# Patient Record
Sex: MALE | Race: WHITE | NOT HISPANIC OR LATINO | Employment: OTHER | URBAN - METROPOLITAN AREA
[De-identification: names, ages, dates, MRNs, and addresses within clinical notes are randomized per-mention and may not be internally consistent; named-entity substitution may affect disease eponyms.]

---

## 2018-06-20 ENCOUNTER — APPOINTMENT (EMERGENCY)
Dept: RADIOLOGY | Facility: HOSPITAL | Age: 63
End: 2018-06-20
Payer: COMMERCIAL

## 2018-06-20 ENCOUNTER — HOSPITAL ENCOUNTER (EMERGENCY)
Facility: HOSPITAL | Age: 63
Discharge: HOME | End: 2018-06-20
Attending: EMERGENCY MEDICINE | Admitting: EMERGENCY MEDICINE
Payer: COMMERCIAL

## 2018-06-20 VITALS
TEMPERATURE: 98.9 F | WEIGHT: 210 LBS | DIASTOLIC BLOOD PRESSURE: 68 MMHG | BODY MASS INDEX: 27.71 KG/M2 | RESPIRATION RATE: 18 BRPM | OXYGEN SATURATION: 97 % | SYSTOLIC BLOOD PRESSURE: 110 MMHG | HEART RATE: 82 BPM

## 2018-06-20 DIAGNOSIS — F10.929 ACUTE ALCOHOL INTOXICATION (HCC): Primary | ICD-10-CM

## 2018-06-20 DIAGNOSIS — S51.019A SKIN TEAR OF ELBOW WITHOUT COMPLICATION: ICD-10-CM

## 2018-06-20 DIAGNOSIS — S09.90XA INJURY OF HEAD, INITIAL ENCOUNTER: ICD-10-CM

## 2018-06-20 LAB
ALBUMIN SERPL BCP-MCNC: 3.4 G/DL (ref 3.5–5)
ALP SERPL-CCNC: 100 U/L (ref 46–116)
ALT SERPL W P-5'-P-CCNC: 28 U/L (ref 12–78)
ANION GAP SERPL CALCULATED.3IONS-SCNC: 11 MMOL/L (ref 4–13)
APAP SERPL-MCNC: <2 UG/ML (ref 10–30)
APTT PPP: 28 SECONDS (ref 24–36)
AST SERPL W P-5'-P-CCNC: 15 U/L (ref 5–45)
BASOPHILS # BLD AUTO: 0.09 THOUSANDS/ΜL (ref 0–0.1)
BASOPHILS NFR BLD AUTO: 1 % (ref 0–1)
BILIRUB SERPL-MCNC: 0.34 MG/DL (ref 0.2–1)
BILIRUB UR QL STRIP: NEGATIVE
BUN SERPL-MCNC: 6 MG/DL (ref 5–25)
CALCIUM SERPL-MCNC: 8.3 MG/DL (ref 8.3–10.1)
CHLORIDE SERPL-SCNC: 97 MMOL/L (ref 100–108)
CLARITY UR: CLEAR
CO2 SERPL-SCNC: 23 MMOL/L (ref 21–32)
COLOR UR: YELLOW
COLOR, POC: NORMAL
CREAT SERPL-MCNC: 0.52 MG/DL (ref 0.6–1.3)
EOSINOPHIL # BLD AUTO: 0.38 THOUSAND/ΜL (ref 0–0.61)
EOSINOPHIL NFR BLD AUTO: 5 % (ref 0–6)
ERYTHROCYTE [DISTWIDTH] IN BLOOD BY AUTOMATED COUNT: 13.3 % (ref 11.6–15.1)
ETHANOL SERPL-MCNC: 233 MG/DL (ref 0–3)
GFR SERPL CREATININE-BSD FRML MDRD: 113 ML/MIN/1.73SQ M
GLUCOSE SERPL-MCNC: 209 MG/DL (ref 65–140)
GLUCOSE UR STRIP-MCNC: NEGATIVE MG/DL
HCT VFR BLD AUTO: 38.3 % (ref 36.5–49.3)
HGB BLD-MCNC: 12.9 G/DL (ref 12–17)
HGB UR QL STRIP.AUTO: NEGATIVE
IMM GRANULOCYTES # BLD AUTO: 0.05 THOUSAND/UL (ref 0–0.2)
IMM GRANULOCYTES NFR BLD AUTO: 1 % (ref 0–2)
INR PPP: 0.96 (ref 0.86–1.17)
KETONES UR STRIP-MCNC: NEGATIVE MG/DL
LEUKOCYTE ESTERASE UR QL STRIP: NEGATIVE
LYMPHOCYTES # BLD AUTO: 3.15 THOUSANDS/ΜL (ref 0.6–4.47)
LYMPHOCYTES NFR BLD AUTO: 43 % (ref 14–44)
MCH RBC QN AUTO: 29.1 PG (ref 26.8–34.3)
MCHC RBC AUTO-ENTMCNC: 33.7 G/DL (ref 31.4–37.4)
MCV RBC AUTO: 86 FL (ref 82–98)
MONOCYTES # BLD AUTO: 0.64 THOUSAND/ΜL (ref 0.17–1.22)
MONOCYTES NFR BLD AUTO: 9 % (ref 4–12)
NEUTROPHILS # BLD AUTO: 2.99 THOUSANDS/ΜL (ref 1.85–7.62)
NEUTS SEG NFR BLD AUTO: 41 % (ref 43–75)
NITRITE UR QL STRIP: NEGATIVE
NRBC BLD AUTO-RTO: 0 /100 WBCS
PH UR STRIP.AUTO: 6 [PH] (ref 4.5–8)
PLATELET # BLD AUTO: 420 THOUSANDS/UL (ref 149–390)
PMV BLD AUTO: 9.8 FL (ref 8.9–12.7)
POTASSIUM SERPL-SCNC: 4 MMOL/L (ref 3.5–5.3)
PROT SERPL-MCNC: 6.6 G/DL (ref 6.4–8.2)
PROT UR STRIP-MCNC: NEGATIVE MG/DL
PROTHROMBIN TIME: 12.9 SECONDS (ref 11.8–14.2)
RBC # BLD AUTO: 4.44 MILLION/UL (ref 3.88–5.62)
SALICYLATES SERPL-MCNC: <3 MG/DL (ref 3–20)
SODIUM SERPL-SCNC: 131 MMOL/L (ref 136–145)
SP GR UR STRIP.AUTO: 1.01 (ref 1–1.03)
UROBILINOGEN UR QL STRIP.AUTO: 0.2 E.U./DL
WBC # BLD AUTO: 7.3 THOUSAND/UL (ref 4.31–10.16)

## 2018-06-20 PROCEDURE — 81003 URINALYSIS AUTO W/O SCOPE: CPT

## 2018-06-20 PROCEDURE — 96360 HYDRATION IV INFUSION INIT: CPT

## 2018-06-20 PROCEDURE — 85730 THROMBOPLASTIN TIME PARTIAL: CPT | Performed by: EMERGENCY MEDICINE

## 2018-06-20 PROCEDURE — 80320 DRUG SCREEN QUANTALCOHOLS: CPT | Performed by: EMERGENCY MEDICINE

## 2018-06-20 PROCEDURE — 85610 PROTHROMBIN TIME: CPT | Performed by: EMERGENCY MEDICINE

## 2018-06-20 PROCEDURE — 80053 COMPREHEN METABOLIC PANEL: CPT | Performed by: EMERGENCY MEDICINE

## 2018-06-20 PROCEDURE — 90471 IMMUNIZATION ADMIN: CPT

## 2018-06-20 PROCEDURE — 96361 HYDRATE IV INFUSION ADD-ON: CPT

## 2018-06-20 PROCEDURE — 70450 CT HEAD/BRAIN W/O DYE: CPT

## 2018-06-20 PROCEDURE — 99284 EMERGENCY DEPT VISIT MOD MDM: CPT

## 2018-06-20 PROCEDURE — 73080 X-RAY EXAM OF ELBOW: CPT

## 2018-06-20 PROCEDURE — 70480 CT ORBIT/EAR/FOSSA W/O DYE: CPT

## 2018-06-20 PROCEDURE — 80329 ANALGESICS NON-OPIOID 1 OR 2: CPT | Performed by: EMERGENCY MEDICINE

## 2018-06-20 PROCEDURE — 90715 TDAP VACCINE 7 YRS/> IM: CPT | Performed by: EMERGENCY MEDICINE

## 2018-06-20 PROCEDURE — 36415 COLL VENOUS BLD VENIPUNCTURE: CPT | Performed by: EMERGENCY MEDICINE

## 2018-06-20 PROCEDURE — 72125 CT NECK SPINE W/O DYE: CPT

## 2018-06-20 PROCEDURE — 85025 COMPLETE CBC W/AUTO DIFF WBC: CPT | Performed by: EMERGENCY MEDICINE

## 2018-06-20 RX ORDER — GLIMEPIRIDE 1 MG/1
1 TABLET ORAL DAILY
COMMUNITY
End: 2020-02-19

## 2018-06-20 RX ORDER — ASPIRIN 81 MG/1
TABLET, CHEWABLE ORAL
COMMUNITY
End: 2020-02-19

## 2018-06-20 RX ORDER — QUETIAPINE FUMARATE 25 MG
1 TABLET ORAL
COMMUNITY

## 2018-06-20 RX ORDER — LISINOPRIL 40 MG/1
40 TABLET ORAL DAILY
COMMUNITY

## 2018-06-20 RX ORDER — LORAZEPAM 1 MG/1
1 TABLET ORAL 2 TIMES DAILY
COMMUNITY
End: 2020-02-19

## 2018-06-20 RX ORDER — LORAZEPAM 0.5 MG/1
TABLET ORAL EVERY 6 HOURS PRN
COMMUNITY

## 2018-06-20 RX ORDER — CITALOPRAM 20 MG/1
10 TABLET ORAL DAILY
COMMUNITY

## 2018-06-20 RX ORDER — CLOPIDOGREL BISULFATE 75 MG/1
TABLET ORAL
COMMUNITY

## 2018-06-20 RX ORDER — IPRATROPIUM BROMIDE AND ALBUTEROL SULFATE 2.5; .5 MG/3ML; MG/3ML
SOLUTION RESPIRATORY (INHALATION)
COMMUNITY
End: 2020-02-21 | Stop reason: HOSPADM

## 2018-06-20 RX ORDER — FLUTICASONE PROPIONATE 50 MCG
1 SPRAY, SUSPENSION (ML) NASAL 2 TIMES DAILY
COMMUNITY
Start: 2013-10-04

## 2018-06-20 RX ORDER — PANTOPRAZOLE SODIUM 40 MG/1
1 TABLET, DELAYED RELEASE ORAL 2 TIMES DAILY
COMMUNITY

## 2018-06-20 RX ORDER — ALBUTEROL SULFATE 90 UG/1
2 AEROSOL, METERED RESPIRATORY (INHALATION) 4 TIMES DAILY PRN
Status: ON HOLD | COMMUNITY
End: 2020-02-21 | Stop reason: SDUPTHER

## 2018-06-20 RX ORDER — METOPROLOL SUCCINATE 100 MG/1
100 TABLET, EXTENDED RELEASE ORAL DAILY
COMMUNITY

## 2018-06-20 RX ORDER — OXYCODONE HYDROCHLORIDE 5 MG/1
1 CAPSULE ORAL EVERY 4 HOURS PRN
COMMUNITY

## 2018-06-20 RX ADMIN — SODIUM CHLORIDE 1000 ML: 0.9 INJECTION, SOLUTION INTRAVENOUS at 13:29

## 2018-06-20 RX ADMIN — TETANUS TOXOID, REDUCED DIPHTHERIA TOXOID AND ACELLULAR PERTUSSIS VACCINE, ADSORBED 0.5 ML: 5; 2.5; 8; 8; 2.5 SUSPENSION INTRAMUSCULAR at 13:59

## 2018-06-20 NOTE — DISCHARGE INSTRUCTIONS
Alcohol Intoxication   WHAT YOU NEED TO KNOW:   Alcohol intoxication is a harmful physical condition caused when you drink more alcohol than your body can handle  It is also called ethanol poisoning, or being drunk  DISCHARGE INSTRUCTIONS:   Medicine: You may be given medicine to manage the signs and symptoms of alcohol intoxication  Take your medicine as directed  Contact your healthcare provider if you think your medicine is not helping or if you have side effects  Tell him if you are allergic to any medicine  Keep a list of the medicines, vitamins, and herbs you take  Include the amounts, and when and why you take them  Bring the list or the pill bottles to follow-up visits  Keep the list with you in case of emergency  Follow up with your healthcare provider as directed:  Write down your questions so you remember to ask them during your visits  Limit or avoid alcohol:  Men should not have more than 2 drinks per day  Women should not have more than 1 drink per day  A drink is 12 ounces of beer, 5 ounces of wine, or 1½ ounces of liquor  Do not drive or operate machines when you drink alcohol:  Make sure you always have someone to drive you when you drink alcohol  For more information:   · Alcoholics Anonymous  Web Address: http://www [employer]/  Contact your healthcare provider if:   · You need help to stop drinking alcohol  · You have trouble with work or school because you drink too much alcohol  · You have physical or verbal fights because of alcohol  · You have questions or concerns about your condition or care  Return to the emergency department if:   · You have sudden trouble breathing or chest pain  · You have a seizure  · You feel sad enough to harm yourself or others  · You have hallucinations (you see or hear things that are not real)  · You cannot stop vomiting  · You were in an accident because of alcohol    © 2017 [employer] Information is for End User's use only and may not be sold, redistributed or otherwise used for commercial purposes  All illustrations and images included in CareNotes® are the copyrighted property of [employer]  or [employer]  The above information is an  only  It is not intended as medical advice for individual conditions or treatments  Talk to your doctor, nurse or pharmacist before following any medical regimen to see if it is safe and effective for you

## 2018-06-20 NOTE — ED PROVIDER NOTES
History  Chief Complaint   Patient presents with    Fall     pt ambulating as per EMS pt stumbeling and fell into garbage can sustaining skin tear to left forearm area, pt with 3week old bruisng and hematomas on forehead and face, no LOC pt reports drinking arrpox 4 beers today pt is on plavix     Patient is a 69-year-old male presenting to the ER today status post fall  Patient was drinking at a local bar and states that he had 4 drinks  States he drank 4 beers specifically  States he left the bar and fell forward striking his head  This was witnessed  No loss of consciousness was noted  Patient was able to get himself up but bystanders called EMS and patient was brought to the ER for further evaluation treatment  Patient takes aspirin and Plavix daily  Fall   Associated symptoms: headaches and neck pain    Associated symptoms: no abdominal pain, no nausea and no vomiting        Prior to Admission Medications   Prescriptions Last Dose Informant Patient Reported? Taking?    B Complex Vitamins (VITAMIN B COMPLEX 100 IJ)   Yes Yes   Sig: Take 1 tablet by mouth daily   LORazepam (ATIVAN) 0 5 mg tablet   Yes Yes   Sig: Take by mouth   LORazepam (ATIVAN) 1 mg tablet   Yes Yes   Sig: Take 1 tablet by mouth 2 (two) times a day   Niacin-Simvastatin ER (SIMCOR) 500-40 MG TB24   Yes Yes   Sig: Take 1 tablet by mouth daily   QUEtiapine (SEROQUEL) 25 mg tablet   Yes Yes   Sig: Take 1 tablet by mouth   albuterol (PROVENTIL HFA,VENTOLIN HFA) 90 mcg/act inhaler   Yes Yes   Sig: Inhale 2 puffs 4 (four) times a day as needed   aspirin 81 mg chewable tablet   Yes Yes   Sig: Chew   betamethasone valerate (VALISONE) 0 1 % cream   Yes Yes   Sig: Apply topically 2 (two) times a day   citalopram (CELEXA) 20 mg tablet   Yes Yes   Sig: Take by mouth   clopidogrel (PLAVIX) 75 mg tablet   Yes Yes   Sig: Take by mouth   fluticasone (FLONASE) 50 mcg/act nasal spray   Yes Yes   Si spray into each nostril 2 (two) times a day glimepiride (AMARYL) 1 mg tablet   Yes Yes   Sig: Take 1 tablet by mouth daily   ipratropium-albuterol (COMBIVENT)  mcg/act inhaler   Yes Yes   Sig: Inhale   ipratropium-albuterol (DUONEB) 0 5-2 5 mg/3 mL nebulizer solution   Yes Yes   Sig: Inhale   lisinopril (ZESTRIL) 40 mg tablet   Yes Yes   Sig: Take by mouth   metFORMIN (GLUCOPHAGE) 1000 MG tablet   Yes Yes   Sig: Take 1 tablet by mouth every 12 (twelve) hours   metoprolol succinate (TOPROL XL) 100 mg 24 hr tablet   Yes Yes   Sig: Take by mouth   oxyCODONE (OXY-IR) 5 MG capsule   Yes Yes   Sig: Take 1 capsule by mouth every 4 (four) hours as needed   pantoprazole (PROTONIX) 40 mg tablet   Yes Yes   Sig: Take 1 tablet by mouth Twice daily   sitaGLIPtin (JANUVIA) 50 mg tablet   Yes Yes   Sig: Take by mouth      Facility-Administered Medications: None       Past Medical History:   Diagnosis Date    Cardiac arrest ([facility] )     Diabetes mellitus ([facility] )        History reviewed  No pertinent surgical history  History reviewed  No pertinent family history  I have reviewed and agree with the history as documented  Social History   Substance Use Topics    Smoking status: Unknown If Ever Smoked    Smokeless tobacco: Not on file    Alcohol use 18 0 oz/week     30 Cans of beer per week        Review of Systems   Constitutional: Negative for activity change, appetite change, chills, fatigue and fever  HENT: Negative  Eyes: Negative  Respiratory: Negative  Cardiovascular: Negative  Gastrointestinal: Negative for abdominal distention, abdominal pain, blood in stool, constipation, diarrhea, nausea and vomiting  Endocrine: Negative  Genitourinary: Negative for decreased urine volume, difficulty urinating, dysuria, enuresis, flank pain, frequency, hematuria, penile swelling, scrotal swelling, testicular pain and urgency  Musculoskeletal: Positive for arthralgias and neck pain  Skin: Positive for wound  Allergic/Immunologic: Negative  Neurological: Positive for headaches  Hematological: Negative  Psychiatric/Behavioral: Negative  All other systems reviewed and are negative  Physical Exam  ED Triage Vitals   Temperature Pulse Respirations Blood Pressure SpO2   06/20/18 1311 06/20/18 1311 06/20/18 1311 06/20/18 1311 06/20/18 1311   98 9 °F (37 2 °C) 86 18 118/70 98 %      Temp Source Heart Rate Source Patient Position - Orthostatic VS BP Location FiO2 (%)   06/20/18 1311 06/20/18 1400 06/20/18 1400 06/20/18 1400 --   Tympanic Monitor Lying Right arm       Pain Score       06/20/18 1311       4           Orthostatic Vital Signs  Vitals:    06/20/18 1311 06/20/18 1400 06/20/18 1547 06/20/18 1600   BP: 118/70 116/67 92/55 110/68   Pulse: 86 79 68 82   Patient Position - Orthostatic VS:  Lying         Physical Exam   Constitutional: He is oriented to person, place, and time  He appears well-developed and well-nourished  HENT:   Head:       Right Ear: External ear normal    Left Ear: External ear normal    Nose: Nose normal    Mouth/Throat: Oropharynx is clear and moist    Eyes: Conjunctivae and EOM are normal  Pupils are equal, round, and reactive to light  Neck: Normal range of motion  Neck supple  No JVD present  No tracheal deviation present  No thyromegaly present  Cardiovascular: Normal rate, regular rhythm, normal heart sounds and intact distal pulses  Exam reveals no gallop and no friction rub  No murmur heard  Pulmonary/Chest: Effort normal and breath sounds normal  No stridor  No respiratory distress  He has no wheezes  He has no rales  He exhibits no tenderness  Abdominal: Soft  Bowel sounds are normal  He exhibits no distension and no mass  There is no tenderness  There is no rebound and no guarding  No hernia  Musculoskeletal: Normal range of motion  He exhibits no edema, tenderness or deformity  Lymphadenopathy:     He has no cervical adenopathy     Neurological: He is alert and oriented to person, place, and time  He has normal reflexes  He displays normal reflexes  No cranial nerve deficit  He exhibits normal muscle tone  Coordination normal    Skin: Skin is warm  No rash noted  No erythema  No pallor  Psychiatric: He has a normal mood and affect  His behavior is normal  Judgment and thought content normal    Nursing note and vitals reviewed  ED Medications  Medications   sodium chloride 0 9 % bolus 1,000 mL (0 mL Intravenous Stopped 6/20/18 1556)   tetanus-diphtheria-acellular pertussis (BOOSTRIX) IM injection 0 5 mL (0 5 mL Intramuscular Given 6/20/18 1359)       Diagnostic Studies  Results Reviewed     Procedure Component Value Units Date/Time    Comprehensive metabolic panel [[id number]]  (Abnormal) Collected:  06/20/18 1329    Lab Status:  Final result Specimen:  Blood from Hand, Right Updated:  06/20/18 1414     Sodium 131 (L) mmol/L      Potassium 4 0 mmol/L      Chloride 97 (L) mmol/L      CO2 23 mmol/L      Anion Gap 11 mmol/L      BUN 6 mg/dL      Creatinine 0 52 (L) mg/dL      Glucose 209 (H) mg/dL      Calcium 8 3 mg/dL      AST 15 U/L      ALT 28 U/L      Alkaline Phosphatase 100 U/L      Total Protein 6 6 g/dL      Albumin 3 4 (L) g/dL      Total Bilirubin 0 34 mg/dL      eGFR 113 ml/min/1 73sq m     Narrative:         National Kidney Disease Education Program recommendations are as follows:  GFR calculation is accurate only with a steady state creatinine  Chronic Kidney disease less than 60 ml/min/1 73 sq  meters  Kidney failure less than 15 ml/min/1 73 sq  meters      Salicylate level [[id number]]  (Abnormal) Collected:  06/20/18 1329    Lab Status:  Final result Specimen:  Blood from Hand, Right Updated:  46/77/86 1178     Salicylate Lvl <3 (L) mg/dL     Acetaminophen level [[id number]]  (Abnormal) Collected:  06/20/18 1329    Lab Status:  Final result Specimen:  Blood from Hand, Right Updated:  06/20/18 1414     Acetaminophen Level <2 (L) ug/mL     Ethanol [[id number]]  (Abnormal) Collected:  06/20/18 1329    Lab Status:  Final result Specimen:  Blood from Hand, Right Updated:  06/20/18 1412     Ethanol Lvl 233 (H) mg/dL     Protime-INR [[id number]]  (Normal) Collected:  06/20/18 1329    Lab Status:  Final result Specimen:  Blood from Hand, Right Updated:  06/20/18 1357     Protime 12 9 seconds      INR 0 96    APTT [[id number]]  (Normal) Collected:  06/20/18 1329    Lab Status:  Final result Specimen:  Blood from Hand, Right Updated:  06/20/18 1357     PTT 28 seconds     CBC and differential [[id number]]  (Abnormal) Collected:  06/20/18 1329    Lab Status:  Final result Specimen:  Blood from Hand, Right Updated:  06/20/18 1345     WBC 7 30 Thousand/uL      RBC 4 44 Million/uL      Hemoglobin 12 9 g/dL      Hematocrit 38 3 %      MCV 86 fL      MCH 29 1 pg      MCHC 33 7 g/dL      RDW 13 3 %      MPV 9 8 fL      Platelets 298 (H) Thousands/uL      nRBC 0 /100 WBCs      Neutrophils Relative 41 (L) %      Immat GRANS % 1 %      Lymphocytes Relative 43 %      Monocytes Relative 9 %      Eosinophils Relative 5 %      Basophils Relative 1 %      Neutrophils Absolute 2 99 Thousands/µL      Immature Grans Absolute 0 05 Thousand/uL      Lymphocytes Absolute 3 15 Thousands/µL      Monocytes Absolute 0 64 Thousand/µL      Eosinophils Absolute 0 38 Thousand/µL      Basophils Absolute 0 09 Thousands/µL     POCT urinalysis dipstick [[id number]]  (Normal) Resulted:  06/20/18 1340    Lab Status:  Final result Specimen:  Urine Updated:  06/20/18 1340     Color, UA see results    ED Urine Macroscopic [[id number]] Collected:  06/20/18 1346    Lab Status:  Final result Specimen:  Urine Updated:  06/20/18 1339     Color, UA Yellow     Clarity, UA Clear     pH, UA 6 0     Leukocytes, UA Negative     Nitrite, UA Negative     Protein, UA Negative mg/dl      Glucose, UA Negative mg/dl      Ketones, UA Negative mg/dl      Urobilinogen, UA 0 2 E U /dl      Bilirubin, UA Negative     Blood, UA Negative     Specific Gravity, UA 1 010    Narrative: CLINITEK RESULT                 CT orbits/temporal bones/skull base wo contrast   Final Result by Fifi Blanchard DO (06/20 1526)   No orbital fracture is seen  Soft tissue hematoma right forehead  Workstation performed: [id number]         CT head without contrast   Final Result by Fifi Blanchard DO (06/20 1520)   No intracranial hemorrhage or territorial infarct  Workstation performed: [id number]         CT cervical spine without contrast   Final Result by Fifi Blanchard DO (06/20 1518)   Postop changes and degenerative changes  Mild progression of degenerative disc disease at C6-C7 since prior exam                    Workstation performed: [id number]         XR elbow 3+ views LEFT   Final Result by Rio Samson MD (06/20 1511)      No acute osseous abnormality              Workstation performed: [id number]               Procedures  Procedures      Phone Consults  ED Phone Contact    ED Course  ED Course as of Jun 21 2054 Wed Jun 20, 2018   1549 MEDICAL ALCOHOL: (!) 233                               MDM  Number of Diagnoses or Management Options  Acute alcohol intoxication [facility]): new and requires workup  Injury of head, initial encounter: new and requires workup  Skin tear of elbow without complication: new and requires workup     Amount and/or Complexity of Data Reviewed  Clinical lab tests: ordered and reviewed  Tests in the radiology section of CPT®: ordered and reviewed  Tests in the medicine section of CPT®: reviewed and ordered  Decide to obtain previous medical records or to obtain history from someone other than the patient: yes  Independent visualization of images, tracings, or specimens: yes    Patient Progress  Patient progress: stable    CritCare Time    Disposition  Final diagnoses:   Acute alcohol intoxication ([facility] )   Skin tear of elbow without complication   Injury of head, initial encounter     Time reflects when diagnosis was documented in both MDM as applicable and the Disposition within this note     Time User Action Codes Description Comment    6/20/2018  4:20 PM Heidy Rough Add [F10 929] Acute alcohol intoxication ([address] )     6/20/2018  4:20 PM Heidy Rough Add [U91 064A] Skin tear of elbow without complication     0/13/4083  4:20 PM Heidy Rough Add [S09 90XA] Injury of head, initial encounter       ED Disposition     ED Disposition Condition Comment    Discharge  Yessica Sabillon discharge to home/self care  Condition at discharge: Good        Follow-up Information     Follow up With Specialties Details Why Contact Info    Master Crews MD Internal Medicine Schedule an appointment as soon as possible for a visit  [address] [place] [address]  [phone]            Discharge Medication List as of 6/20/2018  4:21 PM      CONTINUE these medications which have NOT CHANGED    Details   albuterol (PROVENTIL HFA,VENTOLIN HFA) 90 mcg/act inhaler Inhale 2 puffs 4 (four) times a day as needed, Historical Med      aspirin 81 mg chewable tablet Chew, Historical Med      B Complex Vitamins (VITAMIN B COMPLEX 100 IJ) Take 1 tablet by mouth daily, Historical Med      betamethasone valerate (VALISONE) 0 1 % cream Apply topically 2 (two) times a day, Starting Fri 10/4/2013, Historical Med      citalopram (CELEXA) 20 mg tablet Take by mouth, Historical Med      clopidogrel (PLAVIX) 75 mg tablet Take by mouth, Historical Med      fluticasone (FLONASE) 50 mcg/act nasal spray 1 spray into each nostril 2 (two) times a day, Starting Fri 10/4/2013, Historical Med      glimepiride (AMARYL) 1 mg tablet Take 1 tablet by mouth daily, Historical Med      ipratropium-albuterol (COMBIVENT)  mcg/act inhaler Inhale, Historical Med      ipratropium-albuterol (DUONEB) 0 5-2 5 mg/3 mL nebulizer solution Inhale, Historical Med      lisinopril (ZESTRIL) 40 mg tablet Take by mouth, Historical Med      !!  LORazepam (ATIVAN) 0 5 mg tablet Take by mouth, Historical Med !! LORazepam (ATIVAN) 1 mg tablet Take 1 tablet by mouth 2 (two) times a day, Historical Med      metFORMIN (GLUCOPHAGE) 1000 MG tablet Take 1 tablet by mouth every 12 (twelve) hours, Historical Med      metoprolol succinate (TOPROL XL) 100 mg 24 hr tablet Take by mouth, Historical Med      Niacin-Simvastatin ER (SIMCOR) 500-40 MG TB24 Take 1 tablet by mouth daily, Historical Med      oxyCODONE (OXY-IR) 5 MG capsule Take 1 capsule by mouth every 4 (four) hours as needed, Historical Med      pantoprazole (PROTONIX) 40 mg tablet Take 1 tablet by mouth Twice daily, Historical Med      QUEtiapine (SEROQUEL) 25 mg tablet Take 1 tablet by mouth, Historical Med      sitaGLIPtin (JANUVIA) 50 mg tablet Take by mouth, Historical Med       !! - Potential duplicate medications found  Please discuss with provider  No discharge procedures on file  ED Provider  Attending physically available and evaluated Yisselavery Hernandesgenaro MCCOY managed the patient along with the ED Attending      Electronically Signed by         Lorenza Meigs, DO  06/21/18 2054

## 2018-06-24 NOTE — ED ATTENDING ATTESTATION
Samy Healy MD, saw and evaluated the patient  I have discussed the patient with the resident/non-physician practitioner and agree with the resident's/non-physician practitioner's findings, Plan of Care, and MDM as documented in the resident's/non-physician practitioner's note, except where noted  All available labs and Radiology studies were reviewed  At this point I agree with the current assessment done in the Emergency Department  I have conducted an independent evaluation of this patient a history and physical is as follows:  Patient was drinking at the bar and had a fall  Patient also has noted ecchymosis to its eyes bilaterally and face bilaterally periorbital early patient states that he fell a couple weeks ago but was not seen or evaluated at that time  Patient is intoxicated at this time and has no complaints of other than a headache  PE alert there is ecchymosis and swelling noted to the anterior forehead or specially over the right    There is bilateral periorbital ecchymosis noted TMs are clear neck is nontender and in collar heart regular lungs clear abdomen soft nondistended neurologically the patient is intact MDM:  Will do CT of head and neck check alcohol level and allowed to sober    Critical Care Time  CritCare Time    Procedures

## 2018-09-09 ENCOUNTER — APPOINTMENT (EMERGENCY)
Dept: RADIOLOGY | Facility: HOSPITAL | Age: 63
DRG: 471 | End: 2018-09-09
Payer: COMMERCIAL

## 2018-09-09 ENCOUNTER — APPOINTMENT (INPATIENT)
Dept: RADIOLOGY | Facility: HOSPITAL | Age: 63
DRG: 471 | End: 2018-09-09
Payer: COMMERCIAL

## 2018-09-09 ENCOUNTER — HOSPITAL ENCOUNTER (INPATIENT)
Facility: HOSPITAL | Age: 63
LOS: 5 days | DRG: 471 | End: 2018-09-14
Attending: SURGERY | Admitting: EMERGENCY MEDICINE
Payer: COMMERCIAL

## 2018-09-09 DIAGNOSIS — S12.9XXA: ICD-10-CM

## 2018-09-09 DIAGNOSIS — S12.290A: ICD-10-CM

## 2018-09-09 DIAGNOSIS — S12.110A DENS FRACTURE (HCC): Primary | ICD-10-CM

## 2018-09-09 DIAGNOSIS — Z98.1 S/P CERVICAL SPINAL FUSION: ICD-10-CM

## 2018-09-09 DIAGNOSIS — L89.159 DECUBITUS ULCER OF SACRAL REGION, UNSPECIFIED ULCER STAGE: ICD-10-CM

## 2018-09-09 DIAGNOSIS — S12.390A OTHER CLOSED DISPLACED FRACTURE OF FOURTH CERVICAL VERTEBRA, INITIAL ENCOUNTER (HCC): ICD-10-CM

## 2018-09-09 DIAGNOSIS — S12.300D CLOSED DISPLACED FRACTURE OF FOURTH CERVICAL VERTEBRA WITH ROUTINE HEALING, UNSPECIFIED FRACTURE MORPHOLOGY, SUBSEQUENT ENCOUNTER: ICD-10-CM

## 2018-09-09 DIAGNOSIS — S12.200D CLOSED DISPLACED FRACTURE OF THIRD CERVICAL VERTEBRA WITH ROUTINE HEALING, UNSPECIFIED FRACTURE MORPHOLOGY, SUBSEQUENT ENCOUNTER: ICD-10-CM

## 2018-09-09 DIAGNOSIS — S12.100D CLOSED ODONTOID FRACTURE WITH ROUTINE HEALING: ICD-10-CM

## 2018-09-09 PROBLEM — S12.200A CLOSED DISPLACED FRACTURE OF THIRD CERVICAL VERTEBRA (HCC): Status: ACTIVE | Noted: 2018-09-09

## 2018-09-09 PROBLEM — S12.300A CLOSED DISPLACED FRACTURE OF FOURTH CERVICAL VERTEBRA (HCC): Status: ACTIVE | Noted: 2018-09-09

## 2018-09-09 PROBLEM — I25.10 CAD (CORONARY ARTERY DISEASE): Status: ACTIVE | Noted: 2018-09-09

## 2018-09-09 PROBLEM — F10.10 ALCOHOL ABUSE: Status: ACTIVE | Noted: 2018-09-09

## 2018-09-09 LAB
ANION GAP SERPL CALCULATED.3IONS-SCNC: 13 MMOL/L (ref 4–13)
ANISOCYTOSIS BLD QL SMEAR: PRESENT
BASE EXCESS BLDA CALC-SCNC: 1 MMOL/L (ref -2–3)
BASOPHILS # BLD MANUAL: 0 THOUSAND/UL (ref 0–0.1)
BASOPHILS NFR MAR MANUAL: 0 % (ref 0–1)
BUN SERPL-MCNC: 6 MG/DL (ref 5–25)
CA-I BLD-SCNC: 1.09 MMOL/L (ref 1.12–1.32)
CALCIUM SERPL-MCNC: 8.2 MG/DL (ref 8.3–10.1)
CHLORIDE SERPL-SCNC: 98 MMOL/L (ref 100–108)
CO2 SERPL-SCNC: 23 MMOL/L (ref 21–32)
CREAT SERPL-MCNC: 0.72 MG/DL (ref 0.6–1.3)
EOSINOPHIL # BLD MANUAL: 0.56 THOUSAND/UL (ref 0–0.4)
EOSINOPHIL NFR BLD MANUAL: 6 % (ref 0–6)
ERYTHROCYTE [DISTWIDTH] IN BLOOD BY AUTOMATED COUNT: 17.4 % (ref 11.6–15.1)
GFR SERPL CREATININE-BSD FRML MDRD: 99 ML/MIN/1.73SQ M
GIANT PLATELETS BLD QL SMEAR: PRESENT
GLUCOSE SERPL-MCNC: 173 MG/DL (ref 65–140)
GLUCOSE SERPL-MCNC: 184 MG/DL (ref 65–140)
GLUCOSE SERPL-MCNC: 185 MG/DL (ref 65–140)
GLUCOSE SERPL-MCNC: 221 MG/DL (ref 65–140)
GLUCOSE SERPL-MCNC: 284 MG/DL (ref 65–140)
HCO3 BLDA-SCNC: 25.4 MMOL/L (ref 24–30)
HCT VFR BLD AUTO: 42.1 % (ref 36.5–49.3)
HCT VFR BLD CALC: 42 % (ref 36.5–49.3)
HGB BLD-MCNC: 14.2 G/DL (ref 12–17)
HGB BLDA-MCNC: 14.3 G/DL (ref 12–17)
INR PPP: 0.89 (ref 0.86–1.17)
LYMPHOCYTES # BLD AUTO: 3.65 THOUSAND/UL (ref 0.6–4.47)
LYMPHOCYTES # BLD AUTO: 39 % (ref 14–44)
MCH RBC QN AUTO: 27 PG (ref 26.8–34.3)
MCHC RBC AUTO-ENTMCNC: 33.7 G/DL (ref 31.4–37.4)
MCV RBC AUTO: 80 FL (ref 82–98)
MONOCYTES # BLD AUTO: 0.56 THOUSAND/UL (ref 0–1.22)
MONOCYTES NFR BLD: 6 % (ref 4–12)
NEUTROPHILS # BLD MANUAL: 4.12 THOUSAND/UL (ref 1.85–7.62)
NEUTS BAND NFR BLD MANUAL: 3 % (ref 0–8)
NEUTS SEG NFR BLD AUTO: 41 % (ref 43–75)
NRBC BLD AUTO-RTO: 0 /100 WBCS
PA ADP BLD-ACNC: 213 PRU (ref 194–418)
PA ADP BLD-ACNC: 649 ARU
PCO2 BLD: 27 MMOL/L (ref 21–32)
PCO2 BLD: 37.2 MM HG (ref 42–50)
PH BLD: 7.44 [PH] (ref 7.3–7.4)
PLATELET # BLD AUTO: 368 THOUSANDS/UL (ref 149–390)
PLATELET BLD QL SMEAR: ADEQUATE
PMV BLD AUTO: 9.4 FL (ref 8.9–12.7)
PO2 BLD: 72 MM HG (ref 35–45)
POIKILOCYTOSIS BLD QL SMEAR: PRESENT
POLYCHROMASIA BLD QL SMEAR: PRESENT
POTASSIUM BLD-SCNC: 4.5 MMOL/L (ref 3.5–5.3)
POTASSIUM SERPL-SCNC: 4.1 MMOL/L (ref 3.5–5.3)
PROTHROMBIN TIME: 12.1 SECONDS (ref 11.8–14.2)
RBC # BLD AUTO: 5.26 MILLION/UL (ref 3.88–5.62)
RBC MORPH BLD: PRESENT
SAO2 % BLD FROM PO2: 95 % (ref 95–98)
SODIUM BLD-SCNC: 134 MMOL/L (ref 136–145)
SODIUM SERPL-SCNC: 134 MMOL/L (ref 136–145)
SPECIMEN SOURCE: ABNORMAL
TARGETS BLD QL SMEAR: PRESENT
VARIANT LYMPHS # BLD AUTO: 5 %
WBC # BLD AUTO: 9.37 THOUSAND/UL (ref 4.31–10.16)

## 2018-09-09 PROCEDURE — 85007 BL SMEAR W/DIFF WBC COUNT: CPT | Performed by: SURGERY

## 2018-09-09 PROCEDURE — 85014 HEMATOCRIT: CPT

## 2018-09-09 PROCEDURE — 36415 COLL VENOUS BLD VENIPUNCTURE: CPT | Performed by: SURGERY

## 2018-09-09 PROCEDURE — 94760 N-INVAS EAR/PLS OXIMETRY 1: CPT

## 2018-09-09 PROCEDURE — 82948 REAGENT STRIP/BLOOD GLUCOSE: CPT

## 2018-09-09 PROCEDURE — 99223 1ST HOSP IP/OBS HIGH 75: CPT | Performed by: SURGERY

## 2018-09-09 PROCEDURE — 70498 CT ANGIOGRAPHY NECK: CPT

## 2018-09-09 PROCEDURE — 84132 ASSAY OF SERUM POTASSIUM: CPT

## 2018-09-09 PROCEDURE — C9113 INJ PANTOPRAZOLE SODIUM, VIA: HCPCS | Performed by: STUDENT IN AN ORGANIZED HEALTH CARE EDUCATION/TRAINING PROGRAM

## 2018-09-09 PROCEDURE — 70450 CT HEAD/BRAIN W/O DYE: CPT

## 2018-09-09 PROCEDURE — 82330 ASSAY OF CALCIUM: CPT

## 2018-09-09 PROCEDURE — 82803 BLOOD GASES ANY COMBINATION: CPT

## 2018-09-09 PROCEDURE — 82947 ASSAY GLUCOSE BLOOD QUANT: CPT

## 2018-09-09 PROCEDURE — 72141 MRI NECK SPINE W/O DYE: CPT

## 2018-09-09 PROCEDURE — 74177 CT ABD & PELVIS W/CONTRAST: CPT

## 2018-09-09 PROCEDURE — 99285 EMERGENCY DEPT VISIT HI MDM: CPT

## 2018-09-09 PROCEDURE — 92610 EVALUATE SWALLOWING FUNCTION: CPT

## 2018-09-09 PROCEDURE — 85610 PROTHROMBIN TIME: CPT | Performed by: SURGERY

## 2018-09-09 PROCEDURE — 94660 CPAP INITIATION&MGMT: CPT

## 2018-09-09 PROCEDURE — 85576 BLOOD PLATELET AGGREGATION: CPT | Performed by: NEUROLOGICAL SURGERY

## 2018-09-09 PROCEDURE — 94640 AIRWAY INHALATION TREATMENT: CPT

## 2018-09-09 PROCEDURE — 71260 CT THORAX DX C+: CPT

## 2018-09-09 PROCEDURE — 80048 BASIC METABOLIC PNL TOTAL CA: CPT | Performed by: SURGERY

## 2018-09-09 PROCEDURE — 99254 IP/OBS CNSLTJ NEW/EST MOD 60: CPT | Performed by: NEUROLOGICAL SURGERY

## 2018-09-09 PROCEDURE — 85027 COMPLETE CBC AUTOMATED: CPT | Performed by: SURGERY

## 2018-09-09 PROCEDURE — 84295 ASSAY OF SERUM SODIUM: CPT

## 2018-09-09 PROCEDURE — 72125 CT NECK SPINE W/O DYE: CPT

## 2018-09-09 RX ORDER — IPRATROPIUM BROMIDE AND ALBUTEROL SULFATE 2.5; .5 MG/3ML; MG/3ML
3 SOLUTION RESPIRATORY (INHALATION)
Status: DISCONTINUED | OUTPATIENT
Start: 2018-09-09 | End: 2018-09-09

## 2018-09-09 RX ORDER — LORAZEPAM 2 MG/ML
1 INJECTION INTRAMUSCULAR ONCE
Status: COMPLETED | OUTPATIENT
Start: 2018-09-09 | End: 2018-09-09

## 2018-09-09 RX ORDER — DIAZEPAM 5 MG/ML
5 INJECTION, SOLUTION INTRAMUSCULAR; INTRAVENOUS EVERY 6 HOURS PRN
Status: DISCONTINUED | OUTPATIENT
Start: 2018-09-09 | End: 2018-09-10

## 2018-09-09 RX ORDER — FENTANYL CITRATE 50 UG/ML
50 INJECTION, SOLUTION INTRAMUSCULAR; INTRAVENOUS ONCE
Status: COMPLETED | OUTPATIENT
Start: 2018-09-09 | End: 2018-09-09

## 2018-09-09 RX ORDER — OXYCODONE HYDROCHLORIDE 5 MG/1
5 TABLET ORAL EVERY 4 HOURS PRN
Status: DISCONTINUED | OUTPATIENT
Start: 2018-09-09 | End: 2018-09-09

## 2018-09-09 RX ORDER — METOPROLOL TARTRATE 50 MG/1
50 TABLET, FILM COATED ORAL EVERY 12 HOURS SCHEDULED
Status: DISCONTINUED | OUTPATIENT
Start: 2018-09-09 | End: 2018-09-14 | Stop reason: HOSPADM

## 2018-09-09 RX ORDER — OXYCODONE HYDROCHLORIDE 5 MG/1
10 TABLET ORAL EVERY 4 HOURS PRN
Status: DISCONTINUED | OUTPATIENT
Start: 2018-09-09 | End: 2018-09-14 | Stop reason: HOSPADM

## 2018-09-09 RX ORDER — ALBUTEROL SULFATE 90 UG/1
2 AEROSOL, METERED RESPIRATORY (INHALATION) EVERY 4 HOURS PRN
Status: DISCONTINUED | OUTPATIENT
Start: 2018-09-09 | End: 2018-09-14 | Stop reason: HOSPADM

## 2018-09-09 RX ORDER — LEVALBUTEROL 1.25 MG/.5ML
1.25 SOLUTION, CONCENTRATE RESPIRATORY (INHALATION)
Status: DISCONTINUED | OUTPATIENT
Start: 2018-09-09 | End: 2018-09-14 | Stop reason: HOSPADM

## 2018-09-09 RX ORDER — FENTANYL CITRATE 50 UG/ML
INJECTION, SOLUTION INTRAMUSCULAR; INTRAVENOUS
Status: COMPLETED
Start: 2018-09-09 | End: 2018-09-09

## 2018-09-09 RX ORDER — ACETAMINOPHEN 325 MG/1
975 TABLET ORAL EVERY 8 HOURS SCHEDULED
Status: DISCONTINUED | OUTPATIENT
Start: 2018-09-09 | End: 2018-09-14 | Stop reason: HOSPADM

## 2018-09-09 RX ORDER — HYDROMORPHONE HCL 110MG/55ML
0.5 PATIENT CONTROLLED ANALGESIA SYRINGE INTRAVENOUS EVERY 4 HOURS PRN
Status: DISCONTINUED | OUTPATIENT
Start: 2018-09-09 | End: 2018-09-09 | Stop reason: CLARIF

## 2018-09-09 RX ORDER — SODIUM CHLORIDE 9 MG/ML
125 INJECTION, SOLUTION INTRAVENOUS CONTINUOUS
Status: DISCONTINUED | OUTPATIENT
Start: 2018-09-09 | End: 2018-09-11

## 2018-09-09 RX ORDER — OXYCODONE HYDROCHLORIDE 5 MG/1
2.5 TABLET ORAL EVERY 4 HOURS PRN
Status: DISCONTINUED | OUTPATIENT
Start: 2018-09-09 | End: 2018-09-09

## 2018-09-09 RX ORDER — OXAZEPAM 10 MG
10 CAPSULE ORAL EVERY 6 HOURS SCHEDULED
Status: DISCONTINUED | OUTPATIENT
Start: 2018-09-09 | End: 2018-09-10

## 2018-09-09 RX ORDER — ACETAMINOPHEN 325 MG/1
650 TABLET ORAL EVERY 6 HOURS PRN
Status: DISCONTINUED | OUTPATIENT
Start: 2018-09-09 | End: 2018-09-09

## 2018-09-09 RX ORDER — LIDOCAINE 50 MG/G
1 PATCH TOPICAL DAILY
Status: DISCONTINUED | OUTPATIENT
Start: 2018-09-09 | End: 2018-09-14 | Stop reason: HOSPADM

## 2018-09-09 RX ORDER — OXYCODONE HYDROCHLORIDE 10 MG/1
10 TABLET ORAL EVERY 4 HOURS PRN
Status: DISCONTINUED | OUTPATIENT
Start: 2018-09-09 | End: 2018-09-09

## 2018-09-09 RX ORDER — PANTOPRAZOLE SODIUM 40 MG/1
40 INJECTION, POWDER, FOR SOLUTION INTRAVENOUS
Status: DISCONTINUED | OUTPATIENT
Start: 2018-09-09 | End: 2018-09-10

## 2018-09-09 RX ORDER — OXYCODONE HYDROCHLORIDE 5 MG/1
5 TABLET ORAL EVERY 4 HOURS PRN
Status: DISCONTINUED | OUTPATIENT
Start: 2018-09-09 | End: 2018-09-14 | Stop reason: HOSPADM

## 2018-09-09 RX ADMIN — PANTOPRAZOLE SODIUM 40 MG: 40 INJECTION, POWDER, FOR SOLUTION INTRAVENOUS at 12:32

## 2018-09-09 RX ADMIN — LORAZEPAM 1 MG: 2 INJECTION INTRAMUSCULAR; INTRAVENOUS at 11:00

## 2018-09-09 RX ADMIN — LIDOCAINE 1 PATCH: 50 PATCH TOPICAL at 08:45

## 2018-09-09 RX ADMIN — HYDROMORPHONE HYDROCHLORIDE 0.5 MG: 1 INJECTION, SOLUTION INTRAMUSCULAR; INTRAVENOUS; SUBCUTANEOUS at 10:40

## 2018-09-09 RX ADMIN — Medication 5 MG: at 09:44

## 2018-09-09 RX ADMIN — THIAMINE HYDROCHLORIDE 100 MG: 100 INJECTION, SOLUTION INTRAMUSCULAR; INTRAVENOUS at 08:46

## 2018-09-09 RX ADMIN — OXYCODONE HYDROCHLORIDE 5 MG: 5 TABLET ORAL at 04:08

## 2018-09-09 RX ADMIN — OXYCODONE HYDROCHLORIDE 10 MG: 10 TABLET ORAL at 15:15

## 2018-09-09 RX ADMIN — METOPROLOL TARTRATE 50 MG: 50 TABLET ORAL at 14:25

## 2018-09-09 RX ADMIN — IPRATROPIUM BROMIDE 0.5 MG: 0.5 SOLUTION RESPIRATORY (INHALATION) at 19:12

## 2018-09-09 RX ADMIN — LEVALBUTEROL HYDROCHLORIDE 1.25 MG: 1.25 SOLUTION, CONCENTRATE RESPIRATORY (INHALATION) at 08:31

## 2018-09-09 RX ADMIN — ACETAMINOPHEN 975 MG: 325 TABLET ORAL at 14:12

## 2018-09-09 RX ADMIN — METOPROLOL TARTRATE 50 MG: 50 TABLET ORAL at 21:07

## 2018-09-09 RX ADMIN — FOLIC ACID 1 MG: 5 INJECTION, SOLUTION INTRAMUSCULAR; INTRAVENOUS; SUBCUTANEOUS at 12:23

## 2018-09-09 RX ADMIN — IPRATROPIUM BROMIDE 0.5 MG: 0.5 SOLUTION RESPIRATORY (INHALATION) at 08:32

## 2018-09-09 RX ADMIN — SODIUM CHLORIDE 125 ML/HR: 0.9 INJECTION, SOLUTION INTRAVENOUS at 17:27

## 2018-09-09 RX ADMIN — HYDROMORPHONE HYDROCHLORIDE 0.5 MG: 2 INJECTION INTRAMUSCULAR; INTRAVENOUS; SUBCUTANEOUS at 06:31

## 2018-09-09 RX ADMIN — LEVALBUTEROL HYDROCHLORIDE 1.25 MG: 1.25 SOLUTION, CONCENTRATE RESPIRATORY (INHALATION) at 13:33

## 2018-09-09 RX ADMIN — FENTANYL CITRATE 100 MCG: 50 INJECTION INTRAMUSCULAR; INTRAVENOUS at 08:16

## 2018-09-09 RX ADMIN — OXAZEPAM 10 MG: 10 CAPSULE, GELATIN COATED ORAL at 14:12

## 2018-09-09 RX ADMIN — IOHEXOL 100 ML: 350 INJECTION, SOLUTION INTRAVENOUS at 02:42

## 2018-09-09 RX ADMIN — OXYCODONE HYDROCHLORIDE 10 MG: 10 TABLET ORAL at 21:08

## 2018-09-09 RX ADMIN — SODIUM CHLORIDE 125 ML/HR: 0.9 INJECTION, SOLUTION INTRAVENOUS at 06:18

## 2018-09-09 RX ADMIN — IPRATROPIUM BROMIDE 0.5 MG: 0.5 SOLUTION RESPIRATORY (INHALATION) at 13:33

## 2018-09-09 RX ADMIN — FENTANYL CITRATE 100 MCG: 50 INJECTION, SOLUTION INTRAMUSCULAR; INTRAVENOUS at 08:16

## 2018-09-09 RX ADMIN — INSULIN LISPRO 2 UNITS: 100 INJECTION, SOLUTION INTRAVENOUS; SUBCUTANEOUS at 13:01

## 2018-09-09 RX ADMIN — OXYCODONE HYDROCHLORIDE 5 MG: 5 TABLET ORAL at 12:28

## 2018-09-09 RX ADMIN — OXAZEPAM 10 MG: 10 CAPSULE, GELATIN COATED ORAL at 19:05

## 2018-09-09 RX ADMIN — HYDROMORPHONE HYDROCHLORIDE 0.5 MG: 1 INJECTION, SOLUTION INTRAMUSCULAR; INTRAVENOUS; SUBCUTANEOUS at 03:04

## 2018-09-09 RX ADMIN — ACETAMINOPHEN 975 MG: 325 TABLET ORAL at 21:32

## 2018-09-09 RX ADMIN — LEVALBUTEROL HYDROCHLORIDE 1.25 MG: 1.25 SOLUTION, CONCENTRATE RESPIRATORY (INHALATION) at 19:12

## 2018-09-09 NOTE — RESPIRATORY THERAPY NOTE
RT Protocol Note  Germaine Lizarraga 58 y o  male MRN: [id number]  Unit/Bed#: ICU 09 Encounter: [id number]    Assessment    Active Problems:    * No active hospital problems  *      Home Pulmonary Medications:  TID Duoneb        Past Medical History:   Diagnosis Date    [facility]chronic obstructive [facility] disease) [facility]     CVA [facility]cerebral vascular accident) ([facility] )     Diabetes mellitus ([facility] )     Heart attack ([facility] )     Hypertension      Social History     Social History    Marital status:      Spouse name: N/A    Number of children: N/A    Years of education: N/A     Social History Main Topics    Smoking status: Former Smoker    Smokeless tobacco: Never Used    Alcohol use Yes    Drug use: No    Sexual activity: Not Asked     Other Topics Concern    None     Social History Narrative    None       Subjective         Objective    Physical Exam:   Assessment Type: (P) Assess only  General Appearance: (P) Awake, Alert  Respiratory Pattern: (P) Normal  Chest Assessment: (P) Chest expansion asymmetrical  Bilateral Breath Sounds: (P) Clear, Diminished  O2 Device: (P) NC    Vitals:  Blood pressure 149/83, pulse 90, temperature 98 3 °F (36 8 °C), temperature source Oral, resp  rate 18, height 6' 1" (1 854 m), weight 84 8 kg (186 lb 15 2 oz), SpO2 97 %  Imaging and other studies: I have personally reviewed pertinent reports  O2 Device: (P) NC     Plan    Respiratory Plan: (P) Home Bronchodilator Patient pathway        Resp Comments: (P) Pt ordered on txs so resp protocol was done, pt was a Trauma lvl B for a fall down some stairs  Pt has hx of COPD and takes TID txs at home JULIO Martin  Pt has clear bs at this time and is sating 99% on 3L NC  Will make TID txs Xopenex/Atrovent while he is here per protocol

## 2018-09-09 NOTE — SPEECH THERAPY NOTE
Speech Language/Pathology  Speech/Language Pathology  Assessment    Patient Name: Naomie Koyanagi  BCSJO'U Date: 9/9/2018     Problem List  Patient Active Problem List   Diagnosis    Closed odontoid fracture with routine healing    [facility]    Closed displaced fracture of fourth cervical vertebra ([facility]    Alcohol abuse    Decubitus ulcer of sacral region    CAD (coronary artery disease)     Past Medical History  Past Medical History:   Diagnosis Date    [facility]chronic obstructive [facility] disease) ([facility]     [facility]     Diabetes mellitus ([facility] )     Heart attack ([facility] )     Hypertension      Past Surgical History  History reviewed  No pertinent surgical history  Attestation signed by Cecilia Murillo MD at 9/9/2018 9:05 AM   I have personally seen the patient  I discussed the case with the team, including the practitioner who completed the surgical progress note and agree with the history, exam, assessment, and plan as outlined below except where otherwise noted  I personally reviewed appropriate imaging studies in PACS, laboratory data, medications, and vital signs and have noted significant findings      58year old male s/p fall down 15 stairs brought in as level B alert  Exam:  GCS 14, motor and sensory grossly intact, no deformities, no other injuries noted  Imaging: CT neck with multiple cervical spine fractures C2-5 above previous hardware  CTA neck with no traumatic vascular injury seen  Plan: admit to ICU with multiple fractures, follow neuro status, neurosurgery evaluation, hard cervical collar , spine precautions     S/p Fall down ~15 steps  - F/u CT chest, abdomen, and pelvis  History of Present Illness         HPI:  Naomie Koyanagi is a 58 y o  male who presents following a fall at home down ~15 steps  The patient is heavily intoxicated  There was no LOC  He is on plavix for cardiac stents   He is not complaining of any pain in trauma bay  His Cspine was non-tender to palpation  CT Cspine done showing multiple acute cervical fractures       Assessment:n (per neuro sx consult)  C2, C3, C4, C5 vertebral body fractures with likely anterior ligament rupture and disc rupture  He appears neuro intact except the LUE triceps appeared slightly weak or maybe pain limited weakness  MRI  IMPRESSION:     Exaggerated cervical lordosis  Fractures of the dens, C3, C4 and C5 vertebral bodies demonstrate similar alignment compared to recent CT scan  The dens is slightly displaced posteriorly in relation to the body of C2, approximately 4 mm      Extensive prevertebral edema extending from the level of the clivus inferiorly to the level of C5  There is edema within the interspinous ligaments and paraspinal musculature posteriorly  There is disruption of the anterior longitudinal ligament  However, the posterior longitudinal ligament and ligamentum flavum appear intact      Severe canal stenosis at the C3-4, C4-5 levels  Moderate canal stenosis at C5-6 and C6-7  This is related to annular bulging, endplate and posterior element hypertrophic change  Multilevel foraminal narrowing, severe at C6-7 and moderate at the C3-4,   C4-5 and C5-6 levels      Evaluation of the cord is somewhat limited due to patient motion and the severity of the canal stenosis  However, there does not appear to be cord edema to suggest acute cord injury  Summary:  Pt presents w/ "full" speech, suspect due to pharyngeal edema  Tolerated puree and honey thick by tsp given at a slow controlled rate w/ time allowed for secondary or even a third swallow  + cough w/ larger sips nectar, jello  Mild wet vocal quality w/ ice chips  He was unable to orally transfer a VERY small pill whole       Recommendations:  Diet: puree  Liquid: honey thick by tsp  Meds:crush  Supervision:full  Positioning:Upright (in aspen collar at this time)  Strategies: Pt to take PO/Meds only when fully alert and upright  Oral care  Aspiration precautions  Reflux precautions  ? Steroids/other  for edema  Monitor for s/s increased edema    Therapy Prognosis:favorable  Prognosis considerations: age, cog status  Frequency: 2-5x/week    Patient's goal: "i'd love a steak"    Consider consult w/:  Nutrition    Reason for consult:  R/o aspiration  Determine safest and least restrictive diet  Failed nursing dysphagia assessment,  when done the second time  Passed initially  Coughed w/ thin  Current diet:  npo  Premorbid diet[de-identified]  regular  Previous VBS:  none  O2 requirement:  nc  Voice/Speech:  "full" from swelling  Social:  Lives w/ daughter and grandchildren  Follows commands:  yes                       Cognitive Status:  Alert, appropriate, but when cued for small sips he stated he generally doesn't listen when people tell him what to do Mayte Gonzalez  Oral mech exam:  Full dentition  Full symmetry    Items administered:  Puree,  honey thick liquid, nectar thick liquid, ice chips, jello  Liquids were taken by straw/tsp  Oral stage: WNL w/ limited trials  Lip closure: wnl  Mastication:wnl w/ jello  Bolus formation:appeared adequate  Bolus control: wfl/mildly reduced  Transfer: wfl  Oral residue: none observed  Pocketing:none    Pharyngeal stage: Mod  Swallow promptness: mild delay  Laryngeal rise: fair/reduced (c collar in place)  Wet voice: nectar and jello, mild w/ ice chips  Throat clear: nectar and jello  Cough: nectar and jello  Secondary swallows: all trials, occasionally 3  Audible swallows: min, intermittent    Esophageal stage:  No s/s reported    Aspiration precautions posted    Results d/w:  Pt, nursing    Goal(s):  Pt will tolerate least restrictive diet w/out s/s aspiration or oral/pharyngeal difficulties  Further testing if indicated

## 2018-09-09 NOTE — H&P
H&P Exam - Trauma   Suma Player 58 y o  male MRN: [id number]  Unit/Bed#: TR 02 Encounter: [id number]    Assessment/Plan   Trauma Alert: Level B  Model of Arrival: Ambulance  Trauma Team: Attending Dr Bryan Zhang, Residents Dr Zeb Quiñonez and Fellow Dr Benito Cutler  Consultants: None    Trauma Active Problem/Plan  Cervical fractures  - Continue Cervical collar  - Neurosurgery consult  - F/u CTA  - ICU care    S/p Fall down ~15 steps  - F/u CT chest, abdomen, and pelvis    History of Present Illness   HPI:  Suma Alegria is a 58 y o  male who presents following a fall at home down ~15 steps  The patient is heavily intoxicated  There was no LOC  He is on plavix for cardiac stents  He is not complaining of any pain in trauma bay  His Cspine was non-tender to palpation  CT Cspine done showing multiple acute cervical fractures  Mechanism:Fall    Review of Systems   Constitutional: Negative for chills and fever  Respiratory: Negative for chest tightness and shortness of breath  Cardiovascular: Negative for chest pain and palpitations  Gastrointestinal: Negative for abdominal pain and nausea  Genitourinary: Negative for flank pain  Skin: Negative for rash and wound  Neurological: Negative for weakness and light-headedness  All other systems reviewed and are negative  Historical Information     Past Medical History:   Diagnosis Date    [facility]     [facility] )     Hypertension      History reviewed  No pertinent surgical history  Social History   History   Alcohol Use    Yes     History   Drug Use No     History   Smoking Status    Former Smoker   Smokeless Tobacco    Never Used       There is no immunization history on file for this patient  Last Tetanus: Today  Family History: Non-contributory    Meds/Allergies   all current active meds have been reviewed, current meds:   No current facility-administered medications for this encounter       and PTA meds: None       Allergies   Allergen Reactions    Augmentin [Amoxicillin-Pot Clavulanate]          PHYSICAL EXAM    Objective   Vitals:   First set: Temperature: 97 7 °F (36 5 °C) (09/09/18 0214)  Pulse: (!) 111 (09/09/18 0214)  Respirations: 22 (09/09/18 0214)  Blood Pressure: (!) 183/108 (09/09/18 0214)    Primary Survey:   (A) Airway: Intact  Cervical collar in place  (B) Breathing: B/l breath sounds, on 3L  (C) Circulation: Pulses:   normal  (D) Disabliity:  GCS Total:  14  (E) Expose:  Completed    Secondary Survey: (Click on Physical Exam tab above)  Physical Exam   Constitutional: He is oriented to person, place, and time  He appears well-developed and well-nourished  No distress  HENT:   Head: Normocephalic and atraumatic  Eyes: EOM are normal  Pupils are equal, round, and reactive to light  Cardiovascular: Normal rate and regular rhythm  Pulmonary/Chest: Effort normal and breath sounds normal  No respiratory distress  Abdominal: Soft  He exhibits no distension  There is no tenderness  There is no rebound and no guarding  Musculoskeletal: He exhibits no edema or deformity  Neurological: He is alert and oriented to person, place, and time  Skin: Skin is warm  No rash noted  He is not diaphoretic  No erythema  No pallor  Psychiatric: He has a normal mood and affect  His behavior is normal  Judgment and thought content normal        Invasive Devices     Peripheral Intravenous Line            Peripheral IV 09/09/18 Left Antecubital less than 1 day                Lab Results:   Results: I have personally reviewed pertinent reports   , BMP/CMP:   Lab Results   Component Value Date    GLUCOSE 185 (H) 09/09/2018    and CBC:   Lab Results   Component Value Date    HGB 14 3 09/09/2018    HCT 42 09/09/2018     Imaging/EKG Studies:  Ct Head Without Contrast    Result Date: 9/9/2018  Impression: No acute intracranial abnormality  Mild chronic small vessel ischemic changes and volume loss   Workstation performed: [id number]     Ct Spine Cervical Without Contrast    Result Date: 9/9/2018  Impression: Acute fractures of C2, C3, C4 and C5 as described above  Acute nondisplaced left occipital condyle fracture  An MRI of the cervical spine could be performed for further evaluation  Moderate prevertebral soft tissue swelling, likely posttraumatic in nature  I personally discussed this study with Debbie Cano on 9/9/2018 at 2:50 AM   Workstation performed: [id number]     Ct Chest Abdomen Pelvis W Contrast    Result Date: 9/9/2018  Impression: No evidence of solid organ injury  No acute intra-abdominal abnormality  No free air or free fluid  No pneumothorax  Multiple old appearing bilateral rib fractures  17 mm peripherally calcified right renal cyst   A nonemergent renal ultrasound is recommended for further characterization   Workstation performed: [id number]       Code Status: No Order  Advance Directive and Living Will:      Power of :    POLST:

## 2018-09-09 NOTE — ED PROVIDER NOTES
Emergency Department Airway Evaluation and Management Form    History  Obtained from: EMS and patient  Review of patient's allergies indicates no known allergies  Chief Complaint:  Trauma Alert    HPI: Pt is a 79 y o  male presents s/p fall with CHT; fell down 15 steps  I have reviewed and agree with the history as documented  ROS is unobtainable secondary to critical status of the patient as a result of the trauma  Physical Exam    There were no vitals filed for this visit  Supplemental Oxygen: applied via NC    GCS: 15  Airway: patent  Breathing and Pulmonary exam: bilateral BS  Cardiac and Circulation: rrr  Neurologic exam: moving all extremities  C spine and Neck exam: In collar      Monitor:  SR      ED Medications    No current facility-administered medications for this encounter  No current outpatient prescriptions on file  Medical Decision Makin  CHT in inebriated patient      Portions of the record may have been created with voice recognition software  Occasional wrong word or "sound a like" substitutions may have occurred due to the inherent limitations of voice recognition software           Scott Herron MD  [id number]

## 2018-09-09 NOTE — PROGRESS NOTES
Progress Note - Critical Care   Jona Rabago 58 y o  male MRN: [id number]  Unit/Bed#: ICU 09 Encounter: [id number]    Assessment: 58year old male presenting following a fall down stairs, acute fractures of C2, C3, C4, and C5 visualized on CT    Plan:          Neuro:   -Multiple unstable C-spine fractures  -Continue C-spine immobilization  -Serial neurological exams  -Neurosurgery consulted  -MRI as per neurosurgery recs  -Thiamine 100 mg administered for AMS, occulomotor palsy in setting of alcohol abuse, potential for Wernicke's encephalopathy   -MRI as per                  CV:   -CAD, stent 10                  Lung:   -Diffuse wheezing, likely at baseline vs COPD exacerbation but will give nebulizer treatment                 GI:   -No abnormalities  -NPO                 FEN:   -Maintenance fluids                 :   -No abnormalities                 ID:   -No abnormalities                 Heme:   -Mechanical DVT ppx                 Endo:   -Diabetes-glucose monitoring, will initiate insulin algorithm as necessary                            Msk/Skin:   -Unstable C-spine fracture-plan as above                 Disposition: ICU    Chief Complaint: Multiple unstable c-spine fractures following fall down stairs    HPI/24hr events: Patient is a 58year old male presenting following a fall down a flight of stairs  Patient states that he had had about 6 beers when he tripped and fell down 6 stairs  He denies LOC  Following fall patient had moderate midline pain in his C-spine, transported by EMS to [facility], found on CT spine to have type II dens fracture with odontoid process process 4 mm posteriorly displaced, moderately displaced teardrop fracture at anterior inferior aspect of C3 vertebral body, mildly displaced acute fracture at the anterior inferior aspect of the C4 vertebral body as well as a fracture of the left facet and lamina of C4 and an acute nondisplaced fracture of the left occipital condyle   Patient immobilized in C-spine collar, admitted to critical care unit  Physical Exam:     Neuro: GCS-14, [id number], patient AAOx2, abnormal EOM, full strength and sensation bilaterally in upper and lower extremities    Cards: RRR, no M/R/G    Pulm: No respiratory distress, diffuse wheezes    Abd: Soft, non-tender, non-distended    Musc/Sk: Diffuse abrasions bilaterally on upper extremities    Vitals:    18 0340 18 0345 18 0400 18 0408   BP: 158/94 158/94 149/83    BP Location:  Left arm     Pulse: 92 90 92 90   Resp:    Temp: 98 3 °F (36 8 °C) 98 3 °F (36 8 °C)     TempSrc: Oral Oral     SpO2: 96% 97% 96% 97%   Weight:  84 8 kg (186 lb 15 2 oz)     Height:  6' 1" (1 854 m)               Temperature:   Temp (24hrs), Av 1 °F (36 7 °C), Min:97 7 °F (36 5 °C), Max:98 3 °F (36 8 °C)    Current: Temperature: 98 3 °F (36 8 °C)    Weights:   IBW: 79 9 kg    Body mass index is 24 67 kg/m²  Weight (last 2 days)     Date/Time   Weight    18 0345  84 8 (186 95)    18 0219  81 6 (180)              Non-Invasive/Invasive Ventilation Settings:  Respiratory    Lab Data (Last 4 hours)    None         O2/Vent Data (Last 4 hours)    None              SpO2: SpO2: 97 %    Intake and Outputs:  I/O     None        Nutrition:        Diet Orders            Start     Ordered    18 0236  Diet NPO; Sips with meds  Diet effective now     Question Answer Comment   Diet Type NPO    NPO Except: Sips with meds    RD to adjust diet per protocol?  Yes        18 0237            Labs:     Results from last 7 days  Lab Units 18  0243 18  0224   WBC Thousand/uL 9 37  --    HEMOGLOBIN g/dL 14 2  --    I STAT HEMOGLOBIN g/dl  --  14 3   HEMATOCRIT % 42 1 42   PLATELETS Thousands/uL 368  --    MONO PCT MAN % 6  --       Results from last 7 days  Lab Units 18  0243 09/09/18  0224   SODIUM mmol/L 134*  --    POTASSIUM mmol/L 4 1  --    CHLORIDE mmol/L 98*  --    CO2 mmol/L 23  --    BUN mg/dL 6  --    CREATININE mg/dL 0 72  --    CALCIUM mg/dL 8 2*  --    GLUCOSE, ISTAT mg/dl  --  185*                Results from last 7 days  Lab Units 09/09/18  0243   INR  0 89         No results found for: TROPONINI    Imaging:  I have personally reviewed pertinent reports  CT spine cervical without contrast-ALIGNMENT:  Postoperative changes of anterior cervical fusion are present at C5 and C6  No subluxation    VERTEBRAL BODIES:  There is a minimally displaced type II dens fracture  The odontoid process is approximately 4 mm posteriorly displaced  There is a moderately displaced teardrop type fracture at the anterior inferior aspect of the C3 vertebral body  A mildly displaced acute fracture at the anterior inferior aspect of the C4 vertebral body is also present  There are acute spinous process fractures at C4 and C5  There is an acute nondisplaced oblique C5 vertebral body fracture  There is an acute nondisplaced fracture involving the left facet and lamina of C4  There is an acute nondisplaced fracture of the left occipital condyle  CTA neck w wo contrast: No evidence of vertebral artery injury  No arterial dissection  No hemodynamically significant stenosis within either common or internal carotid artery  Less than 50% stenosis by NASCET criteria  Dominant right vertebral artery with a small calibered left vertebral artery  Acute fractures of C2, C3, C4 and C5 as described above  Micro:  No results found for: Ericka Chirinos SPUTUMCULTUR    Allergies:    Allergies   Allergen Reactions    Amoxicillin     Augmentin [Amoxicillin-Pot Clavulanate]        Medications:   Scheduled Meds:  Current Facility-Administered Medications:  acetaminophen 650 mg Oral Q6H PRN Alber Love MD   HYDROmorphone 0 5 mg Intravenous Q4H PRN Alber Love MD   oxyCODONE 10 mg Oral Q4H PRN Alber Love MD   oxyCODONE 5 mg Oral Q4H PRN Alber Love MD   thiamine 100 mg Intravenous Daily Faustine Muta Michael Jimenes MD     Continuous Infusions:   PRN Meds:    acetaminophen 650 mg Q6H PRN   HYDROmorphone 0 5 mg Q4H PRN   oxyCODONE 10 mg Q4H PRN   oxyCODONE 5 mg Q4H PRN       VTE Pharmacologic Prophylaxis: held for potential of neurosurgical intervention  VTE Mechanical Prophylaxis: sequential compression device    Invasive lines and devices: Invasive Devices     Peripheral Intravenous Line            Peripheral IV 09/09/18 Left Antecubital less than 1 day                        Code Status: Level 1 - Full Code     Portions of the record may have been created with voice recognition software  Occasional wrong word or "sound a like" substitutions may have occurred due to the inherent limitations of voice recognition software  Read the chart carefully and recognize, using context, where substitutions have occurred       Amairani Dior MD

## 2018-09-09 NOTE — PLAN OF CARE
CARDIOVASCULAR - ADULT     Maintains optimal cardiac output and hemodynamic stability Not Progressing     Absence of cardiac dysrhythmias or at baseline rhythm Not Progressing        DISCHARGE PLANNING     Discharge to home or other facility with appropriate resources Not Progressing        GASTROINTESTINAL - ADULT     Minimal or absence of nausea and/or vomiting Not Progressing     Maintains or returns to baseline bowel function Not Progressing     Maintains adequate nutritional intake Not Progressing     Establish and maintain optimal ostomy function Not Progressing        GENITOURINARY - ADULT     Maintains or returns to baseline urinary function Not Progressing     Absence of urinary retention Not Progressing     Urinary catheter remains patent Not Progressing        HEMATOLOGIC - ADULT     Maintains hematologic stability Not Progressing        INFECTION - ADULT     Absence or prevention of progression during hospitalization Not Progressing     Absence of fever/infection during neutropenic period Not Progressing        Knowledge Deficit     Patient/family/caregiver demonstrates understanding of disease process, treatment plan, medications, and discharge instructions Not Progressing        METABOLIC, FLUID AND ELECTROLYTES - ADULT     Electrolytes maintained within normal limits Not Progressing     Fluid balance maintained Not Progressing     Glucose maintained within target range Not Progressing        MUSCULOSKELETAL - ADULT     Maintain or return mobility to safest level of function Not Progressing     Maintain proper alignment of affected body part Not Progressing        NEUROSENSORY - ADULT     Achieves stable or improved neurological status Not Progressing     Absence of seizures Not Progressing     Remains free of injury related to seizures activity Not Progressing     Achieves maximal functionality and self care Not Progressing        Nutrition/Hydration-ADULT     Nutrient/Hydration intake appropriate for improving, restoring or maintaining nutritional needs Not Progressing        PAIN - ADULT     Verbalizes/displays adequate comfort level or baseline comfort level Not Progressing        Potential for Falls     Patient will remain free of falls Not Progressing        Prexisting or High Potential for Compromised Skin Integrity     Skin integrity is maintained or improved Not Progressing        RESPIRATORY - ADULT     Achieves optimal ventilation and oxygenation Not Progressing        SAFETY ADULT     Maintain or return to baseline ADL function Not Progressing     Maintain or return mobility status to optimal level Not Progressing        SKIN/TISSUE INTEGRITY - ADULT     Skin integrity remains intact Not Progressing     Incision(s), wounds(s) or drain site(s) healing without S/S of infection Not Progressing     Oral mucous membranes remain intact Not Progressing

## 2018-09-09 NOTE — LETTER
[address]  [address] [place] [address]  Dept: [phone]    September 12, 2018     Patient: Cally Marquez   YOB: 1955   Date of Visit: 9/9/2018       To Whom it May Concern:    Kathy Gonzáles is under my professional care  He is currently in the hospital and alive under my care  If you have any questions or concerns, please don't hesitate to call           Sincerely,          Rebecca Prado MD

## 2018-09-09 NOTE — CONSULTS
Consultation - Neurosurgery   Misty Mathur 58 y o  male MRN: [id number]  Unit/Bed#: ICU 09 Encounter: [id number]      Assessment/Plan     Assessment:  C2, C3, C4, C5 vertebral body fractures with likely anterior ligament rupture and disc rupture  He appears neuro intact except the LUE triceps appeared slightly weak or maybe pain limited weakness  Plan:  MRI cervical trauma protocol  Maintain collar and spine precautions for the cervical spine  Please hold any antiplatelet agents at this time    History of Present Illness     HPI: Misty Mtahur is a 58y o  year old male who presents after a fall +EtOH  He fell down 15 steps at home  Reportedly no LOC CT head and CT c/a/p showed no injuries there  CT cervical showed multilevel cervical vertebral body fractures  Reportedly not showing signs of pain on intake but now reports severe neck pain  He reports no weakness, sensory disturbance  He takes plavix for heart stents placed many years ago  Consults    Review of Systems   Constitutional: Negative  HENT: Negative  Eyes: Negative  Cardiovascular: Negative  Gastrointestinal: Negative  Musculoskeletal: Positive for neck pain and neck stiffness  Negative for back pain and gait problem  Neurological: Negative  Historical Information   Past Medical History:   Diagnosis Date    [facility]     [facility]cere[facility]     Diabetes mellitus [facility]     [facility]     Hypertension      History reviewed  No pertinent surgical history  History   Alcohol Use    Yes     History   Drug Use No     History   Smoking Status    Former Smoker   Smokeless Tobacco    Never Used     History reviewed  No pertinent family history      Meds/Allergies   all current active meds have been reviewed  Allergies   Allergen Reactions    Amoxicillin     Augmentin [Amoxicillin-Pot Clavulanate]        Objective     Intake/Output Summary (Last 24 hours) at 09/09/18 0836  Last data filed at 09/09/18 0618   Gross per 24 hour   Intake                0 ml   Output              150 ml   Net             -150 ml       Physical Exam   Constitutional: He is oriented to person, place, and time  He appears well-developed  HENT:   Head: Normocephalic  Eyes: Conjunctivae and EOM are normal  Pupils are equal, round, and reactive to light  Cardiovascular: Normal rate  Pulmonary/Chest: Effort normal  No stridor  Abdominal: Soft  Neurological: He is alert and oriented to person, place, and time  No cranial nerve deficit or sensory deficit  Coordination normal      LUE: delts 5/5, bi 5/5, tri 4/5, 5/5 , 5/5 IO  RUE: delts 5/5, bi 5/5, tri 4/5, 5/5 , 5/5 IO    Full strength BLE   Skin: Skin is warm and dry  Neurologic Exam     Mental Status   Oriented to person, place, and time  Cranial Nerves     CN III, IV, VI   Pupils are equal, round, and reactive to light  Extraocular motions are normal        Vitals:Blood pressure (!) 175/112, pulse (!) 110, temperature 98 3 °F (36 8 °C), temperature source Oral, resp  rate (!) 26, height 6' 1" (1 854 m), weight 84 8 kg (186 lb 15 2 oz), SpO2 98 %  ,Body mass index is 24 67 kg/m²  Lab Results: I have personally reviewed pertinent results  Imaging Studies: I have personally reviewed pertinent reports  EKG, Pathology, and Other Studies: I have personally reviewed pertinent reports        VTE Prophylaxis: Sequential compression device Wade Rhea)     Code Status: Level 1 - Full Code  Advance Directive and Living Will:      Power of :    POLST:

## 2018-09-09 NOTE — TRAUMA DOCUMENTATION
Report verbally endorsed to [facility] by this nurse mistakenly documented under another RN

## 2018-09-10 ENCOUNTER — APPOINTMENT (INPATIENT)
Dept: RADIOLOGY | Facility: HOSPITAL | Age: 63
DRG: 471 | End: 2018-09-10
Payer: COMMERCIAL

## 2018-09-10 ENCOUNTER — ANESTHESIA (INPATIENT)
Dept: PERIOP | Facility: HOSPITAL | Age: 63
DRG: 471 | End: 2018-09-10
Payer: COMMERCIAL

## 2018-09-10 ENCOUNTER — ANESTHESIA EVENT (INPATIENT)
Dept: PERIOP | Facility: HOSPITAL | Age: 63
DRG: 471 | End: 2018-09-10
Payer: COMMERCIAL

## 2018-09-10 LAB
ABO GROUP BLD: NORMAL
ANION GAP SERPL CALCULATED.3IONS-SCNC: 8 MMOL/L (ref 4–13)
ANION GAP SERPL CALCULATED.3IONS-SCNC: 9 MMOL/L (ref 4–13)
ARTERIAL PATENCY WRIST A: YES
BASE EXCESS BLDA CALC-SCNC: -1 MMOL/L (ref -2–3)
BASE EXCESS BLDA CALC-SCNC: -1.7 MMOL/L
BASE EXCESS BLDA CALC-SCNC: 0 MMOL/L (ref -2–3)
BASOPHILS # BLD AUTO: 0 THOUSANDS/ΜL (ref 0–0.1)
BASOPHILS # BLD AUTO: 0.04 THOUSANDS/ΜL (ref 0–0.1)
BASOPHILS NFR BLD AUTO: 0 % (ref 0–1)
BASOPHILS NFR BLD AUTO: 0 % (ref 0–1)
BLD GP AB SCN SERPL QL: NEGATIVE
BODY TEMPERATURE: 97 DEGREES FEHRENHEIT
BUN SERPL-MCNC: 6 MG/DL (ref 5–25)
BUN SERPL-MCNC: 8 MG/DL (ref 5–25)
CA-I BLD-SCNC: 1.08 MMOL/L (ref 1.12–1.32)
CA-I BLD-SCNC: 1.13 MMOL/L (ref 1.12–1.32)
CA-I BLD-SCNC: 1.13 MMOL/L (ref 1.12–1.32)
CALCIUM SERPL-MCNC: 7.8 MG/DL (ref 8.3–10.1)
CALCIUM SERPL-MCNC: 8.2 MG/DL (ref 8.3–10.1)
CHLORIDE SERPL-SCNC: 103 MMOL/L (ref 100–108)
CHLORIDE SERPL-SCNC: 109 MMOL/L (ref 100–108)
CO2 SERPL-SCNC: 23 MMOL/L (ref 21–32)
CO2 SERPL-SCNC: 26 MMOL/L (ref 21–32)
CREAT SERPL-MCNC: 0.38 MG/DL (ref 0.6–1.3)
CREAT SERPL-MCNC: 0.45 MG/DL (ref 0.6–1.3)
EOSINOPHIL # BLD AUTO: 0.01 THOUSAND/ΜL (ref 0–0.61)
EOSINOPHIL # BLD AUTO: 0.1 THOUSAND/ΜL (ref 0–0.61)
EOSINOPHIL NFR BLD AUTO: 0 % (ref 0–6)
EOSINOPHIL NFR BLD AUTO: 1 % (ref 0–6)
ERYTHROCYTE [DISTWIDTH] IN BLOOD BY AUTOMATED COUNT: 16.8 % (ref 11.6–15.1)
ERYTHROCYTE [DISTWIDTH] IN BLOOD BY AUTOMATED COUNT: 17 % (ref 11.6–15.1)
GFR SERPL CREATININE-BSD FRML MDRD: 120 ML/MIN/1.73SQ M
GFR SERPL CREATININE-BSD FRML MDRD: 128 ML/MIN/1.73SQ M
GLUCOSE SERPL-MCNC: 150 MG/DL (ref 65–140)
GLUCOSE SERPL-MCNC: 191 MG/DL (ref 65–140)
GLUCOSE SERPL-MCNC: 201 MG/DL (ref 65–140)
GLUCOSE SERPL-MCNC: 217 MG/DL (ref 65–140)
GLUCOSE SERPL-MCNC: 220 MG/DL (ref 65–140)
HCO3 BLDA-SCNC: 23.6 MMOL/L (ref 22–28)
HCO3 BLDA-SCNC: 24.9 MMOL/L (ref 22–28)
HCO3 BLDA-SCNC: 25.7 MMOL/L (ref 22–28)
HCT VFR BLD AUTO: 32.1 % (ref 36.5–49.3)
HCT VFR BLD AUTO: 38.1 % (ref 36.5–49.3)
HCT VFR BLD CALC: 31 % (ref 36.5–49.3)
HCT VFR BLD CALC: 31 % (ref 36.5–49.3)
HGB BLD-MCNC: 10.3 G/DL (ref 12–17)
HGB BLD-MCNC: 12.3 G/DL (ref 12–17)
HGB BLDA-MCNC: 10.5 G/DL (ref 12–17)
HGB BLDA-MCNC: 10.5 G/DL (ref 12–17)
HOROWITZ INDEX BLDA+IHG-RTO: 50 MM[HG]
IMM GRANULOCYTES # BLD AUTO: 0.03 THOUSAND/UL (ref 0–0.2)
IMM GRANULOCYTES # BLD AUTO: 0.08 THOUSAND/UL (ref 0–0.2)
IMM GRANULOCYTES NFR BLD AUTO: 1 % (ref 0–2)
IMM GRANULOCYTES NFR BLD AUTO: 1 % (ref 0–2)
LACTATE SERPL-SCNC: 0.7 MMOL/L (ref 0.5–2)
LYMPHOCYTES # BLD AUTO: 0.25 THOUSANDS/ΜL (ref 0.6–4.47)
LYMPHOCYTES # BLD AUTO: 0.8 THOUSANDS/ΜL (ref 0.6–4.47)
LYMPHOCYTES NFR BLD AUTO: 5 % (ref 14–44)
LYMPHOCYTES NFR BLD AUTO: 7 % (ref 14–44)
MAGNESIUM SERPL-MCNC: 2.2 MG/DL (ref 1.6–2.6)
MCH RBC QN AUTO: 27.2 PG (ref 26.8–34.3)
MCH RBC QN AUTO: 27.2 PG (ref 26.8–34.3)
MCHC RBC AUTO-ENTMCNC: 32.1 G/DL (ref 31.4–37.4)
MCHC RBC AUTO-ENTMCNC: 32.3 G/DL (ref 31.4–37.4)
MCV RBC AUTO: 84 FL (ref 82–98)
MCV RBC AUTO: 85 FL (ref 82–98)
MONOCYTES # BLD AUTO: 0.09 THOUSAND/ΜL (ref 0.17–1.22)
MONOCYTES # BLD AUTO: 0.95 THOUSAND/ΜL (ref 0.17–1.22)
MONOCYTES NFR BLD AUTO: 2 % (ref 4–12)
MONOCYTES NFR BLD AUTO: 8 % (ref 4–12)
NEUTROPHILS # BLD AUTO: 5.15 THOUSANDS/ΜL (ref 1.85–7.62)
NEUTROPHILS # BLD AUTO: 9.44 THOUSANDS/ΜL (ref 1.85–7.62)
NEUTS SEG NFR BLD AUTO: 83 % (ref 43–75)
NEUTS SEG NFR BLD AUTO: 92 % (ref 43–75)
NRBC BLD AUTO-RTO: 0 /100 WBCS
NRBC BLD AUTO-RTO: 0 /100 WBCS
O2 CT BLDA-SCNC: 15.2 ML/DL (ref 16–23)
OXYHGB MFR BLDA: 96.3 % (ref 94–97)
PCO2 BLD: 26 MMOL/L (ref 21–32)
PCO2 BLD: 27 MMOL/L (ref 21–32)
PCO2 BLD: 43.6 MM HG (ref 36–44)
PCO2 BLD: 47.3 MM HG (ref 36–44)
PCO2 BLDA: 42.6 MM HG (ref 36–44)
PEEP RESPIRATORY: 5 CM[H2O]
PH BLD: 7.33 [PH] (ref 7.35–7.45)
PH BLD: 7.38 [PH] (ref 7.35–7.45)
PH BLDA: 7.36 [PH] (ref 7.35–7.45)
PHOSPHATE SERPL-MCNC: 2.5 MG/DL (ref 2.3–4.1)
PLATELET # BLD AUTO: 173 THOUSANDS/UL (ref 149–390)
PLATELET # BLD AUTO: 256 THOUSANDS/UL (ref 149–390)
PMV BLD AUTO: 10.4 FL (ref 8.9–12.7)
PMV BLD AUTO: 9.9 FL (ref 8.9–12.7)
PO2 BLD: 127 MM HG (ref 75–129)
PO2 BLD: 273 MM HG (ref 75–129)
PO2 BLDA: 96.1 MM HG (ref 75–129)
POTASSIUM BLD-SCNC: 3.8 MMOL/L (ref 3.5–5.3)
POTASSIUM BLD-SCNC: 4 MMOL/L (ref 3.5–5.3)
POTASSIUM SERPL-SCNC: 3.4 MMOL/L (ref 3.5–5.3)
POTASSIUM SERPL-SCNC: 4 MMOL/L (ref 3.5–5.3)
RBC # BLD AUTO: 3.78 MILLION/UL (ref 3.88–5.62)
RBC # BLD AUTO: 4.53 MILLION/UL (ref 3.88–5.62)
RH BLD: POSITIVE
SAO2 % BLD FROM PO2: 100 % (ref 95–98)
SAO2 % BLD FROM PO2: 99 % (ref 95–98)
SODIUM BLD-SCNC: 139 MMOL/L (ref 136–145)
SODIUM BLD-SCNC: 139 MMOL/L (ref 136–145)
SODIUM SERPL-SCNC: 138 MMOL/L (ref 136–145)
SODIUM SERPL-SCNC: 140 MMOL/L (ref 136–145)
SPECIMEN EXPIRATION DATE: NORMAL
SPECIMEN SOURCE: ABNORMAL
VENT AC: 17
VENT- AC: AC
VT SETTING VENT: 450 ML
WBC # BLD AUTO: 11.41 THOUSAND/UL (ref 4.31–10.16)
WBC # BLD AUTO: 5.53 THOUSAND/UL (ref 4.31–10.16)

## 2018-09-10 PROCEDURE — 84132 ASSAY OF SERUM POTASSIUM: CPT

## 2018-09-10 PROCEDURE — 86900 BLOOD TYPING SEROLOGIC ABO: CPT | Performed by: SURGERY

## 2018-09-10 PROCEDURE — 94640 AIRWAY INHALATION TREATMENT: CPT

## 2018-09-10 PROCEDURE — 22843 INSERT SPINE FIXATION DEVICE: CPT | Performed by: NEUROLOGICAL SURGERY

## 2018-09-10 PROCEDURE — 0RG4071 FUSION OF CERVICOTHORACIC VERTEBRAL JOINT WITH AUTOLOGOUS TISSUE SUBSTITUTE, POSTERIOR APPROACH, POSTERIOR COLUMN, OPEN APPROACH: ICD-10-PCS | Performed by: NEUROLOGICAL SURGERY

## 2018-09-10 PROCEDURE — C1713 ANCHOR/SCREW BN/BN,TIS/BN: HCPCS | Performed by: NEUROLOGICAL SURGERY

## 2018-09-10 PROCEDURE — 95940 IONM IN OPERATNG ROOM 15 MIN: CPT | Performed by: NEUROLOGICAL SURGERY

## 2018-09-10 PROCEDURE — 22614 ARTHRD PST TQ 1NTRSPC EA ADD: CPT | Performed by: NEUROLOGICAL SURGERY

## 2018-09-10 PROCEDURE — 84295 ASSAY OF SERUM SODIUM: CPT

## 2018-09-10 PROCEDURE — 82948 REAGENT STRIP/BLOOD GLUCOSE: CPT

## 2018-09-10 PROCEDURE — 0RG2071 FUSION OF 2 OR MORE CERVICAL VERTEBRAL JOINTS WITH AUTOLOGOUS TISSUE SUBSTITUTE, POSTERIOR APPROACH, POSTERIOR COLUMN, OPEN APPROACH: ICD-10-PCS | Performed by: NEUROLOGICAL SURGERY

## 2018-09-10 PROCEDURE — 82947 ASSAY GLUCOSE BLOOD QUANT: CPT

## 2018-09-10 PROCEDURE — 85025 COMPLETE CBC W/AUTO DIFF WBC: CPT | Performed by: EMERGENCY MEDICINE

## 2018-09-10 PROCEDURE — 80048 BASIC METABOLIC PNL TOTAL CA: CPT | Performed by: NEUROLOGICAL SURGERY

## 2018-09-10 PROCEDURE — 5A1935Z RESPIRATORY VENTILATION, LESS THAN 24 CONSECUTIVE HOURS: ICD-10-PCS | Performed by: ANESTHESIOLOGY

## 2018-09-10 PROCEDURE — 83735 ASSAY OF MAGNESIUM: CPT | Performed by: EMERGENCY MEDICINE

## 2018-09-10 PROCEDURE — 22600 ARTHRD PST TQ 1NTRSPC CRV: CPT | Performed by: NEUROLOGICAL SURGERY

## 2018-09-10 PROCEDURE — 71045 X-RAY EXAM CHEST 1 VIEW: CPT

## 2018-09-10 PROCEDURE — 80048 BASIC METABOLIC PNL TOTAL CA: CPT | Performed by: EMERGENCY MEDICINE

## 2018-09-10 PROCEDURE — 82330 ASSAY OF CALCIUM: CPT | Performed by: EMERGENCY MEDICINE

## 2018-09-10 PROCEDURE — 85014 HEMATOCRIT: CPT

## 2018-09-10 PROCEDURE — 94760 N-INVAS EAR/PLS OXIMETRY 1: CPT

## 2018-09-10 PROCEDURE — 99233 SBSQ HOSP IP/OBS HIGH 50: CPT | Performed by: SURGERY

## 2018-09-10 PROCEDURE — 82330 ASSAY OF CALCIUM: CPT

## 2018-09-10 PROCEDURE — 83605 ASSAY OF LACTIC ACID: CPT | Performed by: EMERGENCY MEDICINE

## 2018-09-10 PROCEDURE — 20936 SP BONE AGRFT LOCAL ADD-ON: CPT | Performed by: NEUROLOGICAL SURGERY

## 2018-09-10 PROCEDURE — 80048 BASIC METABOLIC PNL TOTAL CA: CPT | Performed by: NURSE PRACTITIONER

## 2018-09-10 PROCEDURE — 86920 COMPATIBILITY TEST SPIN: CPT

## 2018-09-10 PROCEDURE — 72040 X-RAY EXAM NECK SPINE 2-3 VW: CPT

## 2018-09-10 PROCEDURE — 82803 BLOOD GASES ANY COMBINATION: CPT

## 2018-09-10 PROCEDURE — 22595 ARTHRD PST TQ ATLAS-AXIS: CPT | Performed by: NEUROLOGICAL SURGERY

## 2018-09-10 PROCEDURE — 84100 ASSAY OF PHOSPHORUS: CPT | Performed by: EMERGENCY MEDICINE

## 2018-09-10 PROCEDURE — C9113 INJ PANTOPRAZOLE SODIUM, VIA: HCPCS | Performed by: STUDENT IN AN ORGANIZED HEALTH CARE EDUCATION/TRAINING PROGRAM

## 2018-09-10 PROCEDURE — 94002 VENT MGMT INPAT INIT DAY: CPT

## 2018-09-10 PROCEDURE — 86901 BLOOD TYPING SEROLOGIC RH(D): CPT | Performed by: SURGERY

## 2018-09-10 PROCEDURE — 63015 REMOVE SPINE LAMINA >2 CRVCL: CPT | Performed by: NEUROLOGICAL SURGERY

## 2018-09-10 PROCEDURE — 86850 RBC ANTIBODY SCREEN: CPT | Performed by: SURGERY

## 2018-09-10 PROCEDURE — 85025 COMPLETE CBC W/AUTO DIFF WBC: CPT | Performed by: NURSE PRACTITIONER

## 2018-09-10 PROCEDURE — 82805 BLOOD GASES W/O2 SATURATION: CPT | Performed by: EMERGENCY MEDICINE

## 2018-09-10 DEVICE — ROD 3605240 STANDARD 3.5MM X 240MM
Type: IMPLANTABLE DEVICE | Site: POSTERIOR CERVICAL | Status: FUNCTIONAL
Brand: INFINITY™ OCCIPITOCERVICAL UPPER THORACIC SYSTEM

## 2018-09-10 DEVICE — DBM 006005 PROGENIX PLUS 5CC
Type: IMPLANTABLE DEVICE | Site: POSTERIOR CERVICAL | Status: FUNCTIONAL
Brand: PROGENIX® PUTTY AND PROGENIX® PLUS

## 2018-09-10 RX ORDER — ONDANSETRON 2 MG/ML
4 INJECTION INTRAMUSCULAR; INTRAVENOUS EVERY 6 HOURS PRN
Status: DISCONTINUED | OUTPATIENT
Start: 2018-09-10 | End: 2018-09-14 | Stop reason: HOSPADM

## 2018-09-10 RX ORDER — PROPOFOL 10 MG/ML
INJECTION, EMULSION INTRAVENOUS
Status: COMPLETED
Start: 2018-09-10 | End: 2018-09-10

## 2018-09-10 RX ORDER — PROPOFOL 10 MG/ML
5-50 INJECTION, EMULSION INTRAVENOUS
Status: DISCONTINUED | OUTPATIENT
Start: 2018-09-10 | End: 2018-09-11

## 2018-09-10 RX ORDER — DIAZEPAM 5 MG/1
5 TABLET ORAL EVERY 6 HOURS
Status: DISCONTINUED | OUTPATIENT
Start: 2018-09-10 | End: 2018-09-12

## 2018-09-10 RX ORDER — LIDOCAINE HYDROCHLORIDE 10 MG/ML
INJECTION, SOLUTION INFILTRATION; PERINEURAL AS NEEDED
Status: DISCONTINUED | OUTPATIENT
Start: 2018-09-10 | End: 2018-09-10 | Stop reason: HOSPADM

## 2018-09-10 RX ORDER — MAGNESIUM HYDROXIDE 1200 MG/15ML
LIQUID ORAL AS NEEDED
Status: DISCONTINUED | OUTPATIENT
Start: 2018-09-10 | End: 2018-09-10 | Stop reason: HOSPADM

## 2018-09-10 RX ORDER — SUCCINYLCHOLINE CHLORIDE 20 MG/ML
INJECTION INTRAMUSCULAR; INTRAVENOUS AS NEEDED
Status: DISCONTINUED | OUTPATIENT
Start: 2018-09-10 | End: 2018-09-10 | Stop reason: SURG

## 2018-09-10 RX ORDER — CHLORHEXIDINE GLUCONATE 0.12 MG/ML
15 RINSE ORAL EVERY 12 HOURS SCHEDULED
Status: DISCONTINUED | OUTPATIENT
Start: 2018-09-10 | End: 2018-09-11

## 2018-09-10 RX ORDER — FENTANYL CITRATE 50 UG/ML
INJECTION, SOLUTION INTRAMUSCULAR; INTRAVENOUS AS NEEDED
Status: DISCONTINUED | OUTPATIENT
Start: 2018-09-10 | End: 2018-09-10 | Stop reason: SURG

## 2018-09-10 RX ORDER — EPHEDRINE SULFATE 50 MG/ML
INJECTION, SOLUTION INTRAVENOUS AS NEEDED
Status: DISCONTINUED | OUTPATIENT
Start: 2018-09-10 | End: 2018-09-10 | Stop reason: SURG

## 2018-09-10 RX ORDER — PROPOFOL 10 MG/ML
INJECTION, EMULSION INTRAVENOUS AS NEEDED
Status: DISCONTINUED | OUTPATIENT
Start: 2018-09-10 | End: 2018-09-10 | Stop reason: SURG

## 2018-09-10 RX ORDER — PROPOFOL 10 MG/ML
INJECTION, EMULSION INTRAVENOUS CONTINUOUS PRN
Status: DISCONTINUED | OUTPATIENT
Start: 2018-09-10 | End: 2018-09-10 | Stop reason: SURG

## 2018-09-10 RX ORDER — ALBUMIN, HUMAN INJ 5% 5 %
SOLUTION INTRAVENOUS CONTINUOUS PRN
Status: DISCONTINUED | OUTPATIENT
Start: 2018-09-10 | End: 2018-09-10 | Stop reason: SURG

## 2018-09-10 RX ORDER — SODIUM CHLORIDE, SODIUM LACTATE, POTASSIUM CHLORIDE, CALCIUM CHLORIDE 600; 310; 30; 20 MG/100ML; MG/100ML; MG/100ML; MG/100ML
INJECTION, SOLUTION INTRAVENOUS CONTINUOUS PRN
Status: DISCONTINUED | OUTPATIENT
Start: 2018-09-10 | End: 2018-09-10 | Stop reason: SURG

## 2018-09-10 RX ORDER — VANCOMYCIN HYDROCHLORIDE 1 G/200ML
1000 INJECTION, SOLUTION INTRAVENOUS EVERY 12 HOURS
Status: COMPLETED | OUTPATIENT
Start: 2018-09-11 | End: 2018-09-11

## 2018-09-10 RX ORDER — LIDOCAINE HYDROCHLORIDE 10 MG/ML
INJECTION, SOLUTION INFILTRATION; PERINEURAL AS NEEDED
Status: DISCONTINUED | OUTPATIENT
Start: 2018-09-10 | End: 2018-09-10 | Stop reason: SURG

## 2018-09-10 RX ORDER — THIAMINE MONONITRATE (VIT B1) 100 MG
100 TABLET ORAL DAILY
Status: DISCONTINUED | OUTPATIENT
Start: 2018-09-11 | End: 2018-09-14 | Stop reason: HOSPADM

## 2018-09-10 RX ORDER — BISACODYL 10 MG
10 SUPPOSITORY, RECTAL RECTAL DAILY PRN
Status: DISCONTINUED | OUTPATIENT
Start: 2018-09-10 | End: 2018-09-14 | Stop reason: HOSPADM

## 2018-09-10 RX ORDER — FAMOTIDINE 40 MG/5ML
20 POWDER, FOR SUSPENSION ORAL 2 TIMES DAILY
Status: DISCONTINUED | OUTPATIENT
Start: 2018-09-10 | End: 2018-09-10

## 2018-09-10 RX ORDER — VANCOMYCIN HYDROCHLORIDE 1 G/200ML
1000 INJECTION, SOLUTION INTRAVENOUS ONCE
Status: COMPLETED | OUTPATIENT
Start: 2018-09-10 | End: 2018-09-10

## 2018-09-10 RX ORDER — DOCUSATE SODIUM 100 MG/1
100 CAPSULE, LIQUID FILLED ORAL 2 TIMES DAILY
Status: DISCONTINUED | OUTPATIENT
Start: 2018-09-10 | End: 2018-09-11

## 2018-09-10 RX ORDER — FOLIC ACID 1 MG/1
1 TABLET ORAL DAILY
Status: DISCONTINUED | OUTPATIENT
Start: 2018-09-10 | End: 2018-09-10

## 2018-09-10 RX ORDER — MAGNESIUM SULFATE HEPTAHYDRATE 40 MG/ML
2 INJECTION, SOLUTION INTRAVENOUS ONCE
Status: COMPLETED | OUTPATIENT
Start: 2018-09-10 | End: 2018-09-10

## 2018-09-10 RX ORDER — LIDOCAINE HYDROCHLORIDE AND EPINEPHRINE 10; 10 MG/ML; UG/ML
INJECTION, SOLUTION INFILTRATION; PERINEURAL AS NEEDED
Status: DISCONTINUED | OUTPATIENT
Start: 2018-09-10 | End: 2018-09-10 | Stop reason: HOSPADM

## 2018-09-10 RX ORDER — FAMOTIDINE 20 MG/1
20 TABLET, FILM COATED ORAL 2 TIMES DAILY
Status: DISCONTINUED | OUTPATIENT
Start: 2018-09-10 | End: 2018-09-11

## 2018-09-10 RX ORDER — HEPARIN SODIUM 5000 [USP'U]/ML
5000 INJECTION, SOLUTION INTRAVENOUS; SUBCUTANEOUS EVERY 8 HOURS SCHEDULED
Status: DISCONTINUED | OUTPATIENT
Start: 2018-09-11 | End: 2018-09-11

## 2018-09-10 RX ORDER — SODIUM CHLORIDE 9 MG/ML
INJECTION, SOLUTION INTRAVENOUS CONTINUOUS PRN
Status: DISCONTINUED | OUTPATIENT
Start: 2018-09-10 | End: 2018-09-10 | Stop reason: SURG

## 2018-09-10 RX ADMIN — VANCOMYCIN HYDROCHLORIDE 1000 MG: 1 INJECTION, SOLUTION INTRAVENOUS at 12:03

## 2018-09-10 RX ADMIN — PROPOFOL 130 MCG/KG/MIN: 10 INJECTION, EMULSION INTRAVENOUS at 12:23

## 2018-09-10 RX ADMIN — SODIUM CHLORIDE: 0.9 INJECTION, SOLUTION INTRAVENOUS at 15:19

## 2018-09-10 RX ADMIN — DIAZEPAM 5 MG: 5 TABLET ORAL at 08:12

## 2018-09-10 RX ADMIN — HYDROMORPHONE HYDROCHLORIDE 0.4 MG: 1 INJECTION, SOLUTION INTRAMUSCULAR; INTRAVENOUS; SUBCUTANEOUS at 17:19

## 2018-09-10 RX ADMIN — OXAZEPAM 10 MG: 10 CAPSULE, GELATIN COATED ORAL at 00:43

## 2018-09-10 RX ADMIN — CHLORHEXIDINE GLUCONATE 15 ML: 1.2 RINSE ORAL at 10:38

## 2018-09-10 RX ADMIN — EPHEDRINE SULFATE 10 MG: 50 INJECTION, SOLUTION INTRAMUSCULAR; INTRAVENOUS; SUBCUTANEOUS at 12:49

## 2018-09-10 RX ADMIN — IPRATROPIUM BROMIDE 0.5 MG: 0.5 SOLUTION RESPIRATORY (INHALATION) at 19:43

## 2018-09-10 RX ADMIN — LIDOCAINE 1 PATCH: 50 PATCH TOPICAL at 08:18

## 2018-09-10 RX ADMIN — ALBUMIN (HUMAN): 12.5 SOLUTION INTRAVENOUS at 12:27

## 2018-09-10 RX ADMIN — Medication 0.15 MCG/KG/MIN: at 12:25

## 2018-09-10 RX ADMIN — SODIUM CHLORIDE 125 ML/HR: 0.9 INJECTION, SOLUTION INTRAVENOUS at 10:07

## 2018-09-10 RX ADMIN — IPRATROPIUM BROMIDE 0.5 MG: 0.5 SOLUTION RESPIRATORY (INHALATION) at 07:23

## 2018-09-10 RX ADMIN — HYDROMORPHONE HYDROCHLORIDE 0.4 MG: 1 INJECTION, SOLUTION INTRAMUSCULAR; INTRAVENOUS; SUBCUTANEOUS at 17:02

## 2018-09-10 RX ADMIN — POTASSIUM CHLORIDE 20 MEQ: 149 INJECTION, SOLUTION, CONCENTRATE INTRAVENOUS at 10:01

## 2018-09-10 RX ADMIN — ALBUMIN (HUMAN): 12.5 SOLUTION INTRAVENOUS at 13:44

## 2018-09-10 RX ADMIN — HYDROMORPHONE HYDROCHLORIDE 0.5 MG: 1 INJECTION, SOLUTION INTRAMUSCULAR; INTRAVENOUS; SUBCUTANEOUS at 10:04

## 2018-09-10 RX ADMIN — LIDOCAINE HYDROCHLORIDE 50 MG: 10 INJECTION, SOLUTION INFILTRATION; PERINEURAL at 12:18

## 2018-09-10 RX ADMIN — SODIUM CHLORIDE, SODIUM LACTATE, POTASSIUM CHLORIDE, AND CALCIUM CHLORIDE: .6; .31; .03; .02 INJECTION, SOLUTION INTRAVENOUS at 14:55

## 2018-09-10 RX ADMIN — OXAZEPAM 10 MG: 10 CAPSULE, GELATIN COATED ORAL at 06:00

## 2018-09-10 RX ADMIN — INSULIN LISPRO 1 UNITS: 100 INJECTION, SOLUTION INTRAVENOUS; SUBCUTANEOUS at 23:45

## 2018-09-10 RX ADMIN — INSULIN LISPRO 2 UNITS: 100 INJECTION, SOLUTION INTRAVENOUS; SUBCUTANEOUS at 19:00

## 2018-09-10 RX ADMIN — SODIUM CHLORIDE: 0.9 INJECTION, SOLUTION INTRAVENOUS at 12:09

## 2018-09-10 RX ADMIN — SODIUM CHLORIDE, SODIUM LACTATE, POTASSIUM CHLORIDE, AND CALCIUM CHLORIDE: .6; .31; .03; .02 INJECTION, SOLUTION INTRAVENOUS at 12:09

## 2018-09-10 RX ADMIN — FAMOTIDINE 20 MG: 20 TABLET ORAL at 18:36

## 2018-09-10 RX ADMIN — MAGNESIUM SULFATE HEPTAHYDRATE 2 G: 40 INJECTION, SOLUTION INTRAVENOUS at 08:11

## 2018-09-10 RX ADMIN — ACETAMINOPHEN 975 MG: 325 TABLET ORAL at 21:02

## 2018-09-10 RX ADMIN — DIAZEPAM 5 MG: 5 TABLET ORAL at 20:58

## 2018-09-10 RX ADMIN — LEVALBUTEROL HYDROCHLORIDE 1.25 MG: 1.25 SOLUTION, CONCENTRATE RESPIRATORY (INHALATION) at 07:23

## 2018-09-10 RX ADMIN — SODIUM CHLORIDE 125 ML/HR: 0.9 INJECTION, SOLUTION INTRAVENOUS at 02:40

## 2018-09-10 RX ADMIN — EPHEDRINE SULFATE 10 MG: 50 INJECTION, SOLUTION INTRAMUSCULAR; INTRAVENOUS; SUBCUTANEOUS at 12:52

## 2018-09-10 RX ADMIN — METOPROLOL TARTRATE 50 MG: 50 TABLET ORAL at 20:58

## 2018-09-10 RX ADMIN — PROPOFOL 50 MCG/KG/MIN: 10 INJECTION, EMULSION INTRAVENOUS at 18:01

## 2018-09-10 RX ADMIN — EPHEDRINE SULFATE 5 MG: 50 INJECTION, SOLUTION INTRAMUSCULAR; INTRAVENOUS; SUBCUTANEOUS at 12:23

## 2018-09-10 RX ADMIN — SODIUM CHLORIDE 125 ML/HR: 0.9 INJECTION, SOLUTION INTRAVENOUS at 18:05

## 2018-09-10 RX ADMIN — POTASSIUM CHLORIDE 20 MEQ: 149 INJECTION, SOLUTION, CONCENTRATE INTRAVENOUS at 09:00

## 2018-09-10 RX ADMIN — LEVALBUTEROL HYDROCHLORIDE 1.25 MG: 1.25 SOLUTION, CONCENTRATE RESPIRATORY (INHALATION) at 19:43

## 2018-09-10 RX ADMIN — CHLORHEXIDINE GLUCONATE 15 ML: 1.2 RINSE ORAL at 21:00

## 2018-09-10 RX ADMIN — FENTANYL CITRATE 100 MCG: 50 INJECTION, SOLUTION INTRAMUSCULAR; INTRAVENOUS at 12:18

## 2018-09-10 RX ADMIN — EPHEDRINE SULFATE 5 MG: 50 INJECTION, SOLUTION INTRAMUSCULAR; INTRAVENOUS; SUBCUTANEOUS at 13:42

## 2018-09-10 RX ADMIN — OXYCODONE HYDROCHLORIDE 5 MG: 5 TABLET ORAL at 03:34

## 2018-09-10 RX ADMIN — PROPOFOL 160 MG: 10 INJECTION, EMULSION INTRAVENOUS at 12:18

## 2018-09-10 RX ADMIN — PANTOPRAZOLE SODIUM 40 MG: 40 INJECTION, POWDER, FOR SOLUTION INTRAVENOUS at 08:12

## 2018-09-10 RX ADMIN — METOPROLOL TARTRATE 50 MG: 50 TABLET ORAL at 08:12

## 2018-09-10 RX ADMIN — HYDROMORPHONE HYDROCHLORIDE 0.4 MG: 1 INJECTION, SOLUTION INTRAMUSCULAR; INTRAVENOUS; SUBCUTANEOUS at 17:15

## 2018-09-10 RX ADMIN — PROPOFOL 100 MG: 10 INJECTION, EMULSION INTRAVENOUS at 12:38

## 2018-09-10 RX ADMIN — ACETAMINOPHEN 975 MG: 325 TABLET ORAL at 05:59

## 2018-09-10 RX ADMIN — Medication 0.2 MCG/KG/HR: at 12:25

## 2018-09-10 RX ADMIN — EPHEDRINE SULFATE 5 MG: 50 INJECTION, SOLUTION INTRAMUSCULAR; INTRAVENOUS; SUBCUTANEOUS at 14:01

## 2018-09-10 RX ADMIN — EPHEDRINE SULFATE 5 MG: 50 INJECTION, SOLUTION INTRAMUSCULAR; INTRAVENOUS; SUBCUTANEOUS at 13:44

## 2018-09-10 RX ADMIN — HYDROMORPHONE HYDROCHLORIDE 0.5 MG: 1 INJECTION, SOLUTION INTRAMUSCULAR; INTRAVENOUS; SUBCUTANEOUS at 05:17

## 2018-09-10 RX ADMIN — SUCCINYLCHOLINE CHLORIDE 100 MG: 20 INJECTION, SOLUTION INTRAMUSCULAR; INTRAVENOUS at 12:18

## 2018-09-10 RX ADMIN — POTASSIUM CHLORIDE 20 MEQ: 149 INJECTION, SOLUTION, CONCENTRATE INTRAVENOUS at 07:56

## 2018-09-10 RX ADMIN — PROPOFOL 30 MCG/KG/MIN: 10 INJECTION, EMULSION INTRAVENOUS at 21:54

## 2018-09-10 RX ADMIN — HYDROMORPHONE HYDROCHLORIDE 0.4 MG: 1 INJECTION, SOLUTION INTRAMUSCULAR; INTRAVENOUS; SUBCUTANEOUS at 17:11

## 2018-09-10 RX ADMIN — DEXAMETHASONE SODIUM PHOSPHATE 10 MG: 10 INJECTION INTRAMUSCULAR; INTRAVENOUS at 13:05

## 2018-09-10 RX ADMIN — CHLORHEXIDINE GLUCONATE 15 ML: 1.2 RINSE ORAL at 20:58

## 2018-09-10 RX ADMIN — EPHEDRINE SULFATE 10 MG: 50 INJECTION, SOLUTION INTRAMUSCULAR; INTRAVENOUS; SUBCUTANEOUS at 12:45

## 2018-09-10 RX ADMIN — THIAMINE HYDROCHLORIDE 100 MG: 100 INJECTION, SOLUTION INTRAMUSCULAR; INTRAVENOUS at 08:19

## 2018-09-10 RX ADMIN — PHENYLEPHRINE HYDROCHLORIDE 30 MCG/MIN: 10 INJECTION INTRAVENOUS at 12:24

## 2018-09-10 RX ADMIN — HYDROMORPHONE HYDROCHLORIDE 0.4 MG: 1 INJECTION, SOLUTION INTRAMUSCULAR; INTRAVENOUS; SUBCUTANEOUS at 17:05

## 2018-09-10 RX ADMIN — FENTANYL CITRATE 25 MCG/HR: 50 INJECTION, SOLUTION INTRAMUSCULAR; INTRAVENOUS at 18:36

## 2018-09-10 NOTE — ANESTHESIA PROCEDURE NOTES
Arterial Line Insertion  Date/Time: 9/10/2018 12:30 PM  Performed by: Kwadwo Hicks  Authorized by: Gabriela Barry   Consent: Verbal consent obtained  Risks and benefits: risks, benefits and alternatives were discussed  Consent given by: patient  Patient understanding: patient states understanding of the procedure being performed  Patient consent: the patient's understanding of the procedure matches consent given  Procedure consent: procedure consent matches procedure scheduled  Relevant documents: relevant documents present and verified  Test results: test results available and properly labeled  Site marked: the operative site was marked  Radiology Images: Radiology Images displayed and confirmed  If images not available, report reviewed  Patient identity confirmed: verbally with patient and arm band  Time out: Immediately prior to procedure a "time out" was called to verify the correct patient, procedure, equipment, support staff and site/side marked as required  Preparation: Patient was prepped and draped in the usual sterile fashion  Indications: hemodynamic monitoring  Orientation:  Right  Location: radial artery  Sedation:  Patient sedated: yes (GETA)  Vitals: Vital signs were monitored during sedation      Procedure Details:  Chris's test normal: yes  Needle gauge: 20  Number of attempts: 2 (S  [place] Punches)

## 2018-09-10 NOTE — ANESTHESIA PREPROCEDURE EVALUATION
Review of Systems/Medical History  Patient summary reviewed  Chart reviewed  No history of anesthetic complications     Cardiovascular  Exercise comment: Able to climb two flights of stairs without cardiopulmonary limitation prior to accident Hypertension , Past MI , CAD , Cardiac stents > 1 year    Pulmonary  Smoker (Quit one week ago) , COPD (Requires prn albuterol 2-3x daily) moderate- medication dependent ,        GI/Hepatic    GERD well controlled,   Comment: Confirmed NPO appropriate     Negative  ROS        Endo/Other  Diabetes type 2 Oral agent,      GYN       Hematology  Negative hematology ROS      Musculoskeletal       Neurology    CVA , no residual symptoms,   Comment: Presents status post fall down flight of stairs with fractures of the dens, C3, C4 and C5 Psychology       Comment: Alcohol abuse         Physical Exam    Airway  Comment: Hard collar in place  Mallampati score: III  TM Distance: >3 FB  Neck ROM: limited     Dental   Comment: No loose teeth,     Cardiovascular  Rhythm: regular, No murmur,     Pulmonary  Breath sounds clear to auscultation,     Other Findings        Anesthesia Plan  ASA Score- 4     Anesthesia Type- general with ASA Monitors  Additional Monitors: arterial line  Airway Plan: ETT  Comment: Discussed high likelihood of continued intubation post-op        Plan Factors-  Patient did not smoke on day of surgery  Induction- intravenous  Postoperative Plan- Plan for postoperative opioid use  Informed Consent- Anesthetic plan and risks discussed with patient

## 2018-09-10 NOTE — PROGRESS NOTES
Post op check    S: Patient returns to the ICU status post posterior cervical decompressive laminectomy C3-C6 and posterior cervical lateral mass and pedicle fixation fusion C1-T1  No complications  EBL 250cc  Patient received 2 4L crystalloid, 500cc 5% albumin, and made 1 8L UOP  He remained intubated and sedated on a propofol drip for prevertebral edema  O: Vitals - T 97 7, /67, P 59, RR 17, 100% AC 17/450/0 6/5  Exam - Gen - NAD, resting comfortably in bed  HEENT - pupils 2mm and reactive bilaterally  Neck - collar in place, posterior midline incision dressing c/d/i, posterior MEGHAN to full suction with 50cc sanguinous output  CVS - RRR, no m/r/g  Resp - equal breath sounds, CTAB  Abd - soft, NT, ND   - antony with clear urine  Ext - no edema or tenderness  Neuro - GCS 11T (E4V1M6), follows commands with extremities x4    A/P:    1  Fall with multiple cervical spine fractures s/p posterior decompression and fusion  -neurosurgery following  -moving and following commands with both upper extremities  -maintain collar  -MAP goal >85  -monitor MEGHAN output, management per nsg  -pain control  -SQH start at 2200 per nsg, holding home Plavix with history of coronary stents, will resume per nsg  -significant prevertebral edema in OR, will leave intubated tonight for airway protection, post op labs pending, will adjust ventilator accordingly  -will trend endpoints, continue mIVF while NPO    2  History of alcohol use  -no significant autonomic instability  -asymptomatic prior to OR  -will check lytes  -continue scheduled valium, thiamine  -will treat signs of withdrawal with further benzodiazepines     3   History of COPD  -ventilator management above  -continue scheduled and prn nebs

## 2018-09-10 NOTE — SOCIAL WORK
CM met with pt to discuss the role of CM  Pt lives with his daughter Faith Michele in a 2 story home which has 3STE and 14 steps inside  Pt is retired, doesn't drive, but considers himself fully independent PTA  Pt owns a cane, walker, and shower seat  Pt states he has Bi-Polar and had a Psych admission in 2010  Pt reports ETOH abuse  Pt reports drinking daily (10 beers), one blackout that he remembers, no withdrawal symptoms, had 3 IP ETOH stays (2 at [facility] and 1 at [facility] in [facility]  Pt will go to the OR today with Nsg  CM will follow up post op with d/c plan   Meds to [facility] discussed

## 2018-09-10 NOTE — PLAN OF CARE
Problem: DISCHARGE PLANNING - CARE MANAGEMENT  Goal: Discharge to post-acute care or home with appropriate resources  INTERVENTIONS:  - Conduct assessment to determine patient/family and health care team treatment goals, and need for post-acute services based on payer coverage, community resources, and patient preferences, and barriers to discharge  - Address psychosocial, clinical, and financial barriers to discharge as identified in assessment in conjunction with the patient/family and health care team  - Arrange appropriate level of post-acute services according to patient's   needs and preference and payer coverage in collaboration with the physician and health care team  - Communicate with and update the patient/family, physician, and health care team regarding progress on the discharge plan  - Arrange appropriate transportation to post-acute venues  -F/U for with appropriate venues for ETOH abuse  Outcome: Progressing

## 2018-09-10 NOTE — NUTRITION
Replete K+  Check Ionized Ca  If unable to safely advance PO diet to CCD 3, 2 gm Na+, Low Cholesterol, then consider an alternate means of nutr support and reconsult RD for recs

## 2018-09-10 NOTE — CASE MANAGEMENT
Initial Clinical Review    Admission: Date/Time/Statement: 9/9/18 @ 0236 Inpatient Written     Orders Placed This Encounter   Procedures    Inpatient Admission     Standing Status:   Standing     Number of Occurrences:   1     Order Specific Question:   Admitting Physician     Answer:   Meme Mcintyre     Order Specific Question:   Level of Care     Answer:   Critical Care [15]     Order Specific Question:   Bed Type     Answer:   Trauma [7]     Order Specific Question:   Estimated length of stay     Answer:   More than 2 Midnights     Order Specific Question:   Certification     Answer:   I certify that inpatient services are medically necessary for this patient for a duration of greater than two midnights  See H&P and MD Progress Notes for additional information about the patient's course of treatment  ED: Date/Time/Mode of Arrival:   ED Arrival Information     Expected Arrival Acuity Means of Arrival Escorted By Service Admission Type    - 9/9/2018 02:13 Immediate Ambulance [facility] EMS Trauma Emergency    Arrival Complaint    -          Chief Complaint:   Chief Complaint   Patient presents with   [address]     per EMS, pt fell down 15 steps  + ETOH  pt takes plavix       History of Illness: Александр Shields is a 58 y o  male who presents following a fall at home down ~15 steps  The patient is heavily intoxicated  There was no LOC  He is on plavix for cardiac stents  He is not complaining of any pain in trauma bay  His Cspine was non-tender to palpation  CT Cspine done showing multiple acute cervical fractures       ED Vital Signs:   ED Triage Vitals   Temperature Pulse Respirations Blood Pressure SpO2   09/09/18 0214 09/09/18 0214 09/09/18 0214 09/09/18 0214 09/09/18 0214   97 7 °F (36 5 °C) (!) 111 22 (!) 183/108 95 %      Temp Source Heart Rate Source Patient Position - Orthostatic VS BP Location FiO2 (%)   09/09/18 0214 09/09/18 0214 09/09/18 0214 09/09/18 0345 --   Tympanic Monitor Lying Left arm       Pain Score       09/09/18 0304       8        Wt Readings from Last 1 Encounters:   09/09/18 84 8 kg (186 lb 15 2 oz)       Vital Signs (abnormal): -112, resps 20-32, /101, 185/90, 186/96    Abnormal Labs/Diagnostic Test Results:   Glucose 185    GCS 14  Ct Head Without Contrast  Impression: No acute intracranial abnormality  Mild chronic small vessel ischemic changes and volume loss       Ct Spine Cervical Without Contrast  Impression: Acute fractures of C2, C3, C4 and C5 as described above  Acute nondisplaced left occipital condyle fracture  An MRI of the cervical spine could be performed for further evaluation  Moderate prevertebral soft tissue swelling, likely posttraumatic in nature       Ct Chest Abdomen Pelvis W Contrast  Impression: No evidence of solid organ injury  No acute intra-abdominal abnormality  No free air or free fluid  No pneumothorax  Multiple old appearing bilateral rib fractures  17 mm peripherally calcified right renal cyst   A nonemergent renal ultrasound is recommended for further characterization  ED Treatment:   Medication Administration from 09/09/2018 0209 to 09/09/2018 4701       Date/Time Order Dose Route Action     09/09/2018 0242 iohexol (OMNIPAQUE) 350 MG/ML injection (MULTI-DOSE) 100 mL 100 mL Intravenous Given     09/09/2018 0304 HYDROmorphone (DILAUDID) injection 0 5 mg 0 5 mg Intravenous Given          Past Medical/Surgical History: Active Ambulatory Problems     Diagnosis Date Noted    No Active Ambulatory Problems     Resolved Ambulatory Problems     Diagnosis Date Noted    No Resolved Ambulatory Problems     Past Medical History:   Diagnosis Date    [facility]     [facility]cerebral [facility] accident) [facility]     Diabetes mellitus ([facility]     Heart attack ([facility]     Hypertension        Admitting Diagnosis: Unspecified multiple injuries, initial encounter [T07  XXXA]    Age/Sex: 58 y o  male    Assessment/Plan:   Trauma Alert: Level B  Model of Arrival: Ambulance    Trauma Active Problem/Plan  Cervical fractures  - Continue Cervical collar  - Neurosurgery consult  - F/u CTA  - ICU care    S/p Fall down ~15 steps  - F/u CT chest, abdomen, and pelvis    Admission Orders:  ICU  NPO  OR for Posterior cervical decompressive laminectomy C3-6   Posterior cervical lateral mass and pedicle  fixation fusion C1-T1   OOB as salo  Accuchecks QAC and QHS with coverage  IO  Cpap, resp protocol  CXR  Speech eval  Consult neuorsx  Sequential compression device    Scheduled Meds:   Current Facility-Administered Medications:  acetaminophen 975 mg Oral Q8H [address]   albuterol 2 puff Inhalation Q4H PRN   diazepam 5 mg Oral Q6H   HYDROmorphone 0 5 mg Intravenous Q4H PRN   insulin lispro 1-5 Units Subcutaneous Q6H [address]   insulin lispro 1-6 Units Subcutaneous 4x Daily (with meals and at bedtime)   ipratropium 0 5 mg Nebulization TID   levalbuterol 1 25 mg Nebulization TID   lidocaine 1 patch Transdermal Daily   magnesium sulfate 2 g Intravenous Once   metoprolol tartrate 50 mg Oral Q12H LENNY   neomycin-polymyxin B  1 mL in sodium chloride 0 9% 1000 mL irrigation bottle  Irrigation Once   oxyCODONE 10 mg Oral Q4H PRN   oxyCODONE 5 mg Oral Q4H PRN   pantoprazole 40 mg Intravenous Q24H [address]   potassium chloride 20 mEq Intravenous Q2H   sodium chloride 125 mL/hr Intravenous Continuous   thiamine 100 mg Intravenous Daily     Continuous Infusions:   sodium chloride 125 mL/hr Last Rate: 125 mL/hr (09/10/18 0240)     PRN Meds: albuterol    HYDROmorphone 0 5mg IV x2 thus far    oxyCODONE 10mg PO x2 thus far    Thank you,  [address]  Utilization Review Department  Phone: [phone]; Fax [phone]  ATTENTION: Please call with any questions or concerns to [phone]  and carefully follow the prompts so that you are directed to the right person     Send all requests for admission clinical reviews, approved or denied determinations and any other requests to fax [phone]   All voicemails are confidential

## 2018-09-10 NOTE — OP NOTE
OPERATIVE REPORT  PATIENT NAME: Jessica Wallace    :  1955  MRN: [id number]  Pt Location:  OR ROOM 17    SURGERY DATE: 9/10/2018    Surgeon(s) and Role:     * Brayden Mojica MD - Primary    Preop Diagnosis:  Dens fracture ([facility] ) [S12 100A]  Other closed displaced fracture of fourth cervical vertebra, [facility] Efrain Hernandez  Other closed displaced fracture of third cervical vertebra, initial encounter ([facility] ) [S12 290A]    Post-Op Diagnosis Codes:     * Dens fracture ([facility] ) [S12 100A]     * Other closed displaced fracture of fourth cervical vertebra, initial encounter ([facility] ) [S12 390A]     * Other closed displaced fracture of third cervical vertebra, initial encounter ([facility] ) [S12 290A]    Procedure(s) (LRB):  Posterior cervical decompressive laminectomy C3-6; Posterior cervical lateral mass and pedicle fixation fusion C1-T1 (N/A)    Specimen(s):  * No specimens in log *    Estimated Blood Loss:   250 mL    Drains:  Urethral Catheter Latex 16 Fr  (Active)   Site Assessment Clean;Skin intact 9/10/2018  1:49 PM   Collection Container Standard drainage bag 9/10/2018  1:49 PM   Securement Method Securing device (Describe) 9/10/2018  1:49 PM   Number of days: 0       Anesthesia Type:   General    Operative Indications:  Dens fracture ([facility] ) [C40 263Z]  Other closed displaced fracture of fourth cervical vertebra, initial encounter ([facility] ) Efrain Hernandez  Other closed displaced fracture of third cervical vertebra, initial encounter ([facility] ) [S12 290A]      Operative Findings:  Disruption of the facet capsules as well as the anterior longitudinal ligament with multiple spinous process fractures    Complications:   None    Procedure and Technique: The head was placed into 3 point head fixation after the patient had been placed in adequate general endotracheal anesthesia  The patient was turned prone and the head and neck were clipped free of hair  This was then prepped with Betadine soap then DuraPrep    Double layer drapes were placed in normal fashion and a Betadine impregnated sticky drape was placed over these  A time-out was called and all parameters a time-out were followed  The the skin in the midline centered over  C1 spinous process to the  T1 spinous process was injected with 1% lidocaine with 1 100,000 epinephrine  A 15  Blade was used to incise the skin  Bovie and bipolar were used throughout the procedure to maintain hemostasis  The Bovie and a cutting setting was used to divide the tissues to the dorsal fascia  A subperiosteal dissection was carried out of the spinous processes lamina and lateral masses of  C1 through  T1  Self-retaining retractors retractors were used to maintain operative exposure  Under fluoroscopic guidance vertex (Elastica) lateral mass screws were placed into the lateral masses of  C1 through  C7    5 5 mm by  30 mm screws were placed into T1 after each pedicle had been probed with a pedicle finder and then tapped  Next,  122 mm rods were cut to the appropriate length and introduced into the tulips of the aforementioned screws  Caps were placed and tightened to the appropriate torque  The cartilaginous material in the facets was carefully debrided and the bone was lightly decorticated  This was done so as to facilitate later posterior lateral fusion  A trough was then drilled at the lateral aspect of the lamina and medial to the lateral masses from  C3 through  C6, inclusive  The interspinous ligament was released with the use of the Bovie, bipolar and scissors  Rachel Phillips was then used to hold the spinous process and elevate the spinous process and laminar an interspinous ligament elements off the underlying dura  As this was done epidural veins were coagulated and divided  Copious quantities of irrigation were used to cleanse out the region  FloSeal was placed in the lateral gutters as well as bone wax were appropriate    Copious quantities of antibiotic irrigation were used to cleanse out the region  Locally harvested bone from the spinous process and lamina was debrided of soft tissue and then placed the bone mill and morcellated  This was mixed with demineralized bone matrix and then placed in the posterior lateral position so as to allow for a posterior lateral fusion to be performed  1 g of vancomycin powder was placed in this region  The muscle was then approximated with interrupted 0 Vicryl suture  The fascia was closed with interrupted 0 Vicryl suture  The fat was approximated with interrupted inverted to 0 Vicryl suture the skin was closed with staples  A 7 mm flat Cesar-Johnson drain was placed in the epidural space  Clean sterile dressings were placed  The patient was taken out of pins, turned supine  A collar was placed    There were  no significant alterations in the EMG the, somatosensory-evoked potentials were motor-evoked potentials in this case  because of the severity of the anterior spinal fractures and the large  Retropharyngeal edema and fluid mass I judged it safer to keep the patient intubated  I was present for the entire procedure      Patient Disposition:  Critical Care Unit    SIGNATURE: Estela Moreno MD  DATE: September 10, 2018  TIME: 5:33 PM

## 2018-09-10 NOTE — PROGRESS NOTES
Progress Note - Critical Care   Edith Bautista 58 y o  male MRN: [id number]  Unit/Bed#: ICU 09 Encounter: [id number]    Attending Physician: Glenda Terrazas MD      ______________________________________________________________________  Assessment and Plan:   Principal Problem:    Closed displaced fracture of third cervical vertebra [facility])  Active Problems:    Closed odontoid fracture with routine healing    Closed displaced fracture of fourth cervical vertebra ([facility])    Alcohol abuse    Decubitus ulcer of sacral region    CAD (coronary artery disease)    Dens fracture ([facility] [place] [address] )  Resolved Problems:    * No resolved hospital problems  *    59 y/o male s/p fall down stairs while intoxicated    Neuro:   Acute C2-C5 fractures with anterior longitudinal ligament injury and 4 mm of displacement of the dens posteriorly- neurologically intact, no deficits  Maintain cervical spine precautions and cervical collar  Will order VISTA collar  Neurosurgery is reportedly planning to take to the OR today  Acute Left non-displaced occipital condyle fracture- maintain collar for now per neurosurgery  Pain control  Analgesia- continue scheduled tylenol, add scheduled valium for spasm (and discontinue serax)  Continue oxycodone for moderate to severe pain with IV dilaudid for breakthrough  Acute alcohol withdraw- continue CIWA protocol  Was given 1 mg IV ativan in MRI yesterday, otherwise has had serax 10 mg q6h ordered  Will transition to valium to aid with muscle spasm  CV:   Hypertension- pain related most likely, though he does have a history of HTN  Home metoprolol was resumed yesterday  Control pain with above stated regimen and if remains HTN >180 mmHg, will treat with labetalol if needed  History of CAD s/p stents- home plavix on hold due to possible OR today with neurosurgery  Pulm:   No active issues  Pulmonary toilette/IS  History of COPD- continue scheduled atrovent and xopenex  No evidence of AE  GI:   NPO pending possible OR today  Dysphagia- due to collar and swelling- start pureed diet with HTL when able to take PO  :   Voiding without difficulty  RFTs nl  UOP adequate  F/E/N:   Continue NSS at 125 ml/hr  Hypokalemia- repleted, along with Mg  Continue NPO for now  ID:   No active issues  Afebrile  Heme:   No active issues  Endo:   History of DM0-Goal BS <180  Patient has had 2 sugars >200, will start SSI algorithm 1  Will confirm home medications as I see none listed  Msk/Skin:   Routine skin care  Prophylaxis:   SCDs, holding chemical prophylaxis due to possible OR today     Disposition: Continue ICU level care    Code Status: Level 1 - Full Code    Counseling / Coordination of Care  Total time spent today 30 minutes  Greater than 50% of total time was spent with the patient and / or family counseling and / or coordination of care  A description of the counseling / coordination of care: d/w patient and nurse plan of care for the day    ______________________________________________________________________    Chief Complaint: "Neck pain"    24 Hour Events: Patient is having significant pain in the neck and shoulders  Ready for possible surgery today  Review of Systems   Constitutional: Negative  HENT: Negative  Eyes: Negative  Respiratory: Negative  Cardiovascular: Negative  Gastrointestinal: Negative  Endocrine: Negative  Genitourinary: Negative  Musculoskeletal:        + neck pain  + shoulder pain   Skin: Negative  Allergic/Immunologic: Negative  Neurological: Negative  Negative for weakness and numbness  Hematological: Negative  Psychiatric/Behavioral: Negative       ______________________________________________________________________    Physical Exam:     Physical Exam   Constitutional: He is oriented to person, place, and time  He appears well-developed and well-nourished  HENT:   Head: Normocephalic     Eyes: Pupils are equal, round, and reactive to light  Neck:   + Aspen collar in place   Cardiovascular: Normal rate, regular rhythm and normal heart sounds  Pulmonary/Chest: Effort normal and breath sounds normal  No respiratory distress  Abdominal: Soft  Bowel sounds are normal  He exhibits no distension  There is no tenderness  There is no guarding  Genitourinary: Penis normal    Musculoskeletal: Normal range of motion  He exhibits no edema or deformity  Neurological: He is alert and oriented to person, place, and time  + strength 5/5 B/L UE/LE  + sensation intact   Skin: Skin is warm and dry  He is not diaphoretic  No erythema  Psychiatric: He has a normal mood and affect      ______________________________________________________________________  Vitals:    09/10/18 0300 09/10/18 0400 09/10/18 0500 09/10/18 0600   BP: 155/93 161/87 170/93 (!) 171/90   Pulse: 70 72 78 78   Resp: 15 12 14 14   Temp:  98 °F (36 7 °C)     TempSrc:  Oral     SpO2: 96% 97% 97% 95%   Weight:       Height:           Temperature:   Temp (24hrs), Av 9 °F (36 6 °C), Min:97 7 °F (36 5 °C), Max:98 °F (36 7 °C)    Current Temperature: 98 °F (36 7 °C)  Weights:   IBW: 79 9 kg    Body mass index is 24 67 kg/m²  Weight (last 2 days)     Date/Time   Weight    18 0345  84 8 (186 95)    18 0219  81 6 (180)            Hemodynamic Monitoring:  N/A     Non-Invasive/Invasive Ventilation Settings:  Respiratory    Lab Data (Last 4 hours)    None         O2/Vent Data (Last 4 hours)    None              No results found for: PHART, HAB0YSU, PO2ART, INS4VBP, U1MSSTQY, BEART, SOURCE  SpO2: SpO2: 97 % (3L), SpO2 Device: O2 Device: Nasal cannula  Intake and Outputs:  I/O        07 -  07 0701 - 09/10 0700 09/10 0701 - 09/11 0700    P  O   1318     I V  (mL/kg) 0 (0) 2962 5 (34 9)     IV Piggyback  100     Total Intake(mL/kg) 0 (0) 4380 5 (51 7)     Urine (mL/kg/hr) 150 1150 (0 6)     Total Output 150 1150      Net -150 +3230 5                 UOP: 50 ml/hr   Nutrition:        Diet Orders            Start     Ordered    09/10/18 0001  Diet NPO  Diet effective midnight     Question Answer Comment   Diet Type NPO    RD to adjust diet per protocol? No        188          Labs:     Results from last 7 days  Lab Units 18  0243 18  0224   WBC Thousand/uL 9 37  --    HEMOGLOBIN g/dL 14 2  --    I STAT HEMOGLOBIN g/dl  --  14 3   HEMATOCRIT % 42 1 42   PLATELETS Thousands/uL 368  --    MONO PCT MAN % 6  --        Results from last 7 days  Lab Units 09/10/18  0529 18  0243 18  0224   SODIUM mmol/L 138 134*  --    POTASSIUM mmol/L 3 4* 4 1  --    CHLORIDE mmol/L 103 98*  --    CO2 mmol/L 26 23  --    BUN mg/dL 8 6  --    CREATININE mg/dL 0 45* 0 72  --    CALCIUM mg/dL 8 2* 8 2*  --    GLUCOSE, ISTAT mg/dl  --   --  185*         No results found for: PHOS     Results from last 7 days  Lab Units 18  0243   INR  0 89     No results found for: TROPONINI      ABG:No results found for: PHART, XBC0JPL, PO2ART, JEV9AZP, E5YNPJPW, BEART, SOURCE     Imagin/9 MRI Cervical Spine: Exaggerated cervical lordosis  Fractures of the dens, C3, C4 and C5 vertebral bodies demonstrate similar alignment compared to recent CT scan  The dens is slightly displaced posteriorly in relation to the body of C2, approximately 4 mm      Extensive prevertebral edema extending from the level of the clivus inferiorly to the level of C5  There is edema within the interspinous ligaments and paraspinal musculature posteriorly  There is disruption of the anterior longitudinal ligament  However, the posterior longitudinal ligament and ligamentum flavum appear intact      Severe canal stenosis at the C3-4, C4-5 levels  Moderate canal stenosis at C5-6 and C6-7  This is related to annular bulging, endplate and posterior element hypertrophic change    Multilevel foraminal narrowing, severe at C6-7 and moderate at the C3-4,   C4-5 and C5-6 levels      Evaluation of the cord is somewhat limited due to patient motion and the severity of the canal stenosis  However, there does not appear to be cord edema to suggest acute cord injury     I have personally reviewed pertinent reports  EKG: no new    Micro:  No results found for: VIRA Hines  Allergies:    Allergies   Allergen Reactions    Amoxicillin     Augmentin [Amoxicillin-Pot Clavulanate]      Medications:   Scheduled Meds:  Current Facility-Administered Medications:  acetaminophen 975 mg Oral Counts include 234 beds at the [facility] Chelsie Trevino MD    albuterol 2 puff Inhalation Q4H PRN Anisha Cullen MD    diazepam 5 mg Intravenous Q6H PRN TATIANA Lockwood    HYDROmorphone 0 5 mg Intravenous Q4H PRN Antonino Lyon MD    insulin lispro 1-6 Units Subcutaneous 4x Daily (with meals and at bedtime) Shahab Villalta MD    ipratropium 0 5 mg Nebulization TID Anisha Cullen MD    levalbuterol 1 25 mg Nebulization TID Anisha Cullen MD    lidocaine 1 patch Transdermal Daily Chelsie Trevino MD    magnesium sulfate 2 g Intravenous Once Jed Carbajal PA-C    metoprolol tartrate 50 mg Oral Q12H [address] Shahab Villalta MD    neomycin-polymyxin B  1 mL in sodium chloride 0 9% 1000 mL irrigation bottle  Irrigation Once Antonino Lyon MD    oxazepam 10 mg Oral Q6H [address] Shahab Villalta MD    oxyCODONE 10 mg Oral Q4H PRN Shahab Villalta MD    oxyCODONE 5 mg Oral Q4H PRN Shahab Villalta MD    pantoprazole 40 mg Intravenous Q24H [address] Shahab Villalta MD    potassium chloride 20 mEq Intravenous Q2H Jed Carbajal PA-C    sodium chloride 125 mL/hr Intravenous Continuous Chelsie Trevino MD Last Rate: 125 mL/hr (09/10/18 0240)   thiamine 100 mg Intravenous Daily Chelsie Trevino MD Last Rate: Stopped (09/09/18 0915)     Continuous Infusions:  sodium chloride 125 mL/hr Last Rate: 125 mL/hr (09/10/18 0240)     PRN Meds:    albuterol 2 puff Q4H PRN   diazepam 5 mg Q6H PRN   HYDROmorphone 0 5 mg Q4H PRN   oxyCODONE 10 mg Q4H PRN   oxyCODONE 5 mg Q4H PRN     VTE Pharmacologic Prophylaxis: Pharmacologic VTE Prophylaxis contraindicated due to surgery planned for today  VTE Mechanical Prophylaxis: sequential compression device     Invasive lines and devices: Invasive Devices     Peripheral Intravenous Line            Peripheral IV 09/09/18 Left Wrist 1 day    Peripheral IV 09/09/18 Right Forearm less than 1 day                     Portions of the record may have been created with voice recognition software  Occasional wrong word or "sound a like" substitutions may have occurred due to the inherent limitations of voice recognition software  Read the chart carefully and recognize, using context, where substitutions have occurred      Jose De Jesus Lee PA-C

## 2018-09-10 NOTE — MEDICAL STUDENT
Progress Note - Critical Care   Chavo Monroe 58 y o  male MRN: [id number]  Unit/Bed#: ICU 09 Encounter: [id number]    Assessment: Morgan Polo is a 57 yo with history of alcohol abuse, DM, C2-5 fusion (2002), and cardiac stents (2003) on plavix s/p fall from stairs with multiple unstable C2-5 fractures and ligamentous injuries  Plan:           Neuro:   · GCS 15, AAOx 4, neuro exam intact  · Hx alcohol abuse, 6 beers/day - has attended rehab, never seizures, tremors   · CIWA 0, 1x PRN valium yesterday AM needed  · To OR today w/Nsg for C3-6 lami w/C12-T1 fusion   · D/C serax, start valium 5 q6  · Pain: ScheduledTylenol q8, fentanyl, lido patch, PRN dilaudid, oxy 5/10  · Plan per nsg for repeat imaging                  CV:  · CAD s/p stents (2002) on Plavix - assay not within effective range  · Hold plavix for OR - appreciate nsg recommendation to restart  · Hypertensive w/'s-200's - start labetolol/hydralazine PRN                  Lung:   · Baseline nebs at home - wheezing resolved from yesterday  · Continue scheduled and PRN nebs                  GI:   · No acute issues  · Continue protonix 40                  FEN:   · NS @ 125  · Replete K+, order Mg   · Hx alcohol abuse - continue scheduled banana bag lytes  · NPO for OR  · Puree/honey thick per speech - re-evaluate post-op                 :   · No acute issues  · Will need eventual US for 17 mm calcified renal cyst                  ID:   · Afebrile, WBC to 11 4 today - will trend                 Heme:   · CBC, INR WNL  · Plavix/ASA assays - outside threshold of therapeutic use    · No acute issues   · DVT proph - SCDs    Plan per nsg for post-op SQH                 Endo:   · Diabetic on Metformin  · BG >180's - 280's  · Increase to Algo 4 SS/gtt                            Msk/Skin:   · C-collar in place  · Frequent turning                 Disposition: to OR w/Nsg, return to ICU     Chief Complaint: "My neck hurts"    HPI/24hr events:  1x dilaudid, 1x oxy 5  No valium required  Persistently hypertensive 150's-200's  Physical Exam:   Physical Exam   Constitutional:  AAO x4  He appears well-developed and well-nourished  No distress  HENT:   Head: Normocephalic and atraumatic  Eyes: Pupils PEARRL, EOM intact, no nystagmus   Cardiovascular: Normal rate and regular rhythm  Pulmonary/Chest: Breath sounds equal BL  No wheezing, rhonchi, rales  Abdominal: Soft  He exhibits no distension  There is no tenderness  There is no rebound and no guarding  Musculoskeletal: C-collar in place  Neurological:    GCS 15, AAOx4   Speech slurred   CN II-XII intact - EOM WNL, no nystagmus   SILT   Reflexes 2+ throughout   No mcdowell, clonus, dysmetra, tremor, drift      D B T I G   R 5 5 5 5 5  L 5 5 5 5 5      HF HE KF KE DF PF  R 5 5 5 5 5 5  L 5 5 5 5 5 5     Skin: Multiple ecchymosis and abrasions on upper extremities  Psychiatric: He has a normal mood and affect  His behavior is normal  Judgment and thought content normal        Vitals:    09/10/18 0300 09/10/18 0400 09/10/18 0500 09/10/18 0600   BP: 155/93 161/87 170/93 (!) 171/90   Pulse: 70 72 78 78   Resp: 15 12 14 14   Temp:  98 °F (36 7 °C)     TempSrc:  Oral     SpO2: 96% 97% 97% 95%   Weight:       Height:                 Temperature:   Temp (24hrs), Av 9 °F (36 6 °C), Min:97 7 °F (36 5 °C), Max:98 °F (36 7 °C)    Current: Temperature: 98 °F (36 7 °C)    Weights:   IBW: 79 9 kg    Body mass index is 24 67 kg/m²    Weight (last 2 days)     Date/Time   Weight    18 0345  84 8 (186 95)    18 0219  81 6 (180)              Hemodynamic Monitoring:  N/A     Non-Invasive/Invasive Ventilation Settings:  Respiratory    Lab Data (Last 4 hours)    None         O2/Vent Data (Last 4 hours)    None              No results found for: PHART, WRI6KBJ, PO2ART, TCR5VWL, Q2JCPHEX, BEART, SOURCE  SpO2: SpO2: 95 %    Intake and Outputs:  I/O       701 -  07 - 09/10 07 09/10 0701 - 09/11 0700    P  O   1318     I V  (mL/kg) 0 (0) 2962 5 (34 9)     IV Piggyback  100     Total Intake(mL/kg) 0 (0) 4380 5 (51 7)     Urine (mL/kg/hr) 150 1150 (0 6)     Total Output 150 1150      Net -150 +3230 5                 Nutrition:        Diet Orders            Start     Ordered    09/10/18 0001  Diet NPO  Diet effective midnight     Question Answer Comment   Diet Type NPO    RD to adjust diet per protocol? No        09/09/18 2308          Labs:     Results from last 7 days  Lab Units 09/09/18  0243 09/09/18  0224   WBC Thousand/uL 9 37  --    HEMOGLOBIN g/dL 14 2  --    I STAT HEMOGLOBIN g/dl  --  14 3   HEMATOCRIT % 42 1 42   PLATELETS Thousands/uL 368  --    MONO PCT MAN % 6  --       Results from last 7 days  Lab Units 09/10/18  0529 09/09/18  0243 09/09/18  0224   SODIUM mmol/L 138 134*  --    POTASSIUM mmol/L 3 4* 4 1  --    CHLORIDE mmol/L 103 98*  --    CO2 mmol/L 26 23  --    BUN mg/dL 8 6  --    CREATININE mg/dL 0 45* 0 72  --    CALCIUM mg/dL 8 2* 8 2*  --    GLUCOSE, ISTAT mg/dl  --   --  185*                Results from last 7 days  Lab Units 09/09/18  0243   INR  0 89         No results found for: TROPONINI      Micro:  No results found for: Ramo Moreno, [facility] , SPUTUMCULTUR    Allergies:    Allergies   Allergen Reactions    Amoxicillin     Augmentin [Amoxicillin-Pot Clavulanate]        Medications:   Scheduled Meds:  Current Facility-Administered Medications:  acetaminophen 975 mg Oral Q8H [facility] Shane Moran MD    albuterol 2 puff Inhalation Q4H PRN Stephan Pedro MD    diazepam 5 mg Intravenous Q6H PRN TATIANA Gallegos    HYDROmorphone 0 5 mg Intravenous Q4H PRN Barbi Vargas MD    insulin lispro 1-6 Units Subcutaneous 4x Daily (with meals and at bedtime) Moises Garcias MD    ipratropium 0 5 mg Nebulization TID Stephan Pedro MD    levalbuterol 1 25 mg Nebulization TID Stephan Pedro MD    lidocaine 1 patch Transdermal Daily Shane Moran MD magnesium sulfate 2 g Intravenous Once Latasha Vega PA-C    metoprolol tartrate 50 mg Oral Q12H [facility] Yoanna Oliva MD    neomycin-polymyxin B  1 mL in sodium chloride 0 9% 1000 mL irrigation bottle  Irrigation Once Lacretiarnoldo Wolfe MD    oxazepam 10 mg Oral Q6H [facility] Yoanna Oliva MD    oxyCODONE 10 mg Oral Q4H PRN Yoanna Oliva MD    oxyCODONE 5 mg Oral Q4H PRN Yoanna Oliva MD    pantoprazole 40 mg Intravenous Q24H [facility] Yoanna Oliva MD    potassium chloride 20 mEq Intravenous Q2H Latasha Vega PA-C    sodium chloride 125 mL/hr Intravenous Continuous Enmanuel Duval MD Last Rate: 125 mL/hr (09/10/18 0240)   thiamine 100 mg Intravenous Daily Enmanuel Duval MD Last Rate: Stopped (09/09/18 0915)     Continuous Infusions:  sodium chloride 125 mL/hr Last Rate: 125 mL/hr (09/10/18 0240)     PRN Meds:    albuterol 2 puff Q4H PRN   diazepam 5 mg Q6H PRN   HYDROmorphone 0 5 mg Q4H PRN   oxyCODONE 10 mg Q4H PRN   oxyCODONE 5 mg Q4H PRN       VTE Pharmacologic Prophylaxis: Sequential compression device (Venodyne)   VTE Mechanical Prophylaxis: sequential compression device    Invasive lines and devices: Invasive Devices     Peripheral Intravenous Line            Peripheral IV 09/09/18 Left Wrist 1 day    Peripheral IV 09/09/18 Right Forearm less than 1 day                   Counseling / Coordination of Care     Code Status: Level 1 - Full Code     Portions of the record may have been created with voice recognition software  Occasional wrong word or "sound a like" substitutions may have occurred due to the inherent limitations of voice recognition software  Read the chart carefully and recognize, using context, where substitutions have occurred       Jax Jaimes

## 2018-09-10 NOTE — RESPIRATORY THERAPY NOTE
RT Ventilator Management Note  Yessica Sabillon 58 y o  male MRN: [id number]  Unit/Bed#: ICU 09 Encounter: [id number]      Daily Screen     No data found  Physical Exam:   Assessment Type: (P) Assess only  General Appearance: (P) Sedated  Respiratory Pattern: (P) Assisted  Chest Assessment: (P) Chest expansion symmetrical  Bilateral Breath Sounds: (P) Clear, Diminished      Resp Comments: (P) Pt came back from OR intubated  On [address] vent settings  Tube at 26 at lip however it is stated from OR it is at 24  Will follow up after CXR to verify placement

## 2018-09-11 ENCOUNTER — APPOINTMENT (INPATIENT)
Dept: RADIOLOGY | Facility: HOSPITAL | Age: 63
DRG: 471 | End: 2018-09-11
Payer: COMMERCIAL

## 2018-09-11 PROBLEM — E11.9 DIABETES (HCC): Status: ACTIVE | Noted: 2018-09-11

## 2018-09-11 PROBLEM — D62 ACUTE BLOOD LOSS ANEMIA: Status: ACTIVE | Noted: 2018-09-11

## 2018-09-11 LAB
ANION GAP SERPL CALCULATED.3IONS-SCNC: 6 MMOL/L (ref 4–13)
ANION GAP SERPL CALCULATED.3IONS-SCNC: 7 MMOL/L (ref 4–13)
BASOPHILS # BLD AUTO: 0 THOUSANDS/ΜL (ref 0–0.1)
BASOPHILS NFR BLD AUTO: 0 % (ref 0–1)
BUN SERPL-MCNC: 5 MG/DL (ref 5–25)
BUN SERPL-MCNC: 5 MG/DL (ref 5–25)
CALCIUM SERPL-MCNC: 7.8 MG/DL (ref 8.3–10.1)
CALCIUM SERPL-MCNC: 8.2 MG/DL (ref 8.3–10.1)
CHLORIDE SERPL-SCNC: 105 MMOL/L (ref 100–108)
CHLORIDE SERPL-SCNC: 107 MMOL/L (ref 100–108)
CO2 SERPL-SCNC: 24 MMOL/L (ref 21–32)
CO2 SERPL-SCNC: 25 MMOL/L (ref 21–32)
CREAT SERPL-MCNC: 0.38 MG/DL (ref 0.6–1.3)
CREAT SERPL-MCNC: 0.41 MG/DL (ref 0.6–1.3)
EOSINOPHIL # BLD AUTO: 0 THOUSAND/ΜL (ref 0–0.61)
EOSINOPHIL NFR BLD AUTO: 0 % (ref 0–6)
ERYTHROCYTE [DISTWIDTH] IN BLOOD BY AUTOMATED COUNT: 16.5 % (ref 11.6–15.1)
ERYTHROCYTE [DISTWIDTH] IN BLOOD BY AUTOMATED COUNT: 16.5 % (ref 11.6–15.1)
GFR SERPL CREATININE-BSD FRML MDRD: 124 ML/MIN/1.73SQ M
GFR SERPL CREATININE-BSD FRML MDRD: 128 ML/MIN/1.73SQ M
GLUCOSE SERPL-MCNC: 179 MG/DL (ref 65–140)
GLUCOSE SERPL-MCNC: 201 MG/DL (ref 65–140)
GLUCOSE SERPL-MCNC: 203 MG/DL (ref 65–140)
GLUCOSE SERPL-MCNC: 212 MG/DL (ref 65–140)
GLUCOSE SERPL-MCNC: 212 MG/DL (ref 65–140)
HCT VFR BLD AUTO: 27.8 % (ref 36.5–49.3)
HCT VFR BLD AUTO: 27.8 % (ref 36.5–49.3)
HGB BLD-MCNC: 8.9 G/DL (ref 12–17)
HGB BLD-MCNC: 8.9 G/DL (ref 12–17)
IMM GRANULOCYTES # BLD AUTO: 0.03 THOUSAND/UL (ref 0–0.2)
IMM GRANULOCYTES NFR BLD AUTO: 1 % (ref 0–2)
LYMPHOCYTES # BLD AUTO: 0.53 THOUSANDS/ΜL (ref 0.6–4.47)
LYMPHOCYTES NFR BLD AUTO: 10 % (ref 14–44)
MCH RBC QN AUTO: 26.7 PG (ref 26.8–34.3)
MCH RBC QN AUTO: 26.7 PG (ref 26.8–34.3)
MCHC RBC AUTO-ENTMCNC: 32 G/DL (ref 31.4–37.4)
MCHC RBC AUTO-ENTMCNC: 32 G/DL (ref 31.4–37.4)
MCV RBC AUTO: 84 FL (ref 82–98)
MCV RBC AUTO: 84 FL (ref 82–98)
MONOCYTES # BLD AUTO: 0.39 THOUSAND/ΜL (ref 0.17–1.22)
MONOCYTES NFR BLD AUTO: 7 % (ref 4–12)
NEUTROPHILS # BLD AUTO: 4.54 THOUSANDS/ΜL (ref 1.85–7.62)
NEUTS SEG NFR BLD AUTO: 82 % (ref 43–75)
NRBC BLD AUTO-RTO: 0 /100 WBCS
PLATELET # BLD AUTO: 181 THOUSANDS/UL (ref 149–390)
PLATELET # BLD AUTO: 181 THOUSANDS/UL (ref 149–390)
PMV BLD AUTO: 10.3 FL (ref 8.9–12.7)
PMV BLD AUTO: 10.3 FL (ref 8.9–12.7)
POTASSIUM SERPL-SCNC: 4.1 MMOL/L (ref 3.5–5.3)
POTASSIUM SERPL-SCNC: 4.5 MMOL/L (ref 3.5–5.3)
RBC # BLD AUTO: 3.33 MILLION/UL (ref 3.88–5.62)
RBC # BLD AUTO: 3.33 MILLION/UL (ref 3.88–5.62)
SODIUM SERPL-SCNC: 137 MMOL/L (ref 136–145)
SODIUM SERPL-SCNC: 137 MMOL/L (ref 136–145)
WBC # BLD AUTO: 5.49 THOUSAND/UL (ref 4.31–10.16)
WBC # BLD AUTO: 5.49 THOUSAND/UL (ref 4.31–10.16)

## 2018-09-11 PROCEDURE — 85027 COMPLETE CBC AUTOMATED: CPT | Performed by: NEUROLOGICAL SURGERY

## 2018-09-11 PROCEDURE — G8996 SWALLOW CURRENT STATUS: HCPCS

## 2018-09-11 PROCEDURE — G8988 SELF CARE GOAL STATUS: HCPCS

## 2018-09-11 PROCEDURE — 72040 X-RAY EXAM NECK SPINE 2-3 VW: CPT

## 2018-09-11 PROCEDURE — 97167 OT EVAL HIGH COMPLEX 60 MIN: CPT

## 2018-09-11 PROCEDURE — 85025 COMPLETE CBC W/AUTO DIFF WBC: CPT | Performed by: STUDENT IN AN ORGANIZED HEALTH CARE EDUCATION/TRAINING PROGRAM

## 2018-09-11 PROCEDURE — 80048 BASIC METABOLIC PNL TOTAL CA: CPT | Performed by: STUDENT IN AN ORGANIZED HEALTH CARE EDUCATION/TRAINING PROGRAM

## 2018-09-11 PROCEDURE — 99024 POSTOP FOLLOW-UP VISIT: CPT | Performed by: PHYSICIAN ASSISTANT

## 2018-09-11 PROCEDURE — 92610 EVALUATE SWALLOWING FUNCTION: CPT

## 2018-09-11 PROCEDURE — 94760 N-INVAS EAR/PLS OXIMETRY 1: CPT

## 2018-09-11 PROCEDURE — 82948 REAGENT STRIP/BLOOD GLUCOSE: CPT

## 2018-09-11 PROCEDURE — 99291 CRITICAL CARE FIRST HOUR: CPT | Performed by: SURGERY

## 2018-09-11 PROCEDURE — 94003 VENT MGMT INPAT SUBQ DAY: CPT

## 2018-09-11 PROCEDURE — G8997 SWALLOW GOAL STATUS: HCPCS

## 2018-09-11 PROCEDURE — G8987 SELF CARE CURRENT STATUS: HCPCS

## 2018-09-11 PROCEDURE — 94640 AIRWAY INHALATION TREATMENT: CPT

## 2018-09-11 RX ORDER — LORAZEPAM 2 MG/ML
2 INJECTION INTRAMUSCULAR ONCE
Status: DISCONTINUED | OUTPATIENT
Start: 2018-09-11 | End: 2018-09-11

## 2018-09-11 RX ORDER — SENNOSIDES 8.6 MG
2 TABLET ORAL
Status: DISCONTINUED | OUTPATIENT
Start: 2018-09-11 | End: 2018-09-14 | Stop reason: HOSPADM

## 2018-09-11 RX ORDER — FOLIC ACID 1 MG/1
1 TABLET ORAL DAILY
Status: DISCONTINUED | OUTPATIENT
Start: 2018-09-11 | End: 2018-09-14 | Stop reason: HOSPADM

## 2018-09-11 RX ORDER — HEPARIN SODIUM 5000 [USP'U]/ML
5000 INJECTION, SOLUTION INTRAVENOUS; SUBCUTANEOUS EVERY 8 HOURS SCHEDULED
Status: DISCONTINUED | OUTPATIENT
Start: 2018-09-11 | End: 2018-09-14 | Stop reason: HOSPADM

## 2018-09-11 RX ADMIN — ACETAMINOPHEN 975 MG: 325 TABLET ORAL at 05:51

## 2018-09-11 RX ADMIN — SENNOSIDES 17.2 MG: 8.6 TABLET, FILM COATED ORAL at 23:43

## 2018-09-11 RX ADMIN — Medication 1 G: at 02:17

## 2018-09-11 RX ADMIN — LEVALBUTEROL HYDROCHLORIDE 1.25 MG: 1.25 SOLUTION, CONCENTRATE RESPIRATORY (INHALATION) at 14:15

## 2018-09-11 RX ADMIN — INSULIN LISPRO 1 UNITS: 100 INJECTION, SOLUTION INTRAVENOUS; SUBCUTANEOUS at 12:49

## 2018-09-11 RX ADMIN — DIAZEPAM 5 MG: 5 TABLET ORAL at 19:44

## 2018-09-11 RX ADMIN — INSULIN LISPRO 1 UNITS: 100 INJECTION, SOLUTION INTRAVENOUS; SUBCUTANEOUS at 06:23

## 2018-09-11 RX ADMIN — THIAMINE HCL TAB 100 MG 100 MG: 100 TAB at 10:22

## 2018-09-11 RX ADMIN — METOPROLOL TARTRATE 50 MG: 50 TABLET ORAL at 21:18

## 2018-09-11 RX ADMIN — ACETAMINOPHEN 975 MG: 325 TABLET ORAL at 13:14

## 2018-09-11 RX ADMIN — INSULIN LISPRO 2 UNITS: 100 INJECTION, SOLUTION INTRAVENOUS; SUBCUTANEOUS at 18:18

## 2018-09-11 RX ADMIN — IPRATROPIUM BROMIDE 0.5 MG: 0.5 SOLUTION RESPIRATORY (INHALATION) at 19:35

## 2018-09-11 RX ADMIN — DIAZEPAM 5 MG: 5 TABLET ORAL at 08:17

## 2018-09-11 RX ADMIN — PROPOFOL 40 MCG/KG/MIN: 10 INJECTION, EMULSION INTRAVENOUS at 06:24

## 2018-09-11 RX ADMIN — DIAZEPAM 5 MG: 5 TABLET ORAL at 13:14

## 2018-09-11 RX ADMIN — FOLIC ACID 1 MG: 1 TABLET ORAL at 12:49

## 2018-09-11 RX ADMIN — OXYCODONE HYDROCHLORIDE 10 MG: 10 TABLET ORAL at 16:05

## 2018-09-11 RX ADMIN — HEPARIN SODIUM 5000 UNITS: 5000 INJECTION, SOLUTION INTRAVENOUS; SUBCUTANEOUS at 21:18

## 2018-09-11 RX ADMIN — HEPARIN SODIUM 5000 UNITS: 5000 INJECTION, SOLUTION INTRAVENOUS; SUBCUTANEOUS at 14:50

## 2018-09-11 RX ADMIN — ACETAMINOPHEN 975 MG: 325 TABLET ORAL at 21:18

## 2018-09-11 RX ADMIN — OXYCODONE HYDROCHLORIDE 10 MG: 10 TABLET ORAL at 10:22

## 2018-09-11 RX ADMIN — VANCOMYCIN HYDROCHLORIDE 1000 MG: 1 INJECTION, SOLUTION INTRAVENOUS at 04:44

## 2018-09-11 RX ADMIN — LEVALBUTEROL HYDROCHLORIDE 1.25 MG: 1.25 SOLUTION, CONCENTRATE RESPIRATORY (INHALATION) at 07:34

## 2018-09-11 RX ADMIN — CHLORHEXIDINE GLUCONATE 15 ML: 1.2 RINSE ORAL at 08:53

## 2018-09-11 RX ADMIN — METOPROLOL TARTRATE 50 MG: 50 TABLET ORAL at 08:54

## 2018-09-11 RX ADMIN — FAMOTIDINE 20 MG: 20 TABLET ORAL at 08:17

## 2018-09-11 RX ADMIN — IPRATROPIUM BROMIDE 0.5 MG: 0.5 SOLUTION RESPIRATORY (INHALATION) at 14:15

## 2018-09-11 RX ADMIN — LEVALBUTEROL HYDROCHLORIDE 1.25 MG: 1.25 SOLUTION, CONCENTRATE RESPIRATORY (INHALATION) at 19:35

## 2018-09-11 RX ADMIN — IPRATROPIUM BROMIDE 0.5 MG: 0.5 SOLUTION RESPIRATORY (INHALATION) at 07:34

## 2018-09-11 RX ADMIN — LIDOCAINE 1 PATCH: 50 PATCH TOPICAL at 08:52

## 2018-09-11 RX ADMIN — FENTANYL CITRATE 25 MCG/HR: 50 INJECTION, SOLUTION INTRAMUSCULAR; INTRAVENOUS at 05:16

## 2018-09-11 RX ADMIN — PROPOFOL 40 MCG/KG/MIN: 10 INJECTION, EMULSION INTRAVENOUS at 03:30

## 2018-09-11 RX ADMIN — DIAZEPAM 5 MG: 5 TABLET ORAL at 04:43

## 2018-09-11 RX ADMIN — POTASSIUM PHOSPHATE, MONOBASIC AND POTASSIUM PHOSPHATE, DIBASIC 21 MMOL: 224; 236 INJECTION, SOLUTION INTRAVENOUS at 02:19

## 2018-09-11 NOTE — SOCIAL WORK
CM facilitated Brief Alcohol Intervention yesterday  Pt does not want HOST  HOST offered again today and denied  Pt is POD #1   Pt will need to work with therapy today

## 2018-09-11 NOTE — DISCHARGE INSTR - OTHER ORDERS
Skin Care Plan:  1Hydraguard to sacral / buttocks bid and prn   2-Turn/reposition q2h for pressure re-distribution on skin    3-Soft care cushion when out of bed  4-Elevate heels to offload pressure  5-Moisturize skin daily with skin nourishing cream   6-Hydraguard to bilateral heels BID and PRN

## 2018-09-11 NOTE — PROGRESS NOTES
Pt to radiology for neck xrays  Transported via bed with [place] FFT monitor and RN attending, pt tolerated well

## 2018-09-11 NOTE — PLAN OF CARE
Problem: SLP ADULT - SWALLOWING, IMPAIRED  Goal: Advance to least restrictive diet without signs or symptoms of aspiration for planned discharge setting  See evaluation for individualized goals    See eval   Outcome: Progressing

## 2018-09-11 NOTE — MEDICAL STUDENT
Progress Note - Critical Care  MEDICAL STUDENT NOTE: NOT A LEGAL DOCUMENT    Kodak Keys 58 y o  male MRN: [id number]  Unit/Bed#: ICU 09 Encounter: [id number]    Attending Physician: Stephan Pedro MD    ______________________________________________________________________  Assessment and Plan:   Principal Problem:    Closed displaced fracture of third cervical vertebra [facility])  Active Problems:    Closed odontoid fracture with routine healing    Closed displaced fracture of fourth cervical vertebra ([facility])    Alcohol abuse    Decubitus ulcer of sacral region    CAD (coronary artery disease)    Dens fracture ([facility] [address] )  Resolved Problems:    * No resolved hospital problems  *    Sherri Osorio is a 59 yo M with a hx of alcohol use disorder, C2-5 fusion in 2002, and cardiac stents in 2003 on Plavix who presented two days ago s/p mechanical fall down stairs while intoxicated with multiple C2-5 fractures and ligamentous injuries  Patient is now s/p posterior cervical decompressive laminectomy C3-6 and posterior cervical lateral mass and pedicle fixation fusion C1-T1 on POD#1  Neuro:   GCS 11t with no obvious neurological deficits appreciated  Acute C2-C5 fractures with anterior longitudinal ligament injury and 4mm of displacement of the dens posteriorly:  -Now s/p posterior cervical decompressive laminectomy C3-6 and posterior cervical lateral mass and pedicle fixation fusion C1-T1 on POD#1   -Patient is following commands in upper and lower extremities  -MAP goal of 85 for cord perfusion has been maintained    -Patient is intubated with collar in place and posterior MEGHAN drain to suction with sanguinous output    - Neurosurgery is following  SQH was started at 2200 per Neurosurgery  Home Plavix has been held and awaiting [facility] reccommendations on when to restart  Patient remained intubated overnight due to significant prevertebral edema intra-operatively     Acute left no-displaced occipital condyle fracture  -Maintain collar per Nsg  Analgesia  - Continue scheduled tylenol, fentanyl gtt, and propofol gtt  Patient did not require PRN oxycodone or dilaudid  Hx of alcohol abuse  -Patient reportedly consumes 6 beers/day    -No significant autonomic instability during hospitalization  -CIWA currently 0 but difficult to assess 2/2 intubation     -Continue with diazepam 5 mg Q6H PO Win and thiamine 100 mg PO daily  - Continue to monitor for signs of withdrawal      CV:   Hypertension  - Blood pressures overnight have been in the range of 110s-170s/80s-100s with an isolated 037 mmHg systolic pressure at 4817  This could be pain related though he does have hx of HTN  MAP of 85 for spinal cord perfusion maintained  Continue metoprolol 50 mg Q12 H scheduled  Hx of CAD s/p stents:   -Home Plavix held  Will f/u Nsg recommendations on when to restart  Pulm: Intubated  - Saturated at  on A/C 17/450/50%/5  Most recent ABG pH 7 36, pCO2 42 6, Po2 96, Hco3 23 6, BD -1 7  Hx of COPD  - Continue sche atrovent and Xopenix  No evidence of respiratory distress  GI:   - Currently NPO with OGT in place while intubated  :   - Mohr in place  Making adequate urine at 160 4 ml/hr (1 89 cc/kg/hr)  F/E/N:   - Fluids: NS at 125, Fent at 25 mcg/hr, Propofol at 25 4 mL/hr (49 9 mL/kg/hr)  - Electrolytes: Received calcium and phosph repletions overnight  - Diet: NPO/OGT  ID:   - Currently receiving vncomycin 1,000 mg IV Q12 H for surgical ppx  - Patient has remained afebrile without a leukocytosis  - Will continue to monitor  Heme:   - Post-op Hgb 8 9 from 10 3  Will continue to monitor for acute blood loss anemia  Endo:   Diabetes mellitus  - BGs over 24 hours in range of 150-220  Goal under 180  Currently on SSI algorithm 1  Need to confirm home meds  Msk/Skin:   - Routine skin care       Disposition:   - Continue ICU level of care     Code Status: Level 1 - Full Code    ______________________________________________________________________    Chief Complaint: Intubated/sedated    24 Hour Events:    Pt received a posterior cervical decompressive laminectomy of C3-6 and posterior cervical lateral mass and pedicle fixation fusion C1-T1  EBL was 250 mL and pt received 2 4 L crystalloid and 500 cc 5% albumin introp    Procedure was relatively uncomplicated but because of the severity of the anterior spinal fractures and retropharyngeal edema, Dr Tano Calvillo recommended keeping the patient intubated and sedated on propofol gtt with a RASS -2  He also recommended careful extubation when indicated  Overnight, attempts at decreasing propofol gtt were not tolerated with some agitation  Patient has not required any PRN pain medication overnight  He is not currently exhibiting signs of alcohol withdrawal      Review of Systems   Unable to perform ROS: Intubated     ______________________________________________________________________    Physical Exam:     ______________________________________________________________________  Vitals:    18 0000 18 0100 18 0200 18 0332   BP:       BP Location:       Pulse: (!) 54 60 56    Resp: 16 17 17    Temp:       TempSrc:       SpO2: 100% 100% 100% 100%   Weight:       Height:         Exam - Gen - NAD, resting comfortably in bed  HEENT - pupils 2mm and reactive bilaterally  Neck - collar in place, posterior midline incision dressing c/d/i, posterior MEGHAN to suction with sanguinous output  CVS - RRR, no m/r/g  Resp - equal breath sounds, CTAB  Abd - soft, NT, ND   - antony with clear urine  Ext - no edema or tenderness, scatter ecchymosis on R arm  Neuro - GCS 11T (E4V1M6), follows commands with extremities x4    Temperature:   Temp (24hrs), Av 6 °F (36 4 °C), Min:96 9 °F (36 1 °C), Max:98 2 °F (36 8 °C)    Current Temperature: 98 2 °F (36 8 °C)  Weights:   IBW: 79 9 kg    Body mass index is 25 54 kg/m²    Weight (last 2 days)     Date/Time   Weight    09/09/18 0345  84 8 (186 95)    09/09/18 0219  81 6 (180)            Hemodynamic Monitoring:    N/A    Non-Invasive/Invasive Ventilation Settings:  Respiratory    Lab Data (Last 4 hours)    None         O2/Vent Data (Last 4 hours)      09/11 0332           Vent Mode AC/VC       Resp Rate (BPM) (BPM) 17       Vt (mL) (mL) 450       FIO2 (%) (%) 40       PEEP (cmH2O) (cmH2O) 5       MV 10 2                 Lab Results   Component Value Date    PHART 7 362 09/10/2018    NSE4ZLC 42 6 09/10/2018    PO2ART 96 1 09/10/2018    IMO7LTP 23 6 09/10/2018    BEART -1 7 09/10/2018    SOURCE Line, Arterial 09/10/2018     SpO2: SpO2: 100 %  Intake and Outputs:  I/O       09/09 0701 - 09/10 0700 09/10 0701 - 09/11 0700    P  O  1318     I V  (mL/kg) 2962 5 (34 9) 5114 5 (60 3)    NG/GT  50    IV Piggyback 100 600    Total Intake(mL/kg) 4380 5 (51 7) 5764 5 (68)    Urine (mL/kg/hr) 1150 (0 6) 3850 (1 9)    Drains  110    Blood  250    Total Output 1150 4210    Net +3230 5 +1554 5              UOP: 160 4 ml/hr (1 89 cc/kg/hr)  Nutrition:        Diet Orders            Start     Ordered    09/11/18 0000  Diet NPO; Sips with meds  Diet effective midnight     Question Answer Comment   Diet Type NPO    NPO Except: Sips with meds        09/10/18 1800        Labs:     Results from last 7 days  Lab Units 09/11/18  0552 09/10/18  1853 09/10/18  1604  09/10/18  0529   WBC Thousand/uL 5 49  5 49 5 53  --   --  11 41*   HEMOGLOBIN g/dL 8 9*  8 9* 10 3*  --   --  12 3   I STAT HEMOGLOBIN g/dl  --   --  10 5*  < >  --    HEMATOCRIT % 27 8*  27 8* 32 1* 31*  < > 38 1   PLATELETS Thousands/uL 181  181 173  --   --  256   NEUTROS PCT % 82* 92*  --   --  83*   MONOS PCT % 7 2*  --   --  8   < > = values in this interval not displayed      Results from last 7 days  Lab Units 09/11/18  0543 09/10/18  2351 09/10/18  1853 09/10/18  1604 09/10/18  1443  09/09/18  0224   SODIUM mmol/L 137 137 140  --   --   < >  -- POTASSIUM mmol/L 4 5 4 1 4 0  --   --   < >  --    CHLORIDE mmol/L 105 107 109*  --   --   < >  --    CO2 mmol/L 25 24 23  --   --   < >  --    BUN mg/dL 5 5 6  --   --   < >  --    CREATININE mg/dL 0 38* 0 41* 0 38*  --   --   < >  --    CALCIUM mg/dL 8 2* 7 8* 7 8*  --   --   < >  --    GLUCOSE, ISTAT mg/dl  --   --   --  217* 201*  --  185*   < > = values in this interval not displayed  Results from last 7 days  Lab Units 09/10/18  1853   MAGNESIUM mg/dL 2 2     Lab Results   Component Value Date    PHOS 2 5 09/10/2018        Results from last 7 days  Lab Units 09/09/18  0243   INR  0 89     No results found for: TROPONINI    Results from last 7 days  Lab Units 09/10/18  1853   LACTIC ACID mmol/L 0 7     ABG:  Lab Results   Component Value Date    PHART 7 362 09/10/2018    SIR8SLW 42 6 09/10/2018    PO2ART 96 1 09/10/2018    DTZ3CWN 23 6 09/10/2018    BEART -1 7 09/10/2018    SOURCE Line, Arterial 09/10/2018     Imaging: CXR and XR C-spine post-operatively  Read pending on CXR  I have personally reviewed pertinent reports  EKG: N/A  Micro:  No results found for: VIRA Stevenson  Allergies:    Allergies   Allergen Reactions    Amoxicillin     Augmentin [Amoxicillin-Pot Clavulanate]      Medications:   Scheduled Meds:  Current Facility-Administered Medications:  acetaminophen 975 mg Oral [facility] Julio C Almonte MD    albuterol 2 puff Inhalation Q4H PRN Marcell Hudson MD    bisacodyl 10 mg Rectal Daily PRN Juan Carlos Gaines MD    chlorhexidine 15 mL [facility] [address] Juan Carlos Gaines MD    chlorhexidine 15 mL [facility] Q12H [address] [place] Ailyn Frausto PA-C    diazepam 5 mg Oral Q6H Hemant Hernandez PA-C    docusate sodium 100 mg Oral BID Juan Carlos Gaines MD    famotidine 20 mg Oral BID Mackenzie Wiggins PA-C    fentaNYL 25 mcg/hr Intravenous Continuous Juan Carlos Gaines MD Last Rate: 25 mcg/hr (09/11/18 0516)   heparin (porcine) 5,000 Units Subcutaneous [facility] Ed Baldwin Carlos Mireles MD    HYDROmorphone 0 5 mg Intravenous Q4H PRN Priyanka Merrill MD    insulin lispro 1-5 Units Subcutaneous Q6H [address] Chelita Amato PA-C    insulin lispro 1-6 Units Subcutaneous 4x Daily (with meals and at bedtime) Priyanka Merrill MD    ipratropium 0 5 mg Nebulization TID Mukesh Siddiqi MD    levalbuterol 1 25 mg Nebulization TID Mukesh Siddiqi MD    lidocaine 1 patch Transdermal Daily Marti Napoles MD    metoprolol tartrate 50 mg Oral Q12H [address] Priyanka Merrill MD    ondansetron 4 mg Intravenous Q6H PRN Kevan Enamorado MD    oxyCODONE 10 mg Oral Q4H PRN Priyanka Merrill MD    oxyCODONE 5 mg Oral Q4H PRN Priyanka Merrill MD    potassium phosphate 21 mmol Intravenous Once Daryle Spar, MD Last Rate: 21 mmol (09/11/18 0219)   propofol 5-50 mcg/kg/min Intravenous Titrated Kevan Enamorado MD Last Rate: 40 mcg/kg/min (09/11/18 0330)   sodium chloride 125 mL/hr Intravenous Continuous Marti Napoles MD Last Rate: 125 mL/hr (09/10/18 1805)   thiamine 100 mg Oral Daily Babs Frausto PA-C      Continuous Infusions:  fentaNYL 25 mcg/hr Last Rate: 25 mcg/hr (09/11/18 0516)   propofol 5-50 mcg/kg/min Last Rate: 40 mcg/kg/min (09/11/18 0330)   sodium chloride 125 mL/hr Last Rate: 125 mL/hr (09/10/18 1805)     PRN Meds:    albuterol 2 puff Q4H PRN   bisacodyl 10 mg Daily PRN   HYDROmorphone 0 5 mg Q4H PRN   ondansetron 4 mg Q6H PRN   oxyCODONE 10 mg Q4H PRN   oxyCODONE 5 mg Q4H PRN     VTE Pharmacologic Prophylaxis: Heparin (SQ)  VTE Mechanical Prophylaxis: sequential compression device  Invasive lines and devices:   Invasive Devices     Peripheral Intravenous Line            Peripheral IV 09/09/18 Left Wrist 2 days    Peripheral IV 09/09/18 Right Forearm 1 day    Peripheral IV 09/10/18 Left Antecubital less than 1 day          Arterial Line            Arterial Line 09/10/18 Right Radial less than 1 day          Drain            Closed/Suction Drain Midline Back less than 1 day    NG/OG/Enteral Tube Orogastric 18 Fr Center mouth less than 1 day    Urethral Catheter Latex 16 Fr  less than 1 day          Airway            ETT  Oral;Hi-Lo; Cuffed 8 mm less than 1 day                 Chuckie Sky, MS4  Surgical ICU Clerkship

## 2018-09-11 NOTE — SPEECH THERAPY NOTE
Speech Language/Pathology  Speech/Language Pathology Dysphagia Assessment    Patient Name: Yessica Sabillon  VMCUV'G Date: 9/11/2018     Problem List  Patient Active Problem List   Diagnosis    Closed odontoid fracture with routine healing    [facility]osed [facility] vertebra ([facility] )    [facility] of [facility]    Alcohol abuse    Decubitus ulcer of sacral region    CAD (coronary artery disease)    Dens fracture ([facility] )    Acute blood loss anemia    Diabetes ([facility] )     Past Medical History  Past Medical History:   Diagnosis Date    COPD (chronic obstructive pulmonary disease) ([facility] )     CVA (cerebral vascular accident) ([facility] )     Diabetes mellitus ([place] [address] )     Heart attack ([place] [address] )     Hypertension      Past Surgical History  History reviewed  No pertinent surgical history  Summary:  Pt presents w/mild oral and s/s moderate pharyngeal dysphagia  S/s aspiration noted with nectar thick liquids and on occasion with honey thick liquids by cup sip  Recommendations:  Diet: dysphagia level 2 to start  Liquid: honey thick by tsp  Meds: crush  Supervision: assist  Positioning:Upright  Strategies: Pt to take PO/Meds only when fully alert and upright  Oral care  Aspiration precautions  Reflux precautions    Therapy Prognosis: good  Prognosis considerations: age, cognitive status  Frequency: 3-5x per week  HPI:  Pt is a 57 y/o M admitted s/p fall down 15 stairs sustaining multiple cervical spine fxs  See initial eval completed 9/9/18 for full H&P  Pt now s/p OR yesterday for posterior cervical decompressive laminectomy C3-6; Posterior cervical lateral mass and pedicle fixation fusion C1-T1  Extubated this morning  Reassessment ordered to assure po tolerance/resume diet      Reason for consult:  R/o aspiration  Determine safest and least restrictive diet s/p OR    Current diet:  Puree/honey thick liquid ordered and held pending eval  Premorbid diet[de-identified]  Regular PTA; 1/htl prior to OR  Previous VBS:  none  O2 requirement:  nc  Voice/Speech:  improving  Social:  Lives with daughter and 4 grandchildren  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  Follows commands:  yes                      Cognitive Status:  Awake, alert, and appropriate  Oral mech exam:  Full dentition  Full symmetry    Items administered:  Puree,  honey thick liquid, nectar thick liquid, ice chips, jorge crackers  Liquids were taken by straw/tsp       Oral stage:  Lip closure: wnl  Mastication: mildly slow but effective  Bolus formation:appeared adequate  Bolus control: suspect premature spill  Transfer: wfl  Oral residue: none observed  Pocketing:none     Pharyngeal stage:  Swallow promptness: mild delay  Laryngeal rise: fair per palpation  Wet voice: for nectar thick liquids  Throat clear: nectar thick liquids  Cough: cup sips honey thick liquids and nectar thick liquids  Secondary swallows: intermittent  Audible swallows: no     Esophageal stage:  No s/s reported     Aspiration precautions posted     Results d/w:  Pt, nursing     Goal(s):  Pt will tolerate least restrictive diet w/out s/s aspiration or oral/pharyngeal difficulties  Further testing if indicated

## 2018-09-11 NOTE — SPEECH THERAPY NOTE
Speech Language/Pathology    Speech/Language Pathology Progress Note    Patient Name: Александр Shields  URBAN Date: 9/11/2018    Pt currently intubated s/p OR; plans to extubate if tolerated  ST on hold at this time    Will reassess swallow function when extubated and appropriate for eval

## 2018-09-11 NOTE — PLAN OF CARE
Problem: OCCUPATIONAL THERAPY ADULT  Goal: Performs self-care activities at highest level of function for planned discharge setting  See evaluation for individualized goals  Treatment Interventions: ADL retraining, Functional transfer training, Endurance training, Cognitive reorientation, Patient/family training, Equipment evaluation/education, Compensatory technique education, Continued evaluation, Energy conservation, Activityengagement          See flowsheet documentation for full assessment, interventions and recommendations  Limitation: Decreased ADL status, Decreased Safe judgement during ADL, Decreased cognition, Decreased endurance, Decreased self-care trans, Decreased high-level ADLs  Prognosis: Fair  Assessment: Pt is a 57 y/o male seen for OT eval s/p adm to [facility] s/p fall at home down 15 steps  CT spine showed multiple acute cervical fractures  Pt is dx'd w/ closed displaced fracture of 3rd cervical vertebra  Pt is now s/p the following procedure "Posterior cervical decompressive laminectomy C3-6; Posterior cervical lateral mass and pedicle fixation fusion C1-T1 (N/A)" on 9/10/18  Pt has a C/S collar and active spinal precautions  Comorbidities include a h/o CVA, heart attack and HTN, hx of alcohol abuse, psych (bipolar) w/ hx of psych admission in 2010 and 3 IP ETOH stays  Pt with active OT orders and up and OOB as tolerated orders  Pt lives with dght, son in law and 4 grand-kids, in 2 SH, 3 HETAL, 14 steps inside to basement where pt lives (no hand rail) and 12 steps to 2nd floor where main bed/bath located  Pt was I w/  ADLS and IADLS, does not drive, & required use of DME PTA including cane, walker, and shower chair  Pt is currently demonstrating the following occupational deficits: Min A UB ADLS, Mod A LB ADLS, Min A transfers and functional mobility w/ RW   These deficits that are impacting pt's baseline areas of occupation are a result of the following impairments: pain, endurance, activity tolerance, functional mobility, forward functional reach, functional standing tolerance, unsupportive home environment, decreased I w/ ADLS/IADLS, decreased safety awareness and decreased insight into deficits  The following Occupational Performance Areas to address include: eating, grooming, bathing/shower, toilet hygiene, dressing, socialization, health maintenance, functional mobility, community mobility, clothing management and household maintenance  Pt scored overall 55/100 on the Barthel Index  Based on the aforementioned OT evaluation, functional performance deficits, and assessments, pt has been identified as a high complexity evaluation  Recommend STR upon D/C when medically stable, pending progress   Pt to continue to benefit from acute immediate OT services to address the following goals 3-5x/week to  w/in 10-14 days:      OT Discharge Recommendation: Short Term Rehab (pending progress)  OT - OK to Discharge: Yes (when medically stable)      Comments: Erica Dixon MS, OTR/L

## 2018-09-11 NOTE — RESPIRATORY THERAPY NOTE
RT Ventilator Management Note  Jona Rabago 58 y o  male MRN: [id number]  Unit/Bed#: ICU 09 Encounter: [id number]      Daily Screen     No data found  Physical Exam:   Assessment Type: Assess only  General Appearance: Awake  Respiratory Pattern: Assisted  Chest Assessment: Chest expansion symmetrical  Bilateral Breath Sounds: Clear, Diminished  Cough: None  O2 Device: Ventilator      Resp Comments: Pt  remains stable on AC mode  No problems throughout the night  Will continue to monitor per protocol

## 2018-09-11 NOTE — RESPIRATORY THERAPY NOTE
RT Ventilator Management Note  Александр Shields 58 y o  male MRN: [id number]  Unit/Bed#: ICU 09 Encounter: [id number]      Daily Screen       9/11/2018 0734             Patient safety screen outcome[de-identified] Passed    Spont breathing trial % for 30 min: -            Physical Exam:   Assessment Type: (P) Assess only  General Appearance: (P) Alert, Awake  Respiratory Pattern: (P) Assisted, Spontaneous  Chest Assessment: (P) Chest expansion symmetrical  Bilateral Breath Sounds: (P) Clear, Diminished  O2 Device: Ventilator      Resp Comments: Pt placed on PSV settings at this time  No resp distress noted  A cuff leak test was peformed at this time  Pt has a good audible cuff leak as well as good volume loss when cuff is dropped  Spoke with MD about results  Plan is to extubate durring rounds  Will continue to monitor throughout shift

## 2018-09-11 NOTE — PLAN OF CARE
Problem: SAFETY,RESTRAINT: NV/NON-SELF DESTRUCTIVE BEHAVIOR  Goal: Returns to optimal restraint-free functioning  INTERVENTIONS:  - Assess the patient's behavior and symptoms that indicate continued need for restraint  - Identify and implement measures to help patient regain control  - Assess readiness for release of restraint    Outcome: Completed Date Met: 09/11/18

## 2018-09-11 NOTE — RESPIRATORY THERAPY NOTE
RT Ventilator Management Note  Karan Reed 58 y o  male MRN: [id number]  Unit/Bed#: ICU 09 Encounter: [id number]      Daily Screen       9/11/2018 0734 9/11/2018 0831          Patient safety screen outcome[de-identified] Passed -      Spont breathing trial % for 30 min: - -      Spont breathing trial outcome[de-identified] - Passed      Name of Medical Team Notified[de-identified] - Dr Reji García      Preparing to extubate/ Notify Nurse: - Yes      Extubation order obtained: - Yes      Consider Cuff Test: - Yes      Patient extubated: - Yes      RSBI: - 22              Physical Exam:   Assessment Type: Assess only  General Appearance: Alert, Awake  Respiratory Pattern: Normal  Chest Assessment: Chest expansion symmetrical  Bilateral Breath Sounds: Clear, Diminished  Suction: Oral, ET Tube  O2 Device: Ventilator      Resp Comments: Pt extubated at this time per verbal order from Dr Reji García with no complications  Prior to extubation Pt had a possitive cuff leak and an RSBI of 22  Once extubated pt was placed on a 3L NC, SPO2 WNL  No resp distress noted,  pt was able to use voice and no stridor was heard  Will continue to monitor pt throughout shift

## 2018-09-11 NOTE — PROGRESS NOTES
Progress Note - Critical Care   Yessica Sabillon 58 y o  male MRN: [id number]  Unit/Bed#: ICU 09 Encounter: [id number]    Attending Physician: Mekhi Galindo MD      ______________________________________________________________________  Assessment and Plan:   Principal Problem:    Closed displaced fracture of third cervical vertebra [facility])  Active Problems:    Closed odontoid fracture with routine healing    Closed displaced fracture of fourth cervical vertebra ([facility])    Alcohol abuse    Decubitus ulcer of sacral region    CAD (coronary artery disease)    Dens fracture ([facility] [address] )  Resolved Problems:    * No resolved hospital problems  *    26-year-old male status post fall down stairs while intoxicated    Neuro:   Acute C2-C5 fractures with anterior longitudinal ligament injury and 4 mm of displacement of the dens posteriorly- POD #1 s/p posterior cervical decompressive laminectomy C3-6 and posterior cervical lateral mass and pedicle fixation fusion C1-T1 with Neurosurgery  Maintain cervical spine precautions and [place] collar  Per Dr  or work, there was significant prevertebral edema and swelling and recommendations are to keep the patient intubated overnight  Will check for a cuff leak this morning and wean to extubate  Neurologically intact/moving all extremities  Will continue to maintain MAPs of greater than or equal to 85 mmHg  Patient currently is doing the spontaneously and has required no vasopressors  MEGHAN drain with 180 mL of serosanguineous output  Acute left-sided nondisplaced occipital condyle fracture-maintain cervical collar  No operative intervention per Neurosurgery  Analgesia-continue scheduled Tylenol, Valium, and oxycodone with IV Dilaudid for breakthrough  Continue fentanyl drip at 100 mcgs while intubated as well  Sedation-continue propofol drip for now    Acute alcohol withdrawal-on CIWA protocol    Continue scheduled Valium for spasms and in place of Serax for withdrawal prophylaxis  Continue thiamine and folic acid  No additional Ativan has been needed  CV:   Hypertension-pain related most likely  Continue home metoprolol 50 mg q 12 hours  Blood pressure overall improved with sedation  Will treat with p r n  for SBP is greater than 180  History of CAD status post stents-home Plavix is on hold until cleared by Neurosurgery  Pulm:   Acute respiratory failure-secondary to airway edema  Will wean to extubate and check cuff leak today  If patient does well on weaning trial and has adequate cuff leak will plan to extubate later this afternoon  History of COPD-continue scheduled Atrovent and Xopenex  No evidence of acute exacerbation  GI:   Will start tube feeds today patient is not extubated  Patient is extubated will resume prior diet and speech therapy to come re-evaluate the patient  The patient was previously on a dysphagia diet with pureed and honey thickened liquids  :   Will discontinue Mohr catheter once patient is extubated  Close renal function normal urine output adequate  F/E/N:   Continue normal saline at 125 mL/hr  Tube feeds versus diet today depending on extubation  Electrolytes normal    ID:   No active issues  Continue with last dose of perioperative vancomycin for prophylaxis  Heme:   Acute blood loss anemia-hemoglobin dropped from 10 3-8 9 this morning  Patient is asymptomatic, no need for transfusion at this time  Will recheck tomorrow morning  Endo:   Diabetes mellitus-goal blood sugar less than 180  Sugar this morning on BMP greater than 200  Increase sliding scale insulin algorithm 3      Msk/Skin:   Local wound care to incisions  Turning and repositioning q 2 hours  Routine skin care  Prophylaxis:  SCDs, subcutaneous heparin  Discontinue Pepcid today with starting a diet    Disposition:   Continue ICU level care    Code Status: Level 1 - Full Code    Counseling / Coordination of Care  Total Critical Care time spent 30 minutes excluding procedures, teaching and family updates  ______________________________________________________________________    Chief Complaint:  Intubated and sedated    24 Hour Events:  Patient went to the operating room for Posterior cervical decompressive laminectomy C3-6 and cervical lateral mass and pedicle fixation fusion C1-T1  Her doctor arc there was significant prevertebral edema and swelling so the patient remained intubated overnight last night  He is awake and alert and did require high amounts of propofol along with a fentanyl drip at 100 mcgs  Review of Systems   Unable to perform ROS: Intubated     ______________________________________________________________________    Physical Exam:     Physical Exam   Constitutional: He appears well-developed and well-nourished  HENT:   Head: Normocephalic and atraumatic  Eyes: Pupils are equal, round, and reactive to light  Neck:   +Vista collar in place  +posterior cervical incision dressing clean and dry   Cardiovascular: Normal rate, regular rhythm and normal heart sounds  Pulmonary/Chest: Effort normal and breath sounds normal  No respiratory distress  +maintained on assist-control settings  I attempted to place patient on pressure support however he was apneic and switch back to assist control  Abdominal: Soft  Bowel sounds are normal  He exhibits no distension  There is no tenderness  Genitourinary: Penis normal    Genitourinary Comments: +Mohr catheter in place   Musculoskeletal: Normal range of motion  He exhibits no edema or deformity  Neurological:   +GCS 11 T (4, 1 T, 6)  +nonfocal exam, moving all extremities equally and with full strength bilaterally   Skin: Skin is warm and dry  No rash noted  He is not diaphoretic   No erythema      ______________________________________________________________________  Vitals:    09/11/18 0332 09/11/18 0400 09/11/18 0500 09/11/18 0600   BP:       BP Location:       Pulse: 72 64 58   Resp:  (!)  16   Temp:       TempSrc:       SpO2: 100% 100% 99% 100%   Weight:       Height:           Temperature:   Temp (24hrs), Av 6 °F (36 4 °C), Min:96 9 °F (36 1 °C), Max:98 2 °F (36 8 °C)    Current Temperature: 98 2 °F (36 8 °C)  Weights:   IBW: 79 9 kg    Body mass index is 25 54 kg/m²  Weight (last 2 days)     Date/Time   Weight    18 0345  84 8 (186 95)    18 0219  81 6 (180)            Hemodynamic Monitoring:  N/A     Non-Invasive/Invasive Ventilation Settings:  Respiratory    Lab Data (Last 4 hours)    None         O2/Vent Data (Last 4 hours)       0332           Vent Mode AC/VC       Resp Rate (BPM) (BPM) 17       Vt (mL) (mL) 450       FIO2 (%) (%) 40       PEEP (cmH2O) (cmH2O) 5       MV 10 2                 Lab Results   Component Value Date    PHART 7 362 09/10/2018    ITG4YQU 42 6 09/10/2018    PO2ART 96 1 09/10/2018    LRC1TEU 23 6 09/10/2018    BEART -1 7 09/10/2018    SOURCE Line, Arterial 09/10/2018     SpO2: SpO2: 100 %, SpO2 Device: O2 Device:  (ett)  Intake and Outputs:  I/O        07 - 09/10 0700 09/10 07 -  0700    P  O  1318     I V  (mL/kg) 2962 5 (34 9) 5251 9 (61 9)    NG/GT  50    IV Piggyback 100 1150    Total Intake(mL/kg) 4380 5 (51 7) 6451 9 (76 1)    Urine (mL/kg/hr) 1150 (0 6) 3850 (1 9)    Drains  110    Blood  250    Total Output 1150 4210    Net +3230 5 +2241 9              UOP: 180 ml/hr     Nutrition:        Diet Orders            Start     Ordered    18 0000  Diet NPO; Sips with meds  Diet effective midnight     Question Answer Comment   Diet Type NPO    NPO Except:  Sips with meds        09/10/18 1800          Labs:     Results from last 7 days  Lab Units 18  0552 09/10/18  1853 09/10/18  1604  09/10/18  0529   WBC Thousand/uL 5 49  5 49 5 53  --   --  11 41*   HEMOGLOBIN g/dL 8 9*  8 9* 10 3*  --   --  12 3   I STAT HEMOGLOBIN g/dl  --   --  10 5*  < >  --    HEMATOCRIT % 27 8*  27 8* 32 1* 31*  < > 38 1   PLATELETS Thousands/uL 181  181 173  --   --  256   NEUTROS PCT % 82* 92*  --   --  83*   MONOS PCT % 7 2*  --   --  8   < > = values in this interval not displayed  Results from last 7 days  Lab Units 18  0543 09/10/18  2351 09/10/18  1853 09/10/18  1604 09/10/18  1443  18  0224   SODIUM mmol/L 137 137 140  --   --   < >  --    POTASSIUM mmol/L 4 5 4 1 4 0  --   --   < >  --    CHLORIDE mmol/L 105 107 109*  --   --   < >  --    CO2 mmol/L 25 24 23  --   --   < >  --    BUN mg/dL 5 5 6  --   --   < >  --    CREATININE mg/dL 0 38* 0 41* 0 38*  --   --   < >  --    CALCIUM mg/dL 8 2* 7 8* 7 8*  --   --   < >  --    GLUCOSE, ISTAT mg/dl  --   --   --  217* 201*  --  185*   < > = values in this interval not displayed  Results from last 7 days  Lab Units 09/10/18  1853   MAGNESIUM mg/dL 2 2     Lab Results   Component Value Date    PHOS 2 5 09/10/2018        Results from last 7 days  Lab Units 18  0243   INR  0 89     No results found for: TROPONINI    Results from last 7 days  Lab Units 09/10/18  1853   LACTIC ACID mmol/L 0 7     ABG:  Lab Results   Component Value Date    PHART 7 362 09/10/2018    UZT9YCG 42 6 09/10/2018    PO2ART 96 1 09/10/2018    ZDM2MAP 23 6 09/10/2018    BEART -1 7 09/10/2018    SOURCE Line, Arterial 09/10/2018     Imagin/10 chest x-ray:  ET tube tip approximately 4 cm above the calixto, lung fields are clear I have personally reviewed pertinent reports  EKG:  No new    Micro:  No results found for: OTTO YañezCULTDENNIS  Allergies:    Allergies   Allergen Reactions    Amoxicillin     Augmentin [Amoxicillin-Pot Clavulanate]      Medications:   Scheduled Meds:  Current Facility-Administered Medications:  acetaminophen 975 mg Oral [facility] Fei Bonilla MD    albuterol 2 puff Inhalation Q4H PRN Madeleine Miller MD    bisacodyl 10 mg Rectal Daily PRN Brayden Mojica MD    chlorhexidine 15 mL [facility] Brayden Mojica, MD    chlorhexidine 15 mL [facility] Q12H [facility] Jony Frausto PA-C    diazepam 5 mg Oral Q6H Edmund Rivera PA-C    docusate sodium 100 mg Oral BID Nerissa Viera MD    famotidine 20 mg Oral BID Court Bueno PA-C    fentaNYL 25 mcg/hr Intravenous Continuous Nerissa Viera MD Last Rate: 25 mcg/hr (09/11/18 0516)   heparin (porcine) 5,000 Units Subcutaneous [facility] Nerissa Viera MD    HYDROmorphone 0 5 mg Intravenous Q4H PRN Chase Monroy MD    insulin lispro 1-5 Units Subcutaneous Q6H [facility] Edmund Rivera PA-C    insulin lispro 1-6 Units Subcutaneous 4x Daily (with meals and at bedtime) Chase Monroy MD    ipratropium 0 5 mg Nebulization TID Ben Carolina MD    levalbuterol 1 25 mg Nebulization TID Ben Carolina MD    lidocaine 1 patch Transdermal Daily Dina Ocampo MD    metoprolol tartrate 50 mg Oral Q12H [facility] Chase Monroy MD    ondansetron 4 mg Intravenous Q6H PRN Nerissa Viera MD    oxyCODONE 10 mg Oral Q4H PRN Chase Monroy MD    oxyCODONE 5 mg Oral Q4H PRN Chase Monroy MD    propofol 5-50 mcg/kg/min Intravenous Titrated Nerissa Viera MD Last Rate: 40 mcg/kg/min (09/11/18 5529)   sodium chloride 125 mL/hr Intravenous Continuous Dina Ocampo MD Last Rate: 125 mL/hr (09/10/18 1805)   thiamine 100 mg Oral Daily Babs Frausto PA-C      Continuous Infusions:  fentaNYL 25 mcg/hr Last Rate: 25 mcg/hr (09/11/18 0516)   propofol 5-50 mcg/kg/min Last Rate: 40 mcg/kg/min (09/11/18 0624)   sodium chloride 125 mL/hr Last Rate: 125 mL/hr (09/10/18 1805)     PRN Meds:    albuterol 2 puff Q4H PRN   bisacodyl 10 mg Daily PRN   HYDROmorphone 0 5 mg Q4H PRN   ondansetron 4 mg Q6H PRN   oxyCODONE 10 mg Q4H PRN   oxyCODONE 5 mg Q4H PRN     VTE Pharmacologic Prophylaxis: Heparin  VTE Mechanical Prophylaxis: sequential compression device     Invasive lines and devices:   Invasive Devices     Peripheral Intravenous Line            Peripheral IV 09/09/18 Left Wrist 2 days    Peripheral IV 09/09/18 Right Forearm 1 day    Peripheral IV 09/10/18 Left Antecubital less than 1 day          Arterial Line            Arterial Line 09/10/18 Right Radial less than 1 day          Drain            Closed/Suction Drain Midline Back less than 1 day    NG/OG/Enteral Tube Orogastric 18 Fr Center mouth less than 1 day    Urethral Catheter Latex 16 Fr  less than 1 day          Airway            ETT  Oral;Hi-Lo; Cuffed 8 mm less than 1 day                     Portions of the record may have been created with voice recognition software  Occasional wrong word or "sound a like" substitutions may have occurred due to the inherent limitations of voice recognition software  Read the chart carefully and recognize, using context, where substitutions have occurred      Sally Reyes PA-C

## 2018-09-11 NOTE — OCCUPATIONAL THERAPY NOTE
[address] Evaluation     Patient Name: Sea Villela  JHVXA'U Date: 9/11/2018  Problem List  Patient Active Problem List   Diagnosis    Closed odontoid fracture with routine healing    [facility]    Closed displaced [facility] of fourth cervical [facility]    Alcohol abuse    Decubitus ulcer of sacral region    CAD (coronary artery disease)    [facility]    Acute blood loss anemia    Diabetes ([facility] )     Past Medical History  Past Medical History:   Diagnosis Date    COPD (chronic obstructive pulmonary disease) ([facility] )     CVA (cerebral vascular accident) ([facility] )     Diabetes mellitus ([facility] )     Heart attack ([facility] )     Hypertension      Past Surgical History  History reviewed  No pertinent surgical history  09/11/18 1688   Note Type   Note type Eval/Treat   Restrictions/Precautions   Weight Bearing Precautions Per Order No   Braces or Orthoses C/S Collar   Other Precautions Impulsive;Cognitive; Bed Alarm;Multiple lines;Telemetry; Fall Risk;Pain;Spinal precautions   Pain Assessment   Pain Assessment 0-10   Pain Score 4   Pain Type Acute pain   Pain Location Head   Patient's Stated Pain Goal No pain   Hospital Pain Intervention(s) Repositioned; Ambulation/increased activity; Emotional support   Response to Interventions tolerated well   Home Living   Type of [address] [place] [address] Two level; Work area in basement;Bed/bath upstairs   Bathroom Shower/Tub Tub/shower unit   [address] bars in shower; Shower chair;Hand-held shower;Grab bars around toilet   Home Equipment Walker;Cane   Additional Comments Pt reports living in 2 , 3 HETAL, 14 steps to basement where pt lives and 12 steps to 2nd floor where bed/bath are located- 1/2 in basement for pt   Prior Function   Level of [place] Independent with ADLs and functional mobility   Lives With Family  (dght, son in law, and 4 grandkids)   Receives Help From Family   ADL Assistance Independent   IADLs Independent   Falls in the last 6 months 5 to 10   Vocational Retired   Comments Pt reports being I w/ ADLS, IADLS, and Mod I w/ transfers and functional mobility PTA   Lifestyle   Autonomy Pt reports being I w/ ADLS, IADLS, and Mod I w/ transfers and functional mobility PTA   Reciprocal Relationships Pt lives w/ dght, son in law and 4 grandkids   Service to Others pt is retired [employer]   Intrinsic Gratification Pt enjoys guitar, reading, and TV   Psychosocial   Psychosocial (WDL) [address]  5  [address] [facility] 5  [address] [facility] 4  [address] 3  Moderate Assistance   [address] 4  [address] 3  Moderate [id number] [facility] 4  Minimal Assistance   Functional Deficit Steadying;Verbal cueing;Supervision/safety; Impulsive; Increased time to complete   Bed Mobility   Supine to Sit 5  Supervision   Additional items Increased time required;Verbal cues;HOB elevated   Sit to Supine 5  Supervision   Additional items Increased time required;Verbal cues;HOB elevated   Additional Comments Pt went from supine <>sit w/ S for safety, HOB elevated for assist  Pt demonstrated impulsivity and needed VC for safety   Transfers   Sit to Stand 4  Minimal assistance   Additional items Assist x 1; Increased time required;Verbal cues; Impulsive   Stand to Sit 4  Minimal assistance   Additional items Assist x 1; Increased time required;Verbal cues; Impulsive   Additional Comments Pt performed sit-stand from EOB w/ Min A x1 for safety/balance and VC for proper technique/hand placement on RW  Functional Mobility   Functional Mobility 4  Minimal assistance   Additional Comments pt ambulated short in room distance w/ MIn A x1 for safety and use of RW for support/stability   Pt w/ decreased safety awareness and impulsivity   Additional items Rolling walker   Balance   Static Sitting Fair -   Static Standing Poor +   Ambulatory Poor +   Activity Tolerance   Activity Tolerance Patient tolerated treatment well   Nurse Made Aware yes   RUE Assessment   RUE Assessment WFL   LUE Assessment   LUE Assessment WFL   Hand Function   Gross Motor Coordination Functional   Fine Motor Coordination Functional   Sensation   Light Touch No apparent deficits   Vision - Complex Assessment   Additional Comments No apparent deficits   Cognition   Overall Cognitive Status Impaired   Arousal/Participation Responsive; Cooperative   Attention Attends with cues to redirect   Orientation Level Oriented to person;Oriented to place;Oriented to situation;Disoriented to time   Memory Decreased recall of precautions   Following Commands Follows one step commands with increased time or repetition   Comments Pt is pleasant and cooperative; requires repetitive VC for safety, impulsive and inconsistently follows 1 step commands  Recommend formal cognitive evaluation for assist w/ safe d/c planning   Assessment   Limitation Decreased ADL status; Decreased Safe judgement during ADL;Decreased cognition;Decreased endurance;Decreased self-care trans;Decreased high-level ADLs   Prognosis Fair   Assessment Pt is a 59 y/o male seen for OT eval s/p adm to [facility] s/p fall at home down 15 steps  CT spine showed multiple acute cervical fractures  Pt is dx'd w/ closed displaced fracture of 3rd cervical vertebra  Pt is now s/p the following procedure "Posterior cervical decompressive laminectomy C3-6; Posterior cervical lateral mass and pedicle fixation fusion C1-T1 (N/A)" on 9/10/18  Pt has a C/S collar and active spinal precautions  Comorbidities include a h/o CVA, heart attack and HTN, hx of alcohol abuse, psych (bipolar) w/ hx of psych admission in 2010 and 3 IP ETOH stays  Pt with active OT orders and up and OOB as tolerated orders   Pt lives with dght, son in law and 4 grand-kids, in 2 SH, 3 HETAL, 14 steps inside to basement where pt lives (no hand rail) and 12 steps to 2nd floor where main bed/bath located  Pt was I w/  ADLS and IADLS, does not drive, & required use of DME PTA including cane, walker, and shower chair  Pt is currently demonstrating the following occupational deficits: Min A UB ADLS, Mod A LB ADLS, Min A transfers and functional mobility w/ RW  These deficits that are impacting pt's baseline areas of occupation are a result of the following impairments: pain, endurance, activity tolerance, functional mobility, forward functional reach, functional standing tolerance, unsupportive home environment, decreased I w/ ADLS/IADLS, decreased safety awareness and decreased insight into deficits  The following Occupational Performance Areas to address include: eating, grooming, bathing/shower, toilet hygiene, dressing, socialization, health maintenance, functional mobility, community mobility, clothing management and household maintenance  Pt scored overall 55/100 on the Barthel Index  Based on the aforementioned OT evaluation, functional performance deficits, and assessments, pt has been identified as a high complexity evaluation  Recommend STR upon D/C when medically stable, pending progress  Pt to continue to benefit from acute immediate OT services to address the following goals 3-5x/week to  w/in 10-14 days:    Goals   Patient Goals to play guitar again   LTG Time Frame 10-14   Long Term Goal #1 see below listed goals   Plan   Treatment Interventions ADL retraining;Functional transfer training; Endurance training;Cognitive reorientation;Patient/family training;Equipment evaluation/education; Compensatory technique education;Continued evaluation; Energy conservation; Activityengagement   Goal Expiration Date 18   OT Frequency 3-5x/wk   Recommendation   OT Discharge Recommendation Short Term Rehab  (pending progress)   OT - OK to Discharge Yes  (when medically stable)   Barthel Index   Feeding 10   Bathing 0   Grooming Score 5   Dressing Score 5   Bladder Score 10   Bowels Score 10   Toilet Use Score 5   Transfers (Bed/Chair) Score 10   Mobility (Level Surface) Score 0   Stairs Score 0   Barthel Index Score 55   Modified [place] Scale   Modified [place] Scale 4          GOALS    1) Pt will improve activity tolerance to G for min 30 min txment sessions    2) Pt will complete ADLs/self care w/ supervision    3) Pt will complete toileting w/ supervision w/ G hygiene/thoroughness using DME PRN    4) Pt will improve fx'l tfers on/off all surfaces using DME PRN w/ G balance/safety including toileting w/ supervision    5) Pt will improve fx'l mobility during I/ADl/leisure tasks using DME PRN w/ G balance/safety w/ supervision    6) Pt will be attentive 100% of the time during ongoing cognitive assessment to A w/ safe d/c planning/recommendations    7) Pt will demonstrate G carryover of pt/caregiver education and training as appropriate (maintaining spinal precautions) w/ supervision w/o cues w/ G tolerance    8) Pt will follow 100% simple one step verbal commands and be A/Ox4 consistently t/o use of external environmental cues w/ supervision    9) Pt will demonstrate 100% carryover of E C  techniques w/ supervision t/o fx'l I/ADL/ leisure tasks w/o cues s/p skilled education    10) Pt will improve standing tolerance to 5-10 mins w/ use of DME/AD as needed to increased strength/endurance for sink level ADL tasks w/ supervision    Erica Dixon MS, OTR/L

## 2018-09-11 NOTE — PHYSICAL THERAPY NOTE
PT CANCEL    PT CONSULT RECEIVED  PATIENT IS INTUBATED AT THIS TIME AND NOT APPROPRIATE FOR PARTICIPATION IN SKILLED PT  PT WILL CONTINUE TO FOLLOW ON CASELOAD       Delvis Nick PT

## 2018-09-11 NOTE — CONSULTS
Progress Note - Wound   Chavo Monroe 58 y o  male MRN: [id number]  Unit/Bed#: ICU 09 Encounter: [id number]      Assessment: Patient is seen for wound care consult of the skin   The patient is seen for the assessment while in bed with the bedside RN Lisa   The patient is noted to have scattered dry abrasions on the left knee and the hands   Patient reports he fell down the stairs hitting his back and buttocks   Assessment   1  Bilateral heels intact   2  Dry abrasions of the left knee and hands   3  Ecchymosis of the right mid back   4  Intact dressing on the posterior cervical region / with aspen vista collar on   Skin intact at the the lower posterior area of the collar and the anterior lower portion   5  Sacral and buttocks with scattered dry abrasions and noted stage 2 -POA area on the sacral buttocks   Discussed plan of care with the RN and the patient for skin care   Skin Care Plan:  1Hydraguard to sacral / buttocks bid and prn   2-Turn/reposition q2h for pressure re-distribution on skin  3-Soft care cushion when out of bed  4-Elevate heels to offload pressure  5-Moisturize skin daily with skin nourishing cream   6-Hydraguard to bilateral heels BID and PRN             Objective:    Vitals: Blood pressure 121/71, pulse 88, temperature (!) 97 °F (36 1 °C), temperature source Oral, resp  rate (!) 25, height 6' 1" (1 854 m), weight 84 8 kg (186 lb 15 2 oz), SpO2 96 %  ,Body mass index is 25 54 kg/m²              Pressure Ulcer 09/09/18 Buttocks Left reddened left buttocks and blister to right buttocks 9/11/18 Patient has mixed etiology of friction / abrasion and pressure from fall  (Active)   Staging Stage II 9/11/2018  3:00 PM   Wound Description Clean;Fragile;Pink 9/11/2018  3:00 PM   Pat-wound Assessment Clean;Dry;Fragile 9/11/2018  3:00 PM   Wound Length (cm) 0 3 cm 9/11/2018  3:00 PM   Wound Width (cm) 0 3 cm 9/11/2018  3:00 PM   Wound Depth (cm) 0 1 9/11/2018  3:00 PM   Calculated Wound Area (cm^2) 0 09 cm^2 9/11/2018  3:00 PM   Calculated Wound Volume (cm^3) 0 01 cm^3 9/11/2018  3:00 PM   Drainage Amount None 9/11/2018  3:00 PM   Treatment Cleansed; Offload; Turn & reposition 9/11/2018  3:00 PM   Dressing Moisture barrier 9/11/2018  3:00 PM   Patient Tolerance Tolerated well 9/11/2018  3:00 PM       Wound 09/09/18 Abrasion(s) Back Right;Posterior (Active)   Wound Description Clean;Dry; Intact 9/11/2018 12:00 PM   Pat-wound Assessment Clean;Dry; Intact 9/11/2018 12:00 PM   Closure Open to air 9/11/2018 12:00 PM   Drainage Amount None 9/11/2018  4:00 AM   Non-staged Wound Description Not applicable 4/19/3311  3:28 AM   Treatments Cleansed;Site care 9/11/2018  8:00 AM   Dressing Open to air 9/11/2018 12:00 PM   Patient Tolerance Tolerated well 9/11/2018 12:00 PM           Wound care will follow weekly     Jesica Vigil RN BSN [place]

## 2018-09-11 NOTE — PROGRESS NOTES
Progress Note - Neurosurgery   Dionne Greer 58 y o  male MRN: [id number]  Unit/Bed#: ICU 09 Encounter: [id number]    Assessment:  1  POD 1 posterior cervical decompression laminectomy C3-C6, posterior cervical lateral mass and pedicle fixation/fusion C1-T1  2  Dense fracture  3  Acute tear drop vertebral body fracture of C3 and C4  4  Spinous process fracture C4 and C5  5  Acute traumatic C5 vertebral body fracture  6  Acute left facet/lamina fracture C6  7  Left occipital condyle fracture  8  Alcohol abuse  9  CAD  10  Status post fall down steps while intoxicated    Plan:  · Exam reveals full strength and sensation throughout extremities  Vista collar in place  Dressing intact  · Imaging reviewed personally and by attending  Final results as below  · CT cervical spine without contrast September 9, 2018: History of anterior cervical fusion C5-C6  Exam minimally displaced type 2 odontoid fracture approximately 4 mm posterior displacement  Moderately displaced tear drop fractures of anterior inferior C3 and C4 vertebral body  Acute spinous process fractures C4 and C5  Acute nondisplaced oblique fracture of C5 vertebral body  Acute nondisplaced fracture involving left facet and lamina C4  Acute nondisplaced fracture of left occipital condyle  · CTA neck with without contrast September 9, 2018:  No evidence of vertebral artery injury  No arterial dissection  Dominant right vertebral artery  · Cervical spine postop upright x-rays ordered and pending  · MEGHAN drain with dark bloody output  185 mL over 24 hr   Maintain at this time  Continue monitor output  Drain to full suction  Order placed  · Given MRI without edema in grossly nonfocal exam, may discontinue MAP parameters  · Pain control - per primary team  · Mobilize with physical and occupational therapy  · DVT PPX:  Heparin order to start today, SCDs in place bilaterally    · Patient may be transferred to medical-surgical floor from neurosurgical standpoint  Subjective/Objective   Chief Complaint:  Postoperative follow-up    Subjective:  Patient states he is not doing well secondary to fall down steps  He states increasing neck pain currently rated 8/10 midline along incision  Denies any radicular pain, numbness, tingling and or weakness of extremities  Mohr has recently been discontinued awaiting 1st void  Denies any chest pain or shortness of breath  No significant events overnight  Objective:  Sitting up in bed  NAD  I/O       09/09 0701 - 09/10 0700 09/10 0701 - 09/11 0700 09/11 0701 - 09/12 0700    P  O  1318      I V  (mL/kg) 2962 5 (34 9) 5251 9 (61 9) 295 1 (3 5)    NG/GT  50     IV Piggyback 100 1150     Total Intake(mL/kg) 4380 5 (51 7) 6451 9 (76 1) 295 1 (3 5)    Urine (mL/kg/hr) 1150 (0 6) 4300 (2 1) 700 (1 6)    Drains  185     Blood  250     Total Output 1150 4735 700    Net +3230 5 +1716 9 -404 9                 Invasive Devices     Peripheral Intravenous Line            Peripheral IV 09/09/18 Left Wrist 2 days    Peripheral IV 09/10/18 Left Antecubital less than 1 day          Drain            Closed/Suction Drain Midline Back 1 day    NG/OG/Enteral Tube Orogastric 18 Fr Center mouth less than 1 day                Physical Exam:  Vitals: Blood pressure 168/98, pulse 72, temperature 97 9 °F (36 6 °C), temperature source Oral, resp  rate 13, height 6' 1" (1 854 m), weight 84 8 kg (186 lb 15 2 oz), SpO2 100 %  ,Body mass index is 25 54 kg/m²  Hemodynamic Monitoring: MAP: Arterial Line MAP (mmHg): 128 mmHg    General appearance: alert, appears stated age, cooperative and no distress  Head: Normocephalic, without obvious abnormality  Eyes:  Tracks appropriately on room  Neck: supple, symmetrical, trachea midline and dressing intact  Vista collar in place    Lungs: non labored breathing  Heart: regular heart rate  Neurologic:   Mental status: Alert, oriented, thought content appropriate  Sensory: normal to PP BUE, LT BLE  Motor: moving all extremities without focal weakness    Lab Results:    Results from last 7 days  Lab Units 09/11/18  0552 09/10/18  1853 09/10/18  1604  09/10/18  0529   WBC Thousand/uL 5 49  5 49 5 53  --   --  11 41*   HEMOGLOBIN g/dL 8 9*  8 9* 10 3*  --   --  12 3   I STAT HEMOGLOBIN g/dl  --   --  10 5*  < >  --    HEMATOCRIT % 27 8*  27 8* 32 1* 31*  < > 38 1   PLATELETS Thousands/uL 181  181 173  --   --  256   NEUTROS PCT % 82* 92*  --   --  83*   MONOS PCT % 7 2*  --   --  8   < > = values in this interval not displayed  Results from last 7 days  Lab Units 09/11/18  0543 09/10/18  2351 09/10/18  1853 09/10/18  1604 09/10/18  1443  09/09/18  0224   SODIUM mmol/L 137 137 140  --   --   < >  --    POTASSIUM mmol/L 4 5 4 1 4 0  --   --   < >  --    CHLORIDE mmol/L 105 107 109*  --   --   < >  --    CO2 mmol/L 25 24 23  --   --   < >  --    BUN mg/dL 5 5 6  --   --   < >  --    CREATININE mg/dL 0 38* 0 41* 0 38*  --   --   < >  --    CALCIUM mg/dL 8 2* 7 8* 7 8*  --   --   < >  --    GLUCOSE, ISTAT mg/dl  --   --   --  217* 201*  --  185*   < > = values in this interval not displayed  Results from last 7 days  Lab Units 09/10/18  1853   MAGNESIUM mg/dL 2 2       Results from last 7 days  Lab Units 09/10/18  1853   PHOSPHORUS mg/dL 2 5       Results from last 7 days  Lab Units 09/09/18  0243   INR  0 89     No results found for: TROPONINT  ABG:  Lab Results   Component Value Date    PHART 7 362 09/10/2018    OHN8KOT 42 6 09/10/2018    PO2ART 96 1 09/10/2018    YLV6LXJ 23 6 09/10/2018    BEART -1 7 09/10/2018    SOURCE Line, Arterial 09/10/2018       Imaging Studies: I have personally reviewed pertinent reports  and I have personally reviewed pertinent films in PACS    XR chest portable   Final Result by Gracie Gutierres MD (09/00 6251)      ET tube tip is approximately 4 cm above the calixto              Workstation performed: [id number]         MRI cervical spine wo contrast   Final Result by Gene DO Darby (09/09 1226)      Exaggerated cervical lordosis  Fractures of the dens, C3, C4 and C5 vertebral bodies demonstrate similar alignment compared to recent CT scan  The dens is slightly displaced posteriorly in relation to the body of C2, approximately 4 mm  Extensive prevertebral edema extending from the level of the clivus inferiorly to the level of C5  There is edema within the interspinous ligaments and paraspinal musculature posteriorly  There is disruption of the anterior longitudinal ligament  However, the posterior longitudinal ligament and ligamentum flavum appear intact  Severe canal stenosis at the C3-4, C4-5 levels  Moderate canal stenosis at C5-6 and C6-7  This is related to annular bulging, endplate and posterior element hypertrophic change  Multilevel foraminal narrowing, severe at C6-7 and moderate at the C3-4,    C4-5 and C5-6 levels  Evaluation of the cord is somewhat limited due to patient motion and the severity of the canal stenosis  However, there does not appear to be cord edema to suggest acute cord injury  Workstation performed: [id number]         XR trauma multiple   Final Result by Haylie Roldan MD (09/09 0530)      No active pulmonary disease  Workstation performed: [id number]         CTA neck w wo contrast   Final Result by Enzo Kamara MD (09/09 7458)      No evidence of vertebral artery injury  No arterial dissection  No hemodynamically significant stenosis within either common or internal carotid artery  Less than 50% stenosis by NASCET criteria  Dominant right vertebral artery with a small calibered left vertebral artery  Acute fractures of C2, C3, C4 and C5 as described above  Workstation performed: [id number]         CT chest abdomen pelvis w contrast   Final Result by Enzo Kamara MD (09/09 1328)      No evidence of solid organ injury        No acute intra-abdominal abnormality  No free air or free fluid  No pneumothorax  Multiple old appearing bilateral rib fractures  17 mm peripherally calcified right renal cyst   A nonemergent renal ultrasound is recommended for further characterization  Workstation performed: [id number]         CT spine cervical without contrast   Final Result by Lee Ann Lopez MD (09/09 0256)      Acute fractures of C2, C3, C4 and C5 as described above  Acute nondisplaced left occipital condyle fracture  An MRI of the cervical spine could be performed for further evaluation  Moderate prevertebral soft tissue swelling, likely posttraumatic in nature  I personally discussed this study with Donnell Aleman on 9/9/2018 at 2:50 AM                    Workstation performed: [id number]         CT head without contrast   Final Result by Lee Ann Lopez MD (09/09 0246)      No acute intracranial abnormality  Mild chronic small vessel ischemic changes and volume loss  Workstation performed: [id number]         XR chest 1 view    (Results Pending)   XR spine cervical 2 or 3 vw injury    (Results Pending)   XR spine cervical 2 or 3 vw injury    (Results Pending)       EKG, Pathology, and Other Studies: I have personally reviewed pertinent reports        VTE Pharmacologic Prophylaxis: Heparin - order to start today    VTE Mechanical Prophylaxis: sequential compression device

## 2018-09-12 PROBLEM — E87.6 HYPOKALEMIA: Status: ACTIVE | Noted: 2018-09-12

## 2018-09-12 PROBLEM — Z98.1 S/P CERVICAL SPINAL FUSION: Status: ACTIVE | Noted: 2018-09-12

## 2018-09-12 LAB
ABO GROUP BLD BPU: NORMAL
ABO GROUP BLD BPU: NORMAL
ANION GAP SERPL CALCULATED.3IONS-SCNC: 5 MMOL/L (ref 4–13)
BASOPHILS # BLD AUTO: 0.04 THOUSANDS/ΜL (ref 0–0.1)
BASOPHILS NFR BLD AUTO: 1 % (ref 0–1)
BPU ID: NORMAL
BPU ID: NORMAL
BUN SERPL-MCNC: 3 MG/DL (ref 5–25)
CALCIUM SERPL-MCNC: 8.5 MG/DL (ref 8.3–10.1)
CHLORIDE SERPL-SCNC: 98 MMOL/L (ref 100–108)
CO2 SERPL-SCNC: 31 MMOL/L (ref 21–32)
CREAT SERPL-MCNC: 0.45 MG/DL (ref 0.6–1.3)
CROSSMATCH: NORMAL
CROSSMATCH: NORMAL
EOSINOPHIL # BLD AUTO: 0.1 THOUSAND/ΜL (ref 0–0.61)
EOSINOPHIL NFR BLD AUTO: 1 % (ref 0–6)
ERYTHROCYTE [DISTWIDTH] IN BLOOD BY AUTOMATED COUNT: 17.2 % (ref 11.6–15.1)
GFR SERPL CREATININE-BSD FRML MDRD: 120 ML/MIN/1.73SQ M
GLUCOSE SERPL-MCNC: 142 MG/DL (ref 65–140)
GLUCOSE SERPL-MCNC: 178 MG/DL (ref 65–140)
GLUCOSE SERPL-MCNC: 247 MG/DL (ref 65–140)
HCT VFR BLD AUTO: 30.4 % (ref 36.5–49.3)
HGB BLD-MCNC: 9.8 G/DL (ref 12–17)
IMM GRANULOCYTES # BLD AUTO: 0.06 THOUSAND/UL (ref 0–0.2)
IMM GRANULOCYTES NFR BLD AUTO: 1 % (ref 0–2)
LYMPHOCYTES # BLD AUTO: 2.14 THOUSANDS/ΜL (ref 0.6–4.47)
LYMPHOCYTES NFR BLD AUTO: 29 % (ref 14–44)
MAGNESIUM SERPL-MCNC: 2 MG/DL (ref 1.6–2.6)
MCH RBC QN AUTO: 26.9 PG (ref 26.8–34.3)
MCHC RBC AUTO-ENTMCNC: 32.2 G/DL (ref 31.4–37.4)
MCV RBC AUTO: 84 FL (ref 82–98)
MONOCYTES # BLD AUTO: 0.62 THOUSAND/ΜL (ref 0.17–1.22)
MONOCYTES NFR BLD AUTO: 9 % (ref 4–12)
NEUTROPHILS # BLD AUTO: 4.33 THOUSANDS/ΜL (ref 1.85–7.62)
NEUTS SEG NFR BLD AUTO: 59 % (ref 43–75)
NRBC BLD AUTO-RTO: 0 /100 WBCS
PLATELET # BLD AUTO: 187 THOUSANDS/UL (ref 149–390)
PMV BLD AUTO: 10 FL (ref 8.9–12.7)
POTASSIUM SERPL-SCNC: 3.2 MMOL/L (ref 3.5–5.3)
RBC # BLD AUTO: 3.64 MILLION/UL (ref 3.88–5.62)
SODIUM SERPL-SCNC: 134 MMOL/L (ref 136–145)
UNIT DISPENSE STATUS: NORMAL
UNIT DISPENSE STATUS: NORMAL
UNIT PRODUCT CODE: NORMAL
UNIT PRODUCT CODE: NORMAL
UNIT RH: NORMAL
UNIT RH: NORMAL
WBC # BLD AUTO: 7.29 THOUSAND/UL (ref 4.31–10.16)

## 2018-09-12 PROCEDURE — 82948 REAGENT STRIP/BLOOD GLUCOSE: CPT

## 2018-09-12 PROCEDURE — 99024 POSTOP FOLLOW-UP VISIT: CPT | Performed by: PHYSICIAN ASSISTANT

## 2018-09-12 PROCEDURE — 94760 N-INVAS EAR/PLS OXIMETRY 1: CPT

## 2018-09-12 PROCEDURE — G8978 MOBILITY CURRENT STATUS: HCPCS

## 2018-09-12 PROCEDURE — 97163 PT EVAL HIGH COMPLEX 45 MIN: CPT

## 2018-09-12 PROCEDURE — 80048 BASIC METABOLIC PNL TOTAL CA: CPT | Performed by: PHYSICIAN ASSISTANT

## 2018-09-12 PROCEDURE — 85025 COMPLETE CBC W/AUTO DIFF WBC: CPT | Performed by: PHYSICIAN ASSISTANT

## 2018-09-12 PROCEDURE — 92526 ORAL FUNCTION THERAPY: CPT

## 2018-09-12 PROCEDURE — G8979 MOBILITY GOAL STATUS: HCPCS

## 2018-09-12 PROCEDURE — 99232 SBSQ HOSP IP/OBS MODERATE 35: CPT | Performed by: PHYSICIAN ASSISTANT

## 2018-09-12 PROCEDURE — 94640 AIRWAY INHALATION TREATMENT: CPT

## 2018-09-12 PROCEDURE — 83735 ASSAY OF MAGNESIUM: CPT | Performed by: PHYSICIAN ASSISTANT

## 2018-09-12 RX ORDER — POTASSIUM CHLORIDE 20 MEQ/1
40 TABLET, EXTENDED RELEASE ORAL 2 TIMES DAILY
Status: DISCONTINUED | OUTPATIENT
Start: 2018-09-12 | End: 2018-09-12

## 2018-09-12 RX ORDER — IPRATROPIUM BROMIDE AND ALBUTEROL SULFATE 2.5; .5 MG/3ML; MG/3ML
3 SOLUTION RESPIRATORY (INHALATION) 4 TIMES DAILY
COMMUNITY
End: 2020-02-19

## 2018-09-12 RX ORDER — ALBUTEROL SULFATE 90 UG/1
2 AEROSOL, METERED RESPIRATORY (INHALATION) 2 TIMES DAILY PRN
COMMUNITY
End: 2020-02-21 | Stop reason: HOSPADM

## 2018-09-12 RX ORDER — CLOPIDOGREL BISULFATE 75 MG/1
75 TABLET ORAL DAILY
COMMUNITY
End: 2020-02-19

## 2018-09-12 RX ORDER — LANOLIN ALCOHOL/MO/W.PET/CERES
3 CREAM (GRAM) TOPICAL
Status: DISCONTINUED | OUTPATIENT
Start: 2018-09-12 | End: 2018-09-14 | Stop reason: HOSPADM

## 2018-09-12 RX ORDER — DIAZEPAM 5 MG/1
5 TABLET ORAL EVERY 8 HOURS
Status: DISCONTINUED | OUTPATIENT
Start: 2018-09-12 | End: 2018-09-14 | Stop reason: HOSPADM

## 2018-09-12 RX ORDER — FOLIC ACID 1 MG/1
TABLET ORAL DAILY
COMMUNITY
End: 2020-02-19

## 2018-09-12 RX ORDER — POTASSIUM CHLORIDE 20 MEQ/1
40 TABLET, EXTENDED RELEASE ORAL ONCE
Status: COMPLETED | OUTPATIENT
Start: 2018-09-12 | End: 2018-09-12

## 2018-09-12 RX ORDER — MULTIVITAMIN
1 TABLET ORAL DAILY
COMMUNITY

## 2018-09-12 RX ORDER — METOPROLOL TARTRATE 50 MG/1
25 TABLET, FILM COATED ORAL EVERY 12 HOURS SCHEDULED
COMMUNITY
End: 2018-09-24 | Stop reason: HOSPADM

## 2018-09-12 RX ADMIN — DIAZEPAM 5 MG: 5 TABLET ORAL at 17:23

## 2018-09-12 RX ADMIN — IPRATROPIUM BROMIDE 0.5 MG: 0.5 SOLUTION RESPIRATORY (INHALATION) at 07:50

## 2018-09-12 RX ADMIN — IPRATROPIUM BROMIDE 0.5 MG: 0.5 SOLUTION RESPIRATORY (INHALATION) at 20:12

## 2018-09-12 RX ADMIN — POTASSIUM CHLORIDE 40 MEQ: 1500 TABLET, EXTENDED RELEASE ORAL at 10:50

## 2018-09-12 RX ADMIN — INSULIN LISPRO 3 UNITS: 100 INJECTION, SOLUTION INTRAVENOUS; SUBCUTANEOUS at 13:00

## 2018-09-12 RX ADMIN — THIAMINE HCL TAB 100 MG 100 MG: 100 TAB at 10:00

## 2018-09-12 RX ADMIN — OXYCODONE HYDROCHLORIDE 10 MG: 10 TABLET ORAL at 00:37

## 2018-09-12 RX ADMIN — FOLIC ACID 1 MG: 1 TABLET ORAL at 10:00

## 2018-09-12 RX ADMIN — IPRATROPIUM BROMIDE 0.5 MG: 0.5 SOLUTION RESPIRATORY (INHALATION) at 13:47

## 2018-09-12 RX ADMIN — LEVALBUTEROL HYDROCHLORIDE 1.25 MG: 1.25 SOLUTION, CONCENTRATE RESPIRATORY (INHALATION) at 13:47

## 2018-09-12 RX ADMIN — INSULIN LISPRO 2 UNITS: 100 INJECTION, SOLUTION INTRAVENOUS; SUBCUTANEOUS at 00:09

## 2018-09-12 RX ADMIN — HEPARIN SODIUM 5000 UNITS: 5000 INJECTION, SOLUTION INTRAVENOUS; SUBCUTANEOUS at 22:35

## 2018-09-12 RX ADMIN — METOPROLOL TARTRATE 50 MG: 50 TABLET ORAL at 10:00

## 2018-09-12 RX ADMIN — METOPROLOL TARTRATE 50 MG: 50 TABLET ORAL at 22:35

## 2018-09-12 RX ADMIN — LEVALBUTEROL HYDROCHLORIDE 1.25 MG: 1.25 SOLUTION, CONCENTRATE RESPIRATORY (INHALATION) at 20:12

## 2018-09-12 RX ADMIN — HEPARIN SODIUM 5000 UNITS: 5000 INJECTION, SOLUTION INTRAVENOUS; SUBCUTANEOUS at 05:51

## 2018-09-12 RX ADMIN — OXYCODONE HYDROCHLORIDE 5 MG: 5 TABLET ORAL at 19:51

## 2018-09-12 RX ADMIN — LIDOCAINE 1 PATCH: 50 PATCH TOPICAL at 10:00

## 2018-09-12 RX ADMIN — OXYCODONE HYDROCHLORIDE 10 MG: 10 TABLET ORAL at 10:48

## 2018-09-12 RX ADMIN — INSULIN LISPRO 1 UNITS: 100 INJECTION, SOLUTION INTRAVENOUS; SUBCUTANEOUS at 18:50

## 2018-09-12 RX ADMIN — LEVALBUTEROL HYDROCHLORIDE 1.25 MG: 1.25 SOLUTION, CONCENTRATE RESPIRATORY (INHALATION) at 07:50

## 2018-09-12 RX ADMIN — DIAZEPAM 5 MG: 5 TABLET ORAL at 03:28

## 2018-09-12 RX ADMIN — ACETAMINOPHEN 975 MG: 325 TABLET ORAL at 15:00

## 2018-09-12 RX ADMIN — HEPARIN SODIUM 5000 UNITS: 5000 INJECTION, SOLUTION INTRAVENOUS; SUBCUTANEOUS at 15:00

## 2018-09-12 RX ADMIN — ACETAMINOPHEN 975 MG: 325 TABLET ORAL at 05:51

## 2018-09-12 RX ADMIN — ACETAMINOPHEN 975 MG: 325 TABLET ORAL at 22:35

## 2018-09-12 NOTE — PHYSICAL THERAPY NOTE
PT EVALUATION     09/12/18 0140   Note Type   Note type Eval only   Pain Assessment   Pain Assessment No/denies pain   Pain Score No Pain   Pain Type Surgical pain   Pain Location Neck;Head   Home Living   Type of Home House   Home Layout Two level  (3 HETAL, 14 STEPS TO BASEMENT)   Bathroom Shower/Tub Tub/shower unit   Bathroom Toilet Standard   Bathroom Equipment Grab bars in shower; Shower chair   Bathroom Accessibility Accessible   Home Equipment Walker;Cane   Prior Function   Level of [place] Independent with ADLs and functional mobility   Lives With Family  (DGHT, SON-IN-LAW, 4 GRANDCHILDREN)   Receives Help From Family   ADL Assistance Independent   IADLs Independent   Falls in the last 6 months 5 to 10   Vocational Retired   Restrictions/Precautions   Wells Ryan Bearing Precautions Per Order No   Braces or Orthoses C/S Collar   Other Precautions Spinal precautions; Fall Risk;Telemetry;Multiple lines; Chair Alarm; Bed Alarm  (BED ALARM ACTIVE POST PT EVAL )   General   Family/Caregiver Present No   Cognition   Overall Cognitive Status WFL   RUE Assessment   RUE Assessment WFL   LUE Assessment   LUE Assessment WFL   RLE Assessment   RLE Assessment X   Strength RLE   RLE Overall Strength 4+/5  (GROSSLY)   R Hip Flexion 4+/5   R Knee Extension 4+/5   R Ankle Dorsiflexion 4+/5   LLE Assessment   LLE Assessment X   Strength LLE   LLE Overall Strength 4+/5  (GROSSLY)   L Hip Flexion 4+/5   L Knee Extension 4+/5   L Ankle Dorsiflexion 4+/5   Bed Mobility   Sit to Supine 5  Supervision   Additional items Increased time required   Transfers   Sit to Stand 4  Minimal assistance   Additional items Assist x 1   Stand to Sit 4  Minimal assistance   Additional items Assist x 1   Ambulation/Elevation   Gait pattern Excessively slow   Gait Assistance 4  Minimal assist   Additional items Assist x 1   Assistive Device Rolling walker   Distance 40 FEET X 2   Balance   Static Sitting Fair   Static Standing Poor +   Ambulatory Poor +   Endurance Deficit   Endurance Deficit Yes   Endurance Deficit Description DECREASED ACTIVITY TOLERANCE   Activity Tolerance   Activity Tolerance Patient tolerated treatment well   Medical Staff Made Aware PT STUDENT [facility]   Nurse Made Aware YES TAIWO   Assessment   Prognosis Good   Problem List Decreased endurance;Decreased mobility; Impaired balance   Assessment PT COMPLETED EVALUATION OF 58YEAR-OLD MALE WHO WAS ADMITTED TO [facility] ON 9/9/18 AFTER A FALL AT HOME  PATIENT FELL DOWN 15 STEPS WHILE HEAVILY INTOXICATED  MRI OF CERVICAL SPINE SHOWED "Fractures of the dens, C3, C4 and C5 vertebral bodies, Severe canal stenosis at the C3-4, C4-5 levels  Moderate canal stenosis at C5-6 and C6-7    Related to annular bulging "  PATIENT UNDERWENT Posterior cervical decompressive laminectomy C3-6; Posterior cervical lateral mass and pedicle fixation fusion C1-T1 9/10/18  CURRENT DIAGNOSES INCLUDE CLOSED DISPLACED FRACTURE OF C3   CURRENT MEDICAL AND PHYSICAL INSTABILITIES INCLUDE: CONTINUOUS O2/TELEMETRY MONITORING, CERVICAL BRACE, MULTIPLE LINES, FALLS RISK, AND A REGRESSION IN FUNCTIONAL STATUS FROM BASELINE  PMH IS SIGNIFICANT FOR CVA, MI, HTN, ETOH ABUSE, SMOKING HX, AND DM  PRIOR TO THIS ADMISSION, PATIENT WAS RESIDING IN A TWO-STORY HOME (3 HETAL, 14 STEPS TO BASEMENT) WITH DGHT, SON-IN-LAW, AND 4 GRANDCHILDREN  PATIENT WAS PREVIOUSLY I W/ MOBILITY (HAS CANE AND RW BUT DENIES USE), ADLS AND IADLS  (-) DRIVING, HAS NOT DRIVEN FOR SEVERAL YEARS  PATIENT REPORTS 4-5 FALLS WITHIN THE PAST 6 MONTHS  CURRENT IMPAIRMENTS INCLUDE: DECREASED ACTIVITY TOLERANCE, BALANCE DEFICITS, MILD GAIT DEVIATIONS (DECREASED STRIDE/STEP LENGTH) AND FALLS RISK  DURING PT EVALUATION, PATIENT WAS MIN-AX1 WITH SIT <--> STAND  PATIENT AMBULATED [de-identified] FEET MINX-AX1 W/ RW AND CHAIR FOLLOW  PATIENT DEMONSTRATED MILD GAIT DEVIATIONS (EXCESSIVELY SLOW W/ DECREASED STRIDE/STEP LENGTH) BUT DENIED DIZZINESS/LIGHTHEADEDNESS    PATIENT WAS MIN-AX1 FOR SIT <--> SUPINE  EDUCATED PATIENT ON SPINAL PRECAUTIONS (NO BENDING, LIFTING MORE THAN 10LBS, AND TWISTING) AND HAD PATIENT REPEAT BACK TO ENSURE UNDERSTANDING  PT D/C RECOMMENDATION IS FOR STR WHEN MED JAY JAY  PATIENT WILL BENEFIT FROM CONTINUED SKILLED PT THIS ADMISSION TO IMPROVE MOBILITY AND SAFETY  Goals   Patient Goals TO WALK FURTHER   LTG Expiration Date 09/26/18   Long Term Goal #1 1) PERFORM BED MOBILITY MOD-I TO PARTICIPATE IN FREQUENT REPOSITIONING AND IMPROVE SKIN INTEGRITY; 2) PERFORM SIT<-->STAND TRANSFER MOD-I TO PROMOTE I WITH TOILETING AND OOB MOBILITY; 3) AMBULATE 200 FEET MOD-I W/ LEAST RESTRICTIVE DEVICE TO PARTICIPATE IN HOUSEHOLD AND COMMUNITY LEVEL AMBULATION; 4) IMPROVE BALANCE 1 GRADE TO IMPROVE SAFETY DURING AMBULATION, REDUCE RISK OF FALLS; 5) NAVIGATE 12 STEPS S LEVEL IN ORDER TO SAFELY NAVIGATE MULTIPLE FLOORS AT HOME  Treatment Day 0   Plan   Treatment/Interventions Elevations; Endurance training;Gait training;Spoke to nursing;OT;Functional transfer training   PT Frequency Other (Comment)  (3-5X/WK)   Recommendation   Recommendation Short-term skilled PT   Equipment Recommended Other (Comment)  (PATIENT HAS RW, CANE)   PT - OK to Discharge Yes  (TO STR WHEN MED JAY JAY)   Modified Odon Scale   Modified Odon Scale 3   Barthel Index   Feeding 10   Bathing 0   Grooming Score 5   Dressing Score 5   Bladder Score 10   Bowels Score 10   Toilet Use Score 5   Transfers (Bed/Chair) Score 10   Mobility (Level Surface) Score 10   Stairs Score 0   Barthel Index Score 65     Chata Voss, PT

## 2018-09-12 NOTE — PROGRESS NOTES
Progress Note - ICU Transfer to Step down/med  surg  Honey Hill Fast 58 y o  male MRN: [id number]    Unit/Bed#: ICU 09 Encounter: [id number]      Code Status: Level 1 - Full Code    Date of ICU admission: 9/9/18    Reason for ICU adm: Multiple cervical fractures requiring surgery    Active problems:   Patient Active Problem List   Diagnosis    Closed odontoid fracture with routine healing    Closed displaced fracture of third cervical vertebra [facility] )    Closed displaced fracture of fourth cervical vertebra ([facility] )    Alcohol abuse    Decubitus ulcer of sacral region    CAD (coronary artery disease)    Dens fracture ([facility] )    Acute blood loss anemia    Diabetes ([place] [address] )    S/P C1-T1 Posterior Cervical Discectomy and Fusion on 9/10/18    Hypokalemia       Summary of clinical course: the patient was admitted to the ICU for close monitoring for significant cervical fractures requiring surgery with Neurosurgery  He underwent a C3-C6 PCDF, C1-T1 Fusion and was sent back to the ICU intubated  He was successfully extubated and continued to do well from a respiratory standpoint  Of concern, the patient does drink a fair amount (at least 6 beer/daily) on a regular basis and has been monitored on CIWA protocol but has been without signs or symptoms of DTs  This may be somewhat assisted by the fact that he has also been on scheduled Valium for back/neck spasms as well, which was weaned down today and should continue to be weaned off  He is currently tolerating a mechanical soft diet and is doing well with that  His pain is controlled and his post-fixation cervical films were done and show good alignment  He was felt to be appropriate for downgrade to a general medical bed  We will call his pharmacy to review his medications and transition him over to them as able  His glucose has been somewhat elevated and his diet was adjusted today        Recent or scheduled procedures: Cervical fixation/fusion as listed above    Outstanding/pending diagnostics: none    Hospital discharge planning:  Pending therapy evaluations    Sign out was provided to Sindy Lopez PA-C

## 2018-09-12 NOTE — SOCIAL WORK
CM met with pt to discuss d/c plan  CM spoke about the medical team's recommendation of rehab  CM informed the pt of two types of rehab (acute and SNF) and provided the pt with different options pertaining to these levels  Pt's choice is to have rehab referrals sent within [place]   CM will adhere to pt's choice

## 2018-09-12 NOTE — PROGRESS NOTES
Progress Note - Neurosurgery   Srinath Dhillon 58 y o  male MRN: [id number]  Unit/Bed#: ICU 09 Encounter: [id number]    Assessment:  1  POD 2 posterior cervical decompression laminectomy C3-C6, posterior cervical lateral mass and pedicle fixation/fusion C1-T1  2  Dense fracture  3  Acute tear drop vertebral body fracture of C3 and C4  4  Spinous process fracture C4 and C5  5  Acute traumatic C5 vertebral body fracture  6  Acute left facet/lamina fracture C6  7  Left occipital condyle fracture  8  Alcohol abuse  9  CAD  10  Status post fall down steps while intoxicated    Plan:  · Exam reveals full strength and sensation throughout extremities  Vista collar in place  Dressing intact  Drainage noted around drain site insertion  Dressing changed  · Imaging reviewed personally and by attending  Final results as below  · CT cervical spine without contrast September 9, 2018: History of anterior cervical fusion C5-C6  Exam minimally displaced type 2 odontoid fracture approximately 4 mm posterior displacement  Moderately displaced tear drop fractures of anterior inferior C3 and C4 vertebral body  Acute spinous process fractures C4 and C5  Acute nondisplaced oblique fracture of C5 vertebral body  Acute nondisplaced fracture involving left facet and lamina C4  Acute nondisplaced fracture of left occipital condyle  · CTA neck with without contrast September 9, 2018:  No evidence of vertebral artery injury  No arterial dissection  Dominant right vertebral artery  · MRI cervical spine September 9, 2018:  Exaggerated cervical lordosis  Fractures of dense, C3, C4 and C5 vertebral bodies  Extensive prevertebral edema extending to C5  Edema within interspinous ligaments and paraspinal musculature  Disruption of ALL  Severe canal stenosis C3-4 and C4-5 along with moderate stenosis at C5-6 and C6-7  No definitive evidence of cord edema or acute cord injury    · Cervical spine postop upright x-rays 9/11/18: Satisfactory alignment her replacement  · MEGHAN drain with red bloody output  320 mL over 24 hr   Maintain at this time  Continue monitor output  Drain to full suction  Will consider decrease to half suction continue with this afternoon  · Pain control - per primary team   Recommend discontinuation of IV medications  Continue oral regimen as ordered  · Mobilize with physical and occupational therapy - recommending short-term rehab  · DVT PPX:  Heparin, SCDs in place bilaterally  · Patient may be transferred to medical-surgical floor from neurosurgical standpoint  Will continue to follow  Call questions or concerns  Subjective/Objective   Chief Complaint: "I am okay"/postoperative follow-up    Subjective:  No significant events overnight  Patient states pain is well controlled  He does state difficulty tolerating collar  Good appetite and awaiting breakfast   Voiding well  Denies any pain, numbness, tingling and or weakness of his extremities  Objective:  Sitting up in bed  NAD  I/O       09/10 0701 - 09/11 0700 09/11 0701 - 09/12 0700 09/12 0701 - 09/13 0700    P  O   570     I V  (mL/kg) 5251 9 (61 9) 295 1 (3 5)     NG/GT 50      IV Piggyback 1150      Total Intake(mL/kg) 6451 9 (76 1) 865 1 (10 2)     Urine (mL/kg/hr) 4300 (2 1) 3425 (1 7)     Drains 185 320     Blood 250      Total Output 4735 3745      Net +1716 9 -2879 9                   Invasive Devices     Peripheral Intravenous Line            Peripheral IV 09/09/18 Left Wrist 3 days    Peripheral IV 09/10/18 Left Antecubital 1 day          Drain            Closed/Suction Drain Midline Back 1 day                Physical Exam:  Vitals: Blood pressure 152/84, pulse 76, temperature 98 6 °F (37 °C), resp  rate 14, height 6' 1" (1 854 m), weight 84 8 kg (186 lb 15 2 oz), SpO2 93 %  ,Body mass index is 25 54 kg/m²      General appearance: alert, appears stated age, cooperative and no distress  Head: Normocephalic, without obvious abnormality  Eyes:  Conjugate gaze, tracks appropriately on room  Neck: supple, symmetrical, trachea midline  Midline dressing intact  Saturated 4 x 4 over drain insertion site  Lungs: non labored breathing  Heart: regular heart rate  Neurologic:   Mental status: Alert, oriented, thought content appropriate  Cranial nerves: grossly intact (Cranial nerves II-XII)  Sensory: normal to LT  Motor: moving all extremities without focal weakness  Reflexes:  No clonus or Finley bilaterally    Lab Results:    Results from last 7 days  Lab Units 09/12/18  0535 09/11/18  0552 09/10/18  1853   WBC Thousand/uL 7 29 5 49  5 49 5 53   HEMOGLOBIN g/dL 9 8* 8 9*  8 9* 10 3*   HEMATOCRIT % 30 4* 27 8*  27 8* 32 1*   PLATELETS Thousands/uL 187 181  181 173   NEUTROS PCT % 59 82* 92*   MONOS PCT % 9 7 2*       Results from last 7 days  Lab Units 09/12/18  0535 09/11/18  0543 09/10/18  2351  09/10/18  1604 09/10/18  1443  09/09/18  0224   SODIUM mmol/L 134* 137 137  < >  --   --   < >  --    POTASSIUM mmol/L 3 2* 4 5 4 1  < >  --   --   < >  --    CHLORIDE mmol/L 98* 105 107  < >  --   --   < >  --    CO2 mmol/L 31 25 24  < >  --   --   < >  --    BUN mg/dL 3* 5 5  < >  --   --   < >  --    CREATININE mg/dL 0 45* 0 38* 0 41*  < >  --   --   < >  --    CALCIUM mg/dL 8 5 8 2* 7 8*  < >  --   --   < >  --    GLUCOSE, ISTAT mg/dl  --   --   --   --  217* 201*  --  185*   < > = values in this interval not displayed      Results from last 7 days  Lab Units 09/12/18  0535 09/10/18  1853   MAGNESIUM mg/dL 2 0 2 2       Results from last 7 days  Lab Units 09/10/18  1853   PHOSPHORUS mg/dL 2 5       Results from last 7 days  Lab Units 09/09/18  0243   INR  0 89     No results found for: TROPONINT  ABG:  Lab Results   Component Value Date    PHART 7 362 09/10/2018    EKW5AQF 42 6 09/10/2018    PO2ART 96 1 09/10/2018    WED8AET 23 6 09/10/2018    BEART -1 7 09/10/2018    SOURCE Line, Arterial 09/10/2018       Imaging Studies: I have personally reviewed pertinent reports  and I have personally reviewed pertinent films in PACS    XR spine cervical 2 or 3 vw injury   Final Result by Efrain Fontaine DO (09/12 3182)   Status post decompressive laminectomy and cervical fusion C1-T1, postoperative day #1  Hardware intact  Stable fractures  Workstation performed: [id number]         XR chest 1 view   Final Result by Tosin Butler MD (09/11 1214)      No active pulmonary disease  Workstation performed: [id number]         XR chest portable   Final Result by Tristian Mays MD (86/93 6981)      ET tube tip is approximately 4 cm above the calixto  Workstation performed: [id number]         MRI cervical spine wo contrast   Final Result by Vishal Jorgensen DO (09/09 1226)      Exaggerated cervical lordosis  Fractures of the dens, C3, C4 and C5 vertebral bodies demonstrate similar alignment compared to recent CT scan  The dens is slightly displaced posteriorly in relation to the body of C2, approximately 4 mm  Extensive prevertebral edema extending from the level of the clivus inferiorly to the level of C5  There is edema within the interspinous ligaments and paraspinal musculature posteriorly  There is disruption of the anterior longitudinal ligament  However, the posterior longitudinal ligament and ligamentum flavum appear intact  Severe canal stenosis at the C3-4, C4-5 levels  Moderate canal stenosis at C5-6 and C6-7  This is related to annular bulging, endplate and posterior element hypertrophic change  Multilevel foraminal narrowing, severe at C6-7 and moderate at the C3-4,    C4-5 and C5-6 levels  Evaluation of the cord is somewhat limited due to patient motion and the severity of the canal stenosis  However, there does not appear to be cord edema to suggest acute cord injury                 Workstation performed: [id number]         XR trauma multiple   Final Result by Tosin Butler MD (09/09 0573)      No active pulmonary disease  Workstation performed: [id number]         CTA neck w wo contrast   Final Result by Karolina Thompson MD (09/09 9339)      No evidence of vertebral artery injury  No arterial dissection  No hemodynamically significant stenosis within either common or internal carotid artery  Less than 50% stenosis by NASCET criteria  Dominant right vertebral artery with a small calibered left vertebral artery  Acute fractures of C2, C3, C4 and C5 as described above  Workstation performed: [id number]         CT chest abdomen pelvis w contrast   Final Result by Karolina Thompson MD (09/09 2981)      No evidence of solid organ injury  No acute intra-abdominal abnormality  No free air or free fluid  No pneumothorax  Multiple old appearing bilateral rib fractures  17 mm peripherally calcified right renal cyst   A nonemergent renal ultrasound is recommended for further characterization  Workstation performed: [id number]         CT spine cervical without contrast   Final Result by Karolina Thompson MD (09/09 0696)      Acute fractures of C2, C3, C4 and C5 as described above  Acute nondisplaced left occipital condyle fracture  An MRI of the cervical spine could be performed for further evaluation  Moderate prevertebral soft tissue swelling, likely posttraumatic in nature  I personally discussed this study with Debbie Cano on 9/9/2018 at 2:50 AM                    Workstation performed: [id number]         CT head without contrast   Final Result by Karolina Thompson MD (09/09 1800)      No acute intracranial abnormality  Mild chronic small vessel ischemic changes and volume loss  Workstation performed: [id number]         XR spine cervical 2 or 3 vw injury    (Results Pending)       EKG, Pathology, and Other Studies: I have personally reviewed pertinent reports        VTE Pharmacologic Prophylaxis: Heparin    VTE Mechanical Prophylaxis: sequential compression device

## 2018-09-12 NOTE — PLAN OF CARE
Problem: PHYSICAL THERAPY ADULT  Goal: Performs mobility at highest level of function for planned discharge setting  See evaluation for individualized goals  Treatment/Interventions: Elevations, Endurance training, Gait training, Spoke to nursing, OT, Functional transfer training  Equipment Recommended: Other (Comment) (PATIENT HAS RW, CANE)       See flowsheet documentation for full assessment, interventions and recommendations  Prognosis: Good  Problem List: Decreased endurance, Decreased mobility, Impaired balance  Assessment: PT COMPLETED EVALUATION OF 58YEAR-OLD MALE WHO WAS ADMITTED TO [facility] ON 9/9/18 AFTER A FALL AT HOME  PATIENT FELL DOWN 15 STEPS WHILE HEAVILY INTOXICATED  MRI OF CERVICAL SPINE SHOWED "Fractures of the dens, C3, C4 and C5 vertebral bodies, Severe canal stenosis at the C3-4, C4-5 levels  Moderate canal stenosis at C5-6 and C6-7    Related to annular bulging "  PATIENT UNDERWENT Posterior cervical decompressive laminectomy C3-6; Posterior cervical lateral mass and pedicle fixation fusion C1-T1 9/10/18  CURRENT DIAGNOSES INCLUDE CLOSED DISPLACED FRACTURE OF C3   CURRENT MEDICAL AND PHYSICAL INSTABILITIES INCLUDE: CONTINUOUS O2/TELEMETRY MONITORING, CERVICAL BRACE, MULTIPLE LINES, FALLS RISK, AND A REGRESSION IN FUNCTIONAL STATUS FROM BASELINE  PMH IS SIGNIFICANT FOR CVA, MI, HTN, ETOH ABUSE, SMOKING HX, AND DM  PRIOR TO THIS ADMISSION, PATIENT WAS RESIDING IN A TWO-STORY HOME (3 HETAL, 14 STEPS TO BASEMENT) WITH DGHT, SON-IN-LAW, AND 4 GRANDCHILDREN  PATIENT WAS PREVIOUSLY I W/ MOBILITY (HAS CANE AND RW BUT DENIES USE), ADLS AND IADLS  (-) DRIVING, HAS NOT DRIVEN FOR SEVERAL YEARS  PATIENT REPORTS 4-5 FALLS WITHIN THE PAST 6 MONTHS  CURRENT IMPAIRMENTS INCLUDE: DECREASED ACTIVITY TOLERANCE, BALANCE DEFICITS, MILD GAIT DEVIATIONS (DECREASED STRIDE/STEP LENGTH) AND FALLS RISK  DURING PT EVALUATION, PATIENT WAS MIN-AX1 WITH SIT <--> STAND    PATIENT AMBULATED 80 FEET MINX-AX1 W/ RW AND CHAIR FOLLOW  PATIENT DEMONSTRATED MILD GAIT DEVIATIONS (EXCESSIVELY SLOW W/ DECREASED STRIDE/STEP LENGTH) BUT DENIED DIZZINESS/LIGHTHEADEDNESS  PATIENT WAS MIN-AX1 FOR SIT <--> SUPINE  EDUCATED PATIENT ON SPINAL PRECAUTIONS (NO BENDING, LIFTING MORE THAN 10LBS, AND TWISTING) AND HAD PATIENT REPEAT BACK TO ENSURE UNDERSTANDING  PT D/C RECOMMENDATION IS FOR STR WHEN MED JAY JAY  PATIENT WILL BENEFIT FROM CONTINUED SKILLED PT THIS ADMISSION TO IMPROVE MOBILITY AND SAFETY  Recommendation: (S) Short-term skilled PT     PT - OK to Discharge: (S) Yes (TO STR WHEN MED JAY JAY)    See flowsheet documentation for full assessment       Lida Brice, PT

## 2018-09-12 NOTE — PROGRESS NOTES
Progress Note - Critical Care   Jona Rabago 58 y o  male MRN: [id number]  Unit/Bed#: ICU 09 Encounter: [id number]    Attending Physician: Erasmo Heath MD      ______________________________________________________________________  Assessment and Plan:   Principal Problem:    Closed displaced fracture of third cervical vertebra [facility])  Active Problems:    Closed odontoid fracture with routine healing    Closed displaced fracture of fourth cervical vertebra (HCC)    Alcohol abuse    Decubitus ulcer of sacral region    CAD (coronary artery disease)    Dens fracture (HCC)    Acute blood loss anemia    Diabetes ([address] )    S/P C1-T1 Posterior Cervical Discectomy and Fusion on 9/10/18  Resolved Problems:    * No resolved hospital problems  *        Neuro: POD #2 s/p PCDF of C1-T1 for C2-C5 fractures - NS is following  The patient's exam has remained stable and his pain appears reasonably controlled  Continue Vista collar at all times  MEGHAN drains per NS     Occipital Condyle Fx - maintain collar, no additional intervention      Pain - continue Tylenol and lidoderm scheduled and valium 5 mg q6h for spasm  Additional dilaudid 0 5 mg and oxycodone 5-10 mg prn  Chronic Alcohol Abuse - patient remains on CIWA protocol and has been stable from that standpoint  He is however already getting Valium scheduled q6h which is likely helping  Continue thiamine and folic acid scheduled  Not requiring Serax or additional benzos currently  Continue to monitor but patient currently denies withdraw symptoms and vitals have been reasonably stable  CV: HTN/CAD - continue home metoprolol as ordered  BP is intermittently elevated which may be related to his pain  Home plavix on hold per NS  Pulm: Respiratory insufficiency - patient was successfully extubated yesterday and has been doing well  Continue with pulmonary mechanics and IS     COPD - continue on scheduled nebs - Atrovent and Xopenex      GI: Bowel regimen prn     Dysphagia - SLP following and have passed him for a dysphagia 2 diet: mechanical soft, honey thick liquids    : Voiding without issues  F/E/N: IVF - KVO     Diet - dysphagia 2, SLP following for advancement     Hypokalemia - repleted today    ID: Afebrile, no leukocytosis  Continue to monitor    Heme: Acute blood loss anemia - hgb today 9 8 and stable  Continue to monitor prn    Endo: Hyperglycemia - 180-220 glucose over the last 24 hours  Continue to cover with SSI at this time  Msk/Skin: Mobilization, skin checks    Disposition: Medsur    Code Status: Level 1 - Full Code    Counseling / Coordination of Care  Total time spent today 20 minutes  Greater than 50% of total time was spent with the patient and / or family counseling and / or coordination of care    ______________________________________________________________________    Chief Complaint: no complaints this morning    24 Hour Events: Patient was extubated yesterday without incident or complications    Review of Systems   Respiratory: Negative for shortness of breath  Cardiovascular: Negative for chest pain  Gastrointestinal: Negative for abdominal pain  Musculoskeletal: Negative for back pain and neck pain  Neurological: Negative for weakness and numbness  ______________________________________________________________________    Physical Exam:   Physical Exam   Constitutional: He is oriented to person, place, and time  No distress  HENT:   Head: Normocephalic  Eyes: Pupils are equal, round, and reactive to light  Neck:   [place] collar in place   Cardiovascular: Normal rate and regular rhythm  Pulmonary/Chest: Effort normal and breath sounds normal  No respiratory distress  He has no wheezes  IS 1250 cc   Abdominal: Soft  He exhibits no distension  Genitourinary:   Genitourinary Comments: voiding   Musculoskeletal:   Motor intact throughout   Neurological: He is alert and oriented to person, place, and time  No sensory deficit  Skin: Skin is warm and dry  Vitals reviewed  ______________________________________________________________________  Vitals:    18 1935 18 2000 18 2100 18 2200   BP:  158/83 152/82 167/92   BP Location:       Pulse:  (!) 108 (!) 110 100   Resp:  (!) 31 20 (!) 23   Temp:  98 6 °F (37 °C)     TempSrc:       SpO2: 96% 95% 96% 95%   Weight:       Height:           Temperature:   Temp (24hrs), Av °F (36 7 °C), Min:97 °F (36 1 °C), Max:98 6 °F (37 °C)    Current Temperature: 98 6 °F (37 °C)  Weights:   IBW: 79 9 kg    Body mass index is 25 54 kg/m²  Weight (last 2 days)     None        Hemodynamic Monitoring:  N/A     Non-Invasive/Invasive Ventilation Settings:  Respiratory    Lab Data (Last 4 hours)    None         O2/Vent Data (Last 4 hours)    None              No results found for: PHART, XAX1TIF, PO2ART, QCL2XRO, V9BVUKVJ, BEART, SOURCE  SpO2: SpO2: 93 %  Intake and Outputs:  I/O       09/10 07 -  0700  07 -  0700    P  O   120    I V  (mL/kg) 5251 9 (61 9) 295 1 (3 5)    NG/GT 50     IV Piggyback 1150     Total Intake(mL/kg) 6451 9 (76 1) 415 1 (4 9)    Urine (mL/kg/hr) 4300 (2 1) 3125 (1 5)    Drains 185 195    Blood 250     Total Output 4735 3320    Net +1716 9 -2904 9              UOP: 1 68 ml/kg/hr   Nutrition:        Diet Orders            Start     Ordered    18 1321  Diet Dysphagia/Modified Consistency;  Dysphagia 2-Mechanical Soft; Honey Thick Liquid  Diet effective now     Question Answer Comment   Diet Type Dysphagia/Modified Consistency    Dysphagia/Modified Consistency Dysphagia 2-Mechanical Soft    Liquid Modifier Honey Thick Liquid        18 1320          Labs:     Results from last 7 days  Lab Units 18  0535 18  0552 09/10/18  1853   WBC Thousand/uL 7 29 5 49  5 49 5 53   HEMOGLOBIN g/dL 9 8* 8 9*  8 9* 10 3*   HEMATOCRIT % 30 4* 27 8*  27 8* 32 1*   PLATELETS Thousands/uL 187 181  181 173   NEUTROS PCT % 59 82* 92*   MONOS PCT % 9 7 2*       Results from last 7 days  Lab Units 09/11/18  0543 09/10/18  2351 09/10/18  1853 09/10/18  1604 09/10/18  1443  09/09/18  0224   SODIUM mmol/L 137 137 140  --   --   < >  --    POTASSIUM mmol/L 4 5 4 1 4 0  --   --   < >  --    CHLORIDE mmol/L 105 107 109*  --   --   < >  --    CO2 mmol/L 25 24 23  --   --   < >  --    BUN mg/dL 5 5 6  --   --   < >  --    CREATININE mg/dL 0 38* 0 41* 0 38*  --   --   < >  --    CALCIUM mg/dL 8 2* 7 8* 7 8*  --   --   < >  --    GLUCOSE, ISTAT mg/dl  --   --   --  217* 201*  --  185*   < > = values in this interval not displayed  Results from last 7 days  Lab Units 09/10/18  1853   MAGNESIUM mg/dL 2 2     Lab Results   Component Value Date    PHOS 2 5 09/10/2018        Results from last 7 days  Lab Units 09/09/18  0243   INR  0 89     No results found for: TROPONINI    Results from last 7 days  Lab Units 09/10/18  1853   LACTIC ACID mmol/L 0 7     ABG:  Lab Results   Component Value Date    PHART 7 362 09/10/2018    YUD3DAT 42 6 09/10/2018    PO2ART 96 1 09/10/2018    PNM6SKD 23 6 09/10/2018    BEART -1 7 09/10/2018    SOURCE Line, Arterial 09/10/2018     Imaging: none    Micro:  No results found for: Chantale Coker, WOUNDCULT, SPUTUMCULTUR  Allergies:    Allergies   Allergen Reactions    Amoxicillin     Augmentin [Amoxicillin-Pot Clavulanate]      Medications:   Scheduled Meds:  Current Facility-Administered Medications:  acetaminophen 975 mg Oral [facility] Eloina Morton MD   albuterol 2 puff Inhalation Q4H PRN Erasmo Heath MD   bisacodyl 10 mg Rectal Daily PRN Renetta Mosqueda MD   diazepam 5 mg Oral Q6H Rohini Martinez PA-C   folic acid 1 mg Oral Daily Rohini Martinez PA-C   heparin (porcine) 5,000 Units Subcutaneous [facility] Na Calzada PA-C   HYDROmorphone 0 5 mg Intravenous Q4H PRN Cyrus Isabel MD   insulin lispro 1-6 Units Subcutaneous Q6H [facility] long[facility]term Juana Cramer PA-C   ipratropium 0 5 mg Nebulization TID Mukul Maynard Jeremiah To MD   levalbuterol 1 25 mg Nebulization TID Be Paul MD   lidocaine 1 patch Transdermal Daily Enrique Varela MD   magnesium hydroxide 30 mL Oral Daily PRN Jose De Jesus Lee PA-C   metoprolol tartrate 50 mg Oral Q12H [address] Ander Contreras MD   ondansetron 4 mg Intravenous Q6H PRN Reed Snow MD   oxyCODONE 10 mg Oral Q4H PRN Ander Contreras MD   oxyCODONE 5 mg Oral Q4H PRN Ander Contreras MD   senna 2 tablet Oral HS Jose De Jesus Lee PA-C   thiamine 100 mg Oral Daily Babs Frausto PA-C     Continuous Infusions:   PRN Meds:    albuterol 2 puff Q4H PRN   bisacodyl 10 mg Daily PRN   HYDROmorphone 0 5 mg Q4H PRN   magnesium hydroxide 30 mL Daily PRN   ondansetron 4 mg Q6H PRN   oxyCODONE 10 mg Q4H PRN   oxyCODONE 5 mg Q4H PRN     VTE Pharmacologic Prophylaxis: Heparin  VTE Mechanical Prophylaxis: sequential compression device  Invasive lines and devices: Invasive Devices     Peripheral Intravenous Line            Peripheral IV 09/09/18 Left Wrist 3 days    Peripheral IV 09/10/18 Left Antecubital 1 day          Drain            Closed/Suction Drain Midline Back 1 day                     Portions of the record may have been created with voice recognition software  Occasional wrong word or "sound a like" substitutions may have occurred due to the inherent limitations of voice recognition software  Read the chart carefully and recognize, using context, where substitutions have occurred      Arline Campos PA-C

## 2018-09-12 NOTE — SPEECH THERAPY NOTE
Speech Language/Pathology    Speech/Language Pathology Progress Note    Patient Name: Suma BANKS Date: 9/12/2018     Subjective:  'Oh I am ok"  Current Diet: level 2 w/ htl  Objective:  Pt seen for ongoing dx dysphagia tx  Nsg states pt much better today & wanting an upgrade  Pt trialed w/ toast, nt by cup(4 oz), thin by cup & straw (8 oz)  Pt self fed toast- adequate retrieval, mastication & full oral clearing  No overt coughing or throat clearing  Adequate seal on cup rim, prompt transfers w/ all liquids, able to take successive sips thin w/o overt coughing or clearing  Assessment:  Pt w/ improved overall swallow function today for an upgrade  Plan/Recommendations:  1  Upgrade to Level 3 w/ thin  2   Speech to f/u for regular & tolerance

## 2018-09-12 NOTE — SOCIAL WORK
CM spoke to pt's dtr Sharran Simmonds  She was hoping to the pt would go to an IP rehab for therapy if pt was recommended   CM states he will discuss with patient

## 2018-09-13 LAB
ANION GAP SERPL CALCULATED.3IONS-SCNC: 6 MMOL/L (ref 4–13)
BUN SERPL-MCNC: 5 MG/DL (ref 5–25)
CALCIUM SERPL-MCNC: 8.7 MG/DL (ref 8.3–10.1)
CHLORIDE SERPL-SCNC: 98 MMOL/L (ref 100–108)
CO2 SERPL-SCNC: 31 MMOL/L (ref 21–32)
CREAT SERPL-MCNC: 0.5 MG/DL (ref 0.6–1.3)
ERYTHROCYTE [DISTWIDTH] IN BLOOD BY AUTOMATED COUNT: 17.3 % (ref 11.6–15.1)
GFR SERPL CREATININE-BSD FRML MDRD: 115 ML/MIN/1.73SQ M
GLUCOSE SERPL-MCNC: 126 MG/DL (ref 65–140)
GLUCOSE SERPL-MCNC: 151 MG/DL (ref 65–140)
GLUCOSE SERPL-MCNC: 160 MG/DL (ref 65–140)
GLUCOSE SERPL-MCNC: 231 MG/DL (ref 65–140)
GLUCOSE SERPL-MCNC: 247 MG/DL (ref 65–140)
GLUCOSE SERPL-MCNC: 346 MG/DL (ref 65–140)
HCT VFR BLD AUTO: 32.4 % (ref 36.5–49.3)
HGB BLD-MCNC: 10.5 G/DL (ref 12–17)
MCH RBC QN AUTO: 26.9 PG (ref 26.8–34.3)
MCHC RBC AUTO-ENTMCNC: 32.4 G/DL (ref 31.4–37.4)
MCV RBC AUTO: 83 FL (ref 82–98)
PLATELET # BLD AUTO: 186 THOUSANDS/UL (ref 149–390)
PMV BLD AUTO: 10.3 FL (ref 8.9–12.7)
POTASSIUM SERPL-SCNC: 3.5 MMOL/L (ref 3.5–5.3)
RBC # BLD AUTO: 3.9 MILLION/UL (ref 3.88–5.62)
SODIUM SERPL-SCNC: 135 MMOL/L (ref 136–145)
WBC # BLD AUTO: 7.47 THOUSAND/UL (ref 4.31–10.16)

## 2018-09-13 PROCEDURE — 82948 REAGENT STRIP/BLOOD GLUCOSE: CPT

## 2018-09-13 PROCEDURE — 99255 IP/OBS CONSLTJ NEW/EST HI 80: CPT

## 2018-09-13 PROCEDURE — 99232 SBSQ HOSP IP/OBS MODERATE 35: CPT | Performed by: EMERGENCY MEDICINE

## 2018-09-13 PROCEDURE — 94760 N-INVAS EAR/PLS OXIMETRY 1: CPT

## 2018-09-13 PROCEDURE — 85027 COMPLETE CBC AUTOMATED: CPT | Performed by: PHYSICIAN ASSISTANT

## 2018-09-13 PROCEDURE — 97530 THERAPEUTIC ACTIVITIES: CPT

## 2018-09-13 PROCEDURE — 99024 POSTOP FOLLOW-UP VISIT: CPT | Performed by: PHYSICIAN ASSISTANT

## 2018-09-13 PROCEDURE — 80048 BASIC METABOLIC PNL TOTAL CA: CPT | Performed by: PHYSICIAN ASSISTANT

## 2018-09-13 PROCEDURE — 97116 GAIT TRAINING THERAPY: CPT

## 2018-09-13 PROCEDURE — 94640 AIRWAY INHALATION TREATMENT: CPT

## 2018-09-13 PROCEDURE — 97535 SELF CARE MNGMENT TRAINING: CPT

## 2018-09-13 RX ORDER — METOPROLOL TARTRATE 5 MG/5ML
5 INJECTION INTRAVENOUS EVERY 6 HOURS PRN
Status: DISCONTINUED | OUTPATIENT
Start: 2018-09-13 | End: 2018-09-14

## 2018-09-13 RX ADMIN — SENNOSIDES 17.2 MG: 8.6 TABLET, FILM COATED ORAL at 21:03

## 2018-09-13 RX ADMIN — THIAMINE HCL TAB 100 MG 100 MG: 100 TAB at 09:24

## 2018-09-13 RX ADMIN — ACETAMINOPHEN 975 MG: 325 TABLET ORAL at 21:03

## 2018-09-13 RX ADMIN — LEVALBUTEROL HYDROCHLORIDE 1.25 MG: 1.25 SOLUTION, CONCENTRATE RESPIRATORY (INHALATION) at 19:12

## 2018-09-13 RX ADMIN — INSULIN LISPRO 3 UNITS: 100 INJECTION, SOLUTION INTRAVENOUS; SUBCUTANEOUS at 23:33

## 2018-09-13 RX ADMIN — MELATONIN 3 MG: at 00:25

## 2018-09-13 RX ADMIN — METOPROLOL TARTRATE 50 MG: 50 TABLET ORAL at 21:03

## 2018-09-13 RX ADMIN — LIDOCAINE 1 PATCH: 50 PATCH TOPICAL at 09:25

## 2018-09-13 RX ADMIN — DIAZEPAM 5 MG: 5 TABLET ORAL at 00:58

## 2018-09-13 RX ADMIN — OXYCODONE HYDROCHLORIDE 10 MG: 10 TABLET ORAL at 21:03

## 2018-09-13 RX ADMIN — MELATONIN 3 MG: at 21:03

## 2018-09-13 RX ADMIN — DIAZEPAM 5 MG: 5 TABLET ORAL at 18:03

## 2018-09-13 RX ADMIN — INSULIN LISPRO 5 UNITS: 100 INJECTION, SOLUTION INTRAVENOUS; SUBCUTANEOUS at 13:12

## 2018-09-13 RX ADMIN — METOPROLOL TARTRATE 50 MG: 50 TABLET ORAL at 09:25

## 2018-09-13 RX ADMIN — OXYCODONE HYDROCHLORIDE 10 MG: 10 TABLET ORAL at 00:25

## 2018-09-13 RX ADMIN — ACETAMINOPHEN 975 MG: 325 TABLET ORAL at 18:02

## 2018-09-13 RX ADMIN — LEVALBUTEROL HYDROCHLORIDE 1.25 MG: 1.25 SOLUTION, CONCENTRATE RESPIRATORY (INHALATION) at 13:42

## 2018-09-13 RX ADMIN — DIAZEPAM 5 MG: 5 TABLET ORAL at 09:24

## 2018-09-13 RX ADMIN — LEVALBUTEROL HYDROCHLORIDE 1.25 MG: 1.25 SOLUTION, CONCENTRATE RESPIRATORY (INHALATION) at 07:15

## 2018-09-13 RX ADMIN — INSULIN LISPRO 3 UNITS: 100 INJECTION, SOLUTION INTRAVENOUS; SUBCUTANEOUS at 00:25

## 2018-09-13 RX ADMIN — DIAZEPAM 5 MG: 5 TABLET ORAL at 23:33

## 2018-09-13 RX ADMIN — HEPARIN SODIUM 5000 UNITS: 5000 INJECTION, SOLUTION INTRAVENOUS; SUBCUTANEOUS at 05:34

## 2018-09-13 RX ADMIN — HEPARIN SODIUM 5000 UNITS: 5000 INJECTION, SOLUTION INTRAVENOUS; SUBCUTANEOUS at 21:04

## 2018-09-13 RX ADMIN — IPRATROPIUM BROMIDE 0.5 MG: 0.5 SOLUTION RESPIRATORY (INHALATION) at 07:15

## 2018-09-13 RX ADMIN — IPRATROPIUM BROMIDE 0.5 MG: 0.5 SOLUTION RESPIRATORY (INHALATION) at 13:42

## 2018-09-13 RX ADMIN — ACETAMINOPHEN 975 MG: 325 TABLET ORAL at 05:34

## 2018-09-13 RX ADMIN — HEPARIN SODIUM 5000 UNITS: 5000 INJECTION, SOLUTION INTRAVENOUS; SUBCUTANEOUS at 18:03

## 2018-09-13 RX ADMIN — FOLIC ACID 1 MG: 1 TABLET ORAL at 09:24

## 2018-09-13 RX ADMIN — IPRATROPIUM BROMIDE 0.5 MG: 0.5 SOLUTION RESPIRATORY (INHALATION) at 19:12

## 2018-09-13 RX ADMIN — OXYCODONE HYDROCHLORIDE 10 MG: 10 TABLET ORAL at 09:23

## 2018-09-13 NOTE — PROGRESS NOTES
Progress Note - Neurosurgery   Dionne Greer 58 y o  male MRN: [id number]  Unit/Bed#: [facility] 932-01 Encounter: [id number]    Assessment:  1  POD 3 posterior cervical decompression laminectomy C3-C6, posterior cervical lateral mass and pedicle fixation/fusion C1-T1 (9/10/18)  2  Dense fracture  3  Acute tear drop vertebral body fracture of C3 and C4  4  Spinous process fracture C4 and C5  5  Acute traumatic C5 vertebral body fracture  6  Acute left facet/lamina fracture C6  7  Left occipital condyle fracture  8  Alcohol abuse  9  CAD  10  Status post fall down steps while intoxicated    Plan:  · Exam reveals full strength and sensation LT throughout extremities  Diffuse decreased PP RUE  Vista collar in place  Dressing intact  · Imaging reviewed personally and by attending  Final results as below  · CT cervical spine without contrast September 9, 2018: History of anterior cervical fusion C5-C6  Exam minimally displaced type 2 odontoid fracture approximately 4 mm posterior displacement  Moderately displaced tear drop fractures of anterior inferior C3 and C4 vertebral body  Acute spinous process fractures C4 and C5  Acute nondisplaced oblique fracture of C5 vertebral body  Acute nondisplaced fracture involving left facet and lamina C4  Acute nondisplaced fracture of left occipital condyle  · CTA neck with without contrast September 9, 2018:  No evidence of vertebral artery injury  No arterial dissection  Dominant right vertebral artery  · MRI cervical spine September 9, 2018:  Exaggerated cervical lordosis  Fractures of dense, C3, C4 and C5 vertebral bodies  Extensive prevertebral edema extending to C5  Edema within interspinous ligaments and paraspinal musculature  Disruption of ALL  Severe canal stenosis C3-4 and C4-5 along with moderate stenosis at C5-6 and C6-7  No definitive evidence of cord edema or acute cord injury    · Cervical spine postop upright x-rays 9/11/18:  Satisfactory alignment and hardware placement  · MEGHAN drain with red bloody output  140 mL over 24 hr   40ml this morning  Maintain at this time  Continue monitor output  Drain to gravity  · Pain control - per primary team   Recommend discontinuation of IV medications  Continue oral regimen as ordered  · Mobilize with physical and occupational therapy - recommending short-term rehab  · DVT PPX:  Heparin, SCDs in place bilaterally  · Regarding Plavix - discussed with patient he states he had stents placed 2004  He states he met with his cardiologist several months ago and he was taking off Plavix  Per patient no need to resume Plavix  This is consistent with normal P2Y12 on admission  · Patient may transition to [facility]'s acute rehab from neurosurgical standpoint  If he requires discharged to a different rehab facility, will need to await discontinuation of drain  · Will continue to follow  Call questions or concerns  Subjective/Objective   Chief Complaint:  Postoperative follow-up    Subjective:  Patient states improving right shoulder and arm pain  He states this has been present and improving though he has not offered this complaint earlier this week during my examination  Denies any associated weakness or sensation alteration  Continues with some difficulties the with speech/dysarthria  Denies chest pain or shortness of breath  Some difficulty falling sleep overnight but overall no significant events  Objective:  Sitting up in bed  Sleeping  NAD  I/O       09/11 0701 - 09/12 0700 09/12 0701 - 09/13 0700 09/13 0701 - 09/14 0700    P  O  570 1040 240    I V  (mL/kg) 295 1 (3 5)      Total Intake(mL/kg) 865 1 (10 2) 1040 (11 8) 240 (2 7)    Urine (mL/kg/hr) 3425 (1 7) 1550 (0 7) 575 (0 9)    Drains 320 140 40    Total Output 3745 1690 615    Net -2879 9 -650 -375                 Invasive Devices     Peripheral Intravenous Line            Peripheral IV 09/09/18 Left Wrist 4 days    Peripheral IV 09/10/18 Left Antecubital 2 days          Drain            Closed/Suction Drain Midline Back 3 days                Physical Exam:  Vitals: Blood pressure 157/89, pulse 100, temperature 97 9 °F (36 6 °C), temperature source Oral, resp  rate 18, height 6' 1" (1 854 m), weight 87 8 kg (193 lb 9 oz), SpO2 96 %  ,Body mass index is 25 54 kg/m²  General appearance: alert, appears stated age, cooperative and no distress  Head: Normocephalic, without obvious abnormality  Eyes:  Conjugate gaze  Neck: supple, symmetrical, trachea midline and dressing intact, vista collar in place  Lungs: non labored breathing  Heart: regular heart rate  Neurologic:   Mental status: Alert, oriented, thought content appropriate  Cranial nerves: grossly intact (Cranial nerves II-XII) with exception of dysarthria  Dysarthric repetition  Accurate identification of objects 3/3  Sensory: normal to LT    Diffuse decreased pinprick right upper extremity  Motor: moving all extremities without focal weakness   Reflexes:  No Clonus    Lab Results:    Results from last 7 days  Lab Units 09/13/18  0508 09/12/18  0535 09/11/18  0552 09/10/18  1853   WBC Thousand/uL 7 47 7 29 5 49  5 49 5 53   HEMOGLOBIN g/dL 10 5* 9 8* 8 9*  8 9* 10 3*   HEMATOCRIT % 32 4* 30 4* 27 8*  27 8* 32 1*   PLATELETS Thousands/uL 186 187 181  181 173   NEUTROS PCT %  --  59 82* 92*   MONOS PCT %  --  9 7 2*       Results from last 7 days  Lab Units 09/13/18  0508 09/12/18  0535 09/11/18  0543  09/10/18  1604 09/10/18  1443  09/09/18  0224   SODIUM mmol/L 135* 134* 137  < >  --   --   < >  --    POTASSIUM mmol/L 3 5 3 2* 4 5  < >  --   --   < >  --    CHLORIDE mmol/L 98* 98* 105  < >  --   --   < >  --    CO2 mmol/L 31 31 25  < >  --   --   < >  --    BUN mg/dL 5 3* 5  < >  --   --   < >  --    CREATININE mg/dL 0 50* 0 45* 0 38*  < >  --   --   < >  --    CALCIUM mg/dL 8 7 8 5 8 2*  < >  --   --   < >  --    GLUCOSE, ISTAT mg/dl  --   --   --   --  217* 201*  --  185*   < > = values in this interval not displayed  Results from last 7 days  Lab Units 09/12/18  0535 09/10/18  1853   MAGNESIUM mg/dL 2 0 2 2       Results from last 7 days  Lab Units 09/10/18  1853   PHOSPHORUS mg/dL 2 5       Results from last 7 days  Lab Units 09/09/18  0243   INR  0 89     No results found for: TROPONINT  ABG:  Lab Results   Component Value Date    PHART 7 362 09/10/2018    [id number] 42 6 09/10/2018    PO2ART 96 1 09/10/2018    [id number] 23 6 09/10/2018    BEART -1 7 09/10/2018    SOURCE Line, Arterial 09/10/2018       Imaging Studies: I have personally reviewed pertinent reports  and I have personally reviewed pertinent films in PACS    XR spine cervical 2 or 3 vw injury   Final Result by Arvin Figueroa DO (09/13 9709)      Fluoroscopic guidance provided for intraoperative localization during cervical fusion procedure as above  Please refer to the separate procedure notes for additional details  Workstation performed: [id number]         XR spine cervical 2 or 3 vw injury   Final Result by Juan Parekh DO (09/12 5868)   Status post decompressive laminectomy and cervical fusion C1-T1, postoperative day #1  Hardware intact  Stable fractures  Workstation performed: [id number]         XR chest 1 view   Final Result by Eleni Campo MD (09/11 1214)      No active pulmonary disease  Workstation performed: [id number]         XR chest portable   Final Result by Lazaro De Guzman MD (66/87 8036)      ET tube tip is approximately 4 cm above the calixto  Workstation performed: [id number]         MRI cervical spine wo contrast   Final Result by Gisell Bhatti DO (09/09 1226)      Exaggerated cervical lordosis  Fractures of the dens, C3, C4 and C5 vertebral bodies demonstrate similar alignment compared to recent CT scan  The dens is slightly displaced posteriorly in relation to the body of C2, approximately 4 mm        Extensive prevertebral edema extending from the level of the clivus inferiorly to the level of C5  There is edema within the interspinous ligaments and paraspinal musculature posteriorly  There is disruption of the anterior longitudinal ligament  However, the posterior longitudinal ligament and ligamentum flavum appear intact  Severe canal stenosis at the C3-4, C4-5 levels  Moderate canal stenosis at C5-6 and C6-7  This is related to annular bulging, endplate and posterior element hypertrophic change  Multilevel foraminal narrowing, severe at C6-7 and moderate at the C3-4,    C4-5 and C5-6 levels  Evaluation of the cord is somewhat limited due to patient motion and the severity of the canal stenosis  However, there does not appear to be cord edema to suggest acute cord injury  Workstation performed: [id number]         XR trauma multiple   Final Result by Christal Mathias MD (09/09 0530)      No active pulmonary disease  Workstation performed: [id number]         CTA neck w wo contrast   Final Result by Sylwia Chun MD (09/09 1146)      No evidence of vertebral artery injury  No arterial dissection  No hemodynamically significant stenosis within either common or internal carotid artery  Less than 50% stenosis by NASCET criteria  Dominant right vertebral artery with a small calibered left vertebral artery  Acute fractures of C2, C3, C4 and C5 as described above  Workstation performed: [id number]         CT chest abdomen pelvis w contrast   Final Result by Sylwia Chun MD (09/09 0743)      No evidence of solid organ injury  No acute intra-abdominal abnormality  No free air or free fluid  No pneumothorax  Multiple old appearing bilateral rib fractures  17 mm peripherally calcified right renal cyst   A nonemergent renal ultrasound is recommended for further characterization        Workstation performed: [id number]         CT spine cervical without contrast   Final Result by Desirae Hernandez MD (09/09 0256)      Acute fractures of C2, C3, C4 and C5 as described above  Acute nondisplaced left occipital condyle fracture  An MRI of the cervical spine could be performed for further evaluation  Moderate prevertebral soft tissue swelling, likely posttraumatic in nature  I personally discussed this study with Deepa Batista on 9/9/2018 at 2:50 AM                    Workstation performed: [id number]         CT head without contrast   Final Result by Desirae Hernandez MD (09/09 0246)      No acute intracranial abnormality  Mild chronic small vessel ischemic changes and volume loss  Workstation performed: [id number]             EKG, Pathology, and Other Studies: I have personally reviewed pertinent reports        VTE Pharmacologic Prophylaxis: Heparin    VTE Mechanical Prophylaxis: sequential compression device

## 2018-09-13 NOTE — OCCUPATIONAL THERAPY NOTE
Occupational Therapy Treatment Note:       09/13/18 0920   Restrictions/Precautions   Weight Bearing Precautions Per Order No   Braces or Orthoses C/S Collar   Other Precautions Fall Risk;Pain;Spinal precautions; Chair Alarm; Bed Alarm   Pain Assessment   Pain Assessment 0-10   Pain Score 8   Transfers   Sit to Stand 4  Minimal assistance   Additional items Assist x 1; Armrests   Stand to Sit 4  Minimal assistance   Additional items Assist x 1; Armrests   Cognition   Overall Cognitive Status Impaired   Arousal/Participation Alert; Responsive   Following Commands Follows one step commands with increased time or repetition   Assessment   Assessment pt  seen for AM OT session focusing on ADLs of bathing, dressing, grooming and transfers  pt  requires supervision for grooming and UB bathing  Min Asst for UB dressing and LB bathing/dressing  pt  needed asst to wash feet and don socks  pt  requires Min Asst for transfers  pt  noted to be SOB on room air with pulse ox in upper 90's during session while performing tasks  pt  OOB in chair at end of session with all needs in reach  pt  will continue to benefit from skilled OT services to maximize independence  Plan   Treatment Interventions ADL retraining;Functional transfer training; Endurance training;Patient/family training; Compensatory technique education; Energy conservation; Activityengagement   Goal Expiration Date 09/25/18   Treatment Day 1   OT Frequency 3-5x/wk   Recommendation   OT Discharge Recommendation Short Term Rehab  (pending progress)   Barthel Index   Feeding 10   Bathing 0   Grooming Score 5   Dressing Score 5   Bladder Score 10   Bowels Score 10   Toilet Use Score 5   Transfers (Bed/Chair) Score 10   Mobility (Level Surface) Score 10   Stairs Score 0   Barthel Index Score 65   Modified Wharton Scale   Modified Kapil Scale 3   Shona Yi

## 2018-09-13 NOTE — PROGRESS NOTES
Progress Note - Karan Reed 1955, 58 y o  male MRN: [id number]    Unit/Bed#: [facility] 932-01 Encounter: [id number]    Primary Care Provider: Shani Amaya MD   Date and time admitted to hospital: 9/9/2018  2:13 AM        Hypokalemia   Assessment & Plan    Monitor labs        S/P C1-T1 Posterior Cervical Discectomy and Fusion on 9/10/18   Assessment & Plan    See above        Diabetes [facility])   Assessment & Plan    No results found for: HGBA1C    Recent Labs      09/12/18   1810  09/13/18   0013  09/13/18   0540  09/13/18   1129   POCGLU  178*  247*  160*  346*       Blood Sugar Average: Last 72 hrs:  (P) 218 [id number]        Acute blood loss anemia   Assessment & Plan    Monitoring labs        Dens fracture ([address] )   Assessment & Plan    See above        * Closed displaced fracture of third cervical vertebra [facility])   Assessment & Plan    Neurosurgery following  Collar on  Neuro intact            Subjective: I am doing well    Objective: Comfortable,  Sitting up in bed    Meds/Allergies   Prescriptions Prior to Admission   Medication Sig Dispense Refill Last Dose    albuterol (PROVENTIL HFA,VENTOLIN HFA) 90 mcg/act inhaler Inhale 2 puffs 2 (two) times a day as needed for wheezing       fluticasone-salmeterol (ADVAIR) 500-50 mcg/dose inhaler Inhale 1 puff 2 (two) times a day Rinse mouth after use         folic acid (FOLVITE) 1 mg tablet Take by mouth daily       ipratropium-albuterol (DUO-NEB) 0 5-2 5 mg/3 mL nebulizer solution Take 3 mL by nebulization 4 (four) times a day       metoprolol tartrate (LOPRESSOR) 50 mg tablet Take 25 mg by mouth every 12 (twelve) hours       Multiple Vitamin (MULTIVITAMIN) tablet Take 1 tablet by mouth daily       tiotropium (SPIRIVA) 18 mcg inhalation capsule Place 18 mcg into inhaler and inhale daily       clopidogrel (PLAVIX) 75 mg tablet Take 75 mg by mouth daily   More than a month at Unknown time    metFORMIN (GLUCOPHAGE) 1000 MG tablet Take 1,000 mg by mouth 2 (two) times a day with meals   More than a month at Unknown time       Vitals: Blood pressure 157/89, pulse 100, temperature 97 9 °F (36 6 °C), temperature source Oral, resp  rate 18, height 6' 1" (1 854 m), weight 87 8 kg (193 lb 9 oz), SpO2 96 %  Body mass index is 25 54 kg/m²  ABG: Lab Results   Component Value Date    PHART 7 362 09/10/2018    OBQ4SCC 42 6 09/10/2018    PO2ART 96 1 09/10/2018    WTE0HKS 23 6 09/10/2018    BEART -1 7 09/10/2018    SOURCE Line, Arterial 09/10/2018         Intake/Output Summary (Last 24 hours) at 09/13/18 1322  Last data filed at 09/13/18 1100   Gross per 24 hour   Intake              920 ml   Output             1815 ml   Net             -895 ml       Invasive Devices     Peripheral Intravenous Line            Peripheral IV 09/09/18 Left Wrist 4 days    Peripheral IV 09/10/18 Left Antecubital 2 days          Drain            Closed/Suction Drain Midline Back 3 days                Nutrition/GI Proph/Bowel Reg: regular    Physical Exam:   GENERAL APPEARANCE: comfortable  HEENT: EOM's intact  CV: RRR, no complaint of chest pain  LUNGS: CTA bilaterally, no shortness of breath  ABD: soft and non-tender  EXT: moves all four extremities  NEURO: intact, one drain intact  SKIN: warm and dry, [place] vista collar is on      Lab Results: Results for Nanette Gomez (MRN [id number]) as of 9/13/2018 13:18   Ref   Range 9/13/2018 05:08   eGFR Latest Units: ml/min/1 73sq m 115   Sodium Latest Ref Range: 136 - 145 mmol/L 135 (L)   Potassium Latest Ref Range: 3 5 - 5 3 mmol/L 3 5   Chloride Latest Ref Range: 100 - 108 mmol/L 98 (L)   CO2 Latest Ref Range: 21 - 32 mmol/L 31   Anion Gap Latest Ref Range: 4 - 13 mmol/L 6   BUN Latest Ref Range: 5 - 25 mg/dL 5   Creatinine Latest Ref Range: 0 60 - 1 30 mg/dL 0 50 (L)   Glucose Latest Ref Range: 65 - 140 mg/dL 151 (H)   Calcium Latest Ref Range: 8 3 - 10 1 mg/dL 8 7   WBC Latest Ref Range: 4 31 - 10 16 Thousand/uL 7 47   RBC Latest Ref Range: 3 88 - 5 62 Million/uL 3 90   Hemoglobin Latest Ref Range: 12 0 - 17 0 g/dL 10 5 (L)   HCT Latest Ref Range: 36 5 - 49 3 % 32 4 (L)   MCV Latest Ref Range: 82 - 98 fL 83   MCH Latest Ref Range: 26 8 - 34 3 pg 26 9   MCHC Latest Ref Range: 31 4 - 37 4 g/dL 32 4   RDW Latest Ref Range: 11 6 - 15 1 % 17 3 (H)   Platelets Latest Ref Range: 149 - 390 Thousands/uL 186   MPV Latest Ref Range: 8 9 - 12 7 fL 10 3     Imaging/EKG Studies: none  Other Studies: none  VTE Prophylaxis: SCD's, sq heparin    Nurse / Provider rounds completed with staff nurse, Seymour Edge and patient

## 2018-09-13 NOTE — PLAN OF CARE
Problem: PHYSICAL THERAPY ADULT  Goal: Performs mobility at highest level of function for planned discharge setting  See evaluation for individualized goals  Treatment/Interventions: Elevations, Endurance training, Gait training, Spoke to nursing, OT, Functional transfer training  Equipment Recommended: Other (Comment) (PATIENT HAS RW, CANE)       See flowsheet documentation for full assessment, interventions and recommendations  Outcome: Progressing  Prognosis: Good  Problem List: Decreased endurance, Decreased mobility, Impaired balance, Pain  Assessment: The pt  has improving balance today, but he continues to fatigue easily  He also notes increased pain with ambulation  He had no overt loss of balance, but he was unable to perform any dynamic trunk activities during ambulation  He was able to manuever around obstacles without difficulty as well  He does continue to require instruction for proper posture and transfer techniques, but he did demonstrate carry-over from earlier in the session  He remains limited from his baseline independence, and he will benefit from continued physical therapy in order to safely progress his functional mobility  Barriers to Discharge: Inaccessible home environment, Decreased caregiver support     Recommendation: Post acute IP rehab     PT - OK to Discharge: (S) Yes (TO STR WHEN MED JAY JAY)    See flowsheet documentation for full assessment

## 2018-09-13 NOTE — CONSULTS
PHYSICAL MEDICINE AND REHABILITATION CONSULT NOTE  Andria Paget 58 y o  male MRN: [id number]  Unit/Bed#: [facility] 932-01 Encounter: [id number]    Requested by:   MYRON Nielsen  Reason for Consultation:  Rehabilitation medicine evaluation and assistance with appropriate disposition  Primary Rehabilitation Diagnosis:   Orthopedic Disorders:  08 9  Other Orthopedic Cervical vertebral fractures s/p decompression and fusion    Etiologic Diagnosis:  Cervical vertebral fractures s/p decompression and fusion      Assessment and Recommendations:  Andria Paget is a 58 y o  male with PMH of alcohol dependence, falls with head trauma, stroke 4/2018, CAD/MI s/p cardiac stenting, HTN, COPD, DM, former nicotine dependence, KLARISSA only partially compliant with CPAP at times, peripheral neuropathy, possible bipolar depression who apparently fell down 15 steps while heavily intoxicated without LOC or clear head trauma on 9/9/18 who presented with GCS 14 AAOx2 with near full strength and sensation throughout who was found to have occipital condyle fracture, complex C2 fracture (type 2 dens fracture with 4mm post displacement), closed displaced fractures of cervical 2-3 vertebrae, multiple cervical spinous process fractures and disruption of facet capsules rupture of anterior longitudinal ligamentwith significant associated retropharyngeal edema who underwent posterior cervical 3-6 decompression laminectomy and posterior cervical mass and pedicle fixation/fusion C1-T1 by Dr Antonino Lyon on 9/10/18  MRI of cervical spine did not reveal acute spinal cord signal changes  Course also notable for finding of finding of sacral stage 2 pressure ulcer which apparently was present on admission as well as multiple abrasions and ecchymoses as well as significant pain, acute blood loss anemia, CIWA protocol with scheduled valium but not requiring additional benzos, respiratory failure s/p extubation on 9/11, and dysphagia  Patient presents s/p cervical decompression and fusion with cervical collar in place with moderate neck, R trap, shoulder and R upper arm pain without significant acute neurologic deficits but still with chronic peripheral neuropathy as well as significant decline in ADLs, ambulation, and swallowing  Patient appears to be an excellent acute rehab candidate  Recommend ARC pending insurance authorization and bed availability  # Disposition: ARC   RATIONALE:   I have reviewed this patient's medical and functional history, hospital course, skilled therapy notes, and have evaluated the patient in person  In my professional judgment and rehabilitation experience, this patient WILL MEET the medical necessity criteria for an inpatient ARU stay:  A  Condition requires 24-hour supervision by a physician specialized in rehabilitation and 24-hour rehabilitation nursing  Celestino Fernández an interdisciplinary team approach for intensive rehabilitation which will require skilled therapy in at least 2 domains one of which being PT  C  Expected to adequately participate in an intensive therapy program (~3 hours a day for 5 days a week)  D  Expectation that patient will make significant practical improvement in a typical ARU timeframe for that specific diagnosis and current functional status  E  Realistic goal that the patient will reach a functional level to allow the individual to safely return home (alone, with family or friends, or with hired caregivers) or other community setting (such as an assisted living facility or [facility]) rather than to an institution  # Rehabilitation:     Continue PT and OT to improve functional mobility, transfers, upper and lower body strengthening, conditioning, balance, and gait training with appropriate assistive device  Continue ST for dysphagia and to evaluate and if appropriate treat deficits in cognition      # Cardiopulmonary:    Vitals: Acceptable  CAD/MI s/p stenting as well as stroke history 2018 - resume antiplatelet as soon as possible at discretion of neurosurgery, consider statin (uncertain why he is not on this given recent CVA)  Recommend optimal BP/DM mgmt per primary service  Hx of KLARISSA with patient only partially compliant with CPAP > consider CPAP at discretion of primary team  Encourage deep breathing, incentive spirometry    # Pain/Psych/Sleep:  Pain: multifactorial: R neck/trap, into R upper arm; suspect mild R cervical 5 radicular component too  Reasonable to continue Valium for now for muscle spasms, withdrawal prevention/anxiety  Recommend gabapentin 100mg TID for neuropathic and and adjuvant pain control; also can be helpful with alcohol abuse hx (plan would be to wean from Valium and onto hasmukh alone in ARF initially)  Continue oxycodone PRN   D/C IV pain meds soon  Continue melatonin QHS for sleep  Consider CPAP if he can tolerate as outlined above with hx of KLARISSA  Counseled on and continue to encourage deep breathing/relaxation/behavioral pain management techniques:     Deep breathin seconds in, 5 seconds, 5 times per hour when awake and PRN when in pain or anticipate pain; avoid holding breath and tightening muscles and instead breath slowly and deeply    # Spine/Ortho:  Cervical collar  Drain per NS   Monitor wound site  Cervical spine precautions  Follow-up NS PRN and after d/c     # Other medical:  Management per primary team    # Bowel function:  intact  Ensure adequate function prevent constipation, monitor for obstruction  Bowel regimen per primary team     # Bladder function: intact  monitor for retention, incontinence, UTI     # Skin:   - Wound care (if applicable per primary team)  - Nursing to turn patient Q2H, monitor for skin breakdown, rashes, and wounds if applicable    # VTE prohylaxis:   - Per primary team     # Diet/Hydration:  D3, thins; continue SLP   Ensure adequate nutrition and hydration  Monitor BP/HR     If necessary monitor with strict intake  Monitor BUN/Cr and lytes intermittently     # Other relevant medical problems:  Per primary and specialist services     Code Status (Per EMR): Level 1: Full Code    Prior Level of Function:  Independent with ADLs, ambulation, and cognition  Current Level of Function:  Transfers and ambulation 50 ft min assist, most ADLs estimated min-mod assist     Potential Barriers to Discharge:  Co-morbidities (see above), new functional deficits, deconditioning, pain, alcohol dependence history, cervical collar     ==================================================================    Chief Complaint:  Neck pain     History of Present Illness:   Genaro Conn is a 58 y o  male with PMH of alcohol dependence, falls with head trauma, stroke 4/2018, CAD/MI s/p cardiac stenting, HTN, COPD, DM, former nicotine dependence, KLARISSA only partially compliant with CPAP at times, peripheral neuropathy, possible bipolar depression who apparently fell down 15 steps while heavily intoxicated without LOC or clear head trauma on 9/9/18 who presented with GCS 14 AAOx2 with near full strength and sensation throughout who was found to have occipital condyle fracture, complex C2 fracture (type 2 dens fracture with 4mm post displacement), closed displaced fractures of cervical 2-3 vertebrae, multiple cervical spinous process fractures and disruption of facet capsules rupture of anterior longitudinal ligamentwith significant associated retropharyngeal edema who underwent posterior cervical 3-6 decompression laminectomy and posterior cervical mass and pedicle fixation/fusion C1-T1 by Dr Kristy Marion on 9/10/18  MRI of cervical spine did not reveal acute spinal cord signal changes    Course also notable for finding of finding of sacral stage 2 pressure ulcer which apparently was present on admission as well as multiple abrasions and ecchymoses as well as significant pain, acute blood loss anemia, CIWA protocol with scheduled valium but not requiring additional benzos, respiratory failure s/p extubation on 9/11, and dysphagia  On evaluation, patient reports significant R sided neck, trap and shoulder pain with some radiation into upper arm  He reports pain is largely dull and aching  He reports chronic "neuropathy" with pain and decreased sensation in bilateral feet and lower legs which is unchanged recently  He reports generalized weakness without focal changes in strength or sensation and also denies bowel/bladder changes  Patient reports occasional lightheadedness with standing but denies headache, visual changes, vertigo, fever, chills, sweats, abdominal pain, or nausea or calf pain  He reports occasional dry cough with some SOB with exertion but about at his baseline  He notes improving swallowing  Patient reports losing >50 lbs over last several years with decreased appetite  He reports likely some history of depression  He reports that he is adamant about not drinking any more and wants to be healthy and spend good time with family  He denies significant anxiety at this time  Patient reports chronic difficulty sleeping with sleep apnea but states he typically does not use CPAP at night but will use it when reading at times  Review of Systems:     Complete review of systems obtained  Please see HPI for details with other significant symptoms or history listed here: Otherwise, 14 point review of systems completed and was otherwise unremarkable      Allergies   Allergen Reactions    Amoxicillin     Augmentin [Amoxicillin-Pot Clavulanate]        Current Facility-Administered Medications:     acetaminophen (TYLENOL) tablet 975 mg, 975 mg, Oral, Q8H [facility], Shane Moran MD, 975 mg at 09/13/18 0534    albuterol (PROVENTIL HFA,VENTOLIN HFA) inhaler 2 puff, 2 puff, Inhalation, Q4H PRN, Stephan Pedro MD    bisacodyl (DULCOLAX) rectal suppository 10 mg, 10 mg, Rectal, Daily PRN, Dalila Naval Mónica Lowry MD    diazepam (VALIUM) tablet 5 mg, 5 mg, Oral, Q8H, Babs Frausto PA-C, 5 mg at 42/17/63 9224    folic acid (FOLVITE) tablet 1 mg, 1 mg, Oral, Daily, Rohini Martinez PA-C, 1 mg at 09/13/18 8018    heparin (porcine) subcutaneous injection 5,000 Units, 5,000 Units, Subcutaneous, Q8H [facility], 5,000 Units at 09/13/18 0534 **AND** Platelet count, , , Once, Renetta Mosqueda MD    HYDROmorphone (DILAUDID) injection 0 5 mg, 0 5 mg, Intravenous, Q4H PRN, Cyrus Isabel MD    insulin lispro (HumaLOG) 100 units/mL subcutaneous injection 1-6 Units, 1-6 Units, Subcutaneous, Q6H [facility], 5 Units at 09/13/18 1312 **AND** Fingerstick Glucose (POCT), , , Q6H, Rohini Martinez PA-C    ipratropium (ATROVENT) 0 02 % inhalation solution 0 5 mg, 0 5 mg, Nebulization, TID, Erasmo Heath MD, 0 5 mg at 09/13/18 1342    levalbuterol (Sharlette Hazy) inhalation solution 1 25 mg, 1 25 mg, Nebulization, TID, Erasmo Heath MD, 1 25 mg at 09/13/18 1342    lidocaine (LIDODERM) 5 % patch 1 patch, 1 patch, Transdermal, Daily, lEoina Morton MD, 1 patch at 09/13/18 0925    magnesium hydroxide (MILK OF MAGNESIA) 400 mg/5 mL oral suspension 30 mL, 30 mL, Oral, Daily PRN, Rohini Martinez PA-C    melatonin tablet 3 mg, 3 mg, Oral, HS PRN, Donny Samuels MD, 3 mg at 09/13/18 0025    metoprolol (LOPRESSOR) injection 5 mg, 5 mg, Intravenous, Q6H PRN, Donny Samuels MD    metoprolol tartrate (LOPRESSOR) tablet 50 mg, 50 mg, Oral, Q12H [facility], Cyrus Isabel MD, 50 mg at 09/13/18 0925    ondansetron (ZOFRAN) injection 4 mg, 4 mg, Intravenous, Q6H PRN, Renetta Mosqueda MD    oxyCODONE (ROXICODONE) immediate release tablet 10 mg, 10 mg, Oral, Q4H PRN, Cyrus Isabel MD, 10 mg at 09/13/18 9811    oxyCODONE (ROXICODONE) IR tablet 5 mg, 5 mg, Oral, Q4H PRN, Cyrus Isabel MD, 5 mg at 09/12/18 1951    senna (SENOKOT) tablet 17 2 mg, 2 tablet, Oral, HS, Rohini Martinez PA-C, 17 2 mg at 09/11/18 6893    thiamine (VITAMIN B1) tablet 100 mg, 100 mg, Oral, Daily, Agatha Riley PA-C, 100 mg at 09/13/18 6476    Past Medical History:   Diagnosis Date    [facility]     [facility]cerebral vascular accident) [facility]     Diabetes mellitus [facility]     [facility]     Hypertension        Past Surgical History:   Procedure Laterality Date    CERVICAL FUSION N/A 9/10/2018    Procedure: Posterior cervical decompressive laminectomy C3-6; Posterior cervical lateral mass and pedicle fixation fusion C1-T1;  Surgeon: Tonya Erickson MD;  Location: [facility];  Service: Neurosurgery      History reviewed  No pertinent family history  Social History obtained:  Patient lives in basement/lower level area of multi-story home with children and grandchildren  He typically walks a flight of stairs down to his basement living area which has bathroom but could enter from outside which would not have stairs  He apparently will have family available at time of discharge to assist if needed  Patient is a chronic heavy alcohol user  He quit several times in his life and attended several IP alcohol programs  Patient was sober for 4 years until about 1 year ago when he restarted heavy drinking  He then stopped for about a week 2-3 weeks ago and then restarted  He smoked cigarettes heavily in past and then stopped from 4-5 years until about 1 year ago and then restarted but stopped again several weeks ago  He denies history of cannabis or illicit drug use        Social History available currently in EMR:  Social History     Social History    Marital status:      Spouse name: N/A    Number of children: N/A    Years of education: N/A     Social History Main Topics    Smoking status: Former Smoker    Smokeless tobacco: Never Used    Alcohol use Yes    Drug use: No    Sexual activity: Not Asked     Other Topics Concern    None     Social History Narrative    None       Physical Examination:   Temp:  [97 8 °F (36 6 °C)-98 8 °F (37 1 °C)] 97 9 °F (36 6 °C)  HR:  [] 100  Resp:  [12-18] 18  BP: (132-176)/(67-95) 157/89  FiO2 (%):  [40] 40  General: Awake, initially tired but then alert in NAD  HENT: MMM  Neck: C collar in place, Supple, no lymphadenopathy  Respiratory: Unlabored breathing, breath sounds equal, Lungs CTA, no wheezes, rales, or rhonchi  Cardiovascular: Regular rate and rhythm, no murmurs, rubs, or gallops  Gastrointestinal: Soft, non-tender, non-distended, normoactive bowel sounds  Genitourinary: No antony  SkiN/MSK/Extremities:    Multitude of skin abrasions and various areas of ecchymosis   Distal extremities appropriately warm, normal cap refill distally, no cyanosis or calf edema, no calf tenderness to palpation  Neurologic/Psych:   MENTAL STATUS: awake, oriented to person, place, time, and situation  Affect: Euthymic   CN2-12: able to open and close eyes to command, PERRLA, EOMI, face appears symmetric without obvious facial drooping or ptosis bilaterally, facial sensation likely intact to light touch, able to open mouth and extend tongue to command, tongue protudes midline, shrug both shoulders to command with strength appearing full or near full bilaterally  Strength/MMT:  Near 5/5 throughout   Sensation: decreased to light touch in bilateral feet and distally to above mid calf  DTR/Tone/Upper motor neuron signs: hyporeflexive distal extremities, normoreflexive to hyperreflexive proximally   Finley negative bilaterally   PROM of major joints: full, smooth, without increased tone, or obvious patient distress during movement      Data:  Lab Results   Component Value Date    HGB 10 5 (L) 09/13/2018    HCT 32 4 (L) 09/13/2018    WBC 7 47 09/13/2018     (L) 09/13/2018    K 3 5 09/13/2018    CL 98 (L) 09/13/2018    GLUCOSE 217 (H) 09/10/2018    CREATININE 0 50 (L) 09/13/2018    BUN 5 09/13/2018         Xr Chest Portable    Result Date: 9/11/2018  Impression: ET tube tip is approximately 4 cm above the calixto  Workstation performed: [id number]     Xr Spine Cervical 2 Or 3 Vw Injury    Result Date: 9/13/2018  Impression: Fluoroscopic guidance provided for intraoperative localization during cervical fusion procedure as above  Please refer to the separate procedure notes for additional details  Workstation performed: [id number]     Xr Spine Cervical 2 Or 3 Vw Injury    Result Date: 9/12/2018  Impression: Status post decompressive laminectomy and cervical fusion C1-T1, postoperative day #1  Hardware intact  Stable fractures  Workstation performed: [id number]     Ct Head Without Contrast    Result Date: 9/9/2018  Impression: No acute intracranial abnormality  Mild chronic small vessel ischemic changes and volume loss  Workstation performed: [id number]     Cta Neck W Wo Contrast    Result Date: 9/9/2018  Impression: No evidence of vertebral artery injury  No arterial dissection  No hemodynamically significant stenosis within either common or internal carotid artery  Less than 50% stenosis by NASCET criteria  Dominant right vertebral artery with a small calibered left vertebral artery  Acute fractures of C2, C3, C4 and C5 as described above  Workstation performed: [id number]     Ct Spine Cervical Without Contrast    Result Date: 9/9/2018  Impression: Acute fractures of C2, C3, C4 and C5 as described above  Acute nondisplaced left occipital condyle fracture  An MRI of the cervical spine could be performed for further evaluation  Moderate prevertebral soft tissue swelling, likely posttraumatic in nature  I personally discussed this study with Enrique Vargas on 9/9/2018 at 2:50 AM   Workstation performed: [id number]     Mri Cervical Spine Wo Contrast    Result Date: 9/9/2018  Impression: Exaggerated cervical lordosis  Fractures of the dens, C3, C4 and C5 vertebral bodies demonstrate similar alignment compared to recent CT scan    The dens is slightly displaced posteriorly in relation to the body of C2, approximately 4 mm  Extensive prevertebral edema extending from the level of the clivus inferiorly to the level of C5  There is edema within the interspinous ligaments and paraspinal musculature posteriorly  There is disruption of the anterior longitudinal ligament  However, the posterior longitudinal ligament and ligamentum flavum appear intact  Severe canal stenosis at the C3-4, C4-5 levels  Moderate canal stenosis at C5-6 and C6-7  This is related to annular bulging, endplate and posterior element hypertrophic change  Multilevel foraminal narrowing, severe at C6-7 and moderate at the C3-4, C4-5 and C5-6 levels  Evaluation of the cord is somewhat limited due to patient motion and the severity of the canal stenosis  However, there does not appear to be cord edema to suggest acute cord injury  Workstation performed: [id number]     Xr Chest 1 View    Result Date: 9/11/2018  Impression: No active pulmonary disease  Workstation performed: [id number]     Ct Chest Abdomen Pelvis W Contrast    Result Date: 9/9/2018  Impression: No evidence of solid organ injury  No acute intra-abdominal abnormality  No free air or free fluid  No pneumothorax  Multiple old appearing bilateral rib fractures  17 mm peripherally calcified right renal cyst   A nonemergent renal ultrasound is recommended for further characterization  Workstation performed: [id number]     Xr Trauma Multiple    Result Date: 9/9/2018  Impression: No active pulmonary disease  Workstation performed: [id number]       Pertinent labs and imaging reviewed  Thank you for allowing the PM&R service to participate in the care of this patient  We will continue to follow Siddharth Escoto progress with you  Please do not hesitate to call with questions or concerns      Nikolai Hernandes MD, [address] [place] [address] Network  Physical Medicine and Rehabilitation

## 2018-09-13 NOTE — PLAN OF CARE
Problem: OCCUPATIONAL THERAPY ADULT  Goal: Performs self-care activities at highest level of function for planned discharge setting  See evaluation for individualized goals  Treatment Interventions: ADL retraining, Functional transfer training, Endurance training, Cognitive reorientation, Patient/family training, Equipment evaluation/education, Compensatory technique education, Continued evaluation, Energy conservation, Activityengagement          See flowsheet documentation for full assessment, interventions and recommendations  Outcome: Progressing  Limitation: Decreased ADL status, Decreased Safe judgement during ADL, Decreased cognition, Decreased endurance, Decreased self-care trans, Decreased high-level ADLs  Prognosis: Fair  Assessment: pt  seen for AM OT session focusing on ADLs of bathing, dressing, grooming and transfers  pt  requires supervision for grooming and UB bathing  Min Asst for UB dressing and LB bathing/dressing  pt  needed asst to wash feet and don socks  pt  requires Min Asst for transfers  pt  noted to be SOB during session while performing tasks  pt  OOB in chair at end of session with all needs in reach  pt  will continue to benefit from skilled OT services to maximize independence        OT Discharge Recommendation: Short Term Rehab (pending progress)  OT - OK to Discharge: Yes (when medically stable)  Gatito Garibay

## 2018-09-13 NOTE — ASSESSMENT & PLAN NOTE
No results found for: HGBA1C    Recent Labs      09/12/18   1810  09/13/18   0013  09/13/18   0540  09/13/18   1129   POCGLU  178*  247*  160*  346*       Blood Sugar Average: Last 72 hrs:  (P) 882 [id number]

## 2018-09-13 NOTE — PHYSICAL THERAPY NOTE
Physical Therapy Progress Note     09/13/18 0900   Pain Assessment   Pain Assessment 0-10   Pain Score 9   Pain Type Surgical pain   Pain Location Neck   Pain Orientation Posterior;Bilateral   Hospital Pain Intervention(s) Repositioned; Ambulation/increased activity; Emotional support   Response to Interventions Tolerated  Restrictions/Precautions   Braces or Orthoses C/S Collar   Other Precautions Pain; Fall Risk;Spinal precautions; Bed Alarm; Chair Alarm   Subjective   Subjective The pt  notes that he continues to have pain, and that he is tired  He was agreeable to ambulate with therapy for a short distance  Bed Mobility   Supine to Sit 5  Supervision   Transfers   Sit to Stand 4  Minimal assistance   Additional items Assist x 2;Verbal cues; Increased time required   Stand to Sit 5  Supervision   Additional items Increased time required   Ambulation/Elevation   Gait pattern Excessively slow; Step to; Inconsistent sally;Decreased foot clearance; Forward Flexion   Gait Assistance 4  Minimal assist   Additional items Assist x 1;Verbal cues   Assistive Device Rolling walker   Distance 50 feet  Balance   Static Sitting Good   Dynamic Sitting Fair +   Static Standing Fair   Ambulatory Poor +   Activity Tolerance   Activity Tolerance Patient tolerated treatment well;Patient limited by fatigue;Patient limited by pain   Nurse [address], RN  Exercises   Hip Flexion Supine;Bilateral;AROM;5 reps   Knee AROM Long Arc Quad Sitting;Bilateral;AROM;5 reps   Assessment   Prognosis Good   Problem List Decreased endurance;Decreased mobility; Impaired balance;Pain   Assessment The pt  has improving balance today, but he continues to fatigue easily  He also notes increased pain with ambulation  He had no overt loss of balance, but he was unable to perform any dynamic trunk activities during ambulation  He was able to manuever around obstacles without difficulty as well   He does continue to require instruction for proper posture and transfer techniques, but he did demonstrate carry-over from earlier in the session  He remains limited from his baseline independence, and he will benefit from continued physical therapy in order to safely progress his functional mobility  Barriers to Discharge Inaccessible home environment;Decreased caregiver support   Goals   Patient Goals To feel better, and to have less pain  LTG Expiration Date 09/26/18   Long Term Goal #1 1) PERFORM BED MOBILITY MOD-I TO PARTICIPATE IN FREQUENT REPOSITIONING AND IMPROVE SKIN INTEGRITY; 2) PERFORM SIT<-->STAND TRANSFER MOD-I TO PROMOTE I WITH TOILETING AND OOB MOBILITY; 3) AMBULATE 200 FEET MOD-I W/ LEAST RESTRICTIVE DEVICE TO PARTICIPATE IN HOUSEHOLD AND COMMUNITY LEVEL AMBULATION; 4) IMPROVE BALANCE 1 GRADE TO IMPROVE SAFETY DURING AMBULATION, REDUCE RISK OF FALLS; 5) NAVIGATE 12 STEPS S LEVEL IN ORDER TO SAFELY NAVIGATE MULTIPLE FLOORS AT HOME  by Dennis Leiva PT at 09/12/18 0140   Treatment Day 1   Plan   Treatment/Interventions Functional transfer training;LE strengthening/ROM; Elevations; Therapeutic exercise; Endurance training;Patient/family training;Gait training;Bed mobility   Progress Progressing toward goals   PT Frequency (3-5x a week )   Recommendation   Recommendation Post acute IP rehab     Lary Jaime, PTA

## 2018-09-14 ENCOUNTER — HOSPITAL ENCOUNTER (INPATIENT)
Facility: HOSPITAL | Age: 63
LOS: 10 days | Discharge: HOME | DRG: 560 | End: 2018-09-24
Attending: PHYSICAL MEDICINE & REHABILITATION | Admitting: PHYSICAL MEDICINE & REHABILITATION
Payer: COMMERCIAL

## 2018-09-14 VITALS
RESPIRATION RATE: 18 BRPM | DIASTOLIC BLOOD PRESSURE: 80 MMHG | OXYGEN SATURATION: 95 % | WEIGHT: 193.56 LBS | BODY MASS INDEX: 25.65 KG/M2 | HEIGHT: 73 IN | HEART RATE: 76 BPM | TEMPERATURE: 97.7 F | SYSTOLIC BLOOD PRESSURE: 140 MMHG

## 2018-09-14 DIAGNOSIS — F10.10 ALCOHOL ABUSE: ICD-10-CM

## 2018-09-14 DIAGNOSIS — S12.100D CLOSED ODONTOID FRACTURE WITH ROUTINE HEALING: ICD-10-CM

## 2018-09-14 DIAGNOSIS — E11.9 TYPE 2 DIABETES MELLITUS WITHOUT COMPLICATION, WITHOUT LONG-TERM CURRENT USE OF INSULIN (HCC): Primary | ICD-10-CM

## 2018-09-14 DIAGNOSIS — I25.10 CAD (CORONARY ARTERY DISEASE): ICD-10-CM

## 2018-09-14 PROBLEM — Z91.89 AT MODERATE RISK FOR VENOUS THROMBOEMBOLISM (VTE): Status: ACTIVE | Noted: 2018-09-14

## 2018-09-14 PROBLEM — R13.12 OROPHARYNGEAL DYSPHAGIA: Status: ACTIVE | Noted: 2018-09-14

## 2018-09-14 LAB
GLUCOSE SERPL-MCNC: 156 MG/DL (ref 65–140)
GLUCOSE SERPL-MCNC: 265 MG/DL (ref 65–140)
GLUCOSE SERPL-MCNC: 283 MG/DL (ref 65–140)
GLUCOSE SERPL-MCNC: 305 MG/DL (ref 65–140)

## 2018-09-14 PROCEDURE — 82948 REAGENT STRIP/BLOOD GLUCOSE: CPT

## 2018-09-14 PROCEDURE — 94640 AIRWAY INHALATION TREATMENT: CPT

## 2018-09-14 PROCEDURE — 99255 IP/OBS CONSLTJ NEW/EST HI 80: CPT | Performed by: PHYSICAL MEDICINE & REHABILITATION

## 2018-09-14 PROCEDURE — 94760 N-INVAS EAR/PLS OXIMETRY 1: CPT

## 2018-09-14 PROCEDURE — 99024 POSTOP FOLLOW-UP VISIT: CPT | Performed by: PHYSICIAN ASSISTANT

## 2018-09-14 PROCEDURE — 99238 HOSP IP/OBS DSCHRG MGMT 30/<: CPT | Performed by: NURSE PRACTITIONER

## 2018-09-14 RX ORDER — THIAMINE MONONITRATE (VIT B1) 100 MG
100 TABLET ORAL DAILY
Status: DISCONTINUED | OUTPATIENT
Start: 2018-09-15 | End: 2018-09-24 | Stop reason: HOSPADM

## 2018-09-14 RX ORDER — LANOLIN ALCOHOL/MO/W.PET/CERES
3 CREAM (GRAM) TOPICAL
Status: CANCELLED | OUTPATIENT
Start: 2018-09-14

## 2018-09-14 RX ORDER — SENNOSIDES 8.6 MG
2 TABLET ORAL
Status: CANCELLED | OUTPATIENT
Start: 2018-09-14

## 2018-09-14 RX ORDER — ALBUTEROL SULFATE 90 UG/1
2 AEROSOL, METERED RESPIRATORY (INHALATION) EVERY 4 HOURS PRN
Status: DISCONTINUED | OUTPATIENT
Start: 2018-09-14 | End: 2018-09-24 | Stop reason: HOSPADM

## 2018-09-14 RX ORDER — LEVALBUTEROL 1.25 MG/.5ML
1.25 SOLUTION, CONCENTRATE RESPIRATORY (INHALATION)
Status: DISCONTINUED | OUTPATIENT
Start: 2018-09-14 | End: 2018-09-17

## 2018-09-14 RX ORDER — LEVALBUTEROL 1.25 MG/.5ML
SOLUTION, CONCENTRATE RESPIRATORY (INHALATION)
Status: COMPLETED
Start: 2018-09-14 | End: 2018-09-14

## 2018-09-14 RX ORDER — BISACODYL 10 MG
10 SUPPOSITORY, RECTAL RECTAL DAILY PRN
Status: CANCELLED | OUTPATIENT
Start: 2018-09-14

## 2018-09-14 RX ORDER — OXYCODONE HYDROCHLORIDE 10 MG/1
10 TABLET ORAL EVERY 4 HOURS PRN
Status: DISCONTINUED | OUTPATIENT
Start: 2018-09-14 | End: 2018-09-24

## 2018-09-14 RX ORDER — GABAPENTIN 100 MG/1
100 CAPSULE ORAL 3 TIMES DAILY
Status: CANCELLED | OUTPATIENT
Start: 2018-09-14

## 2018-09-14 RX ORDER — ALBUTEROL SULFATE 90 UG/1
2 AEROSOL, METERED RESPIRATORY (INHALATION) EVERY 4 HOURS PRN
Status: CANCELLED | OUTPATIENT
Start: 2018-09-14

## 2018-09-14 RX ORDER — LANOLIN ALCOHOL/MO/W.PET/CERES
3 CREAM (GRAM) TOPICAL
Status: DISCONTINUED | OUTPATIENT
Start: 2018-09-14 | End: 2018-09-24 | Stop reason: HOSPADM

## 2018-09-14 RX ORDER — HEPARIN SODIUM 5000 [USP'U]/ML
5000 INJECTION, SOLUTION INTRAVENOUS; SUBCUTANEOUS EVERY 8 HOURS SCHEDULED
Status: CANCELLED | OUTPATIENT
Start: 2018-09-14

## 2018-09-14 RX ORDER — DIAZEPAM 5 MG/1
5 TABLET ORAL DAILY
Status: COMPLETED | OUTPATIENT
Start: 2018-09-18 | End: 2018-09-19

## 2018-09-14 RX ORDER — ACETAMINOPHEN 325 MG/1
975 TABLET ORAL EVERY 8 HOURS SCHEDULED
Status: DISCONTINUED | OUTPATIENT
Start: 2018-09-14 | End: 2018-09-20

## 2018-09-14 RX ORDER — OXYCODONE HYDROCHLORIDE 5 MG/1
5 TABLET ORAL EVERY 4 HOURS PRN
Status: CANCELLED | OUTPATIENT
Start: 2018-09-14

## 2018-09-14 RX ORDER — DIAZEPAM 5 MG/1
5 TABLET ORAL EVERY 8 HOURS
Status: CANCELLED | OUTPATIENT
Start: 2018-09-14

## 2018-09-14 RX ORDER — METOPROLOL TARTRATE 50 MG/1
50 TABLET, FILM COATED ORAL EVERY 12 HOURS SCHEDULED
Status: CANCELLED | OUTPATIENT
Start: 2018-09-14

## 2018-09-14 RX ORDER — OXYCODONE HYDROCHLORIDE 5 MG/1
5 TABLET ORAL EVERY 4 HOURS PRN
Status: DISCONTINUED | OUTPATIENT
Start: 2018-09-14 | End: 2018-09-24 | Stop reason: HOSPADM

## 2018-09-14 RX ORDER — DIAZEPAM 5 MG/1
5 TABLET ORAL EVERY 12 HOURS
Status: COMPLETED | OUTPATIENT
Start: 2018-09-16 | End: 2018-09-17

## 2018-09-14 RX ORDER — LIDOCAINE 50 MG/G
1 PATCH TOPICAL DAILY
Status: DISCONTINUED | OUTPATIENT
Start: 2018-09-15 | End: 2018-09-20

## 2018-09-14 RX ORDER — ATORVASTATIN CALCIUM 80 MG/1
80 TABLET, FILM COATED ORAL
Status: DISCONTINUED | OUTPATIENT
Start: 2018-09-14 | End: 2018-09-24 | Stop reason: HOSPADM

## 2018-09-14 RX ORDER — ACETAMINOPHEN 325 MG/1
975 TABLET ORAL EVERY 8 HOURS SCHEDULED
Status: CANCELLED | OUTPATIENT
Start: 2018-09-14

## 2018-09-14 RX ORDER — FOLIC ACID 1 MG/1
1 TABLET ORAL DAILY
Status: DISCONTINUED | OUTPATIENT
Start: 2018-09-15 | End: 2018-09-24 | Stop reason: HOSPADM

## 2018-09-14 RX ORDER — SENNOSIDES 8.6 MG
2 TABLET ORAL
Status: DISCONTINUED | OUTPATIENT
Start: 2018-09-14 | End: 2018-09-24 | Stop reason: HOSPADM

## 2018-09-14 RX ORDER — FLUTICASONE FUROATE AND VILANTEROL 100; 25 UG/1; UG/1
1 POWDER RESPIRATORY (INHALATION)
Status: DISCONTINUED | OUTPATIENT
Start: 2018-09-15 | End: 2018-09-24 | Stop reason: HOSPADM

## 2018-09-14 RX ORDER — FOLIC ACID 1 MG/1
1 TABLET ORAL DAILY
Status: CANCELLED | OUTPATIENT
Start: 2018-09-15

## 2018-09-14 RX ORDER — HEPARIN SODIUM 5000 [USP'U]/ML
5000 INJECTION, SOLUTION INTRAVENOUS; SUBCUTANEOUS EVERY 8 HOURS SCHEDULED
Status: DISCONTINUED | OUTPATIENT
Start: 2018-09-14 | End: 2018-09-24 | Stop reason: HOSPADM

## 2018-09-14 RX ORDER — OXYCODONE HYDROCHLORIDE 10 MG/1
10 TABLET ORAL EVERY 4 HOURS PRN
Status: CANCELLED | OUTPATIENT
Start: 2018-09-14

## 2018-09-14 RX ORDER — METOPROLOL TARTRATE 50 MG/1
50 TABLET, FILM COATED ORAL EVERY 12 HOURS SCHEDULED
Status: DISCONTINUED | OUTPATIENT
Start: 2018-09-14 | End: 2018-09-24 | Stop reason: HOSPADM

## 2018-09-14 RX ORDER — GABAPENTIN 100 MG/1
100 CAPSULE ORAL 3 TIMES DAILY
Status: DISCONTINUED | OUTPATIENT
Start: 2018-09-14 | End: 2018-09-14 | Stop reason: HOSPADM

## 2018-09-14 RX ORDER — DIAZEPAM 5 MG/1
5 TABLET ORAL EVERY 8 HOURS
Status: COMPLETED | OUTPATIENT
Start: 2018-09-14 | End: 2018-09-15

## 2018-09-14 RX ORDER — LEVALBUTEROL 1.25 MG/.5ML
1.25 SOLUTION, CONCENTRATE RESPIRATORY (INHALATION)
Status: CANCELLED | OUTPATIENT
Start: 2018-09-14

## 2018-09-14 RX ORDER — BISACODYL 10 MG
10 SUPPOSITORY, RECTAL RECTAL DAILY PRN
Status: DISCONTINUED | OUTPATIENT
Start: 2018-09-14 | End: 2018-09-24 | Stop reason: HOSPADM

## 2018-09-14 RX ORDER — THIAMINE MONONITRATE (VIT B1) 100 MG
100 TABLET ORAL DAILY
Status: CANCELLED | OUTPATIENT
Start: 2018-09-15

## 2018-09-14 RX ORDER — LIDOCAINE 50 MG/G
1 PATCH TOPICAL DAILY
Status: CANCELLED | OUTPATIENT
Start: 2018-09-15

## 2018-09-14 RX ORDER — GABAPENTIN 100 MG/1
100 CAPSULE ORAL 3 TIMES DAILY
Status: DISCONTINUED | OUTPATIENT
Start: 2018-09-14 | End: 2018-09-24 | Stop reason: HOSPADM

## 2018-09-14 RX ADMIN — OXYCODONE HYDROCHLORIDE 5 MG: 5 TABLET ORAL at 04:37

## 2018-09-14 RX ADMIN — LIDOCAINE 1 PATCH: 50 PATCH TOPICAL at 08:53

## 2018-09-14 RX ADMIN — IPRATROPIUM BROMIDE 0.5 MG: 0.5 SOLUTION RESPIRATORY (INHALATION) at 07:53

## 2018-09-14 RX ADMIN — INSULIN LISPRO 2 UNITS: 100 INJECTION, SOLUTION INTRAVENOUS; SUBCUTANEOUS at 22:06

## 2018-09-14 RX ADMIN — HEPARIN SODIUM 5000 UNITS: 5000 INJECTION, SOLUTION INTRAVENOUS; SUBCUTANEOUS at 06:03

## 2018-09-14 RX ADMIN — IPRATROPIUM BROMIDE 0.5 MG: 0.5 SOLUTION RESPIRATORY (INHALATION) at 16:42

## 2018-09-14 RX ADMIN — OXYCODONE HYDROCHLORIDE 10 MG: 10 TABLET ORAL at 10:58

## 2018-09-14 RX ADMIN — ATORVASTATIN CALCIUM 80 MG: 80 TABLET, FILM COATED ORAL at 17:00

## 2018-09-14 RX ADMIN — ACETAMINOPHEN 975 MG: 325 TABLET ORAL at 22:03

## 2018-09-14 RX ADMIN — DIAZEPAM 5 MG: 5 TABLET ORAL at 08:53

## 2018-09-14 RX ADMIN — ACETAMINOPHEN 975 MG: 325 TABLET ORAL at 04:37

## 2018-09-14 RX ADMIN — IPRATROPIUM BROMIDE 0.5 MG: 0.5 SOLUTION RESPIRATORY (INHALATION) at 19:17

## 2018-09-14 RX ADMIN — DIAZEPAM 5 MG: 5 TABLET ORAL at 17:00

## 2018-09-14 RX ADMIN — METOPROLOL TARTRATE 50 MG: 50 TABLET, FILM COATED ORAL at 21:01

## 2018-09-14 RX ADMIN — GABAPENTIN 100 MG: 100 CAPSULE ORAL at 17:00

## 2018-09-14 RX ADMIN — OXYCODONE HYDROCHLORIDE 5 MG: 5 TABLET ORAL at 20:50

## 2018-09-14 RX ADMIN — LEVALBUTEROL HYDROCHLORIDE 1.25 MG: 1.25 SOLUTION, CONCENTRATE RESPIRATORY (INHALATION) at 07:53

## 2018-09-14 RX ADMIN — LEVALBUTEROL HYDROCHLORIDE 1.25 MG: 1.25 SOLUTION, CONCENTRATE RESPIRATORY (INHALATION) at 16:42

## 2018-09-14 RX ADMIN — INSULIN LISPRO 2 UNITS: 100 INJECTION, SOLUTION INTRAVENOUS; SUBCUTANEOUS at 17:00

## 2018-09-14 RX ADMIN — INSULIN LISPRO 1 UNITS: 100 INJECTION, SOLUTION INTRAVENOUS; SUBCUTANEOUS at 06:06

## 2018-09-14 RX ADMIN — METOPROLOL TARTRATE 50 MG: 50 TABLET ORAL at 08:52

## 2018-09-14 RX ADMIN — SENNOSIDES 17.2 MG: 8.6 TABLET, FILM COATED ORAL at 22:04

## 2018-09-14 RX ADMIN — GABAPENTIN 100 MG: 100 CAPSULE ORAL at 22:00

## 2018-09-14 RX ADMIN — HEPARIN SODIUM 5000 UNITS: 5000 INJECTION, SOLUTION INTRAVENOUS; SUBCUTANEOUS at 22:04

## 2018-09-14 RX ADMIN — THIAMINE HCL TAB 100 MG 100 MG: 100 TAB at 08:52

## 2018-09-14 RX ADMIN — FOLIC ACID 1 MG: 1 TABLET ORAL at 08:52

## 2018-09-14 RX ADMIN — LEVALBUTEROL HYDROCHLORIDE 1.25 MG: 1.25 SOLUTION, CONCENTRATE RESPIRATORY (INHALATION) at 19:17

## 2018-09-14 NOTE — ASSESSMENT & PLAN NOTE
Secondary to decreased mobility after fall and cervical spine fractures s/p C1-T1 fusion  Ambulating sufficiently - no need to continue heparin outpatient

## 2018-09-14 NOTE — ASSESSMENT & PLAN NOTE
Cervical collar at all times  MEGHAN drain removed 9/15  Surgical sutures in place - neurosurgery to remove on 9/24    Nociceptive pain in setting of cervical fractures s/p fusion   - tylenol PRN   - lidocaine patch PRN   - myofascial release with therapies with moderate relief   - add baclofen 5mg QHS for muscle relaxant and also sleep    Patient participated in comprehensive inpatient rehabilitation management with rehab MD, PT, OT, rehab level nursing, and case management, for deconditioning/debility following fall with cervical spine fractures

## 2018-09-14 NOTE — DISCHARGE SUMMARY
Discharge Summary - Александр Shields 58 y o  male MRN: [id number]    Unit/Bed#: [facility] 932-01 Encounter: [id number]    Admission Date:   Admission Orders     Ordered        09/09/18 0237  Inpatient Admission  Once               Admitting Diagnosis: Unspecified multiple injuries, initial encounter [T07  XXXA]  Dens fracture ([facility] [address] ) Donald Rued    HPI: Per Dr Sherice Hodge on admission "Александр Shields is a 58 y o  male who presents following a fall at home down ~15 steps  The patient is heavily intoxicated  There was no LOC  He is on plavix for cardiac stents  He is not complaining of any pain in trauma bay  His Cspine was non-tender to palpation  CT Cspine done showing multiple acute cervical fractures "    Procedures Performed: No orders of the defined types were placed in this encounter  Summary of Hospital Course: Mr Layton Olivier was treated for extensive cervical fractures from C3-C6  He underwent posterior cervical decompression laminectomy of C3-C6 with posterior cervical lateral mass and pedicle fixation/fusion C1-T1 on 9/10/18 by Dr Alexx Mcpherson  He remains in a cervical collar as per their recommendations and has 1 MEGHAN drain which is being weaned off suction today  He has no neuro-deficits and is stable for discharge to [facility] rehab today  Neurosurgery will continue to follow his progress and his MEGHAN drainage at [facility]  His hospitalization was complicated by his alcohol use for which he did experience some symptoms of DT  He was managed with serax therapy which was transitioned to valium and is being weaned down from his valium  He has no further symptoms at the time of discharge  Gabapentin was initiated today for pain control as his valium starts to be weaned  He will follow-up with neurosurgery as an outpatient as well as his PCP  Significant Findings, Care, Treatment and Services Provided:   Xr Chest Portable    Result Date: 9/11/2018  Impression: ET tube tip is approximately 4 cm above the calixto   Workstation performed: [id number]     Xr Spine Cervical 2 Or 3 Vw Injury    Result Date: 9/13/2018  Impression: Fluoroscopic guidance provided for intraoperative localization during cervical fusion procedure as above  Please refer to the separate procedure notes for additional details  Workstation performed: [id number]     Xr Spine Cervical 2 Or 3 Vw Injury    Result Date: 9/12/2018  Impression: Status post decompressive laminectomy and cervical fusion C1-T1, postoperative day #1  Hardware intact  Stable fractures  Workstation performed: [id number]     Ct Head Without Contrast    Result Date: 9/9/2018  Impression: No acute intracranial abnormality  Mild chronic small vessel ischemic changes and volume loss  Workstation performed: [id number]     Cta Neck W Wo Contrast    Result Date: 9/9/2018  Impression: No evidence of vertebral artery injury  No arterial dissection  No hemodynamically significant stenosis within either common or internal carotid artery  Less than 50% stenosis by NASCET criteria  Dominant right vertebral artery with a small calibered left vertebral artery  Acute fractures of C2, C3, C4 and C5 as described above  Workstation performed: [id number]     Ct Spine Cervical Without Contrast    Result Date: 9/9/2018  Impression: Acute fractures of C2, C3, C4 and C5 as described above  Acute nondisplaced left occipital condyle fracture  An MRI of the cervical spine could be performed for further evaluation  Moderate prevertebral soft tissue swelling, likely posttraumatic in nature  I personally discussed this study with Anthony Montoya on 9/9/2018 at 2:50 AM   Workstation performed: [id number]     Mri Cervical Spine Wo Contrast    Result Date: 9/9/2018  Impression: Exaggerated cervical lordosis  Fractures of the dens, C3, C4 and C5 vertebral bodies demonstrate similar alignment compared to recent CT scan  The dens is slightly displaced posteriorly in relation to the body of C2, approximately 4 mm  Extensive prevertebral edema extending from the level of the clivus inferiorly to the level of C5  There is edema within the interspinous ligaments and paraspinal musculature posteriorly  There is disruption of the anterior longitudinal ligament  However, the posterior longitudinal ligament and ligamentum flavum appear intact  Severe canal stenosis at the C3-4, C4-5 levels  Moderate canal stenosis at C5-6 and C6-7  This is related to annular bulging, endplate and posterior element hypertrophic change  Multilevel foraminal narrowing, severe at C6-7 and moderate at the C3-4, C4-5 and C5-6 levels  Evaluation of the cord is somewhat limited due to patient motion and the severity of the canal stenosis  However, there does not appear to be cord edema to suggest acute cord injury  Workstation performed: [id number]     Xr Chest 1 View    Result Date: 9/11/2018  Impression: No active pulmonary disease  Workstation performed: [id number]     Ct Chest Abdomen Pelvis W Contrast    Result Date: 9/9/2018  Impression: No evidence of solid organ injury  No acute intra-abdominal abnormality  No free air or free fluid  No pneumothorax  Multiple old appearing bilateral rib fractures  17 mm peripherally calcified right renal cyst   A nonemergent renal ultrasound is recommended for further characterization  Workstation performed: [id number]     Xr Trauma Multiple    Result Date: 9/9/2018  Impression: No active pulmonary disease   Workstation performed: [id number]       Complications: ETOH abuse    Discharge Diagnosis:   Patient Active Problem List   Diagnosis    Closed odontoid fracture with routine healing    Closed displaced fracture of third cervical vertebra ([address] )    Closed displaced fracture of fourth cervical vertebra (HCC)    Alcohol abuse    Decubitus ulcer of sacral region    CAD (coronary artery disease)    Dens fracture ([address] )    Acute blood loss anemia    Diabetes ([address] )    S/P C1-T1 Posterior Cervical Discectomy and Fusion on 9/10/18    Hypokalemia         Resolved Problems  Date Reviewed: 9/13/2018    None          Condition at Discharge: fair         Discharge instructions/Information to patient and family:   See after visit summary for information provided to patient and family  Provisions for Follow-Up Care:  See after visit summary for information related to follow-up care and any pertinent home health orders  PCP: Nellie Yen MD    Disposition: [facility] [address] [facility] Readmission: No    Discharge Statement   I spent 22 minutes discharging the patient  This time was spent on the day of discharge  I had direct contact with the patient on the day of discharge  Additional documentation is required if more than 30 minutes were spent on discharge  Discharge Medications:  See after visit summary for reconciled discharge medications provided to patient and family

## 2018-09-14 NOTE — ASSESSMENT & PLAN NOTE
S/p stenting   Previously on plavix  Per neurosurgery, OK to resume antiplatelet in 2 weeks post-op (9/24/18)    Also with history of CVA in April 2018  Atorvastatin 80mg

## 2018-09-14 NOTE — RESPIRATORY THERAPY NOTE
RT Protocol Note  Macho Galindo 58 y o  male MRN: [id number]  Unit/Bed#: -88 Encounter: [id number]    Assessment    Principal Problem:    S/P C1-T1 Posterior Cervical Discectomy and Fusion on 9/10/18  Active Problems:    Closed odontoid fracture with routine healing    Closed displaced fracture of third cervical vertebra (HCC)    Closed displaced fracture of fourth cervical vertebra (HCC)    Alcohol abuse    Decubitus ulcer of sacral region    CAD (coronary artery disease)    Dens fracture (HCC)    Acute blood loss anemia    Diabetes (HCC)    At moderate risk for venous thromboembolism (VTE)    Oropharyngeal dysphagia      Home Pulmonary Medications:    Home Devices/Therapy:  (albuterol mdi/albuterol/atrovent X 4 daily )    Past Medical History:   Diagnosis Date    [facility]     CVA [facility]cerebral vascular accident) ([facility] )     Diabetes mellitus ([facility] )     Heart attack ([place] [address] )     Hypertension      Social History     Social History    Marital status:      Spouse name: N/A    Number of children: N/A    Years of education: N/A     Social History Main Topics    Smoking status: Current Every Day Smoker     Packs/day: 0 50     Types: Cigarettes    Smokeless tobacco: Never Used    Alcohol use 3 6 oz/week     6 Cans of beer per week    Drug use: No    Sexual activity: Not Asked     Other Topics Concern    None     Social History Narrative    None       Subjective         Objective    Physical Exam:   Assessment Type: Pre-treatment  General Appearance: Alert, Awake  Respiratory Pattern: Normal  Chest Assessment: Chest expansion symmetrical  Bilateral Breath Sounds: Clear, Diminished  R Breath Sounds: Clear  L Breath Sounds: Clear  Cough: None  O2 Device: none    Vitals:  Blood pressure 156/93, pulse 79, temperature 97 6 °F (36 4 °C), temperature source Oral, resp  rate 18, height 6' 1" (1 854 m), weight 87 1 kg (192 lb), SpO2 100 %        Results from last 7 days  Lab Units 09/10/18  1854   PH ART  7 362   PCO2 ART mm Hg 42 6   PO2 ART mm Hg 96 1   HCO3 ART mmol/L 23 6   BASE EXC ART mmol/L -1 7   O2 CONTENT ART mL/dL 15 2*   O2 HGB, ARTERIAL % 96 3   ABG SOURCE  Line, Arterial   ROXI TEST  Yes       Imaging and other studies: I have personally reviewed pertinent reports        O2 Device: none     Plan    Respiratory Plan: Mild Distress pathway, Home Bronchodilator Patient pathway        Resp Comments: pt assessed per protocol, DX: C6 cervical FX, pt in no distress or sob at this time, states he uses albuterol/atrovent 4 X day and Albuterol MDI 2 X day, pt on RMA satting 100%, CXR lungs Clear, will oprder Xopenex/Atrovent TID and Q6 prn per patient request

## 2018-09-14 NOTE — INCIDENTAL FINDINGS
The following findings require follow up:  Radiographic finding   Findin mm peripherally calcified right renal cyst   A nonemergent renal ultrasound is recommended for further characterization  One or more subcentimeter sharply circumscribed low-density hepatic lesion(s) are noted, too small to accurately characterize, but statistically most likely to represent subcentimeter hepatic cysts  There are small bilateral fat-containing inguinal hernias     Follow up required: with your PCP   Follow up should be done within 4-6 week(s)    Please notify the following clinician to assist with the follow up:   Dr Krista Lemons

## 2018-09-14 NOTE — CONSULTS
Consultation - Zulema Sanchez 58 y o  male MRN: [id number]    Unit/Bed#: [facility] 963-31 Encounter: [id number]        History of Present Illness     HPI: Zulema Sanchez is a 58 y o  male, with PMH of MI s/p PTCA, CVA, ETOH abuse, COPD, DM type 2 and HTN, who presented 9/9/18 after a fall down 15 steps  He was intoxicated at the time  He initially denied pain and did not have C-spine tenderness but was found to have acute fracture of C2 type 2 dens fx, C3 teardrop fracture, C4 body fracture, C4 - C5 spinous process fx and vertebral body fx C5 left facet laminar fx C4 Lt occipital condyle fx  Pt went to the OR 9/10/18 for a posterior cervical decompressive laminectomy C3-6 and posterior cervical lateral mass and pedicle fixation fusion C1-T1 by Dr Henok Edwards  Pt tolerated the surgery with a 250ml blood loss  MEGHAN drains were removed 9/14/18  ROS:  Constitutional: Negative  HENT: Negative  Respiratory: Negative  Cardiovascular: Negative  Gastrointestinal: Negative  Musculoskeletal: Neck pain    Neurological: Negative  Psychiatric/Behavioral: Negative          Historical Information   Past Medical History:   Diagnosis Date    COPD (chronic obstructive pulmonary disease) ([facility] [address] )     CVA (cerebral vascular accident) ([facility] [address] )     Diabetes mellitus ([facility] [address] )     Heart attack ([facility] [address] )     Hypertension      Past Surgical History:   Procedure Laterality Date    CERVICAL FUSION N/A 9/10/2018    Procedure: Posterior cervical decompressive laminectomy C3-6; Posterior cervical lateral mass and pedicle fixation fusion C1-T1;  Surgeon: Marcel Parekh MD;  Location: [facility];  Service: Neurosurgery     Social History   History   Alcohol Use    3 6 oz/week    6 Cans of beer per week     History   Drug Use No     History   Smoking Status    Current Every Day Smoker    Packs/day: 0 50    Types: Cigarettes   Smokeless Tobacco    Never Used     Family History   Problem Relation Age of Onset    Heart attack Mother Meds/Allergies   current meds:  Current Facility-Administered Medications   Medication Dose Route Frequency    acetaminophen (TYLENOL) tablet 975 mg  975 mg Oral Q8H [address]    albuterol (PROVENTIL HFA,VENTOLIN HFA) inhaler 2 puff  2 puff Inhalation Q4H PRN    bisacodyl (DULCOLAX) rectal suppository 10 mg  10 mg Rectal Daily PRN    diazepam (VALIUM) tablet 5 mg  5 mg Oral Q8H    [START ON 4/58/8655] folic acid (FOLVITE) tablet 1 mg  1 mg Oral Daily    gabapentin (NEURONTIN) capsule 100 mg  100 mg Oral TID    heparin (porcine) subcutaneous injection 5,000 Units  5,000 Units Subcutaneous Q8H [address]    insulin lispro (HumaLOG) 100 units/mL subcutaneous injection 1-6 Units  1-6 Units Subcutaneous Q6H [address]    ipratropium (ATROVENT) 0 02 % inhalation solution 0 5 mg  0 5 mg Nebulization TID    levalbuterol (XOPENEX) inhalation solution 1 25 mg  1 25 mg Nebulization TID    [START ON 9/15/2018] lidocaine (LIDODERM) 5 % patch 1 patch  1 patch Transdermal Daily    magnesium hydroxide (MILK OF MAGNESIA) 400 mg/5 mL oral suspension 30 mL  30 mL Oral Daily PRN    melatonin tablet 3 mg  3 mg Oral HS PRN    metoprolol tartrate (LOPRESSOR) tablet 50 mg  50 mg Oral Q12H [address]    oxyCODONE (ROXICODONE) immediate release tablet 10 mg  10 mg Oral Q4H PRN    oxyCODONE (ROXICODONE) IR tablet 5 mg  5 mg Oral Q4H PRN    senna (SENOKOT) tablet 17 2 mg  2 tablet Oral HS    [START ON 9/15/2018] thiamine (VITAMIN B1) tablet 100 mg  100 mg Oral Daily       PTA meds:   Prescriptions Prior to Admission   Medication    albuterol (PROVENTIL HFA,VENTOLIN HFA) 90 mcg/act inhaler    clopidogrel (PLAVIX) 75 mg tablet    fluticasone-salmeterol (ADVAIR) 500-50 mcg/dose inhaler    folic acid (FOLVITE) 1 mg tablet    ipratropium-albuterol (DUO-NEB) 0 5-2 5 mg/3 mL nebulizer solution    metFORMIN (GLUCOPHAGE) 1000 MG tablet    metoprolol tartrate (LOPRESSOR) 50 mg tablet    Multiple Vitamin (MULTIVITAMIN) tablet    tiotropium (SPIRIVA) 18 mcg inhalation capsule     Allergies   Allergen Reactions    Amoxicillin     Augmentin [Amoxicillin-Pot Clavulanate]        Objective   Vitals: There were no vitals taken for this visit  Physical Exam   Constitutional: Pt is oriented to person, place, and time  HENT:  Normocephalic  EOM are normal  PERRLA  Neck supple  Cardiovascular: Normal rate and regular rhythm  Pulmonary: Breath sounds normal  No respiratory distress  Pt has no wheezes nor rales  Abdominal: Soft  Bowel sounds are normal  Pt exhibits no distension  There is no tenderness  There is no rebound and no guarding  Neurological: Pt is alert and oriented to person, place, and time  Psychiatric: Pt has a normal mood and affect  Lab Results:   Results from last 7 days  Lab Units 09/13/18  0508 09/12/18  0535   WBC Thousand/uL 7 47 7 29   HEMOGLOBIN g/dL 10 5* 9 8*   HEMATOCRIT % 32 4* 30 4*   PLATELETS Thousands/uL 186 187       Results from last 7 days  Lab Units 09/13/18  0508 09/12/18  0535  09/10/18  1604   SODIUM mmol/L 135* 134*  < >  --    POTASSIUM mmol/L 3 5 3 2*  < >  --    CHLORIDE mmol/L 98* 98*  < >  --    CO2 mmol/L 31 31  < >  --    BUN mg/dL 5 3*  < >  --    CREATININE mg/dL 0 50* 0 45*  < >  --    GLUCOSE, ISTAT mg/dl  --   --   --  217*   CALCIUM mg/dL 8 7 8 5  < >  --    < > = values in this interval not displayed  Results from last 7 days  Lab Units 09/09/18  0243   INR  0 89       Glucose, i-STAT (mg/dl)   Date Value   09/10/2018 217 (H)   09/10/2018 201 (H)   09/09/2018 185 (H)       Labs reviewed    Imaging: reviewed  EKG, Pathology, and Other Studies: I have personally reviewed pertinent reports      VTE Prophylaxis: Heparin    Code Status: Level 1 - Full Code   Advance Directive and Living Will:      Power of :    POLST:      Assessment/Plan      # Multiple cervical fractures s/p posterior cervical decompression laminectomy C3-6, posterior cervical lateral mass and pedicle fixation/fusion C1-T1: Pain control  Monitor incision  Aspen collar at all times  # HTN: Continue Lopressor 50mg every 12 hours  Monitor BP every shift  # MI s/p PTCA: Plavix on hold  Resume when cleared by Neurosurgery  # ETOH abuse: Continue folate, thiamine and Valium  Will need to wean Valium in the future  # DM type 2 with hyperglycemia: Will check HA1C  Change blood sugars to before meals and at bedtime  Pt reports taking metformin 1000mg 2x daily  Will resume at 500mg 2x daily  Continue SSI and accuchecks  Adjust medications as needed  # COPD: Pt uses Advair 500/50 and Ventolin at home  He also uses albuteral via nebulizer 3x daily  Use Breo Elipta here for Advair  Continue Proventil inhaler prn as well as Xopenex and Atrovent via nebulizer  Counseling / Coordination of Care  Total floor / unit time spent today 60 minutes  Greater than 50% of total time was spent with the patient and / or family counseling and / or coordination of care        Jair Tatum PA-C

## 2018-09-14 NOTE — PROGRESS NOTES
PHYSICAL MEDICINE AND REHABILITATION   PREADMISSION ASSESSMENT     Projected [facility] Diagnoses:  Impairment of mobility, safety and Activities of Daily Living (ADLs) due to Orthopedic Disorders:  08 9  Other Orthopedic     Etiology: Cervical Fractures- C5 to C5  Date of Onset: 9/9/18   Date of surgery: 9/10/18: Posterior cervical decompressive laminectomy C3-6; Posterior cervical lateral mass and pedicle fixation fusion C1-T1     PATIENT INFORMATION  Name: Sea Villela Phone #: [phone] (home)   [facility]  YOB: 1955 Age: 58 y o  SS#   Marital Status:   Ethnicity:   Employment Status: retired  Extended Emergency Contact Information  Primary Emergency Contact: Rosemarie Mirza [address] Phone: [phone]  Relation: Child  Advance Directive: Level 1 Full Code, AD Unknown    INSURANCE/COVERAGE:     Primary Payor: BLUE CROSS / Plan: [employer] / Product Type: Blue Fee for Service /   Secondary Payer: Private Pay   Payer Contact: Milton Heath Payer Contact:   Contact Phone: [phone]  Contact Fax: [phone] Contact Phone:   Authorization #: [id number]  Coverage Dates:9/14-9/18  LCD: 9/18 with review due 9/19  Medicare Days:  Medical Record #: [id number]    REFERRAL SOURCE:   Referring provider: Faby Davis MD  Referring facility: [address] [place] [address]  Room: [address] [place] [address]  PCP: Misael Gordon MD PCP phone number: [phone]    MEDICAL INFORMATION  HPI: As per PMR on 9/13/18:  Sea Villela is a 58 y o  male with PMH of alcohol dependence, falls with head trauma, stroke 4/2018, CAD/MI s/p cardiac stenting, HTN, COPD, DM, former nicotine dependence, KLARISSA only partially compliant with CPAP at times, peripheral neuropathy, possible bipolar depression who apparently fell down 15 steps while heavily intoxicated without LOC or clear head trauma on 9/9/18 who presented with GCS 14 AAOx2 with near full strength and sensation throughout who was found to have occipital condyle fracture, complex C2 fracture (type 2 dens fracture with 4mm post displacement), closed displaced fractures of cervical 2-3 vertebrae, multiple cervical spinous process fractures and disruption of facet capsules rupture of anterior longitudinal ligamentwith significant associated retropharyngeal edema who underwent posterior cervical 3-6 decompression laminectomy and posterior cervical mass and pedicle fixation/fusion C1-T1 by Dr Kristy Marion on 9/10/18  MRI of cervical spine did not reveal acute spinal cord signal changes  Course also notable for finding of finding of sacral stage 2 pressure ulcer which apparently was present on admission as well as multiple abrasions and ecchymoses as well as significant pain, acute blood loss anemia, CIWA protocol with scheduled valium but not requiring additional benzos, respiratory failure s/p extubation on 9/11, and dysphagia  Patient was evaluated by OT and PT who are recommending rehab as well as attending physician and [facility] drain remains intact and is to be d/c'd today vs tomorrow  He is medically clear for transfer today  Past Medical History:   Past Surgical History:    Allergies:     Past Medical History:   Diagnosis Date    [facility] obstructive [facility] disease) [facility]     CVA (cerebral vascular accident) ([address]     Diabetes mellitus ([address] )     Heart attack ([facility] )     Hypertension     Past Surgical History:   Procedure Laterality Date    CERVICAL FUSION N/A 9/10/2018    Procedure: Posterior cervical decompressive laminectomy C3-6; Posterior cervical lateral mass and pedicle fixation fusion C1-T1;  Surgeon: Kristy Marion MD;  Location: [facility];  Service: Neurosurgery     Allergies   Allergen Reactions    Amoxicillin     Augmentin [Amoxicillin-Pot Clavulanate]          Comorbidities: s/p fall, alcohol abuse, C2-C5 fractures, dens fracture, occipital condyle fracture,longitudal ligament tear, dysphagia, ABLA, hypokalemia, sacral decubitus ulcer, DM II, and history of CAD s/p stenting    CURRENT VITAL SIGNS:   Temp:  [97 5 °F (36 4 °C)-98 2 °F (36 8 °C)] 97 7 °F (36 5 °C)  HR:  [71-94] 76  Resp:  [18] 18  BP: (130-144)/(71-80) 140/80   Intake/Output Summary (Last 24 hours) at 09/14/18 1209  Last data filed at 09/14/18 1100   Gross per 24 hour   Intake             1420 ml   Output             2250 ml   Net             -830 ml        LABORATORY RESULTS:      Lab Results   Component Value Date    HGB 10 5 (L) 09/13/2018    HCT 32 4 (L) 09/13/2018    WBC 7 47 09/13/2018     Lab Results   Component Value Date    BUN 5 09/13/2018     (L) 09/13/2018    K 3 5 09/13/2018    CL 98 (L) 09/13/2018    GLUCOSE 217 (H) 09/10/2018    CREATININE 0 50 (L) 09/13/2018     Lab Results   Component Value Date    PROTIME 12 1 09/09/2018    INR 0 89 09/09/2018        DIAGNOSTIC STUDIES:  Xr Chest Portable    Result Date: 9/11/2018  Impression: ET tube tip is approximately 4 cm above the calixto  Workstation performed: [id number]     Xr Spine Cervical 2 Or 3 Vw Injury    Result Date: 9/13/2018  Impression: Fluoroscopic guidance provided for intraoperative localization during cervical fusion procedure as above  Please refer to the separate procedure notes for additional details  Workstation performed: [id number]     Xr Spine Cervical 2 Or 3 Vw Injury    Result Date: 9/12/2018  Impression: Status post decompressive laminectomy and cervical fusion C1-T1, postoperative day #1  Hardware intact  Stable fractures  Workstation performed: [id number]     Ct Head Without Contrast    Result Date: 9/9/2018  Impression: No acute intracranial abnormality  Mild chronic small vessel ischemic changes and volume loss   Workstation performed: [id number]     Cta Neck W Wo Contrast    Result Date: 9/9/2018  Impression: No evidence of vertebral artery injury  No arterial dissection  No hemodynamically significant stenosis within either common or internal carotid artery  Less than 50% stenosis by NASCET criteria  Dominant right vertebral artery with a small calibered left vertebral artery  Acute fractures of C2, C3, C4 and C5 as described above  Workstation performed: [id number]     Ct Spine Cervical Without Contrast    Result Date: 9/9/2018  Impression: Acute fractures of C2, C3, C4 and C5 as described above  Acute nondisplaced left occipital condyle fracture  An MRI of the cervical spine could be performed for further evaluation  Moderate prevertebral soft tissue swelling, likely posttraumatic in nature  I personally discussed this study with Praneeth Jones on 9/9/2018 at 2:50 AM   Workstation performed: [id number]     Mri Cervical Spine Wo Contrast    Result Date: 9/9/2018  Impression: Exaggerated cervical lordosis  Fractures of the dens, C3, C4 and C5 vertebral bodies demonstrate similar alignment compared to recent CT scan  The dens is slightly displaced posteriorly in relation to the body of C2, approximately 4 mm  Extensive prevertebral edema extending from the level of the clivus inferiorly to the level of C5  There is edema within the interspinous ligaments and paraspinal musculature posteriorly  There is disruption of the anterior longitudinal ligament  However, the posterior longitudinal ligament and ligamentum flavum appear intact  Severe canal stenosis at the C3-4, C4-5 levels  Moderate canal stenosis at C5-6 and C6-7  This is related to annular bulging, endplate and posterior element hypertrophic change  Multilevel foraminal narrowing, severe at C6-7 and moderate at the C3-4, C4-5 and C5-6 levels  Evaluation of the cord is somewhat limited due to patient motion and the severity of the canal stenosis  However, there does not appear to be cord edema to suggest acute cord injury   Workstation performed: [id number]     Xr Chest 1 View    Result Date: 9/11/2018  Impression: No active pulmonary disease  Workstation performed: [id number]     Ct Chest Abdomen Pelvis W Contrast    Result Date: 9/9/2018  Impression: No evidence of solid organ injury  No acute intra-abdominal abnormality  No free air or free fluid  No pneumothorax  Multiple old appearing bilateral rib fractures  17 mm peripherally calcified right renal cyst   A nonemergent renal ultrasound is recommended for further characterization  Workstation performed: [id number]     Xr Trauma Multiple    Result Date: 9/9/2018  Impression: No active pulmonary disease  Workstation performed: [id number]       PRECAUTIONS/SPECIAL NEEDS:  Tobacco:   History   Smoking Status    Former Smoker   Smokeless Tobacco    Never Used   , Alcohol:    History   Alcohol Use    Yes   , Splints/Braces: C/S Collar at all times, Anticoagulation:  heparin, Blood Sugar Management: as per MD, Edema Management, Safety Concerns, Pain Management, Aspiration Risk/Precautions, Dietary Restrictions: Dysphagia 3-Dental Soft;  Thin Liquid, Spinal Precautions and Fall Precautions    MEDICATIONS:     Current Facility-Administered Medications:     acetaminophen (TYLENOL) tablet 975 mg, 975 mg, Oral, Q8H [facility], Loli Vizcarra MD, 975 mg at 09/14/18 0437    albuterol (PROVENTIL HFA,VENTOLIN HFA) inhaler 2 puff, 2 puff, Inhalation, Q4H PRN, Cecilia Murillo MD    bisacodyl (DULCOLAX) rectal suppository 10 mg, 10 mg, Rectal, Daily PRN, Cassandra Garsia MD    diazepam (VALIUM) tablet 5 mg, 5 mg, Oral, Q8H, Babs Frausto PA-C, 5 mg at 13/66/84 5500    folic acid (FOLVITE) tablet 1 mg, 1 mg, Oral, Daily, Keiry De La Vega PA-C, 1 mg at 09/14/18 5913    gabapentin (NEURONTIN) capsule 100 mg, 100 mg, Oral, TID, TATIANA Pham    heparin (porcine) subcutaneous injection 5,000 Units, 5,000 Units, Subcutaneous, Q8H [facility], 5,000 Units at 09/14/18 0603 **AND** Platelet count, , , Once, Cassandra Garsia MD    insulin lispro (HumaLOG) 100 units/mL subcutaneous injection 1-6 Units, 1-6 Units, Subcutaneous, Q6H [address], 1 Units at 09/14/18 0606 **AND** Fingerstick Glucose (POCT), , , Q6H, Liu Srinivasan PA-C    ipratropium (ATROVENT) 0 02 % inhalation solution 0 5 mg, 0 5 mg, Nebulization, TID, Renita Bartlett MD, 0 5 mg at 09/14/18 0753    levalbuterol (Leanord Chill) inhalation solution 1 25 mg, 1 25 mg, Nebulization, TID, Renita Bartlett MD, 1 25 mg at 09/14/18 0753    lidocaine (LIDODERM) 5 % patch 1 patch, 1 patch, Transdermal, Daily, Gabbi Novak MD, 1 patch at 09/14/18 0853    magnesium hydroxide (MILK OF MAGNESIA) 400 mg/5 mL oral suspension 30 mL, 30 mL, Oral, Daily PRN, Liu Srinivasan PA-C    melatonin tablet 3 mg, 3 mg, Oral, HS PRN, Judd Whiteside MD, 3 mg at 09/13/18 2103    metoprolol tartrate (LOPRESSOR) tablet 50 mg, 50 mg, Oral, Q12H [address], Levi Rodriguez MD, 50 mg at 09/14/18 0852    ondansetron (ZOFRAN) injection 4 mg, 4 mg, Intravenous, Q6H PRN, Tonya Erickson MD    oxyCODONE (ROXICODONE) immediate release tablet 10 mg, 10 mg, Oral, Q4H PRN, Levi Rodriguez MD, 10 mg at 09/14/18 1058    oxyCODONE (ROXICODONE) IR tablet 5 mg, 5 mg, Oral, Q4H PRN, Levi Rodriguez MD, 5 mg at 09/14/18 0437    senna (SENOKOT) tablet 17 2 mg, 2 tablet, Oral, HS, Liu Srinivasan PA-C, 17 2 mg at 09/13/18 2103    thiamine (VITAMIN B1) tablet 100 mg, 100 mg, Oral, Daily, Venita Frausto PA-C, 100 mg at 09/14/18 3974    SKIN INTEGRITY:   Left buttock stage II with allevyn dressing, right buttock blister, neck incision with dry dressing, right back abrasion HELIO, right finger laceration, scattered abrasions and ecchmosis  PRIOR LEVEL OF FUNCTION:  He lives in a(n) single family home  Cindy Almendarez is  and lives with their family  Self Care: Independent, Indoor Mobility: Modified independent with SPC, Stairs (in/outdoor):  Independent and Cognition: Needed some help    HOME ENVIRONMENT:  The living area: can live on one level  There are No steps to enter the home to the basement where the patient resides  The patient will have 24 hour supervision/physical assistance available upon discharge  Conversation with patient's daughter Migdalia Garcia at length  He lives with his daughter and son in law who work opposing shifts as well as their children  One teenaged granddaughter does schooling from home and can assist as needed  There is a half bath on the first floor and family is currently arranging 1st floor setup in the basement  PREVIOUS DME:  Equipment in home (previous DME): Shower Chair, Grab Bars, Hand Haled Shower, Rolling Walker and Single [employer]    FUNCTIONAL STATUS:  Physical Therapy Occupational Therapy Speech Therapy   As per PTA:      09/13/18 0900   Pain Assessment   Pain Assessment 0-10   Pain Score 9   Pain Type Surgical pain   Pain Location Neck   Pain Orientation Posterior;Bilateral   Hospital Pain Intervention(s) Repositioned; Ambulation/increased activity; Emotional support   Response to Interventions Tolerated  Restrictions/Precautions   Braces or Orthoses C/S Collar   Other Precautions Pain; Fall Risk;Spinal precautions; Bed Alarm; Chair Alarm   Subjective   Subjective The pt  notes that he continues to have pain, and that he is tired  He was agreeable to ambulate with therapy for a short distance  Bed Mobility   Supine to Sit 5  Supervision   Transfers   Sit to Stand 4  Minimal assistance   Additional items Assist x 2;Verbal cues; Increased time required   Stand to Sit 5  Supervision   Additional items Increased time required   Ambulation/Elevation   Gait pattern Excessively slow; Step to; Inconsistent sally;Decreased foot clearance; Forward Flexion   Gait Assistance 4  Minimal assist   Additional items Assist x 1;Verbal cues   Assistive Device Rolling walker   Distance 50 feet     Balance   Static Sitting Good   Dynamic Sitting Fair +   Static Standing Fair   Ambulatory Poor +   Activity Tolerance   Activity Tolerance Patient tolerated treatment well;Patient limited by fatigue;Patient limited by pain   Nurse [address], RN  Exercises   Hip Flexion Supine;Bilateral;AROM;5 reps   Knee AROM Long Arc Quad Sitting;Bilateral;AROM;5 reps   Assessment   Prognosis Good   Problem List Decreased endurance;Decreased mobility; Impaired balance;Pain   Assessment The pt  has improving balance today, but he continues to fatigue easily  He also notes increased pain with ambulation  He had no overt loss of balance, but he was unable to perform any dynamic trunk activities during ambulation  He was able to manuever around obstacles without difficulty as well  He does continue to require instruction for proper posture and transfer techniques, but he did demonstrate carry-over from earlier in the session  He remains limited from his baseline independence, and he will benefit from continued physical therapy in order to safely progress his functional mobility  As per FELDER:         09/13/18 0920   Restrictions/Precautions   Weight Bearing Precautions Per Order No   Braces or Orthoses C/S Collar   Other Precautions Fall Risk;Pain;Spinal precautions; Chair Alarm; Bed Alarm   Pain Assessment   Pain Assessment 0-10   Pain Score 8   Transfers   Sit to Stand 4  Minimal assistance   Additional items Assist x 1; Armrests   Stand to Sit 4  Minimal assistance   Additional items Assist x 1; Armrests   Cognition   Overall Cognitive Status Impaired   Arousal/Participation Alert; Responsive   Following Commands Follows one step commands with increased time or repetition   Assessment   Assessment pt  seen for AM OT session focusing on ADLs of bathing, dressing, grooming and transfers  pt  requires supervision for grooming and UB bathing  Min Asst for UB dressing and LB bathing/dressing  pt  needed asst to wash feet and don socks  pt  requires Min Asst for transfers   pt  noted to be SOB on room air with pulse ox in upper 90's during session while performing tasks  pt  OOB in chair at end of session with all needs in reach  pt  will continue to benefit from skilled OT services to maximize independence  As per SLP on 9/12/18:    Current Diet: level 2 w/ htl  Objective:  Pt seen for ongoing dx dysphagia tx  Nsg states pt much better today & wanting an upgrade  Pt trialed w/ toast, nt by cup(4 oz), thin by cup & straw (8 oz)  Pt self fed toast- adequate retrieval, mastication & full oral clearing  No overt coughing or throat clearing  Adequate seal on cup rim, prompt transfers w/ all liquids, able to take successive sips thin w/o overt coughing or clearing       Assessment:  Pt w/ improved overall swallow function today for an upgrade       Plan/Recommendations:  1  Upgrade to Level 3 w/ thin  2  Speech to f/u for regular & tolerance     CURRENT GAP IN FUNCTION  Prior to admission patient was modified independent with transfers and ambulation with single point cane, independent with ADLs and IADLs  Daughter provided transportation  Estimated length of stay: 10 to 14 days    Anticipated Post-Discharge Disposition/Treatment  Disposition: Return to previous home/apartment  Outpatient Services: Physical Therapy (PT), Occupational Therapy (OT) and Speech Therapy    BARRIERS TO DISCHARGE  Weakness, Pain, Balance Difficulty, Fatigue, Home Accessibility, Caregiver Accessibility, Financial Resources, Equipment Needs and Resource Availability    INTERVENTIONS FOR DISCHARGE  Adaptive equipment, Patient/Family/Caregiver Education, [employer], Support Group, Financial Assistance, Arrange DME needs, Medication Changes as per MD and Therapy exercises    REQUIRED THERAPY:  Patient will require PT, OT and ST 60 minutes each per day, five days per week to achieve rehab goals       REQUIRED FUNCTIONAL AND MEDICAL MANAGEMENT FOR INPATIENT REHABILITATION:  Skin:  Left buttock stage II with allevyn dressing, right buttock blister, neck incision with dry dressing, right back abrasion HELIO, right finger laceration, scattered abrasions and ecchmosis  , Pain Management: Overall pain is moderately controlled, Deep Vein Thrombosis (DVT) Prophylaxis:  heparin and SCD's while in bed, Diabetes Management: continue sliding scale insulin, patient to do finger sticks as ordered, SLIM to continue to manage diabetes, and additional medical conditions, nursing management for education and dressing changes, PM&R to maximize function, PT/OT/ST intervention, patient and family education and training, and any consults as needed  RECOMMENDED LEVEL OF CARE:  Patient [facility] as a level B trauma after he fell down 15 steps while heavily intoxicated  CT of the head was negative  CT of the c-spine showed occipital condyle fracture, complex C2 fracture (type 2 dens fracture with 4mm post displacement), closed displaced fractures of cervical 2-3 vertebrae, multiple cervical spinous process fractures and disruption of facet capsules rupture of anterior longitudinal ligamentwith significant associated retropharyngeal edema  He underwent posterior cervical 3-6 decompression laminectomy and posterior cervical mass and pedicle fixation/fusion C1-T1 by Dr Estela Moreno on 9/10/18  MRI of cervical spine did not reveal acute spinal cord signal changes  Prior to admission patient was modified independent with transfers and ambulation with single point cane, independent with ADLs and IADLs  Daughter provided transportation  At this time he is a min assist for transfers and ambulation with rolling walker as wells as min assist with upper body ADLs and mod assist with lower body ADLs    Nursing management is being recommended for education as well as dressing changes, internal medicine to monitor and manage medical conditions, PM&R to maximize function as well as provide medical oversight, and inpatient rehab to maximize self care and mobility to supervison/ mod I level upon discharge to home with the supervision and assistance of his family

## 2018-09-14 NOTE — H&P
PHYSICAL MEDICINE AND REHABILITATION H&P/ADMISSION NOTE  Yury Roberts 58 y o  male MRN: [id number]  Unit/Bed#: -53 Encounter: [id number]     Rehab Diagnosis: Orthopedic Disorders:  08 9  Other Orthopedic cervical spine fractures     History of Present Illness:   Yury Roberts is a 58 y o  male with history of COPD, HTN, MI, DM, CVA in April 2018, alcohol dependence, KLARISSA noncompliant with CPAP, who presented to [address] [place], [place] [address] after fall down 15 steps  He was GCS 14 on arrival, found to have occipital condyle fracture, C2 fracture (type 2 dens fracture with 4mm posterior displacement), closed displaced fractures of C2-3 vertebrae, multiple cervical spinous process fractures, disruption of facet capsules, and rupture of ALL with retropharyngeal edema  He underwent posterior C3-6 decompression laminectomy and instrumented fixation of C1-T1 with Dr Shantanu Pedro on 9/10/18  MRI of the c-spine did not reveal acute cord signal hyperintensity  He was found to have sacral stage 2 pressure ulcer on admission, as well as multiple abrasions, acute blood loss anemia, and dysphagia  CIWA protocol initiated with valium  He was extubated on 8/45/63 without complication  Subjective: Reports neck pain 5/10, well managed with current pain regimen  He denies any tremors, headache, nausea, vomiting, headaches  He reports drinking approximately 6 beers/daily, and also smoking 1/2 ppd up until his hospital admission  He does not recall the fall; there is possible LOC  We discussed expectations of acute rehab and he is agreeable to plan  Review of Systems: no fever/chills, chest pain, SOB, cough, nausea, vomiting, diarrhea, constipation, abdominal pain, dysuria, bowel/bladder incontinence, headache, blurry vision  +neck pain  A 10-point review of systems was performed  Negative except as listed above      Restrictions include:  spinal orthosis when out of bed , Fall precautions      Functional History - Prior to Admission:      Functional Status: Patient was independent with mobility/ambulation, transfers, ADL's, IADL's  Functional History - Currently:     Physical Therapy Occupational Therapy Speech Therapy   As per PTA:       09/13/18 0900   Pain Assessment   Pain Assessment 0-10   Pain Score 9   Pain Type Surgical pain   Pain Location Neck   Pain Orientation Posterior;Bilateral   Hospital Pain Intervention(s) Repositioned; Ambulation/increased activity; Emotional support   Response to Interventions Tolerated  Restrictions/Precautions   Braces or Orthoses C/S Collar   Other Precautions Pain; Fall Risk;Spinal precautions; Bed Alarm; Chair Alarm   Subjective   Subjective The pt  notes that he continues to have pain, and that he is tired  He was agreeable to ambulate with therapy for a short distance  Bed Mobility   Supine to Sit 5  Supervision   Transfers   Sit to Stand 4  Minimal assistance   Additional items Assist x 2;Verbal cues; Increased time required   Stand to Sit 5  Supervision   Additional items Increased time required   Ambulation/Elevation   Gait pattern Excessively slow; Step to; Inconsistent sally;Decreased foot clearance; Forward Flexion   Gait Assistance 4  Minimal assist   Additional items Assist x 1;Verbal cues   Assistive Device Rolling walker   Distance 50 feet  Balance   Static Sitting Good   Dynamic Sitting Fair +   Static Standing Fair   Ambulatory Poor +   Activity Tolerance   Activity Tolerance Patient tolerated treatment well;Patient limited by fatigue;Patient limited by pain   Nurse [address], RN  Exercises   Hip Flexion Supine;Bilateral;AROM;5 reps   Knee AROM Long Arc Quad Sitting;Bilateral;AROM;5 reps   Assessment   Prognosis Good   Problem List Decreased endurance;Decreased mobility; Impaired balance;Pain   Assessment The pt  has improving balance today, but he continues to fatigue easily  He also notes increased pain with ambulation   He had no overt loss of balance, but he was unable to perform any dynamic trunk activities during ambulation  He was able to manuever around obstacles without difficulty as well  He does continue to require instruction for proper posture and transfer techniques, but he did demonstrate carry-over from earlier in the session  He remains limited from his baseline independence, and he will benefit from continued physical therapy in order to safely progress his functional mobility         As per FELDER:          09/13/18 0920   Restrictions/Precautions   Weight Bearing Precautions Per Order No   Braces or Orthoses C/S Collar   Other Precautions Fall Risk;Pain;Spinal precautions; Chair Alarm; Bed Alarm   Pain Assessment   Pain Assessment 0-10   Pain Score 8   Transfers   Sit to Stand 4  Minimal assistance   Additional items Assist x 1; Armrests   Stand to Sit 4  Minimal assistance   Additional items Assist x 1; Armrests   Cognition   Overall Cognitive Status Impaired   Arousal/Participation Alert; Responsive   Following Commands Follows one step commands with increased time or repetition   Assessment   Assessment pt  seen for AM OT session focusing on ADLs of bathing, dressing, grooming and transfers  pt  requires supervision for grooming and UB bathing  Min Asst for UB dressing and LB bathing/dressing  pt  needed asst to wash feet and don socks  pt  requires Min Asst for transfers  pt  noted to be SOB on room air with pulse ox in upper 90's during session while performing tasks   pt  OOB in chair at end of session with all needs in reach  pt  will continue to benefit from skilled OT services to maximize independence          As per SLP on 9/12/18:     Current Diet: level 2 w/ htl  Objective:  Pt seen for ongoing dx dysphagia tx   Nsg states pt much better today & wanting an upgrade   Pt trialed w/ toast, nt by cup(4 oz), thin by cup & straw (8 oz)   Pt self fed toast- adequate retrieval, mastication & full oral clearing   No overt coughing or throat clearing  Adequate seal on cup rim, prompt transfers w/ all liquids, able to take successive sips thin w/o overt coughing or clearing       Assessment:  Pt w/ improved overall swallow function today for an upgrade       Plan/Recommendations:  1  Upgrade to Level 3 w/ thin  2   Speech to f/u for regular          Past Medical History:   Diagnosis Date    [facility] obstructive [facility] disease[facility]     CVA [facility]cerebral vascular accident) [facility]     Diabetes mellitus ([facility]     [facility]     Hypertension      Past Surgical History:   Procedure Laterality Date    CERVICAL FUSION N/A 9/10/2018    Procedure: Posterior cervical decompressive laminectomy C3-6; Posterior cervical lateral mass and pedicle fixation fusion C1-T1;  Surgeon: Ryanne Hannah MD;  Location: [facility];  Service: Neurosurgery     Family History   Problem Relation Age of Onset    Heart attack Mother      Social History     Social History    Marital status:      Spouse name: N/A    Number of children: N/A    Years of education: N/A     Social History Main Topics    Smoking status: Current Every Day Smoker     Packs/day: 0 50     Types: Cigarettes    Smokeless tobacco: Never Used    Alcohol use 3 6 oz/week     6 Cans of beer per week    Drug use: No    Sexual activity: Not Asked     Other Topics Concern    None     Social History Narrative    None       Current Facility-Administered Medications:  acetaminophen 975 mg Oral [facility] Thuy Magallon MD   albuterol 2 puff Inhalation Q4H PRN Thuy Magallon MD   atorvastatin 80 mg Oral Daily With Arnaldo Silva MD   bisacodyl 10 mg Rectal Daily PRN Thuy Magallon MD   diazepam 5 mg Oral Q8H Thuy Magallon MD   [START ON 9/16/2018] diazepam 5 mg Oral Q12H Thuy Magallon MD   [START ON 9/18/2018] diazepam 5 mg Oral Daily Thuy Magallon MD   [START ON 9/15/2018] fluticasone-vilanterol 1 puff Inhalation Daily Lauren Braswell PA-C   [START ON 6/63/7557] folic acid 1 mg Oral Daily Tayler Cedeno MD   gabapentin 100 mg Oral TID Tayler Cedeno MD   heparin (porcine) 5,000 Units Subcutaneous [facility] Tayler Cedeno MD   insulin lispro 1-5 Units Subcutaneous HS Lauren Braswell PA-C   insulin lispro 1-5 Units Subcutaneous TID AC Lauren Braswell PA-C   ipratropium 0 5 mg Nebulization TID Tayler Cedeno MD   levalbuterol 1 25 mg Nebulization TID Tayler Cedeno MD   [START ON 9/15/2018] lidocaine 1 patch Transdermal Daily Tayler Cedeno MD   magnesium hydroxide 30 mL Oral Daily PRN Tayler Cedeno MD   melatonin 3 mg Oral HS PRN Tayler Cedeno MD   metoprolol tartrate 50 mg Oral Q12H Pankaj Hernandez MD   oxyCODONE 10 mg Oral Q4H PRN Tayler Cedeno MD   oxyCODONE 5 mg Oral Q4H PRN Tayler Cedeno MD   senna 2 tablet Oral HS Tayler Cedeno MD   [START ON 9/15/2018] thiamine 100 mg Oral Daily Tayler Cedeno MD        Allergies   Allergen Reactions    Amoxicillin     Augmentin [Amoxicillin-Pot Clavulanate]        Home environment:   Lives in basement of 2 story home, 0 steps to enter  He lives with daughter, son in law, and granddaughter  There will be 24/7 supervision available on discharge   He is able to have 1st floor living arrangements       Physical Exam:  HR:  [71-94] 79  Resp:  [18] 18  BP: (130-156)/(71-93) 156/93  SpO2:  [94 %-98 %] 98 %    Gen: NAD, lying in bed comfortably  Head: NCAT  Eyes: EOMI, PERRL  Throat: clear, MMM  CV: RRR, no m/r/g  Pulm: CTAB, no rales/wheeze  Abd: soft, NTND  Ext: no gross LE edema; distal extremities warm and well perfused  Skin: multiple abrasions with ecchymoses in bilateral upper extremities; sacrum intact, heels intact; left buttock healing stage 2 ulcer; MEGHAN drain from posterior cervical incision with serosanguinous drainage  Neuro: alert, oriented to person/place/time; follows commands, reflexes 2+ bilateral patella; negative mcdowell/babinski bilateral; mild dysmetria noted bilateral finger-nose; no tremors noted      MMT:   Right  Left  Site  Right Left  Site    5 5 S Ab: Shoulder Abductors  5- 5- HF: Hip Flexors    5 5 EF: Elbow Flexors  5 5 KF: Knee Flexors    5 5 EE: Elbow Extensors  5 5 KE: Knee Extensors    5 5 WE: Wrist Extensors  5- 5- DR: Dorsi Flexors    5 5 FF: Finger Flexors  5 5 PF: Plantar Flexors    5 5 HI: Hand Intrinsics  5- 5- EHL: Extensor Hallucis Longus       Laboratory:      Results from last 7 days  Lab Units 09/13/18  0508 09/12/18  0535 09/11/18  0552   HEMOGLOBIN g/dL 10 5* 9 8* 8 9*  8 9*   HEMATOCRIT % 32 4* 30 4* 27 8*  27 8*   WBC Thousand/uL 7 47 7 29 5 49  5 49       Results from last 7 days  Lab Units 09/13/18  0508 09/12/18  0535 09/11/18  0543  09/10/18  1604 09/10/18  1443  09/09/18  0224   BUN mg/dL 5 3* 5  < >  --   --   < >  --    SODIUM mmol/L 135* 134* 137  < >  --   --   < >  --    POTASSIUM mmol/L 3 5 3 2* 4 5  < >  --   --   < >  --    CHLORIDE mmol/L 98* 98* 105  < >  --   --   < >  --    GLUCOSE, ISTAT mg/dl  --   --   --   --  217* 201*  --  185*   CREATININE mg/dL 0 50* 0 45* 0 38*  < >  --   --   < >  --    < > = values in this interval not displayed  Results from last 7 days  Lab Units 09/09/18  0243   PROTIME seconds 12 1   INR  0 89        Wt Readings from Last 1 Encounters:   09/14/18 87 1 kg (192 lb)     Estimated body mass index is 25 33 kg/m² as calculated from the following:    Height as of this encounter: 6' 1" (1 854 m)  Weight as of this encounter: 87 1 kg (192 lb)  Imaging: reviewed  [facility] 9/9/18:  No acute intracranial abnormality        Mild chronic small vessel ischemic changes and volume loss      CT c-spine 9/9/18:  Acute fractures of C2, C3, C4 and C5 as described above  Acute nondisplaced left occipital condyle fracture  An MRI of the cervical spine could be performed for further evaluation      Moderate prevertebral soft tissue swelling, likely posttraumatic in nature      Plan:   Oropharyngeal dysphagia   Assessment & Plan    Secondary to posterior fusion C1-T1  SLP following  Advance diet as appropriate        At moderate risk for venous thromboembolism (VTE)   Assessment & Plan    Secondary to decreased mobility after fall and cervical spine fractures s/p C1-T1 fusion  Heparin 5000u TID  SCDs        Diabetes [facility])   Assessment & Plan    No results found for: HGBA1C    Recent Labs      09/13/18   1656  09/13/18   2332  09/14/18   0605  09/14/18   1136   POCGLU  126  231*  156*  305*     Check HbA1C on 9/15   Previously on metformin - consider restarting  SSI    Blood Sugar Average: Last 72 hrs:          Acute blood loss anemia   Assessment & Plan    Secondary to trauma s/p surgery  H/H stable  Periodic CBC        CAD (coronary artery disease)   Assessment & Plan    S/p stenting   Previously on plavix  Will discuss with neurosurgery on resuming antiplatelet    Also with history of CVA in April 2018  Start statin          Decubitus ulcer of sacral region   Assessment & Plan    Right buttock/ischium stage II decubitus  Frequent turns  Daily monitoring         Alcohol abuse   Assessment & Plan    Currently on valium 5mg q8h  Decrease valium to 5mg q12h starting 9/16, then 5mg daily starting 9/18, then stop 9/20  Folate supplementation  Monitor for signs of withdrawal    Cessation discussed with patient        * S/P C1-T1 Posterior Cervical Discectomy and Fusion on 9/10/18   Assessment & Plan    Cervical collar at all times  MEGHAN drain in place, anticipate removal 9/15  Neurosurgery following    Recommend comprehensive inpatient rehabilitation management with rehab MD, PT, OT, rehab level nursing, and case management, for deconditioning/debility following fall with cervical spine fractures             Bladder: check PVRs x48 hours for urinary retention           Rehabilitation Prognosis: good     Tolerance for three hours of therapy a day: good     Family/Patient Goals:  Patient/family's goals: Return to previous home/apartment      Patient will receive PT, OT and ST 60 minutes each per day, five days per week to achieve rehab goals  Mobility Goals:   Bed Mobility: Moderate Cloud  Sit to stand: Supervision  Ambulation: Supervision    Activities of Daily Living (ADLs) Goals:  Eating: Independent  Hygiene and Grooming: Moderate Cloud  Bathing: Supervision  Upper Extremity Dressing: Moderate Cloud  Lower Extremity Dressing: Supervision    Cognition / Communication:  Cognition grossly intact    Discharge Planning:  Rehabilitation and discharge goals discussed with the patient and/or family  Case Managment and Social Work to review patient/family resources and to coordinate Discharge Planning  Estimated length of stay: 10 to 14 days    Patient and Family Education and Training:  Rehabilitation and discharge goals discussed with the patient and/or family  Patient/family education/training needs to be discussed in weekly team meeting  Other equipment:  Walker vs cane, tub/shower chair, grab bars      Medical Necessity Criteria for [facility] Admission: Anemia, Hypertension, Diabetes management and Incision/Wound care   In addition, the preadmission screen, post-admission physical evaluation, overall plan of care and admissions order demonstrate a reasonable expectation that the following criteria were met at the time of admission to the [facility]  1  The patient requires active and ongoing therapeutic intervention of multiple therapy disciplines (physical therapy, occupational therapy, speech-language pathology, or prosthetics/orthotics), one of which is physical or occupational therapy      2  Patient requires an intensive rehabilitation therapy program, as defined in Chapter 1, section 110 2 2 of the CMS Medicare Policy Manual  This intensive rehabilitation therapy program will consist of at least 3 hours of therapy per day at least 5 days per week or at least 15 hours of intensive rehabilitation therapy within a 7 consecutive day period, beginning with the date of admission to the [facility]  3  The patient is reasonably expected to actively participate in, and benefit significantly from, the intensive rehabilitation therapy program as defined in Chapter 1, section 110 2 2 of the CMS Medicare Policy Manual at this time of admission to the [facility]  He can reasonably be expected to make measurable improvement (that will be of practical value to improve the patients functional capacity or adaptation to impairments) as a result of the rehabilitation treatment, as defined in section 110 3, and such improvement can be expected to be made within the prescribed period of time  As noted in the CMS Medicare Policy Manual, the patient need not be expected to achieve complete independence in the domain of self-care nor be expected to return to his or her prior level of functioning in order to meet this standard  4  The patient must require physician supervision by a rehabilitation physician  As such, a rehabilitation physician will conduct face-to-face visits with the patient at least 3 days per week throughout the patients stay in the [facility] to assess the patient both medically and functionally, as well as to modify the course of treatment as needed to maximize the patients capacity to benefit from the rehabilitation process  5  The patient requires an intensive and coordinated interdisciplinary approach to providing rehabilitation, as defined in Chapter 1, section 110 2 5 of the CMS Medicare Policy Manual  This will be achieved through periodic team conferences, conducted at least once in a 7-day period, and comprising of an interdisciplinary team of medical professionals consisting of: a rehabilitation physician, registered nurse,  and/or , and a licensed/certified therapist from each therapy discipline involved in treating the patient       Changes Since Pre-admission Assessment: None -This patient's participation in rehab continues to be reasonable, necessary and appropriate  CMS Required Post-Admission Physician Evaluation Elements  History and Physical, including medical history, functional history and active comorbidities as in above text      Post-Admission Physician Evaluation:  The patient has the potential to make improvement and is in need of physical, occupational, and/or therapy services  The patient may also need nutritional services  Given the patient's complex medical condition and risk of further medical complications, rehabilitative services cannot be safely provided at a lower level of care, such as a skilled nursing facility  I have reviewed the patient's functional and medical status at the time of the preadmission screening and they are the same as on the day of this admission  I acknowledge that I have personally performed a full physical examination on this patient within 24 hours of admission  The patient and/or family demonstrated understanding the rehabilitation program and the discharge process after we discussed them      Agree in entirety: yes  Minor adaptions: none    Major changes: none     Yasmany Bo MD [facility]  Physical Medicine and Rehabilitation    ** Please Note: Fluency Direct voice to text software may have been used in the creation of this document   **

## 2018-09-14 NOTE — ASSESSMENT & PLAN NOTE
Completed CIWA/valium taper 9/20  Folate, thiamine supplementation  Monitor for signs of withdrawal    Cessation discussed with patient    Neuropsych consulted for support/counseling

## 2018-09-14 NOTE — DISCHARGE INSTRUCTIONS
Neurosurgical discharge instructions     Please keep incision clean and dry  Avoid applying creams, lotion or antiseptic to incision area   Allow steri-strips to fall off  Please remove them completely in 10 days   Check the wound daily  If the incision becomes red, swollen, tender, warm, or has increased drainage please notify MD immediately   May shower 3 days after surgery, but do not soak in a tub and no swimming   No heavy lifting greater than 5 - 10lbs   May walk as tolerated   Please wear collar as if directed by MD   Wear C-Collar for 6 weeks or until cleared by Neurosurgery   May cover incision with dry sterile bandage daily while using collar   Please take pain medications to relieved incision pain, and muscle relaxants to prevent spasms as directed by MD or Extender  See Med  Rec  completed at Discharge   Please take over the counter stool softeners such as colace or senna-s to avoid constipation while on narcotics   No driving for 2 weeks or while on narcotics   Returning to work will be discussed at follow up appt   Do not take ibuprofen, Naproxen/Aleve or any NSAID: May take Tylenol instead   If taking Coumadin, Aspirin, or Plavix, you may resume these medications once cleared by Neurosurgery   Follow-up as scheduled for a 2 week incision check and staple/suture removal   Follow-up 6 weeks after surgery with repeat cervical spine upright x-rays to be completed prior to visit  **Please notify MD immediately if you experience a fever of 101  F or have increased neck or arm pain  New numbness and/or weakness in your arm  Difficulty swallowing or breathing especially while lying down  Numbness or weakness in arms or legs  **    Abuse of Alcohol   WHAT YOU NEED TO KNOW:   · Alcohol abuse is unhealthy drinking behavior  You may drink too much at one time once a week, or continue to drink too much daily  You continue to drink even though it causes problems   The problems can be alcohol related legal problems, or problems with work or relationships with family  · If you drink too much at one time, you are binge drinking  Binge drinking is when you have a large amount of alcohol in a short time  Your blood alcohol concentrations (JUANITA) goes above 0 08 g/dLlevel during binge drinking  For men, this usually happens with more than 4 drinks in 2 hours  For women, it is more than 3 drinks in 2 hours  A drink is 12 ounces of beer, 4 ounces of wine, or 1½ ounces of liquor  DISCHARGE INSTRUCTIONS:   Call 911 for any of the following:   · You have sudden chest pain or trouble breathing  · You have a seizure or have shaking or trembling  · You were in an accident because of alcohol  Return to the emergency department if:   · You want to harm yourself or others  · You have hallucinations (you see or hear things that are not real)  · You cannot stop vomiting or you vomit blood  Contact your healthcare provider if:   · You need help to stop drinking alcohol  · You have questions or concerns about your condition or care  Medicines:   · Vitamin supplements  may be given to treat low vitamin levels  Alcohol can make it hard for your body to absorb enough vitamins such as B1  Vitamin supplements may also be given to prevent alcohol related brain damage  · Take your medicine as directed  Contact your healthcare provider if you think your medicine is not helping or if you have side effects  Tell him or her if you are allergic to any medicine  Keep a list of the medicines, vitamins, and herbs you take  Include the amounts, and when and why you take them  Bring the list or the pill bottles to follow-up visits  Carry your medicine list with you in case of an emergency  Treatments or therapies you may need:   · Detoxification (detox) and withdrawal  is a program that helps you to safely get alcohol out of your body  Detox can also help get rid of the physical need to drink  Healthcare providers monitor the physical symptoms of withdrawal  They may give you medicines to help decrease nausea, dehydration, and seizures  Healthcare providers will also monitor your blood pressure, heart and breathing rates, and your temperature  Symptoms of anxiety, depression, and suicidal thoughts are also monitored and managed during detox  Healthcare providers may give you medicines for these symptoms and therapy sessions will be available to you  Detox is usually done at a detox center or in a hospital  Healthcare providers do not recommend that you try to detox at home or by yourself  Withdrawal symptoms may become life-threatening  The center can help you find 12 step programs or an individual therapist to help with emotional support after detox  · Inpatient and outpatient treatment  focus on your personal needs to help you stop drinking  Treatment helps you understand the reasons you abuse alcohol  Counselors and therapists provide you with support and help you find ways to cope instead of drinking  You may need inpatient treatment to provide a controlled environment  You may need outpatient treatment after your inpatient treatment is complete  · Alcohol aversion therapy  takes away the desire to drink by causing a negative reaction when you drink  Healthcare providers may give you medicines that cause nausea and vomiting when you drink alcohol  They may instead give you a medicine that decreases your urge to drink alcohol  These medicines are used to help you stop drinking or reduce the amount you drink  They can also help you avoid relapse  Follow up with your healthcare provider as directed:  Write down your questions so you remember to ask them during your visits  Avoid alcohol:  You should stop drinking entirely  Alcohol can damage your brain, heart, and liver  It also increases your risk for injury, high blood pressure, and certain types of cancer   Alcohol is dangerous when you combine it with certain medicines  Do not drive if you have had alcohol:  Make sure someone who has not been drinking can help you get home  Get support:  Most people need support to stop drinking alcohol  Mental health providers, support groups, rehabilitation centers, and your healthcare provider can provide support  For more information:   · Alcoholics Anonymous  Web Address: http://On[employer]/  · Substance Abuse and [address] , [address] [place]  Web Address: https://[employer]/  © 2017 [address] [employer] Information is for End User's use only and may not be sold, redistributed or otherwise used for commercial purposes  All illustrations and images included in CareNotes® are the copyrighted property of [employer] , [employer]  or [employer]  The above information is an  only  It is not intended as medical advice for individual conditions or treatments  Talk to your doctor, nurse or pharmacist before following any medical regimen to see if it is safe and effective for you

## 2018-09-14 NOTE — ASSESSMENT & PLAN NOTE
Lab Results   Component Value Date    HGBA1C 8 5 (H) 09/15/2018       Recent Labs      09/23/18   1101  09/23/18   1616  09/23/18   2101  09/24/18   0655   POCGLU  191*  135  130  140     Metformin 1000mg BID  Glipizide 2 5mg   Recommend follow up with PCP outpatient

## 2018-09-14 NOTE — PROGRESS NOTES
Progress Note - Neurosurgery   Dontae Freedman 58 y o  male MRN: [id number]  Unit/Bed#: [facility] 932-01 Encounter: [id number]    ADDENDUM:  Approximately 30 mL over 8 hr  Drain discontinued after transfer to acute rehab center  Suture tied  Patient tolerated well  No complications  Continue to hold Plavix until two week POV if felt medically necessary to restart  Assessment:  1  POD 4 posterior cervical decompression laminectomy C3-C6, posterior cervical lateral mass and pedicle fixation/fusion C1-T1 (9/10/18)  2  Dense fracture  3  Acute tear drop vertebral body fracture of C3 and C4  4  Spinous process fracture C4 and C5  5  Acute traumatic C5 vertebral body fracture  6  Acute left facet/lamina fracture C6  7  Left occipital condyle fracture  8  Alcohol abuse  9  CAD  10  Status post fall down steps while intoxicated    Plan:  · Exam reveals full strength and sensation LT throughout extremities  Diffuse decreased PP over right bicep  Vista collar in place  Dressing intact  · Imaging reviewed personally and by attending  Final results as below  · CT cervical spine without contrast September 9, 2018: History of anterior cervical fusion C5-C6  Exam minimally displaced type 2 odontoid fracture approximately 4 mm posterior displacement  Moderately displaced tear drop fractures of anterior inferior C3 and C4 vertebral body  Acute spinous process fractures C4 and C5  Acute nondisplaced oblique fracture of C5 vertebral body  Acute nondisplaced fracture involving left facet and lamina C4  Acute nondisplaced fracture of left occipital condyle  · CTA neck with without contrast September 9, 2018:  No evidence of vertebral artery injury  No arterial dissection  Dominant right vertebral artery  · MRI cervical spine September 9, 2018:  Exaggerated cervical lordosis  Fractures of dense, C3, C4 and C5 vertebral bodies  Extensive prevertebral edema extending to C5    Edema within interspinous ligaments and paraspinal musculature  Disruption of ALL  Severe canal stenosis C3-4 and C4-5 along with moderate stenosis at C5-6 and C6-7  No definitive evidence of cord edema or acute cord injury  · Cervical spine postop upright x-rays 9/11/18:  Satisfactory alignment and hardware placement  · MEGHAN drain with red bloody output  140 mL over 24 hr   25ml this morning  Maintain at this time  Continue monitor output  Drain to gravity  Plan to discontinue this afternoon  · Pain control - per primary team   Recommend discontinuation of IV medications  Continue oral regimen as ordered  · Given complain of worse right upper extremity pain, recommend starting gabapentin  No change in exam   Defer additional imaging  · Mobilize with physical and occupational therapy - recommending short-term rehab  · DVT PPX:  Heparin, SCDs in place bilaterally  · Regarding Plavix - discussed with patient he states he had stents placed 2004  He states he met with his cardiologist several months ago and he was taking off Plavix  Per patient no need to resume Plavix  This is consistent with normal P2Y12 on admission  · Patient may transition to [facility]'s acute rehab from neurosurgical standpoint  · Will continue to follow  Call questions or concerns  Subjective/Objective   Chief Complaint: "I'm in pain"/postoperative follow-up    Subjective:  Patient complaining of severe right shoulder pain extending down requires bicep and nursing home down his forearm  Denies any associated numbness or weakness  Admits to chronic right shoulder pain and states this is similar  Also complaining of right-sided headache  Denies any chest pain shortness of breath  Tolerating oral intake without nausea vomiting  Voiding appropriately  Objective:  Lying in bed  NAD  I/O       09/12 0701 - 09/13 0700 09/13 0701 - 09/14 0700 09/14 0701 - 09/15 0700    P  O  1040 1420 240    NG/GT  0     Total Intake(mL/kg) 1040 (11 8) 1420 (16 2) 240 (2 7) Urine (mL/kg/hr) 1550 (0 7) 2150 (1) 575 (1 1)    Drains 140 140 25    Total Output 1690 2290 600    Net -031 -472 -667                 Invasive Devices     Drain            Closed/Suction Drain Midline Back 4 days                Physical Exam:  Vitals: Blood pressure 140/80, pulse 76, temperature 97 7 °F (36 5 °C), temperature source Oral, resp  rate 18, height 6' 1" (1 854 m), weight 87 8 kg (193 lb 9 oz), SpO2 95 %  ,Body mass index is 25 54 kg/m²  General appearance: alert, appears stated age, cooperative and no distress  Head: Normocephalic, without obvious abnormality,   Eyes:  Conjugate gaze, tracks appropriately  Neck: supple, symmetrical, trachea midline  Vista collar in place  Incision clean dry intact  Skin edges well aligned  No erythema, edema or drainage  Lungs: non labored breathing  Heart: regular heart rate  Neurologic:   Mental status: Alert, oriented, thought content appropriate  Cranial nerves: grossly intact (Cranial nerves II-XII)  Sensory:  Decreased pinprick right bicep    Equal bilaterally light touch  Motor: moving all extremities without focal weakness    Lab Results:    Results from last 7 days  Lab Units 09/13/18  0508 09/12/18  0535 09/11/18  0552 09/10/18  1853   WBC Thousand/uL 7 47 7 29 5 49  5 49 5 53   HEMOGLOBIN g/dL 10 5* 9 8* 8 9*  8 9* 10 3*   HEMATOCRIT % 32 4* 30 4* 27 8*  27 8* 32 1*   PLATELETS Thousands/uL 186 187 181  181 173   NEUTROS PCT %  --  59 82* 92*   MONOS PCT %  --  9 7 2*       Results from last 7 days  Lab Units 09/13/18  0508 09/12/18  0535 09/11/18  0543  09/10/18  1604 09/10/18  1443  09/09/18  0224   SODIUM mmol/L 135* 134* 137  < >  --   --   < >  --    POTASSIUM mmol/L 3 5 3 2* 4 5  < >  --   --   < >  --    CHLORIDE mmol/L 98* 98* 105  < >  --   --   < >  --    CO2 mmol/L 31 31 25  < >  --   --   < >  --    BUN mg/dL 5 3* 5  < >  --   --   < >  --    CREATININE mg/dL 0 50* 0 45* 0 38*  < >  --   --   < >  --    CALCIUM mg/dL 8 7 8 5 8 2*  < > --   --   < >  --    GLUCOSE, ISTAT mg/dl  --   --   --   --  217* 201*  --  185*   < > = values in this interval not displayed  Results from last 7 days  Lab Units 09/12/18  0535 09/10/18  1853   MAGNESIUM mg/dL 2 0 2 2       Results from last 7 days  Lab Units 09/10/18  1853   PHOSPHORUS mg/dL 2 5       Results from last 7 days  Lab Units 09/09/18  0243   INR  0 89     No results found for: TROPONINT  ABG:  Lab Results   Component Value Date    PHART 7 362 09/10/2018    LCU2TGJ 42 6 09/10/2018    PO2ART 96 1 09/10/2018    YPA5SHI 23 6 09/10/2018    BEART -1 7 09/10/2018    SOURCE Line, Arterial 09/10/2018       Imaging Studies: I have personally reviewed pertinent reports  and I have personally reviewed pertinent films in PACS    XR spine cervical 2 or 3 vw injury   Final Result by Owen Mehta DO (09/13 6318)      Fluoroscopic guidance provided for intraoperative localization during cervical fusion procedure as above  Please refer to the separate procedure notes for additional details  Workstation performed: [id number]         XR spine cervical 2 or 3 vw injury   Final Result by Willian Hicks DO (09/12 9786)   Status post decompressive laminectomy and cervical fusion C1-T1, postoperative day #1  Hardware intact  Stable fractures  Workstation performed: [id number]         XR chest 1 view   Final Result by Abdiel Garcia MD (09/11 1214)      No active pulmonary disease  Workstation performed: [id number]         XR chest portable   Final Result by Tab Warner MD (31/30 6895)      ET tube tip is approximately 4 cm above the calixto  Workstation performed: [id number]         MRI cervical spine wo contrast   Final Result by Mirella Londono DO (09/09 1226)      Exaggerated cervical lordosis  Fractures of the dens, C3, C4 and C5 vertebral bodies demonstrate similar alignment compared to recent CT scan    The dens is slightly displaced posteriorly in relation to the body of C2, approximately 4 mm  Extensive prevertebral edema extending from the level of the clivus inferiorly to the level of C5  There is edema within the interspinous ligaments and paraspinal musculature posteriorly  There is disruption of the anterior longitudinal ligament  However, the posterior longitudinal ligament and ligamentum flavum appear intact  Severe canal stenosis at the C3-4, C4-5 levels  Moderate canal stenosis at C5-6 and C6-7  This is related to annular bulging, endplate and posterior element hypertrophic change  Multilevel foraminal narrowing, severe at C6-7 and moderate at the C3-4,    C4-5 and C5-6 levels  Evaluation of the cord is somewhat limited due to patient motion and the severity of the canal stenosis  However, there does not appear to be cord edema to suggest acute cord injury  Workstation performed: [id number]         XR trauma multiple   Final Result by Salma Mead MD (09/09 0530)      No active pulmonary disease  Workstation performed: [id number]         CTA neck w wo contrast   Final Result by Rios Alanis MD (09/09 7662)      No evidence of vertebral artery injury  No arterial dissection  No hemodynamically significant stenosis within either common or internal carotid artery  Less than 50% stenosis by NASCET criteria  Dominant right vertebral artery with a small calibered left vertebral artery  Acute fractures of C2, C3, C4 and C5 as described above  Workstation performed: [id number]         CT chest abdomen pelvis w contrast   Final Result by Rios Alanis MD (09/09 7816)      No evidence of solid organ injury  No acute intra-abdominal abnormality  No free air or free fluid  No pneumothorax  Multiple old appearing bilateral rib fractures  17 mm peripherally calcified right renal cyst   A nonemergent renal ultrasound is recommended for further characterization  Workstation performed: [id number]         CT spine cervical without contrast   Final Result by Meme Mcleod MD (09/09 0256)      Acute fractures of C2, C3, C4 and C5 as described above  Acute nondisplaced left occipital condyle fracture  An MRI of the cervical spine could be performed for further evaluation  Moderate prevertebral soft tissue swelling, likely posttraumatic in nature  I personally discussed this study with Abdiaziz Mas on 9/9/2018 at 2:50 AM                    Workstation performed: [id number]         CT head without contrast   Final Result by Meme Mcleod MD (09/09 0246)      No acute intracranial abnormality  Mild chronic small vessel ischemic changes and volume loss  Workstation performed: [id number]         XR spine cervical 2 or 3 vw injury    (Results Pending)       EKG, Pathology, and Other Studies: I have personally reviewed pertinent reports        VTE Pharmacologic Prophylaxis: Heparin    VTE Mechanical Prophylaxis: sequential compression device

## 2018-09-15 LAB
EST. AVERAGE GLUCOSE BLD GHB EST-MCNC: 197 MG/DL
GLUCOSE SERPL-MCNC: 176 MG/DL (ref 65–140)
GLUCOSE SERPL-MCNC: 209 MG/DL (ref 65–140)
GLUCOSE SERPL-MCNC: 248 MG/DL (ref 65–140)
GLUCOSE SERPL-MCNC: 320 MG/DL (ref 65–140)
HBA1C MFR BLD: 8.5 % (ref 4.2–6.3)

## 2018-09-15 PROCEDURE — 97110 THERAPEUTIC EXERCISES: CPT

## 2018-09-15 PROCEDURE — 97166 OT EVAL MOD COMPLEX 45 MIN: CPT

## 2018-09-15 PROCEDURE — 97535 SELF CARE MNGMENT TRAINING: CPT

## 2018-09-15 PROCEDURE — 82948 REAGENT STRIP/BLOOD GLUCOSE: CPT

## 2018-09-15 PROCEDURE — 94640 AIRWAY INHALATION TREATMENT: CPT

## 2018-09-15 PROCEDURE — 94760 N-INVAS EAR/PLS OXIMETRY 1: CPT

## 2018-09-15 PROCEDURE — 83036 HEMOGLOBIN GLYCOSYLATED A1C: CPT | Performed by: PHYSICIAN ASSISTANT

## 2018-09-15 PROCEDURE — 97530 THERAPEUTIC ACTIVITIES: CPT

## 2018-09-15 PROCEDURE — 97162 PT EVAL MOD COMPLEX 30 MIN: CPT

## 2018-09-15 RX ADMIN — DIAZEPAM 5 MG: 5 TABLET ORAL at 15:09

## 2018-09-15 RX ADMIN — LEVALBUTEROL HYDROCHLORIDE 1.25 MG: 1.25 SOLUTION, CONCENTRATE RESPIRATORY (INHALATION) at 07:10

## 2018-09-15 RX ADMIN — METOPROLOL TARTRATE 50 MG: 50 TABLET, FILM COATED ORAL at 08:41

## 2018-09-15 RX ADMIN — HEPARIN SODIUM 5000 UNITS: 5000 INJECTION, SOLUTION INTRAVENOUS; SUBCUTANEOUS at 05:03

## 2018-09-15 RX ADMIN — LEVALBUTEROL HYDROCHLORIDE 1.25 MG: 1.25 SOLUTION, CONCENTRATE RESPIRATORY (INHALATION) at 19:02

## 2018-09-15 RX ADMIN — OXYCODONE HYDROCHLORIDE 5 MG: 5 TABLET ORAL at 05:04

## 2018-09-15 RX ADMIN — DIAZEPAM 5 MG: 5 TABLET ORAL at 00:59

## 2018-09-15 RX ADMIN — INSULIN LISPRO 3 UNITS: 100 INJECTION, SOLUTION INTRAVENOUS; SUBCUTANEOUS at 21:38

## 2018-09-15 RX ADMIN — FOLIC ACID 1 MG: 1 TABLET ORAL at 08:40

## 2018-09-15 RX ADMIN — HEPARIN SODIUM 5000 UNITS: 5000 INJECTION, SOLUTION INTRAVENOUS; SUBCUTANEOUS at 21:38

## 2018-09-15 RX ADMIN — LEVALBUTEROL HYDROCHLORIDE 1.25 MG: 1.25 SOLUTION, CONCENTRATE RESPIRATORY (INHALATION) at 14:17

## 2018-09-15 RX ADMIN — INSULIN LISPRO 2 UNITS: 100 INJECTION, SOLUTION INTRAVENOUS; SUBCUTANEOUS at 16:42

## 2018-09-15 RX ADMIN — ATORVASTATIN CALCIUM 80 MG: 80 TABLET, FILM COATED ORAL at 16:42

## 2018-09-15 RX ADMIN — ACETAMINOPHEN 975 MG: 325 TABLET ORAL at 13:35

## 2018-09-15 RX ADMIN — GABAPENTIN 100 MG: 100 CAPSULE ORAL at 08:40

## 2018-09-15 RX ADMIN — GABAPENTIN 100 MG: 100 CAPSULE ORAL at 15:08

## 2018-09-15 RX ADMIN — ACETAMINOPHEN 975 MG: 325 TABLET ORAL at 21:37

## 2018-09-15 RX ADMIN — HEPARIN SODIUM 5000 UNITS: 5000 INJECTION, SOLUTION INTRAVENOUS; SUBCUTANEOUS at 13:35

## 2018-09-15 RX ADMIN — METOPROLOL TARTRATE 50 MG: 50 TABLET, FILM COATED ORAL at 21:37

## 2018-09-15 RX ADMIN — Medication 100 MG: at 08:40

## 2018-09-15 RX ADMIN — INSULIN LISPRO 1 UNITS: 100 INJECTION, SOLUTION INTRAVENOUS; SUBCUTANEOUS at 11:09

## 2018-09-15 RX ADMIN — IPRATROPIUM BROMIDE 0.5 MG: 0.5 SOLUTION RESPIRATORY (INHALATION) at 14:17

## 2018-09-15 RX ADMIN — OXYCODONE HYDROCHLORIDE 5 MG: 5 TABLET ORAL at 13:35

## 2018-09-15 RX ADMIN — IPRATROPIUM BROMIDE 0.5 MG: 0.5 SOLUTION RESPIRATORY (INHALATION) at 19:02

## 2018-09-15 RX ADMIN — INSULIN LISPRO 1 UNITS: 100 INJECTION, SOLUTION INTRAVENOUS; SUBCUTANEOUS at 08:41

## 2018-09-15 RX ADMIN — ACETAMINOPHEN 975 MG: 325 TABLET ORAL at 05:03

## 2018-09-15 RX ADMIN — FLUTICASONE FUROATE AND VILANTEROL TRIFENATATE 1 PUFF: 100; 25 POWDER RESPIRATORY (INHALATION) at 08:41

## 2018-09-15 RX ADMIN — GABAPENTIN 100 MG: 100 CAPSULE ORAL at 21:37

## 2018-09-15 RX ADMIN — IPRATROPIUM BROMIDE 0.5 MG: 0.5 SOLUTION RESPIRATORY (INHALATION) at 07:10

## 2018-09-15 RX ADMIN — LIDOCAINE 1 PATCH: 50 PATCH TOPICAL at 08:41

## 2018-09-15 RX ADMIN — DIAZEPAM 5 MG: 5 TABLET ORAL at 08:41

## 2018-09-15 NOTE — PROGRESS NOTES
KAYY GALLOWAY   09/15/18 0700   Patient Data   Rehab Impairment Impairment of mobility, safety and Activities of Daily Living (ADLs) due to Orthopedic Disorders   Etiologic Diagnosis Posterior cervical decompressive laminectomy C3-6; Posterior cervical lateral mass and pedicle fixation fusion C1-T1    Date of Onset 09/09/18   Home Setup   Type of Home Multi Level   Method of Entry Stairs   Number of Stairs 3   Number of Stairs in Home 13   First Floor Bathroom Tub;Curtain;Grab Bars   First Floor Bathroom Accessibility Grab bars in tub/shower; Shower chair   First Floor Setup Available Yes   Available Equipment Roller Walker;Single SARCELLES; Shower Chair   Baseline Information   Prior Device(s) Used Single Point Cane   Prior IADL Participation   Money Management Identify Money;Estimate Costs;Estimate Change;Combine Bills;Manage Checkbook   Meal Preparation Partial Participation;Microwave;Stove top   [employer] Participation;Transport/store; Wash;Dry;Fold   Home Cleaning Partial Participation  (daughter and son in law assist)   Prior Level of Function   Self-Care 3  Independent - Patient completed the activities by him/herself, with or without an assistive device, with no assistance from a helper  Functional Cognition 3  Independent - Patient completed the activities by him/herself, with or without an assistive device, with no assistance from a helper  Prior Assistance Needed for Household Chores/Cleaning;Meal Preparation   Prior Device Used Other (comments)  [facility])   Patient Preference   Nickname (Patient Preference) [place]   Psychosocial   Psychosocial (WDL) WDL   Patient Behaviors/Mood Cooperative   Restrictions/Precautions   Precautions Bed/chair alarms; Fall Risk;Spinal precautions;Pressure Ulcer;Cognitive   ROM Restrictions Yes  (spinal precautions)   Braces or Orthoses C/S Collar   Pain Assessment   Pain Assessment 0-10   Pain Score 3   Pain Type Surgical pain   Pain Location Neck   Pain Orientation Bilateral   QI: Eating   Assistance Needed Independent   Assistance Provided by [place] No physical assistance   Eating CARE Score 6   Eating Assessment   Meal Assessed Breakfast   QI: Swallowing/Nutritional Status Regular food   Current Diet Dental Soft   Eating (FIM) 6 - Patient requires increased time or safety concern   QI: Oral Hygiene   Assistance Needed Supervision   Assistance Provided by [place] No physical assistance   Oral Hygiene CARE Score 4   Grooming   Able To Initiate Tasks;Comb/Brush Hair;Wash/Dry Face;Brush/Clean Teeth;Wash/Dry Hands   Limitation Noted In Safety;Strength;Problem Solving   Findings Pt w/c level to complete grooming routine  Pt required VC to maintain spinal precautions during oral hygiene  Pt required A to comb hair thoroughly due to C/S collar bulk  Grooming (FIM) 4 - Patient completed 3/4  tasks   QI: Shower/Bathe Self   Assistance Needed Physical assistance   Assistance Provided by [place] 25%-49%   Shower/Bathe Self CARE Score 3   Bathing   Assessed Bath Style Sponge Bath   Anticipated D/C Bath Style Tub   Able to Gather/Transport No   Able to [employer] Temperature Yes   Able to Wash/Rinse/Dry (body part) Left Arm;Right Arm;L Upper Leg;R Upper Leg;Chest;Abdomen;Perineal Area; Buttocks   Limitations Noted in Balance; Endurance; Safety;Problem Solving;ROM;Strength   Positioning Seated;Standing   Findings  Pt seated to bathe UB  Pt demo ability to utilize cross leg method to bathe b/l LE, however required A for thoroughness 2* fatigue  Pt CGA in stance to bathe buttocks/perineal area  Bathing (FIM) 4 - Patient completes 8/10 or 9/10 parts   QI: Upper Body Dressing   Assistance Needed Supervision   Assistance Provided by [place] No physical assistance   Comment Pt able to don/doff pull over shirt      Upper Body Dressing CARE Score 4   QI: Lower Body Dressing   Assistance Needed Physical assistance   Assistance Provided by [place] Less than 25%   Comment Pt seated to thread pants/undergarment over b/l LE, however required A for R LE 2* fatigue  Pt steadying A in stance for CM over hips  Lower Body Dressing CARE Score 3   QI: Putting On/Taking Off Footwear   Assistance Needed Supervision   Assistance Provided by [place] No physical assistance   Comment Pt able to don/doff b/l socks w/ cross leg method  Putting On/Taking Off Footwear CARE Score 4   QI: Picking Up Object   Reason if not Attempted Safety concerns   Picking Up Object CARE Score 88   Dressing/Undressing Clothing   Remove UB Clothes Pullover Shirt   Remove LB Clothes Pants;Socks   Don UB Clothes Pullover Shirt   Don LB Clothes Pants; Undergarment;Socks   Limitations Noted In Balance; Endurance; Coordination; Safety;Problem Solving;Strength;ROM   Positioning Supported Sit;Standing   Findings Pt noted w/ fatigue and frequent SOB t/o ADL routine 2* PMH of COPD and requires inc time to complete  See above note for detail  UB Dressing (FIM) 5 - [place] sets up supplies or applies device   LB Dressing (FIM) 4 - Patient completes 75% of all tasks   QI: [address] Needed Incidental touching   Assistance Provided by [place] Less than 25%   Comment CGA   Toileting Hygiene CARE Score 3   Toileting   Able to [address] down yes, up yes  Able to Manage Clothing Closures Other  (elastic waist band)   Manage Hygiene Bladder   Limitations Noted In Balance; Safety;LE Strength   Adaptive Equipment Grab Bar   Findings Pt CGA in stance for CM over hips      Toileting (FIM) 4 - Patient requires steadying assist or light touching   QI: Roll Left and Right   Assistance Needed Supervision   Assistance Provided by [place] No physical assistance   Roll Left and Right CARE Score 4   QI: Lying to Sitting on Side of Bed   Assistance Needed Supervision   Assistance Provided by [place] No physical assistance   Lying to Sitting on Side of Bed CARE Score 4   QI: Sit to Stand   Assistance Needed Physical assistance   Assistance Provided by [place] Less than 25% Sit to Stand CARE Score 3   QI: Chair/Bed-to-Chair Transfer   Assistance Needed Physical assistance   Assistance Provided by [place] Less than 25%   Comment RW   Chair/Bed-to-Chair Transfer CARE Score 3   Transfer Bed/Chair/Wheelchair   Positioning Concerns Skin Integrity   Limitations Noted In Balance; Endurance; Coordination;LE Strength;UE Strength   Adaptive Equipment Roller Walker   Stand Pivot Minimal Assist   Sit to Stand Minimal   Stand to Sit Minimal   Supine to Sit Supervision   Findings Pt Deanna-CGA w/ RW for all fxnl transfers  Pt require VC for safe hand placement  Bed, Chair, Wheelchair Transfer (FIM) 4 - Patient requires steadying assist or light touching   QI: Toilet Transfer   Assistance Needed Physical assistance   Assistance Provided by [place] Less than 25%   Toilet Transfer CARE Score 3   Toilet Transfer   Surface Assessed Standard Toilet   Transfer Technique Standard   Limitations Noted In Balance; Endurance; Safety;LE Strength;UE Strength   Adaptive Equipment Grab Bar   Findings Pt Deanna w/ RW for fxnl ambulation to/from bathroom and onto/off of toilet  Pt required VC for safe hand placement on GB  Toilet Transfer (FIM) 4 - Patient requires steadying assist or light touching   Tub/Shower Transfer   Not Assessed Sponge Bath;Medical   Tub Transfer (FIM) 0 - Activity does not occur   Shower Transfer (FIM) 0 - Activity does not occur   Comprehension   QI: Comprehension 3  Usually Understands: Understands most conversations, but misses some part/intent of message  Requires cues at times to understand  Comprehension (FIM) 5 - Needs help/cues, repetition only RARELY ( < 10% of the time)   Expression   QI: Expression 4   Express complex messages without difficulty and with speech that is clear and easy to [place]   Expression (FIM) 4 - Expresses basic info/needs 75-90% of time   Social Interaction   Social Interaction (FIM) 5 - Interacts appropriately with others 90% of time   Problem Solving Problem solving (FIM) 4 - Solves basic problems 75-89% of time   Memory   Recalls Precaution No   Memory (FIM) 4 - Recognizes/recalls/performs 75-89%   RUE Assessment   RUE Assessment WFL   LUE Assessment   LUE Assessment WFL   Sensation   Light Touch No apparent deficits   Cognition   Overall Cognitive Status Impaired   Arousal/Participation Alert; Responsive   Attention Attends with cues to redirect   Orientation Level Oriented X4   Memory Decreased short term memory;Decreased recall of precautions   Following Commands Follows one step commands with increased time or repetition   Comments Pt able to recall 2/3 spinal precautions only after being prompted by OT w/ acronym "BLT"  Pt easily frustrated during ADL routine, however cooperative t/o Tx session  Discharge Information   Patient's Discharge Plan d/c home w/ family support   Impressions Pt is a 58 y o  male seen for OT evaluation s/p [facility]/2018 for posterior cervical decompressive laminectomy C3-6 and posterior cervical lateral mass and pedicle fixation fusion C1-T1 following fall down flight of stairs while intoxicated  Pt PMH includes: alcohol abuse, falls w/ head trauma, CVA (4/2018), CAD s/p cardiac stenting, HTN, COPD, DM II, and sacral decubitus ulcer  Pt reports living in finished basement in multi-level home w/ daughter and son-in-law PTA  Pt reports PLOF as mod I w/ use of SPC for long distant ambulation, independent for ADLs and driving, and partial participation in IADLs w/ some A from daughter for cooking/cleaning  Pt reports having the following DME available at home: shower chair w/ back, GB in tub, hand held shower, RW, and SPC  Upon evaluation, pt current level of fxn: Deanna for ADLs and fxnl transfers w/ RW due to the following deficits impacting occupational performance: decreased endurance, decreased UE/LE strength, impaired balance, spinal precautions and pain management    Based on OT evaluation, pt demonstrates good rehab potential and would benefit from skilled OT services to reach pt goals of mod I w/ LRAD w/ ELOS 7-10 days to return to least restrictive environment pending pt progress     OT Therapy Minutes   OT Time In 0700   OT Time Out 0830   OT Total Time (minutes) 90   OT Mode of treatment - Individual (minutes) 90   OT Mode of treatment - Concurrent (minutes) 0   OT Mode of treatment - Group (minutes) 0   OT Mode of treatment - Co-treat (minutes) 0   OT Mode of Teatment - Total time(minutes) 90 minutes

## 2018-09-15 NOTE — PROGRESS NOTES
Internal Medicine Progress Note  Patient: Vega Ojeda  Age/sex: 58 y o  male  Medical Record #: [id number]      ASSESSMENT/PLAN:  Vega Ojeda is seen and examined and mangement for following issues:    # Multiple cervical fractures s/p posterior cervical decompression laminectomy C3-6, posterior cervical lateral mass and pedicle fixation/fusion C1-T1: Pain control  Monitor incision  Aspen collar at all times      # HTN: Continue Lopressor 50mg every 12 hours  Monitor BP every shift      # MI s/p PTCA: Plavix on hold  Resume when cleared by Neurosurgery      # ETOH abuse: Continue folate, thiamine and Valium  Valium being weaned      # DM type 2 with hyperglycemia:  Hemoglobin A1c 8 5  Pt reports taking metformin 1000mg 2x daily  Will resume at 500mg 2x daily  Continue SSI and accuchecks  Adjust medications as needed  BS:  305, 283, 265, 176     # COPD: Pt uses Advair 500/50 and Ventolin at home  He also uses albuteral via nebulizer 3x daily  Use Breo Elipta here for Advair  Continue Proventil inhaler prn as well as Xopenex and Atrovent via nebulizer  Subjective: Patient seen and examined  Patient reports he is doing well  Neck pain is well controlled  The Aspen collar it is not uncomfortable  He denies any current complaints      ROS:   GI: denies abdominal pain, change bowel habits or reflux symptoms  Neuro: No new neurologic changes  Respiratory: No Cough, SOB  Cardiovascular: No CP, palpitations     Scheduled Meds:    Current Facility-Administered Medications:  acetaminophen 975 mg Oral [facility] Jordan Aguila MD   albuterol 2 puff Inhalation Q4H PRN Jordan Aguila MD   atorvastatin 80 mg Oral Daily With Sam Hudson MD   bisacodyl 10 mg Rectal Daily PRN Jordan Aguila MD   diazepam 5 mg Oral Q8H Jordan Aguila MD   [START ON 9/16/2018] diazepam 5 mg Oral Q12H Jordan Aguila MD   [START ON 9/18/2018] diazepam 5 mg Oral Daily Jordan Aguila MD   fluticasone-vilanterol 1 puff Inhalation Daily Lauren NOAH Braswell   folic acid 1 mg Oral Daily Kaleb Pierre MD   gabapentin 100 mg Oral TID Kaleb Pierre MD   heparin (porcine) 5,000 Units Subcutaneous [facility] Kaleb Pierre MD   insulin lispro 1-5 Units Subcutaneous HS Lauren Braswell PA-C   insulin lispro 1-5 Units Subcutaneous TID AC Lauren Braswell PA-C   ipratropium 0 5 mg Nebulization TID Kaleb Pierre MD   levalbuterol 1 25 mg Nebulization TID Kaleb Pierre MD   lidocaine 1 patch Transdermal Daily Kaleb Pierre MD   magnesium hydroxide 30 mL Oral Daily PRN Kaleb Pierre MD   melatonin 3 mg Oral HS PRN Kaleb Pierre MD   metoprolol tartrate 50 mg Oral Q12H [address] Kaleb Pierre MD   oxyCODONE 10 mg Oral Q4H PRN Kaleb Pierre MD   oxyCODONE 5 mg Oral Q4H PRN Kaleb Pierre MD   senna 2 tablet Oral HS Kaleb Pierre MD   thiamine 100 mg Oral Daily Kaleb Pierre MD       Labs:       Results from last 7 days  Lab Units 09/13/18  0508 09/12/18  0535   WBC Thousand/uL 7 47 7 29   HEMOGLOBIN g/dL 10 5* 9 8*   HEMATOCRIT % 32 4* 30 4*   PLATELETS Thousands/uL 186 187       Results from last 7 days  Lab Units 09/13/18  0508 09/12/18  0535  09/10/18  1604   SODIUM mmol/L 135* 134*  < >  --    POTASSIUM mmol/L 3 5 3 2*  < >  --    CHLORIDE mmol/L 98* 98*  < >  --    CO2 mmol/L 31 31  < >  --    BUN mg/dL 5 3*  < >  --    CREATININE mg/dL 0 50* 0 45*  < >  --    GLUCOSE, ISTAT mg/dl  --   --   --  217*   CALCIUM mg/dL 8 7 8 5  < >  --    < > = values in this interval not displayed      Results from last 7 days  Lab Units 09/15/18  0500   HEMOGLOBIN A1C % 8 5*       Results from last 7 days  Lab Units 09/09/18  0243   INR  0 89        Glucose, i-STAT (mg/dl)   Date Value   09/10/2018 217 (H)   09/10/2018 201 (H)   09/09/2018 185 (H)       Labs reviewed    Physical Examination:  Vitals:   Vitals:    09/15/18 0456 09/15/18 0500 09/15/18 0710 09/15/18 0839   BP: 169/89 169/89  123/71   BP Location: Left arm   Left arm   Pulse: 79 79  99   Resp: 18      Temp: 97 9 °F (36 6 °C) TempSrc: Oral      SpO2: 94%  95%    Weight:       Height:           PERRLA EOMI no JVD  RESP: CTAB, no R/R/W  CV: +S1 S2, regular rate, no rubs  ABD: soft, NT, ND, normal BS   EXT:  No edema  Neuro:  Alert and oriented x3  [ X ] Total time spent: 30 Mins and greater than 50% of this time was spent counseling/coordinating care  ** Please Note: Dragon 360 Dictation voice to text software may have been used in the creation of this document   **

## 2018-09-15 NOTE — PROGRESS NOTES
09/15/18 1230   Patient Data   Rehab Impairment Impairment of mobility, safety and Activities of Daily Living (ADLs) due to Orthopedic Disorders   Etiologic Diagnosis Posterior cervical decompressive laminectomy C3-6; Posterior cervical lateral mass and pedicle fixation fusion C1-T1    Date of Onset 09/09/18   Support System   Relationship daughter, son-in-law   Home Setup   Type of Home Multi Level   Method of Entry Stairs   Number of Stairs 3  (plus 1 curb step from porch into house)   Number of Stairs in Home 12   In Home Hand Rail Bilateral  (daughter to have BHR installed)   First Floor Setup Available No   Available Equipment XDx   Prior Level of Function   Self-Care 3  Independent - Patient completed the activities by him/herself, with or without an assistive device, with no assistance from a helper  Indoor-Mobility (Ambulation) 3  Independent - Patient completed the activities by him/herself, with or without an assistive device, with no assistance from a helper  Stairs 3  Independent - Patient completed the activities by him/herself, with or without an assistive device, with no assistance from a helper  Functional Cognition 3  Independent - Patient completed the activities by him/herself, with or without an assistive device, with no assistance from a helper  Patient Preference   Nickname (Patient Preference) desirae   Psychosocial   Psychosocial (WDL) WDL   Restrictions/Precautions   Precautions Bed/chair alarms; Fall Risk;Aspiration   Braces or Orthoses C/S Collar   Pain Assessment   Pain Score 2   Pain Type Surgical pain   Pain Location Arm;Neck   Pain Orientation Bilateral;Posterior   Hospital Pain Intervention(s) Repositioned; Ambulation/increased activity  (nursing provided pain meds during session)   Response to Interventions 4/10 during session and worked on [address] [place] [address] Way and nursing provided pain meds, and end of session pt reported 3/10 when back in room in recliner (put cushion on recliner to elevate seat height)   QI: Roll Left and Right   Assistance Needed Supervision   Roll Left and Right CARE Score 4   QI: Sit to Lying   Assistance Needed Supervision   Sit to Lying CARE Score 4   QI: Lying to Sitting on Side of Bed   Assistance Needed Supervision   Lying to Sitting on Side of Bed CARE Score 4   QI: Sit to Stand   Assistance Needed Physical assistance; Adaptive equipment   Assistance Provided by [place] Less than 25%   Sit to Stand CARE Score 3   QI: Chair/Bed-to-Chair Transfer   Assistance Needed Physical assistance; Adaptive equipment   Assistance Provided by [place] Less than 25%   Chair/Bed-to-Chair Transfer CARE Score 3   QI: Car Transfer   Assistance Needed Physical assistance; Adaptive equipment   Assistance Provided by [place] Less than 25%   Car Transfer CARE Score 3   Transfer Bed/Chair/Wheelchair   Limitations Noted In Balance; Endurance;LE Strength;UE Strength   Adaptive Equipment Roller Walker;None;Hand Hold   Stand Pivot Minimal Assist   Sit to Stand Minimal   Stand to Sit Minimal   Supine to Sit Supervision   Sit to Supine Supervision   Findings reviewed log roll technique for bed mobility and sidelying wtih use of pillows to support neck appropriately   dec standing dynamic balance, dec righting reactions   Bed, Chair, Wheelchair Transfer (FIM) 1 - Patient requires total assist for all tasks   QI: Walk 10 Feet   Assistance Needed Physical assistance   Assistance Provided by [place] Total assistance   Comment Sarah x2, 2nd person for safety/CGA   Walk 10 Feet CARE Score 1   QI: Walk 50 Feet with Two Turns   Assistance Needed Physical assistance   Assistance Provided by [place] Total assistance   Comment Sarah x2, 2nd person for safety/CGA   Walk 50 Feet with Two Turns CARE Score 1   QI: Walk 150 Feet   Assistance Needed Physical assistance   Assistance Provided by [place] Total assistance   Comment Sarah x2, 2nd person for safety/CGA   Walk 150 Feet CARE Score 1   QI: Walking 10 Feet on Uneven Surfaces   Reason if not Attempted Safety concerns   Walking 10 Feet on Uneven Surfaces CARE Score 88   Ambulation   Does the patient walk? 2  Yes   Primary Discharge Mode of Locomotion Walk   Walk Assist Level Minimum Assist  (Ax1-2)   Gait Pattern Inconsistant [place]; Slow [place]; Forward Flexion; Improper weight shift;Decreased L stance;Decreased R stance   Assist Device Roller Walker;Cane;Hand Hold  (none)   Distance Walked (feet) 50 ft  (60, 100, 125, 140 x2)   Limitations Noted In Balance; Endurance; Heel Strike;Swing;Strength;Speed   Findings pt reported feeling dizzy upon standing and marching in place; BP seated 140/80 and standing 126/78  BP WNL during rest of session however pt occasionally reported feeling dizzy  nursing staff notified  Walking (FIM) 1 - Patient requires assist of two people   Wheelchair mobility   QI: Does the patient use a wheelchair? 0  No   QI: 1 Step (Curb)   Reason if not Attempted Safety concerns   1 Step (Curb) CARE Score 88   QI: 4 Steps   Assistance Needed Physical assistance; Adaptive equipment   Assistance Provided by [place] Less than 25%   4 Steps CARE Score 3   QI: 12 Steps   Reason if not Attempted Safety concerns   12 Steps CARE Score 88   Stairs   Type [place] Steps   # of Steps 6   Weight Bearing Precautions Fall Risk   Assist Devices Bilateral Rail   Findings nonreciprocal and reciprocal pattern, BHR for safety, Sarah and 2nd person present for safety   Stairs (FIM) 1 - Patient requires assist of two people   Comprehension   QI: Comprehension 4  Undestands: Clear comprehension without cues or repetitions   Comprehension (FIM) 5 - Understands basic directions and conversation   Expression   QI: Expression 4   Express complex messages without difficulty and with speech that is clear and easy to [place]   Expression (FIM) 6 - Expresses complex/abstract but requires:  more time   Social Interaction   Social Interaction (FIM) 5 - Requires redirection but less than 10% of the time  Problem Solving   Problem solving (FIM) 5 - Solves basic problems 90% of time   Memory   Memory (FIM) 4 - Recognizes/recalls/performs 75-89%   Strength RLE   RLE Overall Strength 3+/5   Strength LLE   LLE Overall Strength 3+/5   Coordination   Movements are Fluid and Coordinated 1   Cognition   Arousal/Participation Cooperative   Objective Measure   PT Measure(s) session focused on pt ed, review of household set up and family support, review of spine precautions and safety precautions with mobility  sitting rest breaks as needed and when pt felt dizzy  switched to a high back WC with head support for support for pt when sitting up OOB/in therapy  supine on mat, practiced rolling both ways in hooklying, reviewed different positioning techniqeus for pt that he can use here and at home  reviewed lumbar stabilization exercises with marching in place in hooklying and knee ext while maintaining abd contraction  sidleying hip abd 3x12 BLE, hooklying resisted hip abd against green theraband 3x15  stretching bilat hamstrings and calves 2x60 sec each  review of pt's overall functional mobility currently, reviewed recommendations for familly support at home  Discharge Information   Patient's Discharge Plan to dc home with family support   Patient's Rehab Expectations to get better and go home   Impressions Pt presents for PT eval s/p Posterior cervical decompressive laminectomy C3-6; Posterior cervical lateral mass and pedicle fixation fusion C1-T1  Pt presents with forward head posture/kyphosis, which in conjunction wtih spine precautions and c-collar limits his ability to use vision to maintain balance  Pt was using RW/SPC at home PTA and practiced with both of them today; pt had 1 LOB requiring ModA with SPC however did not have a LOB with RW   Discussed with pt PT POC that includes walking without AD to challenge balance/improve righting reactions as much as possible, however may recommend use of RW at home  Pt presents with dec activity tolerance and need for rest breaks during therapy  Pt also presents w/dec BLE strength, which impacts his ability to perform transfers and stairs  Pt will cont to benefit from skilled PT to further progress overall safety and I with functional mobility      PT Therapy Minutes   PT Time In 1230   PT Time Out 1400   PT Total Time (minutes) 90   PT Mode of treatment - Individual (minutes) 90   PT Mode of treatment - Concurrent (minutes) 0   PT Mode of treatment - Group (minutes) 0   PT Mode of treatment - Co-treat (minutes) 0   PT Mode of Teatment - Total time(minutes) 90 minutes

## 2018-09-15 NOTE — PROGRESS NOTES
09/15/18 0700   Rehab Team Goals   ADL Team Goal Patient will be independent with ADLs with least restrictive device upon completion of rehab program   Cognitive Team Goal Patient will be independent for basic  tasks and require supervision for complex tasks upon completion of rehab program   Rehab Team Interventions   OT Interventions Self Care;Home Management; Therapeutic Exercise;Community Reintegration;Cognitive Retraining;Energy Conservation;Patient/Family Education   Eating Goal   QI: Eating Goal 06  Independent - Patient completes the activity by him/herself with no assistance from a helper  FIM Eating Goal Moderate [place]   Status Ongoing; Target goal - one week; Target goal - two weeks   Grooming Goal   QI: Oral Hygiene Goal 06  Independent - Patient completes the activity by him/herself with no assistance from a helper  FIM Grooming Goal Moderate [place]   Task Wash/Dry Face;Wash/Dry Hands;Brush Teeth;Comb Hair; Shave;Acquire Items; Initiate Task   Environment Seated in Chair;Stand at [employer]   Status Ongoing; Target goal - one week; Target goal - two weeks   Intervention Assistive Device;Balance Work; Therapeutic Exercise; Tolerance Work   Bathing Goal   QI: Shower/bathe self Goal 06  Independent - Patient completes the activity by him/herself with no assistance from a helper  FIM Bathing Goal Moderate [place]   Task Upper Body Bathing  (LB bathing)   Environment Seated;Standing; Tub   Adaptive Equipment Reacher;Long Handle Sponge;Seat with back;Grab Bar;Hand Held Shower   Safety Precautions Other  (sternal precautions)   Status Ongoing; Target goal - one week; Target goal - two weeks   Intervention ADL Training;Assistive Device; Therapeutic Exercise   Upper Body Dressing Goal   QI: Upper body dressing Goal 06  Independent - Patient completes the activity by him/herself with no assistance from a helper  FIM Upper Body Dressing Goal Moderate [place]   Task Upper Body;Arms in/out; Over Head;Orthotic   Environment Seated   Status Ongoing; Target goal - one week; Target goal - two weeks   Intervention Balance Work;Assistive Device; Therapeutic Exercise; Tolerance Work   [place] Dressing Goal   QI: Lower body dressing Goal 06  Independent - Patient completes the activity by him/herself with no assistance from a helper  QI: Putting on/taking off footwear Goal 06  Independent - Patient completes the activity by him/herself with no assistance from a helper  FIM Lower Body Dressing Goal Moderate [place]   Task Lower Body;Shoe/Slipper;Socks;Pants; Undergarment   Adaptive Equipment Reacher;Sock Aide; Shoe Horn;Dressing Stick; Elastic Laces   Environment Seated;Standing   Safety Precautions Other  (sternal precautions)   Status Ongoing; Target goal - one week; Target goal - two weeks   Intervention Assistive Device;Balance Work; Therapeutic Exercise; Tolerance Work   Toileting Goal   QI: Toileting hygiene Goal 06  Independent - Patient completes the activity by him/herself with no assistance from a helper  FIM Toileting Goal Moderate [place]   Task Pants Up;Pants Down;Hygiene   Assistive Device Grab Bar   Safety Balance   Status Ongoing; Target goal - one week; Target goal - two weeks   Intervention ADL Training;Balance Work;Assistive Device   PT Transfer Goal   QI: Sit to stand Goal 06  Independent - Patient completes the activity by him/herself with no assistance from a helper  QI: Chair/bed-to-chair transfer Goal 06  Independent - Patient completes the activity by him/herself with no assistance from a helper  FIM Transfer Goal Modified [place]   Assistive Device Roller Walker   Environment Level Surface; Well Lit   Status Ongoing; Target goal - one week; Target goal - two weeks   Toileting Transfer Goal   QI: Toilet transfer Goal 06  Independent - Patient completes the activity by him/herself with no assistance from a helper     FIM Toilet Transfer Goal Moderate [place]   Assistive Device Grab Bar   Status Ongoing; Target goal - one week; Target goal - two weeks   Intervention ADL Training;Balance Work;Assistive Device   Tub/Shower Transfer Goal   FIM Tub/ Shower Transfer Goal Supervision   Method [employer] with [employer]   Status Ongoing; Target goal - one week; Target goal - two weeks   Interventions ADL Training;Assistive Device   Comprehension Goal   Comprehension Assist Level Moderate [place]   Status Ongoing; Target goal - one week; Target goal - two weeks   Expression Goal   Expression Assist Level Independant   Status Ongoing; Target goal - one week; Target goal - two weeks   Social Interaction Goal   Social Interaction Assist Level Moderate [place]   Status Ongoing; Target goal - one week; Target goal - two weeks   Problem Solving Goal   Problem Solving Assist Level Moderate [place]   Status Ongoing; Target goal - one week; Target goal - two weeks   Intervention Safety Education   Memory Goal   Memory Assist Level Moderate [place]   [place] [facility];Safety Strategy   Status Ongoing; Target goal - one week; Target goal - two weeks   Community Reintegration Goal   Status Ongoing; Target - one week; Target - two weeks   Interventions Community Outing; Activity Tolerance;Leisure Activity   Object Retrieval Goal   QI: Picking up object Goal 06  Independent - Patient completes the activity by him/herself with no assistance from a helper  Assistive Device  Reacher   Small Object Picked Up socks   Home Management Goal   Assist Level Moderate [place]   Status Ongoing; Target - one week; Target - two weeks   Interventions Activity Tolerance;Meal Preparation; Other  (light meal prep)

## 2018-09-15 NOTE — PROGRESS NOTES
OT explained to pt that he needs to call for assistance when he wants to go to the bathroom or go back to bed  He agreed  Later, RN checked on pt and he was found in the bed after he transferred himself  Alarms put in place at this time

## 2018-09-16 LAB
GLUCOSE SERPL-MCNC: 186 MG/DL (ref 65–140)
GLUCOSE SERPL-MCNC: 190 MG/DL (ref 65–140)
GLUCOSE SERPL-MCNC: 190 MG/DL (ref 65–140)
GLUCOSE SERPL-MCNC: 206 MG/DL (ref 65–140)

## 2018-09-16 PROCEDURE — 94640 AIRWAY INHALATION TREATMENT: CPT

## 2018-09-16 PROCEDURE — 92610 EVALUATE SWALLOWING FUNCTION: CPT

## 2018-09-16 PROCEDURE — 97530 THERAPEUTIC ACTIVITIES: CPT

## 2018-09-16 PROCEDURE — 97116 GAIT TRAINING THERAPY: CPT

## 2018-09-16 PROCEDURE — 97535 SELF CARE MNGMENT TRAINING: CPT

## 2018-09-16 PROCEDURE — 82948 REAGENT STRIP/BLOOD GLUCOSE: CPT

## 2018-09-16 PROCEDURE — 94760 N-INVAS EAR/PLS OXIMETRY 1: CPT

## 2018-09-16 RX ADMIN — OXYCODONE HYDROCHLORIDE 5 MG: 5 TABLET ORAL at 14:17

## 2018-09-16 RX ADMIN — Medication 100 MG: at 08:22

## 2018-09-16 RX ADMIN — LEVALBUTEROL HYDROCHLORIDE 1.25 MG: 1.25 SOLUTION, CONCENTRATE RESPIRATORY (INHALATION) at 14:12

## 2018-09-16 RX ADMIN — INSULIN LISPRO 1 UNITS: 100 INJECTION, SOLUTION INTRAVENOUS; SUBCUTANEOUS at 07:19

## 2018-09-16 RX ADMIN — METOPROLOL TARTRATE 50 MG: 50 TABLET, FILM COATED ORAL at 22:13

## 2018-09-16 RX ADMIN — GABAPENTIN 100 MG: 100 CAPSULE ORAL at 22:13

## 2018-09-16 RX ADMIN — ACETAMINOPHEN 975 MG: 325 TABLET ORAL at 05:31

## 2018-09-16 RX ADMIN — METFORMIN HYDROCHLORIDE 1000 MG: 500 TABLET ORAL at 16:45

## 2018-09-16 RX ADMIN — DIAZEPAM 5 MG: 5 TABLET ORAL at 07:19

## 2018-09-16 RX ADMIN — HEPARIN SODIUM 5000 UNITS: 5000 INJECTION, SOLUTION INTRAVENOUS; SUBCUTANEOUS at 05:31

## 2018-09-16 RX ADMIN — DIAZEPAM 5 MG: 5 TABLET ORAL at 19:29

## 2018-09-16 RX ADMIN — ACETAMINOPHEN 975 MG: 325 TABLET ORAL at 22:12

## 2018-09-16 RX ADMIN — ACETAMINOPHEN 975 MG: 325 TABLET ORAL at 13:02

## 2018-09-16 RX ADMIN — IPRATROPIUM BROMIDE 0.5 MG: 0.5 SOLUTION RESPIRATORY (INHALATION) at 19:36

## 2018-09-16 RX ADMIN — METOPROLOL TARTRATE 50 MG: 50 TABLET, FILM COATED ORAL at 08:22

## 2018-09-16 RX ADMIN — FLUTICASONE FUROATE AND VILANTEROL TRIFENATATE 1 PUFF: 100; 25 POWDER RESPIRATORY (INHALATION) at 07:20

## 2018-09-16 RX ADMIN — INSULIN LISPRO 1 UNITS: 100 INJECTION, SOLUTION INTRAVENOUS; SUBCUTANEOUS at 22:13

## 2018-09-16 RX ADMIN — ATORVASTATIN CALCIUM 80 MG: 80 TABLET, FILM COATED ORAL at 16:45

## 2018-09-16 RX ADMIN — IPRATROPIUM BROMIDE 0.5 MG: 0.5 SOLUTION RESPIRATORY (INHALATION) at 07:35

## 2018-09-16 RX ADMIN — GABAPENTIN 100 MG: 100 CAPSULE ORAL at 08:22

## 2018-09-16 RX ADMIN — INSULIN LISPRO 1 UNITS: 100 INJECTION, SOLUTION INTRAVENOUS; SUBCUTANEOUS at 16:45

## 2018-09-16 RX ADMIN — LIDOCAINE 1 PATCH: 50 PATCH TOPICAL at 08:22

## 2018-09-16 RX ADMIN — LEVALBUTEROL HYDROCHLORIDE 1.25 MG: 1.25 SOLUTION, CONCENTRATE RESPIRATORY (INHALATION) at 19:37

## 2018-09-16 RX ADMIN — INSULIN LISPRO 1 UNITS: 100 INJECTION, SOLUTION INTRAVENOUS; SUBCUTANEOUS at 11:21

## 2018-09-16 RX ADMIN — SENNOSIDES 17.2 MG: 8.6 TABLET, FILM COATED ORAL at 22:13

## 2018-09-16 RX ADMIN — HEPARIN SODIUM 5000 UNITS: 5000 INJECTION, SOLUTION INTRAVENOUS; SUBCUTANEOUS at 13:02

## 2018-09-16 RX ADMIN — OXYCODONE HYDROCHLORIDE 10 MG: 10 TABLET ORAL at 03:41

## 2018-09-16 RX ADMIN — LEVALBUTEROL HYDROCHLORIDE 1.25 MG: 1.25 SOLUTION, CONCENTRATE RESPIRATORY (INHALATION) at 07:35

## 2018-09-16 RX ADMIN — FOLIC ACID 1 MG: 1 TABLET ORAL at 08:22

## 2018-09-16 RX ADMIN — GABAPENTIN 100 MG: 100 CAPSULE ORAL at 16:45

## 2018-09-16 RX ADMIN — IPRATROPIUM BROMIDE 0.5 MG: 0.5 SOLUTION RESPIRATORY (INHALATION) at 14:12

## 2018-09-16 RX ADMIN — HEPARIN SODIUM 5000 UNITS: 5000 INJECTION, SOLUTION INTRAVENOUS; SUBCUTANEOUS at 22:13

## 2018-09-16 NOTE — PROGRESS NOTES
09/16/18 1030   Pain Assessment   Pain Assessment 0-10   Pain Score 4   Pain Type Acute pain   Pain Location Back   Pain Orientation Lower   Restrictions/Precautions   Precautions Aspiration;Bed/chair alarms; Fall Risk;Spinal precautions;Supervision on toilet/commode   Braces or Orthoses C/S Collar   Subjective   Subjective pt tired this morning; pt c/o pain in lower back   QI: Roll Left and Right   Assistance Needed Supervision   Comment use of bed rails, HOB elevated   Roll Left and Right CARE Score 4   QI: Lying to Sitting on Side of Bed   Assistance Needed Supervision   Comment HOB elevated   Lying to Sitting on Side of Bed CARE Score 4   QI: Sit to Stand   Assistance Needed Incidental touching   Sit to Stand CARE Score 4   QI: Chair/Bed-to-Chair Transfer   Assistance Needed Incidental touching; Adaptive equipment   Comment RW   Chair/Bed-to-Chair Transfer CARE Score 4   Transfer Bed/Chair/Wheelchair   Limitations Noted In Balance;UE Strength;LE Strength   Adaptive Equipment Roller Walker   Stand Pivot Contact Guard   Sit to [facility]   Stand to [facility]   Supine to [address]  [place], Chair, Wheelchair Transfer (FIM) 4 - Patient requires steadying assist or light touching   QI: Car Transfer   Reason if not Attempted Activity not applicable   Car Transfer CARE Score 9   QI: Walk 10 Feet   Assistance Needed Incidental touching; Adaptive equipment   Comment RW   Walk 10 Feet CARE Score 4   QI: Walk 50 Feet with Two Turns   Assistance Needed Incidental touching; Adaptive equipment   Walk 50 Feet with Two Turns CARE Score 4   QI: Walk 150 Feet   Assistance Needed Incidental touching; Adaptive equipment   Walk 150 Feet CARE Score 4   QI: Walking 10 Feet on Uneven Surfaces   Reason if not Attempted Activity not applicable   Walking 10 Feet on Uneven Surfaces CARE Score 9   Ambulation   Does the patient walk? 2   Yes   Primary Discharge Mode of Locomotion Walk   Walk Assist Level Contact Guard   Gait Pattern Inconsistant [place];Decreased foot clearance; Forward Flexion; Improper weight shift   Assist Device Juarezer Nimco Ha Walked (feet) 150 ft  (x2)   Limitations Noted In Balance; Endurance;Posture;Speed;Strength;Swing   Findings practiced with SPC, 75' x4, CGA   Walking (FIM) 4 - Patient requires steadying assist or light touching AND distance 150 feet or more, no rest   Wheelchair mobility   QI: Does the patient use a wheelchair? 0  No   QI: 1 Step (Curb)   Reason if not Attempted Activity not applicable   1 Step (Curb) CARE Score 9   QI: 4 Steps   Assistance Needed Incidental touching; Adaptive equipment   4 Steps CARE Score 4   Stairs   Type Stairs   # of Steps 6   Weight Bearing Precautions Fall Risk;Cervical   Assist Devices Bilateral Rail   Findings limited by fatigue   Stairs (FIM) 2 - Patient goes up and down 4 - 11 stairs regardless of assist/device/setup   QI: Picking Up Object   Reason if not Attempted Activity not applicable   Picking Up Object CARE Score 9   QI: Toilet Transfer   Assistance Needed Incidental touching   Toilet Transfer CARE Score 4   Toilet Transfer   Findings pt stood to urinate   Toilet Transfer (FIM) 4 - Patient requires steadying assist or light touching   Assessment   Treatment Assessment session focused on improving balance and activity tolerance through functional mobility  pt cont to be limited due to fatigue  pt trialed amb with SPC, pt states still feeling wobbly, but no LOB noted  will cont to focus on improve balance,s afety and activity tolerance to progress with functional mobility with LRAD for d/c home  Problem List Decreased strength;Decreased endurance; Impaired balance;Decreased mobility; Decreased safety awareness;Orthopedic restrictions   Barriers to Discharge Inaccessible home environment;Decreased caregiver support   PT Barriers   Physical Impairment Decreased strength;Decreased endurance; Impaired balance;Decreased mobility; Decreased safety awareness;Orthopedic restrictions   Functional Limitation Walking;Standing;Stair negotiation;Car transfers   Plan   Treatment/Interventions Functional transfer training;LE strengthening/ROM; Endurance training;Patient/family training;Bed mobility;Gait training   Progress Progressing toward goals   Recommendation   Recommendation Outpatient PT; Home with family support   PT Therapy Minutes   PT Time In 1030   PT Time Out 1130   PT Total Time (minutes) 60   PT Mode of treatment - Individual (minutes) 60   PT Mode of treatment - Concurrent (minutes) 0   PT Mode of treatment - Group (minutes) 0   PT Mode of treatment - Co-treat (minutes) 0   PT Mode of Teatment - Total time(minutes) 60 minutes   Therapy Time missed   Time missed?  No

## 2018-09-16 NOTE — PROGRESS NOTES
SLP TAA       09/16/18 0750   Patient Data   Rehab Impairment Other Orthopedic   Etiologic Diagnosis Cervical Fractures C5   Date of Onset 09/09/18   Restrictions/Precautions   Precautions Aspiration;Bed/chair alarms;Cognitive; Fall Risk;Spinal precautions;Supervision on toilet/commode   Braces or Orthoses C/S Collar   Pain Assessment   Pain Assessment No/denies pain   QI: Eating   Assistance Needed Set-up / clean-up   Assistance Provided by [place] No physical assistance   Eating CARE Score 5   Eating Assessment   Swallow Precautions Yes   Bedside Swallow Results Yes   VBS Study Results No   Food To Mouth Yes   Able To Cut Yes   Positioning Upright;Out of Bed   Safety Needs Increase Time   Meal Assessed Breakfast   QI: Swallowing/Nutritional Status Modified food consistency   Current Diet Thin;Dysphia III   Intake Mode PO;Self   Dentures Other  (natural dentition upper and lower)   Finishes Timely Yes   Opens Packages Yes   Findings Pt demonstrating mild oral dysphagia  Will benefit form SLP services to establish safest least restrictive diet at this time  Eating (FIM) 5 - Patient needs help to open contianers or set up tray   Discharge Information   Patient's Discharge Plan home w/ family support   Patient's Rehab Expectations "to get better to get back home"   Impressions Pt will benefit from SLP services focusing on dysphagia tx to establish safest least restrictive diet at this time      SLP Therapy Minutes   SLP Time In 9286   SLP Time Out 0820   SLP Total Time (minutes) 30   SLP Mode of treatment - Individual (minutes) 30   SLP Mode of treatment - Concurrent (minutes) 0   SLP Mode of treatment - Group (minutes) 0   SLP Mode of treatment - Co-treat (minutes) 0   SLP Mode of Teatment - Total time(minutes) 30 minutes

## 2018-09-16 NOTE — PROGRESS NOTES
Pt transferred self from recliner to bed  Alarm was set off  Pt stated that he "did not want to call and bother staff " Educated pt that he should ring the call bell when he would like to get back into bed  Bed alarm placed

## 2018-09-16 NOTE — PROGRESS NOTES
Internal Medicine Progress Note  Patient: Elva Lindo  Age/sex: 58 y o  male  Medical Record #: [id number]      ASSESSMENT/PLAN:  Elva Lindo is seen and examined and mangement for following issues:    # Multiple cervical fractures s/p posterior cervical decompression laminectomy C3-6, posterior cervical lateral mass and pedicle fixation/fusion C1-T1: Pain control  Monitor incision  [place]en collar at all times      # HTN: Continue Lopressor 50mg every 12 hours  Monitor BP every shift      # MI s/p PTCA: Plavix on hold  Resume when cleared by Neurosurgery      # ETOH abuse: Continue folate, thiamine and Valium  Valium being weaned      # DM type 2 with hyperglycemia:  Hemoglobin A1c 8 5  Pt reports taking metformin 1000mg 2x daily  Will resume at 1000mg 2x daily and a diabetic diet    Continue SSI and accuchecks  Adjust medications as needed  BS:  209, 248, 320, 190     # COPD: Pt uses Advair 500/50 and Ventolin at home  He also uses albuteral via nebulizer 3x daily  Use Breo Elipta here for Advair  Continue Proventil inhaler prn as well as Xopenex and Atrovent via nebulizer  Subjective: Patient seen and examined  Patient reports he is doing well  Neck pain is well controlled at 4/10  The [place] collar it is not uncomfortable  He denies any current complaints      ROS:   GI: denies abdominal pain, change bowel habits or reflux symptoms  Neuro: No new neurologic changes  Respiratory: No Cough, SOB  Cardiovascular: No CP, palpitations     Scheduled Meds:    Current Facility-Administered Medications:  acetaminophen 975 mg Oral [facility] Isai Lopez MD   albuterol 2 puff Inhalation Q4H PRN Isai Lopez MD   atorvastatin 80 mg Oral Daily With Kim Marinelli MD   bisacodyl 10 mg Rectal Daily PRN Isai Lopez MD   diazepam 5 mg Oral Q12H Isai Lopez MD   [START ON 9/18/2018] diazepam 5 mg Oral Daily Isai Lopez MD   fluticasone-vilanterol 1 puff Inhalation Daily Lauren Braswell PA-C   folic acid 1 mg Oral Daily Tayler Cedeno MD   gabapentin 100 mg Oral TID Tayler Cedeno MD   heparin (porcine) 5,000 Units Subcutaneous [facility] Tayler Cedeno MD   insulin lispro 1-5 Units Subcutaneous HS Lauren Braswell PA-C   insulin lispro 1-5 Units Subcutaneous TID AC Lauren Braswell PA-C   ipratropium 0 5 mg Nebulization TID Tayler Cedeno MD   levalbuterol 1 25 mg Nebulization TID Tayler Cedeno MD   lidocaine 1 patch Transdermal Daily Tayler Cedeno MD   magnesium hydroxide 30 mL Oral Daily PRN Tayler Cedeno MD   melatonin 3 mg Oral HS PRN Tayler Cedeno MD   metoprolol tartrate 50 mg Oral Q12H [address] Tayler Cedeno MD   oxyCODONE 10 mg Oral Q4H PRN Tayler Cedeno MD   oxyCODONE 5 mg Oral Q4H PRN Tayler Cedeno MD   senna 2 tablet Oral HS Tayler Cedeno MD   thiamine 100 mg Oral Daily Tayler Cedeno MD       Labs:       Results from last 7 days  Lab Units 09/13/18  0508 09/12/18  0535   WBC Thousand/uL 7 47 7 29   HEMOGLOBIN g/dL 10 5* 9 8*   HEMATOCRIT % 32 4* 30 4*   PLATELETS Thousands/uL 186 187       Results from last 7 days  Lab Units 09/13/18  0508 09/12/18  0535  09/10/18  1604   SODIUM mmol/L 135* 134*  < >  --    POTASSIUM mmol/L 3 5 3 2*  < >  --    CHLORIDE mmol/L 98* 98*  < >  --    CO2 mmol/L 31 31  < >  --    BUN mg/dL 5 3*  < >  --    CREATININE mg/dL 0 50* 0 45*  < >  --    GLUCOSE, ISTAT mg/dl  --   --   --  217*   CALCIUM mg/dL 8 7 8 5  < >  --    < > = values in this interval not displayed      Results from last 7 days  Lab Units 09/15/18  0500   HEMOGLOBIN A1C % 8 5*              Glucose, i-STAT (mg/dl)   Date Value   09/10/2018 217 (H)   09/10/2018 201 (H)   09/09/2018 185 (H)       Labs reviewed    Physical Examination:  Vitals:   Vitals:    09/15/18 2103 09/16/18 0531 09/16/18 0735 09/16/18 0753   BP: 142/83 136/83  143/92   BP Location: Left arm   Left arm   Pulse: (!) 112 79  101   Resp: 18 20     Temp: 97 7 °F (36 5 °C) 98 1 °F (36 7 °C)     TempSrc: Oral Oral     SpO2: 98% 96% 96%    Weight:       Height: PERRLA EOMI no JVD  RESP: CTAB, no R/R/W  CV: +S1 S2, regular rate, no rubs  ABD: soft, NT, ND, normal BS   EXT:  No edema  Neuro:  Alert and oriented x3  [ X ] Total time spent: 30 Mins and greater than 50% of this time was spent counseling/coordinating care  ** Please Note: Dragon 360 Dictation voice to text software may have been used in the creation of this document   **

## 2018-09-16 NOTE — PROGRESS NOTES
09/16/18 1330   Pain Assessment   Pain Assessment 0-10   Pain Score 5   Pain Type Acute pain   Pain Location Back;Neck   Pain Orientation Mid   Pain Onset Gradual   Hospital Pain Intervention(s) Rest;Repositioned   Restrictions/Precautions   Precautions Aspiration;Bed/chair alarms; Fall Risk;Spinal precautions;Supervision on toilet/commode   ROM Restrictions Yes  (spinal precautions)   Braces or Orthoses C/S Collar   QI: Roll Left and Right   Assistance Needed Supervision   Assistance Provided by [place] No physical assistance   Roll Left and Right CARE Score 4   QI: Sit to [address] Provided by [place] No physical assistance   Sit to Lying CARE Score 4   QI: Lying to Sitting on Side of Bed   Assistance Needed Supervision   Assistance Provided by [place] No physical assistance   Comment HOB elevated   Lying to Sitting on Side of Bed CARE Score 4   QI: Sit to Stand   Assistance Needed Incidental touching   Assistance Provided by [place] Less than 25%   Sit to Stand CARE Score 3   QI: Chair/Bed-to-Chair Transfer   Assistance Needed Incidental touching   Assistance Provided by [place] Less than 25%   Comment RW   Chair/Bed-to-Chair Transfer CARE Score 3   Transfer Bed/Chair/Wheelchair   Limitations Noted In Balance; Coordination; Endurance;LE Strength;UE Strength   Adaptive Equipment Roller Walker   Stand Pivot Contact Guard   Sit to Stand Other  (CGA)   Stand to Sit Minimal   Supine to Sit Supervision   Sit to Supine Supervision   Findings Pt CGA w/ RW for sit>stand  Pt requires A to lower stand>sit and tends to throw self into chair  OT provided edu on how to safely transition stand >sit w/ pt receptive     Bed, Chair, Wheelchair Transfer (FIM) 4 - Patient requires steadying assist or light touching   Cognition   Overall Cognitive Status Impaired   Arousal/Participation Cooperative   Attention Attends with cues to redirect   Orientation Level Oriented X4   Memory Decreased short term memory;Decreased recall of precautions   Following Commands Follows one step commands with increased time or repetition   Comments Pt able to recall 2/3 spinal precautions and 1/3 w/ prompt from OT  Additional Activities   Additional Activities Comments Pt seated to review management and care for C/S collar  Pt provided w/ instruction packet for care of cervical collar  Pt wearing shower collar while going over different parts of C/S collar  Pt able to correctly identify all parts after going over w/ OT  Pt able to properly don front piece of C/S collar so that it provides correct amount of support  Pt cont to require A w/ back piece and would benefit from practice in front of mirror  Pt demo inc difficulty w/ changing out pads and became frustrated, stating "I will never be able to fucking do this"  OT consuled pt and noted that this is new learning w/ pt calm and receptive  Pt would benefit from cont practice to change out and clean pads next session  Activity Tolerance   Activity Tolerance Patient tolerated treatment well   Assessment   Treatment Assessment Pt participated in skilled OT Tx session w/ focus on managmenet/care for C/S collar, safety awareness, and fxnl transfers  Pt easily frustrated at times and benefits from positive encouragement as well as humor  Pt completed fxnl ambulation w/ RW back to room at end of session and returned to bed 2* c/o pain in mid back/neck  Pt noted to be laying in bed w/ no neck support and forward head posture  OT readjusted pillows to provide inc support to head/neck and provided edu on why this is a better position for pt to be laying in  Pt receptive and reports "this is much better"  Cont OT POC w/ focus on dynamic standing balance, endurance, integration of spinal precautions in daily routine, C/S collar management, and safety awareness to inc pt indep w/ ADLs/IADLs and all fxnl transfers   Pt would benefit from light meal prep next session w/ focus on maintaining spinal precautions  Prognosis Good   Problem List Decreased strength;Decreased endurance; Impaired balance;Decreased mobility; Decreased coordination;Decreased safety awareness;Orthopedic restrictions   Barriers to Discharge Inaccessible home environment;Decreased caregiver support   Plan   Treatment/Interventions ADL retraining;Functional transfer training;LE strengthening/ROM; Therapeutic exercise; Endurance training;Cognitive reorientation;Patient/family training;Equipment eval/education; Bed mobility; Compensatory technique education   Progress Progressing toward goals   Recommendation   OT Discharge Recommendation Home with family support   OT Therapy Minutes   OT Time In 1330   OT Time Out 1415   OT Total Time (minutes) 45   OT Mode of treatment - Individual (minutes) 45   OT Mode of treatment - Concurrent (minutes) 0   OT Mode of treatment - Group (minutes) 0   OT Mode of treatment - Co-treat (minutes) 0   OT Mode of Teatment - Total time(minutes) 45 minutes   Therapy Time missed   Time missed?  No

## 2018-09-16 NOTE — PROGRESS NOTES
SLP Dysphagia Assessment       09/16/18 6220   Pain Assessment   Pain Assessment No/denies pain   Restrictions/Precautions   Precautions Aspiration;Bed/chair alarms;Cognitive; Fall Risk;Spinal precautions;Supervision on toilet/commode   Braces or Orthoses C/S Collar   Speech/Swallow Mechanism Exam   Labial Symmetry WFL   Labial Strength WFL   Labial ROM WFL   Facial Symmetry WFL   Facial Strength WFL   Facial ROM WFL   Lingual Symmetry WFL   Lingual Strength WFL   Lingual ROM WFL   Mandible Other (Comment)  (decreased 2* C-collar)   Dentition Adequate   Swallow Information   Current Risks for Dysphagia & Aspiration Recent intubation;General debilitation;Cervical spine injury/surgery   Current Symptoms/Concerns Cough;Clear throat;Difficulty chewing   Current Diet Dysphagia advance; Thin liquid   Baseline Diet Regular; Thin liquids   Consistencies Assessed and Performance   Materials Admnistered Soft/Level 3; Thin liquid;Mechanical Soft/Level 2   Oral Stage Mild impaired   Phargngeal Stage WFL; Aspiration risk   Swallow Mechanics WFL;Swallow initation; Appears prompt;Weak larygneal rise;Good Larygneal rise;Aspiration risk   Esophageal Concerns No s/s reported   Recommendations   Diet Solid Recommendation Level 3 Dysphagia/ advanced/ soft to chew   Diet Liquid Recommendation Thin liquid   Recommended Form of Meds As tolerated   General Precautions Aspiration precautions; Feed only when alert;Minimize distractions;Upright as possible for all oral intake;Remain upright for 45 mins after meals; Other (Comment)  (OOB for meals)   Compensatory Swallowing Strategies Alternate solids and liquids;Voluntary throat clear/cough to clear penetration   Results Reviewed with PT/Family/Caregiver   QI: Eating   Assistance Needed Set-up / clean-up   Assistance Provided by [place] No physical assistance   Eating CARE Score 5   Swallow Assessment   Swallow Treatment Assessment Pt observed w/ breakfast  Diet currently is level 3 w/ thin liquids  Pt denies overt difficulty w/ meals post-op  Pt was setup w/ tray and able to feed self w/o difficulty  Consumed 100% of meal and 180cc of thins by cup  Lip seal around tsp, cup and straw was [facility]  Mastication of soft solids (chopped sausage, scrambled eggs, banana) was full/functional w/o oral residual/pocketing  No anterior spillage observed w/ consistencies  A-p transfer w/ oatmeal and thins by cup/straw was [facility]  Swallow initiation was prompt and hyolaryngeal elevation was [facility] for smaller bites, but when taking larger bites, double swallows noted  No overt signs/sxs of aspiration noted w/ meal  Overall suspect swallow function is nearing baseline and diet advancement to regular will be soon  Pt educated on session and goals at this time  Will benefit from SLP services to maximize swallow function and establish safest least restrictive diet at this time  Swallow Assessment Prognosis   Prognosis Good   Prognosis Considerations Co-morbidities; Medical prognosis   SLP Therapy Minutes   SLP Time In 2500   SLP Time Out 0820   SLP Total Time (minutes) 30   SLP Mode of treatment - Individual (minutes) 30   SLP Mode of treatment - Concurrent (minutes) 0   SLP Mode of treatment - Group (minutes) 0   SLP Mode of treatment - Co-treat (minutes) 0   SLP Mode of Teatment - Total time(minutes) 30 minutes   Therapy Time missed   Time missed?  No   Daily FIM Score   Eating (FIM) 5 - Patient needs help to open contianers or set up tray

## 2018-09-17 LAB
ANION GAP SERPL CALCULATED.3IONS-SCNC: 6 MMOL/L (ref 4–13)
BASOPHILS # BLD AUTO: 0.04 THOUSANDS/ΜL (ref 0–0.1)
BASOPHILS NFR BLD AUTO: 1 % (ref 0–1)
BUN SERPL-MCNC: 10 MG/DL (ref 5–25)
CALCIUM SERPL-MCNC: 9 MG/DL (ref 8.3–10.1)
CHLORIDE SERPL-SCNC: 102 MMOL/L (ref 100–108)
CO2 SERPL-SCNC: 27 MMOL/L (ref 21–32)
CREAT SERPL-MCNC: 0.48 MG/DL (ref 0.6–1.3)
EOSINOPHIL # BLD AUTO: 0.17 THOUSAND/ΜL (ref 0–0.61)
EOSINOPHIL NFR BLD AUTO: 3 % (ref 0–6)
ERYTHROCYTE [DISTWIDTH] IN BLOOD BY AUTOMATED COUNT: 18.7 % (ref 11.6–15.1)
GFR SERPL CREATININE-BSD FRML MDRD: 116 ML/MIN/1.73SQ M
GLUCOSE SERPL-MCNC: 159 MG/DL (ref 65–140)
GLUCOSE SERPL-MCNC: 165 MG/DL (ref 65–140)
GLUCOSE SERPL-MCNC: 172 MG/DL (ref 65–140)
GLUCOSE SERPL-MCNC: 182 MG/DL (ref 65–140)
GLUCOSE SERPL-MCNC: 186 MG/DL (ref 65–140)
GLUCOSE SERPL-MCNC: 196 MG/DL (ref 65–140)
HCT VFR BLD AUTO: 33.2 % (ref 36.5–49.3)
HGB BLD-MCNC: 10.6 G/DL (ref 12–17)
IMM GRANULOCYTES # BLD AUTO: 0.03 THOUSAND/UL (ref 0–0.2)
IMM GRANULOCYTES NFR BLD AUTO: 1 % (ref 0–2)
LYMPHOCYTES # BLD AUTO: 1.89 THOUSANDS/ΜL (ref 0.6–4.47)
LYMPHOCYTES NFR BLD AUTO: 30 % (ref 14–44)
MCH RBC QN AUTO: 27.1 PG (ref 26.8–34.3)
MCHC RBC AUTO-ENTMCNC: 31.9 G/DL (ref 31.4–37.4)
MCV RBC AUTO: 85 FL (ref 82–98)
MONOCYTES # BLD AUTO: 0.71 THOUSAND/ΜL (ref 0.17–1.22)
MONOCYTES NFR BLD AUTO: 11 % (ref 4–12)
NEUTROPHILS # BLD AUTO: 3.53 THOUSANDS/ΜL (ref 1.85–7.62)
NEUTS SEG NFR BLD AUTO: 54 % (ref 43–75)
NRBC BLD AUTO-RTO: 0 /100 WBCS
PLATELET # BLD AUTO: 251 THOUSANDS/UL (ref 149–390)
PMV BLD AUTO: 10.7 FL (ref 8.9–12.7)
POTASSIUM SERPL-SCNC: 3.9 MMOL/L (ref 3.5–5.3)
RBC # BLD AUTO: 3.91 MILLION/UL (ref 3.88–5.62)
SODIUM SERPL-SCNC: 135 MMOL/L (ref 136–145)
WBC # BLD AUTO: 6.37 THOUSAND/UL (ref 4.31–10.16)

## 2018-09-17 PROCEDURE — 97116 GAIT TRAINING THERAPY: CPT

## 2018-09-17 PROCEDURE — 85025 COMPLETE CBC W/AUTO DIFF WBC: CPT | Performed by: PHYSICIAN ASSISTANT

## 2018-09-17 PROCEDURE — 92507 TX SP LANG VOICE COMM INDIV: CPT

## 2018-09-17 PROCEDURE — 99232 SBSQ HOSP IP/OBS MODERATE 35: CPT | Performed by: PHYSICAL MEDICINE & REHABILITATION

## 2018-09-17 PROCEDURE — 94760 N-INVAS EAR/PLS OXIMETRY 1: CPT

## 2018-09-17 PROCEDURE — 94640 AIRWAY INHALATION TREATMENT: CPT

## 2018-09-17 PROCEDURE — 97530 THERAPEUTIC ACTIVITIES: CPT

## 2018-09-17 PROCEDURE — 80048 BASIC METABOLIC PNL TOTAL CA: CPT | Performed by: PHYSICIAN ASSISTANT

## 2018-09-17 PROCEDURE — 97535 SELF CARE MNGMENT TRAINING: CPT

## 2018-09-17 PROCEDURE — 97110 THERAPEUTIC EXERCISES: CPT

## 2018-09-17 PROCEDURE — 82948 REAGENT STRIP/BLOOD GLUCOSE: CPT

## 2018-09-17 RX ORDER — LEVALBUTEROL 1.25 MG/.5ML
1.25 SOLUTION, CONCENTRATE RESPIRATORY (INHALATION)
Status: DISCONTINUED | OUTPATIENT
Start: 2018-09-17 | End: 2018-09-24 | Stop reason: HOSPADM

## 2018-09-17 RX ADMIN — FLUTICASONE FUROATE AND VILANTEROL TRIFENATATE 1 PUFF: 100; 25 POWDER RESPIRATORY (INHALATION) at 08:10

## 2018-09-17 RX ADMIN — ACETAMINOPHEN 975 MG: 325 TABLET ORAL at 05:40

## 2018-09-17 RX ADMIN — OXYCODONE HYDROCHLORIDE 10 MG: 10 TABLET ORAL at 08:51

## 2018-09-17 RX ADMIN — INSULIN LISPRO 1 UNITS: 100 INJECTION, SOLUTION INTRAVENOUS; SUBCUTANEOUS at 16:05

## 2018-09-17 RX ADMIN — DIAZEPAM 5 MG: 5 TABLET ORAL at 21:13

## 2018-09-17 RX ADMIN — METOPROLOL TARTRATE 50 MG: 50 TABLET, FILM COATED ORAL at 08:09

## 2018-09-17 RX ADMIN — ATORVASTATIN CALCIUM 80 MG: 80 TABLET, FILM COATED ORAL at 16:08

## 2018-09-17 RX ADMIN — METFORMIN HYDROCHLORIDE 1000 MG: 500 TABLET ORAL at 16:08

## 2018-09-17 RX ADMIN — INSULIN LISPRO 1 UNITS: 100 INJECTION, SOLUTION INTRAVENOUS; SUBCUTANEOUS at 08:11

## 2018-09-17 RX ADMIN — LEVALBUTEROL HYDROCHLORIDE 1.25 MG: 1.25 SOLUTION, CONCENTRATE RESPIRATORY (INHALATION) at 20:58

## 2018-09-17 RX ADMIN — LEVALBUTEROL HYDROCHLORIDE 1.25 MG: 1.25 SOLUTION, CONCENTRATE RESPIRATORY (INHALATION) at 07:49

## 2018-09-17 RX ADMIN — GABAPENTIN 100 MG: 100 CAPSULE ORAL at 16:08

## 2018-09-17 RX ADMIN — INSULIN LISPRO 1 UNITS: 100 INJECTION, SOLUTION INTRAVENOUS; SUBCUTANEOUS at 21:16

## 2018-09-17 RX ADMIN — METFORMIN HYDROCHLORIDE 1000 MG: 500 TABLET ORAL at 07:46

## 2018-09-17 RX ADMIN — DIAZEPAM 5 MG: 5 TABLET ORAL at 07:47

## 2018-09-17 RX ADMIN — LIDOCAINE 1 PATCH: 50 PATCH TOPICAL at 08:10

## 2018-09-17 RX ADMIN — FOLIC ACID 1 MG: 1 TABLET ORAL at 08:10

## 2018-09-17 RX ADMIN — METOPROLOL TARTRATE 50 MG: 50 TABLET, FILM COATED ORAL at 21:12

## 2018-09-17 RX ADMIN — IPRATROPIUM BROMIDE 0.5 MG: 0.5 SOLUTION RESPIRATORY (INHALATION) at 20:58

## 2018-09-17 RX ADMIN — OXYCODONE HYDROCHLORIDE 5 MG: 5 TABLET ORAL at 20:05

## 2018-09-17 RX ADMIN — ACETAMINOPHEN 975 MG: 325 TABLET ORAL at 13:55

## 2018-09-17 RX ADMIN — GABAPENTIN 100 MG: 100 CAPSULE ORAL at 21:13

## 2018-09-17 RX ADMIN — Medication 100 MG: at 08:10

## 2018-09-17 RX ADMIN — ACETAMINOPHEN 975 MG: 325 TABLET ORAL at 21:13

## 2018-09-17 RX ADMIN — HEPARIN SODIUM 5000 UNITS: 5000 INJECTION, SOLUTION INTRAVENOUS; SUBCUTANEOUS at 13:55

## 2018-09-17 RX ADMIN — OXYCODONE HYDROCHLORIDE 10 MG: 10 TABLET ORAL at 03:07

## 2018-09-17 RX ADMIN — HEPARIN SODIUM 5000 UNITS: 5000 INJECTION, SOLUTION INTRAVENOUS; SUBCUTANEOUS at 21:12

## 2018-09-17 RX ADMIN — GABAPENTIN 100 MG: 100 CAPSULE ORAL at 08:09

## 2018-09-17 RX ADMIN — IPRATROPIUM BROMIDE 0.5 MG: 0.5 SOLUTION RESPIRATORY (INHALATION) at 07:49

## 2018-09-17 RX ADMIN — INSULIN LISPRO 1 UNITS: 100 INJECTION, SOLUTION INTRAVENOUS; SUBCUTANEOUS at 11:49

## 2018-09-17 RX ADMIN — HEPARIN SODIUM 5000 UNITS: 5000 INJECTION, SOLUTION INTRAVENOUS; SUBCUTANEOUS at 05:40

## 2018-09-17 RX ADMIN — OXYCODONE HYDROCHLORIDE 5 MG: 5 TABLET ORAL at 14:00

## 2018-09-17 NOTE — PROGRESS NOTES
09/17/18 0830   Pain Assessment   Pain Assessment 0-10   Pain Score 4   Pain Location Shoulder;Neck;Back   Pain Orientation Bilateral   Hospital Pain Intervention(s) Rest   Restrictions/Precautions   Precautions Aspiration; Fall Risk;Bed/chair alarms;Spinal precautions;Supervision on toilet/commode   Braces or Orthoses C/S Collar   QI: Oral Hygiene   Assistance Needed Supervision   Oral Hygiene CARE Score 4   Grooming   Findings Pt completed hand hygiene in stance at sink; CS level with no LOB noted   Grooming (FIM) 5 - Patient requires supervision/monitoring   QI: Roll Left and Right   Assistance Needed Supervision   Roll Left and Right CARE Score 4   QI: Sit to Lying   Assistance Needed Supervision   Sit to Lying CARE Score 4   QI: Lying to Sitting on Side of Bed   Assistance Needed Supervision   Lying to Sitting on Side of Bed CARE Score 4   QI: Sit to Stand   Assistance Needed Incidental touching   Sit to Stand CARE Score 4   Transfer Bed/Chair/Wheelchair   Findings pt completed fxnl mob and xfers at CG/CS level with use of RW  Pt c/o b/l foot pain with ambulation  OT to f/u with MD  Pt describing as aching pain   Bed, Chair, Wheelchair Transfer (FIM) 4 - Patient requires steadying assist or light touching   QI: [address] Needed Incidental touching   [address] CARE Score 4   Toileting   Findings pt urinating in stance; CS/CS for clothing management   Toileting (FIM) 4 - Patient requires steadying assist or light touching   Exercise Tools   Other Exercise Tool 1 Towel Slides: pt completed B/L shoulder AROM seated at tabletop to perform proximal ROM to dec joint stiffness/rigidity and improve fxnl reach/use  pt completed b/l scapular protraction/retraction and scaption exercises   3 x 10 with brief rest breaks to manage fatigue   Additional Activities   Additional Activities Comments sit<>stand xfers: pt engaged in LE strengthening and endurance exercise to perform sit<>stand xfers from EOB with use of LEs for xfer rise/descent  pt completed 4 x 5 with brief rest breaks to manage fatigue  pt completed overall at  level   Assessment   Treatment Assessment Pt engaged in skilled OT tx with focus on ADL fxn, fxnl xfers, sit<>stand xfers, proximal strengthening, and overall edu  See above for further details  Pt initially c/o pain and OT able to redirect and engage in OT tx willingly  OT edu on on recommendation for DME, use of shower chair and LHAE prn  Pt reports he owns shower chair in walk in shower at home  Pt stating his dtr will be able to assist with collar management  Plan   Treatment/Interventions ADL retraining;Functional transfer training;LE strengthening/ROM; Endurance training; Therapeutic exercise;Patient/family training;Equipment eval/education; Bed mobility; Compensatory technique education   Progress Progressing toward goals   OT Therapy Minutes   OT Time In 0830   OT Time Out 0930   OT Total Time (minutes) 60   OT Mode of treatment - Individual (minutes) 50   OT Mode of treatment - Concurrent (minutes) 10   OT Mode of treatment - Group (minutes) 0   OT Mode of treatment - Co-treat (minutes) 0   OT Mode of Teatment - Total time(minutes) 60 minutes   Therapy Time missed   Time missed?  No

## 2018-09-17 NOTE — PROGRESS NOTES
09/17/18 1030   Pain Assessment   Pain Assessment 0-10   Pain Score 3   Pain Type Surgical pain   Pain Location Hand;Shoulder   Pain Orientation Bilateral   Restrictions/Precautions   Precautions Aspiration;Bed/chair alarms; Fall Risk;Supervision on toilet/commode;Spinal precautions   ROM Restrictions Yes   RUE ROM Restriction (spinal percaution )   Braces or Orthoses C/S Collar   Subjective   Subjective pt agreeable to perform PT session    QI: Sit to Stand   Assistance Needed Incidental touching   Sit to Stand CARE Score 4   Bed Mobility   Able to Roll Right to Left;Left to Right   Findings S level    Transfer Bed/Chair/Wheelchair   Limitations Noted In Balance;LE Strength;UE Strength   Adaptive Equipment Roller Walker   Stand Pivot Contact Guard   [facility]   Stand to [facility]   Supine to ELVIA Pereyraey, Chair, Wheelchair Transfer (FIM) 4 - Patient requires steadying assist or light touching   QI: Walk 150 Feet   Assistance Needed Incidental touching   Walk 150 Feet CARE Score 4   Ambulation   Does the patient walk? 2  Yes   Primary Discharge Mode of Locomotion Walk   Walk Assist Level Contact Guard   Gait Pattern Inconsistant Sury;Decreased foot clearance; Improper weight shift   Assist Device Vernon Nimco Ha Walked (feet) 150 ft   Limitations Noted In Balance; Endurance;Posture; Sequencing;Speed;Strength   Findings progressing toward goals    Walking (FIM) 4 - Patient requires steadying assist or light touching AND distance 150 feet or more, no rest   Wheelchair mobility   QI: Does the patient use a wheelchair? 0  No   Wheelchair (FIM) 0 - Activity does not occur   Toilet Transfer   Surface Assessed Standard Toilet   Limitations Noted In Balance; Safety   Positioning Concerns Safety   Findings stood to urinate    Toilet Transfer (FIM) 4 - Patient requires steadying assist or light touching   Other Comments   Comments pt instructed with C ' spinal percaution and Ed/ on safety awareness  during ambulation with RW   Assessment   Treatment Assessment pt progressing with thex ex's , functional activities and gait training with RW , VC for hand placement during STS   pt reports less wobbly dueing ambulation and no LOB noted   Pt will cont toward DC goals at this time  Plan   Treatment/Interventions LE strengthening/ROM; Functional transfer training; Therapeutic exercise; Endurance training;Bed mobility;Gait training;Patient/family training   Progress Progressing toward goals   PT Therapy Minutes   PT Time In 1030   PT Time Out 1130   PT Total Time (minutes) 60   PT Mode of treatment - Individual (minutes) 60   PT Mode of treatment - Concurrent (minutes) 0   PT Mode of treatment - Group (minutes) 0   PT Mode of treatment - Co-treat (minutes) 0   PT Mode of Teatment - Total time(minutes) 60 minutes   Therapy Time missed   Time missed?  No

## 2018-09-17 NOTE — TREATMENT PLAN
Individualized Plan of [address] 58 y o  male MRN: [id number]  Unit/Bed#: -62 Encounter: [id number]     PATIENT INFORMATION  ADMISSION DATE: 9/14/2018  2:19 PM BEBE CATEGORY:Orthopedic Disorders:  08 9  Other Orthopedic cervical spine fractures   ADMISSION DIAGNOSIS: C6 cervical fracture ([address] ) [[address]]  EXPECTED LOS: 10-14 days     MEDICAL/FUNCTIONAL PROGNOSIS  Based on my assessment of the patient's medical conditions and current functional status, the prognosis for attaining medical and functional goals or the IRF stay is:  Good    Medical Goals: Patient will be medically stable for discharge to [facility] upon completion of rehab program    [address]: Home - with supervision  Is a 24-hr caregiver available? Yes  Has discharge plan been discussed with primary caregiver? Yes  Date of Discussion: 09/17/18    ANTICIPATED FOLLOW-UP SERVICE:   Outpatient Therapy Services: PT and OT          DISCIPLINE SPECIFIC PLANS:  Required Disciplines & Services: Rehabillitation Nursing, Case Management, Dietay/Nutrition and Diabetes Education    REQUIRED THERAPY:  Therapy Hours per Day Days per Week Total Days   Physical Therapy 1 5 5 5   Occupational Therapy 1 5 5 5   NOTE: Additional therapy time(s) may be added as appropriate to meet patient needs and to achieve functional goals      Patient will either participate in above therapy regimen or participate in 900 minutes of therapy within 7 day week consisting of PT and OT due to the following medical procedure/condition:Orthopedic Disorders:  08 9  Other Orthopedic cervical spine fractures    ANTICIPATED FUNCTIONAL OUTCOMES:  ADL: Patient will be independent with ADLs with least restrictive device upon completion of rehab program   Bladder/Bowel:     Transfers: Patient will be independent with transfers with least restrictive device upon completion of rehab program   Locomotion: Patient will be independent with locomotion with least restrictive device upon completion of rehab program   Cognitive: Patient will be independent for basic  tasks and require supervision for complex tasks upon completion of rehab program     DISCHARGE PLANNING NEEDS  Equipment needs: Discharge needs to be reviewed with team      REHAB ANTICIPATED PARTICIPATION RESTRICTIONS: none
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
## 2018-09-17 NOTE — PROGRESS NOTES
09/17/18 1350   Pain Assessment   Pain Assessment 0-10   Pain Score 5   Pain Type Surgical pain;Acute pain   Pain Location Shoulder   Pain Orientation Right   Restrictions/Precautions   Precautions Aspiration;Bed/chair alarms; Fall Risk;Supervision on toilet/commode;Spinal precautions   Braces or Orthoses C/S Collar   Subjective   Subjective pt reports feeling okay and ready for PTsession    QI: Roll Left and Right   Assistance Needed Supervision   Roll Left and Right CARE Score 4   QI: Sit to Lying   Assistance Needed Supervision   Sit to Lying CARE Score 4   QI: Lying to Sitting on Side of Bed   Assistance Needed Supervision   Lying to Sitting on Side of Bed CARE Score 4   QI: Sit to Stand   Assistance Needed Incidental touching   Sit to Stand CARE Score 4   Bed Mobility   Able to Roll Right to Left;Left to Right   Findings S level    QI: Chair/Bed-to-Chair Transfer   Assistance Needed Incidental touching   Chair/Bed-to-Chair Transfer CARE Score 4   Transfer Bed/Chair/Wheelchair   Limitations Noted In Balance;LE Strength;UE Strength;Pain Management; Endurance   Adaptive Equipment Roller Walker   Findings progressing toward goals right  S ' pain noted    Bed, Chair, Wheelchair Transfer (FIM) 4 - Patient requires steadying assist or light touching   QI: Walk 150 Feet   Assistance Needed Incidental touching   Comment RW with Chair follow for safety    Walk 150 Feet CARE Score 4   QI: Walking 10 Feet on Uneven Surfaces   Reason if not Attempted Activity not applicable   Walking 10 Feet on Uneven Surfaces CARE Score 9   Ambulation   Does the patient walk? 2  Yes   Primary Discharge Mode of Locomotion Walk   Walk Assist Level Contact Guard   Gait Pattern Inconsistant Sury; Slow Sury; Improper weight shift   Assist Device Vernon Ha Walked (feet) 152 ft   Limitations Noted In Endurance;Balance;Strength;Speed   Walking (FIM) 4 - Patient requires steadying assist or light touching AND distance 150 feet or more, no rest   Wheelchair mobility   Wheelchair Assist Level Supervision   Method Right upper extremity; Left upper extremity   Needs Assist With Press Release;Locking Brakes   Distance Level Surface (feet) 150 ft   Findings inc B/L UE    Wheelchair (FIM) 5 - Patient requires supervision/monitoring AND wheels distance 150 feet or more, no rest   Toilet Transfer   Surface Assessed Raised Toilet   Limitations Noted In Balance; Safety   Positioning Concerns Safety   Findings stood to urinate    Toilet Transfer (FIM) 4 - Patient requires steadying assist or light touching   Equipment Use   NuStep level 2 for 10 min    Assessment   Treatment Assessment pt shows good effort during skilled PT c/o some pain as above ,pt able to perform Nu Step bike to inc LE/UE strengthening and endurance , pt perform WC propl with vc's for hand placement and turns   Pt will cont toward goals  and will cont to benefit with skilled PT    Plan   Treatment/Interventions Functional transfer training;LE strengthening/ROM; Therapeutic exercise; Endurance training;Gait training;Bed mobility; Patient/family training   Progress Progressing toward goals   PT Therapy Minutes   PT Time In 1350   PT Time Out 1420   PT Total Time (minutes) 30   PT Mode of treatment - Individual (minutes) 30   PT Mode of treatment - Concurrent (minutes) 0   PT Mode of treatment - Group (minutes) 0   PT Mode of treatment - Co-treat (minutes) 0   PT Mode of Teatment - Total time(minutes) 30 minutes   Therapy Time missed   Time missed?  No

## 2018-09-17 NOTE — PROGRESS NOTES
Internal Medicine Progress Note  Patient: eDnisa Pierce  Age/sex: 58 y o  male  Medical Record #: [id number]      ASSESSMENT/PLAN:  Denisa Pierce is seen and examined and management for following issues:    Multiple cervical fractures s/p posterior cervical decompression laminectomy C3-6, posterior cervical lateral mass and pedicle fixation/fusion C1-T1 on 9/10/18: Pain control per primary service  Monitor incision  Aspen collar at all times      HTN: stable; continue Lopressor 50mg every 12 hours      MI s/p PTCA: Plavix on hold; continue Lipitor 80mg qd  Resume when cleared by Neurosurgery      ETOH abuse: Continue folate, thiamine and Valium  Valium being weaned      DM type 2:  Hemoglobin A1c 8 5  Yesterday evening, resumed Metformin 1000mg 2x daily as he takes at home  May need more tx but see how BSs are  = ?compliant at home with Metformin (HbA1C was 8 5); continue QID Accuchecks/SSI/DM diet  , 206, 186, 190; fasting today 182     COPD: Pt uses Advair 500/50, Albuterol nebs TID and prn Ventolin at home = here on Xopenex/Atrovent nebs TID and using Elizabeth Perfect here for Advair  Subjective: Patient seen and examined  He denies any current complaints      ROS:   GI: denies abdominal pain, change bowel habits or reflux symptoms  Neuro: No new neurologic changes  Respiratory: No Cough, SOB  Cardiovascular: No CP, palpitations     Scheduled Meds:    Current Facility-Administered Medications:  acetaminophen 975 mg Oral [facility] Sneha King MD   albuterol 2 puff Inhalation Q4H PRN Sneha King MD   atorvastatin 80 mg Oral Daily With Alvaro Edward MD   bisacodyl 10 mg Rectal Daily PRN Sneha King MD   diazepam 5 mg Oral Q12H Sneha King MD   [START ON 9/18/2018] diazepam 5 mg Oral Daily Sneha King MD   fluticasone-vilanterol 1 puff Inhalation Daily Lauren Braswell PA-C   folic acid 1 mg Oral Daily Sneha King MD   gabapentin 100 mg Oral TID Sneha King MD   heparin (porcine) 5,000 Units Subcutaneous [facility] Yasmany Bo MD   insulin lispro 1-5 Units Subcutaneous HS Lauren Braswell PA-C   insulin lispro 1-5 Units Subcutaneous TID AC Lauren Braswell PA-C   ipratropium 0 5 mg Nebulization TID Yasmany Bo MD   levalbuterol 1 25 mg Nebulization TID Yasmany Bo MD   lidocaine 1 patch Transdermal Daily Yasmany Bo MD   magnesium hydroxide 30 mL Oral Daily PRN Yasmany Bo MD   melatonin 3 mg Oral HS PRN Yasmany Bo MD   metFORMIN 1,000 mg Oral BID With Meals Lauren Braswell PA-C   metoprolol tartrate 50 mg Oral Q12H [address] Yasmany Bo MD   oxyCODONE 10 mg Oral Q4H PRN Yasmany Bo MD   oxyCODONE 5 mg Oral Q4H PRN Yasmany Bo MD   senna 2 tablet Oral HS Yasmany Bo MD   thiamine 100 mg Oral Daily Yasmany Bo MD       Labs:       Results from last 7 days  Lab Units 09/17/18  0524 09/13/18  0508   WBC Thousand/uL 6 37 7 47   HEMOGLOBIN g/dL 10 6* 10 5*   HEMATOCRIT % 33 2* 32 4*   PLATELETS Thousands/uL 251 186       Results from last 7 days  Lab Units 09/17/18  0524 09/13/18  0508  09/10/18  1604   SODIUM mmol/L 135* 135*  < >  --    POTASSIUM mmol/L 3 9 3 5  < >  --    CHLORIDE mmol/L 102 98*  < >  --    CO2 mmol/L 27 31  < >  --    BUN mg/dL 10 5  < >  --    CREATININE mg/dL 0 48* 0 50*  < >  --    GLUCOSE, ISTAT mg/dl  --   --   --  217*   CALCIUM mg/dL 9 0 8 7  < >  --    < > = values in this interval not displayed      Results from last 7 days  Lab Units 09/15/18  0500   HEMOGLOBIN A1C % 8 5*              Glucose, i-STAT (mg/dl)   Date Value   09/10/2018 217 (H)   09/10/2018 201 (H)   09/09/2018 185 (H)       Labs reviewed    Physical Examination:  Vitals:   Vitals:    09/16/18 1937 09/16/18 2040 09/17/18 0620 09/17/18 0749   BP:  144/67 139/85    BP Location:  Right arm Left arm    Pulse:  74 72    Resp:  18 18    Temp:  98 3 °F (36 8 °C) 98 5 °F (36 9 °C)    TempSrc:  Oral Oral    SpO2: 98% 97% 93% 94%   Weight:       Height:         Constitutional:  NAD; pleasant; nontoxic  HEENT:  AT/NC; oropharynx negative for thrush on tongue   Neck: collar on  CV:  +S1, S2;  RRR; no rub/murmur  Pulmonary:  BBS without crackles/wheeze/rhonci; resp are unlabored  Abdominal:  soft, +BS, ND/NT; no mass  Skin:  no rashes  Musculoskeletal:  no edema  :  no antony  Neurological/Psych:  AAO;  RUE 4+/5, RLE/LLE/LUE 5/5; no depression/anxiety      [ X ] Total time spent: 30 Mins and greater than 50% of this time was spent counseling/coordinating care  ** Please Note: Dragon 360 Dictation voice to text software may have been used in the creation of this document   **

## 2018-09-17 NOTE — PROGRESS NOTES
09/17/18 1130   Pain Assessment   Pain Assessment No/denies pain   Pain Score No Pain   Restrictions/Precautions   Precautions Aspiration;Bed/chair alarms; Fall Risk;Spinal precautions;Supervision on toilet/commode   ROM Restrictions Yes   RUE ROM Restriction (spinal precautions)   Braces or Orthoses C/S Collar   Swallow Information   Current Risks for Dysphagia & Aspiration Recent intubation;General debilitation;Cervical spine injury/surgery   Current Symptoms/Concerns Cough;Clear throat;Difficulty chewing   Current Diet Dysphagia advance; Thin liquid   Baseline Diet Regular; Thin liquids   Consistencies Assessed and Performance   Materials Admnistered Soft/Level 3;Regular/Solid; Thin liquid   Oral Stage Mild impaired   Phargngeal Stage WFL; Mild impaired;Aspiration risk   Swallow Mechanics WFL;Swallow initation; Appears prompt;Weak larygneal rise;Good Larygneal rise;Aspiration risk   Esophageal Concerns No s/s reported   Recommendations   Risk for Aspiration Mild   Diet Solid Recommendation Level 3 Dysphagia/ advanced/ soft to chew   Diet Liquid Recommendation Thin liquid   Recommended Form of Meds As tolerated   General Precautions Aspiration precautions; Feed only when alert;Minimize distractions;Upright as possible for all oral intake;Remain upright for 45 mins after meals  (OOB for meals )   Compensatory Swallowing Strategies Alternate solids and liquids;Voluntary throat clear/cough to clear penetration   Results Reviewed with PT/Family/Caregiver   QI: [facility] Provided by [place] No physical assistance   Eating CARE Score 6   Swallow Assessment   Swallow Treatment Assessment Pt seen during lunch w/ a regular diet trial tray consisting of a tossed salad, fresh fruit cup, stuffed shells, and chicken noodle soup where pt consumed 100% of meal, along w/ ~7oz thin liquids by cup sips   Pt was mod I w/ tray set up and demonstrated appropriate rate of self feeding w/ smaller/approprite bite sizes  Mastication of softer and regular solids was [place] w/ effective breakdown, bolus formation and oral clearance w/o increased residual noted  While overall a-p transfers and swallow initiation appeared timely across meal, suspecting decreased oral control and possible premature spillage of ambient fluids from fresh fruit where pt was noted to have coughing x2 w/ intake of fruit such as honeydew, pineapple, etc  However, no other overt s/s aspiration observed w/ thins taken by cup sips or additional regular diet textures  Educated pt on taking smaller bites to improve oral manipulation and control of items which may turn to more mixed consistency  Will cont to recommend level 3 diet and thin liquids at this time w/ SLP to follow to trial additional regular diet textures w/ potential for diet upgrade  Swallow Assessment Prognosis   Prognosis Good   Prognosis Considerations Co-morbidities; Medical prognosis   SLP Therapy Minutes   SLP Time In 36   SLP Time Out 1210   SLP Total Time (minutes) 40   SLP Mode of treatment - Individual (minutes) 0   SLP Mode of treatment - Concurrent (minutes) 0   SLP Mode of treatment - Group (minutes) 40   SLP Mode of treatment - Co-treat (minutes) 0   SLP Mode of Teatment - Total time(minutes) 40 minutes   Therapy Time missed   Time missed?  No   Daily FIM Score   Eating (FIM) 6 - Patient requires modified diet

## 2018-09-17 NOTE — PROGRESS NOTES
Physical Medicine and Rehabilitation Progress Note  Vega Ojeda 58 y o  male MRN: [id number]  Unit/Bed#: [facility] 416-20 Encounter: [id number]    HPI: Vega Ojeda is a 58 y o  male with COPD, HTN, MI, DM, CVA in April 2018, alcohol dependence, KLARISSA noncompliant with CPAP, who presented on 9/9/18 after fall down 15 steps  He sustained multiple cervical spine fractures, s/p C3-6 decompression laminectomy and instrumented fixation/fusion of C1-T1 with Dr Humberto Curiel on 9/10/18  He was placed on CIWA protocol with valium  He was extubated on 3/50/01 without complication  He was admitted to acute rehab on 9/14/18  CC: bilateral shoulder pain    Subjective: Overnight no acute events  Reports some neck soreness, bilateral shoulder pain  He attributes it to c-collar  Otherwise sleeping OK, no other complaints  Nursing staff reporting: no problems    ROS: No fever, chills, chest pain, SOB, cough, nausea, vomiting, diarrhea, constipation, abdominal pain, dysuria, bowel/bladder incontinence, new weakness or changes in sensation  +neck and shoulder pain  Complete ROS obtained and otherwise negative        Assessment/Plan:  Oropharyngeal dysphagia   Assessment & Plan    Secondary to posterior fusion C1-T1  Currently on dysphagia 3 diet  SLP following  Advance diet as appropriate        At moderate risk for venous thromboembolism (VTE)   Assessment & Plan    Secondary to decreased mobility after fall and cervical spine fractures s/p C1-T1 fusion  Heparin 5000u TID  SCDs        Hypokalemia   Assessment & Plan    Periodic BMP  PRN supplementation        Diabetes [facility])   Assessment & Plan    Lab Results   Component Value Date    HGBA1C 8 5 (H) 09/15/2018       Recent Labs      09/16/18   2054  09/17/18   0619  09/17/18   0742  09/17/18   1134   POCGLU  190*  182*  165*  159*     Metformin restarted 1000mg BID  SSI    Blood Sugar Average: Last 72 hrs:          Acute blood loss anemia   Assessment & Plan    Secondary to trauma s/p surgery  H/H stable  Periodic CBC        CAD (coronary artery disease)   Assessment & Plan    S/p stenting   Previously on plavix  Per neurosurgery, OK to resume antiplatelet in 2 weeks post-op (9/24/18)    Also with history of CVA in April 2018  Atorvastatin 80mg           Decubitus ulcer of sacral region   Assessment & Plan    Right buttock/ischium stage II decubitus  Frequent turns  Daily monitoring         Alcohol abuse   Assessment & Plan    Valium to 5mg q12h starting 9/16, then 5mg daily starting 9/18, then stop 9/20  Folate, thiamine supplementation  Monitor for signs of withdrawal    Cessation discussed with patient        * S/P C1-T1 Posterior Cervical Discectomy and Fusion on 9/10/18   Assessment & Plan    Cervical collar at all times  MEGHAN drain removed 9/15  Neurosurgery following    Recommend comprehensive inpatient rehabilitation management with rehab MD, PT, OT, rehab level nursing, and case management, for deconditioning/debility following fall with cervical spine fractures               Scheduled Meds:    Current Facility-Administered Medications:  acetaminophen 975 mg Oral [facility] Stacy Camacho MD   albuterol 2 puff Inhalation Q4H PRN Stacy Camacho MD   atorvastatin 80 mg Oral Daily With Conner Nagel MD   bisacodyl 10 mg Rectal Daily PRN Stacy Camacho MD   diazepam 5 mg Oral Q12H Stacy Camacho MD   [START ON 9/18/2018] diazepam 5 mg Oral Daily Stacy Camacho MD   fluticasone-vilanterol 1 puff Inhalation Daily Lauren Braswlel PA-C   folic acid 1 mg Oral Daily Stacy Camacho MD   gabapentin 100 mg Oral TID Stacy Camacho MD   heparin (porcine) 5,000 Units Subcutaneous [facility] Stacy Camacho MD   insulin lispro 1-5 Units Subcutaneous HS Lauren Braswell PA-C   insulin lispro 1-5 Units Subcutaneous TID AC Lauren Braswell PA-C   ipratropium 0 5 mg Nebulization TID Stacy Camacho MD   levalbuterol 1 25 mg Nebulization TID Stacy Camacho MD   lidocaine 1 patch Transdermal Daily Stacy Camacho MD   magnesium hydroxide 30 mL Oral Daily PRN Steffanie Vargas MD   melatonin 3 mg Oral HS PRN Steffanie Vargas MD   metFORMIN 1,000 mg Oral BID With Meals Lauren Braswell PA-C   metoprolol tartrate 50 mg Oral Q12H [facility] Steffanie Vargas MD   oxyCODONE 10 mg Oral Q4H PRN Steffanie Vargas MD   oxyCODONE 5 mg Oral Q4H PRN Steffanie Vargas MD   senna 2 tablet Oral HS Steffanie Vargas MD   thiamine 100 mg Oral Daily Steffanie Vargas MD       Allergies   Allergen Reactions    Amoxicillin     Augmentin [Amoxicillin-Pot Clavulanate]         Objective:    Functional Update:  09/16/18 1030    Pain Assessment   Pain Assessment 0-10   Pain Score 4   Pain Type Acute pain   Pain Location Back   Pain Orientation Lower   Restrictions/Precautions   Precautions Aspiration;Bed/chair alarms; Fall Risk;Spinal precautions;Supervision on toilet/commode   Braces or Orthoses C/S Collar   Subjective   Subjective pt tired this morning; pt c/o pain in lower back   QI: Roll Left and Right   Assistance Needed Supervision   Comment use of bed rails, HOB elevated   Roll Left and Right CARE Score 4   QI: Lying to Sitting on Side of Bed   Assistance Needed Supervision   Comment HOB elevated   Lying to Sitting on Side of Bed CARE Score 4   QI: Sit to Stand   Assistance Needed Incidental touching   Sit to Stand CARE Score 4   QI: Chair/Bed-to-Chair Transfer   Assistance Needed Incidental touching; Adaptive equipment   Comment RW   Chair/Bed-to-Chair Transfer CARE Score 4   Transfer Bed/Chair/Wheelchair   Limitations Noted In Balance;UE Strength;LE Strength   Adaptive Equipment Roller Walker   Stand Pivot Contact Guard   [facility]   Stand to [facility]   Supine to ELVIA Pereyraey, Chair, Wheelchair Transfer (FIM) 4 - Patient requires steadying assist or light touching   QI: Car Transfer   Reason if not Attempted Activity not applicable   Car Transfer CARE Score 9   QI: Walk 10 Feet   Assistance Needed Incidental touching; Adaptive equipment Comment RW   Walk 10 Feet CARE Score 4   QI: Walk 50 Feet with Two Turns   Assistance Needed Incidental touching; Adaptive equipment   Walk 50 Feet with Two Turns CARE Score 4   QI: Walk 150 Feet   Assistance Needed Incidental touching; Adaptive equipment   Walk 150 Feet CARE Score 4   QI: Walking 10 Feet on Uneven Surfaces   Reason if not Attempted Activity not applicable   Walking 10 Feet on Uneven Surfaces CARE Score 9   Ambulation   Does the patient walk? 2  Yes   Primary Discharge Mode of Locomotion Walk   Walk Assist Level Contact Guard   Gait Pattern Inconsistant Sury;Decreased foot clearance; Forward Flexion; Improper weight shift   Assist Device Roller Nimco Ha Walked (feet) 150 ft  (x2)   Limitations Noted In Balance; Endurance;Posture;Speed;Strength;Swing   Findings practiced with SPC, 75' x4, CGA   Walking (FIM) 4 - Patient requires steadying assist or light touching AND distance 150 feet or more, no rest   Wheelchair mobility   QI: Does the patient use a wheelchair? 0  No   QI: 1 Step (Curb)   Reason if not Attempted Activity not applicable   1 Step (Curb) CARE Score 9   QI: 4 Steps   Assistance Needed Incidental touching; Adaptive equipment   4 Steps CARE Score 4   Stairs   Type Stairs   # of Steps 6   Weight Bearing Precautions Fall Risk;Cervical   Assist Devices Bilateral Rail   Findings limited by fatigue   Stairs (FIM) 2 - Patient goes up and down 4 - 11 stairs regardless of assist/device/setup   QI: Picking Up Object   Reason if not Attempted Activity not applicable   Picking Up Object CARE Score 9   QI: Toilet Transfer   Assistance Needed Incidental touching   Toilet Transfer CARE Score 4   Toilet Transfer   Findings pt stood to urinate   Toilet Transfer (FIM) 4 - Patient requires steadying assist or light touching       Physical Exam:  Temp:  [98 3 °F (36 8 °C)-98 5 °F (36 9 °C)] 98 5 °F (36 9 °C)  HR:  [72-74] 72  Resp:  [18-20] 18  BP: (111-144)/(53-85) 139/85  Oxygen Therapy  SpO2: 94 %    GEN: NAD, lying comfortably in bed  Head: NCAT, no gross lesions  Eyes: PERRL, EOMI  Throat: clear, no thrush, MMM  Neck: wearing c-collar; posterior cervical incision well approximated with staples, drain removed, no drainage noted  Pulm: CTAB, no rales/wheezes  CV: RRR, normal s1/s2  Abd: soft, NTND  Ext: no pedal edema bilaterally, distal extremities warm and well perfused  Psych: normal affect, no agitation  Skin: no observable rashes  Neuro: A+Ox3, fluent speech, follows commands; moving all extremities spontaneously and antigravity      Diagnostic Studies: reviewed, no new imaging  No results found  Laboratory:      Results from last 7 days  Lab Units 09/17/18  0524 09/13/18  0508 09/12/18  0535   HEMOGLOBIN g/dL 10 6* 10 5* 9 8*   HEMATOCRIT % 33 2* 32 4* 30 4*   WBC Thousand/uL 6 37 7 47 7 29       Results from last 7 days  Lab Units 09/17/18  0524 09/13/18  0508 09/12/18  0535  09/10/18  1604 09/10/18  1443   BUN mg/dL 10 5 3*  < >  --   --    SODIUM mmol/L 135* 135* 134*  < >  --   --    POTASSIUM mmol/L 3 9 3 5 3 2*  < >  --   --    CHLORIDE mmol/L 102 98* 98*  < >  --   --    GLUCOSE, ISTAT mg/dl  --   --   --   --  217* 201*   CREATININE mg/dL 0 48* 0 50* 0 45*  < >  --   --    < > = values in this interval not displayed  Patient Active Problem List   Diagnosis    Closed odontoid fracture with routine healing    Closed displaced fracture of third cervical vertebra ([address] )    Closed displaced fracture of fourth cervical vertebra (HCC)    Alcohol abuse    Decubitus ulcer of sacral region    CAD (coronary artery disease)    Dens fracture ([address] )    Acute blood loss anemia    Diabetes ([address] )    S/P C1-T1 Posterior Cervical Discectomy and Fusion on 9/10/18    Hypokalemia    At moderate risk for venous thromboembolism (VTE)    Oropharyngeal dysphagia       ** Please Note: Fluency Direct voice to text software may have been used in the creation of this document   **    Total visit time: At least 25 minutes, with more than 50% spent counseling/coordinating care

## 2018-09-18 ENCOUNTER — TELEPHONE (OUTPATIENT)
Dept: NEUROSURGERY | Facility: CLINIC | Age: 63
End: 2018-09-18

## 2018-09-18 LAB
GLUCOSE SERPL-MCNC: 125 MG/DL (ref 65–140)
GLUCOSE SERPL-MCNC: 139 MG/DL (ref 65–140)
GLUCOSE SERPL-MCNC: 175 MG/DL (ref 65–140)
GLUCOSE SERPL-MCNC: 187 MG/DL (ref 65–140)

## 2018-09-18 PROCEDURE — 97530 THERAPEUTIC ACTIVITIES: CPT

## 2018-09-18 PROCEDURE — 97110 THERAPEUTIC EXERCISES: CPT

## 2018-09-18 PROCEDURE — 92526 ORAL FUNCTION THERAPY: CPT

## 2018-09-18 PROCEDURE — 82948 REAGENT STRIP/BLOOD GLUCOSE: CPT

## 2018-09-18 PROCEDURE — 97116 GAIT TRAINING THERAPY: CPT

## 2018-09-18 PROCEDURE — 97535 SELF CARE MNGMENT TRAINING: CPT

## 2018-09-18 PROCEDURE — 94640 AIRWAY INHALATION TREATMENT: CPT

## 2018-09-18 PROCEDURE — 94760 N-INVAS EAR/PLS OXIMETRY 1: CPT

## 2018-09-18 PROCEDURE — 99232 SBSQ HOSP IP/OBS MODERATE 35: CPT | Performed by: PHYSICAL MEDICINE & REHABILITATION

## 2018-09-18 RX ORDER — GLIPIZIDE 5 MG/1
2.5 TABLET ORAL
Status: DISCONTINUED | OUTPATIENT
Start: 2018-09-19 | End: 2018-09-24 | Stop reason: HOSPADM

## 2018-09-18 RX ADMIN — INSULIN LISPRO 1 UNITS: 100 INJECTION, SOLUTION INTRAVENOUS; SUBCUTANEOUS at 21:10

## 2018-09-18 RX ADMIN — ACETAMINOPHEN 975 MG: 325 TABLET ORAL at 21:10

## 2018-09-18 RX ADMIN — LEVALBUTEROL HYDROCHLORIDE 1.25 MG: 1.25 SOLUTION, CONCENTRATE RESPIRATORY (INHALATION) at 07:11

## 2018-09-18 RX ADMIN — HEPARIN SODIUM 5000 UNITS: 5000 INJECTION, SOLUTION INTRAVENOUS; SUBCUTANEOUS at 21:10

## 2018-09-18 RX ADMIN — OXYCODONE HYDROCHLORIDE 5 MG: 5 TABLET ORAL at 12:09

## 2018-09-18 RX ADMIN — OXYCODONE HYDROCHLORIDE 10 MG: 10 TABLET ORAL at 08:19

## 2018-09-18 RX ADMIN — METOPROLOL TARTRATE 50 MG: 50 TABLET, FILM COATED ORAL at 21:10

## 2018-09-18 RX ADMIN — HEPARIN SODIUM 5000 UNITS: 5000 INJECTION, SOLUTION INTRAVENOUS; SUBCUTANEOUS at 05:11

## 2018-09-18 RX ADMIN — LEVALBUTEROL HYDROCHLORIDE 1.25 MG: 1.25 SOLUTION, CONCENTRATE RESPIRATORY (INHALATION) at 19:21

## 2018-09-18 RX ADMIN — Medication 100 MG: at 08:45

## 2018-09-18 RX ADMIN — INSULIN LISPRO 1 UNITS: 100 INJECTION, SOLUTION INTRAVENOUS; SUBCUTANEOUS at 08:21

## 2018-09-18 RX ADMIN — HEPARIN SODIUM 5000 UNITS: 5000 INJECTION, SOLUTION INTRAVENOUS; SUBCUTANEOUS at 14:18

## 2018-09-18 RX ADMIN — SENNOSIDES 17.2 MG: 8.6 TABLET, FILM COATED ORAL at 21:10

## 2018-09-18 RX ADMIN — METOPROLOL TARTRATE 50 MG: 50 TABLET, FILM COATED ORAL at 08:19

## 2018-09-18 RX ADMIN — OXYCODONE HYDROCHLORIDE 10 MG: 10 TABLET ORAL at 20:24

## 2018-09-18 RX ADMIN — ATORVASTATIN CALCIUM 80 MG: 80 TABLET, FILM COATED ORAL at 16:34

## 2018-09-18 RX ADMIN — MELATONIN 3 MG: at 21:10

## 2018-09-18 RX ADMIN — DIAZEPAM 5 MG: 5 TABLET ORAL at 08:29

## 2018-09-18 RX ADMIN — IPRATROPIUM BROMIDE 0.5 MG: 0.5 SOLUTION RESPIRATORY (INHALATION) at 07:11

## 2018-09-18 RX ADMIN — OXYCODONE HYDROCHLORIDE 10 MG: 10 TABLET ORAL at 04:27

## 2018-09-18 RX ADMIN — GABAPENTIN 100 MG: 100 CAPSULE ORAL at 16:34

## 2018-09-18 RX ADMIN — LIDOCAINE 1 PATCH: 50 PATCH TOPICAL at 21:10

## 2018-09-18 RX ADMIN — ACETAMINOPHEN 975 MG: 325 TABLET ORAL at 14:18

## 2018-09-18 RX ADMIN — IPRATROPIUM BROMIDE 0.5 MG: 0.5 SOLUTION RESPIRATORY (INHALATION) at 19:21

## 2018-09-18 RX ADMIN — ACETAMINOPHEN 975 MG: 325 TABLET ORAL at 05:11

## 2018-09-18 RX ADMIN — FLUTICASONE FUROATE AND VILANTEROL TRIFENATATE 1 PUFF: 100; 25 POWDER RESPIRATORY (INHALATION) at 08:19

## 2018-09-18 RX ADMIN — GABAPENTIN 100 MG: 100 CAPSULE ORAL at 08:20

## 2018-09-18 RX ADMIN — GABAPENTIN 100 MG: 100 CAPSULE ORAL at 21:10

## 2018-09-18 RX ADMIN — METFORMIN HYDROCHLORIDE 1000 MG: 500 TABLET ORAL at 08:19

## 2018-09-18 RX ADMIN — METFORMIN HYDROCHLORIDE 1000 MG: 500 TABLET ORAL at 16:34

## 2018-09-18 RX ADMIN — FOLIC ACID 1 MG: 1 TABLET ORAL at 08:19

## 2018-09-18 NOTE — PCC PHYSICAL THERAPY
09/18/2018   pt cont to be limited with C' area ROM d/t C' collar brace, also progressing with ambulation with RW and safety precaution, vc's at times for hand placement before standing  Pt cont to need Skilled PT d/t overall conditioning and strengthening in B/L LE   Pt cont to benefit with PT at this time

## 2018-09-18 NOTE — PCC SPEECH THERAPY
Pt currently being followed for dysphagia therapy where pt is presenting with overall mild oropharyngeal dysphagia characterized by mildly reduced mastication, decreased oral control, mildly delayed initiation of swallows and weaker/reduced hyolaryngeal rise  Pt is tolerating a level 3 diet and thin liquids at this time, however, trials of regular diet items have occurred with intermittent coughing noted with more regular textures, specifically fresh fruit  Suspecting decreased oral control of ambient fluid where eliciting coughing when masticating fruit, prior to swallows occurring  When cued for smaller bites, increased tolerance of items noted  Will cont to recommend level 3 diet and thin liquids at this time, however, pt will benefit from further skilled ST to trial additional regular diet textures w/ potential for diet upgrade

## 2018-09-18 NOTE — TELEPHONE ENCOUNTER
----- Message from Eduarda Silva MD sent at 9/15/2018 11:20 AM EDT -----  Can we check to make sure that someone is treating these blood surgars as is possible - FMD  ----- Message -----  From: Lab, Background User  Sent: 9/10/2018   7:26 PM  To: Eduarda Silva MD

## 2018-09-18 NOTE — PROGRESS NOTES
09/18/18 1000   Pain Assessment   Pain Assessment 0-10   Pain Score 3   Pain Type Surgical pain;Acute pain   Pain Location Shoulder   Pain Orientation Bilateral   Restrictions/Precautions   Precautions Aspiration;Bed/chair alarms; Fall Risk;Spinal precautions;Supervision on toilet/commode   Braces or Orthoses C/S Collar   Subjective   Subjective pt reports feeling okay and ready for PT session    QI: Sit to Stand   Assistance Needed Supervision   Sit to Stand CARE Score 4   Bed Mobility   Findings S level    QI: Chair/Bed-to-Chair Transfer   Assistance Needed Incidental touching   Chair/Bed-to-Chair Transfer CARE Score 4   Transfer Bed/Chair/Wheelchair   Limitations Noted In Balance;LE Strength;UE Strength; Sequencing;Pain Management; Endurance   Adaptive Equipment Roller Walker;Cane   Stand Pivot Contact Guard   Sit to TXU [place]   Stand to Sit Supervision   Supine to Sit Supervision   Sit to Supine Supervision   Car Transfer Contact Guard   Findings progressing toward goals  SPC for SPT right hand trail   Bed, Chair, Wheelchair Transfer (FIM) 4 - Patient requires steadying assist or light touching   QI: Car Transfer   Assistance Needed Incidental touching   Car Transfer CARE Score 4   QI: Walk 10 Feet   Assistance Needed Incidental touching   Comment SPC right hand    Walk 10 Feet CARE Score 4   QI: Walk 150 Feet   Assistance Needed Supervision   Walk 150 Feet CARE Score 4   Ambulation   Does the patient walk? 2  Yes   Primary Discharge Mode of Locomotion Walk   Walk Assist Level Close Supervision;Contact Guard   Gait Pattern Inconsistant Sury;Decreased foot clearance; Improper weight shift   Assist Device Vernon Ha Walked (feet) 155 ft   Limitations Noted In Balance; Endurance;Strength;Speed   Findings SPC trail walking Short distance and RW for longer distance    Walking (FIM) 4 - Patient requires steadying assist or light touching AND distance 150 feet or more, no rest   Wheelchair mobility Wheelchair (FIM) 0 - Activity does not occur   Stairs   Type Stairs   # of Steps 8   Weight Bearing Precautions Fall Risk;Cervical   Assist Devices Bilateral Rail   Findings fatigue , endurance    Stairs (FIM) 2 - Patient goes up and down 4 - 11 stairs regardless of assist/device/setup   QI: Toilet Transfer   Assistance Needed Incidental touching   Toilet Transfer CARE Score 4   Toilet Transfer   Surface Assessed Standard Toilet   Limitations Noted In Balance; Safety   Positioning Concerns Safety   Toilet Transfer (FIM) 4 - Patient requires steadying assist or light touching   Therapeutic Interventions   Strengthening LAQ 2 # X20 HIP Marching x20 AP x20 QS x20    Flexibility hamstring B/L    Balance STS x10 walking BWD with RW to inc balance awareness    Equipment Use   NuStep level 2 and 13 min    Assessment   Treatment Assessment pt performing with the ex's functional activities and gait training with RW and SPC for short distance  and to challenge PT balance awareness   Pt improving with all transfers  and functional activities , [facility] balance awareness with box tapping and VC 's for weight shifting , pt will cont to benefit with PT session to meet D/C goals    Plan   Treatment/Interventions Functional transfer training;LE strengthening/ROM; Elevations; Therapeutic exercise; Endurance training;Patient/family training;Bed mobility;Gait training   Progress Progressing toward goals   PT Therapy Minutes   PT Time In 1000   PT Time Out 1130   PT Total Time (minutes) 90   PT Mode of treatment - Individual (minutes) 90   PT Mode of treatment - Concurrent (minutes) 0   PT Mode of treatment - Group (minutes) 0   PT Mode of treatment - Co-treat (minutes) 0   PT Mode of Teatment - Total time(minutes) 90 minutes   Therapy Time missed   Time missed?  No

## 2018-09-18 NOTE — PROGRESS NOTES
09/18/18 0800   Pain Assessment   Pain Assessment 0-10   Pain Score 8   Pain Type Acute pain;Surgical pain   Pain Location Shoulder   Pain Orientation Bilateral   Pain Descriptors Aching;Stabbing   Pain Frequency Intermittent   Pain Onset Ongoing   Clinical Progression Gradually worsening   Hospital Pain Intervention(s) Distraction  (nursing administered medication)   Response to Interventions tolerated throughout meal   Restrictions/Precautions   Precautions Aspiration;Bed/chair alarms; Fall Risk;Spinal precautions;Supervision on toilet/commode   ROM Restrictions Yes   RUE ROM Restriction (spinal precautions)   Braces or Orthoses C/S Collar   Swallow Information   Current Risks for Dysphagia & Aspiration Recent intubation;General debilitation;Cervical spine injury/surgery   Current Symptoms/Concerns Cough;Clear throat;During meals; Difficulty chewing   Current Diet Dysphagia advance; Thin liquid   Baseline Diet Regular; Thin liquids   Consistencies Assessed and Performance   Materials Admnistered Soft/Level 3;Regular/Solid; Thin liquid   Oral Stage Mild impaired   Phargngeal Stage Mild impaired;Aspiration risk   Swallow Mechanics Swallow initation; Appears prompt;Weak larygneal rise;Good Larygneal rise;Aspiration risk   Esophageal Concerns No s/s reported   Recommendations   Risk for Aspiration Mild   Diet Solid Recommendation Level 3 Dysphagia/ advanced/ soft to chew   Diet Liquid Recommendation Thin liquid   Recommended Form of Meds As tolerated   General Precautions Aspiration precautions; Feed only when alert;Minimize distractions;Upright as possible for all oral intake;Remain upright for 45 mins after meals  (OOB for meals)   Compensatory Swallowing Strategies Alternate solids and liquids;Voluntary throat clear/cough to clear penetration   Results Reviewed with PT/Family/Caregiver   QI: [address] Provided by [place] No physical assistance   Eating CARE Score 6   Swallow Assessment   Swallow Treatment Assessment Pt seen for ongoing dysphagia therapy during bfast w/ a regular diet trial tray consisting of whitt, an english muffin, scrambled eggs and fresh fruit, consuming 100% of meal, along w/ 6oz thin liquids by cup sips  Pt was mod I w/ tray set up and able to self feed w/o assist  Mastication of solids was mildly slower but functional and appeared complete, noting adequate bolus formation and oral clearance-no increased residual or pocketing present  Timely a-p transfers of both thins and solids w/ overall swallow initiation of solids to appear mildly delayed but at times prompter  Cont to suspect decreased oral control of ambient fluids as noted by coughing w/ fresh fruit prior to initiating swallow  Pt re-educated on taking smaller bites to decrease amt of ambient fluid and for ease of oral manipulation/control of items  Cough also elicited w/ intake of english muffin but no further overt s/s aspiration noted during meal w/ thins taken by cup sip  Cont to recommend level 3 diet and thin liquids at this time but will cont to trial diet upgrades w/ SLP w/ potential for diet upgrade  Swallow Assessment Prognosis   Prognosis Good   Prognosis Considerations Co-morbidities; Medical prognosis   SLP Therapy Minutes   SLP Time In 0800   SLP Time Out 0830   SLP Total Time (minutes) 30   SLP Mode of treatment - Individual (minutes) 0   SLP Mode of treatment - Concurrent (minutes) 30   SLP Mode of treatment - Group (minutes) 0   SLP Mode of treatment - Co-treat (minutes) 0   SLP Mode of Teatment - Total time(minutes) 30 minutes   Therapy Time missed   Time missed?  No   Daily FIM Score   Eating (FIM) 6 - Patient requires modified diet

## 2018-09-18 NOTE — PROGRESS NOTES
Internal Medicine Progress Note  Patient: Alexandro Karimi  Age/sex: 58 y o  male  Medical Record #: [id number]      ASSESSMENT/PLAN:  Alexandro Karimi is seen and examined and management for following issues:    Multiple cervical fractures s/p posterior cervical decompression laminectomy C3-6, posterior cervical lateral mass and pedicle fixation/fusion C1-T1 on 9/10/18: Pain control per primary service  Continue to monitor incision  Aspen collar at all times      HTN: stable; continue Lopressor 50mg every 12 hours      MI s/p PTCA: Plavix on hold = resume when OK with NS (OK to restart 2 weeks post-op on 9/24/18); continue Lipitor 80mg qd      ETOH abuse: Continue folate, thiamine and Valium  Valium being weaned by primary service      DM type 2:  Hemoglobin A1c 8 5  On 9/16/18 evening, resumed Metformin 1000mg 2x daily as he takes at home  Will add Glipizide 2 5mg qam starting tomorrow;  ?compliant at home with Metformin (HbA1C was 8 5); continue QID Accuchecks/SSI/DM diet  , 159, 172, 196; fasting today 175     COPD: Pt uses Advair 500/50, Albuterol nebs TID and prn Ventolin at home = here on Xopenex/Atrovent nebs TID and using Delta Diaz here for Advair  Subjective: Patient seen and examined  He denies any current complaints      ROS:   GI: denies abdominal pain, change bowel habits or reflux symptoms  Neuro: No new neurologic changes  Respiratory: No Cough, SOB  Cardiovascular: No CP, palpitations     Scheduled Meds:    Current Facility-Administered Medications:  acetaminophen 975 mg Oral [facility] Abimbola Macedo MD   albuterol 2 puff Inhalation Q4H PRN Abimbola Macedo MD   atorvastatin 80 mg Oral Daily With Dee Dee Acevedo MD   bisacodyl 10 mg Rectal Daily PRN Abimbola Macedo MD   diazepam 5 mg Oral Daily Abimbola Macedo MD   fluticasone-vilanterol 1 puff Inhalation Daily Lauren Braswell PA-C   folic acid 1 mg Oral Daily Abimbola Macedo MD   gabapentin 100 mg Oral TID Abimbola Macedo MD   heparin (porcine) 5,000 Units Subcutaneous [facility] Lucien Latham MD   insulin lispro 1-5 Units Subcutaneous HS Lauren Braswell PA-C   insulin lispro 1-5 Units Subcutaneous TID AC Lauren Braswell PA-C   ipratropium 0 5 mg Nebulization BID Lucien Latham MD   levalbuterol 1 25 mg Nebulization BID Lucien Latham MD   lidocaine 1 patch Transdermal Daily Lucien Latham MD   magnesium hydroxide 30 mL Oral Daily PRN Lucien Latham MD   melatonin 3 mg Oral HS PRN Lucien Latham MD   metFORMIN 1,000 mg Oral BID With Meals Lauren Braswell PA-C   metoprolol tartrate 50 mg Oral Q12H [address] Lucien Latham MD   oxyCODONE 10 mg Oral Q4H PRN Lucien Latham MD   oxyCODONE 5 mg Oral Q4H PRN Lucien Latham MD   senna 2 tablet Oral HS Lucien Latham MD   thiamine 100 mg Oral Daily Lucien Latham MD       Labs:       Results from last 7 days  Lab Units 09/17/18  0524 09/13/18  0508   WBC Thousand/uL 6 37 7 47   HEMOGLOBIN g/dL 10 6* 10 5*   HEMATOCRIT % 33 2* 32 4*   PLATELETS Thousands/uL 251 186       Results from last 7 days  Lab Units 09/17/18  0524 09/13/18  0508   SODIUM mmol/L 135* 135*   POTASSIUM mmol/L 3 9 3 5   CHLORIDE mmol/L 102 98*   CO2 mmol/L 27 31   BUN mg/dL 10 5   CREATININE mg/dL 0 48* 0 50*   CALCIUM mg/dL 9 0 8 7       Results from last 7 days  Lab Units 09/15/18  0500   HEMOGLOBIN A1C % 8 5*              Glucose, i-STAT (mg/dl)   Date Value   09/10/2018 217 (H)   09/10/2018 201 (H)   09/09/2018 185 (H)       Labs reviewed    Physical Examination:  Vitals:   Vitals:    09/17/18 2047 09/17/18 2058 09/18/18 0612 09/18/18 0711   BP: 133/76  147/78    BP Location: Left arm  Left arm    Pulse: 86  75    Resp: 18  18    Temp: 97 8 °F (36 6 °C)  98 °F (36 7 °C)    TempSrc: Oral  Oral    SpO2: 96% 96% 95% 95%   Weight:       Height:         Constitutional:  NAD; pleasant; nontoxic  HEENT:  AT/NC; oropharynx negative for thrush on tongue   Neck: collar on  CV:  +S1, S2;  RRR; no rub/murmur  Pulmonary:  BBS without crackles/wheeze/rhonci; resp are unlabored  Abdominal:  soft, +BS, ND/NT; no mass  Skin:  no rashes  Musculoskeletal:  no edema  :  no antony  Neurological/Psych:  AAO;  RUE 4+/5, RLE/LLE/LUE 5/5; no depression/anxiety      [ X ] Total time spent: 30 Mins and greater than 50% of this time was spent counseling/coordinating care  ** Please Note: Dragon 360 Dictation voice to text software may have been used in the creation of this document   **

## 2018-09-18 NOTE — PROGRESS NOTES
Physical Medicine and Rehabilitation Progress Note  Jonas Perla 58 y o  male MRN: [id number]  Unit/Bed#: [facility] 115-25 Encounter: [id number]    HPI: Jonas Perla is a 58 y o  male with COPD, HTN, MI, DM, CVA in April 2018, alcohol dependence, KLARISSA noncompliant with CPAP, who presented on 9/9/18 after fall down 15 steps  He sustained multiple cervical spine fractures, s/p C3-6 decompression laminectomy and instrumented fixation/fusion of C1-T1 with Dr Sandi Ann on 9/10/18  He was placed on CIWA protocol with valium  He was extubated on 4/82/73 without complication  He was admitted to acute rehab on 9/14/18  CC: neck pain    Subjective: no acute events overnight  Reports improvement in shoulder pain after myofascial release with therapies yesterday  Neck pain "not too bad", slept OK overnight but woke up once at 4AM  No other complaints  Nursing staff reporting: no problems    ROS: No fever, chills, chest pain, SOB, cough, nausea, vomiting, diarrhea, constipation, abdominal pain, dysuria, bowel/bladder incontinence, new weakness or changes in sensation  +neck discomfort  Complete ROS obtained and otherwise negative        Assessment/Plan:  Oropharyngeal dysphagia   Assessment & Plan    Secondary to posterior fusion C1-T1  Currently on dysphagia 3 diet  SLP following  Advance diet as appropriate        At moderate risk for venous thromboembolism (VTE)   Assessment & Plan    Secondary to decreased mobility after fall and cervical spine fractures s/p C1-T1 fusion  Heparin 5000u TID  SCDs        Hypokalemia   Assessment & Plan    Periodic BMP  PRN supplementation        Diabetes [facility])   Assessment & Plan    Lab Results   Component Value Date    HGBA1C 8 5 (H) 09/15/2018       Recent Labs      09/17/18   1134  09/17/18   1603  09/17/18   2050  09/18/18   0648   POCGLU  159*  172*  196*  175*     Metformin 1000mg BID  Glipizide 2 5mg started - monitor BG  SSI    Blood Sugar Average: Last 72 hrs:          Acute blood loss anemia   Assessment & Plan    Secondary to trauma s/p surgery  H/H stable  Periodic CBC        CAD (coronary artery disease)   Assessment & Plan    S/p stenting   Previously on plavix  Per neurosurgery, OK to resume antiplatelet in 2 weeks post-op (9/24/18)    Also with history of CVA in April 2018  Atorvastatin 80mg           Decubitus ulcer of sacral region   Assessment & Plan    Right buttock/ischium stage II decubitus  Frequent turns  Daily monitoring         Alcohol abuse   Assessment & Plan    Valium to 5mg q12h starting 9/16, then 5mg daily starting 9/18, then stop 9/20  Folate, thiamine supplementation  Monitor for signs of withdrawal    Cessation discussed with patient    Neuropsych consulted for support/counseling        * S/P C1-T1 Posterior Cervical Discectomy and Fusion on 9/10/18   Assessment & Plan    Cervical collar at all times  MEGHAN drain removed 9/15  Surgical staples in place - remove 9/24    Recommend comprehensive inpatient rehabilitation management with rehab MD, PT, OT, rehab level nursing, and case management, for deconditioning/debility following fall with cervical spine fractures               Scheduled Meds:    Current Facility-Administered Medications:  acetaminophen 975 mg Oral [facility] Roula Maloney MD   albuterol 2 puff Inhalation Q4H PRN Roula Maloney MD   atorvastatin 80 mg Oral Daily With Jerold Bence, MD   bisacodyl 10 mg Rectal Daily PRN Roula Maloney MD   diazepam 5 mg Oral Daily Roula Maloney MD   fluticasone-vilanterol 1 puff Inhalation Daily Lauren Braswell PA-C   folic acid 1 mg Oral Daily Roula Maloney MD   gabapentin 100 mg Oral TID Roula Maloney MD   [START ON 9/19/2018] glipiZIDE 2 5 mg Oral Daily Before Breakfast TATIANA Medrano   heparin (porcine) 5,000 Units Subcutaneous [facility] Roula Maloney MD   insulin lispro 1-5 Units Subcutaneous HS Lauren Braswell PA-C   insulin lispro 1-5 Units Subcutaneous TID AC Lauren Braswell PA-C ipratropium 0 5 mg Nebulization BID Isai Lopez MD   levalbuterol 1 25 mg Nebulization BID Isai Lopez MD   lidocaine 1 patch Transdermal Daily Isai Lopez MD   magnesium hydroxide 30 mL Oral Daily PRN Isai Lopez MD   melatonin 3 mg Oral HS PRN Isai Lopez MD   metFORMIN 1,000 mg Oral BID With Meals Lauren Braswell PA-C   metoprolol tartrate 50 mg Oral Q12H [address] Isai Lopez MD   oxyCODONE 10 mg Oral Q4H PRN Isai oLpez MD   oxyCODONE 5 mg Oral Q4H PRN Isai Lopez MD   senna 2 tablet Oral HS Isai Lopez MD   thiamine 100 mg Oral Daily Isai Lopez MD       Allergies   Allergen Reactions    Amoxicillin     Augmentin [Amoxicillin-Pot Clavulanate]         Objective:    Functional Update:  09/17/18 1350    Pain Assessment   Pain Assessment 0-10   Pain Score 5   Pain Type Surgical pain;Acute pain   Pain Location Shoulder   Pain Orientation Right   Restrictions/Precautions   Precautions Aspiration;Bed/chair alarms; Fall Risk;Supervision on toilet/commode;Spinal precautions   Braces or Orthoses C/S Collar   Subjective   Subjective pt reports feeling okay and ready for PTsession    QI: Roll Left and Right   Assistance Needed Supervision   Roll Left and Right CARE Score 4   QI: Sit to Lying   Assistance Needed Supervision   Sit to Lying CARE Score 4   QI: Lying to Sitting on Side of Bed   Assistance Needed Supervision   Lying to Sitting on Side of Bed CARE Score 4   QI: Sit to Stand   Assistance Needed Incidental touching   Sit to Stand CARE Score 4   Bed Mobility   Able to Roll Right to Left;Left to Right   Findings S level    QI: Chair/Bed-to-Chair Transfer   Assistance Needed Incidental touching   Chair/Bed-to-Chair Transfer CARE Score 4   Transfer Bed/Chair/Wheelchair   Limitations Noted In Balance;LE Strength;UE Strength;Pain Management; Endurance   Adaptive Equipment Roller Walker   Findings progressing toward goals right  S ' pain noted    Bed, Chair, Wheelchair Transfer (FIM) 4 - Patient requires steadying assist or light touching   QI: Walk 150 Feet   Assistance Needed Incidental touching   Comment RW with Chair follow for safety    Walk 150 Feet CARE Score 4   QI: Walking 10 Feet on Uneven Surfaces   Reason if not Attempted Activity not applicable   Walking 10 Feet on Uneven Surfaces CARE Score 9   Ambulation   Does the patient walk? 2  Yes   Primary Discharge Mode of Locomotion Walk   Walk Assist Level Contact Guard   Gait Pattern Inconsistant Sury; Slow Sury; Improper weight shift   Assist Device Roller [place] Ha Walked (feet) 152 ft   Limitations Noted In Endurance;Balance;Strength;Speed   Walking (FIM) 4 - Patient requires steadying assist or light touching AND distance 150 feet or more, no rest   Wheelchair mobility   Wheelchair Assist Level Supervision   Method Right upper extremity; Left upper extremity   Needs Assist With Press Release;Locking Brakes   Distance Level Surface (feet) 150 ft   Findings inc B/L UE    Wheelchair (FIM) 5 - Patient requires supervision/monitoring AND wheels distance 150 feet or more, no rest   Toilet Transfer   Surface Assessed Raised Toilet   Limitations Noted In Balance; Safety   Positioning Concerns Safety   Findings stood to urinate    Toilet Transfer (FIM) 4 - Patient requires steadying assist or light touching   Equipment Use   NuStep level 2 for 10 min    Assessment   Treatment Assessment pt shows good effort during skilled PT c/o some pain as above ,pt able to perform Nu Step bike to inc LE/UE strengthening and endurance , pt perform WC propl with vc's for hand placement and turns   Pt will cont toward goals  and will cont to benefit with skilled PT    Plan   Treatment/Interventions Functional transfer training;LE strengthening/ROM; Therapeutic exercise; Endurance training;Gait training;Bed mobility; Patient/family training   Progress Progressing toward goals       Physical Exam:  Temp:  [97 6 °F (36 4 °C)-98 °F (36 7 °C)] 98 °F (36 7 °C)  HR:  [75-86] 75  Resp:  [18] 18  BP: (125-147)/(65-78) 147/78  Oxygen Therapy  SpO2: 95 %    GEN: NAD, lying comfortably in bed  Head: NCAT  Eyes: EOMI, anicteric  Throat: clear, no thrush, MMM  Neck: c-collar secure; incision well approximated with surgical staples, c/d/i  Pulm: CTAB, no rales/wheezes  CV: RRR, normal s1/s2  Abd: soft, NTND  Ext: no pedal edema bilaterally, distal extremities warm and well perfused  Psych: normal affect, no agitation  Skin: incision as above; no rashes  Neuro: A+Ox3, fluent speech, follows commands; 5-/5 right finger flexion, otherwise 5/5 bilateral UE and LE  MSK: cervical and thoracic kyphosis    Diagnostic Studies: reviewed, no new imaging  No results found  Laboratory:      Results from last 7 days  Lab Units 09/17/18  0524 09/13/18  0508 09/12/18  0535   HEMOGLOBIN g/dL 10 6* 10 5* 9 8*   HEMATOCRIT % 33 2* 32 4* 30 4*   WBC Thousand/uL 6 37 7 47 7 29       Results from last 7 days  Lab Units 09/17/18  0524 09/13/18  0508 09/12/18  0535   BUN mg/dL 10 5 3*   SODIUM mmol/L 135* 135* 134*   POTASSIUM mmol/L 3 9 3 5 3 2*   CHLORIDE mmol/L 102 98* 98*   CREATININE mg/dL 0 48* 0 50* 0 45*            Patient Active Problem List   Diagnosis    Closed odontoid fracture with routine healing    Closed displaced fracture of third cervical vertebra (HCC)    Closed displaced fracture of fourth cervical vertebra (HCC)    Alcohol abuse    Decubitus ulcer of sacral region    CAD (coronary artery disease)    Dens fracture (HCC)    Acute blood loss anemia    Diabetes (HCC)    S/P C1-T1 Posterior Cervical Discectomy and Fusion on 9/10/18    Hypokalemia    At moderate risk for venous thromboembolism (VTE)    Oropharyngeal dysphagia       ** Please Note: Fluency Direct voice to text software may have been used in the creation of this document  **    Total visit time:  At least 25 minutes, with more than 50% spent counseling/coordinating care

## 2018-09-19 PROBLEM — J44.9 COPD WITHOUT EXACERBATION (HCC): Status: ACTIVE | Noted: 2018-09-19

## 2018-09-19 LAB
GLUCOSE SERPL-MCNC: 130 MG/DL (ref 65–140)
GLUCOSE SERPL-MCNC: 138 MG/DL (ref 65–140)
GLUCOSE SERPL-MCNC: 153 MG/DL (ref 65–140)
GLUCOSE SERPL-MCNC: 158 MG/DL (ref 65–140)

## 2018-09-19 PROCEDURE — 82948 REAGENT STRIP/BLOOD GLUCOSE: CPT

## 2018-09-19 PROCEDURE — 94640 AIRWAY INHALATION TREATMENT: CPT

## 2018-09-19 PROCEDURE — 94760 N-INVAS EAR/PLS OXIMETRY 1: CPT

## 2018-09-19 PROCEDURE — 97535 SELF CARE MNGMENT TRAINING: CPT

## 2018-09-19 PROCEDURE — 97530 THERAPEUTIC ACTIVITIES: CPT

## 2018-09-19 PROCEDURE — 97110 THERAPEUTIC EXERCISES: CPT

## 2018-09-19 PROCEDURE — 99233 SBSQ HOSP IP/OBS HIGH 50: CPT | Performed by: PHYSICAL MEDICINE & REHABILITATION

## 2018-09-19 PROCEDURE — 92526 ORAL FUNCTION THERAPY: CPT

## 2018-09-19 PROCEDURE — 97116 GAIT TRAINING THERAPY: CPT

## 2018-09-19 RX ADMIN — GABAPENTIN 100 MG: 100 CAPSULE ORAL at 16:59

## 2018-09-19 RX ADMIN — HEPARIN SODIUM 5000 UNITS: 5000 INJECTION, SOLUTION INTRAVENOUS; SUBCUTANEOUS at 21:48

## 2018-09-19 RX ADMIN — ATORVASTATIN CALCIUM 80 MG: 80 TABLET, FILM COATED ORAL at 16:59

## 2018-09-19 RX ADMIN — METOPROLOL TARTRATE 50 MG: 50 TABLET, FILM COATED ORAL at 21:47

## 2018-09-19 RX ADMIN — HEPARIN SODIUM 5000 UNITS: 5000 INJECTION, SOLUTION INTRAVENOUS; SUBCUTANEOUS at 05:38

## 2018-09-19 RX ADMIN — ACETAMINOPHEN 975 MG: 325 TABLET ORAL at 05:38

## 2018-09-19 RX ADMIN — METOPROLOL TARTRATE 50 MG: 50 TABLET, FILM COATED ORAL at 09:01

## 2018-09-19 RX ADMIN — ACETAMINOPHEN 975 MG: 325 TABLET ORAL at 21:48

## 2018-09-19 RX ADMIN — FLUTICASONE FUROATE AND VILANTEROL TRIFENATATE 1 PUFF: 100; 25 POWDER RESPIRATORY (INHALATION) at 09:00

## 2018-09-19 RX ADMIN — OXYCODONE HYDROCHLORIDE 5 MG: 5 TABLET ORAL at 14:26

## 2018-09-19 RX ADMIN — GLIPIZIDE 2.5 MG: 5 TABLET ORAL at 09:00

## 2018-09-19 RX ADMIN — LIDOCAINE 1 PATCH: 50 PATCH TOPICAL at 21:50

## 2018-09-19 RX ADMIN — ACETAMINOPHEN 975 MG: 325 TABLET ORAL at 13:28

## 2018-09-19 RX ADMIN — OXYCODONE HYDROCHLORIDE 10 MG: 10 TABLET ORAL at 23:48

## 2018-09-19 RX ADMIN — Medication 100 MG: at 08:58

## 2018-09-19 RX ADMIN — GABAPENTIN 100 MG: 100 CAPSULE ORAL at 21:48

## 2018-09-19 RX ADMIN — METFORMIN HYDROCHLORIDE 1000 MG: 500 TABLET ORAL at 08:59

## 2018-09-19 RX ADMIN — HEPARIN SODIUM 5000 UNITS: 5000 INJECTION, SOLUTION INTRAVENOUS; SUBCUTANEOUS at 13:28

## 2018-09-19 RX ADMIN — LEVALBUTEROL HYDROCHLORIDE 1.25 MG: 1.25 SOLUTION, CONCENTRATE RESPIRATORY (INHALATION) at 19:40

## 2018-09-19 RX ADMIN — METFORMIN HYDROCHLORIDE 1000 MG: 500 TABLET ORAL at 16:58

## 2018-09-19 RX ADMIN — IPRATROPIUM BROMIDE 0.5 MG: 0.5 SOLUTION RESPIRATORY (INHALATION) at 19:40

## 2018-09-19 RX ADMIN — GABAPENTIN 100 MG: 100 CAPSULE ORAL at 08:58

## 2018-09-19 RX ADMIN — OXYCODONE HYDROCHLORIDE 10 MG: 10 TABLET ORAL at 05:38

## 2018-09-19 RX ADMIN — FOLIC ACID 1 MG: 1 TABLET ORAL at 08:58

## 2018-09-19 RX ADMIN — SENNOSIDES 17.2 MG: 8.6 TABLET, FILM COATED ORAL at 21:48

## 2018-09-19 RX ADMIN — DIAZEPAM 5 MG: 5 TABLET ORAL at 08:58

## 2018-09-19 RX ADMIN — INSULIN LISPRO 1 UNITS: 100 INJECTION, SOLUTION INTRAVENOUS; SUBCUTANEOUS at 09:00

## 2018-09-19 NOTE — PROGRESS NOTES
Physical Medicine and Rehabilitation Progress Note  Leana Xie 58 y o  male MRN: [id number]  Unit/Bed#: -97 Encounter: [id number]    HPI: Leana Xie is a 58 y o  male with COPD, HTN, MI, DM, CVA in April 2018, alcohol dependence, KLARISSA noncompliant with CPAP, who presented on 9/9/18 after fall down 15 steps  He sustained multiple cervical spine fractures, s/p C3-6 decompression laminectomy and instrumented fixation/fusion of C1-T1 with Dr Lamar Mejia on 9/10/18  He was placed on CIWA protocol with valium  He was extubated on 4/45/36 without complication  He was admitted to acute rehab on 9/14/18  CC: cough    Subjective: Patient reports cough developing overnight  No SOB, non productive, no chest pain  He reports mild neck pain, manageable, attributes to cervical collar  Reports last BM yesterday (not charted), denies constipation or abdominal discomfort  Nursing staff reporting: no problems    ROS: No fever, chills, chest pain, SOB, nausea, vomiting, diarrhea, constipation, abdominal pain, dysuria, bowel/bladder incontinence, new weakness or changes in sensation  +neck pain, +cough  No rhinorrhea  Complete ROS obtained and otherwise negative        Assessment/Plan:  Oropharyngeal dysphagia   Assessment & Plan    Secondary to posterior fusion C1-T1  Currently on dysphagia 3 diet  SLP following  Advance diet as appropriate        At moderate risk for venous thromboembolism (VTE)   Assessment & Plan    Secondary to decreased mobility after fall and cervical spine fractures s/p C1-T1 fusion  Heparin 5000u TID  SCDs        Hypokalemia   Assessment & Plan    Periodic BMP  PRN supplementation        Diabetes [facility])   Assessment & Plan    Lab Results   Component Value Date    HGBA1C 8 5 (H) 09/15/2018       Recent Labs      09/18/18   1134  09/18/18   1528  09/18/18   2031  09/19/18   0655   POCGLU  125  139  187*  153*     Metformin 1000mg BID  Glipizide 2 5mg started - monitor BG  SSI    Blood Sugar Average: Last 72 hrs:          Acute blood loss anemia   Assessment & Plan    Secondary to trauma s/p surgery  H/H stable  Periodic CBC        CAD (coronary artery disease)   Assessment & Plan    S/p stenting   Previously on plavix  Per neurosurgery, OK to resume antiplatelet in 2 weeks post-op (9/24/18)    Also with history of CVA in April 2018  Atorvastatin 80mg           Decubitus ulcer of sacral region   Assessment & Plan    Right buttock/ischium stage II decubitus - healing  Frequent turns  Daily monitoring         Alcohol abuse   Assessment & Plan    Valium to 5mg q12h starting 9/16, then 5mg daily starting 9/18, then stop 9/20  Folate, thiamine supplementation  Monitor for signs of withdrawal    Cessation discussed with patient    Neuropsych consulted for support/counseling        * S/P C1-T1 Posterior Cervical Discectomy and Fusion on 9/10/18   Assessment & Plan    Cervical collar at all times  MEGHAN drain removed 9/15  Surgical staples in place - remove 9/24    Recommend comprehensive inpatient rehabilitation management with rehab MD, PT, OT, rehab level nursing, and case management, for deconditioning/debility following fall with cervical spine fractures               Scheduled Meds:    Current Facility-Administered Medications:  acetaminophen 975 mg Oral [facility] Kayla Funez MD   albuterol 2 puff Inhalation Q4H PRN Kayla Funez MD   atorvastatin 80 mg Oral Daily With Mily Nicolas MD   bisacodyl 10 mg Rectal Daily PRN Kayla Funez MD   fluticasone-vilanterol 1 puff Inhalation Daily Lauren Braswell PA-C   folic acid 1 mg Oral Daily Kayla Funez MD   gabapentin 100 mg Oral TID Kayla Funez MD   glipiZIDE 2 5 mg Oral Daily Before Breakfast TATIANA Luna   heparin (porcine) 5,000 Units Subcutaneous [facility] Kayla Funez MD   insulin lispro 1-5 Units Subcutaneous HS Lauren Braswell PA-C   insulin lispro 1-5 Units Subcutaneous TID AC Lauren Braswell PA-C   ipratropium 0 5 mg Nebulization BID Carmine Morales MD   levalbuterol 1 25 mg Nebulization BID Carmine Morales MD   lidocaine 1 patch Transdermal Daily Carmine Morales MD   magnesium hydroxide 30 mL Oral Daily PRN Carmine Morales MD   melatonin 3 mg Oral HS PRN Carmine Morales MD   metFORMIN 1,000 mg Oral BID With Meals Lauren Braswell PA-C   metoprolol tartrate 50 mg Oral Q12H [address] Carmine Morales MD   oxyCODONE 10 mg Oral Q4H PRN Carmine Morales MD   oxyCODONE 5 mg Oral Q4H PRN Carmine Morales MD   senna 2 tablet Oral HS Carmine Morales MD   thiamine 100 mg Oral Daily Carmine Morales MD       Allergies   Allergen Reactions    Amoxicillin     Augmentin [Amoxicillin-Pot Clavulanate]         Objective:    Functional Update:  09/18/18 1000    Pain Assessment   Pain Assessment 0-10   Pain Score 3   Pain Type Surgical pain;Acute pain   Pain Location Shoulder   Pain Orientation Bilateral   Restrictions/Precautions   Precautions Aspiration;Bed/chair alarms; Fall Risk;Spinal precautions;Supervision on toilet/commode   Braces or Orthoses C/S Collar   Subjective   Subjective pt reports feeling okay and ready for PT session    QI: Sit to Stand   Assistance Needed Supervision   Sit to Stand CARE Score 4   Bed Mobility   Findings S level    QI: Chair/Bed-to-Chair Transfer   Assistance Needed Incidental touching   Chair/Bed-to-Chair Transfer CARE Score 4   Transfer Bed/Chair/Wheelchair   Limitations Noted In Balance;LE Strength;UE Strength; Sequencing;Pain Management; Endurance   Adaptive Equipment Roller Walker;Cane   Stand Pivot Contact Guard   Sit to TXU Rocio   Stand to Sit Supervision   Supine to Sit Supervision   Sit to Supine Supervision   Car Transfer Contact Guard   Findings progressing toward goals  [facility] for SPT right hand trail   Bed, Chair, Wheelchair Transfer (FIM) 4 - Patient requires steadying assist or light touching   QI: Car Transfer   Assistance Needed Incidental touching   Car Transfer CARE Score 4   QI: Walk 10 Feet   Assistance Needed Incidental touching   Comment SPC right hand    Walk 10 Feet CARE Score 4   QI: Walk 150 Feet   Assistance Needed Supervision   Walk 150 Feet CARE Score 4   Ambulation   Does the patient walk? 2  Yes   Primary Discharge Mode of Locomotion Walk   Walk Assist Level Close Supervision;Contact Guard   Gait Pattern Inconsistant Sury;Decreased foot clearance; Improper weight shift   Assist Device Vernon Ha Walked (feet) 155 ft   Limitations Noted In Balance; Endurance;Strength;Speed   Findings SPC trail walking Short distance and RW for longer distance    Walking (FIM) 4 - Patient requires steadying assist or light touching AND distance 150 feet or more, no rest   Wheelchair mobility   Wheelchair (FIM) 0 - Activity does not occur   Stairs   Type Stairs   # of Steps 8   Weight Bearing Precautions Fall Risk;Cervical   Assist Devices Bilateral Rail   Findings fatigue , endurance    Stairs (FIM) 2 - Patient goes up and down 4 - 11 stairs regardless of assist/device/setup   QI: Toilet Transfer   Assistance Needed Incidental touching   Toilet Transfer CARE Score 4   Toilet Transfer   Surface Assessed Standard Toilet   Limitations Noted In Balance; Safety   Positioning Concerns Safety   Toilet Transfer (FIM) 4 - Patient requires steadying assist or light touching   Therapeutic Interventions   Strengthening LAQ 2 # X20 HIP Marching x20 AP x20 QS x20    Flexibility hamstring B/L    Balance STS x10 walking BWD with RW to inc balance awareness    Equipment Use   NuStep level 2 and 13 min    Assessment   Treatment Assessment pt performing with thex ex's functional activities and gait training with RW and SPC for short distance  and to challenge PT balance awareness    Pt improving with all transfers  and functional activities , [facility] balance awareness with box tapping and VC 's for weight shifting , pt will cont to benefit with PT session to meet D/C goals        Physical Exam:  Temp:  [97 5 °F (36 4 °C)-97 7 °F (36 5 °C)] 97 7 °F (36 5 °C)  HR:  [] 76  Resp:  [18-20] 18  BP: (133-153)/(71-81) 133/75  Oxygen Therapy  SpO2: 94 %    GEN: NAD, resting in bed comfortably  Head: NCAT, no gross lesions  Eyes: EOMI, anicteric  Throat: clear, no thrush, MMM  Neck: wearing c-collar, surgical incision well approximated with staples, c/d/i  Pulm: CTAB, no rales/wheezes  CV: RRR, normal s1/s2  Abd: soft, NTND  Ext: no pedal edema bilaterally, distal extremities warm and well perfused  Psych: normal affect, no agitation  Skin: no observable rashes  Neuro: A+Ox3, fluent speech, follows commands; moving all extremities antigravity    Diagnostic Studies: reviewed, no new imaging  No results found  Laboratory:      Results from last 7 days  Lab Units 09/17/18  0524 09/13/18  0508   HEMOGLOBIN g/dL 10 6* 10 5*   HEMATOCRIT % 33 2* 32 4*   WBC Thousand/uL 6 37 7 47       Results from last 7 days  Lab Units 09/17/18  0524 09/13/18  0508   BUN mg/dL 10 5   SODIUM mmol/L 135* 135*   POTASSIUM mmol/L 3 9 3 5   CHLORIDE mmol/L 102 98*   CREATININE mg/dL 0 48* 0 50*            Patient Active Problem List   Diagnosis    Closed odontoid fracture with routine healing    Closed displaced fracture of third cervical vertebra (HCC)    Closed displaced fracture of fourth cervical vertebra (HCC)    Alcohol abuse    Decubitus ulcer of sacral region    CAD (coronary artery disease)    Dens fracture (HCC)    Acute blood loss anemia    Diabetes (HCC)    S/P C1-T1 Posterior Cervical Discectomy and Fusion on 9/10/18    Hypokalemia    At moderate risk for venous thromboembolism (VTE)    Oropharyngeal dysphagia       ** Please Note: Fluency Direct voice to text software may have been used in the creation of this document  **    Total time spent: At least 35 minutes, with more than 50% spent counseling/coordinating care  In addition, this patient was discussed by the interdisciplinary team in weekly case conference today   The care of the patient was extensively discussed with all care providers and an appropriate rehabilitation plan was formulated unique for this patient  Barriers were identified preventing progression of therapy and appropriate interventions were discussed with each discipline  Please see the team note for input from all disciplines regarding barriers, intervention, and discharge planning

## 2018-09-19 NOTE — PROGRESS NOTES
09/19/18 0700   Pain Assessment   Pain Assessment 0-10   Pain Score 4   Pain Type Surgical pain   Pain Location Neck   Pain Orientation Posterior   Pain Descriptors Aching;Discomfort;Nagging   Pain Frequency Constant/continuous   Pain Onset Ongoing   Hospital Pain Intervention(s) Repositioned;Distraction; Shower/Bath   Response to Interventions Pt reported pain decreased to 3/10 pain during session   Restrictions/Precautions   Precautions Aspiration;Bed/chair alarms; Fall Risk;Spinal precautions;Supervision on toilet/commode   ROM Restrictions Yes   Braces or Orthoses C/S Collar   QI: Eating   Assistance Needed Supervision   Assistance Provided by [place] No physical assistance   Eating CARE Score 4   Eating Assessment   Positioning Upright;Out of Bed   Safety Needs Increase Time   Meal Assessed Breakfast   Opens Packages Yes   Findings Pt requires extended time to open packages 2* decreased coordination in fingers   Eating (FIM) 5 - Patient requires supervision, cueing or coaxing   QI: Oral Hygiene   Assistance Needed Set-up / [address] Provided by [place] No physical assistance   Oral Hygiene CARE Score 5   Grooming   Able To Initiate Tasks;Comb/Brush Hair;Wash/Dry Face;Brush/Clean Teeth;Wash/Dry Hands   Limitation Noted In Problem Solving; Safety; Coordination   Findings Pt performed seated in w/c due to fatigue  Pt with difficulty locating items (toothbrush, comb) during session 2* decreased problem solving and limited neck ROM making it hard for pt to turn head and scan appropriately  Grooming (FIM) 5 - Patient requires supervision/monitoring   QI: Shower/Bathe Self   Assistance Needed Supervision;Verbal cues; Adaptive equipment   Assistance Provided by [place] No physical assistance   Comment shower chair and grab bars   Shower/Bathe Self CARE Score 4   Bathing   Assessed Bath Style Tub   Anticipated D/C Bath Style Tub   Able to Gather/Transport No   Able to Adjust Water Temperature Yes   Able to Wash/Rinse/Dry (body part) Left Arm;Right Arm;L Upper Leg;R Upper Leg;L Lower Leg/Foot;R Lower Leg/Foot;Chest;Abdomen;Perineal Area; Buttocks   Limitations Noted in Balance; Coordination; Endurance;Problem Solving;ROM;Safety;Strength   Positioning Seated;Standing   Adaptive Equipment Shower Seat;Hand Held Shower; Shower Bars   Findings  Pt performed bathing with support from shower chair and grab bars to increase safety with task  Pt able to reach all body parts including feet using tub edge to support to maintain spinal precautions  Pt had no LOB in stance for rear bathing but did require S and v/c to maintain precautions in stance  Bathing (FIM) 5 - Patient requires supervision/monitoring but completes 10/10 parts   Tub/Shower Transfer   Limitations Noted In Balance; Endurance;Problem Solving; Safety;LE Strength   Adaptive Equipment Grab Bars;Seat with Back   Assessed Tub/shower combo   Findings Pt completed tub transfer at Lauren level and required v/c for sequencing with step over transfer  Tub Transfer (FIM) 4 - Patient requires steadying assist or light touching   QI: Upper Body Dressing   Assistance Needed Physical assistance   Assistance Provided by [place] 25%-49%   Comment unable to manage buttons   Upper Body Dressing CARE Score 3   QI: Lower Body Dressing   Assistance Needed Supervision;Verbal cues   Assistance Provided by [place] No physical assistance   Comment extended time to perform leg crossover method; cuing for spinal precautions   Lower Body Dressing CARE Score 4   QI: Putting On/Taking Off [facility] [address] [facility] Provided by [place] No physical assistance   Comment Pt able to don socks with extended time   Putting On/Taking Off Footwear CARE Score 4   Dressing/Undressing [address] Clothes Pants;Socks   [address]; Undergarment;Socks   Limitations Noted In Balance; Endurance; Coordination;Buttoning;Problem Solving; Safety;ROM   Positioning Sit Edge Of Bed;Standing   Findings pt able to don all clothing when given extended time to problem solve tasks  Pt requires assistance w/ buttoning shirt 2* decreased coordination in fingers and visual field blocked by C/S collar  UB Dressing (FIM) 4 - Patient completes 75% of all tasks   LB Dressing (FIM) 5 - Patient requires supervision/monitoring   QI: Roll Left and Right   Assistance Needed Supervision;Verbal cues   Assistance Provided by [place] No physical assistance   Comment bed rails; v/c for log roll to maintain spinal precautions   Roll Left and Right CARE Score 4   QI: Lying to Sitting on Side of Bed   Assistance Needed Supervision   Assistance Provided by [place] No physical assistance   Comment HOB elevated   Lying to Sitting on Side of Bed CARE Score 4   QI: Sit to [address] Provided by [place] No physical assistance   Comment RW   Sit to Stand CARE Score 4   QI: Chair/Bed-to-Chair Transfer   Assistance Needed Incidental touching   Assistance Provided by [place] Less than 25%   Comment RW   Chair/Bed-to-Chair Transfer CARE Score 3   Transfer Bed/Chair/Wheelchair   Limitations Noted In Balance; Coordination;Pain Management;Problem Solving   Adaptive Equipment Juarezer Walker   Findings Pt CGA at initiation of session due to decreased balance during turn  Pt completed transfers at  by end of session and demo increased carryover with hand placement  Bed, Chair, Wheelchair Transfer (FIM) 4 - Patient requires steadying assist or light touching   Coordination   Fine Motor Pt engaged in FM tasks w/ nuts and bolts to promote grasps necessary for ADL performance  Pt required to sort nuts and washers with b/l hands  Pt also completed task in which he had to remove and re-apply nuts to bolts on lap in limited visual field to increase coordination w/ vision occluded   Pt demo increased coordination when cued to slow pace  Cognition   Overall Cognitive Status Impaired   Arousal/Participation Alert; Cooperative   Attention Attends with cues to redirect   Orientation Level Oriented X4   Memory Decreased short term memory;Decreased recall of precautions   Following Commands Follows one step commands without difficulty   Activity Tolerance   Activity Tolerance Patient tolerated treatment well   Assessment   Treatment Assessment Pt participated in skilled OT focusing on ADL retraining and [address]  Pt requires constant cuing to maintain spinal precautions at time  Pt continues to adjust velcro and attempt to tuck chin inside of C/S collar for comfort  Pt educated on importance of fit for C/S collar to maintain safety of spine  Pt had noted difficulty with buttoning on shirt and reports that he feels incoordinated in his fingers  Refer above for details regarding FM tasks  Pt would benefit from continued skilled OT focusing on [address], overall activity tolerance, and maintaining spinal precautions during functional tasks  Continue with POC  Prognosis Good   Problem List Decreased strength;Decreased endurance;Decreased range of motion;Decreased safety awareness;Orthopedic restrictions   Barriers to Discharge Inaccessible home environment;Decreased caregiver support   Plan   Treatment/Interventions ADL retraining;Functional transfer training; Therapeutic exercise; Endurance training;Patient/family training   Progress Progressing toward goals   Recommendation   OT Discharge Recommendation Home with family support   OT Therapy Minutes   OT Time In 0700   OT Time Out 0830   OT Total Time (minutes) 90   OT Mode of treatment - Individual (minutes) 0   OT Mode of treatment - Concurrent (minutes) 0   OT Mode of treatment - Group (minutes) 0   OT Mode of treatment - Co-treat (minutes) 0   OT Mode of Teatment - Total time(minutes) 0 minutes   Therapy Time missed   Time missed?  No

## 2018-09-19 NOTE — PROGRESS NOTES
09/19/18 0900   Pain Assessment   Pain Assessment 0-10   Pain Score 4   Pain Type Surgical pain   Pain Location Foot;Neck   Pain Orientation Inner;Posterior   Restrictions/Precautions   Precautions Aspiration;Bed/chair alarms; Fall Risk;Spinal precautions;Supervision on toilet/commode   Braces or Orthoses C/S Collar   Subjective   Subjective pt reports feeling okay and ready for PT session   QI: Sit to Stand   Assistance Needed Set-up / clean-up   Sit to Stand CARE Score 5   Bed Mobility   Findings S level    QI: Chair/Bed-to-Chair Transfer   Assistance Needed Set-up / clean-up   Chair/Bed-to-Chair Transfer CARE Score 5   Transfer Bed/Chair/Wheelchair   Limitations Noted In Pain Management;Balance; Endurance;UE Strength;LE Strength; Sequencing   Adaptive Equipment Roller Walker   Stand Pivot Supervision   Sit to Stand Supervision   Stand to [employer] Transfer Supervision   Findings progressing at S level today    Bed, Chair, Wheelchair Transfer (FIM) 5 - Patient requires supervision/monitoring   QI: Car Transfer   Assistance Needed Set-up / clean-up   Car Transfer CARE Score 5   QI: Walk 150 Feet   Assistance Needed Set-up / clean-up   Walk 150 Feet CARE Score 5   Ambulation   Does the patient walk? 2  Yes   Primary Discharge Mode of Locomotion Walk   Walk Assist Level Supervision   Gait Pattern Inconsistant Sury; Improper weight shift   Assist Device Vernon [place] Ha Walked (feet) 160 ft   Limitations Noted In Balance; Endurance;Device Management;Speed; Sequencing;Swing;Posture   Findings progress to S level at this time    Walking (FIM) 5 - Patient requires supervision/monitoring AND distance 150 feet or more, no rest   Wheelchair mobility   Wheelchair (FIM) 0 - Activity does not occur   QI: 1 Step (Curb)   Assistance Needed Supervision   Comment curb step with RW    1 Step (Curb) CARE Score 4   QI: 4 Steps   Assistance Needed Supervision   4 Steps CARE Score 4   QI: 12 Steps   Assistance Needed Supervision   12 Steps CARE Score 4   Stairs   Type Stairs;Curb   # of Steps 12   Weight Bearing Precautions Fall Risk;Cervical   Assist Devices Single Rail;Cane   Stairs (FIM) 5 - Patient requires supervision/monitoring AND goes up and down full flight (12- 14 stairs)   QI: Toilet Transfer   Assistance Needed Supervision   Toilet Transfer CARE Score 4   Toilet Transfer   Surface Assessed Standard Toilet   Findings stood to urinate   Toilet Transfer (FIM) 5 - Patient requires supervision/monitoring   Therapeutic Interventions   Strengthening LAQ X2 # 20 hip flexion x20 AP QS x20    Balance standing balance    Equipment Use   NuStep level 2 for 13 min    Assessment   Treatment Assessment progressing toward goals inc S level to pt safety   pt inc gait distnace to his tolerance with RW , no inc in pain   Pt inc FF stairs with one hand rail and SPC vc 's for seqence pattern and safety   Pt will cont to benefit with PT session    Plan   Treatment/Interventions Functional transfer training; Therapeutic exercise;LE strengthening/ROM; Endurance training;Patient/family training;Bed mobility;Gait training   Progress Progressing toward goals   PT Therapy Minutes   PT Time In 0900   PT Time Out 1000   PT Total Time (minutes) 60   PT Mode of treatment - Individual (minutes) 60   PT Mode of treatment - Concurrent (minutes) 0   PT Mode of treatment - Group (minutes) 0   PT Mode of treatment - Co-treat (minutes) 0   PT Mode of Teatment - Total time(minutes) 60 minutes   Therapy Time missed   Time missed?  No

## 2018-09-19 NOTE — TEAM CONFERENCE
Acute RehabilitationTeam Conference Note  Date: 9/19/2018   Time: 11:33 AM       Patient Name:  Oral Gibbs       Medical Record Number: [id number]   YOB: 1955  Sex: Male          Room/Bed:  [facility]8/[facility] 968-01  Payor Info:  Payor: Destiny Proctor / Plan: [id number] / Product Type: Blue Fee for Service /      Admitting Diagnosis: C6 cervical fracture ([facility] 75 ) [S12 500A]   Admit Date/Time:  9/14/2018  2:19 PM  Admission Comments: No comment available     Primary Diagnosis:  S/P cervical spinal fusion  Principal Problem: S/P cervical spinal fusion    Patient Active Problem List    Diagnosis Date Noted    COPD without exacerbation ([facility] ) 09/19/2018    At moderate risk for venous thromboembolism (VTE) 09/14/2018    Oropharyngeal dysphagia 09/14/2018    S/P C1-T1 Posterior Cervical Discectomy and Fusion on 9/10/18 09/12/2018    Hypokalemia 09/12/2018    Acute blood loss anemia 09/11/2018    Diabetes ([facility] ) 09/11/2018    Closed odontoid fracture with routine healing 09/09/2018    Closed displaced fracture of third cervical vertebra ([facility] ) 09/09/2018    Closed displaced fracture of fourth cervical vertebra ([facility] ) 09/09/2018    Alcohol abuse 09/09/2018    Decubitus ulcer of sacral region 09/09/2018    CAD (coronary artery disease) 09/09/2018    Dens fracture ([facility] ) 09/09/2018       Physical Therapy:    Weight Bearing Status: Full Weight Bearing  Transfers: Contact Guard  Bed Mobility: Supervision  Amulation Distance (ft): 150 feet  Ambulation: Contact Guard  Assistive Device for Ambulation: Roller Walker  Number of Stairs: 12  Stair Assistance: Minimal Assistance  Discharge Recommendations: Home with:  [address] [facility] Carlee Cespedes with[de-identified] Family Support, Home Physical Therapy    09/18/2018   pt cont to be limited with C' area ROM d/t C' collar brace, also progressing with ambulation with RW and safety precaution, vc's at times for hand placement before standing    Pt cont to need Skilled PT d/t overall conditioning and strengthening in B/L LE   Pt cont to benefit with PT at this time   Occupational Therapy:  Eating: Modified Independent  Grooming: Supervision  Bathing: Minimal Assistance  Bathing: Minimal Assistance  Upper Body Dressing: Supervision  Lower Body Dressing: Minimal Assistance  Toileting: Supervision  Toilet Transfer: Supervision  Cognition: Exceptions to WNL  Cognition: Decreased Memory  Orientation: Person, Place, Situation  Discharge Recommendations: Home with:  [address] Ces[address]es with[de-identified] Family Support       Pt making progress towards LTGs  Pt currently functioning at a Deanna/CGA level for LB ADL tasks  He is completing functional transfers with RW at /CGA which fluctuates at times based on pain  Pt currently limited by pain, decreased general strength, dec endurance, dec standing balance, and dec activity tolerance  Pt will continue to benefit from skilled OT services following POC with focus on noted deficits to reach Beth goals  Speech Therapy:  Mode of Communication: Verbal     Orientation: Person, Place, Time, Situation  Swallowing: Exceptions to WNL  Swallowing: Oral Dysphagia, Pharyngeal Dysphagia, Aspiration Risk  Diet Recommendations: Level 3/Denture Soft, Thin  Discharge Recommendations: Home with:  [address] with[de-identified] Family Support  Pt currently being followed for dysphagia therapy where pt is presenting with overall mild oropharyngeal dysphagia characterized by mildly reduced mastication, decreased oral control, mildly delayed initiation of swallows and weaker/reduced hyolaryngeal rise  Pt is tolerating a level 3 diet and thin liquids at this time, however, trials of regular diet items have occurred with intermittent coughing noted with more regular textures, specifically fresh fruit  Suspecting decreased oral control of ambient fluid where eliciting coughing when masticating fruit, prior to swallows occurring  When cued for smaller bites, increased tolerance of items noted   Will cont to recommend level 3 diet and thin liquids at this time, however, pt will benefit from further skilled ST to trial additional regular diet textures w/ potential for diet upgrade  Nursing Notes:  Appetite: Good  Diet Type: Dysphagia III, Diabetic                      Diet Patient/Family Education Complete: Yes    Type of Wound (LDA): Incision  Pressure Ulcer 09/09/18 Buttocks Left reddened left buttocks and blister to right buttocks 9/11/18 Patient has mixed etiology of friction / abrasion and pressure from fall  (Active)   Staging Stage II 9/17/2018 10:00 PM   Wound Description Pink 9/18/2018  9:37 AM   Pat-wound Assessment Excoriated 9/18/2018  9:37 AM   Wound Length (cm) 0 3 cm 9/11/2018  3:00 PM   Wound Width (cm) 0 3 cm 9/11/2018  3:00 PM   Wound Depth (cm) 0 1 9/11/2018  3:00 PM   Calculated Wound Area (cm^2) 0 09 cm^2 9/11/2018  3:00 PM   Calculated Wound Volume (cm^3) 0 01 cm^3 9/11/2018  3:00 PM   Drainage Amount None 9/14/2018  2:25 PM   Treatment Cleansed; Offload; Turn & reposition 9/11/2018  3:00 PM   Dressing Foam, Silicone (eg  Allevyn, etc) 9/18/2018  9:37 AM   Dressing Changed New dressing applied 9/14/2018  2:25 PM   Patient Tolerance Tolerated well 9/11/2018  3:00 PM   Dressing Status Clean;Dry; Intact 9/18/2018  9:37 AM           Incision 09/10/18 Neck Other (Comment) (Active)   Incision Description Clean;Pink 9/18/2018  9:37 AM   Pat-wound Assessment Intact;Dry 9/18/2018  9:37 AM   Closure [place]; Sutures 9/18/2018  9:37 AM   Drainage Amount None 9/18/2018  9:37 AM   Treatments Cleansed;Site care 9/17/2018 10:38 AM   Dressing Dry dressing 9/18/2018  9:37 AM   Dressing Changed Changed 9/18/2018  9:37 AM   Patient Tolerance Tolerated well 9/17/2018 10:00 AM   Dressing Status Clean;Dry; Intact 9/17/2018 10:00 PM     Wound 09/09/18 Abrasion(s) Back Right;Posterior (Active)   Wound Description Pink; Intact 9/18/2018  9:37 AM   Pat-wound Assessment Intact 9/18/2018  9:37 AM   Closure Open to air 9/18/2018  9:37 AM   Drainage Amount None 9/13/2018  7:29 AM   Non-staged Wound Description Not applicable 8/46/8787  5:58 AM   Treatments Cleansed;Site care 9/11/2018  8:00 AM   Dressing Open to air 9/13/2018  7:29 AM   Patient Tolerance Tolerated well 9/11/2018 12:00 PM        Type of Wound Patient/Family Education: Yes  Bladder: 5 - Supervision     Bladder Patient/Family Education: Yes  Bowel: 4 - Minimal Assistance     Bowel Patient/Family Education: Yes  Pain Location: Neck  Pain Orientation: Posterior  Pain Score: 8                       Hospital Pain Intervention(s): Repositioned, Distraction, Shower/Bath  Pain Patient/Family Education: Yes  Medication Management/Safety  Injectable: Insulin  Safe Administration: Yes  Medication Patient/Family Education Complete: Yes    Pt admitted to [facility] s/p fall down multiple steps  Presented with Closed displaced fractures of C2-3 vertebrae, multiple cervical spinous process fractures, disruption of facet capsules, and rupture of ALL with retropharyngeal edema  Underwent Posterior C3-6 decompression laminectomy and instrumented fixation of C1-T1 with Dr Sandi Ann on 9/10/18   Wears [place] Collar AAT  Incision has staples and x1 suture  Has History of COPD, HTN, MI, DM, CVA in April 2018, alcohol dependence  DM managed with QID sugar checks, diet, glipizide, metformin and humalog  Pt has Stage 2 pressure ulcer on sacrum (Allevyn-foam applied)  Has Multiple abrasions on B/L LE and arms form fall  Pain is managed with tylenol, lidocaine patch,  & PRN oxycodone  This week we will monitor lab  Values and vital signs  We will work on pain management, monitor incision for s/s of infection, prevent further skin breakdown with repositioning and weight shifts and work on safety awareness  Pt will remain free from falls             Case Management:     Discharge Planning  Goal Length of Stay: 10  Living Arrangements: Children  Support Systems: Children  Assistance Needed: NO  Type of Current Residence: Private residence  [address]: No  Patient participating in therapy and making progress  Patient lives with daughter, JERE and 4 grandchildren in lower level of their home  Per daughter, patient has been an alcoholic since age 12 with unsuccessful treatment  He has burned many bridges and now only has her to assist  Daughter has contacted [facility] to inquire about HHA to assist  Daughter educated on ARC routine and need for cont'd services on dc  CM will follow and assist with dc needs  Is the patient actively participating in therapies? yes  List any modifications to the treatment plan:     Barriers Interventions   balance Therapy exercises, use of device   stairs Therapy stair training   Collar mgmt Therapy training, family education             Is the patient making expected progress toward goals? yes  List any update or changes to goals:     Medical Goals: Patient will be medically stable for discharge to [facility] upon completion of rehab program and Patient will be able to manage medical conditions and comorbid conditions with medications and follow up upon completion of rehab program    Weekly Team Goals:   Rehab Team Goals  ADL Team Goal: Patient will be independent with ADLs with least restrictive device upon completion of rehab program  Transfer Team Goal: Patient will be independent with transfers with least restrictive device upon completion of rehab program  Locomotion Team Goal: Patient will be independent with locomotion with least restrictive device upon completion of rehab program  Cognitive Team Goal: Patient will be independent for basic  tasks and require supervision for complex tasks upon completion of rehab program    Discussion: in attendance to review pts progress is rn pt ot slp cm and physician  Pt is supervision for most functional mobility  Pt needs assist with collar mgmt and family training will occur   Pt needs additional assist on stairs  Pt has a walker at home and will use for overall balance  Recommendations are for contd outpt physical therapy      Anticipated Discharge Date:  9/24/18

## 2018-09-19 NOTE — PROGRESS NOTES
09/19/18 1130   Pain Assessment   Pain Assessment No/denies pain   Restrictions/Precautions   Precautions Aspiration;Bed/chair alarms; Fall Risk;Spinal precautions;Supervision on toilet/commode   Braces or Orthoses C/S Collar   Swallow Information   Current Risks for Dysphagia & Aspiration Recent intubation;General debilitation;Cervical spine injury/surgery   Current Symptoms/Concerns Cough; With liquids; Difficulty chewing   Current Diet Dysphagia advance; Thin liquid   Baseline Diet Regular; Thin liquids   Consistencies Assessed and Performance   Materials Admnistered Soft/Level 3;Regular/Solid; Thin liquid   Oral Stage WFL   Phargngeal Stage Mild impaired;Aspiration risk   Swallow Mechanics WFL; Mild delayed;Swallow initation; Appears prompt;Good Larygneal rise;Aspiration risk   Esophageal Concerns No s/s reported   Recommendations   Diet Solid Recommendation Regular consistency   Diet Liquid Recommendation Thin liquid   Recommended Form of Meds As tolerated   General Precautions Aspiration precautions; Feed only when alert;Minimize distractions;Upright as possible for all oral intake;Remain upright for 45 mins after meals; Other (Comment)  (OOB for meals)   Compensatory Swallowing Strategies Alternate solids and liquids;Effortful swallow;Voluntary throat clear/cough to clear penetration   Results Reviewed with PAC/CRNP;PT/Family/Caregiver   QI: [address] Provided by [employer] No physical assistance   Eating CARE Score 6   Swallow Assessment   Swallow Treatment Assessment Pt was observed w/ lunch, where unfortunately trial tray of regular diet w/ thin liquids did not come, but pt was agreeable for trialing regular chips during meal  Pt was able to setup tray and feed self w/o difficulty  Pt consumed 100% of meal and 180cc of thins by cup  Pt's overall ability to tolerate soft and solid textures (chopped turkey, green beans, chips) was full/functional w/o oral residual/pocketing  C-collar did not appear to interfere w/ ability to masticate any consistencies  A-p tranfer of puree (mashed potatoes) was [facility]  Overall bolus transfer of jello and thins by cup was [facility], however, noting 2 episodes of decreased bolus control/transfer of thins by cup sip due to larger sips taken near end of cup  Swallow initiation is prompt for majority of meal along w/ adequate hyolaryngeal elevation  Cought elicited x2 at end of meal w/ thin liquids due to incoordination of bolus size w/ swallow promptness  Pt was aware of this and subsequent sips were smaller and controlled w/o overt aspiration sxs  Will recommend to upgrade diet to regular w/ thin liquids at this time  Will f/u briefly to monitor tolerance of diet w/o increased aspiration sxs  Swallow Assessment Prognosis   Prognosis Good   Prognosis Considerations Co-morbidities; Medical prognosis; Ability to carry over   SLP Therapy Minutes   SLP Time In 1130   SLP Time Out 1200   SLP Total Time (minutes) 30   SLP Mode of treatment - Individual (minutes) 0   SLP Mode of treatment - Concurrent (minutes) 30   SLP Mode of treatment - Group (minutes) 0   SLP Mode of treatment - Co-treat (minutes) 0   SLP Mode of Teatment - Total time(minutes) 30 minutes   Therapy Time missed   Time missed?  No   Daily FIM Score   Eating (FIM) 6 - Patient requires modified diet

## 2018-09-19 NOTE — PCC NURSING
Pt admitted to [facility] s/p fall down multiple steps  Presented with Closed displaced fractures of C2-3 vertebrae, multiple cervical spinous process fractures, disruption of facet capsules, and rupture of ALL with retropharyngeal edema  Underwent Posterior C3-6 decompression laminectomy and instrumented fixation of C1-T1 with Dr Piper Bautista on 9/10/18   Wears [place] Collar AAT  Incision has staples and x1 suture  Has History of COPD, HTN, MI, DM, CVA in April 2018, alcohol dependence  DM managed with QID sugar checks, diet, glipizide, metformin and humalog  Pt has Stage 2 pressure ulcer on sacrum (Allevyn-foam applied)  Has Multiple abrasions on B/L LE and arms form fall  Pain is managed with tylenol, lidocaine patch,  & PRN oxycodone  This week we will monitor lab  Values and vital signs  We will work on pain management, monitor incision for s/s of infection, prevent further skin breakdown with repositioning and weight shifts and work on safety awareness  Pt will remain free from falls

## 2018-09-19 NOTE — PROGRESS NOTES
Internal Medicine Progress Note  Patient: Tatyana Pastrana  Age/sex: 58 y o  male  Medical Record #: [id number]      ASSESSMENT/PLAN:  Tatyana Pastrana is seen and examined and management for following issues:    Multiple cervical fractures s/p posterior cervical decompression laminectomy C3-6, posterior cervical lateral mass and pedicle fixation/fusion C1-T1 on 9/10/18: Pain control per primary service  Continue to monitor incision  [place] collar at all times      HTN: stable; continue Lopressor 50mg every 12 hours      MI s/p PTCA: Plavix on hold = resume when OK with NS (OK to restart 2 weeks post-op on 9/24/18); continue Lipitor 80mg qd      ETOH abuse: Continue folate, thiamine and Valium  Valium being weaned by primary service      DM type 2:  Hemoglobin A1c 8 5  On 9/16/18 evening, resumed Metformin 1000mg 2x daily as he takes at home  Added Glipizide 2 5mg qam starting today;  ?compliant at home with Metformin (HbA1C was 8 5); continue QID Accuchecks/SSI/DM diet  , 125, 139, 187; fasting today 153     COPD: Pt uses Advair 500/50, Albuterol nebs TID and prn Ventolin at home = here on Xopenex/Atrovent nebs TID and using Sonal Labrador here for Advair  Subjective: Patient seen and examined  He denies any current complaints      ROS:   GI: denies abdominal pain, change bowel habits or reflux symptoms  Neuro: No new neurologic changes  Respiratory: No Cough, SOB  Cardiovascular: No CP, palpitations     Scheduled Meds:    Current Facility-Administered Medications:  acetaminophen 975 mg Oral [facility] Wang Beltrán MD   albuterol 2 puff Inhalation Q4H PRN Wang Beltrán MD   atorvastatin 80 mg Oral Daily With Cassandra Fuchs MD   bisacodyl 10 mg Rectal Daily PRN Wang Beltrán MD   fluticasone-vilanterol 1 puff Inhalation Daily Lauren Braswell PA-C   folic acid 1 mg Oral Daily Wang Beltrán MD   gabapentin 100 mg Oral TID Wang Beltrán MD   glipiZIDE 2 5 mg Oral Daily Before Breakfast Keysha Buckner TATIANA Melo   heparin (porcine) 5,000 Units Subcutaneous [facility] Víctor Villeda MD   insulin lispro 1-5 Units Subcutaneous HS Lauren Braswell PA-C   insulin lispro 1-5 Units Subcutaneous TID AC Lauren Braswell PA-C   ipratropium 0 5 mg Nebulization BID Víctor Villeda MD   levalbuterol 1 25 mg Nebulization BID Víctor Villeda MD   lidocaine 1 patch Transdermal Daily Víctor Villeda MD   magnesium hydroxide 30 mL Oral Daily PRN Víctor Villeda MD   melatonin 3 mg Oral HS PRN Víctor Villeda MD   metFORMIN 1,000 mg Oral BID With Meals Lauren Braswell PA-C   metoprolol tartrate 50 mg Oral Q12H [address] Víctor Villeda MD   oxyCODONE 10 mg Oral Q4H PRN Víctor Villeda MD   oxyCODONE 5 mg Oral Q4H PRN Víctor Villeda MD   senna 2 tablet Oral HS Víctor Villeda MD   thiamine 100 mg Oral Daily Víctor Villeda MD       Labs:       Results from last 7 days  Lab Units 09/17/18  0524 09/13/18  0508   WBC Thousand/uL 6 37 7 47   HEMOGLOBIN g/dL 10 6* 10 5*   HEMATOCRIT % 33 2* 32 4*   PLATELETS Thousands/uL 251 186       Results from last 7 days  Lab Units 09/17/18  0524 09/13/18  0508   SODIUM mmol/L 135* 135*   POTASSIUM mmol/L 3 9 3 5   CHLORIDE mmol/L 102 98*   CO2 mmol/L 27 31   BUN mg/dL 10 5   CREATININE mg/dL 0 48* 0 50*   CALCIUM mg/dL 9 0 8 7       Results from last 7 days  Lab Units 09/15/18  0500   HEMOGLOBIN A1C % 8 5*              Glucose, i-STAT (mg/dl)   Date Value   09/10/2018 217 (H)   09/10/2018 201 (H)   09/09/2018 185 (H)       Labs reviewed    Physical Examination:  Vitals:   Vitals:    09/18/18 1359 09/18/18 1921 09/18/18 2029 09/19/18 0550   BP: 136/71  153/81 133/75   BP Location: Left arm  Right arm Left arm   Pulse: 82  100 76   Resp: 18  20 18   Temp: 97 5 °F (36 4 °C)  97 6 °F (36 4 °C) 97 7 °F (36 5 °C)   TempSrc: Oral  Oral Oral   SpO2: 93% 93% 95% 94%   Weight:    83 8 kg (184 lb 11 2 oz)   Height:         Constitutional:  NAD; pleasant; nontoxic  HEENT:  AT/NC; oropharynx negative for thrush on tongue   Neck: collar on  CV:  +S1, S2;  RRR; no rub/murmur  Pulmonary:  BBS without crackles/wheeze/rhonci; resp are unlabored  Abdominal:  soft, +BS, ND/NT; no mass  Skin:  no rashes  Musculoskeletal:  no edema  :  no antony  Neurological/Psych:  AAO;  RUE 5/5, RLE/LLE/LUE 5/5; no depression/anxiety      [ X ] Total time spent: 30 Mins and greater than 50% of this time was spent counseling/coordinating care  ** Please Note: Dragon 360 Dictation voice to text software may have been used in the creation of this document   **

## 2018-09-19 NOTE — PROGRESS NOTES
09/19/18 1400   Pain Assessment   Pain Assessment 0-10   Pain Score 6   Pain Location Back   Pain Orientation Lower   Restrictions/Precautions   Precautions Aspiration;Bed/chair alarms;Supervision on toilet/commode;Spinal precautions   Braces or Orthoses C/S Collar   Subjective   Subjective pt agreeable to perform skilled PT session    QI: Sit to Stand   Assistance Needed Supervision   Sit to Stand CARE Score 4   Transfer Bed/Chair/Wheelchair   Limitations Noted In Balance; Coordination;Pain Management; Sequencing;UE Strength;LE Strength   Adaptive Equipment Roller Walker   All Transfer Supervision   Bed, Chair, Wheelchair Transfer (FIM) 5 - Patient requires supervision/monitoring   QI: Walk 150 Feet   Assistance Needed Set-up / clean-up   Walk 150 Feet CARE Score 5   Ambulation   Does the patient walk? 2  Yes   Primary Discharge Mode of Locomotion Walk   Walk Assist Level Supervision   Gait Pattern Decreased foot clearance; Inconsistant Sury; Improper weight shift   Assist Device Roller [employer] Jacey Walked (feet) 155 ft   Limitations Noted In Balance;Device Management; Endurance;Posture; Sequencing;Speed;Strength   Walking (FIM) 5 - Patient requires supervision/monitoring AND distance 150 feet or more, no rest   QI: 12 Steps   Assistance Needed Supervision   12 Steps CARE Score 4   Stairs   Type Stairs   # of Steps 12   Weight Bearing Precautions Fall Risk;Cervical   Assist Devices Single Rail;Cane   Stairs (FIM) 5 - Patient requires supervision/monitoring AND goes up and down full flight (12- 14 stairs)   QI: Toilet Transfer   Assistance Needed Supervision   Toilet Transfer CARE Score 4   Toilet Transfer   Toilet Transfer (FIM) 5 - Patient requires supervision/monitoring   Therapeutic Interventions   Strengthening LAQ AP x20 hip flexion x20 STS x10    Flexibility B/L LE stretching    Balance standing balance    Equipment [facility]   [facility]    Assessment   Treatment Assessment pt perform ambulation with RW , trail SPC in pt room to inc balance awareness and safety evita return home  Pt perfrom FF stairs with one handrail and SPC ascending and descending stairs vc's for step seqeunce and pattern   Pt will cont to benefit wit skilled PT session, instructed on C collar percaution    Barriers to Discharge Inaccessible home environment;Decreased caregiver support   Plan   Treatment/Interventions LE strengthening/ROM; Functional transfer training; Therapeutic exercise; Endurance training;Bed mobility;Gait training   Progress Progressing toward goals   Recommendation   Recommendation Outpatient PT; Home with family support   PT Therapy Minutes   PT Time In 1400   PT Time Out 1430   PT Total Time (minutes) 30   PT Mode of treatment - Individual (minutes) 30   PT Mode of treatment - Concurrent (minutes) 0   PT Mode of treatment - Group (minutes) 0   PT Mode of treatment - Co-treat (minutes) 0   PT Mode of Teatment - Total time(minutes) 30 minutes   Therapy Time missed   Time missed?  No

## 2018-09-19 NOTE — SOCIAL WORK
CM spoke with Tesfaye Grossman at [address] [facility] for insurance review  Patient approved for cont'd stay with LCD 9/23 and DC 9/24  Cm left message for daughter Santosh Deleon to review weekly team meeting  CM met with patient to review team meeting and discuss current functional barriers  Patient agreeable to dc 9/24 with cont'd outpatient PT   CM spoke to Roz Mccormick at [address] [facility] to schedule outpatient PT for 10/1 at 8:15am Written instruction provided

## 2018-09-19 NOTE — PCC CARE MANAGEMENT
Patient participating in therapy and making progress  Patient lives with daughter, JERE and 4 grandchildren in lower level of their home  Per daughter, patient has been an alcoholic since age 12 with unsuccessful treatment  He has burned many bridges and now only has her to assist  Daughter has contacted [facility] to inquire about HHA to assist  Daughter educated on ARC routine and need for cont'd services on dc  CM will follow and assist with dc needs

## 2018-09-20 LAB
ANION GAP SERPL CALCULATED.3IONS-SCNC: 6 MMOL/L (ref 4–13)
BASOPHILS # BLD AUTO: 0.06 THOUSANDS/ΜL (ref 0–0.1)
BASOPHILS NFR BLD AUTO: 1 % (ref 0–1)
BUN SERPL-MCNC: 9 MG/DL (ref 5–25)
CALCIUM SERPL-MCNC: 9.2 MG/DL (ref 8.3–10.1)
CHLORIDE SERPL-SCNC: 98 MMOL/L (ref 100–108)
CO2 SERPL-SCNC: 28 MMOL/L (ref 21–32)
CREAT SERPL-MCNC: 0.45 MG/DL (ref 0.6–1.3)
EOSINOPHIL # BLD AUTO: 0.23 THOUSAND/ΜL (ref 0–0.61)
EOSINOPHIL NFR BLD AUTO: 4 % (ref 0–6)
ERYTHROCYTE [DISTWIDTH] IN BLOOD BY AUTOMATED COUNT: 18.8 % (ref 11.6–15.1)
GFR SERPL CREATININE-BSD FRML MDRD: 120 ML/MIN/1.73SQ M
GLUCOSE P FAST SERPL-MCNC: 134 MG/DL (ref 65–99)
GLUCOSE SERPL-MCNC: 134 MG/DL (ref 65–140)
GLUCOSE SERPL-MCNC: 134 MG/DL (ref 65–140)
GLUCOSE SERPL-MCNC: 138 MG/DL (ref 65–140)
GLUCOSE SERPL-MCNC: 148 MG/DL (ref 65–140)
GLUCOSE SERPL-MCNC: 181 MG/DL (ref 65–140)
HCT VFR BLD AUTO: 32.4 % (ref 36.5–49.3)
HGB BLD-MCNC: 10.6 G/DL (ref 12–17)
IMM GRANULOCYTES # BLD AUTO: 0.04 THOUSAND/UL (ref 0–0.2)
IMM GRANULOCYTES NFR BLD AUTO: 1 % (ref 0–2)
LYMPHOCYTES # BLD AUTO: 2.04 THOUSANDS/ΜL (ref 0.6–4.47)
LYMPHOCYTES NFR BLD AUTO: 32 % (ref 14–44)
MCH RBC QN AUTO: 27.5 PG (ref 26.8–34.3)
MCHC RBC AUTO-ENTMCNC: 32.7 G/DL (ref 31.4–37.4)
MCV RBC AUTO: 84 FL (ref 82–98)
MONOCYTES # BLD AUTO: 0.9 THOUSAND/ΜL (ref 0.17–1.22)
MONOCYTES NFR BLD AUTO: 14 % (ref 4–12)
NEUTROPHILS # BLD AUTO: 3.11 THOUSANDS/ΜL (ref 1.85–7.62)
NEUTS SEG NFR BLD AUTO: 48 % (ref 43–75)
NRBC BLD AUTO-RTO: 0 /100 WBCS
PLATELET # BLD AUTO: 371 THOUSANDS/UL (ref 149–390)
PMV BLD AUTO: 10.1 FL (ref 8.9–12.7)
POTASSIUM SERPL-SCNC: 3.8 MMOL/L (ref 3.5–5.3)
RBC # BLD AUTO: 3.85 MILLION/UL (ref 3.88–5.62)
SODIUM SERPL-SCNC: 132 MMOL/L (ref 136–145)
WBC # BLD AUTO: 6.38 THOUSAND/UL (ref 4.31–10.16)

## 2018-09-20 PROCEDURE — 85025 COMPLETE CBC W/AUTO DIFF WBC: CPT | Performed by: NURSE PRACTITIONER

## 2018-09-20 PROCEDURE — 94640 AIRWAY INHALATION TREATMENT: CPT

## 2018-09-20 PROCEDURE — 97116 GAIT TRAINING THERAPY: CPT

## 2018-09-20 PROCEDURE — 99232 SBSQ HOSP IP/OBS MODERATE 35: CPT | Performed by: PHYSICAL MEDICINE & REHABILITATION

## 2018-09-20 PROCEDURE — 97110 THERAPEUTIC EXERCISES: CPT

## 2018-09-20 PROCEDURE — 97535 SELF CARE MNGMENT TRAINING: CPT

## 2018-09-20 PROCEDURE — 97530 THERAPEUTIC ACTIVITIES: CPT

## 2018-09-20 PROCEDURE — 80048 BASIC METABOLIC PNL TOTAL CA: CPT | Performed by: NURSE PRACTITIONER

## 2018-09-20 PROCEDURE — 94760 N-INVAS EAR/PLS OXIMETRY 1: CPT

## 2018-09-20 PROCEDURE — 92526 ORAL FUNCTION THERAPY: CPT

## 2018-09-20 PROCEDURE — 82948 REAGENT STRIP/BLOOD GLUCOSE: CPT

## 2018-09-20 RX ORDER — ACETAMINOPHEN 325 MG/1
975 TABLET ORAL EVERY 8 HOURS PRN
Status: DISCONTINUED | OUTPATIENT
Start: 2018-09-20 | End: 2018-09-24 | Stop reason: HOSPADM

## 2018-09-20 RX ORDER — LIDOCAINE 50 MG/G
1 PATCH TOPICAL DAILY PRN
Status: DISCONTINUED | OUTPATIENT
Start: 2018-09-20 | End: 2018-09-24 | Stop reason: HOSPADM

## 2018-09-20 RX ORDER — POLYETHYLENE GLYCOL 3350 17 G/17G
17 POWDER, FOR SOLUTION ORAL ONCE
Status: COMPLETED | OUTPATIENT
Start: 2018-09-20 | End: 2018-09-20

## 2018-09-20 RX ADMIN — METFORMIN HYDROCHLORIDE 1000 MG: 500 TABLET ORAL at 16:23

## 2018-09-20 RX ADMIN — IPRATROPIUM BROMIDE 0.5 MG: 0.5 SOLUTION RESPIRATORY (INHALATION) at 20:28

## 2018-09-20 RX ADMIN — METOPROLOL TARTRATE 50 MG: 50 TABLET, FILM COATED ORAL at 07:49

## 2018-09-20 RX ADMIN — OXYCODONE HYDROCHLORIDE 5 MG: 5 TABLET ORAL at 22:07

## 2018-09-20 RX ADMIN — METFORMIN HYDROCHLORIDE 1000 MG: 500 TABLET ORAL at 07:48

## 2018-09-20 RX ADMIN — HEPARIN SODIUM 5000 UNITS: 5000 INJECTION, SOLUTION INTRAVENOUS; SUBCUTANEOUS at 13:34

## 2018-09-20 RX ADMIN — GABAPENTIN 100 MG: 100 CAPSULE ORAL at 22:13

## 2018-09-20 RX ADMIN — INSULIN LISPRO 1 UNITS: 100 INJECTION, SOLUTION INTRAVENOUS; SUBCUTANEOUS at 16:25

## 2018-09-20 RX ADMIN — IPRATROPIUM BROMIDE 0.5 MG: 0.5 SOLUTION RESPIRATORY (INHALATION) at 07:40

## 2018-09-20 RX ADMIN — ACETAMINOPHEN 975 MG: 325 TABLET ORAL at 05:13

## 2018-09-20 RX ADMIN — LEVALBUTEROL HYDROCHLORIDE 1.25 MG: 1.25 SOLUTION, CONCENTRATE RESPIRATORY (INHALATION) at 20:28

## 2018-09-20 RX ADMIN — POLYETHYLENE GLYCOL 3350 17 G: 17 POWDER, FOR SOLUTION ORAL at 09:34

## 2018-09-20 RX ADMIN — LEVALBUTEROL HYDROCHLORIDE 1.25 MG: 1.25 SOLUTION, CONCENTRATE RESPIRATORY (INHALATION) at 07:40

## 2018-09-20 RX ADMIN — OXYCODONE HYDROCHLORIDE 10 MG: 10 TABLET ORAL at 10:10

## 2018-09-20 RX ADMIN — ATORVASTATIN CALCIUM 80 MG: 80 TABLET, FILM COATED ORAL at 16:24

## 2018-09-20 RX ADMIN — GABAPENTIN 100 MG: 100 CAPSULE ORAL at 07:49

## 2018-09-20 RX ADMIN — GLIPIZIDE 2.5 MG: 5 TABLET ORAL at 07:48

## 2018-09-20 RX ADMIN — GABAPENTIN 100 MG: 100 CAPSULE ORAL at 16:23

## 2018-09-20 RX ADMIN — METOPROLOL TARTRATE 50 MG: 50 TABLET, FILM COATED ORAL at 22:08

## 2018-09-20 RX ADMIN — Medication 100 MG: at 07:48

## 2018-09-20 RX ADMIN — HEPARIN SODIUM 5000 UNITS: 5000 INJECTION, SOLUTION INTRAVENOUS; SUBCUTANEOUS at 05:13

## 2018-09-20 RX ADMIN — HEPARIN SODIUM 5000 UNITS: 5000 INJECTION, SOLUTION INTRAVENOUS; SUBCUTANEOUS at 22:13

## 2018-09-20 RX ADMIN — SENNOSIDES 17.2 MG: 8.6 TABLET, FILM COATED ORAL at 22:13

## 2018-09-20 RX ADMIN — FOLIC ACID 1 MG: 1 TABLET ORAL at 07:49

## 2018-09-20 RX ADMIN — FLUTICASONE FUROATE AND VILANTEROL TRIFENATATE 1 PUFF: 100; 25 POWDER RESPIRATORY (INHALATION) at 07:48

## 2018-09-20 NOTE — PROGRESS NOTES
Physical Medicine and Rehabilitation Progress Note  Malia Mosqueda 58 y o  male MRN: [id number]  Unit/Bed#: -06 Encounter: [id number]    HPI: Malia Mosqueda is a 58 y o  male with COPD, HTN, MI, DM, CVA in April 2018, alcohol dependence, KLARSISA noncompliant with CPAP, who presented on 9/9/18 after fall down 15 steps  He sustained multiple cervical spine fractures, s/p C3-6 decompression laminectomy and instrumented fixation/fusion of C1-T1 with Dr Tonie Hope on 9/10/18  He was placed on CIWA protocol with valium  He was extubated on 7/07/98 without complication  He was admitted to acute rehab on 9/14/18  CC: neck pain    Subjective: Reports neck pain and upper traps pain secondary to cervical collar use  Reports BM yesterday, but not documented  Had poor sleep as result of neck pain  Otherwise no complaints  Nursing staff reporting: no problems    ROS: No fever, chills, chest pain, SOB, cough, nausea, vomiting, diarrhea, constipation, abdominal pain, dysuria, bowel/bladder incontinence, new weakness or changes in sensation  +neck pain, 5/10, dull  Complete ROS obtained and otherwise negative        Assessment/Plan:  COPD without exacerbation (HCC)   Assessment & Plan    Continue levalbuterol, ipratropium        Oropharyngeal dysphagia   Assessment & Plan    Secondary to posterior fusion C1-T1  Advanced to regular with thin liquids 9/19  SLP following        At moderate risk for venous thromboembolism (VTE)   Assessment & Plan    Secondary to decreased mobility after fall and cervical spine fractures s/p C1-T1 fusion  Heparin 5000u TID  SCDs        Hypokalemia   Assessment & Plan    Periodic BMP  PRN supplementation        Diabetes [facility])   Assessment & Plan    Lab Results   Component Value Date    HGBA1C 8 5 (H) 09/15/2018       Recent Labs      09/19/18   1107  09/19/18   1607  09/19/18   2043  09/20/18   0643   POCGLU  158*  130  138  148*     Metformin 1000mg BID  Glipizide 2 5mg started - monitor BG  SSI    Blood Sugar Average: Last 72 hrs:          Acute blood loss anemia   Assessment & Plan    Secondary to trauma s/p surgery  H/H stable  Periodic CBC        CAD (coronary artery disease)   Assessment & Plan    S/p stenting   Previously on plavix  Per neurosurgery, OK to resume antiplatelet in 2 weeks post-op (9/24/18)    Also with history of CVA in April 2018  Atorvastatin 80mg           Decubitus ulcer of sacral region   Assessment & Plan    Right buttock/ischium stage II decubitus - healing  Frequent turns  Daily monitoring         Alcohol abuse   Assessment & Plan    Completed CIWA/valium taper 9/20  Folate, thiamine supplementation  Monitor for signs of withdrawal    Cessation discussed with patient    Neuropsych consulted for support/counseling        * S/P C1-T1 Posterior Cervical Discectomy and Fusion on 9/10/18   Assessment & Plan    Cervical collar at all times  MEGHAN drain removed 9/15  Surgical staples in place - will coordinate with neurosurgery for removal approximately 2 weeks post op (9/24)    Nociceptive pain in setting of cervical fractures s/p fusion   - tylenol, oxycodone PRN   - lidocaine patch PRN   - myofascial release with therapies with moderate relief    Recommend comprehensive inpatient rehabilitation management with rehab MD, PT, OT, rehab level nursing, and case management, for deconditioning/debility following fall with cervical spine fractures               Scheduled Meds:    Current Facility-Administered Medications:  acetaminophen 975 mg Oral Q8H PRN Diane Blanco MD   albuterol 2 puff Inhalation Q4H PRN Diane Blanco MD   atorvastatin 80 mg Oral Daily With Shikha Pacheco MD   bisacodyl 10 mg Rectal Daily PRN Diane Blanco MD   fluticasone-vilanterol 1 puff Inhalation Daily Lauren Braswell PA-C   folic acid 1 mg Oral Daily Diane Blanco MD   gabapentin 100 mg Oral TID Diane Blanco MD   glipiZIDE 2 5 mg Oral Daily Before Breakfast TATIANA Sahu   heparin (porcine) 5,000 Units Subcutaneous [facility] Yasmany Bo MD   insulin lispro 1-5 Units Subcutaneous HS Lauren Braswell PA-C   insulin lispro 1-5 Units Subcutaneous TID AC Lauren Braswell PA-C   ipratropium 0 5 mg Nebulization BID Yasmany Bo MD   levalbuterol 1 25 mg Nebulization BID Yasmany Bo MD   lidocaine 1 patch Transdermal Daily PRN Yasmany Bo MD   magnesium hydroxide 30 mL Oral Daily PRN Yasmany Bo MD   melatonin 3 mg Oral HS PRN Yasmany Bo MD   metFORMIN 1,000 mg Oral BID With Meals Lauren Braswell PA-C   metoprolol tartrate 50 mg Oral Q12H [facility] Yasmany Bo MD   oxyCODONE 10 mg Oral Q4H PRN Yasmany Bo MD   oxyCODONE 5 mg Oral Q4H PRN Yasmany Bo MD   senna 2 tablet Oral HS Yasmany Bo MD   thiamine 100 mg Oral Daily Yasmany Bo MD       Allergies   Allergen Reactions    Amoxicillin     Augmentin [Amoxicillin-Pot Clavulanate]         Objective:    Functional Update:  09/19/18 1400    Pain Assessment   Pain Assessment 0-10   Pain Score 6   Pain Location Back   Pain Orientation Lower   Restrictions/Precautions   Precautions Aspiration;Bed/chair alarms;Supervision on toilet/commode;Spinal precautions   Braces or Orthoses C/S Collar   Subjective   Subjective pt agreeable to perform skilled PT session    QI: Sit to Stand   Assistance Needed Supervision   Sit to Stand CARE Score 4   Transfer Bed/Chair/Wheelchair   Limitations Noted In Balance; Coordination;Pain Management; Sequencing;UE Strength;LE Strength   Adaptive Equipment Roller Walker   All Transfer Supervision   Bed, Chair, Wheelchair Transfer (FIM) 5 - Patient requires supervision/monitoring   QI: Walk 150 Feet   Assistance Needed Set-up / clean-up   Walk 150 Feet CARE Score 5   Ambulation   Does the patient walk? 2  Yes   Primary Discharge Mode of Locomotion Walk   Walk Assist Level Supervision   Gait Pattern Decreased foot clearance; Inconsistant Sury; Improper weight shift   Assist Device [employer] (feet) 155 ft   Limitations Noted In Balance;Device Management; Endurance;Posture; Sequencing;Speed;Strength   Walking (FIM) 5 - Patient requires supervision/monitoring AND distance 150 feet or more, no rest   QI: 12 Steps   Assistance Needed Supervision   12 Steps CARE Score 4   Stairs   Type Stairs   # of Steps 12   Weight Bearing Precautions Fall Risk;Cervical   Assist Devices Single Rail;Cane   Stairs (FIM) 5 - Patient requires supervision/monitoring AND goes up and down full flight (12- 14 stairs)   QI: Toilet Transfer   Assistance Needed Supervision   Toilet Transfer CARE Score 4   Toilet Transfer   Toilet Transfer (FIM) 5 - Patient requires supervision/monitoring   Therapeutic Interventions   Strengthening LAQ AP x20 hip flexion x20 STS x10    Flexibility B/L LE stretching    Balance standing balance    Equipment Use   NuStep level 2 for 13 min    Assessment   Treatment Assessment pt perform ambulation with RW , trail SPC in pt room to inc balance awareness and safety evita return home  Pt perfrom FF stairs with one handrail and SPC ascending and descending stairs vc's for step seqeunce and pattern   Pt will cont to benefit wit skilled PT session, instructed on C collar percaution    Barriers to Discharge Inaccessible home environment;Decreased caregiver support         Physical Exam:  Temp:  [97 9 °F (36 6 °C)-98 9 °F (37 2 °C)] 98 9 °F (37 2 °C)  HR:  [72-93] 72  Resp:  [18-20] 20  BP: (114-153)/(70-94) 147/79  Oxygen Therapy  SpO2: 95 %    GEN: NAD, lying in bed comfortably  Head: NCAT, no gross lesions  Eyes: PERRL, EOMI  Throat: clear, no thrush, MMM  Pulm: CTAB, no rales/wheezes  CV: RRR, normal s1/s2  Abd: soft, NTND  Ext: no pedal edema bilaterally, distal extremities warm and well perfused  Psych: normal affect, no agitation  Skin: no observable rashes; cervical incision c/d/i well approximated with surgical staples  Neuro:  A+Ox3, fluent speech, follows commands; strength 5/5 bilateral finger flexion, abduction, elbow flexion/extension, shoulder abduction      Diagnostic Studies: reviewed, no new imaging  No results found  Laboratory:      Results from last 7 days  Lab Units 09/20/18  0615 09/17/18  0524   HEMOGLOBIN g/dL 10 6* 10 6*   HEMATOCRIT % 32 4* 33 2*   WBC Thousand/uL 6 38 6 37       Results from last 7 days  Lab Units 09/20/18  0615 09/17/18  0524   BUN mg/dL 9 10   SODIUM mmol/L 132* 135*   POTASSIUM mmol/L 3 8 3 9   CHLORIDE mmol/L 98* 102   CREATININE mg/dL 0 45* 0 48*            Patient Active Problem List   Diagnosis    Closed odontoid fracture with routine healing    Closed displaced fracture of third cervical vertebra (HCC)    Closed displaced fracture of fourth cervical vertebra (HCC)    Alcohol abuse    Decubitus ulcer of sacral region    CAD (coronary artery disease)    Dens fracture (HCC)    Acute blood loss anemia    Diabetes (HCC)    S/P C1-T1 Posterior Cervical Discectomy and Fusion on 9/10/18    Hypokalemia    At moderate risk for venous thromboembolism (VTE)    Oropharyngeal dysphagia    COPD without exacerbation (HCC)       ** Please Note: Fluency Direct voice to text software may have been used in the creation of this document  **    Total visit time:  At least 25 minutes, with more than 50% spent counseling/coordinating care

## 2018-09-20 NOTE — PROGRESS NOTES
09/20/18 1130   Pain Assessment   Pain Assessment No/denies pain   Restrictions/Precautions   Precautions Bed/chair alarms; Fall Risk;Aspiration;Supervision on toilet/commode;Spinal precautions   Braces or Orthoses C/S Collar   Swallow Information   Current Risks for Dysphagia & Aspiration Recent intubation;General debilitation;New Neuro event;Cervical spine injury/surgery   Current Symptoms/Concerns Cough;During meals; Difficulty chewing   Current Diet Regular; Thin liquid   Baseline Diet Regular; Thin liquids   Consistencies Assessed and Performance   Materials Admnistered Soft/Level 3; Thin liquid;Regular/Solid   Oral Stage WFL   Phargngeal Stage WFL   Swallow Mechanics WFL; Good Larygneal rise   Esophageal Concerns No s/s reported   Recommendations   Diet Solid Recommendation Regular consistency   Diet Liquid Recommendation Thin liquid   Recommended Form of Meds As tolerated   General Precautions Aspiration precautions; Feed only when alert;Minimize distractions;Upright as possible for all oral intake;Remain upright for 45 mins after meals; Other (Comment)  (OOB for meals)   Compensatory Swallowing Strategies Alternate solids and liquids;Voluntary throat clear/cough to clear penetration   QI: Eating   Assistance Needed Independent   Eating CARE Score 6   Swallow Assessment   Swallow Treatment Assessment Pt observed w/ lunch, where diet was upgraded to regular w/ thin liquids  Pt was able to setup tray and feed self w/o assistance  Consumed 100% of meal and 180cc of thin liquids by cup  Demonstrating full/functional mastication of soft and solids today (crab cakes, pasta salad, cooked carrots) w/o oral residual or pocketing  Mastication w/ C-collar on was [facility]  A-p transfer of pudding and thins by cup today was [facility]  Swallow initiation is prompt and hyolaryngeal elevation is WFL  No overt signs/sxs of aspiration noted w/ meal  Will f/u briefly to monitor tolerance of diet w/o increased or overt aspriation sxs     Swallow Assessment Prognosis   Prognosis Good   Prognosis Considerations Co-morbidities; Medical prognosis   SLP Therapy Minutes   SLP Time In 1130   SLP Time Out 1150   SLP Total Time (minutes) 20   SLP Mode of treatment - Individual (minutes) 20   SLP Mode of treatment - Concurrent (minutes) 0   SLP Mode of treatment - Group (minutes) 0   SLP Mode of treatment - Co-treat (minutes) 0   SLP Mode of Teatment - Total time(minutes) 20 minutes   Therapy Time missed   Time missed? No   Daily FIM Score   Eating (FIM) 7 - Patient completely independent   Error in minute calculation   Should be 20 min concurrent treatment

## 2018-09-20 NOTE — PROGRESS NOTES
Internal Medicine Progress Note  Patient: Denisa Pierce  Age/sex: 58 y o  male  Medical Record #: [id number]      ASSESSMENT/PLAN:  Denisa Pierce is seen and examined and management for following issues:    Multiple cervical fractures s/p posterior cervical decompression laminectomy C3-6, posterior cervical lateral mass and pedicle fixation/fusion C1-T1 on 9/10/18: Pain control per primary service  Continue to monitor incision  Aspen collar at all times      HTN: stable; continue Lopressor 50mg every 12 hours      MI s/p PTCA: Plavix on hold = resume when OK with NS (OK to restart 2 weeks post-op on 9/24/18); continue Lipitor 80mg qd      ETOH abuse: Continue folate, thiamine and Valium  Valium being weaned by primary service      DM type 2:  Hemoglobin A1c 8 5  On 9/16/18 evening, resumed Metformin 1000mg 2x daily as he takes at home  Added Glipizide 2 5mg qam starting yesterday;  ?compliant at home with Metformin (HbA1C was 8 5); continue QID Accuchecks/SSI/DM diet  , 158, 130, 138; fasting today 148     COPD: Pt uses Advair 500/50, Albuterol nebs TID and prn Ventolin at home = here on Xopenex/Atrovent nebs TID and using Donia Setter here for Advair  Hyponatremia: mild; no sx; will watch      Subjective: Patient seen and examined  He is having bilateral shoulder and upper back pain = d/w Dr Dexter Iverson and he will address      ROS:   GI: denies abdominal pain, change bowel habits or reflux symptoms  Neuro: No new neurologic changes  Respiratory: No Cough, SOB  Cardiovascular: No CP, palpitations     Scheduled Meds:    Current Facility-Administered Medications:  acetaminophen 975 mg Oral Q8H PRN Sneha King MD   albuterol 2 puff Inhalation Q4H PRN Sneha King MD   atorvastatin 80 mg Oral Daily With Alvaro Edward MD   bisacodyl 10 mg Rectal Daily PRN Sneha King MD   fluticasone-vilanterol 1 puff Inhalation Daily Lauren Braswell PA-C   folic acid 1 mg Oral Daily Sneha King MD   gabapentin 100 mg Oral TID Shadi Decker MD   glipiZIDE 2 5 mg Oral Daily Before Breakfast TATIANA Flores   heparin (porcine) 5,000 Units Subcutaneous [facility] Shadi Decker MD   insulin lispro 1-5 Units Subcutaneous HS Lauren Braswell PA-C   insulin lispro 1-5 Units Subcutaneous TID AC Lauren Braswell PA-C   ipratropium 0 5 mg Nebulization BID Shadi Decker MD   levalbuterol 1 25 mg Nebulization BID Shadi Decker MD   magnesium hydroxide 30 mL Oral Daily PRN Shadi Decker MD   melatonin 3 mg Oral HS PRN Shadi Decker MD   metFORMIN 1,000 mg Oral BID With Meals Lauren Braswell PA-C   metoprolol tartrate 50 mg Oral Q12H [address] Shadi Decker MD   oxyCODONE 10 mg Oral Q4H PRN Shadi Decker MD   oxyCODONE 5 mg Oral Q4H PRN Shadi Decker MD   senna 2 tablet Oral HS Shadi Decker MD   thiamine 100 mg Oral Daily Shadi Decker MD       Labs:       Results from last 7 days  Lab Units 09/20/18  0615 09/17/18  0524   WBC Thousand/uL 6 38 6 37   HEMOGLOBIN g/dL 10 6* 10 6*   HEMATOCRIT % 32 4* 33 2*   PLATELETS Thousands/uL 371 251       Results from last 7 days  Lab Units 09/20/18  0615 09/17/18  0524   SODIUM mmol/L 132* 135*   POTASSIUM mmol/L 3 8 3 9   CHLORIDE mmol/L 98* 102   CO2 mmol/L 28 27   BUN mg/dL 9 10   CREATININE mg/dL 0 45* 0 48*   CALCIUM mg/dL 9 2 9 0       Results from last 7 days  Lab Units 09/15/18  0500   HEMOGLOBIN A1C % 8 5*              Glucose, i-STAT (mg/dl)   Date Value   09/10/2018 217 (H)   09/10/2018 201 (H)   09/09/2018 185 (H)       Labs reviewed    Physical Examination:  Vitals:   Vitals:    09/19/18 2038 09/20/18 0601 09/20/18 0740 09/20/18 0745   BP: 153/94 151/79  147/79   BP Location: Right arm Left arm  Left arm   Pulse: 93 72 72 72   Resp: 18 20 20    Temp: 98 °F (36 7 °C) 98 9 °F (37 2 °C)     TempSrc: Oral Oral     SpO2: 95% 95% 95%    Weight:       Height:         Constitutional:  NAD; pleasant; nontoxic  HEENT:  AT/NC; oropharynx negative for thrush on tongue   Neck: collar on  CV: +S1, S2;  RRR; no rub/murmur  Pulmonary:  BBS without crackles/wheeze/rhonci; resp are unlabored  Abdominal:  soft, +BS, ND/NT; no mass  Skin:  no rashes  Musculoskeletal:  no edema  :  no antony  Neurological/Psych:  AAO;  RUE 5/5, RLE/LLE/LUE 5/5; no depression/anxiety      [ X ] Total time spent: 30 Mins and greater than 50% of this time was spent counseling/coordinating care  ** Please Note: Dragon 360 Dictation voice to text software may have been used in the creation of this document   **

## 2018-09-20 NOTE — WOUND OSTOMY CARE
Progress Note - Wound   Zulema Sanchez 58 y o  male MRN: [id number]  Unit/Bed#: -95 Encounter: [id number]      Assessment:  Wound care to see patient for weekly follow up visit  Patient seen on rehab unit in bed, cooperative with assessment  Independent with turning and repositioning - may need cueing/reminders  Continent of bowel and bladder  Nutrition is following along with patient  B/l heels are intact with no redness or wounds noted  Recommend offloading and hydraguard cream      L buttock - site of stage 2 pressure injury with noted mixed etiology is resolved at time of assessment with intact dry scabbing which is well adhered  No open aspects noted  Skin the sacrum/R buttock is dry peeling but intact with pink blanchable skin  Skin is non-macerated  Recommend to use hydraguard cream to the buttocks, and sacrum rather than foam dressing due to fragile/skin appearance  Educated patient on the importance of frequent offloading of pressure via turning, repositioning and weight-shifting  Verbalized understanding of plan of care  Patient tolerated well  Primary nurse aware of plan of care  See flow sheets for more detailed assessment findings  Will sign off at this time as patient has no active wounds  Plan:   1  Apply hydraguard to b/l heels, buttocks and sacrum BID and PRN for prevention and protection  2  Apply skin nourishing cream the entire skin daily for moisture  3  Turn and reposition patient every  2 hours   4  Elevate heels off of bed with pillows to offload pressure   5  Soft care cushion to chair when OOB       Objective:    Vitals: Blood pressure 147/79, pulse 72, temperature 98 9 °F (37 2 °C), temperature source Oral, resp  rate 20, height 6' 1" (1 854 m), weight 83 8 kg (184 lb 11 2 oz), SpO2 95 %  ,Body mass index is 24 37 kg/m²  Recommendations written as orders     Versie Baumgarten RN BSN [facility]ON

## 2018-09-20 NOTE — PROGRESS NOTES
09/20/18 1005   Pain Assessment   Pain Assessment 0-10   Pain Score 8   Pain Type Acute pain;Surgical pain   Pain Location Back; Shoulder   Pain Orientation Bilateral   Restrictions/Precautions   Precautions Bed/chair alarms; Fall Risk;Aspiration;Supervision on toilet/commode;Spinal precautions   Braces or Orthoses C/S Collar   Grooming   Able To Wash/Dry Hands   Findings Pt performed hand washing after toileting, supervision while standing at sink  Grooming (FIM) 5 - Patient requires supervision/monitoring   QI: Lying to Sitting on Side of Bed   Assistance Needed Supervision   Assistance Provided by [place] No physical assistance   Comment HOB elevated   Lying to Sitting on Side of Bed CARE Score 4   QI: Sit to [address] Provided by [place] No physical assistance   Sit to Stand CARE Score 4   Bed Mobility   Supine to Sit 5  Supervision   Additional items Increased time required;Verbal cues   Sit to Supine 5  Supervision   Additional items Increased time required   QI: Chair/Bed-to-Chair Transfer   Assistance Needed Supervision   Assistance Provided by [place] Less than 25%   Chair/Bed-to-Chair Transfer CARE Score 3   Transfer Bed/Chair/Wheelchair   Supine to Sit Supervision   Sit to Supine Supervision   Findings Pt req vc's to slow gait pace when ambulating to bathroom 2* urgency  Bed, Chair, Wheelchair Transfer (FIM) 5 - Patient requires supervision/monitoring   QI: [facility] Provided by [place] No physical assistance   Cody Brewster 83 Score 6   Toileting   Able to [address] down yes, up yes  Able to Manage Clothing Closures Yes   Manage Hygiene Bladder; Bowel   Limitations Noted In Balance   Toileting (FIM) 5 - Patient requires supervision/monitoring   QI: Toilet Transfer   Assistance Needed Supervision   Assistance Provided by [place] No physical assistance   Toilet Transfer CARE Score 4   Toilet Transfer Surface Assessed Standard Toilet   Transfer Technique Standard   Limitations Noted In Balance; Endurance   Adaptive Equipment Grab Bar   Findings Pt req vc's to utilize ECTs during toilet xfer to dec fall risk  Toilet Transfer (FIM) 5 - Patient requires supervision/monitoring   Functional Standing Tolerance   Time 1 minute 30 seconds x2 trials   Activity Pt completed card game ther act while standing at table to inc fxnl standing tolerance and static standing balance  Pt expressed, "I can't stand very long today, my back hurts so bad " Between standing trials, pt particiapted in ther act while unsupported sitting engaging in fxnl reach, crossing midline, and unsupported sitting balance  Pt tolerated well w/ rest breaks prn to manage pain and fatigue  Exercise Tools   Other Exercise Tool 1 Pt performed towel glides 10x3 in all planes to provide gentle UE strength and ROM as pt reports muscle stiffness from using RW  Pt tolerated ther ex well w/ rest breaks between sets prn  Assessment   Treatment Assessment Pt seen for skilled OT session focusing on fxnl xfer training, toileting, fxnl standing tolerance, and UE ther ex  Pt ambulated to and from OT gym at close supervision w/ RW and CGA when ambulating to bathroom 2* urgency and vc's req at this time  Due to inc gait pace when ambulating to bathroom, to reassess ability to progress to DS/BRP during following session, cont to monitor  Pt having c/o inc pain t/o session and req several rest breaks to manage same, NP Sharita made aware  SpO2 95% HR 79 after activity and w/ c/o 8/10 back pain  Pt requesting to return to bed prior to ST session in order to manage fatigue and pain  Pt will cont to benefit from services focusing on self-care management training, balance and endurance work to maximize fxnl indep to return to PLOF  Pt was left resting supine in bed w/ all needs within reach and alarm activated     Prognosis Good   Problem List Decreased strength;Decreased endurance;Decreased range of motion;Decreased safety awareness;Orthopedic restrictions   Plan   Treatment/Interventions ADL retraining;Functional transfer training; Therapeutic exercise; Endurance training;Patient/family training;Bed mobility;Gait training   Progress Progressing toward goals   OT Therapy Minutes   OT Time In 1005   OT Time Out 1135   OT Total Time (minutes) 90   OT Mode of treatment - Individual (minutes) 90   OT Mode of treatment - Concurrent (minutes) 0   OT Mode of treatment - Group (minutes) 0   OT Mode of treatment - Co-treat (minutes) 0   OT Mode of Teatment - Total time(minutes) 90 minutes   Therapy Time missed   Time missed?  No

## 2018-09-20 NOTE — CONSULTS
Consultation - Neuropsychology    Chalo Ponce 58 y o  male MRN: [id number]  Unit/Bed#: -02 Encounter: [id number]    Assessment/Plan     Assessment:  Pt denied history of major depression, anxiety or other mental health concerns  Psychosocial stressors include: multiple injuries from pt falling down the stairs at his home; loss of independence, need for rehab and hospitalization  Overall, pt is currently coping with their medical issues effectively and is adjusting to rehab needs  Family and social support includes: pt lives with his daughter and son in law; extended family  Pt reported motivation to participate in rehab program and work on rehab goals  Dx: F43 28 Adjustment disorder with other symptoms  Code: 71349    Plan:     History of Present Illness   Physician Requesting Consult: aYsmany Bo MD  Reason for Consult / Principal Problem: coping, adjustment  Patient is a 58 y o  male   Primary complaints include: concern about health problems, poor concentration and trauma recollections  Psychosocial Stressors: health  Consults    Psychiatric Review Of Systems:  sleep: no  appetite changes: no  weight changes: no  energy/anergy: yes  interest/pleasure/anhedonia: no  somatic symptoms: yes  anxiety/panic: no  krupa: no  guilty/hopeless: no  self injurious behavior/risky behavior: no    Historical Information   Past Psychiatric History:   None      Substance Abuse History:  Use of Alcohol: noted in chart history of alcohol abuse; noted use of 6 cans of beer per week  Smoking history: current everyday cigarette smoker; 0 5 ppd  Family Psychiatric History: none noted  Social History  Education: high school diploma/GED  Marital history:   Living arrangement, social support: The patient lives in home with daughter, age adult  Occupational History: unemployed  Functioning Relationships: good support system and good relationship with children    Other Pertinent History: None    Traumatic History:   Abuse: none noted  Other Traumatic Events: fall that lead to injuries and subsequent surgeries       Past Medical History:   Diagnosis Date    [facility]chronic obstructive [facility] disease) ([facility] )     CVA (cerebral vascular accident) ([facility] )     Diabetes mellitus ([place] [address] )     Heart attack ([place] [address] )     Hypertension        Medical Review Of Systems:  Review of Systems    Meds/Allergies   current meds:   Current Facility-Administered Medications   Medication Dose Route Frequency    acetaminophen (TYLENOL) tablet 975 mg  975 mg Oral Q8H PRN    albuterol (PROVENTIL HFA,VENTOLIN HFA) inhaler 2 puff  2 puff Inhalation Q4H PRN    atorvastatin (LIPITOR) tablet 80 mg  80 mg Oral Daily With Dinner    bisacodyl (DULCOLAX) rectal suppository 10 mg  10 mg Rectal Daily PRN    fluticasone-vilanterol (BREO ELLIPTA) 100-25 mcg/inh inhaler 1 puff  1 puff Inhalation Daily    folic acid (FOLVITE) tablet 1 mg  1 mg Oral Daily    gabapentin (NEURONTIN) capsule 100 mg  100 mg Oral TID    glipiZIDE (GLUCOTROL) tablet 2 5 mg  2 5 mg Oral Daily Before Breakfast    heparin (porcine) subcutaneous injection 5,000 Units  5,000 Units Subcutaneous Q8H [address]    insulin lispro (HumaLOG) 100 units/mL subcutaneous injection 1-5 Units  1-5 Units Subcutaneous HS    insulin lispro (HumaLOG) 100 units/mL subcutaneous injection 1-5 Units  1-5 Units Subcutaneous TID AC    ipratropium (ATROVENT) 0 02 % inhalation solution 0 5 mg  0 5 mg Nebulization BID    levalbuterol (XOPENEX) inhalation solution 1 25 mg  1 25 mg Nebulization BID    lidocaine (LIDODERM) 5 % patch 1 patch  1 patch Transdermal Daily PRN    magnesium hydroxide (MILK OF MAGNESIA) 400 mg/5 mL oral suspension 30 mL  30 mL Oral Daily PRN    melatonin tablet 3 mg  3 mg Oral HS PRN    metFORMIN (GLUCOPHAGE) tablet 1,000 mg  1,000 mg Oral BID With Meals    metoprolol tartrate (LOPRESSOR) tablet 50 mg  50 mg Oral Q12H [address]    oxyCODONE (ROXICODONE) immediate release tablet 10 mg  10 mg Oral Q4H PRN    oxyCODONE (ROXICODONE) IR tablet 5 mg  5 mg Oral Q4H PRN    senna (SENOKOT) tablet 17 2 mg  2 tablet Oral HS    thiamine (VITAMIN B1) tablet 100 mg  100 mg Oral Daily     Allergies   Allergen Reactions    Amoxicillin     Augmentin [Amoxicillin-Pot Clavulanate]        Objective   Vital signs in last 24 hours:  Temp:  [97 4 °F (36 3 °C)-98 9 °F (37 2 °C)] 97 4 °F (36 3 °C)  HR:  [72-93] 79  Resp:  [18-20] 20  BP: (114-153)/(70-94) 150/81      Intake/Output Summary (Last 24 hours) at 09/20/18 1317  Last data filed at 09/20/18 1233   Gross per 24 hour   Intake             1700 ml   Output             2250 ml   Net             -550 ml       Mental Status Evaluation:  Appearance:  age appropriate   Behavior:  normal   Speech:  normal volume   Mood:  euthymic   Affect:  mood-congruent   Language:  WNL   Thought Process:  goal directed and logical   Thought Content:  normal   Perceptual Disturbances: None   Risk Potential: none   Sensorium:  person, place and situation   Cognition:  grossly intact   Consciousness:  alert and awake    Attention: attention span and concentration were age appropriate   Intellect: within normal limits   Fund of Knowledge: vocabulary: WNL   Insight:  fair   Judgment: age appropriate   Muscle Strength and Tone: see PT eval   Gait/Station: see PT eval   Motor Activity: no abnormal movements

## 2018-09-20 NOTE — PROGRESS NOTES
09/20/18 1235   Pain Assessment   Pain Assessment No/denies pain   Restrictions/Precautions   Precautions Aspiration;Bed/chair alarms; Fall Risk;Pain   ROM Restrictions Yes   RUE ROM Restriction (spinal precautions)   Braces or Orthoses C/S Collar   Cognition   Overall Cognitive Status WFL   Subjective   Subjective pt stated he can't get any sleep and is always tired  pt denies any pain   QI: Roll Left and Right   Assistance Needed Independent   Roll Left and Right CARE Score 6   QI: Sit to Lying   Assistance Needed Supervision   Sit to Lying CARE Score 4   QI: Lying to Sitting on Side of Bed   Assistance Needed Supervision   Lying to Sitting on Side of Bed CARE Score 4   QI: Sit to Stand   Assistance Needed Supervision   Sit to Stand CARE Score 4   QI: Chair/Bed-to-Chair Transfer   Assistance Needed Supervision; Adaptive equipment   Chair/Bed-to-Chair Transfer CARE Score 4   Transfer Bed/Chair/Wheelchair   Limitations Noted In Balance;LE Strength   Adaptive Equipment Roller Walker   Stand Pivot Supervision   Sit to Stand Supervision   Stand to Sit Supervision   Supine to Sit Supervision   Sit to Supine Supervision   Findings needs occasional cues for RW management   Bed, Chair, Wheelchair Transfer (FIM) 5 - Patient requires supervision/monitoring   QI: Car Transfer   Reason if not Attempted Activity not applicable   Car Transfer CARE Score 9   QI: Walk 10 Feet   Assistance Needed Supervision; Adaptive equipment   Walk 10 Feet CARE Score 4   QI: Walk 50 Feet with Two Turns   Assistance Needed Supervision; Adaptive equipment   Walk 50 Feet with Two Turns CARE Score 4   QI: Walk 150 Feet   Assistance Needed Supervision; Adaptive equipment   Walk 150 Feet CARE Score 4   QI: Walking 10 Feet on Uneven Surfaces   Reason if not Attempted Activity not applicable   Walking 10 Feet on Uneven Surfaces CARE Score 9   Ambulation   Does the patient walk? 2   Yes   Primary Discharge Mode of Locomotion Walk   Walk Assist Level Supervision   Gait Pattern Decreased foot clearance; Forward Flexion; Improper weight shift   Assist Device Roller Nimco Ha Walked (feet) 150 ft  (x2)   Limitations Noted In Balance;Device Management;Posture;Speed;Strength   Findings practiced with 'x3 for balance training   Walking (FIM) 5 - Patient requires supervision/monitoring AND distance 150 feet or more, no rest   Wheelchair mobility   QI: Does the patient use a wheelchair? 0  No   QI: 1 Step (Curb)   Assistance Needed Physical assistance   Assistance Provided by [place] Less than 25%   Comment SPC and HHA   1 Step (Curb) CARE Score 3   QI: 4 Steps   Assistance Needed Incidental touching; Adaptive equipment   4 Steps CARE Score 4   QI: 12 Steps   Assistance Needed Incidental touching; Adaptive equipment   12 Steps CARE Score 4   Stairs   Type Stairs;Curb   # of Steps 12   Weight Bearing Precautions Fall Risk;Cervical   Assist Devices Single Rail;Cane   Findings CS/CGA; curb CG/Deanna HHA on R cane on L   Stairs (FIM) 4 - Patient requires steadying assist or light touching AND patient goes up and down full flight (12- 14 stairs)   QI: Toilet Transfer   Assistance Needed Supervision   Toilet Transfer CARE Score 4   Toilet Transfer   Surface Assessed Standard Toilet   Findings stood to urinate, RW in front for stability   Toilet Transfer (FIM) 5 - Patient requires supervision/monitoring   Equipment Use   NuStep L2 12mins   Assessment   Treatment Assessment session cont to practice amb with SPC for balance  practiced DS in room with RW   pt with good understanding  spoke to OT and stated pt with increase pain this morning and demonstrated decrease balance  will reasses tomorrow for DS if safe  cont to focus on balance, activity tolerance and safety to progress with funcitonal mobility and Ind     Problem List Decreased strength;Decreased endurance;Decreased range of motion;Decreased safety awareness;Orthopedic restrictions   Barriers to Discharge Inaccessible home environment;Decreased caregiver support   PT Barriers   Physical Impairment Decreased strength;Decreased endurance; Impaired balance;Decreased mobility; Decreased safety awareness;Orthopedic restrictions   Functional Limitation Walking;Standing;Stair negotiation;Car transfers   Plan   Treatment/Interventions Functional transfer training;LE strengthening/ROM; Endurance training;Patient/family training;Bed mobility;Gait training   Progress Progressing toward goals   Recommendation   Recommendation Outpatient PT; Home with family support   PT Therapy Minutes   PT Time In 1235   PT Time Out 1335   PT Total Time (minutes) 60   PT Mode of treatment - Individual (minutes) 60   PT Mode of treatment - Concurrent (minutes) 0   PT Mode of treatment - Group (minutes) 0   PT Mode of treatment - Co-treat (minutes) 0   PT Mode of Teatment - Total time(minutes) 60 minutes   Therapy Time missed   Time missed?  No

## 2018-09-20 NOTE — ORTHOTIC NOTE
Orthotic Note            Date: 9/20/2018    Time: 9:30     Patient Name: Zulema Sanchez            Reason for Consult:  Patient Active Problem List   Diagnosis    Closed odontoid fracture with routine healing    Closed displaced fracture [facility] third cervical vertebra [facility]    Closed displaced fracture of fourth cervical vertebra ([address] [address] )    Alcohol abuse    Decubitus ulcer of sacral region    CAD (coronary artery disease)    Dens fracture ([facility]    Acute blood loss anemia    Diabetes ([facility]    S/P C1-T1 Posterior Cervical Discectomy and Fusion on 9/10/18    Hypokalemia    At moderate risk for venous thromboembolism (VTE)    Oropharyngeal dysphagia    COPD without exacerbation ([facility] )     [place] replacement pads    I delivered [employer] replacements pads to pt's room  At this time THE [facility] is in optimal position and pt is asleep in bed  I will continue to follow up daily         Recommendations:  Please call [employer]otech ext [phone] in regards to bracing instructions and/or adjustments     Lary York Restorative Technician, BS

## 2018-09-21 PROBLEM — R13.12 OROPHARYNGEAL DYSPHAGIA: Status: RESOLVED | Noted: 2018-09-14 | Resolved: 2018-09-21

## 2018-09-21 PROBLEM — L89.159 DECUBITUS ULCER OF SACRAL REGION: Status: RESOLVED | Noted: 2018-09-09 | Resolved: 2018-09-21

## 2018-09-21 PROBLEM — E87.6 HYPOKALEMIA: Status: RESOLVED | Noted: 2018-09-12 | Resolved: 2018-09-21

## 2018-09-21 LAB
GLUCOSE SERPL-MCNC: 124 MG/DL (ref 65–140)
GLUCOSE SERPL-MCNC: 147 MG/DL (ref 65–140)
GLUCOSE SERPL-MCNC: 165 MG/DL (ref 65–140)
GLUCOSE SERPL-MCNC: 94 MG/DL (ref 65–140)

## 2018-09-21 PROCEDURE — 97116 GAIT TRAINING THERAPY: CPT

## 2018-09-21 PROCEDURE — 97535 SELF CARE MNGMENT TRAINING: CPT

## 2018-09-21 PROCEDURE — 97110 THERAPEUTIC EXERCISES: CPT

## 2018-09-21 PROCEDURE — 92526 ORAL FUNCTION THERAPY: CPT

## 2018-09-21 PROCEDURE — 99232 SBSQ HOSP IP/OBS MODERATE 35: CPT | Performed by: PHYSICAL MEDICINE & REHABILITATION

## 2018-09-21 PROCEDURE — 82948 REAGENT STRIP/BLOOD GLUCOSE: CPT

## 2018-09-21 PROCEDURE — 97530 THERAPEUTIC ACTIVITIES: CPT

## 2018-09-21 PROCEDURE — 94760 N-INVAS EAR/PLS OXIMETRY 1: CPT

## 2018-09-21 PROCEDURE — 94640 AIRWAY INHALATION TREATMENT: CPT

## 2018-09-21 RX ORDER — GLIPIZIDE 5 MG/1
2.5 TABLET ORAL
Qty: 30 TABLET | Refills: 0 | Status: SHIPPED | OUTPATIENT
Start: 2018-09-22

## 2018-09-21 RX ORDER — LANOLIN ALCOHOL/MO/W.PET/CERES
3 CREAM (GRAM) TOPICAL
Qty: 30 TABLET | Refills: 0 | Status: SHIPPED | OUTPATIENT
Start: 2018-09-21

## 2018-09-21 RX ORDER — ACETAMINOPHEN 325 MG/1
TABLET ORAL
Qty: 30 TABLET | Refills: 0 | Status: SHIPPED | OUTPATIENT
Start: 2018-09-21

## 2018-09-21 RX ORDER — ATORVASTATIN CALCIUM 80 MG/1
80 TABLET, FILM COATED ORAL
Qty: 30 TABLET | Refills: 0 | Status: SHIPPED | OUTPATIENT
Start: 2018-09-22

## 2018-09-21 RX ORDER — BACLOFEN 10 MG/1
5 TABLET ORAL
Status: DISCONTINUED | OUTPATIENT
Start: 2018-09-21 | End: 2018-09-24 | Stop reason: HOSPADM

## 2018-09-21 RX ORDER — LANOLIN ALCOHOL/MO/W.PET/CERES
100 CREAM (GRAM) TOPICAL DAILY
Qty: 30 TABLET | Refills: 0 | Status: SHIPPED | OUTPATIENT
Start: 2018-09-22

## 2018-09-21 RX ORDER — METOPROLOL TARTRATE 50 MG/1
50 TABLET, FILM COATED ORAL EVERY 12 HOURS SCHEDULED
Qty: 60 TABLET | Refills: 0 | Status: SHIPPED | OUTPATIENT
Start: 2018-09-21 | End: 2020-02-19

## 2018-09-21 RX ORDER — LIDOCAINE 50 MG/G
1 PATCH TOPICAL DAILY PRN
Qty: 30 PATCH | Refills: 0 | Status: SHIPPED | OUTPATIENT
Start: 2018-09-21

## 2018-09-21 RX ORDER — GABAPENTIN 100 MG/1
100 CAPSULE ORAL 3 TIMES DAILY
Qty: 90 CAPSULE | Refills: 0 | Status: SHIPPED | OUTPATIENT
Start: 2018-09-21

## 2018-09-21 RX ADMIN — LEVALBUTEROL HYDROCHLORIDE 1.25 MG: 1.25 SOLUTION, CONCENTRATE RESPIRATORY (INHALATION) at 20:22

## 2018-09-21 RX ADMIN — IPRATROPIUM BROMIDE 0.5 MG: 0.5 SOLUTION RESPIRATORY (INHALATION) at 07:13

## 2018-09-21 RX ADMIN — Medication 100 MG: at 08:20

## 2018-09-21 RX ADMIN — METOPROLOL TARTRATE 50 MG: 50 TABLET, FILM COATED ORAL at 21:14

## 2018-09-21 RX ADMIN — METFORMIN HYDROCHLORIDE 1000 MG: 500 TABLET ORAL at 07:31

## 2018-09-21 RX ADMIN — GABAPENTIN 100 MG: 100 CAPSULE ORAL at 16:16

## 2018-09-21 RX ADMIN — GLIPIZIDE 2.5 MG: 5 TABLET ORAL at 07:31

## 2018-09-21 RX ADMIN — OXYCODONE HYDROCHLORIDE 5 MG: 5 TABLET ORAL at 05:30

## 2018-09-21 RX ADMIN — OXYCODONE HYDROCHLORIDE 5 MG: 5 TABLET ORAL at 21:21

## 2018-09-21 RX ADMIN — GABAPENTIN 100 MG: 100 CAPSULE ORAL at 08:20

## 2018-09-21 RX ADMIN — MAGNESIUM HYDROXIDE 30 ML: 400 SUSPENSION ORAL at 11:25

## 2018-09-21 RX ADMIN — METOPROLOL TARTRATE 50 MG: 50 TABLET, FILM COATED ORAL at 08:20

## 2018-09-21 RX ADMIN — BACLOFEN 5 MG: 10 TABLET ORAL at 21:13

## 2018-09-21 RX ADMIN — LEVALBUTEROL HYDROCHLORIDE 1.25 MG: 1.25 SOLUTION, CONCENTRATE RESPIRATORY (INHALATION) at 07:13

## 2018-09-21 RX ADMIN — HEPARIN SODIUM 5000 UNITS: 5000 INJECTION, SOLUTION INTRAVENOUS; SUBCUTANEOUS at 21:13

## 2018-09-21 RX ADMIN — LIDOCAINE 1 PATCH: 50 PATCH CUTANEOUS at 09:57

## 2018-09-21 RX ADMIN — IPRATROPIUM BROMIDE 0.5 MG: 0.5 SOLUTION RESPIRATORY (INHALATION) at 20:22

## 2018-09-21 RX ADMIN — INSULIN LISPRO 1 UNITS: 100 INJECTION, SOLUTION INTRAVENOUS; SUBCUTANEOUS at 07:38

## 2018-09-21 RX ADMIN — GABAPENTIN 100 MG: 100 CAPSULE ORAL at 21:13

## 2018-09-21 RX ADMIN — FOLIC ACID 1 MG: 1 TABLET ORAL at 08:20

## 2018-09-21 RX ADMIN — FLUTICASONE FUROATE AND VILANTEROL TRIFENATATE 1 PUFF: 100; 25 POWDER RESPIRATORY (INHALATION) at 07:31

## 2018-09-21 RX ADMIN — ATORVASTATIN CALCIUM 80 MG: 80 TABLET, FILM COATED ORAL at 16:16

## 2018-09-21 RX ADMIN — HEPARIN SODIUM 5000 UNITS: 5000 INJECTION, SOLUTION INTRAVENOUS; SUBCUTANEOUS at 13:32

## 2018-09-21 RX ADMIN — HEPARIN SODIUM 5000 UNITS: 5000 INJECTION, SOLUTION INTRAVENOUS; SUBCUTANEOUS at 05:26

## 2018-09-21 RX ADMIN — METFORMIN HYDROCHLORIDE 1000 MG: 500 TABLET ORAL at 16:16

## 2018-09-21 NOTE — PROGRESS NOTES
Physical Medicine and Rehabilitation Progress Note  Abdiel Jacobs 58 y o  male MRN: [id number]  Unit/Bed#: -07 Encounter: [id number]    HPI: Abdiel Jacobs is a 58 y o  male with COPD, HTN, MI, DM, CVA in April 2018, alcohol dependence, KLARISSA noncompliant with CPAP, who presented on 9/9/18 after fall down 15 steps  He sustained multiple cervical spine fractures, s/p C3-6 decompression laminectomy and instrumented fixation/fusion of C1-T1 with Dr Patel Ewing on 9/10/18  He was placed on CIWA protocol with valium  He was extubated on 9/59/34 without complication  He was admitted to acute rehab on 9/14/18  CC: neck pain    Subjective: Reports poor sleep due to neck pain  Dull, located over posterior cervical/upper traps region  Per nursing, patient removing c-collar despite repeated reminders  Also with constipation, no BM for several days  Nursing staff reporting: no problems    ROS: No fever, chills, chest pain, SOB, cough, nausea, vomiting, diarrhea, abdominal pain, dysuria, bowel/bladder incontinence, new weakness or changes in sensation  +constipation, neck pain  Complete ROS obtained and otherwise negative        Assessment/Plan:  COPD without exacerbation (HCC)   Assessment & Plan    Continue levalbuterol, ipratropium        Oropharyngeal dysphagia   Assessment & Plan    Secondary to posterior fusion C1-T1  Advanced to regular with thin liquids 9/19  SLP following        At moderate risk for venous thromboembolism (VTE)   Assessment & Plan    Secondary to decreased mobility after fall and cervical spine fractures s/p C1-T1 fusion  Heparin 5000u TID  SCDs        Hypokalemia   Assessment & Plan    Periodic BMP  PRN supplementation        Diabetes [facility])   Assessment & Plan    Lab Results   Component Value Date    HGBA1C 8 5 (H) 09/15/2018       Recent Labs      09/20/18   1559  09/20/18   2115  09/21/18   0649  09/21/18   1053   POCGLU  181*  134  165*  94     Metformin 1000mg BID  Glipizide 2 5mg started - monitor BG  SSI          Acute blood loss anemia   Assessment & Plan    Secondary to trauma s/p surgery  H/H stable  Periodic CBC        CAD (coronary artery disease)   Assessment & Plan    S/p stenting   Previously on plavix  Per neurosurgery, OK to resume antiplatelet in 2 weeks post-op (9/24/18)    Also with history of CVA in April 2018  Atorvastatin 80mg           Decubitus ulcer of sacral region   Assessment & Plan    Right buttock/ischium stage II decubitus - healing  Frequent turns  Daily monitoring         Alcohol abuse   Assessment & Plan    Completed CIWA/valium taper 9/20  Folate, thiamine supplementation  Monitor for signs of withdrawal    Cessation discussed with patient    Neuropsych consulted for support/counseling        * S/P C1-T1 Posterior Cervical Discectomy and Fusion on 9/10/18   Assessment & Plan    Cervical collar at all times  MEGHAN drain removed 9/15  Surgical sutures in place - follow up with neurosurgery for removal after discharge    Nociceptive pain in setting of cervical fractures s/p fusion   - tylenol, oxycodone PRN   - lidocaine patch PRN   - myofascial release with therapies with moderate relief   - add baclofen 5mg QHS for muscle relaxant and also sleep    Recommend comprehensive inpatient rehabilitation management with rehab MD, PT, OT, rehab level nursing, and case management, for deconditioning/debility following fall with cervical spine fractures               Scheduled Meds:    Current Facility-Administered Medications:  acetaminophen 975 mg Oral Q8H PRN Stacy Camacho MD   albuterol 2 puff Inhalation Q4H PRN Stacy Camacho MD   atorvastatin 80 mg Oral Daily With Conner Nagel MD   baclofen 5 mg Oral HS Stacy Camacho MD   bisacodyl 10 mg Rectal Daily PRN Stacy Camacho MD   fluticasone-vilanterol 1 puff Inhalation Daily Lauren Braswell PA-C   folic acid 1 mg Oral Daily Stacy Camacho MD   gabapentin 100 mg Oral TID Stacy Camacho MD   glipiZIDE 2 5 mg Oral Daily Before Breakfast TATIANA Tam   heparin (porcine) 5,000 Units Subcutaneous [facility] Everardo Stiles MD   insulin lispro 1-5 Units Subcutaneous HS Lauren Braswell PA-C   insulin lispro 1-5 Units Subcutaneous TID AC Lauren Braswell PA-C   ipratropium 0 5 mg Nebulization BID Everardo Stiles MD   levalbuterol 1 25 mg Nebulization BID Everardo Stiles MD   lidocaine 1 patch Transdermal Daily PRN Everardo Stiles MD   magnesium hydroxide 30 mL Oral Daily PRN Everardo Stiles MD   magnesium hydroxide 30 mL Oral Once Everardo Stiles MD   melatonin 3 mg Oral HS PRN Everardo Stiles MD   metFORMIN 1,000 mg Oral BID With Meals Lauren Braswell PA-C   metoprolol tartrate 50 mg Oral Q12H Melly Cedillo MD   oxyCODONE 10 mg Oral Q4H PRN Everardo Stiles MD   oxyCODONE 5 mg Oral Q4H PRN Everardo Stiles MD   senna 2 tablet Oral HS Everardo Stiles MD   thiamine 100 mg Oral Daily Everardo Stiles MD       Allergies   Allergen Reactions    Amoxicillin     Augmentin [Amoxicillin-Pot Clavulanate]         Objective:    Functional Update:  09/20/18 1235    Pain Assessment   Pain Assessment No/denies pain   Restrictions/Precautions   Precautions Aspiration;Bed/chair alarms; Fall Risk;Pain   ROM Restrictions Yes   RUE ROM Restriction (spinal precautions)   Braces or Orthoses C/S Collar   Cognition   Overall Cognitive Status WFL   Subjective   Subjective pt stated he can't get any sleep and is always tired  pt denies any pain   QI: Roll Left and Right   Assistance Needed Independent   Roll Left and Right CARE Score 6   QI: Sit to Lying   Assistance Needed Supervision   Sit to Lying CARE Score 4   QI: Lying to Sitting on Side of Bed   Assistance Needed Supervision   Lying to Sitting on Side of Bed CARE Score 4   QI: Sit to Stand   Assistance Needed Supervision   Sit to Stand CARE Score 4   QI: Chair/Bed-to-Chair Transfer   Assistance Needed Supervision; Adaptive equipment   Chair/Bed-to-Chair Transfer CARE Score 4   Transfer Bed/Chair/Wheelchair Limitations Noted In Balance;LE Strength   Adaptive Equipment Roller Walker   Stand Pivot Supervision   Sit to Stand Supervision   Stand to Sit Supervision   Supine to Sit Supervision   Sit to Supine Supervision   Findings needs occasional cues for RW management   Bed, Chair, Wheelchair Transfer (FIM) 5 - Patient requires supervision/monitoring   QI: Car Transfer   Reason if not Attempted Activity not applicable   Car Transfer CARE Score 9   QI: Walk 10 Feet   Assistance Needed Supervision; Adaptive equipment   Walk 10 Feet CARE Score 4   QI: Walk 50 Feet with Two Turns   Assistance Needed Supervision; Adaptive equipment   Walk 50 Feet with Two Turns CARE Score 4   QI: Walk 150 Feet   Assistance Needed Supervision; Adaptive equipment   Walk 150 Feet CARE Score 4   QI: Walking 10 Feet on Uneven Surfaces   Reason if not Attempted Activity not applicable   Walking 10 Feet on Uneven Surfaces CARE Score 9   Ambulation   Does the patient walk? 2  Yes   Primary Discharge Mode of Locomotion Walk   Walk Assist Level Supervision   Gait Pattern Decreased foot clearance; Forward Flexion; Improper weight shift   Assist Device Vernon Ha Walked (feet) 150 ft  (x2)   Limitations Noted In Balance;Device Management;Posture;Speed;Strength   Findings practiced with 'x3 for balance training   Walking (FIM) 5 - Patient requires supervision/monitoring AND distance 150 feet or more, no rest   Wheelchair mobility   QI: Does the patient use a wheelchair? 0  No   QI: 1 Step (Curb)   Assistance Needed Physical assistance   Assistance Provided by [place] Less than 25%   Comment SPC and HHA   1 Step (Curb) CARE Score 3   QI: 4 Steps   Assistance Needed Incidental touching; Adaptive equipment   4 Steps CARE Score 4   QI: 12 Steps   Assistance Needed Incidental touching; Adaptive equipment   12 Steps CARE Score 4   Stairs   Type Stairs;Curb   # of Steps 12   Weight Bearing Precautions Fall Risk;Cervical   Assist Devices Single Rail;Cane   Findings CS/CGA; curb CG/Deanna HHA on R cane on L   Stairs (FIM) 4 - Patient requires steadying assist or light touching AND patient goes up and down full flight (12- 14 stairs)   QI: Toilet Transfer   Assistance Needed Supervision   Toilet Transfer CARE Score 4   Toilet Transfer   Surface Assessed Standard Toilet   Findings stood to urinate, RW in front for stability   Toilet Transfer (FIM) 5 - Patient requires supervision/monitoring   Equipment Use   NuStep L2 12mins   Assessment   Treatment Assessment session cont to practice amb with SPC for balance  practiced DS in room with RW   pt with good understanding  spoke to OT and stated pt with increase pain this morning and demonstrated decrease balance  will reasses tomorrow for DS if safe  cont to focus on balance, activity tolerance and safety to progress with funcitonal mobility and Ind  Physical Exam:  Temp:  [97 4 °F (36 3 °C)-98 °F (36 7 °C)] 97 5 °F (36 4 °C)  HR:  [] 85  Resp:  [20] 20  BP: (131-152)/(70-97) 148/78  Oxygen Therapy  SpO2: 95 %    GEN: NAD, sitting comfortably in chair, wearing c-collar  Head: NCAT, no gross lesions  Eyes: EOMI, anicteric  Throat: clear, no thrush, MMM  Pulm: CTAB, no rales/wheezes  CV: RRR, normal s1/s2  Abd: soft, NTND  Ext: no pedal edema bilaterally, distal extremities warm and well perfused  Psych: normal affect, no agitation  Skin: no observable rashes; cervical incision c/d/i  Neuro: A+Ox3, fluent speech, follows commands; strength 5/5 bilateral hip flexion, knee extension, dorsi/plantarflexion    Diagnostic Studies: reviewed, no new imaging  No results found      Laboratory:      Results from last 7 days  Lab Units 09/20/18  0615 09/17/18  0524   HEMOGLOBIN g/dL 10 6* 10 6*   HEMATOCRIT % 32 4* 33 2*   WBC Thousand/uL 6 38 6 37       Results from last 7 days  Lab Units 09/20/18  0615 09/17/18  0524   BUN mg/dL 9 10   SODIUM mmol/L 132* 135*   POTASSIUM mmol/L 3 8 3 9   CHLORIDE mmol/L 98* 102   CREATININE mg/dL 0 45* 0 48*            Patient Active Problem List   Diagnosis    Closed odontoid fracture with routine healing    Closed displaced fracture of third cervical vertebra (HCC)    Closed displaced fracture of fourth cervical vertebra (HCC)    Alcohol abuse    Decubitus ulcer of sacral region    CAD (coronary artery disease)    Dens fracture (HCC)    Acute blood loss anemia    Diabetes ([facility] [address] )    S/P C1-T1 Posterior Cervical Discectomy and Fusion on 9/10/18    Hypokalemia    At moderate risk for venous thromboembolism (VTE)    Oropharyngeal dysphagia    COPD without exacerbation ([facility])       ** Please Note: Fluency Direct voice to text software may have been used in the creation of this document  **    Total visit time:  At least 25 minutes, with more than 50% spent counseling/coordinating care

## 2018-09-21 NOTE — PROGRESS NOTES
Internal Medicine Progress Note  Patient: Yury Roberts  Age/sex: 58 y o  male  Medical Record #: [id number]      ASSESSMENT/PLAN:  Yury Roberts is seen and examined and management for following issues:    Multiple cervical fractures s/p posterior cervical decompression laminectomy C3-6, posterior cervical lateral mass and pedicle fixation/fusion C1-T1 on 9/10/18: Pain control per primary service  Continue to monitor incision  Aspen collar at all times      HTN: stable; continue Lopressor 50mg every 12 hours      MI s/p PTCA: Plavix on hold = resume when OK with NS (OK to restart 2 weeks post-op on 9/24/18); continue Lipitor 80mg qd      ETOH abuse: Continue folate, thiamine and Valium  Valium weaned off by primary service      DM type 2:  Hemoglobin A1c 8 5  On 9/16/18 evening, resumed Metformin 1000mg 2x daily as he takes at home  Added Glipizide 2 5mg qam starting 9/19/18;  ?compliant at home with Metformin (HbA1C was 8 5); continue QID Accuchecks/SSI/DM diet  , 138, 181, 134; fasting today 165     COPD: Pt uses Advair 500/50, Albuterol nebs TID and prn Ventolin at home = here on Xopenex/Atrovent nebs TID and using Abby Hu here for Advair  Hyponatremia: mild; no sx; will watch      Subjective: Patient seen and examined       ROS:   GI: denies abdominal pain, change bowel habits or reflux symptoms  Neuro: No new neurologic changes  Respiratory: No Cough, SOB  Cardiovascular: No CP, palpitations     Scheduled Meds:    Current Facility-Administered Medications:  acetaminophen 975 mg Oral Q8H PRN Antoine Alcantara MD   albuterol 2 puff Inhalation Q4H PRN Antoine Alcantara MD   atorvastatin 80 mg Oral Daily With Renetta Echevarria MD   bisacodyl 10 mg Rectal Daily PRN Antoine Alcantara MD   fluticasone-vilanterol 1 puff Inhalation Daily Lauren Braswell PA-C   folic acid 1 mg Oral Daily Antoine Alcantara MD   gabapentin 100 mg Oral TID Antoine Alcantara MD   glipiZIDE 2 5 mg Oral Daily Before Breakfast Jose Bishop TATIANA Melo   heparin (porcine) 5,000 Units Subcutaneous [facility] Jordan Aguila MD   insulin lispro 1-5 Units Subcutaneous HS Lauren Braswell PA-C   insulin lispro 1-5 Units Subcutaneous TID AC Lauren Braswell PA-C   ipratropium 0 5 mg Nebulization BID Jordan Aguila MD   levalbuterol 1 25 mg Nebulization BID Jordan Aguila MD   lidocaine 1 patch Transdermal Daily PRN Jordan Aguila MD   magnesium hydroxide 30 mL Oral Daily PRN Jordan Aguila MD   melatonin 3 mg Oral HS PRN Jordan Aguila MD   metFORMIN 1,000 mg Oral BID With Meals Lauren Braswell PA-C   metoprolol tartrate 50 mg Oral Q12H [address] Jordan Aguila MD   oxyCODONE 10 mg Oral Q4H PRN Jordan Aguila MD   oxyCODONE 5 mg Oral Q4H PRN Jordan Aguila MD   senna 2 tablet Oral HS Jordan Aguila MD   thiamine 100 mg Oral Daily Jordan Aguila MD       Labs:       Results from last 7 days  Lab Units 09/20/18  0615 09/17/18  0524   WBC Thousand/uL 6 38 6 37   HEMOGLOBIN g/dL 10 6* 10 6*   HEMATOCRIT % 32 4* 33 2*   PLATELETS Thousands/uL 371 251       Results from last 7 days  Lab Units 09/20/18  0615 09/17/18  0524   SODIUM mmol/L 132* 135*   POTASSIUM mmol/L 3 8 3 9   CHLORIDE mmol/L 98* 102   CO2 mmol/L 28 27   BUN mg/dL 9 10   CREATININE mg/dL 0 45* 0 48*   CALCIUM mg/dL 9 2 9 0       Results from last 7 days  Lab Units 09/15/18  0500   HEMOGLOBIN A1C % 8 5*              Glucose, i-STAT (mg/dl)   Date Value   09/10/2018 217 (H)   09/10/2018 201 (H)   09/09/2018 185 (H)       Labs reviewed    Physical Examination:  Vitals:   Vitals:    09/20/18 2208 09/21/18 0527 09/21/18 0714 09/21/18 0741   BP: 134/77 152/85  148/78   BP Location:  Left arm  Left arm   Pulse: (!) 109 77  85   Resp:  20     Temp:  97 5 °F (36 4 °C)     TempSrc:  Oral     SpO2:  95% 95%    Weight:       Height:         Constitutional:  NAD; pleasant; nontoxic  Neck: collar on  CV:  +S1, S2;  RRR; no rub/murmur  Pulmonary:  BBS without crackles/wheeze/rhonci; resp are unlabored  Abdominal:  soft, +BS, ND/NT; no mass  Skin:  no rashes  Musculoskeletal:  no edema  :  no antony  Neurological/Psych:  AAO;  RUE 5/5, RLE/LLE/LUE 5/5; no depression/anxiety      [ X ] Total time spent: 30 Mins and greater than 50% of this time was spent counseling/coordinating care  ** Please Note: Dragon 360 Dictation voice to text software may have been used in the creation of this document   **

## 2018-09-21 NOTE — DISCHARGE SUMMARY
Discharge Summary - PMR   Abdiel Jacobs 58 y o  male MRN: [id number]  Unit/Bed#: [facility] [place] 557-07 Encounter: [id number]    Admission Date: 9/14/2018     Discharge Date: 09/24/18     Etiologic/Rehabilitation Diagnosis: Impairment of mobility, safety and Activities of Daily Living (ADLs) due to Orthopedic Disorders:  08 9  Other Orthopedic cervical spine fractures    HPI: Abdiel Jacobs is a 58 y o  male with history of COPD, HTN, MI, DM, CVA in April 2018, alcohol dependence, KLARISSA noncompliant with CPAP, who presented to [address] [place] [address] on 9/9/18 after fall down 15 steps  He was GCS 14 on arrival, found to have occipital condyle fracture, C2 fracture (type 2 dens fracture with 4mm posterior displacement), closed displaced fractures of C2-3 vertebrae, multiple cervical spinous process fractures, disruption of facet capsules, and rupture of ALL with retropharyngeal edema       He underwent posterior C3-6 decompression laminectomy and instrumented fixation of C1-T1 with Dr Patel Ewing on 9/10/18  MRI of the c-spine did not reveal acute cord signal hyperintensity  He was found to have sacral stage 2 pressure ulcer on admission, as well as multiple abrasions, acute blood loss anemia, and dysphagia  CIWA protocol initiated with valium  He was extubated on 8/65/22 without complication       He was admitted to acute rehab on 9/14  His drain was removed without complication  He participated in therapies and made functional gains  He was weaned off of valium and there were no signs of withdrawal  He was discharged on 9/21 in stable medical condition  Procedures Performed During [facility] [place] Admission: None    Acute Rehabilitation Center Course:   Patient participated in a comprehensive interdisciplinary inpatient rehabilitation program which included involvment of MD, therapies (PT, OT, and/or SLP), RN, CM, SW, dietary, and psychology services   He was able to be advanced to a supervision level of assist and considered safe for discharge home with family  Please see below for patient's day to day management of medical needs          COPD without exacerbation ([facility] [address] )   Assessment & Plan    Continue levalbuterol, ipratropium        At moderate risk for venous thromboembolism (VTE)   Assessment & Plan    Secondary to decreased mobility after fall and cervical spine fractures s/p C1-T1 fusion  Ambulating sufficiently - no need to continue heparin outpatient        Diabetes [facility])   Assessment & Plan    Lab Results   Component Value Date    HGBA1C 8 5 (H) 09/15/2018       Recent Labs      09/23/18   1101  09/23/18   1616  09/23/18   2101  09/24/18   0655   POCGLU  191*  135  130  140     Metformin 1000mg BID  Glipizide 2 5mg   Recommend follow up with PCP outpatient          Acute blood loss anemia   Assessment & Plan    Secondary to trauma s/p surgery  H/H stable  Recommend repeat CBC outpatient         CAD (coronary artery disease)   Assessment & Plan    S/p stenting   Previously on plavix  Per neurosurgery, OK to resume antiplatelet in 2 weeks post-op (9/24/18)    Also with history of CVA in April 2018  Atorvastatin 80mg           Alcohol abuse   Assessment & Plan    Completed CIWA/valium taper 9/20  Folate, thiamine supplementation  Monitor for signs of withdrawal    Cessation discussed with patient    Neuropsych consulted for support/counseling        * S/P C1-T1 Posterior Cervical Discectomy and Fusion on 9/10/18   Assessment & Plan    Cervical collar at all times  MEGHAN drain removed 9/15  Surgical sutures in place - neurosurgery to remove on 9/24    Nociceptive pain in setting of cervical fractures s/p fusion   - tylenol PRN   - lidocaine patch PRN   - myofascial release with therapies with moderate relief   - add baclofen 5mg QHS for muscle relaxant and also sleep    Patient participated in comprehensive inpatient rehabilitation management with rehab MD, PT, OT, rehab level nursing, and case management, for deconditioning/debility following fall with cervical spine fractures        Oropharyngeal dysphagiaresolved as of 9/21/2018   Assessment & Plan    Secondary to posterior fusion C1-T1  Advanced to regular with thin liquids 9/19  SLP following        Hypokalemiaresolved as of 9/21/2018   Assessment & Plan    Periodic BMP  PRN supplementation        Decubitus ulcer of sacral regionresolved as of 9/21/2018   Assessment & Plan    Right buttock/ischium stage II decubitus - healing  Frequent turns  Daily monitoring             Discharge Physical Examination:  /74 (BP Location: Left arm)   Pulse 79   Temp 98 °F (36 7 °C) (Oral)   Resp 18   Ht 6' 1" (1 854 m)   Wt 83 8 kg (184 lb 11 2 oz)   SpO2 95%   BMI 24 37 kg/m²      GEN: NAD, sitting comfortably in chair  Head: NCAT, no gross lesions  Eyes: PERRL, EOMI  Throat: clear, no thrush, MMM  Neck: wearing c-collar, mild TTP upper traps  Pulm: CTAB, no rales/wheezes  CV: RRR, normal s1/s2  Abd: soft, NTND  Ext: no pedal edema bilaterally, distal extremities warm and well perfused  Psych: normal affect, no agitation  Skin: no observable rashes; posterior cervical incision c/d/i  Neuro: A+Ox3, fluent speech, follows commands, strength 5/5 bilateral finger flexion, finger abduction, wrist extension, elbow flexion/extension, shoulder abduction, hip flexion, knee extension, dorsi/plantarflexion, EHL    Complications: none    Functional Status Upon Admission to [facility]:  Physical Therapy Occupational Therapy Speech Therapy   As per PTA:       09/13/18 0900   Pain Assessment   Pain Assessment 0-10   Pain Score 9   Pain Type Surgical pain   Pain Location Neck   Pain Orientation Posterior;Bilateral   Hospital Pain Intervention(s) Repositioned; Ambulation/increased activity; Emotional support   Response to Interventions Tolerated  Restrictions/Precautions   Braces or Orthoses C/S Collar   Other Precautions Pain; Fall Risk;Spinal precautions; Bed Alarm; Chair Alarm   Subjective Subjective The pt  notes that he continues to have pain, and that he is tired  He was agreeable to ambulate with therapy for a short distance  Bed Mobility   Supine to Sit 5  Supervision   Transfers   Sit to Stand 4  Minimal assistance   Additional items Assist x 2;Verbal cues; Increased time required   Stand to Sit 5  Supervision   Additional items Increased time required   Ambulation/Elevation   Gait pattern Excessively slow; Step to; Inconsistent sally;Decreased foot clearance; Forward Flexion   Gait Assistance 4  Minimal assist   Additional items Assist x 1;Verbal cues   Assistive Device Rolling walker   Distance 50 feet  Balance   Static Sitting Good   Dynamic Sitting Fair +   Static Standing Fair   Ambulatory Poor +   Activity Tolerance   Activity Tolerance Patient tolerated treatment well;Patient limited by fatigue;Patient limited by pain   Nurse [address], RN  Exercises   Hip Flexion Supine;Bilateral;AROM;5 reps   Knee AROM Long Arc Quad Sitting;Bilateral;AROM;5 reps   Assessment   Prognosis Good   Problem List Decreased endurance;Decreased mobility; Impaired balance;Pain   Assessment The pt  has improving balance today, but he continues to fatigue easily  He also notes increased pain with ambulation  He had no overt loss of balance, but he was unable to perform any dynamic trunk activities during ambulation  He was able to manuever around obstacles without difficulty as well  He does continue to require instruction for proper posture and transfer techniques, but he did demonstrate carry-over from earlier in the session  He remains limited from his baseline independence, and he will benefit from continued physical therapy in order to safely progress his functional mobility         As per FELDER:          09/13/18 0920   Restrictions/Precautions   Weight Bearing Precautions Per Order No   Braces or Orthoses C/S Collar   Other Precautions Fall Risk;Pain;Spinal precautions; Chair Alarm; Bed Alarm   Pain Assessment   Pain Assessment 0-10   Pain Score 8   Transfers   Sit to Stand 4  Minimal assistance   Additional items Assist x 1; Armrests   Stand to Sit 4  Minimal assistance   Additional items Assist x 1; Armrests   Cognition   Overall Cognitive Status Impaired   Arousal/Participation Alert; Responsive   Following Commands Follows one step commands with increased time or repetition   Assessment   Assessment pt  seen for AM OT session focusing on ADLs of bathing, dressing, grooming and transfers  pt  requires supervision for grooming and UB bathing  Min Asst for UB dressing and LB bathing/dressing  pt  needed asst to wash feet and don socks  pt  requires Min Asst for transfers  pt  noted to be SOB on room air with pulse ox in upper 90's during session while performing tasks  pt  OOB in chair at end of session with all needs in reach  pt  will continue to benefit from skilled OT services to maximize independence          As per SLP on 9/12/18:     Current Diet: level 2 w/ htl  Objective:  Pt seen for ongoing dx dysphagia tx   Nsg states pt much better today & wanting an upgrade   Pt trialed w/ toast, nt by cup(4 oz), thin by cup & straw (8 oz)   Pt self fed toast- adequate retrieval, mastication & full oral clearing   No overt coughing or throat clearing  Adequate seal on cup rim, prompt transfers w/ all liquids, able to take successive sips thin w/o overt coughing or clearing       Assessment:  Pt w/ improved overall swallow function today for an upgrade       Plan/Recommendations:  1  Upgrade to Level 3 w/ thin  2   Speech to f/u for regular       Functional Status Upon Discharge from [facility]:   09/23/18 1000    Pain Assessment   Pain Assessment 0-10   Pain Score 5   Pain Type Surgical pain   Pain Location Back   Pain Descriptors Aching   Restrictions/Precautions   Precautions Spinal precautions   ROM Restrictions Yes   Braces or Orthoses C/S Collar   General   Change In Medical/Functional Status Mod I   Cognition Overall Cognitive Status Impaired   Arousal/Participation Alert; Cooperative   Attention Within functional limits   Orientation Level Oriented X4   Memory Decreased short term memory;Decreased recall of precautions   Following Commands Follows multistep commands with increased time or repetition   Subjective   Subjective reports he is having pain in his upper back   QI: Roll Left and Right   Assistance Needed Independent   Roll Left and Right CARE Score 6   QI: Sit to [address] to Lying CARE Score 6   QI: Lying to Sitting on Side of Bed   Assistance Needed Independent   Lying to Sitting on Side of Bed CARE Score 6   QI: Sit to Stand   Assistance Needed Independent   Sit to Stand CARE Score 6   QI: Chair/Bed-to-Chair Transfer   Assistance Needed Independent   Chair/Bed-to-Chair Transfer CARE Score 6   Transfer Bed/Chair/Wheelchair   Limitations Noted In Balance;UE Strength;LE Strength   Adaptive Equipment Roller Walker   Stand Pivot Modified Independent   Sit to Stand Modified Independent   Stand to Sit Modified Independent   Supine to Sit Independent   Sit to Supine Independent   Car Transfer Supervision   Bed, Chair, Wheelchair Transfer (FIM) 6 - Patient requires assistive device/extra time/safety concerns but completes independently   QI: Car Transfer   Assistance Needed Supervision   Car Transfer CARE Score 4   QI: Walk 10 Feet   Assistance Needed Independent   Walk 10 Feet CARE Score 6   QI: Walk 50 Feet with Two Turns   Assistance Needed Independent   Walk 50 Feet with Two Turns CARE Score 6   QI: Walk 150 Feet   Assistance Needed Independent   Walk 150 Feet CARE Score 6   QI: Walking 10 Feet on Uneven Surfaces   Assistance Needed Supervision   Walking 10 Feet on Uneven Surfaces CARE Score 4   Ambulation   Does the patient walk? 2  Yes   Primary Discharge Mode of Locomotion Walk   Walk Assist Level Independent   Gait Pattern Inconsistant Sury; Slow Sury; Forward Flexion; Wide TEO; Poor UE WB;Improper weight shift   Assist Device Roller [place] Ha Walked (feet) 500 ft   Limitations Noted In Heel Strike;Posture;Speed;Strength;Swing   Findings mod I for even surfaces   Walking (FIM) 6 - Patient requires assistive device/extra time/safety concerns but completes independently AND distance 150 feet or more, no rest   Wheelchair mobility   QI: Does the patient use a wheelchair? 0   No   QI: 1 Step (Curb)   Assistance Needed Supervision   1 Step (Curb) CARE Score 4   QI: 4 Steps   Assistance Needed Supervision   4 Steps CARE Score 4   QI: 12 Steps   Assistance Needed Supervision   12 Steps CARE Score 4   Stairs   Type Stairs;Curb;Ramp   # of Steps 12   Weight Bearing Precautions Fall Risk   Assist Devices Single Rail   Findings S   Stairs (FIM) 5 - Patient requires supervision/monitoring AND goes up and down full flight (12- 14 stairs)   QI: Toilet Transfer   Assistance Needed Independent   Toilet Transfer CARE Score 6   Toilet Transfer   Surface Assessed Standard Toilet   Toilet Transfer (FIM) 6 - Patient requires assistive device/extra time/safety concerns but completes independently   Therapeutic Interventions   Strengthening LAQ, marches standing, hamstring curls standing 3# CW 15x2; adduction yellow ball 20x2; abduction green TB 20x2   Flexibility hamstring and gastroc 60"x2   Assessment   Treatment Assessment pt trialed for progression to mod I and he is able to be mod I at this time, nursing notified; pt is consistent with xfers and use of RW; pt had difficulty in lobby where there is an area rug, the walker got caught and he was able to get it out from under the carpet but his foot was under the carpet and he did not notice, this may be due to the fact that he is wearing a collar and can not see his feet; decreased safety awareness for uneven surfaces, should have S for uneven surfaces; pt continues to have pain in the upper back area between his scapulas, nursing notified and HP applied for 10 mins which he reported was decreasing his pain; pt will need S on stairs due to decreased strength and balance with only 1 UE support; continue POC as per PT   Problem List Decreased strength;Decreased range of motion;Decreased endurance; Impaired balance;Orthopedic restrictions;Pain   PT Barriers   Functional Limitation Stair negotiation   Plan   Treatment/Interventions LE strengthening/ROM; Elevations; Therapeutic exercise; Endurance training   Progress Progressing toward goals   Recommendation   Recommendation Home PT; Home with family support; Outpatient PT   Equipment Recommended Walker      09/23/18 0700   Pain Assessment   Pain Assessment 0-10   Pain Score 3   Pain Type Surgical pain;Acute pain   Pain Location Back   Pain Orientation Bilateral   Restrictions/Precautions   Precautions Spinal precautions   Braces or Orthoses C/S Collar   QI: [address] Provided by [place] No physical assistance   Eating CARE Score 6   Eating Assessment   Eating (FIM) 7 - Patient completely independent   QI: [address] Provided by [place] No physical assistance   Oral Hygiene CARE Score 6   Grooming   Able To Comb/Brush Hair;Wash/Dry Face;Brush/Clean Teeth;Wash/Dry Hands   Findings Pt performed while standing at sink  Grooming (FIM) 6 - Patient requires assistive device/extra time/safety concerns but completes independently   QI: Shower/Bathe [address] Provided by [place] No physical assistance   Comment shower chair + grab bars   Shower/Bathe Self CARE Score 6   Bathing   Assessed Bath Style Tub   Anticipated D/C Bath Style Tub   Able to Gather/Transport Yes   Able to Adjust Water Temperature Yes   Able to Wash/Rinse/Dry (body part) Left Arm;Right Arm;L Upper Leg;R Upper Leg;L Lower Leg/Foot;R Lower Leg/Foot;Chest;Abdomen;Perineal Area; Buttocks   Positioning Seated;Standing Adaptive Equipment Shower Bars; Shower Seat;Hand Automatic Data demo G safety awareness and demo crossed-leg technique to bathe lower legs/feet  Bathing (FIM) 6 - Patient requires assistive device/extra time/safety concerns but completes independently   Tub/Shower Transfer   Limitations Noted In Balance; Endurance   Adaptive Equipment Grab Bars;Seat with Back   Assessed Tub/shower combo   Findings Pt reports daughter able to provide supervision to inc safety and dec fall risk for tub xfers upon d/c  Tub Transfer (FIM) 5 - Patient requires supervision/monitoring   Shower Transfer (FIM) 0 - Activity does not occur   QI: Upper Body [address] Provided by [place] No physical assistance   Upper Body Dressing CARE Score 6   QI: Lower Body [address] Provided by [place] No physical assistance   Lower Body Dressing CARE Score 6   QI: Putting On/Taking Off [address] Provided by [place] No physical assistance   Putting On/Taking Off Footwear CARE Score 6   QI: [address] [facility] Provided by [place] No physical assistance   Comment w/ reacher   Picking Up Object CARE Score 6   Dressing/Undressing Clothing   Remove UB Clothes Button Shirt   Remove LB Clothes Pants;Socks   Don UB Clothes Button Shirt   Don LB Clothes Pants;Socks   Positioning Supported Sit;Standing   Findings Pt demo crossed-leg technique to don socks and thread BLE into pants  Pt demo G ECTs and self monitoring strategies, standing only when needed to maximize safety     UB Dressing (FIM) 6 - Patient requires assistive device/extra time/safety concerns but completes independently   LB Dressing (FIM) 6 - Patient requires assistive device/extra time/safety concerns but completes independently   QI: Lying to Sitting on Side of Bed   Assistance Needed Independent   Assistance Provided by [place] No physical assistance   Lying to Sitting on Side of Bed CARE Score 6   QI: Sit to [address] Provided by [place] No physical assistance   Sit to Stand CARE Score 6   Bed Mobility   Supine to Sit 7  Independent   Sit to Supine 7  Independent   QI: Chair/Bed-to-Chair Transfer   Assistance Needed Independent   Assistance Provided by [place] No physical assistance   Chair/Bed-to-Chair Transfer CARE Score 6   Transfer Bed/Chair/Wheelchair   Adaptive Equipment Roller [employer], Chair, Wheelchair Transfer (FIM) 6 - Patient requires assistive device/extra time/safety concerns but completes independently   QI: [address] Provided by [place] No physical assistance   Toileting Hygiene CARE Score 6   Toileting   Able to [address] down yes, up yes  Able to [facility] Yes   Manage Hygiene Bladder   Findings Pt able to perform without challenge, demo G safety awareness t/o  Toileting (FIM) 6 - Patient requires assistive device/extra time/safety concerns but completes independently   QI: [address] Provided by [place] No physical assistance   Toilet Transfer CARE Score 6   Toilet Transfer   Surface Assessed Standard Toilet   Transfer Technique [address]   Toilet Transfer (FIM) 6 - Patient requires assistive device/extra time/safety concerns but completes independently   Exercise Tools   Other Exercise Tool 1 Pt completed 10x3 towel glides in all planes while seated at table to provide gentle stretch to BUE, shoulders, and trapezius  Pt tolerated well w/ rest breaks prn to manage fatigue  Assessment   Treatment Assessment Pt seen for OT session focusing on AM ADL, details noted above  Overall, pt demo G carryover of ECTs, RW management, and safety awareness t/o having no LOB   Reviewed collar management and changing of pads, pt demo G understanding  Pt reports daughter will be available to provide supervision for tub xfers and to assist w/ collar management  Pt reports inc confidence w/ skills gain and having no question/concerns re  d/c om 9/24/18  Pt was left resting in bed w/ HOB elevated and breakfast tray provided  All needs within reach  Prognosis Fair   Problem List Decreased endurance; Impaired balance;Orthopedic restrictions;Pain       Discharge Diagnosis: Impairment of mobility, safety and Activities of Daily Living (ADLs) due to Orthopedic Disorders:  08 9  Other Orthopedic cervical spine fractures    Discharge Medications:   Acetaminophen 975mg TID PRN  Albuterol 2puffs BID PRN  Atorvastatin 80mg daily  plavix 75mg daily  advair 1 puff BID  Folic acid 1mg daily  Gabapentin 100mg TID  Glipizide 2 5mg daily  Duoneb QID  Lidocaine 5% 1 patch daily PRN  Melatonin 3mg QHS  Metformin 1000mg BID  Metoprolol 50mg BID  Thiamine 100mg daily  Spiriva 18mcg daily      Condition at Discharge: fair     Discharge instructions/Information to patient and family:   See after visit summary for information provided to patient and family  Provisions for Follow-Up Care:  PCP  Neurosurgery      Disposition: Home    Planned Readmission: No    Discharge Statement   I spent 45 minutes discharging the patient  This time was spent on the day of discharge  I had direct contact with the patient on the day of discharge  Greater than 50% of the total time was spent examining patient, answering all patient questions, arranging and discussing plan of care with patient as well as directly providing post-discharge instructions  Additional time then spent on discharge activities

## 2018-09-21 NOTE — PROGRESS NOTES
09/21/18 1330   Pain Assessment   Pain Assessment 0-10   Pain Score 3   Restrictions/Precautions   Precautions Aspiration; Fall Risk;Spinal precautions   ROM Restrictions Yes   Braces or Orthoses C/S Collar   Subjective   Subjective pt states feeling okay ready for PT session    Bed Mobility   Findings indep level    QI: Chair/Bed-to-Chair Transfer   Assistance Needed Set-up / clean-up   Chair/Bed-to-Chair Transfer CARE Score 5   Transfer Bed/Chair/Wheelchair   Limitations Noted In Balance; Coordination; Endurance; Sequencing;LE Strength   Adaptive Equipment Roller Walker   All Transfer Supervision   Findings DS level    Bed, Chair, Wheelchair Transfer (FIM) 5 - Patient requires supervision/monitoring   Ambulation   Does the patient walk? 2  Yes   Primary Discharge Mode of Locomotion Walk   Walk Assist Level Distant Supervision   Gait Pattern Inconsistant Sury;Decreased foot clearance; Improper weight shift   Assist Device [place] Ha Walked (feet) 150 ft   Limitations Noted In Device Management;Balance; Endurance;Posture;Strength;Swing;Speed   Walking (FIM) 5 - Patient requires supervision/monitoring AND distance 150 feet or more, no rest   Toilet Transfer   Surface Assessed Standard Toilet   Toilet Transfer (FIM) 5 - Patient requires supervision/monitoring   Therapeutic Interventions   Strengthening LAQ SAQ QS x20 SLR x20 hip add/abd x10    Flexibility B/L LE stretching    Balance standing balance    Equipment Use   [facility] level 2 for 10 min    Assessment   Treatment Assessment pt perform thex ex's functional activities gait training , standing balance with C collar for safety awareness  [facility] at [facility] session to in balance awareness  pt will cont to benefit with PT session   pt needs to cont FF stairs with SPC   Barriers to Discharge Inaccessible home environment;Decreased caregiver support   Plan   Treatment/Interventions LE strengthening/ROM; Functional transfer training; Therapeutic exercise; Endurance training;Patient/family training;Gait training;Bed mobility   Progress Progressing toward goals   PT Therapy Minutes   PT Time In 1330   PT Time Out 1400   PT Total Time (minutes) 30   PT Mode of treatment - Individual (minutes) 30   PT Mode of treatment - Concurrent (minutes) 0   PT Mode of treatment - Group (minutes) 0   PT Mode of treatment - Co-treat (minutes) 0   PT Mode of Teatment - Total time(minutes) 30 minutes   Therapy Time missed   Time missed?  No

## 2018-09-21 NOTE — PROGRESS NOTES
09/21/18 1130   Pain Assessment   Pain Assessment 0-10   Pain Score 3   Pain Location Neck   Pain Orientation Posterior   Hospital Pain Intervention(s) Repositioned;Distraction   Swallow Information   Current Risks for Dysphagia & Aspiration Recent intubation;General debilitation;Cervical spine injury/surgery   Current Symptoms/Concerns Cough;During meals   Current Diet Regular; Thin liquid   Baseline Diet Regular; Thin liquids   Consistencies Assessed and Performance   Materials Admnistered Soft/Level 3;Regular/Solid; Thin liquid   Oral Stage WFL   Phargngeal Stage [facility]   Swallow Mechanics WFL; Good Larygneal rise;Swallow initation; Appears prompt   Esophageal Concerns No s/s reported   Recommendations   Diet Solid Recommendation Regular consistency   Diet Liquid Recommendation Thin liquid   Recommended Form of Meds As tolerated   General Precautions Aspiration precautions; Feed only when alert;Minimize distractions;Upright as possible for all oral intake;Remain upright for 45 mins after meals  (OOB for meals)   Compensatory Swallowing Strategies Alternate solids and liquids;Voluntary throat clear/cough to clear penetration   Results Reviewed with PT/Family/Caregiver   QI: Eating   Assistance Needed Independent   Eating CARE Score 6   Swallow Assessment   Swallow Treatment Assessment Pt obseved w/ lunch, where diet remains regular w/ thin liquids  Pt was OOB in recliner for meal  Pt was independent w/ tray setup and ability to feed self  Consumed 100% of meal and 180cc of thin liquids by cup  Pt's lip seal around tsp and cup was [facility]  No anterior spillage noted w/ consistencies  Mastication of soft and solids (side salad w/ cucumbers, tomatoes, stuffed shells) was [facility]  No oral resdiual noted w/ consistencies  A-p transfer of thins was [facility]  Swallow initiation was prompt and hyolaryngeal elevation was [facility]   Cough elicited x1 during meal but otherwise no further signs/sxs of aspiration noted w/ meal  cough appeared related to pre-exisitng congestion and not overt aspiration  Will continue to recommend regular diet w/ thin liquids  No further SLP services required at this time for dysphagia tx due to ability to tolerate safest least restrictive diet of regular w/ thin liquids w/o increased oropharyngeal or aspiration sxs  Swallow Assessment Prognosis   Prognosis Good   Prognosis Considerations Co-morbidities; Medical prognosis   SLP Therapy Minutes   SLP Time In 1130   SLP Time Out 1200   SLP Total Time (minutes) 30   SLP Mode of treatment - Individual (minutes) 30   SLP Mode of treatment - Concurrent (minutes) 0   SLP Mode of treatment - Group (minutes) 0   SLP Mode of treatment - Co-treat (minutes) 0   SLP Mode of Teatment - Total time(minutes) 30 minutes   Therapy Time missed   Time missed?  No   Daily FIM Score   Eating (FIM) 7 - Patient completely independent

## 2018-09-21 NOTE — PROGRESS NOTES
Pt is again loosening and removing cervical collar  Instructed on expectation that the collar stays on at all times  Pt needs reminding

## 2018-09-21 NOTE — PROGRESS NOTES
09/21/18 1000   Pain Assessment   Pain Assessment 0-10   Pain Score 5   Pain Type Surgical pain   Pain Location Neck;Back   Pain Orientation Posterior; Upper   Pain Descriptors Aching;Cramping;Discomfort   Pain Frequency Constant/continuous   Pain Onset Ongoing   Restrictions/Precautions   Precautions Aspiration; Fall Risk;Pain;Spinal precautions   ROM Restrictions Yes  (spinal precuations)   Braces or Orthoses C/S Collar   Grooming   Able To Initiate Tasks; Acquire Items; Shave;Comb/Brush Hair;Wash/Dry Face;Brush/Clean Teeth;Wash/Dry Hands   Limitation Noted In Problem Solving; Safety;Strength   Findings Pt performed grooming in stance at sink w/ support from RW to maintain balance  Pt completed shaving in bed   Refer below for details   Grooming (FIM) 4 - Patient completed 3/4  tasks   QI: Roll Left and Right   Assistance Needed Independent   Assistance Provided by [place] No physical assistance   Comment bed rails   Roll Left and Right CARE Score 6   QI: Sit to [address] Provided by [place] No physical assistance   Sit to Lying CARE Score 6   QI: Lying to Sitting on Side of Bed   Assistance Needed Independent   Assistance Provided by [place] No physical assistance   Lying to Sitting on Side of Bed CARE Score 6   QI: Sit to [address] Provided by [place] No physical assistance   Comment DS   Sit to Stand CARE Score 4   QI: Chair/Bed-to-Chair Transfer   Assistance Needed Supervision   Assistance Provided by [place] No physical assistance   Comment DS w RW   Chair/Bed-to-Chair Transfer CARE Score 4   Transfer Bed/Chair/Wheelchair   Adaptive Equipment Roller Walker   Findings Pt completes transfers at DS w/ RW   Bed, Chair, Wheelchair Transfer (FIM) 5 - Patient requires supervision/monitoring   QI: [address] Provided by [place] No physical assistance   Comment DS   [address] CARE Score 4 Toileting   Able to Pull Clothing down yes, up yes  Able to Manage Clothing Closures Yes   Manage Hygiene Bladder; Bowel   Limitations Noted In Balance;Problem Solving   Adaptive Equipment Grab Bar   Findings Pt completes toileting at DS   Toileting (FIM) 5 - Patient requires supervision/monitoring   QI: Toilet Transfer   Assistance Needed Supervision   Assistance Provided by [place] No physical assistance   Toilet Transfer CARE Score 4   Toilet Transfer   Surface Assessed Standard Toilet   Transfer Technique Standard   Limitations Noted In Balance; Endurance   Adaptive Equipment Grab Bar   Findings DS w/ RW   Toilet Transfer (FIM) 5 - Patient requires supervision/monitoring   Meal Prep   Meal Prep Level Walker   Meal Prep Level of Assistance Close supervision; Moderate verbal cues   Meal Preparation Pt engaegd in simple meal prep of fried egg w/ RW  Pt required mod v/c to increase safety w/ activity  Pt with no LOB but did walked away from RW at times in kitchen  Pt demo P hand hygiene w/ tasks  Pt required physical intervention to stop him from leaning against hot burner  Pt demo P safety awareness  OT recommends that pt has assistance for meal prep because he is unsafe at this time  Kitchen Mobility   Kitchen-Mobility Level Walker   Kitchen Activity Retrieve items;Transport items   Kitchen Mobility Comments Pt demo P carryover when performing functional mobility w/ RW in kitchen  Pt edu to use countertops to transport objects but coninued to release one hand from walker while ambulating  Pt also attempted to mobilize around kitchen w/o walker at times requiring v/c  No LOB but decreased safety awareness  Functional Standing Tolerance   Time 5minx2   Activity dynamic stance w/ kitchen mobility   Comments Refer above for details   Cognition   Overall Cognitive Status Impaired   Arousal/Participation Alert; Cooperative   Attention Attends with cues to redirect   Orientation Level Oriented X4   Memory Decreased short term memory;Decreased recall of precautions   Following Commands Follows one step commands without difficulty   Activity Tolerance   Activity Tolerance Patient limited by pain; Patient limited by fatigue   Assessment   Treatment Assessment Pt participated in skilled OT session focusing on UE ROM, kitchen mobility and meal prep, and grooming  Pt engaged in towel glides on tabletop to increase ROM in UE and decrease pain limiting functional performance  Pt completed kitchen mobility and meal prep w/ RW  Pt requires constant v/c for safety at this time and it is recommended that he has A for this at home  Pt engaged in grooming task of shaving while supported in bed to maintain spinal precautions  Pt edu to keep head flat against bed while front of C/S collar removed to shave  Pt completed 75% of shaving task but required A from therapist to shave neck to maintain precautions  Pt educated that C/S collar must be on at all other times during the day to provide protection to spine  Pt progressed to DS for toileting in room  NSG notified and PT in agreement  Pt edu that he must ring before going to the bathroom and ring upon exiting the bathroom  Pt reports understanding  Pt would benefit from continued therapy focusing on safety awareness w/ tasks, spinal precautions, and overall activity [place]  Continue with POC  Prognosis Fair   Problem List Decreased strength;Decreased endurance;Decreased range of motion;Decreased safety awareness;Orthopedic restrictions   Barriers to Discharge Inaccessible home environment;Decreased caregiver support   Plan   Treatment/Interventions ADL retraining;Functional transfer training; Therapeutic exercise; Endurance training;Patient/family training   Progress Progressing toward goals   OT Therapy Minutes   OT Time In 1000   OT Time Out 1130   OT Total Time (minutes) 90   OT Mode of treatment - Individual (minutes) 90   OT Mode of treatment - Concurrent (minutes) 0   OT Mode of treatment - Group (minutes) 0   OT Mode of treatment - Co-treat (minutes) 0   OT Mode of Teatment - Total time(minutes) 90 minutes   Therapy Time missed   Time missed?  No

## 2018-09-22 LAB
GLUCOSE SERPL-MCNC: 127 MG/DL (ref 65–140)
GLUCOSE SERPL-MCNC: 144 MG/DL (ref 65–140)
GLUCOSE SERPL-MCNC: 155 MG/DL (ref 65–140)
GLUCOSE SERPL-MCNC: 166 MG/DL (ref 65–140)

## 2018-09-22 PROCEDURE — 97110 THERAPEUTIC EXERCISES: CPT

## 2018-09-22 PROCEDURE — 94640 AIRWAY INHALATION TREATMENT: CPT | Performed by: SOCIAL WORKER

## 2018-09-22 PROCEDURE — 82948 REAGENT STRIP/BLOOD GLUCOSE: CPT

## 2018-09-22 PROCEDURE — 94640 AIRWAY INHALATION TREATMENT: CPT

## 2018-09-22 PROCEDURE — 97535 SELF CARE MNGMENT TRAINING: CPT

## 2018-09-22 PROCEDURE — 94760 N-INVAS EAR/PLS OXIMETRY 1: CPT

## 2018-09-22 PROCEDURE — 97530 THERAPEUTIC ACTIVITIES: CPT

## 2018-09-22 RX ADMIN — OXYCODONE HYDROCHLORIDE 10 MG: 10 TABLET ORAL at 05:17

## 2018-09-22 RX ADMIN — FLUTICASONE FUROATE AND VILANTEROL TRIFENATATE 1 PUFF: 100; 25 POWDER RESPIRATORY (INHALATION) at 10:08

## 2018-09-22 RX ADMIN — GABAPENTIN 100 MG: 100 CAPSULE ORAL at 10:07

## 2018-09-22 RX ADMIN — OXYCODONE HYDROCHLORIDE 10 MG: 10 TABLET ORAL at 21:03

## 2018-09-22 RX ADMIN — SENNOSIDES 17.2 MG: 8.6 TABLET, FILM COATED ORAL at 21:04

## 2018-09-22 RX ADMIN — MELATONIN 3 MG: at 21:04

## 2018-09-22 RX ADMIN — LEVALBUTEROL HYDROCHLORIDE 1.25 MG: 1.25 SOLUTION, CONCENTRATE RESPIRATORY (INHALATION) at 19:23

## 2018-09-22 RX ADMIN — INSULIN LISPRO 1 UNITS: 100 INJECTION, SOLUTION INTRAVENOUS; SUBCUTANEOUS at 11:09

## 2018-09-22 RX ADMIN — HEPARIN SODIUM 5000 UNITS: 5000 INJECTION, SOLUTION INTRAVENOUS; SUBCUTANEOUS at 21:05

## 2018-09-22 RX ADMIN — Medication 100 MG: at 10:06

## 2018-09-22 RX ADMIN — FOLIC ACID 1 MG: 1 TABLET ORAL at 10:07

## 2018-09-22 RX ADMIN — HEPARIN SODIUM 5000 UNITS: 5000 INJECTION, SOLUTION INTRAVENOUS; SUBCUTANEOUS at 05:17

## 2018-09-22 RX ADMIN — IPRATROPIUM BROMIDE 0.5 MG: 0.5 SOLUTION RESPIRATORY (INHALATION) at 07:37

## 2018-09-22 RX ADMIN — METFORMIN HYDROCHLORIDE 1000 MG: 500 TABLET ORAL at 10:07

## 2018-09-22 RX ADMIN — GABAPENTIN 100 MG: 100 CAPSULE ORAL at 17:14

## 2018-09-22 RX ADMIN — LEVALBUTEROL HYDROCHLORIDE 1.25 MG: 1.25 SOLUTION, CONCENTRATE RESPIRATORY (INHALATION) at 07:37

## 2018-09-22 RX ADMIN — HEPARIN SODIUM 5000 UNITS: 5000 INJECTION, SOLUTION INTRAVENOUS; SUBCUTANEOUS at 13:53

## 2018-09-22 RX ADMIN — BACLOFEN 5 MG: 10 TABLET ORAL at 21:04

## 2018-09-22 RX ADMIN — GLIPIZIDE 2.5 MG: 5 TABLET ORAL at 10:05

## 2018-09-22 RX ADMIN — GABAPENTIN 100 MG: 100 CAPSULE ORAL at 21:04

## 2018-09-22 RX ADMIN — IPRATROPIUM BROMIDE 0.5 MG: 0.5 SOLUTION RESPIRATORY (INHALATION) at 19:23

## 2018-09-22 RX ADMIN — METOPROLOL TARTRATE 50 MG: 50 TABLET, FILM COATED ORAL at 21:04

## 2018-09-22 RX ADMIN — OXYCODONE HYDROCHLORIDE 5 MG: 5 TABLET ORAL at 10:04

## 2018-09-22 RX ADMIN — ATORVASTATIN CALCIUM 80 MG: 80 TABLET, FILM COATED ORAL at 17:14

## 2018-09-22 RX ADMIN — METOPROLOL TARTRATE 50 MG: 50 TABLET, FILM COATED ORAL at 10:07

## 2018-09-22 RX ADMIN — METFORMIN HYDROCHLORIDE 1000 MG: 500 TABLET ORAL at 17:14

## 2018-09-22 NOTE — PROGRESS NOTES
09/22/18 0830   Pain Assessment   Pain Score 3   Pain Location Back;Neck   Pain Orientation [facility]   Hospital Pain Intervention(s) Distraction  (pain meds )   Restrictions/Precautions   Precautions Spinal precautions   Braces or Orthoses C/S Collar   General   Change In Medical/Functional Status Pt  progressed to BRP today    Cognition   Arousal/Participation Alert; Cooperative   Attention Within functional limits   Following Commands Follows multistep commands with increased time or repetition   Subjective   Subjective Pt  reported that he did not have enough sleep last night  Pt  just woke up from a nap but is ready to participate in PT    QI: Roll Left and Right   Assistance Needed Independent   Roll Left and Right CARE Score 6   QI: Sit to [address] to Lying CARE Score 6   QI: Lying to Sitting on Side of Bed   Assistance Needed Independent   Lying to Sitting on Side of Bed CARE Score 6   QI: Sit to [address]; Adaptive equipment   Sit to Stand CARE Score 6   QI: Chair/Bed-to-Chair Transfer   Assistance Needed Independent; Adaptive equipment   Chair/Bed-to-Chair Transfer CARE Score 6   Transfer Bed/Chair/Wheelchair   Adaptive Equipment Roller [employer], Chair, Wheelchair Transfer (FIM) 6 - Patient requires assistive device/extra time/safety concerns but completes independently   QI: Car Transfer   Assistance Needed Supervision   Car Transfer CARE Score 4   QI: [address]; Adaptive equipment   Walk 10 Feet CARE Score 6   QI: Walk 50 Feet with Two [address]; Adaptive equipment   Walk 50 Feet with Two Turns CARE Score 6   QI: Walk 150 Feet   Assistance Needed Independent; Adaptive equipment   Walk 150 Feet CARE Score 6   QI: Walking 10 Feet on Uneven Surfaces   Assistance Needed Supervision   Comment ramp    Walking 10 Feet on Uneven Surfaces CARE Score 4   Ambulation   Does the patient walk?  2  Yes   Primary Discharge Mode of Locomotion Walk   Walk Assist Level Supervision;Modified Independent   Gait Pattern Forward Flexion   Assist Device Roller Walker   Distance Walked (feet) 200 ft  (X 1, 150 X 2 with RW mod I, 150 X 1 with SPC)   Limitations Noted In Posture   Walking (FIM) 6 - Patient requires assistive device/extra time/safety concerns but completes independently AND distance 150 feet or more, no rest   Wheelchair mobility   QI: Does the patient use a wheelchair? 0  No   QI: 1 Step (Curb)   Assistance Needed Incidental touching   1 Step (Curb) CARE Score 4   QI: 4 Steps   Assistance Needed Supervision   4 Steps CARE Score 4   QI: 12 Steps   Assistance Needed Supervision   12 Steps CARE Score 4   Stairs   Type Stairs;Curb;Ramp   # of Steps 12  (FF)   Weight Bearing Precautions Fall Risk   Assist Devices Cane;Single Rail   Findings CS   Stairs (FIM) 5 - Patient requires supervision/monitoring AND goes up and down full flight (12- 14 stairs)   Therapeutic Interventions   Strengthening standing on counter hip abd, heel and toe raises, hip ext    Flexibility B hamstring and gastoc stretching    Equipment Use   NuStep Level 2 X 5 mins with B UE and LE then 5 mins LE only because pt  c/o his back starting to bother him more    Assessment   Treatment Assessment Pt  did well overnight being DS level with no issues reported by pt or by nursing  Pt  now progressed to BRP with nurse made aware  Pt  trialed with SPC again this session but cont to be more stable using RW  Pt  has bothe RW at home and understands that he should be using RW at all times upon d/c  Cont with POC as tolerated  Ptogress to mod I tomorrow if safe   Problem List Decreased endurance; Impaired balance;Orthopedic restrictions;Pain   Barriers to Discharge Inaccessible home environment   PT Barriers   Physical Impairment Decreased endurance; Impaired balance;Orthopedic restrictions;Pain   Functional Limitation Stair negotiation;Standing;Transfers; Walking   Plan   Treatment/Interventions Functional transfer training;LE strengthening/ROM; Elevations; Therapeutic exercise; Endurance training;Patient/family training;Equipment eval/education; Bed mobility;Gait training   Recommendation   Recommendation Home PT; Home with family support   Equipment Recommended Walker   PT Therapy Minutes   PT Time In 0830   PT Time Out 0930   PT Total Time (minutes) 60   PT Mode of treatment - Individual (minutes) 60   PT Mode of treatment - Concurrent (minutes) 0   PT Mode of treatment - Group (minutes) 0   PT Mode of treatment - Co-treat (minutes) 0   PT Mode of Teatment - Total time(minutes) 60 minutes

## 2018-09-22 NOTE — PROGRESS NOTES
09/22/18 [facility]   Pain Assessment   Pain Assessment 0-10   Pain Score 3   Pain Location Neck   Restrictions/Precautions   ROM Restrictions Yes   Braces or Orthoses C/S Collar   Transfer Bed/Chair/Wheelchair   Limitations Noted In Balance; Coordination; Endurance;Pain Management;UE Strength;LE Strength;Problem Solving   Adaptive Equipment Roller Walker   Sit to Stand Other  (DS)   Stand to Sit Other  (DS)   Supine to Sit Other  (DS)   Sit to Supine Other  (DS)   Bed, Chair, Wheelchair Transfer (FIM) 6 - Patient requires assistive device/extra time/safety concerns but completes independently   Toileting   Able to Pull Clothing down yes, up yes  Able to Manage Clothing Closures Yes   Manage Hygiene Bladder   Limitations Noted In Balance; Coordination;Problem Solving;ROM;Safety;UE Strength;LE Strength   Adaptive Equipment Grab Bar   Toileting (FIM) 6 - Patient requires assistive device/extra time/safety concerns but completes independently   Toilet Transfer   Surface Assessed Standard Toilet   Transfer Technique Standard   Limitations Noted In Balance; Endurance;Problem Solving;ROM;Safety; Sensation;UE Strength;LE Strength   Adaptive Equipment Grab Bar   Positioning Concerns Grab Bars   Toilet Transfer (FIM) 6 - Patient requires assistive device/extra time/safety concerns but completes independently   Functional Standing Tolerance   Time 7:00   Activity Table top task   Transfers   Sit to Stand 6  Modified independent   Stand to Sit 6  Modified independent   Toilet transfer 6  Modified independent   Additional Comments Functional mob with RW to OT gym DS    Therapeutic Exercise - ROM   UE-ROM Yes   ROM- Right Upper Extremities   R Shoulder Flexion; Horizontal ABduction;AROM   R Weight/Reps/Sets 2x10 towel slides   ROM - Left Upper Extremities    L Shoulder AROM; Flexion; Horizontal ABduction   L Weight/Reps/Sets 2x10 towel slides   Assessment   Treatment Assessment Pt presents with cont decreased strength, endurance, act salo and BUE AROM secondary to precuations limiting ADLs, IADLs and functional mob  Pt demonstrates improving safety with functional mob and static standing tolerance  Mild fatigue this session secondary to lack of sleep  Recommended cont OT as per POC  Prognosis Fair   Problem List Decreased endurance; Impaired balance;Orthopedic restrictions;Pain   Plan   Treatment/Interventions ADL retraining;Functional transfer training; Therapeutic exercise; Endurance training;Cognitive reorientation;Patient/family training;Equipment eval/education; Bed mobility; Compensatory technique education   Progress Progressing toward goals   OT Therapy Minutes   OT Time In 1300   OT Time Out 1330   OT Total Time (minutes) 30   OT Mode of treatment - Individual (minutes) 30   OT Mode of treatment - Concurrent (minutes) 0   OT Mode of treatment - Group (minutes) 0   OT Mode of treatment - Co-treat (minutes) 0   OT Mode of Teatment - Total time(minutes) 30 minutes   Therapy Time missed   Time missed?  No

## 2018-09-22 NOTE — PROGRESS NOTES
Internal Medicine Progress Note  Patient: Regina Rivas  Age/sex: 58 y o  male  Medical Record #: [id number]      ASSESSMENT/PLAN:  Regina Rivas is seen and examined and management for following issues:    Multiple cervical fractures s/p posterior cervical decompression laminectomy C3-6, posterior cervical lateral mass and pedicle fixation/fusion C1-T1 on 9/10/18: Pain control per primary service  Continue to monitor incision  Aspen collar at all times      HTN: stable; continue Lopressor 50mg every 12 hours      MI s/p PTCA: Plavix on hold = resume when OK with NS (OK to restart 2 weeks post-op on 9/24/18); continue Lipitor 80mg qd      ETOH abuse: Continue folate, thiamine and Valium  Valium weaned off by primary service      DM type 2:  Hemoglobin A1c 8 5  On 9/16/18 evening, resumed Metformin 1000mg 2x daily as he takes at home  Added Glipizide 2 5mg qam starting 9/19/18;  ?compliant at home with Metformin (HbA1C was 8 5); continue QID Accuchecks/SSI/DM diet  , 166, 155     COPD: Pt uses Advair 500/50, Albuterol nebs TID and prn Ventolin at home = here on Xopenex/Atrovent nebs TID and using Lanette Josh here for Advair  Hyponatremia: mild; no sx; will watch      Subjective: Patient seen and examined       ROS:   GI: denies abdominal pain, change bowel habits or reflux symptoms  Neuro: No new neurologic changes  Respiratory: No Cough, SOB  Cardiovascular: No CP, palpitations     Scheduled Meds:    Current Facility-Administered Medications:  acetaminophen 975 mg Oral Q8H PRN Ruel Amato MD   albuterol 2 puff Inhalation Q4H PRN Ruel Amato MD   atorvastatin 80 mg Oral Daily With Vangie Andre MD   baclofen 5 mg Oral HS Ruel Amato MD   bisacodyl 10 mg Rectal Daily PRN Ruel Amato MD   fluticasone-vilanterol 1 puff Inhalation Daily Lauren Braswell PA-C   folic acid 1 mg Oral Daily Ruel Amato MD   gabapentin 100 mg Oral TID Ruel Amato MD   glipiZIDE 2 5 mg Oral Daily Before Breakfast TATIANA Medrano   heparin (porcine) 5,000 Units Subcutaneous Count includes the [facility] Roula Maloney MD   insulin lispro 1-5 Units Subcutaneous HS Lauren Braswell PA-C   insulin lispro 1-5 Units Subcutaneous TID AC Lauren Braswell PA-C   ipratropium 0 5 mg Nebulization BID Roula Maloney MD   levalbuterol 1 25 mg Nebulization BID Roula Maloney MD   lidocaine 1 patch Transdermal Daily PRN Roula Maloney MD   magnesium hydroxide 30 mL Oral Daily PRN Roula Maloney MD   melatonin 3 mg Oral HS PRN Roula Maloney MD   metFORMIN 1,000 mg Oral BID With Meals Lauren Braswell PA-C   metoprolol tartrate 50 mg Oral Q12H [facility] Roula Maloney MD   oxyCODONE 10 mg Oral Q4H PRN Roula Maloney MD   oxyCODONE 5 mg Oral Q4H PRN Roula Maloney MD   senna 2 tablet Oral HS Roula Maloney MD   thiamine 100 mg Oral Daily Roula Maloney MD       Labs:       Results from last 7 days  Lab Units 09/20/18  0615 09/17/18  0524   WBC Thousand/uL 6 38 6 37   HEMOGLOBIN g/dL 10 6* 10 6*   HEMATOCRIT % 32 4* 33 2*   PLATELETS Thousands/uL 371 251       Results from last 7 days  Lab Units 09/20/18  0615 09/17/18  0524   SODIUM mmol/L 132* 135*   POTASSIUM mmol/L 3 8 3 9   CHLORIDE mmol/L 98* 102   CO2 mmol/L 28 27   BUN mg/dL 9 10   CREATININE mg/dL 0 45* 0 48*   CALCIUM mg/dL 9 2 9 0                  Glucose, i-STAT (mg/dl)   Date Value   09/10/2018 217 (H)   09/10/2018 201 (H)   09/09/2018 185 (H)       Labs reviewed    Physical Examination:  Vitals:   Vitals:    09/21/18 2101 09/22/18 0556 09/22/18 1002 09/22/18 1326   BP: 138/75 128/70 122/71 121/70   BP Location: Left arm Left arm Right arm Left arm   Pulse: 95 82 96 81   Resp: 20 20  18   Temp: 98 2 °F (36 8 °C) 97 7 °F (36 5 °C)  98 6 °F (37 °C)   TempSrc: Oral Oral  Oral   SpO2: 93% 95%  93%   Weight:       Height:         Constitutional:  NAD; pleasant; nontoxic  Neck: collar on  CV:  +S1, S2;  RRR; no rub/murmur  Pulmonary:  BBS without crackles/wheeze/rhonci; resp are unlabored  Abdominal: soft, +BS, ND/NT; no mass  Skin:  no rashes  Musculoskeletal:  no edema  :  no antony  Neurological/Psych:  AAO;  RUE 5/5, RLE/LLE/LUE 5/5; no depression/anxiety      [ X ] Total time spent: 30 Mins and greater than 50% of this time was spent counseling/coordinating care  ** Please Note: Dragon 360 Dictation voice to text software may have been used in the creation of this document   **

## 2018-09-23 LAB
GLUCOSE SERPL-MCNC: 130 MG/DL (ref 65–140)
GLUCOSE SERPL-MCNC: 135 MG/DL (ref 65–140)
GLUCOSE SERPL-MCNC: 144 MG/DL (ref 65–140)
GLUCOSE SERPL-MCNC: 191 MG/DL (ref 65–140)

## 2018-09-23 PROCEDURE — 94760 N-INVAS EAR/PLS OXIMETRY 1: CPT

## 2018-09-23 PROCEDURE — 97110 THERAPEUTIC EXERCISES: CPT

## 2018-09-23 PROCEDURE — 82948 REAGENT STRIP/BLOOD GLUCOSE: CPT

## 2018-09-23 PROCEDURE — 94640 AIRWAY INHALATION TREATMENT: CPT

## 2018-09-23 PROCEDURE — 97530 THERAPEUTIC ACTIVITIES: CPT

## 2018-09-23 PROCEDURE — 97535 SELF CARE MNGMENT TRAINING: CPT

## 2018-09-23 RX ADMIN — BACLOFEN 5 MG: 10 TABLET ORAL at 22:03

## 2018-09-23 RX ADMIN — GABAPENTIN 100 MG: 100 CAPSULE ORAL at 16:34

## 2018-09-23 RX ADMIN — OXYCODONE HYDROCHLORIDE 10 MG: 10 TABLET ORAL at 04:59

## 2018-09-23 RX ADMIN — HEPARIN SODIUM 5000 UNITS: 5000 INJECTION, SOLUTION INTRAVENOUS; SUBCUTANEOUS at 05:00

## 2018-09-23 RX ADMIN — IPRATROPIUM BROMIDE 0.5 MG: 0.5 SOLUTION RESPIRATORY (INHALATION) at 07:05

## 2018-09-23 RX ADMIN — LEVALBUTEROL HYDROCHLORIDE 1.25 MG: 1.25 SOLUTION, CONCENTRATE RESPIRATORY (INHALATION) at 19:01

## 2018-09-23 RX ADMIN — GLIPIZIDE 2.5 MG: 5 TABLET ORAL at 08:22

## 2018-09-23 RX ADMIN — INSULIN LISPRO 1 UNITS: 100 INJECTION, SOLUTION INTRAVENOUS; SUBCUTANEOUS at 11:40

## 2018-09-23 RX ADMIN — MELATONIN 3 MG: at 22:03

## 2018-09-23 RX ADMIN — HEPARIN SODIUM 5000 UNITS: 5000 INJECTION, SOLUTION INTRAVENOUS; SUBCUTANEOUS at 22:03

## 2018-09-23 RX ADMIN — ATORVASTATIN CALCIUM 80 MG: 80 TABLET, FILM COATED ORAL at 16:34

## 2018-09-23 RX ADMIN — METOPROLOL TARTRATE 50 MG: 50 TABLET, FILM COATED ORAL at 22:04

## 2018-09-23 RX ADMIN — FLUTICASONE FUROATE AND VILANTEROL TRIFENATATE 1 PUFF: 100; 25 POWDER RESPIRATORY (INHALATION) at 08:29

## 2018-09-23 RX ADMIN — METFORMIN HYDROCHLORIDE 1000 MG: 500 TABLET ORAL at 08:22

## 2018-09-23 RX ADMIN — GABAPENTIN 100 MG: 100 CAPSULE ORAL at 08:21

## 2018-09-23 RX ADMIN — OXYCODONE HYDROCHLORIDE 5 MG: 5 TABLET ORAL at 11:28

## 2018-09-23 RX ADMIN — FOLIC ACID 1 MG: 1 TABLET ORAL at 08:23

## 2018-09-23 RX ADMIN — GABAPENTIN 100 MG: 100 CAPSULE ORAL at 22:03

## 2018-09-23 RX ADMIN — Medication 100 MG: at 08:23

## 2018-09-23 RX ADMIN — METFORMIN HYDROCHLORIDE 1000 MG: 500 TABLET ORAL at 16:34

## 2018-09-23 RX ADMIN — IPRATROPIUM BROMIDE 0.5 MG: 0.5 SOLUTION RESPIRATORY (INHALATION) at 19:01

## 2018-09-23 RX ADMIN — METOPROLOL TARTRATE 50 MG: 50 TABLET, FILM COATED ORAL at 08:21

## 2018-09-23 RX ADMIN — LEVALBUTEROL HYDROCHLORIDE 1.25 MG: 1.25 SOLUTION, CONCENTRATE RESPIRATORY (INHALATION) at 07:05

## 2018-09-23 RX ADMIN — SENNOSIDES 17.2 MG: 8.6 TABLET, FILM COATED ORAL at 22:04

## 2018-09-23 RX ADMIN — HEPARIN SODIUM 5000 UNITS: 5000 INJECTION, SOLUTION INTRAVENOUS; SUBCUTANEOUS at 14:12

## 2018-09-23 NOTE — PROGRESS NOTES
09/23/18 0700   Pain Assessment   Pain Assessment 0-10   Pain Score 3   Pain Type Surgical pain;Acute pain   Pain Location Back   Pain Orientation Bilateral   Restrictions/Precautions   Precautions Spinal precautions   Braces or Orthoses C/S Collar   QI: [address] Provided by [place] No physical assistance   Eating CARE Score 6   Eating Assessment   Eating (FIM) 7 - Patient completely independent   QI: [address] Provided by [place] No physical assistance   Oral Hygiene CARE Score 6   Grooming   Able To Comb/Brush Hair;Wash/Dry Face;Brush/Clean Teeth;Wash/Dry Hands   Findings Pt performed while standing at sink  Grooming (FIM) 6 - Patient requires assistive device/extra time/safety concerns but completes independently   QI: Shower/Bathe [address] Provided by [place] No physical assistance   Comment shower chair + grab bars   Shower/Bathe Self CARE Score 6   Bathing   Assessed Bath Style Tub   Anticipated D/C Bath Style Tub   Able to Gather/Transport Yes   Able to Adjust Water Temperature Yes   Able to Wash/Rinse/Dry (body part) Left Arm;Right Arm;L Upper Leg;R Upper Leg;L Lower Leg/Foot;R Lower Leg/Foot;Chest;Abdomen;Perineal Area; Buttocks   Positioning Seated;Standing   Adaptive Equipment Shower Bars; Shower Seat;Hand Automatic Data demo G safety awareness and demo crossed-leg technique to bathe lower legs/feet  Bathing (FIM) 6 - Patient requires assistive device/extra time/safety concerns but completes independently   Tub/Shower Transfer   Limitations Noted In Balance; Endurance   Adaptive Equipment Grab Bars;Seat with Back   Assessed Tub/shower combo   Findings Pt reports daughter able to provide supervision to inc safety and dec fall risk for tub xfers upon d/c     Tub Transfer (FIM) 5 - Patient requires supervision/monitoring   Shower Transfer (FIM) 0 - Activity does not occur   QI: Upper Body [address] Provided by [place] No physical assistance   Upper Body Dressing CARE Score 6   QI: Lower Body [address] Provided by [place] No physical assistance   Lower Body Dressing CARE Score 6   QI: Putting On/Taking Off [address] Provided by [place] No physical assistance   Putting On/Taking Off Footwear CARE Score 6   QI: [address] [facility] Provided by [place] No physical assistance   Comment w/ reacher   Picking Up Object CARE Score 6   Dressing/Undressing Clothing   Remove UB Clothes Button Shirt   Remove LB Clothes Pants;Socks   Don UB Clothes Button Shirt   Don LB Clothes Pants;Socks   Positioning Supported Sit;Standing   Findings Pt demo crossed-leg technique to don socks and thread BLE into pants  Pt demo G ECTs and self monitoring strategies, standing only when needed to maximize safety     UB Dressing (FIM) 6 - Patient requires assistive device/extra time/safety concerns but completes independently   LB Dressing (FIM) 6 - Patient requires assistive device/extra time/safety concerns but completes independently   QI: Lying to Sitting on Side of Bed   Assistance Needed Independent   Assistance Provided by [place] No physical assistance   Lying to Sitting on Side of Bed CARE Score 6   QI: Sit to [address] Provided by [place] No physical assistance   Sit to Stand CARE Score 6   Bed Mobility   Supine to Sit 7  Independent   Sit to Supine 7  Independent   QI: Chair/Bed-to-Chair Transfer   Assistance Needed Independent   Assistance Provided by [place] No physical assistance   Chair/Bed-to-Chair Transfer CARE Score 6   Transfer Bed/Chair/Wheelchair   Adaptive Equipment Roller [employer], Chair, Wheelchair Transfer (FIM) 6 - Patient requires assistive device/extra time/safety concerns but completes independently   QI: [address] Provided by [place] No physical assistance   Toileting Hygiene CARE Score 6   Toileting   Able to [address] down yes, up yes  Able to [address] Yes   Manage Hygiene Bladder   Findings Pt able to perform without challenge, demo G safety awareness t/o  Toileting (FIM) 6 - Patient requires assistive device/extra time/safety concerns but completes independently   QI: [address] Provided by [place] No physical assistance   Toilet Transfer CARE Score 6   Toilet Transfer   Surface Assessed Standard Toilet   Transfer Technique [address]   Toilet Transfer (FIM) 6 - Patient requires assistive device/extra time/safety concerns but completes independently   Exercise Tools   Other Exercise Tool 1 Pt completed 10x3 towel glides in all planes while seated at table to provide gentle stretch to BUE, shoulders, and trapezius  Pt tolerated well w/ rest breaks prn to manage fatigue  Assessment   Treatment Assessment Pt seen for OT session focusing on AM ADL, details noted above  Overall, pt demo G carryover of ECTs, RW management, and safety awareness t/o having no LOB  Reviewed collar management and changing of pads, pt demo G understanding  Pt reports daughter will be available to provide supervision for tub xfers and to assist w/ collar management  Pt reports inc confidence w/ skills gain and having no question/concerns re  d/c om 9/24/18  Pt was left resting in bed w/ HOB elevated and breakfast tray provided  All needs within reach  Prognosis Fair   Problem List Decreased endurance; Impaired balance;Orthopedic restrictions;Pain   Plan   Treatment/Interventions Therapeutic exercise; Endurance training;Gait training   Progress Improving as expected   Recommendation   OT Discharge Recommendation Home with family support   OT Therapy Minutes   OT Time In 0700   OT Time Out 0830   OT Total Time (minutes) 90   OT Mode of treatment - Individual (minutes) 90   OT Mode of treatment - Concurrent (minutes) 0   OT Mode of treatment - Group (minutes) 0   OT Mode of treatment - Co-treat (minutes) 0   OT Mode of Teatment - Total time(minutes) 90 minutes   Therapy Time missed   Time missed?  No

## 2018-09-23 NOTE — PROGRESS NOTES
Internal Medicine Progress Note  Patient: Oral Gibbs  Age/sex: 58 y o  male  Medical Record #: [id number]      ASSESSMENT/PLAN:  Oral Gibbs is seen and examined and management for following issues:    Multiple cervical fractures s/p posterior cervical decompression laminectomy C3-6, posterior cervical lateral mass and pedicle fixation/fusion C1-T1 on 9/10/18: Pain control per primary service  Continue to monitor incision  Aspen collar at all times      HTN: stable; continue Lopressor 50mg every 12 hours      MI s/p PTCA: Plavix on hold = resume when OK with NS (OK to restart 2 weeks post-op on 9/24/18); continue Lipitor 80mg qd      ETOH abuse: Continue folate, thiamine and Valium  Valium weaned off by primary service      DM type 2:  Hemoglobin A1c 8 5  On 9/16/18 evening, resumed Metformin 1000mg 2x daily as he takes at home  Added Glipizide 2 5mg qam starting 9/19/18;  ?compliant at home with Metformin (HbA1C was 8 5); continue QID Accuchecks/SSI/DM diet  , 127, 144     COPD: Pt uses Advair 500/50, Albuterol nebs TID and prn Ventolin at home = here on Xopenex/Atrovent nebs TID and using [place] here for Advair  Hyponatremia: mild; no sx; will watch      Subjective: Patient seen and examined       ROS:   GI: denies abdominal pain, change bowel habits or reflux symptoms  Neuro: No new neurologic changes  Respiratory: No Cough, SOB  Cardiovascular: No CP, palpitations     Scheduled Meds:    Current Facility-Administered Medications:  acetaminophen 975 mg Oral Q8H PRN Ariela Escalante MD   albuterol 2 puff Inhalation Q4H PRN Ariela Escalante MD   atorvastatin 80 mg Oral Daily With Antonia Paulson MD   baclofen 5 mg Oral HS Ariela Escalante MD   bisacodyl 10 mg Rectal Daily PRN Ariela Escalante MD   fluticasone-vilanterol 1 puff Inhalation Daily Lauren Braswell PA-C   folic acid 1 mg Oral Daily Ariela Escalante MD   gabapentin 100 mg Oral TID Ariela Escalante MD   glipiZIDE 2 5 mg Oral Daily Before Breakfast TATIANA Sahu   heparin (porcine) 5,000 Units Subcutaneous [facility] Diane Blanco MD   insulin lispro 1-5 Units Subcutaneous HS Lauren Braswell PA-C   insulin lispro 1-5 Units Subcutaneous TID AC Lauren Braswell PA-C   ipratropium 0 5 mg Nebulization BID Diane Blanco MD   levalbuterol 1 25 mg Nebulization BID Diane Blanco MD   lidocaine 1 patch Transdermal Daily PRN Diane Blanco MD   magnesium hydroxide 30 mL Oral Daily PRN Diane Blanco MD   melatonin 3 mg Oral HS PRN Diane Blanco MD   metFORMIN 1,000 mg Oral BID With Meals Lauren Braswell PA-C   metoprolol tartrate 50 mg Oral Q12H [address] Diane Blanco MD   oxyCODONE 10 mg Oral Q4H PRN Diane Blanco MD   oxyCODONE 5 mg Oral Q4H PRN Diane Blanco MD   senna 2 tablet Oral HS Diane Blanco MD   thiamine 100 mg Oral Daily Diane Blanco MD       Labs:       Results from last 7 days  Lab Units 09/20/18  0615 09/17/18  0524   WBC Thousand/uL 6 38 6 37   HEMOGLOBIN g/dL 10 6* 10 6*   HEMATOCRIT % 32 4* 33 2*   PLATELETS Thousands/uL 371 251       Results from last 7 days  Lab Units 09/20/18  0615 09/17/18  0524   SODIUM mmol/L 132* 135*   POTASSIUM mmol/L 3 8 3 9   CHLORIDE mmol/L 98* 102   CO2 mmol/L 28 27   BUN mg/dL 9 10   CREATININE mg/dL 0 45* 0 48*   CALCIUM mg/dL 9 2 9 0                  Glucose, i-STAT (mg/dl)   Date Value   09/10/2018 217 (H)   09/10/2018 201 (H)   09/09/2018 185 (H)       Labs reviewed    Physical Examination:  Vitals:   Vitals:    09/22/18 1923 09/22/18 2049 09/23/18 0526 09/23/18 0707   BP:  134/70 142/78    BP Location:  Left arm Left arm    Pulse:  94 76    Resp:  18 18    Temp:  98 7 °F (37 1 °C) 97 6 °F (36 4 °C)    TempSrc:  Oral Oral    SpO2: 94% 96% 94% 96%   Weight:       Height:         Constitutional:  NAD; pleasant; nontoxic  Neck: collar on  CV:  +S1, S2;  RRR; no rub/murmur  Pulmonary:  BBS without crackles/wheeze/rhonci; resp are unlabored  Abdominal:  soft, +BS, ND/NT; no mass  Skin:  no rashes  Musculoskeletal:  no edema  :  no antony  Neurological/Psych:  AAO;  RUE 5/5, RLE/LLE/LUE 5/5; no depression/anxiety      [ X ] Total time spent: 30 Mins and greater than 50% of this time was spent counseling/coordinating care  ** Please Note: Dragon 360 Dictation voice to text software may have been used in the creation of this document   **

## 2018-09-23 NOTE — PROGRESS NOTES
09/23/18 1000   Pain Assessment   Pain Assessment 0-10   Pain Score 5   Pain Type Surgical pain   Pain Location Back   Pain Descriptors Aching   Restrictions/Precautions   Precautions Spinal precautions   ROM Restrictions Yes   Braces or Orthoses C/S Collar   General   Change In Medical/Functional Status Mod I   Cognition   Overall Cognitive Status Impaired   Arousal/Participation Alert; Cooperative   Attention Within functional limits   Orientation Level Oriented X4   Memory Decreased short term memory;Decreased recall of precautions   Following Commands Follows multistep commands with increased time or repetition   Subjective   Subjective reports he is having pain in his upper back   QI: Roll Left and Right   Assistance Needed Independent   Roll Left and Right CARE Score 6   QI: Sit to Lying   Assistance Needed Independent   Sit to Lying CARE Score 6   QI: Lying to Sitting on Side of Bed   Assistance Needed Independent   Lying to Sitting on Side of Bed CARE Score 6   QI: Sit to Stand   Assistance Needed Independent   Sit to Stand CARE Score 6   QI: Chair/Bed-to-Chair Transfer   Assistance Needed Independent   Chair/Bed-to-Chair Transfer CARE Score 6   Transfer Bed/Chair/Wheelchair   Limitations Noted In Balance;UE Strength;LE Strength   Adaptive Equipment Roller Walker   Stand Pivot Modified Independent   Sit to Stand Modified Independent   Stand to Sit Modified Independent   Supine to Sit Independent   Sit to Supine Independent   Car Transfer Supervision   Bed, Chair, Wheelchair Transfer (FIM) 6 - Patient requires assistive device/extra time/safety concerns but completes independently   QI: Car Transfer   Assistance Needed Supervision   Car Transfer CARE Score 4   QI: Walk 10 Feet   Assistance Needed Independent   Walk 10 Feet CARE Score 6   QI: Walk 50 Feet with Two Turns   Assistance Needed Independent   Walk 50 Feet with Two Turns CARE Score 6   QI: Walk 150 Feet   Assistance Needed Independent   Walk 150 Feet CARE Score 6   QI: Walking 10 Feet on Uneven Surfaces   Assistance Needed Supervision   Walking 10 Feet on Uneven Surfaces CARE Score 4   Ambulation   Does the patient walk? 2  Yes   Primary Discharge Mode of Locomotion Walk   Walk Assist Level Independent   Gait Pattern Inconsistant Sury; Slow Sury; Forward Flexion; Wide TEO; Poor UE WB;Improper weight shift   Assist Device Roller [place] Ha Walked (feet) 500 ft   Limitations Noted In Heel Strike;Posture;Speed;Strength;Swing   Findings mod I for even surfaces   Walking (FIM) 6 - Patient requires assistive device/extra time/safety concerns but completes independently AND distance 150 feet or more, no rest   Wheelchair mobility   QI: Does the patient use a wheelchair? 0   No   QI: 1 Step (Curb)   Assistance Needed Supervision   1 Step (Curb) CARE Score 4   QI: 4 Steps   Assistance Needed Supervision   4 Steps CARE Score 4   QI: 12 Steps   Assistance Needed Supervision   12 Steps CARE Score 4   Stairs   Type Stairs;Curb;Ramp   # of Steps 12   Weight Bearing Precautions Fall Risk   Assist Devices Single Rail   Findings S   Stairs (FIM) 5 - Patient requires supervision/monitoring AND goes up and down full flight (12- 14 stairs)   QI: Toilet Transfer   Assistance Needed Independent   Toilet Transfer CARE Score 6   Toilet Transfer   Surface Assessed Standard Toilet   Toilet Transfer (FIM) 6 - Patient requires assistive device/extra time/safety concerns but completes independently   Therapeutic Interventions   Strengthening LAQ, marches standing, hamstring curls standing 3# CW 15x2; adduction yellow ball 20x2; abduction green TB 20x2   Flexibility hamstring and gastroc 60"x2   Assessment   Treatment Assessment pt trialed for progression to mod I and he is able to be mod I at this time, nursing notified; pt is consistent with xfers and use of RW; pt had difficulty in lobby where there is an area rug, the walker got caught and he was able to get it out from under the carpet but his foot was under the carpet and he did not notice, this may be due to the fact that he is wearing a collar and can not see his feet; decreased safety awareness for uneven surfaces, should have S for uneven surfaces; pt continues to have pain in the upper back area between his scapulas, nursing notified and HP applied for 10 mins which he reported was decreasing his pain; pt will need S on stairs due to decreased strength and balance with only 1 UE support; continue POC as per PT   Problem List Decreased strength;Decreased range of motion;Decreased endurance; Impaired balance;Orthopedic restrictions;Pain   PT Barriers   Functional Limitation Stair negotiation   Plan   Treatment/Interventions LE strengthening/ROM; Elevations; Therapeutic exercise; Endurance training   Progress Progressing toward goals   Recommendation   Recommendation Home PT; Home with family support; Outpatient PT   Equipment Recommended Walker   PT Therapy Minutes   PT Time In 1000   PT Time Out 1130   PT Total Time (minutes) 90   PT Mode of treatment - Individual (minutes) 90   PT Mode of treatment - Concurrent (minutes) 0   PT Mode of treatment - Group (minutes) 0   PT Mode of treatment - Co-treat (minutes) 0   PT Mode of Teatment - Total time(minutes) 90 minutes   Therapy Time missed   Time missed?  No

## 2018-09-24 VITALS
RESPIRATION RATE: 18 BRPM | WEIGHT: 184.7 LBS | DIASTOLIC BLOOD PRESSURE: 64 MMHG | OXYGEN SATURATION: 95 % | BODY MASS INDEX: 24.48 KG/M2 | TEMPERATURE: 98 F | HEIGHT: 73 IN | HEART RATE: 82 BPM | SYSTOLIC BLOOD PRESSURE: 109 MMHG

## 2018-09-24 LAB
GLUCOSE SERPL-MCNC: 118 MG/DL (ref 65–140)
GLUCOSE SERPL-MCNC: 140 MG/DL (ref 65–140)

## 2018-09-24 PROCEDURE — 99024 POSTOP FOLLOW-UP VISIT: CPT | Performed by: PHYSICIAN ASSISTANT

## 2018-09-24 PROCEDURE — 82948 REAGENT STRIP/BLOOD GLUCOSE: CPT

## 2018-09-24 PROCEDURE — 94760 N-INVAS EAR/PLS OXIMETRY 1: CPT

## 2018-09-24 PROCEDURE — 99239 HOSP IP/OBS DSCHRG MGMT >30: CPT | Performed by: PHYSICAL MEDICINE & REHABILITATION

## 2018-09-24 PROCEDURE — 94640 AIRWAY INHALATION TREATMENT: CPT

## 2018-09-24 RX ADMIN — GABAPENTIN 100 MG: 100 CAPSULE ORAL at 07:31

## 2018-09-24 RX ADMIN — Medication 100 MG: at 07:31

## 2018-09-24 RX ADMIN — OXYCODONE HYDROCHLORIDE 10 MG: 10 TABLET ORAL at 01:55

## 2018-09-24 RX ADMIN — GLIPIZIDE 2.5 MG: 5 TABLET ORAL at 07:38

## 2018-09-24 RX ADMIN — METOPROLOL TARTRATE 50 MG: 50 TABLET, FILM COATED ORAL at 07:31

## 2018-09-24 RX ADMIN — METFORMIN HYDROCHLORIDE 1000 MG: 500 TABLET ORAL at 07:31

## 2018-09-24 RX ADMIN — FLUTICASONE FUROATE AND VILANTEROL TRIFENATATE 1 PUFF: 100; 25 POWDER RESPIRATORY (INHALATION) at 07:32

## 2018-09-24 RX ADMIN — FOLIC ACID 1 MG: 1 TABLET ORAL at 07:31

## 2018-09-24 RX ADMIN — IPRATROPIUM BROMIDE 0.5 MG: 0.5 SOLUTION RESPIRATORY (INHALATION) at 08:03

## 2018-09-24 RX ADMIN — HEPARIN SODIUM 5000 UNITS: 5000 INJECTION, SOLUTION INTRAVENOUS; SUBCUTANEOUS at 06:23

## 2018-09-24 RX ADMIN — LEVALBUTEROL HYDROCHLORIDE 1.25 MG: 1.25 SOLUTION, CONCENTRATE RESPIRATORY (INHALATION) at 08:03

## 2018-09-24 NOTE — SPEECH THERAPY NOTE
SLP Discharge Summary    Pt was discharged home w/ family support on 9/24/2018  At time of d/c, pt was able to achieve LTG of safest least restrictive diet being regular w/ thin liquids  Initial diet upon presentation to the [facility] was level 3 w/ thin liquids  Pt demonstrating tolerance of level 3 diet and thin liquids w/o increased risk of aspiration  When beginning trials of regular solids, pt noted to have slower, mildly prolonged mastication due to C-collar  Additionally, signs/sxs of aspiration noted w/ mixed consistencies  Upon reviewing/educating pt in regards to safe swallow strategies, pt was able to improve ability to masticated solids given smaller bite sizes, increased time w/o risk for aspiration  Additionally, when taking smaller single sips of thins by cup, improvement noted w/ bolus control/transfer w/o exhibiting aspiration sxs  Pt upgraded to regular diet w/ thin liquids before d/c to home  Since pt demonstrating tolerance of diet advancement w/o increased aspiration sxs, pt does not require SLP services at time of d/c

## 2018-09-24 NOTE — PROGRESS NOTES
Internal Medicine Progress Note  Patient: Lanette Hoffman  Age/sex: 58 y o  male  Medical Record #: [id number]      ASSESSMENT/PLAN:  Lanette Hoffman is seen and examined and management for following issues:    Multiple cervical fractures s/p posterior cervical decompression laminectomy C3-6, posterior cervical lateral mass and pedicle fixation/fusion C1-T1 on 9/10/18: Pain controlled  Aspen collar at all times      HTN: stable; continue Lopressor 50mg every 12 hours      MI s/p PTCA: Plavix on hold = resume when OK with NS (OK to restart 2 weeks post-op on 9/24/18); continue Lipitor 80mg qd      ETOH abuse: Continue folate, thiamine  Valium weaned off by primary service      DM type 2:  Hemoglobin A1c 8 5 = takes Metformin 1000 mg BID at home as well as here  Added Glipizide 2 5mg qam starting 9/19/18 = continue same for home and do BSs QID (he has a meter), call <100 or >220 = d/w patient;  ?compliant at home with Metformin (HbA1C was 8 5); continue DM diet = d/w patient  , 191, 135, 130; fasting today 140     COPD: Pt uses Advair 500/50, Albuterol nebs TID and prn Ventolin at home = here on Xopenex/Atrovent nebs TID and using Wyona Dennis here for Advair  To go back on home meds at discharge = he has been stable here  Hyponatremia: mild; no sx; recheck as OP      Subjective: Patient seen and examined       ROS:   GI: denies abdominal pain, change bowel habits or reflux symptoms  Neuro: No new neurologic changes  Respiratory: No Cough, SOB  Cardiovascular: No CP, palpitations     Scheduled Meds:    Current Facility-Administered Medications:  acetaminophen 975 mg Oral Q8H PRN Diane Blanco MD   albuterol 2 puff Inhalation Q4H PRN Diane Blanco MD   atorvastatin 80 mg Oral Daily With Shikha Pacheco MD   baclofen 5 mg Oral HS Diane Blanco MD   bisacodyl 10 mg Rectal Daily PRN Diane Blanco MD   fluticasone-vilanterol 1 puff Inhalation Daily Lauren Braswell PA-C   folic acid 1 mg Oral Daily Cuauhtemoc Moreno Dexter Iverson MD   gabapentin 100 mg Oral TID Sneha King MD   glipiZIDE 2 5 mg Oral Daily Before Breakfast TATIANA Ace   heparin (porcine) 5,000 Units Subcutaneous [facility] Sneha King MD   insulin lispro 1-5 Units Subcutaneous HS Lauren Braswell PA-C   insulin lispro 1-5 Units Subcutaneous TID AC Lauren Braswell PA-C   ipratropium 0 5 mg Nebulization BID Sneha King MD   levalbuterol 1 25 mg Nebulization BID Sneha King MD   lidocaine 1 patch Transdermal Daily PRN Sneha King MD   magnesium hydroxide 30 mL Oral Daily PRN Sneha King MD   melatonin 3 mg Oral HS PRN Sneha King MD   metFORMIN 1,000 mg Oral BID With Meals Lauren Braswell PA-C   metoprolol tartrate 50 mg Oral Q12H [address] Sneha King MD   oxyCODONE 5 mg Oral Q4H PRN Sneha King MD   senna 2 tablet Oral HS Sneha King MD   thiamine 100 mg Oral Daily Sneha King MD       Labs:       Results from last 7 days  Lab Units 09/20/18  0615   WBC Thousand/uL 6 38   HEMOGLOBIN g/dL 10 6*   HEMATOCRIT % 32 4*   PLATELETS Thousands/uL 371       Results from last 7 days  Lab Units 09/20/18  0615   SODIUM mmol/L 132*   POTASSIUM mmol/L 3 8   CHLORIDE mmol/L 98*   CO2 mmol/L 28   BUN mg/dL 9   CREATININE mg/dL 0 45*   CALCIUM mg/dL 9 2                  Glucose, i-STAT (mg/dl)   Date Value   09/10/2018 217 (H)   09/10/2018 201 (H)   09/09/2018 185 (H)       Labs reviewed    Physical Examination:  Vitals:   Vitals:    09/23/18 1329 09/23/18 2056 09/24/18 0549 09/24/18 0803   BP: 109/68 144/88 130/74    BP Location: Left arm Right arm Left arm    Pulse: 81 91 79    Resp: 18 20 18    Temp: 98 1 °F (36 7 °C) 98 °F (36 7 °C) 98 °F (36 7 °C)    TempSrc: Oral Oral Oral    SpO2: 95% 96% 94% 95%   Weight:       Height:         Constitutional:  NAD; pleasant; nontoxic  Neck: collar on  CV:  +S1, S2;  RRR; no rub/murmur  Pulmonary:  BBS without crackles/wheeze/rhonci; resp are unlabored  Abdominal:  soft, +BS, ND/NT; no mass  Skin:  no rashes  Musculoskeletal:  no edema  :  no antony  Neurological/Psych:  AAO;  RUE 5/5, RLE/LLE/LUE 5/5; no depression/anxiety      [ X ] Total time spent: 30 Mins and greater than 50% of this time was spent counseling/coordinating care  ** Please Note: Dragon 360 Dictation voice to text software may have been used in the creation of this document   **

## 2018-09-24 NOTE — PROGRESS NOTES
Progress Note - Neurosurgery   Jonas Perla 58 y o  male MRN: [id number]  Unit/Bed#: -01 Encounter: [id number]    Assessment:  1  POD#14 posterior cervical decompression laminectomy C3-C6, posterior cervical lateral mass and pedicle fixation / fusion C1-T1 (09/10/2018)  2  Dense fracture  3  Acute tear drop vertebral body fracture of C3 and C4  4  Spinous process fracture C4 and C5  5  Acute traumatic C5 vertebral body fracture  6  Acute left facet / lamina fracture C6  7  Left occipital condyle fracture  8  Alcohol abuse  9  CAD  10  Status post fall down steps while intoxicated    Plan:  · Exam:  Full strength and sensation throughout extremities, Vista collar in place, incision site clean, dry, and intact, staples and sutures were removed with no issues or drainage, patient tolerated procedure well, new dressing was applied  · Imaging reviewed personally and by attending  Final results as below:  ? Cervical spine postop upright x-rays (09/11/18): Satisfactory alignment and hardware placement  · Patient to be discharged home from [facility] today  · Mobilize at home per PT / OT  · Patient is cleared from a neurosurgical standpoint  · Will follow up in 6 weeks in the office with repeat imaging 2-3 days prior to appointment  · Contact neurosurgery if there are any questions or concerns  Subjective/Objective   Chief Complaint: "I am doing well"    Subjective: Patient states he is doing well  He states previous right shoulder and arm pain has resolved completely  He denies pain, numbness, tingling, or weakness  He denies chest pain or shortness of breath  Objective:  Lying in bed, NAD  I/O       09/22 0701 - 09/23 0700 09/23 0701 - 09/24 0700 09/24 0701 - 09/25 0700    P  O  520 1167 420    Total Intake(mL/kg) 520 (6 2) 1167 (13 9) 420 (5)    Urine (mL/kg/hr)  1000 (0 5)     Total Output   1000      Net +520 +167 +420                 Physical Exam:  Vitals: Blood pressure 109/64, pulse 82, temperature 98 °F (36 7 °C), temperature source Oral, resp  rate 18, height 6' 1" (1 854 m), weight 83 8 kg (184 lb 11 2 oz), SpO2 95 %  ,Body mass index is 24 37 kg/m²  General appearance: alert, appears stated age, cooperative and no distress  Head: Normocephalic, without obvious abnormality, atraumatic  Eyes: Conjugate gaze, tracks appropriately  Neck: Supple, symmetrical, trachea midline, vista collar in place  Incision is clean, dry, and intact  Skin edges well aligned  Sutures and staples removed without issues  Patient tolerated procedure well  No erythema, edema, or drainage  Lungs: non labored breathing  Heart: regular heart rate  Neurologic:   Mental status: Alert, oriented X 3, thought content appropriate  Cranial nerves: grossly intact (Cranial nerves II-XII)  Sensory: normal to light touch in all extremities bilaterally  Motor: moving all extremities without focal weakness, strength 5/5 bilaterally    Lab Results:    Results from last 7 days  Lab Units 09/20/18  0615   WBC Thousand/uL 6 38   HEMOGLOBIN g/dL 10 6*   HEMATOCRIT % 32 4*   PLATELETS Thousands/uL 371   NEUTROS PCT % 48   MONOS PCT % 14*       Results from last 7 days  Lab Units 09/20/18  0615   SODIUM mmol/L 132*   POTASSIUM mmol/L 3 8   CHLORIDE mmol/L 98*   CO2 mmol/L 28   BUN mg/dL 9   CREATININE mg/dL 0 45*   CALCIUM mg/dL 9 2                 No results found for: TROPONINT  ABG:  Lab Results   Component Value Date    PHART 7 362 09/10/2018    EWK9ODW 42 6 09/10/2018    PO2ART 96 1 09/10/2018    IRJ6EWZ 23 6 09/10/2018    BEART -1 7 09/10/2018    SOURCE Line, Arterial 09/10/2018       Imaging Studies: I have personally reviewed pertinent reports  and I have personally reviewed pertinent films in PACS    EKG, Pathology, and Other Studies: I have personally reviewed pertinent reports        VTE Pharmacologic Prophylaxis: Heparin    VTE Mechanical Prophylaxis: sequential compression device

## 2018-09-24 NOTE — DISCHARGE INSTRUCTIONS
Should you develop fevers, chills, sweats, rigors, or any drainage from your surgical site please contact your family doctor or surgeon immediately or go to the ER immediately as these are indicators of possible infection     Please note you are restricted from driving/operating a motorized vehicle/operating heavy machinery/etc until you are cleared through a formal driving evaluation  This service is offered through [address]  driving evaluation program on [address] however this evaluation can be done at a site of your choosing  Please contact your family doctor for a referral when your family doctor clears you to perform this evaluation once your neurologic/physical deficits have stabilized  Please see your doctors listed in the follow up providers section of your discharge paperwork, and take the discharge paperwork with you to your appointments    Please note changes may have been made to your medications please refer to your discharge paperwork for your current medications and take this list with you to all your doctors appointments for your doctors to review    Please do not combine any muscle relaxants (including but not limited to zanaflex, flexaril, soma, robaxin, etc) pain medications (including but not limited to tramadol, vicodin, norco, percocet, oxycodone, oxycontin, morphine, hydropmorphone, oxymorphone, fentanyl, hydrocodone, etc)  or sedatives/sleep aids/anti-anxiety medications (including but not limited to [place], trazodone, valium, xanax, klonipin, clonazepam, ativan, lorazepam, restoril, temazepam etc) that you may have been prescribed prior to admission with the pain medication you are being prescribed upon discharge from the rehab unit (unless listed to do so on your medication reconciliation in your discharge paperwork)   Please do not drive or operate heavy machinery while using these medications  Do not drink alcohol while using these medications       You will be given a limited supply of pain medications on discharge; should you require additional refills/medications, your PCP and/or surgeon may prescribe at his/her discretion  Please check your blood sugars 4 times daily before meals and at bedtime and record them to provide to your doctors, contact your family doctor immediately for blood sugars higher than 220 or lower than 100     Please check your blood pressure prior to taking your blood pressure medications and 1 hour after, please contact your family doctor or cardiologist immediately for a blood pressure below 100/60 and do not take your blood pressure medications until speaking with them, please contact your family doctor or cardiologist immediately for blood pressure greater than 160/100    Please avoid NSAID (including but not limited to advil, aleve, motrin, naproxen, ibuprofen, mobic, meloxicam, diclofenac etc) medications as NSAID medications may delay bone healing    Please wear your cervical collar at all times until cleared by [facility]    Please follow your spine precautions as instructed until cleared by [facility]      Abuse of Alcohol   WHAT YOU NEED TO KNOW:   · Alcohol abuse is unhealthy drinking behavior  You may drink too much at one time once a week, or continue to drink too much daily  You continue to drink even though it causes problems  The problems can be alcohol related legal problems, or problems with work or relationships with family  · If you drink too much at one time, you are binge drinking  Binge drinking is when you have a large amount of alcohol in a short time  Your blood alcohol concentrations (JUANITA) goes above 0 08 g/dLlevel during binge drinking  For men, this usually happens with more than 4 drinks in 2 hours  For women, it is more than 3 drinks in 2 hours  A drink is 12 ounces of beer, 4 ounces of wine, or 1½ ounces of liquor    DISCHARGE INSTRUCTIONS:   Call 911 for the following:   · You have sudden chest pain or trouble breathing  · You have a seizure or have shaking or trembling  · You were in an accident because of alcohol  Seek care immediately if:   · You want to harm yourself or others  · You have hallucinations (you see or hear things that are not real)  · You cannot stop vomiting or you vomit blood  Contact your healthcare provider if:   · You need help to stop drinking alcohol  · You have questions or concerns about your condition or care  Medicines:   · Vitamin supplements  may be given to treat low vitamin levels  Alcohol can make it hard for your body to absorb enough vitamins such as B1  Vitamin supplements may also be given to prevent alcohol related brain damage  · Take your medicine as directed  Contact your healthcare provider if you think your medicine is not helping or if you have side effects  Tell him or her if you are allergic to any medicine  Keep a list of the medicines, vitamins, and herbs you take  Include the amounts, and when and why you take them  Bring the list or the pill bottles to follow-up visits  Carry your medicine list with you in case of an emergency  Treatments or therapies you may need:   · Detoxification (detox) and withdrawal  is a program that helps you to safely get alcohol out of your body  Detox can also help get rid of the physical need to drink  Healthcare providers monitor the physical symptoms of withdrawal  They may give you medicines to help decrease nausea, dehydration, and seizures  Healthcare providers will also monitor your blood pressure, heart and breathing rates, and your temperature  Symptoms of anxiety, depression, and suicidal thoughts are also monitored and managed during detox  Healthcare providers may give you medicines for these symptoms and therapy sessions will be available to you   Detox is usually done at a detox center or in a hospital  Healthcare providers do not recommend that you try to detox at home or by yourself  Withdrawal symptoms may become life-threatening  The center can help you find 12 step programs or an individual therapist to help with emotional support after detox  · Inpatient and outpatient treatment  focus on your personal needs to help you stop drinking  Treatment helps you understand the reasons you abuse alcohol  Counselors and therapists provide you with support and help you find ways to cope instead of drinking  You may need inpatient treatment to provide a controlled environment  You may need outpatient treatment after your inpatient treatment is complete  · Alcohol aversion therapy  takes away the desire to drink by causing a negative reaction when you drink  Healthcare providers may give you medicines that cause nausea and vomiting when you drink alcohol  They may instead give you a medicine that decreases your urge to drink alcohol  These medicines are used to help you stop drinking or reduce the amount you drink  They can also help you avoid relapse  Follow up with your healthcare provider as directed:  Write down your questions so you remember to ask them during your visits  Avoid alcohol:  You should stop drinking entirely  Alcohol can damage your brain, heart, and liver  It also increases your risk for injury, high blood pressure, and certain types of cancer  Alcohol is dangerous when you combine it with certain medicines  Do not drive if you drink alcohol:  Make sure someone who has not been drinking can help you get home  Get support:  Most people need support to stop drinking alcohol  Mental health providers, support groups, rehabilitation centers, and your healthcare provider can provide support     For more information:   · Alcoholics Anonymous  Web Address: http://www esquivel info/  · Substance Abuse and [facility] [address] [place] [id number]  Web Address: https://[employer]/  © 2017 [address] [employer] Information is for End User's use only and may not be sold, redistributed or otherwise used for commercial purposes  All illustrations and images included in CareNotes® are the copyrighted property of A D A M , Inc  or Quang Freeman  The above information is an  only  It is not intended as medical advice for individual conditions or treatments  Talk to your doctor, nurse or pharmacist before following any medical regimen to see if it is safe and effective for you  Cervical Fracture   WHAT YOU NEED TO KNOW:   · A cervical fracture is a break in 1 or more of the 7 cervical vertebrae (bones) in your neck  Cervical vertebrae support your head and allow your neck to bend and twist  The vertebrae enclose and protect the spinal cord, which controls your ability to move  Cervical fractures can happen after injuring the neck in motor vehicle accidents, falls, diving, and contact sports  Because a cervical fracture can also harm the spinal cord, it can be a very serious injury  DISCHARGE INSTRUCTIONS:   Medicines:   · Pain medicine: You may be given medicine to take away or decrease pain  Do not wait until the pain is severe before you take your medicine  · Take your medicine as directed  Contact your healthcare provider if you think your medicine is not helping or if you have side effects  Tell him or her if you are allergic to any medicine  Keep a list of the medicines, vitamins, and herbs you take  Include the amounts, and when and why you take them  Bring the list or the pill bottles to follow-up visits  Carry your medicine list with you in case of an emergency  Follow up with your spine specialist or healthcare provider as directed:  Write down your questions so you remember to ask them during your visits  Skin and brace care:  Skin breakdown can lead to deep wounds caused by pressure or pulling on your skin   Check your chin, ears, back of your head, and shoulders for redness or sores if you are wearing a brace  Check the skin daily around halo brace pins for signs of infection, such as redness or bad-smelling drainage  Change your vest lining if it gets wet  Ask your healthcare provider how to care for your halo pins and vest  Ask your healthcare provider for more information about using a halo brace, semirigid collar, or soft collar  Therapy: A physical therapist and an occupational therapist may exercise your arms, legs, and hands  They may also teach you new ways to do things around the house  A speech therapist may work with you to help you talk or swallow  Wound care:  Ask your healthcare provider to show you how to care for your wound  Contact your spine specialist or healthcare provider if:   · You have a fever  · You see a skin rash, redness, or sores under your brace  · You have problems swallowing while you are wearing your halo brace  · Your neck pain is not getting better even with treatment  · You have questions or concerns about your cervical fracture, medicine, or care  Seek care immediately or call 911 if:   · You have a sudden, severe headache with nausea and vomiting  · You are seeing double or cannot see out of 1 eye  · You cannot stay awake  · The pins in your halo brace have loosened or look deeper in the skin than before  · You feel new weakness or numbness in your hands or fingers  · You are short of breath  · You cannot feel or move your arms or legs  · You cough up blood  · You feel lightheaded, short of breath, and have chest pain  You cough up blood  · You feel lightheaded, short of breath, and have chest pain  · Your arm or leg feels warm, tender, and painful  It may look swollen and red  © 2017 [facility] [facility] Information is for End User's use only and may not be sold, redistributed or otherwise used for commercial purposes   All illustrations and images included in CareNotes® are the copyrighted property of RAJAT BELTRAN Eunice  or Quang Freeman  The above information is an  only  It is not intended as medical advice for individual conditions or treatments  Talk to your doctor, nurse or pharmacist before following any medical regimen to see if it is safe and effective for you

## 2018-09-25 NOTE — CASE MANAGEMENT
Team dc summary - pt made good progress and returned home w/family support and contd outpt physical therapy at [facility] [address]  appmt scheduled for pt and placed on dc instructions  No dme needs identified on dc  Family present for dc instructions and aware of pts functional ability

## 2018-09-27 NOTE — PHYSICAL THERAPY NOTE
PT D/C SUMMARY:    Pt met all goals regarding bed mobility, transfers, and walking prior to d/c home  Pt was at S level for car transfers and stairs prior to d/c home  Recommend S for for stairs and car transfers upon d/c; pt stated he only has steps to get to the basement where he lives in the house and only leaves for doctors appointments, which he would have family with him  All other barriers to home were resolved  Recommend RW use to minimize fall risk due to decreased balance and righting reactions  No DME needed as pt already has RW at home  Recommend d/c home with family support and OP PT

## 2018-09-27 NOTE — OCCUPATIONAL THERAPY NOTE
Occupational Therapy Discharge Summary    Pt d/c home at overall mod I level with use of RW  Pt d/c home with family support from dtr and family who he lives with  Recommending S for tub xfer and assist for management of cervical collar at time of d/c  Multiple edu sessions on collar management provided  Pt noncompliant at times with cervical collar management and spinal precautions, req cues and assistance  Pt continues to demo dec balance/endurance limiting indep   No further OT needs, OP PT services to continue at d/c

## 2018-10-16 ENCOUNTER — TELEPHONE (OUTPATIENT)
Dept: NEUROSURGERY | Facility: CLINIC | Age: 63
End: 2018-10-16

## 2018-10-23 ENCOUNTER — TELEPHONE (OUTPATIENT)
Dept: NEUROSURGERY | Facility: CLINIC | Age: 63
End: 2018-10-23

## 2018-10-23 NOTE — TELEPHONE ENCOUNTER
10/23/18  CALLED PT TO R/S TODAY'S NO SHOW   6 WEEK POV APPT      MAIL BOX FULL   CANNOT LEAVE A MESSAGE

## 2018-10-24 NOTE — TELEPHONE ENCOUNTER
I tried to call patient again to see if he wants to r/s and there was no answer and I was unable to leave [facility] because mailbox was full

## 2019-02-28 ENCOUNTER — APPOINTMENT (EMERGENCY)
Dept: RADIOLOGY | Facility: HOSPITAL | Age: 64
End: 2019-02-28
Payer: COMMERCIAL

## 2019-02-28 ENCOUNTER — HOSPITAL ENCOUNTER (OUTPATIENT)
Facility: HOSPITAL | Age: 64
Setting detail: OBSERVATION
Discharge: HOME | End: 2019-03-01
Attending: EMERGENCY MEDICINE | Admitting: EMERGENCY MEDICINE
Payer: COMMERCIAL

## 2019-02-28 DIAGNOSIS — S12.001A CLOSED NONDISPLACED FRACTURE OF FIRST CERVICAL VERTEBRA, UNSPECIFIED FRACTURE MORPHOLOGY, INITIAL ENCOUNTER (HCC): ICD-10-CM

## 2019-02-28 DIAGNOSIS — S12.301A CLOSED NONDISPLACED FRACTURE OF FOURTH CERVICAL VERTEBRA, UNSPECIFIED FRACTURE MORPHOLOGY, INITIAL ENCOUNTER (HCC): Primary | ICD-10-CM

## 2019-02-28 PROBLEM — F10.10 ETOH ABUSE: Status: ACTIVE | Noted: 2019-02-28

## 2019-02-28 PROBLEM — S12.000A CLOSED FRACTURE OF FIRST CERVICAL VERTEBRA (HCC): Status: ACTIVE | Noted: 2019-02-28

## 2019-02-28 LAB
ANION GAP SERPL CALCULATED.3IONS-SCNC: 8 MMOL/L (ref 4–13)
BASE EXCESS BLDA CALC-SCNC: 0 MMOL/L (ref -2–3)
BASOPHILS # BLD AUTO: 0.08 THOUSANDS/ΜL (ref 0–0.1)
BASOPHILS NFR BLD AUTO: 1 % (ref 0–1)
BUN SERPL-MCNC: 7 MG/DL (ref 5–25)
CA-I BLD-SCNC: 1.12 MMOL/L (ref 1.12–1.32)
CALCIUM SERPL-MCNC: 8.4 MG/DL (ref 8.3–10.1)
CHLORIDE SERPL-SCNC: 100 MMOL/L (ref 100–108)
CO2 SERPL-SCNC: 25 MMOL/L (ref 21–32)
CREAT SERPL-MCNC: 0.62 MG/DL (ref 0.6–1.3)
EOSINOPHIL # BLD AUTO: 0.19 THOUSAND/ΜL (ref 0–0.61)
EOSINOPHIL NFR BLD AUTO: 2 % (ref 0–6)
ERYTHROCYTE [DISTWIDTH] IN BLOOD BY AUTOMATED COUNT: 16.2 % (ref 11.6–15.1)
GFR SERPL CREATININE-BSD FRML MDRD: 104 ML/MIN/1.73SQ M
GLUCOSE SERPL-MCNC: 258 MG/DL (ref 65–140)
GLUCOSE SERPL-MCNC: 262 MG/DL (ref 65–140)
HCO3 BLDA-SCNC: 25.9 MMOL/L (ref 24–30)
HCT VFR BLD AUTO: 38.3 % (ref 36.5–49.3)
HCT VFR BLD CALC: 38 % (ref 36.5–49.3)
HGB BLD-MCNC: 12.4 G/DL (ref 12–17)
HGB BLDA-MCNC: 12.9 G/DL (ref 12–17)
IMM GRANULOCYTES # BLD AUTO: 0.05 THOUSAND/UL (ref 0–0.2)
IMM GRANULOCYTES NFR BLD AUTO: 1 % (ref 0–2)
LYMPHOCYTES # BLD AUTO: 3.13 THOUSANDS/ΜL (ref 0.6–4.47)
LYMPHOCYTES NFR BLD AUTO: 40 % (ref 14–44)
MCH RBC QN AUTO: 28.1 PG (ref 26.8–34.3)
MCHC RBC AUTO-ENTMCNC: 32.4 G/DL (ref 31.4–37.4)
MCV RBC AUTO: 87 FL (ref 82–98)
MONOCYTES # BLD AUTO: 0.69 THOUSAND/ΜL (ref 0.17–1.22)
MONOCYTES NFR BLD AUTO: 9 % (ref 4–12)
NEUTROPHILS # BLD AUTO: 3.74 THOUSANDS/ΜL (ref 1.85–7.62)
NEUTS SEG NFR BLD AUTO: 47 % (ref 43–75)
NRBC BLD AUTO-RTO: 0 /100 WBCS
PCO2 BLD: 27 MMOL/L (ref 21–32)
PCO2 BLD: 44.6 MM HG (ref 42–50)
PH BLD: 7.37 [PH] (ref 7.3–7.4)
PLATELET # BLD AUTO: 217 THOUSANDS/UL (ref 149–390)
PMV BLD AUTO: 10.6 FL (ref 8.9–12.7)
PO2 BLD: 27 MM HG (ref 35–45)
POTASSIUM BLD-SCNC: 4.3 MMOL/L (ref 3.5–5.3)
POTASSIUM SERPL-SCNC: 4 MMOL/L (ref 3.5–5.3)
RBC # BLD AUTO: 4.42 MILLION/UL (ref 3.88–5.62)
SAO2 % BLD FROM PO2: 49 % (ref 95–98)
SODIUM BLD-SCNC: 135 MMOL/L (ref 136–145)
SODIUM SERPL-SCNC: 133 MMOL/L (ref 136–145)
SPECIMEN SOURCE: ABNORMAL
WBC # BLD AUTO: 7.88 THOUSAND/UL (ref 4.31–10.16)

## 2019-02-28 PROCEDURE — 70450 CT HEAD/BRAIN W/O DYE: CPT

## 2019-02-28 PROCEDURE — 82947 ASSAY GLUCOSE BLOOD QUANT: CPT

## 2019-02-28 PROCEDURE — 84295 ASSAY OF SERUM SODIUM: CPT

## 2019-02-28 PROCEDURE — 36415 COLL VENOUS BLD VENIPUNCTURE: CPT | Performed by: NURSE PRACTITIONER

## 2019-02-28 PROCEDURE — 71045 X-RAY EXAM CHEST 1 VIEW: CPT

## 2019-02-28 PROCEDURE — 99219 PR INITIAL OBSERVATION CARE/DAY 50 MINUTES: CPT | Performed by: EMERGENCY MEDICINE

## 2019-02-28 PROCEDURE — 84132 ASSAY OF SERUM POTASSIUM: CPT

## 2019-02-28 PROCEDURE — 80048 BASIC METABOLIC PNL TOTAL CA: CPT | Performed by: NURSE PRACTITIONER

## 2019-02-28 PROCEDURE — 82803 BLOOD GASES ANY COMBINATION: CPT

## 2019-02-28 PROCEDURE — 85014 HEMATOCRIT: CPT

## 2019-02-28 PROCEDURE — 72125 CT NECK SPINE W/O DYE: CPT

## 2019-02-28 PROCEDURE — 82330 ASSAY OF CALCIUM: CPT

## 2019-02-28 PROCEDURE — 85025 COMPLETE CBC W/AUTO DIFF WBC: CPT | Performed by: NURSE PRACTITIONER

## 2019-02-28 PROCEDURE — 99285 EMERGENCY DEPT VISIT HI MDM: CPT

## 2019-02-28 PROCEDURE — 94760 N-INVAS EAR/PLS OXIMETRY 1: CPT

## 2019-02-28 RX ORDER — OXYCODONE HYDROCHLORIDE 5 MG/1
5 TABLET ORAL EVERY 4 HOURS PRN
Status: DISCONTINUED | OUTPATIENT
Start: 2019-02-28 | End: 2019-03-01

## 2019-02-28 RX ORDER — FOLIC ACID 1 MG/1
1 TABLET ORAL DAILY
Status: DISCONTINUED | OUTPATIENT
Start: 2019-03-01 | End: 2019-03-01 | Stop reason: HOSPADM

## 2019-02-28 RX ORDER — ATORVASTATIN CALCIUM 80 MG/1
80 TABLET, FILM COATED ORAL
Status: DISCONTINUED | OUTPATIENT
Start: 2019-02-28 | End: 2019-03-01 | Stop reason: HOSPADM

## 2019-02-28 RX ORDER — ALBUTEROL SULFATE 2.5 MG/3ML
2.5 SOLUTION RESPIRATORY (INHALATION)
Status: DISCONTINUED | OUTPATIENT
Start: 2019-02-28 | End: 2019-03-01

## 2019-02-28 RX ORDER — OXYCODONE HYDROCHLORIDE 5 MG/1
2.5 TABLET ORAL EVERY 4 HOURS PRN
Status: DISCONTINUED | OUTPATIENT
Start: 2019-02-28 | End: 2019-03-01

## 2019-02-28 RX ORDER — THIAMINE MONONITRATE (VIT B1) 100 MG
100 TABLET ORAL DAILY
Status: DISCONTINUED | OUTPATIENT
Start: 2019-03-01 | End: 2019-03-01 | Stop reason: HOSPADM

## 2019-02-28 RX ORDER — HYDROMORPHONE HCL/PF 1 MG/ML
0.5 SYRINGE (ML) INJECTION
Status: DISCONTINUED | OUTPATIENT
Start: 2019-02-28 | End: 2019-03-01

## 2019-02-28 RX ORDER — GABAPENTIN 100 MG/1
100 CAPSULE ORAL 3 TIMES DAILY
Status: DISCONTINUED | OUTPATIENT
Start: 2019-02-28 | End: 2019-03-01 | Stop reason: HOSPADM

## 2019-02-28 RX ORDER — FLUTICASONE FUROATE AND VILANTEROL 100; 25 UG/1; UG/1
1 POWDER RESPIRATORY (INHALATION)
Status: DISCONTINUED | OUTPATIENT
Start: 2019-03-01 | End: 2019-03-01 | Stop reason: HOSPADM

## 2019-02-28 RX ORDER — ACETAMINOPHEN 325 MG/1
975 TABLET ORAL EVERY 8 HOURS SCHEDULED
Status: DISCONTINUED | OUTPATIENT
Start: 2019-02-28 | End: 2019-03-01 | Stop reason: HOSPADM

## 2019-02-28 RX ADMIN — GABAPENTIN 100 MG: 100 CAPSULE ORAL at 18:49

## 2019-02-28 RX ADMIN — ACETAMINOPHEN 975 MG: 325 TABLET ORAL at 23:31

## 2019-02-28 RX ADMIN — ACETAMINOPHEN 975 MG: 325 TABLET ORAL at 18:01

## 2019-02-28 RX ADMIN — ATORVASTATIN CALCIUM 80 MG: 80 TABLET, FILM COATED ORAL at 18:48

## 2019-02-28 RX ADMIN — GABAPENTIN 100 MG: 100 CAPSULE ORAL at 23:31

## 2019-02-28 NOTE — H&P
H&P Exam - Trauma   Krysten Jennings 71 y o  male MRN: [id number]  Unit/Bed#: TR 03 Encounter: [id number]    Assessment/Plan   Trauma Alert: Level B  Model of Arrival: Ambulance  Trauma Team: Attending dR Kelly and JACIEL Hart  Consultants: None    Trauma Active Problems:   Multiple falls  Head contusion  ETOH abuse disorder   ASA use     Trauma Plan:   Monitor overnight for any concussive symptoms  Re-assess when clinically sober for any additional injuries  Follow-up official radiology reads     Chief Complaint: head pain    History of Present Illness   HPI:  Krysten Jennings is a 71 y o  male who presents via EMS reportedly sustained multiple unwitnessed falls today, ETOH abuse  Family called EMS  Pt has a recent history of C4 fracture in September  Pt admits to drinking 10 beers a day and admits he has had DT's in the past with seizures  He admits he takes aspirin daily  Mechanism:Fall    Review of Systems   Constitutional: Negative for activity change, chills, fatigue and fever  HENT: Positive for facial swelling (forehead)  Negative for congestion and trouble swallowing  Eyes: Negative for photophobia, pain and visual disturbance  Respiratory: Negative for cough, choking, chest tightness, shortness of breath, wheezing and stridor  Cardiovascular: Negative for chest pain and palpitations  Gastrointestinal: Negative for abdominal distention, abdominal pain, nausea and vomiting  Genitourinary: Negative for flank pain  Musculoskeletal: Negative for arthralgias, back pain, gait problem, joint swelling, myalgias, neck pain and neck stiffness  Neurological: Negative for dizziness, tremors, facial asymmetry, weakness, light-headedness, numbness and headaches  Psychiatric/Behavioral: Positive for decreased concentration  Negative for agitation  Historical Information   History is unobtainable from the patient due to alcohol intoxication    Efforts to obtain history included the following sources: other medical personnel, obtained from other records    History reviewed  No pertinent past medical history  History reviewed  No pertinent surgical history  Social History   Social History     Substance and Sexual Activity   Alcohol Use Yes    Comment: every day drinker     Social History     Substance and Sexual Activity   Drug Use Not on file     Social History     Tobacco Use   Smoking Status Current Every Day Smoker       There is no immunization history on file for this patient  Last Tetanus: up to date per patient, given last admission   Family History: Non-contributory  Unable to obtain/limited by       Meds/Allergies   all current active meds have been reviewed    No Known Allergies      PHYSICAL EXAM    PE limited by:     Objective   Vitals:   First set: Temperature: 98 6 °F (37 °C) (02/28/19 1614)  Pulse: 85 (02/28/19 1614)  Respirations: 18 (02/28/19 1614)  Blood Pressure: 113/74 (02/28/19 1614)    Primary Survey:   (A) Airway: intact  (B) Breathing: equal bilatearlly  (C) Circulation: Pulses:   normal, radial  2/4 and femoral  2/4  (D) Disabliity:  GCS Total:  14 confused to time, timeline  (E) Expose:  Completed    Secondary Survey: (Click on Physical Exam tab above)  Physical Exam   Constitutional: No distress  HENT:   Head: Normocephalic  Right Ear: External ear normal    Forehead contusion, left external ear lac, 0 2 mm    Eyes: Pupils are equal, round, and reactive to light  Conjunctivae are normal  Right eye exhibits no discharge  Left eye exhibits no discharge  Neck: Normal range of motion  No tracheal deviation present  Cardiovascular: Normal rate, regular rhythm, normal heart sounds and intact distal pulses  Pulmonary/Chest: Effort normal and breath sounds normal  No stridor  No respiratory distress  He has no wheezes  He has no rales  He exhibits no tenderness  Abdominal: Soft  Bowel sounds are normal  He exhibits no distension and no mass  There is no tenderness   There is no rebound and no guarding  Musculoskeletal: He exhibits no edema, tenderness or deformity  Neurological: He is alert  No cranial nerve deficit  Mildly confused, appears intoxicated but cooperative   Slurred speech   Moves all extremities to command, equal strength 5/5    Skin: Skin is warm and dry  Capillary refill takes less than 2 seconds  He is not diaphoretic  Invasive Devices     Peripheral Intravenous Line            Peripheral IV 02/28/19 Left Antecubital less than 1 day                Lab Results:   Results: I have personally reviewed pertinent reports  and I have personally reviewed pertinent films in PACS, BMP/CMP:   Lab Results   Component Value Date    CO2 27 02/28/2019    GLUCOSE 262 (H) 02/28/2019    and CBC:   Lab Results   Component Value Date    HGB 12 9 02/28/2019    HCT 38 02/28/2019     Imaging/EKG Studies: Results: I have personally reviewed pertinent reports     and I have personally reviewed pertinent films in PACS, FAST: negative, Chest X-Ray: no DIONY/PTX, CT Scan Head: no intracranial injury, CT Scan C-Spine: cervical abnormalities appear chronic, follow-up official reads   Other Studies:     Code Status: No Order  Advance Directive and Living Will:      Power of :    POLST:

## 2019-02-28 NOTE — SOCIAL WORK
CM responded to trauma alert  Pt was brought to the ED via [facility] s/p multiple falls at home   CM contacted pt's dtr Leigha Olsen [phone] to inform her of pt's admission to the ED but her mailbox was fully and CM couldn't leave a message

## 2019-03-01 ENCOUNTER — APPOINTMENT (OUTPATIENT)
Dept: RADIOLOGY | Facility: HOSPITAL | Age: 64
End: 2019-03-01
Payer: COMMERCIAL

## 2019-03-01 VITALS
HEART RATE: 99 BPM | OXYGEN SATURATION: 95 % | DIASTOLIC BLOOD PRESSURE: 81 MMHG | BODY MASS INDEX: 24.52 KG/M2 | RESPIRATION RATE: 16 BRPM | WEIGHT: 185 LBS | HEIGHT: 73 IN | TEMPERATURE: 98.1 F | SYSTOLIC BLOOD PRESSURE: 144 MMHG

## 2019-03-01 LAB
ANION GAP SERPL CALCULATED.3IONS-SCNC: 9 MMOL/L (ref 4–13)
BUN SERPL-MCNC: 8 MG/DL (ref 5–25)
CALCIUM SERPL-MCNC: 8.6 MG/DL (ref 8.3–10.1)
CHLORIDE SERPL-SCNC: 105 MMOL/L (ref 100–108)
CO2 SERPL-SCNC: 23 MMOL/L (ref 21–32)
CREAT SERPL-MCNC: 0.61 MG/DL (ref 0.6–1.3)
ERYTHROCYTE [DISTWIDTH] IN BLOOD BY AUTOMATED COUNT: 16.9 % (ref 11.6–15.1)
GFR SERPL CREATININE-BSD FRML MDRD: 105 ML/MIN/1.73SQ M
GLUCOSE P FAST SERPL-MCNC: 154 MG/DL (ref 65–99)
GLUCOSE SERPL-MCNC: 154 MG/DL (ref 65–140)
HCT VFR BLD AUTO: 39.2 % (ref 36.5–49.3)
HGB BLD-MCNC: 12.8 G/DL (ref 12–17)
MCH RBC QN AUTO: 28.2 PG (ref 26.8–34.3)
MCHC RBC AUTO-ENTMCNC: 32.7 G/DL (ref 31.4–37.4)
MCV RBC AUTO: 86 FL (ref 82–98)
PLATELET # BLD AUTO: 243 THOUSANDS/UL (ref 149–390)
PMV BLD AUTO: 10.4 FL (ref 8.9–12.7)
POTASSIUM SERPL-SCNC: 4.2 MMOL/L (ref 3.5–5.3)
RBC # BLD AUTO: 4.54 MILLION/UL (ref 3.88–5.62)
SODIUM SERPL-SCNC: 137 MMOL/L (ref 136–145)
WBC # BLD AUTO: 6.64 THOUSAND/UL (ref 4.31–10.16)

## 2019-03-01 PROCEDURE — G8988 SELF CARE GOAL STATUS: HCPCS

## 2019-03-01 PROCEDURE — 97116 GAIT TRAINING THERAPY: CPT

## 2019-03-01 PROCEDURE — 99244 OFF/OP CNSLTJ NEW/EST MOD 40: CPT | Performed by: NEUROLOGICAL SURGERY

## 2019-03-01 PROCEDURE — G8987 SELF CARE CURRENT STATUS: HCPCS

## 2019-03-01 PROCEDURE — G8978 MOBILITY CURRENT STATUS: HCPCS

## 2019-03-01 PROCEDURE — 99238 HOSP IP/OBS DSCHRG MGMT 30/<: CPT | Performed by: SURGERY

## 2019-03-01 PROCEDURE — 97166 OT EVAL MOD COMPLEX 45 MIN: CPT

## 2019-03-01 PROCEDURE — 97162 PT EVAL MOD COMPLEX 30 MIN: CPT

## 2019-03-01 PROCEDURE — 72050 X-RAY EXAM NECK SPINE 4/5VWS: CPT

## 2019-03-01 PROCEDURE — G8989 SELF CARE D/C STATUS: HCPCS

## 2019-03-01 PROCEDURE — G8979 MOBILITY GOAL STATUS: HCPCS

## 2019-03-01 PROCEDURE — 99245 OFF/OP CONSLTJ NEW/EST HI 55: CPT | Performed by: NURSE PRACTITIONER

## 2019-03-01 PROCEDURE — 94760 N-INVAS EAR/PLS OXIMETRY 1: CPT

## 2019-03-01 PROCEDURE — 80048 BASIC METABOLIC PNL TOTAL CA: CPT | Performed by: NURSE PRACTITIONER

## 2019-03-01 PROCEDURE — G8980 MOBILITY D/C STATUS: HCPCS

## 2019-03-01 PROCEDURE — 85027 COMPLETE CBC AUTOMATED: CPT | Performed by: NURSE PRACTITIONER

## 2019-03-01 RX ORDER — SIMVASTATIN 5 MG
5 TABLET ORAL
COMMUNITY
End: 2020-02-19

## 2019-03-01 RX ORDER — MULTIVITAMIN
1 TABLET ORAL DAILY
COMMUNITY
End: 2020-02-19

## 2019-03-01 RX ORDER — ALBUTEROL SULFATE 2.5 MG/3ML
2.5 SOLUTION RESPIRATORY (INHALATION) EVERY 6 HOURS PRN
Status: DISCONTINUED | OUTPATIENT
Start: 2019-03-01 | End: 2019-03-01

## 2019-03-01 RX ORDER — METOPROLOL TARTRATE 50 MG/1
25 TABLET, FILM COATED ORAL EVERY 12 HOURS SCHEDULED
COMMUNITY
End: 2020-02-19

## 2019-03-01 RX ORDER — GABAPENTIN 100 MG/1
100 CAPSULE ORAL 3 TIMES DAILY
COMMUNITY
End: 2020-02-19

## 2019-03-01 RX ORDER — FOLIC ACID 1 MG/1
TABLET ORAL DAILY
COMMUNITY

## 2019-03-01 RX ORDER — IPRATROPIUM BROMIDE AND ALBUTEROL SULFATE 2.5; .5 MG/3ML; MG/3ML
3 SOLUTION RESPIRATORY (INHALATION) EVERY 4 HOURS PRN
Status: DISCONTINUED | OUTPATIENT
Start: 2019-03-01 | End: 2019-03-01 | Stop reason: HOSPADM

## 2019-03-01 RX ADMIN — ACETAMINOPHEN 975 MG: 325 TABLET ORAL at 14:51

## 2019-03-01 RX ADMIN — FLUTICASONE FUROATE AND VILANTEROL TRIFENATATE 1 PUFF: 100; 25 POWDER RESPIRATORY (INHALATION) at 09:37

## 2019-03-01 RX ADMIN — GABAPENTIN 100 MG: 100 CAPSULE ORAL at 16:31

## 2019-03-01 RX ADMIN — ACETAMINOPHEN 975 MG: 325 TABLET ORAL at 05:43

## 2019-03-01 RX ADMIN — THIAMINE HCL TAB 100 MG 100 MG: 100 TAB at 09:37

## 2019-03-01 RX ADMIN — FOLIC ACID 1 MG: 1 TABLET ORAL at 09:37

## 2019-03-01 RX ADMIN — TIOTROPIUM BROMIDE 18 MCG: 18 CAPSULE ORAL; RESPIRATORY (INHALATION) at 09:37

## 2019-03-01 RX ADMIN — METOPROLOL TARTRATE 50 MG: 50 TABLET, FILM COATED ORAL at 09:37

## 2019-03-01 RX ADMIN — ATORVASTATIN CALCIUM 80 MG: 80 TABLET, FILM COATED ORAL at 16:31

## 2019-03-01 RX ADMIN — GABAPENTIN 100 MG: 100 CAPSULE ORAL at 09:37

## 2019-03-01 NOTE — PHYSICAL THERAPY NOTE
Physical Therapy Evaluation     Patient's Name: Garrett Cowden    Admitting Diagnosis  Closed nondisplaced fracture of first cervical vertebra, unspecified fracture morphology, initial encounter [facility]) [S12 001A]  Closed nondisplaced fracture of fourth cervical vertebra, unspecified fracture morphology, initial encounter ([place] [address] ) [S12 301A]  Unspecified multiple injuries, initial encounter [T07  XXXA]    Problem List  Patient Active Problem List   Diagnosis    Closed fracture of first cervical vertebra ([place] [address] )    ETOH abuse       Past Medical History  Past Medical History:   Diagnosis Date    COPD (chronic obstructive pulmonary disease) ([place] [address] )     Diabetes mellitus ([place] [address] )     Heart attack ([place] [address] )     Hypertension     Stroke [facility])        Past Surgical History  History reviewed  No pertinent surgical history  03/01/19 1128   Note Type   Note type Eval/Treat   Pain Assessment   Pain Assessment No/denies pain   Pain Score No Pain   Home Living   Type of Home House   Home Layout Two level  (3 HETAL)   Bathroom Shower/Tub Tub/shower unit   Bathroom Toilet Standard   Bathroom Equipment   (None)   Home Equipment Cane  (Used for community distances only)   Prior Function   Level of [place] Independent with ADLs and functional mobility   Lives With [facility]  (Daughter, son-in-law, 4 grandchildren)   Receives Help From Family   ADL Assistance Independent   IADLs Independent   Falls in the last 6 months 1 to 4  (reports one)   Vocational Retired   Comments PTA, pt was I with ADLs, IADLs, and functional mobility with [facility] for community distances  He does not drive due to hx of DUI  Restrictions/Precautions   Weight Bearing Precautions Per Order No   Other Precautions Cognitive; Chair Alarm; Bed Alarm; Fall Risk;Pain   Cognition   Overall Cognitive Status WFL   Arousal/Participation Alert   Orientation Level Oriented X4   Memory Within functional limits   Following Commands Follows multistep commands with increased time or repetition   RLE Assessment   RLE Assessment WFL  (3+/5)   LLE Assessment   LLE Assessment WFL  (3+/5)   Bed Mobility   Supine to Sit 5  Supervision   Additional items Increased time required;Verbal cues   Sit to Supine 5  Supervision   Additional items Increased time required;Verbal cues   Transfers   Sit to Stand 5  Supervision   Additional items Increased time required;Verbal cues   Stand to Sit 5  Supervision   Additional items Increased time required;Verbal cues   Ambulation/Elevation   Gait pattern Wide TEO; Forward Flexion; Short stride; Excessively slow   Gait Assistance 4  Minimal assist   Additional items Assist x 1;Verbal cues; Tactile cues   Assistive Device Rolling walker   Distance 300'x2   Stair Management Assistance 4  Minimal assist   Additional items Assist x 1;Verbal cues; Tactile cues   Stair Management Technique One rail R;Reciprocal;Step to pattern  (step to descending)   Number of Stairs 14   Balance   Static Sitting Good   Dynamic Sitting Fair +   Static Standing Fair   Dynamic Standing Fair -   Ambulatory Fair -   Endurance Deficit   Endurance Deficit Yes   Endurance Deficit Description SOB, dizziness   Activity Tolerance   Activity Tolerance Patient tolerated treatment well   Nurse Made Aware Yes, RN cleared pt for PT eval   Assessment   Prognosis Good   Problem List Decreased strength;Decreased endurance; Impaired balance;Decreased mobility   Assessment Pt is 71 y o  male seen for PT evaluation s/p admit to [facility] on 2/28/2019 w/ Closed fracture of first cervical vertebra ([address] )  Pt with hx of ETOH abuse sustained multiple unwitnessed falls  Pt had a recent hx of C4 fracture in September  PT consulted to assess pt's functional mobility and d/c needs  Order placed for PT eval and tx, w/ up in chair order  Comorbidities affecting pt's physical performance at time of assessment include: ETOH abuse   PTA, pt was independent w/ all functional mobility w/ [facility] distances, ambulates community distances and elevations and retired  Personal factors affecting pt at time of IE include: lives in 2 story house, ambulating w/ assistive device, inability to navigate level surfaces w/o external assistance and positive fall history  Please find objective findings from PT assessment regarding body systems outlined above with impairments and limitations including weakness, impaired balance, decreased endurance, gait deviations, pain, decreased activity tolerance, decreased functional mobility tolerance and fall risk  The following objective measures performed on IE also reveal limitations: Barthel Index: 75/100  Pt's clinical presentation is currently evolving seen in pt's presentation of neuro checks, multiple falls, abnormal labs  Pt tolerated ambulation 300' with use of rolling walker and up/down 14 steps with min A due to reports of dizziness  Pt to benefit from continued mobilization with staff  From PT/mobility standpoint, recommendation at time of d/c would be home with family support pending progress in order to facilitate return to PLOF  D/C PT  Barriers to Discharge None   Goals   Patient Goals To go home   Treatment Day 1   Recommendation   Recommendation Home with family support   Equipment Recommended Walker   PT - OK to Discharge Yes   Additional Comments When medically stable   Barthel Index   Feeding 10   Bathing 0   Grooming Score 5   Dressing Score 5   Bladder Score 10   Bowels Score 10   Toilet Use Score 10   Transfers (Bed/Chair) Score 10   Mobility (Level Surface) Score 10   Stairs Score 5   Barthel Index Score 75     PT Treatment  Time In: 1143  Time Out: 1158  Total Time: 15    Pt was agreeable to PT treatment session for gait  Pt uses a SPC at baseline and was provided with a RW and educated on its use  He required increased cues for proper sequencing and safety through transfers with the use of the RW    Pt ambulated an additional 300' with min A for slight LOB  He demonstrated SOB throughout session and required short standing rest breaks  Pt would benefit from continued mobilization with staff  D/C recommendation is home with family support         Emerald Berg, PT, DPT

## 2019-03-01 NOTE — SOCIAL WORK
CM met with pt at bedside and reviewed observation notice  Pt verbalizes understanding and provided signature  CM provided copy to pt

## 2019-03-01 NOTE — SOCIAL WORK
Pt is familiar with the trauma service  CM met with pt to discuss the role of CM  Pt lives with his daughter Lesli Edwards in a 2 story home which has 3STE and 14 steps inside  Pt is retired, doesn't drive, but considers himself fully independent PTA  Pt owns a cane, walker, and shower seat  Pt states he has Bi-Polar and had a Psych admission in 2010  CM facilitated SBIRT  Pt reports ETOH abuse  Pt reports drinking daily (10 beers), one blackout that he remembers, no withdrawal symptoms, had 3 IP ETOH stays (2 at [facility] and [place] [facility] [place] [facility]  Pt doesn't want HOST  Pt was at [facility] during his last admission  CM will follow up  CM reviewed d/c planning process including the following: identifying help at home, patient preference for d/c planning needs, Discharge Lounge, [facility] Meds to Bed program, availability of treatment team to discuss questions or concerns patient and/or family may have regarding understanding medications and recognizing signs and symptoms once discharged  CM also encouraged patient to follow up with all recommended appointments after discharge  Patient advised of importance for patient and family to participate in managing patients medical well being

## 2019-03-01 NOTE — PLAN OF CARE
Problem: Potential for Falls  Goal: Patient will remain free of falls  Description  INTERVENTIONS:  - Assess patient frequently for physical needs  -  Identify cognitive and physical deficits and behaviors that affect risk of falls    -  [place] fall precautions as indicated by assessment   - Educate patient/family on patient safety including physical limitations  - Instruct patient to call for assistance with activity based on assessment  - Modify environment to reduce risk of injury  - Consider OT/PT consult to assist with strengthening/mobility  Outcome: Progressing     Problem: PAIN - ADULT  Goal: Verbalizes/displays adequate comfort level or baseline comfort level  Description  Interventions:  - Encourage patient to monitor pain and request assistance  - Assess pain using appropriate pain scale  - Administer analgesics based on type and severity of pain and evaluate response  - Implement non-pharmacological measures as appropriate and evaluate response  - Consider cultural and social influences on pain and pain management  - Notify physician/advanced practitioner if interventions unsuccessful or patient reports new pain  Outcome: Progressing     Problem: INFECTION - ADULT  Goal: Absence or prevention of progression during hospitalization  Description  INTERVENTIONS:  - Assess and monitor for signs and symptoms of infection  - Monitor lab/diagnostic results  - Monitor all insertion sites, i e  indwelling lines, tubes, and drains  - Monitor endotracheal (as able) and nasal secretions for changes in amount and color  - [place] appropriate cooling/warming therapies per order  - Administer medications as ordered  - Instruct and encourage patient and family to use good hand hygiene technique  - Identify and instruct in appropriate isolation precautions for identified infection/condition  Outcome: Progressing     Problem: SAFETY ADULT  Goal: Patient will remain free of falls  Description  INTERVENTIONS:  - Assess patient frequently for physical needs  -  Identify cognitive and physical deficits and behaviors that affect risk of falls    -  [place] fall precautions as indicated by assessment   - Educate patient/family on patient safety including physical limitations  - Instruct patient to call for assistance with activity based on assessment  - Modify environment to reduce risk of injury  - Consider OT/PT consult to assist with strengthening/mobility  Outcome: Progressing  Goal: Maintain or return to baseline ADL function  Description  INTERVENTIONS:  -  Assess patient's ability to carry out ADLs; assess patient's baseline for ADL function and identify physical deficits which impact ability to perform ADLs (bathing, care of mouth/teeth, toileting, grooming, dressing, etc )  - Assess/evaluate cause of self-care deficits   - Assess range of motion  - Assess patient's mobility; develop plan if impaired  - Assess patient's need for assistive devices and provide as appropriate  - Encourage maximum independence but intervene and supervise when necessary  ¯ Involve family in performance of ADLs  ¯ Assess for home care needs following discharge   ¯ Request OT consult to assist with ADL evaluation and planning for discharge  ¯ Provide patient education as appropriate  Outcome: Progressing  Goal: Maintain or return mobility status to optimal level  Description  INTERVENTIONS:  - Assess patient's baseline mobility status (ambulation, transfers, stairs, etc )    - Identify cognitive and physical deficits and behaviors that affect mobility  - Identify mobility aids required to assist with transfers and/or ambulation (gait belt, sit-to-stand, lift, walker, cane, etc )  - [place] fall precautions as indicated by assessment  - Record patient progress and toleration of activity level on Mobility SBAR; progress patient to next Phase/Stage  - Instruct patient to call for assistance with activity based on assessment  - Request Rehabilitation consult to assist with strengthening/weightbearing, etc   Outcome: Progressing     Problem: DISCHARGE PLANNING  Goal: Discharge to home or other facility with appropriate resources  Description  INTERVENTIONS:  - Identify barriers to discharge w/patient and caregiver  - Arrange for needed discharge resources and transportation as appropriate  - Identify discharge learning needs (meds, wound care, etc )  - Arrange for interpretive services to assist at discharge as needed  - Refer to Case Management Department for coordinating discharge planning if the patient needs post-hospital services based on physician/advanced practitioner order or complex needs related to functional status, cognitive ability, or social support system  Outcome: Progressing     Problem: Knowledge Deficit  Goal: Patient/family/caregiver demonstrates understanding of disease process, treatment plan, medications, and discharge instructions  Description  Complete learning assessment and assess knowledge base    Interventions:  - Provide teaching at level of understanding  - Provide teaching via preferred learning methods  Outcome: Progressing

## 2019-03-01 NOTE — CONSULTS
Consultation - Geriatrics   Lidia Monte 71 y o  male MRN: [id number]  Unit/Bed#: [facility] 624-01 Encounter: [id number]      Assessment/Plan  1  Ambulatory dysfunction/fall  Secondary to alcohol abuse  PT/OT  Recommend vitamin D3 at [employer] daily    2  ETOH abuse  Patient states he drinks 8-9 beers day  He cannot remember last time he did drink  Patient at risk for withdrawal  Recommend patient to quit drinking, consult Case Management, social Work for supportive resources  Recommend continuation on folate, thiamine  Monitor vitamin B12    3  Deconditioning  Recommend PT/OT  Recommend protein supplementation  Recommend frequent mobilization  Recommend adequate p o  Nutrition and hydration    4  Anemia  Secondary to poor nutrition  Continue thiamine, vitamin-B    5  Cervical see to through 5 fractures, chronic  Patient with history of previous fall  Neurosurgery following    6  Pain, acute secondary to trauma  Continue acetaminophen 975 mg p o  Q 8 hours scheduled  Continue low-dose oxycodone 2 5 mg PO q 4 hours p r n  Moderate pain and oxycodone 5 mg PO q 4 hours p r n  Severe pain                    History of Present Illness   Physician Requesting Consult: Mesha Contreras MD  Reason for Consult / Principal Problem: fall  Hx and PE limited by: n/a  HPI: Lidia Monte is a 71y o  year old male who presents with fall  Patient has a history of COPD, diabetes mellitus, MI, hypertension, CVA, alcohol abuse, sleep apnea  He is alert and orient x4, mini cog is 5/5  Patient states he was drinking and he fell  He states that he drinks an average of 10 beers a day  He lives with his daughter and her family  He still drives he denies any difficulty driving  He denies issues with memory  He states that he normally ambulates independently except for occasionally if he goes for distances he will use a single-point cane  He states that he has fallen in the past secondary to drinking    He helps his daughter with light chores around the house  He has a mild to perform his own ADLs  He denies issues with vision hearing or dentition  Denies issues with insomnia  He states that he has sleep apnea for which she is supposed uses CPAP  Id denies issues with bowel movements, urinary frequency or urinary retention  He states that he quit smoking 5 weeks ago  He states that if he quit smoking he can quit drinking  He states he was thinking of getting a job were doing volunteer work somewhere to occupy his time  Inpatient consult to Gerontology  Consult performed by: TATIANA Simpson  Consult ordered by: TATIANA Johnson          Review of Systems   Constitutional: Negative for unexpected weight change  HENT: Negative for dental problem  Eyes: Negative for visual disturbance  Respiratory: Negative for shortness of breath  Cardiovascular: Negative for chest pain  Gastrointestinal: Negative for constipation  Genitourinary: Negative for difficulty urinating  Musculoskeletal: Negative for gait problem  Neurological: Negative for seizures  Psychiatric/Behavioral: Negative for agitation and decreased concentration  Historical Information   Past Medical History:   Diagnosis Date    COPD [facility]chronic obstructive pulmonary disease) ([facility] )     Diabetes mellitus ([place] [address] )     Heart attack ([place] [address] )     Hypertension     Stroke [facility])      History reviewed  No pertinent surgical history    Social History   Social History     Substance and Sexual Activity   Alcohol Use Yes    Alcohol/week: 4 8 - 7 2 oz    Types: 8 - 12 Cans of beer per week    Frequency: 4 or more times a week    Drinks per session: 10 or more    Binge frequency: Daily or almost daily    Comment: every day drinker     Social History     Substance and Sexual Activity   Drug Use Never     Social History     Tobacco Use   Smoking Status Former Smoker   Smokeless Tobacco Never Used   Tobacco Comment    quit 5 months ago Family History: non-contributory    Meds/Allergies   Current meds:   Current Facility-Administered Medications   Medication Dose Route Frequency    acetaminophen (TYLENOL) tablet 975 mg  975 mg Oral Q8H [address]    atorvastatin (LIPITOR) tablet 80 mg  80 mg Oral Daily With Dinner    fluticasone-vilanterol (BREO ELLIPTA) 100-25 mcg/inh inhaler 1 puff  1 puff Inhalation Daily    folic acid (FOLVITE) tablet 1 mg  1 mg Oral Daily    gabapentin (NEURONTIN) capsule 100 mg  100 mg Oral TID    HYDROmorphone (DILAUDID) injection 0 5 mg  0 5 mg Intravenous Q3H PRN    ipratropium-albuterol (DUO-NEB) 0 5-2 5 mg/3 mL inhalation solution 3 mL  3 mL Nebulization Q4H PRN    metoprolol tartrate (LOPRESSOR) tablet 50 mg  50 mg Oral Q12H [address]    oxyCODONE (ROXICODONE) IR tablet 5 mg  5 mg Oral Q4H PRN    Or    oxyCODONE (ROXICODONE) IR tablet 2 5 mg  2 5 mg Oral Q4H PRN    thiamine (VITAMIN B1) tablet 100 mg  100 mg Oral Daily    tiotropium (SPIRIVA) capsule for inhaler 18 mcg  18 mcg Inhalation Daily      Current PTA meds:  No medications prior to admission  Allergies   Allergen Reactions    Amoxicillin Hives    Augmentin [Amoxicillin-Pot Clavulanate] Hives       Objective   Vitals: Blood pressure 144/81, pulse 99, temperature 98 1 °F (36 7 °C), temperature source Oral, resp  rate 16, SpO2 95 %  ,There is no height or weight on file to calculate BMI  Physical Exam   Constitutional: He is oriented to person, place, and time  He appears well-developed and well-nourished  No distress  HENT:   Head: Normocephalic  Eyes: Right eye exhibits no discharge  Left eye exhibits no discharge  Neck: Normal range of motion  Cardiovascular: Normal rate, regular rhythm and normal heart sounds  Exam reveals no gallop and no friction rub  No murmur heard  Pulmonary/Chest: Effort normal and breath sounds normal  No respiratory distress  He has no wheezes  He has no rales  Abdominal: Soft   Bowel sounds are normal  He exhibits no distension  Musculoskeletal: Normal range of motion  He exhibits no edema  Neurological: He is oriented to person, place, and time  Skin: Skin is warm  He is not diaphoretic  Nursing note and vitals reviewed  Lab Results:   Results from last 7 days   Lab Units 03/01/19  0531   WBC Thousand/uL 6 64   HEMOGLOBIN g/dL 12 8   HEMATOCRIT % 39 2   PLATELETS Thousands/uL 243        Results from last 7 days   Lab Units 03/01/19  0531  02/28/19  1620   POTASSIUM mmol/L 4 2   < >  --    CHLORIDE mmol/L 105   < >  --    CO2 mmol/L 23   < >  --    CO2, I-STAT mmol/L  --   --  27   BUN mg/dL 8   < >  --    CREATININE mg/dL 0 61   < >  --    CALCIUM mg/dL 8 6   < >  --    GLUCOSE, ISTAT mg/dl  --   --  262*    < > = values in this interval not displayed  Imaging Studies: I have personally reviewed pertinent reports  EKG, Pathology, and Other Studies: I have personally reviewed pertinent reports      VTE Prophylaxis: Sequential compression device (Venodyne)     Code Status: Level 1 - Full Code

## 2019-03-01 NOTE — PROGRESS NOTES
03/01/19 1086 Rob Siddiqui Involvement Other (Comment)  (Pt  attends once in awhile)   Spiritual Beliefs/Perceptions   Support Systems Children;Family members   Psychosocial   Patient Behaviors/Mood Appropriate for situation;Brightens with approach   Stress Factors   Patient Stress Factors Health changes   Coping Responses   Patient Coping Anxiety;Open/discussion   Plan of Care   Comments Reviewed information from treatment team, explored relational needs and resources, explored emotional needs and resources related to his discouragement that he keeps drinking, listened empathetically, facilitated life review, cultivated relationship of care and support, offered prayer, patient expressed hope   Assessment Completed by: Unit visit

## 2019-03-01 NOTE — SOCIAL WORK
Pt cleared to d/c home  Pt doesn't have transportation  Pt attempted but failed  CM contacted the shuttle but pt's residence is too far   Pt's [employer] set for 545pm

## 2019-03-01 NOTE — ASSESSMENT & PLAN NOTE
-cont   Aspen collar  -q4h neuro checks  -no focal deficits  -neurosurgery consult; flex c-spine xray  -pain control  -c-spine precautions

## 2019-03-01 NOTE — CONSULTS
Consultation - Neurosurgery   Louis Bejarano 71 y o  male MRN: [id number]  Unit/Bed#: [facility] 624-01 Encounter: [id number]      Inpatient consult to Neurosurgery  Consult performed by: Tomy Wood PA-C  Consult ordered by: TATIANA Castillo        ADDENDUM: Patient flexion-extension x-rays reviewed by myself and attending  Alignment and hardware appears stable  Discussed with Trauma team, patient able to have cervical collar if benefit felt in terms of pain management  No further neurosurgical intervention this time  Two week follow-up appointment with repeat cervical x-rays in outpatient office  Patient cleared from a neurosurgical standpoint for discharge home  Imaging reviewed by myself and attending at morning rounds on 03/01/19 at 7:35  Patient seen and examined by myself on 03/01/19 at 9:27 AM    Assessment/Plan     Assessment:  1  Fractured C1 fixation screw, age indeterminate  2  Chronic C2-5 cervical fractures  3  S/p fall  4  ETOH abuse  5  COPD  6  HTN  7  DM type 2  8  S/p posterior C1-T1 fix fusion with C3-6 decompression  9  H/o MI with stents  10  H/o Stroke   11  H/o C5-6 ACDF in 2002  Plan:  · Exam non-focal  Very pleasant, AAO x3, CN, strenght and sensation intact  No clonus or mcdowell  No PD  reflexes 2+ and symmetric  · Imaging reviewed personally and by attending  Final results as below  · CT cervical spine W/O contrast 2/28/2019: Chronic healed fractures of cervical spine from C2-C5  Evidence of C5-6 ACDF  Fractured C1 screw with lucency around left C2 screw  No subluxation or malalinement  · Pain control and medical management per Trauma team   · Mobilize as tolerated with PT/OT  · DVT PPX: SCD's  Patient is cleared for DVT ppx from a neurosurgical standpoint  · Patient has fractured C1 fixation screw with lucency around C2 screw  · Will order Flexion and extension XR to assess for hardware stability and cervical alignment     · No surgical intervention at this time  · Will assess need for revision based on XR findings  · Patient has no acute vertebral osseous fractures on CT  Patient does not need to be in cervical collar at this time  · Cervical collar removed by myself  Expected ROM intact with history of C1-T1 posterior fusion  Mild posterior pain with ROM  Very minimal pain at rest     · Neurosurgery will continue to follow  Call with any questions or concerns  History of Present Illness   HPI: Froy Cortez is a 71 y o  male with PMH including HTN, previous stroke, MI, DM2, COPD, ETOH, previous  abuse who presents s/p fall at home while intoxicated  Upon questioning patient states his son bought him beer and he was drinking at the house, when he no longer remembers what happened  The next thing he remembers was waking up at the hospital  He states about 5 months ago he quit smoking  He has been in and out of rehab for his alcohol abuse, most recently in November into December of 2018, when he was sober for a month after before starting to drink again  He knows he has a problem and hopes to quit drinking since he has been able to refrain from smoking  In the past he has had a 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  He also has a history of a C1-T1 posterior cervical fixation fusion with C3-6 laminectomy because of fractures to C2, C3, C4, C5, sustained from a fall in September 2018  History of previous C5-6 ACDF in 2002 at [address] [place] [address]  He admits to mild midline posterior neck pain, but otherwise has no complaints  There is nothing that changes the pain at this time  He denies any HA, dizziness, changes in vision or hearing, trouble eating or drinking, CP, SOB, abdominal pain, bowel or bladder incontinence, tingling, numbness, shooting pains or weakness in any of his extremities  Review of Systems   Constitutional: Negative for appetite change, diaphoresis and fatigue  HENT: Negative for congestion, facial swelling and voice change  Eyes: Negative for visual disturbance  Respiratory: Negative for cough and chest tightness  Cardiovascular: Negative for chest pain and leg swelling  Gastrointestinal: Negative for abdominal pain, diarrhea, nausea and vomiting  Genitourinary: Negative for difficulty urinating  Musculoskeletal: Positive for neck pain (very mild midline tenderness )  Negative for back pain and neck stiffness  Skin: Negative for rash and wound  Neurological: Negative for dizziness, seizures, weakness, numbness and headaches  Psychiatric/Behavioral: Negative for agitation, behavioral problems and confusion  Historical Information   Past Medical History:   Diagnosis Date    COPD (chronic obstructive pulmonary disease) ([place] [address] )     Diabetes mellitus ([place] [address] )     Heart attack ([place] [address] )     Hypertension     Stroke [facility])      History reviewed  No pertinent surgical history  Social History     Substance and Sexual Activity   Alcohol Use Yes    Alcohol/week: 4 8 - 7 2 oz    Types: 8 - 12 Cans of beer per week    Frequency: 4 or more times a week    Drinks per session: 10 or more    Binge frequency: Daily or almost daily    Comment: every day drinker     Social History     Substance and Sexual Activity   Drug Use Never     Social History     Tobacco Use   Smoking Status Former Smoker   Smokeless Tobacco Never Used   Tobacco Comment    quit 5 months ago      History reviewed  No pertinent family history      Meds/Allergies   all current active meds have been reviewed, current meds:   Current Facility-Administered Medications   Medication Dose Route Frequency    acetaminophen (TYLENOL) tablet 975 mg  975 mg Oral Q8H [address]    atorvastatin (LIPITOR) tablet 80 mg  80 mg Oral Daily With Dinner    fluticasone-vilanterol (BREO ELLIPTA) 100-25 mcg/inh inhaler 1 puff  1 puff Inhalation Daily    folic acid (FOLVITE) tablet 1 mg  1 mg Oral Daily    gabapentin (NEURONTIN) capsule 100 mg  100 mg Oral TID    HYDROmorphone (DILAUDID) injection 0 5 mg  0 5 mg Intravenous Q3H PRN    ipratropium-albuterol (DUO-NEB) 0 5-2 5 mg/3 mL inhalation solution 3 mL  3 mL Nebulization Q4H PRN    metoprolol tartrate (LOPRESSOR) tablet 50 mg  50 mg Oral Q12H [facility]    oxyCODONE (ROXICODONE) IR tablet 5 mg  5 mg Oral Q4H PRN    Or    oxyCODONE (ROXICODONE) IR tablet 2 5 mg  2 5 mg Oral Q4H PRN    thiamine (VITAMIN B1) tablet 100 mg  100 mg Oral Daily    tiotropium (SPIRIVA) capsule for inhaler 18 mcg  18 mcg Inhalation Daily    and PTA meds:   None     Allergies   Allergen Reactions    Amoxicillin Hives    Augmentin [Amoxicillin-Pot Clavulanate] Hives       Objective   I/O       02/27 0701 - 02/28 0700 02/28 0701 - 03/01 0700 03/01 0701 - 03/02 0700    P  O   0     I V   0     Total Intake  0     Urine  675     Total Output  675     Net  -675                  Physical Exam   Constitutional: He is oriented to person, place, and time  He appears well-developed and well-nourished  HENT:   Head: Normocephalic  Eyes: Pupils are equal, round, and reactive to light  Conjunctivae and EOM are normal  Right eye exhibits no discharge  Neck: Normal range of motion  Neck supple  No JVD present  Cardiovascular: Normal rate  Pulmonary/Chest: Effort normal    Abdominal: Soft  He exhibits no distension  There is no tenderness  Musculoskeletal: Normal range of motion  He exhibits tenderness (mild tenderness on palpation of upper cervical spine  )  He exhibits no edema or deformity  Neurological: He is alert and oriented to person, place, and time  He has normal reflexes  No cranial nerve deficit  He has a normal Finger-Nose-Finger Test    Reflex Scores:       Bicep reflexes are 2+ on the right side and 2+ on the left side  Brachioradialis reflexes are 2+ on the right side and 2+ on the left side  Patellar reflexes are 2+ on the right side and 2+ on the left side         Achilles reflexes are 2+ on the right side and 2+ on the left side   Skin: Skin is warm and dry  Psychiatric: He has a normal mood and affect  His speech is normal and behavior is normal  Thought content normal      Neurologic Exam     Mental Status   Oriented to person, place, and time  Follows 2 step commands  Attention: normal  Concentration: normal    Speech: speech is normal   Level of consciousness: alert  Knowledge: good  Able to perform simple calculations  Able to repeat  Normal comprehension  Cranial Nerves     CN II   Visual fields full to confrontation  CN III, IV, VI   Pupils are equal, round, and reactive to light  Extraocular motions are normal    CN III: no CN III palsy  CN VI: no CN VI palsy  Nystagmus: none   Diplopia: none  Ophthalmoparesis: none  Upgaze: normal  Downgaze: normal    CN V   Facial sensation intact  CN VII   Facial expression full, symmetric       CN VIII   CN VIII normal    Hearing: intact    CN IX, X   CN IX normal    CN X normal      CN XI   CN XI normal      CN XII   CN XII normal    Tongue: not atrophic  Tongue deviation: none    Motor Exam   Muscle bulk: normal  Overall muscle tone: normal  Right arm tone: normal  Left arm tone: normal  Right arm pronator drift: absent  Left arm pronator drift: absent  Right leg tone: normal  Left leg tone: normal    Strength   Right deltoid: 5/5  Left deltoid: 5/5  Right biceps: 5/5  Left biceps: 5/5  Right triceps: 5/5  Left triceps: 5/5  Right wrist flexion: 5/5  Left wrist flexion: 5/5  Right wrist extension: 5/5  Left wrist extension: 5/5  Right iliopsoas: 5/5  Left iliopsoas: 5/5  Right quadriceps: 5/5  Left quadriceps: 5/5  Right hamstrin/5  Left hamstrin/5  Right glutei: 5/5  Left glutei: 5/5  Right anterior tibial: 5/5  Left anterior tibial: 5/5  Right gastroc: 5/5  Left gastroc: 5/5    Sensory Exam   Light touch normal    Proprioception normal    Pinprick normal      Gait, Coordination, and Reflexes     Coordination   Finger to nose coordination: normal    Tremor Resting tremor: absent  Action tremor: absent    Reflexes   Right brachioradialis: 2+  Left brachioradialis: 2+  Right biceps: 2+  Left biceps: 2+  Right patellar: 2+  Left patellar: 2+  Right achilles: 2+  Left achilles: 2+  Right Finley: absent  Left Finley: absent  Right ankle clonus: absent  Left ankle clonus: absent      Vitals:Blood pressure 144/81, pulse 86, temperature 98 1 °F (36 7 °C), temperature source Oral, resp  rate 16, SpO2 93 %  ,There is no height or weight on file to calculate BMI  Lab Results:   Results from last 7 days   Lab Units 03/01/19 0531 02/28/19  1659 02/28/19  1620   WBC Thousand/uL 6 64 7 88  --    HEMOGLOBIN g/dL 12 8 12 4  --    I STAT HEMOGLOBIN g/dl  --   --  12 9   HEMATOCRIT % 39 2 38 3  --    HEMATOCRIT, ISTAT %  --   --  38   PLATELETS Thousands/uL 243 217  --    NEUTROS PCT %  --  47  --    MONOS PCT %  --  9  --      Results from last 7 days   Lab Units 03/01/19 0531 02/28/19  1659 02/28/19  1620   POTASSIUM mmol/L 4 2 4 0  --    CHLORIDE mmol/L 105 100  --    CO2 mmol/L 23 25  --    CO2, I-STAT mmol/L  --   --  27   BUN mg/dL 8 7  --    CREATININE mg/dL 0 61 0 62  --    CALCIUM mg/dL 8 6 8 4  --    GLUCOSE, ISTAT mg/dl  --   --  262*                 No results found for: TROPONINT  ABG:No results found for: PHART, WQM4WYD, PO2ART, IXE8GSM, X6YUJFAF, BEART, SOURCE    Imaging Studies: I have personally reviewed pertinent reports  and I have personally reviewed pertinent films in PACS    Ct Head Without Contrast    Result Date: 2/28/2019  Impression: No acute intracranial abnormality  Right frontal scalp swelling  Parietal and cerebellar predominant atrophy  I personally discussed this study with Arsen Pearl on 2/28/2019 at 4:50 PM  Workstation performed: [id number]     Ct Spine Cervical Without Contrast    Result Date: 2/28/2019  Impression: 1  Chronic healed fractures of the cervical spine from C2 to C5 with anterior and posterior spinal fixation   2   Left C1 lateral mass screw fracture, of indeterminate age  Minimal lucency about the C2 articular screws  3   Mild distraction of the left C3-4 facet joint without facet subluxation or dislocation  There is mild retrolisthesis of C3 on C4  I personally discussed this study with Dr Marbella Torres on 2/28/2019 at 5:07 PM   Workstation performed: [id number]     Xr Trauma Multiple    Result Date: 3/1/2019  Impression: No acute traumatic abnormality Workstation performed: [id number]       EKG, Pathology, and Other Studies: I have personally reviewed pertinent reports  VTE Prophylaxis: Sequential compression device Yudith Norman     Code Status: Level 1 - Full Code  Advance Directive and Living Will:      Power of :    POLST:      Counseling / Coordination of Care  I spent 30 minutes with the patient

## 2019-03-01 NOTE — OCCUPATIONAL THERAPY NOTE
[address]  Evaluation     Patient Name: Lucia Fernandez  YCBAD'U Date: 3/1/2019  Problem List  Patient Active Problem List   Diagnosis    [facility]    ETOH abuse     Past Medical History  Past Medical History:   Diagnosis Date    [facility]     [facility] mellitus ([facility] )     Heart attack ([facility] )     Hypertension     Stroke [facility])      Past Surgical History  History reviewed  No pertinent surgical history  03/01/19 1200   Note Type   Note type Eval only   Restrictions/Precautions   Weight Bearing Precautions Per Order No   Braces or Orthoses   (C-COLLAR CLEARED )   Other Precautions Cognitive; Chair Alarm; Bed Alarm; Fall Risk;Pain   Pain Assessment   Pain Assessment No/denies pain   Pain Score No Pain   Home Living   Type of Home House   Home Layout Two level;Bed/bath upstairs  (BATHROOM IN BASEMENT AND 2ND FLOOR ONLY )   Bathroom Shower/Tub Tub/shower unit   Bathroom Toilet Standard   Bathroom Equipment Commode   Bathroom Accessibility Accessible   Home Equipment Cane   Additional Comments PT REPORTS USE OF SPC FOR COMMUNITY USE AND STAIR MANAGEMENT   Prior Function   Level of [place] Independent with ADLs and functional mobility   Lives With [employer] Help From Family   ADL Assistance Independent   IADLs Independent   Falls in the last 6 months 1 to 4   Vocational Retired   Lifestyle   Autonomy  [facility] ADLS/IADLS  CURRENTLY HAS LICENSE SUSPENDED 2' DUI    Reciprocal Relationships LIVES WITH DAUGHTER, SON IN LAW AND 4 GRANDCHILDREN  PT REPORTS SOMEONE IN HOME AT ALL TIMES AND ABLE TO ASSIST AS NEEDED      Service to Others RETIRED   Intrinsic Gratification ENJOYS SPENDING TIME WITH HIS GRANDCHILDREN    ADL   Eating Assistance 6  Modified independent   Grooming Assistance 6  Modified Independent   UB Bathing Assistance 5  Supervision/Setup   LB Bathing Assistance 5  Supervision/Setup   UB Dressing Assistance 5  Supervision/Setup   LB Dressing Assistance 5  Supervision/Setup   Toileting Assistance  5  Supervision/Setup   Functional Assistance 5  Supervision/Setup   Bed Mobility   Supine to Sit 5  Supervision   Additional items Increased time required;Verbal cues   Sit to Supine Unable to assess  (PT LEFT OOB WITH ALARM ON )   Transfers   Sit to Stand 5  Supervision   Additional items Assist x 1; Increased time required; Impulsive;Verbal cues   Stand to Sit 5  Supervision   Additional items Assist x 1; Increased time required; Impulsive;Verbal cues   Functional Mobility   Functional Mobility 5  Supervision   Additional items Rolling walker   Balance   Static Sitting Fair +   Static Standing Fair   Ambulatory Fair -   Activity Tolerance   Activity Tolerance Patient tolerated treatment well;Patient limited by fatigue   Medical Staff Made Aware SPOKE TO CM REGARDING D/C PLAN    Nurse Made Aware APPROPRIATE TO SEE PER RN   RUE Assessment   RUE Assessment WFL   LUE Assessment   LUE Assessment WFL   Hand Function   Gross Motor Coordination Functional   Fine Motor Coordination Functional   Sensation   Light Touch No apparent deficits   Cognition   Overall Cognitive Status Impaired   Arousal/Participation Alert; Cooperative   Attention Within functional limits   Orientation Level Oriented X4   Memory Decreased recall of recent events   Following Commands Follows multistep commands without difficulty   Comments PT IS PLEASANT AND COOPERATIVE  A+OX4 WITH LIMITED RECALL OF RECENT EVENTS BELIEVED TO BE 2' ALCOHOL USE  PT WITH LIMITED JUDGEMENT/INSIGHT WITH CHRONIC ALCOHOL ABUSE  PT ABLE TO APPROPRIATELY ANSWER SAFETY QUESTIONS DURING EVALUATION  PT REPORTS NO COGNITIVE CHANGES COMPARED TO BASELINE  NO ACUTE SAFETY CONCERNS DESPITE CHRONIC ALCOHOL ABUSE AT THIS TIME      Assessment   Assessment 72 YO Male SEEN FOR INITIAL OCCUPATIONAL THERAPY EVALUATION FOLLOWING ADMISSION TO [facility] S/P MULTIPLE FALLS 2' ETOH USE RESULTING IN HEAD CONTUSION  PER NEUROSX, C-COLLAR CLEARED WITH AGE INDETERMINATE FRACTURED C1 FIXATION SCREW AND CHRONIC C2-5 CERVICAL FRACTURES  PROBLEMS LIST INCLUDES ETOH ABUSE, COPD, HTN, DM, H/O MI, H/O CVA AND PREVIOUS CERVICAL SX  PT IS FROM HOME WITH FAMILY WHERE HE REPORTS BEING INDEPENDENT WITH ADLS/IADLS PTA  PT CURRENTLY DOES NOT DRIVE 2' LICENSE SUSPENDED  PT CURRENTLY REQUIRES OVERALL SUPERVISION WITH ADLS, TRANSFERS AND FUNCTIONAL MOBILITY WITH USE OF RW  PT IS LIMITED 2' FATIGUE, IMPAIRED BALANCE, OCCASIONAL DIZZINESS WITH CHANGE OF POSITIONING, LIMITED RECALL OF RECENT EVENTS, LIMITED JUDGEMENT/INSIGHT 2' ALCOHOL ABUSE  and OVERALL LIMITED ACTIVITY TOLERANCE  PT EDUCATED ON DEEP BREATHING TECHNIQUES T/O ACTIVITY, SLOWING OF PACE, ENERGY CONSERVATION TECHNIQUES FOR CARRY OVER UPON D/C, INCREASED FAMILY SUPPORT and CONTINUE PARTICIPATION IN SELF-CARE/MOBILITY WITH STAFF [address]   PT REPORTS SOMEONE IS HOME AT ALL TIMES AND ABLE TO ASSIST AS NEEDED  DME RECS INCLUDE SC + AGREE WITH PT RECS FOR USE OF RW  FROM AN OCCUPATIONAL THERAPY PERSPECTIVE, PT CAN RETURN HOME WITH INCREASED FAMILY SUPPORT WHEN MEDICALLY CLEARED  ALL CURRENT QUESTIONS/CONCERNS ADDRESSED  NO ADDITIONAL ACUTE CARE OT NEEDS  D/C OT      Goals   Patient Goals TO RETURN HOME    Recommendation   OT Discharge Recommendation Home with family support   Equipment Recommended Tub seat with back  (RW)   OT - OK to Discharge Yes   Barthel Index   Feeding 10   Bathing 0   Grooming Score 5   Dressing Score 5   Bladder Score 10   Bowels Score 10   Toilet Use Score 10   Transfers (Bed/Chair) Score 10   Mobility (Level Surface) Score 10   Stairs Score 5   Barthel Index Score 75   Modified Kapil Scale   Modified Glades Scale 3       Documentation completed by Tanisha Gray, DELILAH, OTR/L

## 2019-03-01 NOTE — UTILIZATION REVIEW
Initial Clinical Review    Admission: Date/Time/Statement: Observation 2/28 @ 1647    Orders Placed This Encounter   Procedures    Place in Observation     Standing Status:   Standing     Number of Occurrences:   1     Order Specific Question:   Admitting Physician     Answer:   Jaqueline Greene     Order Specific Question:   Level of Care     Answer:   Med Surg [16]     Order Specific Question:   Bed Type     Answer:   Trauma [7]     ED: Date/Time/Mode of Arrival:   ED Arrival Information     Expected Arrival Acuity Means of Arrival Escorted By Service Admission Type    - 2/28/2019 16:13 Immediate Ambulance [facility] Complaint    trauma alert        Chief Complaint:   Chief Complaint   Patient presents with    Fall     patient has hx of multiple falls  patient fell today, unknown LOC, step off in c-spine     History of Illness: 71 y o  male with multiple falls at home  ED Vital Signs:   ED Triage Vitals   Temperature Pulse Respirations Blood Pressure SpO2   02/28/19 1614 02/28/19 1614 02/28/19 1614 02/28/19 1614 02/28/19 1614   98 6 °F (37 °C) 85 18 113/74 97 %      Temp Source Heart Rate Source Patient Position - Orthostatic VS BP Location FiO2 (%)   02/28/19 1614 02/28/19 1614 02/28/19 1614 02/28/19 2344 --   Tympanic Monitor Lying Right arm       Pain Score       02/28/19 1801       No Pain        Wt Readings from Last 1 Encounters:   No data found for Wt     Vital Signs (abnormal): HR = 108, 90  Temp = 97 2    Pertinent Labs/Diagnostic Test Results:   CT Head - No acute intracranial abnormality  CT Spine Cervical - Chronic healed fractures of the cervical spine from C2 to C5 with anterior and posterior spinal fixation  Left C1 lateral mass screw fracture, of indeterminate age  Minimal lucency about the C2 articular screws  Mild distraction of the left C3-4 facet joint without facet subluxation or dislocation    There is mild retrolisthesis of C3 on C4     Na = 133    ED Treatment:   Medication Administration from 02/28/2019 1606 to 02/28/2019 1830       Date/Time Order Dose Route Action     02/28/2019 1801 acetaminophen (TYLENOL) tablet 975 mg 975 mg Oral Given        Past Medical/Surgical History: Active Ambulatory Problems     Diagnosis Date Noted    No Active Ambulatory Problems     Resolved Ambulatory Problems     Diagnosis Date Noted    No Resolved Ambulatory Problems     Past Medical History:   Diagnosis Date    COPD (chronic obstructive pulmonary disease) ([place] [address] )     Diabetes mellitus ([place] [address] )     Heart attack ([place] [address] )     Hypertension     Stroke [facility])      Admitting Diagnosis: Closed nondisplaced fracture of first cervical vertebra, unspecified fracture morphology, initial encounter ([place] [address] ) [S12 001A]  Closed nondisplaced fracture of fourth cervical vertebra, unspecified fracture morphology, initial encounter ([place] [address] ) [S12 301A]  Unspecified multiple injuries, initial encounter [T07  XXXA]     Age/Sex: 71 y o  male     Assessment/Plan:   64y Male to ED with sustained multiple unwitnessed falls today, ETOH abuse  Family called EMS  Pt has a recent history of C4 fracture in September  Pt admits to drinking 10 beers a day and admits he has had DT's in the past with seizures  He admits he takes aspirin daily       Trauma Active Problems:   Multiple falls  Head contusion  ETOH abuse disorder   ASA use      Trauma Plan:   Monitor overnight for any concussive symptoms  Re-assess when clinically sober for any additional injuries  Follow-up official radiology reads   [facility] protocol     [facility] score = 0  GCS = 15         Admission Orders:  [facility]  Neurological checks q4h  Cervical spine precautions  Gerontology cons  Neurosurgery cons  OT Cognitive eval  PT/OT eval and treat    Scheduled Meds:   Current Facility-Administered Medications:  acetaminophen 975 mg Oral Q8H [facility]   atorvastatin 80 mg Oral Daily With Dinner   fluticasone-vilanterol 1 puff Inhalation Daily   folic acid 1 mg Oral Daily   gabapentin 100 mg Oral TID   metoprolol tartrate 50 mg Oral Q12H [facility]   thiamine 100 mg Oral Daily   tiotropium 18 mcg Inhalation Daily     Continuous Infusions:    PRN Meds: albuterol    HYDROmorphone

## 2019-03-01 NOTE — RESPIRATORY THERAPY NOTE
RT Protocol Note  Delicia De La Rosa 71 y o  male MRN: [id number]  Unit/Bed#: [facility] 624-01 Encounter: [id number]    Assessment    Principal Problem:    Closed fracture of first cervical vertebra ([address] )  Active Problems:    ETOH abuse      Home Pulmonary Medications:  Breo, Spiriva, albuterol PRN  History reviewed  No pertinent past medical history  Social History     Socioeconomic History    Marital status: Unknown     Spouse name: None    Number of children: None    Years of education: None    Highest education level: None   Occupational History    None   Social Needs    Financial resource strain: None    Food insecurity:     Worry: None     Inability: None    Transportation needs:     Medical: None     Non-medical: None   Tobacco Use    Smoking status: Current Every Day Smoker   Substance and Sexual Activity    Alcohol use: Yes     Comment: every day drinker    Drug use: None    Sexual activity: None   Lifestyle    Physical activity:     Days per week: None     Minutes per session: None    Stress: None   Relationships    Social connections:     Talks on phone: None     Gets together: None     Attends Rastafarian service: None     Active member of club or organization: None     Attends meetings of clubs or organizations: None     Relationship status: None    Intimate partner violence:     Fear of current or ex partner: None     Emotionally abused: None     Physically abused: None     Forced sexual activity: None   Other Topics Concern    None   Social History Narrative    None       Subjective         Objective    Physical Exam:   Assessment Type: Assess only  General Appearance: Awake  Respiratory Pattern: Normal  Chest Assessment: Chest expansion symmetrical  Bilateral Breath Sounds: Clear    Vitals:  Blood pressure 99/56, pulse 78, temperature (!) 97 2 °F (36 2 °C), resp  rate 16, SpO2 96 %  Imaging and other studies: I have personally reviewed pertinent reports  Plan    Respiratory Plan: Home Bronchodilator Patient pathway        Resp Comments: Pt admit s/p fall, head contusion  Hx COPD   (+) tob hx  Uses Breo, spiriva, albuterol prn at home  CXR pending  BS clear  SPO2 96% on room air  Pt in NAD  Plan to continue w/home Rx

## 2019-03-01 NOTE — PROGRESS NOTES
Progress Note - Irene Moyer 1955, 71 y o  male MRN: [id number]    Unit/Bed#: [facility] 624-01 Encounter: [id number]    Primary Care Provider: Meredith Hurd MD   Date and time admitted to hospital: 2/28/2019  4:13 PM        ETOH abuse  Assessment & Plan  -cont  CIWA  -thiamine/folate    * Closed fracture of first cervical vertebra (HCC)  Assessment & Plan  -cont  Aspen collar  -q4h neuro checks  -no focal deficits  -neurosurgery consult; flex c-spine xray  -pain control  -c-spine precautions            Subjective/Objective   Chief Complaint: I feel well    Subjective: No acute events  No BZD needed ON  No numbness, tingling, weakness  Objective:     Meds/Allergies   No medications prior to admission  Vitals: Blood pressure 144/81, pulse 99, temperature 98 1 °F (36 7 °C), temperature source Oral, resp  rate 16, SpO2 95 %  There is no height or weight on file to calculate BMI  SpO2: SpO2: 95 %    ABG: No results found for: PHART, PHV8IAU, PO2ART, KGX0WDR, R0ZTKTWG, BEART, SOURCE      Intake/Output Summary (Last 24 hours) at 3/1/2019 1010  Last data filed at 3/1/2019 0956  Gross per 24 hour   Intake 960 ml   Output 675 ml   Net 285 ml       Invasive Devices     Peripheral Intravenous Line            Peripheral IV 02/28/19 Left Antecubital less than 1 day                Nutrition/GI Proph/Bowel Reg: Regular House    Physical Exam:   Physical Exam   Constitutional: He is oriented to person, place, and time  He appears well-developed and well-nourished  No distress  HENT:   Head: Normocephalic and atraumatic  Eyes: Pupils are equal, round, and reactive to light  Conjunctivae and EOM are normal  No scleral icterus  Neck: Normal range of motion  Neck supple  Cardiovascular: Normal rate, regular rhythm, normal heart sounds and intact distal pulses  No murmur heard  Pulmonary/Chest: Effort normal and breath sounds normal  No respiratory distress  He has no wheezes  Abdominal: Soft   Bowel sounds are normal  He exhibits no distension  There is no tenderness  Musculoskeletal: Normal range of motion  He exhibits no edema  c-spine TTP   Neurological: He is alert and oriented to person, place, and time  He displays normal reflexes  No cranial nerve deficit or sensory deficit  He exhibits normal muscle tone  Coordination normal    Skin: Skin is warm and dry  No rash noted  He is not diaphoretic  Psychiatric: He has a normal mood and affect  His behavior is normal    Nursing note and vitals reviewed  Lab Results:   BMP/CMP:   Lab Results   Component Value Date    SODIUM 137 03/01/2019    K 4 2 03/01/2019     03/01/2019    CO2 23 03/01/2019    CO2 27 02/28/2019    BUN 8 03/01/2019    CREATININE 0 61 03/01/2019    GLUCOSE 262 (H) 02/28/2019    CALCIUM 8 6 03/01/2019    EGFR 105 03/01/2019    and CBC:   Lab Results   Component Value Date    WBC 6 64 03/01/2019    HGB 12 8 03/01/2019    HCT 39 2 03/01/2019    MCV 86 03/01/2019     03/01/2019    MCH 28 2 03/01/2019    MCHC 32 7 03/01/2019    RDW 16 9 (H) 03/01/2019    MPV 10 4 03/01/2019    NRBC 0 02/28/2019     Imaging/EKG Studies:  2/28L CT cervical spine   IMPRESSION:     1  Chronic healed fractures of the cervical spine from C2 to C5 with anterior and posterior spinal fixation  2   Left C1 lateral mass screw fracture, of indeterminate age  Minimal lucency about the C2 articular screws  3   Mild distraction of the left C3-4 facet joint without facet subluxation or dislocation  There is mild retrolisthesis of C3 on C4      VTE Prophylaxis: pending nsx rec's

## 2019-03-01 NOTE — DISCHARGE INSTRUCTIONS
No heavy lifting  No strenuous activities  Recommend no driving until follow up appointment  **Please notify MD immediately if you have increased neck or arm pain  New numbness and/or weakness in your arm  Difficulty swallowing or breathing especially while lying down  Numbness or weakness in arms or legs   **

## 2019-03-01 NOTE — RESPIRATORY THERAPY NOTE
RT Protocol Note  Darryle Manzanilla 71 y o  male MRN: [id number]  Unit/Bed#: [facility] 624-01 Encounter: [id number]    Assessment    Principal Problem:    Closed fracture of first cervical vertebra ([place] [address] )  Active Problems:    ETOH abuse      Home Pulmonary Medication    Duoneb prn  Breo  Spiriva       Past Medical History:   Diagnosis Date    COPD (chronic obstructive pulmonary disease) ([place] [address] )     Diabetes mellitus ([place] [address] )     Heart attack ([place] [address] )     Hypertension     Stroke ([place] [address] )      Social History     Socioeconomic History    Marital status: Unknown     Spouse name: None    Number of children: None    Years of education: None    Highest education level: None   Occupational History    None   Social Needs    Financial resource strain: None    Food insecurity:     Worry: None     Inability: None    Transportation needs:     Medical: None     Non-medical: None   Tobacco Use    Smoking status: Former Smoker    Smokeless tobacco: Never Used    Tobacco comment: quit 5 months ago    Substance and Sexual Activity    Alcohol use:  Yes     Alcohol/week: 4 8 - 7 2 oz     Types: 8 - 12 Cans of beer per week     Frequency: 4 or more times a week     Drinks per session: 10 or more     Binge frequency: Daily or almost daily     Comment: every day drinker    Drug use: Never    Sexual activity: Not Currently   Lifestyle    Physical activity:     Days per week: None     Minutes per session: None    Stress: None   Relationships    Social connections:     Talks on phone: None     Gets together: None     Attends Buddhism service: None     Active member of club or organization: None     Attends meetings of clubs or organizations: None     Relationship status: None    Intimate partner violence:     Fear of current or ex partner: None     Emotionally abused: None     Physically abused: None     Forced sexual activity: None   Other Topics Concern    None   Social History Narrative    None       Subjective Objective    Physical Exam:   Assessment Type: Assess only  General Appearance: Alert, Awake  Respiratory Pattern: Normal  Chest Assessment: Chest expansion symmetrical  Bilateral Breath Sounds: Clear  R Breath Sounds: Clear  L Breath Sounds: Clear  O2 Device: RA    Vitals:  Blood pressure 144/81, pulse 99, temperature 98 1 °F (36 7 °C), temperature source Oral, resp  rate 16, SpO2 95 %  Imaging and other studies: I have personally reviewed pertinent reports  O2 Device: RA     Plan    Respiratory Plan: Home Bronchodilator Patient pathway        Resp Comments: Pt in no resp distress, BS clear and no need for prn udn   Pt takes Duoneb prn at home, will d/c albuterol udn prn and will order Duoneb udn Q6 4prn

## 2019-03-04 ENCOUNTER — TELEPHONE (OUTPATIENT)
Dept: NEUROSURGERY | Facility: CLINIC | Age: 64
End: 2019-03-04

## 2019-03-04 NOTE — TELEPHONE ENCOUNTER
03/04/2019-ISAIAH (03/01/2019)NEUROSURGERY WILL SIGN OFF AND FOLLOW UP IN 2 WEEKS WITH REPEAT CERVICAL XR  CALLED PT, LEFT MESSAGE ON MACHINE CONFIRMING 03/15/2019 APT W/XRAY

## 2019-03-15 ENCOUNTER — TELEPHONE (OUTPATIENT)
Dept: NEUROSURGERY | Facility: CLINIC | Age: 64
End: 2019-03-15

## 2020-02-18 ENCOUNTER — HOSPITAL ENCOUNTER (INPATIENT)
Facility: HOSPITAL | Age: 65
LOS: 2 days | Discharge: HOME HEALTH | DRG: 192 | End: 2020-02-21
Attending: EMERGENCY MEDICINE | Admitting: INTERNAL MEDICINE
Payer: MEDICARE

## 2020-02-18 DIAGNOSIS — J44.1 COPD WITH ACUTE EXACERBATION (HCC): Primary | ICD-10-CM

## 2020-02-18 PROCEDURE — 93005 ELECTROCARDIOGRAM TRACING: CPT

## 2020-02-18 PROCEDURE — 99285 EMERGENCY DEPT VISIT HI MDM: CPT

## 2020-02-18 PROCEDURE — 1123F ACP DISCUSS/DSCN MKR DOCD: CPT | Performed by: PHYSICIAN ASSISTANT

## 2020-02-18 PROCEDURE — 99285 EMERGENCY DEPT VISIT HI MDM: CPT | Performed by: EMERGENCY MEDICINE

## 2020-02-19 ENCOUNTER — APPOINTMENT (EMERGENCY)
Dept: RADIOLOGY | Facility: HOSPITAL | Age: 65
DRG: 192 | End: 2020-02-19
Payer: MEDICARE

## 2020-02-19 PROBLEM — Z59.1 DIRTY LIVING CONDITIONS: Status: ACTIVE | Noted: 2020-02-19

## 2020-02-19 PROBLEM — J44.1 CHRONIC OBSTRUCTIVE PULMONARY DISEASE WITH ACUTE EXACERBATION (HCC): Status: ACTIVE | Noted: 2018-09-19

## 2020-02-19 PROBLEM — E11.65 TYPE 2 DIABETES MELLITUS WITH HYPERGLYCEMIA, WITHOUT LONG-TERM CURRENT USE OF INSULIN (HCC): Status: ACTIVE | Noted: 2018-09-11

## 2020-02-19 PROBLEM — G47.33 OSA (OBSTRUCTIVE SLEEP APNEA): Status: ACTIVE | Noted: 2020-02-19

## 2020-02-19 PROBLEM — Z59.19 DIRTY LIVING CONDITIONS: Status: ACTIVE | Noted: 2020-02-19

## 2020-02-19 LAB
ANION GAP SERPL CALCULATED.3IONS-SCNC: 7 MMOL/L (ref 4–13)
ATRIAL RATE: 110 BPM
BASE EX.OXY STD BLDV CALC-SCNC: 94.1 % (ref 60–80)
BASE EXCESS BLDV CALC-SCNC: 4.7 MMOL/L
BASOPHILS # BLD AUTO: 0.01 THOUSANDS/ΜL (ref 0–0.1)
BASOPHILS NFR BLD AUTO: 0 % (ref 0–1)
BUN SERPL-MCNC: 18 MG/DL (ref 5–25)
CALCIUM SERPL-MCNC: 9.3 MG/DL (ref 8.3–10.1)
CHLORIDE SERPL-SCNC: 103 MMOL/L (ref 100–108)
CO2 SERPL-SCNC: 27 MMOL/L (ref 21–32)
CREAT SERPL-MCNC: 0.7 MG/DL (ref 0.6–1.3)
EOSINOPHIL # BLD AUTO: 0 THOUSAND/ΜL (ref 0–0.61)
EOSINOPHIL NFR BLD AUTO: 0 % (ref 0–6)
ERYTHROCYTE [DISTWIDTH] IN BLOOD BY AUTOMATED COUNT: 15.8 % (ref 11.6–15.1)
EST. AVERAGE GLUCOSE BLD GHB EST-MCNC: 151 MG/DL
GFR SERPL CREATININE-BSD FRML MDRD: 98 ML/MIN/1.73SQ M
GLUCOSE SERPL-MCNC: 105 MG/DL (ref 65–140)
GLUCOSE SERPL-MCNC: 159 MG/DL (ref 65–140)
GLUCOSE SERPL-MCNC: 196 MG/DL (ref 65–140)
GLUCOSE SERPL-MCNC: 200 MG/DL (ref 65–140)
GLUCOSE SERPL-MCNC: 216 MG/DL (ref 65–140)
HBA1C MFR BLD: 6.9 %
HCO3 BLDV-SCNC: 28.1 MMOL/L (ref 24–30)
HCT VFR BLD AUTO: 33.9 % (ref 36.5–49.3)
HGB BLD-MCNC: 10.9 G/DL (ref 12–17)
IMM GRANULOCYTES # BLD AUTO: 0.08 THOUSAND/UL (ref 0–0.2)
IMM GRANULOCYTES NFR BLD AUTO: 1 % (ref 0–2)
LYMPHOCYTES # BLD AUTO: 2.19 THOUSANDS/ΜL (ref 0.6–4.47)
LYMPHOCYTES NFR BLD AUTO: 20 % (ref 14–44)
MCH RBC QN AUTO: 26.5 PG (ref 26.8–34.3)
MCHC RBC AUTO-ENTMCNC: 32.2 G/DL (ref 31.4–37.4)
MCV RBC AUTO: 82 FL (ref 82–98)
MONOCYTES # BLD AUTO: 1.4 THOUSAND/ΜL (ref 0.17–1.22)
MONOCYTES NFR BLD AUTO: 13 % (ref 4–12)
NEUTROPHILS # BLD AUTO: 7.49 THOUSANDS/ΜL (ref 1.85–7.62)
NEUTS SEG NFR BLD AUTO: 66 % (ref 43–75)
NRBC BLD AUTO-RTO: 0 /100 WBCS
O2 CT BLDV-SCNC: 15.3 ML/DL
P AXIS: 4 DEGREES
PCO2 BLDV: 37 MM HG (ref 42–50)
PH BLDV: 7.5 [PH] (ref 7.3–7.4)
PLATELET # BLD AUTO: 385 THOUSANDS/UL (ref 149–390)
PMV BLD AUTO: 10.5 FL (ref 8.9–12.7)
PO2 BLDV: 80.6 MM HG (ref 35–45)
POTASSIUM SERPL-SCNC: 3.6 MMOL/L (ref 3.5–5.3)
PR INTERVAL: 168 MS
PROCALCITONIN SERPL-MCNC: 0.12 NG/ML
QRS AXIS: 84 DEGREES
QRSD INTERVAL: 112 MS
QT INTERVAL: 336 MS
QTC INTERVAL: 454 MS
RBC # BLD AUTO: 4.12 MILLION/UL (ref 3.88–5.62)
SODIUM SERPL-SCNC: 137 MMOL/L (ref 136–145)
T WAVE AXIS: 14 DEGREES
TROPONIN I SERPL-MCNC: <0.02 NG/ML
VENTRICULAR RATE: 110 BPM
WBC # BLD AUTO: 11.17 THOUSAND/UL (ref 4.31–10.16)

## 2020-02-19 PROCEDURE — 93010 ELECTROCARDIOGRAM REPORT: CPT | Performed by: INTERNAL MEDICINE

## 2020-02-19 PROCEDURE — 82948 REAGENT STRIP/BLOOD GLUCOSE: CPT

## 2020-02-19 PROCEDURE — 82805 BLOOD GASES W/O2 SATURATION: CPT | Performed by: EMERGENCY MEDICINE

## 2020-02-19 PROCEDURE — 94640 AIRWAY INHALATION TREATMENT: CPT

## 2020-02-19 PROCEDURE — 84145 PROCALCITONIN (PCT): CPT | Performed by: EMERGENCY MEDICINE

## 2020-02-19 PROCEDURE — 84484 ASSAY OF TROPONIN QUANT: CPT | Performed by: EMERGENCY MEDICINE

## 2020-02-19 PROCEDURE — 85025 COMPLETE CBC W/AUTO DIFF WBC: CPT | Performed by: EMERGENCY MEDICINE

## 2020-02-19 PROCEDURE — 36415 COLL VENOUS BLD VENIPUNCTURE: CPT | Performed by: EMERGENCY MEDICINE

## 2020-02-19 PROCEDURE — 94760 N-INVAS EAR/PLS OXIMETRY 1: CPT

## 2020-02-19 PROCEDURE — 99223 1ST HOSP IP/OBS HIGH 75: CPT | Performed by: INTERNAL MEDICINE

## 2020-02-19 PROCEDURE — 80048 BASIC METABOLIC PNL TOTAL CA: CPT | Performed by: EMERGENCY MEDICINE

## 2020-02-19 PROCEDURE — 83036 HEMOGLOBIN GLYCOSYLATED A1C: CPT | Performed by: INTERNAL MEDICINE

## 2020-02-19 PROCEDURE — 71045 X-RAY EXAM CHEST 1 VIEW: CPT

## 2020-02-19 RX ORDER — GUAIFENESIN 600 MG
600 TABLET, EXTENDED RELEASE 12 HR ORAL 2 TIMES DAILY
Status: DISCONTINUED | OUTPATIENT
Start: 2020-02-19 | End: 2020-02-21 | Stop reason: HOSPADM

## 2020-02-19 RX ORDER — DOCUSATE SODIUM 100 MG/1
100 CAPSULE, LIQUID FILLED ORAL 2 TIMES DAILY PRN
Status: DISCONTINUED | OUTPATIENT
Start: 2020-02-19 | End: 2020-02-21 | Stop reason: HOSPADM

## 2020-02-19 RX ORDER — SODIUM CHLORIDE FOR INHALATION 0.9 %
3 VIAL, NEBULIZER (ML) INHALATION
Status: DISCONTINUED | OUTPATIENT
Start: 2020-02-19 | End: 2020-02-19

## 2020-02-19 RX ORDER — GLIPIZIDE 5 MG/1
5 TABLET ORAL
Status: DISCONTINUED | OUTPATIENT
Start: 2020-02-19 | End: 2020-02-19

## 2020-02-19 RX ORDER — BUDESONIDE 0.5 MG/2ML
0.5 INHALANT ORAL
Status: DISCONTINUED | OUTPATIENT
Start: 2020-02-19 | End: 2020-02-21 | Stop reason: HOSPADM

## 2020-02-19 RX ORDER — LANOLIN ALCOHOL/MO/W.PET/CERES
3 CREAM (GRAM) TOPICAL
Status: DISCONTINUED | OUTPATIENT
Start: 2020-02-19 | End: 2020-02-21 | Stop reason: HOSPADM

## 2020-02-19 RX ORDER — CITALOPRAM 10 MG/1
10 TABLET ORAL DAILY
Status: DISCONTINUED | OUTPATIENT
Start: 2020-02-19 | End: 2020-02-21 | Stop reason: HOSPADM

## 2020-02-19 RX ORDER — THIAMINE MONONITRATE (VIT B1) 100 MG
100 TABLET ORAL DAILY
Status: DISCONTINUED | OUTPATIENT
Start: 2020-02-19 | End: 2020-02-21 | Stop reason: HOSPADM

## 2020-02-19 RX ORDER — METOPROLOL SUCCINATE 100 MG/1
100 TABLET, EXTENDED RELEASE ORAL DAILY
Status: DISCONTINUED | OUTPATIENT
Start: 2020-02-19 | End: 2020-02-21 | Stop reason: HOSPADM

## 2020-02-19 RX ORDER — FLUTICASONE PROPIONATE 50 MCG
1 SPRAY, SUSPENSION (ML) NASAL 2 TIMES DAILY
Status: DISCONTINUED | OUTPATIENT
Start: 2020-02-19 | End: 2020-02-21 | Stop reason: HOSPADM

## 2020-02-19 RX ORDER — ATORVASTATIN CALCIUM 80 MG/1
80 TABLET, FILM COATED ORAL
Status: DISCONTINUED | OUTPATIENT
Start: 2020-02-19 | End: 2020-02-21 | Stop reason: HOSPADM

## 2020-02-19 RX ORDER — CLOPIDOGREL BISULFATE 75 MG/1
75 TABLET ORAL DAILY
Status: DISCONTINUED | OUTPATIENT
Start: 2020-02-19 | End: 2020-02-21 | Stop reason: HOSPADM

## 2020-02-19 RX ORDER — GLIMEPIRIDE 1 MG/1
1 TABLET ORAL DAILY
Status: CANCELLED | OUTPATIENT
Start: 2020-02-19

## 2020-02-19 RX ORDER — ONDANSETRON 2 MG/ML
4 INJECTION INTRAMUSCULAR; INTRAVENOUS EVERY 6 HOURS PRN
Status: DISCONTINUED | OUTPATIENT
Start: 2020-02-19 | End: 2020-02-21 | Stop reason: HOSPADM

## 2020-02-19 RX ORDER — LORAZEPAM 0.5 MG/1
0.5 TABLET ORAL EVERY 6 HOURS PRN
Status: DISCONTINUED | OUTPATIENT
Start: 2020-02-19 | End: 2020-02-21 | Stop reason: HOSPADM

## 2020-02-19 RX ORDER — OXYCODONE HYDROCHLORIDE 5 MG/1
5 TABLET ORAL EVERY 4 HOURS PRN
Status: DISCONTINUED | OUTPATIENT
Start: 2020-02-19 | End: 2020-02-21 | Stop reason: HOSPADM

## 2020-02-19 RX ORDER — ALBUTEROL SULFATE 2.5 MG/3ML
5 SOLUTION RESPIRATORY (INHALATION) ONCE
Status: COMPLETED | OUTPATIENT
Start: 2020-02-19 | End: 2020-02-19

## 2020-02-19 RX ORDER — LEVALBUTEROL 1.25 MG/.5ML
1.25 SOLUTION, CONCENTRATE RESPIRATORY (INHALATION)
Status: DISCONTINUED | OUTPATIENT
Start: 2020-02-19 | End: 2020-02-21 | Stop reason: HOSPADM

## 2020-02-19 RX ORDER — SODIUM CHLORIDE FOR INHALATION 0.9 %
VIAL, NEBULIZER (ML) INHALATION
Status: DISPENSED
Start: 2020-02-19 | End: 2020-02-19

## 2020-02-19 RX ORDER — GABAPENTIN 100 MG/1
100 CAPSULE ORAL 3 TIMES DAILY
Status: DISCONTINUED | OUTPATIENT
Start: 2020-02-19 | End: 2020-02-21 | Stop reason: HOSPADM

## 2020-02-19 RX ORDER — PREDNISONE 20 MG/1
40 TABLET ORAL DAILY
Status: DISCONTINUED | OUTPATIENT
Start: 2020-02-19 | End: 2020-02-19

## 2020-02-19 RX ORDER — PANTOPRAZOLE SODIUM 40 MG/1
40 TABLET, DELAYED RELEASE ORAL
Status: DISCONTINUED | OUTPATIENT
Start: 2020-02-19 | End: 2020-02-21 | Stop reason: HOSPADM

## 2020-02-19 RX ORDER — LEVALBUTEROL 1.25 MG/.5ML
1.25 SOLUTION, CONCENTRATE RESPIRATORY (INHALATION)
Status: DISCONTINUED | OUTPATIENT
Start: 2020-02-19 | End: 2020-02-19

## 2020-02-19 RX ORDER — FLUTICASONE FUROATE AND VILANTEROL 200; 25 UG/1; UG/1
1 POWDER RESPIRATORY (INHALATION)
Status: DISCONTINUED | OUTPATIENT
Start: 2020-02-19 | End: 2020-02-19

## 2020-02-19 RX ORDER — FOLIC ACID 1 MG/1
1 TABLET ORAL DAILY
Status: DISCONTINUED | OUTPATIENT
Start: 2020-02-19 | End: 2020-02-21 | Stop reason: HOSPADM

## 2020-02-19 RX ORDER — CHOLECALCIFEROL (VITAMIN D3) 10 MCG
1 TABLET ORAL
Status: DISCONTINUED | OUTPATIENT
Start: 2020-02-19 | End: 2020-02-21 | Stop reason: HOSPADM

## 2020-02-19 RX ORDER — QUETIAPINE FUMARATE 25 MG/1
25 TABLET, FILM COATED ORAL
Status: DISCONTINUED | OUTPATIENT
Start: 2020-02-19 | End: 2020-02-21 | Stop reason: HOSPADM

## 2020-02-19 RX ORDER — TRIAMCINOLONE ACETONIDE 1 MG/G
CREAM TOPICAL 2 TIMES DAILY
Status: DISCONTINUED | OUTPATIENT
Start: 2020-02-19 | End: 2020-02-21 | Stop reason: HOSPADM

## 2020-02-19 RX ORDER — LISINOPRIL 20 MG/1
40 TABLET ORAL DAILY
Status: DISCONTINUED | OUTPATIENT
Start: 2020-02-19 | End: 2020-02-21 | Stop reason: HOSPADM

## 2020-02-19 RX ORDER — MAGNESIUM HYDROXIDE/ALUMINUM HYDROXICE/SIMETHICONE 120; 1200; 1200 MG/30ML; MG/30ML; MG/30ML
30 SUSPENSION ORAL EVERY 6 HOURS PRN
Status: DISCONTINUED | OUTPATIENT
Start: 2020-02-19 | End: 2020-02-21 | Stop reason: HOSPADM

## 2020-02-19 RX ORDER — METHYLPREDNISOLONE SODIUM SUCCINATE 40 MG/ML
40 INJECTION, POWDER, LYOPHILIZED, FOR SOLUTION INTRAMUSCULAR; INTRAVENOUS EVERY 12 HOURS SCHEDULED
Status: DISCONTINUED | OUTPATIENT
Start: 2020-02-19 | End: 2020-02-20

## 2020-02-19 RX ORDER — ACETAMINOPHEN 325 MG/1
650 TABLET ORAL EVERY 6 HOURS PRN
Status: DISCONTINUED | OUTPATIENT
Start: 2020-02-19 | End: 2020-02-21 | Stop reason: HOSPADM

## 2020-02-19 RX ADMIN — ISODIUM CHLORIDE 3 ML: 0.03 SOLUTION RESPIRATORY (INHALATION) at 08:16

## 2020-02-19 RX ADMIN — IPRATROPIUM BROMIDE 0.5 MG: 0.5 SOLUTION RESPIRATORY (INHALATION) at 19:45

## 2020-02-19 RX ADMIN — FLUTICASONE PROPIONATE 1 SPRAY: 50 SPRAY, METERED NASAL at 11:03

## 2020-02-19 RX ADMIN — ENOXAPARIN SODIUM 40 MG: 40 INJECTION SUBCUTANEOUS at 11:12

## 2020-02-19 RX ADMIN — GUAIFENESIN 600 MG: 600 TABLET, EXTENDED RELEASE ORAL at 18:37

## 2020-02-19 RX ADMIN — BUDESONIDE 0.5 MG: 0.5 INHALANT RESPIRATORY (INHALATION) at 19:45

## 2020-02-19 RX ADMIN — INSULIN LISPRO 1 UNITS: 100 INJECTION, SOLUTION INTRAVENOUS; SUBCUTANEOUS at 06:58

## 2020-02-19 RX ADMIN — CITALOPRAM HYDROBROMIDE 10 MG: 10 TABLET ORAL at 11:05

## 2020-02-19 RX ADMIN — Medication 1 CAPSULE: at 18:39

## 2020-02-19 RX ADMIN — ALBUTEROL SULFATE 5 MG: 2.5 SOLUTION RESPIRATORY (INHALATION) at 00:34

## 2020-02-19 RX ADMIN — PANTOPRAZOLE SODIUM 40 MG: 40 TABLET, DELAYED RELEASE ORAL at 06:19

## 2020-02-19 RX ADMIN — CLOPIDOGREL BISULFATE 75 MG: 75 TABLET ORAL at 11:12

## 2020-02-19 RX ADMIN — FOLIC ACID 1 MG: 1 TABLET ORAL at 11:12

## 2020-02-19 RX ADMIN — LISINOPRIL 40 MG: 20 TABLET ORAL at 11:05

## 2020-02-19 RX ADMIN — LEVALBUTEROL HYDROCHLORIDE 1.25 MG: 1.25 SOLUTION, CONCENTRATE RESPIRATORY (INHALATION) at 08:08

## 2020-02-19 RX ADMIN — QUETIAPINE FUMARATE 25 MG: 25 TABLET ORAL at 21:28

## 2020-02-19 RX ADMIN — GABAPENTIN 100 MG: 100 CAPSULE ORAL at 11:04

## 2020-02-19 RX ADMIN — THIAMINE HCL TAB 100 MG 100 MG: 100 TAB at 11:12

## 2020-02-19 RX ADMIN — PANTOPRAZOLE SODIUM 40 MG: 40 TABLET, DELAYED RELEASE ORAL at 18:37

## 2020-02-19 RX ADMIN — LEVALBUTEROL HYDROCHLORIDE 1.25 MG: 1.25 SOLUTION, CONCENTRATE RESPIRATORY (INHALATION) at 13:54

## 2020-02-19 RX ADMIN — METHYLPREDNISOLONE SODIUM SUCCINATE 40 MG: 40 INJECTION, POWDER, FOR SOLUTION INTRAMUSCULAR; INTRAVENOUS at 21:28

## 2020-02-19 RX ADMIN — FLUTICASONE FUROATE AND VILANTEROL TRIFENATATE 1 PUFF: 200; 25 POWDER RESPIRATORY (INHALATION) at 11:02

## 2020-02-19 RX ADMIN — TRIAMCINOLONE ACETONIDE: 1 CREAM TOPICAL at 11:03

## 2020-02-19 RX ADMIN — GUAIFENESIN 600 MG: 600 TABLET, EXTENDED RELEASE ORAL at 11:04

## 2020-02-19 RX ADMIN — IPRATROPIUM BROMIDE 0.5 MG: 0.5 SOLUTION RESPIRATORY (INHALATION) at 00:34

## 2020-02-19 RX ADMIN — GLIPIZIDE 5 MG: 5 TABLET ORAL at 06:57

## 2020-02-19 RX ADMIN — IPRATROPIUM BROMIDE 0.5 MG: 0.5 SOLUTION RESPIRATORY (INHALATION) at 13:54

## 2020-02-19 RX ADMIN — ATORVASTATIN CALCIUM 80 MG: 80 TABLET, FILM COATED ORAL at 18:36

## 2020-02-19 RX ADMIN — PREDNISONE 40 MG: 20 TABLET ORAL at 06:19

## 2020-02-19 RX ADMIN — GABAPENTIN 100 MG: 100 CAPSULE ORAL at 18:37

## 2020-02-19 RX ADMIN — INSULIN LISPRO 2 UNITS: 100 INJECTION, SOLUTION INTRAVENOUS; SUBCUTANEOUS at 11:14

## 2020-02-19 RX ADMIN — Medication 1 TABLET: at 11:04

## 2020-02-19 RX ADMIN — METOPROLOL SUCCINATE 100 MG: 100 TABLET, EXTENDED RELEASE ORAL at 11:06

## 2020-02-19 RX ADMIN — INSULIN LISPRO 1 UNITS: 100 INJECTION, SOLUTION INTRAVENOUS; SUBCUTANEOUS at 21:29

## 2020-02-19 RX ADMIN — GABAPENTIN 100 MG: 100 CAPSULE ORAL at 21:28

## 2020-02-19 RX ADMIN — ALBUTEROL SULFATE 5 MG: 2.5 SOLUTION RESPIRATORY (INHALATION) at 00:08

## 2020-02-19 RX ADMIN — METFORMIN HYDROCHLORIDE 1000 MG: 500 TABLET ORAL at 06:19

## 2020-02-19 RX ADMIN — LEVALBUTEROL HYDROCHLORIDE 1.25 MG: 1.25 SOLUTION, CONCENTRATE RESPIRATORY (INHALATION) at 19:45

## 2020-02-19 RX ADMIN — IPRATROPIUM BROMIDE 0.5 MG: 0.5 SOLUTION RESPIRATORY (INHALATION) at 00:08

## 2020-02-19 RX ADMIN — TIOTROPIUM BROMIDE 18 MCG: 18 CAPSULE ORAL; RESPIRATORY (INHALATION) at 11:07

## 2020-02-19 RX ADMIN — FLUTICASONE PROPIONATE 1 SPRAY: 50 SPRAY, METERED NASAL at 18:37

## 2020-02-19 NOTE — ASSESSMENT & PLAN NOTE
· Pt reports to daily alcohol use - 4 beers/day   · He is on thiamine, folate and multivitamins    He also takes Seroquel 25 mg at night  · Ativan 0 5 mg tablet as needed  · CIWA protocol  · No s/sx of withdrawal at this time

## 2020-02-19 NOTE — RESPIRATORY THERAPY NOTE
RT Protocol Note  Serena Odell 76 y o  male MRN: [id number]  Unit/Bed#: [facility] 28 Encounter: [id number]    Assessment    Principal Problem:    Chronic obstructive pulmonary disease with acute exacerbation ([place] [address] )  Active Problems:    Alcohol abuse    CAD (coronary artery disease)    Type 2 diabetes mellitus with hyperglycemia, without long-term current use of insulin (HCC)    KLARISSA (obstructive sleep apnea)    Dirty living conditions      Home Pulmonary Medications:  Albuterol and atrovent TID       Past Medical History:   Diagnosis Date    Cardiac arrest ([place] [address] )     COPD (chronic obstructive pulmonary disease) ([place] [address] )     CVA (cerebral vascular accident) ([place] [address] )     Diabetes mellitus ([place] [address] )     Heart attack ([place] [address] )     Hypertension     Stroke ([place] [address] )      Social History     Socioeconomic History    Marital status:      Spouse name: None    Number of children: None    Years of education: None    Highest education level: None   Occupational History    None   Social Needs    Financial resource strain: None    Food insecurity:     Worry: None     Inability: None    Transportation needs:     Medical: None     Non-medical: None   Tobacco Use    Smoking status: Former Smoker    Smokeless tobacco: Never Used    Tobacco comment: quit 5 months ago    Substance and Sexual Activity    Alcohol use:  Yes     Alcohol/week: 8 0 - 12 0 standard drinks     Types: 8 - 12 Cans of beer per week     Frequency: 4 or more times a week     Drinks per session: 10 or more     Binge frequency: Daily or almost daily     Comment: every day drinker    Drug use: Never    Sexual activity: Not Currently   Lifestyle    Physical activity:     Days per week: None     Minutes per session: None    Stress: None   Relationships    Social connections:     Talks on phone: None     Gets together: None     Attends Evangelical service: None     Active member of club or organization: None     Attends meetings of clubs or organizations: None Relationship status: None    Intimate partner violence:     Fear of current or ex partner: None     Emotionally abused: None     Physically abused: None     Forced sexual activity: None   Other Topics Concern    None   Social History Narrative    ** Merged History Encounter **         ** Merged History Encounter **            Subjective         Objective    Physical Exam:   Assessment Type: Assess only  Cough: None    Vitals:  Blood pressure 149/69, pulse 76, temperature 98 °F (36 7 °C), temperature source Oral, resp  rate 18, height 6' 1" (1 854 m), weight 74 8 kg (165 lb), SpO2 96 %  Imaging and other studies: I have personally reviewed pertinent reports  Plan    Respiratory Plan: Home Bronchodilator Patient pathway        Resp Comments: Pt  evaluated for respiratory protocol  BS= well aerated and sl coarse  Pt  in no distress on RMA  SpO2=95%  Pt  uses UDN txs TID at home and has been ordered the same in the ER  No further respiratory intervention required at this time  Will continue to monitor and titrate care accordingly

## 2020-02-19 NOTE — ASSESSMENT & PLAN NOTE
Lab Results   Component Value Date    HGBA1C 8 5 (H) 09/15/2018       No results for input(s): POCGLU in the last 72 hours  Blood Sugar Average: Last 72 hrs:   continue Januvia, Glucophage and Glucotrol  Hemoglobin A1c  Check blood sugars before meals and bedtime  Insulin sliding scale

## 2020-02-19 NOTE — ASSESSMENT & PLAN NOTE
Patient is on multiple medications for COPD  Unfortunately, as the patient goes home he gets into exacerbation due to unkept living conditions (please see notes from [facility] February 15, 2020)  Patient would need case management  So far, the patient did well after nebulization of albuterol with ipratropium  Would continue with prednisone 40 mg daily  Continue nebulizations of levalbuterol and ipratropium  His baseline is 95% on room air

## 2020-02-19 NOTE — ED ATTENDING ATTESTATION
2/18/2020  IEse MD, saw and evaluated the patient  I have discussed the patient with the resident/non-physician practitioner and agree with the resident's/non-physician practitioner's findings, Plan of Care, and MDM as documented in the resident's/non-physician practitioner's note, except where noted  All available labs and Radiology studies were reviewed  I was present for key portions of any procedure(s) performed by the resident/non-physician practitioner and I was immediately available to provide assistance  At this point I agree with the current assessment done in the Emergency Department  I have conducted an independent evaluation of this patient a history and physical is as follows:    ED Course     Emergency Department Note- Jamila Escoto 76 y o  male MRN: [id number]    Unit/Bed#: ED 28 Encounter: [id number]    Jamila Escoto is a 76 y o  male who presents with   Chief Complaint   Patient presents with    Shortness of Breath     Pt with hx of COPD states he was "laying around" and got SOB  Denies CP  History of Present Illness   HPI:  Jamila Escoto is a 76 y o  male who presents for evaluation of:  Recurrent SOB from COPD tonight  Patient has been to the ED several times for COPD flares over the last couple of weeks  Patient is not sure what triggers the dyspnea  He is currently on prednisone  Review of Systems   Constitutional: Negative for chills and fever  HENT: Negative for congestion and rhinorrhea  Respiratory: Positive for cough (small amount) and shortness of breath  Cardiovascular: Negative for chest pain and palpitations  Gastrointestinal: Negative for abdominal pain and nausea  All other systems reviewed and are negative        Historical Information   Past Medical History:   Diagnosis Date    Cardiac arrest ([facility] )     COPD (chronic obstructive pulmonary disease) ([facility] )     CVA (cerebral vascular accident) ([facility] )     Diabetes mellitus ([facility] )  [facility] attack ([facility] )     Hypertension     Stroke [facility])      Past Surgical History:   Procedure Laterality Date    CERVICAL FUSION N/A 9/10/2018    Procedure: Posterior cervical decompressive laminectomy C3-6; Posterior cervical lateral mass and pedicle fixation fusion C1-T1;  Surgeon: Karsten Beyer MD;  Location: [facility];  Service: Neurosurgery     Social History   Social History     Substance and Sexual Activity   Alcohol Use Yes    Alcohol/week: 8 0 - 12 0 standard drinks    Types: 8 - 12 Cans of beer per week    Frequency: 4 or more times a week    Drinks per session: 10 or more    Binge frequency: Daily or almost daily    Comment: every day drinker     Social History     Substance and Sexual Activity   Drug Use Never     Social History     Tobacco Use   Smoking Status Former Smoker   Smokeless Tobacco Never Used   Tobacco Comment    quit 5 months ago      Family History: non-contributory    Meds/Allergies   all medications and allergies reviewed  Allergies   Allergen Reactions    Amoxicillin Hives    Amoxicillin     Amoxicillin Hives    Amoxicillin-Pot Clavulanate Hives    Augmentin [Amoxicillin-Pot Clavulanate]     Augmentin [Amoxicillin-Pot Clavulanate] Hives       Objective   First Vitals:   Blood Pressure: 150/100 (02/18/20 2342)  Pulse: (!) 110 (02/18/20 2342)  Temperature: 98 °F (36 7 °C) (02/18/20 2342)  Temp Source: Oral (02/18/20 2342)  Respirations: 22 (02/18/20 2342)  Height: 6' 1" (185 4 cm) (02/18/20 2342)  Weight - Scale: 74 8 kg (165 lb) (02/18/20 2342)  SpO2: 92 % (02/18/20 2342)    Current Vitals:   Blood Pressure: 150/100 (02/18/20 2342)  Pulse: (!) 110 (02/18/20 2342)  Temperature: 98 °F (36 7 °C) (02/18/20 2342)  Temp Source: Oral (02/18/20 2342)  Respirations: 22 (02/18/20 2342)  Height: 6' 1" (185 4 cm) (02/18/20 2342)  Weight - Scale: 74 8 kg (165 lb) (02/18/20 2342)  SpO2: 92 % (02/18/20 2342)    No intake or output data in the 24 hours ending 02/19/20 0022    Invasive Devices     Peripheral Intravenous Line            Peripheral IV 20 Left Forearm less than 1 day                Physical Exam   Constitutional: He is oriented to person, place, and time  He appears well-developed and well-nourished  HENT:   Head: Normocephalic and atraumatic  Mouth/Throat: Oropharynx is clear and moist    Neck: Normal range of motion  Neck supple  Cardiovascular: Normal rate and regular rhythm  Pulmonary/Chest: No accessory muscle usage  No respiratory distress  He has wheezes in the right middle field, the right lower field, the left middle field and the left lower field  He has no rales  Abdominal: Soft  Bowel sounds are normal    Musculoskeletal:        Right lower leg: Normal         Left lower leg: Normal    Neurological: He is alert and oriented to person, place, and time  Skin: Skin is warm and dry  Capillary refill takes less than 2 seconds  Psychiatric: He has a normal mood and affect  His behavior is normal    Nursing note and vitals reviewed  75 yo M with albuterol neb in no distress    Medical Decision Makin   Acute dyspnea secondary to COPD flare: albuterol and ipratropium neb    Recent Results (from the past 36 hour(s))   CBC and differential    Collection Time: 20 12:11 AM   Result Value Ref Range    WBC 11 17 (H) 4 31 - 10 16 Thousand/uL    RBC 4 12 3 88 - 5 62 Million/uL    Hemoglobin 10 9 (L) 12 0 - 17 0 g/dL    Hematocrit 33 9 (L) 36 5 - 49 3 %    MCV 82 82 - 98 fL    MCH 26 5 (L) 26 8 - 34 3 pg    MCHC 32 2 31 4 - 37 4 g/dL    RDW 15 8 (H) 11 6 - 15 1 %    MPV 10 5 8 9 - 12 7 fL    Platelets 811 925 - 190 Thousands/uL    nRBC 0 /100 WBCs    Neutrophils Relative 66 43 - 75 %    Immat GRANS % 1 0 - 2 %    Lymphocytes Relative 20 14 - 44 %    Monocytes Relative 13 (H) 4 - 12 %    Eosinophils Relative 0 0 - 6 %    Basophils Relative 0 0 - 1 %    Neutrophils Absolute 7 49 1 85 - 7 62 Thousands/µL    Immature Grans Absolute 0 08 0 00 - 0 20 Thousand/uL    Lymphocytes Absolute 2 19 0 60 - 4 47 Thousands/µL    Monocytes Absolute 1 40 (H) 0 17 - 1 22 Thousand/µL    Eosinophils Absolute 0 00 0 00 - 0 61 Thousand/µL    Basophils Absolute 0 01 0 00 - 0 10 Thousands/µL     XR chest 1 view portable    (Results Pending)         Portions of the record may have been created with voice recognition software  Occasional wrong word or "sound a like" substitutions may have occurred due to the inherent limitations of voice recognition software  Read the chart carefully and recognize, using context, where substitutions have occurred          Critical Care Time  Procedures

## 2020-02-19 NOTE — ASSESSMENT & PLAN NOTE
· Pt has had multiple ER visits and hospitalizations at [facility] over last several weeks regarding his COPD  · After he is released from hospital he returns soon after  From note at [address] on 2/15, reportedly home is unkempt and there are cats/dogs in residence    Pt reports allergies to cats   · Pulmonology consult given frequent ER visits in last several weeks   · Continue scheduled nebs, prednisone   · CM consult   · Pt reports to improvement of symptoms following nebulizer treatment   · Currently on room air

## 2020-02-19 NOTE — PLAN OF CARE
Problem: Potential for Falls  Goal: Patient will remain free of falls  Description  INTERVENTIONS:  - Assess patient frequently for physical needs  -  Identify cognitive and physical deficits and behaviors that affect risk of falls    -  [place] fall precautions as indicated by assessment   - Educate patient/family on patient safety including physical limitations  - Instruct patient to call for assistance with activity based on assessment  - Modify environment to reduce risk of injury  - Consider OT/PT consult to assist with strengthening/mobility  Outcome: Progressing     Problem: PAIN - ADULT  Goal: Verbalizes/displays adequate comfort level or baseline comfort level  Description  Interventions:  - Encourage patient to monitor pain and request assistance  - Assess pain using appropriate pain scale  - Administer analgesics based on type and severity of pain and evaluate response  - Implement non-pharmacological measures as appropriate and evaluate response  - Consider cultural and social influences on pain and pain management  - Notify physician/advanced practitioner if interventions unsuccessful or patient reports new pain  Outcome: Progressing     Problem: INFECTION - ADULT  Goal: Absence or prevention of progression during hospitalization  Description  INTERVENTIONS:  - Assess and monitor for signs and symptoms of infection  - Monitor lab/diagnostic results  - Monitor all insertion sites, i e  indwelling lines, tubes, and drains  - Monitor endotracheal if appropriate and nasal secretions for changes in amount and color  - [place] appropriate cooling/warming therapies per order  - Administer medications as ordered  - Instruct and encourage patient and family to use good hand hygiene technique  - Identify and instruct in appropriate isolation precautions for identified infection/condition  Outcome: Progressing  Goal: Absence of fever/infection during neutropenic period  Description  INTERVENTIONS:  - Monitor WBC    Outcome: Progressing     Problem: SAFETY ADULT  Goal: Maintain or return to baseline ADL function  Description  INTERVENTIONS:  -  Assess patient's ability to carry out ADLs; assess patient's baseline for ADL function and identify physical deficits which impact ability to perform ADLs (bathing, care of mouth/teeth, toileting, grooming, dressing, etc )  - Assess/evaluate cause of self-care deficits   - Assess range of motion  - Assess patient's mobility; develop plan if impaired  - Assess patient's need for assistive devices and provide as appropriate  - Encourage maximum independence but intervene and supervise when necessary  - Involve family in performance of ADLs  - Assess for home care needs following discharge   - Consider OT consult to assist with ADL evaluation and planning for discharge  - Provide patient education as appropriate  Outcome: Progressing  Goal: Maintain or return mobility status to optimal level  Description  INTERVENTIONS:  - Assess patient's baseline mobility status (ambulation, transfers, stairs, etc )    - Identify cognitive and physical deficits and behaviors that affect mobility  - Identify mobility aids required to assist with transfers and/or ambulation (gait belt, sit-to-stand, lift, walker, cane, etc )  - [place] fall precautions as indicated by assessment  - Record patient progress and toleration of activity level on Mobility SBAR; progress patient to next Phase/Stage  - Instruct patient to call for assistance with activity based on assessment  - Consider rehabilitation consult to assist with strengthening/weightbearing, etc   Outcome: Progressing     Problem: DISCHARGE PLANNING  Goal: Discharge to home or other facility with appropriate resources  Description  INTERVENTIONS:  - Identify barriers to discharge w/patient and caregiver  - Arrange for needed discharge resources and transportation as appropriate  - Identify discharge learning needs (meds, wound care, etc )  - Arrange for interpretive services to assist at discharge as needed  - Refer to Case Management Department for coordinating discharge planning if the patient needs post-hospital services based on physician/advanced practitioner order or complex needs related to functional status, cognitive ability, or social support system  Outcome: Progressing     Problem: Knowledge Deficit  Goal: Patient/family/caregiver demonstrates understanding of disease process, treatment plan, medications, and discharge instructions  Description  Complete learning assessment and assess knowledge base    Interventions:  - Provide teaching at level of understanding  - Provide teaching via preferred learning methods  Outcome: Progressing

## 2020-02-19 NOTE — CONSULTS
Consultation - Pulmonary Medicine   Emily Kennedy 76 y o  male MRN: [id number]  Unit/Bed#: ED 28 Encounter: [id number]      Assessment/plan:  1  COPD with acute exacerbation  -with reported symptoms probably asthma/COPD overlap   -discontinue prednisone and start Solu-Medrol 40 mg IV q 12   -discontinue  Breo and Spiriva  Start nebulization with Xopenex Atrovent q 6 hours  -Pulmicort nebulization b i d  -out of bed increase activity as tolerated  Pulmonary toilet  Incentive spirometry   -chest x-ray reviewed no acute pulmonary disease   -outpatient pulmonary function testing  -discussed importance of compliance with pulmonary medications and CPAP  -patient should have yearly CT lung cancer screening  I reviewed [facility] CTA chest report 02/01/2020  No PE, elevation right hemidiaphragm, mild emphysema, and severe coronary artery arthrosclerotic calcification  Few tiny pulmonary nodules up to 3 mm left upper lobe mention  -patient will follow with Dr Sacha Dent pulmonologist at [facility]  Labs reviewed  Procalcitonin normal   VBG 7 49/37/80  Elevated white count 11 1 likely secondary to steroid use  Troponin normal   Creatinine 0 7 with GFR 98    2  KLARISSA    -CPAP q h s  per [facility] pulmonary note, patient supposed to be on CPAP 14 cm H2O      History of Present Illness   Physician Requesting Consult: John Moralez MD  Reason for Consult / Principal Problem:  COPD exacerbation with frequent hospital visits  HPI: Emily Kennedy is a 76y o  year old male with significant history of frequent COPD exacerbation and reports approximately 10 ED/hospitalization over the past year  Patient follows at [facility] and pulmonologist is Dr Sacha Dent  Patient states was just hospitalized last week and discharged on Friday  Discharge on steroid taper however states over the past 4 days his symptoms have worsened and presented to ED for evaluation    Patient also with history of KLARISSA with recommended CPAP 14 cm H2O q h s  however patient reports is noncompliant with his CPAP device  Patient also not taking his pulmonary medications as recommended  List of medications as below  Patient reports drinking approximately 4 beers daily  Quit smoking approximately 6 months ago with approximately 50 pack years  Patient reports living conditions with unkept and crowding situation  Daughter and  and their 4 children live in his house  Reports they have 2 cats 2 dogs and 2 birds  Patient states has noticed when around cats/dogs he does have noted increased bronchospasm  Denies any significant cough or sputum  No hemoptysis  Denies any fevers, chills, chest pain, abdominal pain, lower extremity edema, calf pain  No home O2 reported  Patient with room air O2 saturation 96% during my examination  Reports mild dyspnea at rest and significant dyspnea with ambulation and climbing stairs  Approximately 50 pack year smoking history  Quit smoking approximately 6 months ago  Former employee at [employer] and retired from [employer]  Denies any recent travel  Dogs and cats in the house which seem to exacerbate his symptoms  Positive 2 birds as pets      Per pulmonologist note Dr Sanjana Saunders 11/11/2019 patient is supposed to be on albuterol, DuoNeb, Trelegy and Daliresp  Patient not taking medications as directed and states has only been using his albuterol and frequent prednisone tapers  Patient not compliant with his CPAP 14 cm H2O q h s  as well  Patient does not recall ever having pulmonary function testing  Inpatient consult to Pulmonology  Consult performed by: Santos Javier PA-C  Consult ordered by: Wilian Fritz PA-C       I reviewed patient's past medical history, allergies, medications, family history and social history  Review of Systems   Constitutional: Negative for chills, fever and unexpected weight change  HENT: Negative for congestion, rhinorrhea, sore throat and trouble swallowing      Respiratory: Positive for cough (Mostly nonproductive cough), chest tightness, shortness of breath and wheezing  Cardiovascular: Negative  Negative for chest pain, palpitations and leg swelling  Gastrointestinal: Negative for abdominal pain  Genitourinary: Negative for decreased urine volume  Musculoskeletal: Negative for myalgias  Skin: Negative for rash  Neurological: Negative for headaches  Hematological: Negative for adenopathy  Psychiatric/Behavioral: Negative for confusion  All other systems reviewed and are negative  Historical Information   Past Medical History:   Diagnosis Date    Cardiac arrest ([facility] )     COPD (chronic obstructive pulmonary disease) ([place] [address] )     CVA (cerebral vascular accident) ([facility] )     Diabetes mellitus ([facility] )     Heart attack ([place] [address] )     Hypertension     Stroke [facility])      Past Surgical History:   Procedure Laterality Date    CERVICAL FUSION N/A 9/10/2018    Procedure: Posterior cervical decompressive laminectomy C3-6; Posterior cervical lateral mass and pedicle fixation fusion C1-T1;  Surgeon: Maxine Gloria MD;  [facility]: [facility];  Service: Neurosurgery     Social History   Social History     Substance and Sexual Activity   Alcohol Use Yes    Alcohol/week: 8 0 - 12 0 standard drinks    Types: 8 - 12 Cans of beer per week    Frequency: 4 or more times a week    Drinks per session: 10 or more    Binge frequency: Daily or almost daily    Comment: every day drinker     Social History     Substance and Sexual Activity   Drug Use Never     Social History     Tobacco Use   Smoking Status Former Smoker   Smokeless Tobacco Never Used   Tobacco Comment    quit 5 months ago    Approximately 50 pack years    Reports quitting 6 months ago  Occupational History for HPI    Family History: non-contributory    Meds/Allergies   all current active meds have been reviewed, pertinent pulmonary meds have been reviewed, current meds:   Current Facility-Administered Medications Medication Dose Route Frequency    acetaminophen (TYLENOL) tablet 650 mg  650 mg Oral Q6H PRN    aluminum-magnesium hydroxide-simethicone (MYLANTA) 200-200-20 mg/5 mL oral suspension 30 mL  30 mL Oral Q6H PRN    atorvastatin (LIPITOR) tablet 80 mg  80 mg Oral Daily With Dinner    b complex-vitamin C-folic acid (NEPHROCAPS) capsule 1 capsule  1 capsule Oral Daily With Dinner    budesonide (PULMICORT) inhalation solution 0 5 mg  0 5 mg Nebulization Q12H    citalopram (CeleXA) tablet 10 mg  10 mg Oral Daily    clopidogrel (PLAVIX) tablet 75 mg  75 mg Oral Daily    docusate sodium (COLACE) capsule 100 mg  100 mg Oral BID PRN    enoxaparin (LOVENOX) subcutaneous injection 40 mg  40 mg Subcutaneous Daily    fluticasone (FLONASE) 50 mcg/act nasal spray 1 spray  1 spray Nasal BID    folic acid (FOLVITE) tablet 1 mg  1 mg Oral Daily    gabapentin (NEURONTIN) capsule 100 mg  100 mg Oral TID    guaiFENesin (MUCINEX) 12 hr tablet 600 mg  600 mg Oral BID    insulin lispro (HumaLOG) 100 units/mL subcutaneous injection 1-5 Units  1-5 Units Subcutaneous HS    insulin lispro (HumaLOG) 100 units/mL subcutaneous injection 1-6 Units  1-6 Units Subcutaneous TID AC    ipratropium (ATROVENT) 0 02 % inhalation solution 0 5 mg  0 5 mg Nebulization Q6H    levalbuterol (XOPENEX) inhalation solution 1 25 mg  1 25 mg Nebulization Q6H    lisinopril (ZESTRIL) tablet 40 mg  40 mg Oral Daily    LORazepam (ATIVAN) tablet 0 5 mg  0 5 mg Oral Q6H PRN    melatonin tablet 3 mg  3 mg Oral HS PRN    methylPREDNISolone sodium succinate (Solu-MEDROL) injection 40 mg  40 mg Intravenous Q12H LENNY    metoprolol succinate (TOPROL-XL) 24 hr tablet 100 mg  100 mg Oral Daily    multivitamin-minerals (CENTRUM) tablet 1 tablet  1 tablet Oral Daily    ondansetron (ZOFRAN) injection 4 mg  4 mg Intravenous Q6H PRN    oxyCODONE (ROXICODONE) IR tablet 5 mg  5 mg Oral Q4H PRN    pantoprazole (PROTONIX) EC tablet 40 mg  40 mg Oral BID AC    QUEtiapine (SEROquel) tablet 25 mg  25 mg Oral HS    sodium chloride 0 9 % inhalation solution **ADS Override Pull**        sodium chloride 0 9 % inhalation solution 3 mL  3 mL Nebulization TID    thiamine (VITAMIN B1) tablet 100 mg  100 mg Oral Daily    triamcinolone (KENALOG) 0 1 % cream   Topical BID    and PTA meds:   Prior to Admission Medications   Prescriptions Last Dose Informant Patient Reported? Taking? B Complex Vitamins (VITAMIN B COMPLEX 100 IJ)   Yes Yes   Sig: Take 1 tablet by mouth daily   LORazepam (ATIVAN) 0 5 mg tablet   Yes Yes   Sig: Take by mouth   Multiple Vitamin (MULTIVITAMIN) tablet  Self Yes Yes   Sig: Take 1 tablet by mouth daily   Niacin-Simvastatin ER (SIMCOR) 500-40 MG TB24   Yes Yes   Sig: Take 1 tablet by mouth daily   QUEtiapine (SEROQUEL) 25 mg tablet   Yes Yes   Sig: Take 1 tablet by mouth   acetaminophen (TYLENOL) 325 mg tablet   No Yes   Sig: Take 3tabs up to 3 times daily as needed for pain   albuterol (PROVENTIL HFA,VENTOLIN HFA) 90 mcg/act inhaler   Yes Yes   Sig: Inhale 2 puffs 4 (four) times a day as needed   albuterol (PROVENTIL HFA,VENTOLIN HFA) 90 mcg/act inhaler  Self Yes Yes   Sig: Inhale 2 puffs 2 (two) times a day as needed for wheezing   atorvastatin (LIPITOR) 80 mg tablet   No Yes   Sig: Take 1 tablet (80 mg total) by mouth daily with dinner   betamethasone valerate (VALISONE) 0 1 % cream   Yes Yes   Sig: Apply topically 2 (two) times a day   citalopram (CELEXA) 20 mg tablet   Yes Yes   Sig: Take by mouth   clopidogrel (PLAVIX) 75 mg tablet   Yes Yes   Sig: Take by mouth   fluticasone (FLONASE) 50 mcg/act nasal spray   Yes Yes   Si spray into each nostril 2 (two) times a day   fluticasone-salmeterol (ADVAIR) 500-50 mcg/dose inhaler   Yes Yes   Sig: Inhale 1 puff 2 (two) times a day Rinse mouth after use     folic acid (FOLVITE) 1 mg tablet   Yes Yes   Sig: Take by mouth daily   gabapentin (NEURONTIN) 100 mg capsule   No Yes   Sig: Take 1 capsule (100 mg total) by mouth 3 (three) times a day   glipiZIDE (GLUCOTROL) 5 mg tablet   No Yes   Sig: Take 0 5 tablets (2 5 mg total) by mouth daily before breakfast   Patient taking differently: Take 5 mg by mouth daily before breakfast    ipratropium-albuterol (COMBIVENT)  mcg/act inhaler   Yes Yes   Sig: Inhale   ipratropium-albuterol (DUONEB) 0 5-2 5 mg/3 mL nebulizer solution   Yes Yes   Sig: Inhale   lidocaine (LIDODERM) 5 %   No Yes   Sig: Apply 1 patch topically daily as needed for mild pain or moderate pain Remove & Discard patch within 12 hours or as directed by MD   lisinopril (ZESTRIL) 40 mg tablet   Yes Yes   Sig: Take by mouth   melatonin 3 mg   No Yes   Sig: Take 1 tablet (3 mg total) by mouth daily at bedtime as needed (30)   metFORMIN (GLUCOPHAGE) 1000 MG tablet   Yes Yes   Sig: Take 1 tablet by mouth every 12 (twelve) hours   metoprolol succinate (TOPROL XL) 100 mg 24 hr tablet   Yes Yes   Sig: Take by mouth   oxyCODONE (OXY-IR) 5 MG capsule   Yes Yes   Sig: Take 1 capsule by mouth every 4 (four) hours as needed   pantoprazole (PROTONIX) 40 mg tablet   Yes Yes   Sig: Take 1 tablet by mouth Twice daily   sitaGLIPtin (JANUVIA) 50 mg tablet   Yes Yes   Sig: Take by mouth   thiamine 100 MG tablet   No Yes   Sig: Take 1 tablet (100 mg total) by mouth daily   tiotropium (SPIRIVA) 18 mcg inhalation capsule  Pharmacy (Specify) Yes Yes   Sig: Place 18 mcg into inhaler and inhale daily      Facility-Administered Medications: None       Allergies   Allergen Reactions    Amoxicillin Hives    Amoxicillin     Amoxicillin Hives    Amoxicillin-Pot Clavulanate Hives    Augmentin [Amoxicillin-Pot Clavulanate]     Augmentin [Amoxicillin-Pot Clavulanate] Hives       Objective   Vitals: Blood pressure 141/66, pulse 72, temperature 98 °F (36 7 °C), temperature source Oral, resp  rate 18, height 6' 1" (1 854 m), weight 74 8 kg (165 lb), SpO2 96 %  ,Body mass index is 21 77 kg/m²    No intake or output data in the 24 hours ending 02/19/20 1041  Invasive Devices     Peripheral Intravenous Line            Peripheral IV 02/19/20 Left Forearm less than 1 day                Physical Exam   Constitutional: He is oriented to person, place, and time  No distress  HENT:   Head: Normocephalic  Mouth/Throat: Oropharynx is clear and moist    Eyes: Pupils are equal, round, and reactive to light  Neck: Neck supple  No tracheal deviation present  Cardiovascular: Normal rate, regular rhythm, normal heart sounds and intact distal pulses  Pulmonary/Chest: Effort normal  No respiratory distress  He has wheezes  He exhibits no tenderness  Left basilar coarse breath sounds  Abdominal: Soft  Bowel sounds are normal  There is no tenderness  Musculoskeletal: He exhibits no edema or tenderness  Lymphadenopathy:     He has no cervical adenopathy  Neurological: He is alert and oriented to person, place, and time  Skin: Capillary refill takes less than 2 seconds  He is not diaphoretic  Psychiatric: He has a normal mood and affect  Lab Results:   I have personally reviewed pertinent lab results  , ABG: No results found for: PHART, AVS7KHI, PO2ART, RBV2KPT, Q7DCIQFO, BEART, SOURCE, BNP: No results found for: BNP, CBC:   Lab Results   Component Value Date    WBC 11 17 (H) 02/19/2020    HGB 10 9 (L) 02/19/2020    HCT 33 9 (L) 02/19/2020    MCV 82 02/19/2020     02/19/2020    MCH 26 5 (L) 02/19/2020    MCHC 32 2 02/19/2020    RDW 15 8 (H) 02/19/2020    MPV 10 5 02/19/2020    NRBC 0 02/19/2020   , CMP:   Lab Results   Component Value Date    SODIUM 137 02/19/2020    K 3 6 02/19/2020     02/19/2020    CO2 27 02/19/2020    BUN 18 02/19/2020    CREATININE 0 70 02/19/2020    CALCIUM 9 3 02/19/2020    EGFR 98 02/19/2020   , PT/INR: No results found for: PT, INR, Troponin:   Lab Results   Component Value Date    TROPONINI <0 02 02/19/2020     Imaging Studies: I have personally reviewed pertinent reports     and I have personally reviewed pertinent films in PACS  EKG, Pathology, and Other Studies: I have personally reviewed pertinent reports     and I have personally reviewed pertinent films in PACS  VTE Prophylaxis: Enoxaparin (Lovenox)    Code Status: Level 1 - Full Code

## 2020-02-19 NOTE — ED PROVIDER NOTES
History  Chief Complaint   Patient presents with    Shortness of Breath     Pt with hx of COPD states he was "laying around" and got SOB  Denies CP  This is a 68-year-old male past medical history of COPD with multiple visits to [facility] for COPD exacerbations in the past couple weeks, CAD, alcohol abuse, poor living conditions, diabetes, KLARISSA with noncompliance to CPAP presenting today for shortness of breath  He says that he is currently taking 40 mg of steroids for his acute exacerbation of COPD  He says he does not have access to inhalers and so has not been able to take any when he is short of breath  Today he says his shortness of breath got worse and so he wants to come in and get evaluated  He was seen at [facility] several times in the past couple weeks  He was admitted twice and he has received several DuoNebs while the hospital past couple days  To me, he denies any fever, chills, nausea, vomiting, diarrhea  He does not have any chest pain currently  History provided by:  Patient   used: No    Shortness of Breath   Associated symptoms: cough    Associated symptoms: no abdominal pain, no chest pain, no diaphoresis, no fever, no rash and no vomiting        Prior to Admission Medications   Prescriptions Last Dose Informant Patient Reported? Taking?    B Complex Vitamins (VITAMIN B COMPLEX 100 IJ)   Yes Yes   Sig: Take 1 tablet by mouth daily   LORazepam (ATIVAN) 0 5 mg tablet   Yes Yes   Sig: Take by mouth   Multiple Vitamin (MULTIVITAMIN) tablet  Self Yes Yes   Sig: Take 1 tablet by mouth daily   Niacin-Simvastatin ER [place] BILOX) 500-40 MG TB24   Yes Yes   Sig: Take 1 tablet by mouth daily   QUEtiapine (SEROQUEL) 25 mg tablet   Yes Yes   Sig: Take 1 tablet by mouth   acetaminophen (TYLENOL) 325 mg tablet   No Yes   Sig: Take 3tabs up to 3 times daily as needed for pain   albuterol (PROVENTIL HFA,VENTOLIN HFA) 90 mcg/act inhaler   Yes Yes   Sig: Inhale 2 puffs 4 (four) times a day as needed   albuterol (PROVENTIL HFA,VENTOLIN HFA) 90 mcg/act inhaler  Self Yes Yes   Sig: Inhale 2 puffs 2 (two) times a day as needed for wheezing   atorvastatin (LIPITOR) 80 mg tablet   No Yes   Sig: Take 1 tablet (80 mg total) by mouth daily with dinner   betamethasone valerate (VALISONE) 0 1 % cream   Yes Yes   Sig: Apply topically 2 (two) times a day   citalopram (CELEXA) 20 mg tablet   Yes Yes   Sig: Take by mouth   clopidogrel (PLAVIX) 75 mg tablet   Yes Yes   Sig: Take by mouth   fluticasone (FLONASE) 50 mcg/act nasal spray   Yes Yes   Si spray into each nostril 2 (two) times a day   fluticasone-salmeterol (ADVAIR) 500-50 mcg/dose inhaler   Yes Yes   Sig: Inhale 1 puff 2 (two) times a day Rinse mouth after use     folic acid (FOLVITE) 1 mg tablet   Yes Yes   Sig: Take by mouth daily   gabapentin (NEURONTIN) 100 mg capsule   No Yes   Sig: Take 1 capsule (100 mg total) by mouth 3 (three) times a day   glipiZIDE (GLUCOTROL) 5 mg tablet   No Yes   Sig: Take 0 5 tablets (2 5 mg total) by mouth daily before breakfast   Patient taking differently: Take 5 mg by mouth daily before breakfast    ipratropium-albuterol (COMBIVENT)  mcg/act inhaler   Yes Yes   Sig: Inhale   ipratropium-albuterol (DUONEB) 0 5-2 5 mg/3 mL nebulizer solution   Yes Yes   Sig: Inhale   lidocaine (LIDODERM) 5 %   No Yes   Sig: Apply 1 patch topically daily as needed for mild pain or moderate pain Remove & Discard patch within 12 hours or as directed by MD   lisinopril (ZESTRIL) 40 mg tablet   Yes Yes   Sig: Take by mouth   melatonin 3 mg   No Yes   Sig: Take 1 tablet (3 mg total) by mouth daily at bedtime as needed (30)   metFORMIN (GLUCOPHAGE) 1000 MG tablet   Yes Yes   Sig: Take 1 tablet by mouth every 12 (twelve) hours   metoprolol succinate (TOPROL XL) 100 mg 24 hr tablet   Yes Yes   Sig: Take by mouth   oxyCODONE (OXY-IR) 5 MG capsule   Yes Yes   Sig: Take 1 capsule by mouth every 4 (four) hours as needed pantoprazole (PROTONIX) 40 mg tablet   Yes Yes   Sig: Take 1 tablet by mouth Twice daily   sitaGLIPtin (JANUVIA) 50 mg tablet   Yes Yes   Sig: Take by mouth   thiamine 100 MG tablet   No Yes   Sig: Take 1 tablet (100 mg total) by mouth daily   tiotropium (SPIRIVA) 18 mcg inhalation capsule  Pharmacy (Specify) Yes Yes   Sig: Place 18 mcg into inhaler and inhale daily      Facility-Administered Medications: None       Past Medical History:   Diagnosis Date    Cardiac arrest ([facility] )     COPD (chronic obstructive pulmonary disease) ([place] [address] )     CVA (cerebral vascular accident) ([facility] )     Diabetes mellitus ([facility] )     Heart attack ([place] [address] )     Hypertension     Stroke [facility])        Past Surgical History:   Procedure Laterality Date    CERVICAL FUSION N/A 9/10/2018    Procedure: Posterior cervical decompressive laminectomy C3-6; Posterior cervical lateral mass and pedicle fixation fusion C1-T1;  Surgeon: Robb Mcnulty MD;  Location: [facility];  Service: Neurosurgery       Family History   Problem Relation Age of Onset    Heart attack Mother      I have reviewed and agree with the history as documented  Social History     Tobacco Use    Smoking status: Former Smoker    Smokeless tobacco: Never Used    Tobacco comment: quit 5 months ago    Substance Use Topics    Alcohol use: Yes     Alcohol/week: 8 0 - 12 0 standard drinks     Types: 8 - 12 Cans of beer per week     Frequency: 4 or more times a week     Drinks per session: 10 or more     Binge frequency: Daily or almost daily     Comment: every day drinker    Drug use: Never        Review of Systems   Constitutional: Negative for chills, diaphoresis and fever  HENT: Negative  Eyes: Negative  Negative for visual disturbance  Respiratory: Positive for cough and shortness of breath  Cardiovascular: Negative  Negative for chest pain  Gastrointestinal: Negative  Negative for abdominal pain, nausea and vomiting  Endocrine: Negative      Genitourinary: Negative  Musculoskeletal: Negative  Negative for myalgias  Skin: Negative  Negative for rash  Allergic/Immunologic: Negative  Neurological: Negative  Negative for light-headedness and numbness  Hematological: Negative  Psychiatric/Behavioral: Negative  All other systems reviewed and are negative  Physical Exam  ED Triage Vitals [02/18/20 2342]   Temperature Pulse Respirations Blood Pressure SpO2   98 °F (36 7 °C) (!) 110 22 150/100 92 %      Temp Source Heart Rate Source Patient Position - Orthostatic VS BP Location FiO2 (%)   Oral Monitor Sitting Right arm --      Pain Score       No Pain             Orthostatic Vital Signs  Vitals:    02/19/20 0636 02/19/20 0745 02/19/20 1100 02/19/20 1223   BP: 150/80 141/66 132/76 141/81   Pulse: 79 72 84 76   Patient Position - Orthostatic VS:  Lying Lying        Physical Exam   Constitutional: He is oriented to person, place, and time  He appears well-developed and well-nourished  No distress  HENT:   Head: Normocephalic and atraumatic  Mouth/Throat: Oropharynx is clear and moist    Eyes: Conjunctivae are normal    Neck: Normal range of motion  Neck supple  Cardiovascular: Normal rate and regular rhythm  Pulmonary/Chest: Effort normal  Tachypnea noted  He has wheezes  Abdominal: Soft  He exhibits no distension  There is no tenderness  Musculoskeletal: Normal range of motion  Neurological: He is alert and oriented to person, place, and time  Skin: Skin is warm and dry  Psychiatric: He has a normal mood and affect  Nursing note and vitals reviewed        ED Medications  Medications   atorvastatin (LIPITOR) tablet 80 mg (has no administration in time range)   b complex-vitamin C-folic acid (NEPHROCAPS) capsule 1 capsule (has no administration in time range)   triamcinolone (KENALOG) 0 1 % cream ( Topical Given 2/19/20 1103)   citalopram (CeleXA) tablet 10 mg (10 mg Oral Given 2/19/20 1105)   clopidogrel (PLAVIX) tablet 75 mg (75 mg Oral Given 2/19/20 1112)   fluticasone (FLONASE) 50 mcg/act nasal spray 1 spray (1 spray Nasal Given 2/78/53 7979)   folic acid (FOLVITE) tablet 1 mg (1 mg Oral Given 2/19/20 1112)   gabapentin (NEURONTIN) capsule 100 mg (100 mg Oral Given 2/19/20 1104)   lisinopril (ZESTRIL) tablet 40 mg (40 mg Oral Given 2/19/20 1105)   LORazepam (ATIVAN) tablet 0 5 mg (has no administration in time range)   melatonin tablet 3 mg (has no administration in time range)   metoprolol succinate (TOPROL-XL) 24 hr tablet 100 mg (100 mg Oral Given 2/19/20 1106)   multivitamin-minerals (CENTRUM) tablet 1 tablet (1 tablet Oral Given 2/19/20 1104)   oxyCODONE (ROXICODONE) IR tablet 5 mg (has no administration in time range)   pantoprazole (PROTONIX) EC tablet 40 mg (40 mg Oral Given 2/19/20 0619)   QUEtiapine (SEROquel) tablet 25 mg (has no administration in time range)   thiamine (VITAMIN B1) tablet 100 mg (100 mg Oral Given 2/19/20 1112)   docusate sodium (COLACE) capsule 100 mg (has no administration in time range)   ondansetron (ZOFRAN) injection 4 mg (has no administration in time range)   aluminum-magnesium hydroxide-simethicone (MYLANTA) 200-200-20 mg/5 mL oral suspension 30 mL (has no administration in time range)   guaiFENesin (MUCINEX) 12 hr tablet 600 mg (600 mg Oral Given 2/19/20 1104)   enoxaparin (LOVENOX) subcutaneous injection 40 mg (40 mg Subcutaneous Given 2/19/20 1112)   insulin lispro (HumaLOG) 100 units/mL subcutaneous injection 1-6 Units (2 Units Subcutaneous Given 2/19/20 1114)   insulin lispro (HumaLOG) 100 units/mL subcutaneous injection 1-5 Units (has no administration in time range)   acetaminophen (TYLENOL) tablet 650 mg (has no administration in time range)   levalbuterol (XOPENEX) inhalation solution 1 25 mg (1 25 mg Nebulization Given 2/19/20 1354)   ipratropium (ATROVENT) 0 02 % inhalation solution 0 5 mg (0 5 mg Nebulization Given 2/19/20 4568)   budesonide (PULMICORT) inhalation solution 0 5 mg (0 5 mg Nebulization Not Given 2/19/20 1402)   methylPREDNISolone sodium succinate (Solu-MEDROL) injection 40 mg (has no administration in time range)   albuterol inhalation solution 5 mg (5 mg Nebulization Given 2/19/20 0008)   ipratropium (ATROVENT) 0 02 % inhalation solution 0 5 mg (0 5 mg Nebulization Given 2/19/20 0008)   albuterol inhalation solution 5 mg (5 mg Nebulization Given 2/19/20 0034)   ipratropium (ATROVENT) 0 02 % inhalation solution 0 5 mg (0 5 mg Nebulization Given 2/19/20 0034)       Diagnostic Studies  Results Reviewed     Procedure Component Value Units Date/Time    Fingerstick Glucose (POCT) [[id number]]  (Abnormal) Collected:  02/19/20 1110    Lab Status:  Final result Updated:  02/19/20 1111     POC Glucose 196 mg/dl     Hemoglobin A1c w/EAG Estimation (Orders if not completed within the last 90 days) [[id number]]  (Abnormal) Collected:  02/19/20 0624    Lab Status:  Final result Specimen:  Blood from Arm, Left Updated:  02/19/20 0708     Hemoglobin A1C 6 9 %       mg/dl     Fingerstick Glucose (POCT) [[id number]]  (Abnormal) Collected:  02/19/20 0632    Lab Status:  Final result Updated:  02/19/20 0633     POC Glucose 159 mg/dl     Procalcitonin [[id number]]  (Normal) Collected:  02/19/20 0014    Lab Status:  Final result Specimen:  Blood from Arm, Left Updated:  02/19/20 0052     Procalcitonin 0 12 ng/ml     Troponin I [[id number]]  (Normal) Collected:  02/19/20 0011    Lab Status:  Final result Specimen:  Blood from Arm, Left Updated:  02/19/20 0040     Troponin I <0 02 ng/mL     Basic metabolic panel [[id number]]  (Abnormal) Collected:  02/19/20 0011    Lab Status:  Final result Specimen:  Blood from Arm, Left Updated:  02/19/20 0035     Sodium 137 mmol/L      Potassium 3 6 mmol/L      Chloride 103 mmol/L      CO2 27 mmol/L      ANION GAP 7 mmol/L      BUN 18 mg/dL      Creatinine 0 70 mg/dL      Glucose 200 mg/dL      Calcium 9 3 mg/dL      eGFR 98 ml/min/1 73sq m     Narrative:       Consolidated Jos Kid[employer] Disease [employer] guidelines for Chronic Kidney Disease (CKD):     Stage 1 with normal or high GFR (GFR > 90 mL/min/1 73 square meters)    Stage 2 Mild CKD (GFR = 60-89 mL/min/1 73 square meters)    Stage 3A Moderate CKD (GFR = 45-59 mL/min/1 73 square meters)    Stage 3B Moderate CKD (GFR = 30-44 mL/min/1 73 square meters)    Stage 4 Severe CKD (GFR = 15-29 mL/min/1 73 square meters)    Stage 5 End Stage CKD (GFR <15 mL/min/1 73 square meters)  Note: GFR calculation is accurate only with a steady state creatinine    Blood gas, venous [[id number]]  (Abnormal) Collected:  02/19/20 0014    Lab Status:  Final result Specimen:  Blood from Arm, Left Updated:  02/19/20 0026     pH, Rocky 7 498     pCO2, Rocky 37 0 mm Hg      pO2, Rocky 80 6 mm Hg      HCO3, Rocky 28 1 mmol/L      Base Excess, Rocky 4 7 mmol/L      O2 Content, Rocky 15 3 ml/dL      O2 HGB, VENOUS 94 1 %     CBC and differential [[id number]]  (Abnormal) Collected:  02/19/20 0011    Lab Status:  Final result Specimen:  Blood from Arm, Left Updated:  02/19/20 0021     WBC 11 17 Thousand/uL      RBC 4 12 Million/uL      Hemoglobin 10 9 g/dL      Hematocrit 33 9 %      MCV 82 fL      MCH 26 5 pg      MCHC 32 2 g/dL      RDW 15 8 %      MPV 10 5 fL      Platelets 971 Thousands/uL      nRBC 0 /100 WBCs      Neutrophils Relative 66 %      Immat GRANS % 1 %      Lymphocytes Relative 20 %      Monocytes Relative 13 %      Eosinophils Relative 0 %      Basophils Relative 0 %      Neutrophils Absolute 7 49 Thousands/µL      Immature Grans Absolute 0 08 Thousand/uL      Lymphocytes Absolute 2 19 Thousands/µL      Monocytes Absolute 1 40 Thousand/µL      Eosinophils Absolute 0 00 Thousand/µL      Basophils Absolute 0 01 Thousands/µL                  XR chest 1 view portable   Final Result by Maxine Dow MD (02/19 0274)      No active pulmonary disease              Workstation performed: [id number]               Procedures  ECG 12 Lead Documentation Only  Date/Time: 2/19/2020 12:40 AM  Performed by: Yong Pickett MD  Authorized by: Yong Pickett MD     ECG reviewed by me, the ED Provider: yes    Patient location:  ED  Previous ECG:     Previous ECG:  Unavailable    Comparison to cardiac monitor: No    Interpretation:     Interpretation: normal    Rate:     ECG rate assessment: normal    Rhythm:     Rhythm: sinus rhythm    Ectopy:     Ectopy: none    QRS:     QRS axis:  Normal    QRS intervals:  Normal  Conduction:     Conduction: normal    ST segments:     ST segments:  Normal  T waves:     T waves: normal            ED Course                               MDM  Number of Diagnoses or Management Options  COPD with acute exacerbation ([address] ): established and worsening  Diagnosis management comments: 69-year-old male presenting multiple times for COPD exacerbation presenting today for shortness of breath  He is currently on steroids  Will give him DuoNebs here  No tachypnea, patient is wheezing diffusely  Re-evaluation determined that he needed another DuoNeb  Given his multiple admissions and multiple visits to the emergency department, we will admit him to the hospital   Chest x-ray within normal limits  Labs within normal limits  VBG reassuring  Negative procalcitonin    Patient was reassessed  Patient was updated with information regarding the tests/imaging done today  I answered all of the patient's and/or family's questions  Patient and/or family vocalizes understanding  After discussion and given the test results, the patient will be admitted to the hospital  Patient and/or family are agreeable to the plan   They will be [facility] to [facility] who will further manage their care in the hospital          Amount and/or Complexity of Data Reviewed  Clinical lab tests: ordered and reviewed  Tests in the radiology section of CPT®: ordered and reviewed  Tests in the medicine section of CPT®: reviewed and ordered  Review and summarize past medical records: yes  Discuss the patient with other providers: yes  Independent visualization of images, tracings, or specimens: yes    Patient Progress  Patient progress: stable        Disposition  Final diagnoses:   COPD with acute exacerbation ([address] )     Time reflects when diagnosis was documented in both MDM as applicable and the Disposition within this note     Time User Action Codes Description Comment    2/19/2020  1:33 AM Marcos Gonzalez [J44 1] COPD with acute exacerbation [facility])       ED Disposition     ED Disposition Condition Date/Time Comment    Admit Stable Wed Feb 19, 2020  1:33 AM Case was discussed with MATY and the patient's admission status was agreed to be Admission Status: observation status to the service of Dr Susannah Rutledge          Follow-up Information     Follow up With Specialties Details Why Contact Info    Germain Suarez MD Pulmonary Disease Schedule an appointment as soon as possible for a visit  [address] [place] [address] 3  [phone]            Current Discharge Medication List      CONTINUE these medications which have NOT CHANGED    Details   acetaminophen (TYLENOL) 325 mg tablet Take 3tabs up to 3 times daily as needed for pain  Qty: 30 tablet, Refills: 0    Associated Diagnoses: Closed odontoid fracture with routine healing      !! albuterol (PROVENTIL HFA,VENTOLIN HFA) 90 mcg/act inhaler Inhale 2 puffs 4 (four) times a day as needed      !! albuterol (PROVENTIL HFA,VENTOLIN HFA) 90 mcg/act inhaler Inhale 2 puffs 2 (two) times a day as needed for wheezing      atorvastatin (LIPITOR) 80 mg tablet Take 1 tablet (80 mg total) by mouth daily with dinner  Qty: 30 tablet, Refills: 0    Associated Diagnoses: Type 2 diabetes mellitus without complication, without long-term current use of insulin ([facility])      B Complex Vitamins (VITAMIN B COMPLEX 100 IJ) Take 1 tablet by mouth daily      betamethasone valerate (VALISONE) 0 1 % cream Apply topically 2 (two) times a day      citalopram (CELEXA) 20 mg tablet Take by mouth      clopidogrel (PLAVIX) 75 mg tablet Take by mouth      fluticasone (FLONASE) 50 mcg/act nasal spray 1 spray into each nostril 2 (two) times a day      fluticasone-salmeterol (ADVAIR) 500-50 mcg/dose inhaler Inhale 1 puff 2 (two) times a day Rinse mouth after use        folic acid (FOLVITE) 1 mg tablet Take by mouth daily      gabapentin (NEURONTIN) 100 mg capsule Take 1 capsule (100 mg total) by mouth 3 (three) times a day  Qty: 90 capsule, Refills: 0    Associated Diagnoses: Closed odontoid fracture with routine healing      glipiZIDE (GLUCOTROL) 5 mg tablet Take 0 5 tablets (2 5 mg total) by mouth daily before breakfast  Qty: 30 tablet, Refills: 0    Associated Diagnoses: Type 2 diabetes mellitus without complication, without long-term current use of insulin ([facility])      ipratropium-albuterol (COMBIVENT)  mcg/act inhaler Inhale      ipratropium-albuterol (DUONEB) 0 5-2 5 mg/3 mL nebulizer solution Inhale      lidocaine (LIDODERM) 5 % Apply 1 patch topically daily as needed for mild pain or moderate pain Remove & Discard patch within 12 hours or as directed by MD  Qty: 30 patch, Refills: 0    Associated Diagnoses: Closed odontoid fracture with routine healing      lisinopril (ZESTRIL) 40 mg tablet Take by mouth      LORazepam (ATIVAN) 0 5 mg tablet Take by mouth      melatonin 3 mg Take 1 tablet (3 mg total) by mouth daily at bedtime as needed (30)  Qty: 30 tablet, Refills: 0    Associated Diagnoses: Closed odontoid fracture with routine healing      metFORMIN (GLUCOPHAGE) 1000 MG tablet Take 1 tablet by mouth every 12 (twelve) hours      metoprolol succinate (TOPROL XL) 100 mg 24 hr tablet Take by mouth      Multiple Vitamin (MULTIVITAMIN) tablet Take 1 tablet by mouth daily      Niacin-Simvastatin ER (SIMCOR) 500-40 MG TB24 Take 1 tablet by mouth daily      oxyCODONE (OXY-IR) 5 MG capsule Take 1 capsule by mouth every 4 (four) hours as needed      pantoprazole (PROTONIX) 40 mg tablet Take 1 tablet by mouth Twice daily      QUEtiapine (SEROQUEL) 25 mg tablet Take 1 tablet by mouth      sitaGLIPtin (JANUVIA) 50 mg tablet Take by mouth      thiamine 100 MG tablet Take 1 tablet (100 mg total) by mouth daily  Qty: 30 tablet, Refills: 0    Associated Diagnoses: Alcohol abuse      tiotropium (SPIRIVA) 18 mcg inhalation capsule Place 18 mcg into inhaler and inhale daily       ! ! - Potential duplicate medications found  Please discuss with provider  No discharge procedures on file  ED Provider  Attending physically available and evaluated Alejandrina Burdick I managed the patient along with the ED Attending      Electronically Signed by         Kim Sawant MD  02/19/20 3895

## 2020-02-19 NOTE — ASSESSMENT & PLAN NOTE
Lab Results   Component Value Date    HGBA1C 6 9 (H) 02/19/2020       Recent Labs     02/19/20  0632   POCGLU 159*       Blood Sugar Average: Last 72 hrs:  (P) 159   · Well controlled  · Hold oral meds  · SSI, accu checks

## 2020-02-19 NOTE — H&P
H&P- Susan Myers 1955, 76 y o  male MRN: [id number]    Unit/Bed#: ED 28 Encounter: [id number]    Primary Care Provider: TATIANA Myers   Date and time admitted to hospital: 2/18/2020 11:32 PM        * Chronic obstructive pulmonary disease with acute exacerbation [facility])  Assessment & Plan  Patient is on multiple medications for COPD  Unfortunately, as the patient goes home he gets into exacerbation due to unkept living conditions (please see notes from [facility] February 15, 2020)  Patient would need case management  So far, the patient did well after nebulization of albuterol with ipratropium  Would continue with prednisone 40 mg daily  Continue nebulizations of levalbuterol and ipratropium  His baseline is 95% on room air  CAD (coronary artery disease)  Assessment & Plan  Currently on metoprolol and Plavix  Patient denies any current chest discomfort  KLARISSA (obstructive sleep apnea)  Assessment & Plan  Patient is supposedly on CPAP during bedtime however noncompliant  Alcohol abuse  Assessment & Plan  According to patient he does not drink heavily; he consumes 4 bottles of beer daily  He is on thiamine, folate and multivitamins  He also takes Seroquel 25 mg at night  Ativan 0 5 mg tablet as needed  Dirty living conditions  Assessment & Plan  Would need case management to re-evaluate prior to discharge  Type 2 diabetes mellitus with hyperglycemia, without long-term current use of insulin (HCC)  Assessment & Plan  Lab Results   Component Value Date    HGBA1C 8 5 (H) 09/15/2018       No results for input(s): POCGLU in the last 72 hours  Blood Sugar Average: Last 72 hrs:   continue Januvia, Glucophage and Glucotrol  Hemoglobin A1c  Check blood sugars before meals and bedtime  Insulin sliding scale            VTE Prophylaxis: Enoxaparin (Lovenox)  / sequential compression device   Code Status: Prior full Code as discussed  POLST: There is no POLST form on file for this patient (pre-hospital)    Anticipated Length of Stay:  Patient will be admitted on an Inpatient basis with an anticipated length of stay of  greater than 2 midnights  Justification for Hospital Stay: Please see detailed plans noted above  Chief Complaint:     Unkept living conditions and frequent COPD exacerbation  History of Present Illness:  Pj Escoto is a 76 y o  male who has a past medical history significant for COPD exacerbation with usual oxygenation at 95% on room air  Likewise he has obstructive sleep apnea but noncompliant with CPAP  He has a history of alcohol abuse but currently has cut down significantly to just 4 beers a night  Diabetes mellitus type 2 on medications not on insulin with hyperglycemia, nicotine abuse but has stop smoking since 6 months ago; hypertension and hyperlipidemia  This is a patient who has frequent COPD exacerbations and has seen [facility] 10 times since the beginning of this year for COPD exacerbation  Significant note is of no one from February 15 which is states that essentially he gets better with nebulizations and start of steroids  However when he goes home the living conditions are unkept with the presence of crowding (daughter and her  along with 4 children in the house) with 2 cats, 2 dogs and 2 birds  That said, he then begins to get short of breath needing to return to the emergency room  The patient essentially experience the same problem tonight  He denies any chest pain  No fever  No sputum production  He has cough and productive of very minimal sputum  No abdominal pain  No edema  No nausea or vomiting  After the patient was given nebulization of albuterol and ipratropium he essentially got better and improved  He does not have any air hunger as of this moment he can speak in long sentences        Review of Systems:    Constitutional:  Denies fever or chills   Eyes:  Denies change in visual acuity   HENT:  Denies nasal congestion or sore throat   Respiratory:  Cough and shortness of breath as noted above  Cardiovascular:  Denies chest pain or edema   GI:  Denies abdominal pain, nausea, vomiting, bloody stools or diarrhea   :  Denies dysuria   Musculoskeletal:  Denies back pain or joint pain   Integument:  Denies rash   Neurologic:  Denies headache, focal weakness or sensory changes   Endocrine:  Denies polyuria or polydipsia   Lymphatic:  Denies swollen glands   Psychiatric:  Denies depression or anxiety     Past Medical and Surgical History:   Past Medical History:   Diagnosis Date    [facility]     COPD [facility]chronic obstructive [facility] disease[facility]     [facility]cerebral vascular accident[facility]     Diabetes mellitus ([facility] )     Heart attack ([facility] )     Hypertension     Stroke ([facility] )      Past Surgical History:   Procedure Laterality Date    CERVICAL FUSION N/A 9/10/2018    Procedure: Posterior cervical decompressive laminectomy C3-6; Posterior cervical lateral mass and pedicle fixation fusion C1-T1;  Surgeon: Ailyn Germain MD;  Location: [facility];  Service: Neurosurgery       Meds/Allergies:    (Not in a hospital admission)  acetaminophen (TYLENOL) 325 mg tablet Take 3tabs up to 3 times daily as needed for pain Mike Sunshine MD Not Ordered   albuterol (PROVENTIL HFA,VENTOLIN HFA) 90 mcg/act inhaler Inhale 2 puffs 4 (four) times a day as needed Mike Sunshine MD Not Ordered   albuterol (PROVENTIL HFA,VENTOLIN HFA) 90 mcg/act inhaler Inhale 2 puffs 2 (two) times a day as needed for wheezing Mike Sunshine MD Not Ordered   atorvastatin (LIPITOR) 80 mg tablet Take 1 tablet (80 mg total) by mouth daily with dinner Mike Sunshine MD Reordered   Ordered as: atorvastatin (LIPITOR) tablet 80 mg - 80 mg, Oral, Daily with dinner, First dose on Wed 2/19/20 at 1630   B Complex Vitamins (VITAMIN B COMPLEX 100 IJ) Take 1 tablet by mouth daily Mike Sunshine MD Replaced   Ordered as: b complex-vitamin C-folic acid (NEPHROCAPS) capsule 1 capsule - 1 capsule, Oral, Daily with dinner, First dose on Wed 2/19/20 at 1630   betamethasone valerate (VALISONE) 0 1 % cream Apply topically 2 (two) times a day Jona Seth MD Reordered   Ordered as: triamcinolone (KENALOG) 0 1 % cream - Topical, 2 times daily, First dose on Wed 2/19/20 at 0900 For external use only  What is the location for this topical application? eczematous lesions   citalopram (CELEXA) 20 mg tablet Take by mouth Jona Seth MD Reordered   Ordered as: citalopram (CeleXA) tablet 10 mg - 10 mg, Oral, Daily, First dose on Wed 2/19/20 at 0900 Look alike sound alike  clopidogrel (PLAVIX) 75 mg tablet Take by mouth Jona Seth MD Reordered   Ordered as: clopidogrel (PLAVIX) tablet 75 mg - 75 mg, Oral, Daily, First dose on Wed 2/19/20 at 0900 Do not take with grapefruit or grapefruit juice  LOOK ALIKE SOUND ALIKE MED    fluticasone (FLONASE) 50 mcg/act nasal spray 1 spray into each nostril 2 (two) times a day Jona Seth MD Reordered   Ordered as: fluticasone (FLONASE) 50 mcg/act nasal spray 1 spray - 1 spray, Nasal, 2 times daily, First dose on Wed 2/19/20 at 0900 LOOK ALIKE SOUND ALIKE MED    fluticasone-salmeterol (ADVAIR) 500-50 mcg/dose inhaler Inhale 1 puff 2 (two) times a day Rinse mouth after use  Jona Seth MD Reordered   Ordered as: fluticasone-vilanterol (BREO ELLIPTA) 200-25 MCG/INH inhaler 1 puff - 1 puff, Inhalation, Daily (RESP), First dose on Wed 2/19/20 at 0800 Rinse mouth after use      folic acid (FOLVITE) 1 mg tablet Take by mouth daily Jona Seth MD Reordered   Ordered as: folic acid (FOLVITE) tablet 1 mg - 1 mg, Oral, Daily, First dose on Wed 2/19/20 at 0900   gabapentin (NEURONTIN) 100 mg capsule Take 1 capsule (100 mg total) by mouth 3 (three) times a day Jona Seth MD Reordered   Ordered as: gabapentin (NEURONTIN) capsule 100 mg - 100 mg, Oral, 3 times daily, First dose on Wed 2/19/20 at [address] [place] [address] ALIKE MED    glipiZIDE (GLUCOTROL) 5 mg tablet Take 0 5 tablets (2 5 mg total) by mouth daily before breakfast Anurag Gautam MD Reordered    Patient taking differently: Take 5 mg by mouth daily before breakfast      Ordered as: glipiZIDE (GLUCOTROL) tablet 5 mg - 5 mg, Oral, Daily before breakfast, First dose on Wed 2/19/20 at [id number] [address] MED    ipratropium-albuterol (COMBIVENT)  mcg/act inhaler Inhale Anurag Gautam MD Not Ordered   ipratropium-albuterol (DUONEB) 0 5-2 5 mg/3 mL nebulizer solution Inhale Anurag Gautam MD Not Ordered   lidocaine (LIDODERM) 5 % Apply 1 patch topically daily as needed for mild pain or moderate pain Remove & Discard patch within 12 hours or as directed by MD Anurag Gautam MD Not Ordered   lisinopril (ZESTRIL) 40 mg tablet Take by mouth Anurag Gautam MD Reordered   Ordered as: lisinopril (ZESTRIL) tablet 40 mg - 40 mg, Oral, Daily, First dose on Wed 2/19/20 at [facility] for systolic blood pressure less than (mmHg): 110   LORazepam (ATIVAN) 0 5 mg tablet Take by mouth Anurag Gautam MD Reordered   Ordered as: LORazepam (ATIVAN) tablet 0 5 mg - 0 5 mg, Oral, Every 6 hours PRN, anxiety, Starting Wed 2/19/20 at [address] [facility] High alert medication  LOOK ALIKE SOUND ALIKE MED    melatonin 3 mg Take 1 tablet (3 mg total) by mouth daily at bedtime as needed (30) Anurag Gautam MD Reordered   Ordered as: melatonin tablet 3 mg - 3 mg, Oral, Daily at bedtime PRN, 30, Starting Wed 2/19/20 at 0148   metFORMIN (GLUCOPHAGE) 1000 MG tablet Take 1 tablet by mouth every 12 (twelve) hours Anurag Gautam MD Reordered   Ordered as: metFORMIN (GLUCOPHAGE) tablet 1,000 mg - 1,000 mg, Oral, Every 12 hours, First dose on Wed 2/19/20 at 0200 High Alert Medication   LOOK ALIKE SOUND ALIKE MED    metoprolol succinate (TOPROL XL) 100 mg 24 hr tablet Take by mouth Anurag Gautam MD Reordered   Ordered as: metoprolol succinate (TOPROL-XL) 24 hr tablet 100 mg - 100 mg, Oral, Daily, First dose on Wed 2/19/20 at 0900 Hold for heart rate less than 50 beats per minute  Do not crush or chew  LOOK ALIKE [employer] ALIKE MED Hold for systolic blood pressure less than (mmHg): 110   Multiple Vitamin (MULTIVITAMIN) tablet Take 1 tablet by mouth daily Medina Mays MD Reordered   Ordered as: multivitamin tablet 1 tablet - 1 tablet, Oral, Daily, First dose on Wed 2/19/20 at 0900   [facility] 500-40 MG TB24 Take 1 tablet by mouth daily Medina Mays MD Not Ordered   oxyCODONE (OXY-IR) 5 MG capsule Take 1 capsule by mouth every 4 (four) hours as needed Medina Mays MD Reordered   Ordered as: oxyCODONE (ROXICODONE) IR tablet 5 mg - 5 mg, Oral, Every 4 hours PRN, moderate pain, Starting Wed 2/19/20 at 0149 High alert medication  LOOK ALIKE SOUND ALIKE MED    pantoprazole (PROTONIX) 40 mg tablet Take 1 tablet by mouth Twice daily Medina Mays MD Reordered   Ordered as: pantoprazole (PROTONIX) EC tablet 40 mg - 40 mg, Oral, 2 times daily before meals, First dose on Wed 2/19/20 at 0700 Swallow whole; do not crush, chew or split   LOOK ALIKE SOUND ALIKE MED    QUEtiapine (SEROQUEL) 25 mg tablet Take 1 tablet by mouth Medina Mays MD Reordered   Ordered as: QUEtiapine (SEROquel) tablet 25 mg - 25 mg, Oral, Daily at bedtime, First dose on Wed 2/19/20 at 0200 LOOK ALIKE SOUND ALIKE MED    sitaGLIPtin (JANUVIA) 50 mg tablet Take by mouth Medina Mays MD Reordered   Ordered as: sitaGLIPtin (JANUVIA) tablet 50 mg - 50 mg, Oral, Daily, First dose on Wed 2/19/20 at 0900 LOOK ALIKE SOUND ALIKE MED    thiamine 100 MG tablet Take 1 tablet (100 mg total) by mouth daily Medina Mays MD Reordered   Ordered as: thiamine (VITAMIN B1) tablet 100 mg - 100 mg, Oral, Daily, First dose on Wed 2/19/20 at 0900   tiotropium (SPIRIVA) 18 mcg inhalation capsule Place 18 mcg into inhaler and inhale daily Medina Mays MD Reordered   Ordered as: tiotropium ([facility]) capsule for inhaler 18 mcg - 18 mcg, Inhalation, Daily, First dose on Wed 2/19/20 at 0900 LOOK ALIKE SOUND ALIKE MED        Allergies: Allergies   Allergen Reactions    Amoxicillin Hives    Amoxicillin     Amoxicillin Hives    Amoxicillin-Pot Clavulanate Hives    Augmentin [Amoxicillin-Pot Clavulanate]     Augmentin [Amoxicillin-Pot Clavulanate] Hives     History:  Marital Status:    Occupation:  Currently retired  Patient Pre-hospital Living Situation:  Lives at home; he claims that he owns  Patient Pre-hospital Level of Mobility:  The house at ambulatory  Patient Pre-hospital Diet Restrictions:  Diabetic diet  Substance Use History:   Social History     Substance and Sexual Activity   Alcohol Use Yes    Alcohol/week: 8 0 - 12 0 standard drinks    Types: 8 - 12 Cans of beer per week    Frequency: 4 or more times a week    Drinks per session: 10 or more    Binge frequency: Daily or almost daily    Comment: every day drinker     Social History     Tobacco Use   Smoking Status Former Smoker   Smokeless Tobacco Never Used   Tobacco Comment    quit 5 months ago      Social History     Substance and Sexual Activity   Drug Use Never       Family History:  Family History   Problem Relation Age of Onset    Heart attack Mother        Physical Exam:     Vitals:   Blood Pressure: 150/100 (02/18/20 2342)  Pulse: (!) 110 (02/18/20 2342)  Temperature: 98 °F (36 7 °C) (02/18/20 2342)  Temp Source: Oral (02/18/20 2342)  Respirations: 22 (02/18/20 2342)  Height: 6' 1" (185 4 cm) (02/18/20 2342)  Weight - Scale: 74 8 kg (165 lb) (02/18/20 2342)  SpO2: 92 % (02/18/20 2342)    Constitutional:  Well developed, well nourished, no acute distress, non-toxic appearance   Eyes:  PERRL, conjunctiva normal   HENT:  Atraumatic, external ears normal, nose normal, oropharynx moist, no pharyngeal exudates   Neck- normal range of motion, no tenderness, supple   Respiratory:  No respiratory distress, fair breath sounds, mild rhonchi, no rales  Cardiovascular:  Mildly tachycardic post albuterol nebulization, normal rhythm, no murmurs, no gallops, no rubs   GI:  Soft, nondistended, normal bowel sounds, nontender, no organomegaly, no mass, no rebound, no guarding   :  No costovertebral angle tenderness   Musculoskeletal:  No edema, no tenderness, no deformities  Back- no tenderness  Integument:  Well hydrated, no rash   Lymphatic:  No lymphadenopathy noted   Neurologic:  Alert &awake, communicative, CN 2-12 normal, normal motor function, normal sensory function, no focal deficits noted   Psychiatric:  Speech and behavior appropriate       Lab Results: I have personally reviewed pertinent reports  Results from last 7 days   Lab Units 02/19/20  0011   WBC Thousand/uL 11 17*   HEMOGLOBIN g/dL 10 9*   HEMATOCRIT % 33 9*   PLATELETS Thousands/uL 385   NEUTROS PCT % 66   LYMPHS PCT % 20   MONOS PCT % 13*   EOS PCT % 0     Results from last 7 days   Lab Units 02/19/20  0011   POTASSIUM mmol/L 3 6   CHLORIDE mmol/L 103   CO2 mmol/L 27   BUN mg/dL 18   CREATININE mg/dL 0 70   CALCIUM mg/dL 9 3               Imaging: I have personally reviewed pertinent reports  Compared to previous x-ray of September 10, 2018; demonstrates airspace disease with no infiltrate  No results found  ** Please Note: Dragon 360 Dictation voice to text software was used in the creation of this document   **

## 2020-02-19 NOTE — ASSESSMENT & PLAN NOTE
According to patient he does not drink heavily; he consumes 4 bottles of beer daily  He is on thiamine, folate and multivitamins  He also takes Seroquel 25 mg at night  Ativan 0 5 mg tablet as needed

## 2020-02-19 NOTE — RESTORATIVE TECHNICIAN NOTE
Restorative Specialist Mobility Note       Activity: Ambulate in ayala, Ambulate in room, Bathroom privileges, Dangle, Stand at bedside(Educated/encouraged pt to ambulate with assistance 3-4 x's/day  Bed alarm on   Pt callbell, phone/tray within reach )     Assistive Device: Dorita FLYNN, Restorative Technician, [employer]

## 2020-02-20 LAB
GLUCOSE SERPL-MCNC: 212 MG/DL (ref 65–140)
GLUCOSE SERPL-MCNC: 243 MG/DL (ref 65–140)
GLUCOSE SERPL-MCNC: 282 MG/DL (ref 65–140)
GLUCOSE SERPL-MCNC: 352 MG/DL (ref 65–140)

## 2020-02-20 PROCEDURE — 94760 N-INVAS EAR/PLS OXIMETRY 1: CPT | Performed by: SOCIAL WORKER

## 2020-02-20 PROCEDURE — 94760 N-INVAS EAR/PLS OXIMETRY 1: CPT

## 2020-02-20 PROCEDURE — 94640 AIRWAY INHALATION TREATMENT: CPT | Performed by: SOCIAL WORKER

## 2020-02-20 PROCEDURE — 99232 SBSQ HOSP IP/OBS MODERATE 35: CPT | Performed by: PHYSICIAN ASSISTANT

## 2020-02-20 PROCEDURE — 94640 AIRWAY INHALATION TREATMENT: CPT

## 2020-02-20 PROCEDURE — 82948 REAGENT STRIP/BLOOD GLUCOSE: CPT

## 2020-02-20 RX ORDER — PREDNISONE 20 MG/1
40 TABLET ORAL
Status: DISCONTINUED | OUTPATIENT
Start: 2020-02-20 | End: 2020-02-21 | Stop reason: HOSPADM

## 2020-02-20 RX ADMIN — GABAPENTIN 100 MG: 100 CAPSULE ORAL at 18:03

## 2020-02-20 RX ADMIN — ATORVASTATIN CALCIUM 80 MG: 80 TABLET, FILM COATED ORAL at 18:04

## 2020-02-20 RX ADMIN — CLOPIDOGREL BISULFATE 75 MG: 75 TABLET ORAL at 10:09

## 2020-02-20 RX ADMIN — INSULIN LISPRO 3 UNITS: 100 INJECTION, SOLUTION INTRAVENOUS; SUBCUTANEOUS at 10:11

## 2020-02-20 RX ADMIN — INSULIN LISPRO 2 UNITS: 100 INJECTION, SOLUTION INTRAVENOUS; SUBCUTANEOUS at 18:04

## 2020-02-20 RX ADMIN — GUAIFENESIN 600 MG: 600 TABLET, EXTENDED RELEASE ORAL at 10:08

## 2020-02-20 RX ADMIN — IPRATROPIUM BROMIDE 0.5 MG: 0.5 SOLUTION RESPIRATORY (INHALATION) at 14:00

## 2020-02-20 RX ADMIN — GUAIFENESIN 600 MG: 600 TABLET, EXTENDED RELEASE ORAL at 18:04

## 2020-02-20 RX ADMIN — LEVALBUTEROL HYDROCHLORIDE 1.25 MG: 1.25 SOLUTION, CONCENTRATE RESPIRATORY (INHALATION) at 19:32

## 2020-02-20 RX ADMIN — TRIAMCINOLONE ACETONIDE: 1 CREAM TOPICAL at 18:04

## 2020-02-20 RX ADMIN — TRIAMCINOLONE ACETONIDE: 1 CREAM TOPICAL at 10:11

## 2020-02-20 RX ADMIN — IPRATROPIUM BROMIDE 0.5 MG: 0.5 SOLUTION RESPIRATORY (INHALATION) at 05:00

## 2020-02-20 RX ADMIN — Medication 1 CAPSULE: at 18:03

## 2020-02-20 RX ADMIN — IPRATROPIUM BROMIDE 0.5 MG: 0.5 SOLUTION RESPIRATORY (INHALATION) at 19:32

## 2020-02-20 RX ADMIN — LEVALBUTEROL HYDROCHLORIDE 1.25 MG: 1.25 SOLUTION, CONCENTRATE RESPIRATORY (INHALATION) at 14:00

## 2020-02-20 RX ADMIN — BUDESONIDE 0.5 MG: 0.5 INHALANT RESPIRATORY (INHALATION) at 19:32

## 2020-02-20 RX ADMIN — ENOXAPARIN SODIUM 40 MG: 40 INJECTION SUBCUTANEOUS at 10:07

## 2020-02-20 RX ADMIN — PANTOPRAZOLE SODIUM 40 MG: 40 TABLET, DELAYED RELEASE ORAL at 05:19

## 2020-02-20 RX ADMIN — FOLIC ACID 1 MG: 1 TABLET ORAL at 10:08

## 2020-02-20 RX ADMIN — LEVALBUTEROL HYDROCHLORIDE 1.25 MG: 1.25 SOLUTION, CONCENTRATE RESPIRATORY (INHALATION) at 05:00

## 2020-02-20 RX ADMIN — INSULIN LISPRO 6 UNITS: 100 INJECTION, SOLUTION INTRAVENOUS; SUBCUTANEOUS at 12:46

## 2020-02-20 RX ADMIN — Medication 1 TABLET: at 10:07

## 2020-02-20 RX ADMIN — QUETIAPINE FUMARATE 25 MG: 25 TABLET ORAL at 22:43

## 2020-02-20 RX ADMIN — FLUTICASONE PROPIONATE 1 SPRAY: 50 SPRAY, METERED NASAL at 18:05

## 2020-02-20 RX ADMIN — CITALOPRAM HYDROBROMIDE 10 MG: 10 TABLET ORAL at 10:08

## 2020-02-20 RX ADMIN — INSULIN LISPRO 2 UNITS: 100 INJECTION, SOLUTION INTRAVENOUS; SUBCUTANEOUS at 22:43

## 2020-02-20 RX ADMIN — PREDNISONE 40 MG: 20 TABLET ORAL at 12:46

## 2020-02-20 RX ADMIN — BUDESONIDE 0.5 MG: 0.5 INHALANT RESPIRATORY (INHALATION) at 07:12

## 2020-02-20 RX ADMIN — PANTOPRAZOLE SODIUM 40 MG: 40 TABLET, DELAYED RELEASE ORAL at 18:04

## 2020-02-20 RX ADMIN — FLUTICASONE PROPIONATE 1 SPRAY: 50 SPRAY, METERED NASAL at 10:10

## 2020-02-20 RX ADMIN — GABAPENTIN 100 MG: 100 CAPSULE ORAL at 10:08

## 2020-02-20 RX ADMIN — THIAMINE HCL TAB 100 MG 100 MG: 100 TAB at 10:09

## 2020-02-20 RX ADMIN — GABAPENTIN 100 MG: 100 CAPSULE ORAL at 22:43

## 2020-02-20 NOTE — SOCIAL WORK
CM received return phone call from [facility]  She has been following pt due to # of recent hospitalizations and "lack of follow-through on pt's part " CM discussed concerns of pt's home environment (possible deplorable conditions)  Brenda Mccauley reports she has not yet been out to pt's residence  Per Brenda Mccauley, she was informed that pt's family had been working on making his room pet-free and to change the jyoti (numerous animals reported in residence and pt has an allergy)  Pt reports to not have c-pap machine at home  Amanda RUTLEDGE notified of same

## 2020-02-20 NOTE — ASSESSMENT & PLAN NOTE
· Pt has had multiple ER visits and hospitalizations at [facility] over last several weeks regarding his COPD  · After he is released from hospital he returns soon after  From note at [address] on 2/15, reportedly home is unkempt and there are cats/dogs in residence  Pt reports allergies to cats   · Pulmonology consult given frequent ER visits in last several weeks   · Continue scheduled nebs    Was on IV solu-medrol - transition to PO prednisone taper tomorrow, starting with 40 mg and decreasing by 10 mg every 3 days   · CM consult   · Pt reports to improvement of symptoms following nebulizer treatment   · Currently on room air

## 2020-02-20 NOTE — PROGRESS NOTES
[facility]  Progress Note - Miguelina Schmidt 1955, 76 y o  male MRN: [id number]    [facility] Encounter: [id number]    Primary Care Provider: TATIANA Dinero   Date and time admitted to hospital: 2/18/2020 11:32 PM      * Chronic obstructive pulmonary disease with acute exacerbation ([address] )  Assessment & Plan  · Pt has had multiple ER visits and hospitalizations at [facility] over last several weeks regarding his COPD  · After he is released from hospital he returns soon after  From note at [facility] on 2/15, reportedly home is unkempt and there are cats/dogs in residence  Pt reports allergies to cats   · Pulmonology consult given frequent ER visits in last several weeks   · Continue scheduled nebs  Was on IV solu-medrol - transition to PO prednisone taper tomorrow, starting with 40 mg and decreasing by 10 mg every 3 days   · CM consult   · Pt reports to improvement of symptoms following nebulizer treatment   · Currently on room air    Dirty living conditions  Assessment & Plan  Would need case management to re-evaluate prior to discharge  KLARISSA (obstructive sleep apnea)  Assessment & Plan  Patient is supposedly on CPAP during bedtime however noncompliant  He does not have CPAP at home    Type 2 diabetes mellitus with hyperglycemia, without long-term current use of insulin [facility])  Assessment & Plan  Lab Results   Component Value Date    HGBA1C 6 9 (H) 02/19/2020       Recent Labs     02/19/20  1649 02/19/20  2101 02/20/20  0737 02/20/20  1116   POCGLU 105 216* 243* 352*       Blood Sugar Average: Last 72 hrs:  (P) 893 [id number]   · Well controlled  · Hold oral meds  · SSI, accu checks     CAD (coronary artery disease)  Assessment & Plan  Currently on metoprolol and Plavix  Patient denies any current chest discomfort  Alcohol abuse  Assessment & Plan  · Pt reports to daily alcohol use - 4 beers/day   · He is on thiamine, folate and multivitamins    He also takes Seroquel 25 mg at night  · Ativan 0 5 mg tablet as needed  · CIWA protocol  · No s/sx of withdrawal at this time       VTE Pharmacologic Prophylaxis:   Pharmacologic: Enoxaparin (Lovenox)  Mechanical VTE Prophylaxis in Place: Yes    Patient Centered Rounds: I have performed bedside rounds with nursing staff today  Discussions with Specialists or Other Care Team Provider: RN, CM    Education and Discussions with Family / Patient: patient  Declined call to family    Time Spent for Care: 20 minutes  More than 50% of total time spent on counseling and coordination of care as described above  Current Length of Stay: 1 day(s)    Current Patient Status: Inpatient   Certification Statement: The patient will continue to require additional inpatient hospital stay due to transition to PO prednisone tomorrow    Discharge Plan: hopefully tomorrow if stable with transition to PO prednisone, f/u on CM     Code Status: Level 1 - Full Code      Subjective:   Patient reports he feels much better  Respiratory status improved  No acute complaints  Objective:     Vitals:   Temp (24hrs), Av 7 °F (36 5 °C), Min:97 6 °F (36 4 °C), Max:97 8 °F (36 6 °C)    Temp:  [97 6 °F (36 4 °C)-97 8 °F (36 6 °C)] 97 8 °F (36 6 °C)  HR:  [67-75] 73  Resp:  [18] 18  BP: (104-125)/(54-70) 125/70  SpO2:  [92 %-97 %] 92 %  Body mass index is 22 02 kg/m²  Input and Output Summary (last 24 hours): Intake/Output Summary (Last 24 hours) at 2020 1720  Last data filed at 2020 1601  Gross per 24 hour   Intake 1680 ml   Output 3775 ml   Net -2095 ml       Physical Exam:     Physical Exam   Constitutional: He is oriented to person, place, and time  No distress  HENT:   Head: Normocephalic and atraumatic  Eyes: EOM are normal  No scleral icterus  Neck: Normal range of motion  Neck supple  Cardiovascular: Normal rate and regular rhythm  Pulmonary/Chest: Effort normal and breath sounds normal  No respiratory distress  He has no wheezes  Abdominal: Soft  Bowel sounds are normal  He exhibits no distension  There is no tenderness  Musculoskeletal: Normal range of motion  He exhibits no edema  Neurological: He is alert and oriented to person, place, and time  No cranial nerve deficit  Skin: Skin is warm and dry  He is not diaphoretic  Psychiatric: He has a normal mood and affect  His behavior is normal    Vitals reviewed  Additional Data:     Labs:    Results from last 7 days   Lab Units 02/19/20  0011   WBC Thousand/uL 11 17*   HEMOGLOBIN g/dL 10 9*   HEMATOCRIT % 33 9*   PLATELETS Thousands/uL 385   NEUTROS PCT % 66   LYMPHS PCT % 20   MONOS PCT % 13*   EOS PCT % 0     Results from last 7 days   Lab Units 02/19/20  0011   SODIUM mmol/L 137   POTASSIUM mmol/L 3 6   CHLORIDE mmol/L 103   CO2 mmol/L 27   BUN mg/dL 18   CREATININE mg/dL 0 70   ANION GAP mmol/L 7   CALCIUM mg/dL 9 3   GLUCOSE RANDOM mg/dL 200*         Results from last 7 days   Lab Units 02/20/20  1116 02/20/20  0737 02/19/20  2101 02/19/20  1649 02/19/20  1110 02/19/20  0632   POC GLUCOSE mg/dl 352* 243* 216* 105 196* 159*     Results from last 7 days   Lab Units 02/19/20  0624   HEMOGLOBIN A1C % 6 9*     Results from last 7 days   Lab Units 02/19/20  0014   PROCALCITONIN ng/ml 0 12           * I Have Reviewed All Lab Data Listed Above  * Additional Pertinent Lab Tests Reviewed:  All Labs Within Last 24 Hours Reviewed    Imaging:    Imaging Reports Reviewed Today Include: none  Imaging Personally Reviewed by Myself Includes:  none    Recent Cultures (last 7 days):           Last 24 Hours Medication List:     Current Facility-Administered Medications:  acetaminophen 650 mg Oral Q6H PRN Joey Angelo MD   aluminum-magnesium hydroxide-simethicone 30 mL Oral Q6H PRN Joey Angelo MD   atorvastatin 80 mg Oral Daily With Michael Sheets MD   b complex-vitamin C-folic acid 1 capsule Oral Daily With Dinner Joey Angelo MD   budesonide 0 5 mg Nebulization Q12H Wilsonemil Garcia, NOAH   citalopram 10 mg Oral Daily Alvaro Plata MD   clopidogrel 75 mg Oral Daily Alvaro Plata MD   docusate sodium 100 mg Oral BID PRN Alvaro Plata MD   enoxaparin 40 mg Subcutaneous Daily Alvaro Plata MD   fluticasone 1 spray Nasal BID Alvaro Plata MD   folic acid 1 mg Oral Daily Alvaro Plata MD   gabapentin 100 mg Oral TID Alvaro Plata MD   guaiFENesin 600 mg Oral BID Alvrao Plata MD   insulin lispro 1-5 Units Subcutaneous HS Alvaro Plata MD   insulin lispro 1-6 Units Subcutaneous TID AC Alvaro Plata MD   ipratropium 0 5 mg Nebulization Q6H Wilson NOAH Garcia   levalbuterol 1 25 mg Nebulization Q6H Wilsonemil Garcia, NOAH   lisinopril 40 mg Oral Daily Alvaro Plata MD   LORazepam 0 5 mg Oral Q6H PRN Alvaro Plata MD   melatonin 3 mg Oral HS PRN Alvaro Plata MD   metoprolol succinate 100 mg Oral Daily Alvaro Plata MD   multivitamin-minerals 1 tablet Oral Daily Alvaro Plata MD   ondansetron 4 mg Intravenous Q6H PRN Alvaro Plata MD   oxyCODONE 5 mg Oral Q4H PRN Alvaro Plata MD   pantoprazole 40 mg Oral BID AC Alvaro Plata MD   predniSONE 40 mg Oral Daily With Breakfast Wilson Garcia PA-C   QUEtiapine 25 mg Oral HS Alvaro Plata MD   thiamine 100 mg Oral Daily Alvaro Plata MD   triamcinolone  Topical BID Alvaro Plata MD        Today, Patient Was Seen By: Sheryl Han PA-C    ** Please Note: Dictation voice to text software may have been used in the creation of this document   **

## 2020-02-20 NOTE — ASSESSMENT & PLAN NOTE
Lab Results   Component Value Date    HGBA1C 6 9 (H) 02/19/2020       Recent Labs     02/19/20  1649 02/19/20  2101 02/20/20  0737 02/20/20  1116   POCGLU 105 216* 243* 352*       Blood Sugar Average: Last 72 hrs:  (P) 514 [id number]   · Well controlled  · Hold oral meds  · SSI, accu checks

## 2020-02-21 VITALS
RESPIRATION RATE: 18 BRPM | SYSTOLIC BLOOD PRESSURE: 123 MMHG | BODY MASS INDEX: 22.12 KG/M2 | DIASTOLIC BLOOD PRESSURE: 65 MMHG | OXYGEN SATURATION: 94 % | HEIGHT: 73 IN | TEMPERATURE: 98.3 F | HEART RATE: 80 BPM | WEIGHT: 166.89 LBS

## 2020-02-21 LAB
ANION GAP SERPL CALCULATED.3IONS-SCNC: 4 MMOL/L (ref 4–13)
BASOPHILS # BLD AUTO: 0.01 THOUSANDS/ΜL (ref 0–0.1)
BASOPHILS NFR BLD AUTO: 0 % (ref 0–1)
BUN SERPL-MCNC: 13 MG/DL (ref 5–25)
CALCIUM SERPL-MCNC: 8.7 MG/DL (ref 8.3–10.1)
CHLORIDE SERPL-SCNC: 104 MMOL/L (ref 100–108)
CO2 SERPL-SCNC: 28 MMOL/L (ref 21–32)
CREAT SERPL-MCNC: 0.58 MG/DL (ref 0.6–1.3)
EOSINOPHIL # BLD AUTO: 0.04 THOUSAND/ΜL (ref 0–0.61)
EOSINOPHIL NFR BLD AUTO: 0 % (ref 0–6)
ERYTHROCYTE [DISTWIDTH] IN BLOOD BY AUTOMATED COUNT: 15.2 % (ref 11.6–15.1)
GFR SERPL CREATININE-BSD FRML MDRD: 106 ML/MIN/1.73SQ M
GLUCOSE SERPL-MCNC: 143 MG/DL (ref 65–140)
GLUCOSE SERPL-MCNC: 164 MG/DL (ref 65–140)
GLUCOSE SERPL-MCNC: 228 MG/DL (ref 65–140)
GLUCOSE SERPL-MCNC: 375 MG/DL (ref 65–140)
HCT VFR BLD AUTO: 34.3 % (ref 36.5–49.3)
HGB BLD-MCNC: 10.8 G/DL (ref 12–17)
IMM GRANULOCYTES # BLD AUTO: 0.08 THOUSAND/UL (ref 0–0.2)
IMM GRANULOCYTES NFR BLD AUTO: 1 % (ref 0–2)
LYMPHOCYTES # BLD AUTO: 2.82 THOUSANDS/ΜL (ref 0.6–4.47)
LYMPHOCYTES NFR BLD AUTO: 30 % (ref 14–44)
MCH RBC QN AUTO: 25.9 PG (ref 26.8–34.3)
MCHC RBC AUTO-ENTMCNC: 31.5 G/DL (ref 31.4–37.4)
MCV RBC AUTO: 82 FL (ref 82–98)
MONOCYTES # BLD AUTO: 0.83 THOUSAND/ΜL (ref 0.17–1.22)
MONOCYTES NFR BLD AUTO: 9 % (ref 4–12)
NEUTROPHILS # BLD AUTO: 5.67 THOUSANDS/ΜL (ref 1.85–7.62)
NEUTS SEG NFR BLD AUTO: 60 % (ref 43–75)
NRBC BLD AUTO-RTO: 0 /100 WBCS
PLATELET # BLD AUTO: 342 THOUSANDS/UL (ref 149–390)
PMV BLD AUTO: 10.6 FL (ref 8.9–12.7)
POTASSIUM SERPL-SCNC: 3.8 MMOL/L (ref 3.5–5.3)
RBC # BLD AUTO: 4.17 MILLION/UL (ref 3.88–5.62)
SODIUM SERPL-SCNC: 136 MMOL/L (ref 136–145)
WBC # BLD AUTO: 9.45 THOUSAND/UL (ref 4.31–10.16)

## 2020-02-21 PROCEDURE — 85025 COMPLETE CBC W/AUTO DIFF WBC: CPT | Performed by: PHYSICIAN ASSISTANT

## 2020-02-21 PROCEDURE — 99238 HOSP IP/OBS DSCHRG MGMT 30/<: CPT | Performed by: PHYSICIAN ASSISTANT

## 2020-02-21 PROCEDURE — 92610 EVALUATE SWALLOWING FUNCTION: CPT

## 2020-02-21 PROCEDURE — 94761 N-INVAS EAR/PLS OXIMETRY MLT: CPT

## 2020-02-21 PROCEDURE — 94760 N-INVAS EAR/PLS OXIMETRY 1: CPT

## 2020-02-21 PROCEDURE — 94640 AIRWAY INHALATION TREATMENT: CPT

## 2020-02-21 PROCEDURE — 80048 BASIC METABOLIC PNL TOTAL CA: CPT | Performed by: PHYSICIAN ASSISTANT

## 2020-02-21 PROCEDURE — 82948 REAGENT STRIP/BLOOD GLUCOSE: CPT

## 2020-02-21 RX ORDER — ALBUTEROL SULFATE 90 UG/1
2 AEROSOL, METERED RESPIRATORY (INHALATION) 4 TIMES DAILY PRN
Qty: 1 INHALER | Refills: 0 | Status: SHIPPED | OUTPATIENT
Start: 2020-02-21

## 2020-02-21 RX ORDER — INSULIN GLARGINE 100 [IU]/ML
10 INJECTION, SOLUTION SUBCUTANEOUS
Status: DISCONTINUED | OUTPATIENT
Start: 2020-02-21 | End: 2020-02-21 | Stop reason: HOSPADM

## 2020-02-21 RX ORDER — ALBUTEROL SULFATE 90 UG/1
2 AEROSOL, METERED RESPIRATORY (INHALATION) 4 TIMES DAILY PRN
Qty: 1 INHALER | Refills: 0 | OUTPATIENT
Start: 2020-02-21

## 2020-02-21 RX ORDER — PREDNISONE 20 MG/1
TABLET ORAL
Qty: 13 TABLET | Refills: 0 | Status: SHIPPED | OUTPATIENT
Start: 2020-02-22 | End: 2020-03-04

## 2020-02-21 RX ADMIN — INSULIN LISPRO 2 UNITS: 100 INJECTION, SOLUTION INTRAVENOUS; SUBCUTANEOUS at 12:39

## 2020-02-21 RX ADMIN — ATORVASTATIN CALCIUM 80 MG: 80 TABLET, FILM COATED ORAL at 17:03

## 2020-02-21 RX ADMIN — IPRATROPIUM BROMIDE 0.5 MG: 0.5 SOLUTION RESPIRATORY (INHALATION) at 08:00

## 2020-02-21 RX ADMIN — PANTOPRAZOLE SODIUM 40 MG: 40 TABLET, DELAYED RELEASE ORAL at 17:03

## 2020-02-21 RX ADMIN — INSULIN LISPRO 6 UNITS: 100 INJECTION, SOLUTION INTRAVENOUS; SUBCUTANEOUS at 17:02

## 2020-02-21 RX ADMIN — PANTOPRAZOLE SODIUM 40 MG: 40 TABLET, DELAYED RELEASE ORAL at 06:07

## 2020-02-21 RX ADMIN — Medication 1 CAPSULE: at 17:03

## 2020-02-21 RX ADMIN — BUDESONIDE 0.5 MG: 0.5 INHALANT RESPIRATORY (INHALATION) at 08:00

## 2020-02-21 RX ADMIN — GABAPENTIN 100 MG: 100 CAPSULE ORAL at 08:45

## 2020-02-21 RX ADMIN — LEVALBUTEROL HYDROCHLORIDE 1.25 MG: 1.25 SOLUTION, CONCENTRATE RESPIRATORY (INHALATION) at 01:17

## 2020-02-21 RX ADMIN — INSULIN LISPRO 2 UNITS: 100 INJECTION, SOLUTION INTRAVENOUS; SUBCUTANEOUS at 17:02

## 2020-02-21 RX ADMIN — THIAMINE HCL TAB 100 MG 100 MG: 100 TAB at 08:46

## 2020-02-21 RX ADMIN — GABAPENTIN 100 MG: 100 CAPSULE ORAL at 17:03

## 2020-02-21 RX ADMIN — Medication 1 TABLET: at 08:45

## 2020-02-21 RX ADMIN — PREDNISONE 40 MG: 20 TABLET ORAL at 08:46

## 2020-02-21 RX ADMIN — IPRATROPIUM BROMIDE 0.5 MG: 0.5 SOLUTION RESPIRATORY (INHALATION) at 13:52

## 2020-02-21 RX ADMIN — IPRATROPIUM BROMIDE 0.5 MG: 0.5 SOLUTION RESPIRATORY (INHALATION) at 01:17

## 2020-02-21 RX ADMIN — CITALOPRAM HYDROBROMIDE 10 MG: 10 TABLET ORAL at 08:45

## 2020-02-21 RX ADMIN — GUAIFENESIN 600 MG: 600 TABLET, EXTENDED RELEASE ORAL at 17:03

## 2020-02-21 RX ADMIN — FLUTICASONE PROPIONATE 1 SPRAY: 50 SPRAY, METERED NASAL at 08:47

## 2020-02-21 RX ADMIN — LEVALBUTEROL HYDROCHLORIDE 1.25 MG: 1.25 SOLUTION, CONCENTRATE RESPIRATORY (INHALATION) at 13:52

## 2020-02-21 RX ADMIN — INSULIN LISPRO 2 UNITS: 100 INJECTION, SOLUTION INTRAVENOUS; SUBCUTANEOUS at 12:38

## 2020-02-21 RX ADMIN — FOLIC ACID 1 MG: 1 TABLET ORAL at 08:46

## 2020-02-21 RX ADMIN — GUAIFENESIN 600 MG: 600 TABLET, EXTENDED RELEASE ORAL at 08:45

## 2020-02-21 RX ADMIN — CLOPIDOGREL BISULFATE 75 MG: 75 TABLET ORAL at 08:46

## 2020-02-21 RX ADMIN — TRIAMCINOLONE ACETONIDE 1 APPLICATION: 1 CREAM TOPICAL at 08:46

## 2020-02-21 RX ADMIN — LEVALBUTEROL HYDROCHLORIDE 1.25 MG: 1.25 SOLUTION, CONCENTRATE RESPIRATORY (INHALATION) at 08:00

## 2020-02-21 RX ADMIN — TRIAMCINOLONE ACETONIDE: 1 CREAM TOPICAL at 17:04

## 2020-02-21 RX ADMIN — ENOXAPARIN SODIUM 40 MG: 40 INJECTION SUBCUTANEOUS at 08:46

## 2020-02-21 NOTE — ASSESSMENT & PLAN NOTE
· Patient is supposedly on CPAP during bedtime however noncompliant  · He does not have CPAP at home - reports he had one but it broke  He is due for repeat outpatient sleep study

## 2020-02-21 NOTE — ASSESSMENT & PLAN NOTE
· Pt has had multiple ER visits and hospitalizations at [facility] over last several weeks regarding his COPD  · After he is released from hospital he returns soon after  From note at [facility] on 2/15, reportedly home is unkempt and there are cats/dogs in residence  Pt reports allergies to cats   · Pulmonology consult given frequent ER visits in last several weeks   · Continue scheduled nebs    Was on IV solu-medrol - transition to PO prednisone taper, starting with 40 mg and decreasing by 10 mg every 3 days   · CM consult   · Pt reports to improvement of symptoms following nebulizer treatment   · Currently on room air  · Will need pulm f/u on d/c  · Will obtain ambulatory pulse ox - does not require supplemental oxygen at this time  · Stable for discharge with outpatient follow-up

## 2020-02-21 NOTE — RESTORATIVE TECHNICIAN NOTE
Restorative Specialist Mobility Note       Activity: Ambulate in ayala, Ambulate in room, Bathroom privileges, Dangle, Stand at bedside(Educated/encouraged pt to ambulate with assistance 3-4 x's/day  Bed alarm on   Pt callbell, phone/tray within reach )     Assistive Device: None             Mickeal Minium BS, Restorative Technician, [employer]

## 2020-02-21 NOTE — PROGRESS NOTES
[facility]  Progress Note - Alejandrina Burdick 1955, 76 y o  male MRN: [id number]    [facility] Encounter: [id number]    Primary Care Provider: TATIANA Garcia   Date and time admitted to hospital: 2/18/2020 11:32 PM      * Chronic obstructive pulmonary disease with acute exacerbation ([address] )  Assessment & Plan  · Pt has had multiple ER visits and hospitalizations at [facility] over last several weeks regarding his COPD  · After he is released from hospital he returns soon after  From note at [facility] on 2/15, reportedly home is unkempt and there are cats/dogs in residence  Pt reports allergies to cats   · Pulmonology consult given frequent ER visits in last several weeks   · Continue scheduled nebs  Was on IV solu-medrol - transition to PO prednisone taper, starting with 40 mg and decreasing by 10 mg every 3 days   · CM consult   · Pt reports to improvement of symptoms following nebulizer treatment   · Currently on room air  · Will need pulm f/u on d/c  · Will obtain ambulatory pulse ox and speech evaluation prior to discharge - nursing reports he had O2 desats into mid 80s when he ate breakfast, associated with coughing     Dirty living conditions  Assessment & Plan  Would need case management to re-evaluate prior to discharge  KLARISSA (obstructive sleep apnea)  Assessment & Plan  · Patient is supposedly on CPAP during bedtime however noncompliant  · He does not have CPAP at home - reports he had one but it broke  He is due for repeat outpatient sleep study      Type 2 diabetes mellitus with hyperglycemia, without long-term current use of insulin [facility])  Assessment & Plan  Lab Results   Component Value Date    HGBA1C 6 9 (H) 02/19/2020       Recent Labs     02/20/20  1116 02/20/20  1715 02/20/20  2134 02/21/20  0724   POCGLU 352* 212* 282* 143*       Blood Sugar Average: Last 72 hrs:  (P) 212   · Well controlled based on A1C  · Hold oral meds  · Has been hyperglycemic with steroid use - initiate basal/bolus regimen for better control   · SSI, accu checks     CAD (coronary artery disease)  Assessment & Plan  Currently on metoprolol and Plavix  Patient denies any current chest discomfort  Alcohol abuse  Assessment & Plan  · Pt reports to daily alcohol use - 4 beers/day   · He is on thiamine, folate and multivitamins  He also takes Seroquel 25 mg at night  · Ativan 0 5 mg tablet as needed  · CIWA protocol  · No s/sx of withdrawal at this time       VTE Pharmacologic Prophylaxis:   Pharmacologic: Enoxaparin (Lovenox)  Mechanical VTE Prophylaxis in Place: Yes    Patient Centered Rounds: I have performed bedside rounds with nursing staff today  Discussions with Specialists or Other Care Team Provider: CM    Education and Discussions with Family / Patient: patient, declined call to family    Time Spent for Care: 20 minutes  More than 50% of total time spent on counseling and coordination of care as described above  Current Length of Stay: 2 day(s)    Current Patient Status: Inpatient   Certification Statement: The patient will continue to require additional inpatient hospital stay due to needs home O2 eval and speech eval given desaturation with breakfast    Discharge Plan: later today vs tomorrow pending home O2 and speech eval    Code Status: Level 1 - Full Code      Subjective:   Patient reports he feels well  Breathing improved  He reports coughing with breakfast but denies sob  Objective:     Vitals:   Temp (24hrs), Av 1 °F (36 7 °C), Min:97 8 °F (36 6 °C), Max:98 6 °F (37 °C)    Temp:  [97 8 °F (36 6 °C)-98 6 °F (37 °C)] 98 °F (36 7 °C)  HR:  [67-73] 71  Resp:  [18-20] 18  BP: (109-125)/(61-72) 109/64  SpO2:  [92 %-96 %] 96 %  Body mass index is 22 02 kg/m²  Input and Output Summary (last 24 hours):        Intake/Output Summary (Last 24 hours) at 2020 1202  Last data filed at 2020 0900  Gross per 24 hour   Intake 960 ml   Output 1500 ml   Net -540 ml       Physical Exam:     Physical Exam   Constitutional: He is oriented to person, place, and time  No distress  HENT:   Head: Normocephalic and atraumatic  Eyes: EOM are normal  No scleral icterus  Neck: Normal range of motion  Neck supple  Cardiovascular: Normal rate and regular rhythm  Pulmonary/Chest: Effort normal  No respiratory distress  Diminished b/l   Abdominal: Soft  Bowel sounds are normal  He exhibits no distension  There is no tenderness  Musculoskeletal: Normal range of motion  He exhibits no edema  Neurological: He is alert and oriented to person, place, and time  No cranial nerve deficit  Skin: Skin is warm and dry  He is not diaphoretic  Psychiatric: He has a normal mood and affect  His behavior is normal    Vitals reviewed  Additional Data:     Labs:    Results from last 7 days   Lab Units 02/21/20  0608   WBC Thousand/uL 9 45   HEMOGLOBIN g/dL 10 8*   HEMATOCRIT % 34 3*   PLATELETS Thousands/uL 342   NEUTROS PCT % 60   LYMPHS PCT % 30   MONOS PCT % 9   EOS PCT % 0     Results from last 7 days   Lab Units 02/21/20  0608   SODIUM mmol/L 136   POTASSIUM mmol/L 3 8   CHLORIDE mmol/L 104   CO2 mmol/L 28   BUN mg/dL 13   CREATININE mg/dL 0 58*   ANION GAP mmol/L 4   CALCIUM mg/dL 8 7   GLUCOSE RANDOM mg/dL 164*         Results from last 7 days   Lab Units 02/21/20  0724 02/20/20  2134 02/20/20  1715 02/20/20  1116 02/20/20  0737 02/19/20  2101 02/19/20  1649 02/19/20  1110 02/19/20  0632   POC GLUCOSE mg/dl 143* 282* 212* 352* 243* 216* 105 196* 159*     Results from last 7 days   Lab Units 02/19/20  0624   HEMOGLOBIN A1C % 6 9*     Results from last 7 days   Lab Units 02/19/20  0014   PROCALCITONIN ng/ml 0 12           * I Have Reviewed All Lab Data Listed Above  * Additional Pertinent Lab Tests Reviewed:  All Labs Within Last 24 Hours Reviewed    Imaging:    Imaging Reports Reviewed Today Include: none  Imaging Personally Reviewed by Myself Includes:  none    Recent Cultures (last 7 days):           Last 24 Hours Medication List:     Current Facility-Administered Medications:  acetaminophen 650 mg Oral Q6H PRN Rudy Bacon MD   aluminum-magnesium hydroxide-simethicone 30 mL Oral Q6H PRN Rudy Bacon MD   atorvastatin 80 mg Oral Daily With Geoffrey Bermudez MD   b complex-vitamin C-folic acid 1 capsule Oral Daily With Geoffrey Bermudez MD   budesonide 0 5 mg Nebulization Q12H Leonard Gan PA-C   citalopram 10 mg Oral Daily Rudy Bacon MD   clopidogrel 75 mg Oral Daily Rudy Bacon MD   docusate sodium 100 mg Oral BID PRN Rudy Bacon MD   enoxaparin 40 mg Subcutaneous Daily Rudy Bacon MD   fluticasone 1 spray Nasal BID Rudy Bacon MD   folic acid 1 mg Oral Daily Rudy Bacon MD   gabapentin 100 mg Oral TID Rudy Bacon MD   guaiFENesin 600 mg Oral BID Rudy Bacon MD   insulin glargine 10 Units Subcutaneous HS Starr Miller PA-C   insulin lispro 1-5 Units Subcutaneous HS Rudy Bacon MD   insulin lispro 1-6 Units Subcutaneous TID AC Rudy Bacon MD   insulin lispro 2 Units Subcutaneous TID With Meals Starr Miller PA-C   ipratropium 0 5 mg Nebulization Q6H Leonard Gan PA-C   levalbuterol 1 25 mg Nebulization Q6H Leonard Gan PA-C   lisinopril 40 mg Oral Daily Rudy Bacon MD   LORazepam 0 5 mg Oral Q6H PRN Rudy Bacon MD   melatonin 3 mg Oral HS PRN Rudy Bacon MD   metoprolol succinate 100 mg Oral Daily Rudy Bacon MD   multivitamin-minerals 1 tablet Oral Daily Rudy Bacon MD   ondansetron 4 mg Intravenous Q6H PRN Rudy Bacon MD   oxyCODONE 5 mg Oral Q4H PRN Rudy Bacon MD   pantoprazole 40 mg Oral BID AC Rudy Bacon MD   predniSONE 40 mg Oral Daily With Breakfast Leonard Gan PA-C   QUEtiapine 25 mg Oral HS Rudy Bacon MD   thiamine 100 mg Oral Daily Rudy Bacon MD   triamcinolone  Topical BID Rudy Bacon MD        Today, Patient Was Seen By: Ayan Mercedes PA-C    ** Please Note: Dictation voice to text software may have been used in the creation of this document   **

## 2020-02-21 NOTE — ASSESSMENT & PLAN NOTE
Lab Results   Component Value Date    HGBA1C 6 9 (H) 02/19/2020       Recent Labs     02/20/20  2134 02/21/20  0724 02/21/20  1155 02/21/20  1621   POCGLU 282* 143* 228* 375*       Blood Sugar Average: Last 72 hrs:  (P) 228 [id number]   · Well controlled based on A1C  · Has been hyperglycemic with steroid use, anticipate this will improve with steroid taper  · Resume oral medications on discharge  · SSI, accu checks

## 2020-02-21 NOTE — ASSESSMENT & PLAN NOTE
· Pt has had multiple ER visits and hospitalizations at [facility] over last several weeks regarding his COPD  · After he is released from hospital he returns soon after  [facility] [address] on 2/15, reportedly home is unkempt and there are cats/dogs in residence  Pt reports allergies to cats   · Pulmonology consult given frequent ER visits in last several weeks   · Continue scheduled nebs    Was on IV solu-medrol - transition to PO prednisone taper, starting with 40 mg and decreasing by 10 mg every 3 days   · CM consult   · Pt reports to improvement of symptoms following nebulizer treatment   · Currently on room air  · Will need pulm f/u on d/c  · Will obtain ambulatory pulse ox and speech evaluation prior to discharge - nursing reports he had O2 desats into mid 80s when he ate breakfast, associated with coughing

## 2020-02-21 NOTE — RESPIRATORY THERAPY NOTE
Home Oxygen Qualifying Test       Patient name: Nuris Bowman        : 1955   Date of Test:  2020  Diagnosis:      Home Oxygen Test:    **Medicare Guidelines require item(s) 1-5 on all ambulatory patients or 1 and 2 on non-ambulatory patients  1   Baseline SPO2 on Room Air at rest 94 %  2   SPO2 during exercise on Room Air 95 %  During exercise monitor SpO2  If SPO2 increases >=89% with ambulation do not add supplemental             oxygen  If <= 88% on room air add O2 via NC and titrate patient  Patient must be ambulated with O2 and titrated to > 88% with exertion  3   SPO2 on Oxygen at rest N/A % N/A lpm     4   SPO2 during exercise on Oxygen  N/A% a liter flow of N/A lpm     5   Exercise performed:          walking, duration 10 (min), distance 1000 (feet)          []  Supplemental Home Oxygen is indicated  [x]  Client does not qualify for home oxygen        Respiratory Additional Notes  Deric Obrien, RT

## 2020-02-21 NOTE — ASSESSMENT & PLAN NOTE
Lab Results   Component Value Date    HGBA1C 6 9 (H) 02/19/2020       Recent Labs     02/20/20  1116 02/20/20  1715 02/20/20  2134 02/21/20  0724   POCGLU 352* 212* 282* 143*       Blood Sugar Average: Last 72 hrs:  (P) 212   · Well controlled based on A1C  · Hold oral meds  · Has been hyperglycemic with steroid use - initiate basal/bolus regimen for better control   · SSI, accu checks

## 2020-02-21 NOTE — SPEECH THERAPY NOTE
Speech Language/Pathology  Speech/Language Pathology  Assessment    Patient Name: Dinah Hoffmann  UCPPK'N Date: 2/21/2020     Problem List  Principal Problem:    Chronic obstructive pulmonary disease with acute exacerbation ([facility] )  Active Problems:    Alcohol abuse    CAD (coronary artery disease)    Type 2 diabetes mellitus with hyperglycemia, without long-term current use of insulin (HCC)    KLARISSA (obstructive sleep apnea)    Dirty living conditions    Past Medical History  Past Medical History:   Diagnosis Date    Cardiac arrest ([facility] )     COPD (chronic obstructive pulmonary disease) ([facility] )     CVA (cerebral vascular accident) ([facility] )     Diabetes mellitus ([facility] )     Heart attack ([facility] )     Hypertension     Stroke [facility])      Past Surgical History  Past Surgical History:   Procedure Laterality Date    CERVICAL FUSION N/A 9/10/2018    Procedure: Posterior cervical decompressive laminectomy C3-6; Posterior cervical lateral mass and pedicle fixation fusion C1-T1;  Surgeon: Jeremy Davis MD;  Location: [facility];  Service: Neurosurgery     History of Present Illness per chart:  Dinah Hoffmann is a 76 y o  male who has a past medical history significant for COPD exacerbation with usual oxygenation at 95% on room air  Likewise he has obstructive sleep apnea but noncompliant with CPAP  He has a history of alcohol abuse but currently has cut down significantly to just 4 beers a night  Diabetes mellitus type 2 on medications not on insulin with hyperglycemia, nicotine abuse but has stop smoking since 6 months ago; hypertension and hyperlipidemia  This is a patient who has frequent COPD exacerbations and has seen [facility] 10 times since the beginning of this year for COPD exacerbation  Significant note is of no one from February 15 which is states that essentially he gets better with nebulizations and start of steroids    However when he goes home the living conditions are unkept with the presence of crowding (daughter and her  along with 4 children in the house) with 2 cats, 2 dogs and 2 birds  That said, he then begins to get short of breath needing to return to the emergency room  The patient essentially experience the same problem tonight  He denies any chest pain  No fever  No sputum production  He has cough and productive of very minimal sputum  No abdominal pain  No edema  No nausea or vomiting  After the patient was given nebulization of albuterol and ipratropium he essentially got better and improved  He does not have any air hunger as of this moment he can speak in long sentences  Special Studies per MD:  MAURY chest- 02/19/2020  IMPRESSION:  No active pulmonary disease  Bedside Swallow Evaluation:    Summary:  Pt presents w/ functional oral swallowing skills and intermittent mild pharyngeal dysphagia  Patient with mild prolonged mastication, but functional with regular textures  Good bolus formation noted  Transfer and swallow initiation were fairly prompt  Patient with intermittent throat clear when consuming the chicken noodle soup  He took large sips of thin of the soda from the can  Intermittent throat clear noted  He did report some difficulty with a large pill this AM  SLP recommended if pills are large to crush and ok whole if small  Patient agreeable to this  Recommendations of a regular diet and thin liquids  Pills crushed with puree if large, ok whole if small  ST to follow-up x1 at a meal      Recommendations:  Diet: Regular   Liquid: Thin   Meds: Crush if large in puree, whole ok if small   Supervision: Intermittent   Positioning:Upright  Strategies: Pt to take PO/Meds only when fully alert and upright  Oral care:  To be completed daily   Aspiration precautions  Reflux precautions    Therapy Prognosis: Good   Prognosis considerations: age, alertness   Frequency:  F/u x1 with meal and ensure tolerance with pills     Goal(s):  Pt will tolerate least restrictive diet w/out s/s aspiration or oral/pharyngeal difficulties       Patient's goal:  No goal stated     Reason for consult:  R/o aspiration  Determine safest and least restrictive diet  Failed nursing dysphagia assessment  O2 decreased with breakfast meal       Precautions:  None    Current diet:  Regular and thin     Premorbid diet[de-identified]  Regular and thin     Previous VBS:  None in the record     O2 requirement:  RA    Voice/Speech:  Intelligible     Social:  Lives with his daughter, JERE, and grandchildren     Follows commands:   WFL                         Cognitive Status:  Alert and oriented     Oral [facility] exam:  Dentition: natural, adequate   Labial strength and ROM: WFL  Lingual strength and ROM: WFL  Mandibular strength and ROM: WFL  Velum: Unable to visualize   Secretion management: WFL   Volitional cough: Good   Volitional swallow: Good       Items administered:  hard solid, thin liquids, Liquids were taken by can     Oral stage:  WFL  Lip closure: WFL  Mastication: Mildly prolonged, but functional   Bolus formation: WFL  Bolus control: WFL  Transfer: Timely   Oral residue: Not noted  Pocketing: Not noted    Pharyngeal stage:  Mild  Swallow promptness: Fairly prompt   Laryngeal rise: Palpated and judged to be [facility]  Wet voice: Not noted  Throat clear: Intermittent throat clear with soup and soda   Cough: Not noted  Secondary swallows: No   Audible swallows: No       Esophageal stage:  No s/s reported    Aspiration precautions posted    Results d/w:  Pt

## 2020-02-21 NOTE — DISCHARGE SUMMARY
Discharge- Patti Avalos 1955, 76 y o  male MRN: [id number]    [facility] Encounter: [id number]    Primary Care Provider: Joeann Bosworth, CRNP   Date and time admitted to hospital: 2/18/2020 11:32 PM      * Chronic obstructive pulmonary disease with acute exacerbation ([address] )  Assessment & Plan  · Pt has had multiple ER visits and hospitalizations at [facility] over last several weeks regarding his COPD  · After he is released from hospital he returns soon after  From note at [address] on 2/15, reportedly home is unkempt and there are cats/dogs in residence  Pt reports allergies to cats   · Pulmonology consult given frequent ER visits in last several weeks   · Continue scheduled nebs  Was on IV solu-medrol - transition to PO prednisone taper, starting with 40 mg and decreasing by 10 mg every 3 days   · CM consult   · Pt reports to improvement of symptoms following nebulizer treatment   · Currently on room air  · Will need pulm f/u on d/c  · Will obtain ambulatory pulse ox - does not require supplemental oxygen at this time  · Stable for discharge with outpatient follow-up    KLARISSA (obstructive sleep apnea)  Assessment & Plan  · Patient is supposedly on CPAP during bedtime however noncompliant  · He does not have CPAP at home - reports he had one but it broke  He is due for repeat outpatient sleep study      Type 2 diabetes mellitus with hyperglycemia, without long-term current use of insulin [facility])  Assessment & Plan  Lab Results   Component Value Date    HGBA1C 6 9 (H) 02/19/2020       Recent Labs     02/20/20  2134 02/21/20  0724 02/21/20  1155 02/21/20  1621   POCGLU 282* 143* 228* 375*       Blood Sugar Average: Last 72 hrs:  (P) 228 [id number]   · Well controlled based on A1C  · Has been hyperglycemic with steroid use, anticipate this will improve with steroid taper  · Resume oral medications on discharge  · SSI, accu checks     CAD (coronary artery disease)  Assessment & Plan  Currently on metoprolol and Plavix  Patient denies any current chest discomfort  Alcohol abuse  Assessment & Plan  · Pt reports to daily alcohol use - 4 beers/day   · He is on thiamine, folate and multivitamins  He also takes Seroquel 25 mg at night  · Ativan 0 5 mg tablet as needed  · CIWA protocol  · No s/sx of withdrawal at this time     Discharging Physician / Practitioner: Loyda Arciniega PA-C  PCP: Nakia Mathis  Admission Date:   Admission Orders (From admission, onward)     Ordered        02/19/20 0155  Inpatient Admission  Once                   Discharge Date: 02/21/20    Resolved Problems  Date Reviewed: 2/21/2020    None          Consultations During Hospital Stay:  · Pulmonology    Procedures Performed:   · None    Significant Findings / Test Results:   · Chest x-ray:  No active pulmonary disease  · Procalcitonin normal    Incidental Findings:   · None     Test Results Pending at Discharge (will require follow up): · None     Outpatient Tests Requested:  · None    Complications:  None    Reason for Admission:  Acute COPD exacerbation    Hospital Course:     Girma Malin is a 76 y o  male patient who originally presented to the hospital on 2/18/2020 due to shortness of breath  He has had frequent visits to [facility] ER and hospitalizations at [facility] for COPD exacerbations  He denied any chest pain or fever on admission  He had chest x-ray in the ER which was negative for any acute findings  Procalcitonin was negative  He was admitted and started on IV steroids  He was seen in consult with pulmonology given frequent readmissions due to COPD exacerbations  He has history of severe eosinophilia  Presentation may be an overlap of asthma and COPD  Patient may benefit from anti IL 5/Xolair if qualifies, will need to check IgG and [place] allergy test which can be done as an outpatient  He has improved back to baseline  He reports he is feeling better    At this time, he will be transition to oral steroids, with prednisone taper starting at 40 mg to be decrease by 10 mg every 3 days until completed  He will need to follow-up with his PCP and pulmonologist on discharge  He had ambulatory pulse ox evaluation prior to discharge, he does not require any supplemental oxygen at this time  He was seen by speech therapy given concern for coughing while eating breakfast, and passed swallow evaluation with recommendation for regular diet  He is stable for discharge at this time with outpatient follow-up  Please see above list of diagnoses and related plan for additional information  Condition at Discharge: good     Discharge Day Visit / Exam:     * Please refer to separate progress note for these details *    Discussion with Family: pt declined  Discharge instructions/Information to patient and family:   See after visit summary for information provided to patient and family  Provisions for Follow-Up Care:  See after visit summary for information related to follow-up care and any pertinent home health orders  Disposition:     Home    For Discharges to [facility] SNF:   · Not Applicable to this Patient - Not Applicable to this Patient    Planned Readmission: no     Discharge Statement:  I spent 20 minutes discharging the patient  This time was spent on the day of discharge  I had direct contact with the patient on the day of discharge  Greater than 50% of the total time was spent examining patient, answering all patient questions, arranging and discussing plan of care with patient as well as directly providing post-discharge instructions  Additional time then spent on discharge activities  Discharge Medications:  See after visit summary for reconciled discharge medications provided to patient and family        ** Please Note: This note has been constructed using a voice recognition system **

## 2020-02-21 NOTE — DISCHARGE INSTRUCTIONS
Take prednisone taper as directed: 40 mg for 3 days total, then 30 mg for 3 days, then 20 mg for 3 days, then 10 mg for 3 days, then stop  Please continue your inhaler as directed  Please follow-up with your primary care provider within one week of discharge  Please follow-up with your pulmonologist on discharge  COPD (Chronic Obstructive Pulmonary Disease)   WHAT YOU NEED TO KNOW:   Chronic obstructive pulmonary disease (COPD) is a lung disease that makes it hard for you to breathe  It is usually a result of lung damage caused by years of irritation and inflammation in your lungs  DISCHARGE INSTRUCTIONS:   Call 911 if:   · You feel lightheaded, short of breath, and have chest pain  Seek care immediately if:   · You are confused, dizzy, or feel faint  · Your arm or leg feels warm, tender, and painful  It may look swollen and red  · You cough up blood  Contact your healthcare provider if:   · You have more shortness of breath than usual      · You need more medicine than usual to control your symptoms  · You are coughing or wheezing more than usual      · You are coughing up more mucus, or it is a different color or has a different odor  · You gain more than 3 pounds in a week  · You have a fever, a runny or stuffy nose, and a sore throat, or other cold or flu symptoms  · Your skin, lips, or nails start to turn blue  · You have swelling in your legs or ankles  · You are very tired or weak for more than a day  · You notice changes in your mood, or changes in your ability to think or concentrate  · You have questions or concerns about your condition or care  Medicines:   · Medicines  may be used to open your airways, decrease swelling and inflammation in your lungs, or treat an infection  You may need 2 or more medicines  A short-acting medicine relieves symptoms quickly  Long-acting medicines will control or prevent symptoms   Ask for more information about the medicines you are given and how to use them safely  · Take your medicine as directed  Contact your healthcare provider if you think your medicine is not helping or if you have side effects  Tell him or her if you are allergic to any medicine  Keep a list of the medicines, vitamins, and herbs you take  Include the amounts, and when and why you take them  Bring the list or the pill bottles to follow-up visits  Carry your medicine list with you in case of an emergency  Help make breathing easier:   · Use pursed-lip breathing any time you feel short of breath  Take a deep breath in through your nose  Slowly breathe out through your mouth with your lips pursed for twice as long as you inhaled  You can also practice this breathing pattern while you bend, lift, climb stairs, or exercise  It slows down your breathing and helps move more air in and out of your lungs  · Do not smoke, and avoid others who smoke  Nicotine and other substances can cause lung irritation or damage and make it harder for you to breathe  Do not use e-cigarettes or smokeless tobacco  They still contain nicotine  Ask your healthcare provider for information if you currently smoke and need help to quit  For support and more information:  ¨ [employer]  [employer]  Phone: 5- 132 - 611-2916  Web Address: [employer]      · Be aware of and avoid anything that makes your symptoms worse  Stay out of high altitudes and places with high humidity  Stay inside, or cover your mouth and nose with a scarf when you are outside during cold weather  Stay inside on days when air pollution or pollen counts are high  Do not use aerosol sprays such as deodorant, bug spray, and hair spray  Manage COPD and help prevent exacerbations:  COPD is a serious condition that gets worse over time  A COPD exacerbation means your symptoms suddenly get worse  It is important to prevent exacerbations  An exacerbation can cause more lung damage   COPD cannot be cured, but you can take action to feel better and prevent COPD exacerbations:  · Protect yourself from germs  Germs can get into your lungs and cause an infection  An infection in your lungs can create more mucus and make it harder to breathe  An infection can also create swelling in your airways and prevent air from getting in  You can decrease your risk for infection by doing the following:     [facility] [place] [facility] to the [employer] [facility] your hands often with soap and water  Carry germ-killing gel with you  You can use the gel to clean your hands when soap and water are not available  ¨ Do not touch your eyes, nose, or mouth unless you have washed your hands first      ¨ Always cover your mouth when you cough  Cough into a tissue or your shirtsleeve so you do not spread germs from your hands  ¨ Try to avoid people who have a cold or the flu  If you are sick, stay away from others as much as possible  · Drink more liquids  This will help to keep your air passages moist and help you cough up mucus  Ask how much liquid to drink each day and which liquids are best for you  · Exercise daily  Exercise for at least 20 minutes each day to help increase your energy and decrease shortness of breath  Walking or riding a bike are good ways to exercise  Talk to your healthcare provider about the best exercise plan for you  · Ask about vaccines  Your healthcare provider may recommend that you get regular flu and pneumonia vaccines  Pneumonia can become life-threatening for a person who has COPD  Ask about other vaccines you may need  Ask your healthcare provider about the flu and pneumonia vaccines  All adults should get the flu (influenza) vaccine every year as soon as it becomes available  The pneumonia vaccine is given to adults aged 72 or older to prevent pneumococcal disease, such as pneumonia  Adults aged 23 to 59 years who are at high risk for pneumococcal disease also should get the pneumococcal vaccine   It may need to be repeated 1 or 5 years later  Pulmonary rehabilitation:  Your healthcare provider may recommend a program to help you manage your symptoms and improve your quality of life  It may include nutritional counseling and exercise to strengthen your lungs  Make decisions about your choices for future treatment:  Ask for information about advanced medical directives and living seo  These documents help you decide and write down your choices for treatment and end-of-life care  It is best to complete them when you feel well and can think clearly about your wishes  The information can then be kept for future use if you are in the hospital or become very ill  Follow up with your healthcare provider as directed: You may need more tests  Your healthcare provider may refer you to a pulmonary (lung) specialist  Write down your questions so you remember to ask them during your visits  © 2017 [address] [employer] Information is for End User's use only and may not be sold, redistributed or otherwise used for commercial purposes  All illustrations and images included in CareNotes® are the copyrighted property of A D A M [employer] Inc  or [employer]  The above information is an  only  It is not intended as medical advice for individual conditions or treatments  Talk to your doctor, nurse or pharmacist before following any medical regimen to see if it is safe and effective for you

## 2020-04-17 ENCOUNTER — APPOINTMENT (EMERGENCY)
Dept: RADIOLOGY | Facility: HOSPITAL | Age: 65
DRG: 205 | End: 2020-04-17
Payer: MEDICARE

## 2020-04-17 ENCOUNTER — HOSPITAL ENCOUNTER (EMERGENCY)
Facility: HOSPITAL | Age: 65
Discharge: HOME | DRG: 205 | End: 2020-04-17
Attending: EMERGENCY MEDICINE | Admitting: EMERGENCY MEDICINE
Payer: MEDICARE

## 2020-04-17 VITALS
BODY MASS INDEX: 25.33 KG/M2 | DIASTOLIC BLOOD PRESSURE: 67 MMHG | HEART RATE: 85 BPM | TEMPERATURE: 97.8 F | SYSTOLIC BLOOD PRESSURE: 122 MMHG | WEIGHT: 191.14 LBS | OXYGEN SATURATION: 92 % | RESPIRATION RATE: 22 BRPM | HEIGHT: 73 IN

## 2020-04-17 DIAGNOSIS — J44.1 COPD WITH ACUTE EXACERBATION (HCC): Primary | ICD-10-CM

## 2020-04-17 LAB
ANION GAP SERPL CALCULATED.3IONS-SCNC: 9 MMOL/L (ref 4–13)
ATRIAL RATE: 90 BPM
BASOPHILS # BLD AUTO: 0.07 THOUSANDS/ΜL (ref 0–0.1)
BASOPHILS NFR BLD AUTO: 1 % (ref 0–1)
BUN SERPL-MCNC: 13 MG/DL (ref 5–25)
CALCIUM SERPL-MCNC: 9 MG/DL (ref 8.3–10.1)
CHLORIDE SERPL-SCNC: 102 MMOL/L (ref 100–108)
CO2 SERPL-SCNC: 27 MMOL/L (ref 21–32)
CREAT SERPL-MCNC: 0.54 MG/DL (ref 0.6–1.3)
EOSINOPHIL # BLD AUTO: 0.37 THOUSAND/ΜL (ref 0–0.61)
EOSINOPHIL NFR BLD AUTO: 5 % (ref 0–6)
ERYTHROCYTE [DISTWIDTH] IN BLOOD BY AUTOMATED COUNT: 17.2 % (ref 11.6–15.1)
GFR SERPL CREATININE-BSD FRML MDRD: 109 ML/MIN/1.73SQ M
GLUCOSE SERPL-MCNC: 130 MG/DL (ref 65–140)
HCT VFR BLD AUTO: 32.2 % (ref 36.5–49.3)
HGB BLD-MCNC: 10.2 G/DL (ref 12–17)
IMM GRANULOCYTES # BLD AUTO: 0.04 THOUSAND/UL (ref 0–0.2)
IMM GRANULOCYTES NFR BLD AUTO: 1 % (ref 0–2)
LYMPHOCYTES # BLD AUTO: 1.69 THOUSANDS/ΜL (ref 0.6–4.47)
LYMPHOCYTES NFR BLD AUTO: 21 % (ref 14–44)
MAGNESIUM SERPL-MCNC: 1.5 MG/DL (ref 1.6–2.6)
MCH RBC QN AUTO: 26 PG (ref 26.8–34.3)
MCHC RBC AUTO-ENTMCNC: 31.7 G/DL (ref 31.4–37.4)
MCV RBC AUTO: 82 FL (ref 82–98)
MONOCYTES # BLD AUTO: 0.79 THOUSAND/ΜL (ref 0.17–1.22)
MONOCYTES NFR BLD AUTO: 10 % (ref 4–12)
NEUTROPHILS # BLD AUTO: 5.3 THOUSANDS/ΜL (ref 1.85–7.62)
NEUTS SEG NFR BLD AUTO: 62 % (ref 43–75)
NRBC BLD AUTO-RTO: 0 /100 WBCS
NT-PROBNP SERPL-MCNC: 59 PG/ML
PLATELET # BLD AUTO: 297 THOUSANDS/UL (ref 149–390)
PMV BLD AUTO: 9.6 FL (ref 8.9–12.7)
POTASSIUM SERPL-SCNC: 3.6 MMOL/L (ref 3.5–5.3)
PR INTERVAL: 160 MS
QRS AXIS: 85 DEGREES
QRSD INTERVAL: 114 MS
QT INTERVAL: 382 MS
QTC INTERVAL: 467 MS
RBC # BLD AUTO: 3.92 MILLION/UL (ref 3.88–5.62)
SODIUM SERPL-SCNC: 138 MMOL/L (ref 136–145)
T WAVE AXIS: 10 DEGREES
TROPONIN I SERPL-MCNC: <0.02 NG/ML
VENTRICULAR RATE: 90 BPM
WBC # BLD AUTO: 8.26 THOUSAND/UL (ref 4.31–10.16)

## 2020-04-17 PROCEDURE — 96365 THER/PROPH/DIAG IV INF INIT: CPT

## 2020-04-17 PROCEDURE — 99285 EMERGENCY DEPT VISIT HI MDM: CPT

## 2020-04-17 PROCEDURE — 93010 ELECTROCARDIOGRAM REPORT: CPT | Performed by: INTERNAL MEDICINE

## 2020-04-17 PROCEDURE — 80048 BASIC METABOLIC PNL TOTAL CA: CPT | Performed by: EMERGENCY MEDICINE

## 2020-04-17 PROCEDURE — 93005 ELECTROCARDIOGRAM TRACING: CPT

## 2020-04-17 PROCEDURE — 71045 X-RAY EXAM CHEST 1 VIEW: CPT

## 2020-04-17 PROCEDURE — 85025 COMPLETE CBC W/AUTO DIFF WBC: CPT | Performed by: EMERGENCY MEDICINE

## 2020-04-17 PROCEDURE — 84484 ASSAY OF TROPONIN QUANT: CPT | Performed by: EMERGENCY MEDICINE

## 2020-04-17 PROCEDURE — 36415 COLL VENOUS BLD VENIPUNCTURE: CPT | Performed by: EMERGENCY MEDICINE

## 2020-04-17 PROCEDURE — 83735 ASSAY OF MAGNESIUM: CPT | Performed by: EMERGENCY MEDICINE

## 2020-04-17 PROCEDURE — 99285 EMERGENCY DEPT VISIT HI MDM: CPT | Performed by: EMERGENCY MEDICINE

## 2020-04-17 PROCEDURE — 83880 ASSAY OF NATRIURETIC PEPTIDE: CPT | Performed by: EMERGENCY MEDICINE

## 2020-04-17 PROCEDURE — 94640 AIRWAY INHALATION TREATMENT: CPT

## 2020-04-17 RX ORDER — AZITHROMYCIN 250 MG/1
250 TABLET, FILM COATED ORAL EVERY 24 HOURS
Qty: 4 TABLET | Refills: 0 | Status: SHIPPED | OUTPATIENT
Start: 2020-04-17 | End: 2020-04-21

## 2020-04-17 RX ORDER — MAGNESIUM SULFATE HEPTAHYDRATE 40 MG/ML
2 INJECTION, SOLUTION INTRAVENOUS ONCE
Status: COMPLETED | OUTPATIENT
Start: 2020-04-17 | End: 2020-04-17

## 2020-04-17 RX ORDER — AZITHROMYCIN 250 MG/1
500 TABLET, FILM COATED ORAL ONCE
Status: COMPLETED | OUTPATIENT
Start: 2020-04-17 | End: 2020-04-17

## 2020-04-17 RX ORDER — ALBUTEROL SULFATE 90 UG/1
10 AEROSOL, METERED RESPIRATORY (INHALATION) ONCE
Status: COMPLETED | OUTPATIENT
Start: 2020-04-17 | End: 2020-04-17

## 2020-04-17 RX ORDER — PREDNISONE 20 MG/1
40 TABLET ORAL DAILY
Qty: 8 TABLET | Refills: 0 | Status: SHIPPED | OUTPATIENT
Start: 2020-04-17 | End: 2020-04-21

## 2020-04-17 RX ADMIN — MAGNESIUM SULFATE HEPTAHYDRATE 2 G: 40 INJECTION, SOLUTION INTRAVENOUS at 05:17

## 2020-04-17 RX ADMIN — IPRATROPIUM BROMIDE 0.5 MG: 0.5 SOLUTION RESPIRATORY (INHALATION) at 12:07

## 2020-04-17 RX ADMIN — ALBUTEROL SULFATE 5 MG: 2.5 SOLUTION RESPIRATORY (INHALATION) at 12:07

## 2020-04-17 RX ADMIN — AZITHROMYCIN 500 MG: 250 TABLET, FILM COATED ORAL at 05:38

## 2020-04-17 RX ADMIN — ALBUTEROL SULFATE 10 PUFF: 90 AEROSOL, METERED RESPIRATORY (INHALATION) at 05:19

## 2020-04-18 ENCOUNTER — APPOINTMENT (EMERGENCY)
Dept: CT IMAGING | Facility: HOSPITAL | Age: 65
DRG: 205 | End: 2020-04-18
Payer: MEDICARE

## 2020-04-18 ENCOUNTER — APPOINTMENT (EMERGENCY)
Dept: RADIOLOGY | Facility: HOSPITAL | Age: 65
DRG: 205 | End: 2020-04-18
Payer: MEDICARE

## 2020-04-18 ENCOUNTER — HOSPITAL ENCOUNTER (INPATIENT)
Facility: HOSPITAL | Age: 65
LOS: 1 days | Discharge: HOME | DRG: 205 | End: 2020-04-19
Attending: EMERGENCY MEDICINE | Admitting: INTERNAL MEDICINE
Payer: MEDICARE

## 2020-04-18 DIAGNOSIS — J44.1 CHRONIC OBSTRUCTIVE PULMONARY DISEASE WITH ACUTE EXACERBATION (HCC): ICD-10-CM

## 2020-04-18 DIAGNOSIS — R09.02 HYPOXIA: ICD-10-CM

## 2020-04-18 DIAGNOSIS — R06.00 DYSPNEA: Primary | ICD-10-CM

## 2020-04-18 DIAGNOSIS — J44.1 COPD WITH ACUTE EXACERBATION (HCC): ICD-10-CM

## 2020-04-18 DIAGNOSIS — J40 BRONCHITIS: ICD-10-CM

## 2020-04-18 LAB
ALBUMIN SERPL BCP-MCNC: 3.5 G/DL (ref 3.5–5)
ALP SERPL-CCNC: 74 U/L (ref 46–116)
ALT SERPL W P-5'-P-CCNC: 29 U/L (ref 12–78)
ANION GAP SERPL CALCULATED.3IONS-SCNC: 9 MMOL/L (ref 4–13)
AST SERPL W P-5'-P-CCNC: 20 U/L (ref 5–45)
BASOPHILS # BLD AUTO: 0.08 THOUSANDS/ΜL (ref 0–0.1)
BASOPHILS NFR BLD AUTO: 1 % (ref 0–1)
BILIRUB SERPL-MCNC: 0.2 MG/DL (ref 0.2–1)
BUN SERPL-MCNC: 10 MG/DL (ref 5–25)
CALCIUM SERPL-MCNC: 9.3 MG/DL (ref 8.3–10.1)
CHLORIDE SERPL-SCNC: 101 MMOL/L (ref 100–108)
CO2 SERPL-SCNC: 27 MMOL/L (ref 21–32)
CREAT SERPL-MCNC: 0.57 MG/DL (ref 0.6–1.3)
D DIMER PPP FEU-MCNC: 1.01 UG/ML FEU
EOSINOPHIL # BLD AUTO: 0.42 THOUSAND/ΜL (ref 0–0.61)
EOSINOPHIL NFR BLD AUTO: 5 % (ref 0–6)
ERYTHROCYTE [DISTWIDTH] IN BLOOD BY AUTOMATED COUNT: 17.2 % (ref 11.6–15.1)
EST. AVERAGE GLUCOSE BLD GHB EST-MCNC: 174 MG/DL
GFR SERPL CREATININE-BSD FRML MDRD: 107 ML/MIN/1.73SQ M
GLUCOSE SERPL-MCNC: 137 MG/DL (ref 65–140)
GLUCOSE SERPL-MCNC: 158 MG/DL (ref 65–140)
GLUCOSE SERPL-MCNC: 198 MG/DL (ref 65–140)
HBA1C MFR BLD: 7.7 %
HCT VFR BLD AUTO: 34.8 % (ref 36.5–49.3)
HGB BLD-MCNC: 10.9 G/DL (ref 12–17)
IMM GRANULOCYTES # BLD AUTO: 0.03 THOUSAND/UL (ref 0–0.2)
IMM GRANULOCYTES NFR BLD AUTO: 0 % (ref 0–2)
LYMPHOCYTES # BLD AUTO: 1.54 THOUSANDS/ΜL (ref 0.6–4.47)
LYMPHOCYTES NFR BLD AUTO: 18 % (ref 14–44)
MAGNESIUM SERPL-MCNC: 1.7 MG/DL (ref 1.6–2.6)
MCH RBC QN AUTO: 25.8 PG (ref 26.8–34.3)
MCHC RBC AUTO-ENTMCNC: 31.3 G/DL (ref 31.4–37.4)
MCV RBC AUTO: 83 FL (ref 82–98)
MONOCYTES # BLD AUTO: 0.7 THOUSAND/ΜL (ref 0.17–1.22)
MONOCYTES NFR BLD AUTO: 8 % (ref 4–12)
NEUTROPHILS # BLD AUTO: 5.97 THOUSANDS/ΜL (ref 1.85–7.62)
NEUTS SEG NFR BLD AUTO: 68 % (ref 43–75)
NRBC BLD AUTO-RTO: 0 /100 WBCS
NT-PROBNP SERPL-MCNC: 77 PG/ML
PLATELET # BLD AUTO: 328 THOUSANDS/UL (ref 149–390)
PMV BLD AUTO: 10.1 FL (ref 8.9–12.7)
POTASSIUM SERPL-SCNC: 3.9 MMOL/L (ref 3.5–5.3)
PROCALCITONIN SERPL-MCNC: 0.07 NG/ML
PROT SERPL-MCNC: 7.3 G/DL (ref 6.4–8.2)
RBC # BLD AUTO: 4.22 MILLION/UL (ref 3.88–5.62)
S PYO DNA THROAT QL NAA+PROBE: NORMAL
SARS-COV-2 RNA RESP QL NAA+PROBE: NEGATIVE
SODIUM SERPL-SCNC: 137 MMOL/L (ref 136–145)
TROPONIN I SERPL-MCNC: <0.02 NG/ML
TSH SERPL DL<=0.05 MIU/L-ACNC: 0.48 UIU/ML (ref 0.36–3.74)
WBC # BLD AUTO: 8.74 THOUSAND/UL (ref 4.31–10.16)

## 2020-04-18 PROCEDURE — 80053 COMPREHEN METABOLIC PANEL: CPT | Performed by: EMERGENCY MEDICINE

## 2020-04-18 PROCEDURE — 99222 1ST HOSP IP/OBS MODERATE 55: CPT | Performed by: INTERNAL MEDICINE

## 2020-04-18 PROCEDURE — 71275 CT ANGIOGRAPHY CHEST: CPT

## 2020-04-18 PROCEDURE — 83036 HEMOGLOBIN GLYCOSYLATED A1C: CPT | Performed by: INTERNAL MEDICINE

## 2020-04-18 PROCEDURE — 94760 N-INVAS EAR/PLS OXIMETRY 1: CPT

## 2020-04-18 PROCEDURE — 94640 AIRWAY INHALATION TREATMENT: CPT

## 2020-04-18 PROCEDURE — 99285 EMERGENCY DEPT VISIT HI MDM: CPT | Performed by: EMERGENCY MEDICINE

## 2020-04-18 PROCEDURE — 85025 COMPLETE CBC W/AUTO DIFF WBC: CPT | Performed by: EMERGENCY MEDICINE

## 2020-04-18 PROCEDURE — 83880 ASSAY OF NATRIURETIC PEPTIDE: CPT | Performed by: EMERGENCY MEDICINE

## 2020-04-18 PROCEDURE — 87147 CULTURE TYPE IMMUNOLOGIC: CPT | Performed by: INTERNAL MEDICINE

## 2020-04-18 PROCEDURE — 87070 CULTURE OTHR SPECIMN AEROBIC: CPT | Performed by: INTERNAL MEDICINE

## 2020-04-18 PROCEDURE — 87205 SMEAR GRAM STAIN: CPT | Performed by: INTERNAL MEDICINE

## 2020-04-18 PROCEDURE — 87651 STREP A DNA AMP PROBE: CPT | Performed by: EMERGENCY MEDICINE

## 2020-04-18 PROCEDURE — 87040 BLOOD CULTURE FOR BACTERIA: CPT | Performed by: EMERGENCY MEDICINE

## 2020-04-18 PROCEDURE — 94664 DEMO&/EVAL PT USE INHALER: CPT

## 2020-04-18 PROCEDURE — 83735 ASSAY OF MAGNESIUM: CPT | Performed by: EMERGENCY MEDICINE

## 2020-04-18 PROCEDURE — 84484 ASSAY OF TROPONIN QUANT: CPT | Performed by: EMERGENCY MEDICINE

## 2020-04-18 PROCEDURE — 85379 FIBRIN DEGRADATION QUANT: CPT | Performed by: EMERGENCY MEDICINE

## 2020-04-18 PROCEDURE — 87635 SARS-COV-2 COVID-19 AMP PRB: CPT | Performed by: EMERGENCY MEDICINE

## 2020-04-18 PROCEDURE — 84443 ASSAY THYROID STIM HORMONE: CPT | Performed by: EMERGENCY MEDICINE

## 2020-04-18 PROCEDURE — 82948 REAGENT STRIP/BLOOD GLUCOSE: CPT

## 2020-04-18 PROCEDURE — 93005 ELECTROCARDIOGRAM TRACING: CPT

## 2020-04-18 PROCEDURE — 99285 EMERGENCY DEPT VISIT HI MDM: CPT

## 2020-04-18 PROCEDURE — 36415 COLL VENOUS BLD VENIPUNCTURE: CPT | Performed by: EMERGENCY MEDICINE

## 2020-04-18 PROCEDURE — 84145 PROCALCITONIN (PCT): CPT | Performed by: EMERGENCY MEDICINE

## 2020-04-18 PROCEDURE — 71045 X-RAY EXAM CHEST 1 VIEW: CPT

## 2020-04-18 RX ORDER — LORAZEPAM 0.5 MG/1
0.5 TABLET ORAL EVERY 6 HOURS PRN
Status: DISCONTINUED | OUTPATIENT
Start: 2020-04-18 | End: 2020-04-19 | Stop reason: HOSPADM

## 2020-04-18 RX ORDER — LANOLIN ALCOHOL/MO/W.PET/CERES
3 CREAM (GRAM) TOPICAL
Status: DISCONTINUED | OUTPATIENT
Start: 2020-04-18 | End: 2020-04-19 | Stop reason: HOSPADM

## 2020-04-18 RX ORDER — LISINOPRIL 20 MG/1
40 TABLET ORAL DAILY
Status: DISCONTINUED | OUTPATIENT
Start: 2020-04-19 | End: 2020-04-19 | Stop reason: HOSPADM

## 2020-04-18 RX ORDER — LEVALBUTEROL 1.25 MG/.5ML
1.25 SOLUTION, CONCENTRATE RESPIRATORY (INHALATION)
Status: DISCONTINUED | OUTPATIENT
Start: 2020-04-18 | End: 2020-04-19 | Stop reason: HOSPADM

## 2020-04-18 RX ORDER — IPRATROPIUM BROMIDE AND ALBUTEROL SULFATE .5; 3 MG/3ML; MG/3ML
1 SOLUTION RESPIRATORY (INHALATION) ONCE
Status: COMPLETED | OUTPATIENT
Start: 2020-04-18 | End: 2020-04-18

## 2020-04-18 RX ORDER — BUDESONIDE 0.5 MG/2ML
0.5 INHALANT ORAL
Status: DISCONTINUED | OUTPATIENT
Start: 2020-04-18 | End: 2020-04-19 | Stop reason: HOSPADM

## 2020-04-18 RX ORDER — MAGNESIUM HYDROXIDE/ALUMINUM HYDROXICE/SIMETHICONE 120; 1200; 1200 MG/30ML; MG/30ML; MG/30ML
30 SUSPENSION ORAL EVERY 6 HOURS PRN
Status: DISCONTINUED | OUTPATIENT
Start: 2020-04-18 | End: 2020-04-19 | Stop reason: HOSPADM

## 2020-04-18 RX ORDER — METHYLPREDNISOLONE SODIUM SUCCINATE 40 MG/ML
40 INJECTION, POWDER, LYOPHILIZED, FOR SOLUTION INTRAMUSCULAR; INTRAVENOUS EVERY 8 HOURS SCHEDULED
Status: DISCONTINUED | OUTPATIENT
Start: 2020-04-18 | End: 2020-04-19 | Stop reason: HOSPADM

## 2020-04-18 RX ORDER — FLUTICASONE FUROATE AND VILANTEROL 200; 25 UG/1; UG/1
1 POWDER RESPIRATORY (INHALATION) DAILY
Status: DISCONTINUED | OUTPATIENT
Start: 2020-04-19 | End: 2020-04-19 | Stop reason: HOSPADM

## 2020-04-18 RX ORDER — ONDANSETRON 2 MG/ML
4 INJECTION INTRAMUSCULAR; INTRAVENOUS EVERY 6 HOURS PRN
Status: DISCONTINUED | OUTPATIENT
Start: 2020-04-18 | End: 2020-04-19 | Stop reason: HOSPADM

## 2020-04-18 RX ORDER — ACETAMINOPHEN 325 MG/1
650 TABLET ORAL EVERY 8 HOURS PRN
Status: DISCONTINUED | OUTPATIENT
Start: 2020-04-18 | End: 2020-04-19 | Stop reason: HOSPADM

## 2020-04-18 RX ORDER — METOPROLOL SUCCINATE 100 MG/1
100 TABLET, EXTENDED RELEASE ORAL DAILY
Status: DISCONTINUED | OUTPATIENT
Start: 2020-04-19 | End: 2020-04-19 | Stop reason: HOSPADM

## 2020-04-18 RX ORDER — ATORVASTATIN CALCIUM 40 MG/1
80 TABLET, FILM COATED ORAL
Status: DISCONTINUED | OUTPATIENT
Start: 2020-04-19 | End: 2020-04-19 | Stop reason: HOSPADM

## 2020-04-18 RX ORDER — QUETIAPINE FUMARATE 25 MG/1
25 TABLET, FILM COATED ORAL
Status: DISCONTINUED | OUTPATIENT
Start: 2020-04-18 | End: 2020-04-19 | Stop reason: HOSPADM

## 2020-04-18 RX ORDER — LIDOCAINE 50 MG/G
1 PATCH TOPICAL DAILY PRN
Status: DISCONTINUED | OUTPATIENT
Start: 2020-04-18 | End: 2020-04-19 | Stop reason: HOSPADM

## 2020-04-18 RX ORDER — OXYCODONE HYDROCHLORIDE 5 MG/1
5 TABLET ORAL EVERY 4 HOURS PRN
Status: DISCONTINUED | OUTPATIENT
Start: 2020-04-18 | End: 2020-04-19 | Stop reason: HOSPADM

## 2020-04-18 RX ORDER — POLYETHYLENE GLYCOL 3350 17 G/17G
17 POWDER, FOR SOLUTION ORAL DAILY PRN
Status: DISCONTINUED | OUTPATIENT
Start: 2020-04-18 | End: 2020-04-19 | Stop reason: HOSPADM

## 2020-04-18 RX ORDER — LEVALBUTEROL INHALATION SOLUTION 0.63 MG/3ML
0.63 SOLUTION RESPIRATORY (INHALATION) 3 TIMES DAILY
Status: DISCONTINUED | OUTPATIENT
Start: 2020-04-18 | End: 2020-04-18

## 2020-04-18 RX ORDER — THIAMINE MONONITRATE (VIT B1) 100 MG
100 TABLET ORAL DAILY
Status: DISCONTINUED | OUTPATIENT
Start: 2020-04-19 | End: 2020-04-19 | Stop reason: HOSPADM

## 2020-04-18 RX ORDER — ALBUTEROL SULFATE 2.5 MG/3ML
2.5 SOLUTION RESPIRATORY (INHALATION) EVERY 2 HOUR PRN
Status: DISCONTINUED | OUTPATIENT
Start: 2020-04-18 | End: 2020-04-19 | Stop reason: HOSPADM

## 2020-04-18 RX ORDER — CLOPIDOGREL BISULFATE 75 MG/1
75 TABLET ORAL DAILY
Status: DISCONTINUED | OUTPATIENT
Start: 2020-04-19 | End: 2020-04-19 | Stop reason: HOSPADM

## 2020-04-18 RX ORDER — LEVOFLOXACIN 750 MG/1
750 TABLET ORAL EVERY 24 HOURS
Status: DISCONTINUED | OUTPATIENT
Start: 2020-04-18 | End: 2020-04-19 | Stop reason: HOSPADM

## 2020-04-18 RX ORDER — GABAPENTIN 100 MG/1
100 CAPSULE ORAL 3 TIMES DAILY
Status: DISCONTINUED | OUTPATIENT
Start: 2020-04-18 | End: 2020-04-19 | Stop reason: HOSPADM

## 2020-04-18 RX ORDER — FLUTICASONE PROPIONATE 50 MCG
1 SPRAY, SUSPENSION (ML) NASAL 2 TIMES DAILY
Status: DISCONTINUED | OUTPATIENT
Start: 2020-04-18 | End: 2020-04-19 | Stop reason: HOSPADM

## 2020-04-18 RX ORDER — FOLIC ACID 1 MG/1
1 TABLET ORAL DAILY
Status: DISCONTINUED | OUTPATIENT
Start: 2020-04-19 | End: 2020-04-19 | Stop reason: HOSPADM

## 2020-04-18 RX ORDER — PANTOPRAZOLE SODIUM 40 MG/1
40 TABLET, DELAYED RELEASE ORAL
Status: DISCONTINUED | OUTPATIENT
Start: 2020-04-19 | End: 2020-04-19 | Stop reason: HOSPADM

## 2020-04-18 RX ORDER — CITALOPRAM 20 MG/1
10 TABLET ORAL DAILY
Status: DISCONTINUED | OUTPATIENT
Start: 2020-04-19 | End: 2020-04-19 | Stop reason: HOSPADM

## 2020-04-18 RX ADMIN — QUETIAPINE FUMARATE 25 MG: 25 TABLET ORAL at 21:58

## 2020-04-18 RX ADMIN — GABAPENTIN 100 MG: 100 CAPSULE ORAL at 20:15

## 2020-04-18 RX ADMIN — IPRATROPIUM BROMIDE 0.5 MG: 0.5 SOLUTION RESPIRATORY (INHALATION) at 15:12

## 2020-04-18 RX ADMIN — METHYLPREDNISOLONE SODIUM SUCCINATE 40 MG: 40 INJECTION, POWDER, FOR SOLUTION INTRAMUSCULAR; INTRAVENOUS at 21:55

## 2020-04-18 RX ADMIN — LEVALBUTEROL HYDROCHLORIDE 0.63 MG: 0.63 SOLUTION RESPIRATORY (INHALATION) at 15:12

## 2020-04-18 RX ADMIN — INSULIN LISPRO 1 UNITS: 100 INJECTION, SOLUTION INTRAVENOUS; SUBCUTANEOUS at 22:02

## 2020-04-18 RX ADMIN — IOHEXOL 100 ML: 350 INJECTION, SOLUTION INTRAVENOUS at 08:42

## 2020-04-18 RX ADMIN — INSULIN LISPRO 1 UNITS: 100 INJECTION, SOLUTION INTRAVENOUS; SUBCUTANEOUS at 17:03

## 2020-04-18 RX ADMIN — GABAPENTIN 100 MG: 100 CAPSULE ORAL at 17:02

## 2020-04-18 RX ADMIN — IPRATROPIUM BROMIDE 0.5 MG: 0.5 SOLUTION RESPIRATORY (INHALATION) at 20:26

## 2020-04-18 RX ADMIN — LEVALBUTEROL HYDROCHLORIDE 1.25 MG: 1.25 SOLUTION, CONCENTRATE RESPIRATORY (INHALATION) at 20:26

## 2020-04-18 RX ADMIN — BUDESONIDE 0.5 MG: 0.5 INHALANT RESPIRATORY (INHALATION) at 20:26

## 2020-04-18 RX ADMIN — METHYLPREDNISOLONE SODIUM SUCCINATE 40 MG: 40 INJECTION, POWDER, FOR SOLUTION INTRAMUSCULAR; INTRAVENOUS at 17:03

## 2020-04-18 RX ADMIN — FLUTICASONE PROPIONATE 1 SPRAY: 50 SPRAY, METERED NASAL at 17:04

## 2020-04-18 RX ADMIN — LEVOFLOXACIN 750 MG: 750 TABLET, FILM COATED ORAL at 17:02

## 2020-04-19 VITALS
HEIGHT: 73 IN | DIASTOLIC BLOOD PRESSURE: 76 MMHG | HEART RATE: 71 BPM | TEMPERATURE: 97.2 F | SYSTOLIC BLOOD PRESSURE: 156 MMHG | WEIGHT: 187.39 LBS | RESPIRATION RATE: 18 BRPM | OXYGEN SATURATION: 94 % | BODY MASS INDEX: 24.84 KG/M2

## 2020-04-19 LAB
ANION GAP SERPL CALCULATED.3IONS-SCNC: 7 MMOL/L (ref 4–13)
BASOPHILS # BLD AUTO: 0.01 THOUSANDS/ΜL (ref 0–0.1)
BASOPHILS NFR BLD AUTO: 0 % (ref 0–1)
BUN SERPL-MCNC: 10 MG/DL (ref 5–25)
CALCIUM SERPL-MCNC: 9.3 MG/DL (ref 8.3–10.1)
CHLORIDE SERPL-SCNC: 102 MMOL/L (ref 100–108)
CO2 SERPL-SCNC: 26 MMOL/L (ref 21–32)
CREAT SERPL-MCNC: 0.59 MG/DL (ref 0.6–1.3)
EOSINOPHIL # BLD AUTO: 0 THOUSAND/ΜL (ref 0–0.61)
EOSINOPHIL NFR BLD AUTO: 0 % (ref 0–6)
ERYTHROCYTE [DISTWIDTH] IN BLOOD BY AUTOMATED COUNT: 16.4 % (ref 11.6–15.1)
GFR SERPL CREATININE-BSD FRML MDRD: 105 ML/MIN/1.73SQ M
GLUCOSE SERPL-MCNC: 205 MG/DL (ref 65–140)
GLUCOSE SERPL-MCNC: 221 MG/DL (ref 65–140)
GLUCOSE SERPL-MCNC: 252 MG/DL (ref 65–140)
GLUCOSE SERPL-MCNC: 272 MG/DL (ref 65–140)
GLUCOSE SERPL-MCNC: 332 MG/DL (ref 65–140)
HCT VFR BLD AUTO: 33.3 % (ref 36.5–49.3)
HGB BLD-MCNC: 10.2 G/DL (ref 12–17)
IMM GRANULOCYTES # BLD AUTO: 0.02 THOUSAND/UL (ref 0–0.2)
IMM GRANULOCYTES NFR BLD AUTO: 0 % (ref 0–2)
LYMPHOCYTES # BLD AUTO: 0.52 THOUSANDS/ΜL (ref 0.6–4.47)
LYMPHOCYTES NFR BLD AUTO: 10 % (ref 14–44)
MAGNESIUM SERPL-MCNC: 1.8 MG/DL (ref 1.6–2.6)
MCH RBC QN AUTO: 25.1 PG (ref 26.8–34.3)
MCHC RBC AUTO-ENTMCNC: 30.6 G/DL (ref 31.4–37.4)
MCV RBC AUTO: 82 FL (ref 82–98)
MONOCYTES # BLD AUTO: 0.06 THOUSAND/ΜL (ref 0.17–1.22)
MONOCYTES NFR BLD AUTO: 1 % (ref 4–12)
NEUTROPHILS # BLD AUTO: 4.74 THOUSANDS/ΜL (ref 1.85–7.62)
NEUTS SEG NFR BLD AUTO: 89 % (ref 43–75)
NRBC BLD AUTO-RTO: 0 /100 WBCS
PLATELET # BLD AUTO: 339 THOUSANDS/UL (ref 149–390)
PMV BLD AUTO: 10.1 FL (ref 8.9–12.7)
POTASSIUM SERPL-SCNC: 4.6 MMOL/L (ref 3.5–5.3)
PROCALCITONIN SERPL-MCNC: <0.05 NG/ML
RBC # BLD AUTO: 4.06 MILLION/UL (ref 3.88–5.62)
SODIUM SERPL-SCNC: 135 MMOL/L (ref 136–145)
WBC # BLD AUTO: 5.35 THOUSAND/UL (ref 4.31–10.16)

## 2020-04-19 PROCEDURE — 80048 BASIC METABOLIC PNL TOTAL CA: CPT | Performed by: INTERNAL MEDICINE

## 2020-04-19 PROCEDURE — 94640 AIRWAY INHALATION TREATMENT: CPT

## 2020-04-19 PROCEDURE — 84145 PROCALCITONIN (PCT): CPT | Performed by: INTERNAL MEDICINE

## 2020-04-19 PROCEDURE — 82948 REAGENT STRIP/BLOOD GLUCOSE: CPT

## 2020-04-19 PROCEDURE — 94668 MNPJ CHEST WALL SBSQ: CPT

## 2020-04-19 PROCEDURE — 83735 ASSAY OF MAGNESIUM: CPT | Performed by: INTERNAL MEDICINE

## 2020-04-19 PROCEDURE — 99239 HOSP IP/OBS DSCHRG MGMT >30: CPT | Performed by: INTERNAL MEDICINE

## 2020-04-19 PROCEDURE — 85025 COMPLETE CBC W/AUTO DIFF WBC: CPT | Performed by: INTERNAL MEDICINE

## 2020-04-19 PROCEDURE — 94760 N-INVAS EAR/PLS OXIMETRY 1: CPT

## 2020-04-19 RX ORDER — BUDESONIDE 0.5 MG/2ML
0.5 INHALANT ORAL
Qty: 60 VIAL | Refills: 0 | Status: SHIPPED | OUTPATIENT
Start: 2020-04-19

## 2020-04-19 RX ADMIN — IPRATROPIUM BROMIDE 0.5 MG: 0.5 SOLUTION RESPIRATORY (INHALATION) at 14:34

## 2020-04-19 RX ADMIN — FOLIC ACID 1 MG: 1 TABLET ORAL at 08:04

## 2020-04-19 RX ADMIN — LEVALBUTEROL HYDROCHLORIDE 1.25 MG: 1.25 SOLUTION, CONCENTRATE RESPIRATORY (INHALATION) at 14:34

## 2020-04-19 RX ADMIN — THIAMINE HCL TAB 100 MG 100 MG: 100 TAB at 08:05

## 2020-04-19 RX ADMIN — PANTOPRAZOLE SODIUM 40 MG: 40 TABLET, DELAYED RELEASE ORAL at 06:04

## 2020-04-19 RX ADMIN — ENOXAPARIN SODIUM 40 MG: 40 INJECTION SUBCUTANEOUS at 08:04

## 2020-04-19 RX ADMIN — INSULIN LISPRO 2 UNITS: 100 INJECTION, SOLUTION INTRAVENOUS; SUBCUTANEOUS at 14:55

## 2020-04-19 RX ADMIN — LISINOPRIL 40 MG: 20 TABLET ORAL at 08:04

## 2020-04-19 RX ADMIN — Medication 400 MG: at 08:04

## 2020-04-19 RX ADMIN — INSULIN LISPRO 1 UNITS: 100 INJECTION, SOLUTION INTRAVENOUS; SUBCUTANEOUS at 07:59

## 2020-04-19 RX ADMIN — CLOPIDOGREL BISULFATE 75 MG: 75 TABLET ORAL at 08:04

## 2020-04-19 RX ADMIN — METHYLPREDNISOLONE SODIUM SUCCINATE 40 MG: 40 INJECTION, POWDER, FOR SOLUTION INTRAMUSCULAR; INTRAVENOUS at 14:58

## 2020-04-19 RX ADMIN — CITALOPRAM HYDROBROMIDE 10 MG: 20 TABLET ORAL at 08:04

## 2020-04-19 RX ADMIN — INSULIN LISPRO 3 UNITS: 100 INJECTION, SOLUTION INTRAVENOUS; SUBCUTANEOUS at 11:30

## 2020-04-19 RX ADMIN — IPRATROPIUM BROMIDE 0.5 MG: 0.5 SOLUTION RESPIRATORY (INHALATION) at 07:49

## 2020-04-19 RX ADMIN — BUDESONIDE 0.5 MG: 0.5 INHALANT RESPIRATORY (INHALATION) at 07:47

## 2020-04-19 RX ADMIN — FLUTICASONE PROPIONATE 1 SPRAY: 50 SPRAY, METERED NASAL at 08:12

## 2020-04-19 RX ADMIN — METHYLPREDNISOLONE SODIUM SUCCINATE 40 MG: 40 INJECTION, POWDER, FOR SOLUTION INTRAMUSCULAR; INTRAVENOUS at 06:04

## 2020-04-19 RX ADMIN — LEVALBUTEROL HYDROCHLORIDE 1.25 MG: 1.25 SOLUTION, CONCENTRATE RESPIRATORY (INHALATION) at 07:47

## 2020-04-19 RX ADMIN — TIOTROPIUM BROMIDE 18 MCG: 18 CAPSULE ORAL; RESPIRATORY (INHALATION) at 08:08

## 2020-04-19 RX ADMIN — METOPROLOL SUCCINATE 100 MG: 100 TABLET, FILM COATED, EXTENDED RELEASE ORAL at 08:04

## 2020-04-19 RX ADMIN — FLUTICASONE FUROATE AND VILANTEROL TRIFENATATE 1 PUFF: 200; 25 POWDER RESPIRATORY (INHALATION) at 08:07

## 2020-04-19 RX ADMIN — GABAPENTIN 100 MG: 100 CAPSULE ORAL at 08:04

## 2020-04-20 LAB
ATRIAL RATE: 92 BPM
PR INTERVAL: 164 MS
QRS AXIS: 85 DEGREES
QRSD INTERVAL: 104 MS
QT INTERVAL: 368 MS
QTC INTERVAL: 455 MS
T WAVE AXIS: 6 DEGREES
VENTRICULAR RATE: 92 BPM

## 2020-04-20 PROCEDURE — 93010 ELECTROCARDIOGRAM REPORT: CPT | Performed by: INTERNAL MEDICINE

## 2020-04-22 LAB
BACTERIA SPT RESP CULT: ABNORMAL
BACTERIA SPT RESP CULT: ABNORMAL
GRAM STN SPEC: ABNORMAL

## 2020-04-23 LAB
BACTERIA BLD CULT: NORMAL
BACTERIA BLD CULT: NORMAL

## 2020-08-23 NOTE — PROGRESS NOTES
09/21/18 0900   Pain Assessment   Pain Assessment 0-10   Pain Score 5   Restrictions/Precautions   Precautions Aspiration; Fall Risk;Pain;Spinal precautions   ROM Restrictions Yes   Braces or Orthoses C/S Collar   Subjective   Subjective pt states feeling okay and ready for PT session    QI: Roll Left and Right   Assistance Needed Independent   Roll Left and Right CARE Score 6   QI: Sit to [address] to Lying CARE Score 6   QI: Lying to Sitting on Side of Bed   Assistance Needed Independent   Lying to Sitting on Side of Bed CARE Score 6   QI: Sit to Stand   Assistance Needed Adaptive equipment   Sit to Stand CARE Score 6   Bed Mobility   Able to Roll Left to Right;Right to Left   Findings Indep level    QI: Chair/Bed-to-Chair Transfer   Assistance Needed Set-up / clean-up   Chair/Bed-to-Chair Transfer CARE Score 5   Transfer Bed/Chair/Wheelchair   Limitations Noted In Balance; Coordination; Endurance;Pain Management;LE Strength; Sequencing   Adaptive Equipment Roller Walker;Cane   Car Transfer Supervision   All Transfer Supervision   Findings DS level today with nsging and OT aware    Bed, Chair, Wheelchair Transfer (FIM) 5 - Patient requires supervision/monitoring   QI: Car Transfer   Assistance Needed Set-up / clean-up   Car Transfer CARE Score 5   QI: Walk [address] / clean-up   Walk 150 Feet CARE Score 5   QI: Walking 10 Feet on Uneven Surfaces   Assistance Needed Set-up / clean-up   Comment ramp amd curb step    Walking 10 Feet on Uneven Surfaces CARE Score 5   Ambulation   Does the patient walk? 2  Yes   Primary Discharge Mode of Locomotion Walk   Walk Assist Level Distant Supervision   Gait Pattern Inconsistant [place];Decreased foot clearance; Improper weight shift   Assist Device Roller [employer] Walked (feet) 180 ft  (150 x1 )   Limitations Noted In Balance;Device Management; Endurance;Posture;Strength;Swing;Speed   Findings trail with [facility] ambulation to inc balanc e awareness    Walking (FIM) 5 - Patient requires supervision/monitoring AND distance 150 feet or more, no rest   Wheelchair mobility   Wheelchair (FIM) 0 - Activity does not occur   QI: 12 Steps   Assistance Needed Supervision   12 Steps CARE Score 4   Stairs   Type Ramp;Curb;Stairs   # of Steps 12   Weight Bearing Precautions Fall Risk;WBAT   Assist Devices Single Rail;Cane   Stairs (FIM) 5 - Patient requires supervision/monitoring AND goes up and down full flight (12- 14 stairs)   Toilet Transfer   Surface Assessed Standard Toilet   Toilet Transfer (FIM) 5 - Patient requires supervision/monitoring   Therapeutic Interventions   Strengthening LAQ AP hip flexion x20    Flexibility D/L LE stretching   Balance standing balance    Equipment Use   Panorama9 level 2 for 13 min    Assessment   Treatment Assessment pt perform thex exs functional activties and gait training with ELIA GUSMAN in his room , for DS level   Pt will cont to benefit with PT session    Barriers to Discharge Inaccessible home environment;Decreased caregiver support   Plan   Treatment/Interventions Therapeutic exercise;LE strengthening/ROM; Functional transfer training;Elevations; Bed mobility; Patient/family training;Gait training   Progress Progressing toward goals   PT Therapy Minutes   PT Time In 0900   PT Time Out 1000   PT Total Time (minutes) 60   PT Mode of treatment - Individual (minutes) 60   PT Mode of treatment - Concurrent (minutes) 0   PT Mode of treatment - Group (minutes) 0   PT Mode of treatment - Co-treat (minutes) 0   PT Mode of Teatment - Total time(minutes) 60 minutes   Therapy Time missed   Time missed?  No 23-Aug-2020 15:41

## 2021-07-12 ENCOUNTER — OFFICE VISIT (OUTPATIENT)
Dept: PODIATRY | Facility: CLINIC | Age: 66
End: 2021-07-12
Payer: MEDICARE

## 2021-07-12 VITALS
SYSTOLIC BLOOD PRESSURE: 150 MMHG | HEART RATE: 82 BPM | WEIGHT: 221 LBS | HEIGHT: 73 IN | BODY MASS INDEX: 29.29 KG/M2 | DIASTOLIC BLOOD PRESSURE: 83 MMHG

## 2021-07-12 DIAGNOSIS — M19.172 POST-TRAUMATIC ARTHRITIS OF LEFT FOOT: ICD-10-CM

## 2021-07-12 DIAGNOSIS — E11.42 DIABETIC POLYNEUROPATHY ASSOCIATED WITH TYPE 2 DIABETES MELLITUS (HCC): Primary | ICD-10-CM

## 2021-07-12 DIAGNOSIS — M79.676 PAIN DUE TO ONYCHOMYCOSIS OF TOENAIL: ICD-10-CM

## 2021-07-12 DIAGNOSIS — B35.1 PAIN DUE TO ONYCHOMYCOSIS OF TOENAIL: ICD-10-CM

## 2021-07-12 DIAGNOSIS — M20.41 HAMMERTOE, BILATERAL: ICD-10-CM

## 2021-07-12 DIAGNOSIS — M20.42 HAMMERTOE, BILATERAL: ICD-10-CM

## 2021-07-12 PROCEDURE — 99204 OFFICE O/P NEW MOD 45 MIN: CPT | Performed by: PODIATRIST

## 2021-07-12 NOTE — PATIENT INSTRUCTIONS
Foot Care for People with Diabetes   WHAT YOU NEED TO KNOW:   · Foot care helps protect your feet and prevent foot ulcers or sores  Long-term high blood sugar levels can damage the blood vessels and nerves in your legs and feet  This damage makes it hard to feel pressure, pain, temperature, and touch  You may not be able to feel a cut or sore, or shoes that are too tight  Foot care is needed to prevent serious problems, such as an infection or amputation  · Diabetes may cause your toes to become crooked or curved under  These changes may affect the way you walk and can lead to increased pressure on your foot  The pressure can decrease blood flow to your feet  Lack of blood flow increases your risk for a foot ulcer  Do not ignore small problems, such as dry skin or small wounds  These can become life-threatening over time without proper care  DISCHARGE INSTRUCTIONS:   Call your care team provider if:   · Your feet become numb, weak, or hard to move  · You have pus draining from a sore on your foot  · You have a wound on your foot that gets bigger, deeper, or does not heal      · You see blisters, cuts, scratches, calluses, or sores on your foot  · You have a fever, and your feet become red, warm, and swollen  · Your toenails become thick, curled, or yellow  · You find it hard to check your feet because your vision is poor  · You have questions or concerns about your condition or care  Foot care:   · Check your feet each day  Look at your whole foot, including the bottom, and between and under your toes  Check for wounds, corns, and calluses  Use a mirror to see the bottom of your feet  The skin on your feet may be shiny, tight, or darker than normal  Your feet may also be cold and pale  Feel your feet by running your hands along the tops, bottoms, sides, and between your toes  Redness, swelling, and warmth are signs of blood flow problems that can lead to a foot ulcer   Do not try to remove corns or calluses yourself  · Wash your feet each day with soap and warm water  Do not use hot water, because this can injure your foot  Dry your feet gently with a towel after you wash them  Dry between and under your toes  · Apply lotion or a moisturizer on your dry feet  Ask your care team provider what lotions are best to use  Do not put lotion or moisturizer between your toes  Moisture between your toes could lead to skin breakdown  · Cut your toenails correctly  File or cut your toenails straight across  Use a soft brush to clean around your toenails  If your toenails are very thick, you may need to have a care team provider or specialist cut them  · Protect your feet  Do not walk barefoot or wear your shoes without socks  Check your shoes for rocks or other objects that can hurt your feet  Wear cotton socks to help keep your feet dry  Wear socks without toe seams, or wear them with the seams inside out  Change your socks each day  Do not wear socks that are dirty or damp  · Wear shoes that fit well  Wear shoes that do not rub against any area of your feet  Your shoes should be ½ to ¾ inch (1 to 2 centimeters) longer than your feet  Your shoes should also have extra space around the widest part of your feet  Walking or athletic shoes with laces or straps that adjust are best  Ask your care team provider for help to choose shoes that fit you best  Ask him or her if you need to wear an insert, orthotic, or bandage on your feet  · Do not smoke  Smoking can damage your blood vessels and put you at increased risk for foot ulcers  Ask your care team provider for information if you currently smoke and need help to quit  E-cigarettes or smokeless tobacco still contain nicotine  Talk to your care team provider before you use these products  Follow up with your diabetes care team provider or foot specialist as directed:   You will need to have your feet checked at least once a therese  Viviana Morris may need a foot exam more often if you have nerve damage, foot deformities, or ulcers  Write down your questions so you remember to ask them during your visits  © Copyright [address] Information is for End User's use only and may not be sold, redistributed or otherwise used for commercial purposes  All illustrations and images included in CareNotes® are the copyrighted property of [employer]  or [facility] [employer]   The above information is an  only  It is not intended as medical advice for individual conditions or treatments  Talk to your doctor, nurse or pharmacist before following any medical regimen to see if it is safe and effective for you

## 2021-07-12 NOTE — PROGRESS NOTES
Assessment/Plan:         Diagnoses and all orders for this visit:    Diabetic polyneuropathy associated with type 2 diabetes mellitus ([facility] )  -     Diabetic Shoe    Hammertoe, bilateral  -     Diabetic Shoe    Post-traumatic arthritis of left foot  -     Diabetic Shoe    Pain due to onychomycosis of toenail  -     Diabetic Shoe        -Educated on DM risk to lower extremities, proper shoe gear, and daily monitoring of feet    -Educated on A1C and the risks of poorly controlled Diabetes   -Discussed weight loss and suitable exercise regiment  Data Reviewed:    Labs: A1C 7 4 on 7/4/21 and corresponding bloodwork from sameday  Other provider documentation: DC summary from hospitalization 5/85/02    Risk of Complication:  Moderate due to trophic changes to skin, diabetic neuropathy, foot deformity he is at moderate risk of diabetic foot ulceration/infection/amputation  Recommend at-risk foot care every 9-12 weeks  (Q9)      Subjective:      Patient ID: Jessica Wallace is a 78 y o  male  Patient presents for DM foot exam  HE doctor referred him here  He denies numbness in his feet  He states he does not have any foot concerns today  Last A1C was 7 4  PMH significant for DM2, COPD, alcohol abuse  He quit smoking 2 months ago  The following portions of the patient's history were reviewed and updated as appropriate:   He  has a past medical history of Cardiac arrest ([facility] ), COPD (chronic obstructive pulmonary disease) ([facility] [facility] ), CVA (cerebral vascular accident) ([facility] ), Diabetes mellitus ([facility] ), Heart attack ([facility] ), Hypertension, and Stroke ([facility] )    He   Patient Active Problem List    Diagnosis Date Noted    Diabetic polyneuropathy associated with type 2 diabetes mellitus ([facility] ) 07/12/2021    Hammertoe, bilateral 07/12/2021    Post-traumatic arthritis of left foot 07/12/2021    Pain due to onychomycosis of toenail 07/12/2021    Bronchitis 04/18/2020    KLARISSA (obstructive sleep apnea) 02/19/2020    Dirty living conditions 02/19/2020    Closed fracture of first cervical vertebra (HCC) 02/28/2019    ETOH abuse 02/28/2019    Chronic obstructive pulmonary disease with acute exacerbation (HCC) 09/19/2018    At moderate risk for venous thromboembolism (VTE) 09/14/2018    S/P C1-T1 Posterior Cervical Discectomy and Fusion on 9/10/18 09/12/2018    Acute blood loss anemia 09/11/2018    Type 2 diabetes mellitus with hyperglycemia, without long-term current use of insulin ([facility] ) 09/11/2018    Closed odontoid fracture with routine healing 09/09/2018    Closed displaced fracture of third cervical vertebra ([facility] ) 09/09/2018    Closed displaced fracture of fourth cervical vertebra ([facility] ) 09/09/2018    Alcohol abuse 09/09/2018    CAD (coronary artery disease) 09/09/2018    Dens fracture ([facility] ) 09/09/2018     He  has a past surgical history that includes Cervical fusion (N/A, 9/10/2018)  His family history includes Heart attack in his mother  He  reports that he has quit smoking  His smoking use included cigarettes  He has never used smokeless tobacco  He reports previous alcohol use of about 8 0 - 12 0 standard drinks of alcohol per week  He reports that he does not use drugs  Current Outpatient Medications   Medication Sig Dispense Refill    acetaminophen (TYLENOL) 325 mg tablet Take 3tabs up to 3 times daily as needed for pain 30 tablet 0    albuterol (PROVENTIL HFA,VENTOLIN HFA) 90 mcg/act inhaler Inhale 2 puffs 4 (four) times a day as needed for wheezing or shortness of breath 1 Inhaler 0    atorvastatin (LIPITOR) 80 mg tablet Take 1 tablet (80 mg total) by mouth daily with dinner 30 tablet 0    B Complex Vitamins (VITAMIN B COMPLEX 100 IJ) Take 1 tablet by mouth daily      betamethasone valerate (VALISONE) 0 1 % cream Apply topically 2 (two) times a day      budesonide (PULMICORT) 0 5 mg/2 mL nebulizer solution Take 1 vial (0 5 mg total) by nebulization every 12 (twelve) hours Rinse mouth after use   60 vial 0    citalopram (CELEXA) 20 mg tablet Take 10 mg by mouth daily       clopidogrel (PLAVIX) 75 mg tablet Take by mouth      fluticasone (FLONASE) 50 mcg/act nasal spray 1 spray into each nostril 2 (two) times a day      fluticasone-salmeterol (ADVAIR) 500-50 mcg/dose inhaler Inhale 1 puff 2 (two) times a day Rinse mouth after use        folic acid (FOLVITE) 1 mg tablet Take by mouth daily      gabapentin (NEURONTIN) 100 mg capsule Take 1 capsule (100 mg total) by mouth 3 (three) times a day 90 capsule 0    glipiZIDE (GLUCOTROL) 5 mg tablet Take 0 5 tablets (2 5 mg total) by mouth daily before breakfast (Patient taking differently: Take 5 mg by mouth daily before breakfast ) 30 tablet 0    ipratropium-albuterol (COMBIVENT)  mcg/act inhaler Inhale      lidocaine (LIDODERM) 5 % Apply 1 patch topically daily as needed for mild pain or moderate pain Remove & Discard patch within 12 hours or as directed by MD 30 patch 0    lisinopril (ZESTRIL) 40 mg tablet Take 40 mg by mouth daily       LORazepam (Ativan) 0 5 mg tablet Take by mouth every 6 (six) hours as needed for anxiety       melatonin 3 mg Take 1 tablet (3 mg total) by mouth daily at bedtime as needed (30) 30 tablet 0    metFORMIN (GLUCOPHAGE) 1000 MG tablet Take 1 tablet by mouth every 12 (twelve) hours      metoprolol succinate (TOPROL XL) 100 mg 24 hr tablet Take 100 mg by mouth daily       Multiple Vitamin (MULTIVITAMIN) tablet Take 1 tablet by mouth daily      oxyCODONE (OXY-IR) 5 MG capsule Take 1 capsule by mouth every 4 (four) hours as needed      pantoprazole (PROTONIX) 40 mg tablet Take 1 tablet by mouth Twice daily      QUEtiapine (SEROquel) 25 mg tablet Take 1 tablet by mouth daily at bedtime       sitaGLIPtin (JANUVIA) 50 mg tablet Take 50 mg by mouth daily       thiamine 100 MG tablet Take 1 tablet (100 mg total) by mouth daily 30 tablet 0    tiotropium (SPIRIVA) 18 mcg inhalation capsule Place 18 mcg into inhaler and inhale daily No current facility-administered medications for this visit  Current Outpatient Medications on File Prior to Visit   Medication Sig    acetaminophen (TYLENOL) 325 mg tablet Take 3tabs up to 3 times daily as needed for pain    albuterol (PROVENTIL HFA,VENTOLIN HFA) 90 mcg/act inhaler Inhale 2 puffs 4 (four) times a day as needed for wheezing or shortness of breath    atorvastatin (LIPITOR) 80 mg tablet Take 1 tablet (80 mg total) by mouth daily with dinner    B Complex Vitamins (VITAMIN B COMPLEX 100 IJ) Take 1 tablet by mouth daily    betamethasone valerate (VALISONE) 0 1 % cream Apply topically 2 (two) times a day    budesonide (PULMICORT) 0 5 mg/2 mL nebulizer solution Take 1 vial (0 5 mg total) by nebulization every 12 (twelve) hours Rinse mouth after use   citalopram (CELEXA) 20 mg tablet Take 10 mg by mouth daily     clopidogrel (PLAVIX) 75 mg tablet Take by mouth    fluticasone (FLONASE) 50 mcg/act nasal spray 1 spray into each nostril 2 (two) times a day    fluticasone-salmeterol (ADVAIR) 500-50 mcg/dose inhaler Inhale 1 puff 2 (two) times a day Rinse mouth after use      folic acid (FOLVITE) 1 mg tablet Take by mouth daily    gabapentin (NEURONTIN) 100 mg capsule Take 1 capsule (100 mg total) by mouth 3 (three) times a day    glipiZIDE (GLUCOTROL) 5 mg tablet Take 0 5 tablets (2 5 mg total) by mouth daily before breakfast (Patient taking differently: Take 5 mg by mouth daily before breakfast )    ipratropium-albuterol (COMBIVENT)  mcg/act inhaler Inhale    lidocaine (LIDODERM) 5 % Apply 1 patch topically daily as needed for mild pain or moderate pain Remove & Discard patch within 12 hours or as directed by MD    lisinopril (ZESTRIL) 40 mg tablet Take 40 mg by mouth daily     LORazepam (Ativan) 0 5 mg tablet Take by mouth every 6 (six) hours as needed for anxiety     melatonin 3 mg Take 1 tablet (3 mg total) by mouth daily at bedtime as needed (30)    metFORMIN (GLUCOPHAGE) 1000 MG tablet Take 1 tablet by mouth every 12 (twelve) hours    metoprolol succinate (TOPROL XL) 100 mg 24 hr tablet Take 100 mg by mouth daily     Multiple Vitamin (MULTIVITAMIN) tablet Take 1 tablet by mouth daily    oxyCODONE (OXY-IR) 5 MG capsule Take 1 capsule by mouth every 4 (four) hours as needed    pantoprazole (PROTONIX) 40 mg tablet Take 1 tablet by mouth Twice daily    QUEtiapine (SEROquel) 25 mg tablet Take 1 tablet by mouth daily at bedtime     sitaGLIPtin (JANUVIA) 50 mg tablet Take 50 mg by mouth daily     thiamine 100 MG tablet Take 1 tablet (100 mg total) by mouth daily    tiotropium (SPIRIVA) 18 mcg inhalation capsule Place 18 mcg into inhaler and inhale daily     No current facility-administered medications on file prior to visit  He is allergic to amoxicillin, amoxicillin, amoxicillin, amoxicillin-pot clavulanate, augmentin [amoxicillin-pot clavulanate], and augmentin [amoxicillin-pot clavulanate]       Review of Systems   Constitutional: Negative  HENT: Negative for sinus pressure and sinus pain  Respiratory: Negative for cough, choking and shortness of breath  Cardiovascular: Positive for leg swelling  Negative for chest pain  Gastrointestinal: Negative for diarrhea, nausea and vomiting  Musculoskeletal: Negative for arthralgias, gait problem and joint swelling  Skin: Negative for color change, rash and wound  Neurological: Negative for weakness, light-headedness, numbness and headaches  Psychiatric/Behavioral: Negative for agitation  The patient is not nervous/anxious            Objective:      /83   Pulse 82   Ht 6' 1" (1 854 m) Comment: verbal  Wt 100 kg (221 lb)   BMI 29 16 kg/m²     HEMOGLOBIN A1C  Order: [id number]  Status:  Final result   Next appt:  None   Ref Range & Units 7/4/21 11:25 PM   Hemoglobin A1C <5 7 % 7 4High     Comment: Reference Range   Non-diabetic                     <5 7   Pre-diabetic                     5 7-6 4   Diabetic                         >=6 5   ADA target for diabetic control  <=7   eAG, EST AVG Glucose <154 mg/dL 166High           Specimen Collected: 07/04/21 11:25 PM   Last Resulted: 07/05/21  8:49 AM   Received From: [facility]    Result Received: 07/12/21 10:29 AM                 Physical Exam  Vitals reviewed  Constitutional:       Appearance: He is not ill-appearing or diaphoretic  HENT:      Nose: No congestion or rhinorrhea  Cardiovascular:      Rate and Rhythm: Normal rate  Pulses: Pulses are weak  Dorsalis pedis pulses are 1+ on the right side and 1+ on the left side  Posterior tibial pulses are 0 on the right side and 0 on the left side  Pulmonary:      Effort: Pulmonary effort is normal  No respiratory distress  Musculoskeletal:         General: Deformity present  No tenderness or signs of injury  Right lower leg: Edema present  Left lower leg: Edema present  Right foot: Normal range of motion and normal capillary refill  Deformity present  No bunion, Charcot foot or prominent metatarsal heads  Abnormal pulse  Left foot: Decreased range of motion (The talonavicular joint is deformed with extensive amount of spurring and essentially 0 range of motion, patient reports previous trauma)  Normal capillary refill  Deformity present  No bunion, Charcot foot or prominent metatarsal heads  Abnormal pulse  Legs:    Feet:      Right foot:      Protective Sensation: 10 sites tested  1 site sensed  Skin integrity: Dry skin and fissure present  No ulcer, blister, skin breakdown, erythema or callus  Toenail Condition: Right toenails are abnormally thick  Fungal disease present  Left foot:      Protective Sensation: 10 sites tested  3 sites sensed  Skin integrity: Dry skin and fissure present  No ulcer, blister, skin breakdown, erythema or callus  Toenail Condition: Left toenails are abnormally thick  Fungal disease present    Skin: Capillary Refill: Capillary refill takes less than 2 seconds  Findings: No erythema or rash  Neurological:      Mental Status: He is alert and oriented to person, place, and time  Sensory: Sensory deficit present  Motor: No weakness  Gait: Gait normal    Psychiatric:         Mood and Affect: Mood normal          Diabetic Foot Exam    Patient's shoes and socks removed  Right Foot/Ankle   Right Foot Inspection  Skin Exam: skin intact, dry skin and abnormal color no blister, no callus, no erythema, no maceration, no ulcer and no callus                          Toe Exam: swelling and right toe deformityno tenderness and erythema  Sensory   Vibration: absent  Proprioception: diminished   Monofilament testing: absent  Vascular  Capillary refills: < 3 seconds  The right DP pulse is 1+  The right PT pulse is 0  Right Toe  - Comprehensive Exam  Hammertoes: second toe, fifth toe, fourth toe and third toe  Hallux valgus: no    Left Foot/Ankle  Left Foot Inspection  Skin Exam: skin intact, dry skin and abnormal colorno erythema, no maceration, no ulcer and no callus                         Toe Exam: swelling and left toe deformityno tenderness and no erythema                   Sensory   Vibration: absent  Proprioception: diminished  Monofilament: absent  Vascular  Capillary refills: < 3 seconds  The left DP pulse is 1+  The left PT pulse is 0  Left Toe  - Comprehensive Exam  Hammertoes: fourth toe, fifth toe, second toe and third toe  Hallux valgus: no  Assign Risk Category:  Deformity present;  Loss of protective sensation; Weak pulses       Risk: 3

## 2022-06-08 ENCOUNTER — APPOINTMENT (INPATIENT)
Dept: RADIOLOGY | Facility: HOSPITAL | Age: 67
DRG: 552 | End: 2022-06-08
Payer: MEDICARE

## 2022-06-08 ENCOUNTER — HOSPITAL ENCOUNTER (INPATIENT)
Facility: HOSPITAL | Age: 67
LOS: 3 days | Discharge: HOME | DRG: 552 | End: 2022-06-11
Attending: SURGERY | Admitting: SURGERY
Payer: MEDICARE

## 2022-06-08 ENCOUNTER — APPOINTMENT (EMERGENCY)
Dept: RADIOLOGY | Facility: HOSPITAL | Age: 67
DRG: 552 | End: 2022-06-08
Payer: MEDICARE
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
## 2022-06-08 DIAGNOSIS — S12.031A CLOSED NONDISPLACED FRACTURE OF POSTERIOR ARCH OF FIRST CERVICAL VERTEBRA, INITIAL ENCOUNTER (HCC): ICD-10-CM

## 2022-06-08 DIAGNOSIS — K12.2 ORAL ABSCESS: ICD-10-CM

## 2022-06-08 DIAGNOSIS — S02.5XXA CLOSED FRACTURE OF TOOTH, INITIAL ENCOUNTER: ICD-10-CM

## 2022-06-08 DIAGNOSIS — J45.20 MILD INTERMITTENT ASTHMA, UNSPECIFIED WHETHER COMPLICATED: ICD-10-CM

## 2022-06-08 PROBLEM — S01.81XA LACERATION OF FOREHEAD: Status: ACTIVE | Noted: 2022-06-08

## 2022-06-08 PROBLEM — F10.11 H/O ETOH ABUSE: Status: ACTIVE | Noted: 2022-06-08

## 2022-06-08 LAB
ABO GROUP BLD: NORMAL
ANION GAP SERPL CALCULATED.3IONS-SCNC: 14 MMOL/L (ref 4–13)
APTT PPP: 27 SECONDS (ref 23–37)
BASE EXCESS BLDA CALC-SCNC: -3 MMOL/L (ref -2–3)
BASOPHILS # BLD AUTO: 0.05 THOUSANDS/ΜL (ref 0–0.1)
BASOPHILS NFR BLD AUTO: 1 % (ref 0–1)
BLD GP AB SCN SERPL QL: NEGATIVE
BUN SERPL-MCNC: 12 MG/DL (ref 5–25)
CA-I BLD-SCNC: 1.07 MMOL/L (ref 1.12–1.32)
CALCIUM SERPL-MCNC: 8.5 MG/DL (ref 8.3–10.1)
CHLORIDE SERPL-SCNC: 95 MMOL/L (ref 100–108)
CO2 SERPL-SCNC: 19 MMOL/L (ref 21–32)
CREAT SERPL-MCNC: 0.89 MG/DL (ref 0.6–1.3)
EOSINOPHIL # BLD AUTO: 0.3 THOUSAND/ΜL (ref 0–0.61)
EOSINOPHIL NFR BLD AUTO: 4 % (ref 0–6)
ERYTHROCYTE [DISTWIDTH] IN BLOOD BY AUTOMATED COUNT: 22.7 % (ref 11.6–15.1)
GFR SERPL CREATININE-BSD FRML MDRD: 87 ML/MIN/1.73SQ M
GLUCOSE SERPL-MCNC: 169 MG/DL (ref 65–140)
GLUCOSE SERPL-MCNC: 311 MG/DL (ref 65–140)
GLUCOSE SERPL-MCNC: 323 MG/DL (ref 65–140)
HCO3 BLDA-SCNC: 21.5 MMOL/L (ref 24–30)
HCT VFR BLD AUTO: 39.3 % (ref 36.5–46.1)
HCT VFR BLD CALC: 39 % (ref 36.5–46.1)
HGB BLD-MCNC: 12.3 G/DL (ref 12–15.4)
HGB BLDA-MCNC: 13.3 G/DL (ref 12–15.4)
IMM GRANULOCYTES # BLD AUTO: 0.04 THOUSAND/UL (ref 0–0.2)
IMM GRANULOCYTES NFR BLD AUTO: 1 % (ref 0–2)
INR PPP: 0.98 (ref 0.84–1.19)
LYMPHOCYTES # BLD AUTO: 1.39 THOUSANDS/ΜL (ref 0.6–4.47)
LYMPHOCYTES NFR BLD AUTO: 18 % (ref 14–44)
MCH RBC QN AUTO: 25.6 PG (ref 26.8–34.3)
MCHC RBC AUTO-ENTMCNC: 31.3 G/DL (ref 31.4–37.4)
MCV RBC AUTO: 82 FL (ref 82–98)
MONOCYTES # BLD AUTO: 1.05 THOUSAND/ΜL (ref 0.17–1.22)
MONOCYTES NFR BLD AUTO: 13 % (ref 4–12)
NEUTROPHILS # BLD AUTO: 5.1 THOUSANDS/ΜL (ref 1.85–7.62)
NEUTS SEG NFR BLD AUTO: 63 % (ref 43–75)
NRBC BLD AUTO-RTO: 0 /100 WBCS
PCO2 BLD: 23 MMOL/L (ref 21–32)
PCO2 BLD: 34.7 MM HG (ref 42–50)
PH BLD: 7.4 [PH] (ref 7.3–7.4)
PLATELET # BLD AUTO: 211 THOUSANDS/UL (ref 149–390)
PMV BLD AUTO: 9.6 FL (ref 8.9–12.7)
PO2 BLD: 56 MM HG (ref 35–45)
POTASSIUM BLD-SCNC: 4 MMOL/L (ref 3.5–5.3)
POTASSIUM SERPL-SCNC: 4 MMOL/L (ref 3.5–5.3)
PROTHROMBIN TIME: 12.6 SECONDS (ref 11.6–14.5)
RBC # BLD AUTO: 4.8 MILLION/UL (ref 3.88–5.12)
RH BLD: POSITIVE
SAO2 % BLD FROM PO2: 89 % (ref 60–85)
SODIUM BLD-SCNC: 129 MMOL/L (ref 136–145)
SODIUM SERPL-SCNC: 128 MMOL/L (ref 136–145)
SPECIMEN EXPIRATION DATE: NORMAL
SPECIMEN SOURCE: ABNORMAL
WBC # BLD AUTO: 7.93 THOUSAND/UL (ref 4.31–10.16)

## 2022-06-08 PROCEDURE — 74177 CT ABD & PELVIS W/CONTRAST: CPT

## 2022-06-08 PROCEDURE — 85610 PROTHROMBIN TIME: CPT | Performed by: EMERGENCY MEDICINE

## 2022-06-08 PROCEDURE — 36415 COLL VENOUS BLD VENIPUNCTURE: CPT | Performed by: EMERGENCY MEDICINE

## 2022-06-08 PROCEDURE — 84132 ASSAY OF SERUM POTASSIUM: CPT

## 2022-06-08 PROCEDURE — 82948 REAGENT STRIP/BLOOD GLUCOSE: CPT

## 2022-06-08 PROCEDURE — NC001 PR NO CHARGE: Performed by: EMERGENCY MEDICINE

## 2022-06-08 PROCEDURE — 86850 RBC ANTIBODY SCREEN: CPT | Performed by: EMERGENCY MEDICINE

## 2022-06-08 PROCEDURE — 82947 ASSAY GLUCOSE BLOOD QUANT: CPT

## 2022-06-08 PROCEDURE — G1004 CDSM NDSC: HCPCS

## 2022-06-08 PROCEDURE — 99285 EMERGENCY DEPT VISIT HI MDM: CPT

## 2022-06-08 PROCEDURE — 84295 ASSAY OF SERUM SODIUM: CPT

## 2022-06-08 PROCEDURE — 76705 ECHO EXAM OF ABDOMEN: CPT | Performed by: SURGERY

## 2022-06-08 PROCEDURE — 85014 HEMATOCRIT: CPT

## 2022-06-08 PROCEDURE — 93308 TTE F-UP OR LMTD: CPT | Performed by: SURGERY

## 2022-06-08 PROCEDURE — 93005 ELECTROCARDIOGRAM TRACING: CPT

## 2022-06-08 PROCEDURE — 80048 BASIC METABOLIC PNL TOTAL CA: CPT | Performed by: EMERGENCY MEDICINE

## 2022-06-08 PROCEDURE — 70450 CT HEAD/BRAIN W/O DYE: CPT

## 2022-06-08 PROCEDURE — 72125 CT NECK SPINE W/O DYE: CPT

## 2022-06-08 PROCEDURE — 82803 BLOOD GASES ANY COMBINATION: CPT

## 2022-06-08 PROCEDURE — 70486 CT MAXILLOFACIAL W/O DYE: CPT

## 2022-06-08 PROCEDURE — 86901 BLOOD TYPING SEROLOGIC RH(D): CPT | Performed by: EMERGENCY MEDICINE

## 2022-06-08 PROCEDURE — 85730 THROMBOPLASTIN TIME PARTIAL: CPT | Performed by: EMERGENCY MEDICINE

## 2022-06-08 PROCEDURE — 82330 ASSAY OF CALCIUM: CPT

## 2022-06-08 PROCEDURE — 70498 CT ANGIOGRAPHY NECK: CPT

## 2022-06-08 PROCEDURE — 86900 BLOOD TYPING SEROLOGIC ABO: CPT | Performed by: EMERGENCY MEDICINE

## 2022-06-08 PROCEDURE — 99223 1ST HOSP IP/OBS HIGH 75: CPT | Performed by: SURGERY

## 2022-06-08 PROCEDURE — 71260 CT THORAX DX C+: CPT

## 2022-06-08 PROCEDURE — 85025 COMPLETE CBC W/AUTO DIFF WBC: CPT | Performed by: EMERGENCY MEDICINE

## 2022-06-08 RX ORDER — SODIUM CHLORIDE 9 MG/ML
75 INJECTION, SOLUTION INTRAVENOUS CONTINUOUS
Status: DISCONTINUED | OUTPATIENT
Start: 2022-06-08 | End: 2022-06-10

## 2022-06-08 RX ORDER — SENNOSIDES 8.6 MG
2 TABLET ORAL DAILY
Status: DISCONTINUED | OUTPATIENT
Start: 2022-06-09 | End: 2022-06-11 | Stop reason: HOSPADM

## 2022-06-08 RX ORDER — FOLIC ACID 1 MG/1
1 TABLET ORAL DAILY
Status: DISCONTINUED | OUTPATIENT
Start: 2022-06-09 | End: 2022-06-11 | Stop reason: HOSPADM

## 2022-06-08 RX ORDER — INSULIN LISPRO 100 [IU]/ML
1-6 INJECTION, SOLUTION INTRAVENOUS; SUBCUTANEOUS
Status: DISCONTINUED | OUTPATIENT
Start: 2022-06-08 | End: 2022-06-10

## 2022-06-08 RX ORDER — ONDANSETRON 2 MG/ML
4 INJECTION INTRAMUSCULAR; INTRAVENOUS EVERY 6 HOURS PRN
Status: DISCONTINUED | OUTPATIENT
Start: 2022-06-08 | End: 2022-06-11 | Stop reason: HOSPADM

## 2022-06-08 RX ORDER — INSULIN LISPRO 100 [IU]/ML
1-6 INJECTION, SOLUTION INTRAVENOUS; SUBCUTANEOUS EVERY 6 HOURS SCHEDULED
Status: DISCONTINUED | OUTPATIENT
Start: 2022-06-08 | End: 2022-06-10

## 2022-06-08 RX ORDER — LANOLIN ALCOHOL/MO/W.PET/CERES
100 CREAM (GRAM) TOPICAL DAILY
Status: DISCONTINUED | OUTPATIENT
Start: 2022-06-09 | End: 2022-06-11 | Stop reason: HOSPADM

## 2022-06-08 RX ORDER — ENOXAPARIN SODIUM 100 MG/ML
30 INJECTION SUBCUTANEOUS EVERY 12 HOURS
Status: DISCONTINUED | OUTPATIENT
Start: 2022-06-08 | End: 2022-06-11 | Stop reason: HOSPADM

## 2022-06-08 RX ORDER — LIDOCAINE HYDROCHLORIDE 10 MG/ML
INJECTION, SOLUTION EPIDURAL; INFILTRATION; INTRACAUDAL; PERINEURAL
Status: DISPENSED
Start: 2022-06-08 | End: 2022-06-09

## 2022-06-08 RX ADMIN — SODIUM CHLORIDE 75 ML/HR: 0.9 INJECTION, SOLUTION INTRAVENOUS at 23:46

## 2022-06-08 RX ADMIN — SODIUM CHLORIDE 3 G: 9 INJECTION, SOLUTION INTRAVENOUS at 21:17

## 2022-06-08 RX ADMIN — IOHEXOL 100 ML: 350 INJECTION, SOLUTION INTRAVENOUS at 17:52

## 2022-06-08 RX ADMIN — ENOXAPARIN SODIUM 30 MG: 30 INJECTION SUBCUTANEOUS at 21:23

## 2022-06-08 RX ADMIN — IOHEXOL 50 ML: 350 INJECTION, SOLUTION INTRAVENOUS at 20:19

## 2022-06-08 NOTE — ED PROVIDER NOTES
Emergency Department Airway Evaluation and Management Form    History  Obtained from: EMS  Review of patient's allergies indicates no known allergies  Chief Complaint:  Trauma Alert    HPI: Pt is a 75 y o  adult presents s/p fall while intoxicated         I have reviewed and agree with the history as documented  Physical Exam    There were no vitals filed for this visit  Supplemental Oxygen:none    GCS: 14    Neuro: Alert and oriented  Psych: not combative, not anxious, cooperative for exam  Neck: In collar, No JVD, No midline tenderness  Cardio:  Normal  Respiratory: Normal  Mouth:  Normal  Pharynx: Normal    Monitor:  NSR      ED Medications    No current facility-administered medications for this encounter  No current outpatient medications on file        Intubation    No intubation required    Final Diagnosis:  Fall  Head trauma with laceraiton  ETOH    ED Provider  Electronically Signed by           Amita Tristan MD  06/08/22

## 2022-06-08 NOTE — H&P
H&P - Trauma   Benjamin Curry 75 y o  adult MRN: [id number]  Unit/Bed#: TR 02 Encounter: [id number]    Trauma Alert: Level B   Model of Arrival: Ambulance    Trauma Team: Attending To, Residents [facility]sen and Fellow [facility]  Consultants:     Neurosurgery: routine consult; Epic consult order placed; Assessment/Plan   Active Problems / Assessment:   Fall  Head laceration  Nondisplaced fx R posterior C1 arch, fx L C1 pedicle screw  Periapical abscess R mandibular 2nd molar  Complete fx of tooth         Plan:   Trauma admission  Neurosurgery consult for C1 fracture  OMFS consult for molar abscess and tooth fracture  Unasyn for above  Maintain cervical precautions  CTA neck to eval for vascular injury  Suture repair of head lac  Insulin sliding scale      History of Present Illness     Chief Complaint: none  Mechanism:Fall     HPI:    Benjamin Curry is a 75 y o  adult with PMHx EtOH abuse, DM2, HTN, CVA, CAD, COPD, who presents as a Level B trauma alert for a ground level fall with headstrike  Patient was BIBA and remained GCS 15  He states he was feeling dizzy prior to fall  Patient denies current EtOH use  Denies chest pain/SOB/pain  Overt injuries include forehead laceration approx 3cm x 3cm, small abrasion to nose, right knee abrasions  FAST negative  VSS  CT scans revealing C1 fracture, molar abscess and tooth fracture  Mild hyponatremia: Na 128, 132 when corrected for hyperglycemia (311)  Review of Systems   Constitutional: Negative  HENT: Negative  Respiratory: Negative  Cardiovascular: Negative  Gastrointestinal: Negative  Endocrine: Negative  Genitourinary: Negative  Musculoskeletal: Negative  Skin: Positive for wound  Allergic/Immunologic: Negative  Neurological: Negative  Hematological: Negative  All other systems reviewed and are negative  12-point, complete review of systems was reviewed and negative except as stated above       Historical Information     No past medical history on file  No past surgical history on file  There is no immunization history on file for this patient  Last Tetanus: patient unsure  Family History: Non-contributory    1  Before the illness or injury that brought you to the Emergency, did you need someone to help you on a regular basis? 0=No   2  Since the illness or injury that brought you to the Emergency, have you needed more help than usual to take care of yourself? 0=No   3  Have you been hospitalized for one or more nights during the past 6 months (excluding a stay in the Emergency Department)? 0=No   4  In general, do you see well? 0=Yes   5  In general, do you have serious problems with your memory? 0=No   6  Do you take more than three different medications everyday? 1=Yes   TOTAL   1     Did you order a geriatric consult if the score was 2 or greater?: no     Meds/Allergies   current meds:   Current Facility-Administered Medications   Medication Dose Route Frequency    enoxaparin (LOVENOX) subcutaneous injection 30 mg  30 mg Subcutaneous Q12H    [START ON 6/2/2967] folic acid (FOLVITE) tablet 1 mg  1 mg Oral Daily    insulin lispro (HumaLOG) 100 units/mL subcutaneous injection 1-6 Units  1-6 Units Subcutaneous Q6H [address]    insulin lispro (HumaLOG) 100 units/mL subcutaneous injection 1-6 Units  1-6 Units Subcutaneous HS    lidocaine-epinephrine (XYLOCAINE-MPF/EPINEPHRINE) 1%-1:200,000 injection 5 mL  5 mL Infiltration Once    [START ON 6/9/2022] multivitamin-minerals (CENTRUM) tablet 1 tablet  1 tablet Oral Daily    ondansetron (ZOFRAN) injection 4 mg  4 mg Intravenous Q6H PRN    [START ON 6/9/2022] senna (SENOKOT) tablet 17 2 mg  2 tablet Oral Daily    [START ON 6/9/2022] thiamine tablet 100 mg  100 mg Oral Daily    and PTA meds:   None     Allergies have not been reviewed;   Not on File    Objective   Initial Vitals:   Temperature: 97 9 °F (36 6 °C) (06/08/22 1729)  Pulse: 104 (06/08/22 1725)  Respirations: 17 (06/08/22 1725)  Blood Pressure: 123/81 (06/08/22 1725)    Primary Survey:   Airway:        Status: patent;        Pre-hospital Interventions: none        Hospital Interventions: none  Breathing:        Pre-hospital Interventions: none       Effort: normal       Right breath sounds: normal       Left breath sounds: normal  Circulation:        Rhythm: regular       Rate: regular   Right Pulses Left Pulses    R radial: 2+    R pedal: 2+     L radial: 2+    L pedal: 2+       Disability:        GCS: Eye: 4; Verbal: 5 Motor: 6 Total: 15       Right Pupil: round;  reactive         Left Pupil:  round;  reactive      R Motor Strength L Motor Strength    R : 5/5  R dorsiflex: 5/5  R plantarflex: 5/5 L : 5/5  L dorsiflex: 5/5  L plantarflex: 5/5        Sensory:  No sensory deficit  Exposure:       Completed: Yes      Secondary Survey:  Physical Exam  Vitals reviewed  Constitutional:       General: He is not in acute distress  Appearance: He is not diaphoretic  HENT:      Head:      Comments: 3x3cm laceration to forehead, nose abrasion     Right Ear: External ear normal       Left Ear: External ear normal       Nose: Nose normal       Mouth/Throat:      Mouth: Mucous membranes are moist       Pharynx: Oropharynx is clear  Eyes:      Extraocular Movements: Extraocular movements intact  Conjunctiva/sclera: Conjunctivae normal    Cardiovascular:      Rate and Rhythm: Normal rate  Pulses: Normal pulses  Pulmonary:      Effort: Pulmonary effort is normal  No respiratory distress  Comments: Coarse sounds, no increased work of breathing, mouth breathing  Abdominal:      General: There is no distension  Palpations: Abdomen is soft  Tenderness: There is no abdominal tenderness  Genitourinary:     Comments: No niranjan-rectal hematoma, right buttock well healed surgical scars, right flank ecchymosis  Musculoskeletal:         General: No tenderness  Normal range of motion        Cervical back: Normal range of motion  No tenderness  Skin:     General: Skin is warm and dry  Neurological:      Cranial Nerves: No cranial nerve deficit  Sensory: No sensory deficit  Motor: No weakness  Psychiatric:         Mood and Affect: Mood normal          Thought Content: Thought content normal          Invasive Devices  Report    Peripheral Intravenous Line  Duration           Peripheral IV 06/08/22 Left Wrist <1 day              Lab Results:   BMP/CMP:   Lab Results   Component Value Date    SODIUM 128 (L) 06/08/2022    K 4 0 06/08/2022    CL 95 (L) 06/08/2022    CO2 23 06/08/2022    CO2 19 (L) 06/08/2022    BUN 12 06/08/2022    CREATININE 0 89 06/08/2022    GLUCOSE 323 (H) 06/08/2022    CALCIUM 8 5 06/08/2022    EGFR 87 06/08/2022   , CBC:   Lab Results   Component Value Date    WBC 7 93 06/08/2022    HGB 13 3 06/08/2022    HCT 39 06/08/2022    MCV 82 06/08/2022     06/08/2022    MCH 25 6 (L) 06/08/2022    MCHC 31 3 (L) 06/08/2022    RDW 22 7 (H) 06/08/2022    MPV 9 6 06/08/2022    NRBC 0 06/08/2022   , Coagulation:   Lab Results   Component Value Date    INR 0 98 06/08/2022   , Lactate: No results found for: LACTATE, Amylase: No results found for: AMYLASE, Lipase: No results found for: LIPASE, AST: No results found for: AST, ALT: No results found for: ALT, Urinalysis: No results found for: Crista Demian, SPECGRAV, PHUR, LEUKOCYTESUR, NITRITE, PROTEINUA, GLUCOSEU, KETONESU, BILIRUBINUR, BLOODU, CK: No results found for: CKTOTAL, Troponin: No results found for: TROPONINI, EtOH: No results found for: ETOH, UDS: No components found for: RAPIDDRUGSCREEN, Pregnancy: No results found for: PREGTESTUR, ABG: No results found for: PHART, QJZ5NWH, PO2ART, MBP8CHB, B8FBEGXY, BEART, SOURCE and ISTAT: No components found for: VBG    Imaging Results: I have personally reviewed pertinent reports      Chest Xray(s): pending   FAST exam(s): negative for acute findings   CT Scan(s): 6/8 CT brain: neg  6/8 CT cspine: nondisplaced fx R posterior C1 arch, fx L C1 pedicle screw  6/8 CT CAP: esophageal wall thickening at [address] jxn  6/8 CT facial bones: left frontal orbital and nasal scalp laceration and hematoma, periapical abscess R mandibular 2nd molar, buccal cortical destruction c/f soft tissue infection, complete fx of tooth     Additional Xray(s): N/A     Other Studies: CTA neck pending    Code Status: No Order  Advance Directive and Living Will:      Power of :    POLST:    I have spent 30 minutes with Patient  today in which greater than 50% of this time was spent in counseling/coordination of care regarding Diagnostic results, Prognosis, Risks and benefits of tx options, Intructions for management, Patient and family education, Importance of tx compliance, Risk factor reductions and Impressions

## 2022-06-08 NOTE — PROCEDURES
POC FAST US    Date/Time: 6/8/2022 5:36 PM  Performed by: Alexandra Hays MD  Authorized by: Alexandra Hays MD     Patient location:  Trauma  Procedure details:     Exam Type:  Diagnostic    Assess for:  Hemothorax, intra-abdominal fluid and pericardial effusion    Technique: FAST      Views obtained:  Heart - Pericardial sac, RUQ - Tee's Pouch, LUQ - Splenorenal space and Suprapubic - Pouch of Hubert    Image quality: diagnostic      Image availability:  Video obtained  FAST Findings:     RUQ (Hepatorenal) free fluid: absent      LUQ (Splenorenal) free fluid: absent      Suprapubic free fluid: absent      Cardiac wall motion: identified      Pericardial effusion: absent    Interpretation:     Impressions: negative

## 2022-06-08 NOTE — PROGRESS NOTES
Pastoral Care Progress Note    2022  Patient: Darleen Caballero : 1955  Admission Date & Time: 2022 1722  MRN: [id number] CSN: [id number]    Level B trauma, patient fell, drinking had four beers, call daughter the number on contact but no answer

## 2022-06-09 ENCOUNTER — TELEPHONE (OUTPATIENT)
Dept: NEUROSURGERY | Facility: CLINIC | Age: 67
End: 2022-06-09

## 2022-06-09 LAB
ANION GAP SERPL CALCULATED.3IONS-SCNC: 10 MMOL/L (ref 4–13)
ATRIAL RATE: 102 BPM
ATRIAL RATE: 111 BPM
BASOPHILS # BLD AUTO: 0.07 THOUSANDS/ΜL (ref 0–0.1)
BASOPHILS NFR BLD AUTO: 1 % (ref 0–1)
BUN SERPL-MCNC: 6 MG/DL (ref 5–25)
CALCIUM SERPL-MCNC: 9 MG/DL (ref 8.3–10.1)
CHLORIDE SERPL-SCNC: 104 MMOL/L (ref 100–108)
CO2 SERPL-SCNC: 24 MMOL/L (ref 21–32)
CREAT SERPL-MCNC: 0.57 MG/DL (ref 0.6–1.3)
EOSINOPHIL # BLD AUTO: 0.29 THOUSAND/ΜL (ref 0–0.61)
EOSINOPHIL NFR BLD AUTO: 4 % (ref 0–6)
ERYTHROCYTE [DISTWIDTH] IN BLOOD BY AUTOMATED COUNT: 23.4 % (ref 11.6–15.1)
GFR SERPL CREATININE-BSD FRML MDRD: 105 ML/MIN/1.73SQ M
GLUCOSE SERPL-MCNC: 152 MG/DL (ref 65–140)
GLUCOSE SERPL-MCNC: 155 MG/DL (ref 65–140)
GLUCOSE SERPL-MCNC: 174 MG/DL (ref 65–140)
GLUCOSE SERPL-MCNC: 188 MG/DL (ref 65–140)
GLUCOSE SERPL-MCNC: 197 MG/DL (ref 65–140)
GLUCOSE SERPL-MCNC: 227 MG/DL (ref 65–140)
GLUCOSE SERPL-MCNC: 257 MG/DL (ref 65–140)
HCT VFR BLD AUTO: 41.2 % (ref 36.5–49.3)
HGB BLD-MCNC: 12.9 G/DL (ref 12–17)
IMM GRANULOCYTES # BLD AUTO: 0.04 THOUSAND/UL (ref 0–0.2)
IMM GRANULOCYTES NFR BLD AUTO: 1 % (ref 0–2)
LYMPHOCYTES # BLD AUTO: 0.92 THOUSANDS/ΜL (ref 0.6–4.47)
LYMPHOCYTES NFR BLD AUTO: 12 % (ref 14–44)
MAGNESIUM SERPL-MCNC: 1.7 MG/DL (ref 1.6–2.6)
MCH RBC QN AUTO: 25.3 PG (ref 26.8–34.3)
MCHC RBC AUTO-ENTMCNC: 31.3 G/DL (ref 31.4–37.4)
MCV RBC AUTO: 81 FL (ref 82–98)
MONOCYTES # BLD AUTO: 1.08 THOUSAND/ΜL (ref 0.17–1.22)
MONOCYTES NFR BLD AUTO: 14 % (ref 4–12)
NEUTROPHILS # BLD AUTO: 5.38 THOUSANDS/ΜL (ref 1.85–7.62)
NEUTS SEG NFR BLD AUTO: 68 % (ref 43–75)
NRBC BLD AUTO-RTO: 0 /100 WBCS
P AXIS: 103 DEGREES
P AXIS: 47 DEGREES
PHOSPHATE SERPL-MCNC: 3.1 MG/DL (ref 2.3–4.1)
PLATELET # BLD AUTO: 217 THOUSANDS/UL (ref 149–390)
PMV BLD AUTO: 10 FL (ref 8.9–12.7)
POTASSIUM SERPL-SCNC: 4 MMOL/L (ref 3.5–5.3)
PR INTERVAL: 166 MS
PR INTERVAL: 196 MS
QRS AXIS: 79 DEGREES
QRS AXIS: 87 DEGREES
QRSD INTERVAL: 104 MS
QRSD INTERVAL: 110 MS
QT INTERVAL: 338 MS
QT INTERVAL: 348 MS
QTC INTERVAL: 453 MS
QTC INTERVAL: 459 MS
RBC # BLD AUTO: 5.1 MILLION/UL (ref 3.88–5.62)
SODIUM SERPL-SCNC: 138 MMOL/L (ref 136–145)
T WAVE AXIS: 11 DEGREES
T WAVE AXIS: 41 DEGREES
VENTRICULAR RATE: 102 BPM
VENTRICULAR RATE: 111 BPM
WBC # BLD AUTO: 7.78 THOUSAND/UL (ref 4.31–10.16)

## 2022-06-09 PROCEDURE — 83735 ASSAY OF MAGNESIUM: CPT | Performed by: STUDENT IN AN ORGANIZED HEALTH CARE EDUCATION/TRAINING PROGRAM

## 2022-06-09 PROCEDURE — 94640 AIRWAY INHALATION TREATMENT: CPT

## 2022-06-09 PROCEDURE — 0HQ0XZZ REPAIR SCALP SKIN, EXTERNAL APPROACH: ICD-10-PCS | Performed by: SURGERY

## 2022-06-09 PROCEDURE — 94760 N-INVAS EAR/PLS OXIMETRY 1: CPT

## 2022-06-09 PROCEDURE — 99232 SBSQ HOSP IP/OBS MODERATE 35: CPT | Performed by: EMERGENCY MEDICINE

## 2022-06-09 PROCEDURE — 12052 INTMD RPR FACE/MM 2.6-5.0 CM: CPT | Performed by: SURGERY

## 2022-06-09 PROCEDURE — 97166 OT EVAL MOD COMPLEX 45 MIN: CPT

## 2022-06-09 PROCEDURE — 85025 COMPLETE CBC W/AUTO DIFF WBC: CPT | Performed by: STUDENT IN AN ORGANIZED HEALTH CARE EDUCATION/TRAINING PROGRAM

## 2022-06-09 PROCEDURE — 80048 BASIC METABOLIC PNL TOTAL CA: CPT | Performed by: STUDENT IN AN ORGANIZED HEALTH CARE EDUCATION/TRAINING PROGRAM

## 2022-06-09 PROCEDURE — 99223 1ST HOSP IP/OBS HIGH 75: CPT | Performed by: NEUROLOGICAL SURGERY

## 2022-06-09 PROCEDURE — NC001 PR NO CHARGE: Performed by: SURGERY

## 2022-06-09 PROCEDURE — 82948 REAGENT STRIP/BLOOD GLUCOSE: CPT

## 2022-06-09 PROCEDURE — 97163 PT EVAL HIGH COMPLEX 45 MIN: CPT

## 2022-06-09 PROCEDURE — 84100 ASSAY OF PHOSPHORUS: CPT | Performed by: STUDENT IN AN ORGANIZED HEALTH CARE EDUCATION/TRAINING PROGRAM

## 2022-06-09 PROCEDURE — 93010 ELECTROCARDIOGRAM REPORT: CPT | Performed by: INTERNAL MEDICINE

## 2022-06-09 RX ORDER — GINSENG 100 MG
1 CAPSULE ORAL 2 TIMES DAILY
Status: DISCONTINUED | OUTPATIENT
Start: 2022-06-09 | End: 2022-06-11 | Stop reason: HOSPADM

## 2022-06-09 RX ORDER — METHOCARBAMOL 500 MG/1
500 TABLET, FILM COATED ORAL EVERY 6 HOURS PRN
Status: DISCONTINUED | OUTPATIENT
Start: 2022-06-09 | End: 2022-06-11 | Stop reason: HOSPADM

## 2022-06-09 RX ORDER — HYDROMORPHONE HCL/PF 1 MG/ML
0.5 SYRINGE (ML) INJECTION
Status: DISCONTINUED | OUTPATIENT
Start: 2022-06-09 | End: 2022-06-11 | Stop reason: HOSPADM

## 2022-06-09 RX ORDER — LIDOCAINE 50 MG/G
1 PATCH TOPICAL DAILY
Status: DISCONTINUED | OUTPATIENT
Start: 2022-06-09 | End: 2022-06-11 | Stop reason: HOSPADM

## 2022-06-09 RX ORDER — ALBUTEROL SULFATE 90 UG/1
2 AEROSOL, METERED RESPIRATORY (INHALATION) EVERY 4 HOURS PRN
Status: DISCONTINUED | OUTPATIENT
Start: 2022-06-09 | End: 2022-06-11 | Stop reason: HOSPADM

## 2022-06-09 RX ORDER — OXYCODONE HYDROCHLORIDE 10 MG/1
10 TABLET ORAL EVERY 4 HOURS PRN
Status: DISCONTINUED | OUTPATIENT
Start: 2022-06-09 | End: 2022-06-11 | Stop reason: HOSPADM

## 2022-06-09 RX ORDER — OXYCODONE HYDROCHLORIDE 5 MG/1
5 TABLET ORAL EVERY 4 HOURS PRN
Status: DISCONTINUED | OUTPATIENT
Start: 2022-06-09 | End: 2022-06-11 | Stop reason: HOSPADM

## 2022-06-09 RX ORDER — DIPHENHYDRAMINE HYDROCHLORIDE 50 MG/ML
12.5 INJECTION INTRAMUSCULAR; INTRAVENOUS ONCE
Status: COMPLETED | OUTPATIENT
Start: 2022-06-09 | End: 2022-06-09

## 2022-06-09 RX ORDER — ACETAMINOPHEN 325 MG/1
975 TABLET ORAL EVERY 8 HOURS SCHEDULED
Status: DISCONTINUED | OUTPATIENT
Start: 2022-06-09 | End: 2022-06-11 | Stop reason: HOSPADM

## 2022-06-09 RX ORDER — BUDESONIDE 0.5 MG/2ML
0.5 INHALANT ORAL
Status: DISCONTINUED | OUTPATIENT
Start: 2022-06-09 | End: 2022-06-11 | Stop reason: HOSPADM

## 2022-06-09 RX ORDER — GABAPENTIN 100 MG/1
100 CAPSULE ORAL 3 TIMES DAILY
Status: DISCONTINUED | OUTPATIENT
Start: 2022-06-09 | End: 2022-06-11 | Stop reason: HOSPADM

## 2022-06-09 RX ORDER — LEVALBUTEROL 1.25 MG/.5ML
1.25 SOLUTION, CONCENTRATE RESPIRATORY (INHALATION)
Status: DISCONTINUED | OUTPATIENT
Start: 2022-06-09 | End: 2022-06-11 | Stop reason: HOSPADM

## 2022-06-09 RX ADMIN — BUDESONIDE 0.5 MG: 0.5 INHALANT ORAL at 21:18

## 2022-06-09 RX ADMIN — LIDOCAINE 5% 1 PATCH: 700 PATCH TOPICAL at 04:20

## 2022-06-09 RX ADMIN — OXYCODONE HYDROCHLORIDE 10 MG: 10 TABLET ORAL at 04:12

## 2022-06-09 RX ADMIN — DIPHENHYDRAMINE HYDROCHLORIDE 12.5 MG: 50 INJECTION, SOLUTION INTRAMUSCULAR; INTRAVENOUS at 02:14

## 2022-06-09 RX ADMIN — OXYCODONE HYDROCHLORIDE 10 MG: 10 TABLET ORAL at 20:26

## 2022-06-09 RX ADMIN — ACETAMINOPHEN 975 MG: 325 TABLET ORAL at 21:46

## 2022-06-09 RX ADMIN — GABAPENTIN 100 MG: 100 CAPSULE ORAL at 08:22

## 2022-06-09 RX ADMIN — INSULIN LISPRO 2 UNITS: 100 INJECTION, SOLUTION INTRAVENOUS; SUBCUTANEOUS at 17:29

## 2022-06-09 RX ADMIN — ENOXAPARIN SODIUM 30 MG: 30 INJECTION SUBCUTANEOUS at 20:26

## 2022-06-09 RX ADMIN — LEVALBUTEROL HYDROCHLORIDE 1.25 MG: 1.25 SOLUTION, CONCENTRATE RESPIRATORY (INHALATION) at 08:19

## 2022-06-09 RX ADMIN — METHOCARBAMOL TABLETS 500 MG: 500 TABLET, COATED ORAL at 05:06

## 2022-06-09 RX ADMIN — BACITRACIN 1 LARGE APPLICATION: 500 OINTMENT TOPICAL at 17:27

## 2022-06-09 RX ADMIN — GABAPENTIN 100 MG: 100 CAPSULE ORAL at 20:26

## 2022-06-09 RX ADMIN — BACITRACIN 1 LARGE APPLICATION: 500 OINTMENT TOPICAL at 08:23

## 2022-06-09 RX ADMIN — SENNOSIDES 17.2 MG: 8.6 TABLET, FILM COATED ORAL at 08:22

## 2022-06-09 RX ADMIN — LEVALBUTEROL HYDROCHLORIDE 1.25 MG: 1.25 SOLUTION, CONCENTRATE RESPIRATORY (INHALATION) at 21:18

## 2022-06-09 RX ADMIN — SODIUM CHLORIDE 75 ML/HR: 0.9 INJECTION, SOLUTION INTRAVENOUS at 23:50

## 2022-06-09 RX ADMIN — BUDESONIDE 0.5 MG: 0.5 INHALANT ORAL at 08:19

## 2022-06-09 RX ADMIN — INSULIN LISPRO 1 UNITS: 100 INJECTION, SOLUTION INTRAVENOUS; SUBCUTANEOUS at 00:05

## 2022-06-09 RX ADMIN — IPRATROPIUM BROMIDE 0.5 MG: 0.5 SOLUTION RESPIRATORY (INHALATION) at 16:04

## 2022-06-09 RX ADMIN — IPRATROPIUM BROMIDE 0.5 MG: 0.5 SOLUTION RESPIRATORY (INHALATION) at 02:05

## 2022-06-09 RX ADMIN — LEVALBUTEROL HYDROCHLORIDE 1.25 MG: 1.25 SOLUTION, CONCENTRATE RESPIRATORY (INHALATION) at 02:05

## 2022-06-09 RX ADMIN — ACETAMINOPHEN 975 MG: 325 TABLET ORAL at 13:02

## 2022-06-09 RX ADMIN — IPRATROPIUM BROMIDE 0.5 MG: 0.5 SOLUTION RESPIRATORY (INHALATION) at 21:18

## 2022-06-09 RX ADMIN — LEVALBUTEROL HYDROCHLORIDE 1.25 MG: 1.25 SOLUTION, CONCENTRATE RESPIRATORY (INHALATION) at 16:04

## 2022-06-09 RX ADMIN — GABAPENTIN 100 MG: 100 CAPSULE ORAL at 16:02

## 2022-06-09 RX ADMIN — Medication 1 TABLET: at 08:22

## 2022-06-09 RX ADMIN — INSULIN LISPRO 1 UNITS: 100 INJECTION, SOLUTION INTRAVENOUS; SUBCUTANEOUS at 11:59

## 2022-06-09 RX ADMIN — INSULIN LISPRO 3 UNITS: 100 INJECTION, SOLUTION INTRAVENOUS; SUBCUTANEOUS at 23:43

## 2022-06-09 RX ADMIN — THIAMINE HCL TAB 100 MG 100 MG: 100 TAB at 08:22

## 2022-06-09 RX ADMIN — LIDOCAINE HYDROCHLORIDE 5 ML: 10; .005 INJECTION, SOLUTION EPIDURAL; INFILTRATION; INTRACAUDAL; PERINEURAL at 01:22

## 2022-06-09 RX ADMIN — FOLIC ACID 1 MG: 1 TABLET ORAL at 08:22

## 2022-06-09 RX ADMIN — IPRATROPIUM BROMIDE 0.5 MG: 0.5 SOLUTION RESPIRATORY (INHALATION) at 08:19

## 2022-06-09 RX ADMIN — ENOXAPARIN SODIUM 30 MG: 30 INJECTION SUBCUTANEOUS at 08:23

## 2022-06-09 RX ADMIN — SODIUM CHLORIDE 75 ML/HR: 0.9 INJECTION, SOLUTION INTRAVENOUS at 11:57

## 2022-06-09 RX ADMIN — ACETAMINOPHEN 975 MG: 325 TABLET ORAL at 05:06

## 2022-06-09 NOTE — TELEPHONE ENCOUNTER
6/9/22- PT [address] Knowles F/U SCHEDULED 6/30/22  PT WILL NEED CTH    ----- Message from Theodore Watkins sent at 6/9/2022 12:06 PM EDT -----  Can we please schedule this gentleman with AP in 3 weeks? Thanks  I have ordered cervical CT

## 2022-06-09 NOTE — PHYSICAL THERAPY NOTE
PHYSICAL THERAPY EVALUATION  NAME:  Patti Avalos  DATE: 06/09/22    AGE:   75 y o  Mrn:   [id number]  ADMIT DX:  Oral abscess [K12 2]  Fall, initial encounter [W19  XXXA]  Closed fracture of tooth, initial encounter [S02  5XXA]  Closed nondisplaced fracture of posterior arch of first cervical vertebra, initial encounter ([facility] ) [B80 257W]  Unspecified multiple injuries, initial encounter [T07  XXXA]    Past Medical History:   Diagnosis Date    COPD (chronic obstructive pulmonary disease) ([facility] )        No past surgical history on file  Length Of Stay: 1    PHYSICAL THERAPY EVALUATION:        06/09/22 1220   Note Type   Note type Evaluation   Pain Assessment   Pain Assessment Tool 0-10   Pain Score 3   Pain Location/Orientation Location: Neck   Pain Onset/Description Frequency: Constant/Continuous; Descriptor: Aching   Effect of Pain on Daily Activities No change in pain with activity   Patient's Stated Pain Goal No pain   Hospital Pain Intervention(s) Ambulation/increased activity;Repositioned   Restrictions/Precautions   Weight Bearing Precautions Per Order No   Braces or Orthoses   (no c/s collar required per neurosx)   Other Precautions Multiple lines; Fall Risk;Pain;Spinal precautions   Home Living   Type of [address] Two level;Stairs to enter with rails  (2 HETAL)   Home Equipment   (none as per pt)   Additional Comments Patient reports living with daughter, son-in-law, and 4 grand children who are able to assist as needed   Prior Function   Level of [place] Independent with ADLs and functional mobility   Lives With Family   Receives Help From   [address] in the last 6 months 1 to 4   Comments Patient denies use of an assistive device for ambulation prior to admission   General   Family/Caregiver Present No   Cognition   Overall Cognitive Status WFL   Arousal/Participation Alert   Orientation Level Oriented X4   Memory Within functional limits   Following Commands Follows one step commands without difficulty   RUE Assessment   RUE Assessment WFL   LUE Assessment   LUE Assessment WFL   RLE Assessment   RLE Assessment WFL   Strength RLE   RLE Overall Strength 4+/5   LLE Assessment   LLE Assessment WFL   Strength LLE   LLE Overall Strength 4+/5   Bed Mobility   Supine to Sit 5  Supervision   Additional items Increased time required   Transfers   Sit to Stand 5  Supervision   Additional items Increased time required   Stand to Sit 5  Supervision   Additional items Increased time required   Ambulation/Elevation   Gait pattern Short stride; Foward flexed   Gait Assistance 5  Supervision   Assistive Device Rolling walker   Distance 75ft x 2   Stair Management Assistance 4  Minimal assist   Additional items Assist x 1   Stair Management Technique Two rails   Number of Stairs 2   Balance   Static Sitting Fair   Static Standing Fair -   Ambulatory Fair -   Endurance Deficit   Endurance Deficit No   Activity Tolerance   Activity Tolerance Patient limited by pain   Medical Staff Made Aware Heron Hardy OT; OT present for co evaluation due to pts current medical presentation and decreased activity tolerance which all impact pts overall physical performance   Nurse Made Aware Patient appropriate to be seen and mobilized per nursing   Assessment   Prognosis Good   Problem List Decreased mobility;Pain; Impaired balance;Decreased safety awareness   Assessment Pt is 75 y o  male seen for PT evaluation s/p [facility]  Two pt identifiers were used to confirm  Pt presented s/p fall  Pt was admitted with a primary dx of:  Placed fracture of posterior arch of for cervical vertebrae, head laceration, and other active problems including 2 fracture  Per Neurosurgery " Will defer any bracing or treatment of fracture at this time secondary to lack of symptoms and inadequacy of bracing for C1 fractures"  PT now consulted for assessment of mobility and d/c needs   Pt with Up in chair orders  Pts current co morbidities affecting treatment include:  ETOH abuse, dm 2, HTN, CVA, CAD, COPD  Pts current clinical presentation is Unstable/ Unpredictable (high complexity) due to Ongoing medical management for primary dx, Increased reliance on more restrictive AD compared to baseline, Decreased activity tolerance compared to baseline, Fall risk, Increased assistance needed from caregiver at current time, Spinal precautions at current time, Continuous pulse oximetry monitoring     Upon evaluation, pt currently is requiring Supervision for bed mobility; Supervision for transfers and Min Ax1 for ambulation w/ RW  Pt presents at PT eval functioning below baseline and currently w/ overall mobility deficits 2* to: impaired balance, gait deviations, pain, decreased activity tolerance compared to baseline, fall risk, spinal precautions  At conclusion of PT session pt returned back in chair with phone and call bell within reach  Pt denies any further questions at this time  PT is currently recommending Home with increased family support and Outpatient PT   D/C acute care PT at this time due to pt being supervision with all mobility and having supportive family who are able to assist as needed  Pt denies any mobility or safety concerns about returning home at d/c  Recommend pt continues to mobilize with nsg and restorative techs during hospital stay     Barriers to Discharge None   Barriers to Discharge Comments Patient denies any mobility or safety concerns about returning home at time of discharge   Goals   Patient Goals "to go home"   Plan   PT Frequency   (DC IPPT)   Recommendation   PT Discharge Recommendation Home with outpatient rehabilitation   Equipment Recommended [address] Recommended Wheeled walker   Additional Comments Patient denies any mobility or safety concerns about returning home at time of discharge   [place] 8 in Bed Without Bedrails 4 Lying on Back to Sitting on Edge of Flat Bed 4   Moving Bed to Chair 4   Standing Up From Chair 4   Walk in Room 3   Climb 3-5 Stairs 3   Basic Mobility Inpatient Raw Score 22   Basic Mobility Standardized Score 47 4   Highest Level Of Mobility   -HLM Goal 7: Walk 25 feet or more   JH-HLM Achieved 7: Walk 25 feet or more   Modified [place] Scale   Modified [place] Scale 3   Barthel Index   Feeding 10   Bathing 5   Grooming Score 5   Dressing Score 5   Bladder Score 10   Bowels Score 10   Toilet Use Score 10   Transfers (Bed/Chair) Score 10   Mobility (Level Surface) Score 10   Stairs Score 5   Barthel Index Score 80   Portions of the documentation may have been created using voice recognition software  Occasional wrong word or sound alike substitutions may have occurred due to the inherent limitations of the voice recognition software  Read the chart carefully and recognize, using context, where substitutions have occurred      Parker Islas, PT, DPT

## 2022-06-09 NOTE — QUICK NOTE
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 which gives rise episodic sharp pain  He had previous fracture 20 years ago       He did not have a cane with him       No LOC  6 cm left forehead lack has been sutured and debrided, extensive soft tissue swelling and ecchymosis       Extensive grazes and bruising to both knees       Retains good power and use of upper and lower extremities  He does have peripheral neuropathy from IDDM       We are asked to see regarding    · Likely new right C1 posterior arch oblique fracture there is considerable beam artifact from adjacent lateral mass screws   · However fracture does appear to be real at least on the axial and sagittal imaging   · This appears to be through an area of pre-existing lucency - degenerative trabecular bone cyst -  seen on prior cervical CT scan from 2/28/2019   · Pre-existing known lateral mass fracture left C1 lateral mass screw (this is old  -- known) since CT scan 2/28/2019)   · This is at is at the top of extensive construct between C1 and T1    This surgery carried out on 9/10/2018 by Dr Von Myers   · See below for original 2018 Trauma presentation - patient is sustained extensive 3 column cervical spine injury after falling down 15 stairs intoxicated       I reviewed current cervical spine CT without contrast myself and later with Dr Ethan Avery from neuro Radiology       Looking back on prior follow-up CT scan 02/28/2019 it was noted that the right C1 posterior   arch cortex was intact - however there was there is trabecular lucency within the bone marrow - likely degenerative bone trabecular focal necrosis  and  where t  he new oblique fracture is noted   - so   area of prior existing "bony structural weakness" within the C1 posterior arch on the right       There is a lot of beam artifact but the cortical breaches anterior and posterior are seen on axial as well as sagittal imaging  Not seen on coronal image       I discussed his imaging with Dr Son Mena - after a lot of comparisons with prior imaging Dr Son Mena concurs with Dr Bustamante's  opinion that nondisplaced C1 right posterior arch fracture is real - current       However patient is asymptomatic  Denies any neck pain       However the setting of his previous extensive construct current nondisplaced right C1 lateral posterior arch oblique fracture not of structural consequence        Will defer with continuing Vista collar for now - as he has no neck his neuro exam is intact apart from his peripheral neuropathy       Plan for repeat CT cervical spine in 3 weeks time -   if there is some sclerosis developing then further evidence of fracture was real     [facility] We are seeing this 44-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 with multiple severe medical issues who lives with his granddaughter, Kwabena Espinoza, and her  and 4 grandchildren -ages between 5 and 2O seven in household    Lives on first floor    Known to us in the past from Trauma admissions    Long history prior falls 2/2 alcohol and DM PN related  Much of medical care is via [facility] system  HTN DM2  PN  CADs/p 2 PCI 2004 on DAPT 10 years now just ASA 81mg,  CHF COPD    Now here after further fall wit left fronal lac  And grazes both knees: NO LoC Denies neck painhad been in [employer] bar - 2 beers -  walked across street to get bus home [place] on sidewalk ffter left ankle gave out    S/P Trauma  Admission between 09/09/2018 and 09/14/2018:   On 09/09/2018 he had a fall down 15 steps stairs and sustained multilevel complex cervical spine fractures with complex fractures at C2 through the dens, with large anterior try angulated fracture of the body of C3 and also of C4 and  spinous process fracturse of C4-5 and C5 facet and lamina fracture with occipital condyle fracture and rupture of the anterior longitudinal ligament     Unstable cervical spine with 3 column injuries including ALL      He had been on Plavix and ASA secondary to PCI stents 2002  Cardiology took him off Plavix last year just on ASA 81 mg    Now just on RENNY    History of heavy alcohol intake prior falls       MRI cervical spine 9/9/2018 preop did not show any definite T2 or FLAIR spinal cord abnormality       Once his alcohol wore off he complained of severe neck pain and was noted to have good power in UE and LE apart from some triceps weakness bilaterally       Underwent posterior cervical decompression laminectomy C3-C6, posterior cervical lateral mass and pedicle fixation/fusion C1-T1 with Dr Karis Reed    Further admission after fall 02/18/2019 and CT cervical imaging showed  · Newly noted incidental  left C1 screw fracture at posterior lat mass level with minimal displacement -   · New from prior Cervical spine 09/11/2018  · Main construct in good position  · Posterior arch C1 cortex anterior posterior looks intact however there is some lucency consistent with bone degeneration    Lives on first floor of house    Uses cane 1 has to be out the house walking one block       Known to us in the past from Trauma admissions       Long history prior falls 2/2 alcohol and DM PN related        PMH Much of medical care is via [facility] system   HTN DM2    PN  CADs/p 2 PCI 2004 on DAPT 10 years now just ASA 81mg,  CHF COPD       Status post C5-6 ACDF [facility] 2002       Now here   after further fall   with left frontal scalp laceration 6 cm above left eyebrow- sutured and debrided         And grazes both knees: NO LoC Denies neck pain        Hospitals in [place] He tells me he had been dropped off by his son in law   at [facility] [facility] bar - says he had 2 beers -  then decided to go home: walked across street to get bus home: did not have cane with him       Karthikeyan Nuneze on sidewalk - mechanical fall after he had flare up of sharp pain in the left ankle - he has 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 after previous ankle fracture with surgery 20 years ago       Pain in the left ankle caused left ankle to give out       Tried to put his hands out  Felt smack on his forehead   No LOC   Significant Grazes to both knees right more than        S/P Trauma  Admission between 09/09/2018 and 09/14/2018: On 09/09/2018 he had a fall down 15 steps stairs and sustained multilevel complex cervical spine fractures with complex fractures at C2 through the dens, with large anterior try angulated fracture of the body of C3 and also of C4 and  spinous process fracturse of C4-5 and C5   facet and lamina fracture with occipital condyle fracture and rupture of the   anterior longitudinal ligament        Unstable cervical spine with 3 column injuries including ALL in the setting of multi level cervical stenosis and type 2 displaced C2 fracture and significant C3 anterior corner vertebral body fracture and C4 and C5 vertebral body fracture and C4-5 spinous process fractures        He had been on Plavix and ASA secondary to PCI stents   x2 in 2004, cardiology took him off Plavix last year    Just on ASA 81 now       History of heavy alcohol intake prior falls         MRI cervical spine preop 9/9/2008 did not show any definite T2 or FLAIR spinal cord abnormality         Once his alcohol wore off he complained of severe neck pain and was noted to   have good power in UE and LE apart from some triceps weakness bilaterally         Underwent posterior cervical decompression laminectomy C3-C6, posterior cervical lateral mass and pedicle fixation/fusion C1-T1 with Dr Keon Maloney       Further admission after fall 02/18/2019 and CT cervical imaging showed · Newly noted incidental    left C1 screw fracture at posterior lat mass level with minimal displacement -    · New from prior CT Cervical spine 09/11/2018   · Main construct in good position   · Never needed to be revised       Independent at home  Takes a cane out  with him if if he has to walk more than 1 block  Not this time       Exam:    Awake alert interactive   Appeared   Pleasant former mail worker - retired aged  63 years   Extensive ecchymosis and sutured laceration repair left forehead  Left periorbital swelling   EOM full   Counts fingers each eye   Pupils 3 mm to 2 mm brisk       Facial sensation normal except for V1 left - sensory bruising swelling  Facial symmetry equal symmetric       Power in use of upper lower extremity       Fine finger movements are quick       DTRs grade 2 upper extremities   Not tested at knees because of Grazes   No clonus       Sensory examination normal to light touch pinprick JPS DST apart from peripheral neuropathy below ankle       Summary    So pleasant bearded 75year-old 71-year-old former male man known to us from prior ask trauma admissions now with further mechanical fall on the pavement after coming out of a bar and waiting for the bus to get home         His left ankle had givenway after he developed sharp pain in chronic arthritic foot status post prior ankle fracture trauma 20 years ago       Likely real right oblique C1 posterior arch fracture s/p mechanical fall after crossing the road after being in a bar when developed sharp pain in his chronic arthritic left ankle   · He helped himself break his fall putting his hands out - no LOC   · 6 cm irregular laceration left forehead has been sutured   · Extensive left frontal forehead soft tissue ecchymoses   · Bilateral contusions grazes both knees   · S/p C1-T1 PCDF on 9/10/2018 with Dr Eleuterio Bowden     · Left C1 lateral mass screw fracture since CT scan 2/28/2019 - incidental nondisplaced - no surgery needed · Previous C5-C6 ACDF at [address] [place] [address] in 2002  · Known acute C2 type 2 fracture and acute vertebral body corner fractures C3-C4 C5 and spinous process fractures see 345 and ALL disruption in the setting of chronic cervical stenosis - see MRI cervical spine and needing extensive posterior construct C1-T1 because of extensive unstable fracture chronic fractures of C2-C5 s/p fall down 15 steps intoxicated on 9/9/2018 2018  · Known left see 1 lateral mass pedicle screw fractures since fall in March 2019 - remain similar location no further displacement   · On current exam,    · Alert interactive   · Contusion ecchymoses and repaired lack left forehead   ·  Denies any neck pain or discomfort  Denies any numbness or weakness  · No myelopathy or radiculopathy        Imaging:  Reviewed by me and also with me in discussion with Dr Lawerence Closs neuro Radiology later   · CTA neck w/wo, 6/8/2022:      No evidence of pseudoaneurysm, dissection or occlusion of the carotid or vertebral arteries suggest acute vascular injury  No hemodynamically significant stenosis  · Stable right posterior C1 arch nondisplaced fracture  · Looks real as on axial as well as sagittal imaging   · Has occurred through areas of previous trabecular lucency   · Likely degenerative cyst - noted back on CT scan    · Also note of Fracture of left C1 lateral mass screw is old longstanding and has not changed in position since CT scan 2/28/2019   · CT cervical spine, 6/8/2022:    · Nondisplaced fracture of the right posterior C1 arch oblique appears real    · But not of structural consequence   · Fracture of the left  C1 pedicle screw =  known since CT scan 2/28/2019       Plan:   · Continue to monitor neuro exam closely  · Aspen brace discontinued  · Reviewed imaging with radiologist and patient     ·  Discussed that appearance of C1 and screws as well as chronic fractures all appear similar compared to prior imaging     · There does appear to be real new oblique fracture at right posterior C1 arch that is nondisplaced and through an area of previous degenerative bony lucency   · Will defer any bracing or treatment of fracture at this time secondary to lack of symptoms and mechanical inadequacy of bracing for C1 fractures  · Do not anticipate any further neurosurgical intervention, but will follow in the outpatient setting in 3 weeks with repeat CT of cervical spine to further evaluate for sclerosis at site of fracture  · If so, this would confirm more recent etiology   · No need for flexion extension views right now   · Conservative management   · Mobilize with PT/OT  · DVT ppx: SCDs, Lovenox        Neurosurgery will sign off  Follow up as scheduled       Repeat CT cervical spine without contrast in 3 weeks time to evaluate for likely new oblique right posterior C1 arch fracture through area pre-existing prior lucency noted on prior CT scan 2/28/2019       I discussed with NOAH Brunson  I agree with her  documented findings and plan of care  6/9/2022 10:38 AM    Camelia Vera MD, Attending Neurological Surgeon

## 2022-06-09 NOTE — PLAN OF CARE
Problem: MOBILITY - ADULT  Goal: Maintain or return to baseline ADL function  Description: INTERVENTIONS:  -  Assess patient's ability to carry out ADLs; assess patient's baseline for ADL function and identify physical deficits which impact ability to perform ADLs (bathing, care of mouth/teeth, toileting, grooming, dressing, etc )  - Assess/evaluate cause of self-care deficits   - Assess range of motion  - Assess patient's mobility; develop plan if impaired  - Assess patient's need for assistive devices and provide as appropriate  - Encourage maximum independence but intervene and supervise when necessary  - Involve family in performance of ADLs  - Assess for home care needs following discharge   - Consider OT consult to assist with ADL evaluation and planning for discharge  - Provide patient education as appropriate  Outcome: Adequate for Discharge  Goal: Maintains/Returns to pre admission functional level  Description: INTERVENTIONS:  - Perform BMAT or MOVE assessment daily    - Set and communicate daily mobility goal to care team and patient/family/caregiver  - Collaborate with rehabilitation services on mobility goals if consulted  - Perform Range of Motion 3 times a day  - Reposition patient every 2 hours    - Dangle patient 3 times a day  - Stand patient 3 times a day  - Ambulate patient 3 times a day  - Out of bed to chair 3 times a day   - Out of bed for meals 3 times a day  - Out of bed for toileting  - Record patient progress and toleration of activity level   Outcome: Adequate for Discharge     Problem: Potential for Falls  Goal: Patient will remain free of falls  Description: INTERVENTIONS:  - Educate patient/family on patient safety including physical limitations  - Instruct patient to call for assistance with activity   - Consult OT/PT to assist with strengthening/mobility   - Keep Call bell within reach  - Keep bed low and locked with side rails adjusted as appropriate  - Keep care items and personal belongings within reach  - Initiate and maintain comfort rounds  - Make Fall Risk Sign visible to staff  - Offer Toileting every 1 Hours, in advance of need  - Initiate/Maintain alarm  - Obtain necessary fall risk management equipment  - Apply yellow socks and bracelet for high fall risk patients  - Consider moving patient to room near nurses station  Outcome: Adequate for Discharge     Problem: PAIN - ADULT  Goal: Verbalizes/displays adequate comfort level or baseline comfort level  Description: Interventions:  - Encourage patient to monitor pain and request assistance  - Assess pain using appropriate pain scale  - Administer analgesics based on type and severity of pain and evaluate response  - Implement non-pharmacological measures as appropriate and evaluate response  - Consider cultural and social influences on pain and pain management  - Notify physician/advanced practitioner if interventions unsuccessful or patient reports new pain  Outcome: Adequate for Discharge     Problem: INFECTION - ADULT  Goal: Absence or prevention of progression during hospitalization  Description: INTERVENTIONS:  - Assess and monitor for signs and symptoms of infection  - Monitor lab/diagnostic results  - Monitor all insertion sites, i e  indwelling lines, tubes, and drains  - Monitor endotracheal if appropriate and nasal secretions for changes in amount and color  - [place] appropriate cooling/warming therapies per order  - Administer medications as ordered  - Instruct and encourage patient and family to use good hand hygiene technique  - Identify and instruct in appropriate isolation precautions for identified infection/condition  Outcome: Adequate for Discharge     Problem: DISCHARGE PLANNING  Goal: Discharge to home or other facility with appropriate resources  Description: INTERVENTIONS:  - Identify barriers to discharge w/patient and caregiver  - Arrange for needed discharge resources and transportation as appropriate  - Identify discharge learning needs (meds, wound care, etc )  - Arrange for interpretive services to assist at discharge as needed  - Refer to Case Management Department for coordinating discharge planning if the patient needs post-hospital services based on physician/advanced practitioner order or complex needs related to functional status, cognitive ability, or social support system  Outcome: Adequate for Discharge     Problem: Knowledge Deficit  Goal: Patient/family/caregiver demonstrates understanding of disease process, treatment plan, medications, and discharge instructions  Description: Complete learning assessment and assess knowledge base  Interventions:  - Provide teaching at level of understanding  - Provide teaching via preferred learning methods  Outcome: Adequate for Discharge     Problem: NEUROSENSORY - ADULT  Goal: Achieves stable or improved neurological status  Description: INTERVENTIONS  - Monitor and report changes in neurological status  - Monitor vital signs such as temperature, blood pressure, glucose, and any other labs ordered   - Initiate measures to prevent increased intracranial pressure  - Monitor for seizure activity and implement precautions if appropriate      Outcome: Adequate for Discharge  Goal: Achieves maximal functionality and self care  Description: INTERVENTIONS  - Monitor swallowing and airway patency with patient fatigue and changes in neurological status  - Encourage and assist patient to increase activity and self care     - Encourage visually impaired, hearing impaired and aphasic patients to use assistive/communication devices  Outcome: Adequate for Discharge     Problem: HEMATOLOGIC - ADULT  Goal: Maintains hematologic stability  Description: INTERVENTIONS  - Assess for signs and symptoms of bleeding or hemorrhage  - Monitor labs  - Administer supportive blood products/factors as ordered and appropriate  Outcome: Adequate for Discharge     Problem: MUSCULOSKELETAL - ADULT  Goal: Maintain or return mobility to safest level of function  Description: INTERVENTIONS:  - Assess patient's ability to carry out ADLs; assess patient's baseline for ADL function and identify physical deficits which impact ability to perform ADLs (bathing, care of mouth/teeth, toileting, grooming, dressing, etc )  - Assess/evaluate cause of self-care deficits   - Assess range of motion  - Assess patient's mobility  - Assess patient's need for assistive devices and provide as appropriate  - Encourage maximum independence but intervene and supervise when necessary  - Involve family in performance of ADLs  - Assess for home care needs following discharge   - Consider OT consult to assist with ADL evaluation and planning for discharge  - Provide patient education as appropriate  Outcome: Adequate for Discharge  Goal: Maintain proper alignment of affected body part  Description: INTERVENTIONS:  - Support, maintain and protect limb and body alignment  - Provide patient/ family with appropriate education  Outcome: Adequate for Discharge

## 2022-06-09 NOTE — PROGRESS NOTES
[facility] [facility]  Progress Note - Patti Avalos 1955, 75 y o  male MRN: [id number]  Unit/Bed#: [facility] 289-73 Encounter: [id number]  Primary Care Provider: Joeann Bosworth, CRNP   Date and time admitted to hospital: 6/8/2022  5:22 PM    Fall  Assessment & Plan  Injuries as below    Laceration of forehead  Assessment & Plan  Laceration repaired; see procedure note  Will continue to monitor wound healing    H/O ETOH abuse  Assessment & Plan  CIWA negative 6/9  Vitamin replacement    Tooth fracture  Assessment & Plan  OMFS consult  Oral hygiene BID  Unasyn (6/8-)  Follow-up outpatient at [address] [place] [address] for extractions  Multimodal pain control regimen    Oral abscess  Assessment & Plan  OMFS consult  Unasyn (6/8 -  )      * Closed nondisplaced fracture of posterior arch of first cervical vertebra [facility])  Assessment & Plan  Cervical spine precautions  NSG consult- continue to monitor neuro exam, non operative, maintain cervical collar, follow-up outpatient office with Neurosurgery for sign off  CTA with no vascular injury  L2 soft diet; arnold restrictive  Lovenox DVT ppx        Disposition: stable    SUBJECTIVE:  Chief Complaint: tooth and neck pain    Subjective: "I am feeling better and want something to eat "    OBJECTIVE:   Vitals:   Temp:  [97 6 °F (36 4 °C)-98 6 °F (37 °C)] 98 6 °F (37 °C)  HR:  [] 104  Resp:  [6-31] 20  BP: ()/() 127/70    Intake/Output:  I/O       06/07 0701  06/08 0700 06/08 0701  06/09 0700 06/09 0701  06/10 0700    I V  (mL/kg)  468 8 (4 5) 438 8 (4 2)    Total Intake(mL/kg)  468 8 (4 5) 438 8 (4 2)    Urine (mL/kg/hr)  1650     Total Output  1650     Net  -1181 3 +438 8                Nutrition: Diet Arnold/CHO Controlled; Consistent Carbohydrate Diet Level 2 (5 carb servings/75 grams CHO/meal); Dysphagia 3-Dental Soft;  Thin Liquid  GI Proph/Bowel Reg: senokot  VTE Prophylaxis:Enoxaparin (Lovenox)     Physical Exam:   GENERAL APPEARANCE: Comfortable  NEURO:  Alert and oriented x3, no focal deficits  HEENT: eom intack no pain on eom, oropharynx clear and patent; cervical collar in place  CV:  Regular rate rhythm  LUNGS:  Clear to auscultation bilaterally  GI:  Soft nontender, nondistended  :  Voiding  MSK:  Upper and lower extremities 4/5 neurovascularly intact bilaterally  SKIN:  Post laceration repair with no dehiscence, no surrounding erythema, fluctuance, induration, no bleeding or drainage; scab intact  Skin warm and well perfused  Invasive Devices  Report    Peripheral Intravenous Line  Duration           Peripheral IV 06/08/22 Left Wrist 1 day    Peripheral IV 06/08/22 Left;Right Antecubital <1 day                      Lab Results:   BMP/CMP:   Lab Results   Component Value Date    SODIUM 138 06/09/2022    K 4 0 06/09/2022     06/09/2022    CO2 24 06/09/2022    BUN 6 06/09/2022    CREATININE 0 57 (L) 06/09/2022    CALCIUM 9 0 06/09/2022    EGFR 105 06/09/2022    and CBC:   Lab Results   Component Value Date    WBC 7 78 06/09/2022    HGB 12 9 06/09/2022    HCT 41 2 06/09/2022    MCV 81 (L) 06/09/2022     06/09/2022    MCH 25 3 (L) 06/09/2022    MCHC 31 3 (L) 06/09/2022    RDW 23 4 (H) 06/09/2022    MPV 10 0 06/09/2022    NRBC 0 06/09/2022     Imaging/EKG Studies: I have personally reviewed pertinent reports     none  Other Studies: none

## 2022-06-09 NOTE — RESPIRATORY THERAPY NOTE
06/09/22 0133   Inhalation Therapy Tx   SpO2 93 %   Resp Comments Patient presents s/p fall with cervical fx  He has COPD, quit smoking one year ago  He uses albuterol nebs TID at home and has a rescue inhaler as well  He currently is resting in bed, nasal cannula 2 L/min, c/o chest tightness  He has appreciable wheezing on auscultation  Will commence TID aerosols starting now

## 2022-06-09 NOTE — ASSESSMENT & PLAN NOTE
Questionable C1 posterior arch fracture s/p fall while intoxicated  · S/p C1-T1 PCDF on 9/10/2018 with Dr Vincent Cherry  · Previous C5-C6 ACDF at [address] [place] [address] in 2002  · Known chronic fractures of C2-C5 s/p fall in Sept 2018  · Known C1 arch fracture and left pedicle screw fractures since fall in March 2019  · On current exam, denies any neck pain or discomfort  Denies any numbness or weakness  No myelopathy or radiculopathy  Imaging:  · CTA neck w/wo, 6/8/2022: 1  No evidence of pseudoaneurysm, dissection or occlusion of the carotid or vertebral arteries suggest acute vascular injury  No hemodynamically significant stenosis  2   Stable right posterior C1 arch nondisplaced fracture  Fracture of left C1 pedicle screw  · CT cervical spine, 6/8/2022: 1  Nondisplaced fracture of the right posterior C1 arch  2   Fracture of the left C1 pedicle screw  Plan:  · Continue to monitor neuro exam closely  · Aspen brace discontinued  · Reviewed imaging with radiologist and patient  Discussed that appearance of C1 and screws as well as chronic fractures all appear similar compared to prior imaging  Appreciate that there is probable oblique fracture at C1 arch that is minimal and through an area of previous degenerative tissue  · Will defer any bracing or treatment of fracture at this time secondary to lack of symptoms and inadequacy of bracing for C1 fractures  · Do not anticipate any further neurosurgical intervention, but will follow in the outpatient setting in 3 weeks with repeat CT of cervical spine to further evaluate for sclerosis at site of fracture  · Mobilize with PT/OT  · DVT ppx: SCDs, Lovenox  Neurosurgery will sign off  Follow up as scheduled  Please call with any questions or concerns

## 2022-06-09 NOTE — ASSESSMENT & PLAN NOTE
Cervical spine precautions  NSG consult- continue to monitor neuro exam, non operative, maintain cervical collar, follow-up outpatient office with Neurosurgery for sign off  CTA with no vascular injury  L2 soft diet; dl restrictive  Lovenox DVT ppx

## 2022-06-09 NOTE — OCCUPATIONAL THERAPY NOTE
Occupational Therapy Evaluation     Patient Name: Alejandrina Burdick  [id number] Date: 6/9/2022  Problem List  Principal Problem:    Closed nondisplaced fracture of posterior arch of first cervical vertebra [facility])  Active Problems:    Oral abscess    Tooth fracture    H/O ETOH abuse    Laceration of forehead    Fall    Past Medical History  Past Medical History:   Diagnosis Date    COPD (chronic obstructive pulmonary disease) ([address] )      Past Surgical History  No past surgical history on file  06/09/22 1215   OT Last Visit   OT Visit Date 06/09/22   Note Type   Note type Evaluation   Restrictions/Precautions   Other Precautions Chair Alarm; Fall Risk;Spinal precautions   Pain Assessment   Pain Assessment Tool 0-10   Pain Score No Pain   Home Living   Type of [address] [place] [address] Two level;Stairs to enter with rails  (2 HETAL)   Prior Function   Level of [place] Independent with ADLs and functional mobility   Lives With Family  (Dtr, JERE and 4 grandchildren)   Receives Help From Family   ADL Assistance Independent   IADLs Needs assistance   Falls in the last 6 months 1 to 4   Lifestyle   Autonomy I adls and mobiltiy w/o use of ad - denies additional falls - shares homemaking with family   Reciprocal Relationships supportive family - reports his JERE is home during the day   Service to Others retired    Intrinsic Gratification sedentary   Subjective   Subjective offers no c/o   ADL   Eating Assistance 7  Independent   Grooming Assistance 5  Supervision/Setup   [address] 5  Supervision/Setup   LB Bathing Assistance 4  Minimal Assistance   [address] 5  Supervision/Setup   LB [place]  5  Supervision/Setup   Bed Mobility   Supine to Sit 5  Supervision   Transfers   Sit to Stand 5  Supervision   Stand to Sit 5  Supervision   Stand pivot 5  Supervision   Functional Mobility   Functional Mobility 5  Supervision   Additional items Rolling walker   Balance   Static Sitting Fair +   Dynamic Sitting Fair   Static Standing Fair   Dynamic Standing Fair -   Ambulatory Fair -   Activity Tolerance   Activity Tolerance Patient limited by fatigue   Medical Staff Made Aware Albino Warren DPT - present for coeval 2* medical presentation, traumatic nature of injury and limited overall tolerance to activity   RUE Assessment   RUE Assessment WFL   LUE Assessment   LUE Assessment WFL   Cognition   Overall Cognitive Status WFL   Assessment   Limitation Decreased endurance;Decreased self-care trans;Decreased high-level ADLs   Prognosis Good   Assessment Pt is a 75 y o  male who was admitted to [facility] on 6/8/2022 with Closed nondisplaced fracture of posterior arch of first cervical vertebra ([facility] [facility] ) 2* fall while intoxicated -  Pt's problem list also includes PMH of COPD  At baseline pt was completing adls and mobility independently- shares homemaking with family  Pt lives with dtr, troy and 4 grandchildren in 2 story home with 2 HETAL  Currently pt requires min assist for overall ADLS and sba for functional mobility/transfers  Pt currently presents with impairments in the following categories -difficulty performing ADLS and difficulty performing IADLS  activity tolerance, endurance and standing balance/tolerance  These impairments, as well as pt's fatigue and risk for falls  limit pt's ability to safely engage in all baseline areas of occupation, includingfunctional mobility/transfers, community mobility, laundry , house maintenance, meal prep, cleaning, social participation  and leisure activities however pt has supportive family who are able to provide assist prn -  From OT standpoint, recommend home with family support upon D/C   No immediate OT needs indicated at this time- OK for d/c home when medically cleared- d/c from caseload   Goals   Patient Goals go home   Recommendation   OT Discharge Recommendation No rehabilitation needs   OT - OK to Discharge Yes   AM-PAC Daily Activity Inpatient   Lower Body Dressing 3   Bathing 3   Toileting 4   Upper Body Dressing 4   Grooming 4   Eating 4   Daily Activity Raw Score 22   Daily Activity Standardized Score (Calc for Raw Score >=11) 47  1   AM-[facility] Applied Cognition Inpatient   Following a Speech/Presentation 4   Understanding Ordinary Conversation 4   Taking Medications 4   Remembering Where Things Are Placed or Put Away 4   Remembering List of 4-5 Errands 4   Taking Care of Complicated Tasks 4   Applied Cognition Raw Score 24   Applied Cognition Standardized Score 62 21   The patient's raw score on the AM-PAC Daily Activity inpatient short form is 22, standardized score is 47 1, greater than 39 4  Patients at this level are likely to benefit from discharge to home  Please refer to the recommendation of the Occupational Therapist for safe discharge planning      Angie Burt
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
## 2022-06-09 NOTE — ASSESSMENT & PLAN NOTE
OMFS consult  Oral hygiene BID  Unasyn (6/8-)  Follow-up outpatient at [facility] for extractions  Multimodal pain control regimen

## 2022-06-09 NOTE — PROCEDURES
Laceration repair    Date/Time: 6/9/2022 1:20 AM  Performed by: Tsering Hernandes MD  Authorized by: Tsering Hernandes MD   Consent: Verbal consent obtained  Consent given by: patient  Patient understanding: patient states understanding of the procedure being performed  Patient identity confirmed: verbally with patient and arm band  Time out: Immediately prior to procedure a "time out" was called to verify the correct patient, procedure, equipment, support staff and site/side marked as required    Body area: head/neck  Location details: forehead  Laceration length: 3 cm  Foreign bodies: no foreign bodies  Vascular damage: no  Anesthesia: local infiltration    Anesthesia:  Local Anesthetic: lidocaine 1% with epinephrine    Wound Dehiscence:  Superficial Wound Dehiscence: simple closure      Procedure Details:  Irrigation solution: saline  Irrigation method: syringe  Amount of cleaning: extensive  Debridement: minimal  Skin closure: 3-0 nylon  Number of sutures: 5  Technique: simple  Approximation: close  Dressing: antibiotic ointment

## 2022-06-09 NOTE — CONSULTS
Patient Name: Jamila Escoto YOB: 1955    Medical Record No : [id number]     Admit/Registration Date: 6/8/2022  5:22 PM  Date of Consult: 06/09/22      Oral and Maxillofacial Surgery Consult Note    Assessment:  75 y o  male with carious chronically infected teeth     Plan/Recs:  -Analgesia per primary  -Oral hygiene BID  -Follow up outpatient at [address] [place] [address] for extractions- call for appointment [phone]    -----------------------------------------    Chief Complaint:  "I didn't know my teeth were infected"    HPI:  75 y o  male who presents with incidental finding on CT of PAP #31  Pt fell from standing after feeling dizzy  Denies pain, swelling, dyspnea, dysphagia  Denies regular dental care  Pre-admission records reviewed  PMH/PSH/Meds/Allergies Reviewed  No past medical history on file  No past surgical history on file      Not on File    Social History     Socioeconomic History    Marital status: Unknown     Spouse name: Not on file    Number of children: Not on file    Years of education: Not on file    Highest education level: Not on file   Occupational History    Not on file   Tobacco Use    Smoking status: Not on file    Smokeless tobacco: Not on file   Substance and Sexual Activity    Alcohol use: Not on file    Drug use: Not on file    Sexual activity: Not on file   Other Topics Concern    Not on file   Social History Narrative    Not on file     Social Determinants of Health     Financial Resource Strain: Not on file   Food Insecurity: Not on file   Transportation Needs: Not on file   Physical Activity: Not on file   Stress: Not on file   Social Connections: Not on file   Intimate Partner Violence: Not on file   Housing Stability: Not on file       Scheduled Medications  Current Facility-Administered Medications   Medication Dose Route Frequency Provider Last Rate    ampicillin-sulbactam  3 g Intravenous Q6H Sarah Cleveland MD 3 g (06/08/22 2117)    enoxaparin  30 mg Subcutaneous Q12H Stanford Gomes MD      folic acid  1 mg Oral Daily Stanford Gomes MD      insulin lispro  1-6 Units Subcutaneous Q6H [address], MD      insulin lispro  1-6 Units Subcutaneous HS Stanford Gomes MD      lidocaine (PF)           lidocaine-epinephrine  5 mL Infiltration Once Stanford Gomes MD      multivitamin-minerals  1 tablet Oral Daily Stanford Gomes MD      ondansetron  4 mg Intravenous Q6H PRN Stanford Gomes MD      senna  2 tablet Oral Daily Stanford Gomes MD      sodium chloride  75 mL/hr Intravenous Continuous Stanford Gomes MD 75 mL/hr (06/08/22 2346)    thiamine  100 mg Oral Daily Stanford Gomes MD         PRN Medications    ondansetron    Medication Infusions  sodium chloride, 75 mL/hr, Last Rate: 75 mL/hr (06/08/22 2346)        Review of Systems   Constitutional: Negative  HENT: Positive for dental problem  Respiratory: Negative  Cardiovascular: Negative  Skin: Positive for wound  Neurological: Positive for dizziness  Psychiatric/Behavioral: Negative  All other systems reviewed and are negative  Vitals:    Temp:  [97 9 °F (36 6 °C)-98 1 °F (36 7 °C)] 98 1 °F (36 7 °C)  HR:  [] 111  Resp:  [0-31] 19  BP: ()/() 129/75  Wt Readings from Last 1 Encounters:   06/08/22 104 kg (229 lb 15 oz)     Ht Readings from Last 1 Encounters:   No data found for Ht     There is no height or weight on file to calculate BMI  Respiratory  Report   Lab Data (Last 4 hours)    None         O2/Vent Data (Last 4 hours)    None              Patient Lines/Drains/Airways Status     Active Airway     None              I/O:  Current Diet Order:        Diet Orders   (From admission, onward)             Start     Ordered    06/08/22 1939  Diet NPO; Sips with meds  Diet effective now        References:    Nutrtion Support Algorithm Enteral vs  Parenteral   Question Answer Comment   Diet Type NPO    NPO Except: Sips with meds    RD to adjust diet per protocol?  No        06/08/22 1944 Intake/Output Summary (Last 24 hours) at 6/9/2022 0013  Last data filed at 6/8/2022 2101  Gross per 24 hour   Intake --   Output 900 ml   Net -900 ml       Labs:  Results from last 7 days   Lab Units 06/08/22  1732 06/08/22  1730   WBC Thousand/uL  --  7 93   HEMOGLOBIN g/dL  --  12 3   I STAT HEMOGLOBIN g/dl 13 3  --    HEMATOCRIT %  --  39 3   HEMATOCRIT, ISTAT % 39  --    PLATELETS Thousands/uL  --  211     Results from last 7 days   Lab Units 06/08/22  1732 06/08/22  1730   POTASSIUM mmol/L  --  4 0   CHLORIDE mmol/L  --  95*   CO2 mmol/L  --  19*   CO2, I-STAT mmol/L 23  --    BUN mg/dL  --  12   CREATININE mg/dL  --  0 89   GLUCOSE, ISTAT mg/dl 323*  --    CALCIUM mg/dL  --  8 5     Results from last 7 days   Lab Units 06/08/22  1730   INR  0 98   PTT seconds 27         Pain Management Panel    There is no flowsheet data to display  Imaging:   CT FACIAL BONES WITHOUT INTRAVENOUS CONTRAST     INDICATION:   TRAUMA      COMPARISON: None      TECHNIQUE:  Axial CT images were obtained through the facial bones with additional sagittal and coronal reconstructions      Radiation dose length product (DLP) for this visit:  767 48 mGy-cm   This examination, like all CT scans performed in the [facility], was performed utilizing techniques to minimize radiation dose exposure, including the use of iterative   reconstruction and automated exposure control        IMAGE QUALITY:  Diagnostic      FINDINGS:      FACIAL BONES:  Right C1 posterior arch nondisplaced fracture  Fracture of the left C1 pedicle screw      Periapical abscess with buccal cortical destruction involving the right mandibular 2nd molar  Fracture of the tooth      Intact nasal bones  Chronic right and left nasal septal deviation  Evidence of chronic left zygomaticomaxillary complex fracture  Intact orbits      No mandible fracture  Normal alignment of the temporomandibular joints      ORBITS:  Bilateral lens replacements  No retrobulbar hematoma      SINUSES:  Small mucous retention cyst in the right maxillary sinus      SOFT TISSUES:  Left frontal nasal orbital scalp laceration and hematoma      IMPRESSION:        1  No evidence of acute maxillofacial fracture  2   Left frontal orbital and nasal scalp laceration and hematoma  3   Periapical abscess involving the right mandibular 2nd molar (ADA 35), with buccal cortical destruction, concerning for potential soft tissue extension of infection  Complete fracture of the tooth  Physical Exam:   General: Integment: skin warm and dry, patient is WD/WN, Voice quality: WNL, in NAD  Neuro Exam: AAO x 3, CN V,VII grossly intact,  Head: Normocephalic, no scalp lacerations or hematoma   Face: No stepoffs  Abrasions to forehead and midface   Ears: Pinna wnl bilaterally, no otorrhea, hearing grossly intact   Eyes/Periorbital: Pupils equal, round, reactive to light and Extraocular movements intact, intercanthal distance wnl   Nose: External nose symmetrical/no gross deformity, no nasal crepitus, no nasal septal hematoma, no rhinorrhea, no epistaxis, bilateral nares patent   Oral Exam:Lips and mucosal surfaces wnl, floor of mouth is soft with no palpable masses, tongue protrusion is midline and has full range of motion, no pharyngeal edema or exudate   Dentalalveolar Exam: Poor denitition, atraumatic and occlusion stable, ISAIAS >30mm, lateral excursive movements wnl, #15 carious root tips   #31 mobile fx carious tooth  Lymph/Neck Exam: Neck is soft, trachea is midline, no gross cervical lymphadenopathy bilaterally         Sebastien Sun DMD PGY 3 in consultation with Sridevi Cormier DDS

## 2022-06-10 LAB
ANION GAP SERPL CALCULATED.3IONS-SCNC: 1 MMOL/L (ref 4–13)
BASOPHILS # BLD AUTO: 0.04 THOUSANDS/ΜL (ref 0–0.1)
BASOPHILS NFR BLD AUTO: 1 % (ref 0–1)
BUN SERPL-MCNC: 7 MG/DL (ref 5–25)
CALCIUM SERPL-MCNC: 8.8 MG/DL (ref 8.3–10.1)
CHLORIDE SERPL-SCNC: 105 MMOL/L (ref 100–108)
CO2 SERPL-SCNC: 31 MMOL/L (ref 21–32)
CREAT SERPL-MCNC: 0.74 MG/DL (ref 0.6–1.3)
EOSINOPHIL # BLD AUTO: 0.31 THOUSAND/ΜL (ref 0–0.61)
EOSINOPHIL NFR BLD AUTO: 6 % (ref 0–6)
ERYTHROCYTE [DISTWIDTH] IN BLOOD BY AUTOMATED COUNT: 23.3 % (ref 11.6–15.1)
GFR SERPL CREATININE-BSD FRML MDRD: 94 ML/MIN/1.73SQ M
GLUCOSE SERPL-MCNC: 137 MG/DL (ref 65–140)
GLUCOSE SERPL-MCNC: 154 MG/DL (ref 65–140)
GLUCOSE SERPL-MCNC: 155 MG/DL (ref 65–140)
GLUCOSE SERPL-MCNC: 225 MG/DL (ref 65–140)
GLUCOSE SERPL-MCNC: 252 MG/DL (ref 65–140)
GLUCOSE SERPL-MCNC: 259 MG/DL (ref 65–140)
HCT VFR BLD AUTO: 38.6 % (ref 36.5–49.3)
HGB BLD-MCNC: 11.7 G/DL (ref 12–17)
IMM GRANULOCYTES # BLD AUTO: 0.02 THOUSAND/UL (ref 0–0.2)
IMM GRANULOCYTES NFR BLD AUTO: 0 % (ref 0–2)
LYMPHOCYTES # BLD AUTO: 1.23 THOUSANDS/ΜL (ref 0.6–4.47)
LYMPHOCYTES NFR BLD AUTO: 22 % (ref 14–44)
MCH RBC QN AUTO: 25.1 PG (ref 26.8–34.3)
MCHC RBC AUTO-ENTMCNC: 30.3 G/DL (ref 31.4–37.4)
MCV RBC AUTO: 83 FL (ref 82–98)
MONOCYTES # BLD AUTO: 0.77 THOUSAND/ΜL (ref 0.17–1.22)
MONOCYTES NFR BLD AUTO: 14 % (ref 4–12)
NEUTROPHILS # BLD AUTO: 3.18 THOUSANDS/ΜL (ref 1.85–7.62)
NEUTS SEG NFR BLD AUTO: 57 % (ref 43–75)
NRBC BLD AUTO-RTO: 0 /100 WBCS
PLATELET # BLD AUTO: 204 THOUSANDS/UL (ref 149–390)
PMV BLD AUTO: 10.3 FL (ref 8.9–12.7)
POTASSIUM SERPL-SCNC: 3.8 MMOL/L (ref 3.5–5.3)
RBC # BLD AUTO: 4.66 MILLION/UL (ref 3.88–5.62)
SODIUM SERPL-SCNC: 137 MMOL/L (ref 136–145)
WBC # BLD AUTO: 5.55 THOUSAND/UL (ref 4.31–10.16)

## 2022-06-10 PROCEDURE — 99238 HOSP IP/OBS DSCHRG MGMT 30/<: CPT | Performed by: EMERGENCY MEDICINE

## 2022-06-10 PROCEDURE — 94760 N-INVAS EAR/PLS OXIMETRY 1: CPT

## 2022-06-10 PROCEDURE — 85025 COMPLETE CBC W/AUTO DIFF WBC: CPT | Performed by: PHYSICIAN ASSISTANT

## 2022-06-10 PROCEDURE — 82948 REAGENT STRIP/BLOOD GLUCOSE: CPT

## 2022-06-10 PROCEDURE — 94640 AIRWAY INHALATION TREATMENT: CPT

## 2022-06-10 PROCEDURE — 80048 BASIC METABOLIC PNL TOTAL CA: CPT | Performed by: PHYSICIAN ASSISTANT

## 2022-06-10 RX ORDER — METHOCARBAMOL 500 MG/1
500 TABLET, FILM COATED ORAL EVERY 6 HOURS PRN
Qty: 20 TABLET | Refills: 0 | Status: SHIPPED | OUTPATIENT
Start: 2022-06-10

## 2022-06-10 RX ORDER — INSULIN LISPRO 100 [IU]/ML
1-6 INJECTION, SOLUTION INTRAVENOUS; SUBCUTANEOUS
Status: DISCONTINUED | OUTPATIENT
Start: 2022-06-10 | End: 2022-06-11 | Stop reason: HOSPADM

## 2022-06-10 RX ORDER — IPRATROPIUM BROMIDE AND ALBUTEROL SULFATE 2.5; .5 MG/3ML; MG/3ML
3 SOLUTION RESPIRATORY (INHALATION) ONCE
Status: COMPLETED | OUTPATIENT
Start: 2022-06-10 | End: 2022-06-10

## 2022-06-10 RX ORDER — BUDESONIDE 0.5 MG/2ML
0.5 INHALANT ORAL
Refills: 0
Start: 2022-06-10

## 2022-06-10 RX ORDER — ACETAMINOPHEN 325 MG/1
975 TABLET ORAL EVERY 8 HOURS PRN
Refills: 0
Start: 2022-06-10

## 2022-06-10 RX ORDER — INSULIN LISPRO 100 [IU]/ML
1-6 INJECTION, SOLUTION INTRAVENOUS; SUBCUTANEOUS
Status: DISCONTINUED | OUTPATIENT
Start: 2022-06-10 | End: 2022-06-10

## 2022-06-10 RX ORDER — OXYCODONE HYDROCHLORIDE 5 MG/1
5 TABLET ORAL EVERY 4 HOURS PRN
Qty: 10 TABLET | Refills: 0 | Status: SHIPPED | OUTPATIENT
Start: 2022-06-10 | End: 2022-06-20

## 2022-06-10 RX ORDER — FOLIC ACID 1 MG/1
1 TABLET ORAL DAILY
Refills: 0
Start: 2022-06-11

## 2022-06-10 RX ORDER — ALBUTEROL SULFATE 90 UG/1
2 AEROSOL, METERED RESPIRATORY (INHALATION) EVERY 6 HOURS PRN
Qty: 6.7 G | Refills: 0 | Status: SHIPPED | OUTPATIENT
Start: 2022-06-10

## 2022-06-10 RX ORDER — SENNOSIDES 8.6 MG
17.2 TABLET ORAL DAILY
Refills: 0
Start: 2022-06-11

## 2022-06-10 RX ADMIN — LEVALBUTEROL HYDROCHLORIDE 1.25 MG: 1.25 SOLUTION, CONCENTRATE RESPIRATORY (INHALATION) at 19:23

## 2022-06-10 RX ADMIN — GABAPENTIN 100 MG: 100 CAPSULE ORAL at 08:34

## 2022-06-10 RX ADMIN — ACETAMINOPHEN 975 MG: 325 TABLET ORAL at 21:30

## 2022-06-10 RX ADMIN — SENNOSIDES 17.2 MG: 8.6 TABLET, FILM COATED ORAL at 08:35

## 2022-06-10 RX ADMIN — ACETAMINOPHEN 975 MG: 325 TABLET ORAL at 06:26

## 2022-06-10 RX ADMIN — IPRATROPIUM BROMIDE 0.5 MG: 0.5 SOLUTION RESPIRATORY (INHALATION) at 13:20

## 2022-06-10 RX ADMIN — BACITRACIN 1 LARGE APPLICATION: 500 OINTMENT TOPICAL at 17:00

## 2022-06-10 RX ADMIN — THIAMINE HCL TAB 100 MG 100 MG: 100 TAB at 08:35

## 2022-06-10 RX ADMIN — INSULIN LISPRO 2 UNITS: 100 INJECTION, SOLUTION INTRAVENOUS; SUBCUTANEOUS at 11:48

## 2022-06-10 RX ADMIN — Medication 1 TABLET: at 08:34

## 2022-06-10 RX ADMIN — IPRATROPIUM BROMIDE 0.5 MG: 0.5 SOLUTION RESPIRATORY (INHALATION) at 19:23

## 2022-06-10 RX ADMIN — BUDESONIDE 0.5 MG: 0.5 INHALANT ORAL at 19:23

## 2022-06-10 RX ADMIN — IPRATROPIUM BROMIDE AND ALBUTEROL SULFATE 3 ML: 2.5; .5 SOLUTION RESPIRATORY (INHALATION) at 17:11

## 2022-06-10 RX ADMIN — BUDESONIDE 0.5 MG: 0.5 INHALANT ORAL at 08:07

## 2022-06-10 RX ADMIN — FOLIC ACID 1 MG: 1 TABLET ORAL at 08:35

## 2022-06-10 RX ADMIN — ALBUTEROL SULFATE 2 PUFF: 90 AEROSOL, METERED RESPIRATORY (INHALATION) at 12:00

## 2022-06-10 RX ADMIN — LEVALBUTEROL HYDROCHLORIDE 1.25 MG: 1.25 SOLUTION, CONCENTRATE RESPIRATORY (INHALATION) at 08:07

## 2022-06-10 RX ADMIN — GABAPENTIN 100 MG: 100 CAPSULE ORAL at 16:57

## 2022-06-10 RX ADMIN — ENOXAPARIN SODIUM 30 MG: 30 INJECTION SUBCUTANEOUS at 21:30

## 2022-06-10 RX ADMIN — INSULIN LISPRO 3 UNITS: 100 INJECTION, SOLUTION INTRAVENOUS; SUBCUTANEOUS at 16:55

## 2022-06-10 RX ADMIN — GABAPENTIN 100 MG: 100 CAPSULE ORAL at 21:30

## 2022-06-10 RX ADMIN — ALBUTEROL SULFATE 2 PUFF: 90 AEROSOL, METERED RESPIRATORY (INHALATION) at 15:31

## 2022-06-10 RX ADMIN — IPRATROPIUM BROMIDE 0.5 MG: 0.5 SOLUTION RESPIRATORY (INHALATION) at 08:06

## 2022-06-10 RX ADMIN — LIDOCAINE 5% 1 PATCH: 700 PATCH TOPICAL at 08:35

## 2022-06-10 RX ADMIN — ENOXAPARIN SODIUM 30 MG: 30 INJECTION SUBCUTANEOUS at 08:34

## 2022-06-10 RX ADMIN — INSULIN LISPRO 1 UNITS: 100 INJECTION, SOLUTION INTRAVENOUS; SUBCUTANEOUS at 06:30

## 2022-06-10 RX ADMIN — BACITRACIN 1 LARGE APPLICATION: 500 OINTMENT TOPICAL at 08:35

## 2022-06-10 RX ADMIN — LEVALBUTEROL HYDROCHLORIDE 1.25 MG: 1.25 SOLUTION, CONCENTRATE RESPIRATORY (INHALATION) at 13:20

## 2022-06-10 RX ADMIN — SODIUM CHLORIDE 75 ML/HR: 0.9 INJECTION, SOLUTION INTRAVENOUS at 11:50

## 2022-06-10 NOTE — ASSESSMENT & PLAN NOTE
- Laceration repaired; see procedure note  - Will continue to monitor wound healing  - Follow up for suture removal in 7 days
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
## 2022-06-10 NOTE — ASSESSMENT & PLAN NOTE
- NSG consult- continue to monitor neuro exam, non operative, no bracing required, follow-up outpatient office with Neurosurgery   - CTA with no vascular injury  - L2 soft diet; dl restrictive  - Lovenox DVT ppx

## 2022-06-10 NOTE — DISCHARGE INSTRUCTIONS
Trauma Discharge Instructions:    Please follow-up as instructed  If you need a follow-up appointment, please call the office when you leave to schedule an appointment  Activity:  - PT and OT evaluation and treatment as indicated  - You may resume activity as tolerated  - No strenuous activity  - Walking and normal light activities are encouraged  - Normal daily activities including climbing steps are okay  - No driving until you are cleared by neurosurgery    Diet:    - You may resume your normal diabetic diet  Medications:  - You should continue your current medication regimen after discharge unless otherwise instructed  Please refer to your discharge medication list for further details  - Please take the pain medications as directed  - You are encouraged to use non-narcotic pain medications first and whenever possible  Reserve the use of narcotic pain medication for moderate to severe pain not controlled by non-narcotic medications   - No driving while taking narcotic pain medications  - You may become constipated, especially if taking pain medications  You may take any over the counter stool softeners or laxatives as needed  Examples: Milk of Magnesia, Colace, Senna  Additional Instructions:  - May shower daily   - If you have any questions or concerns after discharge please call the office   - Call office or return to ER if fever greater than 101, chills, persistent nausea/vomiting, worsening/uncontrollable pain, develop productive cough, increasing shortness of breath, difficulty breathing, and/or increasing redness or purulent/foul smelling drainage from incision(s)

## 2022-06-10 NOTE — PLAN OF CARE
Problem: MOBILITY - ADULT  Goal: Maintain or return to baseline ADL function  Description: INTERVENTIONS:  -  Assess patient's ability to carry out ADLs; assess patient's baseline for ADL function and identify physical deficits which impact ability to perform ADLs (bathing, care of mouth/teeth, toileting, grooming, dressing, etc )  - Assess/evaluate cause of self-care deficits   - Assess range of motion  - Assess patient's mobility; develop plan if impaired  - Assess patient's need for assistive devices and provide as appropriate  - Encourage maximum independence but intervene and supervise when necessary  - Involve family in performance of ADLs  - Assess for home care needs following discharge   - Consider OT consult to assist with ADL evaluation and planning for discharge  - Provide patient education as appropriate  Outcome: Progressing  Goal: Maintains/Returns to pre admission functional level  Description: INTERVENTIONS:  - Perform BMAT or MOVE assessment daily    - Set and communicate daily mobility goal to care team and patient/family/caregiver  - Collaborate with rehabilitation services on mobility goals if consulted  - Perform Range of Motion 3 times a day  - Reposition patient every 2 hours    - Dangle patient 3 times a day  - Stand patient 3 times a day  - Ambulate patient 3 times a day  - Out of bed to chair 3 times a day   - Out of bed for meals 3 times a day  - Out of bed for toileting  - Record patient progress and toleration of activity level   Outcome: Progressing     Problem: Potential for Falls  Goal: Patient will remain free of falls  Description: INTERVENTIONS:  - Educate patient/family on patient safety including physical limitations  - Instruct patient to call for assistance with activity   - Consult OT/PT to assist with strengthening/mobility   - Keep Call bell within reach  - Keep bed low and locked with side rails adjusted as appropriate  - Keep care items and personal belongings within reach  - Initiate and maintain comfort rounds  - Make Fall Risk Sign visible to staff  - Offer Toileting every 1 Hours, in advance of need  - Initiate/Maintain alarm  - Obtain necessary fall risk management equipment  - Apply yellow socks and bracelet for high fall risk patients  - Consider moving patient to room near nurses station  Outcome: Progressing     Problem: PAIN - ADULT  Goal: Verbalizes/displays adequate comfort level or baseline comfort level  Description: Interventions:  - Encourage patient to monitor pain and request assistance  - Assess pain using appropriate pain scale  - Administer analgesics based on type and severity of pain and evaluate response  - Implement non-pharmacological measures as appropriate and evaluate response  - Consider cultural and social influences on pain and pain management  - Notify physician/advanced practitioner if interventions unsuccessful or patient reports new pain  Outcome: Progressing     Problem: INFECTION - ADULT  Goal: Absence or prevention of progression during hospitalization  Description: INTERVENTIONS:  - Assess and monitor for signs and symptoms of infection  - Monitor lab/diagnostic results  - Monitor all insertion sites, i e  indwelling lines, tubes, and drains  - Monitor endotracheal if appropriate and nasal secretions for changes in amount and color  - [place] appropriate cooling/warming therapies per order  - Administer medications as ordered  - Instruct and encourage patient and family to use good hand hygiene technique  - Identify and instruct in appropriate isolation precautions for identified infection/condition  Outcome: Progressing     Problem: DISCHARGE PLANNING  Goal: Discharge to home or other facility with appropriate resources  Description: INTERVENTIONS:  - Identify barriers to discharge w/patient and caregiver  - Arrange for needed discharge resources and transportation as appropriate  - Identify discharge learning needs (meds, wound care, etc )  - Arrange for interpretive services to assist at discharge as needed  - Refer to Case Management Department for coordinating discharge planning if the patient needs post-hospital services based on physician/advanced practitioner order or complex needs related to functional status, cognitive ability, or social support system  Outcome: Progressing     Problem: Knowledge Deficit  Goal: Patient/family/caregiver demonstrates understanding of disease process, treatment plan, medications, and discharge instructions  Description: Complete learning assessment and assess knowledge base  Interventions:  - Provide teaching at level of understanding  - Provide teaching via preferred learning methods  Outcome: Progressing     Problem: NEUROSENSORY - ADULT  Goal: Achieves stable or improved neurological status  Description: INTERVENTIONS  - Monitor and report changes in neurological status  - Monitor vital signs such as temperature, blood pressure, glucose, and any other labs ordered   - Initiate measures to prevent increased intracranial pressure  - Monitor for seizure activity and implement precautions if appropriate      Outcome: Progressing  Goal: Achieves maximal functionality and self care  Description: INTERVENTIONS  - Monitor swallowing and airway patency with patient fatigue and changes in neurological status  - Encourage and assist patient to increase activity and self care     - Encourage visually impaired, hearing impaired and aphasic patients to use assistive/communication devices  Outcome: Progressing     Problem: HEMATOLOGIC - ADULT  Goal: Maintains hematologic stability  Description: INTERVENTIONS  - Assess for signs and symptoms of bleeding or hemorrhage  - Monitor labs  - Administer supportive blood products/factors as ordered and appropriate  Outcome: Progressing     Problem: MUSCULOSKELETAL - ADULT  Goal: Maintain or return mobility to safest level of function  Description: INTERVENTIONS:  - Assess patient's ability to carry out ADLs; assess patient's baseline for ADL function and identify physical deficits which impact ability to perform ADLs (bathing, care of mouth/teeth, toileting, grooming, dressing, etc )  - Assess/evaluate cause of self-care deficits   - Assess range of motion  - Assess patient's mobility  - Assess patient's need for assistive devices and provide as appropriate  - Encourage maximum independence but intervene and supervise when necessary  - Involve family in performance of ADLs  - Assess for home care needs following discharge   - Consider OT consult to assist with ADL evaluation and planning for discharge  - Provide patient education as appropriate  Outcome: Progressing  Goal: Maintain proper alignment of affected body part  Description: INTERVENTIONS:  - Support, maintain and protect limb and body alignment  - Provide patient/ family with appropriate education  Outcome: Progressing

## 2022-06-10 NOTE — CASE MANAGEMENT
Case Management Discharge Planning Note    Patient name Isrrael Ferrari  Location [facility]-99 MRN [id number]  : 1955 Date 6/10/2022       Current Admission Date: 2022  Current Admission Diagnosis:Closed nondisplaced fracture of posterior arch of first cervical vertebra [facility])   Patient Active Problem List    Diagnosis Date Noted    Oral abscess 2022    Tooth fracture 2022    H/O ETOH abuse 2022    Closed nondisplaced fracture of posterior arch of first cervical vertebra ([address] ) 2022    Laceration of forehead 2022    Fall 2022      LOS (days): 2  Geometric Mean LOS (GMLOS) (days): 2 90  Days to GMLOS:1 1     OBJECTIVE:  Risk of Unplanned Readmission Score: 10 15         Current admission status: Inpatient   Preferred Pharmacy:   PATIENT/FAMILY REPORTS NO PREFERRED PHARMACY  No address on file      Primary Care Provider: TATIANA Pierce    Primary Insurance: MEDICARE  Secondary Insurance: BLUE CROSS    DISCHARGE DETAILS:    Discharge planning discussed with[de-identified] Patient  Freedom of Choice: Yes  Comments - Freedom of Choice: Discussed FOC     Other Referral/Resources/Interventions Provided:  Financial Resources Provided: Indigent Transportation  Referral Comments: MEKHI waiver signed, MEKHI set up via [facility] for 1600 today to go to [employer]     IMM Given (Date):: 06/10/22  IMM Given to[de-identified] Patient     IMM reviewed with patient, patient agrees with discharge determination  Therapy is recommending a RW, patient states he has a standard walker at home, and is refusing this CM ordering a rolling walker

## 2022-06-10 NOTE — CASE MANAGEMENT
Case Management Progress Note    Patient name Miguelina Schmidt  Location [facility] 619/[facility] 150-46 MRN [id number]  : 1955 Date 6/10/2022       LOS (days): 2  Geometric Mean LOS (GMLOS) (days): 2 90  Days to GMLOS:1 1        OBJECTIVE:        Current admission status: Inpatient  Preferred Pharmacy:   PATIENT/FAMILY REPORTS NO PREFERRED PHARMACY  No address on file      [address] [place],[address]D, [facility]  [address] [facility]  Phone: [phone] Fax: [phone]    Primary Care Provider: Lavern Michel    Primary Insurance: MEDICARE  Secondary Insurance: BLUE CROSS    PROGRESS NOTE: LYFT cancelled as patient no longer medically stable

## 2022-06-10 NOTE — PLAN OF CARE
Problem: MOBILITY - ADULT  Goal: Maintain or return to baseline ADL function  Description: INTERVENTIONS:  -  Assess patient's ability to carry out ADLs; assess patient's baseline for ADL function and identify physical deficits which impact ability to perform ADLs (bathing, care of mouth/teeth, toileting, grooming, dressing, etc )  - Assess/evaluate cause of self-care deficits   - Assess range of motion  - Assess patient's mobility; develop plan if impaired  - Assess patient's need for assistive devices and provide as appropriate  - Encourage maximum independence but intervene and supervise when necessary  - Involve family in performance of ADLs  - Assess for home care needs following discharge   - Consider OT consult to assist with ADL evaluation and planning for discharge  - Provide patient education as appropriate  Outcome: Progressing     Problem: Potential for Falls  Goal: Patient will remain free of falls  Description: INTERVENTIONS:  - Educate patient/family on patient safety including physical limitations  - Instruct patient to call for assistance with activity   - Consult OT/PT to assist with strengthening/mobility   - Keep Call bell within reach  - Keep bed low and locked with side rails adjusted as appropriate  - Keep care items and personal belongings within reach  - Initiate and maintain comfort rounds  - Make Fall Risk Sign visible to staff  - Offer Toileting every 1 Hours, in advance of need  - Initiate/Maintain alarm  - Obtain necessary fall risk management equipment  - Apply yellow socks and bracelet for high fall risk patients  - Consider moving patient to room near nurses station  Outcome: Progressing     Problem: PAIN - ADULT  Goal: Verbalizes/displays adequate comfort level or baseline comfort level  Description: Interventions:  - Encourage patient to monitor pain and request assistance  - Assess pain using appropriate pain scale  - Administer analgesics based on type and severity of pain and evaluate response  - Implement non-pharmacological measures as appropriate and evaluate response  - Consider cultural and social influences on pain and pain management  - Notify physician/advanced practitioner if interventions unsuccessful or patient reports new pain  Outcome: Progressing     Problem: INFECTION - ADULT  Goal: Absence or prevention of progression during hospitalization  Description: INTERVENTIONS:  - Assess and monitor for signs and symptoms of infection  - Monitor lab/diagnostic results  - Monitor all insertion sites, i e  indwelling lines, tubes, and drains  - Monitor endotracheal if appropriate and nasal secretions for changes in amount and color  - [place] appropriate cooling/warming therapies per order  - Administer medications as ordered  - Instruct and encourage patient and family to use good hand hygiene technique  - Identify and instruct in appropriate isolation precautions for identified infection/condition  Outcome: Progressing     Problem: HEMATOLOGIC - ADULT  Goal: Maintains hematologic stability  Description: INTERVENTIONS  - Assess for signs and symptoms of bleeding or hemorrhage  - Monitor labs  - Administer supportive blood products/factors as ordered and appropriate  Outcome: Progressing     Problem: MUSCULOSKELETAL - ADULT  Goal: Maintain or return mobility to safest level of function  Description: INTERVENTIONS:  - Assess patient's ability to carry out ADLs; assess patient's baseline for ADL function and identify physical deficits which impact ability to perform ADLs (bathing, care of mouth/teeth, toileting, grooming, dressing, etc )  - Assess/evaluate cause of self-care deficits   - Assess range of motion  - Assess patient's mobility  - Assess patient's need for assistive devices and provide as appropriate  - Encourage maximum independence but intervene and supervise when necessary  - Involve family in performance of ADLs  - Assess for home care needs following discharge   - Consider OT consult to assist with ADL evaluation and planning for discharge  - Provide patient education as appropriate  Outcome: Progressing

## 2022-06-10 NOTE — INCIDENTAL FINDINGS
The following findings require follow up:  Radiographic finding   Findin)  Rim calcified right renal cysts measuring 2 cm  Symmetric nephrographic phase enhancement of the kidneys  No obstructive uropathy  2) Extensive calcified plaque throughout the abdominal aorta and common iliac arteries  Plaque at the mesenteric and renal artery origins may indicate hemodynamically significant stenosis     Follow up required: PCP    Follow up should be done within 1 month(s)    Please notify the following clinician to assist with the follow up:    [id number]

## 2022-06-10 NOTE — ASSESSMENT & PLAN NOTE
- OMFS consult  - Oral hygiene BID  - Follow-up outpatient at [address] [place] [address] extractions  - Multimodal pain control regimen

## 2022-06-11 VITALS
WEIGHT: 229.94 LBS | SYSTOLIC BLOOD PRESSURE: 150 MMHG | HEIGHT: 73 IN | DIASTOLIC BLOOD PRESSURE: 86 MMHG | OXYGEN SATURATION: 94 % | BODY MASS INDEX: 30.47 KG/M2 | TEMPERATURE: 97.4 F | HEART RATE: 82 BPM | RESPIRATION RATE: 16 BRPM

## 2022-06-11 LAB
ANION GAP SERPL CALCULATED.3IONS-SCNC: 10 MMOL/L (ref 4–13)
BUN SERPL-MCNC: 5 MG/DL (ref 5–25)
CALCIUM SERPL-MCNC: 8.9 MG/DL (ref 8.3–10.1)
CHLORIDE SERPL-SCNC: 105 MMOL/L (ref 100–108)
CO2 SERPL-SCNC: 24 MMOL/L (ref 21–32)
CREAT SERPL-MCNC: 0.61 MG/DL (ref 0.6–1.3)
ERYTHROCYTE [DISTWIDTH] IN BLOOD BY AUTOMATED COUNT: 23.7 % (ref 11.6–15.1)
GFR SERPL CREATININE-BSD FRML MDRD: 102 ML/MIN/1.73SQ M
GLUCOSE SERPL-MCNC: 191 MG/DL (ref 65–140)
GLUCOSE SERPL-MCNC: 192 MG/DL (ref 65–140)
GLUCOSE SERPL-MCNC: 23 MG/DL (ref 65–140)
HCT VFR BLD AUTO: 39.3 % (ref 36.5–49.3)
HGB BLD-MCNC: 12.1 G/DL (ref 12–17)
MCH RBC QN AUTO: 25.8 PG (ref 26.8–34.3)
MCHC RBC AUTO-ENTMCNC: 30.8 G/DL (ref 31.4–37.4)
MCV RBC AUTO: 84 FL (ref 82–98)
PLATELET # BLD AUTO: 224 THOUSANDS/UL (ref 149–390)
PMV BLD AUTO: 10.4 FL (ref 8.9–12.7)
POTASSIUM SERPL-SCNC: 3.8 MMOL/L (ref 3.5–5.3)
RBC # BLD AUTO: 4.69 MILLION/UL (ref 3.88–5.62)
SODIUM SERPL-SCNC: 139 MMOL/L (ref 136–145)
WBC # BLD AUTO: 5.07 THOUSAND/UL (ref 4.31–10.16)

## 2022-06-11 PROCEDURE — 99238 HOSP IP/OBS DSCHRG MGMT 30/<: CPT | Performed by: PHYSICIAN ASSISTANT

## 2022-06-11 PROCEDURE — 94760 N-INVAS EAR/PLS OXIMETRY 1: CPT

## 2022-06-11 PROCEDURE — NC001 PR NO CHARGE: Performed by: PHYSICIAN ASSISTANT

## 2022-06-11 PROCEDURE — 80048 BASIC METABOLIC PNL TOTAL CA: CPT | Performed by: PHYSICIAN ASSISTANT

## 2022-06-11 PROCEDURE — 82948 REAGENT STRIP/BLOOD GLUCOSE: CPT

## 2022-06-11 PROCEDURE — 94640 AIRWAY INHALATION TREATMENT: CPT

## 2022-06-11 PROCEDURE — 85027 COMPLETE CBC AUTOMATED: CPT | Performed by: PHYSICIAN ASSISTANT

## 2022-06-11 RX ORDER — ALBUTEROL SULFATE 2.5 MG/3ML
2.5 SOLUTION RESPIRATORY (INHALATION) EVERY 4 HOURS PRN
Status: DISCONTINUED | OUTPATIENT
Start: 2022-06-11 | End: 2022-06-11 | Stop reason: HOSPADM

## 2022-06-11 RX ADMIN — SENNOSIDES 17.2 MG: 8.6 TABLET, FILM COATED ORAL at 08:43

## 2022-06-11 RX ADMIN — LEVALBUTEROL HYDROCHLORIDE 1.25 MG: 1.25 SOLUTION, CONCENTRATE RESPIRATORY (INHALATION) at 07:40

## 2022-06-11 RX ADMIN — INSULIN LISPRO 2 UNITS: 100 INJECTION, SOLUTION INTRAVENOUS; SUBCUTANEOUS at 08:44

## 2022-06-11 RX ADMIN — ALBUTEROL SULFATE 2.5 MG: 2.5 SOLUTION RESPIRATORY (INHALATION) at 02:14

## 2022-06-11 RX ADMIN — FOLIC ACID 1 MG: 1 TABLET ORAL at 08:42

## 2022-06-11 RX ADMIN — ENOXAPARIN SODIUM 30 MG: 30 INJECTION SUBCUTANEOUS at 08:43

## 2022-06-11 RX ADMIN — Medication 1 TABLET: at 08:42

## 2022-06-11 RX ADMIN — ACETAMINOPHEN 975 MG: 325 TABLET ORAL at 05:18

## 2022-06-11 RX ADMIN — IPRATROPIUM BROMIDE 0.5 MG: 0.5 SOLUTION RESPIRATORY (INHALATION) at 07:40

## 2022-06-11 RX ADMIN — BUDESONIDE 0.5 MG: 0.5 INHALANT ORAL at 07:40

## 2022-06-11 RX ADMIN — LIDOCAINE 5% 1 PATCH: 700 PATCH TOPICAL at 08:44

## 2022-06-11 RX ADMIN — THIAMINE HCL TAB 100 MG 100 MG: 100 TAB at 08:43

## 2022-06-11 RX ADMIN — GABAPENTIN 100 MG: 100 CAPSULE ORAL at 10:19

## 2022-06-11 RX ADMIN — BACITRACIN 1 LARGE APPLICATION: 500 OINTMENT TOPICAL at 08:43

## 2022-06-11 NOTE — ASSESSMENT & PLAN NOTE
- OMFS consult  - Oral hygiene BID  - Follow-up outpatient at [address] [place] [address] extractions  - Multimodal pain control regimen

## 2022-06-11 NOTE — QUICK NOTE
AVS given to patient  Patient called his daughter to pick him up at this time  Per patient it will take her about 45 minutes to get here
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
## 2022-06-11 NOTE — PLAN OF CARE
Problem: MOBILITY - ADULT  Goal: Maintain or return to baseline ADL function  Description: INTERVENTIONS:  -  Assess patient's ability to carry out ADLs; assess patient's baseline for ADL function and identify physical deficits which impact ability to perform ADLs (bathing, care of mouth/teeth, toileting, grooming, dressing, etc )  - Assess/evaluate cause of self-care deficits   - Assess range of motion  - Assess patient's mobility; develop plan if impaired  - Assess patient's need for assistive devices and provide as appropriate  - Encourage maximum independence but intervene and supervise when necessary  - Involve family in performance of ADLs  - Assess for home care needs following discharge   - Consider OT consult to assist with ADL evaluation and planning for discharge  - Provide patient education as appropriate  Outcome: Progressing     Problem: Potential for Falls  Goal: Patient will remain free of falls  Description: INTERVENTIONS:  - Educate patient/family on patient safety including physical limitations  - Instruct patient to call for assistance with activity   - Consult OT/PT to assist with strengthening/mobility   - Keep Call bell within reach  - Keep bed low and locked with side rails adjusted as appropriate  - Keep care items and personal belongings within reach  - Initiate and maintain comfort rounds  - Make Fall Risk Sign visible to staff  - Offer Toileting every 1 Hours, in advance of need  - Initiate/Maintain alarm  - Obtain necessary fall risk management equipment  - Apply yellow socks and bracelet for high fall risk patients  - Consider moving patient to room near nurses station  Outcome: Progressing     Problem: PAIN - ADULT  Goal: Verbalizes/displays adequate comfort level or baseline comfort level  Description: Interventions:  - Encourage patient to monitor pain and request assistance  - Assess pain using appropriate pain scale  - Administer analgesics based on type and severity of pain and evaluate response  - Implement non-pharmacological measures as appropriate and evaluate response  - Consider cultural and social influences on pain and pain management  - Notify physician/advanced practitioner if interventions unsuccessful or patient reports new pain  Outcome: Progressing     Problem: INFECTION - ADULT  Goal: Absence or prevention of progression during hospitalization  Description: INTERVENTIONS:  - Assess and monitor for signs and symptoms of infection  - Monitor lab/diagnostic results  - Monitor all insertion sites, i e  indwelling lines, tubes, and drains  - Monitor endotracheal if appropriate and nasal secretions for changes in amount and color  - [place] appropriate cooling/warming therapies per order  - Administer medications as ordered  - Instruct and encourage patient and family to use good hand hygiene technique  - Identify and instruct in appropriate isolation precautions for identified infection/condition  Outcome: Progressing

## 2022-06-14 NOTE — TELEPHONE ENCOUNTER
6/14/22- PT DISCHARGED HOME  PTS PHONE # NOT IN SERVICE, NOR IS HIS EMERGENCY CONTACT  I ALSO TRIED HIS OTHER 2 CHARTS AND #'S ARE NOT CORRECT  SENT 'UNABLE TO REACH' LETTER AND SCRIPT FOR CT HEAD

## 2022-06-29 ENCOUNTER — TELEPHONE (OUTPATIENT)
Dept: NEUROSURGERY | Facility: CLINIC | Age: 67
End: 2022-06-29

## 2022-06-30 ENCOUNTER — TELEPHONE (OUTPATIENT)
Dept: NEUROSURGERY | Facility: CLINIC | Age: 67
End: 2022-06-30

## 2022-06-30 NOTE — TELEPHONE ENCOUNTER
06/3/2022-CALLED PT TO RESCHEDULE TODAY'S "NO SHOW" APT AND REMIND TO HAVE XRAY'S  BUT PHONE IS NOT IN SERVICE      MAILED "UNABLE TO REACH" LETTER TO PT

## 2022-08-19 NOTE — ASSESSMENT & PLAN NOTE
LACTATION PROGRESS NOTE    DELIVERY INFORMATION:   Date and time of birth: 2023 9:35 AM   Delivery method: Vaginal, Spontaneous [250]   Gestational age:40w1d   Birth weight: 6 lb 9.3 oz (2984 g)     MATERNAL INFORMATION:   Age: 23 year old   /Para:    Maternal History:  Past Medical History:   Diagnosis Date   • Appendicitis    • Hx of chlamydia infection       Social History     Tobacco Use   • Smoking status: Never Smoker   • Smokeless tobacco: Never Used   Vaping Use   • Vaping Use: never used   Substance Use Topics   • Alcohol use: Not Currently   • Drug use: Never      Prior to Admission medications    Medication Sig Start Date End Date Taking? Authorizing Provider   triamcinolone (ARISTOCORT) 0.1 % ointment  22   Outside Provider   Prenatal Vit-Fe Fumarate-FA (multivitamin & mineral w/folic acid- PRENATAL) 27-1 MG Tab Take 1 tablet by mouth daily.    Outside Provider   acetaminophen (TYLENOL) 325 MG tablet Take 650 mg by mouth every 4 hours as needed for Pain.    Outside Provider   Adalimumab (Humira Pen-Psor/Uveit Starter) 80 MG/0.8ML & 40MG/0.4ML Pen-injector Kit citrate free Inject 1 each into the skin. 22   Outside Provider      History of feedings with prior children:  Breast fed prior children for one month  Current feeding goal:  Breastfeed longer than with previous child    Feeding history:  Breastfeeding     INFORMATION:             LACTATION CONSULTATION:   Reason for visit:   Initial , Maternal Teaching and assistance with breastfeeding    Assessment/Intervention:  Upon arrival to room, mother holding swaddled baby and she states baby won't wake up.  LC unwrapped baby and changed diaper.  Baby now awake and alert and brought to mother sitting in a chair.  Mother offered left breast in cradle position.  Minimal feeding cues observed, but baby did latch and suck for a few minutes.  Mother desires to eat herself and will try to feed baby again later.  Encouraged to  See above call LC for assistance.  Support person at bedside.    Education provided:   Normal  behavior  Breastfeeding- General Information  Introduction to breastfeeding, importance skin to skin, benefits of breastfeeding, feeding cues, waking a sleeping baby, when to call an LC, typical intake volume/stomach capacity and burping  Breastfeeding- Establishing Milk supply/maintaining  Breastmilk production, engorgement, colostrum, frequency and duration of breastfeeding, feeding on cue, normal cluster feeding, prevention of sore nipples and care and rest  Sore Nipple Care  Breastmilk, deep latching and lanolin   Latch and Positioning  Breast sandwiching, breast support, taking baby off breast and cradle  Milk Expression  Hand expression  How to tell if baby is getting enough to eat  baby's intake/output frequency & how to track and bowel movement color  Resources   Lactation Warm-Line   Readiness: Eager  Acceptance  Method:  Explain  Response:  Verbalizes understanding       Supplies Given:  none at this time    Handouts Given:  None at this time  New Beginnings Booklet - already at bedside    Plan:  Pay attention to early hunger cues (rooting, smacking lips, sticking out his tongue)  Continue feeding baby when any feeding cues demonstrated  Increased skin to skin, especially during the day between feeds to allow  to be more wakeful and show increased feeding cues  Undress baby to waken  Hold baby skin-to-skin during feedings  Offer feeding by placing baby skin to skin every 2-3 hours if baby does not wake  Minimum of 8 feedings/day  Offer BOTH breasts each feeding session  If baby chooses to take one breast only, start with other breast at next feeding  Utilize breast compression to increase milk flow (may act as a gentle reminder for baby to continue sucking)   Gently rub baby, if needed, to keep baby awake during feeding  Listen for swallows  Mom/family was encouraged to keep a intake/output log for the first  two weeks or until baby is back to birthweight.  Feeding log can be discontinued once health care provider is reassured that feedings are going well and that baby's weight gain pattern is adequate.  Call lactation warmline for continued support     For use at home, when needed, patient has not yet obtained a breast pump

## 2022-10-11 PROBLEM — S01.81XA LACERATION OF FOREHEAD: Status: RESOLVED | Noted: 2022-06-08 | Resolved: 2022-10-11

## 2023-07-23 ENCOUNTER — APPOINTMENT (EMERGENCY)
Dept: RADIOLOGY | Facility: HOSPITAL | Age: 68
End: 2023-07-23
Payer: MEDICARE

## 2023-07-23 ENCOUNTER — HOSPITAL ENCOUNTER (INPATIENT)
Facility: HOSPITAL | Age: 68
LOS: 2 days | Discharge: HOME | End: 2023-07-25
Attending: EMERGENCY MEDICINE | Admitting: FAMILY MEDICINE
Payer: MEDICARE

## 2023-07-23 DIAGNOSIS — J44.1 COPD WITH ACUTE EXACERBATION (HCC): Primary | ICD-10-CM

## 2023-07-23 PROBLEM — J44.9 STAGE 3 SEVERE COPD BY GOLD CLASSIFICATION (HCC): Status: ACTIVE | Noted: 2023-07-23

## 2023-07-23 LAB
ALBUMIN SERPL BCP-MCNC: 3.3 G/DL (ref 3.5–5)
ALP SERPL-CCNC: 69 U/L (ref 46–116)
ALT SERPL W P-5'-P-CCNC: 28 U/L (ref 12–78)
ANION GAP SERPL CALCULATED.3IONS-SCNC: 7 MMOL/L
AST SERPL W P-5'-P-CCNC: 18 U/L (ref 5–45)
ATRIAL RATE: 80 BPM
BASOPHILS # BLD AUTO: 0.04 THOUSANDS/ÂΜL (ref 0–0.1)
BASOPHILS NFR BLD AUTO: 0 % (ref 0–1)
BILIRUB SERPL-MCNC: 0.4 MG/DL (ref 0.2–1)
BUN SERPL-MCNC: 14 MG/DL (ref 5–25)
CALCIUM ALBUM COR SERPL-MCNC: 9.5 MG/DL (ref 8.3–10.1)
CALCIUM SERPL-MCNC: 8.9 MG/DL (ref 8.3–10.1)
CARDIAC TROPONIN I PNL SERPL HS: 14 NG/L
CHLORIDE SERPL-SCNC: 106 MMOL/L (ref 96–108)
CO2 SERPL-SCNC: 27 MMOL/L (ref 21–32)
CREAT SERPL-MCNC: 0.7 MG/DL (ref 0.6–1.3)
EOSINOPHIL # BLD AUTO: 0.47 THOUSAND/ÂΜL (ref 0–0.61)
EOSINOPHIL NFR BLD AUTO: 4 % (ref 0–6)
ERYTHROCYTE [DISTWIDTH] IN BLOOD BY AUTOMATED COUNT: 14.7 % (ref 11.6–15.1)
GFR SERPL CREATININE-BSD FRML MDRD: 96 ML/MIN/1.73SQ M
GLUCOSE SERPL-MCNC: 218 MG/DL (ref 65–140)
GLUCOSE SERPL-MCNC: 220 MG/DL (ref 65–140)
GLUCOSE SERPL-MCNC: 439 MG/DL (ref 65–140)
HCT VFR BLD AUTO: 37.6 % (ref 36.5–49.3)
HGB BLD-MCNC: 12.4 G/DL (ref 12–17)
IMM GRANULOCYTES # BLD AUTO: 0.08 THOUSAND/UL (ref 0–0.2)
IMM GRANULOCYTES NFR BLD AUTO: 1 % (ref 0–2)
LYMPHOCYTES # BLD AUTO: 1.66 THOUSANDS/ÂΜL (ref 0.6–4.47)
LYMPHOCYTES NFR BLD AUTO: 14 % (ref 14–44)
MCH RBC QN AUTO: 29 PG (ref 26.8–34.3)
MCHC RBC AUTO-ENTMCNC: 33 G/DL (ref 31.4–37.4)
MCV RBC AUTO: 88 FL (ref 82–98)
MONOCYTES # BLD AUTO: 0.71 THOUSAND/ÂΜL (ref 0.17–1.22)
MONOCYTES NFR BLD AUTO: 6 % (ref 4–12)
NEUTROPHILS # BLD AUTO: 8.8 THOUSANDS/ÂΜL (ref 1.85–7.62)
NEUTS SEG NFR BLD AUTO: 75 % (ref 43–75)
NRBC BLD AUTO-RTO: 0 /100 WBCS
PLATELET # BLD AUTO: 329 THOUSANDS/UL (ref 149–390)
PMV BLD AUTO: 10 FL (ref 8.9–12.7)
POTASSIUM SERPL-SCNC: 4.6 MMOL/L (ref 3.5–5.3)
PROT SERPL-MCNC: 6.8 G/DL (ref 6.4–8.4)
QRS AXIS: 12 DEGREES
QRSD INTERVAL: 78 MS
QT INTERVAL: 228 MS
QTC INTERVAL: 318 MS
RBC # BLD AUTO: 4.28 MILLION/UL (ref 3.88–5.62)
SODIUM SERPL-SCNC: 140 MMOL/L (ref 135–147)
T WAVE AXIS: 270 DEGREES
VENTRICULAR RATE: 117 BPM
WBC # BLD AUTO: 11.76 THOUSAND/UL (ref 4.31–10.16)

## 2023-07-23 PROCEDURE — 80053 COMPREHEN METABOLIC PANEL: CPT

## 2023-07-23 PROCEDURE — 94644 CONT INHLJ TX 1ST HOUR: CPT

## 2023-07-23 PROCEDURE — 82948 REAGENT STRIP/BLOOD GLUCOSE: CPT

## 2023-07-23 PROCEDURE — 96375 TX/PRO/DX INJ NEW DRUG ADDON: CPT

## 2023-07-23 PROCEDURE — 99223 1ST HOSP IP/OBS HIGH 75: CPT | Performed by: FAMILY MEDICINE

## 2023-07-23 PROCEDURE — 94760 N-INVAS EAR/PLS OXIMETRY 1: CPT

## 2023-07-23 PROCEDURE — 85025 COMPLETE CBC W/AUTO DIFF WBC: CPT

## 2023-07-23 PROCEDURE — 71045 X-RAY EXAM CHEST 1 VIEW: CPT

## 2023-07-23 PROCEDURE — 99285 EMERGENCY DEPT VISIT HI MDM: CPT

## 2023-07-23 PROCEDURE — 99291 CRITICAL CARE FIRST HOUR: CPT | Performed by: EMERGENCY MEDICINE

## 2023-07-23 PROCEDURE — 96365 THER/PROPH/DIAG IV INF INIT: CPT

## 2023-07-23 PROCEDURE — 36415 COLL VENOUS BLD VENIPUNCTURE: CPT

## 2023-07-23 PROCEDURE — 93005 ELECTROCARDIOGRAM TRACING: CPT

## 2023-07-23 PROCEDURE — 93010 ELECTROCARDIOGRAM REPORT: CPT | Performed by: INTERNAL MEDICINE

## 2023-07-23 PROCEDURE — 94640 AIRWAY INHALATION TREATMENT: CPT

## 2023-07-23 PROCEDURE — 94664 DEMO&/EVAL PT USE INHALER: CPT

## 2023-07-23 PROCEDURE — 84484 ASSAY OF TROPONIN QUANT: CPT

## 2023-07-23 PROCEDURE — 1123F ACP DISCUSS/DSCN MKR DOCD: CPT | Performed by: INTERNAL MEDICINE

## 2023-07-23 RX ORDER — HEPARIN SODIUM 5000 [USP'U]/ML
5000 INJECTION, SOLUTION INTRAVENOUS; SUBCUTANEOUS EVERY 8 HOURS SCHEDULED
Status: DISCONTINUED | OUTPATIENT
Start: 2023-07-23 | End: 2023-07-25 | Stop reason: HOSPADM

## 2023-07-23 RX ORDER — LEVALBUTEROL INHALATION SOLUTION 1.25 MG/3ML
1.25 SOLUTION RESPIRATORY (INHALATION)
Status: DISCONTINUED | OUTPATIENT
Start: 2023-07-23 | End: 2023-07-25 | Stop reason: HOSPADM

## 2023-07-23 RX ORDER — METHYLPREDNISOLONE SODIUM SUCCINATE 125 MG/2ML
125 INJECTION, POWDER, LYOPHILIZED, FOR SOLUTION INTRAMUSCULAR; INTRAVENOUS ONCE
Status: COMPLETED | OUTPATIENT
Start: 2023-07-23 | End: 2023-07-23

## 2023-07-23 RX ORDER — SODIUM CHLORIDE FOR INHALATION 0.9 %
3 VIAL, NEBULIZER (ML) INHALATION ONCE
Status: COMPLETED | OUTPATIENT
Start: 2023-07-23 | End: 2023-07-23

## 2023-07-23 RX ORDER — BUSPIRONE HYDROCHLORIDE 5 MG/1
5 TABLET ORAL 2 TIMES DAILY
Status: CANCELLED | OUTPATIENT
Start: 2023-07-23

## 2023-07-23 RX ORDER — ATORVASTATIN CALCIUM 40 MG/1
40 TABLET, FILM COATED ORAL
Status: DISCONTINUED | OUTPATIENT
Start: 2023-07-23 | End: 2023-07-25 | Stop reason: HOSPADM

## 2023-07-23 RX ORDER — INSULIN LISPRO 100 [IU]/ML
1-6 INJECTION, SOLUTION INTRAVENOUS; SUBCUTANEOUS
Status: DISCONTINUED | OUTPATIENT
Start: 2023-07-23 | End: 2023-07-25 | Stop reason: HOSPADM

## 2023-07-23 RX ORDER — FOLIC ACID 1 MG/1
1 TABLET ORAL DAILY
Status: DISCONTINUED | OUTPATIENT
Start: 2023-07-24 | End: 2023-07-25 | Stop reason: HOSPADM

## 2023-07-23 RX ORDER — LORAZEPAM 0.5 MG/1
0.5 TABLET ORAL
Status: DISCONTINUED | OUTPATIENT
Start: 2023-07-23 | End: 2023-07-25 | Stop reason: HOSPADM

## 2023-07-23 RX ORDER — GABAPENTIN 100 MG/1
100 CAPSULE ORAL 3 TIMES DAILY
Status: CANCELLED | OUTPATIENT
Start: 2023-07-23

## 2023-07-23 RX ORDER — BUSPIRONE HYDROCHLORIDE 5 MG/1
5 TABLET ORAL 2 TIMES DAILY
Status: DISCONTINUED | OUTPATIENT
Start: 2023-07-23 | End: 2023-07-25 | Stop reason: HOSPADM

## 2023-07-23 RX ORDER — ONDANSETRON 2 MG/ML
4 INJECTION INTRAMUSCULAR; INTRAVENOUS EVERY 6 HOURS PRN
Status: DISCONTINUED | OUTPATIENT
Start: 2023-07-23 | End: 2023-07-25 | Stop reason: HOSPADM

## 2023-07-23 RX ORDER — FLUTICASONE FUROATE AND VILANTEROL 200; 25 UG/1; UG/1
1 POWDER RESPIRATORY (INHALATION)
Status: DISCONTINUED | OUTPATIENT
Start: 2023-07-24 | End: 2023-07-25 | Stop reason: HOSPADM

## 2023-07-23 RX ORDER — LISINOPRIL 10 MG/1
10 TABLET ORAL DAILY
Status: DISCONTINUED | OUTPATIENT
Start: 2023-07-24 | End: 2023-07-25 | Stop reason: HOSPADM

## 2023-07-23 RX ORDER — CLOPIDOGREL BISULFATE 75 MG/1
75 TABLET ORAL DAILY
Status: CANCELLED | OUTPATIENT
Start: 2023-07-23

## 2023-07-23 RX ORDER — METHYLPREDNISOLONE SODIUM SUCCINATE 40 MG/ML
40 INJECTION, POWDER, LYOPHILIZED, FOR SOLUTION INTRAMUSCULAR; INTRAVENOUS EVERY 8 HOURS
Status: DISCONTINUED | OUTPATIENT
Start: 2023-07-24 | End: 2023-07-25 | Stop reason: HOSPADM

## 2023-07-23 RX ORDER — AZITHROMYCIN 250 MG/1
500 TABLET, FILM COATED ORAL EVERY 24 HOURS
Status: DISCONTINUED | OUTPATIENT
Start: 2023-07-23 | End: 2023-07-25 | Stop reason: HOSPADM

## 2023-07-23 RX ORDER — ALBUTEROL SULFATE 2.5 MG/3ML
2.5 SOLUTION RESPIRATORY (INHALATION) EVERY 4 HOURS PRN
Status: DISCONTINUED | OUTPATIENT
Start: 2023-07-23 | End: 2023-07-25 | Stop reason: HOSPADM

## 2023-07-23 RX ORDER — GUAIFENESIN 600 MG/1
600 TABLET, EXTENDED RELEASE ORAL 2 TIMES DAILY
Status: DISCONTINUED | OUTPATIENT
Start: 2023-07-23 | End: 2023-07-25 | Stop reason: HOSPADM

## 2023-07-23 RX ORDER — METOPROLOL SUCCINATE 100 MG/1
100 TABLET, EXTENDED RELEASE ORAL DAILY
Status: CANCELLED | OUTPATIENT
Start: 2023-07-23

## 2023-07-23 RX ORDER — QUETIAPINE FUMARATE 25 MG/1
25 TABLET, FILM COATED ORAL
Status: CANCELLED | OUTPATIENT
Start: 2023-07-23

## 2023-07-23 RX ORDER — DOCUSATE SODIUM 100 MG/1
100 CAPSULE, LIQUID FILLED ORAL 2 TIMES DAILY
Status: DISCONTINUED | OUTPATIENT
Start: 2023-07-23 | End: 2023-07-25 | Stop reason: HOSPADM

## 2023-07-23 RX ORDER — NALTREXONE HYDROCHLORIDE 50 MG/1
50 TABLET, FILM COATED ORAL DAILY
Status: DISCONTINUED | OUTPATIENT
Start: 2023-07-24 | End: 2023-07-23

## 2023-07-23 RX ORDER — LANOLIN ALCOHOL/MO/W.PET/CERES
3 CREAM (GRAM) TOPICAL
Status: DISCONTINUED | OUTPATIENT
Start: 2023-07-23 | End: 2023-07-25 | Stop reason: HOSPADM

## 2023-07-23 RX ORDER — MAGNESIUM SULFATE HEPTAHYDRATE 40 MG/ML
2 INJECTION, SOLUTION INTRAVENOUS ONCE
Status: COMPLETED | OUTPATIENT
Start: 2023-07-23 | End: 2023-07-23

## 2023-07-23 RX ORDER — ALBUTEROL SULFATE 90 UG/1
2 AEROSOL, METERED RESPIRATORY (INHALATION) EVERY 4 HOURS PRN
Status: DISCONTINUED | OUTPATIENT
Start: 2023-07-23 | End: 2023-07-25 | Stop reason: HOSPADM

## 2023-07-23 RX ORDER — CITALOPRAM HYDROBROMIDE 10 MG/1
10 TABLET ORAL DAILY
Status: CANCELLED | OUTPATIENT
Start: 2023-07-23

## 2023-07-23 RX ORDER — LANOLIN ALCOHOL/MO/W.PET/CERES
100 CREAM (GRAM) TOPICAL DAILY
Status: DISCONTINUED | OUTPATIENT
Start: 2023-07-24 | End: 2023-07-25 | Stop reason: HOSPADM

## 2023-07-23 RX ORDER — OXYCODONE HYDROCHLORIDE 5 MG/1
5 TABLET ORAL EVERY 6 HOURS PRN
Status: CANCELLED | OUTPATIENT
Start: 2023-07-23

## 2023-07-23 RX ORDER — ACETAMINOPHEN 325 MG/1
650 TABLET ORAL EVERY 6 HOURS PRN
Status: DISCONTINUED | OUTPATIENT
Start: 2023-07-23 | End: 2023-07-25 | Stop reason: HOSPADM

## 2023-07-23 RX ORDER — SODIUM CHLORIDE FOR INHALATION 0.9 %
3 VIAL, NEBULIZER (ML) INHALATION
Status: DISCONTINUED | OUTPATIENT
Start: 2023-07-23 | End: 2023-07-23

## 2023-07-23 RX ORDER — INSULIN GLARGINE 100 [IU]/ML
30 INJECTION, SOLUTION SUBCUTANEOUS
Status: CANCELLED | OUTPATIENT
Start: 2023-07-23

## 2023-07-23 RX ORDER — PANTOPRAZOLE SODIUM 40 MG/1
40 TABLET, DELAYED RELEASE ORAL
Status: DISCONTINUED | OUTPATIENT
Start: 2023-07-24 | End: 2023-07-25 | Stop reason: HOSPADM

## 2023-07-23 RX ORDER — INSULIN LISPRO 100 [IU]/ML
5 INJECTION, SOLUTION INTRAVENOUS; SUBCUTANEOUS
Status: CANCELLED | OUTPATIENT
Start: 2023-07-23

## 2023-07-23 RX ADMIN — DOCUSATE SODIUM 100 MG: 100 CAPSULE ORAL at 17:25

## 2023-07-23 RX ADMIN — ATORVASTATIN CALCIUM 40 MG: 40 TABLET, FILM COATED ORAL at 17:25

## 2023-07-23 RX ADMIN — LEVALBUTEROL HYDROCHLORIDE 1.25 MG: 1.25 SOLUTION RESPIRATORY (INHALATION) at 17:27

## 2023-07-23 RX ADMIN — AZITHROMYCIN DIHYDRATE 500 MG: 250 TABLET, FILM COATED ORAL at 17:25

## 2023-07-23 RX ADMIN — HEPARIN SODIUM 5000 UNITS: 5000 INJECTION INTRAVENOUS; SUBCUTANEOUS at 21:59

## 2023-07-23 RX ADMIN — ALBUTEROL SULFATE 10 MG: 2.5 SOLUTION RESPIRATORY (INHALATION) at 13:28

## 2023-07-23 RX ADMIN — LORAZEPAM 0.5 MG: 0.5 TABLET ORAL at 21:57

## 2023-07-23 RX ADMIN — LEVALBUTEROL HYDROCHLORIDE 1.25 MG: 1.25 SOLUTION RESPIRATORY (INHALATION) at 19:51

## 2023-07-23 RX ADMIN — IPRATROPIUM BROMIDE 0.5 MG: 0.5 SOLUTION RESPIRATORY (INHALATION) at 19:51

## 2023-07-23 RX ADMIN — Medication 3 ML: at 19:54

## 2023-07-23 RX ADMIN — METHYLPREDNISOLONE SODIUM SUCCINATE 125 MG: 125 INJECTION, POWDER, FOR SOLUTION INTRAMUSCULAR; INTRAVENOUS at 13:28

## 2023-07-23 RX ADMIN — INSULIN DETEMIR 20 UNITS: 100 INJECTION, SOLUTION SUBCUTANEOUS at 22:35

## 2023-07-23 RX ADMIN — IPRATROPIUM BROMIDE 1 MG: 0.5 SOLUTION RESPIRATORY (INHALATION) at 13:29

## 2023-07-23 RX ADMIN — INSULIN LISPRO 2 UNITS: 100 INJECTION, SOLUTION INTRAVENOUS; SUBCUTANEOUS at 17:24

## 2023-07-23 RX ADMIN — Medication 3 ML: at 13:28

## 2023-07-23 RX ADMIN — MAGNESIUM SULFATE HEPTAHYDRATE 2 G: 40 INJECTION, SOLUTION INTRAVENOUS at 13:29

## 2023-07-23 RX ADMIN — Medication 3 ML: at 17:27

## 2023-07-23 RX ADMIN — IPRATROPIUM BROMIDE 0.5 MG: 0.5 SOLUTION RESPIRATORY (INHALATION) at 17:27

## 2023-07-23 RX ADMIN — BUSPIRONE HYDROCHLORIDE 5 MG: 5 TABLET ORAL at 18:19

## 2023-07-23 RX ADMIN — GUAIFENESIN 600 MG: 600 TABLET, EXTENDED RELEASE ORAL at 17:25

## 2023-07-23 RX ADMIN — Medication 12.5 MG: at 22:00

## 2023-07-23 NOTE — ASSESSMENT & PLAN NOTE
Noted he was prescribed naltrexone at [facility], but patient states he is not taking it. Last drink was 4 days ago. He drinks 3-4 beers daily.   Will place on [facility] protocol  Continue folic acid/thiamine

## 2023-07-23 NOTE — ASSESSMENT & PLAN NOTE
Lab Results   Component Value Date    HGBA1C 7.4 (H) 06/27/2023       No results for input(s): "POCGLU" in the last 72 hours.     Blood Sugar Average: Last 72 hrs:  home regimen Basaglar 30 units at bedtime and humalog 5 units TIDwmeal.  Start Levemir 20 units at HS and sliding scale  Titrate as necessary

## 2023-07-23 NOTE — ED ATTENDING ATTESTATION
7/23/2023  Tsering Nino DO, saw and evaluated the patient. I have discussed the patient with the resident/non-physician practitioner and agree with the resident's/non-physician practitioner's findings, Plan of Care, and MDM as documented in the resident's/non-physician practitioner's note, except where noted. All available labs and Radiology studies were reviewed. I was present for key portions of any procedure(s) performed by the resident/non-physician practitioner and I was immediately available to provide assistance. At this point I agree with the current assessment done in the Emergency Department. I have conducted an independent evaluation of this patient a history and physical is as follows:    70-year-old male complains of recurrence of dyspnea with chest tightness similar to multiple recent episodes of COPD exacerbation for which he has been seen 8 times in the past 3 weeks at various hospitals. Each time, he gets a nebulizer treatment and feels better and is able to be discharged. He has a nebulizer machine at home but is only using it once or twice daily. He did use it this morning and twice yesterday but states he does not get relief from it like he does from our nebulizer treatments. He also has multiple inhalers, including inhaled steroids. He was last on oral steroids about 3 weeks ago and states that it was helpful. He quit smoking approximately 2 years ago. He is supposed to wear 2 L of supplemental oxygen continuously but arrived to the emergency department without it. He was not hypoxic. Denies inhalation drug use. No recent travel or sick contacts. Denies f/c, lightheadedness/dizziness, diaphoresis, chest pain other than tightness, abdominal pain, n/v/d. 12 system ROS o/w negative. PE: Mild distress, alert, appears anxious;  PERRL, EOMI; MMM, no posterior oropharyngeal exudate, edema or erythema; HRR, no murmur, monitor shows sinus tachycardia at 115 bpm; lungs wheezes throughout though also diminished throughout, increased WOB w/accessory muscle use, POx 94% on 2L (nl), tachypneic; abdomen s/nt/nd, nl BS in all 4 quadrants; (-) LE edema or calf TTP, FROM extremities x4; skin p/w, diaphoretic from work of breathing. MDM/DDx: Chest tightness/wheezes - COPD flare, bronchitis/bronchospasm, less likely but at risk for pneumonia, pneumothorax, ACS/MI or PE. I independently reviewed and interpreted ordered imaging from this encounter. A/P: Will check CXR, treat symptoms with [place] neb, Mag and steroids, reevaluate for disposition. ED Course         Critical Care Time  CriticalCare Time    Date/Time: 7/23/2023 1:30 PM    Performed by: Seymour Ramirez DO  Authorized by:  Seymour Ramirze DO    Critical care provider statement:     Critical care time (minutes):  30    Critical care time was exclusive of:  Separately billable procedures and treating other patients and teaching time    Critical care was necessary to treat or prevent imminent or life-threatening deterioration of the following conditions:  Respiratory failure    Critical care was time spent personally by me on the following activities:  Obtaining history from patient or surrogate, development of treatment plan with patient or surrogate, discussions with consultants, evaluation of patient's response to treatment, examination of patient, ordering and performing treatments and interventions, ordering and review of laboratory studies, ordering and review of radiographic studies, re-evaluation of patient's condition and review of old charts    I assumed direction of critical care for this patient from another provider in my specialty: no

## 2023-07-23 NOTE — H&P
[address] [facility]  H&P  Name: Mira Ash 70 y.o. male I MRN: [id number]  Unit/Bed#: ED 01 I Date of Admission: 7/23/2023   Date of Service: 7/23/2023 I Hospital Day: 0      Assessment/Plan   * Chronic obstructive pulmonary disease with acute exacerbation ([address] [facility])  Assessment & Plan  Pt with Severe COPD with recurrent exacerbations, untreated KLARISSA, hypoxemia and alcoholism, chronically on 2L  presented to [facility] ED for COPD exacerbation. This is his 8th ED visit this month and hospitalization last month for same. [facility] pulmonology restarted back on prednisone 10 mg daily chronically and azithromycin 250 mg MWF one week ago without improvement. Home regimen is Trelegy, duoneb, Roflumilast.  Today he received IV Mag, solumedrol 125 mg IV, and Duoneb treatment in ED. Resume home inhalers. But roflumilast is non formulary  Solumedrol 40 mg IV Q8H  Xopenex/atrovent neb TID  Azithromycin 500 mg x 3 days  Continue supplement oxygen, remains at baseline 2L           Diabetic polyneuropathy associated with type 2 diabetes mellitus ([address] [facility])  Assessment & Plan  Lab Results   Component Value Date    HGBA1C 7.4 (H) 06/27/2023       No results for input(s): "POCGLU" in the last 72 hours. Blood Sugar Average: Last 72 hrs:  home regimen Basaglar 30 units at bedtime and humalog 5 units TIDwmeal.  Start Levemir 20 units at HS and sliding scale  Titrate as necessary    KLARISSA (obstructive sleep apnea)  Assessment & Plan  Untreated KLARISSA    ETOH abuse  Assessment & Plan  Noted he was prescribed naltrexone at [facility], but patient states he is not taking it. Last drink was 4 days ago. He drinks 3-4 beers daily.   Will place on CIWA protocol  Continue folic acid/thiamine    CAD (coronary artery disease)  Assessment & Plan  Continue aspirin, lipitor, lisinopril and lopressor         VTE Prophylaxis: Heparin  / sequential compression device   Code Status: full code       Anticipated Length of Stay:  Patient will be admitted on an Inpatient basis with an anticipated length of stay of  > 2 midnights. Justification for Hospital Stay: COPD exacerbation    Total Time for Visit, including Counseling / Coordination of Care: 60 minutes. Greater than 50% of this total time spent on direct patient counseling and coordination of care. Chief Complaint:   dypsnea    History of Present Illness:    Andrea Mujica is a 70 y.o. male with PMH of severe COPD, chronic hypoxia on 2 L oxygen at baseline, untreated KLARISSA, alcohol abuse, 8 ED visits this month and 1 hospitalization 1 month ago at [facility], today presented to [facility] ED with complaint of shortness of breath. Patient was recently seen by his pulmonologist and started on daily prednisone and chronic azithromycin however symptoms did not improve. In ER patient is not hypoxic, remains at baseline oxygen at 2 L. Denies any chest pain or fever or chills. Patient received IV Solu-Medrol, IV magnesium, and DuoNeb treatment in ED. However patient continues to have symptoms therefore will be admitted under medicine for further treatment. Patient reports he quit smoking about 2 years ago. Patient reports he is still drinking alcohol, last drink was 4 days ago, drinks about 3-4 beers daily on long-term. He was supposed to be on naltrexone however he is not taking it currently. Review of Systems:    Review of Systems   Constitutional: Negative for chills and fever. HENT: Negative for ear pain and sore throat. Eyes: Negative for pain and visual disturbance. Respiratory: Positive for shortness of breath and wheezing. Negative for cough. Cardiovascular: Negative for chest pain and palpitations. Gastrointestinal: Negative for abdominal pain and vomiting. Genitourinary: Negative for dysuria and hematuria. Musculoskeletal: Negative for arthralgias and back pain. Skin: Negative for color change and rash. Neurological: Negative for seizures and syncope.    All other systems reviewed and are negative. Past Medical and Surgical History:     Past Medical History:   Diagnosis Date   • Cardiac arrest [facility])    • COPD (chronic obstructive pulmonary disease) ([address])    • CVA (cerebral vascular accident) ([address])    • Diabetes mellitus ([address])    • Heart attack ([address])    • Hypertension    • Stroke [facility])        Past Surgical History:   Procedure Laterality Date   • CERVICAL FUSION N/A 9/10/2018    Procedure: Posterior cervical decompressive laminectomy C3-6; Posterior cervical lateral mass and pedicle fixation fusion C1-T1;  Surgeon: Talon Ojeda MD;  Location: [facility];  Service: Neurosurgery       Meds/Allergies:    Prior to Admission medications    Medication Sig Start Date End Date Taking? Authorizing Provider   acetaminophen (TYLENOL) 325 mg tablet Take 3tabs up to 3 times daily as needed for pain 9/21/18   Annamarie Mills MD   acetaminophen (TYLENOL) 325 mg tablet Take 3 tablets (975 mg total) by mouth every 8 (eight) hours as needed for mild pain 6/10/22   [facility]   albuterol (Proventil HFA) 90 mcg/act inhaler Inhale 2 puffs every 6 (six) hours as needed for wheezing 6/10/22   Wendy EARL [facility]   albuterol (PROVENTIL HFA,VENTOLIN HFA) 90 mcg/act inhaler Inhale 2 puffs 4 (four) times a day as needed for wheezing or shortness of breath 2/21/20   Starr Miller PA-C   atorvastatin (LIPITOR) 80 mg tablet Take 1 tablet (80 mg total) by mouth daily with dinner 9/22/18   Annamarie Mills MD   B Complex Vitamins (VITAMIN B COMPLEX 100 IJ) Take 1 tablet by mouth daily    Historical Provider, MD   betamethasone valerate (VALISONE) 0.1 % cream Apply topically 2 (two) times a day 10/4/13   Historical Provider, MD   budesonide (PULMICORT) 0.5 mg/2 mL nebulizer solution Take 1 vial (0.5 mg total) by nebulization every 12 (twelve) hours Rinse mouth after use.  4/19/20   Berry Rendon MD   budesonide (PULMICORT) 0.5 mg/2 mL nebulizer solution Take 2 mL (0.5 mg total) by nebulization every 12 (twelve) hours Rinse mouth after use. 6/10/22   Jenna Meneses PA-C   citalopram (CELEXA) 20 mg tablet Take 10 mg by mouth daily     Historical Provider, MD   clopidogrel (PLAVIX) 75 mg tablet Take by mouth    Historical Provider, MD   fluticasone (FLONASE) 50 mcg/act nasal spray 1 spray into each nostril 2 (two) times a day 10/4/13   Historical Provider, MD   fluticasone-salmeterol (ADVAIR) 500-50 mcg/dose inhaler Inhale 1 puff 2 (two) times a day Rinse mouth after use.     Historical Provider, MD   folic acid (FOLVITE) 1 mg tablet Take by mouth daily    Historical Provider, MD   folic acid (FOLVITE) 1 mg tablet Take 1 tablet (1 mg total) by mouth daily 6/11/22   Liz Meneses PA-C   gabapentin (NEURONTIN) 100 mg capsule Take 1 capsule (100 mg total) by mouth 3 (three) times a day 9/21/18   Faye Garcia MD   glipiZIDE (GLUCOTROL) 5 mg tablet Take 0.5 tablets (2.5 mg total) by mouth daily before breakfast  Patient taking differently: Take 5 mg by mouth daily before breakfast  9/22/18   Faye Garcia MD   ipratropium-albuterol (COMBIVENT)  mcg/act inhaler Inhale    Historical Provider, MD   lidocaine (LIDODERM) 5 % Apply 1 patch topically daily as needed for mild pain or moderate pain Remove & Discard patch within 12 hours or as directed by MD 9/21/18   Faye Garcia MD   lisinopril (ZESTRIL) 40 mg tablet Take 40 mg by mouth daily     Historical Provider, MD   LORazepam (Ativan) 0.5 mg tablet Take by mouth every 6 (six) hours as needed for anxiety     Historical Provider, MD   melatonin 3 mg Take 1 tablet (3 mg total) by mouth daily at bedtime as needed (30) 9/21/18   Faye Garcia MD   metFORMIN (GLUCOPHAGE) 1000 MG tablet Take 1 tablet by mouth every 12 (twelve) hours    Historical Provider, MD   methocarbamol (ROBAXIN) 500 mg tablet Take 1 tablet (500 mg total) by mouth every 6 (six) hours as needed for muscle spasms 6/10/22   Liz Meneses PA-C   metoprolol succinate (TOPROL XL) 100 mg 24 hr tablet Take 100 mg by mouth daily     Historical Provider, MD   Multiple Vitamin (MULTIVITAMIN) tablet Take 1 tablet by mouth daily    Historical Provider, MD   oxyCODONE (OXY-IR) 5 MG capsule Take 1 capsule by mouth every 4 (four) hours as needed    Historical Provider, MD   pantoprazole (PROTONIX) 40 mg tablet Take 1 tablet by mouth Twice daily    Historical Provider, MD   QUEtiapine (SEROquel) 25 mg tablet Take 1 tablet by mouth daily at bedtime     Historical Provider, MD   senna (SENOKOT) 8.6 mg Take 2 tablets (17.2 mg total) by mouth daily 6/11/22   Kylie Meneses PA-C   sitaGLIPtin (JANUVIA) 50 mg tablet Take 50 mg by mouth daily     Historical Provider, MD   thiamine 100 MG tablet Take 1 tablet (100 mg total) by mouth daily 9/22/18   Ray Mejia MD   tiotropium [facility]) 18 mcg inhalation capsule Place 18 mcg into inhaler and inhale daily    Historical Provider, MD     I have reviewed home medications using allscripts. Allergies:    Allergies   Allergen Reactions   • Amoxicillin Hives   • Augmentin [Amoxicillin-Pot Clavulanate] Hives       Social History:     Marital Status: Single     Substance Use History:   Social History     Substance and Sexual Activity   Alcohol Use Not Currently   • Alcohol/week: 8.0 - 12.0 standard drinks of alcohol   • Types: 8 - 12 Cans of beer per week    Comment: every day drinker     Social History     Tobacco Use   Smoking Status Former   • Types: Cigarettes   Smokeless Tobacco Never   Tobacco Comments    quit 5 months ago      Social History     Substance and Sexual Activity   Drug Use Never       Family History:    Family History   Problem Relation Age of Onset   • Heart attack Mother        Physical Exam:     Vitals:   Blood Pressure: 116/74 (07/23/23 1600)  Pulse: 96 (07/23/23 1600)  Temperature: 98.4 °F (36.9 °C) (07/23/23 1306)  Respirations: 20 (07/23/23 1600)  Weight - Scale: 99.8 kg (220 lb) (07/23/23 1306)  SpO2: 96 % (07/23/23 1600)    Physical Exam  Vitals and nursing note reviewed. Constitutional:       General: He is not in acute distress. Appearance: He is well-developed. He is ill-appearing. HENT:      Head: Normocephalic and atraumatic. Eyes:      Conjunctiva/sclera: Conjunctivae normal.   Cardiovascular:      Rate and Rhythm: Regular rhythm. Tachycardia present. Heart sounds: No murmur heard. Pulmonary:      Effort: Pulmonary effort is normal. No respiratory distress. Breath sounds: Normal breath sounds. Comments: Mild wheezing noted bilaterally however diminished breath sounds. Patient completed DuoNeb treatment already  Abdominal:      Palpations: Abdomen is soft. Tenderness: There is no abdominal tenderness. Musculoskeletal:         General: No swelling. Cervical back: Neck supple. Skin:     General: Skin is warm and dry. Capillary Refill: Capillary refill takes less than 2 seconds. Neurological:      Mental Status: He is alert. Psychiatric:         Mood and Affect: Mood normal.         Additional Data:     Lab Results: I have personally reviewed pertinent reports. Results from last 7 days   Lab Units 07/23/23  1334   WBC Thousand/uL 11.76*   HEMOGLOBIN g/dL 12.4   HEMATOCRIT % 37.6   PLATELETS Thousands/uL 329   NEUTROS PCT % 75   LYMPHS PCT % 14   MONOS PCT % 6   EOS PCT % 4     Results from last 7 days   Lab Units 07/23/23  1334   SODIUM mmol/L 140   POTASSIUM mmol/L 4.6   CHLORIDE mmol/L 106   CO2 mmol/L 27   BUN mg/dL 14   CREATININE mg/dL 0.70   ANION GAP mmol/L 7   CALCIUM mg/dL 8.9   ALBUMIN g/dL 3.3*   TOTAL BILIRUBIN mg/dL 0.40   ALK PHOS U/L 69   ALT U/L 28   AST U/L 18   GLUCOSE RANDOM mg/dL 218*                       Imaging: I have personally reviewed pertinent reports.       XR chest 1 view portable   ED Interpretation by Pablo White DO (07/23 1427)   Chronic interstitial changes, no acute cardiopulmonary pathology            ** Please Note: This note has been constructed using a voice recognition system.  **

## 2023-07-23 NOTE — ED PROVIDER NOTES
History  Chief Complaint   Patient presents with   • Shortness of Breath     Pt has c/o SOB for 45 mins. Pt came in w/o his home oxygen, pt states he normally wears 2 L     HPI  Aida Tyler is a 70 y.o. male with PMH COPD on 2L NC chronically who presents to the emergency department with SOB. He reports having shortness of breath for the past month, this is his 8th ER visit in July. He reports being compliant with his inhalers and trying a nebulizer at home without improvement. He denies fevers or cough. He has mild chest pain that has been constant for the past month without acute changes or worsening. Prior to Admission Medications   Prescriptions Last Dose Informant Patient Reported? Taking? B Complex Vitamins (VITAMIN B COMPLEX 100 IJ)   Yes No   Sig: Take 1 tablet by mouth daily   LORazepam (Ativan) 0.5 mg tablet   Yes No   Sig: Take by mouth every 6 (six) hours as needed for anxiety    Multiple Vitamin (MULTIVITAMIN) tablet  Self Yes No   Sig: Take 1 tablet by mouth daily   acetaminophen (TYLENOL) 325 mg tablet   No No   Sig: Take 3tabs up to 3 times daily as needed for pain   acetaminophen (TYLENOL) 325 mg tablet   No No   Sig: Take 3 tablets (975 mg total) by mouth every 8 (eight) hours as needed for mild pain   albuterol (PROVENTIL HFA,VENTOLIN HFA) 90 mcg/act inhaler   No No   Sig: Inhale 2 puffs 4 (four) times a day as needed for wheezing or shortness of breath   albuterol (Proventil HFA) 90 mcg/act inhaler   No No   Sig: Inhale 2 puffs every 6 (six) hours as needed for wheezing   atorvastatin (LIPITOR) 80 mg tablet   No No   Sig: Take 1 tablet (80 mg total) by mouth daily with dinner   betamethasone valerate (VALISONE) 0.1 % cream   Yes No   Sig: Apply topically 2 (two) times a day   budesonide (PULMICORT) 0.5 mg/2 mL nebulizer solution   No No   Sig: Take 1 vial (0.5 mg total) by nebulization every 12 (twelve) hours Rinse mouth after use.    budesonide (PULMICORT) 0.5 mg/2 mL nebulizer solution   No No   Sig: Take 2 mL (0.5 mg total) by nebulization every 12 (twelve) hours Rinse mouth after use. fluticasone (FLONASE) 50 mcg/act nasal spray   Yes No   Si spray into each nostril 2 (two) times a day   fluticasone-salmeterol (ADVAIR) 500-50 mcg/dose inhaler   Yes No   Sig: Inhale 1 puff 2 (two) times a day Rinse mouth after use.    folic acid (FOLVITE) 1 mg tablet   Yes No   Sig: Take by mouth daily   folic acid (FOLVITE) 1 mg tablet   No No   Sig: Take 1 tablet (1 mg total) by mouth daily   glipiZIDE (GLUCOTROL) 5 mg tablet   No No   Sig: Take 0.5 tablets (2.5 mg total) by mouth daily before breakfast   Patient taking differently: Take 5 mg by mouth daily before breakfast    ipratropium-albuterol (COMBIVENT)  mcg/act inhaler   Yes No   Sig: Inhale   lidocaine (LIDODERM) 5 %   No No   Sig: Apply 1 patch topically daily as needed for mild pain or moderate pain Remove & Discard patch within 12 hours or as directed by MD   lisinopril (ZESTRIL) 40 mg tablet   Yes No   Sig: Take 40 mg by mouth daily    melatonin 3 mg   No No   Sig: Take 1 tablet (3 mg total) by mouth daily at bedtime as needed (30)   metFORMIN (GLUCOPHAGE) 1000 MG tablet   Yes No   Sig: Take 1 tablet by mouth every 12 (twelve) hours   methocarbamol (ROBAXIN) 500 mg tablet   No No   Sig: Take 1 tablet (500 mg total) by mouth every 6 (six) hours as needed for muscle spasms   oxyCODONE (OXY-IR) 5 MG capsule   Yes No   Sig: Take 1 capsule by mouth every 4 (four) hours as needed   pantoprazole (PROTONIX) 40 mg tablet   Yes No   Sig: Take 1 tablet by mouth Twice daily   senna (SENOKOT) 8.6 mg   No No   Sig: Take 2 tablets (17.2 mg total) by mouth daily   sitaGLIPtin (JANUVIA) 50 mg tablet   Yes No   Sig: Take 50 mg by mouth daily    thiamine 100 MG tablet   No No   Sig: Take 1 tablet (100 mg total) by mouth daily   tiotropium (SPIRIVA) 18 mcg inhalation capsule  Pharmacy (Specify) Yes No   Sig: Place 18 mcg into inhaler and inhale daily      Facility-Administered Medications: None       Past Medical History:   Diagnosis Date   • Cardiac arrest ([address])    • COPD (chronic obstructive pulmonary disease) (HCC)    • CVA (cerebral vascular accident) ([address])    • Diabetes mellitus ([address])    • Heart attack ([address])    • Hypertension    • Stroke [facility])        Past Surgical History:   Procedure Laterality Date   • CERVICAL FUSION N/A 9/10/2018    Procedure: Posterior cervical decompressive laminectomy C3-6; Posterior cervical lateral mass and pedicle fixation fusion C1-T1;  Surgeon: Jamel Tatum MD;  Location: [facility];  Service: Neurosurgery       Family History   Problem Relation Age of Onset   • Heart attack Mother      I have reviewed and agree with the history as documented. E-Cigarette/Vaping   • E-Cigarette Use Never User      E-Cigarette/Vaping Substances   • Nicotine No    • THC No    • CBD No    • Flavoring No    • Other No    • Unknown No      Social History     Tobacco Use   • Smoking status: Former     Types: Cigarettes   • Smokeless tobacco: Never   • Tobacco comments:     quit 5 months ago    Vaping Use   • Vaping Use: Never used   Substance Use Topics   • Alcohol use: Not Currently     Alcohol/week: 8.0 - 12.0 standard drinks of alcohol     Types: 8 - 12 Cans of beer per week     Comment: every day drinker   • Drug use: Never       Home medications:  Prior to Admission Medications   Prescriptions Last Dose Informant Patient Reported? Taking?    B Complex Vitamins (VITAMIN B COMPLEX 100 IJ)   Yes No   Sig: Take 1 tablet by mouth daily   LORazepam (Ativan) 0.5 mg tablet   Yes No   Sig: Take by mouth every 6 (six) hours as needed for anxiety    Multiple Vitamin (MULTIVITAMIN) tablet  Self Yes No   Sig: Take 1 tablet by mouth daily   acetaminophen (TYLENOL) 325 mg tablet   No No   Sig: Take 3tabs up to 3 times daily as needed for pain   acetaminophen (TYLENOL) 325 mg tablet   No No   Sig: Take 3 tablets (975 mg total) by mouth every 8 (eight) hours as needed for mild pain   albuterol (PROVENTIL HFA,VENTOLIN HFA) 90 mcg/act inhaler   No No   Sig: Inhale 2 puffs 4 (four) times a day as needed for wheezing or shortness of breath   albuterol (Proventil HFA) 90 mcg/act inhaler   No No   Sig: Inhale 2 puffs every 6 (six) hours as needed for wheezing   atorvastatin (LIPITOR) 80 mg tablet   No No   Sig: Take 1 tablet (80 mg total) by mouth daily with dinner   betamethasone valerate (VALISONE) 0.1 % cream   Yes No   Sig: Apply topically 2 (two) times a day   budesonide (PULMICORT) 0.5 mg/2 mL nebulizer solution   No No   Sig: Take 1 vial (0.5 mg total) by nebulization every 12 (twelve) hours Rinse mouth after use. budesonide (PULMICORT) 0.5 mg/2 mL nebulizer solution   No No   Sig: Take 2 mL (0.5 mg total) by nebulization every 12 (twelve) hours Rinse mouth after use. fluticasone (FLONASE) 50 mcg/act nasal spray   Yes No   Si spray into each nostril 2 (two) times a day   fluticasone-salmeterol (ADVAIR) 500-50 mcg/dose inhaler   Yes No   Sig: Inhale 1 puff 2 (two) times a day Rinse mouth after use.    folic acid (FOLVITE) 1 mg tablet   Yes No   Sig: Take by mouth daily   folic acid (FOLVITE) 1 mg tablet   No No   Sig: Take 1 tablet (1 mg total) by mouth daily   glipiZIDE (GLUCOTROL) 5 mg tablet   No No   Sig: Take 0.5 tablets (2.5 mg total) by mouth daily before breakfast   Patient taking differently: Take 5 mg by mouth daily before breakfast    ipratropium-albuterol (COMBIVENT)  mcg/act inhaler   Yes No   Sig: Inhale   lidocaine (LIDODERM) 5 %   No No   Sig: Apply 1 patch topically daily as needed for mild pain or moderate pain Remove & Discard patch within 12 hours or as directed by MD   lisinopril (ZESTRIL) 40 mg tablet   Yes No   Sig: Take 40 mg by mouth daily    melatonin 3 mg   No No   Sig: Take 1 tablet (3 mg total) by mouth daily at bedtime as needed (30)   metFORMIN (GLUCOPHAGE) 1000 MG tablet   Yes No   Sig: Take 1 tablet by mouth every 12 (twelve) hours   methocarbamol (ROBAXIN) 500 mg tablet   No No   Sig: Take 1 tablet (500 mg total) by mouth every 6 (six) hours as needed for muscle spasms   oxyCODONE (OXY-IR) 5 MG capsule   Yes No   Sig: Take 1 capsule by mouth every 4 (four) hours as needed   pantoprazole (PROTONIX) 40 mg tablet   Yes No   Sig: Take 1 tablet by mouth Twice daily   senna (SENOKOT) 8.6 mg   No No   Sig: Take 2 tablets (17.2 mg total) by mouth daily   sitaGLIPtin (JANUVIA) 50 mg tablet   Yes No   Sig: Take 50 mg by mouth daily    thiamine 100 MG tablet   No No   Sig: Take 1 tablet (100 mg total) by mouth daily   tiotropium (SPIRIVA) 18 mcg inhalation capsule  Pharmacy (Specify) Yes No   Sig: Place 18 mcg into inhaler and inhale daily      Facility-Administered Medications: None     Allergies: Allergies   Allergen Reactions   • Amoxicillin Hives   • Augmentin [Amoxicillin-Pot Clavulanate] Hives        Review of Systems   Constitutional: Negative for fever. Respiratory: Positive for shortness of breath and wheezing. Negative for cough. Cardiovascular: Positive for chest pain. Negative for leg swelling. Gastrointestinal: Negative for abdominal pain, nausea and vomiting. All other systems reviewed and are negative. Physical Exam  ED Triage Vitals [07/23/23 1306]   Temperature Pulse Respirations Blood Pressure SpO2   98.4 °F (36.9 °C) (!) 115 (!) 24 131/76 94 %      Temp src Heart Rate Source Patient Position - Orthostatic VS BP Location FiO2 (%)   -- Monitor Sitting Right arm --      Pain Score       No Pain             Orthostatic Vital Signs  Vitals:    07/23/23 1306 07/23/23 1600   BP: 131/76 116/74   Pulse: (!) 115 96   Patient Position - Orthostatic VS: Sitting Lying       Physical Exam  Vitals and nursing note reviewed. Constitutional:       General: He is in acute distress. Appearance: He is ill-appearing. HENT:      Head: Normocephalic.       Mouth/Throat:      Mouth: Mucous membranes are moist. Eyes:      Pupils: Pupils are equal, round, and reactive to light. Cardiovascular:      Rate and Rhythm: Regular rhythm. Tachycardia present. Pulmonary:      Effort: Respiratory distress present. Breath sounds: Wheezing present. No rhonchi or rales. Comments: Pursed lip breathing, poor air movement bilaterally  Abdominal:      General: Abdomen is flat. There is no distension. Palpations: Abdomen is soft. Tenderness: There is no abdominal tenderness. There is no guarding or rebound. Musculoskeletal:      Right lower leg: No edema. Left lower leg: No edema. Skin:     General: Skin is warm and dry. Neurological:      Mental Status: He is alert.          ED Medications  Medications   atorvastatin (LIPITOR) tablet 40 mg (has no administration in time range)   fluticasone-vilanterol 200-25 mcg/actuation 1 puff (has no administration in time range)   folic acid (FOLVITE) tablet 1 mg (has no administration in time range)   lisinopril (ZESTRIL) tablet 10 mg (has no administration in time range)   LORazepam (ATIVAN) tablet 0.5 mg (has no administration in time range)   melatonin tablet 3 mg (has no administration in time range)   pantoprazole (PROTONIX) EC tablet 40 mg (has no administration in time range)   thiamine tablet 100 mg (has no administration in time range)   acetaminophen (TYLENOL) tablet 650 mg (has no administration in time range)   docusate sodium (COLACE) capsule 100 mg (has no administration in time range)   ondansetron (ZOFRAN) injection 4 mg (has no administration in time range)   heparin (porcine) subcutaneous injection 5,000 Units (has no administration in time range)   guaiFENesin (MUCINEX) 12 hr tablet 600 mg (has no administration in time range)   levalbuterol (XOPENEX) inhalation solution 1.25 mg (has no administration in time range)     And   sodium chloride 0.9 % inhalation solution 3 mL (has no administration in time range)   ipratropium (ATROVENT) 0.02 % inhalation solution 0.5 mg (has no administration in time range)   methylPREDNISolone sodium succinate (Solu-MEDROL) injection 40 mg (has no administration in time range)   azithromycin (ZITHROMAX) tablet 500 mg (has no administration in time range)   insulin lispro (HumaLOG) 100 units/mL subcutaneous injection 1-6 Units (has no administration in time range)   aspirin (ECOTRIN LOW STRENGTH) EC tablet 81 mg (has no administration in time range)   busPIRone (BUSPAR) tablet 5 mg (has no administration in time range)   insulin detemir (LEVEMIR) subcutaneous injection 20 Units (has no administration in time range)   metoprolol tartrate (LOPRESSOR) partial tablet 12.5 mg (has no administration in time range)   NON FORMULARY (has no administration in time range)   albuterol inhalation solution 10 mg (10 mg Nebulization Given 7/23/23 1328)     And   ipratropium (ATROVENT) 0.02 % inhalation solution 1 mg (1 mg Nebulization Given 7/23/23 1329)     And   sodium chloride 0.9 % inhalation solution 3 mL (3 mL Nebulization Given 7/23/23 1328)   methylPREDNISolone sodium succinate (Solu-MEDROL) injection 125 mg (125 mg Intravenous Given 7/23/23 1328)   magnesium sulfate 2 g/50 mL IVPB (premix) 2 g (0 g Intravenous Stopped 7/23/23 1429)       Diagnostic Studies  Results Reviewed     Procedure Component Value Units Date/Time    Procalcitonin [[id number]]     Lab Status: No result Specimen: Blood     Platelet count [[id number]]     Lab Status: No result Specimen: Blood     HS Troponin I 4hr [[id number]]     Lab Status: No result Specimen: Blood     HS Troponin 0hr (reflex protocol) [[id number]]  (Normal) Collected: 07/23/23 1334    Lab Status: Final result Specimen: Blood from Arm, Right Updated: 07/23/23 1414     hs TnI 0hr 14 ng/L     HS Troponin I 2hr [[id number]]     Lab Status: No result Specimen: Blood     Comprehensive metabolic panel [[id number]]  (Abnormal) Collected: 07/23/23 1334    Lab Status: Final result Specimen: Blood from Arm, Right Updated: 07/23/23 1402     Sodium 140 mmol/L      Potassium 4.6 mmol/L      Chloride 106 mmol/L      CO2 27 mmol/L      ANION GAP 7 mmol/L      BUN 14 mg/dL      Creatinine 0.70 mg/dL      Glucose 218 mg/dL      Calcium 8.9 mg/dL      Corrected Calcium 9.5 mg/dL      AST 18 U/L      ALT 28 U/L      Alkaline Phosphatase 69 U/L      Total Protein 6.8 g/dL      Albumin 3.3 g/dL      Total Bilirubin 0.40 mg/dL      eGFR 96 ml/min/1.73sq m     Narrative:      National Kid[employer] Disease Foundation guidelines for Chronic Kidney Disease (CKD):   •  Stage 1 with normal or high GFR (GFR > 90 mL/min/1.73 square meters)  •  Stage 2 Mild CKD (GFR = 60-89 mL/min/1.73 square meters)  •  Stage 3A Moderate CKD (GFR = 45-59 mL/min/1.73 square meters)  •  Stage 3B Moderate CKD (GFR = 30-44 mL/min/1.73 square meters)  •  Stage 4 Severe CKD (GFR = 15-29 mL/min/1.73 square meters)  •  Stage 5 End Stage CKD (GFR <15 mL/min/1.73 square meters)  Note: GFR calculation is accurate only with a steady state creatinine    CBC and differential [[id number]]  (Abnormal) Collected: 07/23/23 1334    Lab Status: Final result Specimen: Blood from Arm, Right Updated: 07/23/23 1344     WBC 11.76 Thousand/uL      RBC 4.28 Million/uL      Hemoglobin 12.4 g/dL      Hematocrit 37.6 %      MCV 88 fL      MCH 29.0 pg      MCHC 33.0 g/dL      RDW 14.7 %      MPV 10.0 fL      Platelets 235 Thousands/uL      nRBC 0 /100 WBCs      Neutrophils Relative 75 %      Immat GRANS % 1 %      Lymphocytes Relative 14 %      Monocytes Relative 6 %      Eosinophils Relative 4 %      Basophils Relative 0 %      Neutrophils Absolute 8.80 Thousands/µL      Immature Grans Absolute 0.08 Thousand/uL      Lymphocytes Absolute 1.66 Thousands/µL      Monocytes Absolute 0.71 Thousand/µL      Eosinophils Absolute 0.47 Thousand/µL      Basophils Absolute 0.04 Thousands/µL                  XR chest 1 view portable   ED Interpretation by Rafael Osborn DO (07/23 8989)   Chronic interstitial changes, no acute cardiopulmonary pathology            Procedures  Procedures      ED Course               Identification of Seniors at Risk    Flowsheet Row Most Recent Value   (ISAR) Identification of Seniors at Risk    Before the illness or injury that brought you to the Emergency, did you need someone to help you on a regular basis? 0 Filed at: 07/23/2023 1309   In the last 24 hours, have you needed more help than usual? 0 Filed at: 07/23/2023 1309   Have you been hospitalized for one or more nights during the past 6 months? 0 Filed at: 07/23/2023 1309   In general, do you see well? 0 Filed at: 07/23/2023 1309   In general, do you have serious problems with your memory? 0 Filed at: 07/23/2023 1309   Do you take more than three different medications every day? 1 Filed at: 07/23/2023 1309   ISAR Score 1 Filed at: 07/23/2023 1309                    SBIRT 20yo+    Flowsheet Row Most Recent Value   Initial Alcohol Screen: US AUDIT-C     1. How often do you have a drink containing alcohol? 0 Filed at: 07/23/2023 1309   2. How many drinks containing alcohol do you have on a typical day you are drinking? 0 Filed at: 07/23/2023 1309   3a. Male UNDER 65: How often do you have five or more drinks on one occasion? 0 Filed at: 07/23/2023 1309   3b. FEMALE Any Age, or MALE 65+: How often do you have 4 or more drinks on one occassion? 0 Filed at: 07/23/2023 1309   Audit-C Score 0 Filed at: 07/23/2023 1309   MICHAEL: How many times in the past year have you. .. Used an illegal drug or used a prescription medication for non-medical reasons?  Never Filed at: 07/23/2023 1309          Wells' Criteria for PE    Flowsheet Row Most Recent Value   Wells' Criteria for PE    Clinical signs and symptoms of DVT 0 Filed at: 07/23/2023 1300   PE is primary diagnosis or equally likely 0 Filed at: 07/23/2023 1300   HR >100 1.5 Filed at: 07/23/2023 1300   Immobilization at least 3 days or Surgery in the previous 4 weeks 0 Filed at: 07/23/2023 1300   Previous, objectively diagnosed PE or DVT 0 Filed at: 07/23/2023 1300   Hemoptysis 0 Filed at: 07/23/2023 1300   Malignancy with treatment within 6 months or palliative 0 Filed at: 07/23/2023 1300   Wells' Criteria Total 1.5 Filed at: 07/23/2023 1300            [facility]  MDM  Jorge Vasquez is a 70 y.o. male with PMH COPD on 2L NC who presents to the emergency department with SOB. Workup including vital signs, physical exam, EKG, CXR and labs. EKG without acute ischemic changes and CXR without acute cardiopulmonary abnormalities. Most likely diagnosis COPD exacerbation. Treatment including Solu-Medrol, magnesium, and ambrose neb with significant improvement in symptoms. Given initial severity of symptoms and failure of outpatient management with frequent ER visits, patient would likely benefit from inpatient admission. Plan for admission to Medicine. Disposition  Final diagnoses:   COPD with acute exacerbation ([address])     Time reflects when diagnosis was documented in both MDM as applicable and the Disposition within this note     Time User Action Codes Description Comment    7/23/2023  3:52 PM Elvira Dougherty Add [J44.1] COPD with acute exacerbation [facility])       ED Disposition     ED Disposition   Admit    Condition   Stable    Date/Time   Sun Jul 23, 2023  3:52 PM    Comment   Case was discussed with internal medicine and the patient's admission status was agreed to be Admission Status: inpatient status to the service of Dr. Espinoza Holman . Follow-up Information    None         Patient's Medications   Discharge Prescriptions    No medications on file       No discharge procedures on file. PDMP Review     None           ED Provider  Attending physically available and evaluated Jorge Vasquez. I managed the patient along with the ED Attending. Electronically Signed by    Portions of the record may have been created with voice recognition software.   Occasional wrong word or "sound a like" substitutions may have occurred due to the inherent limitations of voice recognition software.   Read the chart carefully and recognize, using context, where substitutions have occurred     Eyal Mccray MD  07/23/23 2966

## 2023-07-23 NOTE — ASSESSMENT & PLAN NOTE
Pt with Severe COPD with recurrent exacerbations, untreated KLARISSA, hypoxemia and alcoholism, chronically on 2L  presented to [facility] ED for COPD exacerbation. This is his 8th ED visit this month and hospitalization last month for same. [facility] pulmonology restarted back on prednisone 10 mg daily chronically and azithromycin 250 mg MWF one week ago without improvement. Home regimen is Trelegy, duoneb, Roflumilast.  Today he received IV Mag, solumedrol 125 mg IV, and Duoneb treatment in ED. Resume home inhalers.  But roflumilast is non formulary  Solumedrol 40 mg IV Q8H  Xopenex/atrovent neb TID  Azithromycin 500 mg x 3 days  Continue supplement oxygen, remains at baseline 2L

## 2023-07-24 PROBLEM — J96.10 CHRONIC RESPIRATORY FAILURE (HCC): Status: ACTIVE | Noted: 2023-07-24

## 2023-07-24 PROBLEM — Z87.81 H/O CERVICAL FRACTURE: Status: ACTIVE | Noted: 2023-07-24

## 2023-07-24 LAB
ANION GAP SERPL CALCULATED.3IONS-SCNC: 7 MMOL/L
BUN SERPL-MCNC: 23 MG/DL (ref 5–25)
CALCIUM SERPL-MCNC: 8.5 MG/DL (ref 8.3–10.1)
CHLORIDE SERPL-SCNC: 102 MMOL/L (ref 96–108)
CO2 SERPL-SCNC: 23 MMOL/L (ref 21–32)
CREAT SERPL-MCNC: 0.54 MG/DL (ref 0.6–1.3)
ERYTHROCYTE [DISTWIDTH] IN BLOOD BY AUTOMATED COUNT: 14.5 % (ref 11.6–15.1)
GFR SERPL CREATININE-BSD FRML MDRD: 107 ML/MIN/1.73SQ M
GLUCOSE SERPL-MCNC: 187 MG/DL (ref 65–140)
GLUCOSE SERPL-MCNC: 239 MG/DL (ref 65–140)
GLUCOSE SERPL-MCNC: 249 MG/DL (ref 65–140)
GLUCOSE SERPL-MCNC: 280 MG/DL (ref 65–140)
GLUCOSE SERPL-MCNC: 326 MG/DL (ref 65–140)
GLUCOSE SERPL-MCNC: >500 MG/DL (ref 65–140)
HCT VFR BLD AUTO: 33.3 % (ref 36.5–49.3)
HGB BLD-MCNC: 10.9 G/DL (ref 12–17)
MCH RBC QN AUTO: 28.5 PG (ref 26.8–34.3)
MCHC RBC AUTO-ENTMCNC: 32.7 G/DL (ref 31.4–37.4)
MCV RBC AUTO: 87 FL (ref 82–98)
PLATELET # BLD AUTO: 308 THOUSANDS/UL (ref 149–390)
PMV BLD AUTO: 10.7 FL (ref 8.9–12.7)
POTASSIUM SERPL-SCNC: 4.5 MMOL/L (ref 3.5–5.3)
PROCALCITONIN SERPL-MCNC: 0.1 NG/ML
RBC # BLD AUTO: 3.83 MILLION/UL (ref 3.88–5.62)
SODIUM SERPL-SCNC: 132 MMOL/L (ref 135–147)
WBC # BLD AUTO: 7.23 THOUSAND/UL (ref 4.31–10.16)

## 2023-07-24 PROCEDURE — 85027 COMPLETE CBC AUTOMATED: CPT | Performed by: FAMILY MEDICINE

## 2023-07-24 PROCEDURE — 82948 REAGENT STRIP/BLOOD GLUCOSE: CPT

## 2023-07-24 PROCEDURE — 94664 DEMO&/EVAL PT USE INHALER: CPT

## 2023-07-24 PROCEDURE — 99232 SBSQ HOSP IP/OBS MODERATE 35: CPT | Performed by: NURSE PRACTITIONER

## 2023-07-24 PROCEDURE — 94760 N-INVAS EAR/PLS OXIMETRY 1: CPT

## 2023-07-24 PROCEDURE — 80048 BASIC METABOLIC PNL TOTAL CA: CPT | Performed by: FAMILY MEDICINE

## 2023-07-24 PROCEDURE — 84145 PROCALCITONIN (PCT): CPT | Performed by: FAMILY MEDICINE

## 2023-07-24 PROCEDURE — 94640 AIRWAY INHALATION TREATMENT: CPT

## 2023-07-24 RX ORDER — INSULIN LISPRO 100 [IU]/ML
7 INJECTION, SOLUTION INTRAVENOUS; SUBCUTANEOUS
Status: DISCONTINUED | OUTPATIENT
Start: 2023-07-24 | End: 2023-07-25 | Stop reason: HOSPADM

## 2023-07-24 RX ORDER — INSULIN LISPRO 100 [IU]/ML
10 INJECTION, SOLUTION INTRAVENOUS; SUBCUTANEOUS
Status: DISCONTINUED | OUTPATIENT
Start: 2023-07-24 | End: 2023-07-24

## 2023-07-24 RX ORDER — CALCIUM CARBONATE 500 MG/1
500 TABLET, CHEWABLE ORAL DAILY PRN
Status: DISCONTINUED | OUTPATIENT
Start: 2023-07-24 | End: 2023-07-24

## 2023-07-24 RX ORDER — INSULIN LISPRO 100 [IU]/ML
5 INJECTION, SOLUTION INTRAVENOUS; SUBCUTANEOUS
Status: DISCONTINUED | OUTPATIENT
Start: 2023-07-24 | End: 2023-07-24

## 2023-07-24 RX ORDER — CALCIUM CARBONATE 500 MG/1
1000 TABLET, CHEWABLE ORAL 3 TIMES DAILY PRN
Status: DISCONTINUED | OUTPATIENT
Start: 2023-07-24 | End: 2023-07-25 | Stop reason: HOSPADM

## 2023-07-24 RX ADMIN — IPRATROPIUM BROMIDE 0.5 MG: 0.5 SOLUTION RESPIRATORY (INHALATION) at 07:14

## 2023-07-24 RX ADMIN — METHYLPREDNISOLONE SODIUM SUCCINATE 40 MG: 40 INJECTION, POWDER, FOR SOLUTION INTRAMUSCULAR; INTRAVENOUS at 17:33

## 2023-07-24 RX ADMIN — FOLIC ACID 1 MG: 1 TABLET ORAL at 08:07

## 2023-07-24 RX ADMIN — DOCUSATE SODIUM 100 MG: 100 CAPSULE ORAL at 08:08

## 2023-07-24 RX ADMIN — BUSPIRONE HYDROCHLORIDE 5 MG: 5 TABLET ORAL at 17:33

## 2023-07-24 RX ADMIN — ALBUTEROL SULFATE 2.5 MG: 2.5 SOLUTION RESPIRATORY (INHALATION) at 00:22

## 2023-07-24 RX ADMIN — LEVALBUTEROL HYDROCHLORIDE 1.25 MG: 1.25 SOLUTION RESPIRATORY (INHALATION) at 07:14

## 2023-07-24 RX ADMIN — AZITHROMYCIN DIHYDRATE 500 MG: 250 TABLET, FILM COATED ORAL at 17:33

## 2023-07-24 RX ADMIN — PANTOPRAZOLE SODIUM 40 MG: 40 TABLET, DELAYED RELEASE ORAL at 06:49

## 2023-07-24 RX ADMIN — Medication 12.5 MG: at 08:07

## 2023-07-24 RX ADMIN — GUAIFENESIN 600 MG: 600 TABLET, EXTENDED RELEASE ORAL at 08:07

## 2023-07-24 RX ADMIN — LEVALBUTEROL HYDROCHLORIDE 1.25 MG: 1.25 SOLUTION RESPIRATORY (INHALATION) at 14:25

## 2023-07-24 RX ADMIN — IPRATROPIUM BROMIDE 0.5 MG: 0.5 SOLUTION RESPIRATORY (INHALATION) at 19:45

## 2023-07-24 RX ADMIN — INSULIN LISPRO 10 UNITS: 100 INJECTION, SOLUTION INTRAVENOUS; SUBCUTANEOUS at 12:14

## 2023-07-24 RX ADMIN — IPRATROPIUM BROMIDE 0.5 MG: 0.5 SOLUTION RESPIRATORY (INHALATION) at 14:24

## 2023-07-24 RX ADMIN — ONDANSETRON 4 MG: 2 INJECTION INTRAMUSCULAR; INTRAVENOUS at 10:12

## 2023-07-24 RX ADMIN — ALBUTEROL SULFATE 2 PUFF: 90 AEROSOL, METERED RESPIRATORY (INHALATION) at 04:21

## 2023-07-24 RX ADMIN — INSULIN DETEMIR 30 UNITS: 100 INJECTION, SOLUTION SUBCUTANEOUS at 22:51

## 2023-07-24 RX ADMIN — Medication 12.5 MG: at 21:55

## 2023-07-24 RX ADMIN — LORAZEPAM 0.5 MG: 0.5 TABLET ORAL at 21:55

## 2023-07-24 RX ADMIN — GUAIFENESIN 600 MG: 600 TABLET, EXTENDED RELEASE ORAL at 17:33

## 2023-07-24 RX ADMIN — INSULIN LISPRO 4 UNITS: 100 INJECTION, SOLUTION INTRAVENOUS; SUBCUTANEOUS at 08:07

## 2023-07-24 RX ADMIN — INSULIN LISPRO 1 UNITS: 100 INJECTION, SOLUTION INTRAVENOUS; SUBCUTANEOUS at 17:40

## 2023-07-24 RX ADMIN — METHYLPREDNISOLONE SODIUM SUCCINATE 40 MG: 40 INJECTION, POWDER, FOR SOLUTION INTRAMUSCULAR; INTRAVENOUS at 08:08

## 2023-07-24 RX ADMIN — INSULIN LISPRO 7 UNITS: 100 INJECTION, SOLUTION INTRAVENOUS; SUBCUTANEOUS at 17:40

## 2023-07-24 RX ADMIN — LISINOPRIL 10 MG: 10 TABLET ORAL at 08:07

## 2023-07-24 RX ADMIN — ASPIRIN 81 MG: 81 TABLET, COATED ORAL at 08:06

## 2023-07-24 RX ADMIN — INSULIN LISPRO 6 UNITS: 100 INJECTION, SOLUTION INTRAVENOUS; SUBCUTANEOUS at 12:15

## 2023-07-24 RX ADMIN — THIAMINE HCL TAB 100 MG 100 MG: 100 TAB at 08:07

## 2023-07-24 RX ADMIN — HEPARIN SODIUM 5000 UNITS: 5000 INJECTION INTRAVENOUS; SUBCUTANEOUS at 13:35

## 2023-07-24 RX ADMIN — ALBUTEROL SULFATE 2 PUFF: 90 AEROSOL, METERED RESPIRATORY (INHALATION) at 19:12

## 2023-07-24 RX ADMIN — LEVALBUTEROL HYDROCHLORIDE 1.25 MG: 1.25 SOLUTION RESPIRATORY (INHALATION) at 19:45

## 2023-07-24 RX ADMIN — ATORVASTATIN CALCIUM 40 MG: 40 TABLET, FILM COATED ORAL at 17:33

## 2023-07-24 RX ADMIN — HEPARIN SODIUM 5000 UNITS: 5000 INJECTION INTRAVENOUS; SUBCUTANEOUS at 06:49

## 2023-07-24 RX ADMIN — METHYLPREDNISOLONE SODIUM SUCCINATE 40 MG: 40 INJECTION, POWDER, FOR SOLUTION INTRAMUSCULAR; INTRAVENOUS at 01:04

## 2023-07-24 RX ADMIN — FLUTICASONE FUROATE AND VILANTEROL TRIFENATATE 1 PUFF: 200; 25 POWDER RESPIRATORY (INHALATION) at 08:06

## 2023-07-24 RX ADMIN — BUSPIRONE HYDROCHLORIDE 5 MG: 5 TABLET ORAL at 08:07

## 2023-07-24 RX ADMIN — HEPARIN SODIUM 5000 UNITS: 5000 INJECTION INTRAVENOUS; SUBCUTANEOUS at 21:55

## 2023-07-24 NOTE — CASE MANAGEMENT
Case Management Assessment & Discharge Planning Note    Patient name Cassandra Villa  Location [facility] 907/[facility] 473-84 MRN [id number]  : 1955 Date 2023       Current Admission Date: 2023  Current Admission Diagnosis:Chronic obstructive pulmonary disease with acute exacerbation [facility])   Patient Active Problem List    Diagnosis Date Noted   • Stage 3 severe COPD by GOLD classification ([address]) 2023   • Oral abscess 2022   • Tooth fracture 2022   • H/O ETOH abuse 2022   • Closed nondisplaced fracture of posterior arch of first cervical vertebra ([address]) 2022   • Fall 2022   • Diabetic polyneuropathy associated with type 2 diabetes mellitus ([address]) 2021   • Hammertoe, bilateral 2021   • Post-traumatic arthritis of left foot 2021   • Pain due to onychomycosis of toenail 2021   • Bronchitis 2020   • KLARISSA (obstructive sleep apnea) 2020   • Dirty living conditions 2020   • Closed fracture of first cervical vertebra ([address]) 2019   • ETOH abuse 2019   • Chronic obstructive pulmonary disease with acute exacerbation ([address]) 2018   • At moderate risk for venous thromboembolism (VTE) 2018   • S/P C1-T1 Posterior Cervical Discectomy and Fusion on 9/10/18 2018   • Acute blood loss anemia 2018   • Type 2 diabetes mellitus with hyperglycemia, without long-term current use of insulin ([address]) 2018   • Closed odontoid fracture with routine healing 2018   • Closed displaced fracture of third cervical vertebra ([address]) 2018   • Closed displaced fracture of fourth cervical vertebra ([address]) 2018   • Alcohol abuse 2018   • CAD (coronary artery disease) 2018   • Dens fracture ([address]) 2018      LOS (days): 1  Geometric Mean LOS (GMLOS) (days): 2.20  Days to GMLOS:1.5     OBJECTIVE:    Risk of Unplanned Readmission Score: 16.31         Current admission status: Inpatient       Preferred Pharmacy:   [address] [place], [place] - [facility] [address] [facility]. [address] [place] [address]  Phone: [phone] Fax: [address], [address] [facility] [address] [address]  Phone: [phone] Fax: [phone]    PATIENT/FAMILY REPORTS NO PREFERRED PHARMACY  No address on file      Primary Care Provider: TATIANA Poole    Primary Insurance: MEDICARE  Secondary Insurance: BLUE CROSS    ASSESSMENT:  [address] - Daughter   Primary Phone: [phone] (Mobile)                              Patient Information  Mental Status: Alert  During Assessment patient was accompanied by: Not accompanied during assessment  Assessment information provided by[de-identified] Patient  Primary Caregiver: Self  Support Systems: Self, Daughter  Home entry access options.  Select all that apply.: Stairs  Number of steps to enter home.: 3  Do the steps have railings?: Yes  Type of Current Residence: [address] [place] home  Upon entering residence, is there a bedroom on the main floor (no further steps)?: Yes  Upon entering residence, is there a bathroom on the main floor (no further steps)?: Yes  In the last 12 months, was there a time when you were not able to pay the mortgage or rent on time?: No  In the last 12 months, was there a time when you did not have a steady place to sleep or slept in a shelter (including now)?: No  Homeless/housing insecurity resource given?: N/A  Living Arrangements: Lives w/ Daughter  Is patient a ?: No    Activities of Daily Living Prior to Admission  Functional Status: Independent  Completes ADLs independently?: Yes  Ambulates independently?: Yes (Uses a cane for long distances)  Does patient use assisted devices?: No (May use a cane for long distances at times)  Does patient currently own DME?: Yes  What DME does the patient currently own?: Straight Cane  Does patient have a history of Outpatient Therapy (PT/OT)?: No  Does the patient have a history of Short-Term Rehab?: No  Does patient have a history of HHC?: No  Does patient currently have [address]?: No         Patient Information Continued  Income Source: Pension/alf  Does patient have prescription coverage?: Yes  Within the past 12 months, you worried that your food would run out before you got the money to buy more.: Never true  Within the past 12 months, the food you bought just didn't last and you didn't have money to get more.: Never true  Food insecurity resource given?: N/A  Does patient receive dialysis treatments?: No  Does patient have a history of substance abuse?: No (per patient)  Does patient have a history of Mental Health Diagnosis?: No (per patient)         Means of Transportation  Means of Transport to Appts[de-identified] Family transport  In the past 12 months, has lack of transportation kept you from medical appointments or from getting medications?: No  In the past 12 months, has lack of transportation kept you from meetings, work, or from getting things needed for daily living?: No  Was application for public transport provided?: N/A        DISCHARGE DETAILS:     Met at bedside with patient. Lives with daughter, son in law and grandchildren. IADL's, ambulates with a cane if going for long distance walks. Is on PRN oxygen at home through [place], has equipment at home. Daughter will  at [place]. CM to continue to support.

## 2023-07-24 NOTE — ASSESSMENT & PLAN NOTE
Noted he was prescribed naltrexone at [facility], but patient states he is not taking it. · Last drink was 4 days ago. He drinks 3-4 beers daily.   · Continue place on CIWA protocol  · Continue folic acid/thiamine

## 2023-07-24 NOTE — ASSESSMENT & PLAN NOTE
Lab Results   Component Value Date    HGBA1C 7.4 (H) 06/27/2023       Recent Labs     07/23/23  1717 07/23/23 2052 07/24/23  0750   POCGLU 220* 439* 280*       home regimen Basaglar 30 units at bedtime and humalog 5 units TID with meal.  · Will continue same + SSI   · Diabetic diet  · Monitor accuchecks and adjust regimen as needed

## 2023-07-24 NOTE — ASSESSMENT & PLAN NOTE
Pt with Severe COPD with recurrent exacerbations, untreated KLARISSA, hypoxemia and alcoholism, chronically on 2L QHS and PRN  presented to [facility] ED for COPD exacerbation. This is his 8th ED visit this month and hospitalization last month for same. · Follows with [facility] pulmonology and was restarted back on prednisone 10 mg daily chronically and azithromycin 250 mg MWF one week ago without improvement.   · Home regimen is Trelegy, duoneb, Roflumilast, will continue home inhalers however roflumilast is non formulary  · Continue Solumedrol 40 mg IV Q8H  · Xopenex/atrovent neb TID  · Azithromycin 500 mg x 3 days  · Continue supplement oxygen, remains at baseline 2L

## 2023-07-24 NOTE — PROGRESS NOTES
[facility]  Progress Note  Name: Antonio Fleischer  MRN: [id number]  [facility] Date of Admission: 7/23/2023   Date of Service: 7/24/2023 I Hospital Day: 1    Assessment/Plan   * Chronic obstructive pulmonary disease with acute exacerbation ([address] [facility])  Assessment & Plan  Pt with Severe COPD with recurrent exacerbations, untreated KLARISSA, hypoxemia and alcoholism, chronically on 2L QHS and PRN  presented to [facility] ED for COPD exacerbation. This is his 8th ED visit this month and hospitalization last month for same. · Follows with [facility] pulmonology and was restarted back on prednisone 10 mg daily chronically and azithromycin 250 mg MWF one week ago without improvement. · Home regimen is Trelegy, duoneb, Roflumilast, will continue home inhalers however roflumilast is non formulary  · Continue Solumedrol 40 mg IV Q8H  · Xopenex/atrovent neb TID  · Azithromycin 500 mg x 3 days  · Continue supplement oxygen, remains at baseline 2L           Stage 3 severe COPD by GOLD classification ([address] [facility])  Assessment & Plan  · See acute COPD exacerbation     Chronic respiratory failure (HCC)  Assessment & Plan  Wears 2 L nasal cannula QHS and PRN at baseline. · Continue same, titrate as needed. Diabetic polyneuropathy associated with type 2 diabetes mellitus [facility])  Assessment & Plan  Lab Results   Component Value Date    HGBA1C 7.4 (H) 06/27/2023       Recent Labs     07/23/23  1717 07/23/23 2052 07/24/23  0750   POCGLU 220* 439* 280*       home regimen Basaglar 30 units at bedtime and humalog 5 units TID with meal.  · Will continue same + SSI   · Diabetic diet  · Monitor accuchecks and adjust regimen as needed     CAD (coronary artery disease)  Assessment & Plan  · Continue aspirin, lipitor, lisinopril and lopressor      ETOH abuse  Assessment & Plan  Noted he was prescribed naltrexone at [facility], but patient states he is not taking it. · Last drink was 4 days ago.  He drinks 3-4 beers daily.  · Continue place on CIWA protocol  · Continue folic acid/thiamine    KLARISSA (obstructive sleep apnea)  Assessment & Plan  · Untreated KLARISSA           VTE Pharmacologic Prophylaxis:   Moderate Risk (Score 3-4) - Pharmacological DVT Prophylaxis Ordered: heparin. Patient Centered Rounds: I performed bedside rounds with nursing staff today. Discussions with Specialists or Other Care Team Provider: nursing, case management     Education and Discussions with Family / Patient: Patient declined call to . Total Time Spent on Date of Encounter in care of patient: 35 minutes This time was spent on one or more of the following: performing physical exam; counseling and coordination of care; obtaining or reviewing history; documenting in the medical record; reviewing/ordering tests, medications or procedures; communicating with other healthcare professionals and discussing with patient's family/caregivers. Current Length of Stay: 1 day(s)  Current Patient Status: Inpatient   Certification Statement: The patient will continue to require additional inpatient hospital stay due to IV steroids, monitor respiratory status  Discharge Plan: Anticipate discharge in 24-48 hrs to home. Code Status: Level 1 - Full Code    Subjective:   Overall, patient reports feeling much better. Less short of breath. No significant wheezing. No cough. Objective:     Vitals:   Temp (24hrs), Av.4 °F (36.9 °C), Min:98.3 °F (36.8 °C), Max:98.7 °F (37.1 °C)    Temp:  [98.3 °F (36.8 °C)-98.7 °F (37.1 °C)] 98.7 °F (37.1 °C)  HR:  [] 71  Resp:  [18-24] 18  BP: (114-156)/(73-81) 125/73  SpO2:  [94 %-98 %] 97 %  Body mass index is 29.03 kg/m². Input and Output Summary (last 24 hours): Intake/Output Summary (Last 24 hours) at 2023 1109  Last data filed at 2023 1001  Gross per 24 hour   Intake 2640 ml   Output 3375 ml   Net -735 ml       Physical Exam:   Physical Exam  Vitals and nursing note reviewed. Constitutional:       General: He is not in acute distress. Comments: On RA   Cardiovascular:      Rate and Rhythm: Normal rate. Pulmonary:      Effort: No respiratory distress. Breath sounds: Decreased breath sounds and wheezing (Fine expiratory wheezes) present. Musculoskeletal:         General: No swelling. Skin:     General: Skin is warm. Coloration: Skin is pale. Neurological:      Mental Status: He is alert and oriented to person, place, and time. Mental status is at baseline. Psychiatric:         Mood and Affect: Mood normal.          Additional Data:     Labs:  Results from last 7 days   Lab Units 07/24/23  0446 07/23/23  1334   WBC Thousand/uL 7.23 11.76*   HEMOGLOBIN g/dL 10.9* 12.4   HEMATOCRIT % 33.3* 37.6   PLATELETS Thousands/uL 308 329   NEUTROS PCT %  --  75   LYMPHS PCT %  --  14   MONOS PCT %  --  6   EOS PCT %  --  4     Results from last 7 days   Lab Units 07/24/23  0446 07/23/23  1334   SODIUM mmol/L 132* 140   POTASSIUM mmol/L 4.5 4.6   CHLORIDE mmol/L 102 106   CO2 mmol/L 23 27   BUN mg/dL 23 14   CREATININE mg/dL 0.54* 0.70   ANION GAP mmol/L 7 7   CALCIUM mg/dL 8.5 8.9   ALBUMIN g/dL  --  3.3*   TOTAL BILIRUBIN mg/dL  --  0.40   ALK PHOS U/L  --  69   ALT U/L  --  28   AST U/L  --  18   GLUCOSE RANDOM mg/dL 239* 218*         Results from last 7 days   Lab Units 07/24/23  0750 07/23/23  2052 07/23/23  1717   POC GLUCOSE mg/dl 280* 439* 220*         Results from last 7 days   Lab Units 07/24/23  0446   PROCALCITONIN ng/ml 0.10       Lines/Drains:  Invasive Devices     Peripheral Intravenous Line  Duration           Peripheral IV 07/23/23 Right Antecubital <1 day                      Imaging: No pertinent imaging reviewed.     Recent Cultures (last 7 days):         Last 24 Hours Medication List:   Current Facility-Administered Medications   Medication Dose Route Frequency Provider Last Rate   • acetaminophen  650 mg Oral Q6H PRN Stefan Turner MD     • albuterol  2 puff Inhalation Q4H PRN Gilbert Villarreal MD     • albuterol  2.5 mg Nebulization Q4H PRN Gilbert Villarreal MD     • aspirin  81 mg Oral Daily Gilbert Villarreal MD     • atorvastatin  40 mg Oral Daily With Olivia Duval MD     • azithromycin  500 mg Oral Q24H Gilbert Villarreal MD     • busPIRone  5 mg Oral BID Gilbert Villarreal MD     • docusate sodium  100 mg Oral BID Gilbert Villarreal MD     • fluticasone-vilanterol  1 puff Inhalation Daily Gilbert Villarreal MD     • folic acid  1 mg Oral Daily Gilbert Villarreal MD     • guaiFENesin  600 mg Oral BID Gilbert Villarreal MD     • heparin (porcine)  5,000 Units Subcutaneous Q8H Luzma Fenton MD     • insulin detemir  30 Units Subcutaneous HS TATIANA Thorpe     • insulin lispro  1-6 Units Subcutaneous TID AC Gilbert Villarreal MD     • insulin lispro  5 Units Subcutaneous TID With Meals TATIANA Thorpe     • ipratropium  0.5 mg Nebulization TID Gilbert Villarreal MD     • levalbuterol  1.25 mg Nebulization TID Gilbert Villarreal MD     • lisinopril  10 mg Oral Daily Gilbert Villarreal MD     • LORazepam  0.5 mg Oral HS Gilbert Villarreal MD     • melatonin  3 mg Oral HS PRN Gilbert Villarreal MD     • methylPREDNISolone sodium succinate  40 mg Intravenous Nuzhat Gillette MD     • metoprolol tartrate  12.5 mg Oral Q12H Luzma Fenton MD     • NON FORMULARY   Oral 0200 Gilbert Villarreal MD     • ondansetron  4 mg Intravenous Q6H PRN Gilbert Villarreal MD     • pantoprazole  40 mg Oral Early Morning Gilbert Villarreal MD     • thiamine  100 mg Oral Daily Gilbert Villarreal MD          Today, Patient Was Seen By: TATIANA Griffin    **Please Note: This note may have been constructed using a voice recognition system. **

## 2023-07-25 VITALS
WEIGHT: 220 LBS | BODY MASS INDEX: 29.16 KG/M2 | OXYGEN SATURATION: 96 % | DIASTOLIC BLOOD PRESSURE: 75 MMHG | HEIGHT: 73 IN | TEMPERATURE: 96.4 F | RESPIRATION RATE: 16 BRPM | SYSTOLIC BLOOD PRESSURE: 126 MMHG | HEART RATE: 69 BPM

## 2023-07-25 LAB — GLUCOSE SERPL-MCNC: 197 MG/DL (ref 65–140)

## 2023-07-25 PROCEDURE — 82948 REAGENT STRIP/BLOOD GLUCOSE: CPT

## 2023-07-25 PROCEDURE — 94640 AIRWAY INHALATION TREATMENT: CPT

## 2023-07-25 PROCEDURE — 94760 N-INVAS EAR/PLS OXIMETRY 1: CPT

## 2023-07-25 PROCEDURE — 94664 DEMO&/EVAL PT USE INHALER: CPT

## 2023-07-25 PROCEDURE — 99239 HOSP IP/OBS DSCHRG MGMT >30: CPT | Performed by: NURSE PRACTITIONER

## 2023-07-25 PROCEDURE — 94660 CPAP INITIATION&MGMT: CPT

## 2023-07-25 RX ORDER — AZITHROMYCIN 500 MG/1
500 TABLET, FILM COATED ORAL EVERY 24 HOURS
Qty: 1 TABLET | Refills: 0 | Status: SHIPPED | OUTPATIENT
Start: 2023-07-25 | End: 2023-07-28

## 2023-07-25 RX ORDER — PREDNISONE 10 MG/1
TABLET ORAL
Qty: 30 TABLET | Refills: 0 | Status: SHIPPED | OUTPATIENT
Start: 2023-07-26 | End: 2023-08-08

## 2023-07-25 RX ORDER — GLIPIZIDE 5 MG/1
5 TABLET ORAL DAILY
COMMUNITY

## 2023-07-25 RX ADMIN — IPRATROPIUM BROMIDE 0.5 MG: 0.5 SOLUTION RESPIRATORY (INHALATION) at 07:34

## 2023-07-25 RX ADMIN — GUAIFENESIN 600 MG: 600 TABLET, EXTENDED RELEASE ORAL at 09:03

## 2023-07-25 RX ADMIN — METHYLPREDNISOLONE SODIUM SUCCINATE 40 MG: 40 INJECTION, POWDER, FOR SOLUTION INTRAMUSCULAR; INTRAVENOUS at 00:36

## 2023-07-25 RX ADMIN — DOCUSATE SODIUM 100 MG: 100 CAPSULE ORAL at 09:04

## 2023-07-25 RX ADMIN — BUSPIRONE HYDROCHLORIDE 5 MG: 5 TABLET ORAL at 09:03

## 2023-07-25 RX ADMIN — FLUTICASONE FUROATE AND VILANTEROL TRIFENATATE 1 PUFF: 200; 25 POWDER RESPIRATORY (INHALATION) at 09:05

## 2023-07-25 RX ADMIN — LISINOPRIL 10 MG: 10 TABLET ORAL at 09:04

## 2023-07-25 RX ADMIN — LEVALBUTEROL HYDROCHLORIDE 1.25 MG: 1.25 SOLUTION RESPIRATORY (INHALATION) at 07:34

## 2023-07-25 RX ADMIN — METHYLPREDNISOLONE SODIUM SUCCINATE 40 MG: 40 INJECTION, POWDER, FOR SOLUTION INTRAMUSCULAR; INTRAVENOUS at 09:08

## 2023-07-25 RX ADMIN — ASPIRIN 81 MG: 81 TABLET, COATED ORAL at 09:03

## 2023-07-25 RX ADMIN — FOLIC ACID 1 MG: 1 TABLET ORAL at 09:03

## 2023-07-25 RX ADMIN — Medication 12.5 MG: at 09:03

## 2023-07-25 RX ADMIN — PANTOPRAZOLE SODIUM 40 MG: 40 TABLET, DELAYED RELEASE ORAL at 05:20

## 2023-07-25 RX ADMIN — THIAMINE HCL TAB 100 MG 100 MG: 100 TAB at 09:03

## 2023-07-25 RX ADMIN — HEPARIN SODIUM 5000 UNITS: 5000 INJECTION INTRAVENOUS; SUBCUTANEOUS at 05:20

## 2023-07-25 RX ADMIN — INSULIN LISPRO 2 UNITS: 100 INJECTION, SOLUTION INTRAVENOUS; SUBCUTANEOUS at 09:08

## 2023-07-25 RX ADMIN — INSULIN LISPRO 7 UNITS: 100 INJECTION, SOLUTION INTRAVENOUS; SUBCUTANEOUS at 09:08

## 2023-07-25 NOTE — DISCHARGE SUMMARY
Discharge Summary - [facility] Internal Medicine    Patient Information: Aida Tyler 70 y.o. male MRN: [id number]  Unit/Bed#: [facility] 907-01 Encounter: [id number]    Discharging Physician / Practitioner: TATIANA Fong  PCP: Manuela Gabriel  Admission Date: 7/23/2023  Discharge Date: 07/25/23    Reason for Admission: COPD exacerbation     Discharge Diagnoses:     Principal Problem:    Chronic obstructive pulmonary disease with acute exacerbation ([address] [facility])  Active Problems:    Stage 3 severe COPD by GOLD classification ([address] [facility])    Diabetic polyneuropathy associated with type 2 diabetes mellitus ([address] [facility])    Chronic respiratory failure (HCC)    CAD (coronary artery disease)    ETOH abuse    KLARISSA (obstructive sleep apnea)  Resolved Problems:    * No resolved hospital problems. *      Consultations During Hospital Stay:  · None     Procedures Performed:     · None     Significant Findings / Test Results:     · CXR Linear right lung base atelectasis. Incidental Findings:   · none     Test Results Pending at Discharge (will require follow up):   · none     Outpatient Tests Requested:  · Outpatient f/u with PCP  · Outpatient f/u with Dr. Chadwick Lazo    Complications:  None     Hospital Course:     Aida Tyler is a 70 y.o. male patient with a past medical history of severe COPD, chronic respiratory failure, KLARISSA, type 2 diabetes, neuropathy, CAD, alcohol abuse who originally presented to the hospital on 7/23/2023 due to shortness of breath. Wheezing noted on exam consistent with COPD exacerbation. Multiple recent ED presentations and hospitalizations. Improved on IV steroids. Transition to oral prednisone taper on discharge. Patient is chronically on 10 mg p.o. daily, will taper down to this dose and patient was instructed to remain on 10 mg daily. We will also continue all other outpatient medications as he was previously taking them. Will be discharged in stable condition.   He is currently on room air, utilizes 2 L nasal cannula nightly and as needed. Patient was instructed to follow-up with his primary pulmonologist within 1 to 2 weeks. I additionally encouraged him to monitor his blood glucose levels and to contact his PCP if levels are consistently greater than 250. Condition at Discharge: stable     Discharge Day Visit / Exam:     Subjective: Patient offers no acute complaints. Feels significantly better overall. No wheezing, shortness of breath. Verbalized understanding of discharge instructions including need to resume prior medications. Vitals: Blood Pressure: 126/75 (07/25/23 0732)  Pulse: 69 (07/25/23 0732)  Temperature: (!) 96.4 °F (35.8 °C) (07/25/23 0732)  Temp Source: Oral (07/23/23 2214)  Respirations: 16 (07/25/23 0732)  Height: 6' 1" (185.4 cm) (07/23/23 2100)  Weight - Scale: 99.8 kg (220 lb) (07/23/23 2100)  SpO2: 96 % (07/25/23 0815)     Exam:   Physical Exam  Vitals and nursing note reviewed. Constitutional:       General: He is not in acute distress. Pulmonary:      Breath sounds: Decreased breath sounds present. No wheezing. Musculoskeletal:         General: No swelling. Skin:     General: Skin is warm. Neurological:      Mental Status: He is alert and oriented to person, place, and time. Mental status is at baseline. Psychiatric:         Mood and Affect: Mood normal.         Discussion with Family: Patient    Discharge instructions/Information to patient and family:   See after visit summary for information provided to patient and family. Provisions for Follow-Up Care:  See after visit summary for information related to follow-up care and any pertinent home health orders. Disposition:     Home    For Discharges to [facility] SNF:   · Not Applicable to this Patient - Not Applicable to this Patient    Planned Readmission: no     Discharge Statement:  I spent 45 minutes discharging the patient. This time was spent on the day of discharge.  I had direct contact with the patient on the day of discharge. Greater than 50% of the total time was spent examining patient, answering all patient questions, arranging and discussing plan of care with patient as well as directly providing post-discharge instructions. Additional time then spent on discharge activities. Discharge Medications:  See after visit summary for reconciled discharge medications provided to patient and family.       ** Please Note: This note has been constructed using a voice recognition system **

## 2023-07-25 NOTE — DISCHARGE INSTR - AVS FIRST PAGE
Verena Martin,     Please continue all home medications as you were previously taking them, some of your medications may not be listed below. The only thing we added was a prednisone (steroid) taper and one more day of azithromycin. Once you get down to Prednisone 10 mg, stay on that dose. This is your chronic steroid dose. Please follow up with your pulmonologist within 2 weeks. Monitor your blood sugar levels and contact your PCP if they are above 250.     Thanks,    Margaret Piña, [address] [facility] Internal Medicine

## 2023-07-27 ENCOUNTER — HOSPITAL ENCOUNTER (OUTPATIENT)
Facility: HOSPITAL | Age: 68
Setting detail: OBSERVATION
Discharge: HOME | End: 2023-07-28
Attending: EMERGENCY MEDICINE | Admitting: FAMILY MEDICINE
Payer: MEDICARE

## 2023-07-27 ENCOUNTER — APPOINTMENT (EMERGENCY)
Dept: RADIOLOGY | Facility: HOSPITAL | Age: 68
End: 2023-07-27
Payer: MEDICARE

## 2023-07-27 DIAGNOSIS — F41.9 ANXIETY: ICD-10-CM

## 2023-07-27 DIAGNOSIS — J44.1 COPD WITH ACUTE EXACERBATION (HCC): Primary | ICD-10-CM

## 2023-07-27 LAB
2HR DELTA HS TROPONIN: -2 NG/L
ALBUMIN SERPL BCP-MCNC: 3.2 G/DL (ref 3.5–5)
ALP SERPL-CCNC: 59 U/L (ref 46–116)
ALT SERPL W P-5'-P-CCNC: 30 U/L (ref 12–78)
ANION GAP SERPL CALCULATED.3IONS-SCNC: 5 MMOL/L
AST SERPL W P-5'-P-CCNC: 16 U/L (ref 5–45)
BASOPHILS # BLD AUTO: 0.03 THOUSANDS/ÂΜL (ref 0–0.1)
BASOPHILS NFR BLD AUTO: 0 % (ref 0–1)
BILIRUB SERPL-MCNC: 0.41 MG/DL (ref 0.2–1)
BUN SERPL-MCNC: 16 MG/DL (ref 5–25)
CALCIUM ALBUM COR SERPL-MCNC: 9.6 MG/DL (ref 8.3–10.1)
CALCIUM SERPL-MCNC: 9 MG/DL (ref 8.3–10.1)
CARDIAC TROPONIN I PNL SERPL HS: 12 NG/L
CARDIAC TROPONIN I PNL SERPL HS: 14 NG/L
CHLORIDE SERPL-SCNC: 103 MMOL/L (ref 96–108)
CO2 SERPL-SCNC: 27 MMOL/L (ref 21–32)
CREAT SERPL-MCNC: 0.51 MG/DL (ref 0.6–1.3)
EOSINOPHIL # BLD AUTO: 0.15 THOUSAND/ÂΜL (ref 0–0.61)
EOSINOPHIL NFR BLD AUTO: 2 % (ref 0–6)
ERYTHROCYTE [DISTWIDTH] IN BLOOD BY AUTOMATED COUNT: 14.6 % (ref 11.6–15.1)
GFR SERPL CREATININE-BSD FRML MDRD: 109 ML/MIN/1.73SQ M
GLUCOSE SERPL-MCNC: 287 MG/DL (ref 65–140)
GLUCOSE SERPL-MCNC: 480 MG/DL (ref 65–140)
HCT VFR BLD AUTO: 34.2 % (ref 36.5–49.3)
HGB BLD-MCNC: 11.1 G/DL (ref 12–17)
IMM GRANULOCYTES # BLD AUTO: 0.07 THOUSAND/UL (ref 0–0.2)
IMM GRANULOCYTES NFR BLD AUTO: 1 % (ref 0–2)
LYMPHOCYTES # BLD AUTO: 1.91 THOUSANDS/ÂΜL (ref 0.6–4.47)
LYMPHOCYTES NFR BLD AUTO: 20 % (ref 14–44)
MCH RBC QN AUTO: 28.6 PG (ref 26.8–34.3)
MCHC RBC AUTO-ENTMCNC: 32.5 G/DL (ref 31.4–37.4)
MCV RBC AUTO: 88 FL (ref 82–98)
MONOCYTES # BLD AUTO: 0.87 THOUSAND/ÂΜL (ref 0.17–1.22)
MONOCYTES NFR BLD AUTO: 9 % (ref 4–12)
NEUTROPHILS # BLD AUTO: 6.47 THOUSANDS/ÂΜL (ref 1.85–7.62)
NEUTS SEG NFR BLD AUTO: 68 % (ref 43–75)
NRBC BLD AUTO-RTO: 0 /100 WBCS
PLATELET # BLD AUTO: 268 THOUSANDS/UL (ref 149–390)
PMV BLD AUTO: 10.3 FL (ref 8.9–12.7)
POTASSIUM SERPL-SCNC: 3.9 MMOL/L (ref 3.5–5.3)
PROT SERPL-MCNC: 6.5 G/DL (ref 6.4–8.4)
RBC # BLD AUTO: 3.88 MILLION/UL (ref 3.88–5.62)
SODIUM SERPL-SCNC: 135 MMOL/L (ref 135–147)
WBC # BLD AUTO: 9.5 THOUSAND/UL (ref 4.31–10.16)

## 2023-07-27 PROCEDURE — 99223 1ST HOSP IP/OBS HIGH 75: CPT | Performed by: FAMILY MEDICINE

## 2023-07-27 PROCEDURE — 99285 EMERGENCY DEPT VISIT HI MDM: CPT

## 2023-07-27 PROCEDURE — 84484 ASSAY OF TROPONIN QUANT: CPT

## 2023-07-27 PROCEDURE — 94644 CONT INHLJ TX 1ST HOUR: CPT

## 2023-07-27 PROCEDURE — 99285 EMERGENCY DEPT VISIT HI MDM: CPT | Performed by: EMERGENCY MEDICINE

## 2023-07-27 PROCEDURE — 96375 TX/PRO/DX INJ NEW DRUG ADDON: CPT

## 2023-07-27 PROCEDURE — 96366 THER/PROPH/DIAG IV INF ADDON: CPT

## 2023-07-27 PROCEDURE — 94664 DEMO&/EVAL PT USE INHALER: CPT

## 2023-07-27 PROCEDURE — 94760 N-INVAS EAR/PLS OXIMETRY 1: CPT

## 2023-07-27 PROCEDURE — 36415 COLL VENOUS BLD VENIPUNCTURE: CPT

## 2023-07-27 PROCEDURE — 94640 AIRWAY INHALATION TREATMENT: CPT

## 2023-07-27 PROCEDURE — 96365 THER/PROPH/DIAG IV INF INIT: CPT

## 2023-07-27 PROCEDURE — 82948 REAGENT STRIP/BLOOD GLUCOSE: CPT

## 2023-07-27 PROCEDURE — 85025 COMPLETE CBC W/AUTO DIFF WBC: CPT

## 2023-07-27 PROCEDURE — 80053 COMPREHEN METABOLIC PANEL: CPT

## 2023-07-27 PROCEDURE — 71046 X-RAY EXAM CHEST 2 VIEWS: CPT

## 2023-07-27 RX ORDER — ONDANSETRON 2 MG/ML
4 INJECTION INTRAMUSCULAR; INTRAVENOUS EVERY 6 HOURS PRN
Status: DISCONTINUED | OUTPATIENT
Start: 2023-07-27 | End: 2023-07-28 | Stop reason: HOSPADM

## 2023-07-27 RX ORDER — PANTOPRAZOLE SODIUM 40 MG/1
40 TABLET, DELAYED RELEASE ORAL
Status: DISCONTINUED | OUTPATIENT
Start: 2023-07-28 | End: 2023-07-28 | Stop reason: HOSPADM

## 2023-07-27 RX ORDER — ATORVASTATIN CALCIUM 80 MG/1
80 TABLET, FILM COATED ORAL
Status: DISCONTINUED | OUTPATIENT
Start: 2023-07-28 | End: 2023-07-28 | Stop reason: HOSPADM

## 2023-07-27 RX ORDER — LISINOPRIL 20 MG/1
40 TABLET ORAL DAILY
Status: DISCONTINUED | OUTPATIENT
Start: 2023-07-28 | End: 2023-07-28 | Stop reason: HOSPADM

## 2023-07-27 RX ORDER — MAGNESIUM SULFATE HEPTAHYDRATE 40 MG/ML
2 INJECTION, SOLUTION INTRAVENOUS ONCE
Status: COMPLETED | OUTPATIENT
Start: 2023-07-27 | End: 2023-07-27

## 2023-07-27 RX ORDER — LORAZEPAM 0.5 MG/1
0.5 TABLET ORAL EVERY 6 HOURS PRN
Status: DISCONTINUED | OUTPATIENT
Start: 2023-07-27 | End: 2023-07-28 | Stop reason: HOSPADM

## 2023-07-27 RX ORDER — SODIUM CHLORIDE FOR INHALATION 0.9 %
3 VIAL, NEBULIZER (ML) INHALATION
Status: DISCONTINUED | OUTPATIENT
Start: 2023-07-27 | End: 2023-07-28

## 2023-07-27 RX ORDER — FOLIC ACID 1 MG/1
1 TABLET ORAL DAILY
Status: DISCONTINUED | OUTPATIENT
Start: 2023-07-28 | End: 2023-07-28 | Stop reason: HOSPADM

## 2023-07-27 RX ORDER — BUDESONIDE 0.5 MG/2ML
0.5 INHALANT ORAL
Status: DISCONTINUED | OUTPATIENT
Start: 2023-07-27 | End: 2023-07-27

## 2023-07-27 RX ORDER — AZITHROMYCIN 250 MG/1
500 TABLET, FILM COATED ORAL EVERY 24 HOURS
Status: DISCONTINUED | OUTPATIENT
Start: 2023-07-28 | End: 2023-07-27

## 2023-07-27 RX ORDER — FOLIC ACID 1 MG/1
1 TABLET ORAL DAILY
Status: DISCONTINUED | OUTPATIENT
Start: 2023-07-28 | End: 2023-07-27

## 2023-07-27 RX ORDER — HEPARIN SODIUM 5000 [USP'U]/ML
5000 INJECTION, SOLUTION INTRAVENOUS; SUBCUTANEOUS EVERY 8 HOURS SCHEDULED
Status: DISCONTINUED | OUTPATIENT
Start: 2023-07-27 | End: 2023-07-28 | Stop reason: HOSPADM

## 2023-07-27 RX ORDER — LANOLIN ALCOHOL/MO/W.PET/CERES
100 CREAM (GRAM) TOPICAL DAILY
Status: DISCONTINUED | OUTPATIENT
Start: 2023-07-28 | End: 2023-07-28 | Stop reason: HOSPADM

## 2023-07-27 RX ORDER — INSULIN LISPRO 100 [IU]/ML
1-5 INJECTION, SOLUTION INTRAVENOUS; SUBCUTANEOUS
Status: DISCONTINUED | OUTPATIENT
Start: 2023-07-27 | End: 2023-07-28 | Stop reason: HOSPADM

## 2023-07-27 RX ORDER — SENNOSIDES 8.6 MG
17.2 TABLET ORAL DAILY
Status: DISCONTINUED | OUTPATIENT
Start: 2023-07-28 | End: 2023-07-28 | Stop reason: HOSPADM

## 2023-07-27 RX ORDER — METHYLPREDNISOLONE SODIUM SUCCINATE 125 MG/2ML
125 INJECTION, POWDER, LYOPHILIZED, FOR SOLUTION INTRAMUSCULAR; INTRAVENOUS ONCE
Status: COMPLETED | OUTPATIENT
Start: 2023-07-27 | End: 2023-07-27

## 2023-07-27 RX ORDER — INSULIN GLARGINE 100 [IU]/ML
20 INJECTION, SOLUTION SUBCUTANEOUS
Status: DISCONTINUED | OUTPATIENT
Start: 2023-07-27 | End: 2023-07-28 | Stop reason: HOSPADM

## 2023-07-27 RX ORDER — HEPARIN SODIUM 5000 [USP'U]/ML
5000 INJECTION, SOLUTION INTRAVENOUS; SUBCUTANEOUS EVERY 8 HOURS SCHEDULED
Status: DISCONTINUED | OUTPATIENT
Start: 2023-07-27 | End: 2023-07-27

## 2023-07-27 RX ORDER — ACETAMINOPHEN 325 MG/1
650 TABLET ORAL EVERY 6 HOURS PRN
Status: DISCONTINUED | OUTPATIENT
Start: 2023-07-27 | End: 2023-07-28 | Stop reason: HOSPADM

## 2023-07-27 RX ORDER — ALBUTEROL SULFATE 2.5 MG/3ML
2.5 SOLUTION RESPIRATORY (INHALATION) ONCE
Status: COMPLETED | OUTPATIENT
Start: 2023-07-27 | End: 2023-07-27

## 2023-07-27 RX ORDER — LANOLIN ALCOHOL/MO/W.PET/CERES
3 CREAM (GRAM) TOPICAL
Status: DISCONTINUED | OUTPATIENT
Start: 2023-07-27 | End: 2023-07-28 | Stop reason: HOSPADM

## 2023-07-27 RX ORDER — INSULIN LISPRO 100 [IU]/ML
1-6 INJECTION, SOLUTION INTRAVENOUS; SUBCUTANEOUS
Status: DISCONTINUED | OUTPATIENT
Start: 2023-07-28 | End: 2023-07-28 | Stop reason: HOSPADM

## 2023-07-27 RX ORDER — SODIUM CHLORIDE FOR INHALATION 0.9 %
3 VIAL, NEBULIZER (ML) INHALATION ONCE
Status: COMPLETED | OUTPATIENT
Start: 2023-07-27 | End: 2023-07-27

## 2023-07-27 RX ORDER — LEVALBUTEROL INHALATION SOLUTION 1.25 MG/3ML
1.25 SOLUTION RESPIRATORY (INHALATION)
Status: DISCONTINUED | OUTPATIENT
Start: 2023-07-27 | End: 2023-07-28 | Stop reason: HOSPADM

## 2023-07-27 RX ORDER — AZITHROMYCIN 500 MG/1
500 TABLET, FILM COATED ORAL EVERY 24 HOURS
Status: DISCONTINUED | OUTPATIENT
Start: 2023-07-28 | End: 2023-07-28 | Stop reason: HOSPADM

## 2023-07-27 RX ORDER — FLUTICASONE FUROATE AND VILANTEROL 200; 25 UG/1; UG/1
1 POWDER RESPIRATORY (INHALATION) DAILY
Status: DISCONTINUED | OUTPATIENT
Start: 2023-07-28 | End: 2023-07-28 | Stop reason: HOSPADM

## 2023-07-27 RX ORDER — PREDNISONE 20 MG/1
40 TABLET ORAL DAILY
Status: DISCONTINUED | OUTPATIENT
Start: 2023-07-28 | End: 2023-07-28 | Stop reason: HOSPADM

## 2023-07-27 RX ORDER — LANOLIN ALCOHOL/MO/W.PET/CERES
3 CREAM (GRAM) TOPICAL
Status: DISCONTINUED | OUTPATIENT
Start: 2023-07-27 | End: 2023-07-27

## 2023-07-27 RX ADMIN — IPRATROPIUM BROMIDE 1 MG: 0.5 SOLUTION RESPIRATORY (INHALATION) at 17:05

## 2023-07-27 RX ADMIN — INSULIN GLARGINE 20 UNITS: 100 INJECTION, SOLUTION SUBCUTANEOUS at 22:03

## 2023-07-27 RX ADMIN — LEVALBUTEROL HYDROCHLORIDE 1.25 MG: 1.25 SOLUTION RESPIRATORY (INHALATION) at 20:29

## 2023-07-27 RX ADMIN — Medication 3 ML: at 17:05

## 2023-07-27 RX ADMIN — ALBUTEROL SULFATE 2.5 MG: 2.5 SOLUTION RESPIRATORY (INHALATION) at 16:26

## 2023-07-27 RX ADMIN — Medication 3 ML: at 20:29

## 2023-07-27 RX ADMIN — ALBUTEROL SULFATE 10 MG: 2.5 SOLUTION RESPIRATORY (INHALATION) at 17:05

## 2023-07-27 RX ADMIN — HEPARIN SODIUM 5000 UNITS: 5000 INJECTION INTRAVENOUS; SUBCUTANEOUS at 22:06

## 2023-07-27 RX ADMIN — IPRATROPIUM BROMIDE 0.5 MG: 0.5 SOLUTION RESPIRATORY (INHALATION) at 20:29

## 2023-07-27 RX ADMIN — MAGNESIUM SULFATE HEPTAHYDRATE 2 G: 40 INJECTION, SOLUTION INTRAVENOUS at 16:45

## 2023-07-27 RX ADMIN — METHYLPREDNISOLONE SODIUM SUCCINATE 125 MG: 125 INJECTION, POWDER, FOR SOLUTION INTRAMUSCULAR; INTRAVENOUS at 16:46

## 2023-07-27 RX ADMIN — IPRATROPIUM BROMIDE 0.5 MG: 0.5 SOLUTION RESPIRATORY (INHALATION) at 16:26

## 2023-07-27 RX ADMIN — INSULIN LISPRO 5 UNITS: 100 INJECTION, SOLUTION INTRAVENOUS; SUBCUTANEOUS at 22:03

## 2023-07-27 NOTE — ED PROVIDER NOTES
History  Chief Complaint   Patient presents with   • Shortness of Breath     Hx of COPD, feeling more SOB for 30 minutes     Patient is a 69-year-old male with a significant past medical history of COPD, presenting for evaluation of shortness of breath. He was recently admitted to our hospital with an acute COPD exacerbation and discharged within the past few days. As per patient, since his discharge he has been experiencing some progressively worsening shortness of breath. He describes this as exactly the same as past COPD exacerbations. He does state that he has been using his inhaler but states that he has not been using his nebulizer treatments. He denies any pain with breathing. He denies any chest pain. He denies any fevers. He denies any cough. He otherwise denies acute complaints at this time. Prior to Admission Medications   Prescriptions Last Dose Informant Patient Reported? Taking?    B Complex Vitamins (VITAMIN B COMPLEX 100 IJ)   Yes No   Sig: Take 1 tablet by mouth daily   Insulin Glargine (BASAGLAR KWIKPEN SC)   Yes No   Sig: Inject 30 Units under the skin daily at bedtime   Insulin Lispro (HUMALOG KWIKPEN SC)   Yes No   Sig: Inject 2 Units under the skin 3 (three) times a day with meals   LORazepam (Ativan) 0.5 mg tablet   Yes No   Sig: Take by mouth every 6 (six) hours as needed for anxiety    Multiple Vitamin (MULTIVITAMIN) tablet  Self Yes No   Sig: Take 1 tablet by mouth daily   albuterol (PROVENTIL HFA,VENTOLIN HFA) 90 mcg/act inhaler   No No   Sig: Inhale 2 puffs 4 (four) times a day as needed for wheezing or shortness of breath   albuterol (Proventil HFA) 90 mcg/act inhaler   No No   Sig: Inhale 2 puffs every 6 (six) hours as needed for wheezing   atorvastatin (LIPITOR) 80 mg tablet   No No   Sig: Take 1 tablet (80 mg total) by mouth daily with dinner   azithromycin (ZITHROMAX) 500 MG tablet   No No   Sig: Take 1 tablet (500 mg total) by mouth every 24 hours for 1 dose betamethasone valerate (VALISONE) 0.1 % cream   Yes No   Sig: Apply topically 2 (two) times a day   budesonide (PULMICORT) 0.5 mg/2 mL nebulizer solution   No No   Sig: Take 1 vial (0.5 mg total) by nebulization every 12 (twelve) hours Rinse mouth after use. budesonide (PULMICORT) 0.5 mg/2 mL nebulizer solution   No No   Sig: Take 2 mL (0.5 mg total) by nebulization every 12 (twelve) hours Rinse mouth after use. fluticasone (FLONASE) 50 mcg/act nasal spray   Yes No   Si spray into each nostril 2 (two) times a day   fluticasone-salmeterol (ADVAIR) 500-50 mcg/dose inhaler   Yes No   Sig: Inhale 1 puff 2 (two) times a day Rinse mouth after use. folic acid (FOLVITE) 1 mg tablet   Yes No   Sig: Take by mouth daily   folic acid (FOLVITE) 1 mg tablet   No No   Sig: Take 1 tablet (1 mg total) by mouth daily   glipiZIDE (GLUCOTROL) 5 mg tablet   Yes No   Sig: Take 5 mg by mouth in the morning   ipratropium-albuterol (COMBIVENT)  mcg/act inhaler   Yes No   Sig: Inhale   lisinopril (ZESTRIL) 40 mg tablet   Yes No   Sig: Take 40 mg by mouth daily    melatonin 3 mg   No No   Sig: Take 1 tablet (3 mg total) by mouth daily at bedtime as needed (30)   metFORMIN (GLUCOPHAGE) 1000 MG tablet   Yes No   Sig: Take 1 tablet by mouth every 12 (twelve) hours   pantoprazole (PROTONIX) 40 mg tablet   Yes No   Sig: Take 1 tablet by mouth Twice daily   predniSONE 10 mg tablet   No No   Sig: Take 4 tablets (40 mg total) by mouth daily for 3 days, THEN 3 tablets (30 mg total) daily for 3 days, THEN 2 tablets (20 mg total) daily for 3 days, THEN 1 tablet (10 mg total) daily for 3 days. Do not start before 2023.    senna (SENOKOT) 8.6 mg   No No   Sig: Take 2 tablets (17.2 mg total) by mouth daily   thiamine 100 MG tablet   No No   Sig: Take 1 tablet (100 mg total) by mouth daily   tiotropium (SPIRIVA) 18 mcg inhalation capsule  Pharmacy (Specify) Yes No   Sig: Place 18 mcg into inhaler and inhale daily Facility-Administered Medications: None       Past Medical History:   Diagnosis Date   • Cardiac arrest ([address])    • COPD (chronic obstructive pulmonary disease) ([facility])    • CVA (cerebral vascular accident) ([address])    • Diabetes mellitus ([address])    • Heart attack ([address])    • Hypertension    • Stroke [facility])        Past Surgical History:   Procedure Laterality Date   • CERVICAL FUSION N/A 9/10/2018    Procedure: Posterior cervical decompressive laminectomy C3-6; Posterior cervical lateral mass and pedicle fixation fusion C1-T1;  Surgeon: Cassie Denton MD;  Location: [facility];  Service: Neurosurgery       Family History   Problem Relation Age of Onset   • Heart attack Mother      I have reviewed and agree with the history as documented. E-Cigarette/Vaping   • E-Cigarette Use Never User      E-Cigarette/Vaping Substances   • Nicotine No    • THC No    • CBD No    • Flavoring No    • Other No    • Unknown No      Social History     Tobacco Use   • Smoking status: Former     Types: Cigarettes   • Smokeless tobacco: Never   • Tobacco comments:     quit 5 months ago    Vaping Use   • Vaping Use: Never used   Substance Use Topics   • Alcohol use: Not Currently     Alcohol/week: 8.0 - 12.0 standard drinks of alcohol     Types: 8 - 12 Cans of beer per week     Comment: every day drinker   • Drug use: Never        Review of Systems   Constitutional: Negative for fever. Respiratory: Positive for shortness of breath and wheezing. Negative for cough. Cardiovascular: Negative for chest pain. Gastrointestinal: Negative for abdominal pain, nausea and vomiting. All other systems reviewed and are negative.       Physical Exam  ED Triage Vitals   Temperature Pulse Respirations Blood Pressure SpO2   07/27/23 1625 07/27/23 1623 07/27/23 1623 07/27/23 1623 07/27/23 1623   98.3 °F (36.8 °C) 104 18 112/67 98 %      Temp Source Heart Rate Source Patient Position - Orthostatic VS BP Location FiO2 (%)   07/27/23 1625 07/27/23 1623 07/27/23 1623 07/27/23 1623 --   Tympanic Monitor Lying Right arm       Pain Score       07/27/23 2210       No Pain             Orthostatic Vital Signs  Vitals:    07/27/23 2210 07/28/23 0300 07/28/23 0800 07/28/23 1518   BP: 140/88 141/85 141/84 115/60   Pulse: 97 95 81 80   Patient Position - Orthostatic VS: Sitting          Physical Exam  Vitals and nursing note reviewed. Constitutional:       General: He is not in acute distress. Appearance: Normal appearance. He is not ill-appearing or toxic-appearing. HENT:      Head: Normocephalic and atraumatic. Right Ear: External ear normal.      Left Ear: External ear normal.      Nose: Nose normal.   Eyes:      General: No scleral icterus. Right eye: No discharge. Left eye: No discharge. Extraocular Movements: Extraocular movements intact. Conjunctiva/sclera: Conjunctivae normal.   Cardiovascular:      Rate and Rhythm: Normal rate. Heart sounds: Normal heart sounds. No murmur heard. No friction rub. No gallop. Pulmonary:      Effort: Pulmonary effort is normal. Tachypnea present. No respiratory distress. Breath sounds: Wheezing present. Comments: Patient with very mild tachypnea. No significant respiratory distress. No retractions. No accessory muscle use. Abdominal:      General: Abdomen is flat. There is no distension. Palpations: Abdomen is soft. There is no mass. Tenderness: There is no abdominal tenderness. Genitourinary:     Comments: Deferred  Skin:     General: Skin is warm and dry. Neurological:      General: No focal deficit present. Mental Status: He is alert.          ED Medications  Medications   albuterol inhalation solution 2.5 mg (2.5 mg Nebulization Given 7/27/23 1626)     And   ipratropium (ATROVENT) 0.02 % inhalation solution 0.5 mg (0.5 mg Nebulization Given 7/27/23 1626)   albuterol inhalation solution 10 mg (10 mg Nebulization Given 7/27/23 1705)     And   ipratropium (ATROVENT) 0.02 % inhalation solution 1 mg (1 mg Nebulization Given 7/27/23 1705)     And   sodium chloride 0.9 % inhalation solution 3 mL (3 mL Nebulization Given 7/27/23 1705)   magnesium sulfate 2 g/50 mL IVPB (premix) 2 g (0 g Intravenous Stopped 7/27/23 1845)   methylPREDNISolone sodium succinate (Solu-MEDROL) injection 125 mg (125 mg Intravenous Given 7/27/23 1646)   insulin regular (HumuLIN R,NovoLIN R) 8 Units in sodium chloride 0.9 % 3 mL IV syringe ( Intravenous Given 7/28/23 1127)       Diagnostic Studies  Results Reviewed     Procedure Component Value Units Date/Time    Procalcitonin, Next Day AM Collection [[id number]]  (Normal) Collected: 07/28/23 0608    Lab Status: Final result Specimen: Blood from Arm, Left Updated: 07/28/23 0718     Procalcitonin <0.05 ng/ml     Basic metabolic panel [[id number]]  (Abnormal) Collected: 07/28/23 0608    Lab Status: Final result Specimen: Blood from Arm, Left Updated: 07/28/23 0704     Sodium 136 mmol/L      Potassium 4.1 mmol/L      Chloride 103 mmol/L      CO2 28 mmol/L      ANION GAP 5 mmol/L      BUN 13 mg/dL      Creatinine 0.49 mg/dL      Glucose 329 mg/dL      Calcium 8.6 mg/dL      eGFR 111 ml/min/1.73sq m     Narrative:      [facility] guidelines for Chronic Kidney Disease (CKD):   •  Stage 1 with normal or high GFR (GFR > 90 mL/min/1.73 square meters)  •  Stage 2 Mild CKD (GFR = 60-89 mL/min/1.73 square meters)  •  Stage 3A Moderate CKD (GFR = 45-59 mL/min/1.73 square meters)  •  Stage 3B Moderate CKD (GFR = 30-44 mL/min/1.73 square meters)  •  Stage 4 Severe CKD (GFR = 15-29 mL/min/1.73 square meters)  •  Stage 5 End Stage CKD (GFR <15 mL/min/1.73 square meters)  Note: GFR calculation is accurate only with a steady state creatinine    CBC (With Platelets) [[id number]]  (Abnormal) Collected: 07/28/23 0608    Lab Status: Final result Specimen: Blood from Arm, Left Updated: 07/28/23 0632     WBC 7.09 Thousand/uL      RBC 3.60 Million/uL Hemoglobin 10.6 g/dL      Hematocrit 31.1 %      MCV 86 fL      MCH 29.4 pg      MCHC 34.1 g/dL      RDW 14.4 %      Platelets 628 Thousands/uL      MPV 10.3 fL     Fingerstick Glucose (POCT) [[id number]]  (Abnormal) Collected: 07/27/23 2203    Lab Status: Final result Updated: 07/27/23 2205     POC Glucose 480 mg/dl     HS Troponin I 2hr [[id number]]  (Normal) Collected: 07/27/23 1914    Lab Status: Final result Specimen: Blood from Arm, Left Updated: 07/27/23 1952     hs TnI 2hr 12 ng/L      Delta 2hr hsTnI -2 ng/L     HS Troponin 0hr (reflex protocol) [[id number]]  (Normal) Collected: 07/27/23 1645    Lab Status: Final result Specimen: Blood from Arm, Left Updated: 07/27/23 1730     hs TnI 0hr 14 ng/L     Comprehensive metabolic panel [[id number]]  (Abnormal) Collected: 07/27/23 1645    Lab Status: Final result Specimen: Blood from Arm, Left Updated: 07/27/23 1723     Sodium 135 mmol/L      Potassium 3.9 mmol/L      Chloride 103 mmol/L      CO2 27 mmol/L      ANION GAP 5 mmol/L      BUN 16 mg/dL      Creatinine 0.51 mg/dL      Glucose 287 mg/dL      Calcium 9.0 mg/dL      Corrected Calcium 9.6 mg/dL      AST 16 U/L      ALT 30 U/L      Alkaline Phosphatase 59 U/L      Total Protein 6.5 g/dL      Albumin 3.2 g/dL      Total Bilirubin 0.41 mg/dL      eGFR 109 ml/min/1.73sq m     Narrative:      [place] guidelines for Chronic Kidney Disease (CKD):   •  Stage 1 with normal or high GFR (GFR > 90 mL/min/1.73 square meters)  •  Stage 2 Mild CKD (GFR = 60-89 mL/min/1.73 square meters)  •  Stage 3A Moderate CKD (GFR = 45-59 mL/min/1.73 square meters)  •  Stage 3B Moderate CKD (GFR = 30-44 mL/min/1.73 square meters)  •  Stage 4 Severe CKD (GFR = 15-29 mL/min/1.73 square meters)  •  Stage 5 End Stage CKD (GFR <15 mL/min/1.73 square meters)  Note: GFR calculation is accurate only with a steady state creatinine    CBC and differential [[id number]]  (Abnormal) Collected: 07/27/23 2717    Lab Status: Final result Specimen: Blood from Arm, Left Updated: 07/27/23 1708     WBC 9.50 Thousand/uL      RBC 3.88 Million/uL      Hemoglobin 11.1 g/dL      Hematocrit 34.2 %      MCV 88 fL      MCH 28.6 pg      MCHC 32.5 g/dL      RDW 14.6 %      MPV 10.3 fL      Platelets 557 Thousands/uL      nRBC 0 /100 WBCs      Neutrophils Relative 68 %      Immat GRANS % 1 %      Lymphocytes Relative 20 %      Monocytes Relative 9 %      Eosinophils Relative 2 %      Basophils Relative 0 %      Neutrophils Absolute 6.47 Thousands/µL      Immature Grans Absolute 0.07 Thousand/uL      Lymphocytes Absolute 1.91 Thousands/µL      Monocytes Absolute 0.87 Thousand/µL      Eosinophils Absolute 0.15 Thousand/µL      Basophils Absolute 0.03 Thousands/µL                  XR chest 2 views   ED Interpretation by Solitario Zuleta DO (07/27 1909)   No acute cardiopulmonary disease my independent interpretation      Final Result by Dimitris Juarez MD (07/28 2219)      No acute cardiopulmonary disease. Workstation performed: [id number]               Procedures  Procedures      ED Course  ED Course as of 07/28/23 2139   Thu Jul 27, 2023   0355 Procedure Note: EKG  Date/Time: 07/27/23 6:41 PM   Interpreted by: Syed Moon   Indications / Diagnosis: SOB  ECG reviewed by me, the ED Provider: yes   The EKG demonstrates:  Rhythm: normal sinus  Intervals: normal intervals  Axis: right axis  QRS/Blocks: normal QRS, PACs  ST Changes: No acute ST Changes, no STD/HETAL. SBIRT 22yo+    Flowsheet Row Most Recent Value   Initial Alcohol Screen: US AUDIT-C     1. How often do you have a drink containing alcohol? 0 Filed at: 07/27/2023 1920   2. How many drinks containing alcohol do you have on a typical day you are drinking? 0 Filed at: 07/27/2023 1920   3b. FEMALE Any Age, or MALE 65+: How often do you have 4 or more drinks on one occassion?  0 Filed at: 07/27/2023 1920   Audit-C Score 0 Filed at: 07/27/2023 1920   MICHAEL: How many times in the past year have you. .. Used an illegal drug or used a prescription medication for non-medical reasons? Never Filed at: 07/27/2023 1920                Medical Decision Making  Patient is a 77-year-old male presenting for evaluation of shortness of breath. Patient history evaluation, differential diagnosis includes but not limited to: COPD with acute exacerbation, pneumonia, pneumothorax, arrhythmia, atypical ACS. Plan: CBC, BMP, troponin, ECG, chest x-ray, ambrose nebulizer, steroids, magnesium, reassessment    Patient's labs unremarkable. ECG without active ischemia, STEMI, or concerning arrhythmia. Chest x-ray without acute cardiopulmonary disease on my independent interpretation. Patient's symptoms improved following treatment as above, however he continues to be somewhat symptomatic and does not feel comfortable managing his symptoms at home. Discussed this with the inpatient team who agrees to accept the patient for admission. Patient seems understand this plan and is agreeable. All questions answered. Patient admitted. I independently reviewed this patient's chart including his most recent hospital admission. I considered his chronic medical conditions including his history of COPD in my medical decision making. Amount and/or Complexity of Data Reviewed  Labs: ordered. Radiology: ordered and independent interpretation performed. Risk  Prescription drug management. Decision regarding hospitalization.             Disposition  Final diagnoses:   COPD with acute exacerbation ([address])     Time reflects when diagnosis was documented in both MDM as applicable and the Disposition within this note     Time User Action Codes Description Comment    7/27/2023  7:34 PM [place] [J44.1] COPD with acute exacerbation ([address])     7/28/2023  3:18 PM Karolee Meter Add [F41.9] Anxiety       ED Disposition     ED Disposition   Admit    Condition   Stable Date/Time   Thu Jul 27, 2023  7:34 PM    Comment   Case was discussed with MATY and the patient's admission status was agreed to be Admission Status: observation status to the service of Dr. Bijan Sage .            Follow-up Information    None         Discharge Medication List as of 7/28/2023  3:46 PM      START taking these medications    Details   busPIRone (BUSPAR) 10 mg tablet Take 1 tablet (10 mg total) by mouth 3 (three) times a day, Starting Fri 7/28/2023, Normal         CONTINUE these medications which have CHANGED    Details   azithromycin (ZITHROMAX) 500 MG tablet Take 1 tablet (500 mg total) by mouth every 24 hours for 4 doses, Starting Fri 7/28/2023, Until Tue 8/1/2023, Normal         CONTINUE these medications which have NOT CHANGED    Details   albuterol (Proventil HFA) 90 mcg/act inhaler Inhale 2 puffs every 6 (six) hours as needed for wheezing, Starting Fri 6/10/2022, Normal      atorvastatin (LIPITOR) 80 mg tablet Take 1 tablet (80 mg total) by mouth daily with dinner, Starting Sat 9/22/2018, Normal      B Complex Vitamins (VITAMIN B COMPLEX 100 IJ) Take 1 tablet by mouth daily, Historical Med      betamethasone valerate (VALISONE) 0.1 % cream Apply topically 2 (two) times a day, Starting Fri 10/4/2013, Historical Med      budesonide (PULMICORT) 0.5 mg/2 mL nebulizer solution Take 2 mL (0.5 mg total) by nebulization every 12 (twelve) hours Rinse mouth after use., Starting Fri 6/10/2022, No Print      fluticasone (FLONASE) 50 mcg/act nasal spray 1 spray into each nostril 2 (two) times a day, Starting Fri 10/4/2013, Historical Med      fluticasone-salmeterol (ADVAIR) 500-50 mcg/dose inhaler Inhale 1 puff 2 (two) times a day Rinse mouth after use., Historical Med      folic acid (FOLVITE) 1 mg tablet Take 1 tablet (1 mg total) by mouth daily, Starting Sat 6/11/2022, No Print      glipiZIDE (GLUCOTROL) 5 mg tablet Take 5 mg by mouth in the morning, Historical Med      Insulin Glargine (BASAGLAR KWIKPEN SC) Inject 30 Units under the skin daily at bedtime, Historical Med      Insulin Lispro (HUMALOG KWIKPEN SC) Inject 2 Units under the skin 3 (three) times a day with meals, Historical Med      ipratropium-albuterol (COMBIVENT)  mcg/act inhaler Inhale, Historical Med      lisinopril (ZESTRIL) 40 mg tablet Take 40 mg by mouth daily , Historical Med      LORazepam (Ativan) 0.5 mg tablet Take by mouth every 6 (six) hours as needed for anxiety , Historical Med      melatonin 3 mg Take 1 tablet (3 mg total) by mouth daily at bedtime as needed (30), Starting Fri 9/21/2018, Normal      metFORMIN (GLUCOPHAGE) 1000 MG tablet Take 1 tablet by mouth every 12 (twelve) hours, Historical Med      Multiple Vitamin (MULTIVITAMIN) tablet Take 1 tablet by mouth daily, Historical Med      pantoprazole (PROTONIX) 40 mg tablet Take 1 tablet by mouth Twice daily, Historical Med      predniSONE 10 mg tablet Multiple Dosages:Starting Wed 7/26/2023, Until Fri 7/28/2023 at 2359, THEN Starting Sat 7/29/2023, Until Mon 7/31/2023 at 2359, THEN Starting Tue 8/1/2023, Until Thu 8/3/2023 at 2359, THEN Starting Fri 8/4/2023, Until Sun 8/6/2023 at 2359Take 4 tabl ets (40 mg total) by mouth daily for 3 days, THEN 3 tablets (30 mg total) daily for 3 days, THEN 2 tablets (20 mg total) daily for 3 days, THEN 1 tablet (10 mg total) daily for 3 days. Do not start before July 26, 2023., Normal      senna (SENOKOT) 8.6 mg Take 2 tablets (17.2 mg total) by mouth daily, Starting Sat 6/11/2022, No Print      thiamine 100 MG tablet Take 1 tablet (100 mg total) by mouth daily, Starting Sat 9/22/2018, Normal      tiotropium (SPIRIVA) 18 mcg inhalation capsule Place 18 mcg into inhaler and inhale daily, Historical Med           Outpatient Discharge Orders   Ambulatory Referral to Psychiatry   Standing Status: Future Standing Exp.  Date: 07/28/24      Activity as tolerated       PDMP Review     None           ED Provider  Attending physically available and isis Ortega. I managed the patient along with the ED Attending.     Electronically Signed by         Solitario Zuleta DO  07/28/23 8546

## 2023-07-27 NOTE — ASSESSMENT & PLAN NOTE
Lab Results   Component Value Date    HGBA1C 7.4 (H) 06/27/2023       Recent Labs     07/24/23  2150 07/25/23  0732   POCGLU 326* 197*       Blood Sugar Average: Last 72 hrs:   home regimen Basaglar 30 units at bedtime and humalog 5 units TIDwmeal.  Start Levemir 20 units at HS and sliding scale  Titrate as necessary

## 2023-07-27 NOTE — H&P
[address] [facility]  H&P  Name: Alirio Dubois 70 y.o. male I MRN: [id number]  Unit/Bed#: ED 32 I Date of Admission: 7/27/2023   Date of Service: 7/27/2023 I Hospital Day: 0      Assessment/Plan   * Chronic obstructive pulmonary disease with acute exacerbation [facility])  Assessment & Plan  Patient who was discharged only 2 days ago with COPD exacerbation, returned today with complaint of worsening wheezing and dyspnea. In ER, patient was given Pulmicort, albuterol/Atrovent, Solu-Medrol 125 mg, and magnesium sulfate. On my exam, patient has bilateral air entry, wheezing has resolved since receiving the treatment in ED. Patient is still on tapering dose of prednisone, will continue prednisone 40 mg daily. We will continue Xopenex/Atrovent every 6 hours, fluticasone-vilanterol inhaler. roflumilast is non formulary  Complete azithromycin tomorrow morning. Patient adamantly declines smoking for the last 2 years. Due to multiple hospital stay, numerous ED visit at [facility], now [facility], will consult pulmonology. Chronic respiratory failure (HCC)  Assessment & Plan  chronic hypoxia on 2 L oxygen at baseline    Diabetic polyneuropathy associated with type 2 diabetes mellitus [facility])  Assessment & Plan  Lab Results   Component Value Date    HGBA1C 7.4 (H) 06/27/2023       Recent Labs     07/24/23  2150 07/25/23  0732   POCGLU 326* 197*       Blood Sugar Average: Last 72 hrs:   home regimen Basaglar 30 units at bedtime and humalog 5 units TIDwmeal.  Start Levemir 20 units at HS and sliding scale  Titrate as necessary    CAD (coronary artery disease)  Assessment & Plan  Resume home medication aspirin, statin, lisinopril    Alcohol abuse  Assessment & Plan  Patient is poor historian, reports last alcohol drink was 4 days ago.   Placed on CIWA protocol  Continue home medication thiamine/folic acid/MVI       VTE Prophylaxis: Heparin  / sequential compression device   Code Status: full code Anticipated Length of Stay:  Patient will be admitted on an Observation basis with an anticipated length of stay of  < 2 midnights. Justification for Hospital Stay: COPD exacerbation, wheezing    Total Time for Visit, including Counseling / Coordination of Care: 60 minutes. Greater than 50% of this total time spent on direct patient counseling and coordination of care. Chief Complaint:   Niraj Olivera    History of Present Illness:    Poly Ortega is a 70 y.o. male discharged two days ago, PMH of severe COPD, chronic hypoxia on 2 L oxygen at baseline, untreated KLARISSA, alcohol abuse, 9 ED visits this month and 2 hospitalization at [facility], today presented to [facility] ED with complaint of shortness of breath and wheezing. Patient was mildly tachypneic and wheezing on arrival, received heart nebs, magnesium sulfate, Solu-Medrol 125 mg IV with improvement of her symptoms. Denies any chest pain or fever or chills.   Upon arrival to ED, he was requiring 4 L oxygen via nasal cannula, which was weaned down to 2 L at baseline. He denies smoking for last 2 years. Patient reports he is still drinking alcohol, last drink was 4 days ago, drinks about 3-4 beers daily on long-term. Due to recurrent ED visit and hospitalization, will consult pulmonology. Review of Systems: Patient    Review of Systems   Constitutional: Negative for chills and fever. HENT: Negative for ear pain and sore throat. Eyes: Negative for pain and visual disturbance. Respiratory: Positive for shortness of breath and wheezing. Negative for cough. Cardiovascular: Negative for chest pain and palpitations. Gastrointestinal: Negative for abdominal pain and vomiting. Genitourinary: Negative for dysuria and hematuria. Musculoskeletal: Negative for arthralgias and back pain. Skin: Negative for color change and rash. Neurological: Negative for seizures and syncope.    All other systems reviewed and are negative. Past Medical and Surgical History:     Past Medical History:   Diagnosis Date   • Cardiac arrest [facility])    • COPD (chronic obstructive pulmonary disease) ([address])    • CVA (cerebral vascular accident) ([address])    • Diabetes mellitus ([address])    • Heart attack ([address])    • Hypertension    • Stroke [facility])        Past Surgical History:   Procedure Laterality Date   • CERVICAL FUSION N/A 9/10/2018    Procedure: Posterior cervical decompressive laminectomy C3-6; Posterior cervical lateral mass and pedicle fixation fusion C1-T1;  Surgeon: Jamel Tatum MD;  Location: [facility];  Service: Neurosurgery       Meds/Allergies:    Prior to Admission medications    Medication Sig Start Date End Date Taking? Authorizing Provider   albuterol (Proventil HFA) 90 mcg/act inhaler Inhale 2 puffs every 6 (six) hours as needed for wheezing 6/10/22   Mayo Meneses PA-C   albuterol (PROVENTIL HFA,VENTOLIN HFA) 90 mcg/act inhaler Inhale 2 puffs 4 (four) times a day as needed for wheezing or shortness of breath 2/21/20   Starr Miller PA-C   atorvastatin (LIPITOR) 80 mg tablet Take 1 tablet (80 mg total) by mouth daily with dinner 9/22/18   Cullen Zamora MD   azithromycin (ZITHROMAX) 500 MG tablet Take 1 tablet (500 mg total) by mouth every 24 hours for 1 dose 7/25/23 7/26/23  TATIANA Thorpe   B Complex Vitamins (VITAMIN B COMPLEX 100 IJ) Take 1 tablet by mouth daily    Historical Provider, MD   betamethasone valerate (VALISONE) 0.1 % cream Apply topically 2 (two) times a day 10/4/13   Historical Provider, MD   budesonide (PULMICORT) 0.5 mg/2 mL nebulizer solution Take 1 vial (0.5 mg total) by nebulization every 12 (twelve) hours Rinse mouth after use. 4/19/20   Damon Chang MD   budesonide (PULMICORT) 0.5 mg/2 mL nebulizer solution Take 2 mL (0.5 mg total) by nebulization every 12 (twelve) hours Rinse mouth after use.  6/10/22   Jenna Meneses PA-C   fluticasone (FLONASE) 50 mcg/act nasal spray 1 spray into each nostril 2 (two) times a day 10/4/13   Historical Provider, MD   fluticasone-salmeterol (ADVAIR) 500-50 mcg/dose inhaler Inhale 1 puff 2 (two) times a day Rinse mouth after use. Historical Provider, MD   folic acid (FOLVITE) 1 mg tablet Take by mouth daily    Historical Provider, MD   folic acid (FOLVITE) 1 mg tablet Take 1 tablet (1 mg total) by mouth daily 6/11/22   Kyle Meneses PA-C   glipiZIDE (GLUCOTROL) 5 mg tablet Take 5 mg by mouth in the morning    Historical Provider, MD   Insulin Glargine (BASAGLAR KWIKPEN SC) Inject 30 Units under the skin daily at bedtime    Historical Provider, MD   Insulin Lispro (HUMALOG KWIKPEN SC) Inject 2 Units under the skin 3 (three) times a day with meals    Historical Provider, MD   ipratropium-albuterol (COMBIVENT)  mcg/act inhaler Inhale    Historical Provider, MD   lisinopril (ZESTRIL) 40 mg tablet Take 40 mg by mouth daily     Historical Provider, MD   LORazepam (Ativan) 0.5 mg tablet Take by mouth every 6 (six) hours as needed for anxiety     Historical Provider, MD   melatonin 3 mg Take 1 tablet (3 mg total) by mouth daily at bedtime as needed (30) 9/21/18   Melodie Gamboa MD   metFORMIN (GLUCOPHAGE) 1000 MG tablet Take 1 tablet by mouth every 12 (twelve) hours    Historical Provider, MD   Multiple Vitamin (MULTIVITAMIN) tablet Take 1 tablet by mouth daily    Historical Provider, MD   pantoprazole (PROTONIX) 40 mg tablet Take 1 tablet by mouth Twice daily    Historical Provider, MD   predniSONE 10 mg tablet Take 4 tablets (40 mg total) by mouth daily for 3 days, THEN 3 tablets (30 mg total) daily for 3 days, THEN 2 tablets (20 mg total) daily for 3 days, THEN 1 tablet (10 mg total) daily for 3 days.  Do not start before July 26, 2023. 7/26/23 8/7/23  TATIANA Zamora   senna (SENOKOT) 8.6 mg Take 2 tablets (17.2 mg total) by mouth daily 6/11/22   Samella Quiet Lindenmoyer, PA-C   thiamine 100 MG tablet Take 1 tablet (100 mg total) by mouth daily 9/22/18   Janeth Mccain Rusty Box MD   tiotropium [facility]) 18 mcg inhalation capsule Place 18 mcg into inhaler and inhale daily    Historical Provider, MD     I have reviewed home medications using allscripts. Allergies: Allergies   Allergen Reactions   • Amoxicillin Hives   • Augmentin [Amoxicillin-Pot Clavulanate] Hives       Social History:     Marital Status: Single      Substance Use History:   Social History     Substance and Sexual Activity   Alcohol Use Not Currently   • Alcohol/week: 8.0 - 12.0 standard drinks of alcohol   • Types: 8 - 12 Cans of beer per week    Comment: every day drinker     Social History     Tobacco Use   Smoking Status Former   • Types: Cigarettes   Smokeless Tobacco Never   Tobacco Comments    quit 5 months ago      Social History     Substance and Sexual Activity   Drug Use Never       Family History:    Family History   Problem Relation Age of Onset   • Heart attack Mother        Physical Exam:     Vitals:   Blood Pressure: 142/72 (07/27/23 1915)  Pulse: (!) 114 (07/27/23 1915)  Temperature: 98.3 °F (36.8 °C) (07/27/23 1625)  Temp Source: Tympanic (07/27/23 1625)  Respirations: 20 (07/27/23 1915)  SpO2: 92 % (07/27/23 1915)    Physical Exam  Vitals and nursing note reviewed. Constitutional:       General: He is not in acute distress. Appearance: He is well-developed. HENT:      Head: Normocephalic and atraumatic. Eyes:      Conjunctiva/sclera: Conjunctivae normal.   Cardiovascular:      Rate and Rhythm: Normal rate and regular rhythm. Heart sounds: No murmur heard. Pulmonary:      Effort: Pulmonary effort is normal. No respiratory distress. Breath sounds: Normal breath sounds. Comments: On exam lungs are clear to auscultation, at 2 L baseline  Abdominal:      Palpations: Abdomen is soft. Tenderness: There is no abdominal tenderness. Musculoskeletal:         General: No swelling. Cervical back: Neck supple. Skin:     General: Skin is warm and dry.       Capillary Refill: Capillary refill takes less than 2 seconds. Neurological:      Mental Status: He is alert. Psychiatric:         Mood and Affect: Mood normal.          Additional Data:     Lab Results: I have personally reviewed pertinent reports. Results from last 7 days   Lab Units 07/27/23  1645   WBC Thousand/uL 9.50   HEMOGLOBIN g/dL 11.1*   HEMATOCRIT % 34.2*   PLATELETS Thousands/uL 268   NEUTROS PCT % 68   LYMPHS PCT % 20   MONOS PCT % 9   EOS PCT % 2     Results from last 7 days   Lab Units 07/27/23  1645   SODIUM mmol/L 135   POTASSIUM mmol/L 3.9   CHLORIDE mmol/L 103   CO2 mmol/L 27   BUN mg/dL 16   CREATININE mg/dL 0.51*   ANION GAP mmol/L 5   CALCIUM mg/dL 9.0   ALBUMIN g/dL 3.2*   TOTAL BILIRUBIN mg/dL 0.41   ALK PHOS U/L 59   ALT U/L 30   AST U/L 16   GLUCOSE RANDOM mg/dL 287*         Results from last 7 days   Lab Units 07/25/23  0732 07/24/23  2150 07/24/23  1607 07/24/23  1250 07/24/23  1117 07/24/23  0750 07/23/23  2052 07/23/23  1717   POC GLUCOSE mg/dl 197* 326* 187* 249* >500* 280* 439* 220*         Results from last 7 days   Lab Units 07/24/23  0446   PROCALCITONIN ng/ml 0.10       Imaging: I have personally reviewed pertinent reports. XR chest 2 views   ED Interpretation by Kathy Hall DO (07/27 1909)   No acute cardiopulmonary disease my independent interpretation          EKG, Pathology, and Other Studies Reviewed on Admission:   · EKG: none obtained this admission    Allscripts / Epic Records Reviewed: Yes     ** Please Note: This note has been constructed using a voice recognition system.  **

## 2023-07-27 NOTE — ASSESSMENT & PLAN NOTE
Patient is poor historian, reports last alcohol drink was 4 days ago.   Placed on CIWA protocol  Continue home medication thiamine/folic acid/MVI

## 2023-07-27 NOTE — ED ATTENDING ATTESTATION
7/27/2023  IAlthea MD, saw and evaluated the patient. I have discussed the patient with the resident/non-physician practitioner and agree with the resident's/non-physician practitioner's findings, Plan of Care, and MDM as documented in the resident's/non-physician practitioner's note, except where noted. All available labs and Radiology studies were reviewed. I was present for key portions of any procedure(s) performed by the resident/non-physician practitioner and I was immediately available to provide assistance. At this point I agree with the current assessment done in the Emergency Department. I have conducted an independent evaluation of this patient a history and physical is as follows: This is an evaluation of a 69-year-old male with past medical history notable for COPD as well as diabetes, CAD, EtOH abuse who presents to the emergency department with shortness of breath. States that symptoms worsened throughout the day today most notably within the last 45 minutes prior to arrival.  Generally noncompliant with medications did tried inhaler prior to arrival without relief. Denies any chest pain but does have some tightness with breathing. No palpitations. No lightheadedness no dizziness. No fevers no chills no cough. No falls no trauma. On exam patient is mildly tachypneic currently receiving nebulizer treatment. Vital signs stable with oxygen. HEENT is normocephalic and atraumatic. Moist mucous membranes clear sclera and conjunctive. Neck is supple for range of motion. Heart is tachycardic. Lungs are decreased at bases with scattered expiratory wheeze. Tachypnea. Speaks in short sentences. Abdomen soft positive bowel sounds no rebound no guarding nontender. No edema. No calf tenderness palpation. Intact distal pulses capillary fill less than 2 seconds. Awake alert oriented and appropriate. Assessment and plan shortness of breath with known COPD.   We will treat with breathing treatment steroids magnesium. We will do labs EKG chest x-ray. We will reevaluate monitor on continuous pulse ox and treat accordingly. Patient will likely require admission. ED Course     Review of record shows me recent admission for the same.     Critical Care Time  Procedures

## 2023-07-27 NOTE — ED NOTES
Patient Health Care Proxy, Daughter, called and stated that due to patient's continuous hospital admissions and her discussion with patient's pulmonolgist, she thinks that patient's complaints may be due to a psychological issue. Daughter requested that this was addressed. Resident made aware.       Hawa Mckenzie RN  07/27/23 3339

## 2023-07-27 NOTE — ASSESSMENT & PLAN NOTE
Patient who was discharged only 2 days ago with COPD exacerbation, returned today with complaint of worsening wheezing and dyspnea. In ER, patient was given Pulmicort, albuterol/Atrovent, Solu-Medrol 125 mg, and magnesium sulfate. On my exam, patient has bilateral air entry, wheezing has resolved since receiving the treatment in ED. Patient is still on tapering dose of prednisone, will continue prednisone 40 mg daily. We will continue Xopenex/Atrovent every 6 hours, fluticasone-vilanterol inhaler. roflumilast is non formulary  Complete azithromycin tomorrow morning. Patient adamantly declines smoking for the last 2 years. Due to multiple hospital stay, numerous ED visit at [address], now [facility], will consult pulmonology.

## 2023-07-28 VITALS
HEART RATE: 80 BPM | OXYGEN SATURATION: 92 % | TEMPERATURE: 98.7 F | RESPIRATION RATE: 20 BRPM | SYSTOLIC BLOOD PRESSURE: 115 MMHG | DIASTOLIC BLOOD PRESSURE: 60 MMHG

## 2023-07-28 PROBLEM — F41.9 ANXIETY: Status: ACTIVE | Noted: 2023-07-28

## 2023-07-28 LAB
ANION GAP SERPL CALCULATED.3IONS-SCNC: 5 MMOL/L
BUN SERPL-MCNC: 13 MG/DL (ref 5–25)
CALCIUM SERPL-MCNC: 8.6 MG/DL (ref 8.3–10.1)
CHLORIDE SERPL-SCNC: 103 MMOL/L (ref 96–108)
CO2 SERPL-SCNC: 28 MMOL/L (ref 21–32)
CREAT SERPL-MCNC: 0.49 MG/DL (ref 0.6–1.3)
ERYTHROCYTE [DISTWIDTH] IN BLOOD BY AUTOMATED COUNT: 14.4 % (ref 11.6–15.1)
GFR SERPL CREATININE-BSD FRML MDRD: 111 ML/MIN/1.73SQ M
GLUCOSE SERPL-MCNC: 265 MG/DL (ref 65–140)
GLUCOSE SERPL-MCNC: 329 MG/DL (ref 65–140)
GLUCOSE SERPL-MCNC: 377 MG/DL (ref 65–140)
GLUCOSE SERPL-MCNC: >500 MG/DL (ref 65–140)
HCT VFR BLD AUTO: 31.1 % (ref 36.5–49.3)
HGB BLD-MCNC: 10.6 G/DL (ref 12–17)
MCH RBC QN AUTO: 29.4 PG (ref 26.8–34.3)
MCHC RBC AUTO-ENTMCNC: 34.1 G/DL (ref 31.4–37.4)
MCV RBC AUTO: 86 FL (ref 82–98)
PLATELET # BLD AUTO: 245 THOUSANDS/UL (ref 149–390)
PMV BLD AUTO: 10.3 FL (ref 8.9–12.7)
POTASSIUM SERPL-SCNC: 4.1 MMOL/L (ref 3.5–5.3)
PROCALCITONIN SERPL-MCNC: <0.05 NG/ML
RBC # BLD AUTO: 3.6 MILLION/UL (ref 3.88–5.62)
SODIUM SERPL-SCNC: 136 MMOL/L (ref 135–147)
WBC # BLD AUTO: 7.09 THOUSAND/UL (ref 4.31–10.16)

## 2023-07-28 PROCEDURE — 82948 REAGENT STRIP/BLOOD GLUCOSE: CPT

## 2023-07-28 PROCEDURE — 99204 OFFICE O/P NEW MOD 45 MIN: CPT | Performed by: PSYCHIATRY & NEUROLOGY

## 2023-07-28 PROCEDURE — 84145 PROCALCITONIN (PCT): CPT | Performed by: FAMILY MEDICINE

## 2023-07-28 PROCEDURE — 85027 COMPLETE CBC AUTOMATED: CPT | Performed by: FAMILY MEDICINE

## 2023-07-28 PROCEDURE — 94760 N-INVAS EAR/PLS OXIMETRY 1: CPT

## 2023-07-28 PROCEDURE — 99239 HOSP IP/OBS DSCHRG MGMT >30: CPT | Performed by: PHYSICIAN ASSISTANT

## 2023-07-28 PROCEDURE — 80048 BASIC METABOLIC PNL TOTAL CA: CPT | Performed by: FAMILY MEDICINE

## 2023-07-28 PROCEDURE — 94640 AIRWAY INHALATION TREATMENT: CPT

## 2023-07-28 PROCEDURE — 99223 1ST HOSP IP/OBS HIGH 75: CPT | Performed by: INTERNAL MEDICINE

## 2023-07-28 PROCEDURE — 94664 DEMO&/EVAL PT USE INHALER: CPT

## 2023-07-28 RX ORDER — BUSPIRONE HYDROCHLORIDE 10 MG/1
10 TABLET ORAL 3 TIMES DAILY
Status: DISCONTINUED | OUTPATIENT
Start: 2023-07-28 | End: 2023-07-28 | Stop reason: HOSPADM

## 2023-07-28 RX ORDER — BUSPIRONE HYDROCHLORIDE 5 MG/1
5 TABLET ORAL 3 TIMES DAILY
Status: DISCONTINUED | OUTPATIENT
Start: 2023-07-28 | End: 2023-07-28

## 2023-07-28 RX ORDER — AZITHROMYCIN 500 MG/1
500 TABLET, FILM COATED ORAL EVERY 24 HOURS
Qty: 4 TABLET | Refills: 0 | Status: SHIPPED | OUTPATIENT
Start: 2023-07-28 | End: 2023-08-01

## 2023-07-28 RX ORDER — BUSPIRONE HYDROCHLORIDE 10 MG/1
10 TABLET ORAL 3 TIMES DAILY
Qty: 90 TABLET | Refills: 0 | Status: SHIPPED | OUTPATIENT
Start: 2023-07-28

## 2023-07-28 RX ADMIN — PREDNISONE 40 MG: 20 TABLET ORAL at 09:00

## 2023-07-28 RX ADMIN — LEVALBUTEROL HYDROCHLORIDE 1.25 MG: 1.25 SOLUTION RESPIRATORY (INHALATION) at 14:12

## 2023-07-28 RX ADMIN — IPRATROPIUM BROMIDE 0.5 MG: 0.5 SOLUTION RESPIRATORY (INHALATION) at 14:12

## 2023-07-28 RX ADMIN — HEPARIN SODIUM 5000 UNITS: 5000 INJECTION INTRAVENOUS; SUBCUTANEOUS at 13:53

## 2023-07-28 RX ADMIN — AZITHROMYCIN 500 MG: 500 TABLET, FILM COATED ORAL at 09:00

## 2023-07-28 RX ADMIN — ATORVASTATIN CALCIUM 80 MG: 80 TABLET, FILM COATED ORAL at 15:40

## 2023-07-28 RX ADMIN — LISINOPRIL 40 MG: 20 TABLET ORAL at 09:00

## 2023-07-28 RX ADMIN — THIAMINE HCL TAB 100 MG 100 MG: 100 TAB at 09:00

## 2023-07-28 RX ADMIN — INSULIN LISPRO 4 UNITS: 100 INJECTION, SOLUTION INTRAVENOUS; SUBCUTANEOUS at 06:28

## 2023-07-28 RX ADMIN — SENNOSIDES 17.2 MG: 8.6 TABLET, FILM COATED ORAL at 09:00

## 2023-07-28 RX ADMIN — INSULIN LISPRO 2 UNITS: 100 INJECTION, SOLUTION INTRAVENOUS; SUBCUTANEOUS at 09:01

## 2023-07-28 RX ADMIN — LEVALBUTEROL HYDROCHLORIDE 1.25 MG: 1.25 SOLUTION RESPIRATORY (INHALATION) at 07:02

## 2023-07-28 RX ADMIN — PANTOPRAZOLE SODIUM 40 MG: 40 TABLET, DELAYED RELEASE ORAL at 06:27

## 2023-07-28 RX ADMIN — SODIUM CHLORIDE: 9 INJECTION, SOLUTION INTRAVENOUS at 11:27

## 2023-07-28 RX ADMIN — FOLIC ACID 1 MG: 1 TABLET ORAL at 09:00

## 2023-07-28 RX ADMIN — BUSPIRONE HYDROCHLORIDE 10 MG: 10 TABLET ORAL at 15:40

## 2023-07-28 RX ADMIN — IPRATROPIUM BROMIDE 0.5 MG: 0.5 SOLUTION RESPIRATORY (INHALATION) at 07:02

## 2023-07-28 RX ADMIN — FLUTICASONE FUROATE AND VILANTEROL TRIFENATATE 1 PUFF: 200; 25 POWDER RESPIRATORY (INHALATION) at 09:00

## 2023-07-28 NOTE — DISCHARGE INSTR - AVS FIRST PAGE
The psychiatrist increased your BuSpar to 10 mg 3 times daily. I did write a new prescription if you need more medication. Continue taking your Ativan as needed. I did place an ambulatory referral to psychiatry for outpatient follow-up.

## 2023-07-28 NOTE — ASSESSMENT & PLAN NOTE
· Patient notes anxiety, worsens with shortness of breath causing panic attacks  · BuSpar dose increased from twice daily to 3 times daily  · Seen in consult by psychiatry, recommends outpatient follow-up  · Continue as needed Ativan as prescribed by PCP  · Unfortunately history of alcohol abuse puts him at higher risk for anxiety related issues

## 2023-07-28 NOTE — DISCHARGE SUMMARY
[address] [facility]  Discharge- Doc Dais 1955, 70 y.o. male MRN: [id number]  Unit/Bed#: CW2 214-01 Encounter: [id number]  Primary Care Provider: TATIANA Yost   Date and time admitted to hospital: 7/27/2023  4:14 PM    * Chronic obstructive pulmonary disease with acute exacerbation [facility])  Assessment & Plan  · Patient who was discharged only 2 days ago with COPD exacerbation, returned today with complaint of worsening wheezing and dyspnea. · In ER, patient was given Pulmicort, albuterol/Atrovent, Solu-Medrol 125 mg, and magnesium sulfate. · Improvement in wheezing today  · Pulmonology consulted, plan to resume prednisone taper  · Continue nebulizers and inhalers  · roflumilast is non formulary, resume at discharge  · Restart Zithromax  · Patient adamantly declines smoking for the last 2 years. · Outpatient pulmonology follow-up    Chronic respiratory failure ([address])  Assessment & Plan  · chronic hypoxia on 2 L oxygen at baseline    Diabetic polyneuropathy associated with type 2 diabetes mellitus [facility])  Assessment & Plan  Lab Results   Component Value Date    HGBA1C 7.4 (H) 06/27/2023       Recent Labs     07/27/23  2203 07/28/23  0528 07/28/23  1052 07/28/23  1232   POCGLU 480* 377* >500* 265*     ·  home regimen Basaglar 30 units at bedtime and humalog 5 units TIDwmeal.  · Start Levemir 20 units at HS and sliding scale  · Titrate as necessary  · Did have significantly elevated blood glucose today, likely secondary to significant amount of jorge crackers eaten prior to reading, advised a more diabetic diet    CAD (coronary artery disease)  Assessment & Plan  · Resume home medication aspirin, statin, lisinopril    Alcohol abuse  Assessment & Plan  · Patient is poor historian, reports last alcohol drink was 4 days ago.   · Placed on CIWA protocol  · Continue home medication thiamine/folic acid/MVI        Medical Problems     Resolved Problems  Date Reviewed: 7/28/2023 None       Discharging Physician / Practitioner: Atul Thorpe PA-C  PCP: Sommer Gunn, [facility]  Admission Date:   Admission Orders (From admission, onward)     Ordered        07/27/23 1934  Place in Observation  Once                      Discharge Date: 07/28/23    Consultations During Hospital Stay:  · Pulmonology, psychiatry    Procedures Performed:   XR chest 2 views    Result Date: 7/28/2023  Impression: No acute cardiopulmonary disease. Significant Findings / Test Results:   · See above    Incidental Findings:   · none     Test Results Pending at Discharge (will require follow up): · None     Outpatient Tests Requested:  · Pulmonology follow-up  · Psychiatry follow-up    Complications: None    Reason for Admission: COPD exacerbation    Hospital Course:   Renny Preston is a 70 y.o. male patient who originally presented to the hospital on 7/27/2023 due to shortness of breath. He was diagnosed with COPD exacerbation and admitted to the hospital.  He was seen in consult by pulmonology. He was placed on prednisone taper and Zithromax. He received scheduled nebulizers while inpatient. Given his frequent admissions and complaints of anxiety, he was seen in consult by psychiatry. His BuSpar dose was increased and he was advised to follow closely as an outpatient. He was cleared for discharge home. Please see above list of diagnoses and related plan for additional information. Condition at Discharge: stable    Discharge Day Visit / Exam:   Subjective: Patient notes he is feeling better today. Notes improvement in his shortness of breath. Feels comfortable returning home today. Vitals: Blood Pressure: 141/84 (07/28/23 0800)  Pulse: 81 (07/28/23 0800)  Temperature: (!) 97.4 °F (36.3 °C) (07/28/23 0800)  Temp Source: Tympanic (07/27/23 1625)  Respirations: 20 (07/27/23 2210)  SpO2: 98 % (07/28/23 1414)  Exam:   Physical Exam  Vitals reviewed.    Constitutional:       General: He is not in acute distress. HENT:      Head: Normocephalic and atraumatic. Eyes:      General: No scleral icterus. Conjunctiva/sclera: Conjunctivae normal.   Cardiovascular:      Rate and Rhythm: Normal rate and regular rhythm. Heart sounds: No murmur heard. Pulmonary:      Effort: Pulmonary effort is normal. No respiratory distress. Breath sounds: Normal breath sounds. No wheezing. Abdominal:      General: Bowel sounds are normal. There is no distension. Palpations: Abdomen is soft. Tenderness: There is no abdominal tenderness. Musculoskeletal:      Cervical back: Neck supple. Right lower leg: No edema. Left lower leg: No edema. Skin:     General: Skin is warm and dry. Neurological:      Mental Status: He is alert. Mental status is at baseline. Psychiatric:         Mood and Affect: Mood normal.         Behavior: Behavior normal.          Discussion with Family: Updated  (daughter) via phone. Discharge instructions/Information to patient and family:   See after visit summary for information provided to patient and family. Provisions for Follow-Up Care:  See after visit summary for information related to follow-up care and any pertinent home health orders. Disposition:   Home    Planned Readmission: None     Discharge Statement:  I spent 45 minutes discharging the patient. This time was spent on the day of discharge. I had direct contact with the patient on the day of discharge. Greater than 50% of the total time was spent examining patient, answering all patient questions, arranging and discussing plan of care with patient as well as directly providing post-discharge instructions. Additional time then spent on discharge activities. Discharge Medications:  See after visit summary for reconciled discharge medications provided to patient and/or family.       **Please Note: This note may have been constructed using a voice recognition system**

## 2023-07-28 NOTE — CONSULTS
PULMONOLOGY CONSULT NOTE     Name: Conrad Simms   Age & Sex: 70 y.o. male   MRN: [id number]  Unit/Bed#: CW2 214-01   Encounter: [id number]    Reason for consultation: COPD exacerbation    Requesting physician: Isaac Gr MD     Assessment:   1. COPD (Group E) with acute exacerbation   a. Has had >6 admissions in past couple months at [facility]  b. Uses 2L home oxygen at night, sometimes during the day  c. Improving, currently SpO2 99% on RA  d. S/p 125mg Methylprednisolone in ED  e. CXR PA and lateral: showing left lung scaring (seen on previous), b/l healing rib fractures, on lateral image, retrosternal and retrocardiac spaces show opacities and spine sign is not present which is consistent left lung based scarring. CXR however is improved compared to CXR on 7/23. 2. Hx of untreated KLARISSA  3. Hx of MI (x2 in 2003 and 2020) with stent placement  4. Hx of CAD  5. Hx of HTN  6. Hx of Alcohol use disorder (not in withdrawal)  a. Has not had any alcoholic beverages for >4 days  7. Hx of DM    Plan:  • Maintain SpO2 88-92%. • Monitor for fevers, leukocytosis  • Start Breo 200mcg 1 puff daily  • Continue with Albuterol PRN  • Prednisone taper over 12 days: 40 mg for 3 days, then 30mg for 3 days, 20 mg for 3 days, and then 10mg for 3 days  • Complete course of Azithromycin (1/5 days completed)  • F/u with Dr. Ifeanyi Rangel (private pulmonologist) as outpatient. • Pulmonary rehab as OP. History of Present Illness   HPI:  Conrad Simms is a 70 y.o. male with PMHx of COPD with acute exacerbation, MI (x2 in 2003 and 2020), HTN, alcohol use disorder who was admitted to the hospital on 7/28/23 for COPD exacerbation. Yesterday, patient had complained of worsening wheezing and dyspnea. Pt had received Pulmonicort, albuterol, solumedrol 125mg and Mg sulfate in ED. By time patient was admitted, wheezing had already resolved. Today, patient notes improvement in SOB since yesterday.  Pt notes some sputum production, but denies increased purulence, increased frequency of cough. Denies fevers, chills, increased sputum or purulence. He denies sick contacts although he lives at home with his children and grandchildren. Denies GARCIA, orthopnea. Smoking Hx: 47 pack-year history, quit 2 years ago. No Hx of vaping. Review of systems:  12 point review of systems was completed and was otherwise negative except as listed in HPI.     Historical Information   Past Medical History:   Diagnosis Date   • Cardiac arrest ([address] )    • COPD (chronic obstructive pulmonary disease) ([address])    • CVA (cerebral vascular accident) ([address])    • Diabetes mellitus ([address])    • Heart attack ([address])    • Hypertension    • Stroke [facility])      Past Surgical History:   Procedure Laterality Date   • CERVICAL FUSION N/A 9/10/2018    Procedure: Posterior cervical decompressive laminectomy C3-6; Posterior cervical lateral mass and pedicle fixation fusion C1-T1;  Surgeon: Farhat Lazar MD;  Location:  MAIN OR;  Service: Neurosurgery     Family History   Problem Relation Age of Onset   • Heart attack Mother        Social History    Social History:   Social History     Socioeconomic History   • Marital status: Single     Spouse name: Not on file   • Number of children: Not on file   • Years of education: Not on file   • Highest education level: Not on file   Occupational History   • Not on file   Tobacco Use   • Smoking status: Former     Types: Cigarettes   • Smokeless tobacco: Never   • Tobacco comments:     quit 5 months ago    Vaping Use   • Vaping Use: Never used   Substance and Sexual Activity   • Alcohol use: Not Currently     Alcohol/week: 8.0 - 12.0 standard drinks of alcohol     Types: 8 - 12 Cans of beer per week     Comment: every day drinker   • Drug use: Never   • Sexual activity: Not Currently   Other Topics Concern   • Not on file   Social History Narrative    ** Merged History Encounter **         ** Merged History Encounter **         ** Merged History Encounter **          Social Determinants of Health     Financial Resource Strain: Not on file   Food Insecurity: No Food Insecurity (7/24/2023)    Hunger Vital Sign    • Worried About Running Out of Food in the Last Year: Never true    • Ran Out of Food in the Last Year: Never true   Transportation Needs: No Transportation Needs (7/24/2023)    PRAPARE - Transportation    • Lack of Transportation (Medical): No    • Lack of Transportation (Non-Medical): No   Physical Activity: Not on file   Stress: Not on file   Social Connections: Not on file   Intimate Partner Violence: Not on file   Housing Stability: Unknown (7/24/2023)    Housing Stability Vital Sign    • Unable to Pay for Housing in the Last Year: No    • Number of Places Lived in the Last Year: Not on file    • Unstable Housing in the Last Year: No       Occupational History: Retired, worked for many years in a [address] Denies working directly with metals or chemicals.     Meds/Allergies   Current Facility-Administered Medications   Medication Dose Route Frequency   • acetaminophen (TYLENOL) tablet 650 mg  650 mg Oral Q6H PRN   • atorvastatin (LIPITOR) tablet 80 mg  80 mg Oral Daily With Dinner   • azithromycin (ZITHROMAX) tablet 500 mg  500 mg Oral Q24H   • fluticasone-vilanterol 200-25 mcg/actuation 1 puff  1 puff Inhalation Daily   • folic acid (FOLVITE) tablet 1 mg  1 mg Oral Daily   • heparin (porcine) subcutaneous injection 5,000 Units  5,000 Units Subcutaneous Q8H [address]   • insulin glargine (LANTUS) subcutaneous injection 20 Units 0.2 mL  20 Units Subcutaneous HS   • insulin lispro (HumaLOG) 100 units/mL subcutaneous injection 1-5 Units  1-5 Units Subcutaneous HS   • insulin lispro (HumaLOG) 100 units/mL subcutaneous injection 1-6 Units  1-6 Units Subcutaneous TID AC   • ipratropium (ATROVENT) 0.02 % inhalation solution 0.5 mg  0.5 mg Nebulization TID   • levalbuterol (XOPENEX) inhalation solution 1.25 mg  1.25 mg Nebulization TID   • lisinopril (ZESTRIL) tablet 40 mg  40 mg Oral Daily   • LORazepam (ATIVAN) tablet 0.5 mg  0.5 mg Oral Q6H PRN   • melatonin tablet 3 mg  3 mg Oral HS PRN   • ondansetron (ZOFRAN) injection 4 mg  4 mg Intravenous Q6H PRN   • pantoprazole (PROTONIX) EC tablet 40 mg  40 mg Oral Early Morning   • predniSONE tablet 40 mg  40 mg Oral Daily   • senna (SENOKOT) tablet 17.2 mg  17.2 mg Oral Daily   • thiamine tablet 100 mg  100 mg Oral Daily     Medications Prior to Admission   Medication   • albuterol (Proventil HFA) 90 mcg/act inhaler   • albuterol (PROVENTIL HFA,VENTOLIN HFA) 90 mcg/act inhaler   • atorvastatin (LIPITOR) 80 mg tablet   • B Complex Vitamins (VITAMIN B COMPLEX 100 IJ)   • betamethasone valerate (VALISONE) 0.1 % cream   • budesonide (PULMICORT) 0.5 mg/2 mL nebulizer solution   • budesonide (PULMICORT) 0.5 mg/2 mL nebulizer solution   • fluticasone (FLONASE) 50 mcg/act nasal spray   • fluticasone-salmeterol (ADVAIR) 500-50 mcg/dose inhaler   • folic acid (FOLVITE) 1 mg tablet   • folic acid (FOLVITE) 1 mg tablet   • glipiZIDE (GLUCOTROL) 5 mg tablet   • Insulin Glargine (BASAGLAR KWIKPEN SC)   • Insulin Lispro (HUMALOG KWIKPEN SC)   • ipratropium-albuterol (COMBIVENT)  mcg/act inhaler   • lisinopril (ZESTRIL) 40 mg tablet   • LORazepam (Ativan) 0.5 mg tablet   • melatonin 3 mg   • metFORMIN (GLUCOPHAGE) 1000 MG tablet   • Multiple Vitamin (MULTIVITAMIN) tablet   • pantoprazole (PROTONIX) 40 mg tablet   • predniSONE 10 mg tablet   • senna (SENOKOT) 8.6 mg   • thiamine 100 MG tablet   • tiotropium (SPIRIVA) 18 mcg inhalation capsule     Allergies   Allergen Reactions   • Amoxicillin Hives   • Augmentin [Amoxicillin-Pot Clavulanate] Hives       Objective    Vitals: Blood pressure 141/84, pulse 81, temperature (!) 97.4 °F (36.3 °C), resp. rate 20, SpO2 97 %. , There is no height or weight on file to calculate BMI.       Intake/Output Summary (Last 24 hours) at 7/28/2023 0908  Last data filed at 7/28/2023 0800  Gross per 24 hour   Intake 170 ml   Output 1270 ml   Net -1100 ml       Physical Exam  Constitutional:       Appearance: He is well-developed. HENT:      Head: Normocephalic and atraumatic. Pulmonary:      Effort: Pulmonary effort is normal. No tachypnea, accessory muscle usage or respiratory distress. Breath sounds: No stridor. Examination of the right-upper field reveals decreased breath sounds. Examination of the left-upper field reveals decreased breath sounds. Examination of the right-middle field reveals decreased breath sounds. Examination of the left-middle field reveals decreased breath sounds. Examination of the right-lower field reveals decreased breath sounds. Examination of the left-lower field reveals decreased breath sounds. Decreased breath sounds present. No wheezing, rhonchi or rales. Musculoskeletal:      Right lower leg: No tenderness. No edema. Left lower leg: No tenderness. No edema. Neurological:      Mental Status: He is alert. Labs: I have personally reviewed pertinent lab results.   Laboratory and Diagnostics  Results from last 7 days   Lab Units 07/28/23  0608 07/27/23  1645 07/24/23  0446 07/23/23  1334   WBC Thousand/uL 7.09 9.50 7.23 11.76*   HEMOGLOBIN g/dL 10.6* 11.1* 10.9* 12.4   HEMATOCRIT % 31.1* 34.2* 33.3* 37.6   PLATELETS Thousands/uL 245 268 308 329   NEUTROS PCT %  --  68  --  75   MONOS PCT %  --  9  --  6   EOS PCT %  --  2  --  4     Results from last 7 days   Lab Units 07/28/23  0608 07/27/23  1645 07/24/23  0446 07/23/23  1334   SODIUM mmol/L 136 135 132* 140   POTASSIUM mmol/L 4.1 3.9 4.5 4.6   CHLORIDE mmol/L 103 103 102 106   CO2 mmol/L 28 27 23 27   ANION GAP mmol/L 5 5 7 7   BUN mg/dL 13 16 23 14   CREATININE mg/dL 0.49* 0.51* 0.54* 0.70   CALCIUM mg/dL 8.6 9.0 8.5 8.9   GLUCOSE RANDOM mg/dL 329* 287* 239* 218*   ALT U/L  --  30  --  28   AST U/L  --  16  --  18   ALK PHOS U/L  --  59  --  69   ALBUMIN g/dL  --  3.2*  --  3.3*   TOTAL BILIRUBIN mg/dL  --  0.41  --  0.40                                       Results from last 7 days   Lab Units 23  0608 23  0446   PROCALCITONIN ng/ml <0.05 0.10       AB.36/42.6/96/23.6          Imaging and other studies: I have personally reviewed pertinent films in PACS  XR chest 2 views    Result Date: 2023  Impression No acute cardiopulmonary disease. Workstation performed: [id number]     XR chest pa & lateral    Result Date: 2023  Impression IMPRESSION: There is some mild pleural and pulmonary scarring at the left lung base without change. No definite acute lung disease. No heart failure. Workstation:[id number]    EKG, Pathology, and Other Studies: I have personally reviewed pertinent films in PACS       Code Status: Level 1 - Full Code    VTE Pharmacologic Prophylaxis: Heparin  VTE Mechanical Prophylaxis: reason for no mechanical VTE prophylaxis already on heparin sq    Disclaimer: Portions of the record may have been created with voice recognition software. Occasional wrong word or "sound a like" substitutions may have occurred due to the inherent limitations of voice recognition software. Careful consideration should be taken to recognize, using context, where substitutions have occurred.     Vj Cerda, DO   Pulmonary/Critical Care Fellowship PGY-IV  [facility]

## 2023-07-28 NOTE — CASE MANAGEMENT
Case Management Assessment & Discharge Planning Note    Patient name Angelito Blank  Location 2 214/2 315-04 MRN [id number]  : 1955 Date 2023       Current Admission Date: 2023  Current Admission Diagnosis:Chronic obstructive pulmonary disease with acute exacerbation [facility])   Patient Active Problem List    Diagnosis Date Noted   • Chronic respiratory failure ([address]) 2023   • Stage 3 severe COPD by GOLD classification ([address]) 2023   • Oral abscess 2022   • Tooth fracture 2022   • H/O ETOH abuse 2022   • Closed nondisplaced fracture of posterior arch of first cervical vertebra ([address]) 2022   • Fall 2022   • Diabetic polyneuropathy associated with type 2 diabetes mellitus ([address]) 2021   • Hammertoe, bilateral 2021   • Post-traumatic arthritis of left foot 2021   • Pain due to onychomycosis of toenail 2021   • Bronchitis 2020   • KLARISSA (obstructive sleep apnea) 2020   • Dirty living conditions 2020   • Closed fracture of first cervical vertebra ([address]) 2019   • ETOH abuse 2019   • Chronic obstructive pulmonary disease with acute exacerbation ([address]) 2018   • At moderate risk for venous thromboembolism (VTE) 2018   • S/P C1-T1 Posterior Cervical Discectomy and Fusion on 9/10/18 2018   • Acute blood loss anemia 2018   • Type 2 diabetes mellitus with hyperglycemia, without long-term current use of insulin ([address]) 2018   • Closed odontoid fracture with routine healing 2018   • Closed displaced fracture of third cervical vertebra ([address]) 2018   • Closed displaced fracture of fourth cervical vertebra ([address]) 2018   • Alcohol abuse 2018   • CAD (coronary artery disease) 2018   • Dens fracture ([address]) 2018      LOS (days): 0  Geometric Mean LOS (GMLOS) (days):   Days to GMLOS:     OBJECTIVE:              Current admission status: Observation       Preferred Pharmacy:   [facility] [address] [place] - [address] [facility]  [address] [place] [address]  Phone: [phone] Fax: [address] [place], [place] - [address] [facility] [address] [facility] [address]  Phone: [phone] Fax: [phone]    Primary Care Provider: TATIANA Mitchell    Primary Insurance: MEDICARE  Secondary Insurance: BLUE CROSS    ASSESSMENT:  [address] [employer] - Daughter   Primary Phone: [phone] (Mobile)                              Patient Information  Admitted from[de-identified] Home  Mental Status: Alert                                 DISCHARGE DETAILS:    Discharge planning discussed with[de-identified] see assessment completed 7/23/23                                                                                            Additional Comments: has home O2

## 2023-07-28 NOTE — ASSESSMENT & PLAN NOTE
· Patient who was discharged only 2 days ago with COPD exacerbation, returned today with complaint of worsening wheezing and dyspnea. · In ER, patient was given Pulmicort, albuterol/Atrovent, Solu-Medrol 125 mg, and magnesium sulfate. · Improvement in wheezing today  · Pulmonology consulted, plan to resume prednisone taper  · Continue nebulizers and inhalers  · roflumilast is non formulary, resume at discharge  · Restart Zithromax  · Patient adamantly declines smoking for the last 2 years.   · Outpatient pulmonology follow-up

## 2023-07-28 NOTE — ASSESSMENT & PLAN NOTE
· Patient is poor historian, reports last alcohol drink was 4 days ago.   · Placed on CIWA protocol  · Continue home medication thiamine/folic acid/MVI

## 2023-07-28 NOTE — CONSULTS
Consultation - [address] [facility] 70 y.o. male MRN: [id number]  Unit/Bed#: CW2 214-01 Encounter: [id number]      Chief Complaint: "I want my breathing to get better"    History of Present Illness   Physician Requesting Consult: Mik Espinal MD  Reason for Consult / Principal Problem: Frequent admission for COPD, outpatient pulmonary required psychiatry evaluation, diagnosis COPD with acute exacerbation    Hugo Garrido is a 70 y.o. male with PMH of COPD, Type 2 Diabetes, Chronic respiratory failure, CAD, past stroke, alcohol use disorder presents with COPD exacerbation consulted to psychiatry due to frequent admissions. He reports that his mood is good. He reports about once every other week having a "panic attack" which he describes as a sense of impending doom and shortness of breath. These attacks last for a day and he just waits for them to go away. He reports sleeping 5 hours per night with some trouble falling asleep which he takes melatonin and that helps, but feels he has good energy with this sleep. He reports some feelings of worthlessness. He was prescribed Ativan, which helped with sleep prior but does not currently take this medication. He was prescribed Buspirone in the past, but never took this medication. He denies depression, krupa, SI/HI, auditory or visual hallucinations. He insisted that he take his medication as prescribed for COPD. Also stated that he does not have any issue with the mood that interfere with his medical issues.          Psychiatric Review Of Systems:  sleep: no  appetite changes: no  weight changes: no  energy/anergy: no  interest/pleasure/anhedonia: no  somatic symptoms: no  anxiety/panic: yes  krupa: no  guilty/hopeless: no  self injurious behavior/risky behavior: no    Historical Information   Past Psychiatric History:   None  Past Suicide attempts: none  Past Violent behavior: none  Past Psychiatric medication trial: Ativan    Substance Abuse History:  Alcohol, last drink 2 weeks ago, previously drinking 5 beers daily   marijuana date of last use 10 years ago   I have assessed this patient for substance use within the past 12 months  History of IP/OP rehabilitation program: 3 times, most recent was 3 years ago  Smoking history: quit 2 years ago, 1 PPD prior  Family Psychiatric History:   None    Social History  Education: high school diploma/GED  Learning Disabilities: none  Marital history:   Living arrangement, social support: The patient lives in home with daughter and son-in-law and 4 grandchildren. Occupational History: retired  Functioning Relationships: good support system. Other Pertinent History: None    Traumatic History:   Abuse: none  Other Traumatic Events: none    Past Medical History:   Diagnosis Date   • Cardiac arrest ([address])    • COPD (chronic obstructive pulmonary disease) ([address])    • CVA (cerebral vascular accident) ([address])    • Diabetes mellitus ([address])    • Heart attack ([address])    • Hypertension    • Stroke [facility])        Medical Review Of Systems:  Review of Systems - Negative except trouble breathing with COPD and anxiety. All other systems were reviewed and are negative.     Meds/Allergies   all current active meds have been reviewed  Allergies   Allergen Reactions   • Amoxicillin Hives   • Augmentin [Amoxicillin-Pot Clavulanate] Hives       Objective   Vital signs in last 24 hours:  Temp:  [97.4 °F (36.3 °C)-98.3 °F (36.8 °C)] 97.4 °F (36.3 °C)  HR:  [] 81  Resp:  [18-20] 20  BP: (112-142)/(60-88) 141/84      Intake/Output Summary (Last 24 hours) at 7/28/2023 1411  Last data filed at 7/28/2023 1100  Gross per 24 hour   Intake 1190 ml   Output 2095 ml   Net -905 ml       Mental Status Evaluation:  Appearance:  disheveled and older than stated age   Behavior:  normal   Speech:  normal pitch and normal volume   Mood:  "good"   Affect:  mood-congruent   Language: naming objects and repeating phrases   Thought Process:  goal directed   Associations: intact associations   Thought Content:  normal   Perceptual Disturbances: None   Risk Potential: Suicidal Ideations none, Homicidal Ideations none and Potential for Aggression No   Sensorium:  person, place, time/date and situation   Memory:  recent and remote memory grossly intact   Cognition:  recent and remote memory grossly intact   Consciousness:  alert and awake    Attention: attention span and concentration were age appropriate   Intellect: within normal limits   Fund of Knowledge: awareness of current events: fair, past history: fair and vocabulary: fair   Insight:  fair   Judgment: fair   Muscle Strength and Tone: Within normal limits   Gait/Station: normal gait/station   Motor Activity: no abnormal movements     Lab Results:    I have personally reviewed all pertinent laboratory/tests results. Labs in last 72 hours:   Recent Labs     07/27/23  1645 07/28/23  0608   WBC 9.50 7.09   RBC 3.88 3.60*   HGB 11.1* 10.6*   HCT 34.2* 31.1*    245   RDW 14.6 14.4   NEUTROABS 6.47  --    SODIUM 135 136   K 3.9 4.1    103   CO2 27 28   BUN 16 13   CREATININE 0.51* 0.49*   GLUC 287* 329*   CALCIUM 9.0 8.6   AST 16  --    ALT 30  --    ALKPHOS 59  --    TP 6.5  --    ALB 3.2*  --    TBILI 0.41  --        Code Status: )Level 1 - Full Code    Assessment/Plan     Assessment:  Edith Mcallister is a 70 y.o. male with PMH of COPD, Type 2 Diabetes, Chronic respiratory failure, CAD, past stroke, alcohol use disorder who presents with COPD exacerbation consulted to psychiatry by outpatient pulmonary team. Patient reports anxiety/panic attacks once every two weeks, but overall reports feeling good and better from his COPD exacerbation. He denies depressive symptoms, krupa, SI/HI, auditory or visual hallucinations. He has tried Ativan in the past for sleep and was previously prescribed Buspirone, but never took the medication. Diagnosis:    Anxiety disorder unspecified  Plan:   Continue medical management  Start Buspirone 10 mg TID  Patient does not require inpatient psych admission  Follow-up with PCP on discharge  Discussed with primary team  No other intervention at this time  I will sign off  Risks, benefits and possible side effects of Medications:   Risks, benefits, and possible side effects of medications explained to patient and patient verbalizes understanding.           Getachew Dixon MD

## 2023-07-28 NOTE — ASSESSMENT & PLAN NOTE
Lab Results   Component Value Date    HGBA1C 7.4 (H) 06/27/2023       Recent Labs     07/27/23  2203 07/28/23  0528 07/28/23  1052 07/28/23  1232   POCGLU 480* 377* >500* 265*     ·  home regimen Basaglar 30 units at bedtime and humalog 5 units TIDwmeal.  · Start Levemir 20 units at HS and sliding scale  · Titrate as necessary  · Did have significantly elevated blood glucose today, likely secondary to significant amount of jorge crackers eaten prior to reading, advised a more diabetic diet

## 2023-07-31 ENCOUNTER — TELEPHONE (OUTPATIENT)
Dept: PSYCHIATRY | Facility: CLINIC | Age: 68
End: 2023-07-31

## 2023-07-31 NOTE — TELEPHONE ENCOUNTER
Contacted patient in regards to routine referral and placing patient on proper wait list. "Call could not be completed at this time" writer tried call 2x unable to lvm.

## 2023-08-06 ENCOUNTER — HOSPITAL ENCOUNTER (INPATIENT)
Facility: HOSPITAL | Age: 68
LOS: 2 days | Discharge: HOME | End: 2023-08-08
Attending: EMERGENCY MEDICINE | Admitting: INTERNAL MEDICINE
Payer: MEDICARE

## 2023-08-06 ENCOUNTER — APPOINTMENT (EMERGENCY)
Dept: RADIOLOGY | Facility: HOSPITAL | Age: 68
End: 2023-08-06
Payer: MEDICARE

## 2023-08-06 DIAGNOSIS — K22.10 ESOPHAGEAL ULCER: ICD-10-CM

## 2023-08-06 DIAGNOSIS — J44.1 COPD EXACERBATION (HCC): Primary | ICD-10-CM

## 2023-08-06 DIAGNOSIS — J44.9 COPD (CHRONIC OBSTRUCTIVE PULMONARY DISEASE) (HCC): ICD-10-CM

## 2023-08-06 DIAGNOSIS — D64.9 ANEMIA: ICD-10-CM

## 2023-08-06 PROBLEM — R06.02 SOB (SHORTNESS OF BREATH): Status: ACTIVE | Noted: 2023-08-06

## 2023-08-06 PROBLEM — I10 HYPERTENSION: Status: ACTIVE | Noted: 2023-08-06

## 2023-08-06 LAB
2HR DELTA HS TROPONIN: -3 NG/L
ABO GROUP BLD: NORMAL
ALBUMIN SERPL BCP-MCNC: 3.2 G/DL (ref 3.5–5)
ALP SERPL-CCNC: 45 U/L (ref 46–116)
ALT SERPL W P-5'-P-CCNC: 27 U/L (ref 12–78)
ANION GAP SERPL CALCULATED.3IONS-SCNC: 8 MMOL/L
AST SERPL W P-5'-P-CCNC: 13 U/L (ref 5–45)
ATRIAL RATE: 105 BPM
BACTERIA UR QL AUTO: NORMAL /HPF
BASOPHILS # BLD AUTO: 0.01 THOUSANDS/ÂΜL (ref 0–0.1)
BASOPHILS NFR BLD AUTO: 0 % (ref 0–1)
BILIRUB SERPL-MCNC: 0.42 MG/DL (ref 0.2–1)
BILIRUB UR QL STRIP: NEGATIVE
BLD GP AB SCN SERPL QL: NEGATIVE
BUN SERPL-MCNC: 35 MG/DL (ref 5–25)
CALCIUM ALBUM COR SERPL-MCNC: 9 MG/DL (ref 8.3–10.1)
CALCIUM SERPL-MCNC: 8.4 MG/DL (ref 8.3–10.1)
CARDIAC TROPONIN I PNL SERPL HS: 15 NG/L
CARDIAC TROPONIN I PNL SERPL HS: 18 NG/L
CHLORIDE SERPL-SCNC: 99 MMOL/L (ref 96–108)
CLARITY UR: CLEAR
CO2 SERPL-SCNC: 23 MMOL/L (ref 21–32)
COLOR UR: COLORLESS
CREAT SERPL-MCNC: 0.94 MG/DL (ref 0.6–1.3)
EOSINOPHIL # BLD AUTO: 0.01 THOUSAND/ÂΜL (ref 0–0.61)
EOSINOPHIL NFR BLD AUTO: 0 % (ref 0–6)
ERYTHROCYTE [DISTWIDTH] IN BLOOD BY AUTOMATED COUNT: 15.2 % (ref 11.6–15.1)
FERRITIN SERPL-MCNC: 19 NG/ML (ref 24–336)
GFR SERPL CREATININE-BSD FRML MDRD: 82 ML/MIN/1.73SQ M
GLUCOSE SERPL-MCNC: 214 MG/DL (ref 65–140)
GLUCOSE SERPL-MCNC: 308 MG/DL (ref 65–140)
GLUCOSE UR STRIP-MCNC: ABNORMAL MG/DL
HCT VFR BLD AUTO: 20.4 % (ref 36.5–49.3)
HCT VFR BLD AUTO: 21.8 % (ref 36.5–49.3)
HGB BLD-MCNC: 6.6 G/DL (ref 12–17)
HGB BLD-MCNC: 7.3 G/DL (ref 12–17)
HGB UR QL STRIP.AUTO: NEGATIVE
IMM GRANULOCYTES # BLD AUTO: 0.15 THOUSAND/UL (ref 0–0.2)
IMM GRANULOCYTES NFR BLD AUTO: 1 % (ref 0–2)
IRON SATN MFR SERPL: 5 % (ref 20–50)
IRON SERPL-MCNC: 16 UG/DL (ref 65–175)
KETONES UR STRIP-MCNC: NEGATIVE MG/DL
LEUKOCYTE ESTERASE UR QL STRIP: ABNORMAL
LYMPHOCYTES # BLD AUTO: 1.6 THOUSANDS/ÂΜL (ref 0.6–4.47)
LYMPHOCYTES NFR BLD AUTO: 12 % (ref 14–44)
MCH RBC QN AUTO: 29 PG (ref 26.8–34.3)
MCHC RBC AUTO-ENTMCNC: 33.5 G/DL (ref 31.4–37.4)
MCV RBC AUTO: 87 FL (ref 82–98)
MONOCYTES # BLD AUTO: 1.08 THOUSAND/ÂΜL (ref 0.17–1.22)
MONOCYTES NFR BLD AUTO: 8 % (ref 4–12)
NEUTROPHILS # BLD AUTO: 10.05 THOUSANDS/ÂΜL (ref 1.85–7.62)
NEUTS SEG NFR BLD AUTO: 79 % (ref 43–75)
NITRITE UR QL STRIP: NEGATIVE
NON-SQ EPI CELLS URNS QL MICRO: NORMAL /HPF
NRBC BLD AUTO-RTO: 0 /100 WBCS
P AXIS: 71 DEGREES
PH UR STRIP.AUTO: 5 [PH]
PLATELET # BLD AUTO: 380 THOUSANDS/UL (ref 149–390)
PMV BLD AUTO: 10.4 FL (ref 8.9–12.7)
POTASSIUM SERPL-SCNC: 4.1 MMOL/L (ref 3.5–5.3)
PR INTERVAL: 168 MS
PROT SERPL-MCNC: 5.9 G/DL (ref 6.4–8.4)
PROT UR STRIP-MCNC: NEGATIVE MG/DL
QRS AXIS: 46 DEGREES
QRSD INTERVAL: 110 MS
QT INTERVAL: 354 MS
QTC INTERVAL: 467 MS
RBC # BLD AUTO: 2.52 MILLION/UL (ref 3.88–5.62)
RBC #/AREA URNS AUTO: NORMAL /HPF
RH BLD: POSITIVE
SODIUM SERPL-SCNC: 130 MMOL/L (ref 135–147)
SP GR UR STRIP.AUTO: 1.02 (ref 1–1.03)
SPECIMEN EXPIRATION DATE: NORMAL
T WAVE AXIS: 13 DEGREES
TIBC SERPL-MCNC: 332 UG/DL (ref 250–450)
UROBILINOGEN UR STRIP-ACNC: <2 MG/DL
VENTRICULAR RATE: 105 BPM
WBC # BLD AUTO: 12.9 THOUSAND/UL (ref 4.31–10.16)
WBC #/AREA URNS AUTO: NORMAL /HPF

## 2023-08-06 PROCEDURE — 86901 BLOOD TYPING SEROLOGIC RH(D): CPT

## 2023-08-06 PROCEDURE — 81001 URINALYSIS AUTO W/SCOPE: CPT

## 2023-08-06 PROCEDURE — 99285 EMERGENCY DEPT VISIT HI MDM: CPT | Performed by: EMERGENCY MEDICINE

## 2023-08-06 PROCEDURE — C9113 INJ PANTOPRAZOLE SODIUM, VIA: HCPCS

## 2023-08-06 PROCEDURE — 96374 THER/PROPH/DIAG INJ IV PUSH: CPT

## 2023-08-06 PROCEDURE — 86900 BLOOD TYPING SEROLOGIC ABO: CPT

## 2023-08-06 PROCEDURE — 36430 TRANSFUSION BLD/BLD COMPNT: CPT

## 2023-08-06 PROCEDURE — 94664 DEMO&/EVAL PT USE INHALER: CPT

## 2023-08-06 PROCEDURE — 96361 HYDRATE IV INFUSION ADD-ON: CPT

## 2023-08-06 PROCEDURE — 36415 COLL VENOUS BLD VENIPUNCTURE: CPT

## 2023-08-06 PROCEDURE — 94640 AIRWAY INHALATION TREATMENT: CPT

## 2023-08-06 PROCEDURE — 85025 COMPLETE CBC W/AUTO DIFF WBC: CPT

## 2023-08-06 PROCEDURE — 86850 RBC ANTIBODY SCREEN: CPT

## 2023-08-06 PROCEDURE — 94644 CONT INHLJ TX 1ST HOUR: CPT

## 2023-08-06 PROCEDURE — 82948 REAGENT STRIP/BLOOD GLUCOSE: CPT

## 2023-08-06 PROCEDURE — 84484 ASSAY OF TROPONIN QUANT: CPT

## 2023-08-06 PROCEDURE — 83550 IRON BINDING TEST: CPT

## 2023-08-06 PROCEDURE — C9113 INJ PANTOPRAZOLE SODIUM, VIA: HCPCS | Performed by: INTERNAL MEDICINE

## 2023-08-06 PROCEDURE — 80053 COMPREHEN METABOLIC PANEL: CPT

## 2023-08-06 PROCEDURE — 99285 EMERGENCY DEPT VISIT HI MDM: CPT

## 2023-08-06 PROCEDURE — 94760 N-INVAS EAR/PLS OXIMETRY 1: CPT

## 2023-08-06 PROCEDURE — 99223 1ST HOSP IP/OBS HIGH 75: CPT | Performed by: INTERNAL MEDICINE

## 2023-08-06 PROCEDURE — 85014 HEMATOCRIT: CPT

## 2023-08-06 PROCEDURE — P9016 RBC LEUKOCYTES REDUCED: HCPCS

## 2023-08-06 PROCEDURE — 93005 ELECTROCARDIOGRAM TRACING: CPT

## 2023-08-06 PROCEDURE — 71046 X-RAY EXAM CHEST 2 VIEWS: CPT

## 2023-08-06 PROCEDURE — 86920 COMPATIBILITY TEST SPIN: CPT

## 2023-08-06 PROCEDURE — 85018 HEMOGLOBIN: CPT

## 2023-08-06 PROCEDURE — 83540 ASSAY OF IRON: CPT

## 2023-08-06 PROCEDURE — 96375 TX/PRO/DX INJ NEW DRUG ADDON: CPT

## 2023-08-06 PROCEDURE — 93010 ELECTROCARDIOGRAM REPORT: CPT | Performed by: INTERNAL MEDICINE

## 2023-08-06 PROCEDURE — 82728 ASSAY OF FERRITIN: CPT

## 2023-08-06 PROCEDURE — 30233N1 TRANSFUSION OF NONAUTOLOGOUS RED BLOOD CELLS INTO PERIPHERAL VEIN, PERCUTANEOUS APPROACH: ICD-10-PCS | Performed by: FAMILY MEDICINE

## 2023-08-06 RX ORDER — PANTOPRAZOLE SODIUM 40 MG/10ML
40 INJECTION, POWDER, LYOPHILIZED, FOR SOLUTION INTRAVENOUS ONCE
Status: COMPLETED | OUTPATIENT
Start: 2023-08-06 | End: 2023-08-06

## 2023-08-06 RX ORDER — FOLIC ACID 1 MG/1
1 TABLET ORAL DAILY
Status: DISCONTINUED | OUTPATIENT
Start: 2023-08-07 | End: 2023-08-08 | Stop reason: HOSPADM

## 2023-08-06 RX ORDER — LORAZEPAM 0.5 MG/1
0.5 TABLET ORAL DAILY PRN
Status: DISCONTINUED | OUTPATIENT
Start: 2023-08-06 | End: 2023-08-08 | Stop reason: HOSPADM

## 2023-08-06 RX ORDER — INSULIN GLARGINE 100 [IU]/ML
15 INJECTION, SOLUTION SUBCUTANEOUS
Status: DISCONTINUED | OUTPATIENT
Start: 2023-08-06 | End: 2023-08-08 | Stop reason: HOSPADM

## 2023-08-06 RX ORDER — LANOLIN ALCOHOL/MO/W.PET/CERES
100 CREAM (GRAM) TOPICAL DAILY
Status: DISCONTINUED | OUTPATIENT
Start: 2023-08-07 | End: 2023-08-08 | Stop reason: HOSPADM

## 2023-08-06 RX ORDER — LANOLIN ALCOHOL/MO/W.PET/CERES
3 CREAM (GRAM) TOPICAL
Status: DISCONTINUED | OUTPATIENT
Start: 2023-08-06 | End: 2023-08-08 | Stop reason: HOSPADM

## 2023-08-06 RX ORDER — BUDESONIDE 0.5 MG/2ML
0.5 INHALANT ORAL
Status: DISCONTINUED | OUTPATIENT
Start: 2023-08-06 | End: 2023-08-06

## 2023-08-06 RX ORDER — FLUTICASONE FUROATE AND VILANTEROL 200; 25 UG/1; UG/1
1 POWDER RESPIRATORY (INHALATION)
Status: DISCONTINUED | OUTPATIENT
Start: 2023-08-07 | End: 2023-08-08 | Stop reason: HOSPADM

## 2023-08-06 RX ORDER — BUSPIRONE HYDROCHLORIDE 10 MG/1
10 TABLET ORAL 3 TIMES DAILY
Status: DISCONTINUED | OUTPATIENT
Start: 2023-08-06 | End: 2023-08-08 | Stop reason: HOSPADM

## 2023-08-06 RX ORDER — LEVALBUTEROL INHALATION SOLUTION 1.25 MG/3ML
1.25 SOLUTION RESPIRATORY (INHALATION) EVERY 4 HOURS PRN
Status: DISCONTINUED | OUTPATIENT
Start: 2023-08-06 | End: 2023-08-08

## 2023-08-06 RX ORDER — ALBUTEROL SULFATE 90 UG/1
2 AEROSOL, METERED RESPIRATORY (INHALATION) EVERY 6 HOURS PRN
Status: DISCONTINUED | OUTPATIENT
Start: 2023-08-06 | End: 2023-08-06 | Stop reason: ALTCHOICE

## 2023-08-06 RX ORDER — INSULIN LISPRO 100 [IU]/ML
1-6 INJECTION, SOLUTION INTRAVENOUS; SUBCUTANEOUS
Status: DISCONTINUED | OUTPATIENT
Start: 2023-08-06 | End: 2023-08-08 | Stop reason: HOSPADM

## 2023-08-06 RX ORDER — LORAZEPAM 0.5 MG/1
0.5 TABLET ORAL EVERY 8 HOURS PRN
Status: DISCONTINUED | OUTPATIENT
Start: 2023-08-06 | End: 2023-08-06

## 2023-08-06 RX ORDER — INSULIN LISPRO 100 [IU]/ML
1-6 INJECTION, SOLUTION INTRAVENOUS; SUBCUTANEOUS EVERY 6 HOURS SCHEDULED
Status: DISCONTINUED | OUTPATIENT
Start: 2023-08-06 | End: 2023-08-06

## 2023-08-06 RX ORDER — SODIUM CHLORIDE 9 MG/ML
75 INJECTION, SOLUTION INTRAVENOUS CONTINUOUS
Status: DISCONTINUED | OUTPATIENT
Start: 2023-08-06 | End: 2023-08-07

## 2023-08-06 RX ORDER — MAGNESIUM SULFATE 1 G/100ML
1 INJECTION INTRAVENOUS ONCE
Status: DISCONTINUED | OUTPATIENT
Start: 2023-08-06 | End: 2023-08-06

## 2023-08-06 RX ORDER — ATORVASTATIN CALCIUM 80 MG/1
80 TABLET, FILM COATED ORAL
Status: DISCONTINUED | OUTPATIENT
Start: 2023-08-06 | End: 2023-08-08 | Stop reason: HOSPADM

## 2023-08-06 RX ORDER — SODIUM CHLORIDE FOR INHALATION 0.9 %
3 VIAL, NEBULIZER (ML) INHALATION ONCE
Status: COMPLETED | OUTPATIENT
Start: 2023-08-06 | End: 2023-08-06

## 2023-08-06 RX ORDER — METHYLPREDNISOLONE SODIUM SUCCINATE 125 MG/2ML
125 INJECTION, POWDER, LYOPHILIZED, FOR SOLUTION INTRAMUSCULAR; INTRAVENOUS ONCE
Status: COMPLETED | OUTPATIENT
Start: 2023-08-06 | End: 2023-08-06

## 2023-08-06 RX ADMIN — BUSPIRONE HYDROCHLORIDE 10 MG: 10 TABLET ORAL at 22:21

## 2023-08-06 RX ADMIN — SODIUM CHLORIDE 1000 ML: 0.9 INJECTION, SOLUTION INTRAVENOUS at 14:52

## 2023-08-06 RX ADMIN — PANTOPRAZOLE SODIUM 40 MG: 40 INJECTION, POWDER, FOR SOLUTION INTRAVENOUS at 17:55

## 2023-08-06 RX ADMIN — LORAZEPAM 0.5 MG: 0.5 TABLET ORAL at 22:21

## 2023-08-06 RX ADMIN — METHYLPREDNISOLONE SODIUM SUCCINATE 125 MG: 125 INJECTION, POWDER, FOR SOLUTION INTRAMUSCULAR; INTRAVENOUS at 14:51

## 2023-08-06 RX ADMIN — Medication 3 ML: at 14:11

## 2023-08-06 RX ADMIN — ALBUTEROL SULFATE 10 MG: 2.5 SOLUTION RESPIRATORY (INHALATION) at 14:11

## 2023-08-06 RX ADMIN — IPRATROPIUM BROMIDE 1 MG: 0.5 SOLUTION RESPIRATORY (INHALATION) at 14:11

## 2023-08-06 RX ADMIN — INSULIN GLARGINE 15 UNITS: 100 INJECTION, SOLUTION SUBCUTANEOUS at 22:21

## 2023-08-06 RX ADMIN — ATORVASTATIN CALCIUM 80 MG: 80 TABLET, FILM COATED ORAL at 20:23

## 2023-08-06 RX ADMIN — SODIUM CHLORIDE 75 ML/HR: 0.9 INJECTION, SOLUTION INTRAVENOUS at 21:07

## 2023-08-06 RX ADMIN — LEVALBUTEROL HYDROCHLORIDE 1.25 MG: 1.25 SOLUTION RESPIRATORY (INHALATION) at 20:27

## 2023-08-06 RX ADMIN — Medication 3 MG: at 22:21

## 2023-08-06 RX ADMIN — PANTOPRAZOLE SODIUM 8 MG/HR: 40 INJECTION, POWDER, FOR SOLUTION INTRAVENOUS at 21:05

## 2023-08-06 NOTE — ASSESSMENT & PLAN NOTE
· Patient is not in acute exacerbation  · He had multiple admissions for COPD exacerbation in the past few months  · Continue inhalers

## 2023-08-06 NOTE — ASSESSMENT & PLAN NOTE
· Patient presents with shortness of breath, on 2 L of oxygen at baseline, received steroids and nebulizer treatment on admission for possible COPD exacerbation.   · Patient is not in COPD exacerbation, shortness of breath is most likely from acute blood loss anemia  · See plan below

## 2023-08-06 NOTE — ASSESSMENT & PLAN NOTE
· Patient states he quit drinking about 2 months ago, seems like a poor historian.   On his admission end of July he reports drinking 4 days prior to admission  · We will place him on [facility] protocol  · Continue thiamine and folic acid

## 2023-08-06 NOTE — ASSESSMENT & PLAN NOTE
· Denies any chest pain, continue statin  · Hold aspirin, per patient Plavix was stopped recently by his PCP

## 2023-08-06 NOTE — ED PROVIDER NOTES
History  Chief Complaint   Patient presents with   • Shortness of Breath     Pt has c/o SOB since about an hour ago. Pt SPO2 98% on room air     HPI see MDM    Prior to Admission Medications   Prescriptions Last Dose Informant Patient Reported? Taking? B Complex Vitamins (VITAMIN B COMPLEX 100 IJ)   Yes No   Sig: Take 1 tablet by mouth daily   Insulin Glargine (BASAGLAR KWIKPEN SC)   Yes No   Sig: Inject 30 Units under the skin daily at bedtime   Insulin Lispro (HUMALOG KWIKPEN SC)   Yes No   Sig: Inject 2 Units under the skin 3 (three) times a day with meals   LORazepam (Ativan) 0.5 mg tablet   Yes No   Sig: Take by mouth every 6 (six) hours as needed for anxiety    Multiple Vitamin (MULTIVITAMIN) tablet  Self Yes No   Sig: Take 1 tablet by mouth daily   albuterol (Proventil HFA) 90 mcg/act inhaler   No No   Sig: Inhale 2 puffs every 6 (six) hours as needed for wheezing   aspirin 81 mg chewable tablet   Yes Yes   Sig: Chew 81 mg daily   atorvastatin (LIPITOR) 80 mg tablet   No No   Sig: Take 1 tablet (80 mg total) by mouth daily with dinner   betamethasone valerate (VALISONE) 0.1 % cream   Yes No   Sig: Apply topically 2 (two) times a day   budesonide (PULMICORT) 0.5 mg/2 mL nebulizer solution   No No   Sig: Take 2 mL (0.5 mg total) by nebulization every 12 (twelve) hours Rinse mouth after use. busPIRone (BUSPAR) 10 mg tablet   No No   Sig: Take 1 tablet (10 mg total) by mouth 3 (three) times a day   fluticasone (FLONASE) 50 mcg/act nasal spray   Yes No   Si spray into each nostril 2 (two) times a day   fluticasone-salmeterol (ADVAIR) 500-50 mcg/dose inhaler   Yes No   Sig: Inhale 1 puff 2 (two) times a day Rinse mouth after use.    folic acid (FOLVITE) 1 mg tablet   No No   Sig: Take 1 tablet (1 mg total) by mouth daily   glipiZIDE (GLUCOTROL) 5 mg tablet   Yes No   Sig: Take 5 mg by mouth in the morning   ipratropium-albuterol (COMBIVENT)  mcg/act inhaler   Yes No   Sig: Inhale   lisinopril (ZESTRIL) 40 mg tablet   Yes No   Sig: Take 40 mg by mouth daily    melatonin 3 mg   No No   Sig: Take 1 tablet (3 mg total) by mouth daily at bedtime as needed (30)   metFORMIN (GLUCOPHAGE) 1000 MG tablet   Yes No   Sig: Take 1 tablet by mouth every 12 (twelve) hours   pantoprazole (PROTONIX) 40 mg tablet   Yes No   Sig: Take 1 tablet by mouth Twice daily   predniSONE 10 mg tablet   No No   Sig: Take 4 tablets (40 mg total) by mouth daily for 3 days, THEN 3 tablets (30 mg total) daily for 3 days, THEN 2 tablets (20 mg total) daily for 3 days, THEN 1 tablet (10 mg total) daily for 3 days. Do not start before July 26, 2023. senna (SENOKOT) 8.6 mg   No No   Sig: Take 2 tablets (17.2 mg total) by mouth daily   thiamine 100 MG tablet   No No   Sig: Take 1 tablet (100 mg total) by mouth daily   tiotropium (SPIRIVA) 18 mcg inhalation capsule  Pharmacy (Specify) Yes No   Sig: Place 18 mcg into inhaler and inhale daily      Facility-Administered Medications: None       Past Medical History:   Diagnosis Date   • Cardiac arrest ([address])    • COPD (chronic obstructive pulmonary disease) (HCC)    • CVA (cerebral vascular accident) ([address])    • Diabetes mellitus ([address])    • Heart attack ([address])    • Hypertension    • Stroke [facility])        Past Surgical History:   Procedure Laterality Date   • CERVICAL FUSION N/A 9/10/2018    Procedure: Posterior cervical decompressive laminectomy C3-6; Posterior cervical lateral mass and pedicle fixation fusion C1-T1;  Surgeon: Lashae Anderson MD;  Location: [facility];  Service: Neurosurgery       Family History   Problem Relation Age of Onset   • Heart attack Mother      I have reviewed and agree with the history as documented.     E-Cigarette/Vaping   • E-Cigarette Use Never User      E-Cigarette/Vaping Substances   • Nicotine No    • THC No    • CBD No    • Flavoring No    • Other No    • Unknown No      Social History     Tobacco Use   • Smoking status: Former     Types: Cigarettes   • Smokeless tobacco: Never   • Tobacco comments:     quit 5 months ago    Vaping Use   • Vaping Use: Never used   Substance Use Topics   • Alcohol use: Not Currently     Alcohol/week: 8.0 - 12.0 standard drinks of alcohol     Types: 8 - 12 Cans of beer per week     Comment: every day drinker   • Drug use: Never        Review of Systems    Physical Exam  ED Triage Vitals [08/06/23 1327]   Temperature Pulse Respirations Blood Pressure SpO2   98.1 °F (36.7 °C) (!) 120 (!) 28 150/82 98 %      Temp Source Heart Rate Source Patient Position - Orthostatic VS BP Location FiO2 (%)   Temporal Monitor Sitting Left arm --      Pain Score       No Pain             Orthostatic Vital Signs  Vitals:    08/08/23 0649 08/08/23 0812 08/08/23 1011 08/08/23 1037   BP: 126/60  139/72    Pulse: 63 65 89 82   Patient Position - Orthostatic VS:           Physical Exam    ED Medications  Medications   albuterol inhalation solution 10 mg (10 mg Nebulization Given 8/6/23 1411)     And   ipratropium (ATROVENT) 0.02 % inhalation solution 1 mg (1 mg Nebulization Given 8/6/23 1411)     And   sodium chloride 0.9 % inhalation solution 3 mL (3 mL Nebulization Given 8/6/23 1411)   methylPREDNISolone sodium succinate (Solu-MEDROL) injection 125 mg (125 mg Intravenous Given 8/6/23 1451)   sodium chloride 0.9 % bolus 1,000 mL (0 mL Intravenous Stopped 8/6/23 1704)   pantoprazole (PROTONIX) injection 40 mg (40 mg Intravenous Given 8/6/23 1755)       Diagnostic Studies  Results Reviewed     Procedure Component Value Units Date/Time    Serial Hemoglobin Q8hrs [[id number]]  (Abnormal) Collected: 08/07/23 0005    Lab Status: Final result Specimen: Blood from Arm, Left Updated: 08/07/23 0011     Hemoglobin 7.3 g/dL     Ferritin [[id number]]  (Abnormal) Collected: 08/06/23 1758    Lab Status: Final result Specimen: Blood from Arm, Left Updated: 08/06/23 1904     Ferritin 19 ng/mL     Iron Saturation % [[id number]]  (Abnormal) Collected: 08/06/23 1758    Lab Status: Final result Specimen: Blood from Arm, Left Updated: 08/06/23 1836     Iron Saturation 5 %      TIBC 332 ug/dL      Iron 16 ug/dL     Hemoglobin and hematocrit, blood [[id number]]  (Abnormal) Collected: 08/06/23 1705    Lab Status: Final result Specimen: Blood from Arm, Left Updated: 08/06/23 1739     Hemoglobin 6.6 g/dL      Hematocrit 20.4 %     Fingerstick Glucose (POCT) [[id number]]  (Abnormal) Collected: 08/06/23 1730    Lab Status: Final result Updated: 08/06/23 1731     POC Glucose 214 mg/dl     HS Troponin I 2hr [[id number]]  (Normal) Collected: 08/06/23 1609    Lab Status: Final result Specimen: Blood from Hand, Left Updated: 08/06/23 1705     hs TnI 2hr 15 ng/L      Delta 2hr hsTnI -3 ng/L     Urine Microscopic [[id number]]  (Normal) Collected: 08/06/23 1513    Lab Status: Final result Specimen: Urine, Other Updated: 08/06/23 1604     RBC, UA None Seen /hpf      WBC, UA 1-2 /hpf      Epithelial Cells Occasional /hpf      Bacteria, UA None Seen /hpf     UA w Reflex to Microscopic w Reflex to Culture [[id number]]  (Abnormal) Collected: 08/06/23 1513    Lab Status: Final result Specimen: Urine, Other Updated: 08/06/23 1603     Color, UA Colorless     Clarity, UA Clear     Specific Gravity, UA 1.020     pH, UA 5.0     Leukocytes, UA Elevated glucose may cause decreased leukocyte values.  See urine microscopic for UWBC result     Nitrite, UA Negative     Protein, UA Negative mg/dl      Glucose, UA >=1000 (1%) mg/dl      Ketones, UA Negative mg/dl      Urobilinogen, UA <2.0 mg/dl      Bilirubin, UA Negative     Occult Blood, UA Negative    HS Troponin 0hr (reflex protocol) [[id number]]  (Normal) Collected: 08/06/23 1412    Lab Status: Final result Specimen: Blood from Arm, Left Updated: 08/06/23 1505     hs TnI 0hr 18 ng/L     Comprehensive metabolic panel [[id number]]  (Abnormal) Collected: 08/06/23 1412    Lab Status: Final result Specimen: Blood from Arm, Left Updated: 08/06/23 1449     Sodium 130 mmol/L      Potassium 4.1 mmol/L      Chloride 99 mmol/L      CO2 23 mmol/L      ANION GAP 8 mmol/L      BUN 35 mg/dL      Creatinine 0.94 mg/dL      Glucose 308 mg/dL      Calcium 8.4 mg/dL      Corrected Calcium 9.0 mg/dL      AST 13 U/L      ALT 27 U/L      Alkaline Phosphatase 45 U/L      Total Protein 5.9 g/dL      Albumin 3.2 g/dL      Total Bilirubin 0.42 mg/dL      eGFR 82 ml/min/1.73sq m     Narrative:      National Kidney Disease Foundation guidelines for Chronic Kidney Disease (CKD):   •  Stage 1 with normal or high GFR (GFR > 90 mL/min/1.73 square meters)  •  Stage 2 Mild CKD (GFR = 60-89 mL/min/1.73 square meters)  •  Stage 3A Moderate CKD (GFR = 45-59 mL/min/1.73 square meters)  •  Stage 3B Moderate CKD (GFR = 30-44 mL/min/1.73 square meters)  •  Stage 4 Severe CKD (GFR = 15-29 mL/min/1.73 square meters)  •  Stage 5 End Stage CKD (GFR <15 mL/min/1.73 square meters)  Note: GFR calculation is accurate only with a steady state creatinine    CBC and differential [[id number]]  (Abnormal) Collected: 08/06/23 1412    Lab Status: Final result Specimen: Blood from Arm, Left Updated: 08/06/23 1421     WBC 12.90 Thousand/uL      RBC 2.52 Million/uL      Hemoglobin 7.3 g/dL      Hematocrit 21.8 %      MCV 87 fL      MCH 29.0 pg      MCHC 33.5 g/dL      RDW 15.2 %      MPV 10.4 fL      Platelets 214 Thousands/uL      nRBC 0 /100 WBCs      Neutrophils Relative 79 %      Immat GRANS % 1 %      Lymphocytes Relative 12 %      Monocytes Relative 8 %      Eosinophils Relative 0 %      Basophils Relative 0 %      Neutrophils Absolute 10.05 Thousands/µL      Immature Grans Absolute 0.15 Thousand/uL      Lymphocytes Absolute 1.60 Thousands/µL      Monocytes Absolute 1.08 Thousand/µL      Eosinophils Absolute 0.01 Thousand/µL      Basophils Absolute 0.01 Thousands/µL                  XR chest 2 views   Final Result by Melanie Posey MD (08/07 8806)      No acute cardiopulmonary disease.                   Workstation performed: [id number] Procedures  Procedures      ED Course  ED Course as of 08/10/23 1317   Sun Aug 06, 2023   1436 Hb 7.3, drop from 10.6, dilusional ?                 Identification of Seniors at [facility] Most Recent Value   (ISAR) Identification of Seniors at Risk    Before the illness or injury that brought you to the Emergency, did you need someone to help you on a regular basis? 0 Filed at: 08/06/2023 1328   In the last 24 hours, have you needed more help than usual? 0 Filed at: 08/06/2023 1328   Have you been hospitalized for one or more nights during the past 6 months? 1 Filed at: 08/06/2023 1328   In general, do you see well? 0 Filed at: 08/06/2023 1328   In general, do you have serious problems with your memory? 0 Filed at: 08/06/2023 1328   Do you take more than three different medications every day? 1 Filed at: 08/06/2023 1328   ISAR Score 2 Filed at: 08/06/2023 1328                    SBIRT 22yo+    Flowsheet Row Most Recent Value   Initial Alcohol Screen: US AUDIT-C     1. How often do you have a drink containing alcohol? 0 Filed at: 08/06/2023 1328   2. How many drinks containing alcohol do you have on a typical day you are drinking? 0 Filed at: 08/06/2023 1328   3a. Male UNDER 65: How often do you have five or more drinks on one occasion? 0 Filed at: 08/06/2023 1328   3b. FEMALE Any Age, or MALE 65+: How often do you have 4 or more drinks on one occassion? 0 Filed at: 08/06/2023 1328   Audit-C Score 0 Filed at: 08/06/2023 1328   MICHAEL: How many times in the past year have you. .. Used an illegal drug or used a prescription medication for non-medical reasons? Never Filed at: 08/06/2023 1328                MDM   Pt is a 69-year-old male, history of COPD, multiple admissions recently for the same, presents with shortness of breath that started 1 hour before coming in. States that he was watching TV and suddenly started to have shortness of breath.   Denies any associated chest pain, chest tightness, productive cough, nausea, vomiting, back pain, abdominal pain, urinary symptoms. Patient was evaluated for the same complaints last 2 days at a different ED. Denies any active bleeding. Appears chronically ill. On exam   General: VSS, NAD, awake, alert. Chronically ill-appearing  Head: Normocephalic, atraumatic, nontender. Eyes: EOM-No subconjunctival hemorrhages. ENT: Nose atraumatic. MMM  No malocclusion. No stridor. Normal phonation. No drooling. Normal swallowing. Neck: Trachea midline. No JVD. CV: RRR. Lungs: CTAB No tachypnea. No paradoxical motion. Abd: +BS, soft, NT/ND. No guarding/rigidity. MSK: Full ROM throughout. No lower extremity edema. Skin: Dry, intact. Neuro: AAOx3, GCS 15, CN II-XII grossly intact. Motor/sensory grossly intact. Psychiatric/Behavioral: mood/affect normal; behavior normal; thought content normal; judgement normal   Exam: deferred    Ddx: ACS, COPD, CHF, valvulopathy, anemia, electrolyte abnormalities, malignancy. Plan: Patient was evaluated with a cardiac workup, chest x-ray, symptomatically treated with a DuoNeb, though patient is not wheezing and stopping well on room air, blood pressure still treat for comfort. Patient's cardiac workup as interpreted by me came back negative, but his hemoglobin was 7.3 which is a 3 point drop from 10.6 few days ago. Repeat H&H was obtained with hemoglobin level of 6.6 patient was consented for blood transfusion and was given a unit. Patient does not have any active bleeding. Labs as interpreted by me mild leukocytosis, hemoglobin 7.3, hyponatremia possibly secondary to increased glucose, creatinine normal.  EKG as interpreted by me This EKG was interpreted by me. The EKG demonstrates Normal sinus rhythm, normal intervals and axis, normal QRS, no acute ST changes present. Imaging as interpreted by me hyperinflated lungs secondary to COPD but otherwise unremarkable.     ED Course: Labile in the ED, received 1 unit of blood for hemoglobin of 6.6, admitted to medicine for further workup. Final Dispo: Admitted to medicine  Disposition  Final diagnoses:   COPD exacerbation ([address])   Anemia     Time reflects when diagnosis was documented in both MDM as applicable and the Disposition within this note     Time User Action Codes Description Comment    8/6/2023  7:32 PM Lisa Yarbrough Add [J44.1] COPD exacerbation ([address])     8/6/2023  7:32 PM Annrei Carter Add [D64.9] Anemia     8/7/2023  9:43 AM Alto Radha E Modify [J44.1] COPD exacerbation ([address])     8/7/2023  9:43 AM Alto Radha Modify [D64.9] Anemia     8/8/2023 12:31 PM Cecilia BELTRAN Modify [J44.1] COPD exacerbation ([address])     8/8/2023 12:31 PM Cecilia BELTRAN Modify [D64.9] Anemia     8/8/2023 12:31 PM Shalini Norwood Add [K22.10] Esophageal ulcer     8/9/2023 11:07 AM Cecilia BELTRAN Add [J44.9] COPD (chronic obstructive pulmonary disease) [facility])       ED Disposition     ED Disposition   Admit    Condition   Stable    Date/Time   Sun Aug 6, 2023  7:32 PM    Comment   Case was discussed with Dr. Tyson Ely and the patient's admission status was agreed to be Admission Status: inpatient status to the service of Dr. Tyson Ely. Follow-up Information     Follow up With Specialties Details Why Contact Info Additional Information    Adriana Sommers, [address] [place] [address], Nurse Practitioner Schedule an appointment as soon as possible for a visit in 1 week(s)  [address] [place] [address]  [phone]       [facility] Follow up Office should call you to schedule outpatient follow-up to discuss your biopsy results as well as scheduling EGD and colonoscopy outpatient. If you do not hear anything, please call to schedule.  [address] [facility], [address], [facility] [address] [facility], [place], [place], [address] Follow up  [facility] [address]             Discharge Medication List as of 8/8/2023  2:02 PM      CONTINUE these medications which have CHANGED    Details   pantoprazole (Protonix) 40 mg tablet Take 1 tablet (40 mg total) by mouth 2 (two) times a day, Starting Tue 8/8/2023, Normal         CONTINUE these medications which have NOT CHANGED    Details   albuterol (Proventil HFA) 90 mcg/act inhaler Inhale 2 puffs every 6 (six) hours as needed for wheezing, Starting Fri 6/10/2022, Normal      aspirin 81 mg chewable tablet Chew 81 mg daily, Historical Med      atorvastatin (LIPITOR) 80 mg tablet Take 1 tablet (80 mg total) by mouth daily with dinner, Starting Sat 9/22/2018, Normal      B Complex Vitamins (VITAMIN B COMPLEX 100 IJ) Take 1 tablet by mouth daily, Historical Med      betamethasone valerate (VALISONE) 0.1 % cream Apply topically 2 (two) times a day, Starting Fri 10/4/2013, Historical Med      budesonide (PULMICORT) 0.5 mg/2 mL nebulizer solution Take 2 mL (0.5 mg total) by nebulization every 12 (twelve) hours Rinse mouth after use., Starting Fri 6/10/2022, No Print      busPIRone (BUSPAR) 10 mg tablet Take 1 tablet (10 mg total) by mouth 3 (three) times a day, Starting Fri 7/28/2023, Normal      fluticasone (FLONASE) 50 mcg/act nasal spray 1 spray into each nostril 2 (two) times a day, Starting Fri 10/4/2013, Historical Med      fluticasone-salmeterol (ADVAIR) 500-50 mcg/dose inhaler Inhale 1 puff 2 (two) times a day Rinse mouth after use., Historical Med      folic acid (FOLVITE) 1 mg tablet Take 1 tablet (1 mg total) by mouth daily, Starting Sat 6/11/2022, No Print      glipiZIDE (GLUCOTROL) 5 mg tablet Take 5 mg by mouth in the morning, Historical Med      Insulin Glargine (BASAGLAR KWIKPEN SC) Inject 30 Units under the skin daily at bedtime, Historical Med      Insulin Lispro (HUMALOG KWIKPEN SC) Inject 2 Units under the skin 3 (three) times a day with meals, Historical Med      ipratropium-albuterol (COMBIVENT)  mcg/act inhaler Inhale, Historical Med      lisinopril (ZESTRIL) 40 mg tablet Take 40 mg by mouth daily , Historical Med      LORazepam (Ativan) 0.5 mg tablet Take by mouth every 6 (six) hours as needed for anxiety , Historical Med      melatonin 3 mg Take 1 tablet (3 mg total) by mouth daily at bedtime as needed (30), Starting Fri 9/21/2018, Normal      metFORMIN (GLUCOPHAGE) 1000 MG tablet Take 1 tablet by mouth every 12 (twelve) hours, Historical Med      Multiple Vitamin (MULTIVITAMIN) tablet Take 1 tablet by mouth daily, Historical Med      senna (SENOKOT) 8.6 mg Take 2 tablets (17.2 mg total) by mouth daily, Starting Sat 6/11/2022, No Print      thiamine 100 MG tablet Take 1 tablet (100 mg total) by mouth daily, Starting Sat 9/22/2018, Normal      tiotropium (SPIRIVA) 18 mcg inhalation capsule Place 18 mcg into inhaler and inhale daily, Historical Med         STOP taking these medications       predniSONE 10 mg tablet Comments:   Reason for Stopping:             Outpatient Discharge Orders   EXTERNAL: Ambulatory Referral to Home Health   Standing Status: Future Standing Exp. Date: 08/09/24      Discharge Diet     Activity as tolerated     Call provider for: active or persistent bleeding     Call provider for:  severe uncontrolled pain     Call provider for:  persistent nausea or vomiting       PDMP Review     None           ED Provider  Attending physically available and evaluated Kathy Jacques. I managed the patient along with the ED Attending.     Electronically Signed by         Archana Kamara DO  08/10/23 7040

## 2023-08-06 NOTE — ASSESSMENT & PLAN NOTE
· Significant drop of hemoglobin in the past 3 weeks, patient denies any abdominal pain, hematemesis, blood in stool, melena. · Up until 3 weeks ago his baseline hemoglobin was from 12-14 and since then it has been decreasing.   His hemoglobin 8/1 was 11.7, on 8/5 was 8.2, today 7.3, repeat hemoglobin 6.6  · There is no signs of active bleeding  · EGD 12/27/2021 was done due to acute blood loss anemia which showed large esophageal ulceration at GE junction  · I performed a digital rectal exam with Hemoccult which came back positive  · BUN elevated  · Suspect upper GI bleed  · Start Protonix drip  · 1 unit of blood ordered  · Clear liquid diet today  · N.p.o. after midnight  · GI consult  · H&H every 8 hours, check first repeat hemoglobin 1 hour after a unit of blood  · Transfuse if hemoglobin less than 7

## 2023-08-06 NOTE — ASSESSMENT & PLAN NOTE
Lab Results   Component Value Date    HGBA1C 7.4 (H) 06/27/2023       Recent Labs     08/06/23  1730   POCGLU 214*       Blood Sugar Average: Last 72 hrs:  (P) 214   · Home regimen Basaglar 30 units at bedtime with Humalog 2 units 3 times daily with meals, metformin and glipizide  · Patient is on clear liquid diet, n.p.o. after midnight start Lantus 15 units at bedtime with sliding scale

## 2023-08-07 ENCOUNTER — ANESTHESIA (INPATIENT)
Dept: GASTROENTEROLOGY | Facility: HOSPITAL | Age: 68
End: 2023-08-07
Payer: MEDICARE

## 2023-08-07 ENCOUNTER — ANESTHESIA EVENT (INPATIENT)
Dept: GASTROENTEROLOGY | Facility: HOSPITAL | Age: 68
End: 2023-08-07
Payer: MEDICARE

## 2023-08-07 ENCOUNTER — APPOINTMENT (INPATIENT)
Dept: GASTROENTEROLOGY | Facility: HOSPITAL | Age: 68
End: 2023-08-07
Payer: MEDICARE

## 2023-08-07 PROBLEM — R65.10 SIRS (SYSTEMIC INFLAMMATORY RESPONSE SYNDROME) (HCC): Status: ACTIVE | Noted: 2023-08-07

## 2023-08-07 LAB
ABO GROUP BLD BPU: NORMAL
ANION GAP SERPL CALCULATED.3IONS-SCNC: 2 MMOL/L
BPU ID: NORMAL
BUN SERPL-MCNC: 18 MG/DL (ref 5–25)
CALCIUM SERPL-MCNC: 8.3 MG/DL (ref 8.3–10.1)
CHLORIDE SERPL-SCNC: 110 MMOL/L (ref 96–108)
CO2 SERPL-SCNC: 26 MMOL/L (ref 21–32)
CREAT SERPL-MCNC: 0.55 MG/DL (ref 0.6–1.3)
CROSSMATCH: NORMAL
ERYTHROCYTE [DISTWIDTH] IN BLOOD BY AUTOMATED COUNT: 15.5 % (ref 11.6–15.1)
GFR SERPL CREATININE-BSD FRML MDRD: 106 ML/MIN/1.73SQ M
GLUCOSE SERPL-MCNC: 142 MG/DL (ref 65–140)
GLUCOSE SERPL-MCNC: 181 MG/DL (ref 65–140)
GLUCOSE SERPL-MCNC: 294 MG/DL (ref 65–140)
GLUCOSE SERPL-MCNC: 75 MG/DL (ref 65–140)
HCT VFR BLD AUTO: 23 % (ref 36.5–49.3)
HGB BLD-MCNC: 7.3 G/DL (ref 12–17)
HGB BLD-MCNC: 7.7 G/DL (ref 12–17)
HGB BLD-MCNC: 8.4 G/DL (ref 12–17)
MCH RBC QN AUTO: 29.3 PG (ref 26.8–34.3)
MCHC RBC AUTO-ENTMCNC: 33.5 G/DL (ref 31.4–37.4)
MCV RBC AUTO: 88 FL (ref 82–98)
PLATELET # BLD AUTO: 348 THOUSANDS/UL (ref 149–390)
PMV BLD AUTO: 10.3 FL (ref 8.9–12.7)
POTASSIUM SERPL-SCNC: 4.1 MMOL/L (ref 3.5–5.3)
RBC # BLD AUTO: 2.63 MILLION/UL (ref 3.88–5.62)
SODIUM SERPL-SCNC: 138 MMOL/L (ref 135–147)
UNIT DISPENSE STATUS: NORMAL
UNIT PRODUCT CODE: NORMAL
UNIT PRODUCT VOLUME: 350 ML
UNIT RH: NORMAL
WBC # BLD AUTO: 8.84 THOUSAND/UL (ref 4.31–10.16)

## 2023-08-07 PROCEDURE — 99222 1ST HOSP IP/OBS MODERATE 55: CPT | Performed by: INTERNAL MEDICINE

## 2023-08-07 PROCEDURE — 80048 BASIC METABOLIC PNL TOTAL CA: CPT | Performed by: INTERNAL MEDICINE

## 2023-08-07 PROCEDURE — C9113 INJ PANTOPRAZOLE SODIUM, VIA: HCPCS | Performed by: INTERNAL MEDICINE

## 2023-08-07 PROCEDURE — 85027 COMPLETE CBC AUTOMATED: CPT | Performed by: INTERNAL MEDICINE

## 2023-08-07 PROCEDURE — 43239 EGD BIOPSY SINGLE/MULTIPLE: CPT | Performed by: INTERNAL MEDICINE

## 2023-08-07 PROCEDURE — 0DB68ZX EXCISION OF STOMACH, VIA NATURAL OR ARTIFICIAL OPENING ENDOSCOPIC, DIAGNOSTIC: ICD-10-PCS | Performed by: INTERNAL MEDICINE

## 2023-08-07 PROCEDURE — 88305 TISSUE EXAM BY PATHOLOGIST: CPT | Performed by: PATHOLOGY

## 2023-08-07 PROCEDURE — 85018 HEMOGLOBIN: CPT | Performed by: INTERNAL MEDICINE

## 2023-08-07 PROCEDURE — 94760 N-INVAS EAR/PLS OXIMETRY 1: CPT

## 2023-08-07 PROCEDURE — 94664 DEMO&/EVAL PT USE INHALER: CPT

## 2023-08-07 PROCEDURE — 99232 SBSQ HOSP IP/OBS MODERATE 35: CPT | Performed by: NURSE PRACTITIONER

## 2023-08-07 PROCEDURE — 0DB38ZX EXCISION OF LOWER ESOPHAGUS, VIA NATURAL OR ARTIFICIAL OPENING ENDOSCOPIC, DIAGNOSTIC: ICD-10-PCS | Performed by: INTERNAL MEDICINE

## 2023-08-07 PROCEDURE — 85018 HEMOGLOBIN: CPT | Performed by: NURSE PRACTITIONER

## 2023-08-07 PROCEDURE — 82948 REAGENT STRIP/BLOOD GLUCOSE: CPT

## 2023-08-07 PROCEDURE — 94640 AIRWAY INHALATION TREATMENT: CPT

## 2023-08-07 PROCEDURE — 0DB98ZX EXCISION OF DUODENUM, VIA NATURAL OR ARTIFICIAL OPENING ENDOSCOPIC, DIAGNOSTIC: ICD-10-PCS | Performed by: INTERNAL MEDICINE

## 2023-08-07 RX ORDER — SODIUM CHLORIDE 9 MG/ML
INJECTION, SOLUTION INTRAVENOUS CONTINUOUS PRN
Status: DISCONTINUED | OUTPATIENT
Start: 2023-08-07 | End: 2023-08-07

## 2023-08-07 RX ORDER — LIDOCAINE HYDROCHLORIDE 10 MG/ML
INJECTION, SOLUTION EPIDURAL; INFILTRATION; INTRACAUDAL; PERINEURAL AS NEEDED
Status: DISCONTINUED | OUTPATIENT
Start: 2023-08-07 | End: 2023-08-07

## 2023-08-07 RX ORDER — PROPOFOL 10 MG/ML
INJECTION, EMULSION INTRAVENOUS AS NEEDED
Status: DISCONTINUED | OUTPATIENT
Start: 2023-08-07 | End: 2023-08-07

## 2023-08-07 RX ORDER — PANTOPRAZOLE SODIUM 40 MG/1
40 TABLET, DELAYED RELEASE ORAL
Status: DISCONTINUED | OUTPATIENT
Start: 2023-08-07 | End: 2023-08-08 | Stop reason: HOSPADM

## 2023-08-07 RX ADMIN — SODIUM CHLORIDE: 9 INJECTION, SOLUTION INTRAVENOUS at 09:26

## 2023-08-07 RX ADMIN — INSULIN LISPRO 4 UNITS: 100 INJECTION, SOLUTION INTRAVENOUS; SUBCUTANEOUS at 12:51

## 2023-08-07 RX ADMIN — PANTOPRAZOLE SODIUM 8 MG/HR: 40 INJECTION, POWDER, FOR SOLUTION INTRAVENOUS at 06:14

## 2023-08-07 RX ADMIN — PROPOFOL 120 MG: 10 INJECTION, EMULSION INTRAVENOUS at 09:31

## 2023-08-07 RX ADMIN — PROPOFOL 20 MG: 10 INJECTION, EMULSION INTRAVENOUS at 09:42

## 2023-08-07 RX ADMIN — PROPOFOL 30 MG: 10 INJECTION, EMULSION INTRAVENOUS at 09:39

## 2023-08-07 RX ADMIN — PANTOPRAZOLE SODIUM 40 MG: 40 TABLET, DELAYED RELEASE ORAL at 16:52

## 2023-08-07 RX ADMIN — INSULIN GLARGINE 15 UNITS: 100 INJECTION, SOLUTION SUBCUTANEOUS at 21:39

## 2023-08-07 RX ADMIN — LIDOCAINE HYDROCHLORIDE 50 MG: 10 INJECTION, SOLUTION EPIDURAL; INFILTRATION; INTRACAUDAL; PERINEURAL at 09:31

## 2023-08-07 RX ADMIN — LEVALBUTEROL HYDROCHLORIDE 1.25 MG: 1.25 SOLUTION RESPIRATORY (INHALATION) at 19:25

## 2023-08-07 RX ADMIN — ATORVASTATIN CALCIUM 80 MG: 80 TABLET, FILM COATED ORAL at 16:52

## 2023-08-07 RX ADMIN — BUSPIRONE HYDROCHLORIDE 10 MG: 10 TABLET ORAL at 21:39

## 2023-08-07 RX ADMIN — BUSPIRONE HYDROCHLORIDE 10 MG: 10 TABLET ORAL at 16:52

## 2023-08-07 NOTE — CASE MANAGEMENT
Case Management Assessment & Discharge Planning Note    Patient name [facility]  Location [facility] 606/[facility] 588-13 MRN [id number]  : 1955 Date 2023       Current Admission Date: 2023  Current Admission Diagnosis:SOB (shortness of breath)   Patient Active Problem List    Diagnosis Date Noted   • SIRS (systemic inflammatory response syndrome) ([address]) 2023   • SOB (shortness of breath) 2023   • Hypertension 2023   • COPD (chronic obstructive pulmonary disease) ([address]) 2023   • Anxiety 2023   • Chronic respiratory failure ([address]) 2023   • Stage 3 severe COPD by GOLD classification ([address]) 2023   • Oral abscess 2022   • Tooth fracture 2022   • H/O ETOH abuse 2022   • Closed nondisplaced fracture of posterior arch of first cervical vertebra ([address]) 2022   • Fall 2022   • Diabetic polyneuropathy associated with type 2 diabetes mellitus ([address]) 2021   • Hammertoe, bilateral 2021   • Post-traumatic arthritis of left foot 2021   • Pain due to onychomycosis of toenail 2021   • Bronchitis 2020   • KLARISSA (obstructive sleep apnea) 2020   • Dirty living conditions 2020   • Closed fracture of first cervical vertebra ([address]) 2019   • ETOH abuse 2019   • Chronic obstructive pulmonary disease with acute exacerbation ([address]) 2018   • At moderate risk for venous thromboembolism (VTE) 2018   • S/P C1-T1 Posterior Cervical Discectomy and Fusion on 9/10/18 2018   • Acute blood loss anemia 2018   • Type 2 diabetes mellitus with hyperglycemia, without long-term current use of insulin ([address]) 2018   • Closed odontoid fracture with routine healing 2018   • Closed displaced fracture of third cervical vertebra ([address]) 2018   • Closed displaced fracture of fourth cervical vertebra ([address]) 2018   • Alcohol abuse 2018   • CAD (coronary artery disease) 2018   • Dens fracture ([address]) 09/09/2018      LOS (days): 1  Geometric Mean LOS (GMLOS) (days): 3.00  Days to GMLOS:2.2     OBJECTIVE:  PATIENT READMITTED [address] of Unplanned Readmission Score: 17.42         Current admission status: Inpatient       Preferred Pharmacy:   [address] [place], [place] - [address] [facility]  [address] [place] [address]  Phone: [phone] Fax: [address] [place], [place] - [address] [facility] [address] [address] [facility] [address] [facility] [address]  Phone: [phone] Fax: [phone]    Primary Care Provider: TATIANA Marcus    Primary Insurance: MEDICARE  Secondary Insurance: BLUE CROSS    ASSESSMENT:  [address] Representative - Daughter   Primary Phone: [phone] (Mobile)                 Readmission Root Cause  30 Day Readmission: Yes  Who directed you to return to the hospital?: Self  Did you understand whom to contact if you had questions or problems?: Yes  Did you get your prescriptions before you left the hospital?: No  Reason[de-identified]  (Patient picked up prescriptions post DC by choice.)  Were you able to get your prescriptions filled when you left the hospital?: Yes  Did you take your medications as prescribed?: Yes  Were you able to get to your follow-up appointments?: Yes  During previous admission, was a post-acute recommendation made?: No  Patient was readmitted due to: Shortness of breath.     Patient Information  Admitted from[de-identified] Home  Mental Status: Alert  During Assessment patient was accompanied by: Not accompanied during assessment  Assessment information provided by[de-identified] Patient  Primary Caregiver: Family  Caregiver's Name[de-identified] Lisette Arboleda Relationship to Patient[de-identified] Family Member  Caregiver's Telephone Number[de-identified] [phone]  Support Systems: Daughter, Family members  [place] of Residence: [facility] [address] [place] [address] do you live in?: [address] [facility] entry access options.  Select all that apply.: Stairs  Number of steps to enter home.: 3  Type of Current Residence: 2 story home  Upon entering residence, is there a bedroom on the main floor (no further steps)?: Yes  Upon entering residence, is there a bathroom on the main floor (no further steps)?: Yes  In the last 12 months, was there a time when you were not able to pay the mortgage or rent on time?: No  In the last 12 months, how many places have you lived?: 1  In the last 12 months, was there a time when you did not have a steady place to sleep or slept in a shelter (including now)?: No  Homeless/housing insecurity resource given?: N/A  Living Arrangements: Lives w/ Daughter  Is patient a ?: No    Activities of Daily Living Prior to Admission  Functional Status: Independent  Completes ADLs independently?: Yes  Ambulates independently?: Yes  Does patient use assisted devices?: Yes  Assisted Devices (DME) used: Straight Cane  Does patient have a history of Outpatient Therapy (PT/OT)?: No  Does the patient have a history of Short-Term Rehab?: No  Does patient have a history of HHC?: Yes (Patient does not recall the name of agency.)      Patient Information Continued  Income Source: Pension/skilled nursing  Does patient have prescription coverage?: Yes  Within the past 12 months, you worried that your food would run out before you got the money to buy more.: Never true  Within the past 12 months, the food you bought just didn't last and you didn't have money to get more.: Never true  Food insecurity resource given?: N/A  Does patient receive dialysis treatments?: No  Does patient have a history of substance abuse?: No  Does patient have a history of Mental Health Diagnosis?: No         Means of Transportation  Means of Transport to Appts[de-identified] Family transport  In the past 12 months, has lack of transportation kept you from medical appointments or from getting medications?: No  In the past 12 months, has lack of transportation kept you from meetings, work, or from getting things needed for daily living?: No  Was application for public transport provided?: N/A        DISCHARGE DETAILS:    Discharge planning discussed with[de-identified] Patient. Freedom of Choice: Yes  Comments - Freedom of Choice: FOC discussed       Other Referral/Resources/Interventions Provided:  Referral Comments: Anticipated DC home no needs. Pt has Home O2 with [employer], PRN. Family can provide transportation at DC. CM reviewed d/c planning process including the following: identifying help at home, patient preference for d/c planning needs, Discharge Lounge, [facility] Meds to Bed program, availability of treatment team to discuss questions or concerns patient and/or family may have regarding understanding medications and recognizing signs and symptoms once discharged. CM also encouraged patient to follow up with all recommended appointments after discharge. Patient advised of importance for patient and family to participate in managing patient’s medical well being. Information:  Info obtained by patient. This CM met with pt at bedside, introduced myself and explained my role. Pt lives with Delfina Mac in 2 story home. Nena Masters, [phone]. IADL. DME includes a straight cane and home O2, PRN, through [employer].  Family to provide transportation at DC.

## 2023-08-07 NOTE — ASSESSMENT & PLAN NOTE
Patient presents with shortness of breath, found to be anemic with concern of GIB.    · Plan as below  · Patient is not in COPD exacerbation, shortness of breath is most likely from acute blood loss anemia

## 2023-08-07 NOTE — ASSESSMENT & PLAN NOTE
Significant drop of hemoglobin in the past 3 weeks, patient denies any abdominal pain, hematemesis, blood in stool, melena. · Up until 3 weeks ago his baseline hemoglobin was from 12-14 and since then it has been decreasing. · S/P 1 unit PRBC  · S/P EGD with clean based esophageal ulcer, biopsies pending. Stomach appeared normal but was also biopsied.    · Will need outpatient GI f/u and repeat EGD  · Of note, had EGD in Dec 2021 for acute anemia which showed large esophageal ulceration at GE junction  · Iron panel with iron deficiency anemia, continue PO iron  · Continue PPI BID  · Trend hemoglobin

## 2023-08-07 NOTE — CONSULTS
Consult Service: Gastroenterology    PATIENT INFORMATION      Bernie Caruso 70 y.o. male MRN: [id number]  Unit/Bed#: [facility] 606-01 Encounter: [id number]  PCP: TATIANA Merino  Date of Admission:  8/6/2023  Date of Consultation: 08/07/23  Requesting Physician: Guillermina Jasso, DO     ASSESSMENTS & PLAN     Bernie Caruso is a 70 y.o. male with PMHx of COPD, chronic respiratory failure on 2 L O2, HTN, HLD, and CAD  Presenting to [facility] ED on 8/6 with complaints of SOB, with GI consulted for acute blood loss anemia. #Acute blood loss anemia  #Shortness of breath  -Patient presenting with acute onset of SOB, on 2 L O2 at baseline  -LAM completed with hemoccult positive. Patient received 1 unit pRBCs and started on Protonix gtt.   -Of note, patients hemoglobin 3 weeks ago was between 12-14 which has progressively dropping, 7.3 on arrival  -Hgb 8/1 was 11.7, on 8/5 was 8.2  -Iron studies showed iron sat 5, TIBC 332, iron 16, and ferritin 19, takes oral iron at home  -Endoscopy history per chart review:   · EGD 2017: esophageal ulcer EG junction with visible vessel s/p injection and BiCAP, no further overt bleeding  · EGD 2017: erosive esophagitis, antral gastritis, duodenitis  · Colonoscopy 2016: normal, external hemorrhoids  · EGD 2016: esophageal ulcers, duodenal edema and erosion.   -Was previously on ASA and Plavix but per patient, his Plavix was recently stopped by PCP. ASA currently being held by primary team.  -Patient remains hemodynamically stable, no signs of active bleeding   -Takes 1 Advil daily for generalized pain, denies current tobacco use (former smoker), history of alcohol abuse but states he quit 2 years ago  -EGD today showing: "Single ulcer in the lower third of the esophagus; performed cold forceps biopsy; Grade D esophagitis in the lower third of the esophagus; 2 cm type I hiatal hernia;  The stomach appeared normal. Performed random biopsy to rule out H. Pylori; Edematous mucosa in the 1st part of the duodenum; performed cold forceps biopsy"    Lab Results   Component Value Date/Time    HGB 7.3 (L) 08/07/2023 12:05 AM    HGB 6.6 (LL) 08/06/2023 05:05 PM    HGB 7.3 (L) 08/06/2023 02:12 PM       Plan:  -Plan for outpatient GI follow-up, repeat EGD in 3 months  -Can plan for colonoscopy at that time  -Continue Protonix gtt and IVF   -Transfuse Hgb <7, s/p 1 unit pRBC's  -Continue to hold ASA at this time  -Monitor H&H q6h   -Rest of management per primary team    REASON FOR CONSULTATION      Anemia    HISTORY OF PRESENT ILLNESS      Shade Sosa is a 70 y.o. male with PMHx of COPD, chronic respiratory failure on 2 L O2, HTN, HLD, and CAD  Presenting to [facility] ED on 8/6 with complaints of SOB, with GI consulted for acute blood loss anemia. Patient has had multiple admissions with COPD over the past few months. He became short of breath yesterday and presented to ED. Patient typically utilizing 2 L of O2 supplementation at baseline. He denies chest pain, abdominal pain, flank pain, N/V/D, hematemesis, blood in stool, melena, or urinary symptoms. Of note, patients hemoglobin 3 weeks ago was between 12-14 which has progressively dropping. On arrival to ED, vital signs T 98.1, , RR 28, /82, 98% on RA. Labs were significant for WBC 12.90, Hgb 7.3 (repeat 6.6), Na 130, BUN 35, Glc 308, ALP 45, TP 5.9, Alb 3.2. Iron studies showed iron sat 5, TIBC 332, iron 16, and ferritin 19. CXR showed no acute cardiopulmonary disease. Patient received steroids and nebulizer treatments. LAM completed with hemoccult positive. Patient received 1 unit pRBCs and started on Protonix gtt. Of note, patient had EGD 12/2021 due to acute blood loss anemia which was significant for a large, esophageal ulceration at GE junction. Unremarkable colonoscopy noted in 2016 with external hemorrhoids but patient states he had colonoscopy 5 years ago and had part of large intestine removed.  Was previously on ASA and Plavix but per patient, his Plavix was recently stopped by PCP. ASA currently being held by primary team. Patient takes oral iron supplementation and has history of alcohol abuse. He states he quit drinking 2 years ago. Takes 1 Advil daily for generalized pain. Denies current tobacco use. Patient seems to be a poor historian. Patient seen and examined this morning at bedside. No acute events and hemodynamically stable. Patient states his shortness of breath has improved since presentation. Denies chest pain, N/V/D, abdominal pain, hematochezia, hematemesis, or melena. Patient endorses normal daily bowel movements. Otherwise no additional complaints or concerns at this time. REVIEW OF SYSTEMS     A thorough 12-point review of systems has been conducted. Pertinent positives and negatives are mentioned in the history of present illness. PAST MEDICAL & SURGICAL HISTORY      Past Medical History:   Diagnosis Date   • Cardiac arrest [facility])    • COPD (chronic obstructive pulmonary disease) ([address])    • CVA (cerebral vascular accident) ([address])    • Diabetes mellitus ([address])    • Heart attack ([address])    • Hypertension    • Stroke [facility])        Past Surgical History:   Procedure Laterality Date   • CERVICAL FUSION N/A 9/10/2018    Procedure: Posterior cervical decompressive laminectomy C3-6; Posterior cervical lateral mass and pedicle fixation fusion C1-T1;  Surgeon: Karen Cervantes MD;  Location: [facility];  Service: Neurosurgery       MEDICATIONS & ALLERGIES       Medications:   Prior to Admission medications    Medication Sig Start Date End Date Taking?  Authorizing Provider   albuterol (Proventil HFA) 90 mcg/act inhaler Inhale 2 puffs every 6 (six) hours as needed for wheezing 6/10/22   Margarito Meneses PA-C   atorvastatin (LIPITOR) 80 mg tablet Take 1 tablet (80 mg total) by mouth daily with dinner 9/22/18   Karo Benitez MD   B Complex Vitamins (VITAMIN B COMPLEX 100 IJ) Take 1 tablet by mouth daily    Historical Provider, MD betamethasone valerate (VALISONE) 0.1 % cream Apply topically 2 (two) times a day 10/4/13   Historical Provider, MD   budesonide (PULMICORT) 0.5 mg/2 mL nebulizer solution Take 2 mL (0.5 mg total) by nebulization every 12 (twelve) hours Rinse mouth after use. 6/10/22   Jenna Meneses PA-C   busPIRone (BUSPAR) 10 mg tablet Take 1 tablet (10 mg total) by mouth 3 (three) times a day 7/28/23   Prince Edith PA-C   fluticasone [facility]) 50 mcg/act nasal spray 1 spray into each nostril 2 (two) times a day 10/4/13   Historical Provider, MD   fluticasone-salmeterol (ADVAIR) 500-50 mcg/dose inhaler Inhale 1 puff 2 (two) times a day Rinse mouth after use.     Historical Provider, MD   folic acid (FOLVITE) 1 mg tablet Take 1 tablet (1 mg total) by mouth daily 6/11/22   Mirna Meneses PA-C   glipiZIDE (GLUCOTROL) 5 mg tablet Take 5 mg by mouth in the morning    Historical Provider, MD   Insulin Glargine (BASAGLAR KWIKPEN SC) Inject 30 Units under the skin daily at bedtime    Historical Provider, MD   Insulin Lispro (HUMALOG KWIKPEN SC) Inject 2 Units under the skin 3 (three) times a day with meals    Historical Provider, MD   ipratropium-albuterol (COMBIVENT)  mcg/act inhaler Inhale    Historical Provider, MD   lisinopril (ZESTRIL) 40 mg tablet Take 40 mg by mouth daily     Historical Provider, MD   LORazepam (Ativan) 0.5 mg tablet Take by mouth every 6 (six) hours as needed for anxiety     Historical Provider, MD   melatonin 3 mg Take 1 tablet (3 mg total) by mouth daily at bedtime as needed (30) 9/21/18   Ariela Olivier MD   metFORMIN (GLUCOPHAGE) 1000 MG tablet Take 1 tablet by mouth every 12 (twelve) hours    Historical Provider, MD   Multiple Vitamin (MULTIVITAMIN) tablet Take 1 tablet by mouth daily    Historical Provider, MD   pantoprazole (PROTONIX) 40 mg tablet Take 1 tablet by mouth Twice daily    Historical Provider, MD   predniSONE 10 mg tablet Take 4 tablets (40 mg total) by mouth daily for 3 days, THEN 3 tablets (30 mg total) daily for 3 days, THEN 2 tablets (20 mg total) daily for 3 days, THEN 1 tablet (10 mg total) daily for 3 days. Do not start before July 26, 2023. 7/26/23 8/7/23  TATIANA Carrillo   senna (SENOKOT) 8.6 mg Take 2 tablets (17.2 mg total) by mouth daily 6/11/22   Jessica Meneses PA-C   thiamine 100 MG tablet Take 1 tablet (100 mg total) by mouth daily 9/22/18   Chiquita Mcpherson MD   tiotropium [facility]) 18 mcg inhalation capsule Place 18 mcg into inhaler and inhale daily    Historical Provider, MD       Allergies:    Allergies   Allergen Reactions   • Amoxicillin Hives   • Augmentin [Amoxicillin-Pot Clavulanate] Hives       SOCIAL HISTORY      Marital Status: Single    Substance Use History:   Social History     Substance and Sexual Activity   Alcohol Use Not Currently   • Alcohol/week: 8.0 - 12.0 standard drinks of alcohol   • Types: 8 - 12 Cans of beer per week    Comment: every day drinker     Social History     Tobacco Use   Smoking Status Former   • Types: Cigarettes   Smokeless Tobacco Never   Tobacco Comments    quit 5 months ago      Social History     Substance and Sexual Activity   Drug Use Never       FAMILY HISTORY      Non-Contributory    PHYSICAL EXAM     Vitals:   Blood Pressure: 117/66 (08/07/23 0702)  Pulse: 64 (08/07/23 0711)  Temperature: 97.7 °F (36.5 °C) (08/07/23 0702)  Temp Source: Oral (08/06/23 2059)  Respirations: 18 (08/07/23 0711)  Height: 6' (182.9 cm) (08/06/23 2302)  Weight - Scale: 99 kg (218 lb 4.1 oz) (08/06/23 2302)  SpO2: 99 % (08/07/23 0715)    Physical Exam:   GENERAL: NAD  HEENT:  NC/AT, No Scleral Icterus  CARDIAC:  Regular Rate  PULMONARY:  Non-Labored Breathing, No Respiratory Distress  ABDOMEN:  Soft, No Rebound/Guarding/Rigidity, No Organomegaly, No Tenderness  EXTREMITIES:  No Edema, Cyanosis, or Clubbing  NEUROLOGIC:  Alert  SKIN:  No Rashes or Erythema    ADDITIONAL DATA     Lab Results:       Lab Units 08/06/23  1412 07/28/23  5708 07/27/23  1645 07/24/23  0446 07/23/23  1334 06/10/22  0613 06/09/22  0506   SODIUM mmol/L 130* 136 135 132* 140   < > 138   POTASSIUM mmol/L 4.1 4.1 3.9 4.5 4.6   < > 4.0   CHLORIDE mmol/L 99 103 103 102 106   < > 104   CO2 mmol/L 23 28 27 23 27   < > 24   BUN mg/dL 35* 13 16 23 14   < > 6   CREATININE mg/dL 0.94 0.49* 0.51* 0.54* 0.70   < > 0.57*   GLUCOSE RANDOM mg/dL 308* 329* 287* 239* 218*   < > 188*   CALCIUM mg/dL 8.4 8.6 9.0 8.5 8.9   < > 9.0   MAGNESIUM mg/dL  --   --   --   --   --   --  1.7   PHOSPHORUS mg/dL  --   --   --   --   --   --  3.1    < > = values in this interval not displayed. Lab Units 08/06/23  1412 07/27/23  1645 07/23/23  1334   TOTAL PROTEIN g/dL 5.9* 6.5 6.8   ALBUMIN g/dL 3.2* 3.2* 3.3*   TOTAL BILIRUBIN mg/dL 0.42 0.41 0.40   AST U/L 13 16 18   ALT U/L 27 30 28   ALK PHOS U/L 45* 59 69           Lab Units 08/07/23  0005 08/06/23  1705 08/06/23  1412 07/28/23  0608 07/27/23  1645 07/24/23  0446 07/23/23  1334   WBC Thousand/uL  --   --  12.90* 7.09 9.50 7.23 11.76*   HEMOGLOBIN g/dL 7.3* 6.6* 7.3* 10.6* 11.1* 10.9* 12.4   HEMATOCRIT %  --  20.4* 21.8* 31.1* 34.2* 33.3* 37.6   PLATELETS Thousands/uL  --   --  380 245 268 308 329   MCV fL  --   --  87 86 88 87 88       Lab Results   Component Value Date    IRON 16 (L) 08/06/2023    TIBC 332 08/06/2023    FERRITIN 19 (L) 08/06/2023       Lab Results   Component Value Date    INR 0.98 06/08/2022    INR 0.89 09/09/2018    INR 0.96 06/20/2018    PROTIME 12.6 06/08/2022    PROTIME 12.1 09/09/2018    PROTIME 12.9 06/20/2018         Microbiology Results:        Imaging:  Procedure: XR chest 2 views    Result Date: 8/7/2023  Narrative: CHEST INDICATION:   copd, sob. COMPARISON: 0727 2023 EXAM PERFORMED/VIEWS:  XR CHEST PA & LATERAL Images: 3 FINDINGS: Cardiomediastinal silhouette appears unremarkable. The lungs are clear. The lungs are hyperinflated. No pneumothorax or pleural effusion.  Chronic blunting of the left costophrenic angle. Old rib fractures. Fusion at the cervical thoracic junction. Stable compressed mid thoracic vertebral body. Impression: No acute cardiopulmonary disease. Workstation performed: [id number]     Procedure: XR CHEST 1 VW PORTABLE    Result Date: 8/5/2023  Narrative: Study: Single view AP erect portable chest. COMPARISON: August 1, 2023. HISTORY: Shortness of breath. Patient rotated to the right. Heart and vessels normal. No interval infiltrates or edema. Persistent blunting left lateral costophrenic angle unchanged from earlier studies. Persistent atelectasis/scarring right base. Impression: IMPRESSION: No interval infiltrates, edema, or effusions. Linear atelectasis/scarring right base with left lateral costophrenic angle pleural scarring. Workstation:[id number]    Narrative/Impressions - 3 day look back     EKG, Pathology, and Other Studies Reviewed on Admission:   · EKG: Reviewed    Counseling / Coordination of Care Time: 30 total mins spent n consult. Greater than 50% of total time spent on patient counseling and coordination of care. ...............................................................................................................................................   Kale Orozco MD, MPH  [address] [facility]  Internal Medicine Residency, PGY-II  Recommendations not final until attending attestation

## 2023-08-07 NOTE — ANESTHESIA POSTPROCEDURE EVALUATION
Post-Op Assessment Note    CV Status:  Stable  Pain Score: 0    Pain management: adequate     Mental Status:  Awake   Hydration Status:  Stable and euvolemic   PONV Controlled:  None   Airway Patency:  Patent and adequate      Post Op Vitals Reviewed: Yes      Staff: CRNA         No notable events documented.     /59 (08/07/23 0950)    Temp (!) 97 °F (36.1 °C) (08/07/23 0950)    Pulse 57 (08/07/23 0950)   Resp 18 (08/07/23 0950)    SpO2 100 % (08/07/23 0950)

## 2023-08-07 NOTE — ASSESSMENT & PLAN NOTE
Lab Results   Component Value Date    HGBA1C 7.4 (H) 06/27/2023       Recent Labs     08/06/23  1730   POCGLU 214*     · Home regimen Basaglar 30 units at bedtime with Humalog 2 units 3 times daily with meals, metformin and glipizide  · PO meds on hold, continue Lantus 15 units HS and SSI for now  · Monitor

## 2023-08-07 NOTE — PLAN OF CARE
Problem: PAIN - ADULT  Goal: Verbalizes/displays adequate comfort level or baseline comfort level  Description: Interventions:  - Encourage patient to monitor pain and request assistance  - Assess pain using appropriate pain scale  - Administer analgesics based on type and severity of pain and evaluate response  - Implement non-pharmacological measures as appropriate and evaluate response  - Consider cultural and social influences on pain and pain management  - Notify physician/advanced practitioner if interventions unsuccessful or patient reports new pain  Outcome: Progressing     Problem: INFECTION - ADULT  Goal: Absence or prevention of progression during hospitalization  Description: INTERVENTIONS:  - Assess and monitor for signs and symptoms of infection  - Monitor lab/diagnostic results  - Monitor all insertion sites, i.e. indwelling lines, tubes, and drains  - Monitor endotracheal if appropriate and nasal secretions for changes in amount and color  - Hull appropriate cooling/warming therapies per order  - Administer medications as ordered  - Instruct and encourage patient and family to use good hand hygiene technique  - Identify and instruct in appropriate isolation precautions for identified infection/condition  Outcome: Progressing  Goal: Absence of fever/infection during neutropenic period  Description: INTERVENTIONS:  - Monitor WBC    Outcome: Progressing     Problem: SAFETY ADULT  Goal: Patient will remain free of falls  Description: INTERVENTIONS:  - Educate patient/family on patient safety including physical limitations  - Instruct patient to call for assistance with activity   - Consult OT/PT to assist with strengthening/mobility   - Keep Call bell within reach  - Keep bed low and locked with side rails adjusted as appropriate  - Keep care items and personal belongings within reach  - Initiate and maintain comfort rounds  - Make Fall Risk Sign visible to staff  - Offer Toileting every 2 Hours, in advance of need  - Initiate/Maintain bed alarm  - Obtain necessary fall risk management equipment: bed alarm  - Apply yellow socks and bracelet for high fall risk patients  - Consider moving patient to room near nurses station  Outcome: Progressing  Goal: Maintain or return to baseline ADL function  Description: INTERVENTIONS:  -  Assess patient's ability to carry out ADLs; assess patient's baseline for ADL function and identify physical deficits which impact ability to perform ADLs (bathing, care of mouth/teeth, toileting, grooming, dressing, etc.)  - Assess/evaluate cause of self-care deficits   - Assess range of motion  - Assess patient's mobility; develop plan if impaired  - Assess patient's need for assistive devices and provide as appropriate  - Encourage maximum independence but intervene and supervise when necessary  - Involve family in performance of ADLs  - Assess for home care needs following discharge   - Consider OT consult to assist with ADL evaluation and planning for discharge  - Provide patient education as appropriate  Outcome: Progressing  Goal: Maintains/Returns to pre admission functional level  Description: INTERVENTIONS:  - Perform BMAT or MOVE assessment daily.   - Set and communicate daily mobility goal to care team and patient/family/caregiver. - Collaborate with rehabilitation services on mobility goals if consulted  - Perform Range of Motion4  times a day. - Reposition patient every 3 hours.   - Dangle patient 3 times a day  - Stand patient 3 times a day  - Ambulate patient 3 times a day  - Out of bed to chair 3 times a day   - Out of bed for meals 3 times a day  - Out of bed for toileting  - Record patient progress and toleration of activity level   Outcome: Progressing     Problem: DISCHARGE PLANNING  Goal: Discharge to home or other facility with appropriate resources  Description: INTERVENTIONS:  - Identify barriers to discharge w/patient and caregiver  - Arrange for needed discharge resources and transportation as appropriate  - Identify discharge learning needs (meds, wound care, etc.)  - Arrange for interpretive services to assist at discharge as needed  - Refer to Case Management Department for coordinating discharge planning if the patient needs post-hospital services based on physician/advanced practitioner order or complex needs related to functional status, cognitive ability, or social support system  Outcome: Progressing     Problem: Knowledge Deficit  Goal: Patient/family/caregiver demonstrates understanding of disease process, treatment plan, medications, and discharge instructions  Description: Complete learning assessment and assess knowledge base.   Interventions:  - Provide teaching at level of understanding  - Provide teaching via preferred learning methods  Outcome: Progressing     Problem: CARDIOVASCULAR - ADULT  Goal: Maintains optimal cardiac output and hemodynamic stability  Description: INTERVENTIONS:  - Monitor I/O, vital signs and rhythm  - Monitor for S/S and trends of decreased cardiac output  - Administer and titrate ordered vasoactive medications to optimize hemodynamic stability  - Assess quality of pulses, skin color and temperature  - Assess for signs of decreased coronary artery perfusion  - Instruct patient to report change in severity of symptoms  Outcome: Progressing  Goal: Absence of cardiac dysrhythmias or at baseline rhythm  Description: INTERVENTIONS:  - Continuous cardiac monitoring, vital signs, obtain 12 lead EKG if ordered  - Administer antiarrhythmic and heart rate control medications as ordered  - Monitor electrolytes and administer replacement therapy as ordered  Outcome: Progressing     Problem: RESPIRATORY - ADULT  Goal: Achieves optimal ventilation and oxygenation  Description: INTERVENTIONS:  - Assess for changes in respiratory status  - Assess for changes in mentation and behavior  - Position to facilitate oxygenation and minimize respiratory effort  - Oxygen administered by appropriate delivery if ordered  - Initiate smoking cessation education as indicated  - Encourage broncho-pulmonary hygiene including cough, deep breathe, Incentive Spirometry  - Assess the need for suctioning and aspirate as needed  - Assess and instruct to report SOB or any respiratory difficulty  - Respiratory Therapy support as indicated  Outcome: Progressing     Problem: SKIN/TISSUE INTEGRITY - ADULT  Goal: Skin Integrity remains intact(Skin Breakdown Prevention)  Description: Assess:  -Perform Luciano assessment every shift  -Clean and moisturize skin every other day  -Inspect skin when repositioning, toileting, and assisting with ADLS  -Assess under medical devices such as allevyn every shift  -Assess extremities for adequate circulation and sensation     Bed Management:  -Have minimal linens on bed & keep smooth, unwrinkled  -Change linens as needed when moist or perspiring  -Avoid sitting or lying in one position for more than 2  hours while in bed  -Keep HOB at 30 degrees         Activity:  -Mobilize patient 3 times a day  -Encourage activity and walks on unit  -Encourage or provide ROM exercises   -Turn and reposition patient every 2 Hours  -Use appropriate equipment to lift or move patient in bed  -Instruct/ Assist with weight shifting every 2 when out of bed in chair  -Consider limitation of chair time 2 hour intervals    Skin Care:  -Avoid use of baby powder, tape, friction and shearing, hot water or constrictive clothing  -Relieve pressure over bony prominences using allevyn  -Do not massage red bony areas    Next Steps:  -Teach patient strategies to minimize risks such as proper body hygiene  -Consider consults to  interdisciplinary teams such as wound care  Outcome: Progressing  Goal: Incision(s), wounds(s) or drain site(s) healing without S/S of infection  Description: INTERVENTIONS  - Assess and document dressing, incision, wound bed, drain sites and surrounding tissue  - Provide patient and family education  - Perform skin care/dressing changes every other day  Outcome: Progressing  Goal: Pressure injury heals and does not worsen  Description: Interventions:  - Implement low air loss mattress or specialty surface (Criteria met)  - Apply silicone foam dressing  - Instruct/assist with weight shifting every 30 minutes when in chair   - Limit chair time to 3 hour intervals  - Use special pressure reducing interventions such as 3 when in chair   - Apply fecal or urinary incontinence containment device   - Perform passive or active ROM every 4  - Turn and reposition patient & offload bony prominences every 2 hours   - Utilize friction reducing device or surface for transfers   - Consider consults to  interdisciplinary teams such as wound  - Use incontinent care products after each incontinent episode such as BM  - Consider nutrition services referral as needed  Outcome: Progressing     Problem: HEMATOLOGIC - ADULT  Goal: Maintains hematologic stability  Description: INTERVENTIONS  - Assess for signs and symptoms of bleeding or hemorrhage  - Monitor labs  - Administer supportive blood products/factors as ordered and appropriate  Outcome: Progressing

## 2023-08-07 NOTE — PROGRESS NOTES
[address] [facility]  Progress Note  Name: Leona Herr  MRN: [id number]  [facility] Date of Admission: 8/6/2023   Date of Service: 8/7/2023 I Hospital Day: 1    Assessment/Plan   * SOB (shortness of breath)  Assessment & Plan  Patient presents with shortness of breath, found to be anemic with concern of GIB. · Plan as below  · Patient is not in COPD exacerbation, shortness of breath is most likely from acute blood loss anemia      Acute blood loss anemia  Assessment & Plan  Significant drop of hemoglobin in the past 3 weeks, patient denies any abdominal pain, hematemesis, blood in stool, melena. · Up until 3 weeks ago his baseline hemoglobin was from 12-14 and since then it has been decreasing. · S/P 1 unit PRBC  · S/P EGD with clean based esophageal ulcer, biopsies pending. Stomach appeared normal but was also biopsied. · Will need outpatient GI f/u and repeat EGD  · Of note, had EGD in Dec 2021 for acute anemia which showed large esophageal ulceration at GE junction  · Iron panel with iron deficiency anemia, continue PO iron  · Continue PPI BID  · Trend hemoglobin     SIRS (systemic inflammatory response syndrome) (HCC)  Assessment & Plan  · SIRS related to ABLA as evidenced by leukocytosis, tachycardia treated with IVF and monitoring of CBC. Chronic respiratory failure (HCC)  Assessment & Plan  · On 2 L of oxygen QHS and PRN at baseline    CAD (coronary artery disease)  Assessment & Plan  · Denies any chest pain, continue statin  · Hold aspirin, per patient Plavix was stopped recently by his PCP. Resume ASA on d/c. Continue statin, does not appear to be on BB at baseline.      COPD (chronic obstructive pulmonary disease) ([address])  Assessment & Plan  · Patient is not in acute exacerbation  · He had multiple admissions for COPD exacerbation in the past few months  · Continue inhalers    Hypertension  Assessment & Plan  · BP on soft side, continue to hold lisinopril for now, Can likely resume on d/c.   · Monitor     Type 2 diabetes mellitus with hyperglycemia, without long-term current use of insulin (HCC)  Assessment & Plan  Lab Results   Component Value Date    HGBA1C 7.4 (H) 06/27/2023       Recent Labs     08/06/23  1730   POCGLU 214*     · Home regimen Basaglar 30 units at bedtime with Humalog 2 units 3 times daily with meals, metformin and glipizide  · PO meds on hold, continue Lantus 15 units HS and SSI for now  · Monitor     Alcohol abuse  Assessment & Plan  Patient states he quit drinking about 2 months ago. However his admission end of July he reports drinking 4 days prior to admission  · Continue CIWA for now, no clinical s/s withdrawal.   · Thiamine/folic acid             VTE Pharmacologic Prophylaxis: VTE Score: 3 Moderate Risk (Score 3-4) - Pharmacological DVT Prophylaxis Contraindicated. Sequential Compression Devices Ordered. Patient Centered Rounds: I performed bedside rounds with nursing staff today. Discussions with Specialists or Other Care Team Provider: nursing, case management, GI fellow     Education and Discussions with Family / Patient: Patient declined call to . Total Time Spent on Date of Encounter in care of patient: 35 minutes This time was spent on one or more of the following: performing physical exam; counseling and coordination of care; obtaining or reviewing history; documenting in the medical record; reviewing/ordering tests, medications or procedures; communicating with other healthcare professionals and discussing with patient's family/caregivers. Current Length of Stay: 1 day(s)  Current Patient Status: Inpatient   Certification Statement: The patient will continue to require additional inpatient hospital stay due to monitoring of hemoglobin  Discharge Plan: Anticipate discharge tomorrow to home. Code Status: Level 1 - Full Code    Subjective:   No complaints. Denies abdominal/epigastric pain.  No N/V. Has not tried clears yet. Objective:     Vitals:   Temp (24hrs), Av °F (36.7 °C), Min:97 °F (36.1 °C), Max:98.9 °F (37.2 °C)    Temp:  [97 °F (36.1 °C)-98.9 °F (37.2 °C)] 97.3 °F (36.3 °C)  HR:  [] 71  Resp:  [18-28] 18  BP: (102-150)/(53-82) 126/64  SpO2:  [97 %-100 %] 100 %  Body mass index is 29.6 kg/m². Input and Output Summary (last 24 hours): Intake/Output Summary (Last 24 hours) at 2023 1202  Last data filed at 2023 0955  Gross per 24 hour   Intake 1628.83 ml   Output 2350 ml   Net -721.17 ml       Physical Exam:   Physical Exam  Vitals and nursing note reviewed. Constitutional:       General: He is not in acute distress. Appearance: He is obese. Interventions: Nasal cannula in place. Cardiovascular:      Rate and Rhythm: Normal rate. Pulmonary:      Effort: No respiratory distress. Breath sounds: Normal breath sounds. Abdominal:      Palpations: Abdomen is soft. Tenderness: There is no abdominal tenderness. Musculoskeletal:         General: No swelling. Skin:     General: Skin is warm. Neurological:      Mental Status: He is alert and oriented to person, place, and time. Mental status is at baseline. Psychiatric:         Mood and Affect: Mood normal.          Additional Data:     Labs:  Results from last 7 days   Lab Units 23  0923  1705 23  1412   WBC Thousand/uL 8.84  --  12.90*   HEMOGLOBIN g/dL 7.7*   < > 7.3*   HEMATOCRIT % 23.0*   < > 21.8*   PLATELETS Thousands/uL 348  --  380   NEUTROS PCT %  --   --  79*   LYMPHS PCT %  --   --  12*   MONOS PCT %  --   --  8   EOS PCT %  --   --  0    < > = values in this interval not displayed.      Results from last 7 days   Lab Units 23  0923 23  1412   SODIUM mmol/L 138 130*   POTASSIUM mmol/L 4.1 4.1   CHLORIDE mmol/L 110* 99   CO2 mmol/L 26 23   BUN mg/dL 18 35*   CREATININE mg/dL 0.55* 0.94   ANION GAP mmol/L 2 8   CALCIUM mg/dL 8.3 8.4   ALBUMIN g/dL  --  3.2* TOTAL BILIRUBIN mg/dL  --  0.42   ALK PHOS U/L  --  45*   ALT U/L  --  27   AST U/L  --  13   GLUCOSE RANDOM mg/dL 142* 308*         Results from last 7 days   Lab Units 08/07/23  1140 08/07/23  0659 08/06/23  1730   POC GLUCOSE mg/dl 294* 181* 214*               Lines/Drains:  Invasive Devices     Peripheral Intravenous Line  Duration           Peripheral IV 08/06/23 Dorsal (posterior); Left Wrist <1 day    Peripheral IV 08/06/23 Right Antecubital <1 day                      Imaging: Reviewed radiology reports from this admission including: procedure reports    Recent Cultures (last 7 days):         Last 24 Hours Medication List:   Current Facility-Administered Medications   Medication Dose Route Frequency Provider Last Rate   • atorvastatin  80 mg Oral Daily With Cindy Peterson MD     • busPIRone  10 mg Oral TID Lola Celestin MD     • fluticasone-vilanterol  1 puff Inhalation Daily Lola Celestin MD     • folic acid  1 mg Oral Daily Lola Celestin MD     • insulin glargine  15 Units Subcutaneous HS Lola Celestin MD     • insulin lispro  1-6 Units Subcutaneous TID With Meals Lola Celestin MD     • levalbuterol  1.25 mg Nebulization Q4H PRN Magen Prather DO     • LORazepam  0.5 mg Oral Daily PRN Lola Celestin MD     • melatonin  3 mg Oral HS PRN Lola Celestin MD     • pantoprazole  40 mg Oral BID AC Alhaji Solorio MD     • thiamine  100 mg Oral Daily Lola Celestin MD     • umeclidinium  1 puff Inhalation Daily Lola Celestin MD          Today, Patient Was Seen By: TATIANA Yao    **Please Note: This note may have been constructed using a voice recognition system. **

## 2023-08-07 NOTE — ASSESSMENT & PLAN NOTE
· SIRS related to ABLA as evidenced by leukocytosis, tachycardia treated with IVF and monitoring of CBC.

## 2023-08-07 NOTE — ASSESSMENT & PLAN NOTE
· Denies any chest pain, continue statin  · Hold aspirin, per patient Plavix was stopped recently by his PCP. Resume ASA on d/c. Continue statin, does not appear to be on BB at baseline.

## 2023-08-07 NOTE — ANESTHESIA PREPROCEDURE EVALUATION
Procedure:  EGD    Relevant Problems   CARDIO   (+) CAD (coronary artery disease)   (+) Hypertension      ENDO   (+) Type 2 diabetes mellitus with hyperglycemia, without long-term current use of insulin (HCC)      HEMATOLOGY   (+) Acute blood loss anemia      MUSCULOSKELETAL   (+) Post-traumatic arthritis of left foot      NEURO/PSYCH   (+) Anxiety   (+) Diabetic polyneuropathy associated with type 2 diabetes mellitus (HCC)      PULMONARY   (+) COPD (chronic obstructive pulmonary disease) (HCC)   (+) Chronic obstructive pulmonary disease with acute exacerbation (HCC)   (+) Chronic respiratory failure (HCC)   (+) KLARISSA (obstructive sleep apnea)   (+) SOB (shortness of breath)   (+) Stage 3 severe COPD by GOLD classification ([address])      Other   (+) Alcohol abuse      Denies alcohol consumption currently. Physical Exam    Airway    Mallampati score: II    Neck ROM: full     Dental       Cardiovascular      Pulmonary      Other Findings  On 2 LPM nasal cannula       Anesthesia Plan  ASA Score- 4     Anesthesia Type- IV sedation with anesthesia with ASA Monitors. Additional Monitors:   Airway Plan:           Plan Factors-    Chart reviewed. Patient summary reviewed. Patient is not a current smoker. Obstructive sleep apnea risk education given perioperatively. Induction- intravenous. Postoperative Plan-     Informed Consent- Anesthetic plan and risks discussed with patient. I personally reviewed this patient with the CRNA. Discussed and agreed on the Anesthesia Plan with the CRNA. Moises Brown

## 2023-08-07 NOTE — H&P
[address] [place] [address]  H&P  Name: Juana Walker 70 y.o. male I MRN: [id number]  Unit/Bed#: ED 05 I Date of Admission: 8/6/2023   Date of Service: 8/6/2023 I Hospital Day: 0      Assessment/Plan   * SOB (shortness of breath)  Assessment & Plan  · Patient presents with shortness of breath, on 2 L of oxygen at baseline, received steroids and nebulizer treatment on admission for possible COPD exacerbation. · Patient is not in COPD exacerbation, shortness of breath is most likely from acute blood loss anemia  · See plan below    Acute blood loss anemia  Assessment & Plan  · Significant drop of hemoglobin in the past 3 weeks, patient denies any abdominal pain, hematemesis, blood in stool, melena. · Up until 3 weeks ago his baseline hemoglobin was from 12-14 and since then it has been decreasing.   His hemoglobin 8/1 was 11.7, on 8/5 was 8.2, today 7.3, repeat hemoglobin 6.6  · There is no signs of active bleeding  · EGD 12/27/2021 was done due to acute blood loss anemia which showed large esophageal ulceration at GE junction  · I performed a digital rectal exam with Hemoccult which came back positive, on iron supplemets  · BUN elevated  · Iron panel with iron deficiency anemia  · Suspect upper GI bleed  · Hemodynamically stable  · Start Protonix drip  · 1 unit of blood ordered  · Clear liquid diet today  · N.p.o. after midnight  · FOBT  · GI consult  · H&H every 8 hours, check first repeat hemoglobin 1 hour after a unit of blood  · Transfuse if hemoglobin less than 7    COPD (chronic obstructive pulmonary disease) ([address])  Assessment & Plan  · Patient is not in acute exacerbation  · He had multiple admissions for COPD exacerbation in the past few months  · Continue inhalers    Hypertension  Assessment & Plan  · Hold lisinopril due to GI bleed    Chronic respiratory failure (HCC)  Assessment & Plan  · On 2 L of oxygen at baseline    Type 2 diabetes mellitus with hyperglycemia, without long-term current use of insulin (HCC)  Assessment & Plan  Lab Results   Component Value Date    HGBA1C 7.4 (H) 06/27/2023       Recent Labs     08/06/23  1730   POCGLU 214*       Blood Sugar Average: Last 72 hrs:  (P) 214   · Home regimen Basaglar 30 units at bedtime with Humalog 2 units 3 times daily with meals, metformin and glipizide  · Patient is on clear liquid diet, n.p.o. after midnight start Lantus 15 units at bedtime with sliding scale    CAD (coronary artery disease)  Assessment & Plan  · Denies any chest pain, continue statin  · Hold aspirin, per patient Plavix was stopped recently by his PCP    Alcohol abuse  Assessment & Plan  · Patient states he quit drinking about 2 months ago, seems like a poor historian. On his admission end of July he reports drinking 4 days prior to admission  · We will place him on CIWA protocol  · Continue thiamine and folic acid          VTE Pharmacologic Prophylaxis: VTE Score: 3 Moderate Risk (Score 3-4) - Pharmacological DVT Prophylaxis Contraindicated. Sequential Compression Devices Ordered. Code Status: Level 1 - Full Code   Discussion with family: Attempted to update  (Called daughter 8:02 PM, did not  the phone) via phone. Unable to contact. Anticipated Length of Stay: Patient will be admitted on an inpatient basis with an anticipated length of stay of greater than 2 midnights secondary to Acute blood loss anemia. Total Time Spent on Date of Encounter in care of patient: 65 minutes This time was spent on one or more of the following: performing physical exam; counseling and coordination of care; obtaining or reviewing history; documenting in the medical record; reviewing/ordering tests, medications or procedures; communicating with other healthcare professionals and discussing with patient's family/caregivers.     Chief Complaint: Shortness of breath    History of Present Illness:  Isis Cheung is a 70 y.o. male with a PMH of COPD, chronic respiratory failure on 2 L of oxygen, hypertension, hyperlipidemia, coronary artery disease who presents with shortness of breath. Patient has multiple admissions with COPD in the past few months. Today he acutely became short of breath, he received steroids and nebulizer treatments. He is at his baseline 2 L of oxygen. It was noticed that 3 weeks ago his hemoglobin was between 12 and 14. Since then it has been slowly dropping. Today it was 7.3, repeat hemoglobin was 6.6. Patient denies abdominal pain nausea vomiting, hematemesis, no blood in the stool or melena. No abdominal pain, flank pain. Review of Systems:  Review of Systems   Constitutional: Positive for fatigue. Negative for activity change. HENT: Negative. Respiratory: Positive for shortness of breath. Negative for wheezing. Cardiovascular: Negative for chest pain, palpitations and leg swelling. Gastrointestinal: Negative for abdominal pain, blood in stool, constipation, diarrhea, nausea and vomiting. Genitourinary: Negative for dysuria. Musculoskeletal: Negative. Skin: Negative. Neurological: Negative. Hematological: Negative. Psychiatric/Behavioral: Negative. Past Medical and Surgical History:   Past Medical History:   Diagnosis Date   • Cardiac arrest [facility])    • COPD (chronic obstructive pulmonary disease) ([address]    • CVA (cerebral vascular accident) ([address])    • Diabetes mellitus ([address])    • Heart attack ([address])    • Hypertension    • Stroke [facility])        Past Surgical History:   Procedure Laterality Date   • CERVICAL FUSION N/A 9/10/2018    Procedure: Posterior cervical decompressive laminectomy C3-6; Posterior cervical lateral mass and pedicle fixation fusion C1-T1;  Surgeon: Panchito Kimbrough MD;  Location: [facility];  Service: Neurosurgery       Meds/Allergies:  Prior to Admission medications    Medication Sig Start Date End Date Taking?  Authorizing Provider   albuterol (Proventil HFA) 90 mcg/act inhaler Inhale 2 puffs every 6 (six) hours as needed for wheezing 6/10/22   Abdoulaye Meneses PA-C   atorvastatin (LIPITOR) 80 mg tablet Take 1 tablet (80 mg total) by mouth daily with dinner 9/22/18   Timmy Krishnamurthy MD   B Complex Vitamins (VITAMIN B COMPLEX 100 IJ) Take 1 tablet by mouth daily    Historical Provider, MD   betamethasone valerate (VALISONE) 0.1 % cream Apply topically 2 (two) times a day 10/4/13   Historical Provider, MD   budesonide (PULMICORT) 0.5 mg/2 mL nebulizer solution Take 2 mL (0.5 mg total) by nebulization every 12 (twelve) hours Rinse mouth after use. 6/10/22   Jenna Meneses PA-C   busPIRone (BUSPAR) 10 mg tablet Take 1 tablet (10 mg total) by mouth 3 (three) times a day 7/28/23   Johnnie Nissen, PA-C   fluticasone [facility]) 50 mcg/act nasal spray 1 spray into each nostril 2 (two) times a day 10/4/13   Historical Provider, MD   fluticasone-salmeterol (ADVAIR) 500-50 mcg/dose inhaler Inhale 1 puff 2 (two) times a day Rinse mouth after use.     Historical Provider, MD   folic acid (FOLVITE) 1 mg tablet Take 1 tablet (1 mg total) by mouth daily 6/11/22   Abdoulaye Meneses PA-C   glipiZIDE (GLUCOTROL) 5 mg tablet Take 5 mg by mouth in the morning    Historical Provider, MD   Insulin Glargine (BASAGLAR KWIKPEN SC) Inject 30 Units under the skin daily at bedtime    Historical Provider, MD   Insulin Lispro (HUMALOG KWIKPEN SC) Inject 2 Units under the skin 3 (three) times a day with meals    Historical Provider, MD   ipratropium-albuterol (COMBIVENT)  mcg/act inhaler Inhale    Historical Provider, MD   lisinopril (ZESTRIL) 40 mg tablet Take 40 mg by mouth daily     Historical Provider, MD   LORazepam (Ativan) 0.5 mg tablet Take by mouth every 6 (six) hours as needed for anxiety     Historical Provider, MD   melatonin 3 mg Take 1 tablet (3 mg total) by mouth daily at bedtime as needed (30) 9/21/18   Timmy Krishnamurthy MD   metFORMIN (GLUCOPHAGE) 1000 MG tablet Take 1 tablet by mouth every 12 (twelve) hours    Historical Provider, MD   Multiple Vitamin (MULTIVITAMIN) tablet Take 1 tablet by mouth daily    Historical Provider, MD   pantoprazole (PROTONIX) 40 mg tablet Take 1 tablet by mouth Twice daily    Historical Provider, MD   predniSONE 10 mg tablet Take 4 tablets (40 mg total) by mouth daily for 3 days, THEN 3 tablets (30 mg total) daily for 3 days, THEN 2 tablets (20 mg total) daily for 3 days, THEN 1 tablet (10 mg total) daily for 3 days. Do not start before July 26, 2023. 7/26/23 8/7/23  TATIANA Choudhary   senna (SENOKOT) 8.6 mg Take 2 tablets (17.2 mg total) by mouth daily 6/11/22   Shelli Meneses PA-C   thiamine 100 MG tablet Take 1 tablet (100 mg total) by mouth daily 9/22/18   Oliverio Campbell MD   tiotropium [facility]) 18 mcg inhalation capsule Place 18 mcg into inhaler and inhale daily    Historical Provider, MD     I have reviewed home medications with patient personally. Allergies:    Allergies   Allergen Reactions   • Amoxicillin Hives   • Augmentin [Amoxicillin-Pot Clavulanate] Hives       Social History:  Marital Status: Single   Substance Use History:   Social History     Substance and Sexual Activity   Alcohol Use Not Currently   • Alcohol/week: 8.0 - 12.0 standard drinks of alcohol   • Types: 8 - 12 Cans of beer per week    Comment: every day drinker     Social History     Tobacco Use   Smoking Status Former   • Types: Cigarettes   Smokeless Tobacco Never   Tobacco Comments    quit 5 months ago      Social History     Substance and Sexual Activity   Drug Use Never       Family History:  Family History   Problem Relation Age of Onset   • Heart attack Mother        Physical Exam:     Vitals:   Blood Pressure: 144/57 (08/06/23 2008)  Pulse: 99 (08/06/23 2008)  Temperature: 98.4 °F (36.9 °C) (08/06/23 2008)  Temp Source: Oral (08/06/23 2008)  Respirations: 20 (08/06/23 2008)  Weight - Scale: 99.8 kg (220 lb) (08/06/23 1327)  SpO2: 98 % (08/06/23 2008)    Physical Exam  Constitutional:       General: He is not in acute distress. HENT:      Head: Atraumatic. Cardiovascular:      Rate and Rhythm: Normal rate and regular rhythm. Heart sounds: No murmur heard. No friction rub. No gallop. Pulmonary:      Effort: Pulmonary effort is normal. No respiratory distress. Breath sounds: Normal breath sounds. No wheezing. Abdominal:      General: Bowel sounds are normal. There is no distension. Palpations: Abdomen is soft. Tenderness: There is no abdominal tenderness. Genitourinary:     Comments: Some dry black stool around the anal area  Musculoskeletal:         General: No swelling. Cervical back: Neck supple. Skin:     General: Skin is warm and dry. Neurological:      General: No focal deficit present. Mental Status: He is alert. Psychiatric:         Mood and Affect: Mood normal.          Additional Data:     Lab Results:  Results from last 7 days   Lab Units 08/06/23  1705 08/06/23  1412   WBC Thousand/uL  --  12.90*   HEMOGLOBIN g/dL 6.6* 7.3*   HEMATOCRIT % 20.4* 21.8*   PLATELETS Thousands/uL  --  380   NEUTROS PCT %  --  79*   LYMPHS PCT %  --  12*   MONOS PCT %  --  8   EOS PCT %  --  0     Results from last 7 days   Lab Units 08/06/23  1412   SODIUM mmol/L 130*   POTASSIUM mmol/L 4.1   CHLORIDE mmol/L 99   CO2 mmol/L 23   BUN mg/dL 35*   CREATININE mg/dL 0.94   ANION GAP mmol/L 8   CALCIUM mg/dL 8.4   ALBUMIN g/dL 3.2*   TOTAL BILIRUBIN mg/dL 0.42   ALK PHOS U/L 45*   ALT U/L 27   AST U/L 13   GLUCOSE RANDOM mg/dL 308*         Results from last 7 days   Lab Units 08/06/23  1730   POC GLUCOSE mg/dl 214*               Lines/Drains:  Invasive Devices     Peripheral Intravenous Line  Duration           Peripheral IV 08/06/23 Dorsal (posterior); Left Wrist <1 day                    Imaging: Reviewed radiology reports from this admission including: chest xray  XR chest 2 views    (Results Pending)       EKG and Other Studies Reviewed on Admission:   · EKG: Tachycardia with some PACs, nonspecific ST-T changes. ** Please Note: This note has been constructed using a voice recognition system.  **

## 2023-08-07 NOTE — RESPIRATORY THERAPY NOTE
RT Protocol Note  Angelito Blank 70 y.o. male MRN: [id number]  [facility] Encounter: [id number]    Assessment    Principal Problem:    SOB (shortness of breath)  Active Problems:    Alcohol abuse    CAD (coronary artery disease)    Acute blood loss anemia    Type 2 diabetes mellitus with hyperglycemia, without long-term current use of insulin ([facility])    Chronic respiratory failure (HCC)    Hypertension    COPD (chronic obstructive pulmonary disease) ([facility])      Home Pulmonary Medications:  Albuterol, spiriva       Past Medical History:   Diagnosis Date    Cardiac arrest ([address])     COPD (chronic obstructive pulmonary disease) ([facility])     CVA (cerebral vascular accident) ([address])     Diabetes mellitus ([address] [facility])     Heart attack ([address] [facility])     Hypertension     Stroke ([address] [facility])      Social History     Socioeconomic History    Marital status: Single     Spouse name: None    Number of children: None    Years of education: None    Highest education level: None   Occupational History    None   Tobacco Use    Smoking status: Former     Types: Cigarettes    Smokeless tobacco: Never    Tobacco comments:     quit 5 months ago    Vaping Use    Vaping Use: Never used   Substance and Sexual Activity    Alcohol use: Not Currently     Alcohol/week: 8.0 - 12.0 standard drinks of alcohol     Types: 8 - 12 Cans of beer per week     Comment: every day drinker    Drug use: Never    Sexual activity: Not Currently   Other Topics Concern    None   Social History Narrative    ** Merged History Encounter **         ** Merged History Encounter **         ** Merged History Encounter **          Social Determinants of Health     Financial Resource Strain: Not on file   Food Insecurity: No Food Insecurity (7/24/2023)    Hunger Vital Sign     Worried About Running Out of Food in the Last Year: Never true     Ran Out of Food in the Last Year: Never true   Transportation Needs: No Transportation Needs (7/24/2023)    PRAPARE - Transportation     Lack of Transportation (Medical): No     Lack of Transportation (Non-Medical): No   Physical Activity: Not on file   Stress: Not on file   Social Connections: Not on file   Intimate Partner Violence: Not on file   Housing Stability: Unknown (7/24/2023)    Housing Stability Vital Sign     Unable to Pay for Housing in the Last Year: No     Number of Places Lived in the Last Year: Not on file     Unstable Housing in the Last Year: No       Subjective    Subjective Data: pt complains of sob    Objective    Physical Exam:   Assessment Type: (P) Assess only  General Appearance: (P) Awake, Alert  Respiratory Pattern: (P) Normal  Bilateral Breath Sounds: (P) Diminished  Cough: (P) None    Vitals:  Blood pressure 117/66, pulse (P) 64, temperature 97.7 °F (36.5 °C), resp. rate (P) 18, height 6' (1.829 m), weight 99 kg (218 lb 4.1 oz), SpO2 (P) 99 %. Imaging and other studies: I have personally reviewed pertinent reports. O2 Device: room air     Plan    Respiratory Plan: (P) No distress/Pulmonary history        Resp Comments: (P) pt found on 2L nc, spo2 is 99%, bs are diminished, no resp distress, will continue to monitor per resp protocol.

## 2023-08-07 NOTE — ASSESSMENT & PLAN NOTE
Patient states he quit drinking about 2 months ago.  However his admission end of July he reports drinking 4 days prior to admission  · Continue CIWA for now, no clinical s/s withdrawal.   · Thiamine/folic acid

## 2023-08-07 NOTE — PLAN OF CARE
Problem: PAIN - ADULT  Goal: Verbalizes/displays adequate comfort level or baseline comfort level  Description: Interventions:  - Encourage patient to monitor pain and request assistance  - Assess pain using appropriate pain scale  - Administer analgesics based on type and severity of pain and evaluate response  - Implement non-pharmacological measures as appropriate and evaluate response  - Consider cultural and social influences on pain and pain management  - Notify physician/advanced practitioner if interventions unsuccessful or patient reports new pain  Outcome: Progressing     Problem: INFECTION - ADULT  Goal: Absence or prevention of progression during hospitalization  Description: INTERVENTIONS:  - Assess and monitor for signs and symptoms of infection  - Monitor lab/diagnostic results  - Monitor all insertion sites, i.e. indwelling lines, tubes, and drains  - Monitor endotracheal if appropriate and nasal secretions for changes in amount and color  - [place] appropriate cooling/warming therapies per order  - Administer medications as ordered  - Instruct and encourage patient and family to use good hand hygiene technique  - Identify and instruct in appropriate isolation precautions for identified infection/condition  Outcome: Progressing  Goal: Absence of fever/infection during neutropenic period  Description: INTERVENTIONS:  - Monitor WBC    Outcome: Progressing     Problem: SAFETY ADULT  Goal: Patient will remain free of falls  Description: INTERVENTIONS:  - Educate patient/family on patient safety including physical limitations  - Instruct patient to call for assistance with activity   - Consult OT/PT to assist with strengthening/mobility   - Keep Call bell within reach  - Keep bed low and locked with side rails adjusted as appropriate  - Keep care items and personal belongings within reach  - Initiate and maintain comfort rounds  - Make Fall Risk Sign visible to staff  - Offer Toileting every 3 Hours, in advance of need  - Initiate/Maintain 2alarm  - Obtain necessary fall risk management equipment:   - Apply yellow socks and bracelet for high fall risk patients  - Consider moving patient to room near nurses station  Outcome: Progressing  Goal: Maintain or return to baseline ADL function  Description: INTERVENTIONS:  -  Assess patient's ability to carry out ADLs; assess patient's baseline for ADL function and identify physical deficits which impact ability to perform ADLs (bathing, care of mouth/teeth, toileting, grooming, dressing, etc.)  - Assess/evaluate cause of self-care deficits   - Assess range of motion  - Assess patient's mobility; develop plan if impaired  - Assess patient's need for assistive devices and provide as appropriate  - Encourage maximum independence but intervene and supervise when necessary  - Involve family in performance of ADLs  - Assess for home care needs following discharge   - Consider OT consult to assist with ADL evaluation and planning for discharge  - Provide patient education as appropriate  Outcome: Progressing  Goal: Maintains/Returns to pre admission functional level  Description: INTERVENTIONS:  - Perform BMAT or MOVE assessment daily.   - Set and communicate daily mobility goal to care team and patient/family/caregiver. - Collaborate with rehabilitation services on mobility goals if consulted  - Perform Range of Motion 3 times a day. - Reposition patient every 2 hours.   - Dangle patient 3 times a day  - Stand patient 3 times a day  - Ambulate patient 3 times a day  - Out of bed to chair 3 times a day   - Out of bed for meals 3 times a day  - Out of bed for toileting  - Record patient progress and toleration of activity level   Outcome: Progressing     Problem: DISCHARGE PLANNING  Goal: Discharge to home or other facility with appropriate resources  Description: INTERVENTIONS:  - Identify barriers to discharge w/patient and caregiver  - Arrange for needed discharge resources and transportation as appropriate  - Identify discharge learning needs (meds, wound care, etc.)  - Arrange for interpretive services to assist at discharge as needed  - Refer to Case Management Department for coordinating discharge planning if the patient needs post-hospital services based on physician/advanced practitioner order or complex needs related to functional status, cognitive ability, or social support system  Outcome: Progressing     Problem: Knowledge Deficit  Goal: Patient/family/caregiver demonstrates understanding of disease process, treatment plan, medications, and discharge instructions  Description: Complete learning assessment and assess knowledge base.   Interventions:  - Provide teaching at level of understanding  - Provide teaching via preferred learning methods  Outcome: Progressing     Problem: CARDIOVASCULAR - ADULT  Goal: Maintains optimal cardiac output and hemodynamic stability  Description: INTERVENTIONS:  - Monitor I/O, vital signs and rhythm  - Monitor for S/S and trends of decreased cardiac output  - Administer and titrate ordered vasoactive medications to optimize hemodynamic stability  - Assess quality of pulses, skin color and temperature  - Assess for signs of decreased coronary artery perfusion  - Instruct patient to report change in severity of symptoms  Outcome: Progressing  Goal: Absence of cardiac dysrhythmias or at baseline rhythm  Description: INTERVENTIONS:  - Continuous cardiac monitoring, vital signs, obtain 12 lead EKG if ordered  - Administer antiarrhythmic and heart rate control medications as ordered  - Monitor electrolytes and administer replacement therapy as ordered  Outcome: Progressing     Problem: RESPIRATORY - ADULT  Goal: Achieves optimal ventilation and oxygenation  Description: INTERVENTIONS:  - Assess for changes in respiratory status  - Assess for changes in mentation and behavior  - Position to facilitate oxygenation and minimize respiratory effort  - Oxygen administered by appropriate delivery if ordered  - Initiate smoking cessation education as indicated  - Encourage broncho-pulmonary hygiene including cough, deep breathe, Incentive Spirometry  - Assess the need for suctioning and aspirate as needed  - Assess and instruct to report SOB or any respiratory difficulty  - Respiratory Therapy support as indicated  Outcome: Progressing     Problem: SKIN/TISSUE INTEGRITY - ADULT  Goal: Skin Integrity remains intact(Skin Breakdown Prevention)  Description: Assess:  -Perform Luciano assessment  -Clean and moisturize skin   -Inspect skin when repositioning, toileting, and assisting with ADLS  -Assess under medical devices   -Assess extremities for adequate circulation and sensation     Bed Management:  -Have minimal linens on bed & keep smooth, unwrinkled  -Change linens as needed when moist or perspiring  -Avoid sitting or lying in one position for more than 2 hours while in bed  -Keep HOB at 30 degrees     Toileting:  -Offer bedside commode  -Assess for incontinence  -Use incontinent care products after each incontinent episode     Activity:  -Mobilize patient 3 times a day  -Encourage activity and walks on unit  -Encourage or provide ROM exercises   -Turn and reposition patient every 2 Hours  -Use appropriate equipment to lift or move patient in bed  -Instruct/ Assist with weight shifting every 2 when out of bed in chair  -Consider limitation of chair time 2 hour intervals    Skin Care:  -Avoid use of baby powder, tape, friction and shearing, hot water or constrictive clothing  -Relieve pressure over bony prominences   -Do not massage red bony areas    Next Steps:  -Teach patient strategies to minimize risks    -Consider consults to  interdisciplinary teams   Outcome: Progressing  Goal: Incision(s), wounds(s) or drain site(s) healing without S/S of infection  Description: INTERVENTIONS  - Assess and document dressing, incision, wound bed, drain sites and surrounding tissue  - Provide patient and family education  - Perform skin care/dressing changes   Outcome: Progressing  Goal: Pressure injury heals and does not worsen  Description: Interventions:  - Implement low air loss mattress or specialty surface (Criteria met)  - Apply silicone foam dressing  -Outcome: Progressing     Problem: HEMATOLOGIC - ADULT  Goal: Maintains hematologic stability  Description: INTERVENTIONS  - Assess for signs and symptoms of bleeding or hemorrhage  - Monitor labs  - Administer supportive blood products/factors as ordered and appropriate  Outcome: Progressing

## 2023-08-08 ENCOUNTER — PREP FOR PROCEDURE (OUTPATIENT)
Dept: GASTROENTEROLOGY | Facility: CLINIC | Age: 68
End: 2023-08-08

## 2023-08-08 VITALS
WEIGHT: 218.26 LBS | HEIGHT: 72 IN | RESPIRATION RATE: 18 BRPM | DIASTOLIC BLOOD PRESSURE: 72 MMHG | BODY MASS INDEX: 29.56 KG/M2 | OXYGEN SATURATION: 98 % | HEART RATE: 82 BPM | TEMPERATURE: 98.5 F | SYSTOLIC BLOOD PRESSURE: 139 MMHG

## 2023-08-08 DIAGNOSIS — K22.10 ULCER OF ESOPHAGUS WITHOUT BLEEDING: Primary | ICD-10-CM

## 2023-08-08 PROBLEM — R06.02 SOB (SHORTNESS OF BREATH): Status: RESOLVED | Noted: 2023-08-06 | Resolved: 2023-08-08

## 2023-08-08 LAB
BASOPHILS # BLD AUTO: 0 THOUSANDS/ÂΜL (ref 0–0.1)
BASOPHILS NFR BLD AUTO: 0 % (ref 0–1)
EOSINOPHIL # BLD AUTO: 0.02 THOUSAND/ÂΜL (ref 0–0.61)
EOSINOPHIL NFR BLD AUTO: 0 % (ref 0–6)
ERYTHROCYTE [DISTWIDTH] IN BLOOD BY AUTOMATED COUNT: 15.6 % (ref 11.6–15.1)
GLUCOSE SERPL-MCNC: 133 MG/DL (ref 65–140)
GLUCOSE SERPL-MCNC: 250 MG/DL (ref 65–140)
HCT VFR BLD AUTO: 22.7 % (ref 36.5–49.3)
HGB BLD-MCNC: 7.5 G/DL (ref 12–17)
HGB BLD-MCNC: 8.1 G/DL (ref 12–17)
IMM GRANULOCYTES # BLD AUTO: 0.07 THOUSAND/UL (ref 0–0.2)
IMM GRANULOCYTES NFR BLD AUTO: 1 % (ref 0–2)
LYMPHOCYTES # BLD AUTO: 1.7 THOUSANDS/ÂΜL (ref 0.6–4.47)
LYMPHOCYTES NFR BLD AUTO: 25 % (ref 14–44)
MCH RBC QN AUTO: 29.5 PG (ref 26.8–34.3)
MCHC RBC AUTO-ENTMCNC: 33 G/DL (ref 31.4–37.4)
MCV RBC AUTO: 89 FL (ref 82–98)
MONOCYTES # BLD AUTO: 0.62 THOUSAND/ÂΜL (ref 0.17–1.22)
MONOCYTES NFR BLD AUTO: 9 % (ref 4–12)
NEUTROPHILS # BLD AUTO: 4.48 THOUSANDS/ÂΜL (ref 1.85–7.62)
NEUTS SEG NFR BLD AUTO: 65 % (ref 43–75)
NRBC BLD AUTO-RTO: 0 /100 WBCS
PLATELET # BLD AUTO: 307 THOUSANDS/UL (ref 149–390)
PMV BLD AUTO: 10.7 FL (ref 8.9–12.7)
RBC # BLD AUTO: 2.54 MILLION/UL (ref 3.88–5.62)
WBC # BLD AUTO: 6.89 THOUSAND/UL (ref 4.31–10.16)

## 2023-08-08 PROCEDURE — 82948 REAGENT STRIP/BLOOD GLUCOSE: CPT

## 2023-08-08 PROCEDURE — 88305 TISSUE EXAM BY PATHOLOGIST: CPT | Performed by: PATHOLOGY

## 2023-08-08 PROCEDURE — 99232 SBSQ HOSP IP/OBS MODERATE 35: CPT | Performed by: INTERNAL MEDICINE

## 2023-08-08 PROCEDURE — 99239 HOSP IP/OBS DSCHRG MGMT >30: CPT | Performed by: NURSE PRACTITIONER

## 2023-08-08 PROCEDURE — 85025 COMPLETE CBC W/AUTO DIFF WBC: CPT | Performed by: NURSE PRACTITIONER

## 2023-08-08 PROCEDURE — 85018 HEMOGLOBIN: CPT | Performed by: NURSE PRACTITIONER

## 2023-08-08 RX ORDER — ALBUTEROL SULFATE 2.5 MG/3ML
2.5 SOLUTION RESPIRATORY (INHALATION) EVERY 4 HOURS PRN
Status: DISCONTINUED | OUTPATIENT
Start: 2023-08-08 | End: 2023-08-08 | Stop reason: HOSPADM

## 2023-08-08 RX ORDER — PANTOPRAZOLE SODIUM 40 MG/1
40 TABLET, DELAYED RELEASE ORAL 2 TIMES DAILY
Qty: 60 TABLET | Refills: 0 | Status: SHIPPED | OUTPATIENT
Start: 2023-08-08

## 2023-08-08 RX ORDER — ALBUTEROL SULFATE 90 UG/1
2 AEROSOL, METERED RESPIRATORY (INHALATION) EVERY 4 HOURS PRN
Status: DISCONTINUED | OUTPATIENT
Start: 2023-08-08 | End: 2023-08-08 | Stop reason: HOSPADM

## 2023-08-08 RX ORDER — LEVALBUTEROL INHALATION SOLUTION 1.25 MG/3ML
1.25 SOLUTION RESPIRATORY (INHALATION)
Status: DISCONTINUED | OUTPATIENT
Start: 2023-08-08 | End: 2023-08-08 | Stop reason: HOSPADM

## 2023-08-08 RX ORDER — ASPIRIN 81 MG/1
81 TABLET, CHEWABLE ORAL DAILY
COMMUNITY

## 2023-08-08 RX ADMIN — LEVALBUTEROL HYDROCHLORIDE 1.25 MG: 1.25 SOLUTION RESPIRATORY (INHALATION) at 10:32

## 2023-08-08 RX ADMIN — BUSPIRONE HYDROCHLORIDE 10 MG: 10 TABLET ORAL at 08:50

## 2023-08-08 RX ADMIN — UMECLIDINIUM 1 PUFF: 62.5 AEROSOL, POWDER ORAL at 08:49

## 2023-08-08 RX ADMIN — PANTOPRAZOLE SODIUM 40 MG: 40 TABLET, DELAYED RELEASE ORAL at 05:46

## 2023-08-08 RX ADMIN — FLUTICASONE FUROATE AND VILANTEROL TRIFENATATE 1 PUFF: 200; 25 POWDER RESPIRATORY (INHALATION) at 08:49

## 2023-08-08 RX ADMIN — THIAMINE HCL TAB 100 MG 100 MG: 100 TAB at 08:50

## 2023-08-08 RX ADMIN — INSULIN LISPRO 3 UNITS: 100 INJECTION, SOLUTION INTRAVENOUS; SUBCUTANEOUS at 12:01

## 2023-08-08 RX ADMIN — FOLIC ACID 1 MG: 1 TABLET ORAL at 08:50

## 2023-08-08 NOTE — PROGRESS NOTES
[facility] - Progress Note  Cassandra Villa 70 y.o. male MRN: [id number]  Unit/Bed#: [facility] 606-01 Encounter: [id number]      ASSESSMENT & PLAN:    Cassandra Villa is a 70 y.o. male with PMHx of COPD, chronic respiratory failure on 2 L O2, HTN, HLD, and CAD  Presenting to [facility] ED on 8/6 with complaints of SOB, with GI consulted for acute blood loss anemia. #Acute blood loss anemia  #Shortness of breath  -Patient presenting with acute onset of SOB, on 2 L O2 at baseline  -LAM completed with hemoccult positive. Patient received 1 unit pRBCs and started on Protonix gtt.   -Of note, patients hemoglobin 3 weeks ago was between 12-14 which has progressively dropping, 7.3 on arrival  -Hgb 8/1 was 11.7, on 8/5 was 8.2  -Iron studies showed iron sat 5, TIBC 332, iron 16, and ferritin 19, takes oral iron at home  -Endoscopy history per chart review:   · EGD 2017: esophageal ulcer EG junction with visible vessel s/p injection and BiCAP, no further overt bleeding  · EGD 2017: erosive esophagitis, antral gastritis, duodenitis  · Colonoscopy 2016: normal, external hemorrhoids  · EGD 2016: esophageal ulcers, duodenal edema and erosion.   -Was previously on ASA and Plavix but per patient, his Plavix was recently stopped by PCP. ASA currently being held by primary team.  -Patient remains hemodynamically stable, no signs of active bleeding   -Takes 1 Advil daily for generalized pain, denies current tobacco use (former smoker), history of alcohol abuse but states he quit 2 years ago  -EGD 8/7 showing: "Single ulcer in the lower third of the esophagus; performed cold forceps biopsy; Grade D esophagitis in the lower third of the esophagus; 2 cm type I hiatal hernia;  The stomach appeared normal. Performed random biopsy to rule out H. Pylori; Edematous mucosa in the 1st part of the duodenum; performed cold forceps biopsy"    Lab Results   Component Value Date/Time    HGB 7.5 (L) 08/08/2023 05:18 AM    HGB 8.4 (L) 08/07/2023 08:08 PM    HGB 7.7 (L) 08/07/2023 09:23 AM       Plan:  -Plan for outpatient GI follow-up, repeat EGD in 3 months  -Can plan for colonoscopy at that time  -Continue PO Protonix 40 mg BID for 3 months following discharge  -Follow-up EGD pathology results   -Transfuse Hgb <7, s/p 1 unit pRBC's  -Can restart ASA   -Monitor H&H   -Rest of management per primary team  -Okay for discharge from GI perspective   _____________________________________________________________________    SUBJECTIVE:     Patient seen and examined at bedside this morning. He was sitting up in bed comfortably in no acute distress. Patient denies SOB, chest pain, N/V/D, lightheadedness, dizziness, fatigue, or headache. Otherwise, no additional concerns or complaints at this time.      Scheduled Meds:  Current Facility-Administered Medications   Medication Dose Route Frequency Provider Last Rate   • albuterol  2 puff Inhalation Q4H PRN Kami Suazo MD     • albuterol  2.5 mg Nebulization Q4H PRN Kami Suazo MD     • atorvastatin  80 mg Oral Daily With Sandeep Camacho MD     • busPIRone  10 mg Oral TID Lillian Chauhan MD     • fluticasone-vilanterol  1 puff Inhalation Daily Lillian Chauhan MD     • folic acid  1 mg Oral Daily Lillian Chauhan MD     • insulin glargine  15 Units Subcutaneous HS Lillian Chauhan MD     • insulin lispro  1-6 Units Subcutaneous TID With Meals Lillian Chauhan MD     • levalbuterol  1.25 mg Nebulization BID Kami Suazo MD     • LORazepam  0.5 mg Oral Daily PRN Lillian Chauhan MD     • melatonin  3 mg Oral HS PRN Lillian Chauhan MD     • pantoprazole  40 mg Oral BID AC Urvashi Ballesteros MD     • thiamine  100 mg Oral Daily Lillian Chauhan MD     • umeclidinium  1 puff Inhalation Daily Danielle Ellis MD       Continuous Infusions:   PRN Meds:.•  albuterol  •  albuterol  •  LORazepam  •  melatonin    OBJECTIVE:     Objective   Blood pressure 139/72, pulse 82, temperature 98.5 °F (36.9 °C), resp. rate 18, height 6' (1.829 m), weight 99 kg (218 lb 4.1 oz), SpO2 98 %. Body mass index is 29.6 kg/m². Intake/Output Summary (Last 24 hours) at 8/8/2023 1111  Last data filed at 8/8/2023 0846  Gross per 24 hour   Intake 1320 ml   Output 2900 ml   Net -1580 ml       PHYSICAL EXAM:   General Appearance: Awake and alert, in no acute distress  Abdomen: Soft, non-tender, non-distended; no masses or no organomegaly    Invasive Devices     Peripheral Intravenous Line  Duration           Peripheral IV 08/06/23 Right Antecubital 1 day                LAB RESULTS:      Lab Units 08/07/23  0923 08/06/23  1412 07/28/23  0608 07/27/23  1645 07/24/23  0446 06/10/22  0613 06/09/22  0506   SODIUM mmol/L 138 130* 136 135 132*   < > 138   POTASSIUM mmol/L 4.1 4.1 4.1 3.9 4.5   < > 4.0   CHLORIDE mmol/L 110* 99 103 103 102   < > 104   CO2 mmol/L 26 23 28 27 23   < > 24   BUN mg/dL 18 35* 13 16 23   < > 6   CREATININE mg/dL 0.55* 0.94 0.49* 0.51* 0.54*   < > 0.57*   GLUCOSE RANDOM mg/dL 142* 308* 329* 287* 239*   < > 188*   CALCIUM mg/dL 8.3 8.4 8.6 9.0 8.5   < > 9.0   MAGNESIUM mg/dL  --   --   --   --   --   --  1.7   PHOSPHORUS mg/dL  --   --   --   --   --   --  3.1    < > = values in this interval not displayed.             Lab Units 08/06/23  1412 07/27/23  1645 07/23/23  1334   TOTAL PROTEIN g/dL 5.9* 6.5 6.8   ALBUMIN g/dL 3.2* 3.2* 3.3*   TOTAL BILIRUBIN mg/dL 0.42 0.41 0.40   AST U/L 13 16 18   ALT U/L 27 30 28   ALK PHOS U/L 45* 59 69           Lab Units 08/08/23  0518 08/07/23 2008 08/07/23  0923 08/07/23  0005 08/06/23  1705 08/06/23  1412 07/28/23  0608 07/27/23  1645   WBC Thousand/uL 6.89  --  8.84  --   --  12.90* 7.09 9.50   HEMOGLOBIN g/dL 7.5* 8.4* 7.7* 7.3* 6.6* 7.3* 10.6* 11.1*   HEMATOCRIT % 22.7*  --  23.0*  --  20.4* 21.8* 31.1* 34.2*   PLATELETS Thousands/uL 307  --  348  --   --  380 245 268   MCV fL 89  --  88  --   --  87 86 88       Lab Results   Component Value Date    IRON 16 (L) 08/06/2023    TIBC 332 08/06/2023    FERRITIN 19 (L) 08/06/2023       Lab Results   Component Value Date    INR 0.98 06/08/2022    INR 0.89 09/09/2018    INR 0.96 06/20/2018    PROTIME 12.6 06/08/2022    PROTIME 12.1 09/09/2018    PROTIME 12.9 06/20/2018       RADIOLOGY RESULTS:   Procedure: EGD    Result Date: 8/7/2023  Narrative: Table formatting from the original result was not included. [address] End[facility] [address]: 8/07/23 PHYSICIAN(S): Attending: Ijeoma Lynn MD Fellow: Nayla Belle MD INDICATION: Anemia POST-OP DIAGNOSIS: See the impression below. PREPROCEDURE: Informed consent was obtained for the procedure, including sedation. Risks of perforation, hemorrhage, adverse drug reaction and aspiration were discussed. The patient was placed in the left lateral decubitus position. Patient was explained about the risks and benefits of the procedure. Risks including but not limited to bleeding, infection, and perforation were explained in detail. Also explained about less than 100% sensitivity with the exam and other alternatives. PROCEDURE: EGD DETAILS OF PROCEDURE: Patient was taken to the procedure room where a time out was performed to confirm correct patient and correct procedure. The patient underwent monitored anesthesia care, which was administered by an anesthesia professional. The patient's blood pressure, heart rate, level of consciousness, respirations, oxygen and ETCO2 were monitored throughout the procedure. The scope was introduced through the mouth and advanced to the second part of the duodenum. Retroflexion was performed in the fundus. The patient experienced no blood loss. The procedure was not difficult. The patient tolerated the procedure well. There were no apparent adverse events. ANESTHESIA INFORMATION: ASA: IV Anesthesia Type: Anesthesia type not filed in the log.  MEDICATIONS: No administrations occurring from 3119 to [id number] on 08/07/23 FINDINGS: Single superficial, round, benign-appearing ulcer in the lower third of the esophagus; performed cold forceps biopsy Grade D esophagitis with mucosal breaks measuring 5 mm or more, continuous between folds, covering 75% or more of the circumference in the lower third of the esophagus Z-line 42 cm from the incisors 2 cm sliding hiatal hernia (type I hiatal hernia) - GE junction 42 cm from the incisors, diaphragmatic impression 44 cm from the incisors The stomach appeared normal. Performed random biopsy using biopsy forceps to rule out H. pylori. Edematous mucosa in the 1st part of the duodenum; performed cold forceps biopsy SPECIMENS: ID Type Source Tests Collected by Time Destination 1 : duodenal nodule bx Tissue Duodenum TISSUE EXAM Maia Prasad MD 8/7/2023  9:39 AM  2 : r/o celiac Tissue Duodenum TISSUE EXAM Maia Prasad MD 8/7/2023  9:40 AM  3 :  Tissue Stomach TISSUE EXAM Maia Prasad MD 8/7/2023  9:40 AM  4 : esophageal ulcer bx Tissue Esophagus TISSUE EXAM Maia Prasad MD 8/7/2023  9:42 AM      Impression: Single ulcer in the lower third of the esophagus; performed cold forceps biopsy Grade D esophagitis in the lower third of the esophagus 2 cm type I hiatal hernia The stomach appeared normal. Performed random biopsy to rule out H. pylori. Edematous mucosa in the 1st part of the duodenum; performed cold forceps biopsy RECOMMENDATION:  Await pathology results  Schedule repeat EGD  Gastric ulcer surveillance    Martir Johnson MD     Procedure: XR chest 2 views    Result Date: 8/7/2023  Narrative: CHEST INDICATION:   copd, sob. COMPARISON: 0727 2023 EXAM PERFORMED/VIEWS:  XR CHEST PA & LATERAL Images: 3 FINDINGS: Cardiomediastinal silhouette appears unremarkable. The lungs are clear. The lungs are hyperinflated. No pneumothorax or pleural effusion. Chronic blunting of the left costophrenic angle. Old rib fractures. Fusion at the cervical thoracic junction.  Stable compressed mid thoracic vertebral body. Impression: No acute cardiopulmonary disease. Workstation performed: [id number]     Procedure: XR CHEST 1 VW PORTABLE    Result Date: 8/5/2023  Narrative: Study: Single view AP erect portable chest. COMPARISON: August 1, 2023. HISTORY: Shortness of breath. Patient rotated to the right. Heart and vessels normal. No interval infiltrates or edema. Persistent blunting left lateral costophrenic angle unchanged from earlier studies. Persistent atelectasis/scarring right base. Impression: IMPRESSION: No interval infiltrates, edema, or effusions. Linear atelectasis/scarring right base with left lateral costophrenic angle pleural scarring.  Workstation:[id number]    Narrative/Impressions - 3 day look back       Vivian Gaytan MD, MPH  [address] [facility]  Internal Medicine Residency, PGY-II

## 2023-08-08 NOTE — DISCHARGE SUMMARY
Discharge Summary - [facility]    Patient Information: Saba Ellis 70 y.o. male MRN: [id number]  [facility] Encounter: [id number]    Discharging Physician / Practitioner: Manuela Jara  PCP: Manuela Forbes  Admission Date: 8/6/2023  Discharge Date: 08/08/23    Reason for Admission: Shortness of breath, anemia, esophageal ulcer    Discharge Diagnoses:     Principal Problem (Resolved):    SOB (shortness of breath)  Active Problems:    Acute blood loss anemia    SIRS (systemic inflammatory response syndrome) (HCC)    Chronic respiratory failure (HCC)    CAD (coronary artery disease)    COPD (chronic obstructive pulmonary disease) ([address])    Hypertension    Type 2 diabetes mellitus with hyperglycemia, without long-term current use of insulin ([address])    Alcohol abuse      Consultations During Hospital Stay:  · Gastroenterology    Procedures Performed:     · EGD with evidence of esophageal ulcer    Significant Findings / Test Results:     · CXR negative    Incidental Findings:   · none     Test Results Pending at Discharge (will require follow up): · Biopsies of esophagus and stomach     Outpatient Tests Requested:  · Outpatient f/u with PCP  · Outpatient fu with Gi for result of biopsies and to schedule outpatient colonoscopy as well as repeat EGD in 3 months     Complications:  none    Hospital Course:     Saba Ellis is a 70 y.o. male patient with past medical history of COPD, chronic respiratory failure on 2 L of nasal cannula nightly and as needed, of alcohol abuse, CAD, hypertension, type 2 diabetes who originally presented to the hospital on 8/6/2023 due to shortness of breath, found to be anemic on admission with concern for GI bleed. Was given 1 unit of packed red blood cells on admission. Was seen by GI recommended EGD, this was done 8/7 with clean-based esophageal ulcer, biopsies were taken. Stomach appeared normal but this was also biopsied.     Patient was monitored overnight, having brown stools. No concern for ongoing bleed. Cleared by GI for discharge. Okay to resume aspirin. Instructed patient to avoid NSAIDs, alcohol. Patient aware to follow-up with PCP and/or GI office for results of biopsy. He will require repeat EGD in 3 months time as well as colonoscopy. Condition at Discharge: stable     Discharge Day Visit / Exam:     Subjective: Patient offers no acute complaints. Having brown stools. No abdominal pain, nausea or vomiting. Tolerating oral diet. Vitals: Blood Pressure: 139/72 (08/08/23 1011)  Pulse: 82 (08/08/23 1037)  Temperature: 98.5 °F (36.9 °C) (08/08/23 1011)  Temp Source: Tympanic (08/07/23 0950)  Respirations: 18 (08/08/23 1037)  Height: 6' (182.9 cm) (08/06/23 2302)  Weight - Scale: 99 kg (218 lb 4.1 oz) (08/06/23 2302)  SpO2: 98 % (08/08/23 1037)  Exam:   Physical Exam  Vitals and nursing note reviewed. Constitutional:       General: He is not in acute distress. Cardiovascular:      Rate and Rhythm: Normal rate. Pulmonary:      Breath sounds: Normal breath sounds. Abdominal:      Tenderness: There is no abdominal tenderness. Musculoskeletal:         General: No swelling. Skin:     General: Skin is warm. Neurological:      Mental Status: He is alert and oriented to person, place, and time. Mental status is at baseline. Psychiatric:         Mood and Affect: Mood normal.         Discussion with Family: patient     Discharge instructions/Information to patient and family:   See after visit summary for information provided to patient and family. Provisions for Follow-Up Care:  See after visit summary for information related to follow-up care and any pertinent home health orders. Disposition:     Home    For Discharges to [facility] SNF:   · Not Applicable to this Patient - Not Applicable to this Patient    Planned Readmission: no     Discharge Statement:  I spent 40 minutes discharging the patient.  This time was spent on the day of discharge. I had direct contact with the patient on the day of discharge. Greater than 50% of the total time was spent examining patient, answering all patient questions, arranging and discussing plan of care with patient as well as directly providing post-discharge instructions. Additional time then spent on discharge activities. Discharge Medications:  See after visit summary for reconciled discharge medications provided to patient and family.       ** Please Note: This note has been constructed using a voice recognition system **

## 2023-08-08 NOTE — PLAN OF CARE
Problem: PAIN - ADULT  Goal: Verbalizes/displays adequate comfort level or baseline comfort level  Description: Interventions:  - Encourage patient to monitor pain and request assistance  - Assess pain using appropriate pain scale  - Administer analgesics based on type and severity of pain and evaluate response  - Implement non-pharmacological measures as appropriate and evaluate response  - Consider cultural and social influences on pain and pain management  - Notify physician/advanced practitioner if interventions unsuccessful or patient reports new pain  Outcome: Progressing     Problem: INFECTION - ADULT  Goal: Absence or prevention of progression during hospitalization  Description: INTERVENTIONS:  - Assess and monitor for signs and symptoms of infection  - Monitor lab/diagnostic results  - Monitor all insertion sites, i.e. indwelling lines, tubes, and drains  - Monitor endotracheal if appropriate and nasal secretions for changes in amount and color  - [place] appropriate cooling/warming therapies per order  - Administer medications as ordered  - Instruct and encourage patient and family to use good hand hygiene technique  - Identify and instruct in appropriate isolation precautions for identified infection/condition  Outcome: Progressing  Goal: Absence of fever/infection during neutropenic period  Description: INTERVENTIONS:  - Monitor WBC    Outcome: Progressing     Problem: SAFETY ADULT  Goal: Patient will remain free of falls  Description: INTERVENTIONS:  - Educate patient/family on patient safety including physical limitations  - Instruct patient to call for assistance with activity   - Consult OT/PT to assist with strengthening/mobility   - Keep Call bell within reach  - Keep bed low and locked with side rails adjusted as appropriate  - Keep care items and personal belongings within reach  - Initiate and maintain comfort rounds  - Make Fall Risk Sign visible to staff  - Offer Toileting every **2* Hours, in advance of need  - Initiate/Maintain **bed/chair*alarm  - Obtain necessary fall risk management equipment:   - Apply yellow socks and bracelet for high fall risk patients  - Consider moving patient to room near nurses station  Outcome: Progressing  Goal: Maintain or return to baseline ADL function  Description: INTERVENTIONS:  -  Assess patient's ability to carry out ADLs; assess patient's baseline for ADL function and identify physical deficits which impact ability to perform ADLs (bathing, care of mouth/teeth, toileting, grooming, dressing, etc.)  - Assess/evaluate cause of self-care deficits   - Assess range of motion  - Assess patient's mobility; develop plan if impaired  - Assess patient's need for assistive devices and provide as appropriate  - Encourage maximum independence but intervene and supervise when necessary  - Involve family in performance of ADLs  - Assess for home care needs following discharge   - Consider OT consult to assist with ADL evaluation and planning for discharge  - Provide patient education as appropriate  Outcome: Progressing  Goal: Maintains/Returns to pre admission functional level  Description: INTERVENTIONS:  - Perform BMAT or MOVE assessment daily.   - Set and communicate daily mobility goal to care team and patient/family/caregiver. - Collaborate with rehabilitation services on mobility goals if consulted  - Perform Range of Motion *3** times a day. - Reposition patient every *2** hours.   - Dangle patient **3* times a day  - Stand patient **3* times a day  - Ambulate patient *3** times a day  - Out of bed to chair *3** times a day   - Out of bed for meals *3** times a day  - Out of bed for toileting  - Record patient progress and toleration of activity level   Outcome: Progressing     Problem: DISCHARGE PLANNING  Goal: Discharge to home or other facility with appropriate resources  Description: INTERVENTIONS:  - Identify barriers to discharge w/patient and caregiver  - Arrange for needed discharge resources and transportation as appropriate  - Identify discharge learning needs (meds, wound care, etc.)  - Arrange for interpretive services to assist at discharge as needed  - Refer to Case Management Department for coordinating discharge planning if the patient needs post-hospital services based on physician/advanced practitioner order or complex needs related to functional status, cognitive ability, or social support system  Outcome: Progressing     Problem: Knowledge Deficit  Goal: Patient/family/caregiver demonstrates understanding of disease process, treatment plan, medications, and discharge instructions  Description: Complete learning assessment and assess knowledge base.   Interventions:  - Provide teaching at level of understanding  - Provide teaching via preferred learning methods  Outcome: Progressing     Problem: CARDIOVASCULAR - ADULT  Goal: Maintains optimal cardiac output and hemodynamic stability  Description: INTERVENTIONS:  - Monitor I/O, vital signs and rhythm  - Monitor for S/S and trends of decreased cardiac output  - Administer and titrate ordered vasoactive medications to optimize hemodynamic stability  - Assess quality of pulses, skin color and temperature  - Assess for signs of decreased coronary artery perfusion  - Instruct patient to report change in severity of symptoms  Outcome: Progressing  Goal: Absence of cardiac dysrhythmias or at baseline rhythm  Description: INTERVENTIONS:  - Continuous cardiac monitoring, vital signs, obtain 12 lead EKG if ordered  - Administer antiarrhythmic and heart rate control medications as ordered  - Monitor electrolytes and administer replacement therapy as ordered  Outcome: Progressing     Problem: RESPIRATORY - ADULT  Goal: Achieves optimal ventilation and oxygenation  Description: INTERVENTIONS:  - Assess for changes in respiratory status  - Assess for changes in mentation and behavior  - Position to facilitate oxygenation and minimize respiratory effort  - Oxygen administered by appropriate delivery if ordered  - Initiate smoking cessation education as indicated  - Encourage broncho-pulmonary hygiene including cough, deep breathe, Incentive Spirometry  - Assess the need for suctioning and aspirate as needed  - Assess and instruct to report SOB or any respiratory difficulty  - Respiratory Therapy support as indicated  Outcome: Progressing     Problem: SKIN/TISSUE INTEGRITY - ADULT  Goal: Skin Integrity remains intact(Skin Breakdown Prevention)  Description: Assess:  -Perform Luciano assessment every shift  -Clean and moisturize skin every shift  -Inspect skin when repositioning, toileting, and assisting with ADLS  -Assess under medical devices -Assess extremities for adequate circulation and sensation     Bed Management:  -Have minimal linens on bed & keep smooth, unwrinkled  -Change linens as needed when moist or perspiring  -Avoid sitting or lying in one position for more than *2** hours while in bed  -Keep HOB at *30**degrees     Toileting:  -Offer bedside commode  -Assess for incontinence   -Use incontinent care products after each incontinent episode     Activity:  -Mobilize patient **3* times a day  -Encourage activity and walks on unit  -Encourage or provide ROM exercises   -Turn and reposition patient every **2* Hours  -Use appropriate equipment to lift or move patient in bed  -Instruct/ Assist with weight shifting every **20 minutes* when out of bed in chair  -Consider limitation of chair time *2** hour intervals    Skin Care:  -Avoid use of baby powder, tape, friction and shearing, hot water or constrictive clothing  -Relieve pressure over bony prominences   -Do not massage red bony areas    Next Steps:  -Teach patient strategies to minimize risks-Consider consults to  interdisciplinary teams   Outcome: Progressing  Goal: Incision(s), wounds(s) or drain site(s) healing without S/S of infection  Description: INTERVENTIONS  - Assess and document dressing, incision, wound bed, drain sites and surrounding tissue  - Provide patient and family education  - Perform skin care/dressing changes   Outcome: Progressing  Goal: Pressure injury heals and does not worsen  Description: Interventions:  - Implement low air loss mattress or specialty surface (Criteria met)  - Apply silicone foam dressing  - Instruct/assist with weight shifting every *20** minutes when in chair   - Limit chair time to **2* hour intervals  - Use special pressure reducing interventions  when in chair   - Apply fecal or urinary incontinence containment device   - Perform passive or active ROM   - Turn and reposition patient & offload bony prominences every **2* hours   - Utilize friction reducing device or surface for transfers   - Consider consults to  interdisciplinary teams   - Use incontinent care products after each incontinent episode   - Consider nutrition services referral as needed  Outcome: Progressing     Problem: HEMATOLOGIC - ADULT  Goal: Maintains hematologic stability  Description: INTERVENTIONS  - Assess for signs and symptoms of bleeding or hemorrhage  - Monitor labs  - Administer supportive blood products/factors as ordered and appropriate  Outcome: Progressing

## 2023-08-08 NOTE — CASE MANAGEMENT
Case Management Discharge Planning Note    Patient name Saba Ellis  Location [facility] 606/[facility] 972-31 MRN [id number]  : 1955 Date 2023       Current Admission Date: 2023  Current Admission Diagnosis:Acute blood loss anemia   Patient Active Problem List    Diagnosis Date Noted   • SIRS (systemic inflammatory response syndrome) ([address]) 2023   • Hypertension 2023   • COPD (chronic obstructive pulmonary disease) ([address]) 2023   • Anxiety 2023   • Chronic respiratory failure ([address]) 2023   • Stage 3 severe COPD by GOLD classification ([address]) 2023   • Oral abscess 2022   • Tooth fracture 2022   • H/O ETOH abuse 2022   • Closed nondisplaced fracture of posterior arch of first cervical vertebra ([address]) 2022   • Fall 2022   • Diabetic polyneuropathy associated with type 2 diabetes mellitus ([address]) 2021   • Hammertoe, bilateral 2021   • Post-traumatic arthritis of left foot 2021   • Pain due to onychomycosis of toenail 2021   • Bronchitis 2020   • KLARISSA (obstructive sleep apnea) 2020   • Dirty living conditions 2020   • Closed fracture of first cervical vertebra ([address]) 2019   • ETOH abuse 2019   • Chronic obstructive pulmonary disease with acute exacerbation ([address]) 2018   • At moderate risk for venous thromboembolism (VTE) 2018   • S/P C1-T1 Posterior Cervical Discectomy and Fusion on 9/10/18 2018   • Acute blood loss anemia 2018   • Type 2 diabetes mellitus with hyperglycemia, without long-term current use of insulin ([address]) 2018   • Closed odontoid fracture with routine healing 2018   • Closed displaced fracture of third cervical vertebra ([address]) 2018   • Closed displaced fracture of fourth cervical vertebra ([address]) 2018   • Alcohol abuse 2018   • CAD (coronary artery disease) 2018   • Dens fracture ([address]) 2018      LOS (days): 2  Geometric Mean LOS (GMLOS) (days): 3.10  Days to GMLOS:1.4     OBJECTIVE:  Risk of Unplanned Readmission Score: 16.96         Current admission status: Inpatient   Preferred Pharmacy:   [address]  [address] [facility] [address]  Phone: [phone] Fax: [address], [address] [facility] [address] [facility] [address] [address]  Phone: [phone] Fax: [phone]    Primary Care Provider: TATIANA Lozada    Primary Insurance: MEDICARE  Secondary Insurance: BLUE CROSS    DISCHARGE DETAILS:  This CM met with pt at bedside to discuss transportation home. Pt requested a lyft but he where home O2 2L and does not have his O2 here. Pt requested this CM call his daughter Leon Ortiz, [phone], to see if she is able to bring is O2. Call was placed, VM received. Message left. Pending call back.

## 2023-08-08 NOTE — CASE MANAGEMENT
Case Management Discharge Planning Note    Patient name Angelito Blank  Location [facility] 606/[facility] 449-42 MRN [id number]  : 1955 Date 2023       Current Admission Date: 2023  Current Admission Diagnosis:Acute blood loss anemia   Patient Active Problem List    Diagnosis Date Noted   • SIRS (systemic inflammatory response syndrome) ([address]) 2023   • Hypertension 2023   • COPD (chronic obstructive pulmonary disease) ([address]) 2023   • Anxiety 2023   • Chronic respiratory failure ([address]) 2023   • Stage 3 severe COPD by GOLD classification ([address]) 2023   • Oral abscess 2022   • Tooth fracture 2022   • H/O ETOH abuse 2022   • Closed nondisplaced fracture of posterior arch of first cervical vertebra ([address]) 2022   • Fall 2022   • Diabetic polyneuropathy associated with type 2 diabetes mellitus ([address]) 2021   • Hammertoe, bilateral 2021   • Post-traumatic arthritis of left foot 2021   • Pain due to onychomycosis of toenail 2021   • Bronchitis 2020   • KLARISSA (obstructive sleep apnea) 2020   • Dirty living conditions 2020   • Closed fracture of first cervical vertebra ([address]) 2019   • ETOH abuse 2019   • Chronic obstructive pulmonary disease with acute exacerbation ([address]) 2018   • At moderate risk for venous thromboembolism (VTE) 2018   • S/P C1-T1 Posterior Cervical Discectomy and Fusion on 9/10/18 2018   • Acute blood loss anemia 2018   • Type 2 diabetes mellitus with hyperglycemia, without long-term current use of insulin ([address]) 2018   • Closed odontoid fracture with routine healing 2018   • Closed displaced fracture of third cervical vertebra ([address]) 2018   • Closed displaced fracture of fourth cervical vertebra ([address]) 2018   • Alcohol abuse 2018   • CAD (coronary artery disease) 2018   • Dens fracture ([address]) 2018      LOS (days): 2  Geometric Mean LOS (GMLOS) (days): 3.10  Days to GMLOS:1.3     OBJECTIVE:  Risk of Unplanned Readmission Score: 16.96         Current admission status: Inpatient   Preferred Pharmacy:   [address]  [address] [facility] [address] [facility] [address]  Phone: [phone] Fax: 220 [facility], [address] [facility] [address] 3690 [facility] [address] [address]  Phone: [phone] Fax: [phone]    Primary Care Provider: TATIANA Madrigal    Primary Insurance: MEDICARE  Secondary Insurance: BLUE CROSS    DISCHARGE DETAILS:  This CM met with pt at bedside to discuss transportation home. Pt stated that his daughter called him and she will be arriving shortly with his portable O2 and she can take him home.

## 2023-08-08 NOTE — PLAN OF CARE
Problem: PAIN - ADULT  Goal: Verbalizes/displays adequate comfort level or baseline comfort level  Description: Interventions:  - Encourage patient to monitor pain and request assistance  - Assess pain using appropriate pain scale  - Administer analgesics based on type and severity of pain and evaluate response  - Implement non-pharmacological measures as appropriate and evaluate response  - Consider cultural and social influences on pain and pain management  - Notify physician/advanced practitioner if interventions unsuccessful or patient reports new pain  8/8/2023 1354 by Mario Whitley RN  Outcome: Adequate for Discharge  8/8/2023 0724 by Mario Whitley RN  Outcome: Progressing     Problem: INFECTION - ADULT  Goal: Absence or prevention of progression during hospitalization  Description: INTERVENTIONS:  - Assess and monitor for signs and symptoms of infection  - Monitor lab/diagnostic results  - Monitor all insertion sites, i.e. indwelling lines, tubes, and drains  - Monitor endotracheal if appropriate and nasal secretions for changes in amount and color  - [place] appropriate cooling/warming therapies per order  - Administer medications as ordered  - Instruct and encourage patient and family to use good hand hygiene technique  - Identify and instruct in appropriate isolation precautions for identified infection/condition  8/8/2023 1354 by Mario Whitley RN  Outcome: Adequate for Discharge  8/8/2023 0724 by Mario Whitley RN  Outcome: Progressing  Goal: Absence of fever/infection during neutropenic period  Description: INTERVENTIONS:  - Monitor WBC    8/8/2023 1354 by Mario Whitley RN  Outcome: Adequate for Discharge  8/8/2023 0724 by Mario Whitley RN  Outcome: Progressing     Problem: SAFETY ADULT  Goal: Patient will remain free of falls  Description: INTERVENTIONS:  - Educate patient/family on patient safety including physical limitations  - Instruct patient to call for assistance with activity   - Consult OT/PT to assist with strengthening/mobility   - Keep Call bell within reach  - Keep bed low and locked with side rails adjusted as appropriate  - Keep care items and personal belongings within reach  - Initiate and maintain comfort rounds  - Make Fall Risk Sign visible to staff  - Offer Toileting every **2* Hours, in advance of need  - Initiate/Maintain **bed/chair*alarm  - Obtain necessary fall risk management equipment:  - Apply yellow socks and bracelet for high fall risk patients  - Consider moving patient to room near nurses station  8/8/2023 1354 by Iglesia Gaspar RN  Outcome: Adequate for Discharge  8/8/2023 0724 by Iglesia Gaspar RN  Outcome: Progressing  Goal: Maintain or return to baseline ADL function  Description: INTERVENTIONS:  -  Assess patient's ability to carry out ADLs; assess patient's baseline for ADL function and identify physical deficits which impact ability to perform ADLs (bathing, care of mouth/teeth, toileting, grooming, dressing, etc.)  - Assess/evaluate cause of self-care deficits   - Assess range of motion  - Assess patient's mobility; develop plan if impaired  - Assess patient's need for assistive devices and provide as appropriate  - Encourage maximum independence but intervene and supervise when necessary  - Involve family in performance of ADLs  - Assess for home care needs following discharge   - Consider OT consult to assist with ADL evaluation and planning for discharge  - Provide patient education as appropriate  8/8/2023 [id number] by Iglesia Gaspar RN  Outcome: Adequate for Discharge  8/8/2023 0724 by Iglesia Gaspar RN  Outcome: Progressing  Goal: Maintains/Returns to pre admission functional level  Description: INTERVENTIONS:  - Perform BMAT or MOVE assessment daily.   - Set and communicate daily mobility goal to care team and patient/family/caregiver.    - Collaborate with rehabilitation services on mobility goals if consulted  - Perform Range of Motion **3* times a day. - Reposition patient every **2* hours. - Dangle patient *3** times a day  - Stand patient **3* times a day  - Ambulate patient *3** times a day  - Out of bed to chair **3* times a day   - Out of bed for meals *3* times a day  - Out of bed for toileting  - Record patient progress and toleration of activity level   8/8/2023 1354 by Rance Bence, RN  Outcome: Adequate for Discharge  8/8/2023 0724 by Rance Bence, RN  Outcome: Progressing     Problem: DISCHARGE PLANNING  Goal: Discharge to home or other facility with appropriate resources  Description: INTERVENTIONS:  - Identify barriers to discharge w/patient and caregiver  - Arrange for needed discharge resources and transportation as appropriate  - Identify discharge learning needs (meds, wound care, etc.)  - Arrange for interpretive services to assist at discharge as needed  - Refer to Case Management Department for coordinating discharge planning if the patient needs post-hospital services based on physician/advanced practitioner order or complex needs related to functional status, cognitive ability, or social support system  8/8/2023 1354 by Rance Bence, RN  Outcome: Adequate for Discharge  8/8/2023 0724 by Rance Bence, RN  Outcome: Progressing     Problem: Knowledge Deficit  Goal: Patient/family/caregiver demonstrates understanding of disease process, treatment plan, medications, and discharge instructions  Description: Complete learning assessment and assess knowledge base.   Interventions:  - Provide teaching at level of understanding  - Provide teaching via preferred learning methods  8/8/2023 1354 by Rance Bence, RN  Outcome: Adequate for Discharge  8/8/2023 0724 by Rance Bence, RN  Outcome: Progressing     Problem: CARDIOVASCULAR - ADULT  Goal: Maintains optimal cardiac output and hemodynamic stability  Description: INTERVENTIONS:  - Monitor I/O, vital signs and rhythm  - Monitor for S/S and trends of decreased cardiac output  - Administer and titrate ordered vasoactive medications to optimize hemodynamic stability  - Assess quality of pulses, skin color and temperature  - Assess for signs of decreased coronary artery perfusion  - Instruct patient to report change in severity of symptoms  8/8/2023 1354 by Fritz Orellana RN  Outcome: Adequate for Discharge  8/8/2023 0724 by Fritz Orellana RN  Outcome: Progressing  Goal: Absence of cardiac dysrhythmias or at baseline rhythm  Description: INTERVENTIONS:  - Continuous cardiac monitoring, vital signs, obtain 12 lead EKG if ordered  - Administer antiarrhythmic and heart rate control medications as ordered  - Monitor electrolytes and administer replacement therapy as ordered  8/8/2023 1354 by Fritz Orellana RN  Outcome: Adequate for Discharge  8/8/2023 0724 by Fritz Orellana RN  Outcome: Progressing     Problem: RESPIRATORY - ADULT  Goal: Achieves optimal ventilation and oxygenation  Description: INTERVENTIONS:  - Assess for changes in respiratory status  - Assess for changes in mentation and behavior  - Position to facilitate oxygenation and minimize respiratory effort  - Oxygen administered by appropriate delivery if ordered  - Initiate smoking cessation education as indicated  - Encourage broncho-pulmonary hygiene including cough, deep breathe, Incentive Spirometry  - Assess the need for suctioning and aspirate as needed  - Assess and instruct to report SOB or any respiratory difficulty  - Respiratory Therapy support as indicated  8/8/2023 1354 by Fritz Orellana RN  Outcome: Adequate for Discharge  8/8/2023 0724 by Fritz Orellana RN  Outcome: Progressing     Problem: SKIN/TISSUE INTEGRITY - ADULT  Goal: Skin Integrity remains intact(Skin Breakdown Prevention)  Description: Assess:  -Perform Luciano assessment   -Clean and moisturize skin-Inspect skin when repositioning, toileting, and assisting with ADLS  -Assess under medical devices such -Assess extremities for adequate circulation and sensation     Bed Management:  -Have minimal linens on bed & keep smooth, unwrinkled  -Change linens as needed when moist or perspiring  -Avoid sitting or lying in one position for more than *2** hours while in bed  -Keep HOB at [address] [place]:  -Offer bedside commode  -Assess for incontinence  -Use incontinent care products after each incontinent episode     Activity:  -Mobilize patient *3** times a day  -Encourage activity and walks on unit  -Encourage or provide ROM exercises   -Turn and reposition patient every *2** Hours  -Use appropriate equipment to lift or move patient in bed  -Instruct/ Assist with weight shifting every **20minutes* when out of bed in chair  -Consider limitation of chair time **2* hour intervals    Skin Care:  -Avoid use of baby powder, tape, friction and shearing, hot water or constrictive clothing  -Relieve pressure over bony prominences   -Do not massage red bony areas    Next Steps:  -Teach patient strategies to minimize risks  -Consider consults to  interdisciplinary teams   8/8/2023 1354 by Shavonne Pike RN  Outcome: Adequate for Discharge  8/8/2023 0724 by Shavonne Pike RN  Outcome: Progressing  Goal: Incision(s), wounds(s) or drain site(s) healing without S/S of infection  Description: INTERVENTIONS  - Assess and document dressing, incision, wound bed, drain sites and surrounding tissue  - Provide patient and family education  - Perform skin care/dressing changes   8/8/2023 1354 by Shavonne Pike RN  Outcome: Adequate for Discharge  8/8/2023 0724 by Shavonne Pike RN  Outcome: Progressing  Goal: Pressure injury heals and does not worsen  Description: Interventions:  - Implement low air loss mattress or specialty surface (Criteria met)  - Apply silicone foam dressing  - Instruct/assist with weight shifting every *20** minutes when in chair   - Limit chair time to *2** hour intervals  - Use special pressure reducing interventions  when in chair   - Apply fecal or urinary incontinence containment device   - Perform passive or active ROM - Turn and reposition patient & offload bony prominences every *2** hours   - Utilize friction reducing device or surface for transfers   - Consider consults to  interdisciplinary teams   - Use incontinent care products after each incontinent episode   - Consider nutrition services referral as needed  8/8/2023 1354 by Keaton Chiu RN  Outcome: Adequate for Discharge  8/8/2023 0724 by Keaton Chiu RN  Outcome: Progressing     Problem: HEMATOLOGIC - ADULT  Goal: Maintains hematologic stability  Description: INTERVENTIONS  - Assess for signs and symptoms of bleeding or hemorrhage  - Monitor labs  - Administer supportive blood products/factors as ordered and appropriate  8/8/2023 1354 by Keaton Chiu RN  Outcome: Adequate for Discharge  8/8/2023 0724 by Keaton Chiu RN  Outcome: Progressing

## 2023-08-08 NOTE — RESPIRATORY THERAPY NOTE
RT Protocol Note  Jamila Meadows 70 y.o. male MRN: [id number]  [facility] Encounter: [id number]    Assessment    Principal Problem:    SOB (shortness of breath)  Active Problems:    Alcohol abuse    CAD (coronary artery disease)    Acute blood loss anemia    Type 2 diabetes mellitus with hyperglycemia, without long-term current use of insulin (HCC)    Chronic respiratory failure (HCC)    Hypertension    COPD (chronic obstructive pulmonary disease) ([facility])    SIRS (systemic inflammatory response syndrome) ([facility])      Home Pulmonary Medications:  Albuterol and Spriva   Home Devices/Therapy: Home O2    Past Medical History:   Diagnosis Date    Cardiac arrest ([address])     COPD (chronic obstructive pulmonary disease) ([facility])     CVA (cerebral vascular accident) ([address])     Diabetes mellitus ([address] )     Heart attack ([address] )     Hypertension     Stroke ([facility])      Social History     Socioeconomic History    Marital status: Single     Spouse name: None    Number of children: None    Years of education: None    Highest education level: None   Occupational History    None   Tobacco Use    Smoking status: Former     Types: Cigarettes    Smokeless tobacco: Never    Tobacco comments:     quit 5 months ago    Vaping Use    Vaping Use: Never used   Substance and Sexual Activity    Alcohol use: Not Currently     Alcohol/week: 8.0 - 12.0 standard drinks of alcohol     Types: 8 - 12 Cans of beer per week     Comment: every day drinker    Drug use: Never    Sexual activity: Not Currently   Other Topics Concern    None   Social History Narrative    ** Merged History Encounter **         ** Merged History Encounter **         ** Merged History Encounter **          Social Determinants of Health     Financial Resource Strain: Not on file   Food Insecurity: No Food Insecurity (8/7/2023)    Hunger Vital Sign     Worried About Running Out of Food in the Last Year: Never true     Ran Out of Food in the Last Year: Never true   Transportation Needs: No Transportation Needs (8/7/2023)    PRAPARE - Transportation     Lack of Transportation (Medical): No     Lack of Transportation (Non-Medical): No   Physical Activity: Not on file   Stress: Not on file   Social Connections: Not on file   Intimate Partner Violence: Not on file   Housing Stability: Low Risk  (8/7/2023)    Housing Stability Vital Sign     Unable to Pay for Housing in the Last Year: No     Number of Places Lived in the Last Year: 1     Unstable Housing in the Last Year: No       Subjective    Subjective Data: pt complains of sob    Objective    Physical Exam:   Assessment Type: During-treatment  General Appearance: Awake, Alert  Respiratory Pattern: Normal  Chest Assessment: Chest expansion symmetrical  Bilateral Breath Sounds: Diminished  R Breath Sounds: Diminished  L Breath Sounds: Diminished  Cough: None  O2 Device: 2 lpm    Vitals:  Blood pressure 139/72, pulse 82, temperature 98.5 °F (36.9 °C), resp. rate 18, height 6' (1.829 m), weight 99 kg (218 lb 4.1 oz), SpO2 98 %. Imaging and other studies: I have personally reviewed pertinent reports. O2 Device: 2 lpm     Plan    Respiratory Plan: Mild Distress pathway        Resp Comments: Pt re-assessed at this time and respiratory protocol re done. Pt came to the ED for SOB. Pt has pulm hx of COPD. Pt takes Albuterol MDI at home as needed and takes Albuterol Neb BID. Pt takes Sprivia at home but does not take Pulmicort. Will order pt on BID txs and Albuterol MDI at this time. Pt wears 2 lpm of oxygen at home at night and during the day as needed.

## 2023-08-09 ENCOUNTER — TELEPHONE (OUTPATIENT)
Dept: GASTROENTEROLOGY | Facility: CLINIC | Age: 68
End: 2023-08-09

## 2023-08-09 NOTE — ED ATTENDING ATTESTATION
8/6/2023  IDonald MD, saw and evaluated the patient. I have discussed the patient with the resident/non-physician practitioner and agree with the resident's/non-physician practitioner's findings, Plan of Care, and MDM as documented in the resident's/non-physician practitioner's note, except where noted. All available labs and Radiology studies were reviewed. I was present for key portions of any procedure(s) performed by the resident/non-physician practitioner and I was immediately available to provide assistance. At this point I agree with the current assessment done in the Emergency Department. I have conducted an independent evaluation of this patient a history and physical is as follows:    ED Course       16-year-old male presents with shortness of breath 1 hour prior to arrival.  History of COPD. Vitals reviewed. Heart regular rate and rhythm without murmurs. Lungs with bilateral end expiratory wheezing. No retractions no work of breathing. Abdomen soft nontender nondistended normal bowel sounds. Extremities no edema. Impression: Shortness of breath  Differential diagnosis: COPD exacerbation, ACS MI, arrhythmia, pneumothorax, pneumonia, symptomatic anemia    Plan to check ECG, chest x-ray, CBC with differential, CMP, troponins urinalysis. Will treat with steroids and nebs reassess. ECG independently interpreted by me:Sinus tachycardia with PACs low voltage QRS nonspecific ST changes impression abnormal ECG. Chest x-ray independently interpreted by me: No acute cardiopulmonary disease. Labs reviewed: CBC remarkable for leukocytosis and low hemoglobin consistent with anemia. CMP remarkable for hyponatremia. Elevated glucose, elevated BUN. Troponins 18 and 15 at 0 and 2 hours. Decision regarding hospitalization. Patient presents with shortness of breath evidence of COPD exacerbation and acute blood loss anemia as evident by labs.   Plan to transfuse patient given symptoms admit patient to medicine service for further evaluation and management        Critical Care Time  Procedures

## 2023-08-09 NOTE — CASE MANAGEMENT
Case Management Discharge Planning Note    Patient name Cassandra Villa  Location [facility] 141-40 MRN [id number]  : 1955 Date 2023       Current Admission Date: 2023  Current Admission Diagnosis:Acute blood loss anemia   Patient Active Problem List    Diagnosis Date Noted   • SIRS (systemic inflammatory response syndrome) ([address]) 2023   • Hypertension 2023   • COPD (chronic obstructive pulmonary disease) ([address]) 2023   • Anxiety 2023   • Chronic respiratory failure ([address]) 2023   • Stage 3 severe COPD by GOLD classification ([address]) 2023   • Oral abscess 2022   • Tooth fracture 2022   • H/O ETOH abuse 2022   • Closed nondisplaced fracture of posterior arch of first cervical vertebra ([address]) 2022   • Fall 2022   • Diabetic polyneuropathy associated with type 2 diabetes mellitus ([address]) 2021   • Hammertoe, bilateral 2021   • Post-traumatic arthritis of left foot 2021   • Pain due to onychomycosis of toenail 2021   • Bronchitis 2020   • KLARISSA (obstructive sleep apnea) 2020   • Dirty living conditions 2020   • Closed fracture of first cervical vertebra ([address]) 2019   • ETOH abuse 2019   • Chronic obstructive pulmonary disease with acute exacerbation ([address]) 2018   • At moderate risk for venous thromboembolism (VTE) 2018   • S/P C1-T1 Posterior Cervical Discectomy and Fusion on 9/10/18 2018   • Acute blood loss anemia 2018   • Type 2 diabetes mellitus with hyperglycemia, without long-term current use of insulin ([address]) 2018   • Closed odontoid fracture with routine healing 2018   • Closed displaced fracture of third cervical vertebra ([address]) 2018   • Closed displaced fracture of fourth cervical vertebra ([address]) 2018   • Alcohol abuse 2018   • CAD (coronary artery disease) 2018   • Dens fracture ([address]) 2018      LOS (days): 2  Geometric Mean LOS (GMLOS) (days): 3.10  Days to GMLOS:1.2     OBJECTIVE:  Risk of Unplanned Readmission Score: 17.18         Current admission status: Inpatient   Preferred Pharmacy:   [address] [place], [place] - [address] [facility]  [address] [facility] [address]  Phone: [phone] Fax: [address] [place], [place] - [address] [address] [facility] [place] [address] [facility] [address]  Phone: [phone] Fax: [phone]    Primary Care Provider: TATIANA Arias    Primary Insurance: MEDICARE  Secondary Insurance: BLUE CROSS    DISCHARGE DETAILS:    [address]         Is the patient interested in [facility] at discharge?: Yes  [facility] requested[de-identified] Nursing, Occupational Therapy, Physical [address] [facility] [address] Name[de-identified] [facility] External Referral Reason (only applicable if external HHA name selected): Patient has established relationship with provider  [facility] Provider[de-identified] PCP  Home Health Services Needed[de-identified] Evaluate Functional Status and Safety, Strengthening/Theraputic Exercises to Improve Function, Oxygen Via Nasal Cannula  Homebound Criteria Met[de-identified] Requires the Assistance of Another Person for Safe Ambulation or to Leave the Home, Uses an Assist Device (i.e. cane, walker, etc)  Supporting Clincal Findings[de-identified] Limited Endurance, Requires Oxygen, Fatigues Easliy in Short Distances

## 2023-08-10 NOTE — PROGRESS NOTES
-- Patient:  -- MRN: [id number]  -- Aidin Request ID: [id number]  -- Level of care reserved: [address]  -- Partner Reserved: [facility] [address] [phone]  -- Clinical needs requested:  -- Geography searched: [phone]  -- Start of Service:  -- Request sent: 11:00am EDT on 8/9/2023 by Román Corona  -- Partner reserved: 10:52am EDT on 8/10/2023 by Román Corona  -- Choice list shared:

## 2023-08-11 ENCOUNTER — TELEPHONE (OUTPATIENT)
Dept: GASTROENTEROLOGY | Facility: CLINIC | Age: 68
End: 2023-08-11

## 2023-08-11 NOTE — TELEPHONE ENCOUNTER
----- Message from DO Jeanette sent at 8/8/2023 11:19 AM EDT -----  Regarding: Schedule follow up; repeat EGD in 3 months  Stefanie,     Mr. Jose Perry was seen in consultation while admitted at [facility]. Please call him to schedule a follow up appointment in the next 2-4 weeks in fellow's clinic. Thank you.      Elizabeth Sainz

## 2023-10-12 ENCOUNTER — APPOINTMENT (EMERGENCY)
Dept: RADIOLOGY | Facility: HOSPITAL | Age: 68
End: 2023-10-12
Payer: MEDICARE

## 2023-10-12 ENCOUNTER — HOSPITAL ENCOUNTER (EMERGENCY)
Facility: HOSPITAL | Age: 68
Discharge: HOME | End: 2023-10-12
Attending: EMERGENCY MEDICINE
Payer: MEDICARE

## 2023-10-12 VITALS
HEART RATE: 75 BPM | OXYGEN SATURATION: 96 % | DIASTOLIC BLOOD PRESSURE: 60 MMHG | RESPIRATION RATE: 22 BRPM | TEMPERATURE: 98.5 F | SYSTOLIC BLOOD PRESSURE: 130 MMHG

## 2023-10-12 DIAGNOSIS — J44.1 COPD WITH ACUTE EXACERBATION (HCC): Primary | ICD-10-CM

## 2023-10-12 LAB
2HR DELTA HS TROPONIN: -2 NG/L
ANION GAP SERPL CALCULATED.3IONS-SCNC: 8 MMOL/L
BASE EX.OXY STD BLDV CALC-SCNC: 94.5 % (ref 60–80)
BASE EXCESS BLDV CALC-SCNC: 1.3 MMOL/L
BASOPHILS # BLD AUTO: 0.1 THOUSANDS/ÂΜL (ref 0–0.1)
BASOPHILS NFR BLD AUTO: 1 % (ref 0–1)
BUN SERPL-MCNC: 14 MG/DL (ref 5–25)
CALCIUM SERPL-MCNC: 9.5 MG/DL (ref 8.4–10.2)
CARDIAC TROPONIN I PNL SERPL HS: 15 NG/L
CARDIAC TROPONIN I PNL SERPL HS: 17 NG/L
CHLORIDE SERPL-SCNC: 104 MMOL/L (ref 96–108)
CO2 SERPL-SCNC: 29 MMOL/L (ref 21–32)
CREAT SERPL-MCNC: 0.6 MG/DL (ref 0.6–1.3)
EOSINOPHIL # BLD AUTO: 0.7 THOUSAND/ÂΜL (ref 0–0.61)
EOSINOPHIL NFR BLD AUTO: 7 % (ref 0–6)
ERYTHROCYTE [DISTWIDTH] IN BLOOD BY AUTOMATED COUNT: 19.5 % (ref 11.6–15.1)
FLUAV RNA RESP QL NAA+PROBE: NEGATIVE
FLUBV RNA RESP QL NAA+PROBE: NEGATIVE
GFR SERPL CREATININE-BSD FRML MDRD: 102 ML/MIN/1.73SQ M
GLUCOSE SERPL-MCNC: 154 MG/DL (ref 65–140)
HCO3 BLDV-SCNC: 24.8 MMOL/L (ref 24–30)
HCT VFR BLD AUTO: 30.4 % (ref 36.5–49.3)
HGB BLD-MCNC: 8.7 G/DL (ref 12–17)
IMM GRANULOCYTES # BLD AUTO: 0.05 THOUSAND/UL (ref 0–0.2)
IMM GRANULOCYTES NFR BLD AUTO: 1 % (ref 0–2)
LYMPHOCYTES # BLD AUTO: 1.83 THOUSANDS/ÂΜL (ref 0.6–4.47)
LYMPHOCYTES NFR BLD AUTO: 19 % (ref 14–44)
MCH RBC QN AUTO: 21.8 PG (ref 26.8–34.3)
MCHC RBC AUTO-ENTMCNC: 28.6 G/DL (ref 31.4–37.4)
MCV RBC AUTO: 76 FL (ref 82–98)
MONOCYTES # BLD AUTO: 1.01 THOUSAND/ÂΜL (ref 0.17–1.22)
MONOCYTES NFR BLD AUTO: 10 % (ref 4–12)
NEUTROPHILS # BLD AUTO: 6.21 THOUSANDS/ÂΜL (ref 1.85–7.62)
NEUTS SEG NFR BLD AUTO: 62 % (ref 43–75)
NRBC BLD AUTO-RTO: 0 /100 WBCS
O2 CT BLDV-SCNC: 13.1 ML/DL
PCO2 BLDV: 34.9 MM HG (ref 42–50)
PH BLDV: 7.47 [PH] (ref 7.3–7.4)
PLATELET # BLD AUTO: 593 THOUSANDS/UL (ref 149–390)
PMV BLD AUTO: 10.1 FL (ref 8.9–12.7)
PO2 BLDV: 106.1 MM HG (ref 35–45)
POTASSIUM SERPL-SCNC: 3.9 MMOL/L (ref 3.5–5.3)
RBC # BLD AUTO: 3.99 MILLION/UL (ref 3.88–5.62)
RSV RNA RESP QL NAA+PROBE: NEGATIVE
SARS-COV-2 RNA RESP QL NAA+PROBE: NEGATIVE
SODIUM SERPL-SCNC: 141 MMOL/L (ref 135–147)
WBC # BLD AUTO: 9.9 THOUSAND/UL (ref 4.31–10.16)

## 2023-10-12 PROCEDURE — 85025 COMPLETE CBC W/AUTO DIFF WBC: CPT

## 2023-10-12 PROCEDURE — 94640 AIRWAY INHALATION TREATMENT: CPT

## 2023-10-12 PROCEDURE — 99285 EMERGENCY DEPT VISIT HI MDM: CPT | Performed by: EMERGENCY MEDICINE

## 2023-10-12 PROCEDURE — 82805 BLOOD GASES W/O2 SATURATION: CPT

## 2023-10-12 PROCEDURE — 93005 ELECTROCARDIOGRAM TRACING: CPT

## 2023-10-12 PROCEDURE — 80048 BASIC METABOLIC PNL TOTAL CA: CPT

## 2023-10-12 PROCEDURE — 96374 THER/PROPH/DIAG INJ IV PUSH: CPT

## 2023-10-12 PROCEDURE — 99285 EMERGENCY DEPT VISIT HI MDM: CPT

## 2023-10-12 PROCEDURE — 36415 COLL VENOUS BLD VENIPUNCTURE: CPT

## 2023-10-12 PROCEDURE — 0241U HB NFCT DS VIR RESP RNA 4 TRGT: CPT

## 2023-10-12 PROCEDURE — 84484 ASSAY OF TROPONIN QUANT: CPT

## 2023-10-12 PROCEDURE — 71045 X-RAY EXAM CHEST 1 VIEW: CPT

## 2023-10-12 RX ORDER — PREDNISONE 20 MG/1
20 TABLET ORAL 2 TIMES DAILY WITH MEALS
Qty: 10 TABLET | Refills: 0 | Status: SHIPPED | OUTPATIENT
Start: 2023-10-12 | End: 2023-10-18

## 2023-10-12 RX ORDER — ALBUTEROL SULFATE 90 UG/1
2 AEROSOL, METERED RESPIRATORY (INHALATION) ONCE
Status: COMPLETED | OUTPATIENT
Start: 2023-10-12 | End: 2023-10-12

## 2023-10-12 RX ORDER — METHYLPREDNISOLONE SODIUM SUCCINATE 125 MG/2ML
125 INJECTION, POWDER, LYOPHILIZED, FOR SOLUTION INTRAMUSCULAR; INTRAVENOUS ONCE
Status: COMPLETED | OUTPATIENT
Start: 2023-10-12 | End: 2023-10-12

## 2023-10-12 RX ADMIN — ALBUTEROL SULFATE 2 PUFF: 90 AEROSOL, METERED RESPIRATORY (INHALATION) at 18:53

## 2023-10-12 RX ADMIN — METHYLPREDNISOLONE SODIUM SUCCINATE 125 MG: 125 INJECTION, POWDER, FOR SOLUTION INTRAMUSCULAR; INTRAVENOUS at 16:01

## 2023-10-12 RX ADMIN — IPRATROPIUM BROMIDE 0.5 MG: 0.5 SOLUTION RESPIRATORY (INHALATION) at 15:51

## 2023-10-12 RX ADMIN — ALBUTEROL SULFATE 5 MG: 2.5 SOLUTION RESPIRATORY (INHALATION) at 15:51

## 2023-10-12 NOTE — ED PROVIDER NOTES
History  Chief Complaint   Patient presents with    Shortness of Breath     Patient started 30 minutes ago with sob and pursed lip breathing. Patient 90% on RA. Patient is a 41-year-old male with a significant past medical history of COPD, on 2 L oxygen at baseline, diabetes, presenting for evaluation of shortness of breath. Patient states that he has been having some shortness of breath ever since a discharge with an acute COPD exacerbation from a emergency department 2 days ago. This seemed to get worse over the last hour or so which prompted him to come to the emergency department. He says that this feels similar to past COPD exacerbations. He has been using his nebulizer treatments, most recently yesterday, with some improvement. He has had some dry, nonproductive coughing. He denies any chest pain. He denies any leg swelling, tenderness, warmth. He denies any orthopnea. He otherwise denies acute complaints. Prior to Admission Medications   Prescriptions Last Dose Informant Patient Reported? Taking?    B Complex Vitamins (VITAMIN B COMPLEX 100 IJ)   Yes No   Sig: Take 1 tablet by mouth daily   Insulin Glargine (BASAGLAR KWIKPEN SC)   Yes No   Sig: Inject 30 Units under the skin daily at bedtime   Insulin Lispro (HUMALOG KWIKPEN SC)   Yes No   Sig: Inject 2 Units under the skin 3 (three) times a day with meals   LORazepam (Ativan) 0.5 mg tablet   Yes No   Sig: Take by mouth every 6 (six) hours as needed for anxiety    Multiple Vitamin (MULTIVITAMIN) tablet  Self Yes No   Sig: Take 1 tablet by mouth daily   albuterol (Proventil HFA) 90 mcg/act inhaler   No No   Sig: Inhale 2 puffs every 6 (six) hours as needed for wheezing   aspirin 81 mg chewable tablet   Yes No   Sig: Chew 81 mg daily   atorvastatin (LIPITOR) 80 mg tablet   No No   Sig: Take 1 tablet (80 mg total) by mouth daily with dinner   betamethasone valerate (VALISONE) 0.1 % cream   Yes No   Sig: Apply topically 2 (two) times a day budesonide (PULMICORT) 0.5 mg/2 mL nebulizer solution   No No   Sig: Take 2 mL (0.5 mg total) by nebulization every 12 (twelve) hours Rinse mouth after use. busPIRone (BUSPAR) 10 mg tablet   No No   Sig: Take 1 tablet (10 mg total) by mouth 3 (three) times a day   fluticasone (FLONASE) 50 mcg/act nasal spray   Yes No   Si spray into each nostril 2 (two) times a day   fluticasone-salmeterol (ADVAIR) 500-50 mcg/dose inhaler   Yes No   Sig: Inhale 1 puff 2 (two) times a day Rinse mouth after use.    folic acid (FOLVITE) 1 mg tablet   No No   Sig: Take 1 tablet (1 mg total) by mouth daily   glipiZIDE (GLUCOTROL) 5 mg tablet   Yes No   Sig: Take 5 mg by mouth in the morning   ipratropium-albuterol (COMBIVENT)  mcg/act inhaler   Yes No   Sig: Inhale   lisinopril (ZESTRIL) 40 mg tablet   Yes No   Sig: Take 40 mg by mouth daily    melatonin 3 mg   No No   Sig: Take 1 tablet (3 mg total) by mouth daily at bedtime as needed (30)   metFORMIN (GLUCOPHAGE) 1000 MG tablet   Yes No   Sig: Take 1 tablet by mouth every 12 (twelve) hours   pantoprazole (Protonix) 40 mg tablet   No No   Sig: Take 1 tablet (40 mg total) by mouth 2 (two) times a day   senna (SENOKOT) 8.6 mg   No No   Sig: Take 2 tablets (17.2 mg total) by mouth daily   thiamine 100 MG tablet   No No   Sig: Take 1 tablet (100 mg total) by mouth daily   tiotropium (SPIRIVA) 18 mcg inhalation capsule  Pharmacy (Specify) Yes No   Sig: Place 18 mcg into inhaler and inhale daily      Facility-Administered Medications: None       Past Medical History:   Diagnosis Date    Cardiac arrest (HCC)     COPD (chronic obstructive pulmonary disease) (HCC)     CVA (cerebral vascular accident) ([address])     Diabetes mellitus ([address])     Heart attack ([address])     Hypertension     Stroke [facility])        Past Surgical History:   Procedure Laterality Date    CERVICAL FUSION N/A 9/10/2018    Procedure: Posterior cervical decompressive laminectomy C3-6; Posterior cervical lateral mass and pedicle fixation fusion C1-T1;  Surgeon: Bharat Del Rosario MD;  Location: [facility];  Service: Neurosurgery       Family History   Problem Relation Age of Onset    Heart attack Mother      I have reviewed and agree with the history as documented. E-Cigarette/Vaping    E-Cigarette Use Never User      E-Cigarette/Vaping Substances    Nicotine No     THC No     CBD No     Flavoring No     Other No     Unknown No      Social History     Tobacco Use    Smoking status: Former     Types: Cigarettes    Smokeless tobacco: Never    Tobacco comments:     quit 5 months ago    Vaping Use    Vaping Use: Never used   Substance Use Topics    Alcohol use: Not Currently     Alcohol/week: 8.0 - 12.0 standard drinks of alcohol     Types: 8 - 12 Cans of beer per week     Comment: every day drinker    Drug use: Never        Review of Systems   Constitutional:  Negative for fever. Respiratory:  Positive for cough, chest tightness and shortness of breath. Cardiovascular:  Negative for chest pain and leg swelling. All other systems reviewed and are negative. Physical Exam  ED Triage Vitals   Temperature Pulse Respirations Blood Pressure SpO2   10/12/23 1534 10/12/23 1534 10/12/23 1534 10/12/23 1534 10/12/23 1534   98.5 °F (36.9 °C) (!) 113 22 124/68 90 %      Temp Source Heart Rate Source Patient Position - Orthostatic VS BP Location FiO2 (%)   10/12/23 1534 10/12/23 1534 10/12/23 1729 10/12/23 1534 --   Temporal Monitor Sitting Left arm       Pain Score       10/12/23 1534       No Pain             Orthostatic Vital Signs  Vitals:    10/12/23 1534 10/12/23 1729   BP: 124/68 130/60   Pulse: (!) 113 75   Patient Position - Orthostatic VS:  Sitting       Physical Exam  Vitals and nursing note reviewed. Constitutional:       General: He is not in acute distress. Appearance: Normal appearance. He is not ill-appearing or toxic-appearing. HENT:      Head: Normocephalic and atraumatic.       Right Ear: External ear normal. Left Ear: External ear normal.      Nose: Nose normal.   Eyes:      General: No scleral icterus. Right eye: No discharge. Left eye: No discharge. Extraocular Movements: Extraocular movements intact. Conjunctiva/sclera: Conjunctivae normal.   Cardiovascular:      Rate and Rhythm: Normal rate. Heart sounds: Normal heart sounds. No murmur heard. No friction rub. No gallop. Pulmonary:      Effort: Pulmonary effort is normal. Tachypnea present. No respiratory distress. Breath sounds: Wheezing present. Comments: Prolonged expiratory phase  Abdominal:      General: Abdomen is flat. There is no distension. Palpations: Abdomen is soft. There is no mass. Tenderness: There is no abdominal tenderness. Genitourinary:     Comments: Deferred  Skin:     General: Skin is warm and dry. Neurological:      General: No focal deficit present. Mental Status: He is alert.          ED Medications  Medications   albuterol inhalation solution 5 mg (5 mg Nebulization Given 10/12/23 1551)   ipratropium (ATROVENT) 0.02 % inhalation solution 0.5 mg (0.5 mg Nebulization Given 10/12/23 1551)   methylPREDNISolone sodium succinate (Solu-MEDROL) injection 125 mg (125 mg Intravenous Given 10/12/23 1601)   albuterol (PROVENTIL HFA,VENTOLIN HFA) inhaler 2 puff (2 puffs Inhalation Given 10/12/23 1853)       Diagnostic Studies  Results Reviewed       Procedure Component Value Units Date/Time    HS Troponin I 2hr [[id number]]  (Normal) Collected: 10/12/23 1733    Lab Status: Final result Specimen: Blood from Arm, Left Updated: 10/12/23 1820     hs TnI 2hr 15 ng/L      Delta 2hr hsTnI -2 ng/L     FLU/RSV/COVID - if FLU/RSV clinically relevant [[id number]]  (Normal) Collected: 10/12/23 1558    Lab Status: Final result Specimen: Nares from Nose Updated: 10/12/23 1701     SARS-CoV-2 Negative     INFLUENZA A PCR Negative     INFLUENZA B PCR Negative     RSV PCR Negative    Narrative:      FOR PEDIATRIC PATIENTS - copy/paste COVID Guidelines URL to browser: [email or web address]    SARS-CoV-2 assay is a Nucleic Acid Amplification assay intended for the  qualitative detection of nucleic acid from SARS-CoV-2 in nasopharyngeal  swabs. Results are for the presumptive identification of SARS-CoV-2 RNA. Positive results are indicative of infection with SARS-CoV-2, the virus  causing COVID-19, but do not rule out bacterial infection or co-infection  with other viruses. Laboratories within the [facility] [address] and its  territories are required to report all positive results to the appropriate  public health authorities. Negative results do not preclude SARS-CoV-2  infection and should not be used as the sole basis for treatment or other  patient management decisions. Negative results must be combined with  clinical observations, patient history, and epidemiological information. This test has not been FDA cleared or approved. This test has been authorized by FDA under an Emergency Use Authorization  (EUA). This test is only authorized for the duration of time the  declaration that circumstances exist justifying the authorization of the  emergency use of an in vitro diagnostic tests for detection of SARS-CoV-2  virus and/or diagnosis of COVID-19 infection under section 564(b)(1) of  the Act, 21 U. S.C. 736NMC-3(O)(2), unless the authorization is terminated  or revoked sooner. The test has been validated but independent review by FDA  and CLIA is pending. Test performed using [employer] GeneXpert: This RT-PCR assay targets N2,  a region unique to SARS-CoV-2. A conserved region in the E-gene was chosen  for pan-Sarbecovirus detection which includes SARS-CoV-2. According to CMS-2020-01-R, this platform meets the definition of high-throughput technology.     HS Troponin 0hr (reflex protocol) [[id number]]  (Normal) Collected: 10/12/23 3945    Lab Status: Final result Specimen: Blood from Arm, Left Updated: 10/12/23 1649     hs TnI 0hr 17 ng/L     Basic metabolic panel [[id number]]  (Abnormal) Collected: 10/12/23 1558    Lab Status: Final result Specimen: Blood from Arm, Left Updated: 10/12/23 1641     Sodium 141 mmol/L      Potassium 3.9 mmol/L      Chloride 104 mmol/L      CO2 29 mmol/L      ANION GAP 8 mmol/L      BUN 14 mg/dL      Creatinine 0.60 mg/dL      Glucose 154 mg/dL      Calcium 9.5 mg/dL      eGFR 102 ml/min/1.73sq m     Narrative:      [facility] guidelines for Chronic Kidney Disease (CKD):     Stage 1 with normal or high GFR (GFR > 90 mL/min/1.73 square meters)    Stage 2 Mild CKD (GFR = 60-89 mL/min/1.73 square meters)    Stage 3A Moderate CKD (GFR = 45-59 mL/min/1.73 square meters)    Stage 3B Moderate CKD (GFR = 30-44 mL/min/1.73 square meters)    Stage 4 Severe CKD (GFR = 15-29 mL/min/1.73 square meters)    Stage 5 End Stage CKD (GFR <15 mL/min/1.73 square meters)  Note: GFR calculation is accurate only with a steady state creatinine    CBC and differential [[id number]]  (Abnormal) Collected: 10/12/23 1558    Lab Status: Final result Specimen: Blood from Arm, Left Updated: 10/12/23 1626     WBC 9.90 Thousand/uL      RBC 3.99 Million/uL      Hemoglobin 8.7 g/dL      Hematocrit 30.4 %      MCV 76 fL      MCH 21.8 pg      MCHC 28.6 g/dL      RDW 19.5 %      MPV 10.1 fL      Platelets 181 Thousands/uL      nRBC 0 /100 WBCs      Neutrophils Relative 62 %      Immat GRANS % 1 %      Lymphocytes Relative 19 %      Monocytes Relative 10 %      Eosinophils Relative 7 %      Basophils Relative 1 %      Neutrophils Absolute 6.21 Thousands/µL      Immature Grans Absolute 0.05 Thousand/uL      Lymphocytes Absolute 1.83 Thousands/µL      Monocytes Absolute 1.01 Thousand/µL      Eosinophils Absolute 0.70 Thousand/µL      Basophils Absolute 0.10 Thousands/µL     Blood gas, venous [[id number]]  (Abnormal) Collected: 10/12/23 1558    Lab Status: Final result Specimen: Blood from Arm, Left Updated: 10/12/23 1618     pH, Rocky 7.470     pCO2, Rocky 34.9 mm Hg      pO2, Rocky 106.1 mm Hg      HCO3, Rocky 24.8 mmol/L      Base Excess, Rocky 1.3 mmol/L      O2 Content, Rocky 13.1 ml/dL      O2 HGB, VENOUS 94.5 %                    XR chest 1 view portable   ED Interpretation by Anahi Peña DO (10/12 8202)   No acute cardiopulmonary disease on interpretation            Procedures  Procedures      ED Course  ED Course as of 10/13/23 0635   Thu Oct 12, 2023   7506 Procedure Note: EKG  Date/Time: 10/12/23 4:58 PM   Interpreted by: Kari Goodwin   Indications / Diagnosis: SOB  ECG reviewed by me, the ED Provider: yes   The EKG demonstrates:  Rhythm: normal sinus  Intervals: normal intervals  Axis: borderline right axis  QRS/Blocks: normal QRS, PVCs  ST Changes: No acute ST Changes, no STD/HETAL. Medical Decision Making  Patient is a 39-year-old male presenting for evaluation of shortness of breath. Based on history evaluation, differential diagnosis includes but is not limited to: Acute COPD exacerbation, anxiety, ACS, arrhythmia, pneumothorax. Plan: CBC, BMP, troponin, ECG, chest x-ray, nebulizer treatment, steroids, reassessment    Labs unremarkable. ECG without active ischemia, STEMI, or concerning arrhythmia. Chest x-ray without acute cardiopulmonary disease my interpretation. Patient's symptoms significantly improved following nebulizer treatments and steroids. On reassessment, he is well-appearing. Suspect that the patient's presentation is most consistent with a mild acute COPD exacerbation. Stable for discharge home with primary care/pulmonology follow-up. Prescription for prednisone written and sent to patient pharmacy. Patient seems to understand this plan and is agreeable. All questions answered. Patient discharged home with return precautions.     Amount and/or Complexity of Data Reviewed  Labs: ordered. Radiology: ordered and independent interpretation performed. Risk  Prescription drug management. Disposition  Final diagnoses:   COPD with acute exacerbation ([address])     Time reflects when diagnosis was documented in both MDM as applicable and the Disposition within this note       Time User Action Codes Description Comment    10/12/2023  6:34 PM Kimarie Gonzalez [J44.1] COPD with acute exacerbation [facility])           ED Disposition       ED Disposition   Discharge    Condition   Stable    Date/Time   Thu Oct 12, 2023  6:34 PM    Comment   Mark Hartley discharge to home/self care.                    Follow-up Information       Follow up With Specialties Details Why Contact Info    Denisa Shai, [address], Nurse Practitioner Go in 2 days  [address] [facility]  [phone]              Discharge Medication List as of 10/12/2023  6:36 PM        START taking these medications    Details   predniSONE 20 mg tablet Take 1 tablet (20 mg total) by mouth 2 (two) times a day with meals for 5 days, Starting Thu 10/12/2023, Until Tue 10/17/2023, Normal           CONTINUE these medications which have NOT CHANGED    Details   albuterol (Proventil HFA) 90 mcg/act inhaler Inhale 2 puffs every 6 (six) hours as needed for wheezing, Starting Fri 6/10/2022, Normal      aspirin 81 mg chewable tablet Chew 81 mg daily, Historical Med      atorvastatin (LIPITOR) 80 mg tablet Take 1 tablet (80 mg total) by mouth daily with dinner, Starting Sat 9/22/2018, Normal      B Complex Vitamins (VITAMIN B COMPLEX 100 IJ) Take 1 tablet by mouth daily, Historical Med      betamethasone valerate (VALISONE) 0.1 % cream Apply topically 2 (two) times a day, Starting Fri 10/4/2013, Historical Med      budesonide (PULMICORT) 0.5 mg/2 mL nebulizer solution Take 2 mL (0.5 mg total) by nebulization every 12 (twelve) hours Rinse mouth after use., Starting Fri 6/10/2022, No Print      busPIRone (BUSPAR) 10 mg tablet Take 1 tablet (10 mg total) by mouth 3 (three) times a day, Starting Fri 7/28/2023, Normal      fluticasone (FLONASE) 50 mcg/act nasal spray 1 spray into each nostril 2 (two) times a day, Starting Fri 10/4/2013, Historical Med      fluticasone-salmeterol (ADVAIR) 500-50 mcg/dose inhaler Inhale 1 puff 2 (two) times a day Rinse mouth after use., Historical Med      folic acid (FOLVITE) 1 mg tablet Take 1 tablet (1 mg total) by mouth daily, Starting Sat 6/11/2022, No Print      glipiZIDE (GLUCOTROL) 5 mg tablet Take 5 mg by mouth in the morning, Historical Med      Insulin Glargine (BASAGLAR KWIKPEN SC) Inject 30 Units under the skin daily at bedtime, Historical Med      Insulin Lispro (HUMALOG KWIKPEN SC) Inject 2 Units under the skin 3 (three) times a day with meals, Historical Med      ipratropium-albuterol (COMBIVENT)  mcg/act inhaler Inhale, Historical Med      lisinopril (ZESTRIL) 40 mg tablet Take 40 mg by mouth daily , Historical Med      LORazepam (Ativan) 0.5 mg tablet Take by mouth every 6 (six) hours as needed for anxiety , Historical Med      melatonin 3 mg Take 1 tablet (3 mg total) by mouth daily at bedtime as needed (30), Starting Fri 9/21/2018, Normal      metFORMIN (GLUCOPHAGE) 1000 MG tablet Take 1 tablet by mouth every 12 (twelve) hours, Historical Med      Multiple Vitamin (MULTIVITAMIN) tablet Take 1 tablet by mouth daily, Historical Med      pantoprazole (Protonix) 40 mg tablet Take 1 tablet (40 mg total) by mouth 2 (two) times a day, Starting Tue 8/8/2023, Normal      senna (SENOKOT) 8.6 mg Take 2 tablets (17.2 mg total) by mouth daily, Starting Sat 6/11/2022, No Print      thiamine 100 MG tablet Take 1 tablet (100 mg total) by mouth daily, Starting Sat 9/22/2018, Normal      tiotropium (SPIRIVA) 18 mcg inhalation capsule Place 18 mcg into inhaler and inhale daily, Historical Med           No discharge procedures on file.     PDMP Review       None             ED Provider  Attending physically available and evaluated Mecca Prather. I managed the patient along with the ED Attending.     Electronically Signed by           Ulisses Sharpe DO  10/13/23 8308

## 2023-10-12 NOTE — ED ATTENDING ATTESTATION
10/12/2023  IEmily, DO, saw and evaluated the patient. I have discussed the patient with the resident/non-physician practitioner and agree with the resident's/non-physician practitioner's findings, Plan of Care, and MDM as documented in the resident's/non-physician practitioner's note, except where noted. All available labs and Radiology studies were reviewed. I was present for key portions of any procedure(s) performed by the resident/non-physician practitioner and I was immediately available to provide assistance. At this point I agree with the current assessment done in the Emergency Department. I have conducted an independent evaluation of this patient a history and physical is as follows:    Patient is a 79-year-old male with a history of 2 L O2 dependent COPD, previous CVA, diabetes, coronary disease, hypertension, says for the last several days he has had a dry nonproductive cough, feeling like he is more anxious about his breathing and feels like he is having a COPD exacerbation. No chest pain, no palpitations. No prolonged travel, immobilization or inactivity periods. Patient was seen 2 days ago at outside facility, for the symptoms. Per review of records, his work-up was unremarkable, unremarkable chest x-ray, unremarkable CBC, BMP, negative COVID, influenza, RSV viral testing, negative serial cardiac biomarkers, BNP unremarkable, patient discharged home with a prescription for prednisone 40 mg daily for 4 days. He presents today because he feels like he is not getting better. Does not feel like is getting worse.     General:  Patient is well-appearing  Head:  Atraumatic  Eyes:  Conjunctiva pink  ENT:  Mucous membranes are moist  Neck:  Supple  Cardiac:  S1-S2, without murmurs  Lungs: Expiratory wheeze, no accessory muscle usage, no clinical respiratory failure  Abdomen:  Soft, nontender, normal bowel sounds, no CVA tenderness, no tympany, no rigidity, no guarding  Extremities:  Normal range of motion, no pedal edema or calf asymmetry, radial pulses are equal and symmetric bilaterally  Neurologic:  Awake, fluent speech, normal comprehension, AAOx3  Skin:  Pink warm and dry  Psychiatric:  Alert, pleasant, cooperative      ED Course  ED Course as of 10/12/23 1924   u Oct 12, 2023   1550 ECG interpreted by me, sinus rhythm current of 91, no acute ischemic or infarctive changes, 1 PVC, borderline right axis deviation, previous ECG from August 6, 2023 has normal axis, otherwise no acute change     XR chest 1 view portable   ED Interpretation   No acute cardiopulmonary disease on interpretation        Labs Reviewed   CBC AND DIFFERENTIAL - Abnormal       Result Value Ref Range Status    WBC 9.90  4.31 - 10.16 Thousand/uL Final    RBC 3.99  3.88 - 5.62 Million/uL Final    Hemoglobin 8.7 (*) 12.0 - 17.0 g/dL Final    Hematocrit 30.4 (*) 36.5 - 49.3 % Final    MCV 76 (*) 82 - 98 fL Final    MCH 21.8 (*) 26.8 - 34.3 pg Final    MCHC 28.6 (*) 31.4 - 37.4 g/dL Final    RDW 19.5 (*) 11.6 - 15.1 % Final    MPV 10.1  8.9 - 12.7 fL Final    Platelets 018 (*) 934 - 390 Thousands/uL Final    nRBC 0  /100 WBCs Final    Neutrophils Relative 62  43 - 75 % Final    Immat GRANS % 1  0 - 2 % Final    Lymphocytes Relative 19  14 - 44 % Final    Monocytes Relative 10  4 - 12 % Final    Eosinophils Relative 7 (*) 0 - 6 % Final    Basophils Relative 1  0 - 1 % Final    Neutrophils Absolute 6.21  1.85 - 7.62 Thousands/µL Final    Immature Grans Absolute 0.05  0.00 - 0.20 Thousand/uL Final    Lymphocytes Absolute 1.83  0.60 - 4.47 Thousands/µL Final    Monocytes Absolute 1.01  0.17 - 1.22 Thousand/µL Final    Eosinophils Absolute 0.70 (*) 0.00 - 0.61 Thousand/µL Final    Basophils Absolute 0.10  0.00 - 0.10 Thousands/µL Final   BASIC METABOLIC PANEL - Abnormal    Sodium 141  135 - 147 mmol/L Final    Potassium 3.9  3.5 - 5.3 mmol/L Final    Chloride 104  96 - 108 mmol/L Final    CO2 29  21 - 32 mmol/L Final    ANION GAP 8  mmol/L Final    BUN 14  5 - 25 mg/dL Final    Creatinine 0.60  0.60 - 1.30 mg/dL Final    Comment: Standardized to IDMS reference method    Glucose 154 (*) 65 - 140 mg/dL Final    Comment: If the patient is fasting, the ADA then defines impaired fasting glucose as > 100 mg/dL and diabetes as > or equal to 123 mg/dL. Calcium 9.5  8.4 - 10.2 mg/dL Final    eGFR 102  ml/min/1.73sq m Final    Narrative:     [facility] guidelines for Chronic Kidney Disease (CKD):     Stage 1 with normal or high GFR (GFR > 90 mL/min/1.73 square meters)    Stage 2 Mild CKD (GFR = 60-89 mL/min/1.73 square meters)    Stage 3A Moderate CKD (GFR = 45-59 mL/min/1.73 square meters)    Stage 3B Moderate CKD (GFR = 30-44 mL/min/1.73 square meters)    Stage 4 Severe CKD (GFR = 15-29 mL/min/1.73 square meters)    Stage 5 End Stage CKD (GFR <15 mL/min/1.73 square meters)  Note: GFR calculation is accurate only with a steady state creatinine   BLOOD GAS, VENOUS - Abnormal    pH, Rocky 7.470 (*) 7.300 - 7.400 Final    pCO2, Rocky 34.9 (*) 42.0 - 50.0 mm Hg Final    pO2, Rocky 106.1 (*) 35.0 - 45.0 mm Hg Final    HCO3, Rocky 24.8  24 - 30 mmol/L Final    Base Excess, Rocky 1.3  mmol/L Final    O2 Content, Rocky 13.1  ml/dL Final    O2 HGB, VENOUS 94.5 (*) 60.0 - 80.0 % Final   COVID19, INFLUENZA A/B, RSV PCR, [facility] - Normal    SARS-CoV-2 Negative  Negative Final    Comment:      INFLUENZA A PCR Negative  Negative Final    Comment:      INFLUENZA B PCR Negative  Negative Final    Comment:      RSV PCR Negative  Negative Final    Comment:      Narrative:     FOR PEDIATRIC PATIENTS - copy/paste COVID Guidelines URL to browser: [email or web address]    SARS-CoV-2 assay is a Nucleic Acid Amplification assay intended for the  qualitative detection of nucleic acid from SARS-CoV-2 in nasopharyngeal  swabs.  Results are for the presumptive identification of SARS-CoV-2 RNA.    Positive results are indicative of infection with SARS-CoV-2, the virus  causing COVID-19, but do not rule out bacterial infection or co-infection  with other viruses. Laboratories within the [facility] and its  territories are required to report all positive results to the appropriate  public health authorities. Negative results do not preclude SARS-CoV-2  infection and should not be used as the sole basis for treatment or other  patient management decisions. Negative results must be combined with  clinical observations, patient history, and epidemiological information. This test has not been FDA cleared or approved. This test has been authorized by FDA under an Emergency Use Authorization  (EUA). This test is only authorized for the duration of time the  declaration that circumstances exist justifying the authorization of the  emergency use of an in vitro diagnostic tests for detection of SARS-CoV-2  virus and/or diagnosis of COVID-19 infection under section 564(b)(1) of  the Act, 21 U. S.C. 473KTH-8(D)(5), unless the authorization is terminated  or revoked sooner. The test has been validated but independent review by FDA  and CLIA is pending. Test performed using [employer] Gene[employer]pert: This RT-PCR assay targets N2,  a region unique to SARS-CoV-2. A conserved region in the E-gene was chosen  for pan-Sarbecovirus detection which includes SARS-CoV-2. According to CMS-2020-01-R, this platform meets the definition of high-throughput technology. HS TROPONIN I 0HR - Normal    hs TnI 0hr 17  "Refer to ACS Flowchart"- see link ng/L Final    Comment:                                              Initial (time 0) result  If >=50 ng/L, Myocardial injury suggested ;  Type of myocardial injury and treatment strategy  to be determined. If 5-49 ng/L, a delta result at 2 hours and or 4 hours will be needed to further evaluate.   If <4 ng/L, and chest pain has been >3 hours since onset, patient may qualify for discharge based on the HEART score in the ED. If <5 ng/L and <3hours since onset of chest pain, a delta result at 2 hours will be needed to further evaluate. HS Troponin 99th Percentile URL of a Health Population=12 ng/L with a 95% Confidence Interval of 8-18 ng/L. Second Troponin (time 2 hours)  If calculated delta >= 20 ng/L,  Myocardial injury suggested ; Type of myocardial injury and treatment strategy to be determined. If 5-49 ng/L and the calculated delta is 5-19 ng/L, consult medical service for evaluation. Continue evaluation for ischemia on ecg and other possible etiology and repeat hs troponin at 4 hours. If delta is <5 ng/L at 2 hours, consider discharge based on risk stratification via the HEART score (if in ED), or KAIDEN risk score in IP/Observation. HS Troponin 99th Percentile URL of a Health Population=12 ng/L with a 95% Confidence Interval of 8-18 ng/L.   HS TROPONIN I 2HR - Normal    hs TnI 2hr 15  "Refer to ACS Flowchart"- see link ng/L Final    Comment:                                              Initial (time 0) result  If >=50 ng/L, Myocardial injury suggested ;  Type of myocardial injury and treatment strategy  to be determined. If 5-49 ng/L, a delta result at 2 hours and or 4 hours will be needed to further evaluate. If <4 ng/L, and chest pain has been >3 hours since onset, patient may qualify for discharge based on the HEART score in the ED. If <5 ng/L and <3hours since onset of chest pain, a delta result at 2 hours will be needed to further evaluate. HS Troponin 99th Percentile URL of a Health Population=12 ng/L with a 95% Confidence Interval of 8-18 ng/L. Second Troponin (time 2 hours)  If calculated delta >= 20 ng/L,  Myocardial injury suggested ; Type of myocardial injury and treatment strategy to be determined. If 5-49 ng/L and the calculated delta is 5-19 ng/L, consult medical service for evaluation.   Continue evaluation for ischemia on ecg and other possible etiology and repeat hs troponin at 4 hours. If delta is <5 ng/L at 2 hours, consider discharge based on risk stratification via the HEART score (if in ED), or KAIDEN risk score in IP/Observation. HS Troponin 99th Percentile URL of a Health Population=12 ng/L with a 95% Confidence Interval of 8-18 ng/L. Delta 2hr hsTnI -2  <20 ng/L Final     On reassessment after breathing treatment, patient's lung sounded clear, said he felt much better    At this point believe the patient was has a mild COPD exacerbation, also could be coupled with some slight anxiety regarding his overall medical care. He says he is not worse but just not better, and after only 2 days of steroids I do not believe that he would be 100% better. I do not believe this patient's complaints are from pulmonary embolism and I believe they would most likely be harmed through false positive test results and other complications of testing by further pursuing the diagnosis of pulmonary embolism. Serial cardiac biomarkers unremarkable, ACS considered extremely unlikely. Believe patient is stable for discharge with outpatient follow-up. Supportive care, importance of follow-up and return precautions were discussed with the patient, who expressed understanding. The patient was evaluated for sepsis in the emergency department. After that assessment, at the time of admission, no sepsis, severe sepsis, or septic shock was found. DIAGNOSIS:  Acute COPD exacerbation, chronic oxygen dependent COPD    MEDICAL DECISION MAKING CODING      Patient presents with acute new problem with:  Threat to life or bodily function      Chronic conditions affecting care: As per HPI    COLLECTION AND INTERPRETATION OF DATA  I reviewed prior external notes, including his ECG and ED visit and studies from October 10, 2023.     I ordered each unique test  Tests reviewed personally by me:  ECG: See my ED course  Labs: See above  Imaging: I independently reviewed the chest x-ray and found no acute pathology. Tests considered but not ordered: See above    RISK  All of the patient's current prescription medications should be continued.     Treatment:  Consideration of admission: Admission considered however patient said he feels better and feels comfortable going home and following up as an outpatient      Social Determinants of Health:  Presentation to ED outside of business hours or on night shift  Procedures

## 2023-10-12 NOTE — DISCHARGE INSTRUCTIONS
Your evaluation suggests that your symptoms are due to a non emergent cause most consistent with a COPD exacerbation. Please follow up with your primary care physician/pulmonologist within two days. I prescribed prednisone. Take as prescribed. Return to the Emergency Department if you experience worsening or concerning symptoms. Thank you for choosing us for your care.

## 2023-10-13 LAB
ATRIAL RATE: 91 BPM
P AXIS: 103 DEGREES
PR INTERVAL: 192 MS
QRS AXIS: 114 DEGREES
QRSD INTERVAL: 100 MS
QT INTERVAL: 324 MS
QTC INTERVAL: 398 MS
T WAVE AXIS: 39 DEGREES
VENTRICULAR RATE: 91 BPM

## 2023-10-13 PROCEDURE — 93010 ELECTROCARDIOGRAM REPORT: CPT | Performed by: INTERNAL MEDICINE

## 2023-10-16 ENCOUNTER — HOSPITAL ENCOUNTER (INPATIENT)
Facility: HOSPITAL | Age: 68
LOS: 1 days | Discharge: HOME | DRG: 191 | End: 2023-10-18
Attending: EMERGENCY MEDICINE | Admitting: STUDENT IN AN ORGANIZED HEALTH CARE EDUCATION/TRAINING PROGRAM
Payer: MEDICARE

## 2023-10-16 ENCOUNTER — APPOINTMENT (EMERGENCY)
Dept: RADIOLOGY | Facility: HOSPITAL | Age: 68
DRG: 191 | End: 2023-10-16
Payer: MEDICARE

## 2023-10-16 DIAGNOSIS — J44.9 COPD (CHRONIC OBSTRUCTIVE PULMONARY DISEASE) (HCC): ICD-10-CM

## 2023-10-16 DIAGNOSIS — J44.1 COPD WITH ACUTE EXACERBATION (HCC): Primary | ICD-10-CM

## 2023-10-16 DIAGNOSIS — D64.9 ANEMIA: ICD-10-CM

## 2023-10-16 LAB
2HR DELTA HS TROPONIN: -2 NG/L
ALBUMIN SERPL BCP-MCNC: 4.2 G/DL (ref 3.5–5)
ALP SERPL-CCNC: 77 U/L (ref 34–104)
ALT SERPL W P-5'-P-CCNC: 13 U/L (ref 7–52)
ANION GAP SERPL CALCULATED.3IONS-SCNC: 13 MMOL/L
AST SERPL W P-5'-P-CCNC: 15 U/L (ref 13–39)
BASOPHILS # BLD MANUAL: 0 THOUSAND/UL (ref 0–0.1)
BASOPHILS NFR MAR MANUAL: 0 % (ref 0–1)
BILIRUB SERPL-MCNC: 0.44 MG/DL (ref 0.2–1)
BUN SERPL-MCNC: 23 MG/DL (ref 5–25)
BURR CELLS BLD QL SMEAR: PRESENT
CALCIUM SERPL-MCNC: 9.3 MG/DL (ref 8.4–10.2)
CARDIAC TROPONIN I PNL SERPL HS: 11 NG/L
CARDIAC TROPONIN I PNL SERPL HS: 13 NG/L
CHLORIDE SERPL-SCNC: 98 MMOL/L (ref 96–108)
CO2 SERPL-SCNC: 23 MMOL/L (ref 21–32)
CREAT SERPL-MCNC: 0.72 MG/DL (ref 0.6–1.3)
EOSINOPHIL # BLD MANUAL: 0 THOUSAND/UL (ref 0–0.4)
EOSINOPHIL NFR BLD MANUAL: 0 % (ref 0–6)
ERYTHROCYTE [DISTWIDTH] IN BLOOD BY AUTOMATED COUNT: 19 % (ref 11.6–15.1)
GFR SERPL CREATININE-BSD FRML MDRD: 95 ML/MIN/1.73SQ M
GIANT PLATELETS BLD QL SMEAR: PRESENT
GLUCOSE SERPL-MCNC: 254 MG/DL (ref 65–140)
GLUCOSE SERPL-MCNC: 262 MG/DL (ref 65–140)
HCT VFR BLD AUTO: 34.5 % (ref 36.5–49.3)
HGB BLD-MCNC: 9.8 G/DL (ref 12–17)
HYPERCHROMIA BLD QL SMEAR: PRESENT
LYMPHOCYTES # BLD AUTO: 0.19 THOUSAND/UL (ref 0.6–4.47)
LYMPHOCYTES # BLD AUTO: 2 % (ref 14–44)
MCH RBC QN AUTO: 21 PG (ref 26.8–34.3)
MCHC RBC AUTO-ENTMCNC: 28.4 G/DL (ref 31.4–37.4)
MCV RBC AUTO: 74 FL (ref 82–98)
MONOCYTES # BLD AUTO: 0 THOUSAND/UL (ref 0–1.22)
MONOCYTES NFR BLD: 0 % (ref 4–12)
NEUTROPHILS # BLD MANUAL: 9.12 THOUSAND/UL (ref 1.85–7.62)
NEUTS SEG NFR BLD AUTO: 98 % (ref 43–75)
OVALOCYTES BLD QL SMEAR: PRESENT
PLATELET # BLD AUTO: 659 THOUSANDS/UL (ref 149–390)
PLATELET BLD QL SMEAR: ABNORMAL
PMV BLD AUTO: 9.7 FL (ref 8.9–12.7)
POIKILOCYTOSIS BLD QL SMEAR: PRESENT
POLYCHROMASIA BLD QL SMEAR: PRESENT
POTASSIUM SERPL-SCNC: 4.7 MMOL/L (ref 3.5–5.3)
PROCALCITONIN SERPL-MCNC: <0.05 NG/ML
PROT SERPL-MCNC: 7.5 G/DL (ref 6.4–8.4)
RBC # BLD AUTO: 4.66 MILLION/UL (ref 3.88–5.62)
RBC MORPH BLD: PRESENT
SODIUM SERPL-SCNC: 134 MMOL/L (ref 135–147)
WBC # BLD AUTO: 9.31 THOUSAND/UL (ref 4.31–10.16)

## 2023-10-16 PROCEDURE — 85027 COMPLETE CBC AUTOMATED: CPT | Performed by: EMERGENCY MEDICINE

## 2023-10-16 PROCEDURE — 94760 N-INVAS EAR/PLS OXIMETRY 1: CPT

## 2023-10-16 PROCEDURE — 99285 EMERGENCY DEPT VISIT HI MDM: CPT | Performed by: EMERGENCY MEDICINE

## 2023-10-16 PROCEDURE — 94640 AIRWAY INHALATION TREATMENT: CPT

## 2023-10-16 PROCEDURE — 82948 REAGENT STRIP/BLOOD GLUCOSE: CPT

## 2023-10-16 PROCEDURE — 99285 EMERGENCY DEPT VISIT HI MDM: CPT

## 2023-10-16 PROCEDURE — 84145 PROCALCITONIN (PCT): CPT | Performed by: INTERNAL MEDICINE

## 2023-10-16 PROCEDURE — 80053 COMPREHEN METABOLIC PANEL: CPT | Performed by: EMERGENCY MEDICINE

## 2023-10-16 PROCEDURE — 71045 X-RAY EXAM CHEST 1 VIEW: CPT

## 2023-10-16 PROCEDURE — 36415 COLL VENOUS BLD VENIPUNCTURE: CPT

## 2023-10-16 PROCEDURE — 93005 ELECTROCARDIOGRAM TRACING: CPT

## 2023-10-16 PROCEDURE — 85007 BL SMEAR W/DIFF WBC COUNT: CPT | Performed by: EMERGENCY MEDICINE

## 2023-10-16 PROCEDURE — 84484 ASSAY OF TROPONIN QUANT: CPT | Performed by: EMERGENCY MEDICINE

## 2023-10-16 PROCEDURE — 96374 THER/PROPH/DIAG INJ IV PUSH: CPT

## 2023-10-16 RX ORDER — FLUTICASONE PROPIONATE 50 MCG
1 SPRAY, SUSPENSION (ML) NASAL 2 TIMES DAILY
Status: DISCONTINUED | OUTPATIENT
Start: 2023-10-17 | End: 2023-10-18 | Stop reason: HOSPADM

## 2023-10-16 RX ORDER — LEVALBUTEROL INHALATION SOLUTION 1.25 MG/3ML
1.25 SOLUTION RESPIRATORY (INHALATION)
Status: DISCONTINUED | OUTPATIENT
Start: 2023-10-16 | End: 2023-10-18 | Stop reason: HOSPADM

## 2023-10-16 RX ORDER — IPRATROPIUM BROMIDE AND ALBUTEROL SULFATE 2.5; .5 MG/3ML; MG/3ML
3 SOLUTION RESPIRATORY (INHALATION) ONCE
Status: DISCONTINUED | OUTPATIENT
Start: 2023-10-16 | End: 2023-10-16

## 2023-10-16 RX ORDER — INSULIN LISPRO 100 [IU]/ML
1-5 INJECTION, SOLUTION INTRAVENOUS; SUBCUTANEOUS
Status: DISCONTINUED | OUTPATIENT
Start: 2023-10-17 | End: 2023-10-18 | Stop reason: HOSPADM

## 2023-10-16 RX ORDER — DOXYCYCLINE HYCLATE 100 MG/1
100 CAPSULE ORAL EVERY 12 HOURS
Status: DISCONTINUED | OUTPATIENT
Start: 2023-10-16 | End: 2023-10-18 | Stop reason: HOSPADM

## 2023-10-16 RX ORDER — LANOLIN ALCOHOL/MO/W.PET/CERES
100 CREAM (GRAM) TOPICAL DAILY
Status: DISCONTINUED | OUTPATIENT
Start: 2023-10-17 | End: 2023-10-18 | Stop reason: HOSPADM

## 2023-10-16 RX ORDER — ASPIRIN 81 MG/1
81 TABLET, CHEWABLE ORAL DAILY
Status: DISCONTINUED | OUTPATIENT
Start: 2023-10-17 | End: 2023-10-18 | Stop reason: HOSPADM

## 2023-10-16 RX ORDER — METHYLPREDNISOLONE SODIUM SUCCINATE 125 MG/2ML
125 INJECTION, POWDER, LYOPHILIZED, FOR SOLUTION INTRAMUSCULAR; INTRAVENOUS ONCE
Status: COMPLETED | OUTPATIENT
Start: 2023-10-16 | End: 2023-10-16

## 2023-10-16 RX ORDER — ACETAMINOPHEN 325 MG/1
650 TABLET ORAL EVERY 6 HOURS PRN
Status: DISCONTINUED | OUTPATIENT
Start: 2023-10-16 | End: 2023-10-18 | Stop reason: HOSPADM

## 2023-10-16 RX ORDER — METHYLPREDNISOLONE SODIUM SUCCINATE 40 MG/ML
40 INJECTION, POWDER, LYOPHILIZED, FOR SOLUTION INTRAMUSCULAR; INTRAVENOUS EVERY 8 HOURS
Status: DISCONTINUED | OUTPATIENT
Start: 2023-10-17 | End: 2023-10-18 | Stop reason: HOSPADM

## 2023-10-16 RX ORDER — ATORVASTATIN CALCIUM 80 MG/1
80 TABLET, FILM COATED ORAL
Status: DISCONTINUED | OUTPATIENT
Start: 2023-10-17 | End: 2023-10-18 | Stop reason: HOSPADM

## 2023-10-16 RX ORDER — LANOLIN ALCOHOL/MO/W.PET/CERES
3 CREAM (GRAM) TOPICAL
Status: DISCONTINUED | OUTPATIENT
Start: 2023-10-16 | End: 2023-10-18 | Stop reason: HOSPADM

## 2023-10-16 RX ORDER — LEVALBUTEROL INHALATION SOLUTION 1.25 MG/3ML
SOLUTION RESPIRATORY (INHALATION)
Status: DISPENSED
Start: 2023-10-16 | End: 2023-10-17

## 2023-10-16 RX ORDER — ALBUTEROL SULFATE 2.5 MG/3ML
5 SOLUTION RESPIRATORY (INHALATION) ONCE
Status: COMPLETED | OUTPATIENT
Start: 2023-10-16 | End: 2023-10-16

## 2023-10-16 RX ORDER — LORAZEPAM 0.5 MG/1
0.5 TABLET ORAL EVERY 6 HOURS PRN
Status: DISCONTINUED | OUTPATIENT
Start: 2023-10-16 | End: 2023-10-18 | Stop reason: HOSPADM

## 2023-10-16 RX ORDER — SODIUM CHLORIDE FOR INHALATION 0.9 %
3 VIAL, NEBULIZER (ML) INHALATION
Status: DISCONTINUED | OUTPATIENT
Start: 2023-10-16 | End: 2023-10-16

## 2023-10-16 RX ORDER — PANTOPRAZOLE SODIUM 40 MG/1
40 TABLET, DELAYED RELEASE ORAL 2 TIMES DAILY
Status: DISCONTINUED | OUTPATIENT
Start: 2023-10-17 | End: 2023-10-18 | Stop reason: HOSPADM

## 2023-10-16 RX ORDER — BUDESONIDE 0.5 MG/2ML
0.5 INHALANT ORAL
Status: DISCONTINUED | OUTPATIENT
Start: 2023-10-16 | End: 2023-10-18 | Stop reason: HOSPADM

## 2023-10-16 RX ORDER — BUSPIRONE HYDROCHLORIDE 10 MG/1
10 TABLET ORAL 3 TIMES DAILY
Status: DISCONTINUED | OUTPATIENT
Start: 2023-10-16 | End: 2023-10-18 | Stop reason: HOSPADM

## 2023-10-16 RX ORDER — INSULIN LISPRO 100 [IU]/ML
1-5 INJECTION, SOLUTION INTRAVENOUS; SUBCUTANEOUS
Status: DISCONTINUED | OUTPATIENT
Start: 2023-10-16 | End: 2023-10-18 | Stop reason: HOSPADM

## 2023-10-16 RX ORDER — INSULIN GLARGINE 100 [IU]/ML
30 INJECTION, SOLUTION SUBCUTANEOUS
Status: DISCONTINUED | OUTPATIENT
Start: 2023-10-16 | End: 2023-10-18 | Stop reason: HOSPADM

## 2023-10-16 RX ORDER — HEPARIN SODIUM 5000 [USP'U]/ML
5000 INJECTION, SOLUTION INTRAVENOUS; SUBCUTANEOUS EVERY 8 HOURS SCHEDULED
Status: DISCONTINUED | OUTPATIENT
Start: 2023-10-16 | End: 2023-10-16

## 2023-10-16 RX ORDER — FOLIC ACID 1 MG/1
1 TABLET ORAL DAILY
Status: DISCONTINUED | OUTPATIENT
Start: 2023-10-17 | End: 2023-10-18 | Stop reason: HOSPADM

## 2023-10-16 RX ORDER — GUAIFENESIN 600 MG/1
600 TABLET, EXTENDED RELEASE ORAL 2 TIMES DAILY
Status: DISCONTINUED | OUTPATIENT
Start: 2023-10-17 | End: 2023-10-18 | Stop reason: HOSPADM

## 2023-10-16 RX ORDER — SODIUM CHLORIDE FOR INHALATION 0.9 %
3 VIAL, NEBULIZER (ML) INHALATION ONCE
Status: COMPLETED | OUTPATIENT
Start: 2023-10-16 | End: 2023-10-16

## 2023-10-16 RX ADMIN — ALBUTEROL SULFATE 10 MG: 2.5 SOLUTION RESPIRATORY (INHALATION) at 20:59

## 2023-10-16 RX ADMIN — DOXYCYCLINE 100 MG: 100 CAPSULE ORAL at 23:38

## 2023-10-16 RX ADMIN — LEVALBUTEROL HYDROCHLORIDE 1.25 MG: 1.25 SOLUTION RESPIRATORY (INHALATION) at 23:18

## 2023-10-16 RX ADMIN — METHYLPREDNISOLONE SODIUM SUCCINATE 125 MG: 125 INJECTION, POWDER, FOR SOLUTION INTRAMUSCULAR; INTRAVENOUS at 21:26

## 2023-10-16 RX ADMIN — IPRATROPIUM BROMIDE 1 MG: 0.5 SOLUTION RESPIRATORY (INHALATION) at 20:59

## 2023-10-16 RX ADMIN — ISODIUM CHLORIDE 3 ML: 0.03 SOLUTION RESPIRATORY (INHALATION) at 20:59

## 2023-10-16 RX ADMIN — IPRATROPIUM BROMIDE 0.5 MG: 0.5 SOLUTION RESPIRATORY (INHALATION) at 22:06

## 2023-10-16 RX ADMIN — IPRATROPIUM BROMIDE 0.5 MG: 0.5 SOLUTION RESPIRATORY (INHALATION) at 23:19

## 2023-10-16 RX ADMIN — ALBUTEROL SULFATE 5 MG: 2.5 SOLUTION RESPIRATORY (INHALATION) at 22:07

## 2023-10-17 PROBLEM — F10.11 HISTORY OF ALCOHOL ABUSE: Status: ACTIVE | Noted: 2023-10-17

## 2023-10-17 PROBLEM — D64.9 ANEMIA: Status: ACTIVE | Noted: 2023-10-17

## 2023-10-17 LAB
ALBUMIN SERPL BCP-MCNC: 4 G/DL (ref 3.5–5)
ALP SERPL-CCNC: 66 U/L (ref 34–104)
ALT SERPL W P-5'-P-CCNC: 13 U/L (ref 7–52)
ANION GAP SERPL CALCULATED.3IONS-SCNC: 10 MMOL/L
APTT PPP: 30 SECONDS (ref 23–37)
AST SERPL W P-5'-P-CCNC: 13 U/L (ref 13–39)
ATRIAL RATE: 112 BPM
BASOPHILS # BLD AUTO: 0 THOUSANDS/ÂΜL (ref 0–0.1)
BASOPHILS NFR BLD AUTO: 0 % (ref 0–1)
BILIRUB SERPL-MCNC: 0.38 MG/DL (ref 0.2–1)
BUN SERPL-MCNC: 19 MG/DL (ref 5–25)
CALCIUM SERPL-MCNC: 9 MG/DL (ref 8.4–10.2)
CHLORIDE SERPL-SCNC: 100 MMOL/L (ref 96–108)
CO2 SERPL-SCNC: 24 MMOL/L (ref 21–32)
CREAT SERPL-MCNC: 0.43 MG/DL (ref 0.6–1.3)
EOSINOPHIL # BLD AUTO: 0 THOUSAND/ÂΜL (ref 0–0.61)
EOSINOPHIL NFR BLD AUTO: 0 % (ref 0–6)
ERYTHROCYTE [DISTWIDTH] IN BLOOD BY AUTOMATED COUNT: 19 % (ref 11.6–15.1)
GFR SERPL CREATININE-BSD FRML MDRD: 117 ML/MIN/1.73SQ M
GLUCOSE SERPL-MCNC: 164 MG/DL (ref 65–140)
GLUCOSE SERPL-MCNC: 186 MG/DL (ref 65–140)
GLUCOSE SERPL-MCNC: 196 MG/DL (ref 65–140)
GLUCOSE SERPL-MCNC: 217 MG/DL (ref 65–140)
GLUCOSE SERPL-MCNC: 252 MG/DL (ref 65–140)
GLUCOSE SERPL-MCNC: 291 MG/DL (ref 65–140)
GLUCOSE SERPL-MCNC: 333 MG/DL (ref 65–140)
HCT VFR BLD AUTO: 30.3 % (ref 36.5–49.3)
HGB BLD-MCNC: 8.8 G/DL (ref 12–17)
IMM GRANULOCYTES # BLD AUTO: 0.05 THOUSAND/UL (ref 0–0.2)
IMM GRANULOCYTES NFR BLD AUTO: 1 % (ref 0–2)
INR PPP: 1.06 (ref 0.84–1.19)
LYMPHOCYTES # BLD AUTO: 0.66 THOUSANDS/ÂΜL (ref 0.6–4.47)
LYMPHOCYTES NFR BLD AUTO: 11 % (ref 14–44)
MAGNESIUM SERPL-MCNC: 2 MG/DL (ref 1.9–2.7)
MCH RBC QN AUTO: 21.6 PG (ref 26.8–34.3)
MCHC RBC AUTO-ENTMCNC: 29 G/DL (ref 31.4–37.4)
MCV RBC AUTO: 74 FL (ref 82–98)
MONOCYTES # BLD AUTO: 0.1 THOUSAND/ÂΜL (ref 0.17–1.22)
MONOCYTES NFR BLD AUTO: 2 % (ref 4–12)
NEUTROPHILS # BLD AUTO: 4.96 THOUSANDS/ÂΜL (ref 1.85–7.62)
NEUTS SEG NFR BLD AUTO: 86 % (ref 43–75)
NRBC BLD AUTO-RTO: 0 /100 WBCS
P AXIS: 54 DEGREES
PLATELET # BLD AUTO: 588 THOUSANDS/UL (ref 149–390)
PMV BLD AUTO: 10.2 FL (ref 8.9–12.7)
POTASSIUM SERPL-SCNC: 4.1 MMOL/L (ref 3.5–5.3)
PR INTERVAL: 184 MS
PROCALCITONIN SERPL-MCNC: <0.05 NG/ML
PROT SERPL-MCNC: 6.9 G/DL (ref 6.4–8.4)
PROTHROMBIN TIME: 13.7 SECONDS (ref 11.6–14.5)
QRS AXIS: 122 DEGREES
QRSD INTERVAL: 110 MS
QT INTERVAL: 338 MS
QTC INTERVAL: 461 MS
RBC # BLD AUTO: 4.07 MILLION/UL (ref 3.88–5.62)
SODIUM SERPL-SCNC: 134 MMOL/L (ref 135–147)
T WAVE AXIS: -25 DEGREES
VENTRICULAR RATE: 112 BPM
WBC # BLD AUTO: 5.77 THOUSAND/UL (ref 4.31–10.16)

## 2023-10-17 PROCEDURE — 85025 COMPLETE CBC W/AUTO DIFF WBC: CPT | Performed by: INTERNAL MEDICINE

## 2023-10-17 PROCEDURE — 82948 REAGENT STRIP/BLOOD GLUCOSE: CPT

## 2023-10-17 PROCEDURE — 94664 DEMO&/EVAL PT USE INHALER: CPT

## 2023-10-17 PROCEDURE — 94640 AIRWAY INHALATION TREATMENT: CPT

## 2023-10-17 PROCEDURE — 93010 ELECTROCARDIOGRAM REPORT: CPT | Performed by: INTERNAL MEDICINE

## 2023-10-17 PROCEDURE — 94760 N-INVAS EAR/PLS OXIMETRY 1: CPT

## 2023-10-17 PROCEDURE — 85610 PROTHROMBIN TIME: CPT | Performed by: INTERNAL MEDICINE

## 2023-10-17 PROCEDURE — 84145 PROCALCITONIN (PCT): CPT | Performed by: INTERNAL MEDICINE

## 2023-10-17 PROCEDURE — 99232 SBSQ HOSP IP/OBS MODERATE 35: CPT | Performed by: STUDENT IN AN ORGANIZED HEALTH CARE EDUCATION/TRAINING PROGRAM

## 2023-10-17 PROCEDURE — 85730 THROMBOPLASTIN TIME PARTIAL: CPT | Performed by: INTERNAL MEDICINE

## 2023-10-17 PROCEDURE — 80053 COMPREHEN METABOLIC PANEL: CPT | Performed by: INTERNAL MEDICINE

## 2023-10-17 PROCEDURE — 83735 ASSAY OF MAGNESIUM: CPT | Performed by: INTERNAL MEDICINE

## 2023-10-17 PROCEDURE — 99223 1ST HOSP IP/OBS HIGH 75: CPT | Performed by: INTERNAL MEDICINE

## 2023-10-17 RX ORDER — ALBUTEROL SULFATE 90 UG/1
2 AEROSOL, METERED RESPIRATORY (INHALATION) EVERY 4 HOURS PRN
Status: DISCONTINUED | OUTPATIENT
Start: 2023-10-17 | End: 2023-10-18 | Stop reason: HOSPADM

## 2023-10-17 RX ORDER — HEPARIN SODIUM 5000 [USP'U]/ML
5000 INJECTION, SOLUTION INTRAVENOUS; SUBCUTANEOUS EVERY 8 HOURS SCHEDULED
Status: DISCONTINUED | OUTPATIENT
Start: 2023-10-17 | End: 2023-10-18 | Stop reason: HOSPADM

## 2023-10-17 RX ADMIN — BUSPIRONE HYDROCHLORIDE 10 MG: 10 TABLET ORAL at 20:07

## 2023-10-17 RX ADMIN — FOLIC ACID 1 MG: 1 TABLET ORAL at 08:59

## 2023-10-17 RX ADMIN — IPRATROPIUM BROMIDE 0.5 MG: 0.5 SOLUTION RESPIRATORY (INHALATION) at 13:44

## 2023-10-17 RX ADMIN — METHYLPREDNISOLONE SODIUM SUCCINATE 40 MG: 40 INJECTION, POWDER, FOR SOLUTION INTRAMUSCULAR; INTRAVENOUS at 20:07

## 2023-10-17 RX ADMIN — PANTOPRAZOLE SODIUM 40 MG: 40 TABLET, DELAYED RELEASE ORAL at 17:10

## 2023-10-17 RX ADMIN — METHYLPREDNISOLONE SODIUM SUCCINATE 40 MG: 40 INJECTION, POWDER, FOR SOLUTION INTRAMUSCULAR; INTRAVENOUS at 05:36

## 2023-10-17 RX ADMIN — INSULIN LISPRO 2 UNITS: 100 INJECTION, SOLUTION INTRAVENOUS; SUBCUTANEOUS at 15:40

## 2023-10-17 RX ADMIN — THIAMINE HCL TAB 100 MG 100 MG: 100 TAB at 08:59

## 2023-10-17 RX ADMIN — UMECLIDINIUM 1 PUFF: 62.5 AEROSOL, POWDER ORAL at 09:02

## 2023-10-17 RX ADMIN — FLUTICASONE PROPIONATE 1 SPRAY: 50 SPRAY, METERED NASAL at 20:09

## 2023-10-17 RX ADMIN — LEVALBUTEROL HYDROCHLORIDE 1.25 MG: 1.25 SOLUTION RESPIRATORY (INHALATION) at 07:14

## 2023-10-17 RX ADMIN — GUAIFENESIN 600 MG: 600 TABLET, EXTENDED RELEASE ORAL at 20:07

## 2023-10-17 RX ADMIN — BUDESONIDE 0.5 MG: 0.5 INHALANT RESPIRATORY (INHALATION) at 07:14

## 2023-10-17 RX ADMIN — IPRATROPIUM BROMIDE 0.5 MG: 0.5 SOLUTION RESPIRATORY (INHALATION) at 07:13

## 2023-10-17 RX ADMIN — ATORVASTATIN CALCIUM 80 MG: 80 TABLET, FILM COATED ORAL at 15:36

## 2023-10-17 RX ADMIN — INSULIN GLARGINE 30 UNITS: 100 INJECTION, SOLUTION SUBCUTANEOUS at 21:23

## 2023-10-17 RX ADMIN — BUSPIRONE HYDROCHLORIDE 10 MG: 10 TABLET ORAL at 15:36

## 2023-10-17 RX ADMIN — BUSPIRONE HYDROCHLORIDE 10 MG: 10 TABLET ORAL at 00:54

## 2023-10-17 RX ADMIN — HEPARIN SODIUM 5000 UNITS: 5000 INJECTION INTRAVENOUS; SUBCUTANEOUS at 14:07

## 2023-10-17 RX ADMIN — HEPARIN SODIUM 5000 UNITS: 5000 INJECTION INTRAVENOUS; SUBCUTANEOUS at 20:07

## 2023-10-17 RX ADMIN — DOXYCYCLINE 100 MG: 100 CAPSULE ORAL at 11:21

## 2023-10-17 RX ADMIN — PANTOPRAZOLE SODIUM 40 MG: 40 TABLET, DELAYED RELEASE ORAL at 05:36

## 2023-10-17 RX ADMIN — LEVALBUTEROL HYDROCHLORIDE 1.25 MG: 1.25 SOLUTION RESPIRATORY (INHALATION) at 13:44

## 2023-10-17 RX ADMIN — GUAIFENESIN 600 MG: 600 TABLET, EXTENDED RELEASE ORAL at 08:59

## 2023-10-17 RX ADMIN — DOXYCYCLINE 100 MG: 100 CAPSULE ORAL at 22:38

## 2023-10-17 RX ADMIN — ASPIRIN 81 MG CHEWABLE TABLET 81 MG: 81 TABLET CHEWABLE at 08:59

## 2023-10-17 RX ADMIN — INSULIN LISPRO 1 UNITS: 100 INJECTION, SOLUTION INTRAVENOUS; SUBCUTANEOUS at 09:00

## 2023-10-17 RX ADMIN — INSULIN LISPRO 1 UNITS: 100 INJECTION, SOLUTION INTRAVENOUS; SUBCUTANEOUS at 11:24

## 2023-10-17 RX ADMIN — BUSPIRONE HYDROCHLORIDE 10 MG: 10 TABLET ORAL at 08:59

## 2023-10-17 RX ADMIN — LEVALBUTEROL HYDROCHLORIDE 1.25 MG: 1.25 SOLUTION RESPIRATORY (INHALATION) at 19:43

## 2023-10-17 RX ADMIN — LORAZEPAM 0.5 MG: 0.5 TABLET ORAL at 15:36

## 2023-10-17 RX ADMIN — IPRATROPIUM BROMIDE 0.5 MG: 0.5 SOLUTION RESPIRATORY (INHALATION) at 19:43

## 2023-10-17 RX ADMIN — INSULIN GLARGINE 30 UNITS: 100 INJECTION, SOLUTION SUBCUTANEOUS at 01:04

## 2023-10-17 RX ADMIN — METHYLPREDNISOLONE SODIUM SUCCINATE 40 MG: 40 INJECTION, POWDER, FOR SOLUTION INTRAMUSCULAR; INTRAVENOUS at 14:07

## 2023-10-17 RX ADMIN — BUDESONIDE 0.5 MG: 0.5 INHALANT RESPIRATORY (INHALATION) at 19:43

## 2023-10-17 RX ADMIN — INSULIN LISPRO 2 UNITS: 100 INJECTION, SOLUTION INTRAVENOUS; SUBCUTANEOUS at 21:22

## 2023-10-17 RX ADMIN — INSULIN LISPRO 3 UNITS: 100 INJECTION, SOLUTION INTRAVENOUS; SUBCUTANEOUS at 02:03

## 2023-10-17 RX ADMIN — ACETAMINOPHEN 650 MG: 325 TABLET, FILM COATED ORAL at 15:36

## 2023-10-17 NOTE — ASSESSMENT & PLAN NOTE
Reports hx of alcohol abuse in the past but reports that he stopped drinking alcohol about two years ago   Anemia may be more consistent with iron deficiency given low MCV

## 2023-10-17 NOTE — ED ATTENDING ATTESTATION
10/16/2023  I, Rebeca Mcnair MD, saw and evaluated the patient. I have discussed the patient with the resident/non-physician practitioner and agree with the resident's/non-physician practitioner's findings, Plan of Care, and MDM as documented in the resident's/non-physician practitioner's note, except where noted. All available labs and Radiology studies were reviewed. I was present for key portions of any procedure(s) performed by the resident/non-physician practitioner and I was immediately available to provide assistance. At this point I agree with the current assessment done in the Emergency Department. I have conducted an independent evaluation of this patient a history and physical is as follows:    50-year-old male with history of COPD with 3 ED visits in the past week for COPD exacerbation including 1 today presenting with ongoing difficulty breathing and wheezing. On exam patient awake and alert, uncomfortable appearing with increased work of breathing. There is decreased air movement in all lung fields. I did not hear any wheezing, however as noted there was poor air movement. Heart regular rate and rhythm, no murmurs rubs or gallops. Skin warm and dry. No extremity swelling or edema. After initial DuoNeb patient had some relief with a little bit of increased air movement, no wheezes heard. However he is still feeling short of breath we will start another DuoNeb. Steroids initiated. Discussed with patient, and given multiple recent visits with ongoing problems, we will plan admission.     ED Course         Critical Care Time  Procedures

## 2023-10-17 NOTE — ASSESSMENT & PLAN NOTE
Reports hx of alcohol abuse in the past but reports that he stopped drinking alcohol about two years ago

## 2023-10-17 NOTE — ED PROVIDER NOTES
History  Chief Complaint   Patient presents with    Shortness of Breath     Patient reports becoming increasingly short of breath and weak for about an hour. Patient recently at [facility] for same symptoms and was released after a breathing treatment. Patient visibly tachypneic at rest.      65-year-old man with relevant past medical history of COPD on 2 L of oxygen as needed presents with dyspnea. Patient reports for last couple days worsening dyspnea at rest.  This feels similar to his prior COPD exacerbations. He is not having any chest pain. He is had no cough or fever. He was seen on the 12th here for the same. He was also seen earlier today at [facility] for the same. He reported improvement with breathing treatments but when he goes home his dyspnea returns. He tried a nebulizer treatment once after he left [facility] earlier but continues to be dyspneic. He was given 40 mg prednisone at [facility]. Prior to Admission Medications   Prescriptions Last Dose Informant Patient Reported? Taking?    B Complex Vitamins (VITAMIN B COMPLEX 100 IJ)   Yes No   Sig: Take 1 tablet by mouth daily   Insulin Glargine (BASAGLAR KWIKPEN SC)   Yes No   Sig: Inject 30 Units under the skin daily at bedtime   Insulin Lispro (HUMALOG KWIKPEN SC)   Yes No   Sig: Inject 2 Units under the skin 3 (three) times a day with meals   LORazepam (Ativan) 0.5 mg tablet   Yes No   Sig: Take by mouth every 6 (six) hours as needed for anxiety    Multiple Vitamin (MULTIVITAMIN) tablet  Self Yes No   Sig: Take 1 tablet by mouth daily   albuterol (Proventil HFA) 90 mcg/act inhaler   No No   Sig: Inhale 2 puffs every 6 (six) hours as needed for wheezing   aspirin 81 mg chewable tablet   Yes No   Sig: Chew 81 mg daily   atorvastatin (LIPITOR) 80 mg tablet   No No   Sig: Take 1 tablet (80 mg total) by mouth daily with dinner   betamethasone valerate (VALISONE) 0.1 % cream   Yes No   Sig: Apply topically 2 (two) times a day budesonide (PULMICORT) 0.5 mg/2 mL nebulizer solution   No No   Sig: Take 2 mL (0.5 mg total) by nebulization every 12 (twelve) hours Rinse mouth after use. busPIRone (BUSPAR) 10 mg tablet   No No   Sig: Take 1 tablet (10 mg total) by mouth 3 (three) times a day   fluticasone (FLONASE) 50 mcg/act nasal spray   Yes No   Si spray into each nostril 2 (two) times a day   fluticasone-salmeterol (ADVAIR) 500-50 mcg/dose inhaler   Yes No   Sig: Inhale 1 puff 2 (two) times a day Rinse mouth after use.    folic acid (FOLVITE) 1 mg tablet   No No   Sig: Take 1 tablet (1 mg total) by mouth daily   glipiZIDE (GLUCOTROL) 5 mg tablet   Yes No   Sig: Take 5 mg by mouth in the morning   ipratropium-albuterol (COMBIVENT)  mcg/act inhaler   Yes No   Sig: Inhale   lisinopril (ZESTRIL) 40 mg tablet   Yes No   Sig: Take 40 mg by mouth daily    melatonin 3 mg   No No   Sig: Take 1 tablet (3 mg total) by mouth daily at bedtime as needed (30)   metFORMIN (GLUCOPHAGE) 1000 MG tablet   Yes No   Sig: Take 1 tablet by mouth every 12 (twelve) hours   pantoprazole (Protonix) 40 mg tablet   No No   Sig: Take 1 tablet (40 mg total) by mouth 2 (two) times a day   predniSONE 20 mg tablet   No No   Sig: Take 1 tablet (20 mg total) by mouth 2 (two) times a day with meals for 5 days   senna (SENOKOT) 8.6 mg   No No   Sig: Take 2 tablets (17.2 mg total) by mouth daily   thiamine 100 MG tablet   No No   Sig: Take 1 tablet (100 mg total) by mouth daily   tiotropium (SPIRIVA) 18 mcg inhalation capsule  Pharmacy (Specify) Yes No   Sig: Place 18 mcg into inhaler and inhale daily      Facility-Administered Medications: None       Past Medical History:   Diagnosis Date    Cardiac arrest (HCC)     COPD (chronic obstructive pulmonary disease) (HCC)     CVA (cerebral vascular accident) ([facility])     Diabetes mellitus ([facility])     Heart attack ([facility])     Hypertension     Stroke [facility])        Past Surgical History:   Procedure Laterality Date    CERVICAL FUSION N/A 9/10/2018    Procedure: Posterior cervical decompressive laminectomy C3-6; Posterior cervical lateral mass and pedicle fixation fusion C1-T1;  Surgeon: Madina Lee MD;  Location: [facility];  Service: Neurosurgery       Family History   Problem Relation Age of Onset    Heart attack Mother      I have reviewed and agree with the history as documented. E-Cigarette/Vaping    E-Cigarette Use Never User      E-Cigarette/Vaping Substances    Nicotine No     THC No     CBD No     Flavoring No     Other No     Unknown No      Social History     Tobacco Use    Smoking status: Former     Types: Cigarettes    Smokeless tobacco: Never    Tobacco comments:     quit 5 months ago    Vaping Use    Vaping Use: Never used   Substance Use Topics    Alcohol use: Not Currently     Alcohol/week: 8.0 - 12.0 standard drinks of alcohol     Types: 8 - 12 Cans of beer per week     Comment: every day drinker    Drug use: Never        Review of Systems   Constitutional:  Negative for chills and fever. HENT:  Negative for ear pain and sore throat. Eyes:  Negative for pain and visual disturbance. Respiratory:  Positive for shortness of breath. Negative for cough. Cardiovascular:  Negative for chest pain and palpitations. Gastrointestinal:  Negative for abdominal pain, constipation, diarrhea and vomiting. Genitourinary:  Negative for dysuria and hematuria. Musculoskeletal:  Negative for arthralgias and back pain. Skin:  Negative for color change and rash. Neurological:  Negative for seizures, syncope and light-headedness. Psychiatric/Behavioral:  Negative for agitation and confusion.         Physical Exam  ED Triage Vitals [10/16/23 2046]   Temperature Pulse Respirations Blood Pressure SpO2   98.1 °F (36.7 °C) (!) 111 (!) 26 164/78 94 %      Temp Source Heart Rate Source Patient Position - Orthostatic VS BP Location FiO2 (%)   Temporal Monitor Sitting Right arm --      Pain Score       --             Orthostatic Vital Signs  Vitals:    10/16/23 2046 10/16/23 2115 10/16/23 2130 10/16/23 2215   BP: 164/78 127/67 141/60 120/62   Pulse: (!) 111 98 90 98   Patient Position - Orthostatic VS: Sitting   Lying       Physical Exam  Vitals and nursing note reviewed. Constitutional:       General: He is not in acute distress. Appearance: Normal appearance. He is well-developed. HENT:      Head: Normocephalic and atraumatic. Right Ear: External ear normal.      Left Ear: External ear normal.   Cardiovascular:      Rate and Rhythm: Normal rate and regular rhythm. Pulmonary:      Effort: Pulmonary effort is normal. No respiratory distress. Breath sounds: Examination of the right-upper field reveals decreased breath sounds and wheezing. Examination of the left-upper field reveals decreased breath sounds and wheezing. Examination of the right-middle field reveals decreased breath sounds and wheezing. Examination of the left-middle field reveals decreased breath sounds and wheezing. Examination of the right-lower field reveals decreased breath sounds and wheezing. Examination of the left-lower field reveals wheezing. Decreased breath sounds and wheezing present. Abdominal:      Palpations: Abdomen is soft. Tenderness: There is no abdominal tenderness. There is no guarding or rebound. Musculoskeletal:         General: Normal range of motion. Cervical back: Normal range of motion and neck supple. Skin:     General: Skin is warm and dry. Neurological:      Mental Status: He is alert and oriented to person, place, and time. Mental status is at baseline.    Psychiatric:         Mood and Affect: Mood normal.         Behavior: Behavior normal.         ED Medications  Medications   aspirin chewable tablet 81 mg (has no administration in time range)   atorvastatin (LIPITOR) tablet 80 mg (has no administration in time range)   budesonide (PULMICORT) inhalation solution 0.5 mg ( Nebulization Canceled Entry 10/16/23 2319)   busPIRone (BUSPAR) tablet 10 mg (has no administration in time range)   fluticasone (FLONASE) 50 mcg/act nasal spray 1 spray (has no administration in time range)   folic acid (FOLVITE) tablet 1 mg (has no administration in time range)   insulin glargine (LANTUS) subcutaneous injection 30 Units 0.3 mL (has no administration in time range)   melatonin tablet 3 mg (has no administration in time range)   pantoprazole (PROTONIX) EC tablet 40 mg (has no administration in time range)   LORazepam (ATIVAN) tablet 0.5 mg (has no administration in time range)   thiamine tablet 100 mg (has no administration in time range)   umeclidinium 62.5 mcg/actuation inhaler AEPB 1 puff (has no administration in time range)   acetaminophen (TYLENOL) tablet 650 mg (has no administration in time range)   guaiFENesin (MUCINEX) 12 hr tablet 600 mg (has no administration in time range)   methylPREDNISolone sodium succinate (Solu-MEDROL) injection 40 mg (has no administration in time range)   doxycycline hyclate (VIBRAMYCIN) capsule 100 mg (100 mg Oral Given 10/16/23 2338)   levalbuterol (XOPENEX) inhalation solution 1.25 mg (1.25 mg Nebulization Given 10/16/23 2318)   insulin lispro (HumaLOG) 100 units/mL subcutaneous injection 1-5 Units (has no administration in time range)   insulin lispro (HumaLOG) 100 units/mL subcutaneous injection 1-5 Units (has no administration in time range)   levalbuterol (XOPENEX) 1.25 mg/3 mL inhalation solution **ADS Override Pull** (  Canceled Entry 10/16/23 2323)   ipratropium (ATROVENT) 0.02 % inhalation solution **ADS Override Pull** (  Canceled Entry 10/16/23 2322)   ipratropium (ATROVENT) 0.02 % inhalation solution 0.5 mg (has no administration in time range)   albuterol inhalation solution 10 mg (10 mg Nebulization Given 10/16/23 2059)     And   ipratropium (ATROVENT) 0.02 % inhalation solution 1 mg (1 mg Nebulization Given 10/16/23 2059)     And   sodium chloride 0.9 % inhalation solution 3 mL (3 mL Nebulization Given 10/16/23 2059)   methylPREDNISolone sodium succinate (Solu-MEDROL) injection 125 mg (125 mg Intravenous Given 10/16/23 2126)   ipratropium (ATROVENT) 0.02 % inhalation solution 0.5 mg (0.5 mg Nebulization Given 10/16/23 2206)   albuterol inhalation solution 5 mg (5 mg Nebulization Given 10/16/23 2207)       Diagnostic Studies  Results Reviewed       Procedure Component Value Units Date/Time    Fingerstick Glucose (POCT) [[id number]]  (Abnormal) Collected: 10/16/23 2328    Lab Status: Final result Updated: 10/16/23 2329     POC Glucose 254 mg/dl     HS Troponin I 4hr [[id number]]     Lab Status: No result Specimen: Blood     Procalcitonin [[id number]] Collected: 10/16/23 2254    Lab Status: In process Specimen: Blood from Arm, Left Updated: 10/16/23 2302    HS Troponin I 2hr [[id number]] Collected: 10/16/23 2254    Lab Status: In process Specimen: Blood from Arm, Left Updated: 10/16/23 2302    RBC Morphology Reflex Test [[id number]] Collected: 10/16/23 2103    Lab Status: Final result Specimen: Blood from Arm, Left Updated: 10/16/23 2201    CBC and differential [[id number]]  (Abnormal) Collected: 10/16/23 2103    Lab Status: Final result Specimen: Blood from Arm, Left Updated: 10/16/23 2156     WBC 9.31 Thousand/uL      RBC 4.66 Million/uL      Hemoglobin 9.8 g/dL      Hematocrit 34.5 %      MCV 74 fL      MCH 21.0 pg      MCHC 28.4 g/dL      RDW 19.0 %      MPV 9.7 fL      Platelets 206 Thousands/uL     Narrative: This is an appended report. These results have been appended to a previously verified report.     Manual Differential(PHLEBS Do Not Order) [[id number]]  (Abnormal) Collected: 10/16/23 2103    Lab Status: Final result Specimen: Blood from Arm, Left Updated: 10/16/23 2156     Segmented % 98 %      Lymphocytes % 2 %      Monocytes % 0 %      Eosinophils, % 0 %      Basophils % 0 %      Absolute Neutrophils 9.12 Thousand/uL      Lymphocytes Absolute 0.19 Thousand/uL      Monocytes Absolute 0.00 Thousand/uL      Eosinophils Absolute 0.00 Thousand/uL      Basophils Absolute 0.00 Thousand/uL      Total Counted --     RBC Morphology Present     Platelet Estimate Increased     Giant PLTs Present     [place] Cells Present     Hypochromia Present     Ovalocytes Present     Poikilocytes Present     Polychromasia Present    HS Troponin 0hr (reflex protocol) [[id number]]  (Normal) Collected: 10/16/23 2103    Lab Status: Final result Specimen: Blood from Arm, Left Updated: 10/16/23 2132     hs TnI 0hr 13 ng/L     Comprehensive metabolic panel [[id number]]  (Abnormal) Collected: 10/16/23 2103    Lab Status: Final result Specimen: Blood from Arm, Left Updated: 10/16/23 2124     Sodium 134 mmol/L      Potassium 4.7 mmol/L      Chloride 98 mmol/L      CO2 23 mmol/L      ANION GAP 13 mmol/L      BUN 23 mg/dL      Creatinine 0.72 mg/dL      Glucose 262 mg/dL      Calcium 9.3 mg/dL      AST 15 U/L      ALT 13 U/L      Alkaline Phosphatase 77 U/L      Total Protein 7.5 g/dL      Albumin 4.2 g/dL      Total Bilirubin 0.44 mg/dL      eGFR 95 ml/min/1.73sq m     Narrative:      [place] guidelines for Chronic Kidney Disease (CKD):     Stage 1 with normal or high GFR (GFR > 90 mL/min/1.73 square meters)    Stage 2 Mild CKD (GFR = 60-89 mL/min/1.73 square meters)    Stage 3A Moderate CKD (GFR = 45-59 mL/min/1.73 square meters)    Stage 3B Moderate CKD (GFR = 30-44 mL/min/1.73 square meters)    Stage 4 Severe CKD (GFR = 15-29 mL/min/1.73 square meters)    Stage 5 End Stage CKD (GFR <15 mL/min/1.73 square meters)  Note: GFR calculation is accurate only with a steady state creatinine                   XR chest 1 view portable   ED Interpretation by Gee Bacon MD (10/16 2874)   Chest radiograph personally interpreted by me as no acute cardiopulmonary disease. Right lung scarring similar to prior.             Procedures  Procedures      ED Course         Medical Decision Making  Presents with shortness of breath. Patient states this feels like his prior COPD exacerbations. He has decreased breath sounds bilaterally with wheezing. Feliciano nebulizer and methylprednisone ordered immediately. He did have improvement of his respiratory symptoms with the feliciano neb but did require additional nebulizers. Given his continual return visits for COPD exacerbation, will admit him to medicine for inpatient management. Patient in agreement with plan and questions were answered. Portions or all of this note were generated using voice recognition software. Occasional wrong word or "sound a like" substitutions may have occurred due to the inherent limitations of voice recognition software. Please interpret any errors within the intended context of the whole sentence or idea. Amount and/or Complexity of Data Reviewed  Labs: ordered. Radiology: ordered. Risk  Prescription drug management. Decision regarding hospitalization. Disposition  Final diagnoses:   COPD with acute exacerbation ([address])     Time reflects when diagnosis was documented in both MDM as applicable and the Disposition within this note       Time User Action Codes Description Comment    10/16/2023 10:44 PM Selene Engle [J44.1] COPD with acute exacerbation [facility])           ED Disposition       ED Disposition   Admit    Condition   Stable    Date/Time   Mon Oct 16, 2023 10:44 PM    Comment   Case was discussed with MATY and the patient's admission status was agreed to be Admission Status: observation status to the service of Dr. Veronica Marx . Follow-up Information    None         Patient's Medications   Discharge Prescriptions    No medications on file     No discharge procedures on file. PDMP Review       None             ED Provider  Attending physically available and evaluated Frances Calvo. I managed the patient along with the ED Attending.     Electronically Signed by           Bill Dsouza MD  10/16/23 6623

## 2023-10-17 NOTE — ASSESSMENT & PLAN NOTE
SOB, Wheezing on Exam   CXR without obvious infiltrate or effusion   Lungs with decreased air movement and expiratory wheezing   Previous admissions for COPD   Hx chronic hypoxemic resp fx and on baseline 2L at rest   Nursing improvement today however discussed with patient and given failure of outpatient medical therapy, keep overnight and plan for discharge in the morning

## 2023-10-17 NOTE — CASE MANAGEMENT
Case Management Assessment & Discharge Planning Note    Patient name Lamont Worthington  Location 2 210/CW2 [id number] MRN [id number]  : 1955 Date 10/17/2023       Current Admission Date: 10/16/2023  Current Admission Diagnosis:Chronic obstructive pulmonary disease with acute exacerbation [facility])   Patient Active Problem List    Diagnosis Date Noted    History of alcohol abuse 10/17/2023    Anemia 10/17/2023    SIRS (systemic inflammatory response syndrome) ([address]) 2023    Hypertension 2023    COPD (chronic obstructive pulmonary disease) ([address]) 2023    Anxiety 2023    Chronic respiratory failure ([address]) 2023    Stage 3 severe COPD by GOLD classification ([address]) 2023    Oral abscess 2022    Tooth fracture 2022    H/O ETOH abuse 2022    Closed nondisplaced fracture of posterior arch of first cervical vertebra ([address]) 2022    Fall 2022    Diabetic polyneuropathy associated with type 2 diabetes mellitus ([address]) 2021    Hammertoe, bilateral 2021    Post-traumatic arthritis of left foot 2021    Pain due to onychomycosis of toenail 2021    Bronchitis 2020    KLARISSA (obstructive sleep apnea) 2020    Dirty living conditions 2020    Closed fracture of first cervical vertebra ([address]) 2019    ETOH abuse 2019    Chronic obstructive pulmonary disease with acute exacerbation ([address]) 2018    At moderate risk for venous thromboembolism (VTE) 2018    S/P C1-T1 Posterior Cervical Discectomy and Fusion on 9/10/18 2018    Acute blood loss anemia 2018    Type 2 diabetes mellitus with hyperglycemia, without long-term current use of insulin ([address]) 2018    Closed odontoid fracture with routine healing 2018    Closed displaced fracture of third cervical vertebra ([address]) 2018    Closed displaced fracture of fourth cervical vertebra ([address]) 2018    Alcohol abuse 2018    CAD (coronary artery disease) 09/09/2018    Dens fracture ([address]) 09/09/2018      LOS (days): 0  Geometric Mean LOS (GMLOS) (days): 2.70  Days to GMLOS:2.6     OBJECTIVE:    Risk of Unplanned Readmission Score: 25.99         Current admission status: Inpatient       Preferred Pharmacy:   [address] [place], [place] - [address] [facility]  [address] [facility] [address]  Phone: [phone] Fax: [address] [place], [place] - [address] [facility] [address] [facility] [address] [facility]  Phone: [phone] Fax: [phone]    Primary Care Provider: TATIANA Olguin    Primary Insurance: MEDICARE  Secondary Insurance: BLUE CROSS    ASSESSMENT:  Jose Alfredo Ramirez Representative - Daughter   Primary Phone: [phone] (Mobile)                 Advance Directives  Does patient have a [address]?: No  Does patient have Advance Directives?: Yes  Advance Directives: Living will  Primary Contact: Franko Sheikh         Readmission Root Cause  30 Day Readmission: No    Patient Information  Admitted from[de-identified] Home  Mental Status: Alert  During Assessment patient was accompanied by: Not accompanied during assessment  Assessment information provided by[de-identified] Patient  Primary Caregiver: Self  Support Systems: Self, Family members  [place] of Residence: [facility] [address] [facility] do you live in?: [place]  Type of Current Residence: [address] [place] home  Upon entering residence, is there a bedroom on the main floor (no further steps)?: Yes  Upon entering residence, is there a bathroom on the main floor (no further steps)?: Yes  In the last 12 months, was there a time when you were not able to pay the mortgage or rent on time?: No  In the last 12 months, how many places have you lived?: 1  In the last 12 months, was there a time when you did not have a steady place to sleep or slept in a shelter (including now)?: No  Homeless/housing insecurity resource given?: N/A  Living Arrangements: Lives w/ Extended Family, Lives w/ Daughter  Is patient a ?: No    Activities of Daily Living Prior to Admission  Functional Status: Independent  Completes ADLs independently?: Yes  Ambulates independently?: Yes  Does patient use assisted devices?: Yes  Assisted Devices (DME) used: Straight Cane (uses cane for long distance)  Does patient currently own DME?: Yes  What DME does the patient currently own?: Straight Cane  Does patient have a history of Outpatient Therapy (PT/OT)?: No  Does the patient have a history of Short-Term Rehab?: No  Does patient have a history of HHC?: Yes  Does patient currently have [address]?: No         Patient Information Continued  Income Source: Pension/long term  Does patient have prescription coverage?: Yes  Within the past 12 months, you worried that your food would run out before you got the money to buy more.: Never true  Within the past 12 months, the food you bought just didn't last and you didn't have money to get more.: Never true  Food insecurity resource given?: N/A  Does patient receive dialysis treatments?: No  Does patient have a history of substance abuse?: Yes  Historical substance use preference: Alcohol/ETOH  Is patient currently in treatment for substance abuse?: N/A - sober (sober for two years)      Means of Transportation  Means of Transport to [facility][de-identified] Family transport  In the past 12 months, has lack of transportation kept you from medical appointments or from getting medications?: No  In the past 12 months, has lack of transportation kept you from meetings, work, or from getting things needed for daily living?: No  Was application for public transport provided?: N/A        DISCHARGE DETAILS:    Discharge planning discussed with[de-identified] patient  Freedom of Choice: Yes  Comments - Freedom of Choice: pending any needs  CM contacted family/caregiver?: No- see comments  Were Treatment Team discharge recommendations reviewed with patient/caregiver?: Yes  Did patient/caregiver verbalize understanding of patient care needs?: Yes  Were patient/caregiver advised of the risks associated with not following Treatment Team discharge recommendations?: Yes    Contacts  Patient Contacts: Kong Beauchamp  Relationship to Patient[de-identified] Family  Contact Method: Phone  Phone Number: [phone]  Reason/Outcome: Continuity of Care, Emergency Contact, Discharge Planning     Patient lives with daughter and extended family. Independent for ambulation and adl's. Has a 1st floor master bedrooom and bath. Had 1 Covid J & J plus 1 Booster. Uses a cane for long distance, home oxygen @ HS and daytime prn. CM to follow with any d/c needs. Saurav Alanis .CM reviewed d/c planning process including the following: identifying help at home, patient preference for d/c planning needs, Discharge Lounge, [facility] Meds to Bed program, availability of treatment team to discuss questions or concerns patient and/or family may have regarding understanding medications and recognizing signs and symptoms once discharged. CM also encouraged patient to follow up with all recommended appointments after discharge. Patient advised of importance for patient and family to participate in managing patient’s medical well being.

## 2023-10-17 NOTE — H&P
[address] [place] [address]  H&P  Name: Laura Prieto 70 y.o. male I MRN: [id number]  Unit/Bed#: ED 12 I Date of Admission: 10/16/2023   Date of Service: 10/17/2023 I Hospital Day: 0      Assessment/Plan   * Chronic obstructive pulmonary disease with acute exacerbation (HCC)  Assessment & Plan  SOB, Wheezing on Exam   CXR without obvious infiltrate or effusion   Lungs with decreased air movement and expiratory wheezing   Previous admissions for COPD   Hx chronic hypoxemic resp fx and on baseline 2L at rest   Admit to medicine for COPD exacerbation and will treat with IV steroids, nebulizers, doxycycline. Monitor respiratory status closely. Continuous pulse ox. History of alcohol abuse  Assessment & Plan  Reports hx of alcohol abuse in the past but reports that he stopped drinking alcohol about two years ago     Chronic respiratory failure ([address])  Assessment & Plan  On baseline 2L at rest             VTE Prophylaxis: sequential compression device   Code Status: Level 1 - Full Code       Anticipated Length of Stay:  Patient will be admitted on an Observation basis with an anticipated length of stay of  < 2 midnights. Justification for Hospital Stay: Please see detailed plans noted above. Chief Complaint:     SOB  History of Present Illness:  Laura Prieto is a 70 y.o. male who has past medical history significant for chronic hypoxic respiratory failure and on baseline 2 L at rest, COPD, history of alcohol abuse who presented to [address] [place] [address] ER -10/16 with progressively worsening shortness of breath over the last 24 hours. Patient reports that he was seen in the ER outside the [facility] yesterday for COPD exacerbation reports that he was treated in the ER with a dose of steroids and nebulizers which she did report improved his symptoms. He reports that he then went home and had recurrence of the symptoms of shortness of breath and wheezing.   He denies any fevers or chills. Patient denies any lower extremity swelling. Review of Systems:    Constitutional:  Denies fever or chills   Eyes:  Denies change in visual acuity   HENT:  Denies nasal congestion or sore throat   Respiratory: Positive shortness of breath  Cardiovascular:  Denies chest pain or edema   GI:  Denies abdominal pain or bloody stools  :  Denies dysuria   Musculoskeletal:  Denies back pain or joint pain   Integument:  Denies rash   Neurologic:  Denies headache or sensory changes   Endocrine:  Denies polyuria or polydipsia   Lymphatic:  Denies swollen glands   Psychiatric:  Denies depression or anxiety     Past Medical and Surgical History:   Past Medical History:   Diagnosis Date    Cardiac arrest ([address])     COPD (chronic obstructive pulmonary disease) ([address] )     CVA (cerebral vascular accident) ([address] )     Diabetes mellitus ([address] )     Heart attack ([facility])     Hypertension     Stroke [facility])      Past Surgical History:   Procedure Laterality Date    CERVICAL FUSION N/A 9/10/2018    Procedure: Posterior cervical decompressive laminectomy C3-6; Posterior cervical lateral mass and pedicle fixation fusion C1-T1;  Surgeon: Damián Davis MD;  Location: [facility];  Service: Neurosurgery       Meds/Allergies:  (Not in a hospital admission)      Allergies:    Allergies   Allergen Reactions    Amoxicillin Hives    Augmentin [Amoxicillin-Pot Clavulanate] Hives       History:  Marital Status: Single     Substance Use History:   Social History     Substance and Sexual Activity   Alcohol Use Not Currently    Alcohol/week: 8.0 - 12.0 standard drinks of alcohol    Types: 8 - 12 Cans of beer per week    Comment: every day drinker     Social History     Tobacco Use   Smoking Status Former    Types: Cigarettes   Smokeless Tobacco Never   Tobacco Comments    quit 5 months ago      Social History     Substance and Sexual Activity   Drug Use Never       Family History:  Family History   Problem Relation Age of Onset    Heart attack Mother        Physical Exam:     Vitals:   Blood Pressure: 140/65 (10/17/23 0015)  Pulse: 102 (10/17/23 0015)  Temperature: 98.1 °F (36.7 °C) (10/16/23 2046)  Temp Source: Temporal (10/16/23 2046)  Respirations: 20 (10/16/23 2215)  SpO2: 95 % (10/17/23 0015)    Constitutional:  Alert  Eyes:  EOMI, No scleral icterus   HENT:   oropharynx moist, external ears normal, external nose normal   Respiratory:  decreased breath sounds, expiratory wheezing  Cardiovascular:  tachycardia, no murmurs   GI:  Soft, nondistended, no guarding   :  No costovertebral angle tenderness   Musculoskeletal:  no tenderness, no deformities. Back- no tenderness  Integument:  no jaundice, no rash   Neurologic:  Alert &awake, communicative, CN 2-12 normal,  no focal deficits noted   Psychiatric:  Speech and behavior appropriate       Lab Results: I have personally reviewed pertinent reports. Results from last 7 days   Lab Units 10/16/23  2103 10/12/23  1558   WBC Thousand/uL 9.31 9.90   HEMOGLOBIN g/dL 9.8* 8.7*   HEMATOCRIT % 34.5* 30.4*   PLATELETS Thousands/uL 659* 593*   NEUTROS PCT %  --  62   LYMPHS PCT %  --  19   LYMPHO PCT % 2*  --    MONOS PCT %  --  10   MONO PCT % 0*  --    EOS PCT % 0 7*     Results from last 7 days   Lab Units 10/16/23  2103   POTASSIUM mmol/L 4.7   CHLORIDE mmol/L 98   CO2 mmol/L 23   BUN mg/dL 23   CREATININE mg/dL 0.72   CALCIUM mg/dL 9.3   ALK PHOS U/L 77   ALT U/L 13   AST U/L 15             Imaging: I have personally reviewed pertinent reports. XR chest 1 view portable    Result Date: 10/13/2023  Narrative: CHEST INDICATION:   shortness of breath. COMPARISON: Chest radiograph August 6, 2023 EXAM PERFORMED/VIEWS:  XR CHEST PORTABLE FINDINGS: Cardiomediastinal silhouette appears unremarkable. No focal consolidation. Chronic blunting of the left costophrenic angle. No definite pleural effusion. No pneumothorax. Partially imaged cervicothoracic spinal fixation hardware.  Chronic left rib fracture deformities. No acute osseous abnormality. Impression: No acute cardiopulmonary disease. Workstation performed: [id number]     XR chest pa & lateral    Result Date: 10/10/2023  Narrative: Chest PA and lateral Indication: Shortness of breath Two-view study was done and compared to 06/03/2023, 08/27/2023, and 09/04/2023. Heart is normal in size. There is no change in right basilar subsegmental atelectasis. There is stable pleural thickening in the left lower hemithorax adjacent to multiple old healed left rib fractures. Patient also has multiple old healed right rib fractures. No confluent infiltrates are seen. Pulmonary vascularity does not appear congested. No pleural effusions are seen. Surgical hardware is seen in the cervical spine. Impression: Impression: Stable right basilar subsegmental atelectasis. No active disease. Workstation:[id number]      Total time for visit, including counseling/coordination of care: 45 minutes. Greater than 50% of this total time spent on direct patient counseling and coorination of care. Epic Records Reviewed as well as Records in Care Everywhere    ** Please Note: Dragon 360 Dictation voice to text software was used in the creation of this document.  **

## 2023-10-17 NOTE — RESPIRATORY THERAPY NOTE
10/16/23 2080   Respiratory Protocol   Protocol Initiated? Yes   Protocol Selection Respiratory   Language Barrier? No   Medical & Social History Reviewed? Yes   Diagnostic Studies Reviewed? Yes   Physical Assessment Performed? Yes   Home Devices/Therapy Home O2   Respiratory Plan Moderate/Severe Distress pathway   Respiratory Assessment   Assessment Type During-treatment   General Appearance Alert; Awake   Respiratory Pattern Normal   Chest Assessment Chest expansion symmetrical   Bilateral Breath Sounds Diminished; Expiratory wheezes   Cough Non-productive   Resp Comments Patient admitted with COPD exacerbation with frequent ED visits in the past. He has home oxygen using 2-3 L/min H.S. and prn daytime. His home medication regime has been continued.    O2 Device NC

## 2023-10-17 NOTE — ASSESSMENT & PLAN NOTE
SOB, Wheezing on Exam   CXR without obvious infiltrate or effusion   Lungs with decreased air movement and expiratory wheezing   Previous admissions for COPD   Hx chronic hypoxemic resp fx and on baseline 2L at rest   Admit to medicine for COPD exacerbation and will treat with IV steroids, nebulizers, doxycycline. Monitor respiratory status closely. Continuous pulse ox.

## 2023-10-17 NOTE — ASSESSMENT & PLAN NOTE
Hemoglobin on admission 9.8->8.8  No acute bleed suspected, pt denies hematuria, hemoptysis, blood in stool  Patient endorses history of having his "intestines removed because there was a cyst at that they could not reach with a camera". I see documentation of R hemicolectomy in documentation. Will need to do more research.  Patient will need close outpt followup with PCP

## 2023-10-17 NOTE — PROGRESS NOTES
[address] [facility]  Progress Note  Name: Enmanuel Amezquita  MRN: [id number]  Unit/Bed#: UR6 210-02 I Date of Admission: 10/16/2023   Date of Service: 10/17/2023 I Hospital Day: 0    Assessment/Plan   Anemia  Assessment & Plan  Hemoglobin on admission 9.8->8.8  No acute bleed suspected, pt denies hematuria, hemoptysis, blood in stool  Patient endorses history of having his "intestines removed because there was a cyst at that they could not reach with a camera". I see documentation of R hemicolectomy in documentation. Will need to do more research. Patient will need close outpt followup with PCP    History of alcohol abuse  Assessment & Plan  Reports hx of alcohol abuse in the past but reports that he stopped drinking alcohol about two years ago   Anemia may be more consistent with iron deficiency given low MCV    Chronic respiratory failure (HCC)  Assessment & Plan  On baseline 2L at rest    * Chronic obstructive pulmonary disease with acute exacerbation (HCC)  Assessment & Plan  SOB, Wheezing on Exam   CXR without obvious infiltrate or effusion   Lungs with decreased air movement and expiratory wheezing   Previous admissions for COPD   Hx chronic hypoxemic resp fx and on baseline 2L at rest   Nursing improvement today however discussed with patient and given failure of outpatient medical therapy, keep overnight and plan for discharge in the morning           VTE Pharmacologic Prophylaxis: VTE Score: 4 Moderate Risk (Score 3-4) - Pharmacological DVT Prophylaxis Ordered: heparin. Patient Centered Rounds: I performed bedside rounds with nursing staff today. Discussions with Specialists or Other Care Team Provider: N/A    Education and Discussions with Family / Patient: Patient declined call to . Total Time Spent on Date of Encounter in care of patient: 45 mins.  This time was spent on one or more of the following: performing physical exam; counseling and coordination of care; obtaining or reviewing history; documenting in the medical record; reviewing/ordering tests, medications or procedures; communicating with other healthcare professionals and discussing with patient's family/caregivers. Current Length of Stay: 0 day(s)  Current Patient Status: Inpatient   Certification Statement: The patient will continue to require additional inpatient hospital stay due to COPD exacerbation  Discharge Plan: Anticipate discharge tomorrow to home. Code Status: Level 1 - Full Code    Subjective:   Patient seen and examined at bedside this morning. States he is breathing better but still wheezing a little bit. Denies productive cough, chest pain, fever, or any other significant concerns    Objective:     Vitals:   Temp (24hrs), Av.2 °F (36.8 °C), Min:98.1 °F (36.7 °C), Max:98.2 °F (36.8 °C)    Temp:  [98.1 °F (36.7 °C)-98.2 °F (36.8 °C)] 98.2 °F (36.8 °C)  HR:  [] 62  Resp:  [18-26] 18  BP: (120-168)/(60-89) 168/89  SpO2:  [94 %-98 %] 98 %  There is no height or weight on file to calculate BMI. Input and Output Summary (last 24 hours): Intake/Output Summary (Last 24 hours) at 10/17/2023 1319  Last data filed at 10/17/2023 1128  Gross per 24 hour   Intake 160 ml   Output 2700 ml   Net -2540 ml       Physical Exam:   Physical Exam  Vitals and nursing note reviewed. Constitutional:       General: He is not in acute distress. Appearance: He is well-developed. He is not toxic-appearing or diaphoretic. Comments: Frail, elderly   HENT:      Head: Normocephalic and atraumatic. Mouth/Throat:      Mouth: Mucous membranes are moist.   Eyes:      General: No scleral icterus. Extraocular Movements: Extraocular movements intact. Conjunctiva/sclera: Conjunctivae normal.   Cardiovascular:      Rate and Rhythm: Normal rate and regular rhythm. Pulses: Normal pulses. Heart sounds: No murmur heard. No friction rub. No gallop.    Pulmonary: Effort: Pulmonary effort is normal. No respiratory distress. Breath sounds: Normal breath sounds. No wheezing, rhonchi or rales. Comments: Mild BL wheezing  Abdominal:      General: Abdomen is flat. Bowel sounds are normal. There is no distension. Palpations: Abdomen is soft. There is no mass. Tenderness: There is no abdominal tenderness. There is no guarding. Musculoskeletal:         General: No swelling. Cervical back: Neck supple. Right lower leg: No edema. Left lower leg: No edema. Lymphadenopathy:      Cervical: No cervical adenopathy. Skin:     General: Skin is warm and dry. Capillary Refill: Capillary refill takes less than 2 seconds. Coloration: Skin is not jaundiced. Findings: No rash. Neurological:      General: No focal deficit present. Mental Status: He is alert and oriented to person, place, and time. Sensory: No sensory deficit. Motor: No weakness.    Psychiatric:         Mood and Affect: Mood normal.          Additional Data:     Labs:  Results from last 7 days   Lab Units 10/17/23  0444   WBC Thousand/uL 5.77   HEMOGLOBIN g/dL 8.8*   HEMATOCRIT % 30.3*   PLATELETS Thousands/uL 588*   NEUTROS PCT % 86*   LYMPHS PCT % 11*   MONOS PCT % 2*   EOS PCT % 0     Results from last 7 days   Lab Units 10/17/23  0444   SODIUM mmol/L 134*   POTASSIUM mmol/L 4.1   CHLORIDE mmol/L 100   CO2 mmol/L 24   BUN mg/dL 19   CREATININE mg/dL 0.43*   ANION GAP mmol/L 10   CALCIUM mg/dL 9.0   ALBUMIN g/dL 4.0   TOTAL BILIRUBIN mg/dL 0.38   ALK PHOS U/L 66   ALT U/L 13   AST U/L 13   GLUCOSE RANDOM mg/dL 186*     Results from last 7 days   Lab Units 10/17/23  0444   INR  1.06     Results from last 7 days   Lab Units 10/17/23  1117 10/17/23  0738 10/17/23  0609 10/17/23  0059 10/16/23  2328   POC GLUCOSE mg/dl 196* 217* 164* 333* 254*         Results from last 7 days   Lab Units 10/17/23  0444 10/16/23  2254   PROCALCITONIN ng/ml <0.05 <0.05 Lines/Drains:  Invasive Devices       Peripheral Intravenous Line  Duration             Peripheral IV 10/16/23 Left;Dorsal (posterior) Forearm <1 day                          Imaging: Personally reviewed the following imaging: chest xray    Recent Cultures (last 7 days):         Last 24 Hours Medication List:   Current Facility-Administered Medications   Medication Dose Route Frequency Provider Last Rate    acetaminophen  650 mg Oral Q6H PRN [facility], DO      aspirin  81 mg Oral Daily [facility], DO      atorvastatin  80 mg Oral Daily With Dinner [facility], DO      budesonide  0.5 mg Nebulization Q12H [facility], DO      busPIRone  10 mg Oral TID [facility], DO      doxycycline hyclate  100 mg Oral Q12H [facility], DO      fluticasone  1 spray Nasal BID [facility], DO      folic acid  1 mg Oral Daily [facility], DO      guaiFENesin  600 mg Oral BID [facility], DO      insulin glargine  30 Units Subcutaneous HS [facility], DO      insulin lispro  1-5 Units Subcutaneous TID AC [facility], DO      insulin lispro  1-5 Units Subcutaneous HS [facility], DO      ipratropium  0.5 mg Nebulization TID PRN [facility], DO      levalbuterol  1.25 mg Nebulization TID [facility], DO      LORazepam  0.5 mg Oral Q6H PRN [facility], DO      melatonin  3 mg Oral HS PRN [facility], DO      methylPREDNISolone sodium succinate  40 mg Intravenous Q8H [facility], DO      pantoprazole  40 mg Oral BID [facility], DO      thiamine  100 mg Oral Daily [facility], DO      umeclidinium  1 puff Inhalation Daily [facility], DO          Today, Patient Was Seen By: Fermín Riddle    **Please Note: This note may have been constructed using a voice recognition system. **

## 2023-10-18 VITALS
HEART RATE: 82 BPM | OXYGEN SATURATION: 97 % | TEMPERATURE: 96.6 F | RESPIRATION RATE: 17 BRPM | DIASTOLIC BLOOD PRESSURE: 67 MMHG | SYSTOLIC BLOOD PRESSURE: 123 MMHG

## 2023-10-18 LAB
ERYTHROCYTE [DISTWIDTH] IN BLOOD BY AUTOMATED COUNT: 18.8 % (ref 11.6–15.1)
FERRITIN SERPL-MCNC: 8 NG/ML (ref 24–336)
GLUCOSE SERPL-MCNC: 238 MG/DL (ref 65–140)
GLUCOSE SERPL-MCNC: 257 MG/DL (ref 65–140)
HCT VFR BLD AUTO: 31.5 % (ref 36.5–49.3)
HGB BLD-MCNC: 9 G/DL (ref 12–17)
IRON SERPL-MCNC: <10 UG/DL (ref 50–212)
MCH RBC QN AUTO: 21.2 PG (ref 26.8–34.3)
MCHC RBC AUTO-ENTMCNC: 28.6 G/DL (ref 31.4–37.4)
MCV RBC AUTO: 74 FL (ref 82–98)
PLATELET # BLD AUTO: 641 THOUSANDS/UL (ref 149–390)
PMV BLD AUTO: 10.3 FL (ref 8.9–12.7)
RBC # BLD AUTO: 4.24 MILLION/UL (ref 3.88–5.62)
TIBC SERPL-MCNC: <401 UG/DL (ref 250–450)
UIBC SERPL-MCNC: 391 UG/DL (ref 155–355)
WBC # BLD AUTO: 6.96 THOUSAND/UL (ref 4.31–10.16)

## 2023-10-18 PROCEDURE — 94760 N-INVAS EAR/PLS OXIMETRY 1: CPT

## 2023-10-18 PROCEDURE — 94640 AIRWAY INHALATION TREATMENT: CPT

## 2023-10-18 PROCEDURE — 82948 REAGENT STRIP/BLOOD GLUCOSE: CPT

## 2023-10-18 PROCEDURE — 82728 ASSAY OF FERRITIN: CPT | Performed by: STUDENT IN AN ORGANIZED HEALTH CARE EDUCATION/TRAINING PROGRAM

## 2023-10-18 PROCEDURE — 83550 IRON BINDING TEST: CPT | Performed by: STUDENT IN AN ORGANIZED HEALTH CARE EDUCATION/TRAINING PROGRAM

## 2023-10-18 PROCEDURE — 83540 ASSAY OF IRON: CPT | Performed by: STUDENT IN AN ORGANIZED HEALTH CARE EDUCATION/TRAINING PROGRAM

## 2023-10-18 PROCEDURE — 99239 HOSP IP/OBS DSCHRG MGMT >30: CPT | Performed by: STUDENT IN AN ORGANIZED HEALTH CARE EDUCATION/TRAINING PROGRAM

## 2023-10-18 PROCEDURE — 85027 COMPLETE CBC AUTOMATED: CPT | Performed by: STUDENT IN AN ORGANIZED HEALTH CARE EDUCATION/TRAINING PROGRAM

## 2023-10-18 PROCEDURE — 94664 DEMO&/EVAL PT USE INHALER: CPT

## 2023-10-18 RX ORDER — PREDNISONE 20 MG/1
TABLET ORAL
Qty: 15 TABLET | Refills: 0 | Status: SHIPPED | OUTPATIENT
Start: 2023-10-18 | End: 2023-10-26

## 2023-10-18 RX ORDER — FERROUS SULFATE 324(65)MG
324 TABLET, DELAYED RELEASE (ENTERIC COATED) ORAL
Refills: 0
Start: 2023-10-18

## 2023-10-18 RX ORDER — AZITHROMYCIN 250 MG/1
TABLET, FILM COATED ORAL
Qty: 6 TABLET | Refills: 0 | Status: SHIPPED | OUTPATIENT
Start: 2023-10-18 | End: 2023-10-22

## 2023-10-18 RX ADMIN — PANTOPRAZOLE SODIUM 40 MG: 40 TABLET, DELAYED RELEASE ORAL at 06:14

## 2023-10-18 RX ADMIN — UMECLIDINIUM 1 PUFF: 62.5 AEROSOL, POWDER ORAL at 11:59

## 2023-10-18 RX ADMIN — ALBUTEROL SULFATE 2 PUFF: 90 AEROSOL, METERED RESPIRATORY (INHALATION) at 11:59

## 2023-10-18 RX ADMIN — BUSPIRONE HYDROCHLORIDE 10 MG: 10 TABLET ORAL at 08:48

## 2023-10-18 RX ADMIN — LEVALBUTEROL HYDROCHLORIDE 1.25 MG: 1.25 SOLUTION RESPIRATORY (INHALATION) at 13:17

## 2023-10-18 RX ADMIN — GUAIFENESIN 600 MG: 600 TABLET, EXTENDED RELEASE ORAL at 08:48

## 2023-10-18 RX ADMIN — LEVALBUTEROL HYDROCHLORIDE 1.25 MG: 1.25 SOLUTION RESPIRATORY (INHALATION) at 08:53

## 2023-10-18 RX ADMIN — FOLIC ACID 1 MG: 1 TABLET ORAL at 08:48

## 2023-10-18 RX ADMIN — INSULIN LISPRO 2 UNITS: 100 INJECTION, SOLUTION INTRAVENOUS; SUBCUTANEOUS at 08:48

## 2023-10-18 RX ADMIN — FLUTICASONE PROPIONATE 1 SPRAY: 50 SPRAY, METERED NASAL at 11:59

## 2023-10-18 RX ADMIN — BUDESONIDE 0.5 MG: 0.5 INHALANT RESPIRATORY (INHALATION) at 08:52

## 2023-10-18 RX ADMIN — THIAMINE HCL TAB 100 MG 100 MG: 100 TAB at 08:48

## 2023-10-18 RX ADMIN — IPRATROPIUM BROMIDE 0.5 MG: 0.5 SOLUTION RESPIRATORY (INHALATION) at 13:17

## 2023-10-18 RX ADMIN — INSULIN LISPRO 2 UNITS: 100 INJECTION, SOLUTION INTRAVENOUS; SUBCUTANEOUS at 11:59

## 2023-10-18 RX ADMIN — METHYLPREDNISOLONE SODIUM SUCCINATE 40 MG: 40 INJECTION, POWDER, FOR SOLUTION INTRAMUSCULAR; INTRAVENOUS at 06:11

## 2023-10-18 RX ADMIN — ASPIRIN 81 MG CHEWABLE TABLET 81 MG: 81 TABLET CHEWABLE at 08:48

## 2023-10-18 RX ADMIN — HEPARIN SODIUM 5000 UNITS: 5000 INJECTION INTRAVENOUS; SUBCUTANEOUS at 06:11

## 2023-10-18 RX ADMIN — IPRATROPIUM BROMIDE 0.5 MG: 0.5 SOLUTION RESPIRATORY (INHALATION) at 08:53

## 2023-10-18 RX ADMIN — DOXYCYCLINE 100 MG: 100 CAPSULE ORAL at 11:59

## 2023-10-18 NOTE — DISCHARGE SUMMARY
[address]  Discharge- Bharaht Began 1955, 70 y.o. male MRN: [id number]  Unit/Bed#: -01 Encounter: [id number]  Primary Care Provider: TATIANA Kay   Date and time admitted to hospital: 10/16/2023  8:50 PM    Anemia  Assessment & Plan  Hemoglobin on admission 9.8->8.8  No acute bleed suspected, pt denies hematuria, hemoptysis, blood in stool  Patient endorses history of having his "intestines removed because there was a cyst at that they could not reach with a camera". I see documentation of R hemicolectomy in documentation. Will need to do more research.  Patient will need close outpt followup with PCP    History of alcohol abuse  Assessment & Plan  Reports hx of alcohol abuse in the past but reports that he stopped drinking alcohol about two years ago   Anemia may be more consistent with iron deficiency given low MCV    Chronic respiratory failure (HCC)  Assessment & Plan  On baseline 2L at rest    * Chronic obstructive pulmonary disease with acute exacerbation (HCC)  Assessment & Plan  SOB, Wheezing on Exam   CXR without obvious infiltrate or effusion   Lungs with decreased air movement and expiratory wheezing   Previous admissions for COPD   Hx chronic hypoxemic resp fx and on baseline 2L at rest   Nursing improvement today however discussed with patient and given failure of outpatient medical therapy, keep overnight and plan for discharge in the morning      Medical Problems       Resolved Problems  Date Reviewed: 10/18/2023   None       Discharging Physician / Practitioner: Diana López  PCP: Wendy Rivera, [facility]  Admission Date:   Admission Orders (From admission, onward)       Ordered        10/17/23 1318  Inpatient Admission  Once            10/16/23 2244  Place in Observation  Once                          Discharge Date: 10/18/23    Consultations During Hospital Stay:  None    Procedures Performed:   None    Significant Findings / Test Results:   Xr- No acute cardiopulmonary disease  Hgb 9.0, Iron <10, Ferritin 8    Incidental Findings:   None     Test Results Pending at Discharge (will require follow up): None     Outpatient Tests Requested: Followup with PCP and discuss further workup of anemia and Iron infusion    Complications:  None    Reason for Admission: Dyspnea    Hospital Course:   Vy Noonan is a 70 y.o. male patient who originally presented to the hospital on 10/16/2023 due to dyspnea, Dodie Weiss was recently diagnosed with COPD exacerbation in the outpatient setting was started on a course of prednisone. Despite this, Dodie Weiss symptoms worsens with return to [facility] where he was subsequently referred for admission. Clinically there is wheezing bilaterally and team was concerned for COPD exacerbation. Patient was started on scheduled breathing treatments, IV steroids, and doxycycline. When he was seen on hospital day #1 he was feeling improved but clinically still had wheezing so was admitted inpatient and continued on therapy. The following day he was feeling well. Breathing was improved and he stated he felt close to his prehospital normal and felt ready for discharge home. He was advised to return to an ED or urgent care if symptoms worsen. In review of lab work, Dodie Weiss has had anemia over past several CBC checks. He endorses a history of colectomy, but states he has not had a recent colonoscopy. Iron studies suggesting iron deficiency anemia, recommend follow-up with PCP for consideration of iron infusions and further work-up of anemia with probable colonoscopy. Recommended over-the-counter iron in the mean time. Please see above list of diagnoses and related plan for additional information.      Condition at Discharge: good    Discharge Day Visit / Exam:   Subjective: Laying in bed, no acute distress  Vitals: Blood Pressure: 123/67 (10/18/23 0726)  Pulse: 82 (10/18/23 0726)  Temperature: (!) 96.6 °F (35.9 °C) (10/18/23 0726)  Temp Source: Oral (10/17/23 0730)  Respirations: 17 (10/18/23 0726)  SpO2: 99 % (10/18/23 0854)  Exam:   Physical Exam  Vitals and nursing note reviewed. Constitutional:       General: He is not in acute distress. Appearance: He is well-developed. He is not toxic-appearing or diaphoretic. Comments: Frail, elderly   HENT:      Head: Normocephalic and atraumatic. Mouth/Throat:      Mouth: Mucous membranes are moist.   Eyes:      General: No scleral icterus. Extraocular Movements: Extraocular movements intact. Conjunctiva/sclera: Conjunctivae normal.   Cardiovascular:      Rate and Rhythm: Normal rate and regular rhythm. Pulses: Normal pulses. Heart sounds: No murmur heard. No friction rub. No gallop. Pulmonary:      Effort: Pulmonary effort is normal. No respiratory distress. Breath sounds: Normal breath sounds. No wheezing, rhonchi or rales. Comments: Mild BL wheezing, improving  Abdominal:      General: Abdomen is flat. Bowel sounds are normal. There is no distension. Palpations: Abdomen is soft. There is no mass. Tenderness: There is no abdominal tenderness. There is no guarding. Musculoskeletal:         General: No swelling. Cervical back: Neck supple. Right lower leg: No edema. Left lower leg: No edema. Lymphadenopathy:      Cervical: No cervical adenopathy. Skin:     General: Skin is warm and dry. Capillary Refill: Capillary refill takes less than 2 seconds. Coloration: Skin is not jaundiced. Findings: No rash. Neurological:      General: No focal deficit present. Mental Status: He is alert and oriented to person, place, and time. Sensory: No sensory deficit. Motor: No weakness. Psychiatric:         Mood and Affect: Mood normal.          Discussion with Family: Patient declined call to .      Discharge instructions/Information to patient and family:   See after visit summary for information provided to patient and family. Provisions for Follow-Up Care:  See after visit summary for information related to follow-up care and any pertinent home health orders. Disposition:   Home    Planned Readmission: No     Discharge Statement:  I spent 45 minutes discharging the patient. This time was spent on the day of discharge. I had direct contact with the patient on the day of discharge. Greater than 50% of the total time was spent examining patient, answering all patient questions, arranging and discussing plan of care with patient as well as directly providing post-discharge instructions. Additional time then spent on discharge activities. Discharge Medications:  See after visit summary for reconciled discharge medications provided to patient and/or family.       **Please Note: This note may have been constructed using a voice recognition system**

## 2023-10-18 NOTE — DISCHARGE INSTR - AVS FIRST PAGE
Prescription for prednisone taper and antibiotics sent to [facility] pharmacy in [place] [facility]  If symptoms change or worsen, please seek care at an ED or urgent care  Recommend you  iron over-the-counter and start taking it and then follow-up with your PCP for further discussion of iron deficiency

## 2023-10-18 NOTE — PLAN OF CARE
Problem: PAIN - ADULT  Goal: Verbalizes/displays adequate comfort level or baseline comfort level  Description: Interventions:  - Encourage patient to monitor pain and request assistance  - Assess pain using appropriate pain scale  - Administer analgesics based on type and severity of pain and evaluate response  - Implement non-pharmacological measures as appropriate and evaluate response  - Consider cultural and social influences on pain and pain management  - Notify physician/advanced practitioner if interventions unsuccessful or patient reports new pain  Outcome: Progressing     Problem: INFECTION - ADULT  Goal: Absence or prevention of progression during hospitalization  Description: INTERVENTIONS:  - Assess and monitor for signs and symptoms of infection  - Monitor lab/diagnostic results  - Monitor all insertion sites, i.e. indwelling lines, tubes, and drains  - Monitor endotracheal if appropriate and nasal secretions for changes in amount and color  - [place] appropriate cooling/warming therapies per order  - Administer medications as ordered  - Instruct and encourage patient and family to use good hand hygiene technique  - Identify and instruct in appropriate isolation precautions for identified infection/condition  Outcome: Progressing  Goal: Absence of fever/infection during neutropenic period  Description: INTERVENTIONS:  - Monitor WBC    Outcome: Progressing     Problem: SAFETY ADULT  Goal: Patient will remain free of falls  Description: INTERVENTIONS:  - Educate patient/family on patient safety including physical limitations  - Instruct patient to call for assistance with activity   - Consult OT/PT to assist with strengthening/mobility   - Keep Call bell within reach  - Keep bed low and locked with side rails adjusted as appropriate  - Keep care items and personal belongings within reach  - Initiate and maintain comfort rounds  - Make Fall Risk Sign visible to staff  - Offer Toileting every  Hours, in advance of need  - Initiate/Maintain alarm  - Obtain necessary fall risk management equipment:   - Apply yellow socks and bracelet for high fall risk patients  - Consider moving patient to room near nurses station  Outcome: Progressing  Goal: Maintain or return to baseline ADL function  Description: INTERVENTIONS:  -  Assess patient's ability to carry out ADLs; assess patient's baseline for ADL function and identify physical deficits which impact ability to perform ADLs (bathing, care of mouth/teeth, toileting, grooming, dressing, etc.)  - Assess/evaluate cause of self-care deficits   - Assess range of motion  - Assess patient's mobility; develop plan if impaired  - Assess patient's need for assistive devices and provide as appropriate  - Encourage maximum independence but intervene and supervise when necessary  - Involve family in performance of ADLs  - Assess for home care needs following discharge   - Consider OT consult to assist with ADL evaluation and planning for discharge  - Provide patient education as appropriate  Outcome: Progressing  Goal: Maintains/Returns to pre admission functional level  Description: INTERVENTIONS:  - Perform BMAT or MOVE assessment daily.   - Set and communicate daily mobility goal to care team and patient/family/caregiver. - Collaborate with rehabilitation services on mobility goals if consulted  - Perform Range of Motion  times a day. - Reposition patient every  hours.   - Dangle patient times a day  - Stand patient  times a day  - Ambulate patient  times a day  - Out of bed to chair  times a day   - Out of bed for meals  times a day  - Out of bed for toileting  - Record patient progress and toleration of activity level   Outcome: Progressing     Problem: DISCHARGE PLANNING  Goal: Discharge to home or other facility with appropriate resources  Description: INTERVENTIONS:  - Identify barriers to discharge w/patient and caregiver  - Arrange for needed discharge resources and transportation as appropriate  - Identify discharge learning needs (meds, wound care, etc.)  - Arrange for interpretive services to assist at discharge as needed  - Refer to Case Management Department for coordinating discharge planning if the patient needs post-hospital services based on physician/advanced practitioner order or complex needs related to functional status, cognitive ability, or social support system  Outcome: Progressing     Problem: Knowledge Deficit  Goal: Patient/family/caregiver demonstrates understanding of disease process, treatment plan, medications, and discharge instructions  Description: Complete learning assessment and assess knowledge base.   Interventions:  - Provide teaching at level of understanding  - Provide teaching via preferred learning methods  Outcome: Progressing

## 2023-10-18 NOTE — CASE MANAGEMENT
Case Management Assessment & Discharge Planning Note    Patient name Justo [facility]  Location /-25 MRN [id number]  : 1955 Date 10/18/2023       Current Admission Date: 10/16/2023  Current Admission Diagnosis:Chronic obstructive pulmonary disease with acute exacerbation [facility])   Patient Active Problem List    Diagnosis Date Noted    History of alcohol abuse 10/17/2023    Anemia 10/17/2023    SIRS (systemic inflammatory response syndrome) ([address]) 2023    Hypertension 2023    COPD (chronic obstructive pulmonary disease) ([address]) 2023    Anxiety 2023    Chronic respiratory failure ([address]) 2023    Stage 3 severe COPD by GOLD classification ([address]) 2023    Oral abscess 2022    Tooth fracture 2022    H/O ETOH abuse 2022    Closed nondisplaced fracture of posterior arch of first cervical vertebra ([address]) 2022    Fall 2022    Diabetic polyneuropathy associated with type 2 diabetes mellitus ([address]) 2021    Hammertoe, bilateral 2021    Post-traumatic arthritis of left foot 2021    Pain due to onychomycosis of toenail 2021    Bronchitis 2020    KLARISSA (obstructive sleep apnea) 2020    Dirty living conditions 2020    Closed fracture of first cervical vertebra ([address]) 2019    ETOH abuse 2019    Chronic obstructive pulmonary disease with acute exacerbation ([address]) 2018    At moderate risk for venous thromboembolism (VTE) 2018    S/P C1-T1 Posterior Cervical Discectomy and Fusion on 9/10/18 2018    Acute blood loss anemia 2018    Type 2 diabetes mellitus with hyperglycemia, without long-term current use of insulin ([address]) 2018    Closed odontoid fracture with routine healing 2018    Closed displaced fracture of third cervical vertebra ([address]) 2018    Closed displaced fracture of fourth cervical vertebra ([address]) 2018    Alcohol abuse 2018    CAD (coronary artery disease) 09/09/2018    Dens fracture ([address]) 09/09/2018      LOS (days): 1  Geometric Mean LOS (GMLOS) (days): 2.70  Days to GMLOS:1.7     OBJECTIVE:    Risk of Unplanned Readmission Score: 25.31         Current admission status: Inpatient       Preferred Pharmacy:   [address]  [address] [place] [address]  Phone: [phone] Fax: [address] [facility], [address] [facility] [address] [address]  Phone: [phone] Fax: [phone]    Primary Care Provider: TATIANA Mac    Primary Insurance: MEDICARE  Secondary Insurance: BLUE CROSS    ASSESSMENT:  Jose Alfredo Ramirez Representative - Daughter   Primary Phone: [phone] (Mobile)                                                                  DISCHARGE DETAILS:    Discharge planning discussed with[de-identified] Pt chart reviewed and Cm rec'd TT from unit RN. Pt is medically cleared for discharged home with no need. Pt reported need ride upon discharged. Cm met with pt confirmed discharged, reviewed waiver form for transport and signature obtained. Cm to arrange transport upon discharged.

## 2023-10-18 NOTE — CASE MANAGEMENT
Case Management Assessment & Discharge Planning Note    Patient name Grant Jonas  Location /-72 MRN [id number]  : 1955 Date 10/18/2023       Current Admission Date: 10/16/2023  Current Admission Diagnosis:Chronic obstructive pulmonary disease with acute exacerbation [facility])   Patient Active Problem List    Diagnosis Date Noted    History of alcohol abuse 10/17/2023    Anemia 10/17/2023    SIRS (systemic inflammatory response syndrome) ([address]) 2023    Hypertension 2023    COPD (chronic obstructive pulmonary disease) ([address]) 2023    Anxiety 2023    Chronic respiratory failure ([address]) 2023    Stage 3 severe COPD by GOLD classification ([address]) 2023    Oral abscess 2022    Tooth fracture 2022    H/O ETOH abuse 2022    Closed nondisplaced fracture of posterior arch of first cervical vertebra ([address]) 2022    Fall 2022    Diabetic polyneuropathy associated with type 2 diabetes mellitus ([address]) 2021    Hammertoe, bilateral 2021    Post-traumatic arthritis of left foot 2021    Pain due to onychomycosis of toenail 2021    Bronchitis 2020    KLARISSA (obstructive sleep apnea) 2020    Dirty living conditions 2020    Closed fracture of first cervical vertebra ([address]) 2019    ETOH abuse 2019    Chronic obstructive pulmonary disease with acute exacerbation ([address]) 2018    At moderate risk for venous thromboembolism (VTE) 2018    S/P C1-T1 Posterior Cervical Discectomy and Fusion on 9/10/18 2018    Acute blood loss anemia 2018    Type 2 diabetes mellitus with hyperglycemia, without long-term current use of insulin ([address]) 2018    Closed odontoid fracture with routine healing 2018    Closed displaced fracture of third cervical vertebra ([address]) 2018    Closed displaced fracture of fourth cervical vertebra ([address]) 2018    Alcohol abuse 2018    CAD (coronary artery disease) 09/09/2018    Dens fracture ([address]) 09/09/2018      LOS (days): 1  Geometric Mean LOS (GMLOS) (days): 2.70  Days to GMLOS:1.6     OBJECTIVE:    Risk of Unplanned Readmission Score: 25.31         Current admission status: Inpatient       Preferred Pharmacy:   [address]  [address] [facility] [address] [facility] [address] [place] [address]  Phone: [phone] Fax: [address], [address] [facility] [address] [facility] [address] [place] [address] [facility]  Phone: [phone] Fax: [phone]    Primary Care Provider: TATIANA Mac    Primary Insurance: MEDICARE  Secondary Insurance: BLUE CROSS    ASSESSMENT:  Jose Alfredo Ramirez Representative - Daughter   Primary Phone: [phone] (Mobile)                                                                  DISCHARGE DETAILS:    Discharge planning discussed with[de-identified] Transportation set up with [facility] for [facility]. [email or web address] confirmation. Unit RN updated on same.

## 2023-10-24 ENCOUNTER — HOSPITAL ENCOUNTER (INPATIENT)
Facility: HOSPITAL | Age: 68
LOS: 1 days | Discharge: HOME | DRG: 190 | End: 2023-10-26
Attending: EMERGENCY MEDICINE | Admitting: STUDENT IN AN ORGANIZED HEALTH CARE EDUCATION/TRAINING PROGRAM
Payer: MEDICARE

## 2023-10-24 ENCOUNTER — APPOINTMENT (EMERGENCY)
Dept: RADIOLOGY | Facility: HOSPITAL | Age: 68
DRG: 190 | End: 2023-10-24
Payer: MEDICARE

## 2023-10-24 DIAGNOSIS — J44.1 COPD EXACERBATION (HCC): Primary | ICD-10-CM

## 2023-10-24 DIAGNOSIS — U07.1 COVID-19: ICD-10-CM

## 2023-10-24 DIAGNOSIS — J44.9 COPD (CHRONIC OBSTRUCTIVE PULMONARY DISEASE) (HCC): ICD-10-CM

## 2023-10-24 DIAGNOSIS — U07.1 COVID: ICD-10-CM

## 2023-10-24 LAB
2HR DELTA HS TROPONIN: -1 NG/L
ALBUMIN SERPL BCP-MCNC: 3.9 G/DL (ref 3.5–5)
ALP SERPL-CCNC: 68 U/L (ref 34–104)
ALT SERPL W P-5'-P-CCNC: 13 U/L (ref 7–52)
ANION GAP SERPL CALCULATED.3IONS-SCNC: 7 MMOL/L
AST SERPL W P-5'-P-CCNC: 21 U/L (ref 13–39)
ATRIAL RATE: 141 BPM
BASE EX.OXY STD BLDV CALC-SCNC: 92.9 % (ref 60–80)
BASE EXCESS BLDV CALC-SCNC: 1.7 MMOL/L
BASOPHILS # BLD AUTO: 0.01 THOUSANDS/ÂΜL (ref 0–0.1)
BASOPHILS NFR BLD AUTO: 0 % (ref 0–1)
BILIRUB SERPL-MCNC: 0.57 MG/DL (ref 0.2–1)
BNP SERPL-MCNC: 51 PG/ML (ref 0–100)
BUN SERPL-MCNC: 13 MG/DL (ref 5–25)
CALCIUM SERPL-MCNC: 9 MG/DL (ref 8.4–10.2)
CARDIAC TROPONIN I PNL SERPL HS: 10 NG/L
CARDIAC TROPONIN I PNL SERPL HS: 11 NG/L
CHLORIDE SERPL-SCNC: 98 MMOL/L (ref 96–108)
CO2 SERPL-SCNC: 28 MMOL/L (ref 21–32)
CREAT SERPL-MCNC: 0.49 MG/DL (ref 0.6–1.3)
EOSINOPHIL # BLD AUTO: 0.04 THOUSAND/ÂΜL (ref 0–0.61)
EOSINOPHIL NFR BLD AUTO: 0 % (ref 0–6)
ERYTHROCYTE [DISTWIDTH] IN BLOOD BY AUTOMATED COUNT: 18.8 % (ref 11.6–15.1)
FLUAV RNA RESP QL NAA+PROBE: NEGATIVE
FLUBV RNA RESP QL NAA+PROBE: NEGATIVE
GFR SERPL CREATININE-BSD FRML MDRD: 111 ML/MIN/1.73SQ M
GLUCOSE SERPL-MCNC: 222 MG/DL (ref 65–140)
GLUCOSE SERPL-MCNC: 372 MG/DL (ref 65–140)
GLUCOSE SERPL-MCNC: >500 MG/DL (ref 65–140)
HCO3 BLDV-SCNC: 26.1 MMOL/L (ref 24–30)
HCT VFR BLD AUTO: 32.3 % (ref 36.5–49.3)
HGB BLD-MCNC: 9.3 G/DL (ref 12–17)
IMM GRANULOCYTES # BLD AUTO: 0.07 THOUSAND/UL (ref 0–0.2)
IMM GRANULOCYTES NFR BLD AUTO: 1 % (ref 0–2)
LYMPHOCYTES # BLD AUTO: 1.33 THOUSANDS/ÂΜL (ref 0.6–4.47)
LYMPHOCYTES NFR BLD AUTO: 11 % (ref 14–44)
MCH RBC QN AUTO: 21.1 PG (ref 26.8–34.3)
MCHC RBC AUTO-ENTMCNC: 28.8 G/DL (ref 31.4–37.4)
MCV RBC AUTO: 73 FL (ref 82–98)
MONOCYTES # BLD AUTO: 0.94 THOUSAND/ÂΜL (ref 0.17–1.22)
MONOCYTES NFR BLD AUTO: 8 % (ref 4–12)
NEUTROPHILS # BLD AUTO: 9.33 THOUSANDS/ÂΜL (ref 1.85–7.62)
NEUTS SEG NFR BLD AUTO: 80 % (ref 43–75)
NRBC BLD AUTO-RTO: 0 /100 WBCS
O2 CT BLDV-SCNC: 13.4 ML/DL
PCO2 BLDV: 40.3 MM HG (ref 42–50)
PH BLDV: 7.43 [PH] (ref 7.3–7.4)
PLATELET # BLD AUTO: 468 THOUSANDS/UL (ref 149–390)
PMV BLD AUTO: 10.6 FL (ref 8.9–12.7)
PO2 BLDV: 77.6 MM HG (ref 35–45)
POTASSIUM SERPL-SCNC: 4.2 MMOL/L (ref 3.5–5.3)
PROCALCITONIN SERPL-MCNC: 0.06 NG/ML
PROT SERPL-MCNC: 6.6 G/DL (ref 6.4–8.4)
QRS AXIS: 64 DEGREES
QRSD INTERVAL: 102 MS
QT INTERVAL: 404 MS
QTC INTERVAL: 429 MS
RBC # BLD AUTO: 4.41 MILLION/UL (ref 3.88–5.62)
RSV RNA RESP QL NAA+PROBE: NEGATIVE
SARS-COV-2 RNA RESP QL NAA+PROBE: POSITIVE
SODIUM SERPL-SCNC: 133 MMOL/L (ref 135–147)
T WAVE AXIS: 38 DEGREES
VENTRICULAR RATE: 68 BPM
WBC # BLD AUTO: 11.72 THOUSAND/UL (ref 4.31–10.16)

## 2023-10-24 PROCEDURE — 0241U HB NFCT DS VIR RESP RNA 4 TRGT: CPT

## 2023-10-24 PROCEDURE — 36415 COLL VENOUS BLD VENIPUNCTURE: CPT

## 2023-10-24 PROCEDURE — 99285 EMERGENCY DEPT VISIT HI MDM: CPT

## 2023-10-24 PROCEDURE — 93005 ELECTROCARDIOGRAM TRACING: CPT

## 2023-10-24 PROCEDURE — 83880 ASSAY OF NATRIURETIC PEPTIDE: CPT

## 2023-10-24 PROCEDURE — 84145 PROCALCITONIN (PCT): CPT | Performed by: INTERNAL MEDICINE

## 2023-10-24 PROCEDURE — XW033E5 INTRODUCTION OF REMDESIVIR ANTI-INFECTIVE INTO PERIPHERAL VEIN, PERCUTANEOUS APPROACH, NEW TECHNOLOGY GROUP 5: ICD-10-PCS | Performed by: STUDENT IN AN ORGANIZED HEALTH CARE EDUCATION/TRAINING PROGRAM

## 2023-10-24 PROCEDURE — 99285 EMERGENCY DEPT VISIT HI MDM: CPT | Performed by: EMERGENCY MEDICINE

## 2023-10-24 PROCEDURE — 85025 COMPLETE CBC W/AUTO DIFF WBC: CPT

## 2023-10-24 PROCEDURE — 99222 1ST HOSP IP/OBS MODERATE 55: CPT | Performed by: INTERNAL MEDICINE

## 2023-10-24 PROCEDURE — 80053 COMPREHEN METABOLIC PANEL: CPT

## 2023-10-24 PROCEDURE — 93010 ELECTROCARDIOGRAM REPORT: CPT | Performed by: INTERNAL MEDICINE

## 2023-10-24 PROCEDURE — 82948 REAGENT STRIP/BLOOD GLUCOSE: CPT

## 2023-10-24 PROCEDURE — 82805 BLOOD GASES W/O2 SATURATION: CPT

## 2023-10-24 PROCEDURE — 84484 ASSAY OF TROPONIN QUANT: CPT

## 2023-10-24 PROCEDURE — 71045 X-RAY EXAM CHEST 1 VIEW: CPT

## 2023-10-24 RX ORDER — DEXAMETHASONE SODIUM PHOSPHATE 10 MG/ML
6 INJECTION, SOLUTION INTRAMUSCULAR; INTRAVENOUS DAILY
Status: DISCONTINUED | OUTPATIENT
Start: 2023-10-24 | End: 2023-10-26 | Stop reason: HOSPADM

## 2023-10-24 RX ORDER — PANTOPRAZOLE SODIUM 40 MG/1
40 TABLET, DELAYED RELEASE ORAL 2 TIMES DAILY
Status: DISCONTINUED | OUTPATIENT
Start: 2023-10-24 | End: 2023-10-26 | Stop reason: HOSPADM

## 2023-10-24 RX ORDER — IPRATROPIUM BROMIDE AND ALBUTEROL SULFATE .5; 3 MG/3ML; MG/3ML
1 SOLUTION RESPIRATORY (INHALATION) ONCE
Status: COMPLETED | OUTPATIENT
Start: 2023-10-24 | End: 2023-10-24

## 2023-10-24 RX ORDER — BUSPIRONE HYDROCHLORIDE 10 MG/1
10 TABLET ORAL 3 TIMES DAILY
Status: DISCONTINUED | OUTPATIENT
Start: 2023-10-24 | End: 2023-10-26 | Stop reason: HOSPADM

## 2023-10-24 RX ORDER — INSULIN LISPRO 100 [IU]/ML
1-6 INJECTION, SOLUTION INTRAVENOUS; SUBCUTANEOUS
Status: DISCONTINUED | OUTPATIENT
Start: 2023-10-24 | End: 2023-10-26 | Stop reason: HOSPADM

## 2023-10-24 RX ORDER — FOLIC ACID 1 MG/1
1 TABLET ORAL DAILY
Status: DISCONTINUED | OUTPATIENT
Start: 2023-10-25 | End: 2023-10-26 | Stop reason: HOSPADM

## 2023-10-24 RX ORDER — INSULIN GLARGINE 100 [IU]/ML
50 INJECTION, SOLUTION SUBCUTANEOUS
Status: DISCONTINUED | OUTPATIENT
Start: 2023-10-24 | End: 2023-10-26 | Stop reason: HOSPADM

## 2023-10-24 RX ORDER — ASPIRIN 81 MG/1
81 TABLET, CHEWABLE ORAL DAILY
Status: DISCONTINUED | OUTPATIENT
Start: 2023-10-25 | End: 2023-10-26 | Stop reason: HOSPADM

## 2023-10-24 RX ORDER — AZITHROMYCIN 250 MG/1
500 TABLET, FILM COATED ORAL EVERY 24 HOURS
Status: DISCONTINUED | OUTPATIENT
Start: 2023-10-24 | End: 2023-10-26 | Stop reason: HOSPADM

## 2023-10-24 RX ORDER — LANOLIN ALCOHOL/MO/W.PET/CERES
100 CREAM (GRAM) TOPICAL DAILY
Status: DISCONTINUED | OUTPATIENT
Start: 2023-10-25 | End: 2023-10-26 | Stop reason: HOSPADM

## 2023-10-24 RX ORDER — INSULIN GLARGINE 100 [IU]/ML
40 INJECTION, SOLUTION SUBCUTANEOUS
Status: DISCONTINUED | OUTPATIENT
Start: 2023-10-24 | End: 2023-10-24

## 2023-10-24 RX ORDER — IPRATROPIUM BROMIDE AND ALBUTEROL SULFATE 2.5; .5 MG/3ML; MG/3ML
3 SOLUTION RESPIRATORY (INHALATION)
Status: DISCONTINUED | OUTPATIENT
Start: 2023-10-24 | End: 2023-10-25

## 2023-10-24 RX ORDER — ENOXAPARIN SODIUM 100 MG/ML
40 INJECTION SUBCUTANEOUS DAILY
Status: DISCONTINUED | OUTPATIENT
Start: 2023-10-25 | End: 2023-10-26 | Stop reason: HOSPADM

## 2023-10-24 RX ORDER — INSULIN LISPRO 100 [IU]/ML
12 INJECTION, SOLUTION INTRAVENOUS; SUBCUTANEOUS
Status: DISCONTINUED | OUTPATIENT
Start: 2023-10-24 | End: 2023-10-26 | Stop reason: HOSPADM

## 2023-10-24 RX ORDER — HYDROMORPHONE HCL IN WATER/PF 6 MG/30 ML
0.2 PATIENT CONTROLLED ANALGESIA SYRINGE INTRAVENOUS
Status: DISCONTINUED | OUTPATIENT
Start: 2023-10-24 | End: 2023-10-26 | Stop reason: HOSPADM

## 2023-10-24 RX ORDER — LANOLIN ALCOHOL/MO/W.PET/CERES
3 CREAM (GRAM) TOPICAL
Status: DISCONTINUED | OUTPATIENT
Start: 2023-10-24 | End: 2023-10-26 | Stop reason: HOSPADM

## 2023-10-24 RX ORDER — ATORVASTATIN CALCIUM 80 MG/1
80 TABLET, FILM COATED ORAL
Status: DISCONTINUED | OUTPATIENT
Start: 2023-10-24 | End: 2023-10-26 | Stop reason: HOSPADM

## 2023-10-24 RX ORDER — ALBUTEROL SULFATE 2.5 MG/3ML
2.5 SOLUTION RESPIRATORY (INHALATION) EVERY 4 HOURS PRN
Status: DISCONTINUED | OUTPATIENT
Start: 2023-10-24 | End: 2023-10-25

## 2023-10-24 RX ORDER — OXYCODONE HYDROCHLORIDE 5 MG/1
5 TABLET ORAL EVERY 4 HOURS PRN
Status: DISCONTINUED | OUTPATIENT
Start: 2023-10-24 | End: 2023-10-26 | Stop reason: HOSPADM

## 2023-10-24 RX ORDER — FLUTICASONE FUROATE AND VILANTEROL 200; 25 UG/1; UG/1
1 POWDER RESPIRATORY (INHALATION)
Status: DISCONTINUED | OUTPATIENT
Start: 2023-10-25 | End: 2023-10-26 | Stop reason: HOSPADM

## 2023-10-24 RX ORDER — MAGNESIUM SULFATE HEPTAHYDRATE 40 MG/ML
4 INJECTION, SOLUTION INTRAVENOUS ONCE
Status: COMPLETED | OUTPATIENT
Start: 2023-10-24 | End: 2023-10-24

## 2023-10-24 RX ORDER — FLUTICASONE PROPIONATE 50 MCG
1 SPRAY, SUSPENSION (ML) NASAL 2 TIMES DAILY
Status: DISCONTINUED | OUTPATIENT
Start: 2023-10-24 | End: 2023-10-26 | Stop reason: HOSPADM

## 2023-10-24 RX ORDER — LORAZEPAM 0.5 MG/1
0.5 TABLET ORAL EVERY 8 HOURS PRN
Status: DISCONTINUED | OUTPATIENT
Start: 2023-10-24 | End: 2023-10-26 | Stop reason: HOSPADM

## 2023-10-24 RX ORDER — SODIUM CHLORIDE 9 MG/ML
3 INJECTION INTRAVENOUS
Status: DISCONTINUED | OUTPATIENT
Start: 2023-10-24 | End: 2023-10-26 | Stop reason: HOSPADM

## 2023-10-24 RX ORDER — LISINOPRIL 20 MG/1
40 TABLET ORAL DAILY
Status: DISCONTINUED | OUTPATIENT
Start: 2023-10-25 | End: 2023-10-26 | Stop reason: HOSPADM

## 2023-10-24 RX ADMIN — BUSPIRONE HYDROCHLORIDE 10 MG: 10 TABLET ORAL at 21:49

## 2023-10-24 RX ADMIN — REMDESIVIR 200 MG: 100 INJECTION, POWDER, LYOPHILIZED, FOR SOLUTION INTRAVENOUS at 20:57

## 2023-10-24 RX ADMIN — PANTOPRAZOLE SODIUM 40 MG: 40 TABLET, DELAYED RELEASE ORAL at 19:06

## 2023-10-24 RX ADMIN — MAGNESIUM SULFATE HEPTAHYDRATE 4 G: 40 INJECTION, SOLUTION INTRAVENOUS at 19:06

## 2023-10-24 RX ADMIN — INSULIN GLARGINE 50 UNITS: 100 INJECTION, SOLUTION SUBCUTANEOUS at 21:49

## 2023-10-24 RX ADMIN — FLUTICASONE PROPIONATE 1 SPRAY: 50 SPRAY, METERED NASAL at 19:45

## 2023-10-24 RX ADMIN — INSULIN LISPRO 12 UNITS: 100 INJECTION, SOLUTION INTRAVENOUS; SUBCUTANEOUS at 19:54

## 2023-10-24 RX ADMIN — ATORVASTATIN CALCIUM 80 MG: 80 TABLET, FILM COATED ORAL at 19:06

## 2023-10-24 RX ADMIN — IPRATROPIUM BROMIDE AND ALBUTEROL SULFATE 3 ML: .5; 3 SOLUTION RESPIRATORY (INHALATION) at 15:22

## 2023-10-24 RX ADMIN — DEXAMETHASONE SODIUM PHOSPHATE 6 MG: 10 INJECTION, SOLUTION INTRAMUSCULAR; INTRAVENOUS at 19:07

## 2023-10-24 RX ADMIN — AZITHROMYCIN 500 MG: 500 TABLET, FILM COATED ORAL at 19:06

## 2023-10-24 RX ADMIN — IPRATROPIUM BROMIDE AND ALBUTEROL SULFATE 3 ML: .5; 3 SOLUTION RESPIRATORY (INHALATION) at 19:06

## 2023-10-24 NOTE — ASSESSMENT & PLAN NOTE
Patient with history of chronic respiratory failure on 2 L nasal cannula  Currently at baseline oxygen requirements

## 2023-10-24 NOTE — ED NOTES
Per Dr. Aida Orona give 12 units insulin lispro and recheck BG in 1 hour.       Tamie Tucker RN  10/24/23 1952

## 2023-10-24 NOTE — ED ATTENDING ATTESTATION
10/24/2023  Shayla Seo DO, saw and evaluated the patient. I have discussed the patient with the resident/non-physician practitioner and agree with the resident's/non-physician practitioner's findings, Plan of Care, and MDM as documented in the resident's/non-physician practitioner's note, except where noted. All available labs and Radiology studies were reviewed. I was present for key portions of any procedure(s) performed by the resident/non-physician practitioner and I was immediately available to provide assistance. At this point I agree with the current assessment done in the Emergency Department. I have conducted an independent evaluation of this patient a history and physical is as follows:    69 yo male w/hx COPD and multiple ED visits, admissions for dyspnea. He presents again today for same. He describes an increase in sputum production recently. Is currently on steroids, and was given rx for azithromycin recently. Lungs CTAB. Tachypnea without increased WOB or prolonged expiratory phase. Imp: bronchitis on chronic COPD plan: tx sx, CXR, reassess.       ED Course         Critical Care Time  Procedures

## 2023-10-24 NOTE — H&P
[address] [facility]  H&P  Name: Brenda Funez 70 y.o. male I MRN: [id number]  Unit/Bed#: ED 05 I Date of Admission: 10/24/2023   Date of Service: 10/24/2023 I Hospital Day: 0      Assessment/Plan   History of alcohol abuse  Assessment & Plan  Hx Alcohol abuse we will continue on thiamine and folic acid  Patient reports he has not been drinking for over a year    Hypertension  Assessment & Plan  Continue lisinopril at 40 mg daily    Chronic respiratory failure [facility])  Assessment & Plan  Patient with history of chronic respiratory failure on 2 L nasal cannula  Currently at baseline oxygen requirements    Chronic obstructive pulmonary disease with acute exacerbation [facility])  Assessment & Plan  Patient presented with worsening shortness of breath appears to be consistent with COPD exacerbation  Currently on baseline 2 L nasal cannula  Also in the setting of COVID infection we will continue on Decadron, give remdesivir course  IV magnesium ordered  DuoNebs, Rester protocol, continue home inhalers, azithromycin    Type 2 diabetes mellitus with hyperglycemia, without long-term current use of insulin (HCC)  Assessment & Plan  Lab Results   Component Value Date    HGBA1C 8.8 (H) 10/21/2023       No results for input(s): "POCGLU" in the last 72 hours. Blood Sugar Average: Last 72 hrs:  Continue sliding scale coverage with home insulin Lantus  Monitor for worsening hyperglycemia in the setting of steroids      CAD (coronary artery disease)  Assessment & Plan  Patient with history of CAD will continue on aspirin, statin  Does not appear to be on beta-blocker             VTE Pharmacologic Prophylaxis:   Moderate Risk (Score 3-4) - Pharmacological DVT Prophylaxis Ordered: heparin. Code Status: Level 1 - Full Code   Discussion with family: Patient declined call to .      Anticipated Length of Stay: Patient will be admitted on an observation basis with an anticipated length of stay of less than 2 midnights secondary to copd. Total Time Spent on Date of Encounter in care of patient: 35 mins. This time was spent on one or more of the following: performing physical exam; counseling and coordination of care; obtaining or reviewing history; documenting in the medical record; reviewing/ordering tests, medications or procedures; communicating with other healthcare professionals and discussing with patient's family/caregivers. Chief Complaint: sob    History of Present Illness:  Westley Lui is a 70 y.o. male with a PMH of alcohol abuse, COPD, hypertension, CVA who presents with shortness of breath. Patient reports worsening shortness of breath over the past several days with an increase in how often he coughs and with worsening sputum production. Patient will be admitted for COPD exacerbation, as well as testing positive for COVID. Denies any fevers chills nausea vomiting chest pain during my evaluation his only complaints of mild shortness of breath. Review of Systems:  Review of Systems   Constitutional:  Negative for chills, fatigue and fever. HENT:  Negative for ear pain and sore throat. Eyes:  Negative for pain and visual disturbance. Respiratory:  Positive for cough and shortness of breath. Cardiovascular:  Negative for chest pain, palpitations and leg swelling. Gastrointestinal:  Negative for abdominal distention, abdominal pain, diarrhea and vomiting. Genitourinary:  Negative for dysuria and hematuria. Musculoskeletal:  Negative for arthralgias and back pain. Skin:  Negative for color change, pallor, rash and wound. Neurological:  Negative for dizziness, seizures, syncope and weakness. All other systems reviewed and are negative.       Past Medical and Surgical History:   Past Medical History:   Diagnosis Date    Cardiac arrest [facility])     COPD (chronic obstructive pulmonary disease) ([address] [facility])     CVA (cerebral vascular accident) ([facility])     Diabetes mellitus ([facility]) Heart attack ([address])     Hypertension     Stroke [facility])        Past Surgical History:   Procedure Laterality Date    CERVICAL FUSION N/A 9/10/2018    Procedure: Posterior cervical decompressive laminectomy C3-6; Posterior cervical lateral mass and pedicle fixation fusion C1-T1;  Surgeon: Isrrael García MD;  [facility]: [facility];  Service: Neurosurgery       Meds/Allergies:  Prior to Admission medications    Medication Sig Start Date End Date Taking? Authorizing Provider   albuterol (Proventil HFA) 90 mcg/act inhaler Inhale 2 puffs every 6 (six) hours as needed for wheezing 6/10/22   Beatrice Carson PA-C   aspirin 81 mg chewable tablet Chew 81 mg daily    Historical Provider, MD   atorvastatin (LIPITOR) 80 mg tablet Take 1 tablet (80 mg total) by mouth daily with dinner 9/22/18   Regina Jesus MD   B Complex Vitamins (VITAMIN B COMPLEX 100 IJ) Take 1 tablet by mouth daily    Historical Provider, MD   betamethasone valerate (VALISONE) 0.1 % cream Apply topically 2 (two) times a day 10/4/13   Historical Provider, MD   budesonide (PULMICORT) 0.5 mg/2 mL nebulizer solution Take 2 mL (0.5 mg total) by nebulization every 12 (twelve) hours Rinse mouth after use. 6/10/22   Jenna Meneses PA-C   busPIRone (BUSPAR) 10 mg tablet Take 1 tablet (10 mg total) by mouth 3 (three) times a day 7/28/23   Shon Jarquin PA-C   ferrous sulfate 324 (65 Fe) mg Take 1 tablet (324 mg total) by mouth 2 (two) times a day before meals 10/18/23   Jay Crown King   fluticasone [facility]) 50 mcg/act nasal spray 1 spray into each nostril 2 (two) times a day 10/4/13   Historical Provider, MD   fluticasone-salmeterol (ADVAIR) 500-50 mcg/dose inhaler Inhale 1 puff 2 (two) times a day Rinse mouth after use.     Historical Provider, MD   folic acid (FOLVITE) 1 mg tablet Take 1 tablet (1 mg total) by mouth daily 6/11/22   Audery Sarah A Lindenmoyer, PA-C   Insulin Glargine (BASAGLAR KWIKPEN SC) Inject 30 Units under the skin daily at bedtime Historical Provider, MD   ipratropium-albuterol (COMBIVENT)  mcg/act inhaler Inhale    Historical Provider, MD   lisinopril (ZESTRIL) 40 mg tablet Take 40 mg by mouth daily     Historical Provider, MD   LORazepam (Ativan) 0.5 mg tablet Take by mouth every 6 (six) hours as needed for anxiety     Historical Provider, MD   melatonin 3 mg Take 1 tablet (3 mg total) by mouth daily at bedtime as needed (30) 9/21/18   Rhoda Rivas MD   metFORMIN (GLUCOPHAGE) 1000 MG tablet Take 1 tablet by mouth every 12 (twelve) hours    Historical Provider, MD   pantoprazole (Protonix) 40 mg tablet Take 1 tablet (40 mg total) by mouth 2 (two) times a day 8/8/23   TATIANA Bauer   predniSONE 20 mg tablet Take 2 tablets (40 mg total) by mouth daily for 3 days, THEN 1.5 tablets (30 mg total) daily for 3 days, THEN 1 tablet (20 mg total) daily for 3 days, THEN 0.5 tablets (10 mg total) daily for 3 days. 10/18/23 10/30/23  John Ao   thiamine 100 MG tablet Take 1 tablet (100 mg total) by mouth daily 9/22/18   Rhoda Rivas MD   tiotropium [facility]) 18 mcg inhalation capsule Place 18 mcg into inhaler and inhale daily    Historical Provider, MD     I have reviewed home medications with patient personally. Allergies:    Allergies   Allergen Reactions    Amoxicillin Hives    Augmentin [Amoxicillin-Pot Clavulanate] Hives       Social History:  Marital Status: Single   Occupation:   Patient Pre-hospital Living Situation: Home  Patient Pre-hospital Level of Mobility: walks  Patient Pre-hospital Diet Restrictions:   Substance Use History:   Social History     Substance and Sexual Activity   Alcohol Use Not Currently    Alcohol/week: 8.0 - 12.0 standard drinks of alcohol    Types: 8 - 12 Cans of beer per week    Comment: every day drinker     Social History     Tobacco Use   Smoking Status Former    Types: Cigarettes   Smokeless Tobacco Never   Tobacco Comments    quit 5 months ago      Social History     Substance and Sexual Activity   Drug Use Never       Family History:  Family History   Problem Relation Age of Onset    Heart attack Mother        Physical Exam:     Vitals:   Blood Pressure: 166/68 (10/24/23 1630)  Pulse: 76 (10/24/23 1630)  Temperature: 97.5 °F (36.4 °C) (10/24/23 1446)  Temp Source: Oral (10/24/23 1446)  Respirations: (!) 24 (10/24/23 1630)  SpO2: 99 % (10/24/23 1630)    Physical Exam  Vitals and nursing note reviewed. Constitutional:       General: He is not in acute distress. Appearance: He is well-developed. He is not toxic-appearing or diaphoretic. HENT:      Head: Normocephalic and atraumatic. Eyes:      General: No scleral icterus. Conjunctiva/sclera: Conjunctivae normal.   Cardiovascular:      Rate and Rhythm: Normal rate and regular rhythm. Heart sounds: No murmur heard. No friction rub. No gallop. Pulmonary:      Effort: Pulmonary effort is normal. No respiratory distress. Breath sounds: No stridor. Wheezing present. No rhonchi or rales. Chest:      Chest wall: No tenderness. Abdominal:      General: There is no distension. Palpations: Abdomen is soft. Tenderness: There is no abdominal tenderness. There is no guarding or rebound. Hernia: No hernia is present. Musculoskeletal:         General: No swelling or tenderness. Cervical back: Neck supple. Skin:     General: Skin is warm and dry. Capillary Refill: Capillary refill takes less than 2 seconds. Neurological:      Mental Status: He is alert and oriented to person, place, and time.    Psychiatric:         Mood and Affect: Mood normal.          Additional Data:     Lab Results:  Results from last 7 days   Lab Units 10/24/23  1511   WBC Thousand/uL 11.72*   HEMOGLOBIN g/dL 9.3*   HEMATOCRIT % 32.3*   PLATELETS Thousands/uL 468*   NEUTROS PCT % 80*   LYMPHS PCT % 11*   MONOS PCT % 8   EOS PCT % 0     Results from last 7 days   Lab Units 10/24/23  1511   SODIUM mmol/L 133*   POTASSIUM mmol/L 4.2 CHLORIDE mmol/L 98   CO2 mmol/L 28   BUN mg/dL 13   CREATININE mg/dL 0.49*   ANION GAP mmol/L 7   CALCIUM mg/dL 9.0   ALBUMIN g/dL 3.9   TOTAL BILIRUBIN mg/dL 0.57   ALK PHOS U/L 68   ALT U/L 13   AST U/L 21   GLUCOSE RANDOM mg/dL 222*         Results from last 7 days   Lab Units 10/18/23  1056 10/18/23  0614 10/17/23  2109   POC GLUCOSE mg/dl 238* 257* 252*     Results from last 7 days   Lab Units 10/21/23  0412   HEMOGLOBIN A1C % 8.8*           Lines/Drains:  Invasive Devices       Peripheral Intravenous Line  Duration             Peripheral IV 10/24/23 Right;Ventral (anterior) Forearm <1 day                        Imaging: No pertinent imaging reviewed. X-ray chest 1 view portable    (Results Pending)       EKG and Other Studies Reviewed on Admission:   EKG:  EKG reviewed. ** Please Note: This note has been constructed using a voice recognition system.  **

## 2023-10-24 NOTE — ASSESSMENT & PLAN NOTE
Patient presented with worsening shortness of breath appears to be consistent with COPD exacerbation  Currently on baseline 2 L nasal cannula  Also in the setting of COVID infection we will continue on Decadron, give remdesivir course  IV magnesium ordered  Austen Magallon protocol, continue home inhalers, azithromycin

## 2023-10-24 NOTE — ASSESSMENT & PLAN NOTE
Lab Results   Component Value Date    HGBA1C 8.8 (H) 10/21/2023       No results for input(s): "POCGLU" in the last 72 hours.     Blood Sugar Average: Last 72 hrs:  Continue sliding scale coverage with home insulin Lantus  Monitor for worsening hyperglycemia in the setting of steroids

## 2023-10-24 NOTE — ASSESSMENT & PLAN NOTE
Hx Alcohol abuse we will continue on thiamine and folic acid  Patient reports he has not been drinking for over a year

## 2023-10-25 PROBLEM — U07.1 COVID-19: Status: ACTIVE | Noted: 2023-10-25

## 2023-10-25 LAB
GLUCOSE SERPL-MCNC: 227 MG/DL (ref 65–140)
GLUCOSE SERPL-MCNC: 303 MG/DL (ref 65–140)
GLUCOSE SERPL-MCNC: 75 MG/DL (ref 65–140)
GLUCOSE SERPL-MCNC: 81 MG/DL (ref 65–140)
GLUCOSE SERPL-MCNC: 97 MG/DL (ref 65–140)

## 2023-10-25 PROCEDURE — 94640 AIRWAY INHALATION TREATMENT: CPT

## 2023-10-25 PROCEDURE — 97161 PT EVAL LOW COMPLEX 20 MIN: CPT

## 2023-10-25 PROCEDURE — 97166 OT EVAL MOD COMPLEX 45 MIN: CPT

## 2023-10-25 PROCEDURE — 94664 DEMO&/EVAL PT USE INHALER: CPT

## 2023-10-25 PROCEDURE — 82948 REAGENT STRIP/BLOOD GLUCOSE: CPT

## 2023-10-25 PROCEDURE — 99232 SBSQ HOSP IP/OBS MODERATE 35: CPT | Performed by: PHYSICIAN ASSISTANT

## 2023-10-25 RX ORDER — ALBUTEROL SULFATE 90 UG/1
2 AEROSOL, METERED RESPIRATORY (INHALATION) 4 TIMES DAILY
Status: DISCONTINUED | OUTPATIENT
Start: 2023-10-25 | End: 2023-10-25

## 2023-10-25 RX ORDER — ALBUTEROL SULFATE 90 UG/1
2 AEROSOL, METERED RESPIRATORY (INHALATION) EVERY 4 HOURS PRN
Status: DISCONTINUED | OUTPATIENT
Start: 2023-10-25 | End: 2023-10-26 | Stop reason: HOSPADM

## 2023-10-25 RX ORDER — IPRATROPIUM BROMIDE AND ALBUTEROL SULFATE 2.5; .5 MG/3ML; MG/3ML
3 SOLUTION RESPIRATORY (INHALATION)
Status: DISCONTINUED | OUTPATIENT
Start: 2023-10-25 | End: 2023-10-25

## 2023-10-25 RX ORDER — ALBUTEROL SULFATE 90 UG/1
2 AEROSOL, METERED RESPIRATORY (INHALATION)
Status: DISCONTINUED | OUTPATIENT
Start: 2023-10-25 | End: 2023-10-26 | Stop reason: HOSPADM

## 2023-10-25 RX ORDER — LEVALBUTEROL 1.25 MG/.5ML
1.25 SOLUTION, CONCENTRATE RESPIRATORY (INHALATION)
Status: DISCONTINUED | OUTPATIENT
Start: 2023-10-25 | End: 2023-10-25

## 2023-10-25 RX ADMIN — AZITHROMYCIN 500 MG: 500 TABLET, FILM COATED ORAL at 17:20

## 2023-10-25 RX ADMIN — INSULIN LISPRO 12 UNITS: 100 INJECTION, SOLUTION INTRAVENOUS; SUBCUTANEOUS at 18:33

## 2023-10-25 RX ADMIN — OXYCODONE HYDROCHLORIDE 5 MG: 5 TABLET ORAL at 17:20

## 2023-10-25 RX ADMIN — IPRATROPIUM BROMIDE 2 PUFF: 17 AEROSOL, METERED RESPIRATORY (INHALATION) at 17:21

## 2023-10-25 RX ADMIN — BUSPIRONE HYDROCHLORIDE 10 MG: 10 TABLET ORAL at 08:41

## 2023-10-25 RX ADMIN — PANTOPRAZOLE SODIUM 40 MG: 40 TABLET, DELAYED RELEASE ORAL at 08:41

## 2023-10-25 RX ADMIN — IPRATROPIUM BROMIDE AND ALBUTEROL SULFATE 3 ML: .5; 3 SOLUTION RESPIRATORY (INHALATION) at 03:44

## 2023-10-25 RX ADMIN — REMDESIVIR 100 MG: 100 INJECTION, POWDER, LYOPHILIZED, FOR SOLUTION INTRAVENOUS at 20:54

## 2023-10-25 RX ADMIN — IPRATROPIUM BROMIDE 2 PUFF: 17 AEROSOL, METERED RESPIRATORY (INHALATION) at 11:52

## 2023-10-25 RX ADMIN — LISINOPRIL 40 MG: 20 TABLET ORAL at 08:41

## 2023-10-25 RX ADMIN — ALBUTEROL SULFATE 2 PUFF: 90 AEROSOL, METERED RESPIRATORY (INHALATION) at 16:47

## 2023-10-25 RX ADMIN — BUSPIRONE HYDROCHLORIDE 10 MG: 10 TABLET ORAL at 20:55

## 2023-10-25 RX ADMIN — DEXAMETHASONE SODIUM PHOSPHATE 6 MG: 10 INJECTION, SOLUTION INTRAMUSCULAR; INTRAVENOUS at 17:21

## 2023-10-25 RX ADMIN — FOLIC ACID 1 MG: 1 TABLET ORAL at 08:41

## 2023-10-25 RX ADMIN — ENOXAPARIN SODIUM 40 MG: 40 INJECTION SUBCUTANEOUS at 08:40

## 2023-10-25 RX ADMIN — Medication 2.5 MG: at 11:49

## 2023-10-25 RX ADMIN — INSULIN LISPRO 12 UNITS: 100 INJECTION, SOLUTION INTRAVENOUS; SUBCUTANEOUS at 08:43

## 2023-10-25 RX ADMIN — ALBUTEROL SULFATE 2 PUFF: 90 AEROSOL, METERED RESPIRATORY (INHALATION) at 11:52

## 2023-10-25 RX ADMIN — UMECLIDINIUM 1 PUFF: 62.5 AEROSOL, POWDER ORAL at 08:41

## 2023-10-25 RX ADMIN — ATORVASTATIN CALCIUM 80 MG: 80 TABLET, FILM COATED ORAL at 17:20

## 2023-10-25 RX ADMIN — PANTOPRAZOLE SODIUM 40 MG: 40 TABLET, DELAYED RELEASE ORAL at 17:20

## 2023-10-25 RX ADMIN — FLUTICASONE FUROATE AND VILANTEROL TRIFENATATE 1 PUFF: 200; 25 POWDER RESPIRATORY (INHALATION) at 08:40

## 2023-10-25 RX ADMIN — THIAMINE HCL TAB 100 MG 100 MG: 100 TAB at 08:41

## 2023-10-25 RX ADMIN — IPRATROPIUM BROMIDE 2 PUFF: 17 AEROSOL, METERED RESPIRATORY (INHALATION) at 08:51

## 2023-10-25 RX ADMIN — ALBUTEROL SULFATE 2 PUFF: 90 AEROSOL, METERED RESPIRATORY (INHALATION) at 17:21

## 2023-10-25 RX ADMIN — ALBUTEROL SULFATE 2 PUFF: 90 AEROSOL, METERED RESPIRATORY (INHALATION) at 08:51

## 2023-10-25 RX ADMIN — INSULIN LISPRO 12 UNITS: 100 INJECTION, SOLUTION INTRAVENOUS; SUBCUTANEOUS at 11:52

## 2023-10-25 RX ADMIN — BUSPIRONE HYDROCHLORIDE 10 MG: 10 TABLET ORAL at 17:20

## 2023-10-25 RX ADMIN — INSULIN LISPRO 2 UNITS: 100 INJECTION, SOLUTION INTRAVENOUS; SUBCUTANEOUS at 06:17

## 2023-10-25 RX ADMIN — ASPIRIN 81 MG CHEWABLE TABLET 81 MG: 81 TABLET CHEWABLE at 08:41

## 2023-10-25 NOTE — ASSESSMENT & PLAN NOTE
Lab Results   Component Value Date    HGBA1C 8.8 (H) 10/21/2023       Recent Labs     10/24/23  1946 10/24/23  2109 10/25/23  0437   POCGLU >500* 372* 227*       Blood Sugar Average: Last 72 hrs:  (P) 299.5  Not controlled at baseline  Continue Lantus 50 units QHS (increased from 30 units), started humalog 12 units TID AC in setting of steroid induced hyperglycemia   SSI, accu checks  Adjust PRN

## 2023-10-25 NOTE — ASSESSMENT & PLAN NOTE
Patient presented with worsening shortness of breath appears to be consistent with COPD exacerbation in setting of COVID-19 infection.   Chronically on 2 L NC.   CXR negative   IV decadron, remdesivir per covid protocol   Nebs, respiratory protocol, continue home inhalers  Procal negative, monitor off abx aside from azithromycin   Currently on baseline 2 L NC

## 2023-10-25 NOTE — PHYSICAL THERAPY NOTE
PHYSICAL THERAPY EVALUATION  NAME:  Gabriel Zayas  DATE: 10/25/23    AGE:   70 y.o.  Mrn:   [id number]  ADMIT DX:  SOB (shortness of breath) [R06.02]  COPD exacerbation (HCC) [J44.1]  COVID [U07.1]  Problem List:   Patient Active Problem List   Diagnosis    Closed odontoid fracture with routine healing    Closed displaced fracture of third cervical vertebra ([address])    Closed displaced fracture of fourth cervical vertebra (HCC)    Alcohol abuse    CAD (coronary artery disease)    Dens fracture ([facility])    Acute blood loss anemia    Type 2 diabetes mellitus with hyperglycemia, without long-term current use of insulin ([facility])    S/P C1-T1 Posterior Cervical Discectomy and Fusion on 9/10/18    At moderate risk for venous thromboembolism (VTE)    Chronic obstructive pulmonary disease with acute exacerbation ([facility])    Closed fracture of first cervical vertebra (HCC)    ETOH abuse    KLARISSA (obstructive sleep apnea)    Dirty living conditions    Bronchitis    Diabetic polyneuropathy associated with type 2 diabetes mellitus ([facility])    Hammertoe, bilateral    Post-traumatic arthritis of left foot    Pain due to onychomycosis of toenail    Oral abscess    Tooth fracture    H/O ETOH abuse    Closed nondisplaced fracture of posterior arch of first cervical vertebra (HCC)    Fall    Stage 3 severe COPD by GOLD classification ([facility])    Chronic respiratory failure (HCC)    Anxiety    Hypertension    COPD (chronic obstructive pulmonary disease) ([facility])    SIRS (systemic inflammatory response syndrome) ([address])    History of alcohol abuse    Anemia    COVID-19       Past Medical History  Past Medical History:   Diagnosis Date    Cardiac arrest ([address])     COPD (chronic obstructive pulmonary disease) ([address])     CVA (cerebral vascular accident) ([address])     Diabetes mellitus ([address])     Heart attack ([address])     Hypertension     Stroke [facility])        Past Surgical History  Past Surgical History:   Procedure Laterality Date    CERVICAL FUSION N/A 9/10/2018 Procedure: Posterior cervical decompressive laminectomy C3-6; Posterior cervical lateral mass and pedicle fixation fusion C1-T1;  Surgeon: Yaz Pickard MD;  [facility];  Service: Neurosurgery       Length Of Stay: 0  Performed at least 2 patient identifiers during session: Name and Birthday       10/25/23 1033   PT Last Visit   PT Visit Date 10/25/23   Note Type   Note type Evaluation   Pain Assessment   Pain Assessment Tool 0-10   Pain Score 6   Pain Location/Orientation Orientation: Left; Location: Neck   Pain Onset/Description Onset: Sudden   Restrictions/Precautions   Weight Bearing Precautions Per Order No   Other Precautions Airborne/isolation;Contact/isolation;O2;Fall Risk   Home Living   Type of [address] [place] [address] Two level; Able to live on main level with bedroom/bathroom;Stairs to enter with rails  (2 HETAL)   Bathroom Shower/Tub Walk-in shower   Bathroom Toilet Standard   Bathroom Equipment Grab bars in shower;Grab bars around toilet; Shower chair   [address] [facility]  (uses [facility] for long distances)   Additional Comments pt uses O2 baseline at night and prn during the day   Prior Function   Level of [place] Independent with ADLs; Independent with functional mobility; Independent with IADLS   Lives With Daughter  (and grandchildren)   Receives Help From Family   IADLs Independent with driving; Independent with meal prep; Independent with medication management   Falls in the last 6 months 0   Vocational Retired   General   Family/Caregiver Present No   Cognition   Overall Cognitive Status WFL   Arousal/Participation Alert   Orientation Level Oriented X4   Memory Within functional limits   Following Commands Follows all commands and directions without difficulty   RLE Assessment   RLE Assessment WFL   LLE Assessment   LLE Assessment WFL   Bed Mobility   Supine to Sit 6  Modified independent   Sit to Supine 6  Modified independent   Transfers   Sit to Stand 5  Supervision   Additional items Increased time required   Stand to Sit 5  Supervision   Additional items Increased time required   Additional Comments pt denied dizziness with transitional movement   Ambulation/Elevation   Gait pattern WNL   Gait Assistance 5  Supervision   Assistive Device None   Distance 60 ft   Ambulation/Elevation Additional Comments SaO2 remained in the mid to high 90s   Balance   Static Sitting Normal   Dynamic Sitting Normal   Static Standing Normal   Dynamic Standing Good   Ambulatory Good   Endurance Deficit   Endurance Deficit No   Activity Tolerance   Activity Tolerance Patient tolerated treatment well   Assessment   Prognosis Excellent   Assessment Pt is 70 y.o. male seen for PT evaluation s/p admit to [address] [place] [address] on 10/24/2023 w/ Chronic obstructive pulmonary disease with acute exacerbation ([address]). PT consulted to assess pt's functional mobility and d/c needs. Order placed for PT eval and tx, w/ up and OOB as tolerated order. Pt agreeable to PT  session upon arrival, pt found supine in bed. The following objective measures performed on IE also reveal limitations: AM-PAC 6 Clicks 10/10. Patient was independent w/ all functional mobility w/ no AD. Pt presents at [facility] with transfers, ambulation, and bed mobility. From PT/mobility standpoint, recommendation at time of d/c would be anticipate no needs. Upon conclusion pt supine in bed. D/c PT services at this time. Complexity: Comorbidities affecting pt's physical performance at time of assessment include: DM, CVA, CAD, and COVID . Personal factors affecting pt at time of IE include: steps to enter home. Please find objective findings from PT assessment regarding body systems outlined above with impairments and limitations including pain. Pt's clinical presentation is currently evolving seen in pt's presentation of abnormal H&H, abnormal WBC count, abnormal sodium levels, abnormal blood sugar levels, and active infection.  The patient's AM-PAC Basic Mobility Inpatient Short Form Raw Score is 24. A Raw score of  greater than 16 suggests the patient may benefit from discharge to home. Please also refer to the recommendation of the Physical Therapist for safe discharge planning. RN verbalized pt appropriate for PT session. Pt seen as a co-eval with OT due to the patient's co-morbidities, clinically evolving presentation, and active infection which are a regression from the patient's baseline.    Barriers to Discharge None   Goals   Patient Goals to go home tomorrow   Discharge Recommendation   PT Discharge Recommendation No rehabilitation needs   AM-PAC Basic Mobility Inpatient   Turning in Flat Bed Without Bedrails 4   Lying on Back to Sitting on Edge of Flat Bed Without Bedrails 4   Moving Bed to Chair 4   Standing Up From Chair Using Arms 4   Walk in Room 4   Climb 3-5 Stairs With Railing 4   Basic Mobility Inpatient Raw Score 24   Basic Mobility Standardized Score 57.68   Highest Level Of Mobility   JH-HLM Goal 8: Walk 250 feet or more   JH-HLM Achieved 7: Walk 25 feet or more       Time In: 1018  Time Out: 1033  Total Evaluation Minutes: 15    Tasia Parikh, PT

## 2023-10-25 NOTE — OCCUPATIONAL THERAPY NOTE
Occupational Therapy Evaluation     Patient Name: Frances Calvo  YDMHW'X Date: 10/25/2023  Problem List  Principal Problem:    Chronic obstructive pulmonary disease with acute exacerbation ([address])  Active Problems:    CAD (coronary artery disease)    Type 2 diabetes mellitus with hyperglycemia, without long-term current use of insulin ([address])    Chronic respiratory failure ([address])    Hypertension    History of alcohol abuse    COVID-19    Past Medical History  Past Medical History:   Diagnosis Date    Cardiac arrest ([address])     COPD (chronic obstructive pulmonary disease) ([address])     CVA (cerebral vascular accident) ([address])     Diabetes mellitus ([address])     Heart attack ([address])     Hypertension     Stroke [facility])      Past Surgical History  Past Surgical History:   Procedure Laterality Date    CERVICAL FUSION N/A 9/10/2018    Procedure: Posterior cervical decompressive laminectomy C3-6; Posterior cervical lateral mass and pedicle fixation fusion C1-T1;  Surgeon: Yokasta Bernard MD;  Location[facility];  Service: Neurosurgery         10/25/23 1034   OT Last Visit   OT Visit Date 10/25/23   Note Type   Note type Evaluation   Pain Assessment   Pain Assessment Tool 0-10   Pain Score 6   Pain Location/Orientation Location: Neck   Patient's Stated Pain Goal No pain   Hospital Pain Intervention(s) Repositioned; Ambulation/increased activity; Emotional support   Restrictions/Precautions   Weight Bearing Precautions Per Order No   Other Precautions Airborne/isolation;Contact/isolation;O2;Fall Risk  (COVID-19+)   Home Living   Type of [address] [place] [address] Two level; Able to live on main level with bedroom/bathroom;Stairs to enter with rails  (2 HETAL)   Bathroom Shower/Tub Walk-in shower   Bathroom Toilet Standard   Bathroom Equipment Grab bars in shower;Grab bars around toilet; Shower chair   Bathroom Accessibility Accessible   Home Equipment Cane   Additional Comments 2L O2 FOR NIGHTTIME USE AND PRN DAYTIME PTA.  OCCASIONAL USE OF SPC Prior Function   Level of [place] Independent with ADLs; Independent with functional mobility; Independent with IADLS   Lives With Daughter;Family   Receives Help From Family   IADLs Independent with driving; Independent with meal prep; Independent with medication management   Falls in the last 6 months 0   Vocational Retired   Lifestyle   [employer] PT [address] ADLS/IADLS/DRIVING PTA   Reciprocal Relationships LIVES WITH SUPPORTIVE DAUGHTER AND GRANDCHILDREN   Service to Others RETIRED; MAILMAN   Intrinsic Gratification ENJOYS PLAYING IN THE BAND   ADL   Eating Assistance 7  Independent   Grooming Assistance 7  Independent   UB Bathing Assistance 5  Supervision/Setup   LB Bathing Assistance 5  Supervision/Setup   UB Dressing Assistance 5  Supervision/Setup   LB Dressing Assistance 5  Supervision/Setup   Toileting Assistance  5  Supervision/Setup   Functional Assistance 5  Supervision/Setup   Bed Mobility   Supine to Sit 6  Modified independent   Sit to Supine 6  Modified independent   Transfers   Sit to Stand 5  Supervision   Additional items Increased time required   Stand to Sit 5  Supervision   Additional items Increased time required   Functional Mobility   Functional Mobility 5  Supervision   Balance   Static Sitting Normal   Static Standing Good   Ambulatory Fair +   Activity Tolerance   Activity Tolerance Patient tolerated treatment well   Medical Staff Made Aware PT SEEN FOR CO-SESSION WITH SKILLED PHYSICAL THERAPIST 2' CLINICALLY UNSTABLE PRESENTATION, NEW PRECAUTIONS/LIMITATIONS, LIMITED ACTIVITY TOLERANCE AND PRESENT IMPAIRMENTS WHICH ARE A REGRESSION FROM THE PT'S BASELINE AND IMPACTING OVERALL OCCUPATIONAL PERFORMANCE.    Nurse Made Aware APPROPRIATE TO SEE   RUE Assessment   RUE Assessment WFL   LUE Assessment   LUE Assessment WFL   Hand Function   Gross Motor Coordination Functional   Fine Motor Coordination Functional   Cognition   Overall Cognitive Status WFL   Arousal/Participation Alert; Cooperative   Attention Within functional limits   Orientation Level Oriented X4   Memory Within functional limits   Following Commands Follows all commands and directions without difficulty   Comments PLEASANT AND COOPERATIVE   Assessment   Assessment 71 YO Male SEEN FOR INITIAL OCCUPATIONAL THERAPY EVALUATION FOLLOWING ADMISSION TO [facility] WITH SOB AND COUGH. DX INCLUDES COVID-19+ AND COPD EXACERBATION. PROBLEMS LIST/PMH INCLUDES Cardiac arrest ([facility]), COPD (chronic obstructive pulmonary disease) ([facility]), CVA (cerebral vascular accident) ([facility]), Diabetes mellitus ([facility]), Heart attack ([facility]), Hypertension, and Stroke ([facility]). PT IS FROM HOME WITH FAMILY WHERE HE REPORTS BEING INDEPENDENT WITH ADLS/IADLS/DRIVING PTA. PT CURRENTLY REQUIRES OVERALL SUPERVISION WITH ADLS, TRANSFERS AND FUNCTIONAL MOBILITY WITHOUT USE OF AD. O2 SATS REMAINED IN THE 90'S T/O SESSION. PT IS EXPERIENCING EXPECTED LIMITATIONS 2' PAIN, FATIGUE, IMPAIRED BALANCE, OVERALL WEAKNESS/DECONDITIONING , and OVERALL LIMITED ACTIVITY TOLERANCE. PT EDUCATED ON DEEP BREATHING TECHNIQUES T/O ACTIVITY, SLOWING OF PACE, ENERGY CONSERVATION TECHNIQUES FOR CARRY OVER UPON D/C, INCREASED FAMILY SUPPORT, and CONTINUE PARTICIPATION IN SELF-CARE/MOBILITY WITH STAFF [address] [place] [address] . The patient's raw score on the -PAC Daily Activity Inpatient Short Form is 20. A raw score of greater than or equal to 19 suggests the patient may benefit from discharge to home. Please refer to the recommendation of the Occupational Therapist for safe discharge planning. FROM AN OCCUPATIONAL THERAPY PERSPECTIVE, PT CAN RETURN HOME WITH INCREASED FAMILY SUPPORT WHEN MEDICALLY CLEARED. DME RECS INCLUDE USE OF SC. ALL QUESTIONS/CONCERNS ADDRESSED. NO ADDITIONAL ACUTE CARE OT NEEDS. D/C OT.    Goals   Patient Goals TO RETURN HOME   Discharge Recommendation   OT Discharge Recommendation No rehabilitation needs   Equipment Recommended   (USE OF SC, PT AGREEABLE)   AM-[facility] Daily Activity Inpatient   Lower Body Dressing 3   Bathing 3   Toileting 3   Upper Body Dressing 3   Grooming 4   Eating 4   Daily Activity Raw Score 20   Daily Activity Standardized Score (Calc for Raw Score >=11) 42.03   AM-PAC Applied Cognition Inpatient   Following a Speech/Presentation 4   Understanding Ordinary Conversation 4   Taking Medications 4   Remembering Where Things Are Placed or Put Away 4   Remembering List of 4-5 Errands 4   Taking Care of Complicated Tasks 4   Applied Cognition Raw Score 24   Applied Cognition Standardized Score 62.21         Documentation completed by DELILAH Prakash, OTR/L  [address] [place] [address] Certified ID# [id number]

## 2023-10-25 NOTE — PROGRESS NOTES
Received patient inito 872 from cw 2. Patient is AAOX4 and in good spirits. Sidell consuelo patient to room floor and nursing plan of care. Sidell consuelo patient to call bell and how and why it is to be used. Patient verbaclized an understanding of above.

## 2023-10-25 NOTE — PROGRESS NOTES
[facility]  Progress Note  Name: Serena Ashford  MRN: [id number]  Unit/Bed#: AP5 211-01 I Date of Admission: 10/24/2023   Date of Service: 10/25/2023 I Hospital Day: 0    Assessment/Plan   * Chronic obstructive pulmonary disease with acute exacerbation [facility])  Assessment & Plan  Patient presented with worsening shortness of breath appears to be consistent with COPD exacerbation in setting of COVID-19 infection.   Chronically on 2 L NC.   CXR negative   IV decadron, remdesivir per covid protocol   Nebs, respiratory protocol, continue home inhalers  Procal negative, monitor off abx aside from azithromycin   Currently on baseline 2 L NC     COVID-19  Assessment & Plan  Continue remdesivir, decadron per protocol  Isolation per protocol     History of alcohol abuse  Assessment & Plan  Hx Alcohol abuse we will continue on thiamine and folic acid  Patient reports he has not been drinking for over a year    Hypertension  Assessment & Plan  Continue lisinopril at 40 mg daily    Chronic respiratory failure ([address])  Assessment & Plan  Patient with history of chronic respiratory failure on 2 L nasal cannula  Currently at baseline oxygen requirements    Type 2 diabetes mellitus with hyperglycemia, without long-term current use of insulin [facility])  Assessment & Plan  Lab Results   Component Value Date    HGBA1C 8.8 (H) 10/21/2023       Recent Labs     10/24/23  1946 10/24/23  2109 10/25/23  0437   POCGLU >500* 372* 227*       Blood Sugar Average: Last 72 hrs:  (P) 299.5  Not controlled at baseline  Continue Lantus 50 units QHS (increased from 30 units), started humalog 12 units TID AC in setting of steroid induced hyperglycemia   SSI, accu checks  Adjust PRN     CAD (coronary artery disease)  Assessment & Plan  Patient with history of CAD will continue on aspirin, statin  Does not appear to be on beta-blocker         VTE Pharmacologic Prophylaxis:   Moderate Risk (Score 3-4) - Pharmacological DVT Prophylaxis Ordered: enoxaparin (Lovenox). Patient Centered Rounds: I performed bedside rounds with nursing staff today. Discussions with Specialists or Other Care Team Provider:     Education and Discussions with Family / Patient: Patient declined call to . Total Time Spent on Date of Encounter in care of patient: 20 mins. This time was spent on one or more of the following: performing physical exam; counseling and coordination of care; obtaining or reviewing history; documenting in the medical record; reviewing/ordering tests, medications or procedures; communicating with other healthcare professionals and discussing with patient's family/caregivers. Current Length of Stay: 0 day(s)  Current Patient Status: Observation   Certification Statement: The patient, admitted on an observation basis, will now require > 2 midnight hospital stay due to COVID-19 tx, respiratory failure   Discharge Plan: Anticipate discharge tomorrow to home. Code Status: Level 1 - Full Code    Subjective:   No acute complaints. Feels better today, breathing is improved     Objective:     Vitals:   Temp (24hrs), Av.4 °F (36.9 °C), Min:97.5 °F (36.4 °C), Max:99.5 °F (37.5 °C)    Temp:  [97.5 °F (36.4 °C)-99.5 °F (37.5 °C)] 97.9 °F (36.6 °C)  HR:  [] 75  Resp:  [22-26] 24  BP: (128-166)/() 142/101  SpO2:  [95 %-100 %] 98 %  Body mass index is 25.69 kg/m². Input and Output Summary (last 24 hours): Intake/Output Summary (Last 24 hours) at 10/25/2023 0953  Last data filed at 10/25/2023 0859  Gross per 24 hour   Intake --   Output 2950 ml   Net -2950 ml       Physical Exam:   Physical Exam  Vitals reviewed. Constitutional:       General: He is not in acute distress. Appearance: He is not toxic-appearing. HENT:      Head: Normocephalic and atraumatic. Eyes:      Extraocular Movements: Extraocular movements intact. Cardiovascular:      Rate and Rhythm: Normal rate and regular rhythm. Pulmonary:      Effort: Pulmonary effort is normal. No respiratory distress. Breath sounds: Wheezing present. Abdominal:      General: Bowel sounds are normal. There is no distension. Palpations: Abdomen is soft. Tenderness: There is no abdominal tenderness. Musculoskeletal:         General: Normal range of motion. Cervical back: Normal range of motion. Neurological:      General: No focal deficit present. Mental Status: He is alert and oriented to person, place, and time. Psychiatric:         Mood and Affect: Mood normal.         Behavior: Behavior normal.         Thought Content:  Thought content normal.         Additional Data:     Labs:  Results from last 7 days   Lab Units 10/24/23  1511   WBC Thousand/uL 11.72*   HEMOGLOBIN g/dL 9.3*   HEMATOCRIT % 32.3*   PLATELETS Thousands/uL 468*   NEUTROS PCT % 80*   LYMPHS PCT % 11*   MONOS PCT % 8   EOS PCT % 0     Results from last 7 days   Lab Units 10/24/23  1511   SODIUM mmol/L 133*   POTASSIUM mmol/L 4.2   CHLORIDE mmol/L 98   CO2 mmol/L 28   BUN mg/dL 13   CREATININE mg/dL 0.49*   ANION GAP mmol/L 7   CALCIUM mg/dL 9.0   ALBUMIN g/dL 3.9   TOTAL BILIRUBIN mg/dL 0.57   ALK PHOS U/L 68   ALT U/L 13   AST U/L 21   GLUCOSE RANDOM mg/dL 222*         Results from last 7 days   Lab Units 10/25/23  0437 10/24/23  2109 10/24/23  1946 10/18/23  1056   POC GLUCOSE mg/dl 227* 372* >500* 238*     Results from last 7 days   Lab Units 10/21/23  0412   HEMOGLOBIN A1C % 8.8*     Results from last 7 days   Lab Units 10/24/23  1511   PROCALCITONIN ng/ml 0.06       Lines/Drains:  Invasive Devices       Peripheral Intravenous Line  Duration             Peripheral IV 10/24/23 Right;Ventral (anterior) Forearm <1 day                          Imaging: Reviewed radiology reports from this admission including: chest xray    Recent Cultures (last 7 days):         Last 24 Hours Medication List:   Current Facility-Administered Medications   Medication Dose Route Frequency Provider Last Rate    albuterol  2 puff Inhalation 4x Daily Saqib Banai, DO      albuterol  2 puff Inhalation Q4H PRN Saqib Banai, DO      aspirin  81 mg Oral Daily Saqib Banai, DO      atorvastatin  80 mg Oral Daily With [employer], DO      azithromycin  500 mg Oral Q24H Saqib Banai, DO      busPIRone  10 mg Oral TID Saqib Banai, DO      dexamethasone  6 mg Intravenous Daily Saqib Banai, DO      enoxaparin  40 mg Subcutaneous Daily Saqib Banai, DO      fluticasone  1 spray Nasal BID Saqib Banai, DO      fluticasone-vilanterol  1 puff Inhalation Daily Saqib Banai, DO      folic acid  1 mg Oral Daily Saqib Banai, DO      HYDROmorphone  0.2 mg Intravenous Q3H PRN Saqib Banai, DO      insulin glargine  50 Units Subcutaneous HS Saqib Banai, DO      insulin lispro  1-6 Units Subcutaneous TID AC Saqib Banai, DO      insulin lispro  12 Units Subcutaneous TID With Meals Saqib Banai, DO      ipratropium  2 puff Inhalation 4x Daily Saqib Banai, DO      lisinopril  40 mg Oral Daily Saqib Banai, DO      LORazepam  0.5 mg Oral Q8H PRN Saqib Banai, DO      melatonin  3 mg Oral HS PRN Saqib Banai, DO      oxyCODONE  2.5 mg Oral Q4H PRN Saqib Banai, DO      Or    oxyCODONE  5 mg Oral Q4H PRN Saqib Banai, DO      pantoprazole  40 mg Oral BID Saqib Banai, DO      remdesivir  100 mg Intravenous Q24H Saqib Banai, DO      sodium chloride (PF)  3 mL Intravenous Q1H PRN Neena Frias MD      thiamine  100 mg Oral Daily Saqib Banai, DO      umeclidinium  1 puff Inhalation Daily Saqib Banai, DO          Today, Patient Was Seen By: Balbina Fagan PA-C    **Please Note: This note may have been constructed using a voice recognition system. **

## 2023-10-25 NOTE — RESPIRATORY THERAPY NOTE
RT Protocol Note  Grant Jonas 70 y.o. male MRN: [id number]  Unit/Bed#: CW2 211-01 Encounter: [id number]    Assessment    Principal Problem:    Chronic obstructive pulmonary disease with acute exacerbation ([address] [facility])  Active Problems:    CAD (coronary artery disease)    Type 2 diabetes mellitus with hyperglycemia, without long-term current use of insulin (HCC)    Chronic respiratory failure (HCC)    Hypertension    History of alcohol abuse      Home Pulmonary Medications:  albuterol  Home Devices/Therapy: Other (Comment)    Past Medical History:   Diagnosis Date    Cardiac arrest ([address] [facility])     COPD (chronic obstructive pulmonary disease) ([facility])     CVA (cerebral vascular accident) ([address] [facility])     Diabetes mellitus ([address] [facility])     Heart attack ([address] [facility])     Hypertension     Stroke ([facility] [address] [facility])      Social History     Socioeconomic History    Marital status: Single     Spouse name: None    Number of children: None    Years of education: None    Highest education level: None   Occupational History    None   Tobacco Use    Smoking status: Former     Types: Cigarettes    Smokeless tobacco: Never    Tobacco comments:     quit 5 months ago    Vaping Use    Vaping Use: Never used   Substance and Sexual Activity    Alcohol use: Not Currently     Alcohol/week: 8.0 - 12.0 standard drinks of alcohol     Types: 8 - 12 Cans of beer per week     Comment: every day drinker    Drug use: Never    Sexual activity: Not Currently   Other Topics Concern    None   Social History Narrative    ** Merged History Encounter **         ** Merged History Encounter **         ** Merged History Encounter **          Social Determinants of Health     Financial Resource Strain: Not on file   Food Insecurity: No Food Insecurity (10/17/2023)    Hunger Vital Sign     Worried About Running Out of Food in the Last Year: Never true     Ran Out of Food in the Last Year: Never true   Transportation Needs: No Transportation Needs (10/17/2023)    PRAPARE - Transportation     Lack of Transportation (Medical): No     Lack of Transportation (Non-Medical): No   Physical Activity: Not on file   Stress: Not on file   Social Connections: Not on file   Intimate Partner Violence: Not on file   Housing Stability: Low Risk  (10/17/2023)    Housing Stability Vital Sign     Unable to Pay for Housing in the Last Year: No     Number of Places Lived in the Last Year: 1     Unstable Housing in the Last Year: No       Subjective         Objective    Physical Exam:   Assessment Type: Assess only  General Appearance: Awake, Alert  Respiratory Pattern: Normal  Chest Assessment: Chest expansion symmetrical  Bilateral Breath Sounds: Diminished    Vitals:  Blood pressure 128/72, pulse 85, temperature 98.6 °F (37 °C), temperature source Oral, resp. rate (!) 24, weight 85.9 kg (189 lb 6.4 oz), SpO2 99 %. Imaging and other studies: I have personally reviewed pertinent reports.             Plan    Respiratory Plan: Home Bronchodilator Patient pathway        Resp Comments: per COIVD protocol duo neb dc'd and pt placed on albuterol and atrovent mdi qid and q4 prn

## 2023-10-25 NOTE — CASE MANAGEMENT
Case Management Assessment & Discharge Planning Note    Patient name Mary Jo Pozo  Location 2 211/CW2 211-01 MRN [id number]  : 1955 Date 10/25/2023       Current Admission Date: 10/24/2023  Current Admission Diagnosis:Chronic obstructive pulmonary disease with acute exacerbation [facility])   Patient Active Problem List    Diagnosis Date Noted    COVID-19 10/25/2023    History of alcohol abuse 10/17/2023    Anemia 10/17/2023    SIRS (systemic inflammatory response syndrome) ([address]) 2023    Hypertension 2023    COPD (chronic obstructive pulmonary disease) ([address]) 2023    Anxiety 2023    Chronic respiratory failure ([address]) 2023    Stage 3 severe COPD by GOLD classification ([address]) 2023    Oral abscess 2022    Tooth fracture 2022    H/O ETOH abuse 2022    Closed nondisplaced fracture of posterior arch of first cervical vertebra ([address]) 2022    Fall 2022    Diabetic polyneuropathy associated with type 2 diabetes mellitus ([address]) 2021    Hammertoe, bilateral 2021    Post-traumatic arthritis of left foot 2021    Pain due to onychomycosis of toenail 2021    Bronchitis 2020    KLARISSA (obstructive sleep apnea) 2020    Dirty living conditions 2020    Closed fracture of first cervical vertebra ([address]) 2019    ETOH abuse 2019    Chronic obstructive pulmonary disease with acute exacerbation ([address]) 2018    At moderate risk for venous thromboembolism (VTE) 2018    S/P C1-T1 Posterior Cervical Discectomy and Fusion on 9/10/18 2018    Acute blood loss anemia 2018    Type 2 diabetes mellitus with hyperglycemia, without long-term current use of insulin ([address]) 2018    Closed odontoid fracture with routine healing 2018    Closed displaced fracture of third cervical vertebra ([address]) 2018    Closed displaced fracture of fourth cervical vertebra ([address]) 2018    Alcohol abuse 2018 CAD (coronary artery disease) 09/09/2018    Dens fracture ([address]) 09/09/2018      LOS (days): 0  Geometric Mean LOS (GMLOS) (days):   Days to GMLOS:     OBJECTIVE:       Current admission status: Observation    Preferred Pharmacy:   [address]  [address] [facility] [address]  Phone: [phone] Fax: [address] [facility], [address] [facility] [address] [facility] [address]  Phone: [phone] Fax: [phone]    Primary Care Provider: TATIANA Crandall    Primary Insurance: MEDICARE  Secondary Insurance: BLUE CROSS    ASSESSMENT:  [address] Representative - Daughter   Primary Phone: [phone] (Mobile)                 Patient Information  Admitted from[de-identified] Home  Mental Status: Alert  During Assessment patient was accompanied by: Not accompanied during assessment  Assessment information provided by[de-identified] Patient  Primary Caregiver: Self  Support Systems: Self, Family members, Daughter  Home entry access options.  Select all that apply.: Stairs  Number of steps to enter home.: 2  Type of Current Residence: 2 story home (condo, first floor setup upon entry)  Upon entering residence, is there a bedroom on the main floor (no further steps)?: Yes  Upon entering residence, is there a bathroom on the main floor (no further steps)?: Yes  Living Arrangements: Lives w/ Daughter (Lives w/ daughter, JERE, and grandchildren.)    Activities of Daily Living Prior to Admission  Functional Status: Independent  Completes ADLs independently?: Yes  Ambulates independently?: Yes  Does patient use assisted devices?: Yes  Assisted Devices (DME) used: Portable Oxygen tanks, Home Oxygen concentrator, [address] [facility]  DME Company Name (respiratory supplies): [employer]  O2 Rate(s): 2L  Does patient currently own DME?: Yes  What DME does the patient currently own?: Home Oxygen concentrator, Portable Oxygen tanks, Straight Cane  Does patient have a history of Outpatient Therapy (PT/OT)?: No  Does the patient have a history of Short-Term Rehab?: Yes (Pt unsure of facility name at this time)  Does patient have a history of HHC?: Yes (pt unsure of agency name at this time)  Does patient currently have [facility]?: No    Patient Information Continued  Does patient have prescription coverage?: Yes  Does patient have a history of substance abuse?: Yes  Historical substance use preference: Alcohol/ETOH  Is patient currently in treatment for substance abuse?: N/A - sober (prior placement hx at [facility], 3 years prior)  Does patient have a history of Mental Health Diagnosis?: No    Means of Transportation  Means of Transport to Appts[de-identified] Family transport (family vs uber/lyft)        DISCHARGE DETAILS:    Discharge planning discussed with[de-identified] Pt via TC (covid +)    Other Referral/Resources/Interventions Provided:  Referral Comments: After care needs pending at this time including PT/OT evaluations. pt resides with family, IPTA with use of cane for ambulation. Wears 2L via NC at baseline, owns home 02 concentrator and portable tanks. Supplier is [employer]. If plan if for d/c home, pt states he will require transport home on d/c. Pt will require BLS transport due to covid contact precautions and need for 02 (2L) for transport. CM team will continue to follow.

## 2023-10-26 VITALS
OXYGEN SATURATION: 97 % | RESPIRATION RATE: 14 BRPM | DIASTOLIC BLOOD PRESSURE: 67 MMHG | HEART RATE: 62 BPM | TEMPERATURE: 97.6 F | WEIGHT: 189.4 LBS | BODY MASS INDEX: 25.69 KG/M2 | SYSTOLIC BLOOD PRESSURE: 111 MMHG

## 2023-10-26 LAB
ANION GAP SERPL CALCULATED.3IONS-SCNC: 6 MMOL/L
BASOPHILS # BLD AUTO: 0.01 THOUSANDS/ÂΜL (ref 0–0.1)
BASOPHILS NFR BLD AUTO: 0 % (ref 0–1)
BUN SERPL-MCNC: 16 MG/DL (ref 5–25)
CALCIUM SERPL-MCNC: 8.5 MG/DL (ref 8.4–10.2)
CHLORIDE SERPL-SCNC: 103 MMOL/L (ref 96–108)
CO2 SERPL-SCNC: 26 MMOL/L (ref 21–32)
CREAT SERPL-MCNC: 0.52 MG/DL (ref 0.6–1.3)
EOSINOPHIL # BLD AUTO: 0 THOUSAND/ÂΜL (ref 0–0.61)
EOSINOPHIL NFR BLD AUTO: 0 % (ref 0–6)
ERYTHROCYTE [DISTWIDTH] IN BLOOD BY AUTOMATED COUNT: 19.4 % (ref 11.6–15.1)
GFR SERPL CREATININE-BSD FRML MDRD: 108 ML/MIN/1.73SQ M
GLUCOSE SERPL-MCNC: 134 MG/DL (ref 65–140)
GLUCOSE SERPL-MCNC: 147 MG/DL (ref 65–140)
HCT VFR BLD AUTO: 32.2 % (ref 36.5–49.3)
HGB BLD-MCNC: 9.1 G/DL (ref 12–17)
IMM GRANULOCYTES # BLD AUTO: 0.08 THOUSAND/UL (ref 0–0.2)
IMM GRANULOCYTES NFR BLD AUTO: 1 % (ref 0–2)
LYMPHOCYTES # BLD AUTO: 0.98 THOUSANDS/ÂΜL (ref 0.6–4.47)
LYMPHOCYTES NFR BLD AUTO: 9 % (ref 14–44)
MCH RBC QN AUTO: 21.5 PG (ref 26.8–34.3)
MCHC RBC AUTO-ENTMCNC: 28.3 G/DL (ref 31.4–37.4)
MCV RBC AUTO: 76 FL (ref 82–98)
MONOCYTES # BLD AUTO: 0.89 THOUSAND/ÂΜL (ref 0.17–1.22)
MONOCYTES NFR BLD AUTO: 8 % (ref 4–12)
NEUTROPHILS # BLD AUTO: 8.61 THOUSANDS/ÂΜL (ref 1.85–7.62)
NEUTS SEG NFR BLD AUTO: 82 % (ref 43–75)
NRBC BLD AUTO-RTO: 0 /100 WBCS
PLATELET # BLD AUTO: 412 THOUSANDS/UL (ref 149–390)
PMV BLD AUTO: 10.4 FL (ref 8.9–12.7)
POTASSIUM SERPL-SCNC: 4.1 MMOL/L (ref 3.5–5.3)
RBC # BLD AUTO: 4.24 MILLION/UL (ref 3.88–5.62)
SODIUM SERPL-SCNC: 135 MMOL/L (ref 135–147)
WBC # BLD AUTO: 10.57 THOUSAND/UL (ref 4.31–10.16)

## 2023-10-26 PROCEDURE — 80048 BASIC METABOLIC PNL TOTAL CA: CPT | Performed by: PHYSICIAN ASSISTANT

## 2023-10-26 PROCEDURE — 82948 REAGENT STRIP/BLOOD GLUCOSE: CPT

## 2023-10-26 PROCEDURE — 85025 COMPLETE CBC W/AUTO DIFF WBC: CPT | Performed by: PHYSICIAN ASSISTANT

## 2023-10-26 PROCEDURE — 99238 HOSP IP/OBS DSCHRG MGMT 30/<: CPT | Performed by: PHYSICIAN ASSISTANT

## 2023-10-26 RX ORDER — AZITHROMYCIN 500 MG/1
500 TABLET, FILM COATED ORAL EVERY 24 HOURS
Qty: 1 TABLET | Refills: 0 | Status: SHIPPED | OUTPATIENT
Start: 2023-10-26 | End: 2023-10-27

## 2023-10-26 RX ORDER — DEXAMETHASONE 6 MG/1
6 TABLET ORAL
Qty: 8 TABLET | Refills: 0 | Status: SHIPPED | OUTPATIENT
Start: 2023-10-26 | End: 2023-11-03

## 2023-10-26 RX ADMIN — PANTOPRAZOLE SODIUM 40 MG: 40 TABLET, DELAYED RELEASE ORAL at 09:16

## 2023-10-26 RX ADMIN — FLUTICASONE FUROATE AND VILANTEROL TRIFENATATE 1 PUFF: 200; 25 POWDER RESPIRATORY (INHALATION) at 09:16

## 2023-10-26 RX ADMIN — BUSPIRONE HYDROCHLORIDE 10 MG: 10 TABLET ORAL at 09:16

## 2023-10-26 RX ADMIN — FLUTICASONE PROPIONATE 1 SPRAY: 50 SPRAY, METERED NASAL at 09:16

## 2023-10-26 RX ADMIN — THIAMINE HCL TAB 100 MG 100 MG: 100 TAB at 09:16

## 2023-10-26 RX ADMIN — IPRATROPIUM BROMIDE 2 PUFF: 17 AEROSOL, METERED RESPIRATORY (INHALATION) at 02:05

## 2023-10-26 RX ADMIN — HYDROMORPHONE HYDROCHLORIDE 0.2 MG: 0.2 INJECTION, SOLUTION INTRAMUSCULAR; INTRAVENOUS; SUBCUTANEOUS at 11:00

## 2023-10-26 RX ADMIN — OXYCODONE HYDROCHLORIDE 5 MG: 5 TABLET ORAL at 09:18

## 2023-10-26 RX ADMIN — ASPIRIN 81 MG CHEWABLE TABLET 81 MG: 81 TABLET CHEWABLE at 09:16

## 2023-10-26 RX ADMIN — ALBUTEROL SULFATE 2 PUFF: 90 AEROSOL, METERED RESPIRATORY (INHALATION) at 02:05

## 2023-10-26 RX ADMIN — ENOXAPARIN SODIUM 40 MG: 40 INJECTION SUBCUTANEOUS at 09:17

## 2023-10-26 RX ADMIN — ALBUTEROL SULFATE 2 PUFF: 90 AEROSOL, METERED RESPIRATORY (INHALATION) at 09:16

## 2023-10-26 RX ADMIN — INSULIN LISPRO 4 UNITS: 100 INJECTION, SOLUTION INTRAVENOUS; SUBCUTANEOUS at 09:17

## 2023-10-26 RX ADMIN — FOLIC ACID 1 MG: 1 TABLET ORAL at 09:16

## 2023-10-26 RX ADMIN — ALBUTEROL SULFATE 2 PUFF: 90 AEROSOL, METERED RESPIRATORY (INHALATION) at 11:00

## 2023-10-26 RX ADMIN — INSULIN LISPRO 12 UNITS: 100 INJECTION, SOLUTION INTRAVENOUS; SUBCUTANEOUS at 09:17

## 2023-10-26 RX ADMIN — OXYCODONE HYDROCHLORIDE 5 MG: 5 TABLET ORAL at 00:27

## 2023-10-26 RX ADMIN — LISINOPRIL 40 MG: 20 TABLET ORAL at 09:16

## 2023-10-26 RX ADMIN — ALBUTEROL SULFATE 2 PUFF: 90 AEROSOL, METERED RESPIRATORY (INHALATION) at 00:37

## 2023-10-26 RX ADMIN — IPRATROPIUM BROMIDE 2 PUFF: 17 AEROSOL, METERED RESPIRATORY (INHALATION) at 01:16

## 2023-10-26 RX ADMIN — IPRATROPIUM BROMIDE 2 PUFF: 17 AEROSOL, METERED RESPIRATORY (INHALATION) at 09:21

## 2023-10-26 RX ADMIN — INSULIN LISPRO 12 UNITS: 100 INJECTION, SOLUTION INTRAVENOUS; SUBCUTANEOUS at 12:54

## 2023-10-26 NOTE — ASSESSMENT & PLAN NOTE
Was on remdesivir, IV decadron. Can d/c remdesivir at discharge.   Continue decadron x 10 days on d/c  Isolation per protocol

## 2023-10-26 NOTE — ASSESSMENT & PLAN NOTE
Lab Results   Component Value Date    HGBA1C 8.8 (H) 10/21/2023       Recent Labs     10/25/23  1618 10/25/23  1710 10/25/23  2057 10/26/23  1122   POCGLU 75 81 303* 134         Blood Sugar Average: Last 72 hrs:  (P) 602.2538693663701508  Not controlled at baseline  Continue home rx on d/c

## 2023-10-26 NOTE — CASE MANAGEMENT
Case Management Discharge Planning Note    Patient name Zain Young  Location BL3 580/FN1 558-92 MRN [id number]  : 1955 Date 10/26/2023       Current Admission Date: 10/24/2023  Current Admission Diagnosis:Chronic obstructive pulmonary disease with acute exacerbation [facility])   Patient Active Problem List    Diagnosis Date Noted    COVID-19 10/25/2023    History of alcohol abuse 10/17/2023    Anemia 10/17/2023    SIRS (systemic inflammatory response syndrome) ([address]) 2023    Hypertension 2023    COPD (chronic obstructive pulmonary disease) ([address]) 2023    Anxiety 2023    Chronic respiratory failure ([address]) 2023    Stage 3 severe COPD by GOLD classification ([address]) 2023    Oral abscess 2022    Tooth fracture 2022    H/O ETOH abuse 2022    Closed nondisplaced fracture of posterior arch of first cervical vertebra ([address]) 2022    Fall 2022    Diabetic polyneuropathy associated with type 2 diabetes mellitus ([address]) 2021    Hammertoe, bilateral 2021    Post-traumatic arthritis of left foot 2021    Pain due to onychomycosis of toenail 2021    Bronchitis 2020    KLARISSA (obstructive sleep apnea) 2020    Dirty living conditions 2020    Closed fracture of first cervical vertebra ([address]) 2019    ETOH abuse 2019    Chronic obstructive pulmonary disease with acute exacerbation ([address]) 2018    At moderate risk for venous thromboembolism (VTE) 2018    S/P C1-T1 Posterior Cervical Discectomy and Fusion on 9/10/18 2018    Acute blood loss anemia 2018    Type 2 diabetes mellitus with hyperglycemia, without long-term current use of insulin ([address]) 2018    Closed odontoid fracture with routine healing 2018    Closed displaced fracture of third cervical vertebra ([address]) 2018    Closed displaced fracture of fourth cervical vertebra ([address]) 2018    Alcohol abuse 2018    CAD (coronary artery disease) 09/09/2018    Dens fracture ([address]) 09/09/2018      LOS (days): 1  Geometric Mean LOS (GMLOS) (days): 3.50  Days to GMLOS:2.5     OBJECTIVE:  Risk of Unplanned Readmission Score: 28.26         Current admission status: Inpatient   Preferred Pharmacy:   [address] [place], [place] - [address] [facility]  [address] [facility] [address]  Phone: [phone] Fax: [address] [place], [place] - [address] [facility] [address] [facility] [address]  Phone: [phone] Fax: [phone]    Primary Care Provider: Rahul Raphael [address] [place] [address]    Primary Insurance: MEDICARE  Secondary Insurance: BLUE CROSS    DISCHARGE DETAILS:    Discharge planning discussed with[de-identified] patient's caregiver, Niharika Pencil of Choice: Yes  Comments - Freedom of Choice: no needs  CM contacted family/caregiver?: Yes  Were Treatment Team discharge recommendations reviewed with patient/caregiver?: Yes  Did patient/caregiver verbalize understanding of patient care needs?: Yes  Were patient/caregiver advised of the risks associated with not following Treatment Team discharge recommendations?: Yes    Contacts  Patient Contacts: Ana Rosa West  Relationship to Patient[de-identified] Family  Contact Method: Phone  Phone Number: [phone]  Reason/Outcome: Discharge [address] [facility]         Is the patient interested in [address] at discharge?: No    DME Referral Provided  Referral made for DME?: No    Treatment Team Recommendation: Home  Discharge Destination Plan[de-identified] Home  Transport at Discharge : [facility]  Dispatcher Contacted: Yes  Number/Name of Dispatcher: Roland      IMM Given (Date):: 10/26/23  IMM Given to[de-identified] Family. .IMM reviewed with patient's caregiver, patient's caregiver agrees with discharge determination. Family notified[de-identified] Daughter via Pj Elizalde is his caregiver  Additional Comments:  This CM notified Lourdes Vergara to go over the [facility] for discharge to home and make her aware that patient is Covid + and to use droplet precautions. She states he can stay in his own room to prevent exposure. Frieda Ordonez also confirmed that he has his oxygen concentrator at home. Frieda Ordonez understood the IMM form and agreed to discharge. Daughter aware there may be an extra transport fee. Brooke Riggs .CM reviewed d/c planning process including the following: identifying help at home, patient preference for d/c planning needs, Discharge Lounge, [facility] Meds to Bed program, availability of treatment team to discuss questions or concerns patient and/or family may have regarding understanding medications and recognizing signs and symptoms once discharged. CM also encouraged patient to follow up with all recommended appointments after discharge. Patient advised of importance for patient and family to participate in managing patient’s medical well being.

## 2023-10-26 NOTE — PLAN OF CARE
Problem: Potential for Falls  Goal: Patient will remain free of falls  Description: INTERVENTIONS:  - Educate patient/family on patient safety including physical limitations  - Instruct patient to call for assistance with activity   - Consult OT/PT to assist with strengthening/mobility   - Keep Call bell within reach  - Keep bed low and locked with side rails adjusted as appropriate  - Keep care items and personal belongings within reach  - Initiate and maintain comfort rounds  - Make Fall Risk Sign visible to staff  - Offer Toileting every 3 Hours, in advance of need  - Apply yellow socks and bracelet for high fall risk patients  - Consider moving patient to room near nurses station  Outcome: Progressing

## 2023-10-26 NOTE — ASSESSMENT & PLAN NOTE
Patient presented with worsening shortness of breath appears to be consistent with COPD exacerbation in setting of COVID-19 infection.   Chronically on 2 L NC.   CXR negative   IV decadron, remdesivir per covid protocol -- will d/c to complete 10 days total of decadron   Now resolved, feeling much better, on baseline O2

## 2023-10-26 NOTE — DISCHARGE SUMMARY
[address] [facility]  Discharge- Jefferson Tierney 1955, 70 y.o. male MRN: [id number]  Unit/Bed#: SF3 872-01 Encounter: [id number]  Primary Care Provider: TATIANA Leal   Date and time admitted to hospital: 10/24/2023  2:44 PM    * Chronic obstructive pulmonary disease with acute exacerbation [facility])  Assessment & Plan  Patient presented with worsening shortness of breath appears to be consistent with COPD exacerbation in setting of COVID-19 infection. Chronically on 2 L NC.   CXR negative   IV decadron, remdesivir per covid protocol -- will d/c to complete 10 days total of decadron   Now resolved, feeling much better, on baseline O2     COVID-19  Assessment & Plan  Was on remdesivir, IV decadron. Can d/c remdesivir at discharge.   Continue decadron x 10 days on d/c  Isolation per protocol     History of alcohol abuse  Assessment & Plan  Hx Alcohol abuse we will continue on thiamine and folic acid  Patient reports he has not been drinking for over a year    Hypertension  Assessment & Plan  Continue lisinopril at 40 mg daily    Chronic respiratory failure ([address])  Assessment & Plan  Patient with history of chronic respiratory failure on 2 L nasal cannula  Currently at baseline oxygen requirements    Type 2 diabetes mellitus with hyperglycemia, without long-term current use of insulin [facility])  Assessment & Plan  Lab Results   Component Value Date    HGBA1C 8.8 (H) 10/21/2023       Recent Labs     10/25/23  1618 10/25/23  1710 10/25/23  2057 10/26/23  1122   POCGLU 75 81 303* 134         Blood Sugar Average: Last 72 hrs:  (P) [id number]  Not controlled at baseline  Continue home rx on d/c     CAD (coronary artery disease)  Assessment & Plan  Patient with history of CAD will continue on aspirin, statin  Does not appear to be on beta-blocker      Medical Problems       Resolved Problems  Date Reviewed: 10/26/2023   None       Discharging Physician / Practitioner: Griselda Light, NAOH  PCP: Manuela Beckman  Admission Date:   Admission Orders (From admission, onward)       Ordered        10/25/23 1407  Inpatient Admission  Once            10/24/23 1651  Place in Observation  Once                          Discharge Date: 10/26/23    Consultations During Hospital Stay:  None    Procedures Performed:   None    Significant Findings / Test Results:   COVID-19 positive  CXR negative  Procalcitonin negative   Troponin negative    Incidental Findings:   None     Test Results Pending at Discharge (will require follow up): None     Outpatient Tests Requested:  None    Complications:  none    Reason for Admission: COPD exacerbation/covid [address] Course:   Daniella Kimball is a 70 y.o. male patient who originally presented to the hospital on 10/24/2023 due to shortness of breath x days, with worsening sputum production. CXR negative, procal negative, troponin negative. COVID-19 was positive and given presentation he was dx with COPD exacerbation. He was given IV steroids and remdesivir per protocol with improvement of his sx. Today, he reports his SOB is resolved . He is on his baseline O2 requirement. He has no complaints. He will be discharged on decadron to complete 10 days total of steroids. Please see above list of diagnoses and related plan for additional information. Condition at Discharge: good    Discharge Day Visit / Exam:   Subjective:  No acute complaints, reports SOB resolved. Getting around okay. Vitals: Blood Pressure: 111/67 (10/26/23 0830)  Pulse: 62 (10/26/23 0830)  Temperature: 97.6 °F (36.4 °C) (10/26/23 0830)  Temp Source: Oral (10/26/23 0830)  Respirations: 14 (10/26/23 0830)  Weight - Scale: 85.9 kg (189 lb 6.4 oz) (10/25/23 0544)  SpO2: 97 % (10/26/23 0830)  Exam:   Physical Exam  Vitals reviewed. Constitutional:       General: He is not in acute distress. Appearance: He is not toxic-appearing.    HENT:      Head: Normocephalic and atraumatic. Eyes:      Extraocular Movements: Extraocular movements intact. Cardiovascular:      Rate and Rhythm: Normal rate. Pulmonary:      Effort: Pulmonary effort is normal. No respiratory distress. Musculoskeletal:         General: Normal range of motion. Neurological:      General: No focal deficit present. Mental Status: He is alert and oriented to person, place, and time. Psychiatric:         Mood and Affect: Mood normal.         Behavior: Behavior normal.          Discussion with Family: Patient declined call to . Discharge instructions/Information to patient and family:   See after visit summary for information provided to patient and family. Provisions for Follow-Up Care:  See after visit summary for information related to follow-up care and any pertinent home health orders. Disposition:   Home    Planned Readmission: no     Discharge Statement:  I spent 25 minutes discharging the patient. This time was spent on the day of discharge. I had direct contact with the patient on the day of discharge. Greater than 50% of the total time was spent examining patient, answering all patient questions, arranging and discussing plan of care with patient as well as directly providing post-discharge instructions. Additional time then spent on discharge activities. Discharge Medications:  See after visit summary for reconciled discharge medications provided to patient and/or family.       **Please Note: This note may have been constructed using a voice recognition system**

## 2023-10-29 NOTE — ED PROVIDER NOTES
History  Chief Complaint   Patient presents with    Shortness of Breath     Pt reports sob for the past 2 days. Was seen at [facility] yesterday for same cc and states sob persists. EMS provided pt w/ 1 duo neb     This is a 27-year-old male who is presenting with a chief concern of shortness of breath. Patient also has an ongoing cough, increasing sputum production, and history of COPD. Patient has multiple recent emergency department visits with similar symptoms including a very recent admission at [facility] for dyspnea/COPD exacerbation. Patient has been on recent course of steroids and was recently administered azithromycin which he has completed. Patient feels chills but is denying any fevers. He reports an acute worsening of his symptoms approximately an hour and a half prior to his arrival here in the emergency department. He is denying any chest pain but is endorsing chest tightness. Prior to Admission Medications   Prescriptions Last Dose Informant Patient Reported? Taking? B Complex Vitamins (VITAMIN B COMPLEX 100 IJ)   Yes No   Sig: Take 1 tablet by mouth daily   Insulin Glargine (BASAGLAR KWIKPEN SC)   Yes No   Sig: Inject 30 Units under the skin daily at bedtime   LORazepam (Ativan) 0.5 mg tablet   Yes No   Sig: Take by mouth every 6 (six) hours as needed for anxiety    albuterol (Proventil HFA) 90 mcg/act inhaler   No No   Sig: Inhale 2 puffs every 6 (six) hours as needed for wheezing   aspirin 81 mg chewable tablet   Yes No   Sig: Chew 81 mg daily   atorvastatin (LIPITOR) 80 mg tablet   No No   Sig: Take 1 tablet (80 mg total) by mouth daily with dinner   betamethasone valerate (VALISONE) 0.1 % cream   Yes No   Sig: Apply topically 2 (two) times a day   budesonide (PULMICORT) 0.5 mg/2 mL nebulizer solution   No No   Sig: Take 2 mL (0.5 mg total) by nebulization every 12 (twelve) hours Rinse mouth after use.    busPIRone (BUSPAR) 10 mg tablet   No No   Sig: Take 1 tablet (10 mg total) by mouth 3 (three) times a day   ferrous sulfate 324 (65 Fe) mg   No No   Sig: Take 1 tablet (324 mg total) by mouth 2 (two) times a day before meals   fluticasone (FLONASE) 50 mcg/act nasal spray   Yes No   Si spray into each nostril 2 (two) times a day   fluticasone-salmeterol (ADVAIR) 500-50 mcg/dose inhaler   Yes No   Sig: Inhale 1 puff 2 (two) times a day Rinse mouth after use. folic acid (FOLVITE) 1 mg tablet   No No   Sig: Take 1 tablet (1 mg total) by mouth daily   ipratropium-albuterol (COMBIVENT)  mcg/act inhaler   Yes No   Sig: Inhale   lisinopril (ZESTRIL) 40 mg tablet   Yes No   Sig: Take 40 mg by mouth daily    melatonin 3 mg   No No   Sig: Take 1 tablet (3 mg total) by mouth daily at bedtime as needed (30)   metFORMIN (GLUCOPHAGE) 1000 MG tablet   Yes No   Sig: Take 1 tablet by mouth every 12 (twelve) hours   pantoprazole (Protonix) 40 mg tablet   No No   Sig: Take 1 tablet (40 mg total) by mouth 2 (two) times a day   predniSONE 20 mg tablet   No No   Sig: Take 2 tablets (40 mg total) by mouth daily for 3 days, THEN 1.5 tablets (30 mg total) daily for 3 days, THEN 1 tablet (20 mg total) daily for 3 days, THEN 0.5 tablets (10 mg total) daily for 3 days.    thiamine 100 MG tablet   No No   Sig: Take 1 tablet (100 mg total) by mouth daily   tiotropium (SPIRIVA) 18 mcg inhalation capsule  Pharmacy (Specify) Yes No   Sig: Place 18 mcg into inhaler and inhale daily      Facility-Administered Medications: None       Past Medical History:   Diagnosis Date    Cardiac arrest ([address])     COPD (chronic obstructive pulmonary disease) (HCC)     CVA (cerebral vascular accident) ([address])     Diabetes mellitus ([address] [facility])     Heart attack ([address] [facility])     Hypertension     Stroke [facility])        Past Surgical History:   Procedure Laterality Date    CERVICAL FUSION N/A 9/10/2018    Procedure: Posterior cervical decompressive laminectomy C3-6; Posterior cervical lateral mass and pedicle fixation fusion C1-T1;  Surgeon: Dalphine Blizzard, MD; Location: [facility];  Service: Neurosurgery       Family History   Problem Relation Age of Onset    Heart attack Mother      I have reviewed and agree with the history as documented. E-Cigarette/Vaping    E-Cigarette Use Never User      E-Cigarette/Vaping Substances    Nicotine No     THC No     CBD No     Flavoring No     Other No     Unknown No      Social History     Tobacco Use    Smoking status: Former     Types: Cigarettes    Smokeless tobacco: Never    Tobacco comments:     quit 5 months ago    Vaping Use    Vaping Use: Never used   Substance Use Topics    Alcohol use: Not Currently     Alcohol/week: 8.0 - 12.0 standard drinks of alcohol     Types: 8 - 12 Cans of beer per week     Comment: every day drinker    Drug use: Never        Review of Systems   Constitutional:  Positive for chills and fatigue. Negative for fever. HENT:  Negative for congestion and sore throat. Eyes:  Negative for pain and visual disturbance. Respiratory:  Positive for cough, chest tightness and shortness of breath. Cardiovascular:  Negative for chest pain and palpitations. Gastrointestinal:  Negative for abdominal pain, blood in stool, constipation, diarrhea, nausea and vomiting. Genitourinary:  Negative for dysuria, flank pain and hematuria. Musculoskeletal:  Negative for arthralgias, back pain and neck pain. Skin:  Negative for color change and rash. Neurological:  Positive for weakness. Negative for dizziness, syncope and light-headedness. Hematological:  Negative for adenopathy. Does not bruise/bleed easily. All other systems reviewed and are negative.       Physical Exam  ED Triage Vitals   Temperature Pulse Respirations Blood Pressure SpO2   10/24/23 1446 10/24/23 1446 10/24/23 1446 10/24/23 1446 10/24/23 1446   97.5 °F (36.4 °C) 68 22 129/90 99 %      Temp Source Heart Rate Source Patient Position - Orthostatic VS BP Location FiO2 (%)   10/24/23 1446 10/24/23 1446 10/24/23 1446 10/24/23 1446 --   Oral Monitor Sitting Right arm       Pain Score       10/24/23 2100       No Pain             Orthostatic Vital Signs  Vitals:    10/24/23 2327 10/25/23 0759 10/25/23 1617 10/26/23 0830   BP: 128/72 (!) 142/101 120/72 111/67   Pulse: 85 75 77 62   Patient Position - Orthostatic VS: Sitting   Lying       Physical Exam  Vitals and nursing note reviewed. Constitutional:       General: He is not in acute distress. Appearance: He is well-developed. He is ill-appearing (frail). He is not toxic-appearing or diaphoretic. HENT:      Head: Normocephalic and atraumatic. Right Ear: External ear normal.      Left Ear: External ear normal.      Nose: Nose normal. No congestion or rhinorrhea. Mouth/Throat:      Mouth: Mucous membranes are moist.      Pharynx: No oropharyngeal exudate or posterior oropharyngeal erythema. Eyes:      General: No scleral icterus. Extraocular Movements: Extraocular movements intact. Conjunctiva/sclera: Conjunctivae normal.      Pupils: Pupils are equal, round, and reactive to light. Cardiovascular:      Rate and Rhythm: Normal rate and regular rhythm. Pulses: Normal pulses. Heart sounds: No murmur heard. Pulmonary:      Effort: Tachypnea present. No respiratory distress. Breath sounds: Examination of the right-upper field reveals wheezing. Examination of the left-upper field reveals wheezing. Examination of the right-middle field reveals wheezing. Examination of the left-middle field reveals wheezing. Decreased breath sounds (globally) and wheezing present. No rales. Comments: Prolonged expiratory phase  Abdominal:      Palpations: Abdomen is soft. There is no mass. Tenderness: There is no abdominal tenderness. There is no right CVA tenderness, left CVA tenderness or guarding. Hernia: No hernia is present. Musculoskeletal:         General: No swelling. Normal range of motion. Cervical back: Normal range of motion and neck supple. Right lower leg: No edema. Left lower leg: No edema. Skin:     General: Skin is warm and dry. Capillary Refill: Capillary refill takes less than 2 seconds. Coloration: Skin is not cyanotic. Neurological:      General: No focal deficit present. Mental Status: He is alert and oriented to person, place, and time. Psychiatric:         Mood and Affect: Mood normal.         Behavior: Behavior normal.         ED Medications  Medications   ipratropium-albuterol (FOR EMS ONLY) (DUO-NEB) 0.5-2.5 mg/3 mL inhalation solution 3 mL (0 mL Does not apply Given to EMS 10/24/23 1445)   [facility] 200 mg in sodium chloride 0.9 % 290 mL IVPB (200 mg Intravenous New Bag 10/24/23 2057)   magnesium sulfate 4 g/100 mL IVPB (premix) 4 g (0 g Intravenous Stopped 10/24/23 2058)       Diagnostic Studies  Results Reviewed       Procedure Component Value Units Date/Time    Procalcitonin [[id number]]  (Normal) Collected: 10/24/23 1511    Lab Status: Final result Specimen: Blood from Arm, Right Updated: 10/24/23 1958     Procalcitonin 0.06 ng/ml     Fingerstick Glucose (POCT) [[id number]]  (Abnormal) Collected: 10/24/23 1946    Lab Status: Final result Updated: 10/24/23 1948     POC Glucose >500 mg/dl     HS Troponin I 2hr [[id number]]  (Normal) Collected: 10/24/23 1729    Lab Status: Final result Specimen: Blood from Arm, Right Updated: 10/24/23 1811     hs TnI 2hr 10 ng/L      Delta 2hr hsTnI -1 ng/L     FLU/RSV/COVID - if FLU/RSV clinically relevant [[id number]]  (Abnormal) Collected: 10/24/23 1511    Lab Status: Final result Specimen: Nares from Nose Updated: 10/24/23 1631     SARS-CoV-2 Positive     INFLUENZA A PCR Negative     INFLUENZA B PCR Negative     RSV PCR Negative    Narrative:      FOR PEDIATRIC PATIENTS - copy/paste COVID Guidelines URL to browser: [email or web address]    SARS-CoV-2 assay is a Nucleic Acid Amplification assay intended for the  qualitative detection of nucleic acid from SARS-CoV-2 in nasopharyngeal  swabs. Results are for the presumptive identification of SARS-CoV-2 RNA. Positive results are indicative of infection with SARS-CoV-2, the virus  causing COVID-19, but do not rule out bacterial infection or co-infection  with other viruses. Laboratories within the [facility] and its  territories are required to report all positive results to the appropriate  public health authorities. Negative results do not preclude SARS-CoV-2  infection and should not be used as the sole basis for treatment or other  patient management decisions. Negative results must be combined with  clinical observations, patient history, and epidemiological information. This test has not been FDA cleared or approved. This test has been authorized by FDA under an Emergency Use Authorization  (EUA). This test is only authorized for the duration of time the  declaration that circumstances exist justifying the authorization of the  emergency use of an in vitro diagnostic tests for detection of SARS-CoV-2  virus and/or diagnosis of COVID-19 infection under section 564(b)(1) of  the Act, 21 U. S.C. 428XZK-9(W)(0), unless the authorization is terminated  or revoked sooner. The test has been validated but independent review by FDA  and CLIA is pending. Test performed using [employer] GeneXpert: This RT-PCR assay targets N2,  a region unique to SARS-CoV-2. A conserved region in the E-gene was chosen  for pan-Sarbecovirus detection which includes SARS-CoV-2. According to CMS-2020-01-R, this platform meets the definition of high-throughput technology.     HS Troponin 0hr (reflex protocol) [[id number]]  (Normal) Collected: 10/24/23 1511    Lab Status: Final result Specimen: Blood from Arm, Right Updated: 10/24/23 6507     hs TnI 0hr 11 ng/L     Comprehensive metabolic panel [[id number]]  (Abnormal) Collected: 10/24/23 1511    Lab Status: Final result Specimen: Blood from Arm, Right Updated: 10/24/23 1545     Sodium 133 mmol/L      Potassium 4.2 mmol/L      Chloride 98 mmol/L      CO2 28 mmol/L      ANION GAP 7 mmol/L      BUN 13 mg/dL      Creatinine 0.49 mg/dL      Glucose 222 mg/dL      Calcium 9.0 mg/dL      AST 21 U/L      ALT 13 U/L      Alkaline Phosphatase 68 U/L      Total Protein 6.6 g/dL      Albumin 3.9 g/dL      Total Bilirubin 0.57 mg/dL      eGFR 111 ml/min/1.73sq m     Narrative:      [facility] ([facility]D):     Stage 1 with normal or high GFR (GFR > 90 mL/min/1.73 square meters)    Stage 2 Mild CKD (GFR = 60-89 mL/min/1.73 square meters)    Stage 3A Moderate CKD (GFR = 45-59 mL/min/1.73 square meters)    Stage 3B Moderate CKD (GFR = 30-44 mL/min/1.73 square meters)    Stage 4 Severe CKD (GFR = 15-29 mL/min/1.73 square meters)    Stage 5 End Stage CKD (GFR <15 mL/min/1.73 square meters)  Note: GFR calculation is accurate only with a steady state creatinine    B-Type Natriuretic Peptide(BNP) [[id number]]  (Normal) Collected: 10/24/23 1511    Lab Status: Final result Specimen: Blood from Arm, Right Updated: 10/24/23 1545     BNP 51 pg/mL     CBC and differential [[id number]]  (Abnormal) Collected: 10/24/23 1511    Lab Status: Final result Specimen: Blood from Arm, Right Updated: 10/24/23 1523     WBC 11.72 Thousand/uL      RBC 4.41 Million/uL      Hemoglobin 9.3 g/dL      Hematocrit 32.3 %      MCV 73 fL      MCH 21.1 pg      MCHC 28.8 g/dL      RDW 18.8 %      MPV 10.6 fL      Platelets 610 Thousands/uL      nRBC 0 /100 WBCs      Neutrophils Relative 80 %      Immat GRANS % 1 %      Lymphocytes Relative 11 %      Monocytes Relative 8 %      Eosinophils Relative 0 %      Basophils Relative 0 %      Neutrophils Absolute 9.33 Thousands/µL      Immature Grans Absolute 0.07 Thousand/uL      Lymphocytes Absolute 1.33 Thousands/µL      Monocytes Absolute 0.94 Thousand/µL      Eosinophils Absolute 0.04 Thousand/µL Basophils Absolute 0.01 Thousands/µL     Blood gas, venous [[id number]]  (Abnormal) Collected: 10/24/23 1511    Lab Status: Final result Specimen: Blood from Arm, Right Updated: 10/24/23 1523     pH, Rocky 7.430     pCO2, Rocky 40.3 mm Hg      pO2, Rocky 77.6 mm Hg      HCO3, Rocky 26.1 mmol/L      Base Excess, Rocky 1.7 mmol/L      O2 Content, Rocky 13.4 ml/dL      O2 HGB, VENOUS 92.9 %                    X-ray chest 1 view portable   Final Result by Sae Garibay MD (10/25 1617)      No active pulmonary disease. Workstation performed: [id number]               Procedures  Procedures      ED Course                                       Medical Decision Making  DDX: COPD exacerbation vs. Acute pneumonia, doubt PTX, pleural effusion or ACS    I reviewed patient's recent medical records including multiple emergency department visits and recent hospitalization for COPD exacerbation    Reassessment/disposition: Patient has COVID which may be contributing to this acute decline since being released from the hospital.  Patient was improved significantly after nebulizer treatment and steroid load. However he was very uncomfortable going home and therefore using shared decision making with myself, the patient, and internal medicine team we agreed to admit patient for observation to monitor closely his airway and breathing to ensure that he feels comfortable with the plan for discharge before going home. Patient has no other major complications evident on work-up currently. Will be monitored closely overnight for oxygenation and be given nebulizer treatments as needed and medical optimization to hopefully get him to discharge soon. Amount and/or Complexity of Data Reviewed  Labs: ordered. Radiology: ordered. Risk  Prescription drug management. Decision regarding hospitalization.           Disposition  Final diagnoses:   COPD exacerbation ([address])   COVID     Time reflects when diagnosis was documented in both MDM as applicable and the Disposition within this note       Time User Action Codes Description Comment    10/24/2023  4:50 PM Baylee Tobi Add [J44.1] COPD exacerbation ([address])     10/24/2023  4:50 PM Baylee Tobi Add [U07.1] COVID     10/26/2023 12:57 PM Starr Miller Add [U07.1] COVID-19     10/26/2023 12:57 PM Moren, Dennie Lacrosse Add [J44.9] COPD (chronic obstructive pulmonary disease) [facility])           ED Disposition       ED Disposition   Admit    Condition   Stable    Date/Time   Tue Oct 24, 2023  4:50 PM    Comment   Case was discussed with Dr. Arabella Epps and the patient's admission status was agreed to be Admission Status: inpatient status to the service of Dr. Arabella Epps .                Follow-up Information    None         Discharge Medication List as of 10/26/2023  3:49 PM        START taking these medications    Details   azithromycin (ZITHROMAX) 500 MG tablet Take 1 tablet (500 mg total) by mouth every 24 hours for 1 dose, Starting Thu 10/26/2023, Until Fri 10/27/2023, Normal      dexamethasone (DECADRON) 6 mg tablet Take 1 tablet (6 mg total) by mouth daily with breakfast for 8 days, Starting Thu 10/26/2023, Until Fri 11/3/2023, Normal           CONTINUE these medications which have NOT CHANGED    Details   albuterol (Proventil HFA) 90 mcg/act inhaler Inhale 2 puffs every 6 (six) hours as needed for wheezing, Starting Fri 6/10/2022, Normal      aspirin 81 mg chewable tablet Chew 81 mg daily, Historical Med      atorvastatin (LIPITOR) 80 mg tablet Take 1 tablet (80 mg total) by mouth daily with dinner, Starting Sat 9/22/2018, Normal      B Complex Vitamins (VITAMIN B COMPLEX 100 IJ) Take 1 tablet by mouth daily, Historical Med      betamethasone valerate (VALISONE) 0.1 % cream Apply topically 2 (two) times a day, Starting Fri 10/4/2013, Historical Med      budesonide (PULMICORT) 0.5 mg/2 mL nebulizer solution Take 2 mL (0.5 mg total) by nebulization every 12 (twelve) hours Rinse mouth after use., Starting Fri 6/10/2022, No Print      busPIRone (BUSPAR) 10 mg tablet Take 1 tablet (10 mg total) by mouth 3 (three) times a day, Starting Fri 7/28/2023, Normal      ferrous sulfate 324 (65 Fe) mg Take 1 tablet (324 mg total) by mouth 2 (two) times a day before meals, Starting Wed 10/18/2023, No Print      fluticasone (FLONASE) 50 mcg/act nasal spray 1 spray into each nostril 2 (two) times a day, Starting Fri 10/4/2013, Historical Med      fluticasone-salmeterol (ADVAIR) 500-50 mcg/dose inhaler Inhale 1 puff 2 (two) times a day Rinse mouth after use., Historical Med      folic acid (FOLVITE) 1 mg tablet Take 1 tablet (1 mg total) by mouth daily, Starting Sat 6/11/2022, No Print      Insulin Glargine (BASAGLAR KWIKPEN SC) Inject 30 Units under the skin daily at bedtime, Historical Med      ipratropium-albuterol (COMBIVENT)  mcg/act inhaler Inhale, Historical Med      lisinopril (ZESTRIL) 40 mg tablet Take 40 mg by mouth daily , Historical Med      LORazepam (Ativan) 0.5 mg tablet Take by mouth every 6 (six) hours as needed for anxiety , Historical Med      melatonin 3 mg Take 1 tablet (3 mg total) by mouth daily at bedtime as needed (30), Starting Fri 9/21/2018, Normal      metFORMIN (GLUCOPHAGE) 1000 MG tablet Take 1 tablet by mouth every 12 (twelve) hours, Historical Med      pantoprazole (Protonix) 40 mg tablet Take 1 tablet (40 mg total) by mouth 2 (two) times a day, Starting Tue 8/8/2023, Normal      thiamine 100 MG tablet Take 1 tablet (100 mg total) by mouth daily, Starting Sat 9/22/2018, Normal      tiotropium (SPIRIVA) 18 mcg inhalation capsule Place 18 mcg into inhaler and inhale daily, Historical Med           STOP taking these medications       predniSONE 20 mg tablet Comments:   Reason for Stopping:             Outpatient Discharge Orders   Discharge Diet     Activity as tolerated     Call provider for:  difficulty breathing, headache or visual disturbances       PDMP Review       None             ED Provider  Attending physically available and evaluated Brenda Funez. I managed the patient along with the ED Attending.     Electronically Signed by           Yulisa Weiner MD  10/29/23 5993

## 2023-11-07 ENCOUNTER — APPOINTMENT (EMERGENCY)
Dept: RADIOLOGY | Facility: HOSPITAL | Age: 68
DRG: 191 | End: 2023-11-07
Payer: MEDICARE

## 2023-11-07 ENCOUNTER — HOSPITAL ENCOUNTER (INPATIENT)
Facility: HOSPITAL | Age: 68
LOS: 1 days | Discharge: HOME | DRG: 191 | End: 2023-11-08
Attending: EMERGENCY MEDICINE | Admitting: INTERNAL MEDICINE
Payer: MEDICARE

## 2023-11-07 DIAGNOSIS — J44.1 COPD EXACERBATION (HCC): Primary | ICD-10-CM

## 2023-11-07 DIAGNOSIS — I25.10 CAD (CORONARY ARTERY DISEASE): ICD-10-CM

## 2023-11-07 DIAGNOSIS — E11.9 DM (DIABETES MELLITUS) (HCC): ICD-10-CM

## 2023-11-07 DIAGNOSIS — J44.1 CHRONIC OBSTRUCTIVE PULMONARY DISEASE WITH ACUTE EXACERBATION (HCC): ICD-10-CM

## 2023-11-07 DIAGNOSIS — I10 HTN (HYPERTENSION): ICD-10-CM

## 2023-11-07 LAB
ACANTHOCYTES BLD QL SMEAR: PRESENT
ALBUMIN SERPL BCP-MCNC: 4.1 G/DL (ref 3.5–5)
ALP SERPL-CCNC: 64 U/L (ref 34–104)
ALT SERPL W P-5'-P-CCNC: 13 U/L (ref 7–52)
ANION GAP SERPL CALCULATED.3IONS-SCNC: 9 MMOL/L
ANISOCYTOSIS BLD QL SMEAR: PRESENT
AST SERPL W P-5'-P-CCNC: 11 U/L (ref 13–39)
BASOPHILS # BLD MANUAL: 0 THOUSAND/UL (ref 0–0.1)
BASOPHILS NFR MAR MANUAL: 0 % (ref 0–1)
BILIRUB SERPL-MCNC: 0.55 MG/DL (ref 0.2–1)
BUN SERPL-MCNC: 22 MG/DL (ref 5–25)
CALCIUM SERPL-MCNC: 9.1 MG/DL (ref 8.4–10.2)
CARDIAC TROPONIN I PNL SERPL HS: 12 NG/L
CHLORIDE SERPL-SCNC: 100 MMOL/L (ref 96–108)
CO2 SERPL-SCNC: 25 MMOL/L (ref 21–32)
CREAT SERPL-MCNC: 0.57 MG/DL (ref 0.6–1.3)
EOSINOPHIL # BLD MANUAL: 0 THOUSAND/UL (ref 0–0.4)
EOSINOPHIL NFR BLD MANUAL: 0 % (ref 0–6)
ERYTHROCYTE [DISTWIDTH] IN BLOOD BY AUTOMATED COUNT: 20.1 % (ref 11.6–15.1)
GFR SERPL CREATININE-BSD FRML MDRD: 104 ML/MIN/1.73SQ M
GLUCOSE SERPL-MCNC: 237 MG/DL (ref 65–140)
HCT VFR BLD AUTO: 36.4 % (ref 36.5–49.3)
HGB BLD-MCNC: 10.3 G/DL (ref 12–17)
LYMPHOCYTES # BLD AUTO: 0.15 THOUSAND/UL (ref 0.6–4.47)
LYMPHOCYTES # BLD AUTO: 1 % (ref 14–44)
MCH RBC QN AUTO: 21 PG (ref 26.8–34.3)
MCHC RBC AUTO-ENTMCNC: 28.3 G/DL (ref 31.4–37.4)
MCV RBC AUTO: 74 FL (ref 82–98)
MONOCYTES # BLD AUTO: 0 THOUSAND/UL (ref 0–1.22)
MONOCYTES NFR BLD: 0 % (ref 4–12)
NEUTROPHILS # BLD MANUAL: 14.97 THOUSAND/UL (ref 1.85–7.62)
NEUTS SEG NFR BLD AUTO: 99 % (ref 43–75)
OVALOCYTES BLD QL SMEAR: PRESENT
PLATELET # BLD AUTO: 445 THOUSANDS/UL (ref 149–390)
PLATELET BLD QL SMEAR: ADEQUATE
PMV BLD AUTO: 10 FL (ref 8.9–12.7)
POIKILOCYTOSIS BLD QL SMEAR: PRESENT
POLYCHROMASIA BLD QL SMEAR: PRESENT
POTASSIUM SERPL-SCNC: 4.3 MMOL/L (ref 3.5–5.3)
PROT SERPL-MCNC: 7.3 G/DL (ref 6.4–8.4)
RBC # BLD AUTO: 4.9 MILLION/UL (ref 3.88–5.62)
RBC MORPH BLD: PRESENT
SODIUM SERPL-SCNC: 134 MMOL/L (ref 135–147)
WBC # BLD AUTO: 15.12 THOUSAND/UL (ref 4.31–10.16)

## 2023-11-07 PROCEDURE — 99285 EMERGENCY DEPT VISIT HI MDM: CPT

## 2023-11-07 PROCEDURE — 85007 BL SMEAR W/DIFF WBC COUNT: CPT

## 2023-11-07 PROCEDURE — 85027 COMPLETE CBC AUTOMATED: CPT

## 2023-11-07 PROCEDURE — 93005 ELECTROCARDIOGRAM TRACING: CPT

## 2023-11-07 PROCEDURE — 80053 COMPREHEN METABOLIC PANEL: CPT

## 2023-11-07 PROCEDURE — 94644 CONT INHLJ TX 1ST HOUR: CPT

## 2023-11-07 PROCEDURE — 84484 ASSAY OF TROPONIN QUANT: CPT

## 2023-11-07 PROCEDURE — 36415 COLL VENOUS BLD VENIPUNCTURE: CPT

## 2023-11-07 PROCEDURE — 71046 X-RAY EXAM CHEST 2 VIEWS: CPT

## 2023-11-07 RX ORDER — METHYLPREDNISOLONE SOD SUCC 125 MG
1 VIAL (EA) INJECTION ONCE
Status: COMPLETED | OUTPATIENT
Start: 2023-11-07 | End: 2023-11-07

## 2023-11-07 RX ORDER — PREDNISONE 20 MG/1
60 TABLET ORAL ONCE
Status: COMPLETED | OUTPATIENT
Start: 2023-11-07 | End: 2023-11-07

## 2023-11-07 RX ORDER — IPRATROPIUM BROMIDE AND ALBUTEROL SULFATE .5; 3 MG/3ML; MG/3ML
2 SOLUTION RESPIRATORY (INHALATION) ONCE
Status: COMPLETED | OUTPATIENT
Start: 2023-11-07 | End: 2023-11-07

## 2023-11-07 RX ORDER — PREDNISONE 20 MG/1
40 TABLET ORAL DAILY
Qty: 10 TABLET | Refills: 0 | Status: CANCELLED | OUTPATIENT
Start: 2023-11-07 | End: 2023-11-12

## 2023-11-07 RX ORDER — SODIUM CHLORIDE FOR INHALATION 0.9 %
12 VIAL, NEBULIZER (ML) INHALATION ONCE
Status: COMPLETED | OUTPATIENT
Start: 2023-11-07 | End: 2023-11-07

## 2023-11-07 RX ORDER — ACETAMINOPHEN 325 MG/1
650 TABLET ORAL ONCE
Status: COMPLETED | OUTPATIENT
Start: 2023-11-07 | End: 2023-11-07

## 2023-11-07 RX ADMIN — ALBUTEROL SULFATE 10 MG: 2.5 SOLUTION RESPIRATORY (INHALATION) at 21:03

## 2023-11-07 RX ADMIN — ACETAMINOPHEN 650 MG: 325 TABLET, FILM COATED ORAL at 21:44

## 2023-11-07 RX ADMIN — Medication 12 ML: at 21:03

## 2023-11-07 RX ADMIN — IPRATROPIUM BROMIDE 1 MG: 0.5 SOLUTION RESPIRATORY (INHALATION) at 21:03

## 2023-11-07 RX ADMIN — PREDNISONE 60 MG: 20 TABLET ORAL at 21:03

## 2023-11-08 VITALS
RESPIRATION RATE: 23 BRPM | TEMPERATURE: 98.7 F | SYSTOLIC BLOOD PRESSURE: 156 MMHG | DIASTOLIC BLOOD PRESSURE: 72 MMHG | HEART RATE: 64 BPM | OXYGEN SATURATION: 98 %

## 2023-11-08 LAB
ANION GAP SERPL CALCULATED.3IONS-SCNC: 11 MMOL/L
ANION GAP SERPL CALCULATED.3IONS-SCNC: 8 MMOL/L
BACTERIA UR QL AUTO: NORMAL /HPF
BILIRUB UR QL STRIP: NEGATIVE
BUN SERPL-MCNC: 21 MG/DL (ref 5–25)
BUN SERPL-MCNC: 25 MG/DL (ref 5–25)
CALCIUM SERPL-MCNC: 8.3 MG/DL (ref 8.4–10.2)
CALCIUM SERPL-MCNC: 8.4 MG/DL (ref 8.4–10.2)
CHLORIDE SERPL-SCNC: 105 MMOL/L (ref 96–108)
CHLORIDE SERPL-SCNC: 97 MMOL/L (ref 96–108)
CLARITY UR: CLEAR
CO2 SERPL-SCNC: 22 MMOL/L (ref 21–32)
CO2 SERPL-SCNC: 23 MMOL/L (ref 21–32)
COLOR UR: ABNORMAL
CREAT SERPL-MCNC: 0.6 MG/DL (ref 0.6–1.3)
CREAT SERPL-MCNC: 0.72 MG/DL (ref 0.6–1.3)
ERYTHROCYTE [DISTWIDTH] IN BLOOD BY AUTOMATED COUNT: 19.9 % (ref 11.6–15.1)
GFR SERPL CREATININE-BSD FRML MDRD: 102 ML/MIN/1.73SQ M
GFR SERPL CREATININE-BSD FRML MDRD: 95 ML/MIN/1.73SQ M
GLUCOSE SERPL-MCNC: 102 MG/DL (ref 65–140)
GLUCOSE SERPL-MCNC: 110 MG/DL (ref 65–140)
GLUCOSE SERPL-MCNC: 175 MG/DL (ref 65–140)
GLUCOSE SERPL-MCNC: 232 MG/DL (ref 65–140)
GLUCOSE SERPL-MCNC: 235 MG/DL (ref 65–140)
GLUCOSE SERPL-MCNC: 455 MG/DL (ref 65–140)
GLUCOSE SERPL-MCNC: 497 MG/DL (ref 65–140)
GLUCOSE SERPL-MCNC: 557 MG/DL (ref 65–140)
GLUCOSE SERPL-MCNC: >500 MG/DL (ref 65–140)
GLUCOSE SERPL-MCNC: >500 MG/DL (ref 65–140)
GLUCOSE UR STRIP-MCNC: ABNORMAL MG/DL
HCT VFR BLD AUTO: 33.4 % (ref 36.5–49.3)
HGB BLD-MCNC: 9.7 G/DL (ref 12–17)
HGB UR QL STRIP.AUTO: NEGATIVE
KETONES UR STRIP-MCNC: ABNORMAL MG/DL
LEUKOCYTE ESTERASE UR QL STRIP: ABNORMAL
MAGNESIUM SERPL-MCNC: 2.2 MG/DL (ref 1.9–2.7)
MCH RBC QN AUTO: 21.6 PG (ref 26.8–34.3)
MCHC RBC AUTO-ENTMCNC: 29 G/DL (ref 31.4–37.4)
MCV RBC AUTO: 74 FL (ref 82–98)
NITRITE UR QL STRIP: NEGATIVE
NON-SQ EPI CELLS URNS QL MICRO: NORMAL /HPF
PH UR STRIP.AUTO: 5 [PH]
PHOSPHATE SERPL-MCNC: 3.4 MG/DL (ref 2.3–4.1)
PLATELET # BLD AUTO: 388 THOUSANDS/UL (ref 149–390)
PMV BLD AUTO: 10.3 FL (ref 8.9–12.7)
POTASSIUM SERPL-SCNC: 3.9 MMOL/L (ref 3.5–5.3)
POTASSIUM SERPL-SCNC: 4.3 MMOL/L (ref 3.5–5.3)
PROT UR STRIP-MCNC: NEGATIVE MG/DL
RBC # BLD AUTO: 4.5 MILLION/UL (ref 3.88–5.62)
RBC #/AREA URNS AUTO: NORMAL /HPF
SODIUM SERPL-SCNC: 130 MMOL/L (ref 135–147)
SODIUM SERPL-SCNC: 136 MMOL/L (ref 135–147)
SP GR UR STRIP.AUTO: 1.04 (ref 1–1.03)
UROBILINOGEN UR STRIP-ACNC: <2 MG/DL
WBC # BLD AUTO: 8.79 THOUSAND/UL (ref 4.31–10.16)
WBC #/AREA URNS AUTO: NORMAL /HPF

## 2023-11-08 PROCEDURE — 80048 BASIC METABOLIC PNL TOTAL CA: CPT | Performed by: INTERNAL MEDICINE

## 2023-11-08 PROCEDURE — 84100 ASSAY OF PHOSPHORUS: CPT | Performed by: INTERNAL MEDICINE

## 2023-11-08 PROCEDURE — 83735 ASSAY OF MAGNESIUM: CPT | Performed by: INTERNAL MEDICINE

## 2023-11-08 PROCEDURE — 99236 HOSP IP/OBS SAME DATE HI 85: CPT | Performed by: INTERNAL MEDICINE

## 2023-11-08 PROCEDURE — 82948 REAGENT STRIP/BLOOD GLUCOSE: CPT

## 2023-11-08 PROCEDURE — 36415 COLL VENOUS BLD VENIPUNCTURE: CPT | Performed by: INTERNAL MEDICINE

## 2023-11-08 PROCEDURE — 85027 COMPLETE CBC AUTOMATED: CPT | Performed by: INTERNAL MEDICINE

## 2023-11-08 PROCEDURE — 94760 N-INVAS EAR/PLS OXIMETRY 1: CPT

## 2023-11-08 PROCEDURE — NC001 PR NO CHARGE: Performed by: PHYSICIAN ASSISTANT

## 2023-11-08 PROCEDURE — 81001 URINALYSIS AUTO W/SCOPE: CPT | Performed by: INTERNAL MEDICINE

## 2023-11-08 RX ORDER — ALBUTEROL SULFATE 90 UG/1
2 AEROSOL, METERED RESPIRATORY (INHALATION) EVERY 4 HOURS PRN
Status: DISCONTINUED | OUTPATIENT
Start: 2023-11-08 | End: 2023-11-08 | Stop reason: HOSPADM

## 2023-11-08 RX ORDER — ACETAMINOPHEN 325 MG/1
650 TABLET ORAL EVERY 6 HOURS PRN
Status: DISCONTINUED | OUTPATIENT
Start: 2023-11-08 | End: 2023-11-08 | Stop reason: HOSPADM

## 2023-11-08 RX ORDER — ONDANSETRON 2 MG/ML
4 INJECTION INTRAMUSCULAR; INTRAVENOUS EVERY 6 HOURS PRN
Status: DISCONTINUED | OUTPATIENT
Start: 2023-11-08 | End: 2023-11-08 | Stop reason: HOSPADM

## 2023-11-08 RX ORDER — LANOLIN ALCOHOL/MO/W.PET/CERES
100 CREAM (GRAM) TOPICAL DAILY
Status: DISCONTINUED | OUTPATIENT
Start: 2023-11-08 | End: 2023-11-08 | Stop reason: HOSPADM

## 2023-11-08 RX ORDER — SODIUM CHLORIDE FOR INHALATION 0.9 %
3 VIAL, NEBULIZER (ML) INHALATION EVERY 6 HOURS PRN
Status: DISCONTINUED | OUTPATIENT
Start: 2023-11-08 | End: 2023-11-08 | Stop reason: HOSPADM

## 2023-11-08 RX ORDER — LORAZEPAM 0.5 MG/1
0.5 TABLET ORAL EVERY 6 HOURS PRN
Status: DISCONTINUED | OUTPATIENT
Start: 2023-11-08 | End: 2023-11-08 | Stop reason: HOSPADM

## 2023-11-08 RX ORDER — FLUTICASONE FUROATE AND VILANTEROL 200; 25 UG/1; UG/1
1 POWDER RESPIRATORY (INHALATION)
Status: DISCONTINUED | OUTPATIENT
Start: 2023-11-08 | End: 2023-11-08 | Stop reason: HOSPADM

## 2023-11-08 RX ORDER — FLUTICASONE PROPIONATE 50 MCG
1 SPRAY, SUSPENSION (ML) NASAL 2 TIMES DAILY
Status: DISCONTINUED | OUTPATIENT
Start: 2023-11-08 | End: 2023-11-08 | Stop reason: HOSPADM

## 2023-11-08 RX ORDER — MAGNESIUM HYDROXIDE/ALUMINUM HYDROXICE/SIMETHICONE 120; 1200; 1200 MG/30ML; MG/30ML; MG/30ML
30 SUSPENSION ORAL EVERY 6 HOURS PRN
Status: DISCONTINUED | OUTPATIENT
Start: 2023-11-08 | End: 2023-11-08 | Stop reason: HOSPADM

## 2023-11-08 RX ORDER — AZITHROMYCIN 250 MG/1
TABLET, FILM COATED ORAL
Qty: 6 TABLET | Refills: 0 | Status: SHIPPED | OUTPATIENT
Start: 2023-11-08 | End: 2023-11-12

## 2023-11-08 RX ORDER — INSULIN LISPRO 100 [IU]/ML
1-6 INJECTION, SOLUTION INTRAVENOUS; SUBCUTANEOUS
Status: DISCONTINUED | OUTPATIENT
Start: 2023-11-08 | End: 2023-11-08 | Stop reason: HOSPADM

## 2023-11-08 RX ORDER — INSULIN LISPRO 100 [IU]/ML
3 INJECTION, SOLUTION INTRAVENOUS; SUBCUTANEOUS
Status: DISCONTINUED | OUTPATIENT
Start: 2023-11-08 | End: 2023-11-08 | Stop reason: HOSPADM

## 2023-11-08 RX ORDER — PREDNISONE 20 MG/1
40 TABLET ORAL DAILY
Qty: 6 TABLET | Refills: 0 | Status: SHIPPED | OUTPATIENT
Start: 2023-11-09 | End: 2023-11-12

## 2023-11-08 RX ORDER — ENOXAPARIN SODIUM 100 MG/ML
40 INJECTION SUBCUTANEOUS DAILY
Status: DISCONTINUED | OUTPATIENT
Start: 2023-11-08 | End: 2023-11-08 | Stop reason: HOSPADM

## 2023-11-08 RX ORDER — LISINOPRIL 20 MG/1
40 TABLET ORAL DAILY
Status: DISCONTINUED | OUTPATIENT
Start: 2023-11-08 | End: 2023-11-08 | Stop reason: HOSPADM

## 2023-11-08 RX ORDER — PREDNISONE 20 MG/1
40 TABLET ORAL DAILY
Status: DISCONTINUED | OUTPATIENT
Start: 2023-11-08 | End: 2023-11-08 | Stop reason: HOSPADM

## 2023-11-08 RX ORDER — INSULIN LISPRO 100 [IU]/ML
1-5 INJECTION, SOLUTION INTRAVENOUS; SUBCUTANEOUS
Status: DISCONTINUED | OUTPATIENT
Start: 2023-11-08 | End: 2023-11-08 | Stop reason: HOSPADM

## 2023-11-08 RX ORDER — GUAIFENESIN 600 MG/1
600 TABLET, EXTENDED RELEASE ORAL 2 TIMES DAILY
Status: DISCONTINUED | OUTPATIENT
Start: 2023-11-08 | End: 2023-11-08 | Stop reason: HOSPADM

## 2023-11-08 RX ORDER — INSULIN GLARGINE 100 [IU]/ML
30 INJECTION, SOLUTION SUBCUTANEOUS
Status: DISCONTINUED | OUTPATIENT
Start: 2023-11-08 | End: 2023-11-08

## 2023-11-08 RX ORDER — INSULIN GLARGINE 100 [IU]/ML
30 INJECTION, SOLUTION SUBCUTANEOUS
Status: DISCONTINUED | OUTPATIENT
Start: 2023-11-08 | End: 2023-11-08 | Stop reason: HOSPADM

## 2023-11-08 RX ORDER — INSULIN LISPRO 100 [IU]/ML
10 INJECTION, SOLUTION INTRAVENOUS; SUBCUTANEOUS ONCE
Status: DISCONTINUED | OUTPATIENT
Start: 2023-11-08 | End: 2023-11-08 | Stop reason: HOSPADM

## 2023-11-08 RX ORDER — LEVALBUTEROL INHALATION SOLUTION 1.25 MG/3ML
1.25 SOLUTION RESPIRATORY (INHALATION) EVERY 6 HOURS PRN
Status: DISCONTINUED | OUTPATIENT
Start: 2023-11-08 | End: 2023-11-08 | Stop reason: HOSPADM

## 2023-11-08 RX ORDER — INSULIN LISPRO 100 [IU]/ML
1-6 INJECTION, SOLUTION INTRAVENOUS; SUBCUTANEOUS
Status: DISCONTINUED | OUTPATIENT
Start: 2023-11-08 | End: 2023-11-08

## 2023-11-08 RX ORDER — DOCUSATE SODIUM 100 MG/1
100 CAPSULE, LIQUID FILLED ORAL 2 TIMES DAILY PRN
Status: DISCONTINUED | OUTPATIENT
Start: 2023-11-08 | End: 2023-11-08 | Stop reason: HOSPADM

## 2023-11-08 RX ORDER — PANTOPRAZOLE SODIUM 40 MG/1
40 TABLET, DELAYED RELEASE ORAL 2 TIMES DAILY
Status: DISCONTINUED | OUTPATIENT
Start: 2023-11-08 | End: 2023-11-08 | Stop reason: HOSPADM

## 2023-11-08 RX ORDER — ASPIRIN 81 MG/1
81 TABLET, CHEWABLE ORAL DAILY
Status: DISCONTINUED | OUTPATIENT
Start: 2023-11-08 | End: 2023-11-08 | Stop reason: HOSPADM

## 2023-11-08 RX ORDER — INSULIN LISPRO 100 [IU]/ML
1-5 INJECTION, SOLUTION INTRAVENOUS; SUBCUTANEOUS
Status: DISCONTINUED | OUTPATIENT
Start: 2023-11-08 | End: 2023-11-08

## 2023-11-08 RX ORDER — BUSPIRONE HYDROCHLORIDE 10 MG/1
10 TABLET ORAL 3 TIMES DAILY
Status: DISCONTINUED | OUTPATIENT
Start: 2023-11-08 | End: 2023-11-08 | Stop reason: HOSPADM

## 2023-11-08 RX ORDER — LANOLIN ALCOHOL/MO/W.PET/CERES
3 CREAM (GRAM) TOPICAL
Status: DISCONTINUED | OUTPATIENT
Start: 2023-11-08 | End: 2023-11-08 | Stop reason: HOSPADM

## 2023-11-08 RX ORDER — FOLIC ACID 1 MG/1
1 TABLET ORAL DAILY
Status: DISCONTINUED | OUTPATIENT
Start: 2023-11-08 | End: 2023-11-08 | Stop reason: HOSPADM

## 2023-11-08 RX ORDER — FERROUS SULFATE 325(65) MG
325 TABLET ORAL 2 TIMES DAILY WITH MEALS
Status: DISCONTINUED | OUTPATIENT
Start: 2023-11-08 | End: 2023-11-08 | Stop reason: HOSPADM

## 2023-11-08 RX ORDER — ATORVASTATIN CALCIUM 80 MG/1
80 TABLET, FILM COATED ORAL
Status: DISCONTINUED | OUTPATIENT
Start: 2023-11-08 | End: 2023-11-08 | Stop reason: HOSPADM

## 2023-11-08 RX ADMIN — GUAIFENESIN 600 MG: 600 TABLET, EXTENDED RELEASE ORAL at 08:11

## 2023-11-08 RX ADMIN — LEVALBUTEROL HYDROCHLORIDE 1.25 MG: 1.25 SOLUTION RESPIRATORY (INHALATION) at 09:47

## 2023-11-08 RX ADMIN — INSULIN LISPRO 1 UNITS: 100 INJECTION, SOLUTION INTRAVENOUS; SUBCUTANEOUS at 11:42

## 2023-11-08 RX ADMIN — INSULIN HUMAN 10 UNITS: 100 INJECTION, SOLUTION PARENTERAL at 00:20

## 2023-11-08 RX ADMIN — ENOXAPARIN SODIUM 40 MG: 40 INJECTION SUBCUTANEOUS at 08:12

## 2023-11-08 RX ADMIN — INSULIN LISPRO 3 UNITS: 100 INJECTION, SOLUTION INTRAVENOUS; SUBCUTANEOUS at 11:42

## 2023-11-08 RX ADMIN — SODIUM CHLORIDE 12 UNITS/HR: 9 INJECTION, SOLUTION INTRAVENOUS at 02:35

## 2023-11-08 RX ADMIN — PREDNISONE 40 MG: 20 TABLET ORAL at 08:09

## 2023-11-08 RX ADMIN — BUSPIRONE HYDROCHLORIDE 10 MG: 10 TABLET ORAL at 04:09

## 2023-11-08 RX ADMIN — LISINOPRIL 40 MG: 20 TABLET ORAL at 11:00

## 2023-11-08 RX ADMIN — THIAMINE HCL TAB 100 MG 100 MG: 100 TAB at 08:09

## 2023-11-08 RX ADMIN — SODIUM CHLORIDE 2000 ML: 0.9 INJECTION, SOLUTION INTRAVENOUS at 00:29

## 2023-11-08 RX ADMIN — INSULIN GLARGINE 30 UNITS: 100 INJECTION, SOLUTION SUBCUTANEOUS at 00:40

## 2023-11-08 RX ADMIN — FLUTICASONE PROPIONATE 1 SPRAY: 50 SPRAY, METERED NASAL at 11:01

## 2023-11-08 RX ADMIN — LEVALBUTEROL HYDROCHLORIDE 1.25 MG: 1.25 SOLUTION RESPIRATORY (INHALATION) at 00:53

## 2023-11-08 RX ADMIN — PANTOPRAZOLE SODIUM 40 MG: 40 TABLET, DELAYED RELEASE ORAL at 08:12

## 2023-11-08 RX ADMIN — VITAM B12 100 MCG: 100 TAB at 11:00

## 2023-11-08 RX ADMIN — FOLIC ACID 1 MG: 1 TABLET ORAL at 08:09

## 2023-11-08 RX ADMIN — FLUTICASONE FUROATE AND VILANTEROL TRIFENATATE 1 PUFF: 200; 25 POWDER RESPIRATORY (INHALATION) at 11:02

## 2023-11-08 RX ADMIN — ASPIRIN 81 MG CHEWABLE TABLET 81 MG: 81 TABLET CHEWABLE at 08:08

## 2023-11-08 RX ADMIN — UMECLIDINIUM 1 PUFF: 62.5 AEROSOL, POWDER ORAL at 11:03

## 2023-11-08 RX ADMIN — INSULIN LISPRO 3 UNITS: 100 INJECTION, SOLUTION INTRAVENOUS; SUBCUTANEOUS at 08:23

## 2023-11-08 RX ADMIN — Medication 3 ML: at 09:47

## 2023-11-08 RX ADMIN — FERROUS SULFATE TAB 325 MG (65 MG ELEMENTAL FE) 325 MG: 325 (65 FE) TAB at 08:12

## 2023-11-08 NOTE — H&P
[address] [facility]  H&P  Name: Santiago Constantino 70 y.o. male I MRN: [id number]  Unit/Bed#: QCA I Date of Admission: 11/7/2023   Date of Service: 11/8/2023 I Hospital Day: 0      Assessment/Plan   * Chronic obstructive pulmonary disease with acute exacerbation ([address] [facility])  Assessment & Plan  As of the moment, the patient received prednisone 60 mg p.o with Solu-Medrol 125 mg x 1. In addition he received nebulizations of ipratropium and albuterol. The patient is breathing much better and currently holding saturations at 95 to 96% on room air. He is able to speak in long sentences however has bit of abdominal breathing and wheezes. Patient is placed in hospital for further observation overnight as he is quite uncomfortable about going home. Patient without any fever. We will promote pulmonary toilet, anti-inflammatories. Bronchodilation. Continue with guaifenesin 600 mg every 12. Bradycardia of in place of Advair 200/25 daily. Umeclidinium 62.5 mcg daily. Continue Flonase. CBC with differential and metabolic profile in the morning. CAD (coronary artery disease)  Assessment & Plan  Patient with history of CAD will continue on aspirin, atorvastatin. Does not appear to be on beta-blocker    Alcohol abuse  Assessment & Plan  We will continue with thiamine and folic acid; currently sober and not drinking. Hypertension  Assessment & Plan  Continue lisinopril at 40 mg daily    Chronic respiratory failure [facility])  Assessment & Plan  Patient with history of chronic respiratory failure on 2 L nasal cannula  Currently at baseline oxygen requirements    Type 2 diabetes mellitus with hyperglycemia, without long-term current use of insulin ([facility])  Assessment & Plan  Lab Results   Component Value Date    HGBA1C 8.8 (H) 10/21/2023     Continue Lantus at 30 units at night. Sliding scale. On metformin at home.     Blood Sugar Average: Last 72 hrs:    Update: Just now, the patient had a blood sugar Accu-Chek showing greater than 500 reading. We will get metabolic profile x1. Meanwhile patient will also receive 2 L of normal saline, regular insulin 10 units intravenous; will recheck in 30 minutes. VTE Prophylaxis: Enoxaparin (Lovenox)  / sequential compression device   Code Status: Level 1 - Full Code as discussed with patient  POLST: There is no POLST form on file for this patient (pre-hospital)    Anticipated Length of Stay:  Patient will be admitted on an Observation basis with an anticipated length of stay of less than 2 midnights. Justification for Hospital Stay: Please see detailed plans noted above. Chief Complaint:     Shortness of breath COPD and former alcohol abuse, diabetes mellitus type 2  History of Present Illness:  Arturo Silva is a 70 y.o. male who has past medical history significant for on insulin and metformin, anxiety, essential hypertension, chronic respiratory failure said to be on 2 L of O2 but currently is on room air with saturation at 95 to 96%, CAD, hyperlipidemia who comes to the emergency room with complaints of shortness of breath going on just this morning and going on the rest of the day. Patient denies any fever or chest pain. No sputum production. Patient states that there are no sick contacts. Late October the patient was positive for COVID-19. The patient received prednisone and Solu-Medrol, nebulizations and currently is much better. However, patient is uncomfortable but will be home as he is placed in hospital for further observation of respiratory status overnight. Update: While dictation is ongoing, nursing staff reports that patient's blood sugar by Accu-Chek is greater than 500 and severe sending metabolic profile x1, 2 L of normal saline bolus to be administered in 2 hours (1000 mL/h), regular insulin 10 units x 1 with a recheck of blood sugar in 30 minutes.     Currently, patient is sitting on bed and despite complaints above is comfortable. Review of Systems:    Constitutional:  Denies fever or chills   Eyes:  Denies change in visual acuity   HENT:  Denies nasal congestion or sore throat   Respiratory:  Denies cough or shortness of breath   Cardiovascular:  Denies chest pain or edema   GI:  Denies abdominal pain, nausea, vomiting, bloody stools or diarrhea   :  Denies dysuria   Musculoskeletal:  Denies back pain or joint pain   Integument:  Denies rash   Neurologic:  Denies headache, focal weakness or sensory changes   Endocrine:  Denies polyuria or polydipsia   Psychiatric:  Denies depression or anxiety     Past Medical and Surgical History:   Past Medical History:   Diagnosis Date    Cardiac arrest ([address] )     COPD (chronic obstructive pulmonary disease) ([address] )     CVA (cerebral vascular accident) ([facility])     Diabetes mellitus ([address] [facility])     Heart attack ([address] [facility])     Hypertension     Stroke [facility])      Past Surgical History:   Procedure Laterality Date    CERVICAL FUSION N/A 9/10/2018    Procedure: Posterior cervical decompressive laminectomy C3-6; Posterior cervical lateral mass and pedicle fixation fusion C1-T1;  Surgeon: Ellyn Warren MD;  Location: [facility];  Service: Neurosurgery       Meds/Allergies:  (Not in a hospital admission)      Allergies:    Allergies   Allergen Reactions    Amoxicillin Hives    Augmentin [Amoxicillin-Pot Clavulanate] Hives     History:  Marital Status: Single   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  Patient Pre-hospital Living Situation: Lives at home  Patient Pre-hospital Level of Mobility: Ambulatory   Patient Pre-hospital Diet Restrictions: Diabetic diet  Substance Use History:   Social History     Substance and Sexual Activity   Alcohol Use Not Currently    Alcohol/week: 8.0 - 12.0 standard drinks of alcohol    Types: 8 - 12 Cans of beer per week    Comment: every day drinker     Social History     Tobacco Use   Smoking Status Former    Types: Cigarettes   Smokeless Tobacco Never   Tobacco Comments    quit 5 months ago      Social History     Substance and Sexual Activity   Drug Use Never       Family History:  Family History   Problem Relation Age of Onset    Heart attack Mother        Physical Exam:     Vitals:   Blood Pressure: 133/62 (11/07/23 2200)  Pulse: 95 (11/07/23 2200)  Temperature: 98.7 °F (37.1 °C) (11/07/23 1920)  Temp Source: Oral (11/07/23 1920)  Respirations: 19 (11/07/23 2200)  SpO2: 95 % (11/07/23 2200)    Constitutional:  Well developed, well nourished, no acute distress, non-toxic appearance with mild abdominal breathing but can speak in long sentences  Eyes:  PERRL, conjunctiva normal   HENT:  Atraumatic, external ears normal, nose normal, oropharynx moist, no pharyngeal exudates. Neck- normal range of motion, no tenderness, supple   Respiratory:  No respiratory distress, bronchial breath sounds, no rales, no wheezing with good air movement and 3  Cardiovascular:  Normal rate, normal rhythm, no murmurs, no gallops, no rubs   GI:  Soft, nondistended, normal bowel sounds, nontender, no organomegaly, no mass, no rebound, no guarding   :  No costovertebral angle tenderness   Musculoskeletal:  No edema, no tenderness, no deformities. Back- no tenderness  Integument:  Well hydrated, no rash   Lymphatic:  No lymphadenopathy noted   Neurologic:  Alert &awake, communicative, CN 2-12 normal, normal motor function, normal sensory function, no focal deficits noted   Psychiatric:  Speech and behavior appropriate       Lab Results: I have personally reviewed pertinent reports.       Results from last 7 days   Lab Units 11/07/23 2053   WBC Thousand/uL 15.12*   HEMOGLOBIN g/dL 10.3*   HEMATOCRIT % 36.4*   PLATELETS Thousands/uL 445*   LYMPHO PCT % 1*   MONO PCT % 0*   EOS PCT % 0     Results from last 7 days   Lab Units 11/07/23 2053   POTASSIUM mmol/L 4.3   CHLORIDE mmol/L 100   CO2 mmol/L 25   BUN mg/dL 22   CREATININE mg/dL 0.57*   CALCIUM mg/dL 9.1   ALK PHOS U/L 64   ALT U/L 13   AST U/L 11* Imaging: I have personally reviewed pertinent reports. XR chest pa & lateral    Result Date: 11/3/2023  Narrative: PA and lateral chest Clinical history: Shortness of breath Frontal and lateral views of the chest are performed and compared to chest x-ray performed 11/20/2023. Comparison is also made to numerous previous studies. The lungs are clear. There is no focal infiltrate or pulmonary vascular congestion. The heart is not enlarged. The costophrenic angles are clear. There are mild bibasilar linear type atelectatic changes. There is stable elevation of the right hemidiaphragm. Impression: Impression: 1. No acute pulmonary parenchymal disease. Minimal bibasilar atelectatic changes. Workstation:[id number]    XR chest pa & lateral    Result Date: 11/2/2023  Narrative: Chest 2 views 11/2/2023 Reason for exam: Shortness of breath. PA erect and lateral radiographs of the chest are submitted for interpretation. The lungs are hyperexpanded. Minor atelectasis or scarring is evident at the left lung base. Pleural thickening or small pleural effusion is noted on the left. There is no evidence of pulmonary vascular congestion. The cardiopericardial silhouette and mediastinal structures are stable in appearance when compared to previous exam of 10/27/2023. Pedicle screws and vertical stabilizing bars are noted in the cervical spine. A blade plate and screws is also noted in the lower cervical spine. Osteopenia is noted with compression deformities in the visualized thoracolumbar spine. The most prominent compression deformity involves T8. Impression: IMPRESSION: No pneumonia or CHF detected. Workstation:[id number]    XR chest pa & lateral    Result Date: 10/27/2023  Narrative: Study: Two-view chest. COMPARISON: No prior study. HISTORY: Covid positive; shortness of breath. Heart and vessels normal. Lung fields well aerated with no interval infiltrates or edema.  Persistent elevation right diaphragm with basilar atelectasis. Blunting left lateral posterior costophrenic angles similar to prior study reflecting residual pleural fluid/scarring. Impression: IMPRESSION: No interval infiltrates, edema, or effusions. Residual pleural fluid on the left versus scarring. Mild basal atelectasis right lower lobe posteriorly. Workstation:[id number]    X-ray chest 1 view portable    Result Date: 10/25/2023  Narrative: CHEST INDICATION:   chest pain. COMPARISON: Chest x-ray 10/16/2023 EXAM PERFORMED/VIEWS:  XR CHEST PORTABLE FINDINGS: Cardiomediastinal silhouette appears unremarkable. The lungs are clear. No pneumothorax or pleural effusion. Partially visualized fusion hardware in the cervical spine. Impression: No active pulmonary disease. Workstation performed: [id number]     XR chest pa & lateral    Result Date: 10/23/2023  Narrative: Chest 2 views 10/23/2023 Reason for exam: Chest pain. AP erect and lateral radiographs of the chest are submitted for interpretation. The lungs are hyperexpanded. Well-defined consolidation consistent with atelectasis is noted at both lung bases. There is no evidence of pulmonary vascular congestion. Pleural thickening is noted bilaterally. The cardiopericardial silhouette and mediastinal structures are stable in appearance when compared to previous exam of 10/20/2023. Multiple bilateral rib  irregularities are noted. Osteopenia is noted with a compression deformity in the mid dorsal spine. A compression deformity was present on the previous exam also. Pedicle screws are noted in the lower cervical spine. Cardiac monitoring leads are noted. Impression: IMPRESSION: Bibasilar atelectatic changes. No pneumothorax detected.  [id number]    CTA chest pe study    Result Date: 10/20/2023  Narrative: History: Chest pain, shortness of breath   Exam: CT of the chest with IV contrast (PE protocol)   Technique: Axial CT of the chest performed with 85 cc of Omnipaque 350 intravenous contrast with particular attention to the pulmonary arteries. Coronal reformations were performed as well. Comparison: Chest x-ray earlier today   Findings: Pulmonary Arteries: Normal.   Lungs/Pleura: Minimal atelectasis or scarring in the right lung base. Lungs otherwise clear. Mild emphysema. No pneumothorax or pleural effusion. Mediastinum/Lymph nodes/Heart: No acute findings. Atherosclerotic calcification thoracic aorta and coronary arteries. Chest Wall: Normal.   Upper abdomen/Other: Small rim calcified right renal cyst. Bones: Chronic posttraumatic deformity of the rib cage. Chronic compression deformities of T4, T8, and L1. Impression: Impression:  No evidence of pulmonary embolism or other acute abnormality in the chest.   Workstation:[id number]    POCT US LUNG    Result Date: 10/20/2023  Narrative: This procedure was performed by the ordering provider. It has not been reviewed by a radiologist and the results are available in the provider note. POCT US LIMITED ECHO    Result Date: 10/20/2023  Narrative: This procedure was performed by the ordering provider. It has not been reviewed by a radiologist and the results are available in the provider note. XR chest pa & lateral    Result Date: 10/20/2023  Narrative: History: 23-year-old male with shortness of breath Comparison studies: 10/10/2023 Frontal and lateral views of the chest were obtained Findings: The lungs are well-expanded. No airspace consolidation, pneumothorax, or pleural effusion. There is osseous bridging of several left-sided ribs. There is mild scarring at the right lung base. The pulmonary vasculature is normal. The heart is normal in size, and the cardiac and mediastinal contours are normal. No acute osseous abnormality. There is a moderate wedge compression fracture of a mid to lower thoracic vertebral body, unchanged. Cervical spinal fusion hardware is partially visualized. Impression: Impression: No acute cardiopulmonary disease.  No significant change from the prior examination. Workstation:[id number]    XR chest 1 view portable    Result Date: 10/17/2023  Narrative: CHEST INDICATION:   sob. COMPARISON: 10/12/2023 EXAM PERFORMED/VIEWS:  XR CHEST PORTABLE FINDINGS: Cardiomediastinal silhouette appears unremarkable. The lungs are clear. No pneumothorax or pleural effusion. Osseous structures appear within normal limits for patient age. Impression: No acute cardiopulmonary disease. Workstation performed: [id number]     XR chest 1 view portable    Result Date: 10/13/2023  Narrative: CHEST INDICATION:   shortness of breath. COMPARISON: Chest radiograph August 6, 2023 EXAM PERFORMED/VIEWS:  XR CHEST PORTABLE FINDINGS: Cardiomediastinal silhouette appears unremarkable. No focal consolidation. Chronic blunting of the left costophrenic angle. No definite pleural effusion. No pneumothorax. Partially imaged cervicothoracic spinal fixation hardware. Chronic left rib fracture deformities. No acute osseous abnormality. Impression: No acute cardiopulmonary disease. Workstation performed: [id number]     XR chest pa & lateral    Result Date: 10/10/2023  Narrative: Chest PA and lateral Indication: Shortness of breath Two-view study was done and compared to 06/03/2023, 08/27/2023, and 09/04/2023. Heart is normal in size. There is no change in right basilar subsegmental atelectasis. There is stable pleural thickening in the left lower hemithorax adjacent to multiple old healed left rib fractures. Patient also has multiple old healed right rib fractures. No confluent infiltrates are seen. Pulmonary vascularity does not appear congested. No pleural effusions are seen. Surgical hardware is seen in the cervical spine. Impression: Impression: Stable right basilar subsegmental atelectasis. No active disease. Workstation:[id number]        ** Please Note: Dragon 360 Dictation voice to text software was used in the creation of this document.  **

## 2023-11-08 NOTE — ASSESSMENT & PLAN NOTE
Lab Results   Component Value Date    HGBA1C 8.8 (H) 10/21/2023     Continue Lantus at 30 units at night. Sliding scale. On metformin at home. Patient briefly on insulin drip in setting of high dose steroid. Glucose now normalized. Will discharge on home regimen.      Blood Sugar Average: Last 72 hrs:      (P) [id number]

## 2023-11-08 NOTE — ED NOTES
Will attempt another finger stick for blood sugar 30 min from previous blood sugar check per Admitting.       Jair Villafana RN  11/08/23 0104

## 2023-11-08 NOTE — RESPIRATORY THERAPY NOTE
11/08/23 0900   Respiratory Assessment   Assessment Type Assess only   General Appearance Alert; Awake   Respiratory Pattern Normal   Chest Assessment Chest expansion symmetrical   Bilateral Breath Sounds Clear   Resp Comments Bilateral BS clear. No distress noted.  No indication for UDN   O2 Device ra   Additional Assessments   Pulse 66   Respirations 18   SpO2 99 %   Position Semi-Simms's

## 2023-11-08 NOTE — ASSESSMENT & PLAN NOTE
Patient with history of CAD will continue on aspirin, atorvastatin.     Does not appear to be on beta-blocker

## 2023-11-08 NOTE — DISCHARGE SUMMARY
[address] [facility]  Discharge- Laura Prieto 1955, 70 y.o. male MRN: [id number]  Unit/Bed#: QCA Encounter: [id number]  Primary Care Provider: TATIANA Olguin   Date and time admitted to hospital: 11/7/2023  7:17 PM    * Chronic obstructive pulmonary disease with acute exacerbation ([address])  Assessment & Plan  As of the moment, the patient received prednisone 60 mg p.o with Solu-Medrol 125 mg x 1. In addition he received nebulizations of ipratropium and albuterol. The patient is breathing much better and currently holding saturations at 95 to 96% on room air. Stable for discharge home  Complete a 5 day course of Prednisone  Discharge with a Z pack   Continue with guaifenesin 600 mg every 12. Advair 200/25 daily. Umeclidinium 62.5 mcg daily. Albuterol prn  Continue Flonase. Outpatient follow up with Dr. Annie Alston    Chronic respiratory failure [facility])  Assessment & Plan  Patient with history of chronic respiratory failure on 2 L nasal cannula  Currently at baseline oxygen requirements    Type 2 diabetes mellitus with hyperglycemia, without long-term current use of insulin [facility])  Assessment & Plan  Lab Results   Component Value Date    HGBA1C 8.8 (H) 10/21/2023     Continue Lantus at 30 units at night. Sliding scale. On metformin at home. Patient briefly on insulin drip in setting of high dose steroid. Glucose now normalized. Will discharge on home regimen. Blood Sugar Average: Last 72 hrs:      (P) [id number]      Hypertension  Assessment & Plan  Continue lisinopril at 40 mg daily    Alcohol abuse  Assessment & Plan  We will continue with thiamine and folic acid; currently sober and not drinking. CAD (coronary artery disease)  Assessment & Plan  Patient with history of CAD will continue on aspirin, atorvastatin.     Does not appear to be on beta-blocker      Medical Problems       Resolved Problems  Date Reviewed: 11/8/2023   None       Discharging Physician / Practitioner: Bibiana Patel PA-C  PCP: Lamont Myers, [address] [facility]  Admission Date:   Admission Orders (From admission, onward)       Ordered        11/08/23 0647  Inpatient Admission  Once            11/08/23 0000  Place in Observation  Once,   Status:  Canceled                          Discharge Date: 11/08/23    Consultations During Hospital Stay:  none    Procedures Performed:   CXR - no acute disease    Significant Findings / Test Results:   See above    Incidental Findings:   none       Test Results Pending at Discharge (will require follow up):   none     Outpatient Tests Requested:  none    Complications:  none    Reason for Admission: Shortness of breath    Hospital Course:   Mary Jo Pozo is a 70 y.o. male patient who originally presented to the hospital on 11/7/2023 due to shortness of breath. Patient was found to be in acute COPD exacerbation. Chest x-ray was clear. Patient improved rapidly after IV Solu-Medrol and prednisone. He required a brief insulin drip secondary to hyperglycemia from steroids. Patient now stable on room air. He will be discharged home on oral prednisone. He will complete 5 days of 40 mg daily. We will also discharge him on a Z-Zaki. Patient will follows Dr. Alejandro Walton outpatient and encouraged follow-up. Please see above list of diagnoses and related plan for additional information. Patient required less than 2 midnight stay secondary to rapid improvement of symptoms. Condition at Discharge: good    Discharge Day Visit / Exam:   Subjective:  Feels better. SOB resolved  Vitals: Blood Pressure: 156/72 (11/08/23 1030)  Pulse: 64 (11/08/23 1030)  Temperature: 98.7 °F (37.1 °C) (11/07/23 1920)  Temp Source: Oral (11/07/23 1920)  Respirations: (!) 23 (11/08/23 1030)  SpO2: 98 % (11/08/23 1030)  Exam:   Physical Exam  Vitals and nursing note reviewed. Constitutional:       General: He is not in acute distress. Appearance: He is well-developed.    HENT: Head: Normocephalic and atraumatic. Eyes:      Conjunctiva/sclera: Conjunctivae normal.   Cardiovascular:      Rate and Rhythm: Normal rate and regular rhythm. Heart sounds: No murmur heard. Pulmonary:      Effort: Pulmonary effort is normal. No respiratory distress. Comments: Decreased breath sounds bilaterally  Abdominal:      Palpations: Abdomen is soft. Tenderness: There is no abdominal tenderness. Musculoskeletal:         General: No swelling. Cervical back: Neck supple. Skin:     General: Skin is warm and dry. Capillary Refill: Capillary refill takes less than 2 seconds. Neurological:      Mental Status: He is alert. Psychiatric:         Mood and Affect: Mood normal.          Discussion with Family: Patient declined call to . Discharge instructions/Information to patient and family:   See after visit summary for information provided to patient and family. Provisions for Follow-Up Care:  See after visit summary for information related to follow-up care and any pertinent home health orders. Disposition:   Home    Planned Readmission: none     Discharge Statement:  I spent 40 minutes discharging the patient. This time was spent on the day of discharge. I had direct contact with the patient on the day of discharge. Greater than 50% of the total time was spent examining patient, answering all patient questions, arranging and discussing plan of care with patient as well as directly providing post-discharge instructions. Additional time then spent on discharge activities. Discharge Medications:  See after visit summary for reconciled discharge medications provided to patient and/or family.       **Please Note: This note may have been constructed using a voice recognition system**

## 2023-11-08 NOTE — ED NOTES
Pt stating some SOB; respiratory messaged to admin the prn breathing treatments       Anna Moreno RN  11/08/23 0974

## 2023-11-08 NOTE — DISCHARGE INSTRUCTIONS
You were seen in the emergency department for a COPD exacerbation. Your work-up did not show any concerning findings. Your symptoms were treated with nebulizer treatments and steroids. A prescription for steroids was sent to your pharmacy. Schedule an appointment with your primary care provider to follow-up. If you have worsening symptoms or any new concerning symptoms please return to the emergency department.

## 2023-11-08 NOTE — ED ATTENDING ATTESTATION
11/7/2023  IJacqueline MD, saw and evaluated the patient. I have discussed the patient with the resident/non-physician practitioner and agree with the resident's/non-physician practitioner's findings, Plan of Care, and MDM as documented in the resident's/non-physician practitioner's note, except where noted. All available labs and Radiology studies were reviewed. I was present for key portions of any procedure(s) performed by the resident/non-physician practitioner and I was immediately available to provide assistance. At this point I agree with the current assessment done in the Emergency Department. I have conducted an independent evaluation of this patient a history and physical is as follows:    OA: 69 y/o m with h/o CAD, CKD, DM, COPD who presents with SOB. Tried inhalers at home without relief, also given a treatment by EMS prior to arrival. 2 weeks ago diagnosed with COVID. Slight cough, nonproductive. Otherwise no cp. No abd pain, n/v. No urinary sxms. No fevers/chills. Feels like similar episodes of COPD. PE, mild respiratory distress with tachypnea, NC/AT, MMM, clear sclera/conjunctiva, neck supple/fROM, RR, lungs decreased throughout posteriorly, expiratory wheezing anteriorly, + tachypnea, abd soft, +BS, -r/g, trace edema, - calf ttp, intact pulses, AAO. A/p concern for COPD exacerbation in the setting of recent viral infection. Continue with breathing treatments, steroids, obtain imaging given recent infection, basic and cardiac labs. Re-eval and treat accordingly. ED Course     Pt with dips in his 02 with activity. Still with wheezing. Discussed dc v admission for additional treatments. Pt notes he does not feel well enough for dc at this time.      Critical Care Time  CriticalCare Time    Date/Time: 11/9/2023 9:49 AM    Performed by: Jacqueline Dill MD  Authorized by: Jacqueline Dill MD    Critical care provider statement:     Critical care time (minutes):  31    Critical care time was exclusive of:  Separately billable procedures and treating other patients and teaching time    Critical care was necessary to treat or prevent imminent or life-threatening deterioration of the following conditions:  Respiratory failure    Critical care was time spent personally by me on the following activities:  Blood draw for specimens, obtaining history from patient or surrogate, development of treatment plan with patient or surrogate, evaluation of patient's response to treatment, examination of patient, review of old charts, re-evaluation of patient's condition, ordering and review of radiographic studies, ordering and review of laboratory studies and ordering and performing treatments and interventions

## 2023-11-08 NOTE — QUICK NOTE
Patient's blood sugar initially at 557 went down to 455 and currently is again up at 497 after receiving 10 units of regular insulin IV with ongoing 2 L of saline. That said, the patient might benefit from insulin drip for now. We will place patient stepdown level 2. Meanwhile, patient's diet is changed to n.p.o. Until blood sugars could be under control.

## 2023-11-08 NOTE — QUICK NOTE
Earlier was placed SD 2 for insulin drip. Now is 102 glucose check. The drip is dc'd. Patient reinstated in Med Surg. Sliding scale per protocol. Diet to diabetic cardiac.

## 2023-11-08 NOTE — ASSESSMENT & PLAN NOTE
As of the moment, the patient received prednisone 60 mg p.o with Solu-Medrol 125 mg x 1. In addition he received nebulizations of ipratropium and albuterol. The patient is breathing much better and currently holding saturations at 95 to 96% on room air. He is able to speak in long sentences however has bit of abdominal breathing and wheezes. Patient is placed in hospital for further observation overnight as he is quite uncomfortable about going home. Patient without any fever. We will promote pulmonary toilet, anti-inflammatories. Bronchodilation. Continue with guaifenesin 600 mg every 12. Bradycardia of in place of Advair 200/25 daily. Umeclidinium 62.5 mcg daily. Continue Flonase. CBC with differential and metabolic profile in the morning.

## 2023-11-08 NOTE — APP STUDENT NOTE
JAY JAY STUDENT  Inpatient Progress Note for TRAINING ONLY  Not Part of Legal Medical Record     Progress Note - Bharath Flores 70 y.o. male MRN: [id number]  Unit/Bed#: [facility] Encounter: [id number]    Assessment:    Principal Problem:    Chronic obstructive pulmonary disease with acute exacerbation ([address])  Active Problems:    Alcohol abuse    CAD (coronary artery disease)    Type 2 diabetes mellitus with hyperglycemia, without long-term current use of insulin (HCC)    Chronic respiratory failure (HCC)    Hypertension  Chronic Obstructive Pulmonary Disease with acute exacerbation:  - Patient was started on prednisone 60 mg PO with solumedrol 125 mg x 1. Patient also received nebulization's of Ipratropium and Albuterol.   - Patient is breathing better and was 96-99% O2 on room air.   - Continue with at home medications upon d/c: Albuterol 2 puff Q4H PRN, Fluticasone-vilanterol 200-25 mcg 1 puff QD, Umeclidinium 62.5 mcg 1 puff QD, and Prednisone 40 mg QD for active COPD. Coronary Artery Disease:  - Continue on Atorvastatin 80 mg PO QD pm and Aspirin 81 mg PO QD for CAD. Alcohol Abuse:  - Continue with Thiamine 100 mg PO QD and cyanocobalamin (B12) 100 mcg QD. Hypertension:   - BP is stable within patients baseline. Past three readings have been 133/62, 125/64, and 134/60.   - Continue on Lisinopril 40 mg QD. Chronic Respiratory Failure:  - Patient currently stable and meeting O2 requirements (96-99%) on room air. Type 2 Diabetes Mellitus with hyperglycemia, without long-term current use of insulin:   - Glucose has been returning to stable ranges from 11/7. Previous readings are > 500, 455, 497, 235, 102, and 110 at 7:23 am on 11/8. - Hold insulin drip due to stable glucose levels. -     Microcytic Iron Deficiency Anemia:  - Labs were reviewed today and Hemoglobin is at patients baseline at 9.7.   - Continue Ferrous Sulfate 325 mg BID with meals.      Plan:    ***      VTE Pharmacologic Prophylaxis: Pharmacologic: Enoxaparin (Lovenox)  Mechanical VTE Prophylaxis in Place: No    Patient Centered Rounds: I have performed bedside rounds with nursing staff today. Discussions with Specialists or Other Care Team Provider: ***    Education and Discussions with Family / Patient: ***    Time Spent for Care: 30 minutes. More than 50% of total time spent on counseling and coordination of care as described above. Current Length of Stay: 0 day(s)    Current Patient Status: Inpatient   Certification Statement: The patient, who was admitted as an inpatient, is being discharged sooner than originally anticipated due to: ***improvement of COPD acute exacerbation symptoms. Discharge Plan: ***    Code Status: Level 1 - Full Code    Subjective:   ***69 y/o male with a PMH of COPD, DMT2, HTN, Alcohol abuse, chronic respiratory failure and CAD is being seen as a follow in-patient visit due to an acute COPD exacerbation. Patient today seemed to be doing much better. Patient was stable on room air, with no SOB. Patient stated that he has not had any SOB since the exacerbation yesterday. Patient was pleased with the idea of possibly being discharged today. Objective:     Vitals:   Temp (24hrs), Av.7 °F (37.1 °C), Min:98.7 °F (37.1 °C), Max:98.7 °F (37.1 °C)    Temp:  [98.7 °F (37.1 °C)] 98.7 °F (37.1 °C)  HR:  [53-95] 70  Resp:  [17-20] 20  BP: (108-150)/(51-87) 134/60  SpO2:  [93 %-100 %] 98 %  There is no height or weight on file to calculate BMI. Input and Output Summary (last 24 hours): Intake/Output Summary (Last 24 hours) at 2023 0928  Last data filed at 2023 0732  Gross per 24 hour   Intake 2000 ml   Output 750 ml   Net 1250 ml       Physical Exam:     Physical Exam  Constitutional:       Appearance: Normal appearance. HENT:      Head: Normocephalic. Eyes:      Conjunctiva/sclera: Conjunctivae normal.   Cardiovascular:      Rate and Rhythm: Normal rate and regular rhythm.       Pulses: Normal pulses. Heart sounds: Normal heart sounds. Pulmonary:      Effort: Pulmonary effort is normal.   Abdominal:      General: Bowel sounds are normal.   Musculoskeletal:         General: Normal range of motion. Skin:     General: Skin is warm. Neurological:      Mental Status: He is alert. Mental status is at baseline. Psychiatric:         Mood and Affect: Mood normal.       ( *** Be Sure to Include Physical Exam: Delete this entire line when you have entered your exam)    Historical Information   Past Medical History:   Diagnosis Date    Cardiac arrest ([address])     COPD (chronic obstructive pulmonary disease) ([address])     CVA (cerebral vascular accident) ([address])     Diabetes mellitus ([address])     Heart attack ([address])     Hypertension     Stroke [facility])      Past Surgical History:   Procedure Laterality Date    CERVICAL FUSION N/A 9/10/2018    Procedure: Posterior cervical decompressive laminectomy C3-6; Posterior cervical lateral mass and pedicle fixation fusion C1-T1;  Surgeon: Madina Lee MD;  Location: [facility];  Service: Neurosurgery     Social History   Social History     Substance and Sexual Activity   Alcohol Use Not Currently    Alcohol/week: 8.0 - 12.0 standard drinks of alcohol    Types: 8 - 12 Cans of beer per week    Comment: every day drinker     Social History     Substance and Sexual Activity   Drug Use Never     Social History     Tobacco Use   Smoking Status Former    Types: Cigarettes   Smokeless Tobacco Never   Tobacco Comments    quit 5 months ago      Family History:   Family History   Problem Relation Age of Onset    Heart attack Mother        Meds/Allergies   PTA meds:   Prior to Admission Medications   Prescriptions Last Dose Informant Patient Reported? Taking?    B Complex Vitamins (VITAMIN B COMPLEX 100 IJ)   Yes No   Sig: Take 1 tablet by mouth daily   Insulin Glargine (BASAGLAR KWIKPEN SC)   Yes No   Sig: Inject 30 Units under the skin daily at bedtime   LORazepam (Ativan) 0.5 mg tablet   Yes No   Sig: Take by mouth every 6 (six) hours as needed for anxiety    albuterol (Proventil HFA) 90 mcg/act inhaler   No No   Sig: Inhale 2 puffs every 6 (six) hours as needed for wheezing   aspirin 81 mg chewable tablet   Yes No   Sig: Chew 81 mg daily   atorvastatin (LIPITOR) 80 mg tablet   No No   Sig: Take 1 tablet (80 mg total) by mouth daily with dinner   betamethasone valerate (VALISONE) 0.1 % cream   Yes No   Sig: Apply topically 2 (two) times a day   budesonide (PULMICORT) 0.5 mg/2 mL nebulizer solution   No No   Sig: Take 2 mL (0.5 mg total) by nebulization every 12 (twelve) hours Rinse mouth after use. busPIRone (BUSPAR) 10 mg tablet   No No   Sig: Take 1 tablet (10 mg total) by mouth 3 (three) times a day   ferrous sulfate 324 (65 Fe) mg   No No   Sig: Take 1 tablet (324 mg total) by mouth 2 (two) times a day before meals   fluticasone (FLONASE) 50 mcg/act nasal spray   Yes No   Si spray into each nostril 2 (two) times a day   fluticasone-salmeterol (ADVAIR) 500-50 mcg/dose inhaler   Yes No   Sig: Inhale 1 puff 2 (two) times a day Rinse mouth after use.    folic acid (FOLVITE) 1 mg tablet   No No   Sig: Take 1 tablet (1 mg total) by mouth daily   ipratropium-albuterol (COMBIVENT)  mcg/act inhaler   Yes No   Sig: Inhale   lisinopril (ZESTRIL) 40 mg tablet   Yes No   Sig: Take 40 mg by mouth daily    melatonin 3 mg   No No   Sig: Take 1 tablet (3 mg total) by mouth daily at bedtime as needed (30)   metFORMIN (GLUCOPHAGE) 1000 MG tablet   Yes No   Sig: Take 1 tablet by mouth every 12 (twelve) hours   pantoprazole (Protonix) 40 mg tablet   No No   Sig: Take 1 tablet (40 mg total) by mouth 2 (two) times a day   thiamine 100 MG tablet   No No   Sig: Take 1 tablet (100 mg total) by mouth daily   tiotropium (SPIRIVA) 18 mcg inhalation capsule  Pharmacy (Specify) Yes No   Sig: Place 18 mcg into inhaler and inhale daily      Facility-Administered Medications: None     Allergies   Allergen Reactions    Amoxicillin Hives    Augmentin [Amoxicillin-Pot Clavulanate] Hives       Additional Data:     Labs:    Results from last 7 days   Lab Units 11/08/23 0426 11/07/23 2053   WBC Thousand/uL 8.79 15.12*   HEMOGLOBIN g/dL 9.7* 10.3*   HEMATOCRIT % 33.4* 36.4*   PLATELETS Thousands/uL 388 445*   LYMPHO PCT %  --  1*   MONO PCT %  --  0*   EOS PCT %  --  0     Results from last 7 days   Lab Units 11/08/23  0426 11/08/23  0019 11/07/23 2053   SODIUM mmol/L 136   < > 134*   POTASSIUM mmol/L 3.9   < > 4.3   CHLORIDE mmol/L 105   < > 100   CO2 mmol/L 23   < > 25   BUN mg/dL 21   < > 22   CREATININE mg/dL 0.60   < > 0.57*   ANION GAP mmol/L 8   < > 9   CALCIUM mg/dL 8.4   < > 9.1   ALBUMIN g/dL  --   --  4.1   TOTAL BILIRUBIN mg/dL  --   --  0.55   ALK PHOS U/L  --   --  64   ALT U/L  --   --  13   AST U/L  --   --  11*   GLUCOSE RANDOM mg/dL 232*   < > 237*    < > = values in this interval not displayed. Results from last 7 days   Lab Units 11/08/23  0723 11/08/23  0642 11/08/23  0405 11/08/23  0159 11/08/23  0123 11/08/23  0047 11/08/23  0007   POC GLUCOSE mg/dl 110 102 235* 497* 455* >500* >500*                 * I Have Reviewed All Lab Data Listed Above. * Additional Pertinent Lab Tests Reviewed:  All Labs Within Last 24 Hours Reviewed    Imaging:    Imaging Reports Reviewed Today Include: ***  Imaging Personally Reviewed by Myself Includes:  ***    Recent Cultures (last 7 days):           Last 24 Hours Medication List:   Current Facility-Administered Medications   Medication Dose Route Frequency Provider Last Rate    acetaminophen  650 mg Oral Q6H PRN Gerardo Hale MD      albuterol  2 puff Inhalation Q4H PRN Gerardo Hale MD      aluminum-magnesium hydroxide-simethicone  30 mL Oral Q6H PRN Gerardo Hale MD      aspirin  81 mg Oral Daily Gerardo Hale MD      atorvastatin  80 mg Oral Daily With MD suzanne ChildsPIRone  10 mg Oral TID Enrique Cleaning Alli Manning MD      cyanocobalamin  100 mcg Oral Daily Loreta Pratt MD      docusate sodium  100 mg Oral BID PRN Loreta Pratt MD      enoxaparin  40 mg Subcutaneous Daily Loreta Pratt MD      ferrous sulfate  325 mg Oral BID With Meals Loreta Pratt MD      fluticasone  1 spray Nasal BID Loreta Pratt MD      fluticasone-vilanterol  1 puff Inhalation Daily Loreta Pratt MD      folic acid  1 mg Oral Daily Loreta Pratt, MD      guaiFENesin  600 mg Oral BID Loreta Pratt MD      insulin glargine  30 Units Subcutaneous HS MYRON Heller-C      insulin lispro  1-5 Units Subcutaneous HS Ramonita Jacobo PA-C      insulin lispro  1-6 Units Subcutaneous TID AC Ramonita Jacobo PA-C      insulin lispro  10 Units Subcutaneous Once Tara NOAH Goodman      insulin lispro  3 Units Subcutaneous TID With Meals GERTRUDIS BoyceC      levalbuterol  1.25 mg Nebulization Q6H PRN Loreta Pratt MD      And    sodium chloride  3 mL Nebulization Q6H PRN Loreta Pratt MD      lisinopril  40 mg Oral Daily Loreta Pratt MD      LORazepam  0.5 mg Oral Q6H PRN Loreta Pratt MD      melatonin  3 mg Oral HS PRN Loreta Pratt MD      ondansetron  4 mg Intravenous Q6H PRN Loreta Pratt MD      pantoprazole  40 mg Oral BID Loreta Pratt MD      predniSONE  40 mg Oral Daily Loreta Pratt MD      thiamine  100 mg Oral Daily Loreta Pratt MD      umeclidinium  1 puff Inhalation Daily Loreta Pratt MD          Today, Patient Was Seen By: Nadia Drake    ** Please Note: Dictation voice to text software may have been used in the creation of this document.  **

## 2023-11-08 NOTE — ASSESSMENT & PLAN NOTE
As of the moment, the patient received prednisone 60 mg p.o with Solu-Medrol 125 mg x 1. In addition he received nebulizations of ipratropium and albuterol. The patient is breathing much better and currently holding saturations at 95 to 96% on room air. Stable for discharge home  Complete a 5 day course of Prednisone  Discharge with a Z pack   Continue with guaifenesin 600 mg every 12. Advair 200/25 daily. Umeclidinium 62.5 mcg daily. Albuterol prn  Continue Flonase.   Outpatient follow up with Dr. Dhaliwal

## 2023-11-08 NOTE — ED PROVIDER NOTES
History  Chief Complaint   Patient presents with    Shortness of Breath     Patient reports being SOB today, feeling tight in the chest and slightly wheezy. Patient reports having rescue inhaler and nebulizer at home that he used without relief. EMS administered DUO neb en route. 76-year-old male with a history of COPD, CAD, hypertension, type 2 diabetes, and CKD presents complaining of shortness of breath and chest tightness that began about 1 hour prior to arrival.  The patient states that his symptoms are similar to prior COPD exacerbations. The patient states that he tried a breathing treatment at home without relief. The patient was given a DuoNeb treatment by EMS in route to the emergency department with some relief. The patient states that he quit smoking 2 years ago. The patient denies fever, chills, chest pain, cough, nausea, vomiting, abdominal pain, pain or swelling in his lower extremities. Prior to Admission Medications   Prescriptions Last Dose Informant Patient Reported? Taking? B Complex Vitamins (VITAMIN B COMPLEX 100 IJ)   Yes No   Sig: Take 1 tablet by mouth daily   Insulin Glargine (BASAGLAR KWIKPEN SC)   Yes No   Sig: Inject 30 Units under the skin daily at bedtime   LORazepam (Ativan) 0.5 mg tablet   Yes No   Sig: Take by mouth every 6 (six) hours as needed for anxiety    albuterol (Proventil HFA) 90 mcg/act inhaler   No No   Sig: Inhale 2 puffs every 6 (six) hours as needed for wheezing   aspirin 81 mg chewable tablet   Yes No   Sig: Chew 81 mg daily   atorvastatin (LIPITOR) 80 mg tablet   No No   Sig: Take 1 tablet (80 mg total) by mouth daily with dinner   betamethasone valerate (VALISONE) 0.1 % cream   Yes No   Sig: Apply topically 2 (two) times a day   budesonide (PULMICORT) 0.5 mg/2 mL nebulizer solution   No No   Sig: Take 2 mL (0.5 mg total) by nebulization every 12 (twelve) hours Rinse mouth after use.    busPIRone (BUSPAR) 10 mg tablet   No No   Sig: Take 1 tablet (10 mg total) by mouth 3 (three) times a day   ferrous sulfate 324 (65 Fe) mg   No No   Sig: Take 1 tablet (324 mg total) by mouth 2 (two) times a day before meals   fluticasone (FLONASE) 50 mcg/act nasal spray   Yes No   Si spray into each nostril 2 (two) times a day   fluticasone-salmeterol (ADVAIR) 500-50 mcg/dose inhaler   Yes No   Sig: Inhale 1 puff 2 (two) times a day Rinse mouth after use. folic acid (FOLVITE) 1 mg tablet   No No   Sig: Take 1 tablet (1 mg total) by mouth daily   ipratropium-albuterol (COMBIVENT)  mcg/act inhaler   Yes No   Sig: Inhale   lisinopril (ZESTRIL) 40 mg tablet   Yes No   Sig: Take 40 mg by mouth daily    melatonin 3 mg   No No   Sig: Take 1 tablet (3 mg total) by mouth daily at bedtime as needed (30)   metFORMIN (GLUCOPHAGE) 1000 MG tablet   Yes No   Sig: Take 1 tablet by mouth every 12 (twelve) hours   pantoprazole (Protonix) 40 mg tablet   No No   Sig: Take 1 tablet (40 mg total) by mouth 2 (two) times a day   thiamine 100 MG tablet   No No   Sig: Take 1 tablet (100 mg total) by mouth daily   tiotropium (SPIRIVA) 18 mcg inhalation capsule  Pharmacy (Specify) Yes No   Sig: Place 18 mcg into inhaler and inhale daily      Facility-Administered Medications: None       Past Medical History:   Diagnosis Date    Cardiac arrest ([facility])     COPD (chronic obstructive pulmonary disease) (HCC)     CVA (cerebral vascular accident) ([facility])     Diabetes mellitus ([facility])     Heart attack ([facility])     Hypertension     Stroke [facility])        Past Surgical History:   Procedure Laterality Date    CERVICAL FUSION N/A 9/10/2018    Procedure: Posterior cervical decompressive laminectomy C3-6; Posterior cervical lateral mass and pedicle fixation fusion C1-T1;  Surgeon: Yaz Pickard MD;  Location: [facility];  Service: Neurosurgery       Family History   Problem Relation Age of Onset    Heart attack Mother      I have reviewed and agree with the history as documented.     E-Cigarette/Vaping    E-Cigarette Use Never User      E-Cigarette/Vaping Substances    Nicotine No     THC No     CBD No     Flavoring No     Other No     Unknown No      Social History     Tobacco Use    Smoking status: Former     Types: Cigarettes    Smokeless tobacco: Never    Tobacco comments:     quit 5 months ago    Vaping Use    Vaping Use: Never used   Substance Use Topics    Alcohol use: Not Currently     Alcohol/week: 8.0 - 12.0 standard drinks of alcohol     Types: 8 - 12 Cans of beer per week     Comment: every day drinker    Drug use: Never        Review of Systems   Constitutional:  Negative for chills and fever. HENT:  Negative for congestion and sore throat. Eyes:  Negative for pain and redness. Respiratory:  Positive for chest tightness, shortness of breath and wheezing. Negative for cough. Cardiovascular:  Negative for chest pain and palpitations. Gastrointestinal:  Negative for abdominal pain, diarrhea, nausea and vomiting. Genitourinary:  Negative for dysuria and hematuria. Musculoskeletal:  Negative for arthralgias and myalgias. Skin:  Negative for color change, pallor and rash. Neurological:  Negative for syncope, weakness, light-headedness, numbness and headaches. All other systems reviewed and are negative. Physical Exam  ED Triage Vitals   Temperature Pulse Respirations Blood Pressure SpO2   11/07/23 1920 11/07/23 1923 11/07/23 1920 11/07/23 1923 11/07/23 1923   98.7 °F (37.1 °C) 94 19 124/62 94 %      Temp Source Heart Rate Source Patient Position - Orthostatic VS BP Location FiO2 (%)   11/07/23 1920 11/07/23 2200 11/07/23 1923 11/07/23 1923 --   Oral Monitor Sitting Left arm       Pain Score       11/07/23 2144       6             Orthostatic Vital Signs  Vitals:    11/08/23 0730 11/08/23 0830 11/08/23 0900 11/08/23 1030   BP: 125/64 134/60  156/72   Pulse: 57 70 66 64   Patient Position - Orthostatic VS:           Physical Exam  Constitutional:       General: He is not in acute distress. Appearance: Normal appearance. He is not ill-appearing, toxic-appearing or diaphoretic. HENT:      Head: Normocephalic and atraumatic. Nose: Nose normal.      Mouth/Throat:      Mouth: Mucous membranes are moist.      Pharynx: Oropharynx is clear. Eyes:      Conjunctiva/sclera: Conjunctivae normal.      Pupils: Pupils are equal, round, and reactive to light. Cardiovascular:      Rate and Rhythm: Normal rate and regular rhythm. Pulses: Normal pulses. Heart sounds: Normal heart sounds. No murmur heard. No friction rub. No gallop. Pulmonary:      Effort: No respiratory distress. Breath sounds: Wheezing present. No rhonchi or rales. Comments: Decreased air movement. Chest:      Chest wall: No tenderness. Abdominal:      General: Abdomen is flat. Palpations: Abdomen is soft. Tenderness: There is no abdominal tenderness. There is no guarding or rebound. Musculoskeletal:         General: No swelling or tenderness. Normal range of motion. Cervical back: Normal range of motion and neck supple. No rigidity or tenderness. Right lower leg: No edema. Left lower leg: No edema. Lymphadenopathy:      Cervical: No cervical adenopathy. Skin:     General: Skin is warm and dry. Capillary Refill: Capillary refill takes less than 2 seconds. Coloration: Skin is not jaundiced or pale. Findings: No bruising, erythema, lesion or rash. Neurological:      General: No focal deficit present. Mental Status: He is alert and oriented to person, place, and time.          ED Medications  Medications   aspirin chewable tablet 81 mg (81 mg Oral Given 11/8/23 0808)   atorvastatin (LIPITOR) tablet 80 mg (has no administration in time range)   cyanocobalamin (VITAMIN B-12) tablet 100 mcg (100 mcg Oral Given 11/8/23 1100)   busPIRone (BUSPAR) tablet 10 mg (0 mg Oral Hold 11/8/23 0900)   ferrous sulfate tablet 325 mg (325 mg Oral Given 11/8/23 0812)   fluticasone [facility]) 50 mcg/act nasal spray 1 spray (1 spray Nasal Given 11/8/23 1101)   fluticasone-vilanterol 200-25 mcg/actuation 1 puff (1 puff Inhalation Given 44/7/01 7889)   folic acid (FOLVITE) tablet 1 mg (1 mg Oral Given 11/8/23 0809)   lisinopril (ZESTRIL) tablet 40 mg (40 mg Oral Given 11/8/23 1100)   LORazepam (ATIVAN) tablet 0.5 mg (has no administration in time range)   melatonin tablet 3 mg (has no administration in time range)   pantoprazole (PROTONIX) EC tablet 40 mg (40 mg Oral Given 11/8/23 0812)   thiamine tablet 100 mg (100 mg Oral Given 11/8/23 0809)   umeclidinium 62.5 mcg/actuation inhaler AEPB 1 puff (1 puff Inhalation Given 11/8/23 1103)   acetaminophen (TYLENOL) tablet 650 mg ( Oral Canceled Entry 11/8/23 0823)   docusate sodium (COLACE) capsule 100 mg ( Oral Canceled Entry 11/8/23 0823)   ondansetron (ZOFRAN) injection 4 mg (has no administration in time range)   aluminum-magnesium hydroxide-simethicone (MAALOX) oral suspension 30 mL (has no administration in time range)   guaiFENesin (MUCINEX) 12 hr tablet 600 mg (600 mg Oral Given 11/8/23 0811)   levalbuterol (XOPENEX) inhalation solution 1.25 mg (1.25 mg Nebulization Given 11/8/23 0947)     And   sodium chloride 0.9 % inhalation solution 3 mL (3 mL Nebulization Given 11/8/23 0947)   predniSONE tablet 40 mg (40 mg Oral Given 11/8/23 0809)   enoxaparin (LOVENOX) subcutaneous injection 40 mg (40 mg Subcutaneous Given 11/8/23 0812)   albuterol (PROVENTIL HFA,VENTOLIN HFA) inhaler 2 puff (has no administration in time range)   insulin lispro (HumaLOG) 100 units/mL subcutaneous injection 10 Units (0 Units Subcutaneous Hold 11/8/23 0239)   insulin glargine (LANTUS) subcutaneous injection 30 Units 0.3 mL (has no administration in time range)   insulin lispro (HumaLOG) 100 units/mL subcutaneous injection 1-6 Units (1 Units Subcutaneous Given 11/8/23 4095)   insulin lispro (HumaLOG) 100 units/mL subcutaneous injection 1-5 Units (has no administration in time range)   insulin lispro (HumaLOG) 100 units/mL subcutaneous injection 3 Units (3 Units Subcutaneous Given 11/8/23 1142)   methylPREDNISolone sodium succinate (FOR EMS ONLY) (Solu-MEDROL) 125 MG injection 125 mg (0 mg Does not apply Given to EMS 11/7/23 1946)   ipratropium-albuterol (FOR EMS ONLY) (DUO-NEB) 0.5-2.5 mg/3 mL inhalation solution 6 mL (0 mL Does not apply Given to EMS 11/7/23 1947)   albuterol inhalation solution 10 mg (10 mg Nebulization Given 11/7/23 2103)   ipratropium (ATROVENT) 0.02 % inhalation solution 1 mg (1 mg Nebulization Given 11/7/23 2103)   sodium chloride 0.9 % inhalation solution 12 mL (12 mL Nebulization Given 11/7/23 2103)   predniSONE tablet 60 mg (60 mg Oral Given 11/7/23 2103)   acetaminophen (TYLENOL) tablet 650 mg (650 mg Oral Given 11/7/23 2144)   insulin regular (HumuLIN R,NovoLIN R) injection 10 Units (10 Units Intravenous Given 11/8/23 0020)   sodium chloride 0.9 % bolus 2,000 mL (0 mL Intravenous Stopped 11/8/23 0239)       Diagnostic Studies  Results Reviewed       Procedure Component Value Units Date/Time    Fingerstick Glucose (POCT) [[id number]]  (Abnormal) Collected: 11/08/23 1112    Lab Status: Final result Updated: 11/08/23 1116     POC Glucose 175 mg/dl     Fingerstick Glucose (POCT) [[id number]]  (Normal) Collected: 11/08/23 0723    Lab Status: Final result Updated: 11/08/23 0727     POC Glucose 110 mg/dl     Fingerstick Glucose (POCT) [[id number]]  (Normal) Collected: 11/08/23 0642    Lab Status: Final result Updated: 11/08/23 0643     POC Glucose 102 mg/dl     CBC and differential [[id number]]  (Abnormal) Collected: 11/08/23 0426    Lab Status: Final result Specimen: Blood from Arm, Right Updated: 11/08/23 0500     WBC 8.79 Thousand/uL      RBC 4.50 Million/uL      Hemoglobin 9.7 g/dL      Hematocrit 33.4 %      MCV 74 fL      MCH 21.6 pg      MCHC 29.0 g/dL      RDW 19.9 %      MPV 10.3 fL      Platelets 223 Thousands/uL     Narrative:      See Manual Diff Done Within 3 Days  This is an appended report. These results have been appended to a previously verified report.     Basic metabolic panel [[id number]]  (Abnormal) Collected: 11/08/23 0426    Lab Status: Final result Specimen: Blood from Arm, Right Updated: 11/08/23 0458     Sodium 136 mmol/L      Potassium 3.9 mmol/L      Chloride 105 mmol/L      CO2 23 mmol/L      ANION GAP 8 mmol/L      BUN 21 mg/dL      Creatinine 0.60 mg/dL      Glucose 232 mg/dL      Calcium 8.4 mg/dL      eGFR 102 ml/min/1.73sq m     Narrative:      [facility] (CKD):     Stage 1 with normal or high GFR (GFR > 90 mL/min/1.73 square meters)    Stage 2 Mild CKD (GFR = 60-89 mL/min/1.73 square meters)    Stage 3A Moderate CKD (GFR = 45-59 mL/min/1.73 square meters)    Stage 3B Moderate CKD (GFR = 30-44 mL/min/1.73 square meters)    Stage 4 Severe CKD (GFR = 15-29 mL/min/1.73 square meters)    Stage 5 End Stage CKD (GFR <15 mL/min/1.73 square meters)  Note: GFR calculation is accurate only with a steady state creatinine    Phosphorus [[id number]]  (Normal) Collected: 11/08/23 0426    Lab Status: Final result Specimen: Blood from Arm, Right Updated: 11/08/23 0458     Phosphorus 3.4 mg/dL     Magnesium [[id number]]  (Normal) Collected: 11/08/23 0426    Lab Status: Final result Specimen: Blood from Arm, Right Updated: 11/08/23 0458     Magnesium 2.2 mg/dL     Fingerstick Glucose (POCT) [[id number]]  (Abnormal) Collected: 11/08/23 0405    Lab Status: Final result Updated: 11/08/23 0406     POC Glucose 235 mg/dl     Fingerstick Glucose (POCT) [[id number]]  (Abnormal) Collected: 11/08/23 0159    Lab Status: Final result Updated: 11/08/23 0200     POC Glucose 497 mg/dl     Basic metabolic panel [[id number]]  (Abnormal) Collected: 11/08/23 0019    Lab Status: Final result Specimen: Blood from Arm, Left Updated: 11/08/23 0144     Sodium 130 mmol/L      Potassium 4.3 mmol/L      Chloride 97 mmol/L      CO2 22 mmol/L      ANION GAP 11 mmol/L      BUN 25 mg/dL      Creatinine 0.72 mg/dL      Glucose 557 mg/dL      Calcium 8.3 mg/dL      eGFR 95 ml/min/1.73sq m     Narrative:      [facility] guidelines for Chronic Kidney Disease (CKD):     Stage 1 with normal or high GFR (GFR > 90 mL/min/1.73 square meters)    Stage 2 Mild CKD (GFR = 60-89 mL/min/1.73 square meters)    Stage 3A Moderate CKD (GFR = 45-59 mL/min/1.73 square meters)    Stage 3B Moderate CKD (GFR = 30-44 mL/min/1.73 square meters)    Stage 4 Severe CKD (GFR = 15-29 mL/min/1.73 square meters)    Stage 5 End Stage CKD (GFR <15 mL/min/1.73 square meters)  Note: GFR calculation is accurate only with a steady state creatinine    Fingerstick Glucose (POCT) [[id number]]  (Abnormal) Collected: 11/08/23 0123    Lab Status: Final result Updated: 11/08/23 0124     POC Glucose 455 mg/dl     Fingerstick Glucose (POCT) [[id number]]  (Abnormal) Collected: 11/08/23 0047    Lab Status: Final result Updated: 11/08/23 0048     POC Glucose >500 mg/dl     Urine Microscopic [[id number]]  (Normal) Collected: 11/08/23 0009    Lab Status: Final result Specimen: Urine, Other Updated: 11/08/23 0035     RBC, UA None Seen /hpf      WBC, UA None Seen /hpf      Epithelial Cells None Seen /hpf      Bacteria, UA None Seen /hpf     UA (URINE) with reflex to Scope [[id number]]  (Abnormal) Collected: 11/08/23 0009    Lab Status: Final result Specimen: Urine, Other Updated: 11/08/23 0025     Color, UA Light Yellow     Clarity, UA Clear     Specific Gravity, UA 1.036     pH, UA 5.0     Leukocytes, UA Elevated glucose may cause decreased leukocyte values.  See urine microscopic for UWBC result     Nitrite, UA Negative     Protein, UA Negative mg/dl      Glucose, UA >=1000 (1%) mg/dl      Ketones, UA 20 (1+) mg/dl      Urobilinogen, UA <2.0 mg/dl      Bilirubin, UA Negative     Occult Blood, UA Negative    Fingerstick Glucose (POCT) [[id number]]  (Abnormal) Collected: 11/08/23 0007    Lab Status: Final result Updated: 11/08/23 0008     POC Glucose >500 mg/dl     RBC Morphology Reflex Test [[id number]] Collected: 11/07/23 2053    Lab Status: Final result Specimen: Blood from Arm, Left Updated: 11/07/23 2201    CBC and differential [[id number]]  (Abnormal) Collected: 11/07/23 2053    Lab Status: Final result Specimen: Blood from Arm, Left Updated: 11/07/23 2146     WBC 15.12 Thousand/uL      RBC 4.90 Million/uL      Hemoglobin 10.3 g/dL      Hematocrit 36.4 %      MCV 74 fL      MCH 21.0 pg      MCHC 28.3 g/dL      RDW 20.1 %      MPV 10.0 fL      Platelets 280 Thousands/uL     Narrative: This is an appended report. These results have been appended to a previously verified report.     Manual Differential(PHLEBS Do Not Order) [[id number]]  (Abnormal) Collected: 11/07/23 2053    Lab Status: Final result Specimen: Blood from Arm, Left Updated: 11/07/23 2146     Segmented % 99 %      Lymphocytes % 1 %      Monocytes % 0 %      Eosinophils, % 0 %      Basophils % 0 %      Absolute Neutrophils 14.97 Thousand/uL      Lymphocytes Absolute 0.15 Thousand/uL      Monocytes Absolute 0.00 Thousand/uL      Eosinophils Absolute 0.00 Thousand/uL      Basophils Absolute 0.00 Thousand/uL      Total Counted --     RBC Morphology Present     Platelet Estimate Adequate     Acanthocytes Present     Anisocytosis Present     Ovalocytes Present     Poikilocytes Present     Polychromasia Present    HS Troponin 0hr (reflex protocol) [[id number]]  (Normal) Collected: 11/07/23 2053    Lab Status: Final result Specimen: Blood from Arm, Left Updated: 11/07/23 2126     hs TnI 0hr 12 ng/L     Comprehensive metabolic panel [[id number]]  (Abnormal) Collected: 11/07/23 2053    Lab Status: Final result Specimen: Blood from Arm, Left Updated: 11/07/23 2122     Sodium 134 mmol/L      Potassium 4.3 mmol/L      Chloride 100 mmol/L      CO2 25 mmol/L      ANION GAP 9 mmol/L      BUN 22 mg/dL      Creatinine 0.57 mg/dL      Glucose 237 mg/dL      Calcium 9.1 mg/dL      AST 11 U/L      ALT 13 U/L      Alkaline Phosphatase 64 U/L      Total Protein 7.3 g/dL      Albumin 4.1 g/dL      Total Bilirubin 0.55 mg/dL      eGFR 104 ml/min/1.73sq m     Narrative:      [facility] ([facility]D):     Stage 1 with normal or high GFR (GFR > 90 mL/min/1.73 square meters)    Stage 2 Mild CKD (GFR = 60-89 mL/min/1.73 square meters)    Stage 3A Moderate CKD (GFR = 45-59 mL/min/1.73 square meters)    Stage 3B Moderate CKD (GFR = 30-44 mL/min/1.73 square meters)    Stage 4 Severe CKD (GFR = 15-29 mL/min/1.73 square meters)    Stage 5 End Stage CKD (GFR <15 mL/min/1.73 square meters)  Note: GFR calculation is accurate only with a steady state creatinine                   XR chest 2 views   Final Result by Leodan Chauhan MD (11/08 9724)      No acute cardiopulmonary disease. Workstation performed: [id number]               Procedures  Procedures      ED Course                             SBIRT 20yo+      Flowsheet Row Most Recent Value   Initial Alcohol Screen: US AUDIT-C     1. How often do you have a drink containing alcohol? 0 Filed at: 11/08/2023 0322   2. How many drinks containing alcohol do you have on a typical day you are drinking? 0 Filed at: 11/08/2023 0322   3b. FEMALE Any Age, or MALE 65+: How often do you have 4 or more drinks on one occassion? 0 Filed at: 11/08/2023 0322   Audit-C Score 0 Filed at: 11/08/2023 1146   MICHALE: How many times in the past year have you. .. Used an illegal drug or used a prescription medication for non-medical reasons? Never  [pt not answering questions] Filed at: 11/08/2023 0322                  Medical Decision Making  57-year-old male with a history of COPD, CAD, hypertension, type 2 diabetes, and CKD presents complaining of shortness of breath and chest tightness that began about 1 hour prior to arrival.  Vitals are within the normal limits.   On exam the patient is in no acute distress, mucous membranes moist, neck supple, heart is regular rate and rhythm, faint wheezes diffusely, decreased air movement, no rhonchi or rails, abdomen is soft and nontender, no lower extremity edema or tenderness. Suspect COPD exacerbation. Given the patient's risk factors will order EKG, troponin, CBC, CMP. We will treat the patient with a dose of prednisone and albuterol ipratropium nebulizer treatment. EKG rate 103, sinus rhythm with occasional PACs, RBBB, normal intervals, no ST elevation or depression. Initial troponin is 12, will continue to trend. WBC count is 15. Patient is hyperglycemic with a glucose of 237. The remainder the patient's lab work is reviewed and without actionable derangements. Chest x-ray shows no acute cardiopulmonary disease. The patient is signed out to Dr. Eric Calderón with delta troponin pending with plan based on response to nebulizer treatment. Amount and/or Complexity of Data Reviewed  Labs: ordered. Radiology: ordered. Risk  OTC drugs. Prescription drug management. Decision regarding hospitalization.           Disposition  Final diagnoses:   COPD exacerbation ([facility])   DM (diabetes mellitus) ([facility])   CAD (coronary artery disease)   HTN (hypertension)     Time reflects when diagnosis was documented in both MDM as applicable and the Disposition within this note       Time User Action Codes Description Comment    11/7/2023  9:38 PM Junior EARL Add [J44.1] COPD exacerbation ([address])     11/7/2023 11:45 PM Shyrl Deeds Add [E11.9] DM (diabetes mellitus) ([facility])     11/7/2023 11:46 PM Shyrl Deeds Add [I25.10] CAD (coronary artery disease)     11/7/2023 11:46 PM Shyrl Deeds Add [I10] HTN (hypertension)     11/8/2023 11:45 AM Jose TELLEZ Add [J44.1] Chronic obstructive pulmonary disease with acute exacerbation [facility])           ED Disposition       ED Disposition   Admit    Condition   Stable    Date/Time   Wed Nov 8, 2023 0002    Comment   Patient's case was discussed with Dr. Michelle Xavier and patient's status was agreed to be inpatient status under the care of Dr. Michelle Xavier. Follow-up Information       Follow up With Specialties Details Why Contact Info Additional [address] [facility] Emergency Department Emergency Medicine Go to  If symptoms worsen [address] [id number]  [facility] Emergency Department, [address] [facility], [place], [place], [facility]    João Pendleton, [address] [facility], Nurse Practitioner Schedule an appointment as soon as possible for a visit   [address] [facility] [address] [place] [address]       Pilar Keith MD Pulmonary Disease Follow up Call for a follow up appt to be seen in next 1-2 weeks [address] [facility]  [address] [facility] [address] [phone]               Current Discharge Medication List        START taking these medications    Details   azithromycin (ZITHROMAX) 250 mg tablet Take 2 tablets today then 1 tablet daily x 4 days  Qty: 6 tablet, Refills: 0    Associated Diagnoses: Chronic obstructive pulmonary disease with acute exacerbation ([facility])      predniSONE 20 mg tablet Take 2 tablets (40 mg total) by mouth daily for 3 days Do not start before November 9, 2023. Qty: 6 tablet, Refills: 0    Associated Diagnoses: Chronic obstructive pulmonary disease with acute exacerbation ([address])           CONTINUE these medications which have NOT CHANGED    Details   albuterol (Proventil HFA) 90 mcg/act inhaler Inhale 2 puffs every 6 (six) hours as needed for wheezing  Qty: 6.7 g, Refills: 0    Comments: Substitution to a formulary equivalent within the same pharmaceutical class is authorized.   Associated Diagnoses: Mild intermittent asthma, unspecified whether complicated      aspirin 81 mg chewable tablet Chew 81 mg daily      atorvastatin (LIPITOR) 80 mg tablet Take 1 tablet (80 mg total) by mouth daily with dinner  Qty: 30 tablet, Refills: 0    Associated Diagnoses: Type 2 diabetes mellitus without complication, without long-term current use of insulin (HCC)      B Complex Vitamins (VITAMIN B COMPLEX 100 IJ) Take 1 tablet by mouth daily      betamethasone valerate (VALISONE) 0.1 % cream Apply topically 2 (two) times a day      budesonide (PULMICORT) 0.5 mg/2 mL nebulizer solution Take 2 mL (0.5 mg total) by nebulization every 12 (twelve) hours Rinse mouth after use. Refills: 0    Associated Diagnoses: Fall, initial encounter      busPIRone (BUSPAR) 10 mg tablet Take 1 tablet (10 mg total) by mouth 3 (three) times a day  Qty: 90 tablet, Refills: 0    Associated Diagnoses: Anxiety      ferrous sulfate 324 (65 Fe) mg Take 1 tablet (324 mg total) by mouth 2 (two) times a day before meals  Refills: 0    Associated Diagnoses: Anemia      fluticasone (FLONASE) 50 mcg/act nasal spray 1 spray into each nostril 2 (two) times a day      fluticasone-salmeterol (ADVAIR) 500-50 mcg/dose inhaler Inhale 1 puff 2 (two) times a day Rinse mouth after use.       folic acid (FOLVITE) 1 mg tablet Take 1 tablet (1 mg total) by mouth daily  Refills: 0    Associated Diagnoses: Fall, initial encounter      Insulin Glargine (BASAGLAR KWIKPEN SC) Inject 30 Units under the skin daily at bedtime      ipratropium-albuterol (COMBIVENT)  mcg/act inhaler Inhale      lisinopril (ZESTRIL) 40 mg tablet Take 40 mg by mouth daily       LORazepam (Ativan) 0.5 mg tablet Take by mouth every 6 (six) hours as needed for anxiety       melatonin 3 mg Take 1 tablet (3 mg total) by mouth daily at bedtime as needed (30)  Qty: 30 tablet, Refills: 0    Associated Diagnoses: Closed odontoid fracture with routine healing      metFORMIN (GLUCOPHAGE) 1000 MG tablet Take 1 tablet by mouth every 12 (twelve) hours      pantoprazole (Protonix) 40 mg tablet Take 1 tablet (40 mg total) by mouth 2 (two) times a day  Qty: 60 tablet, Refills: 0    Associated Diagnoses: Esophageal ulcer      thiamine 100 MG tablet Take 1 tablet (100 mg total) by mouth daily  Qty: 30 tablet, Refills: 0    Associated Diagnoses: Alcohol abuse      tiotropium (SPIRIVA) 18 mcg inhalation capsule Place 18 mcg into inhaler and inhale daily           Outpatient Discharge Orders   Ambulatory Referral to Pulmonary Rehabilitation   Standing Status: Future Standing Exp. Date: 11/08/24      Ambulatory referral to Pulmonology   Standing Status: Future Standing Exp. Date: 11/08/24      Discharge Diet     Activity as tolerated       PDMP Review       None             ED Provider  Attending physically available and evaluated Justo Asa. I managed the patient along with the ED Attending.     Electronically Signed by           Sav Perkins DO  11/08/23 0449

## 2023-11-08 NOTE — RESPIRATORY THERAPY NOTE
RT Protocol Note  Arturo Silva 70 y.o. male MRN: [id number]  Unit/Bed#: QCA Encounter: [id number]    Assessment    Principal Problem:    Chronic obstructive pulmonary disease with acute exacerbation ([facility] [place])  Active Problems:    Alcohol abuse    CAD (coronary artery disease)    Type 2 diabetes mellitus with hyperglycemia, without long-term current use of insulin (HCC)    Chronic respiratory failure (HCC)    Hypertension      Home Pulmonary Medications:  Albuterol mdi  Spiriva       Past Medical History:   Diagnosis Date    Cardiac arrest ([facility] [place])     COPD (chronic obstructive pulmonary disease) ([facility] [place])     CVA (cerebral vascular accident) ([facility] [place])     Diabetes mellitus ([facility] [place])     Heart attack ([facility] [place])     Hypertension     Stroke ([address] [place] [address])      Social History     Socioeconomic History    Marital status: Single     Spouse name: None    Number of children: None    Years of education: None    Highest education level: None   Occupational History    None   Tobacco Use    Smoking status: Former     Types: Cigarettes    Smokeless tobacco: Never    Tobacco comments:     quit 5 months ago    Vaping Use    Vaping Use: Never used   Substance and Sexual Activity    Alcohol use: Not Currently     Alcohol/week: 8.0 - 12.0 standard drinks of alcohol     Types: 8 - 12 Cans of beer per week     Comment: every day drinker    Drug use: Never    Sexual activity: Not Currently   Other Topics Concern    None   Social History Narrative    ** Merged History Encounter **         ** Merged History Encounter **         ** Merged History Encounter **          Social Determinants of Health     Financial Resource Strain: Not on file   Food Insecurity: No Food Insecurity (10/17/2023)    Hunger Vital Sign     Worried About Running Out of Food in the Last Year: Never true     Ran Out of Food in the Last Year: Never true   Transportation Needs: No Transportation Needs (10/17/2023)    PRAPARE - Transportation     Lack of Transportation (Medical): No     Lack of Transportation (Non-Medical): No   Physical Activity: Not on file   Stress: Not on file   Social Connections: Not on file   Intimate Partner Violence: Not on file   Housing Stability: Low Risk  (10/17/2023)    Housing Stability Vital Sign     Unable to Pay for Housing in the Last Year: No     Number of Places Lived in the Last Year: 1     Unstable Housing in the Last Year: No       Subjective         Objective    Physical Exam:   General Appearance: Sleeping  Respiratory Pattern: Normal  Chest Assessment: Chest expansion symmetrical  Bilateral Breath Sounds: Clear    Vitals:  Blood pressure 129/87, pulse 85, temperature 98.7 °F (37.1 °C), temperature source Oral, resp. rate 20, SpO2 100 %. Imaging and other studies:           Plan    Respiratory Plan: Home Bronchodilator Patient pathway        Resp Comments: Pt is a 71 y/o male admitted with copd. Is covid positive. Covid protocol utilized. Physician assessment used. Pt has copd and uses albuterol mdi and spiriva at home. Dr Casandra Todd ordered pt on prn udns, Will add mdi since pt uses at home.

## 2023-11-08 NOTE — ASSESSMENT & PLAN NOTE
Lab Results   Component Value Date    HGBA1C 8.8 (H) 10/21/2023     Continue Lantus at 30 units at night. Sliding scale. On metformin at home. Blood Sugar Average: Last 72 hrs:    Update: Just now, the patient had a blood sugar Accu-Chek showing greater than 500 reading. We will get metabolic profile x1. Meanwhile patient will also receive 2 L of normal saline, regular insulin 10 units intravenous; will recheck in 30 minutes.

## 2023-11-09 LAB
ATRIAL RATE: 103 BPM
P AXIS: 69 DEGREES
PR INTERVAL: 178 MS
QRS AXIS: 103 DEGREES
QRSD INTERVAL: 98 MS
QT INTERVAL: 360 MS
QTC INTERVAL: 471 MS
T WAVE AXIS: 19 DEGREES
VENTRICULAR RATE: 103 BPM

## 2023-11-09 PROCEDURE — 93010 ELECTROCARDIOGRAM REPORT: CPT | Performed by: INTERNAL MEDICINE

## 2023-11-20 ENCOUNTER — TELEPHONE (OUTPATIENT)
Dept: PULMONOLOGY | Facility: CLINIC | Age: 68
End: 2023-11-20

## 2023-11-20 NOTE — TELEPHONE ENCOUNTER
Unable to leave a voicemail message, Phone has no ring tone when calling patient to set up a Consult Appointment.

## 2023-11-27 NOTE — ASSESSMENT & PLAN NOTE
Daily Note     Today's date: 2023  Patient name: Xavier Marrow  : 1955  MRN: [id number]  Referring provider: Joni Littlejohn DO  Dx:   Encounter Diagnosis     ICD-10-CM    1. Flexor carpi ulnaris tendinitis  M77.8           Start Time: 1700  Stop Time:   Total time in clinic (min): 31 minutes    Subjective: Pt presents to PT noting an increase in hand pain over the holiday. Pt did miss last weeks appt and has gone over 10 days since last tx which could contribute to increased pain levels. Pt reports it started back up this past Thursday. Objective: See treatment diary below      Assessment: Tolerated treatment well. Patient demonstrated fatigue post treatment, exhibited good technique with therapeutic exercises, and would benefit from continued PT. Pt reports almost a complete reduction of pain level at the conclusion of manual tx. Pt inquired about doing 2x/wk as she notes she's in more discomfort however we discussed how pt missed an appt and she was pain free for almost 6 days prior to discomfort on set therefore will continue 1x/wk for the next few weeks. Plan: Continue per plan of care. Progress treatment as tolerated.        Precautions: HTN, depression, osteoporosis      Manuals           STM wrist flexors RE RE RE                                                 Neuro Re-Ed             gripper  R 20x R 20X          web  R 20x R 20X          Gripper digit  R 5x R 5X          Smiles/frown  R 20x R 20X                                                 Ther Ex             Wrist flex/ext S 10"x3 ea 10"X4  10"x4          Shoulder flex             Pro./sup.  2# 20x 2# 20x          Pinch/ext 10x ea            Bicep curl N/PRO/SUP  4# 15x ea           Triceps ext  8# 20x           Wrist flex/ext  2# 20x 2# 20x          UBE fw/bw             Ther Activity                                                                              Modalities Hx Alcohol abuse we will continue on thiamine and folic acid  Patient reports he has not been drinking for over a year

## 2023-11-28 ENCOUNTER — APPOINTMENT (EMERGENCY)
Dept: RADIOLOGY | Facility: HOSPITAL | Age: 68
End: 2023-11-28
Payer: MEDICARE

## 2023-11-28 ENCOUNTER — HOSPITAL ENCOUNTER (EMERGENCY)
Facility: HOSPITAL | Age: 68
Discharge: HOME | End: 2023-11-28
Attending: EMERGENCY MEDICINE
Payer: MEDICARE

## 2023-11-28 VITALS
HEART RATE: 82 BPM | OXYGEN SATURATION: 99 % | TEMPERATURE: 98 F | RESPIRATION RATE: 22 BRPM | SYSTOLIC BLOOD PRESSURE: 145 MMHG | DIASTOLIC BLOOD PRESSURE: 73 MMHG

## 2023-11-28 DIAGNOSIS — J44.1 COPD EXACERBATION (HCC): Primary | ICD-10-CM

## 2023-11-28 LAB
ALBUMIN SERPL BCP-MCNC: 3.8 G/DL (ref 3.5–5)
ALP SERPL-CCNC: 75 U/L (ref 34–104)
ALT SERPL W P-5'-P-CCNC: 12 U/L (ref 7–52)
ANION GAP SERPL CALCULATED.3IONS-SCNC: 7 MMOL/L
AST SERPL W P-5'-P-CCNC: 12 U/L (ref 13–39)
BASOPHILS # BLD AUTO: 0.06 THOUSANDS/ÂΜL (ref 0–0.1)
BASOPHILS NFR BLD AUTO: 1 % (ref 0–1)
BILIRUB SERPL-MCNC: 0.4 MG/DL (ref 0.2–1)
BUN SERPL-MCNC: 9 MG/DL (ref 5–25)
CALCIUM SERPL-MCNC: 9.2 MG/DL (ref 8.4–10.2)
CHLORIDE SERPL-SCNC: 99 MMOL/L (ref 96–108)
CO2 SERPL-SCNC: 28 MMOL/L (ref 21–32)
CREAT SERPL-MCNC: 0.5 MG/DL (ref 0.6–1.3)
EOSINOPHIL # BLD AUTO: 0.11 THOUSAND/ÂΜL (ref 0–0.61)
EOSINOPHIL NFR BLD AUTO: 1 % (ref 0–6)
ERYTHROCYTE [DISTWIDTH] IN BLOOD BY AUTOMATED COUNT: 21.4 % (ref 11.6–15.1)
GFR SERPL CREATININE-BSD FRML MDRD: 110 ML/MIN/1.73SQ M
GLUCOSE SERPL-MCNC: 245 MG/DL (ref 65–140)
HCT VFR BLD AUTO: 35.3 % (ref 36.5–49.3)
HGB BLD-MCNC: 10.5 G/DL (ref 12–17)
IMM GRANULOCYTES # BLD AUTO: 0.03 THOUSAND/UL (ref 0–0.2)
IMM GRANULOCYTES NFR BLD AUTO: 0 % (ref 0–2)
LYMPHOCYTES # BLD AUTO: 1.46 THOUSANDS/ÂΜL (ref 0.6–4.47)
LYMPHOCYTES NFR BLD AUTO: 14 % (ref 14–44)
MCH RBC QN AUTO: 22.6 PG (ref 26.8–34.3)
MCHC RBC AUTO-ENTMCNC: 29.7 G/DL (ref 31.4–37.4)
MCV RBC AUTO: 76 FL (ref 82–98)
MONOCYTES # BLD AUTO: 0.94 THOUSAND/ÂΜL (ref 0.17–1.22)
MONOCYTES NFR BLD AUTO: 9 % (ref 4–12)
NEUTROPHILS # BLD AUTO: 8.1 THOUSANDS/ÂΜL (ref 1.85–7.62)
NEUTS SEG NFR BLD AUTO: 75 % (ref 43–75)
NRBC BLD AUTO-RTO: 0 /100 WBCS
PLATELET # BLD AUTO: 355 THOUSANDS/UL (ref 149–390)
PMV BLD AUTO: 10.7 FL (ref 8.9–12.7)
POTASSIUM SERPL-SCNC: 4 MMOL/L (ref 3.5–5.3)
PROT SERPL-MCNC: 7 G/DL (ref 6.4–8.4)
RBC # BLD AUTO: 4.64 MILLION/UL (ref 3.88–5.62)
SODIUM SERPL-SCNC: 134 MMOL/L (ref 135–147)
WBC # BLD AUTO: 10.7 THOUSAND/UL (ref 4.31–10.16)

## 2023-11-28 PROCEDURE — 99285 EMERGENCY DEPT VISIT HI MDM: CPT

## 2023-11-28 PROCEDURE — 94640 AIRWAY INHALATION TREATMENT: CPT

## 2023-11-28 PROCEDURE — 93005 ELECTROCARDIOGRAM TRACING: CPT

## 2023-11-28 PROCEDURE — 36415 COLL VENOUS BLD VENIPUNCTURE: CPT

## 2023-11-28 PROCEDURE — 99285 EMERGENCY DEPT VISIT HI MDM: CPT | Performed by: EMERGENCY MEDICINE

## 2023-11-28 PROCEDURE — 85025 COMPLETE CBC W/AUTO DIFF WBC: CPT

## 2023-11-28 PROCEDURE — 71046 X-RAY EXAM CHEST 2 VIEWS: CPT

## 2023-11-28 PROCEDURE — 80053 COMPREHEN METABOLIC PANEL: CPT

## 2023-11-28 RX ORDER — PREDNISONE 20 MG/1
40 TABLET ORAL DAILY
Qty: 10 TABLET | Refills: 0 | Status: SHIPPED | OUTPATIENT
Start: 2023-11-28 | End: 2023-12-03

## 2023-11-28 RX ORDER — SODIUM CHLORIDE FOR INHALATION 0.9 %
12 VIAL, NEBULIZER (ML) INHALATION ONCE
Status: COMPLETED | OUTPATIENT
Start: 2023-11-28 | End: 2023-11-28

## 2023-11-28 RX ORDER — AZITHROMYCIN 250 MG/1
TABLET, FILM COATED ORAL
Qty: 6 TABLET | Refills: 0 | Status: SHIPPED | OUTPATIENT
Start: 2023-11-28 | End: 2023-12-02

## 2023-11-28 RX ORDER — AZITHROMYCIN 500 MG/1
500 TABLET, FILM COATED ORAL ONCE
Status: COMPLETED | OUTPATIENT
Start: 2023-11-28 | End: 2023-11-28

## 2023-11-28 RX ORDER — PREDNISONE 20 MG/1
40 TABLET ORAL ONCE
Status: COMPLETED | OUTPATIENT
Start: 2023-11-28 | End: 2023-11-28

## 2023-11-28 RX ADMIN — ALBUTEROL SULFATE 10 MG: 2.5 SOLUTION RESPIRATORY (INHALATION) at 19:47

## 2023-11-28 RX ADMIN — Medication 12 ML: at 19:47

## 2023-11-28 RX ADMIN — AZITHROMYCIN 500 MG: 500 TABLET, FILM COATED ORAL at 22:01

## 2023-11-28 RX ADMIN — IPRATROPIUM BROMIDE 1 MG: 0.5 SOLUTION RESPIRATORY (INHALATION) at 19:47

## 2023-11-28 RX ADMIN — PREDNISONE 40 MG: 20 TABLET ORAL at 19:46

## 2023-11-29 LAB
ATRIAL RATE: 100 BPM
P AXIS: 69 DEGREES
PR INTERVAL: 210 MS
QRS AXIS: 81 DEGREES
QRSD INTERVAL: 110 MS
QT INTERVAL: 352 MS
QTC INTERVAL: 449 MS
T WAVE AXIS: 8 DEGREES
VENTRICULAR RATE: 98 BPM

## 2023-11-29 NOTE — ED PROVIDER NOTES
History  Chief Complaint   Patient presents with    Shortness of Breath     Pt c/o increasing sob for the past couple of days. Pt has a hx of copd     68M w/ h/o COPD presenting due to concern for possible exacerbation. Patient started having worsening sob over past 4 days. Also has had increased production of yellow sputum. Tried home nebulizer treatment without improvement. Denies f/c, n/v, cp, sore throat, congestion, or other complaints. No lower extremity edema or weight gain noted by patient. Prior to Admission Medications   Prescriptions Last Dose Informant Patient Reported? Taking? B Complex Vitamins (VITAMIN B COMPLEX 100 IJ)   Yes No   Sig: Take 1 tablet by mouth daily   Insulin Glargine (BASAGLAR KWIKPEN SC)   Yes No   Sig: Inject 30 Units under the skin daily at bedtime   LORazepam (Ativan) 0.5 mg tablet   Yes No   Sig: Take by mouth every 6 (six) hours as needed for anxiety    albuterol (Proventil HFA) 90 mcg/act inhaler   No No   Sig: Inhale 2 puffs every 6 (six) hours as needed for wheezing   aspirin 81 mg chewable tablet   Yes No   Sig: Chew 81 mg daily   atorvastatin (LIPITOR) 80 mg tablet   No No   Sig: Take 1 tablet (80 mg total) by mouth daily with dinner   betamethasone valerate (VALISONE) 0.1 % cream   Yes No   Sig: Apply topically 2 (two) times a day   budesonide (PULMICORT) 0.5 mg/2 mL nebulizer solution   No No   Sig: Take 2 mL (0.5 mg total) by nebulization every 12 (twelve) hours Rinse mouth after use. busPIRone (BUSPAR) 10 mg tablet   No No   Sig: Take 1 tablet (10 mg total) by mouth 3 (three) times a day   ferrous sulfate 324 (65 Fe) mg   No No   Sig: Take 1 tablet (324 mg total) by mouth 2 (two) times a day before meals   fluticasone (FLONASE) 50 mcg/act nasal spray   Yes No   Si spray into each nostril 2 (two) times a day   fluticasone-salmeterol (ADVAIR) 500-50 mcg/dose inhaler   Yes No   Sig: Inhale 1 puff 2 (two) times a day Rinse mouth after use.    folic acid (Danni Kim) 1 mg tablet   No No   Sig: Take 1 tablet (1 mg total) by mouth daily   ipratropium-albuterol (COMBIVENT)  mcg/act inhaler   Yes No   Sig: Inhale   lisinopril (ZESTRIL) 40 mg tablet   Yes No   Sig: Take 40 mg by mouth daily    melatonin 3 mg   No No   Sig: Take 1 tablet (3 mg total) by mouth daily at bedtime as needed (30)   metFORMIN (GLUCOPHAGE) 1000 MG tablet   Yes No   Sig: Take 1 tablet by mouth every 12 (twelve) hours   pantoprazole (Protonix) 40 mg tablet   No No   Sig: Take 1 tablet (40 mg total) by mouth 2 (two) times a day   thiamine 100 MG tablet   No No   Sig: Take 1 tablet (100 mg total) by mouth daily   tiotropium (SPIRIVA) 18 mcg inhalation capsule  Pharmacy (Specify) Yes No   Sig: Place 18 mcg into inhaler and inhale daily      Facility-Administered Medications: None       Past Medical History:   Diagnosis Date    Cardiac arrest ([address])     COPD (chronic obstructive pulmonary disease) ([facility])     CVA (cerebral vascular accident) ([address])     Diabetes mellitus ([address])     Heart attack ([address])     Hypertension     Stroke [facility])        Past Surgical History:   Procedure Laterality Date    CERVICAL FUSION N/A 9/10/2018    Procedure: Posterior cervical decompressive laminectomy C3-6; Posterior cervical lateral mass and pedicle fixation fusion C1-T1;  Surgeon: Rocky Gerard MD;  Location: [facility];  Service: Neurosurgery       Family History   Problem Relation Age of Onset    Heart attack Mother      I have reviewed and agree with the history as documented.     E-Cigarette/Vaping    E-Cigarette Use Never User      E-Cigarette/Vaping Substances    Nicotine No     THC No     CBD No     Flavoring No     Other No     Unknown No      Social History     Tobacco Use    Smoking status: Former     Types: Cigarettes    Smokeless tobacco: Never    Tobacco comments:     quit 5 months ago    Vaping Use    Vaping Use: Never used   Substance Use Topics    Alcohol use: Not Currently     Alcohol/week: 8.0 - 12.0 standard drinks of alcohol     Types: 8 - 12 Cans of beer per week     Comment: every day drinker    Drug use: Never        Review of Systems   All other systems reviewed and are negative. Physical Exam  ED Triage Vitals   Temperature Pulse Respirations Blood Pressure SpO2   11/28/23 1816 11/28/23 1813 11/28/23 1813 11/28/23 1813 11/28/23 1813   98 °F (36.7 °C) 99 20 159/100 96 %      Temp Source Heart Rate Source Patient Position - Orthostatic VS BP Location FiO2 (%)   11/28/23 1813 11/28/23 1813 11/28/23 1813 11/28/23 1813 --   Oral Monitor Sitting Left arm       Pain Score       --                    Orthostatic Vital Signs  Vitals:    11/28/23 1813 11/28/23 2030   BP: 159/100 145/73   Pulse: 99 82   Patient Position - Orthostatic VS: Sitting        Physical Exam  Vitals and nursing note reviewed. Constitutional:       General: He is not in acute distress. Appearance: He is well-developed. HENT:      Head: Normocephalic and atraumatic. Eyes:      Conjunctiva/sclera: Conjunctivae normal.   Cardiovascular:      Rate and Rhythm: Normal rate and regular rhythm. Heart sounds: No murmur heard. Pulmonary:      Effort: Pulmonary effort is normal. No respiratory distress. Comments: Shallow breath sounds diffusely. Abdominal:      Palpations: Abdomen is soft. Tenderness: There is no abdominal tenderness. Musculoskeletal:         General: No swelling. Cervical back: Neck supple. Right lower leg: No edema. Left lower leg: No edema. Skin:     General: Skin is warm and dry. Capillary Refill: Capillary refill takes less than 2 seconds. Neurological:      Mental Status: He is alert.    Psychiatric:         Mood and Affect: Mood normal.         ED Medications  Medications   predniSONE tablet 40 mg (40 mg Oral Given 11/28/23 1946)   albuterol inhalation solution 10 mg (10 mg Nebulization Given 11/28/23 1947)   ipratropium (ATROVENT) 0.02 % inhalation solution 1 mg (1 mg Nebulization Given 11/28/23 1947)   sodium chloride 0.9 % inhalation solution 12 mL (12 mL Nebulization Given 11/28/23 1947)   azithromycin (ZITHROMAX) tablet 500 mg (500 mg Oral Given 11/28/23 2201)       Diagnostic Studies  Results Reviewed       Procedure Component Value Units Date/Time    Comprehensive metabolic panel [[id number]]  (Abnormal) Collected: 11/28/23 1947    Lab Status: Final result Specimen: Blood from Arm, Left Updated: 11/28/23 2015     Sodium 134 mmol/L      Potassium 4.0 mmol/L      Chloride 99 mmol/L      CO2 28 mmol/L      ANION GAP 7 mmol/L      BUN 9 mg/dL      Creatinine 0.50 mg/dL      Glucose 245 mg/dL      Calcium 9.2 mg/dL      AST 12 U/L      ALT 12 U/L      Alkaline Phosphatase 75 U/L      Total Protein 7.0 g/dL      Albumin 3.8 g/dL      Total Bilirubin 0.40 mg/dL      eGFR 110 ml/min/1.73sq m     Narrative:      [facility] guidelines for Chronic Kidney Disease (CKD):     Stage 1 with normal or high GFR (GFR > 90 mL/min/1.73 square meters)    Stage 2 Mild CKD (GFR = 60-89 mL/min/1.73 square meters)    Stage 3A Moderate CKD (GFR = 45-59 mL/min/1.73 square meters)    Stage 3B Moderate CKD (GFR = 30-44 mL/min/1.73 square meters)    Stage 4 Severe CKD (GFR = 15-29 mL/min/1.73 square meters)    Stage 5 End Stage CKD (GFR <15 mL/min/1.73 square meters)  Note: GFR calculation is accurate only with a steady state creatinine    CBC and differential [[id number]]  (Abnormal) Collected: 11/28/23 1947    Lab Status: Final result Specimen: Blood from Arm, Left Updated: 11/28/23 2003     WBC 10.70 Thousand/uL      RBC 4.64 Million/uL      Hemoglobin 10.5 g/dL      Hematocrit 35.3 %      MCV 76 fL      MCH 22.6 pg      MCHC 29.7 g/dL      RDW 21.4 %      MPV 10.7 fL      Platelets 274 Thousands/uL      nRBC 0 /100 WBCs      Neutrophils Relative 75 %      Immat GRANS % 0 %      Lymphocytes Relative 14 %      Monocytes Relative 9 %      Eosinophils Relative 1 %      Basophils Relative 1 % Neutrophils Absolute 8.10 Thousands/µL      Immature Grans Absolute 0.03 Thousand/uL      Lymphocytes Absolute 1.46 Thousands/µL      Monocytes Absolute 0.94 Thousand/µL      Eosinophils Absolute 0.11 Thousand/µL      Basophils Absolute 0.06 Thousands/µL                    XR chest 2 views   Final Result by Shante Salvador MD (11/29 8900)      No acute cardiopulmonary disease. Resident: Stiven Harmon, the attending radiologist, have reviewed the images and agree with the final report above. Workstation performed: [id number]               Procedures  Procedures      ED Course                             SBIRT 20yo+      Flowsheet Row Most Recent Value   Initial Alcohol Screen: US AUDIT-C     1. How often do you have a drink containing alcohol? 0 Filed at: 11/28/2023 2006   2. How many drinks containing alcohol do you have on a typical day you are drinking? 0 Filed at: 11/28/2023 2006   3b. FEMALE Any Age, or MALE 65+: How often do you have 4 or more drinks on one occassion? 0 Filed at: 11/28/2023 2006   Audit-C Score 0 Filed at: 11/28/2023 2006   MICHAEL: How many times in the past year have you. .. Used an illegal drug or used a prescription medication for non-medical reasons? Never Filed at: 11/28/2023 2006                  Medical Decision Making  70 M presenting with symptoms consistent with COPD exacerbation vs pna. Will obtain labs, Xray to evaluate. Will treat with nebulizers, steroids, and reevaluate. Patients workup otherwise reassuring at this time. On reevaluation, patient endorses significant improvement in symptoms. He's tolerating RA with sats in mid 90s. Appropriate for discharge with abx and steroids. Provided return precautions. Amount and/or Complexity of Data Reviewed  Labs: ordered. Radiology: ordered. Risk  Prescription drug management.           Disposition  Final diagnoses:   COPD exacerbation ([address])     Time reflects when diagnosis was documented in both MDM as applicable and the Disposition within this note       Time User Action Codes Description Comment    11/28/2023  9:56 PM Kristyn Smith Add [J44.1] COPD exacerbation [facility])           ED Disposition       ED Disposition   Discharge    Condition   Stable    Date/Time   Tue Nov 28, 2023 2156    [address] discharge to home/self care.                    Follow-up Information       Follow up With Specialties Details Why Contact Info    Shanae Kimo, [address] [facility], Nurse Practitioner   [address] [address]  [phone]              Discharge Medication List as of 11/28/2023  9:58 PM        START taking these medications    Details   azithromycin (ZITHROMAX) 250 mg tablet Take 2 tablets today then 1 tablet daily x 4 days, Normal      predniSONE 20 mg tablet Take 2 tablets (40 mg total) by mouth daily for 5 days, Starting Tue 11/28/2023, Until Sun 12/3/2023, Normal           CONTINUE these medications which have NOT CHANGED    Details   albuterol (Proventil HFA) 90 mcg/act inhaler Inhale 2 puffs every 6 (six) hours as needed for wheezing, Starting Fri 6/10/2022, Normal      aspirin 81 mg chewable tablet Chew 81 mg daily, Historical Med      atorvastatin (LIPITOR) 80 mg tablet Take 1 tablet (80 mg total) by mouth daily with dinner, Starting Sat 9/22/2018, Normal      B Complex Vitamins (VITAMIN B COMPLEX 100 IJ) Take 1 tablet by mouth daily, Historical Med      betamethasone valerate (VALISONE) 0.1 % cream Apply topically 2 (two) times a day, Starting Fri 10/4/2013, Historical Med      budesonide (PULMICORT) 0.5 mg/2 mL nebulizer solution Take 2 mL (0.5 mg total) by nebulization every 12 (twelve) hours Rinse mouth after use., Starting Fri 6/10/2022, No Print      busPIRone (BUSPAR) 10 mg tablet Take 1 tablet (10 mg total) by mouth 3 (three) times a day, Starting Fri 7/28/2023, Normal      ferrous sulfate 324 (65 Fe) mg Take 1 tablet (324 mg total) by mouth 2 (two) times a day before meals, Starting Wed 10/18/2023, No Print      fluticasone (FLONASE) 50 mcg/act nasal spray 1 spray into each nostril 2 (two) times a day, Starting Fri 10/4/2013, Historical Med      fluticasone-salmeterol (ADVAIR) 500-50 mcg/dose inhaler Inhale 1 puff 2 (two) times a day Rinse mouth after use., Historical Med      folic acid (FOLVITE) 1 mg tablet Take 1 tablet (1 mg total) by mouth daily, Starting Sat 6/11/2022, No Print      Insulin Glargine (BASAGLAR KWIKPEN SC) Inject 30 Units under the skin daily at bedtime, Historical Med      ipratropium-albuterol (COMBIVENT)  mcg/act inhaler Inhale, Historical Med      lisinopril (ZESTRIL) 40 mg tablet Take 40 mg by mouth daily , Historical Med      LORazepam (Ativan) 0.5 mg tablet Take by mouth every 6 (six) hours as needed for anxiety , Historical Med      melatonin 3 mg Take 1 tablet (3 mg total) by mouth daily at bedtime as needed (30), Starting Fri 9/21/2018, Normal      metFORMIN (GLUCOPHAGE) 1000 MG tablet Take 1 tablet by mouth every 12 (twelve) hours, Historical Med      pantoprazole (Protonix) 40 mg tablet Take 1 tablet (40 mg total) by mouth 2 (two) times a day, Starting Tue 8/8/2023, Normal      thiamine 100 MG tablet Take 1 tablet (100 mg total) by mouth daily, Starting Sat 9/22/2018, Normal      tiotropium (SPIRIVA) 18 mcg inhalation capsule Place 18 mcg into inhaler and inhale daily, Historical Med           No discharge procedures on file. PDMP Review       None             ED Provider  Attending physically available and evaluated Mark Irwin. I managed the patient along with the ED Attending.     Electronically Signed by           Edgar Ernst MD  11/30/23 7335

## 2023-11-29 NOTE — ED NOTES
Pt 88%-90% on RA. Pt offered nasal cannula oxygen but pt refusing at this time.  Will continue to reassess and offer pt oxygen     Brad Kay RN  11/28/23 5809

## 2023-11-29 NOTE — ED ATTENDING ATTESTATION
11/28/2023  I, Ruby Kent MD, saw and evaluated the patient. I have discussed the patient with the resident/non-physician practitioner and agree with the resident's/non-physician practitioner's findings, Plan of Care, and MDM as documented in the resident's/non-physician practitioner's note, except where noted. All available labs and Radiology studies were reviewed. I was present for key portions of any procedure(s) performed by the resident/non-physician practitioner and I was immediately available to provide assistance. At this point I agree with the current assessment done in the Emergency Department.   I have conducted an independent evaluation of this patient a history and physical is as follows:  History of copd Pt has increasing cough sob no increased sputum production No fevers no chills no cp PE: alert heart reg lungs tight heart reg lungs clear abd soft nontender ext nad MDM: Na [place] neb steroids cxr suspect copd exacerbation  ED Course         Critical Care Time  Procedures

## 2023-12-31 ENCOUNTER — HOSPITAL ENCOUNTER (EMERGENCY)
Facility: HOSPITAL | Age: 68
Discharge: HOME | End: 2024-01-01
Attending: EMERGENCY MEDICINE
Payer: MEDICARE

## 2023-12-31 ENCOUNTER — APPOINTMENT (EMERGENCY)
Dept: RADIOLOGY | Facility: HOSPITAL | Age: 68
End: 2023-12-31
Payer: MEDICARE

## 2023-12-31 VITALS
HEART RATE: 100 BPM | RESPIRATION RATE: 28 BRPM | TEMPERATURE: 97.8 F | SYSTOLIC BLOOD PRESSURE: 150 MMHG | DIASTOLIC BLOOD PRESSURE: 90 MMHG | OXYGEN SATURATION: 95 %

## 2023-12-31 DIAGNOSIS — J44.1 COPD WITH ACUTE EXACERBATION (HCC): Primary | ICD-10-CM

## 2023-12-31 DIAGNOSIS — J45.20 MILD INTERMITTENT ASTHMA, UNSPECIFIED WHETHER COMPLICATED: ICD-10-CM

## 2023-12-31 PROCEDURE — 71045 X-RAY EXAM CHEST 1 VIEW: CPT

## 2023-12-31 PROCEDURE — 93005 ELECTROCARDIOGRAM TRACING: CPT

## 2023-12-31 PROCEDURE — 99285 EMERGENCY DEPT VISIT HI MDM: CPT

## 2023-12-31 PROCEDURE — 99285 EMERGENCY DEPT VISIT HI MDM: CPT | Performed by: EMERGENCY MEDICINE

## 2023-12-31 RX ORDER — MAGNESIUM SULFATE HEPTAHYDRATE 40 MG/ML
2 INJECTION, SOLUTION INTRAVENOUS ONCE
Status: COMPLETED | OUTPATIENT
Start: 2023-12-31 | End: 2024-01-01

## 2023-12-31 RX ORDER — METHYLPREDNISOLONE SODIUM SUCCINATE 125 MG/2ML
100 INJECTION, POWDER, LYOPHILIZED, FOR SOLUTION INTRAMUSCULAR; INTRAVENOUS ONCE
Status: COMPLETED | OUTPATIENT
Start: 2023-12-31 | End: 2024-01-01

## 2023-12-31 RX ORDER — SODIUM CHLORIDE FOR INHALATION 0.9 %
12 VIAL, NEBULIZER (ML) INHALATION ONCE
Status: COMPLETED | OUTPATIENT
Start: 2023-12-31 | End: 2024-01-01

## 2024-01-01 LAB
2HR DELTA HS TROPONIN: -3 NG/L
ANION GAP SERPL CALCULATED.3IONS-SCNC: 8 MMOL/L
ATRIAL RATE: 100 BPM
ATRIAL RATE: 97 BPM
BASOPHILS # BLD AUTO: 0.1 THOUSANDS/ÂΜL (ref 0–0.1)
BASOPHILS NFR BLD AUTO: 1 % (ref 0–1)
BUN SERPL-MCNC: 17 MG/DL (ref 5–25)
CALCIUM SERPL-MCNC: 9.2 MG/DL (ref 8.4–10.2)
CARDIAC TROPONIN I PNL SERPL HS: 12 NG/L
CARDIAC TROPONIN I PNL SERPL HS: 15 NG/L
CHLORIDE SERPL-SCNC: 102 MMOL/L (ref 96–108)
CO2 SERPL-SCNC: 26 MMOL/L (ref 21–32)
CREAT SERPL-MCNC: 0.51 MG/DL (ref 0.6–1.3)
EOSINOPHIL # BLD AUTO: 0.87 THOUSAND/ÂΜL (ref 0–0.61)
EOSINOPHIL NFR BLD AUTO: 9 % (ref 0–6)
ERYTHROCYTE [DISTWIDTH] IN BLOOD BY AUTOMATED COUNT: 19.9 % (ref 11.6–15.1)
GFR SERPL CREATININE-BSD FRML MDRD: 109 ML/MIN/1.73SQ M
GLUCOSE SERPL-MCNC: 184 MG/DL (ref 65–140)
HCT VFR BLD AUTO: 35.1 % (ref 36.5–49.3)
HGB BLD-MCNC: 10.3 G/DL (ref 12–17)
IMM GRANULOCYTES # BLD AUTO: 0.05 THOUSAND/UL (ref 0–0.2)
IMM GRANULOCYTES NFR BLD AUTO: 1 % (ref 0–2)
LYMPHOCYTES # BLD AUTO: 1.72 THOUSANDS/ÂΜL (ref 0.6–4.47)
LYMPHOCYTES NFR BLD AUTO: 18 % (ref 14–44)
MCH RBC QN AUTO: 22.3 PG (ref 26.8–34.3)
MCHC RBC AUTO-ENTMCNC: 29.3 G/DL (ref 31.4–37.4)
MCV RBC AUTO: 76 FL (ref 82–98)
MONOCYTES # BLD AUTO: 1.05 THOUSAND/ÂΜL (ref 0.17–1.22)
MONOCYTES NFR BLD AUTO: 11 % (ref 4–12)
NEUTROPHILS # BLD AUTO: 5.81 THOUSANDS/ÂΜL (ref 1.85–7.62)
NEUTS SEG NFR BLD AUTO: 60 % (ref 43–75)
NRBC BLD AUTO-RTO: 0 /100 WBCS
P AXIS: 69 DEGREES
P AXIS: 88 DEGREES
PLATELET # BLD AUTO: 356 THOUSANDS/UL (ref 149–390)
PMV BLD AUTO: 10.1 FL (ref 8.9–12.7)
POTASSIUM SERPL-SCNC: 4 MMOL/L (ref 3.5–5.3)
PR INTERVAL: 182 MS
PR INTERVAL: 214 MS
QRS AXIS: -39 DEGREES
QRS AXIS: 99 DEGREES
QRSD INTERVAL: 106 MS
QRSD INTERVAL: 86 MS
QT INTERVAL: 354 MS
QT INTERVAL: 394 MS
QTC INTERVAL: 456 MS
QTC INTERVAL: 508 MS
RBC # BLD AUTO: 4.61 MILLION/UL (ref 3.88–5.62)
SODIUM SERPL-SCNC: 136 MMOL/L (ref 135–147)
T WAVE AXIS: 22 DEGREES
T WAVE AXIS: 56 DEGREES
VENTRICULAR RATE: 100 BPM
VENTRICULAR RATE: 100 BPM
WBC # BLD AUTO: 9.6 THOUSAND/UL (ref 4.31–10.16)

## 2024-01-01 PROCEDURE — 84484 ASSAY OF TROPONIN QUANT: CPT

## 2024-01-01 PROCEDURE — 36415 COLL VENOUS BLD VENIPUNCTURE: CPT

## 2024-01-01 PROCEDURE — 94644 CONT INHLJ TX 1ST HOUR: CPT

## 2024-01-01 PROCEDURE — 96365 THER/PROPH/DIAG IV INF INIT: CPT

## 2024-01-01 PROCEDURE — 80048 BASIC METABOLIC PNL TOTAL CA: CPT

## 2024-01-01 PROCEDURE — 94640 AIRWAY INHALATION TREATMENT: CPT

## 2024-01-01 PROCEDURE — 93005 ELECTROCARDIOGRAM TRACING: CPT

## 2024-01-01 PROCEDURE — 85025 COMPLETE CBC W/AUTO DIFF WBC: CPT

## 2024-01-01 PROCEDURE — 96375 TX/PRO/DX INJ NEW DRUG ADDON: CPT

## 2024-01-01 RX ORDER — TIOTROPIUM BROMIDE 18 UG/1
18 CAPSULE ORAL; RESPIRATORY (INHALATION) DAILY
Qty: 30 CAPSULE | Refills: 0 | Status: SHIPPED | OUTPATIENT
Start: 2024-01-01

## 2024-01-01 RX ORDER — DOXYCYCLINE HYCLATE 100 MG/1
100 CAPSULE ORAL 2 TIMES DAILY
Qty: 14 CAPSULE | Refills: 0 | Status: SHIPPED | OUTPATIENT
Start: 2024-01-01 | End: 2024-01-08

## 2024-01-01 RX ORDER — PREDNISONE 20 MG/1
40 TABLET ORAL DAILY
Qty: 10 TABLET | Refills: 0 | Status: SHIPPED | OUTPATIENT
Start: 2024-01-01 | End: 2024-01-06

## 2024-01-01 RX ORDER — IPRATROPIUM BROMIDE AND ALBUTEROL SULFATE 2.5; .5 MG/3ML; MG/3ML
3 SOLUTION RESPIRATORY (INHALATION) 2 TIMES DAILY
Qty: 180 ML | Refills: 0 | Status: SHIPPED | OUTPATIENT
Start: 2024-01-01 | End: 2024-01-31

## 2024-01-01 RX ORDER — DOXYCYCLINE HYCLATE 100 MG/1
100 CAPSULE ORAL ONCE
Status: COMPLETED | OUTPATIENT
Start: 2024-01-01 | End: 2024-01-01

## 2024-01-01 RX ORDER — FLUTICASONE PROPIONATE AND SALMETEROL 500; 50 UG/1; UG/1
1 POWDER RESPIRATORY (INHALATION) 2 TIMES DAILY
Qty: 60 BLISTER | Refills: 0 | Status: SHIPPED | OUTPATIENT
Start: 2024-01-01

## 2024-01-01 RX ORDER — ALBUTEROL SULFATE 90 UG/1
2 AEROSOL, METERED RESPIRATORY (INHALATION) EVERY 6 HOURS PRN
Qty: 6.7 G | Refills: 0 | Status: SHIPPED | OUTPATIENT
Start: 2024-01-01

## 2024-01-01 RX ADMIN — DOXYCYCLINE 100 MG: 100 CAPSULE ORAL at 00:57

## 2024-01-01 RX ADMIN — IPRATROPIUM BROMIDE 1 MG: 0.5 SOLUTION RESPIRATORY (INHALATION) at 00:07

## 2024-01-01 RX ADMIN — ALBUTEROL SULFATE 10 MG: 2.5 SOLUTION RESPIRATORY (INHALATION) at 00:06

## 2024-01-01 RX ADMIN — Medication 12 ML: at 00:07

## 2024-01-01 RX ADMIN — MAGNESIUM SULFATE HEPTAHYDRATE 2 G: 40 INJECTION, SOLUTION INTRAVENOUS at 00:27

## 2024-01-01 RX ADMIN — METHYLPREDNISOLONE SODIUM SUCCINATE 100 MG: 125 INJECTION, POWDER, FOR SOLUTION INTRAMUSCULAR; INTRAVENOUS at 00:26

## 2024-01-01 NOTE — DISCHARGE INSTRUCTIONS
Start taking doxycyline and prednisone to help with your breathing.     Follow up with your PCP in the next few days for close re-evaluation.    If you develop new or worsening symptoms, please return to the Emergency Department for further evaluation.

## 2024-01-01 NOTE — ED PROVIDER NOTES
"History  Chief Complaint   Patient presents with    Shortness of Breath     Started 30m ago, \"wasn't doing anything\", dx COPD, only uses O2 2L n/c hs. Denies CP, and other SS     HPI    Patient is a 69 y/o M with PMH COPD on 2L NC qhs presenting with shortness of breath. Pt states started 30 min PTA, non-exertional, no CP. States it feels like his typical COPD and he needs a breathing treatment. He reports compliance with his home treatments and states he takes prednisone. He denies recent fever, chills, cough, congestion, n/v/d.     Prior to Admission Medications   Prescriptions Last Dose Informant Patient Reported? Taking?   B Complex Vitamins (VITAMIN B COMPLEX 100 IJ)   Yes No   Sig: Take 1 tablet by mouth daily   Fluticasone-Salmeterol (Advair) 500-50 mcg/dose inhaler   No Yes   Sig: Inhale 1 puff 2 (two) times a day Rinse mouth after use.   Insulin Glargine (BASAGLAR KWIKPEN SC)   Yes No   Sig: Inject 30 Units under the skin daily at bedtime   LORazepam (Ativan) 0.5 mg tablet   Yes No   Sig: Take by mouth every 6 (six) hours as needed for anxiety    albuterol (Proventil HFA) 90 mcg/act inhaler   No No   Sig: Inhale 2 puffs every 6 (six) hours as needed for wheezing   albuterol (Proventil HFA) 90 mcg/act inhaler   No Yes   Sig: Inhale 2 puffs every 6 (six) hours as needed for wheezing   aspirin 81 mg chewable tablet   Yes No   Sig: Chew 81 mg daily   atorvastatin (LIPITOR) 80 mg tablet   No No   Sig: Take 1 tablet (80 mg total) by mouth daily with dinner   betamethasone valerate (VALISONE) 0.1 % cream   Yes No   Sig: Apply topically 2 (two) times a day   budesonide (PULMICORT) 0.5 mg/2 mL nebulizer solution   No No   Sig: Take 2 mL (0.5 mg total) by nebulization every 12 (twelve) hours Rinse mouth after use.   busPIRone (BUSPAR) 10 mg tablet   No No   Sig: Take 1 tablet (10 mg total) by mouth 3 (three) times a day   ferrous sulfate 324 (65 Fe) mg   No No   Sig: Take 1 tablet (324 mg total) by mouth 2 (two) " times a day before meals   fluticasone (FLONASE) 50 mcg/act nasal spray   Yes No   Si spray into each nostril 2 (two) times a day   fluticasone-salmeterol (ADVAIR) 500-50 mcg/dose inhaler   Yes No   Sig: Inhale 1 puff 2 (two) times a day Rinse mouth after use.   folic acid (FOLVITE) 1 mg tablet   No No   Sig: Take 1 tablet (1 mg total) by mouth daily   ipratropium-albuterol (COMBIVENT)  mcg/act inhaler   Yes No   Sig: Inhale   ipratropium-albuterol (DUO-NEB) 0.5-2.5 mg/3 mL nebulizer solution   No Yes   Sig: Take 3 mL by nebulization 2 (two) times a day   lisinopril (ZESTRIL) 40 mg tablet   Yes No   Sig: Take 40 mg by mouth daily    melatonin 3 mg   No No   Sig: Take 1 tablet (3 mg total) by mouth daily at bedtime as needed (30)   metFORMIN (GLUCOPHAGE) 1000 MG tablet   Yes No   Sig: Take 1 tablet by mouth every 12 (twelve) hours   pantoprazole (Protonix) 40 mg tablet   No No   Sig: Take 1 tablet (40 mg total) by mouth 2 (two) times a day   thiamine 100 MG tablet   No No   Sig: Take 1 tablet (100 mg total) by mouth daily   tiotropium (SPIRIVA) 18 mcg inhalation capsule  Pharmacy (Specify) Yes No   Sig: Place 18 mcg into inhaler and inhale daily   tiotropium (SPIRIVA) 18 mcg inhalation capsule   No Yes   Sig: Place 1 capsule (18 mcg total) into inhaler and inhale daily      Facility-Administered Medications: None       Past Medical History:   Diagnosis Date    Cardiac arrest (HCC)     COPD (chronic obstructive pulmonary disease) (HCC)     CVA (cerebral vascular accident) (HCC)     Diabetes mellitus (HCC)     Heart attack (HCC)     Hypertension     Stroke (HCC)        Past Surgical History:   Procedure Laterality Date    CERVICAL FUSION N/A 9/10/2018    Procedure: Posterior cervical decompressive laminectomy C3-6; Posterior cervical lateral mass and pedicle fixation fusion C1-T1;  Surgeon: Papa Quiros MD;  Location: [facility];  Service: Neurosurgery       Family History   Problem Relation Age of Onset     Heart attack Mother      I have reviewed and agree with the history as documented.    E-Cigarette/Vaping    E-Cigarette Use Never User      E-Cigarette/Vaping Substances    Nicotine No     THC No     CBD No     Flavoring No     Other No     Unknown No      Social History     Tobacco Use    Smoking status: Former     Types: Cigarettes    Smokeless tobacco: Never    Tobacco comments:     quit 5 months ago    Vaping Use    Vaping status: Never Used   Substance Use Topics    Alcohol use: Not Currently     Alcohol/week: 8.0 - 12.0 standard drinks of alcohol     Types: 8 - 12 Cans of beer per week     Comment: every day drinker    Drug use: Never        Review of Systems   Constitutional:  Negative for chills and fever.   HENT:  Negative for ear pain and sore throat.    Eyes:  Negative for pain and visual disturbance.   Respiratory:  Positive for shortness of breath. Negative for cough.    Cardiovascular:  Negative for chest pain and palpitations.   Gastrointestinal:  Negative for abdominal pain and vomiting.   Genitourinary:  Negative for dysuria and hematuria.   Musculoskeletal:  Negative for arthralgias and back pain.   Skin:  Negative for color change and rash.   Neurological:  Negative for seizures and syncope.   All other systems reviewed and are negative.      Physical Exam  ED Triage Vitals [12/31/23 2241]   Temperature Pulse Respirations Blood Pressure SpO2   97.8 °F (36.6 °C) 100 (!) 28 150/90 95 %      Temp Source Heart Rate Source Patient Position - Orthostatic VS BP Location FiO2 (%)   Oral -- -- -- --      Pain Score       No Pain             Orthostatic Vital Signs  Vitals:    12/31/23 2241   BP: 150/90   Pulse: 100       Physical Exam  Vitals and nursing note reviewed.   Constitutional:       General: He is not in acute distress.     Appearance: He is well-developed. He is not toxic-appearing.   HENT:      Head: Normocephalic and atraumatic.      Right Ear: External ear normal.      Left Ear: External  ear normal.      Nose: Nose normal.      Mouth/Throat:      Pharynx: Oropharynx is clear. No oropharyngeal exudate or posterior oropharyngeal erythema.   Eyes:      Extraocular Movements: Extraocular movements intact.      Conjunctiva/sclera: Conjunctivae normal.      Pupils: Pupils are equal, round, and reactive to light.   Cardiovascular:      Rate and Rhythm: Regular rhythm. Tachycardia present.      Pulses: Normal pulses.      Heart sounds: Normal heart sounds. No murmur heard.     No friction rub. No gallop.   Pulmonary:      Effort: Tachypnea present.      Breath sounds: Examination of the right-middle field reveals wheezing. Examination of the left-middle field reveals wheezing. Decreased breath sounds and wheezing present. No rhonchi or rales.   Abdominal:      General: Abdomen is flat.      Palpations: Abdomen is soft.      Tenderness: There is no abdominal tenderness. There is no guarding or rebound.   Musculoskeletal:         General: Normal range of motion.      Cervical back: Normal range of motion. No rigidity.      Right lower leg: No tenderness. No edema.      Left lower leg: No tenderness. No edema.   Skin:     General: Skin is warm and dry.      Capillary Refill: Capillary refill takes less than 2 seconds.   Neurological:      General: No focal deficit present.      Mental Status: He is alert.         ED Medications  Medications   albuterol inhalation solution 10 mg (10 mg Nebulization Given 1/1/24 0006)   ipratropium (ATROVENT) 0.02 % inhalation solution 1 mg (1 mg Nebulization Given 1/1/24 0007)   sodium chloride 0.9 % inhalation solution 12 mL (12 mL Nebulization Given 1/1/24 0007)   methylPREDNISolone sodium succinate (Solu-MEDROL) injection 100 mg (100 mg Intravenous Given 1/1/24 0026)   magnesium sulfate 2 g/50 mL IVPB (premix) 2 g (0 g Intravenous Stopped 1/1/24 0057)   doxycycline hyclate (VIBRAMYCIN) capsule 100 mg (100 mg Oral Given 1/1/24 0057)       Diagnostic Studies  Results Reviewed        Procedure Component Value Units Date/Time    HS Troponin I 2hr [[id number]]  (Normal) Collected: 01/01/24 0213    Lab Status: Final result Specimen: Blood from Arm, Left Updated: 01/01/24 0334     hs TnI 2hr 12 ng/L      Delta 2hr hsTnI -3 ng/L     HS Troponin I 4hr [[id number]]     Lab Status: No result Specimen: Blood     HS Troponin 0hr (reflex protocol) [[id number]]  (Normal) Collected: 01/01/24 0031    Lab Status: Final result Specimen: Blood from Arm, Left Updated: 01/01/24 0120     hs TnI 0hr 15 ng/L     Basic metabolic panel [[id number]]  (Abnormal) Collected: 01/01/24 0031    Lab Status: Final result Specimen: Blood from Arm, Left Updated: 01/01/24 0059     Sodium 136 mmol/L      Potassium 4.0 mmol/L      Chloride 102 mmol/L      CO2 26 mmol/L      ANION GAP 8 mmol/L      BUN 17 mg/dL      Creatinine 0.51 mg/dL      Glucose 184 mg/dL      Calcium 9.2 mg/dL      eGFR 109 ml/min/1.73sq m     Narrative:      National Kid[employer] guidelines for Chronic Kidney Disease (CKD):     Stage 1 with normal or high GFR (GFR > 90 mL/min/1.73 square meters)    Stage 2 Mild CKD (GFR = 60-89 mL/min/1.73 square meters)    Stage 3A Moderate CKD (GFR = 45-59 mL/min/1.73 square meters)    Stage 3B Moderate CKD (GFR = 30-44 mL/min/1.73 square meters)    Stage 4 Severe CKD (GFR = 15-29 mL/min/1.73 square meters)    Stage 5 End Stage CKD (GFR <15 mL/min/1.73 square meters)  Note: GFR calculation is accurate only with a steady state creatinine    CBC and differential [[id number]]  (Abnormal) Collected: 01/01/24 0031    Lab Status: Final result Specimen: Blood from Arm, Left Updated: 01/01/24 0038     WBC 9.60 Thousand/uL      RBC 4.61 Million/uL      Hemoglobin 10.3 g/dL      Hematocrit 35.1 %      MCV 76 fL      MCH 22.3 pg      MCHC 29.3 g/dL      RDW 19.9 %      MPV 10.1 fL      Platelets 356 Thousands/uL      nRBC 0 /100 WBCs      Neutrophils Relative 60 %      Immat GRANS % 1 %      Lymphocytes Relative 18 %       Monocytes Relative 11 %      Eosinophils Relative 9 %      Basophils Relative 1 %      Neutrophils Absolute 5.81 Thousands/µL      Immature Grans Absolute 0.05 Thousand/uL      Lymphocytes Absolute 1.72 Thousands/µL      Monocytes Absolute 1.05 Thousand/µL      Eosinophils Absolute 0.87 Thousand/µL      Basophils Absolute 0.10 Thousands/µL                    XR chest 1 view portable   ED Interpretation by Pro Kwan MD (01/01 0002)   No focal findings, no acute cardiopulmonary process per my interpretation             Procedures  Procedures      ED Course  ED Course as of 01/01/24 0416   Sun Dec 31, 2023   2251 ECG 12 lead  Procedure Note: EKG  Date/Time: 12/31/23 10:51 PM   Interpreted by: Pro Kwan MD  Indications / Diagnosis: sob  ECG reviewed by me, the ED Provider: yes   The EKG demonstrates:  Rhythm: normal sinus with frequent PVC  Intervals: prolonged MI  Axis: left axis  QRS/Blocks: incomplete RBBB  ST Changes: No acute ST Changes, no STD/HETAL.     Mon Jan 01, 2024   0023 Re-assessed, pt feeling better, IV established and labs drawn   0122 hs TnI 0hr: 15  Will need delta               Identification of Seniors at Risk      Flowsheet Row Most Recent Value   (ISAR) Identification of Seniors at Risk    Before the illness or injury that brought you to the Emergency, did you need someone to help you on a regular basis? --   In the last 24 hours, have you needed more help than usual? 0 Filed at: 12/31/2023 2243   Have you been hospitalized for one or more nights during the past 6 months? 1 Filed at: 12/31/2023 2243   In general, do you see well? 0 Filed at: 12/31/2023 2243   In general, do you have serious problems with your memory? 0 Filed at: 12/31/2023 2243   Do you take more than three different medications every day? 1 Filed at: 12/31/2023 2243   ISAR Score 2 Filed at: 12/31/2023 2243                   Initial Sepsis Screening       Row Name 12/31/23 3643                Is the patient's history  suggestive of a new or worsening infection? No  -NA                  User Key  (r) = Recorded By, (t) = Taken By, (c) = Cosigned By      Initials Name Provider Type    VICKY Kwan MD Physician                    SBIRT 22yo+      Flowsheet Row Most Recent Value   Initial Alcohol Screen: US AUDIT-C     1. How often do you have a drink containing alcohol? 0 Filed at: 12/31/2023 2243   2. How many drinks containing alcohol do you have on a typical day you are drinking?  0 Filed at: 12/31/2023 2243   3b. FEMALE Any Age, or MALE 65+: How often do you have 4 or more drinks on one occassion? 0 Filed at: 12/31/2023 2243   Audit-C Score 0 Filed at: 12/31/2023 2243   MICHAEL: How many times in the past year have you...    Used an illegal drug or used a prescription medication for non-medical reasons? Never Filed at: 12/31/2023 2243                  Medical Decision Making  69 y/o M presenting for SOB. VSS. Examines as likely COPD exacerbation. SIRS+ but no suspected source of infection. Will obtain cardiac eval to r/o ACS while treating for presumed COPD exacerbation. Doubt PE, doubt pneumonia. Pt feeling better after breathing treatment. Cardiac w/u grossly unremarkable including negative delta troponin. Refilled pt's home COPD medications and started on course of prednisone and doxycyline. All questions answered, strict return precautions given and pt discharged.     Amount and/or Complexity of Data Reviewed  Labs: ordered. Decision-making details documented in ED Course.  Radiology: ordered and independent interpretation performed.  ECG/medicine tests:  Decision-making details documented in ED Course.    Risk  Prescription drug management.          Disposition  Final diagnoses:   COPD with acute exacerbation (HCC)     Time reflects when diagnosis was documented in both MDM as applicable and the Disposition within this note       Time User Action Codes Description Comment    1/1/2024 12:42 AM Pro Kwan Add [J44.1]  COPD with acute exacerbation (HCC)     1/1/2024  3:39 AM Aquiles, Pro Add [J45.20] Mild intermittent asthma, unspecified whether complicated           ED Disposition       ED Disposition   Discharge    Condition   Stable    Date/Time   Mon Jan 1, 2024 0336    Comment   Lucho Talavera discharge to home/self care.                   Follow-up Information       Follow up With Specialties Details Why Contact Info    [facility], Nurse Practitioner Schedule an appointment as soon as possible for a visit in 3 days  [address] [place] [id number] [phone]              Discharge Medication List as of 1/1/2024  3:36 AM        START taking these medications    Details   doxycycline hyclate (VIBRAMYCIN) 100 mg capsule Take 1 capsule (100 mg total) by mouth 2 (two) times a day for 7 days, Starting Mon 1/1/2024, Until Mon 1/8/2024, Normal      predniSONE 20 mg tablet Take 2 tablets (40 mg total) by mouth daily for 5 days, Starting Mon 1/1/2024, Until Sat 1/6/2024, Normal           CONTINUE these medications which have NOT CHANGED    Details   aspirin 81 mg chewable tablet Chew 81 mg daily, Historical Med      atorvastatin (LIPITOR) 80 mg tablet Take 1 tablet (80 mg total) by mouth daily with dinner, Starting Sat 9/22/2018, Normal      B Complex Vitamins (VITAMIN B COMPLEX 100 IJ) Take 1 tablet by mouth daily, Historical Med      betamethasone valerate (VALISONE) 0.1 % cream Apply topically 2 (two) times a day, Starting Fri 10/4/2013, Historical Med      budesonide (PULMICORT) 0.5 mg/2 mL nebulizer solution Take 2 mL (0.5 mg total) by nebulization every 12 (twelve) hours Rinse mouth after use., Starting Fri 6/10/2022, No Print      busPIRone (BUSPAR) 10 mg tablet Take 1 tablet (10 mg total) by mouth 3 (three) times a day, Starting Fri 7/28/2023, Normal      ferrous sulfate 324 (65 Fe) mg Take 1 tablet (324 mg total) by mouth 2 (two) times a day before meals, Starting Wed 10/18/2023, No  Print      fluticasone (FLONASE) 50 mcg/act nasal spray 1 spray into each nostril 2 (two) times a day, Starting Fri 10/4/2013, Historical Med      folic acid (FOLVITE) 1 mg tablet Take 1 tablet (1 mg total) by mouth daily, Starting Sat 6/11/2022, No Print      Insulin Glargine (BASAGLAR KWIKPEN SC) Inject 30 Units under the skin daily at bedtime, Historical Med      lisinopril (ZESTRIL) 40 mg tablet Take 40 mg by mouth daily , Historical Med      LORazepam (Ativan) 0.5 mg tablet Take by mouth every 6 (six) hours as needed for anxiety , Historical Med      melatonin 3 mg Take 1 tablet (3 mg total) by mouth daily at bedtime as needed (30), Starting Fri 9/21/2018, Normal      metFORMIN (GLUCOPHAGE) 1000 MG tablet Take 1 tablet by mouth every 12 (twelve) hours, Historical Med      pantoprazole (Protonix) 40 mg tablet Take 1 tablet (40 mg total) by mouth 2 (two) times a day, Starting Tue 8/8/2023, Normal      thiamine 100 MG tablet Take 1 tablet (100 mg total) by mouth daily, Starting Sat 9/22/2018, Normal      albuterol (Proventil HFA) 90 mcg/act inhaler Inhale 2 puffs every 6 (six) hours as needed for wheezing, Starting Fri 6/10/2022, Normal      fluticasone-salmeterol (ADVAIR) 500-50 mcg/dose inhaler Inhale 1 puff 2 (two) times a day Rinse mouth after use., Historical Med      ipratropium-albuterol (COMBIVENT)  mcg/act inhaler Inhale, Historical Med      tiotropium (SPIRIVA) 18 mcg inhalation capsule Place 18 mcg into inhaler and inhale daily, Historical Med           No discharge procedures on file.    PDMP Review       None             ED Provider  Attending physically available and evaluated Lucho Talavera. I managed the patient along with the ED Attending.    Electronically Signed by           Por Kwan MD  01/01/24 4081

## 2024-01-01 NOTE — ED ATTENDING ATTESTATION
12/31/2023  I, Diana Ambrocio MD, saw and evaluated the patient. I have discussed the patient with the resident/non-physician practitioner and agree with the resident's/non-physician practitioner's findings, Plan of Care, and MDM as documented in the resident's/non-physician practitioner's note, except where noted. All available labs and Radiology studies were reviewed.  I was present for key portions of any procedure(s) performed by the resident/non-physician practitioner and I was immediately available to provide assistance.       At this point I agree with the current assessment done in the Emergency Department.  I have conducted an independent evaluation of this patient a history and physical is as follows:  Pt has ahd intermittent episodes of sob Pt wears oxygen at night and at noon. Pt using inhalers P tfeels this is like COPD in past Pt is on steroids but a wean no fevers no change in sputum PE: alert heart reg lungs wheezes noted with decreased BS still with good air movement MDM: will do treatment mag ad steroids  reeval  ED Course         Critical Care Time  Procedures

## 2024-01-03 ENCOUNTER — HOSPITAL ENCOUNTER (EMERGENCY)
Facility: HOSPITAL | Age: 69
Discharge: HOME | End: 2024-01-04
Attending: EMERGENCY MEDICINE
Payer: MEDICARE

## 2024-01-03 ENCOUNTER — APPOINTMENT (EMERGENCY)
Dept: RADIOLOGY | Facility: HOSPITAL | Age: 69
End: 2024-01-03
Payer: MEDICARE

## 2024-01-03 VITALS
RESPIRATION RATE: 18 BRPM | OXYGEN SATURATION: 94 % | TEMPERATURE: 98.3 F | DIASTOLIC BLOOD PRESSURE: 65 MMHG | HEART RATE: 96 BPM | SYSTOLIC BLOOD PRESSURE: 146 MMHG

## 2024-01-03 DIAGNOSIS — J44.1 COPD EXACERBATION (HCC): Primary | ICD-10-CM

## 2024-01-03 DIAGNOSIS — J18.9 PNEUMONIA: ICD-10-CM

## 2024-01-03 PROCEDURE — 99285 EMERGENCY DEPT VISIT HI MDM: CPT

## 2024-01-03 PROCEDURE — 99284 EMERGENCY DEPT VISIT MOD MDM: CPT | Performed by: EMERGENCY MEDICINE

## 2024-01-03 PROCEDURE — 71046 X-RAY EXAM CHEST 2 VIEWS: CPT

## 2024-01-03 RX ORDER — ALBUTEROL SULFATE 2.5 MG/3ML
2 SOLUTION RESPIRATORY (INHALATION) ONCE
Status: COMPLETED | OUTPATIENT
Start: 2024-01-03 | End: 2024-01-03

## 2024-01-03 RX ORDER — IPRATROPIUM BROMIDE AND ALBUTEROL SULFATE .5; 3 MG/3ML; MG/3ML
2 SOLUTION RESPIRATORY (INHALATION) ONCE
Status: COMPLETED | OUTPATIENT
Start: 2024-01-03 | End: 2024-01-03

## 2024-01-03 RX ORDER — METHYLPREDNISOLONE SOD SUCC 125 MG
1 VIAL (EA) INJECTION ONCE
Status: COMPLETED | OUTPATIENT
Start: 2024-01-03 | End: 2024-01-03

## 2024-01-03 RX ORDER — DOXYCYCLINE HYCLATE 100 MG/1
100 CAPSULE ORAL EVERY 12 HOURS SCHEDULED
Qty: 14 CAPSULE | Refills: 0 | Status: SHIPPED | OUTPATIENT
Start: 2024-01-03 | End: 2024-01-10

## 2024-01-04 NOTE — ED PROVIDER NOTES
History  Chief Complaint   Patient presents with    Shortness of Breath     Pt has hx of COPD. En route, pt received duo neb tx and solumedrol. Pt O2 was 86% on RA when picked up by EMS. On arrival, pt O2 at 99%. Pt currently receiving breathing tx on arrival.     69-year-old male with history of COPD, diabetes, CAD presents with acute onset shortness of breath.  Symptoms started earlier today.  No improvement with at home nebulizer.  Patient administered IV steroids and bronchodilators and route to hospital.  Patient reports significant improvement after EMS treatment.  Denies chest pain, GI symptoms, cough, fever, or back pain.        Prior to Admission Medications   Prescriptions Last Dose Informant Patient Reported? Taking?   B Complex Vitamins (VITAMIN B COMPLEX 100 IJ)   Yes No   Sig: Take 1 tablet by mouth daily   Fluticasone-Salmeterol (Advair) 500-50 mcg/dose inhaler   No No   Sig: Inhale 1 puff 2 (two) times a day Rinse mouth after use.   Insulin Glargine (BASAGLAR KWIKPEN SC)   Yes No   Sig: Inject 30 Units under the skin daily at bedtime   LORazepam (Ativan) 0.5 mg tablet   Yes No   Sig: Take by mouth every 6 (six) hours as needed for anxiety    albuterol (Proventil HFA) 90 mcg/act inhaler   No No   Sig: Inhale 2 puffs every 6 (six) hours as needed for wheezing   aspirin 81 mg chewable tablet   Yes No   Sig: Chew 81 mg daily   atorvastatin (LIPITOR) 80 mg tablet   No No   Sig: Take 1 tablet (80 mg total) by mouth daily with dinner   betamethasone valerate (VALISONE) 0.1 % cream   Yes No   Sig: Apply topically 2 (two) times a day   budesonide (PULMICORT) 0.5 mg/2 mL nebulizer solution   No No   Sig: Take 2 mL (0.5 mg total) by nebulization every 12 (twelve) hours Rinse mouth after use.   busPIRone (BUSPAR) 10 mg tablet   No No   Sig: Take 1 tablet (10 mg total) by mouth 3 (three) times a day   doxycycline hyclate (VIBRAMYCIN) 100 mg capsule   No No   Sig: Take 1 capsule (100 mg total) by mouth 2 (two)  times a day for 7 days   ferrous sulfate 324 (65 Fe) mg   No No   Sig: Take 1 tablet (324 mg total) by mouth 2 (two) times a day before meals   fluticasone (FLONASE) 50 mcg/act nasal spray   Yes No   Si spray into each nostril 2 (two) times a day   folic acid (FOLVITE) 1 mg tablet   No No   Sig: Take 1 tablet (1 mg total) by mouth daily   ipratropium-albuterol (DUO-NEB) 0.5-2.5 mg/3 mL nebulizer solution   No No   Sig: Take 3 mL by nebulization 2 (two) times a day   lisinopril (ZESTRIL) 40 mg tablet   Yes No   Sig: Take 40 mg by mouth daily    melatonin 3 mg   No No   Sig: Take 1 tablet (3 mg total) by mouth daily at bedtime as needed (30)   metFORMIN (GLUCOPHAGE) 1000 MG tablet   Yes No   Sig: Take 1 tablet by mouth every 12 (twelve) hours   pantoprazole (Protonix) 40 mg tablet   No No   Sig: Take 1 tablet (40 mg total) by mouth 2 (two) times a day   predniSONE 20 mg tablet   No No   Sig: Take 2 tablets (40 mg total) by mouth daily for 5 days   thiamine 100 MG tablet   No No   Sig: Take 1 tablet (100 mg total) by mouth daily   tiotropium (SPIRIVA) 18 mcg inhalation capsule   No No   Sig: Place 1 capsule (18 mcg total) into inhaler and inhale daily      Facility-Administered Medications: None       Past Medical History:   Diagnosis Date    Cardiac arrest (HCC)     COPD (chronic obstructive pulmonary disease) (HCC)     CVA (cerebral vascular accident) (HCC)     Diabetes mellitus (HCC)     Heart attack (HCC)     Hypertension     Stroke (HCC)        Past Surgical History:   Procedure Laterality Date    CERVICAL FUSION N/A 9/10/2018    Procedure: Posterior cervical decompressive laminectomy C3-6; Posterior cervical lateral mass and pedicle fixation fusion C1-T1;  Surgeon: Papa Quiros MD;  Location: [facility];  Service: Neurosurgery       Family History   Problem Relation Age of Onset    Heart attack Mother      I have reviewed and agree with the history as documented.    E-Cigarette/Vaping    E-Cigarette Use  Never User      E-Cigarette/Vaping Substances    Nicotine No     THC No     CBD No     Flavoring No     Other No     Unknown No      Social History     Tobacco Use    Smoking status: Former     Types: Cigarettes    Smokeless tobacco: Never    Tobacco comments:     quit 5 months ago    Vaping Use    Vaping status: Never Used   Substance Use Topics    Alcohol use: Not Currently     Alcohol/week: 8.0 - 12.0 standard drinks of alcohol     Types: 8 - 12 Cans of beer per week     Comment: every day drinker    Drug use: Never        Review of Systems   Constitutional:  Negative for chills and fever.   HENT:  Negative for ear pain and sore throat.    Eyes:  Negative for pain and visual disturbance.   Respiratory:  Positive for shortness of breath and wheezing. Negative for cough.    Cardiovascular:  Negative for chest pain and palpitations.   Gastrointestinal:  Negative for abdominal pain and vomiting.   Genitourinary:  Negative for dysuria and hematuria.   Musculoskeletal:  Negative for arthralgias and back pain.   Skin:  Negative for color change and rash.   Neurological:  Negative for seizures and syncope.   All other systems reviewed and are negative.      Physical Exam  ED Triage Vitals [01/03/24 2143]   Temperature Pulse Respirations Blood Pressure SpO2   98.3 °F (36.8 °C) 104 (!) 26 126/77 99 %      Temp Source Heart Rate Source Patient Position - Orthostatic VS BP Location FiO2 (%)   Oral Monitor Lying -- --      Pain Score       --             Orthostatic Vital Signs  Vitals:    01/03/24 2143 01/03/24 2300   BP: 126/77 146/65   Pulse: 104 96   Patient Position - Orthostatic VS: Lying Lying       Physical Exam  Vitals and nursing note reviewed.   Constitutional:       General: He is in acute distress.      Appearance: He is well-developed. He is ill-appearing. He is not toxic-appearing or diaphoretic.   HENT:      Head: Normocephalic and atraumatic.   Eyes:      Conjunctiva/sclera: Conjunctivae normal.    Cardiovascular:      Rate and Rhythm: Regular rhythm. Tachycardia present.   Pulmonary:      Effort: Tachypnea and respiratory distress present.      Breath sounds: No stridor. Decreased breath sounds present.   Chest:      Chest wall: No tenderness.   Abdominal:      Palpations: Abdomen is soft.      Tenderness: There is no abdominal tenderness.   Musculoskeletal:         General: No swelling.      Cervical back: Neck supple.      Right lower leg: No tenderness. No edema.      Left lower leg: No tenderness. No edema.   Skin:     General: Skin is warm and dry.      Capillary Refill: Capillary refill takes less than 2 seconds.   Neurological:      Mental Status: He is alert and oriented to person, place, and time.   Psychiatric:         Mood and Affect: Mood normal.         Behavior: Behavior normal.         ED Medications  Medications   albuterol (FOR EMS ONLY) (2.5 mg/3 mL) 0.083 % inhalation solution 5 mg (0 mg Does not apply Given to EMS 1/3/24 2146)   ipratropium-albuterol (FOR EMS ONLY) (DUO-NEB) 0.5-2.5 mg/3 mL inhalation solution 6 mL (0 mL Does not apply Given to EMS 1/3/24 2146)   methylPREDNISolone sodium succinate (FOR EMS ONLY) (Solu-MEDROL) 125 MG injection 125 mg (0 mg Does not apply Given to EMS 1/3/24 2147)       Diagnostic Studies  Results Reviewed       None                   XR chest 2 views    (Results Pending)         Procedures  Procedures      ED Course                             SBIRT 20yo+      Flowsheet Row Most Recent Value   Initial Alcohol Screen: US AUDIT-C     1. How often do you have a drink containing alcohol? 0 Filed at: 01/03/2024 2145   2. How many drinks containing alcohol do you have on a typical day you are drinking?  0 Filed at: 01/03/2024 2145   3a. Male UNDER 65: How often do you have five or more drinks on one occasion? 0 Filed at: 01/03/2024 2145   3b. FEMALE Any Age, or MALE 65+: How often do you have 4 or more drinks on one occassion? 0 Filed at: 01/03/2024 2145    Audit-C Score 0 Filed at: 01/03/2024 2145   MICHAEL: How many times in the past year have you...    Used an illegal drug or used a prescription medication for non-medical reasons? Never Filed at: 01/03/2024 2145                  Medical Decision Making  6-year-old male with presentation suggestive of acute COPD exacerbation.  No findings on history or physical exam to suggest ACS, heart failure exacerbation.  Plan: Chest x-ray to rule out pneumonia    Chest x-ray shows possible new right lower lobe consolidation.  Prescription sent for doxycycline.  Patient reports resolution of symptoms after bronchodilator treatment.    Amount and/or Complexity of Data Reviewed  Radiology: ordered.    Risk  Prescription drug management.          Disposition  Final diagnoses:   COPD exacerbation (HCC)   Pneumonia     Time reflects when diagnosis was documented in both MDM as applicable and the Disposition within this note       Time User Action Codes Description Comment    1/3/2024 10:38 PM Jerry Camacho [J44.1] COPD exacerbation (HCC)     1/3/2024 11:35 PM Jerry Camacho [J18.9] Pneumonia           ED Disposition       ED Disposition   Discharge    Condition   Stable    Date/Time   Wed Ashish 3, 2024 2238    Comment   Lucho Talavera discharge to home/self care.                   Follow-up Information       Follow up With Specialties Details Why Contact Info    [facility], Nurse Practitioner   [address] [place] [address] [phone]              Discharge Medication List as of 1/3/2024 10:39 PM        CONTINUE these medications which have NOT CHANGED    Details   albuterol (Proventil HFA) 90 mcg/act inhaler Inhale 2 puffs every 6 (six) hours as needed for wheezing, Starting Mon 1/1/2024, Normal      aspirin 81 mg chewable tablet Chew 81 mg daily, Historical Med      atorvastatin (LIPITOR) 80 mg tablet Take 1 tablet (80 mg total) by mouth daily with dinner, Starting Sat 9/22/2018, Normal       B Complex Vitamins (VITAMIN B COMPLEX 100 IJ) Take 1 tablet by mouth daily, Historical Med      betamethasone valerate (VALISONE) 0.1 % cream Apply topically 2 (two) times a day, Starting Fri 10/4/2013, Historical Med      budesonide (PULMICORT) 0.5 mg/2 mL nebulizer solution Take 2 mL (0.5 mg total) by nebulization every 12 (twelve) hours Rinse mouth after use., Starting Fri 6/10/2022, No Print      busPIRone (BUSPAR) 10 mg tablet Take 1 tablet (10 mg total) by mouth 3 (three) times a day, Starting Fri 7/28/2023, Normal      doxycycline hyclate (VIBRAMYCIN) 100 mg capsule Take 1 capsule (100 mg total) by mouth 2 (two) times a day for 7 days, Starting Mon 1/1/2024, Until Mon 1/8/2024, Normal      ferrous sulfate 324 (65 Fe) mg Take 1 tablet (324 mg total) by mouth 2 (two) times a day before meals, Starting Wed 10/18/2023, No Print      fluticasone (FLONASE) 50 mcg/act nasal spray 1 spray into each nostril 2 (two) times a day, Starting Fri 10/4/2013, Historical Med      Fluticasone-Salmeterol (Advair) 500-50 mcg/dose inhaler Inhale 1 puff 2 (two) times a day Rinse mouth after use., Starting Mon 1/1/2024, Normal      folic acid (FOLVITE) 1 mg tablet Take 1 tablet (1 mg total) by mouth daily, Starting Sat 6/11/2022, No Print      Insulin Glargine (BASAGLAR KWIKPEN SC) Inject 30 Units under the skin daily at bedtime, Historical Med      ipratropium-albuterol (DUO-NEB) 0.5-2.5 mg/3 mL nebulizer solution Take 3 mL by nebulization 2 (two) times a day, Starting Mon 1/1/2024, Until Wed 1/31/2024, Normal      lisinopril (ZESTRIL) 40 mg tablet Take 40 mg by mouth daily , Historical Med      LORazepam (Ativan) 0.5 mg tablet Take by mouth every 6 (six) hours as needed for anxiety , Historical Med      melatonin 3 mg Take 1 tablet (3 mg total) by mouth daily at bedtime as needed (30), Starting Fri 9/21/2018, Normal      metFORMIN (GLUCOPHAGE) 1000 MG tablet Take 1 tablet by mouth every 12 (twelve) hours, Historical Med       pantoprazole (Protonix) 40 mg tablet Take 1 tablet (40 mg total) by mouth 2 (two) times a day, Starting Tue 8/8/2023, Normal      predniSONE 20 mg tablet Take 2 tablets (40 mg total) by mouth daily for 5 days, Starting Mon 1/1/2024, Until Sat 1/6/2024, Normal      thiamine 100 MG tablet Take 1 tablet (100 mg total) by mouth daily, Starting Sat 9/22/2018, Normal      tiotropium (SPIRIVA) 18 mcg inhalation capsule Place 1 capsule (18 mcg total) into inhaler and inhale daily, Starting Mon 1/1/2024, Normal           No discharge procedures on file.    PDMP Review       None             ED Provider  Attending physically available and evaluated Lucho ALAN Talavera. I managed the patient along with the ED Attending.    Electronically Signed by           Jerry Camacho MD  01/04/24 4515

## 2024-01-05 NOTE — ED ATTENDING ATTESTATION
1/3/2024  I, Jameel Aleman MD, saw and evaluated the patient. I have discussed the patient with the resident/non-physician practitioner and agree with the resident's/non-physician practitioner's findings, Plan of Care, and MDM as documented in the resident's/non-physician practitioner's note, except where noted. All available labs and Radiology studies were reviewed.  I was present for key portions of any procedure(s) performed by the resident/non-physician practitioner and I was immediately available to provide assistance.       At this point I agree with the current assessment done in the Emergency Department.  I have conducted an independent evaluation of this patient a history and physical is as follows:    ED Course       Impression: Shortness of breath history of tobacco abuse\  Differential diagnosis: COPD exacerbation, doubt pneumonia    Symptoms appear to be consistent with prior COPD exacerbations.  Plan to give trial of steroids bronchodilators check chest x-ray anticipate discharge with outpatient follow-up plan.    Patient reassessed after bronchodilator trial symptoms have resolved requesting discharge to home    Chest x-ray independently interpreted by me no acute cardiopulmonary disease.    Critical Care Time  Procedures      
No

## 2024-01-14 ENCOUNTER — APPOINTMENT (EMERGENCY)
Dept: RADIOLOGY | Facility: HOSPITAL | Age: 69
End: 2024-01-14
Payer: MEDICARE

## 2024-01-14 ENCOUNTER — HOSPITAL ENCOUNTER (EMERGENCY)
Facility: HOSPITAL | Age: 69
Discharge: HOME | End: 2024-01-14
Attending: EMERGENCY MEDICINE
Payer: MEDICARE

## 2024-01-14 VITALS
HEART RATE: 86 BPM | TEMPERATURE: 98.2 F | RESPIRATION RATE: 20 BRPM | SYSTOLIC BLOOD PRESSURE: 132 MMHG | DIASTOLIC BLOOD PRESSURE: 69 MMHG | OXYGEN SATURATION: 93 %

## 2024-01-14 DIAGNOSIS — J44.1 COPD WITH ACUTE EXACERBATION (HCC): Primary | ICD-10-CM

## 2024-01-14 LAB
2HR DELTA HS TROPONIN: -2 NG/L
ALBUMIN SERPL BCP-MCNC: 4.3 G/DL (ref 3.5–5)
ALP SERPL-CCNC: 73 U/L (ref 34–104)
ALT SERPL W P-5'-P-CCNC: 15 U/L (ref 7–52)
ANION GAP SERPL CALCULATED.3IONS-SCNC: 9 MMOL/L
AST SERPL W P-5'-P-CCNC: 18 U/L (ref 13–39)
ATRIAL RATE: 103 BPM
ATRIAL RATE: 105 BPM
BASOPHILS # BLD AUTO: 0.05 THOUSANDS/ÂΜL (ref 0–0.1)
BASOPHILS NFR BLD AUTO: 0 % (ref 0–1)
BILIRUB SERPL-MCNC: 0.36 MG/DL (ref 0.2–1)
BUN SERPL-MCNC: 31 MG/DL (ref 5–25)
CALCIUM SERPL-MCNC: 11 MG/DL (ref 8.4–10.2)
CARDIAC TROPONIN I PNL SERPL HS: 11 NG/L
CARDIAC TROPONIN I PNL SERPL HS: 13 NG/L
CHLORIDE SERPL-SCNC: 99 MMOL/L (ref 96–108)
CO2 SERPL-SCNC: 28 MMOL/L (ref 21–32)
CREAT SERPL-MCNC: 0.8 MG/DL (ref 0.6–1.3)
EOSINOPHIL # BLD AUTO: 0.05 THOUSAND/ÂΜL (ref 0–0.61)
EOSINOPHIL NFR BLD AUTO: 0 % (ref 0–6)
ERYTHROCYTE [DISTWIDTH] IN BLOOD BY AUTOMATED COUNT: 19.9 % (ref 11.6–15.1)
FLUAV RNA RESP QL NAA+PROBE: NEGATIVE
FLUBV RNA RESP QL NAA+PROBE: NEGATIVE
GFR SERPL CREATININE-BSD FRML MDRD: 90 ML/MIN/1.73SQ M
GLUCOSE SERPL-MCNC: 189 MG/DL (ref 65–140)
HCT VFR BLD AUTO: 37.8 % (ref 36.5–49.3)
HGB BLD-MCNC: 11.2 G/DL (ref 12–17)
IMM GRANULOCYTES # BLD AUTO: 0.05 THOUSAND/UL (ref 0–0.2)
IMM GRANULOCYTES NFR BLD AUTO: 0 % (ref 0–2)
LYMPHOCYTES # BLD AUTO: 1.84 THOUSANDS/ÂΜL (ref 0.6–4.47)
LYMPHOCYTES NFR BLD AUTO: 16 % (ref 14–44)
MCH RBC QN AUTO: 22 PG (ref 26.8–34.3)
MCHC RBC AUTO-ENTMCNC: 29.6 G/DL (ref 31.4–37.4)
MCV RBC AUTO: 74 FL (ref 82–98)
MONOCYTES # BLD AUTO: 1.02 THOUSAND/ÂΜL (ref 0.17–1.22)
MONOCYTES NFR BLD AUTO: 9 % (ref 4–12)
NEUTROPHILS # BLD AUTO: 8.74 THOUSANDS/ÂΜL (ref 1.85–7.62)
NEUTS SEG NFR BLD AUTO: 75 % (ref 43–75)
NRBC BLD AUTO-RTO: 0 /100 WBCS
P AXIS: 95 DEGREES
PLATELET # BLD AUTO: 435 THOUSANDS/UL (ref 149–390)
PMV BLD AUTO: 10.2 FL (ref 8.9–12.7)
POTASSIUM SERPL-SCNC: 4.3 MMOL/L (ref 3.5–5.3)
PR INTERVAL: 188 MS
PR INTERVAL: 198 MS
PROT SERPL-MCNC: 7.6 G/DL (ref 6.4–8.4)
QRS AXIS: 69 DEGREES
QRS AXIS: 80 DEGREES
QRSD INTERVAL: 106 MS
QRSD INTERVAL: 98 MS
QT INTERVAL: 356 MS
QT INTERVAL: 358 MS
QTC INTERVAL: 466 MS
QTC INTERVAL: 468 MS
RBC # BLD AUTO: 5.08 MILLION/UL (ref 3.88–5.62)
RSV RNA RESP QL NAA+PROBE: NEGATIVE
SARS-COV-2 RNA RESP QL NAA+PROBE: NEGATIVE
SODIUM SERPL-SCNC: 136 MMOL/L (ref 135–147)
T WAVE AXIS: -6 DEGREES
T WAVE AXIS: 0 DEGREES
VENTRICULAR RATE: 103 BPM
VENTRICULAR RATE: 103 BPM
WBC # BLD AUTO: 11.75 THOUSAND/UL (ref 4.31–10.16)

## 2024-01-14 PROCEDURE — 94640 AIRWAY INHALATION TREATMENT: CPT

## 2024-01-14 PROCEDURE — 0241U HB NFCT DS VIR RESP RNA 4 TRGT: CPT

## 2024-01-14 PROCEDURE — 99285 EMERGENCY DEPT VISIT HI MDM: CPT | Performed by: EMERGENCY MEDICINE

## 2024-01-14 PROCEDURE — 96375 TX/PRO/DX INJ NEW DRUG ADDON: CPT

## 2024-01-14 PROCEDURE — 84484 ASSAY OF TROPONIN QUANT: CPT

## 2024-01-14 PROCEDURE — 94760 N-INVAS EAR/PLS OXIMETRY 1: CPT

## 2024-01-14 PROCEDURE — 96365 THER/PROPH/DIAG IV INF INIT: CPT

## 2024-01-14 PROCEDURE — 93005 ELECTROCARDIOGRAM TRACING: CPT

## 2024-01-14 PROCEDURE — 71045 X-RAY EXAM CHEST 1 VIEW: CPT

## 2024-01-14 PROCEDURE — 36415 COLL VENOUS BLD VENIPUNCTURE: CPT

## 2024-01-14 PROCEDURE — 94644 CONT INHLJ TX 1ST HOUR: CPT

## 2024-01-14 PROCEDURE — 99285 EMERGENCY DEPT VISIT HI MDM: CPT

## 2024-01-14 PROCEDURE — 80053 COMPREHEN METABOLIC PANEL: CPT

## 2024-01-14 PROCEDURE — 85025 COMPLETE CBC W/AUTO DIFF WBC: CPT

## 2024-01-14 RX ORDER — GUAIFENESIN 600 MG/1
600 TABLET, EXTENDED RELEASE ORAL ONCE
Status: COMPLETED | OUTPATIENT
Start: 2024-01-14 | End: 2024-01-14

## 2024-01-14 RX ORDER — METHYLPREDNISOLONE SODIUM SUCCINATE 125 MG/2ML
125 INJECTION, POWDER, LYOPHILIZED, FOR SOLUTION INTRAMUSCULAR; INTRAVENOUS ONCE
Status: COMPLETED | OUTPATIENT
Start: 2024-01-14 | End: 2024-01-14

## 2024-01-14 RX ORDER — SODIUM CHLORIDE FOR INHALATION 0.9 %
12 VIAL, NEBULIZER (ML) INHALATION ONCE
Status: COMPLETED | OUTPATIENT
Start: 2024-01-14 | End: 2024-01-14

## 2024-01-14 RX ORDER — SODIUM CHLORIDE, SODIUM GLUCONATE, SODIUM ACETATE, POTASSIUM CHLORIDE, MAGNESIUM CHLORIDE, SODIUM PHOSPHATE, DIBASIC, AND POTASSIUM PHOSPHATE .53; .5; .37; .037; .03; .012; .00082 G/100ML; G/100ML; G/100ML; G/100ML; G/100ML; G/100ML; G/100ML
1000 INJECTION, SOLUTION INTRAVENOUS ONCE
Status: COMPLETED | OUTPATIENT
Start: 2024-01-14 | End: 2024-01-14

## 2024-01-14 RX ADMIN — Medication 12 ML: at 12:59

## 2024-01-14 RX ADMIN — ALBUTEROL SULFATE 10 MG: 2.5 SOLUTION RESPIRATORY (INHALATION) at 12:59

## 2024-01-14 RX ADMIN — SODIUM CHLORIDE, SODIUM GLUCONATE, SODIUM ACETATE, POTASSIUM CHLORIDE, MAGNESIUM CHLORIDE, SODIUM PHOSPHATE, DIBASIC, AND POTASSIUM PHOSPHATE 1000 ML: .53; .5; .37; .037; .03; .012; .00082 INJECTION, SOLUTION INTRAVENOUS at 14:24

## 2024-01-14 RX ADMIN — IPRATROPIUM BROMIDE 1 MG: 0.5 SOLUTION RESPIRATORY (INHALATION) at 12:59

## 2024-01-14 RX ADMIN — GUAIFENESIN 600 MG: 600 TABLET, EXTENDED RELEASE ORAL at 14:20

## 2024-01-14 RX ADMIN — METHYLPREDNISOLONE SODIUM SUCCINATE 125 MG: 125 INJECTION, POWDER, FOR SOLUTION INTRAMUSCULAR; INTRAVENOUS at 13:07

## 2024-01-14 NOTE — ED ATTENDING ATTESTATION
"1/14/2024   IIsabel MD, saw and evaluated the patient. I have discussed the patient with the resident/non-physician practitioner and agree with the resident's/non-physician practitioner's findings, Plan of Care, and MDM as documented in the resident's/non-physician practitioner's note, except where noted. All available labs and Radiology studies were reviewed.  I was present for key portions of any procedure(s) performed by the resident/non-physician practitioner and I was immediately available to provide assistance.       At this point I agree with the current assessment done in the Emergency Department.  I have conducted an independent evaluation of this patient a history and physical is as follows:    Unit/Bed#: ED 06 Encounter: [id number]    Chief Complaint   Patient presents with    Shortness of Breath     X 2 hrs. \"I'm having COPD exasperation\" Coughing up small amt yellow sputum. -CP/N/V/D hx COPD             Physical Exam  ED Triage Vitals   Temperature Pulse Respirations Blood Pressure SpO2   01/14/24 1218 01/14/24 1218 01/14/24 1218 01/14/24 1220 01/14/24 1218   98.2 °F (36.8 °C) 101 (!) 28 143/74 96 %      Temp Source Heart Rate Source Patient Position - Orthostatic VS BP Location FiO2 (%)   01/14/24 1218 01/14/24 1218 01/14/24 1218 01/14/24 1218 --   Oral Monitor Lying Right arm       Pain Score       --                  Vital signs and nursing notes reviewed    ** IF YOU ARE READING THIS, THE EXAM TEMPLATE BELOW HAS NOT BEEN UPDATED**    CONSTITUTIONAL: male appearing stated age resting in bed, in no acute distress  HEENT: atraumatic, normocephalic. Sclera anicteric, conjunctiva are not injected. Moist oral mucosa  CARDIOVASCULAR/CHEST: RRR, no M/R/G. 2+ radial pulses  PULMONARY: Breathing comfortably on RA. Breath sounds are equal and clear to auscultation  ABDOMEN: non-distended. BS present, normoactive. Non-tender  MSK: moves all extremities, no deformities, no peripheral edema, no calf " asymmetry  NEURO: Awake, alert, and oriented x 3. Face symmetric. Moves all extremities spontaneously. No focal neurologic deficits  SKIN: Warm, appears well-perfused  MENTAL STATUS: Normal affect      Labs and Imaging  Labs Reviewed - No data to display    No orders to display         Procedures  Procedures        ED Course  Medications   albuterol inhalation solution 10 mg (has no administration in time range)   ipratropium (ATROVENT) 0.02 % inhalation solution 1 mg (has no administration in time range)   sodium chloride 0.9 % inhalation solution 12 mL (has no administration in time range)                 the ADA then defines impaired fasting glucose as > 100 mg/dL and diabetes as > or equal to 123 mg/dL.    Calcium 11.0 (*) 8.4 - 10.2 mg/dL Final    AST 18  13 - 39 U/L Final    ALT 15  7 - 52 U/L Final    Comment: Specimen collection should occur prior to Sulfasalazine administration due to the potential for falsely depressed results.     Alkaline Phosphatase 73  34 - 104 U/L Final    Total Protein 7.6  6.4 - 8.4 g/dL Final    Albumin 4.3  3.5 - 5.0 g/dL Final    Total Bilirubin 0.36  0.20 - 1.00 mg/dL Final    Comment: Use of this assay is not recommended for patients undergoing treatment with eltrombopag due to the potential for falsely elevated results.  N-acetyl-p-benzoquinone imine (metabolite of Acetaminophen) will generate erroneously low results in samples for patients that have taken an overdose of Acetaminophen.    eGFR 90  ml/min/1.73sq m Final    Narrative:     National Kidney Disease Foundation guidelines for Chronic Kidney Disease (CKD):     Stage 1 with normal or high GFR (GFR > 90 mL/min/1.73 square meters)    Stage 2 Mild CKD (GFR = 60-89 mL/min/1.73 square meters)    Stage 3A Moderate CKD (GFR = 45-59 mL/min/1.73 square meters)    Stage 3B Moderate CKD (GFR = 30-44 mL/min/1.73 square meters)    Stage 4 Severe CKD (GFR = 15-29 mL/min/1.73 square meters)    Stage 5 End Stage CKD (GFR <15 mL/min/1.73 square meters)  Note: GFR calculation is accurate only with a steady state creatinine   COVID19, INFLUENZA A/B, RSV PCR, [facility] - Normal    SARS-CoV-2 Negative  Negative Final    Comment:      INFLUENZA A PCR Negative  Negative Final    Comment:      INFLUENZA B PCR Negative  Negative Final    Comment:      RSV PCR Negative  Negative Final    Comment:      Narrative:     FOR PEDIATRIC PATIENTS - copy/paste COVID Guidelines URL to browser: [email or web address]    SARS-CoV-2 assay is a Nucleic Acid Amplification assay intended for the  qualitative  "detection of nucleic acid from SARS-CoV-2 in nasopharyngeal  swabs. Results are for the presumptive identification of SARS-CoV-2 RNA.    Positive results are indicative of infection with SARS-CoV-2, the virus  causing COVID-19, but do not rule out bacterial infection or co-infection  with other viruses. Laboratories within the [place] and its  territories are required to report all positive results to the appropriate  public health authorities. Negative results do not preclude SARS-CoV-2  infection and should not be used as the sole basis for treatment or other  patient management decisions. Negative results must be combined with  clinical observations, patient history, and epidemiological information.  This test has not been FDA cleared or approved.    This test has been authorized by FDA under an Emergency Use Authorization  (EUA). This test is only authorized for the duration of time the  declaration that circumstances exist justifying the authorization of the  emergency use of an in vitro diagnostic tests for detection of SARS-CoV-2  virus and/or diagnosis of COVID-19 infection under section 564(b)(1) of  the Act, 21 U.S.C. 360bbb-3(b)(1), unless the authorization is terminated  or revoked sooner. The test has been validated but independent review by FDA  and CLIA is pending.    Test performed using [employer] GeneXpert: This RT-PCR assay targets N2,  a region unique to SARS-CoV-2. A conserved region in the E-gene was chosen  for pan-Sarbecovirus detection which includes SARS-CoV-2.    According to CMS-2020-01-R, this platform meets the definition of high-throughput technology.   HS TROPONIN I 0HR - Normal    hs TnI 0hr 13  \"Refer to ACS Flowchart\"- see link ng/L Final    Comment:                                              Initial (time 0) result  If >=50 ng/L, Myocardial injury suggested ;  Type of myocardial injury and treatment strategy  to be determined.  If 5-49 ng/L, a delta result at 2 hours and or 4 " "hours will be needed to further evaluate.  If <4 ng/L, and chest pain has been >3 hours since onset, patient may qualify for discharge based on the HEART score in the ED.  If <5 ng/L and <3hours since onset of chest pain, a delta result at 2 hours will be needed to further evaluate.    HS Troponin 99th Percentile URL of a Health Population=12 ng/L with a 95% Confidence Interval of 8-18 ng/L.    Second Troponin (time 2 hours)  If calculated delta >= 20 ng/L,  Myocardial injury suggested ; Type of myocardial injury and treatment strategy to be determined.  If 5-49 ng/L and the calculated delta is 5-19 ng/L, consult medical service for evaluation.  Continue evaluation for ischemia on ecg and other possible etiology and repeat hs troponin at 4 hours.  If delta is <5 ng/L at 2 hours, consider discharge based on risk stratification via the HEART score (if in ED), or KAIDEN risk score in IP/Observation.    HS Troponin 99th Percentile URL of a Health Population=12 ng/L with a 95% Confidence Interval of 8-18 ng/L.   HS TROPONIN I 2HR - Normal    hs TnI 2hr 11  \"Refer to ACS Flowchart\"- see link ng/L Final    Comment:                                              Initial (time 0) result  If >=50 ng/L, Myocardial injury suggested ;  Type of myocardial injury and treatment strategy  to be determined.  If 5-49 ng/L, a delta result at 2 hours and or 4 hours will be needed to further evaluate.  If <4 ng/L, and chest pain has been >3 hours since onset, patient may qualify for discharge based on the HEART score in the ED.  If <5 ng/L and <3hours since onset of chest pain, a delta result at 2 hours will be needed to further evaluate.    HS Troponin 99th Percentile URL of a Health Population=12 ng/L with a 95% Confidence Interval of 8-18 ng/L.    Second Troponin (time 2 hours)  If calculated delta >= 20 ng/L,  Myocardial injury suggested ; Type of myocardial injury and treatment strategy to be determined.  If 5-49 ng/L and the calculated " delta is 5-19 ng/L, consult medical service for evaluation.  Continue evaluation for ischemia on ecg and other possible etiology and repeat hs troponin at 4 hours.  If delta is <5 ng/L at 2 hours, consider discharge based on risk stratification via the HEART score (if in ED), or KAIDEN risk score in IP/Observation.    HS Troponin 99th Percentile URL of a Health Population=12 ng/L with a 95% Confidence Interval of 8-18 ng/L.    Delta 2hr hsTnI -2  <20 ng/L Final       XR chest portable   ED Interpretation   Blunting of the left costophrenic angle. Improvement of bibasilar infiltrates compared to prior.      Final Result      No acute cardiopulmonary disease.                  Resident: LINDA PARSONS I, the attending radiologist, have reviewed the images and agree with the final report above.      Workstation performed: [id number]               Procedures  ECG 12 Lead Documentation Only    Date/Time: 1/14/2024 12:26 PM    Performed by: Isabel Ortiz MD  Authorized by: Isabel Ortiz MD    Comments:      Sinus tachycardia, ventricular rate 103, WV interval 188, QRS 98, QTc 468, normal axis, incomplete right bundle branch block, low voltage QRS, premature ventricular complexes at present, no ST/T wave changes to suggest ischemia, compared to prior EKG dated 1/1/2024, incomplete right bundle branch block pattern is now present.          ED Course  Medications   albuterol inhalation solution 10 mg (10 mg Nebulization Given 1/14/24 1259)   ipratropium (ATROVENT) 0.02 % inhalation solution 1 mg (1 mg Nebulization Given 1/14/24 1259)   sodium chloride 0.9 % inhalation solution 12 mL (12 mL Nebulization Given 1/14/24 1259)   methylPREDNISolone sodium succinate (Solu-MEDROL) injection 125 mg (125 mg Intravenous Given 1/14/24 1307)   guaiFENesin (MUCINEX) 12 hr tablet 600 mg (600 mg Oral Given 1/14/24 1420)   multi-electrolyte (ISOLYTE-S PH 7.4) bolus 1,000 mL (0 mL Intravenous Stopped 1/14/24 1530)     69-year-old male  presenting with shortness of breath and wheezing.  Vital signs reviewed, afebrile, mildly tachycardic, tachypneic, not hypotensive or hypoxic.  Medical record including most recent ER visits reviewed.  Suspect COPD exacerbation.  Differential diagnosis includes viral illness, atypical pneumonia, pneumonia, with other etiologies such as pneumothorax, PE, ACS, volume overload considered but deemed less likely.  EKG to my interpretation as above.  Chest x-ray to my interpretation as above, no lobar pneumonia.  Labs reveal CMP with mildly elevated calcium of unclear clinical significance at present, CBC with mild leukocytosis of 11.75, hemoglobin of 11.2, thrombocytosis 435.  Viral testing is negative.  High-sensitivity troponin is 13, repeat is 11.  Following hour-long nebulizer solution, patient feels improved.  I did contemplate admitting patient to the hospital, however, since he is improved, we will continue him on prednisone and ensure he has nebulizer solution at home.  I am afraid that an admission would not significantly benefit the patient, and may unnecessarily expose him to viral illness or iatrogenic infection.  Nonetheless, patient is on the strict return precautions.    Patient discharged to home with recommendations for symptom control, return precautions, and plan for follow up.

## 2024-01-14 NOTE — DISCHARGE INSTRUCTIONS
You have been seen in the emergency department for evaluation of a COPD exacerbation.  We are able to control your symptoms with medications.  Your chest x-ray showed a small effusion, but no concern for ongoing pneumonia.  Please continue taking your prednisone and antibiotics as recently prescribed.  Please continue with nebulizer treatments at home for symptoms.  Please return to the ED if you have worsening shortness of breath or difficulty breathing.  Otherwise, please follow-up with your PCP.

## 2024-01-14 NOTE — ED NOTES
Discussed with  and OK for pt to take uber; Daughter Nimco updated and pt to waiting room     Lali Muñoz RN  01/14/24 [id number]

## 2024-01-14 NOTE — ED ATTENDING ATTESTATION
"1/14/2024   I, Isabel Ortiz MD, saw and evaluated the patient. I have discussed the patient with the resident/non-physician practitioner and agree with the resident's/non-physician practitioner's findings, Plan of Care, and MDM as documented in the resident's/non-physician practitioner's note, except where noted. All available labs and Radiology studies were reviewed.  I was present for key portions of any procedure(s) performed by the resident/non-physician practitioner and I was immediately available to provide assistance.       At this point I agree with the current assessment done in the Emergency Department.  I have conducted an independent evaluation of this patient a history and physical is as follows:    Unit/Bed#: ED 06 Encounter: [id number]    Chief Complaint   Patient presents with    Shortness of Breath     X 2 hrs. \"I'm having COPD exasperation\" Coughing up small amt yellow sputum. -CP/N/V/D hx COPD     69-year-old male with past medical history of diabetes, coronary artery disease, COPD, hypertension, presenting with shortness of breath and cough.  Patient reports that over the past 2 weeks, he has had issues with COPD exacerbation.        Physical Exam  ED Triage Vitals   Temperature Pulse Respirations Blood Pressure SpO2   01/14/24 1218 01/14/24 1218 01/14/24 1218 01/14/24 1220 01/14/24 1218   98.2 °F (36.8 °C) 101 (!) 28 143/74 96 %      Temp Source Heart Rate Source Patient Position - Orthostatic VS BP Location FiO2 (%)   01/14/24 1218 01/14/24 1218 01/14/24 1218 01/14/24 1218 --   Oral Monitor Lying Right arm       Pain Score       --                  Vital signs and nursing notes reviewed    ** IF YOU ARE READING THIS, THE EXAM TEMPLATE BELOW HAS NOT BEEN UPDATED**    CONSTITUTIONAL: male appearing stated age resting in bed, in no acute distress  HEENT: atraumatic, normocephalic. Sclera anicteric, conjunctiva are not injected. Moist oral mucosa  CARDIOVASCULAR/CHEST: RRR, no M/R/G. 2+ radial " pulses  PULMONARY: Breathing comfortably on RA. Breath sounds are equal and clear to auscultation  ABDOMEN: non-distended. BS present, normoactive. Non-tender  MSK: moves all extremities, no deformities, no peripheral edema, no calf asymmetry  NEURO: Awake, alert, and oriented x 3. Face symmetric. Moves all extremities spontaneously. No focal neurologic deficits  SKIN: Warm, appears well-perfused  MENTAL STATUS: Normal affect      Labs and Imaging  Labs Reviewed - No data to display    No orders to display         Procedures  Procedures        ED Course  Medications   albuterol inhalation solution 10 mg (has no administration in time range)   ipratropium (ATROVENT) 0.02 % inhalation solution 1 mg (has no administration in time range)   sodium chloride 0.9 % inhalation solution 12 mL (has no administration in time range)

## 2024-01-14 NOTE — ED NOTES
Daughter Nimco able to pick pt up from hospital  in about 1.5 hrs     Lali Muñoz, RN  01/14/24 1303

## 2024-01-15 NOTE — ED PROVIDER NOTES
"History  Chief Complaint   Patient presents with    Shortness of Breath     X 2 hrs. \"I'm having COPD exasperation\" Coughing up small amt yellow sputum. -CP/N/V/D hx COPD     Patient is a 69-year-old male, past medical history of COPD, MI, CVA, diabetes, hypertension, presenting for evaluation of difficulty breathing.  Patient seen twice earlier this week for the same.  Patient discharged with prednisone and antibiotics previously on 1/3.  Patient states that he was feeling better, but approximately 2 hours ago he began to feel short of breath again and wheezing.  Patient reports an associated cough that has been mildly productive of a yellow mucus.  Patient did attempt to use his DuoNeb treatments at home, but did not have any relief of symptoms.  Patient reports compliance with his prednisone and antibiotics.  Patient denies any other symptoms at this time including chest pain, chest tightness, abdominal pain, nausea, vomiting, diarrhea, fevers, weakness, or recent illnesses.  Patient does report that shortness of breath gets worse with exertion, but again denies chest pain.  Patient denies any history of previous blood clots, recent surgeries, recent immobility, hemoptysis, leg pain, or leg swelling.          Prior to Admission Medications   Prescriptions Last Dose Informant Patient Reported? Taking?   B Complex Vitamins (VITAMIN B COMPLEX 100 IJ)   Yes No   Sig: Take 1 tablet by mouth daily   Fluticasone-Salmeterol (Advair) 500-50 mcg/dose inhaler   No No   Sig: Inhale 1 puff 2 (two) times a day Rinse mouth after use.   Insulin Glargine (BASAGLAR KWIKPEN SC)   Yes No   Sig: Inject 30 Units under the skin daily at bedtime   LORazepam (Ativan) 0.5 mg tablet   Yes No   Sig: Take by mouth every 6 (six) hours as needed for anxiety    albuterol (Proventil HFA) 90 mcg/act inhaler   No No   Sig: Inhale 2 puffs every 6 (six) hours as needed for wheezing   aspirin 81 mg chewable tablet   Yes No   Sig: Chew 81 mg daily "   atorvastatin (LIPITOR) 80 mg tablet   No No   Sig: Take 1 tablet (80 mg total) by mouth daily with dinner   betamethasone valerate (VALISONE) 0.1 % cream   Yes No   Sig: Apply topically 2 (two) times a day   budesonide (PULMICORT) 0.5 mg/2 mL nebulizer solution   No No   Sig: Take 2 mL (0.5 mg total) by nebulization every 12 (twelve) hours Rinse mouth after use.   busPIRone (BUSPAR) 10 mg tablet   No No   Sig: Take 1 tablet (10 mg total) by mouth 3 (three) times a day   ferrous sulfate 324 (65 Fe) mg   No No   Sig: Take 1 tablet (324 mg total) by mouth 2 (two) times a day before meals   fluticasone (FLONASE) 50 mcg/act nasal spray   Yes No   Si spray into each nostril 2 (two) times a day   folic acid (FOLVITE) 1 mg tablet   No No   Sig: Take 1 tablet (1 mg total) by mouth daily   ipratropium-albuterol (DUO-NEB) 0.5-2.5 mg/3 mL nebulizer solution   No No   Sig: Take 3 mL by nebulization 2 (two) times a day   lisinopril (ZESTRIL) 40 mg tablet   Yes No   Sig: Take 40 mg by mouth daily    melatonin 3 mg   No No   Sig: Take 1 tablet (3 mg total) by mouth daily at bedtime as needed (30)   metFORMIN (GLUCOPHAGE) 1000 MG tablet   Yes No   Sig: Take 1 tablet by mouth every 12 (twelve) hours   pantoprazole (Protonix) 40 mg tablet   No No   Sig: Take 1 tablet (40 mg total) by mouth 2 (two) times a day   thiamine 100 MG tablet   No No   Sig: Take 1 tablet (100 mg total) by mouth daily   tiotropium (SPIRIVA) 18 mcg inhalation capsule   No No   Sig: Place 1 capsule (18 mcg total) into inhaler and inhale daily      Facility-Administered Medications: None       Past Medical History:   Diagnosis Date    Cardiac arrest (HCC)     COPD (chronic obstructive pulmonary disease) (HCC)     CVA (cerebral vascular accident) (HCC)     Diabetes mellitus (HCC)     Heart attack (HCC)     Hypertension     Stroke (HCC)        Past Surgical History:   Procedure Laterality Date    CERVICAL FUSION N/A 9/10/2018    Procedure: Posterior cervical  decompressive laminectomy C3-6; Posterior cervical lateral mass and pedicle fixation fusion C1-T1;  Surgeon: Papa Quiros MD;  Location: [facility];  Service: Neurosurgery       Family History   Problem Relation Age of Onset    Heart attack Mother      I have reviewed and agree with the history as documented.    E-Cigarette/Vaping    E-Cigarette Use Never User      E-Cigarette/Vaping Substances    Nicotine No     THC No     CBD No     Flavoring No     Other No     Unknown No      Social History     Tobacco Use    Smoking status: Former     Types: Cigarettes    Smokeless tobacco: Never    Tobacco comments:     quit 5 months ago    Vaping Use    Vaping status: Never Used   Substance Use Topics    Alcohol use: Not Currently     Alcohol/week: 8.0 - 12.0 standard drinks of alcohol     Types: 8 - 12 Cans of beer per week     Comment: every day drinker    Drug use: Never        Review of Systems   Constitutional:  Negative for chills, fatigue and fever.   HENT:  Negative for congestion.    Respiratory:  Positive for cough, shortness of breath and wheezing. Negative for chest tightness.    Cardiovascular:  Negative for chest pain.   Gastrointestinal:  Negative for abdominal pain, diarrhea, nausea and vomiting.   Genitourinary:  Negative for difficulty urinating.   Skin:  Negative for color change.   Neurological:  Negative for dizziness, syncope, weakness, numbness and headaches.       Physical Exam  ED Triage Vitals   Temperature Pulse Respirations Blood Pressure SpO2   01/14/24 1218 01/14/24 1218 01/14/24 1218 01/14/24 1220 01/14/24 1218   98.2 °F (36.8 °C) 101 (!) 28 143/74 96 %      Temp Source Heart Rate Source Patient Position - Orthostatic VS BP Location FiO2 (%)   01/14/24 1218 01/14/24 1218 01/14/24 1218 01/14/24 1218 --   Oral Monitor Lying Right arm       Pain Score       --                    Orthostatic Vital Signs  Vitals:    01/14/24 1220 01/14/24 1230 01/14/24 1501 01/14/24 1530   BP: 143/74 143/74   132/69   Pulse:  99 87 86   Patient Position - Orthostatic VS: Lying          Physical Exam  Vitals and nursing note reviewed.   Constitutional:       General: He is not in acute distress.     Appearance: Normal appearance. He is not ill-appearing or toxic-appearing.   HENT:      Head: Normocephalic and atraumatic.   Eyes:      General: No scleral icterus.     Extraocular Movements: Extraocular movements intact.   Cardiovascular:      Rate and Rhythm: Normal rate and regular rhythm.      Pulses: Normal pulses.      Heart sounds: Normal heart sounds. No murmur heard.  Pulmonary:      Effort: Tachypnea, accessory muscle usage, prolonged expiration and respiratory distress (Mild) present.      Breath sounds: Decreased air movement present. Decreased breath sounds and wheezing (End expiratory) present. No rhonchi.   Abdominal:      General: Abdomen is flat. There is no distension.      Palpations: Abdomen is soft.      Tenderness: There is no abdominal tenderness.   Musculoskeletal:         General: Normal range of motion.      Cervical back: Normal range of motion.   Skin:     Capillary Refill: Capillary refill takes less than 2 seconds.   Neurological:      General: No focal deficit present.      Mental Status: He is alert.      Cranial Nerves: No cranial nerve deficit.      Sensory: No sensory deficit.      Motor: No weakness.      Gait: Gait normal.   Psychiatric:         Mood and Affect: Mood normal.         Behavior: Behavior normal.         ED Medications  Medications   albuterol inhalation solution 10 mg (10 mg Nebulization Given 1/14/24 1259)   ipratropium (ATROVENT) 0.02 % inhalation solution 1 mg (1 mg Nebulization Given 1/14/24 1259)   sodium chloride 0.9 % inhalation solution 12 mL (12 mL Nebulization Given 1/14/24 1259)   methylPREDNISolone sodium succinate (Solu-MEDROL) injection 125 mg (125 mg Intravenous Given 1/14/24 1307)   guaiFENesin (MUCINEX) 12 hr tablet 600 mg (600 mg Oral Given 1/14/24 1420)    multi-electrolyte (ISOLYTE-S PH 7.4) bolus 1,000 mL (0 mL Intravenous Stopped 1/14/24 1530)       Diagnostic Studies  Results Reviewed       Procedure Component Value Units Date/Time    HS Troponin I 2hr [[id number]]  (Normal) Collected: 01/14/24 1427    Lab Status: Final result Specimen: Blood from Hand, Left Updated: 01/14/24 1500     hs TnI 2hr 11 ng/L      Delta 2hr hsTnI -2 ng/L     Comprehensive metabolic panel [[id number]]  (Abnormal) Collected: 01/14/24 1231    Lab Status: Final result Specimen: Blood from Arm, Left Updated: 01/14/24 1342     Sodium 136 mmol/L      Potassium 4.3 mmol/L      Chloride 99 mmol/L      CO2 28 mmol/L      ANION GAP 9 mmol/L      BUN 31 mg/dL      Creatinine 0.80 mg/dL      Glucose 189 mg/dL      Calcium 11.0 mg/dL      AST 18 U/L      ALT 15 U/L      Alkaline Phosphatase 73 U/L      Total Protein 7.6 g/dL      Albumin 4.3 g/dL      Total Bilirubin 0.36 mg/dL      eGFR 90 ml/min/1.73sq m     Narrative:      National Kidney Disease Foundation guidelines for Chronic Kidney Disease (CKD):     Stage 1 with normal or high GFR (GFR > 90 mL/min/1.73 square meters)    Stage 2 Mild CKD (GFR = 60-89 mL/min/1.73 square meters)    Stage 3A Moderate CKD (GFR = 45-59 mL/min/1.73 square meters)    Stage 3B Moderate CKD (GFR = 30-44 mL/min/1.73 square meters)    Stage 4 Severe CKD (GFR = 15-29 mL/min/1.73 square meters)    Stage 5 End Stage CKD (GFR <15 mL/min/1.73 square meters)  Note: GFR calculation is accurate only with a steady state creatinine    FLU/RSV/COVID - if FLU/RSV clinically relevant [[id number]]  (Normal) Collected: 01/14/24 1231    Lab Status: Final result Specimen: Nares from Nose Updated: 01/14/24 1326     SARS-CoV-2 Negative     INFLUENZA A PCR Negative     INFLUENZA B PCR Negative     RSV PCR Negative    Narrative:      FOR PEDIATRIC PATIENTS - copy/paste COVID Guidelines URL to browser: [email or web address]    SARS-CoV-2  assay is a Nucleic Acid Amplification assay intended for the  qualitative detection of nucleic acid from SARS-CoV-2 in nasopharyngeal  swabs. Results are for the presumptive identification of SARS-CoV-2 RNA.    Positive results are indicative of infection with SARS-CoV-2, the virus  causing COVID-19, but do not rule out bacterial infection or co-infection  with other viruses. Laboratories within the [place] and its  territories are required to report all positive results to the appropriate  public health authorities. Negative results do not preclude SARS-CoV-2  infection and should not be used as the sole basis for treatment or other  patient management decisions. Negative results must be combined with  clinical observations, patient history, and epidemiological information.  This test has not been FDA cleared or approved.    This test has been authorized by FDA under an Emergency Use Authorization  (EUA). This test is only authorized for the duration of time the  declaration that circumstances exist justifying the authorization of the  emergency use of an in vitro diagnostic tests for detection of SARS-CoV-2  virus and/or diagnosis of COVID-19 infection under section 564(b)(1) of  the Act, 21 U.S.C. 360bbb-3(b)(1), unless the authorization is terminated  or revoked sooner. The test has been validated but independent review by FDA  and CLIA is pending.    Test performed using [employer] Gene[employer]pert: This RT-PCR assay targets N2,  a region unique to SARS-CoV-2. A conserved region in the E-gene was chosen  for pan-Sarbecovirus detection which includes SARS-CoV-2.    According to CMS-2020-01-R, this platform meets the definition of high-throughput technology.    HS Troponin 0hr (reflex protocol) [[id number]]  (Normal) Collected: 01/14/24 1231    Lab Status: Final result Specimen: Blood from Arm, Left Updated: 01/14/24 1309     hs TnI 0hr 13 ng/L     CBC and differential [[id number]]  (Abnormal) Collected: 01/14/24 1231     Lab Status: Final result Specimen: Blood from Arm, Left Updated: 01/14/24 1251     WBC 11.75 Thousand/uL      RBC 5.08 Million/uL      Hemoglobin 11.2 g/dL      Hematocrit 37.8 %      MCV 74 fL      MCH 22.0 pg      MCHC 29.6 g/dL      RDW 19.9 %      MPV 10.2 fL      Platelets 435 Thousands/uL      nRBC 0 /100 WBCs      Neutrophils Relative 75 %      Immat GRANS % 0 %      Lymphocytes Relative 16 %      Monocytes Relative 9 %      Eosinophils Relative 0 %      Basophils Relative 0 %      Neutrophils Absolute 8.74 Thousands/µL      Immature Grans Absolute 0.05 Thousand/uL      Lymphocytes Absolute 1.84 Thousands/µL      Monocytes Absolute 1.02 Thousand/µL      Eosinophils Absolute 0.05 Thousand/µL      Basophils Absolute 0.05 Thousands/µL                    XR chest portable   ED Interpretation by Tila Moreira MD (01/14 1334)   Blunting of the left costophrenic angle. Improvement of bibasilar infiltrates compared to prior.            Procedures  Procedures      ED Course  ED Course as of 01/14/24 2250   Sun Jan 14, 2024   1220 Patient seen and evaluated by me  DDX: COPD exacerbation due to allergen exposure vs weather vs URI.  Pneumonia on the differential, ACS.  Anemia or electrolyte abnormality.  Not concerned for PE at this time as patient has no risk factors, signs, or symptoms.  Workup and plan: CBC, CMP, troponin, viral swab, chest x-ray, hour-long DuoNeb, Solu-Medrol   1224 EKG done at 1224 interpreted by me   rate 103  rhythm normal sinus rhythm with PVCs, incomplete RBBB   axis normal  QRS complexes are normal  Intervals are normal  ST segments showing no ischemic changes.      1258 WBC(!): 11.75  Mild elevation since prior.   1258 Platelet Count(!): 435   1258 Hemoglobin(!): 11.2  Chronic   1258 Absolute Neutrophils(!): 8.74   1310 Pt re-evaluated, improved aeration BL, still with wheezing. ~50% done treatment.   1312 hs TnI 0hr: 13  Will reflex   1331 FLU/RSV/COVID - if FLU/RSV clinically  relevant  Negative   1334 XR chest portable  Blunting of the left costophrenic angle on my interpretation. Not consistent with pneumonia.   1401 Comprehensive metabolic panel(!)  Mild dehydration, will give fluids   1452 Shared decision making with patient to be discharged.  Will discharge home following delta troponin.   1614 Delta 2hr hsTnI: -2   1615 Patient discharged at this time, given return precautions and follow-up information.  Patient instructed to continue taking his prednisone and antibiotics as previously prescribed.  Patient reports understanding, all questions answered.                                 Wells' Criteria for PE      Flowsheet Row Most Recent Value   Wells' Criteria for PE    Clinical signs and symptoms of DVT 0 Filed at: 01/14/2024 2248   PE is primary diagnosis or equally likely 0 Filed at: 01/14/2024 2248   HR >100 1.5 Filed at: 01/14/2024 2248   Immobilization at least 3 days or Surgery in the previous 4 weeks 0 Filed at: 01/14/2024 2248   Previous, objectively diagnosed PE or DVT 0 Filed at: 01/14/2024 2248   Hemoptysis 0 Filed at: 01/14/2024 2248   Malignancy with treatment within 6 months or palliative 0 Filed at: 01/14/2024 2248   Wells' Criteria Total 1.5 Filed at: 01/14/2024 2248              Medical Decision Making  Please see ED course above regarding details of the MDM    Amount and/or Complexity of Data Reviewed  Labs: ordered. Decision-making details documented in ED Course.  Radiology: ordered and independent interpretation performed. Decision-making details documented in ED Course.    Risk  OTC drugs.  Prescription drug management.          Disposition  Final diagnoses:   COPD with acute exacerbation (HCC)     Time reflects when diagnosis was documented in both MDM as applicable and the Disposition within this note       Time User Action Codes Description Comment    1/14/2024  2:41 PM Tila Moreira Add [J44.1] COPD with acute exacerbation (HCC)           ED Disposition        ED Disposition   Discharge    Condition   Stable    Date/Time   Sun Jan 14, 2024 1441    Comment   Lucho ALAN Talavera discharge to home/self care.                   Follow-up Information       Follow up With Specialties Details Why Contact Info    [facility], Nurse Practitioner   [address] [place] [id number] [phone]              Discharge Medication List as of 1/14/2024  4:15 PM        CONTINUE these medications which have NOT CHANGED    Details   albuterol (Proventil HFA) 90 mcg/act inhaler Inhale 2 puffs every 6 (six) hours as needed for wheezing, Starting Mon 1/1/2024, Normal      aspirin 81 mg chewable tablet Chew 81 mg daily, Historical Med      atorvastatin (LIPITOR) 80 mg tablet Take 1 tablet (80 mg total) by mouth daily with dinner, Starting Sat 9/22/2018, Normal      B Complex Vitamins (VITAMIN B COMPLEX 100 IJ) Take 1 tablet by mouth daily, Historical Med      betamethasone valerate (VALISONE) 0.1 % cream Apply topically 2 (two) times a day, Starting Fri 10/4/2013, Historical Med      budesonide (PULMICORT) 0.5 mg/2 mL nebulizer solution Take 2 mL (0.5 mg total) by nebulization every 12 (twelve) hours Rinse mouth after use., Starting Fri 6/10/2022, No Print      busPIRone (BUSPAR) 10 mg tablet Take 1 tablet (10 mg total) by mouth 3 (three) times a day, Starting Fri 7/28/2023, Normal      ferrous sulfate 324 (65 Fe) mg Take 1 tablet (324 mg total) by mouth 2 (two) times a day before meals, Starting Wed 10/18/2023, No Print      fluticasone (FLONASE) 50 mcg/act nasal spray 1 spray into each nostril 2 (two) times a day, Starting Fri 10/4/2013, Historical Med      Fluticasone-Salmeterol (Advair) 500-50 mcg/dose inhaler Inhale 1 puff 2 (two) times a day Rinse mouth after use., Starting Mon 1/1/2024, Normal      folic acid (FOLVITE) 1 mg tablet Take 1 tablet (1 mg total) by mouth daily, Starting Sat 6/11/2022, No Print      Insulin Glargine (BASAGLAR KWIKPEN SC)  Inject 30 Units under the skin daily at bedtime, Historical Med      ipratropium-albuterol (DUO-NEB) 0.5-2.5 mg/3 mL nebulizer solution Take 3 mL by nebulization 2 (two) times a day, Starting Mon 1/1/2024, Until Wed 1/31/2024, Normal      lisinopril (ZESTRIL) 40 mg tablet Take 40 mg by mouth daily , Historical Med      LORazepam (Ativan) 0.5 mg tablet Take by mouth every 6 (six) hours as needed for anxiety , Historical Med      melatonin 3 mg Take 1 tablet (3 mg total) by mouth daily at bedtime as needed (30), Starting Fri 9/21/2018, Normal      metFORMIN (GLUCOPHAGE) 1000 MG tablet Take 1 tablet by mouth every 12 (twelve) hours, Historical Med      pantoprazole (Protonix) 40 mg tablet Take 1 tablet (40 mg total) by mouth 2 (two) times a day, Starting Tue 8/8/2023, Normal      thiamine 100 MG tablet Take 1 tablet (100 mg total) by mouth daily, Starting Sat 9/22/2018, Normal      tiotropium (SPIRIVA) 18 mcg inhalation capsule Place 1 capsule (18 mcg total) into inhaler and inhale daily, Starting Mon 1/1/2024, Normal           No discharge procedures on file.    PDMP Review       None             ED Provider  Attending physically available and evaluated Lucho ALAN Talavera. I managed the patient along with the ED Attending.    Electronically Signed by           Tila Moreira MD  01/14/24 0616

## 2024-01-26 ENCOUNTER — APPOINTMENT (EMERGENCY)
Dept: RADIOLOGY | Facility: HOSPITAL | Age: 69
End: 2024-01-26
Payer: MEDICARE

## 2024-01-26 ENCOUNTER — HOSPITAL ENCOUNTER (EMERGENCY)
Facility: HOSPITAL | Age: 69
Discharge: HOME | End: 2024-01-26
Attending: EMERGENCY MEDICINE
Payer: MEDICARE

## 2024-01-26 VITALS
SYSTOLIC BLOOD PRESSURE: 144 MMHG | TEMPERATURE: 97.5 F | DIASTOLIC BLOOD PRESSURE: 68 MMHG | OXYGEN SATURATION: 93 % | RESPIRATION RATE: 22 BRPM | HEART RATE: 82 BPM

## 2024-01-26 DIAGNOSIS — J44.1 COPD WITH ACUTE EXACERBATION (HCC): Primary | ICD-10-CM

## 2024-01-26 DIAGNOSIS — J11.1 INFLUENZA: ICD-10-CM

## 2024-01-26 LAB
ATRIAL RATE: 87 BPM
P AXIS: 47 DEGREES
PR INTERVAL: 202 MS
QRS AXIS: 103 DEGREES
QRSD INTERVAL: 118 MS
QT INTERVAL: 360 MS
QTC INTERVAL: 433 MS
T WAVE AXIS: -17 DEGREES
VENTRICULAR RATE: 87 BPM

## 2024-01-26 PROCEDURE — 99285 EMERGENCY DEPT VISIT HI MDM: CPT | Performed by: EMERGENCY MEDICINE

## 2024-01-26 PROCEDURE — 94640 AIRWAY INHALATION TREATMENT: CPT

## 2024-01-26 PROCEDURE — 99285 EMERGENCY DEPT VISIT HI MDM: CPT

## 2024-01-26 PROCEDURE — 93005 ELECTROCARDIOGRAM TRACING: CPT

## 2024-01-26 PROCEDURE — 71046 X-RAY EXAM CHEST 2 VIEWS: CPT

## 2024-01-26 RX ORDER — IPRATROPIUM BROMIDE AND ALBUTEROL SULFATE 2.5; .5 MG/3ML; MG/3ML
3 SOLUTION RESPIRATORY (INHALATION)
Status: DISCONTINUED | OUTPATIENT
Start: 2024-01-26 | End: 2024-01-27 | Stop reason: HOSPADM

## 2024-01-26 RX ORDER — CALCIUM CARBONATE 500 MG/1
500 TABLET, CHEWABLE ORAL DAILY PRN
Status: DISCONTINUED | OUTPATIENT
Start: 2024-01-26 | End: 2024-01-27 | Stop reason: HOSPADM

## 2024-01-26 RX ORDER — PREDNISONE 20 MG/1
40 TABLET ORAL ONCE
Status: COMPLETED | OUTPATIENT
Start: 2024-01-26 | End: 2024-01-26

## 2024-01-26 RX ADMIN — IPRATROPIUM BROMIDE AND ALBUTEROL SULFATE 3 ML: .5; 3 SOLUTION RESPIRATORY (INHALATION) at 20:26

## 2024-01-26 RX ADMIN — PREDNISONE 40 MG: 20 TABLET ORAL at 20:26

## 2024-01-26 RX ADMIN — CALCIUM CARBONATE (ANTACID) CHEW TAB 500 MG 500 MG: 500 CHEW TAB at 23:05

## 2024-01-27 NOTE — ED PROVIDER NOTES
History  Chief Complaint   Patient presents with    Shortness of Breath     Pt has a hx of COPD; c/o SOB for past half hour and at home nebulizer's have not helped. Pt tested positive for flu yesterday     Patient is a 69-year-old male, past medical history of COPD presenting today for evaluation of shortness of breath.  Patient states that this feels like his normal COPD exacerbation, states he was diagnosed with influenza yesterday.  Patient began having worsening shortness of breath and wheezing approximately 45 minutes ago.  States he attempted to take his albuterol at home, with limited improvement of symptoms.  Patient endorses worsening cough over the past 45 minutes as well.  Described as dry, not coughing anything up.  Patient has been seen in multiple emergency departments over the past month for the same symptoms.  Patient has had multiple courses of steroids over the past month as well as a course of antibiotics.  Patient denies any history of blood clots, recent immobility, recent surgeries, hemoptysis.  Patient does have a pulmonologist that he follows with at [facility], has not seen him in approximately 2 months.  Patient denies any leg swelling, fevers, chest pain, abdominal pain, GI upset, or otherwise symptoms today.          Prior to Admission Medications   Prescriptions Last Dose Informant Patient Reported? Taking?   B Complex Vitamins (VITAMIN B COMPLEX 100 IJ)   Yes No   Sig: Take 1 tablet by mouth daily   Fluticasone-Salmeterol (Advair) 500-50 mcg/dose inhaler   No No   Sig: Inhale 1 puff 2 (two) times a day Rinse mouth after use.   Insulin Glargine (BASAGLAR KWIKPEN SC)   Yes No   Sig: Inject 30 Units under the skin daily at bedtime   LORazepam (Ativan) 0.5 mg tablet   Yes No   Sig: Take by mouth every 6 (six) hours as needed for anxiety    albuterol (Proventil HFA) 90 mcg/act inhaler   No No   Sig: Inhale 2 puffs every 6 (six) hours as needed for wheezing   aspirin 81 mg chewable tablet   Yes No    Sig: Chew 81 mg daily   atorvastatin (LIPITOR) 80 mg tablet   No No   Sig: Take 1 tablet (80 mg total) by mouth daily with dinner   betamethasone valerate (VALISONE) 0.1 % cream   Yes No   Sig: Apply topically 2 (two) times a day   budesonide (PULMICORT) 0.5 mg/2 mL nebulizer solution   No No   Sig: Take 2 mL (0.5 mg total) by nebulization every 12 (twelve) hours Rinse mouth after use.   busPIRone (BUSPAR) 10 mg tablet   No No   Sig: Take 1 tablet (10 mg total) by mouth 3 (three) times a day   ferrous sulfate 324 (65 Fe) mg   No No   Sig: Take 1 tablet (324 mg total) by mouth 2 (two) times a day before meals   fluticasone (FLONASE) 50 mcg/act nasal spray   Yes No   Si spray into each nostril 2 (two) times a day   folic acid (FOLVITE) 1 mg tablet   No No   Sig: Take 1 tablet (1 mg total) by mouth daily   ipratropium-albuterol (DUO-NEB) 0.5-2.5 mg/3 mL nebulizer solution   No No   Sig: Take 3 mL by nebulization 2 (two) times a day   lisinopril (ZESTRIL) 40 mg tablet   Yes No   Sig: Take 40 mg by mouth daily    melatonin 3 mg   No No   Sig: Take 1 tablet (3 mg total) by mouth daily at bedtime as needed (30)   metFORMIN (GLUCOPHAGE) 1000 MG tablet   Yes No   Sig: Take 1 tablet by mouth every 12 (twelve) hours   pantoprazole (Protonix) 40 mg tablet   No No   Sig: Take 1 tablet (40 mg total) by mouth 2 (two) times a day   thiamine 100 MG tablet   No No   Sig: Take 1 tablet (100 mg total) by mouth daily   tiotropium (SPIRIVA) 18 mcg inhalation capsule   No No   Sig: Place 1 capsule (18 mcg total) into inhaler and inhale daily      Facility-Administered Medications: None       Past Medical History:   Diagnosis Date    Cardiac arrest (HCC)     COPD (chronic obstructive pulmonary disease) (HCC)     CVA (cerebral vascular accident) (HCC)     Diabetes mellitus (HCC)     Heart attack (HCC)     Hypertension     Stroke (HCC)        Past Surgical History:   Procedure Laterality Date    CERVICAL FUSION N/A 9/10/2018     Procedure: Posterior cervical decompressive laminectomy C3-6; Posterior cervical lateral mass and pedicle fixation fusion C1-T1;  Surgeon: Papa Quiros MD;  Location: [facility];  Service: Neurosurgery       Family History   Problem Relation Age of Onset    Heart attack Mother      I have reviewed and agree with the history as documented.    E-Cigarette/Vaping    E-Cigarette Use Never User      E-Cigarette/Vaping Substances    Nicotine No     THC No     CBD No     Flavoring No     Other No     Unknown No      Social History     Tobacco Use    Smoking status: Former     Types: Cigarettes    Smokeless tobacco: Never    Tobacco comments:     quit 5 months ago    Vaping Use    Vaping status: Never Used   Substance Use Topics    Alcohol use: Not Currently     Alcohol/week: 8.0 - 12.0 standard drinks of alcohol     Types: 8 - 12 Cans of beer per week     Comment: every day drinker    Drug use: Never        Review of Systems   Constitutional:  Negative for chills, fatigue and fever.   HENT:  Negative for congestion.    Respiratory:  Positive for cough, shortness of breath and wheezing. Negative for chest tightness.    Cardiovascular:  Negative for chest pain.   Gastrointestinal:  Negative for abdominal pain, diarrhea, nausea and vomiting.   Genitourinary:  Negative for difficulty urinating.   Skin:  Negative for color change.   Neurological:  Negative for dizziness, syncope, weakness, numbness and headaches.       Physical Exam  ED Triage Vitals [01/26/24 1900]   Temperature Pulse Respirations Blood Pressure SpO2   97.5 °F (36.4 °C) (!) 52 (!) 24 144/68 94 %      Temp Source Heart Rate Source Patient Position - Orthostatic VS BP Location FiO2 (%)   Oral Monitor Lying Right arm --      Pain Score       --             Orthostatic Vital Signs  Vitals:    01/26/24 2000 01/26/24 2030 01/26/24 2200 01/26/24 2300   BP:       Pulse: 86 80 86 82   Patient Position - Orthostatic VS:           Physical Exam  Vitals and nursing  note reviewed.   Constitutional:       General: He is not in acute distress.     Appearance: Normal appearance. He is not ill-appearing or toxic-appearing.   HENT:      Head: Normocephalic and atraumatic.   Eyes:      General: No scleral icterus.     Extraocular Movements: Extraocular movements intact.   Cardiovascular:      Rate and Rhythm: Normal rate and regular rhythm.      Pulses: Normal pulses.      Heart sounds: Normal heart sounds. No murmur heard.  Pulmonary:      Effort: Pulmonary effort is normal. Tachypnea present. No accessory muscle usage or respiratory distress.      Breath sounds: Wheezing (Mild end expiratory, diffuse) and rhonchi (mild, scattered) present. No decreased breath sounds.   Abdominal:      General: Abdomen is flat. There is no distension.      Palpations: Abdomen is soft.      Tenderness: There is no abdominal tenderness.   Musculoskeletal:         General: Normal range of motion.      Cervical back: Normal range of motion.      Right lower leg: No tenderness. No edema.      Left lower leg: No tenderness. No edema.   Skin:     General: Skin is warm.      Capillary Refill: Capillary refill takes less than 2 seconds.   Neurological:      General: No focal deficit present.      Mental Status: He is alert.      Cranial Nerves: No cranial nerve deficit.      Sensory: No sensory deficit.      Motor: No weakness.      Gait: Gait normal.   Psychiatric:         Mood and Affect: Mood normal.         Behavior: Behavior normal.         ED Medications  Medications   predniSONE tablet 40 mg (40 mg Oral Given 1/26/24 2026)       Diagnostic Studies  Results Reviewed       None                   XR chest 2 views   ED Interpretation by Tila Moreira MD (01/26 2040)   Chronic scarring, no acute infiltrates, no pneumothorax or otherwise acute cardiopulmonary pathology            Procedures  Procedures      ED Course  ED Course as of 01/27/24 0406   Fri Jan 26, 2024 2000 Patient seen and evaluated by  me  DDx: COPD exacerbation in the setting of influenza.  Given repeat visits, concern for progression of disease that may require more medications or procedures.  Not concerned for PE at this time or for fluid overload.  No focal exam findings consistent with pneumonia.  Workup and plan: Chest x-ray, EKG, DuoNeb, will give 1 dose of steroids here as patient has had recent steroid use over the past month.  Will likely be able to discharge following treatment.  Will stress the importance of following up with pulmonologist to patient.   2000 EKG done at 1904 interpreted by me   rate 87  rhythm normal sinus with PVCs, RBBB evidenced on prior EKG   axis normal  QRS complexes are borderline widened  Intervals are normal  ST segments showing no elevations or depressions. Some T wave inversions in the inferior leads similar to prior EKG      2101 XR chest 2 views  No acute abnormality on my interpretation   2120 Patient reevaluated, reports improvement of symptoms.  Comfortable with discharge at this time.   2130 Patient discharged at this time, given follow-up information and return precautions.  Patient reports understanding, all questions answered.                                       Medical Decision Making  Please see ED course above regarding details of the MDM    Amount and/or Complexity of Data Reviewed  Radiology: ordered and independent interpretation performed. Decision-making details documented in ED Course.    Risk  Prescription drug management.          Disposition  Final diagnoses:   COPD with acute exacerbation (HCC)   Influenza     Time reflects when diagnosis was documented in both MDM as applicable and the Disposition within this note       Time User Action Codes Description Comment    1/26/2024  9:29 PM Tila Moreira Add [J44.1] COPD with acute exacerbation (HCC)     1/26/2024  9:29 PM Tila Moreira Add [J11.1] Influenza           ED Disposition       ED Disposition   Discharge    Condition   Stable     Date/Time   Fri Jan 26, 2024  9:29 PM    Comment   Lucho Talavera discharge to home/self care.                   Follow-up Information    None         Discharge Medication List as of 1/26/2024  9:30 PM        CONTINUE these medications which have NOT CHANGED    Details   albuterol (Proventil HFA) 90 mcg/act inhaler Inhale 2 puffs every 6 (six) hours as needed for wheezing, Starting Mon 1/1/2024, Normal      aspirin 81 mg chewable tablet Chew 81 mg daily, Historical Med      atorvastatin (LIPITOR) 80 mg tablet Take 1 tablet (80 mg total) by mouth daily with dinner, Starting Sat 9/22/2018, Normal      B Complex Vitamins (VITAMIN B COMPLEX 100 IJ) Take 1 tablet by mouth daily, Historical Med      betamethasone valerate (VALISONE) 0.1 % cream Apply topically 2 (two) times a day, Starting Fri 10/4/2013, Historical Med      budesonide (PULMICORT) 0.5 mg/2 mL nebulizer solution Take 2 mL (0.5 mg total) by nebulization every 12 (twelve) hours Rinse mouth after use., Starting Fri 6/10/2022, No Print      busPIRone (BUSPAR) 10 mg tablet Take 1 tablet (10 mg total) by mouth 3 (three) times a day, Starting Fri 7/28/2023, Normal      ferrous sulfate 324 (65 Fe) mg Take 1 tablet (324 mg total) by mouth 2 (two) times a day before meals, Starting Wed 10/18/2023, No Print      fluticasone (FLONASE) 50 mcg/act nasal spray 1 spray into each nostril 2 (two) times a day, Starting Fri 10/4/2013, Historical Med      Fluticasone-Salmeterol (Advair) 500-50 mcg/dose inhaler Inhale 1 puff 2 (two) times a day Rinse mouth after use., Starting Mon 1/1/2024, Normal      folic acid (FOLVITE) 1 mg tablet Take 1 tablet (1 mg total) by mouth daily, Starting Sat 6/11/2022, No Print      Insulin Glargine (BASAGLAR KWIKPEN SC) Inject 30 Units under the skin daily at bedtime, Historical Med      ipratropium-albuterol (DUO-NEB) 0.5-2.5 mg/3 mL nebulizer solution Take 3 mL by nebulization 2 (two) times a day, Starting Mon 1/1/2024, Until Wed  1/31/2024, Normal      lisinopril (ZESTRIL) 40 mg tablet Take 40 mg by mouth daily , Historical Med      LORazepam (Ativan) 0.5 mg tablet Take by mouth every 6 (six) hours as needed for anxiety , Historical Med      melatonin 3 mg Take 1 tablet (3 mg total) by mouth daily at bedtime as needed (30), Starting Fri 9/21/2018, Normal      metFORMIN (GLUCOPHAGE) 1000 MG tablet Take 1 tablet by mouth every 12 (twelve) hours, Historical Med      pantoprazole (Protonix) 40 mg tablet Take 1 tablet (40 mg total) by mouth 2 (two) times a day, Starting Tue 8/8/2023, Normal      thiamine 100 MG tablet Take 1 tablet (100 mg total) by mouth daily, Starting Sat 9/22/2018, Normal      tiotropium (SPIRIVA) 18 mcg inhalation capsule Place 1 capsule (18 mcg total) into inhaler and inhale daily, Starting Mon 1/1/2024, Normal           No discharge procedures on file.    PDMP Review       None             ED Provider  Attending physically available and evaluated Lucho ALAN Talavera. I managed the patient along with the ED Attending.    Electronically Signed by           Tila Moreira MD  01/27/24 4694

## 2024-01-27 NOTE — ED NOTES
No meds sent to ED from pharmacy, called pharmacy and made aware     Zoe López RN  01/26/24 2257       Zoe López RN  01/26/24 3739

## 2024-01-27 NOTE — ED ATTENDING ATTESTATION
1/26/2024  I, Gene Zavala DO, saw and evaluated the patient. I have discussed the patient with the resident/non-physician practitioner and agree with the resident's/non-physician practitioner's findings, Plan of Care, and MDM as documented in the resident's/non-physician practitioner's note, except where noted. All available labs and Radiology studies were reviewed.  I was present for key portions of any procedure(s) performed by the resident/non-physician practitioner and I was immediately available to provide assistance.       At this point I agree with the current assessment done in the Emergency Department.  I have conducted an independent evaluation of this patient a history and physical is as follows:    69-year-old male with history of COPD presents for evaluation of approximately 45 minutes of shortness of breath.  States that his home inhaled medications were not working for him.  Patient has been here 4 times in the last 3 weeks for similar symptoms.  He is currently taking levofloxacin for presumed pneumonia on top of his  COPD.  Has been on steroids multiple times.    Lungs faint wheezing expiratory -good air movement no distress    imPression: Possible mild COPD exacerbation plan: Chest x-ray, treat symptoms, reassess.  Likely discharge home would not recommend repeat steroids as patient is at high risk for AVN      ED Course         Critical Care Time  Procedures

## 2024-01-27 NOTE — DISCHARGE INSTRUCTIONS
You have been seen in the emergency department for evaluation of a COPD exacerbation.  We were able to control your symptoms today with medications.  Please follow-up with your pulmonologist as soon as possible regarding today's visit and further management.  Please return for repeat symptoms, or if new symptoms arise.

## 2024-04-06 ENCOUNTER — HOSPITAL ENCOUNTER (INPATIENT)
Facility: HOSPITAL | Age: 69
LOS: 4 days | Discharge: HOME | DRG: 274 | End: 2024-04-10
Attending: EMERGENCY MEDICINE | Admitting: INTERNAL MEDICINE
Payer: MEDICARE

## 2024-04-06 ENCOUNTER — APPOINTMENT (EMERGENCY)
Dept: RADIOLOGY | Facility: HOSPITAL | Age: 69
DRG: 274 | End: 2024-04-06
Payer: MEDICARE

## 2024-04-06 DIAGNOSIS — I48.92 ATRIAL FLUTTER (HCC): Primary | ICD-10-CM

## 2024-04-06 DIAGNOSIS — I48.92 ATRIAL FLUTTER WITH RAPID VENTRICULAR RESPONSE (HCC): ICD-10-CM

## 2024-04-06 DIAGNOSIS — J44.1 COPD EXACERBATION (HCC): ICD-10-CM

## 2024-04-06 DIAGNOSIS — R00.0 TACHYCARDIA: ICD-10-CM
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
## 2024-04-06 PROBLEM — D72.829 LEUKOCYTOSIS: Status: ACTIVE | Noted: 2024-04-06

## 2024-04-06 LAB
ALBUMIN SERPL BCP-MCNC: 4.2 G/DL (ref 3.5–5)
ALP SERPL-CCNC: 60 U/L (ref 34–104)
ALT SERPL W P-5'-P-CCNC: 17 U/L (ref 7–52)
ANION GAP SERPL CALCULATED.3IONS-SCNC: 10 MMOL/L (ref 4–13)
APTT PPP: 28 SECONDS (ref 23–37)
AST SERPL W P-5'-P-CCNC: 20 U/L (ref 13–39)
BASOPHILS # BLD AUTO: 0.04 THOUSANDS/ÂΜL (ref 0–0.1)
BASOPHILS NFR BLD AUTO: 0 % (ref 0–1)
BILIRUB SERPL-MCNC: 0.45 MG/DL (ref 0.2–1)
BUN SERPL-MCNC: 16 MG/DL (ref 5–25)
CALCIUM SERPL-MCNC: 9.2 MG/DL (ref 8.4–10.2)
CHLORIDE SERPL-SCNC: 98 MMOL/L (ref 96–108)
CO2 SERPL-SCNC: 27 MMOL/L (ref 21–32)
CREAT SERPL-MCNC: 0.69 MG/DL (ref 0.6–1.3)
EOSINOPHIL # BLD AUTO: 0.01 THOUSAND/ÂΜL (ref 0–0.61)
EOSINOPHIL NFR BLD AUTO: 0 % (ref 0–6)
ERYTHROCYTE [DISTWIDTH] IN BLOOD BY AUTOMATED COUNT: 19.5 % (ref 11.6–15.1)
ERYTHROCYTE [DISTWIDTH] IN BLOOD BY AUTOMATED COUNT: 19.9 % (ref 11.6–15.1)
EST. AVERAGE GLUCOSE BLD GHB EST-MCNC: 209 MG/DL
GFR SERPL CREATININE-BSD FRML MDRD: 96 ML/MIN/1.73SQ M
GLUCOSE SERPL-MCNC: 194 MG/DL (ref 65–140)
GLUCOSE SERPL-MCNC: 213 MG/DL (ref 65–140)
HBA1C MFR BLD: 8.9 %
HCT VFR BLD AUTO: 40.5 % (ref 36.5–49.3)
HCT VFR BLD AUTO: 41.9 % (ref 36.5–49.3)
HGB BLD-MCNC: 12.3 G/DL (ref 12–17)
HGB BLD-MCNC: 12.8 G/DL (ref 12–17)
IMM GRANULOCYTES # BLD AUTO: 0.06 THOUSAND/UL (ref 0–0.2)
IMM GRANULOCYTES NFR BLD AUTO: 1 % (ref 0–2)
INR PPP: 0.91 (ref 0.84–1.19)
LYMPHOCYTES # BLD AUTO: 1.98 THOUSANDS/ÂΜL (ref 0.6–4.47)
LYMPHOCYTES NFR BLD AUTO: 19 % (ref 14–44)
MCH RBC QN AUTO: 23.9 PG (ref 26.8–34.3)
MCH RBC QN AUTO: 24.1 PG (ref 26.8–34.3)
MCHC RBC AUTO-ENTMCNC: 30.4 G/DL (ref 31.4–37.4)
MCHC RBC AUTO-ENTMCNC: 30.5 G/DL (ref 31.4–37.4)
MCV RBC AUTO: 78 FL (ref 82–98)
MCV RBC AUTO: 79 FL (ref 82–98)
MONOCYTES # BLD AUTO: 0.84 THOUSAND/ÂΜL (ref 0.17–1.22)
MONOCYTES NFR BLD AUTO: 8 % (ref 4–12)
NEUTROPHILS # BLD AUTO: 7.73 THOUSANDS/ÂΜL (ref 1.85–7.62)
NEUTS SEG NFR BLD AUTO: 72 % (ref 43–75)
NRBC BLD AUTO-RTO: 0 /100 WBCS
PLATELET # BLD AUTO: 393 THOUSANDS/UL (ref 149–390)
PLATELET # BLD AUTO: 454 THOUSANDS/UL (ref 149–390)
PMV BLD AUTO: 10.8 FL (ref 8.9–12.7)
PMV BLD AUTO: 9.8 FL (ref 8.9–12.7)
POTASSIUM SERPL-SCNC: 4.4 MMOL/L (ref 3.5–5.3)
PROT SERPL-MCNC: 7.3 G/DL (ref 6.4–8.4)
PROTHROMBIN TIME: 12.2 SECONDS (ref 11.6–14.5)
RBC # BLD AUTO: 5.1 MILLION/UL (ref 3.88–5.62)
RBC # BLD AUTO: 5.35 MILLION/UL (ref 3.88–5.62)
SODIUM SERPL-SCNC: 135 MMOL/L (ref 135–147)
TSH SERPL DL<=0.05 MIU/L-ACNC: 0.74 UIU/ML (ref 0.45–4.5)
WBC # BLD AUTO: 10.66 THOUSAND/UL (ref 4.31–10.16)
WBC # BLD AUTO: 12.94 THOUSAND/UL (ref 4.31–10.16)

## 2024-04-06 PROCEDURE — 71046 X-RAY EXAM CHEST 2 VIEWS: CPT

## 2024-04-06 PROCEDURE — 99285 EMERGENCY DEPT VISIT HI MDM: CPT

## 2024-04-06 PROCEDURE — 94640 AIRWAY INHALATION TREATMENT: CPT

## 2024-04-06 PROCEDURE — 85025 COMPLETE CBC W/AUTO DIFF WBC: CPT

## 2024-04-06 PROCEDURE — 83036 HEMOGLOBIN GLYCOSYLATED A1C: CPT | Performed by: INTERNAL MEDICINE

## 2024-04-06 PROCEDURE — 84443 ASSAY THYROID STIM HORMONE: CPT

## 2024-04-06 PROCEDURE — 85610 PROTHROMBIN TIME: CPT | Performed by: INTERNAL MEDICINE

## 2024-04-06 PROCEDURE — 99223 1ST HOSP IP/OBS HIGH 75: CPT | Performed by: INTERNAL MEDICINE

## 2024-04-06 PROCEDURE — 85027 COMPLETE CBC AUTOMATED: CPT | Performed by: INTERNAL MEDICINE

## 2024-04-06 PROCEDURE — 36415 COLL VENOUS BLD VENIPUNCTURE: CPT

## 2024-04-06 PROCEDURE — 93005 ELECTROCARDIOGRAM TRACING: CPT

## 2024-04-06 PROCEDURE — 85730 THROMBOPLASTIN TIME PARTIAL: CPT | Performed by: INTERNAL MEDICINE

## 2024-04-06 PROCEDURE — 94760 N-INVAS EAR/PLS OXIMETRY 1: CPT

## 2024-04-06 PROCEDURE — 82948 REAGENT STRIP/BLOOD GLUCOSE: CPT

## 2024-04-06 PROCEDURE — 94644 CONT INHLJ TX 1ST HOUR: CPT

## 2024-04-06 PROCEDURE — 80053 COMPREHEN METABOLIC PANEL: CPT

## 2024-04-06 RX ORDER — HEPARIN SODIUM 1000 [USP'U]/ML
4000 INJECTION, SOLUTION INTRAVENOUS; SUBCUTANEOUS ONCE
Status: COMPLETED | OUTPATIENT
Start: 2024-04-06 | End: 2024-04-06

## 2024-04-06 RX ORDER — BUDESONIDE 0.5 MG/2ML
0.5 INHALANT ORAL
Status: DISCONTINUED | OUTPATIENT
Start: 2024-04-06 | End: 2024-04-10 | Stop reason: HOSPADM

## 2024-04-06 RX ORDER — LEVALBUTEROL INHALATION SOLUTION 1.25 MG/3ML
1.25 SOLUTION RESPIRATORY (INHALATION)
Status: DISCONTINUED | OUTPATIENT
Start: 2024-04-06 | End: 2024-04-10 | Stop reason: HOSPADM

## 2024-04-06 RX ORDER — ASPIRIN 81 MG/1
81 TABLET, CHEWABLE ORAL DAILY
Status: DISCONTINUED | OUTPATIENT
Start: 2024-04-07 | End: 2024-04-10 | Stop reason: HOSPADM

## 2024-04-06 RX ORDER — FOLIC ACID 1 MG/1
1 TABLET ORAL DAILY
Status: DISCONTINUED | OUTPATIENT
Start: 2024-04-07 | End: 2024-04-10 | Stop reason: HOSPADM

## 2024-04-06 RX ORDER — FLUTICASONE PROPIONATE 50 MCG
1 SPRAY, SUSPENSION (ML) NASAL 2 TIMES DAILY
Status: DISCONTINUED | OUTPATIENT
Start: 2024-04-06 | End: 2024-04-10 | Stop reason: HOSPADM

## 2024-04-06 RX ORDER — HEPARIN SODIUM 1000 [USP'U]/ML
2000 INJECTION, SOLUTION INTRAVENOUS; SUBCUTANEOUS EVERY 6 HOURS PRN
Status: DISCONTINUED | OUTPATIENT
Start: 2024-04-06 | End: 2024-04-08

## 2024-04-06 RX ORDER — AZITHROMYCIN 250 MG/1
250 TABLET, FILM COATED ORAL EVERY 24 HOURS
Status: COMPLETED | OUTPATIENT
Start: 2024-04-06 | End: 2024-04-07

## 2024-04-06 RX ORDER — PREDNISONE 20 MG/1
40 TABLET ORAL DAILY
Status: DISCONTINUED | OUTPATIENT
Start: 2024-04-07 | End: 2024-04-10 | Stop reason: HOSPADM

## 2024-04-06 RX ORDER — HEPARIN SODIUM 10000 [USP'U]/100ML
3-20 INJECTION, SOLUTION INTRAVENOUS
Status: DISCONTINUED | OUTPATIENT
Start: 2024-04-06 | End: 2024-04-08

## 2024-04-06 RX ORDER — INSULIN GLARGINE 100 [IU]/ML
30 INJECTION, SOLUTION SUBCUTANEOUS
Status: DISCONTINUED | OUTPATIENT
Start: 2024-04-06 | End: 2024-04-10 | Stop reason: HOSPADM

## 2024-04-06 RX ORDER — LISINOPRIL 20 MG/1
40 TABLET ORAL DAILY
Status: DISCONTINUED | OUTPATIENT
Start: 2024-04-07 | End: 2024-04-10 | Stop reason: HOSPADM

## 2024-04-06 RX ORDER — ATORVASTATIN CALCIUM 80 MG/1
80 TABLET, FILM COATED ORAL
Status: DISCONTINUED | OUTPATIENT
Start: 2024-04-06 | End: 2024-04-10 | Stop reason: HOSPADM

## 2024-04-06 RX ORDER — LEVALBUTEROL INHALATION SOLUTION 1.25 MG/3ML
1.25 SOLUTION RESPIRATORY (INHALATION) EVERY 6 HOURS PRN
Status: DISCONTINUED | OUTPATIENT
Start: 2024-04-06 | End: 2024-04-06

## 2024-04-06 RX ORDER — LANOLIN ALCOHOL/MO/W.PET/CERES
100 CREAM (GRAM) TOPICAL DAILY
Status: DISCONTINUED | OUTPATIENT
Start: 2024-04-07 | End: 2024-04-10 | Stop reason: HOSPADM

## 2024-04-06 RX ORDER — BUDESONIDE 0.5 MG/2ML
0.5 INHALANT ORAL
Status: DISCONTINUED | OUTPATIENT
Start: 2024-04-06 | End: 2024-04-06

## 2024-04-06 RX ORDER — FERROUS SULFATE 325(65) MG
325 TABLET ORAL
Status: DISCONTINUED | OUTPATIENT
Start: 2024-04-07 | End: 2024-04-10 | Stop reason: HOSPADM

## 2024-04-06 RX ORDER — LORAZEPAM 0.5 MG/1
0.5 TABLET ORAL EVERY 6 HOURS PRN
Status: DISCONTINUED | OUTPATIENT
Start: 2024-04-06 | End: 2024-04-10 | Stop reason: HOSPADM

## 2024-04-06 RX ORDER — ALBUTEROL SULFATE 90 UG/1
2 AEROSOL, METERED RESPIRATORY (INHALATION) EVERY 6 HOURS PRN
Status: DISCONTINUED | OUTPATIENT
Start: 2024-04-06 | End: 2024-04-10 | Stop reason: HOSPADM

## 2024-04-06 RX ORDER — SODIUM CHLORIDE FOR INHALATION 0.9 %
12 VIAL, NEBULIZER (ML) INHALATION ONCE
Status: COMPLETED | OUTPATIENT
Start: 2024-04-06 | End: 2024-04-06

## 2024-04-06 RX ORDER — INSULIN LISPRO 100 [IU]/ML
1-5 INJECTION, SOLUTION INTRAVENOUS; SUBCUTANEOUS
Status: DISCONTINUED | OUTPATIENT
Start: 2024-04-07 | End: 2024-04-06

## 2024-04-06 RX ORDER — ONDANSETRON 2 MG/ML
4 INJECTION INTRAMUSCULAR; INTRAVENOUS EVERY 6 HOURS PRN
Status: DISCONTINUED | OUTPATIENT
Start: 2024-04-06 | End: 2024-04-10 | Stop reason: HOSPADM

## 2024-04-06 RX ORDER — HEPARIN SODIUM 1000 [USP'U]/ML
4000 INJECTION, SOLUTION INTRAVENOUS; SUBCUTANEOUS EVERY 6 HOURS PRN
Status: DISCONTINUED | OUTPATIENT
Start: 2024-04-06 | End: 2024-04-08

## 2024-04-06 RX ORDER — LANOLIN ALCOHOL/MO/W.PET/CERES
3 CREAM (GRAM) TOPICAL
Status: DISCONTINUED | OUTPATIENT
Start: 2024-04-06 | End: 2024-04-10 | Stop reason: HOSPADM

## 2024-04-06 RX ORDER — INSULIN LISPRO 100 [IU]/ML
1-5 INJECTION, SOLUTION INTRAVENOUS; SUBCUTANEOUS
Status: DISCONTINUED | OUTPATIENT
Start: 2024-04-07 | End: 2024-04-10 | Stop reason: HOSPADM

## 2024-04-06 RX ORDER — BUSPIRONE HYDROCHLORIDE 10 MG/1
10 TABLET ORAL 3 TIMES DAILY
Status: DISCONTINUED | OUTPATIENT
Start: 2024-04-06 | End: 2024-04-10 | Stop reason: HOSPADM

## 2024-04-06 RX ORDER — ACETAMINOPHEN 325 MG/1
650 TABLET ORAL EVERY 6 HOURS PRN
Status: DISCONTINUED | OUTPATIENT
Start: 2024-04-06 | End: 2024-04-10 | Stop reason: HOSPADM

## 2024-04-06 RX ORDER — PANTOPRAZOLE SODIUM 40 MG/1
40 TABLET, DELAYED RELEASE ORAL 2 TIMES DAILY
Status: DISCONTINUED | OUTPATIENT
Start: 2024-04-06 | End: 2024-04-10 | Stop reason: HOSPADM

## 2024-04-06 RX ADMIN — HEPARIN SODIUM 11.1 UNITS/KG/HR: 10000 INJECTION, SOLUTION INTRAVENOUS at 17:12

## 2024-04-06 RX ADMIN — ATORVASTATIN CALCIUM 80 MG: 80 TABLET, FILM COATED ORAL at 17:14

## 2024-04-06 RX ADMIN — LEVALBUTEROL HYDROCHLORIDE 1.25 MG: 1.25 SOLUTION RESPIRATORY (INHALATION) at 19:44

## 2024-04-06 RX ADMIN — AZITHROMYCIN DIHYDRATE 250 MG: 250 TABLET ORAL at 18:36

## 2024-04-06 RX ADMIN — Medication 12 ML: at 11:44

## 2024-04-06 RX ADMIN — ALBUTEROL SULFATE 10 MG: 2.5 SOLUTION RESPIRATORY (INHALATION) at 11:43

## 2024-04-06 RX ADMIN — HEPARIN SODIUM 4000 UNITS: 1000 INJECTION INTRAVENOUS; SUBCUTANEOUS at 17:12

## 2024-04-06 RX ADMIN — BUDESONIDE 0.5 MG: 0.5 INHALANT RESPIRATORY (INHALATION) at 19:44

## 2024-04-06 RX ADMIN — BUSPIRONE HYDROCHLORIDE 10 MG: 10 TABLET ORAL at 17:14

## 2024-04-06 RX ADMIN — FLUTICASONE PROPIONATE 1 SPRAY: 50 SPRAY, METERED NASAL at 18:01

## 2024-04-06 RX ADMIN — BUSPIRONE HYDROCHLORIDE 10 MG: 10 TABLET ORAL at 20:51

## 2024-04-06 RX ADMIN — PANTOPRAZOLE SODIUM 40 MG: 40 TABLET, DELAYED RELEASE ORAL at 17:14

## 2024-04-06 RX ADMIN — INSULIN GLARGINE 30 UNITS: 100 INJECTION, SOLUTION SUBCUTANEOUS at 21:00

## 2024-04-06 RX ADMIN — IPRATROPIUM BROMIDE 1 MG: 0.5 SOLUTION RESPIRATORY (INHALATION) at 11:44

## 2024-04-06 NOTE — Clinical Note
Prepped: left chest. Prepped with: ChloraPrep. The site was clipped. The patient was draped. LOOP SITE

## 2024-04-06 NOTE — ASSESSMENT & PLAN NOTE
"Lab Results   Component Value Date    HGBA1C 9.3 (H) 02/01/2024       No results for input(s): \"POCGLU\" in the last 72 hours.    Blood Sugar Average: Last 72 hrs:    Sliding scale insulin  "

## 2024-04-06 NOTE — DISCHARGE INSTRUCTIONS
You have been seen in the emergency department for evaluation of shortness of breath.  We were able to control your symptoms with nebulizer treatments today.  Please take albuterol as needed at home home and continue with your regular medications.  Please follow-up with your primary doctor regarding today's visit and need for ongoing management.  Please continue taking medications as prescribed from your last visit.

## 2024-04-06 NOTE — RESPIRATORY THERAPY NOTE
RT Protocol Note  Lucho Talavera 69 y.o. male MRN: [id number]  Unit/Bed#: [facility] 504-01 Encounter: [id number]    Assessment    Principal Problem:    Tachycardia  Active Problems:    Type 2 diabetes mellitus with hyperglycemia, without long-term current use of insulin (HCC)    Hypertension    COPD (chronic obstructive pulmonary disease) (HCC)      Home Pulmonary Medications:    Home Devices/Therapy: (P)  (Albuterol MDI, Spiriva, Advair, pulmicort)    Past Medical History:   Diagnosis Date    Cardiac arrest (HCC)     COPD (chronic obstructive pulmonary disease) (HCC)     CVA (cerebral vascular accident) (HCC)     Diabetes mellitus (HCC)     Heart attack (HCC)     Hypertension     Stroke (HCC)      Social History     Socioeconomic History    Marital status: Single     Spouse name: None    Number of children: None    Years of education: None    Highest education level: None   Occupational History    None   Tobacco Use    Smoking status: Former     Types: Cigarettes    Smokeless tobacco: Never    Tobacco comments:     quit 5 months ago    Vaping Use    Vaping status: Never Used   Substance and Sexual Activity    Alcohol use: Not Currently     Alcohol/week: 8.0 - 12.0 standard drinks of alcohol     Types: 8 - 12 Cans of beer per week     Comment: every day drinker    Drug use: Never    Sexual activity: Not Currently   Other Topics Concern    None   Social History Narrative    ** Merged History Encounter **         ** Merged History Encounter **         ** Merged History Encounter **          Social Determinants of Health     Financial Resource Strain: Low Risk  (2/1/2024)    Received from [facility]    Overall Financial Resource Strain (CARDIA)     Difficulty of Paying Living Expenses: Not very hard   Food Insecurity: No Food Insecurity (2/1/2024)    Received from [facility]    Hunger Vital Sign     Worried About Running Out of Food in the Last Year: Never true     Ran Out of Food in the  Last Year: Never true   Transportation Needs: No Transportation Needs (2/1/2024)    Received from [facility]    PRAPARE - Transportation     Lack of Transportation (Medical): No     Lack of Transportation (Non-Medical): No   Physical Activity: Inactive (4/7/2023)    Received from [facility]    Exercise Vital Sign     Days of Exercise per Week: 0 days     Minutes of Exercise per Session: 0 min   Stress: No Stress Concern Present (4/7/2023)    Received from [facility]    [place] [employer] - Occupational Stress Questionnaire     Feeling of Stress : Only a little   Social Connections: Socially Isolated (4/7/2023)    Received from [facility]    Social Connection and Isolation Panel [NHANES]     Frequency of Communication with Friends and Family: Never     Frequency of Social Gatherings with Friends and Family: Never     Attends Mandaen Services: Never     Active Member of Clubs or Organizations: No     Attends Club or Organization Meetings: Never     Marital Status:    Intimate Partner Violence: Not At Risk (2/1/2024)    Received from [facility]    Humiliation, Afraid, Rape, and Kick questionnaire     Fear of Current or Ex-Partner: No     Emotionally Abused: No     Physically Abused: No     Sexually Abused: No   Housing Stability: Low Risk  (2/1/2024)    Received from [facility]    Housing Stability Vital Sign     Unable to Pay for Housing in the Last Year: No     Number of Places Lived in the Last Year: 2     Unstable Housing in the Last Year: No       Subjective         Objective    Physical Exam:   Assessment Type: (P) Assess only  General Appearance: (P) Alert, Awake  Respiratory Pattern: (P) Normal  Chest Assessment: (P) Chest expansion symmetrical  Bilateral Breath Sounds: (P) Diminished  R Breath Sounds: (P) Diminished  L Breath Sounds: (P) Diminished  Cough: (P) None  O2  "Device: (P) RA    Vitals:  Blood pressure 139/76, pulse 76, temperature 97.9 °F (36.6 °C), temperature source Oral, resp. rate 20, height 6' 1\" (1.854 m), weight 99.8 kg (220 lb), SpO2 96%.          Imaging and other studies: I have personally reviewed pertinent reports.      O2 Device: (P) RA     Plan    Respiratory Plan: (P) Home Bronchodilator Patient pathway, No distress/Pulmonary history        Resp Comments: (P) patient assessed for resp protocol- DX: tachycardia, HX: COPD/ ASTHMA, pt on Ra at this time satting 96%, BS diminished, takes resp med ication at home, Albuterol MDI BID,Spiriva, Advair, Pulmicort, will continue Pulmicort BID and will order Xopenex 1.25 mg BID, will continue to monitor and will reassess in 24-48 hours   "

## 2024-04-06 NOTE — ED PROVIDER NOTES
History  Chief Complaint   Patient presents with    Shortness of Breath     Pt reports acute onset of trouble breathing X 30 minutes with history of COPD, no chest pain     Patient is a 69-year-old male, past medical history significant for COPD, diabetes, CVA, presenting today for evaluation of shortness of breath.  Per patient this started approximately 30 minutes prior to arrival.  Patient did attempt to use albuterol at home, with no relief of symptoms.  On chart check, patient has multiple recent visits for the same, last of which being 3 days ago at [facility].  At that time, patient had normal labs, and a chest x-ray that was concerning for atelectasis versus early formation of an infiltrate.  Patient was discharged with antibiotics and steroids for which he endorses compliance.  Patient denies any chest pain, leg pain, leg swelling, recent travel or recent immobility, recent surgeries, history of blood clots, current chemotherapy, or otherwise complaints.          Prior to Admission Medications   Prescriptions Last Dose Informant Patient Reported? Taking?   B Complex Vitamins (VITAMIN B COMPLEX 100 IJ)   Yes No   Sig: Take 1 tablet by mouth daily   Fluticasone-Salmeterol (Advair) 500-50 mcg/dose inhaler   No No   Sig: Inhale 1 puff 2 (two) times a day Rinse mouth after use.   Insulin Glargine (BASAGLAR KWIKPEN SC)   Yes No   Sig: Inject 30 Units under the skin daily at bedtime   LORazepam (Ativan) 0.5 mg tablet   Yes No   Sig: Take by mouth every 6 (six) hours as needed for anxiety    albuterol (Proventil HFA) 90 mcg/act inhaler   No No   Sig: Inhale 2 puffs every 6 (six) hours as needed for wheezing   aspirin 81 mg chewable tablet   Yes No   Sig: Chew 81 mg daily   atorvastatin (LIPITOR) 80 mg tablet   No No   Sig: Take 1 tablet (80 mg total) by mouth daily with dinner   betamethasone valerate (VALISONE) 0.1 % cream   Yes No   Sig: Apply topically 2 (two) times a day   budesonide (PULMICORT) 0.5 mg/2  mL nebulizer solution   No No   Sig: Take 2 mL (0.5 mg total) by nebulization every 12 (twelve) hours Rinse mouth after use.   busPIRone (BUSPAR) 10 mg tablet   No No   Sig: Take 1 tablet (10 mg total) by mouth 3 (three) times a day   ferrous sulfate 324 (65 Fe) mg   No No   Sig: Take 1 tablet (324 mg total) by mouth 2 (two) times a day before meals   fluticasone (FLONASE) 50 mcg/act nasal spray   Yes No   Si spray into each nostril 2 (two) times a day   folic acid (FOLVITE) 1 mg tablet   No No   Sig: Take 1 tablet (1 mg total) by mouth daily   lisinopril (ZESTRIL) 40 mg tablet   Yes No   Sig: Take 40 mg by mouth daily    melatonin 3 mg   No No   Sig: Take 1 tablet (3 mg total) by mouth daily at bedtime as needed (30)   metFORMIN (GLUCOPHAGE) 1000 MG tablet   Yes No   Sig: Take 1 tablet by mouth every 12 (twelve) hours   pantoprazole (Protonix) 40 mg tablet   No No   Sig: Take 1 tablet (40 mg total) by mouth 2 (two) times a day   thiamine 100 MG tablet   No No   Sig: Take 1 tablet (100 mg total) by mouth daily   tiotropium (SPIRIVA) 18 mcg inhalation capsule   No No   Sig: Place 1 capsule (18 mcg total) into inhaler and inhale daily      Facility-Administered Medications: None       Past Medical History:   Diagnosis Date    Cardiac arrest (HCC)     COPD (chronic obstructive pulmonary disease) (HCC)     CVA (cerebral vascular accident) (HCC)     Diabetes mellitus (HCC)     Heart attack (HCC)     Hypertension     Stroke (HCC)        Past Surgical History:   Procedure Laterality Date    CERVICAL FUSION N/A 9/10/2018    Procedure: Posterior cervical decompressive laminectomy C3-6; Posterior cervical lateral mass and pedicle fixation fusion C1-T1;  Surgeon: Papa Quiros MD;  Location: [facility];  Service: Neurosurgery       Family History   Problem Relation Age of Onset    Heart attack Mother      I have reviewed and agree with the history as documented.    E-Cigarette/Vaping    E-Cigarette Use Never User       E-Cigarette/Vaping Substances    Nicotine No     THC No     CBD No     Flavoring No     Other No     Unknown No      Social History     Tobacco Use    Smoking status: Former     Types: Cigarettes    Smokeless tobacco: Never    Tobacco comments:     quit 5 months ago    Vaping Use    Vaping status: Never Used   Substance Use Topics    Alcohol use: Not Currently     Alcohol/week: 8.0 - 12.0 standard drinks of alcohol     Types: 8 - 12 Cans of beer per week     Comment: every day drinker    Drug use: Never        Review of Systems   Constitutional:  Negative for chills, fatigue and fever.   HENT:  Negative for congestion.    Respiratory:  Positive for shortness of breath. Negative for cough and chest tightness.    Cardiovascular:  Negative for chest pain.   Gastrointestinal:  Negative for abdominal pain, diarrhea, nausea and vomiting.   Genitourinary:  Negative for difficulty urinating.   Skin:  Negative for color change.   Neurological:  Negative for dizziness, syncope, weakness, numbness and headaches.       Physical Exam  ED Triage Vitals   Temperature Pulse Respirations Blood Pressure SpO2   04/06/24 1125 04/06/24 1125 04/06/24 1125 04/06/24 1125 04/06/24 1125   97.6 °F (36.4 °C) (!) 109 (!) 24 126/81 97 %      Temp Source Heart Rate Source Patient Position - Orthostatic VS BP Location FiO2 (%)   04/06/24 1125 04/06/24 1125 04/06/24 1125 04/06/24 1125 --   Oral Monitor Lying Left arm       Pain Score       04/06/24 1230       8             Orthostatic Vital Signs  Vitals:    04/06/24 1125 04/06/24 1230 04/06/24 1500   BP: 126/81 108/59 112/66   Pulse: (!) 109 70 70   Patient Position - Orthostatic VS: Lying Lying Lying       Physical Exam  Vitals and nursing note reviewed.   Constitutional:       General: He is not in acute distress.     Appearance: Normal appearance. He is not ill-appearing or toxic-appearing.   HENT:      Head: Normocephalic and atraumatic.      Mouth/Throat:      Mouth: Mucous membranes are  moist.   Eyes:      General: No scleral icterus.     Extraocular Movements: Extraocular movements intact.   Cardiovascular:      Rate and Rhythm: Regular rhythm. Tachycardia present.      Pulses: Normal pulses.      Heart sounds: Normal heart sounds. No murmur heard.  Pulmonary:      Effort: Pulmonary effort is normal. Tachypnea (Mild) present. No respiratory distress.      Breath sounds: No decreased air movement. Wheezing (Wheezing appears worse on left side but is diffuse throughout both lungs. Overall mild wheezing) present. No rhonchi.   Abdominal:      General: Abdomen is flat. There is no distension.      Palpations: Abdomen is soft.      Tenderness: There is no abdominal tenderness.   Musculoskeletal:         General: Normal range of motion.      Cervical back: Normal range of motion.      Right lower leg: No tenderness. No edema.      Left lower leg: No tenderness. No edema.   Skin:     General: Skin is warm.      Capillary Refill: Capillary refill takes less than 2 seconds.   Neurological:      General: No focal deficit present.      Mental Status: He is alert.      Cranial Nerves: No cranial nerve deficit.      Sensory: No sensory deficit.      Motor: No weakness.      Gait: Gait normal.   Psychiatric:         Mood and Affect: Mood normal.         Behavior: Behavior normal.         ED Medications  Medications   albuterol inhalation solution 10 mg (10 mg Nebulization Given 4/6/24 1143)   ipratropium (ATROVENT) 0.02 % inhalation solution 1 mg (1 mg Nebulization Given 4/6/24 1144)   sodium chloride 0.9 % inhalation solution 12 mL (12 mL Nebulization Given 4/6/24 1144)       Diagnostic Studies  Results Reviewed       Procedure Component Value Units Date/Time    TSH, 3rd generation with Free T4 reflex [[id number]]  (Normal) Collected: 04/06/24 1347    Lab Status: Final result Specimen: Blood from Arm, Left Updated: 04/06/24 1443     TSH 3RD GENERATON 0.743 uIU/mL     Comprehensive metabolic panel [[id number]]   (Abnormal) Collected: 04/06/24 1347    Lab Status: Final result Specimen: Blood from Arm, Left Updated: 04/06/24 1423     Sodium 135 mmol/L      Potassium 4.4 mmol/L      Chloride 98 mmol/L      CO2 27 mmol/L      ANION GAP 10 mmol/L      BUN 16 mg/dL      Creatinine 0.69 mg/dL      Glucose 194 mg/dL      Calcium 9.2 mg/dL      AST 20 U/L      ALT 17 U/L      Alkaline Phosphatase 60 U/L      Total Protein 7.3 g/dL      Albumin 4.2 g/dL      Total Bilirubin 0.45 mg/dL      eGFR 96 ml/min/1.73sq m     Narrative:      National Kid[employer] Disease Foundation guidelines for Chronic Kidney Disease (CKD):     Stage 1 with normal or high GFR (GFR > 90 mL/min/1.73 square meters)    Stage 2 Mild CKD (GFR = 60-89 mL/min/1.73 square meters)    Stage 3A Moderate CKD (GFR = 45-59 mL/min/1.73 square meters)    Stage 3B Moderate CKD (GFR = 30-44 mL/min/1.73 square meters)    Stage 4 Severe CKD (GFR = 15-29 mL/min/1.73 square meters)    Stage 5 End Stage CKD (GFR <15 mL/min/1.73 square meters)  Note: GFR calculation is accurate only with a steady state creatinine    CBC and differential [[id number]]  (Abnormal) Collected: 04/06/24 1347    Lab Status: Final result Specimen: Blood from Arm, Left Updated: 04/06/24 1401     WBC 10.66 Thousand/uL      RBC 5.35 Million/uL      Hemoglobin 12.8 g/dL      Hematocrit 41.9 %      MCV 78 fL      MCH 23.9 pg      MCHC 30.5 g/dL      RDW 19.9 %      MPV 10.8 fL      Platelets 454 Thousands/uL      nRBC 0 /100 WBCs      Neutrophils Relative 72 %      Immature Grans % 1 %      Lymphocytes Relative 19 %      Monocytes Relative 8 %      Eosinophils Relative 0 %      Basophils Relative 0 %      Neutrophils Absolute 7.73 Thousands/µL      Absolute Immature Grans 0.06 Thousand/uL      Absolute Lymphocytes 1.98 Thousands/µL      Absolute Monocytes 0.84 Thousand/µL      Eosinophils Absolute 0.01 Thousand/µL      Basophils Absolute 0.04 Thousands/µL                    XR chest 2 views   ED Interpretation by  Tila Moreira MD (04/06 1328)   Atelectasis, otherwise no acute cardiopulmonary disease            Procedures  Procedures      ED Course  ED Course as of 04/06/24 1553   Sat Apr 06, 2024   1130 Patient seen and evaluated by me  DDx: COPD exacerbation, pneumonia, atelectasis. Have considered ACS and PE which are less supported by history and physical exam. Symptoms are not related to exertion, patient does not have any chest pain, and had a normal troponin on 4/3 when seen at [facility]. No signs/symptoms or recent risk factors concerning for PE  Workup and Plan: EKG, CXR, [place] neb. Will hold off on steroids as patient overall appears comfortable and not in respiratory distress and has had recent steroids for the same. Additionally patient has DM so should avoid steroids for that reason.   1131 EKG done at 1128 interpreted by me   rate 91  rhythm sinus arrhythmia, incomplete RBBB   axis right deviation  QRS complexes are normal  Intervals are normal  ST segments showing inversions in the inferior leads which are evidenced on prior EKGs. No acute changes compared to prior.     1328 XR chest 2 views  Atelectasis   1333 Pt reports improvement of symptoms, will discharge   1335 Patient discharged at this time, given return precautions and follow-up information.  Patient reports understanding, all questions answered.   1341 Repeat EKG significant for a-flutter which was mistakenly interpretted as artifact on original EKG. Will plan for labs and cardiology consult-likely admission.    1423 CBC and differential(!)   1511 TSH 3RD GENERATON: 0.743   1511 Platelet Count(!): 454  Elevated on prior.   1512 Comprehensive metabolic panel(!)  Hyperglycemia.   1545 Cards recommending AC and admission for echo/further workup.               Identification of Seniors at Risk      Flowsheet Row Most Recent Value   (ISAR) Identification of Seniors at Risk    Before the illness or injury that brought you to the Emergency, did you  need someone to help you on a regular basis? 0 Filed at: 04/06/2024 1127   In the last 24 hours, have you needed more help than usual? 0 Filed at: 04/06/2024 1127   Have you been hospitalized for one or more nights during the past 6 months? 1 Filed at: 04/06/2024 1127   In general, do you see well? 0 Filed at: 04/06/2024 1127   In general, do you have serious problems with your memory? 0 Filed at: 04/06/2024 1127   Do you take more than three different medications every day? 1 Filed at: 04/06/2024 1127   ISAR Score 2 Filed at: 04/06/2024 1127                      SBIRT 22yo+      Flowsheet Row Most Recent Value   Initial Alcohol Screen: US AUDIT-C     1. How often do you have a drink containing alcohol? 0 Filed at: 04/06/2024 1208   2. How many drinks containing alcohol do you have on a typical day you are drinking?  0 Filed at: 04/06/2024 1208   3a. Male UNDER 65: How often do you have five or more drinks on one occasion? 0 Filed at: 04/06/2024 1208   3b. FEMALE Any Age, or MALE 65+: How often do you have 4 or more drinks on one occassion? 0 Filed at: 04/06/2024 1208   Audit-C Score 0 Filed at: 04/06/2024 1208   MICHAEL: How many times in the past year have you...    Used an illegal drug or used a prescription medication for non-medical reasons? Never Filed at: 04/06/2024 1208                  Medical Decision Making  Please see ED course above regarding details of the MDM    Amount and/or Complexity of Data Reviewed  Labs: ordered. Decision-making details documented in ED Course.  Radiology: ordered and independent interpretation performed. Decision-making details documented in ED Course.    Risk  Prescription drug management.  Decision regarding hospitalization.          Disposition  Final diagnoses:   COPD exacerbation (HCC)   Atrial flutter (HCC)     Time reflects when diagnosis was documented in both MDM as applicable and the Disposition within this note       Time User Action Codes Description Comment     4/6/2024 12:53 PM Tila Moreira Add [J44.1] COPD exacerbation (HCC)     4/6/2024  3:52 PM Tila Moreira Add [I48.92] Atrial flutter (HCC)     4/6/2024  3:52 PM Tila Moreira Modify [J44.1] COPD exacerbation (HCC)     4/6/2024  3:52 PM Tila Moreira Modify [I48.92] Atrial flutter (HCC)           ED Disposition       ED Disposition   Admit    Condition   Stable    Date/Time   Sat Apr 6, 2024 4097    Comment   --             Follow-up Information       Follow up With Specialties Details Why Contact Info    [facility], Nurse Practitioner   [address] [place] [id number] [phone]              Patient's Medications   Discharge Prescriptions    No medications on file     No discharge procedures on file.    PDMP Review       None             ED Provider  Attending physically available and evaluated Lucho Talavera. I managed the patient along with the ED Attending.    Electronically Signed by           Tila Moreira MD  04/06/24 8981

## 2024-04-06 NOTE — H&P
"[facility]  H&P  Name: Lucho Talavera 69 y.o. male I MRN: [id number]  Unit/Bed#: PPHP 504-01 I Date of Admission: 4/6/2024   Date of Service: 4/6/2024 I Hospital Day: 0      Assessment/Plan   * Tachycardia  Assessment & Plan  Patient presented initially with atrial flutter with rapid vent response.  Case was discussed with Dr. Rendon from EP with the ED.  Consult EP  Start anticoagulation  Patient appears rate controlled currently. Currently in sinus on monitor   Patient reports tachycardia was exacerbated after albuterol treatment this morning.    Leukocytosis  Assessment & Plan  Mild in the setting of recent steroid regimen started on the fourth    COPD (chronic obstructive pulmonary disease) ([facility])  Assessment & Plan  Bronchodilator therapy  Patient was recently started on Zithromax and prednisone 2 days ago at [facility] ED.  He presented there with suspected COPD exacerbation with possible infection..He was sent home on Zithromax and prednisone    Hypertension  Assessment & Plan  Zestril    ETOH abuse  Assessment & Plan  Will check echo to rule out cardiomyopathy in the setting of tachyarrhythmia  [facility] protocol    Type 2 diabetes mellitus with hyperglycemia, without long-term current use of insulin ([facility])  Assessment & Plan  Lab Results   Component Value Date    HGBA1C 9.3 (H) 02/01/2024       No results for input(s): \"POCGLU\" in the last 72 hours.    Blood Sugar Average: Last 72 hrs:    Sliding scale insulin         VTE Prophylaxis: Heparin Drip  / sequential compression device   Code Status: fc  POLST: There is no POLST form on file for this patient (pre-hospital)      Anticipated Length of Stay:  Patient will be admitted on an Inpatient basis with an anticipated length of stay of  > 2 midnights.   Justification for Hospital Stay: Need to monitor symptoms    Total Time for Visit, including Counseling / Coordination of Care:  85 .  Greater than 50% of this total time " spent on direct patient counseling and coordination of care.    Chief Complaint:   Shortness of breath with tachycardia    History of Present Illness:    Lucho Talavera is a 69 y.o. male who presents with a past medical history of COPD, diabetes history of stroke who presents to the hospital for evaluation of respiratory difficulty.  Patient presented from home after taking albuterol.  Patient has had multiple ED visits at [facility].  Patient previously was started on antibiotics for possible commune acquired pneumonia and steroids on recent discharge from [facility] 3 days ago.  Has a reported history of alcohol use disorder, anxiety asthma COPD prior history of cardiac arrest and previously presented to the ED on April 3, 2024 for shortness of breath symptoms attributed to COPD.  He was treated supportively with improvement of his symptoms after DuoNeb.  He was discharged on Zithromax and prednisone  Patient presented with atrial flutter with rapid ventricular response in the ED..  Currently in sinus rhythm.  Review of Systems:    Review of Systems   Constitutional:  Negative for diaphoresis and fatigue.   Neurological:  Negative for dizziness, facial asymmetry and light-headedness.   All other systems reviewed and are negative.      Past Medical and Surgical History:     Past Medical History:   Diagnosis Date    Cardiac arrest (HCC)     COPD (chronic obstructive pulmonary disease) (HCC)     CVA (cerebral vascular accident) (HCC)     Diabetes mellitus (HCC)     Heart attack (HCC)     Hypertension     Stroke (HCC)        Past Surgical History:   Procedure Laterality Date    CERVICAL FUSION N/A 9/10/2018    Procedure: Posterior cervical decompressive laminectomy C3-6; Posterior cervical lateral mass and pedicle fixation fusion C1-T1;  Surgeon: Papa Quiros MD;  Location: [facility];  Service: Neurosurgery       Meds/Allergies:    Prior to Admission medications    Medication Sig Start Date End  Date Taking? Authorizing Provider   albuterol (Proventil HFA) 90 mcg/act inhaler Inhale 2 puffs every 6 (six) hours as needed for wheezing 1/1/24   Pro Kwan MD   aspirin 81 mg chewable tablet Chew 81 mg daily    Historical Provider, MD   atorvastatin (LIPITOR) 80 mg tablet Take 1 tablet (80 mg total) by mouth daily with dinner 9/22/18   Bowen Hartman MD   B Complex Vitamins (VITAMIN B COMPLEX 100 IJ) Take 1 tablet by mouth daily    Historical Provider, MD   betamethasone valerate (VALISONE) 0.1 % cream Apply topically 2 (two) times a day 10/4/13   Historical Provider, MD   budesonide (PULMICORT) 0.5 mg/2 mL nebulizer solution Take 2 mL (0.5 mg total) by nebulization every 12 (twelve) hours Rinse mouth after use. 6/10/22   Jenna Meneses PA-C   busPIRone (BUSPAR) 10 mg tablet Take 1 tablet (10 mg total) by mouth 3 (three) times a day 7/28/23   Thelma Huizar PA-C   ferrous sulfate 324 (65 Fe) mg Take 1 tablet (324 mg total) by mouth 2 (two) times a day before meals 10/18/23   Dawson Whitley   fluticasone (FLONASE) 50 mcg/act nasal spray 1 spray into each nostril 2 (two) times a day 10/4/13   Historical Provider, MD   Fluticasone-Salmeterol (Advair) 500-50 mcg/dose inhaler Inhale 1 puff 2 (two) times a day Rinse mouth after use. 1/1/24   Pro Kwan MD   folic acid (FOLVITE) 1 mg tablet Take 1 tablet (1 mg total) by mouth daily 6/11/22   Jenna Meneses PA-C   Insulin Glargine (BASAGLAR KWIKPEN SC) Inject 30 Units under the skin daily at bedtime    Historical Provider, MD   lisinopril (ZESTRIL) 40 mg tablet Take 40 mg by mouth daily     Historical Provider, MD   LORazepam (Ativan) 0.5 mg tablet Take by mouth every 6 (six) hours as needed for anxiety     Historical Provider, MD   melatonin 3 mg Take 1 tablet (3 mg total) by mouth daily at bedtime as needed (30) 9/21/18   Bowen Hartman MD   metFORMIN (GLUCOPHAGE) 1000 MG tablet Take 1 tablet by mouth every 12 (twelve) hours    Historical  Provider, MD   pantoprazole (Protonix) 40 mg tablet Take 1 tablet (40 mg total) by mouth 2 (two) times a day 8/8/23   TATIANA Thorpe   thiamine 100 MG tablet Take 1 tablet (100 mg total) by mouth daily 9/22/18   Bowen Hartman MD   tiotropium (SPIRIVA) 18 mcg inhalation capsule Place 1 capsule (18 mcg total) into inhaler and inhale daily 1/1/24   Pro Kwan MD   Medications at Time of Discharge  Medication Sig Dispensed Refills Start Date End Date   albuterol (PROVENTIL HFA;VENTOLIN HFA;PROAIR HFA) 90 mcg/actuation inhaler   Indications: COPD with acute exacerbation (HCC) Inhale 2 puffs every 4 (four) hours as needed for wheezing. 18 g  3 11/02/2023     albuterol (PROVENTIL) 2.5 mg /3 mL (0.083 %) nebulizer solution   Indications: COPD with acute exacerbation (HCC) Take 3 mL (2.5 mg total) by nebulization every 2 (two) hours as needed for wheezing. 300 mL  2 03/09/2024     aspirin 81 MG EC tablet  Take 1 tablet (81 mg total) by mouth daily. 30 tablet  3 05/18/2018     atorvastatin (LIPITOR) 40 MG tablet  Take 1 tablet (40 mg total) by mouth nightly. 90 tablet  1 11/02/2023     azithromycin (ZITHROMAX Z-MATEUSZ) 250 mg tablet  one tablet daily for 4 days. 4 tablet  0 04/03/2024 04/08/2024   blood glucose meter (generic)  Use as directed. 1 each  0 11/30/2023     blood sugar diagnostic strips (generic)   Indications: Type 2 diabetes mellitus with diabetic neuropathy, with long-term current use of insulin (HCC) by miscellaneous route 2 (two) times a day. 100 strip  5 11/30/2023     blood-glucose meter,continuous (DEXCOM G7 ) misc   Indications: Uncontrolled type 2 diabetes mellitus with hyperglycemia (HCC) Use with Dexcom G7 sensor E11.65 1 each  0 02/21/2024     blood-glucose sensor (DEXCOM G7 SENSOR) vi   Indications: Uncontrolled type 2 diabetes mellitus with hyperglycemia (HCC) Apply to skin and replace every 10days E11.65 9 each  3 02/21/2024     busPIRone (BUSPAR) 5 MG tablet  Take 1 tablet (5 mg  total) by mouth 2 (two) times a day. 180 tablet  3 11/30/2023 11/29/2024   empagliflozin (JARDIANCE) 25 mg tab tablet  Take 1 tablet (25 mg total) by mouth daily. 90 tablet  1 11/30/2023     ferrous sulfate 325 mg tablet  Take 1 tablet (325 mg total) by mouth every morning before breakfast. 90 tablet  1 02/21/2022     fluticasone-umeclidinium-vilanterol (TRELEGY ELLIPTA) 200-62.5-25 mcg/puff powder for inhalation   Indications: Simple chronic bronchitis (HCC) Inhale 1 puff daily. 28 each  11 10/16/2023     folic acid (FOLVITE) 1 MG tablet  TAKE ONE TABLET BY MOUTH EVERY DAY 90 tablet  1 07/25/2023     guaiFENesin (MUCINEX) 600 mg 12 hr tablet  Take 2 tablets (1,200 mg total) by mouth every 12 (twelve) hours as needed for cough.   0 05/22/2023 05/21/2024   hydrOXYzine (VISTARIL) 25 MG capsule  Take one capsule at bedtime as needed for insomnia. 30 capsule  1 11/02/2023     insulin glargine,hum.rec.anlog (BASAGLAR KWIKPEN U-100 INSULIN) 100 unit/mL (3 mL) inpn   Indications: Uncontrolled type 2 diabetes mellitus with hyperglycemia (HCC) Inject 30 Units under the skin daily. E11.65 30 mL  3 02/21/2024     insulin lispro (HUMALOG) 100 unit/mL inpn pen   Indications: Uncontrolled type 2 diabetes mellitus with hyperglycemia (HCC) Inject 10units under the skin with dinner and up to 10units three times daily based on sliding scale E11.65 30 mL  3 02/21/2024     ipratropium-albuteroL (DUO-NEB) 0.5-2.5 mg/3 mL nebulizer  INHALE 3 ML (1 VIAL) VIA NEBULIZATION FOUR TIMES A DAY AS NEEDED FOR WHEEZING 360 mL  1 11/30/2023     ipratropium-albuteroL (DUO-NEB) 0.5-2.5 mg/3 mL nebulizer  Inhale 3 mL 4 (four) times a day as needed for wheezing. 360 mL  1 02/13/2024 02/12/2025   lancets 33 gauge 33 gauge misc   Indications: Type 2 diabetes mellitus with diabetic neuropathy, with long-term current use of insulin (HCC) 1 application by miscellaneous route 2 (two) times a day. 200 each  5 01/10/2023     lisinopriL (PriniviL) 10 MG tablet   "Take 1 tablet (10 mg total) by mouth daily. 90 tablet  0 02/06/2024 02/05/2025   LORazepam (ATIVAN) 1 MG tablet  Take 0.5 tablets (0.5 mg total) by mouth every 6 (six) hours as needed for anxiety (dyspnea). 10 tablet  0 02/06/2024     metFORMIN (GLUCOPHAGE) 1000 MG tablet  Take 1 tablet (1,000 mg total) by mouth 2 (two) times a day with meals. 180 tablet  1 10/16/2023     methyl salicylate-menthol (ICY HOT) 30-10 % crea  Apply 1 application topically 3 (three) times a day.   0 06/14/2023     multivitamin (THERAGRAN) tab  Take 1 tablet by mouth daily. 30 tablet  3 05/18/2018     nitroglycerin (NITROSTAT) 0.4 MG SL tablet  Place 1 tablet (0.4 mg total) under the tongue every 5 (five) minutes as needed for chest pain. 100 tablet  1 01/10/2023     pen needle, diabetic (BD ULTRA-FINE BETSEY PEN NEEDLE) 32 gauge x 5/32\" ndle   Indications: Uncontrolled type 2 diabetes mellitus with hyperglycemia (HCC) Use as directed with insulin injections 4 times daily E11.65 400 each  1 10/12/2021     predniSONE (DELTASONE) 10 MG tablet  Take 4 tablets (40 mg total) by mouth daily. Daily for 3 days , then 30 mg daily for 3 days , then 20 mg daily 90 tablet  1 02/04/2024     promethazine-dextromethorphan (PROMETHAZINE-DM) 6.25-15 mg/5 mL syrup  Take 5 mL by mouth 4 (four) times a day as needed for cough. 240 mL  0 02/08/2024     roflumilast (DALIRESP) 500 mcg tab  Take 1 tablet (500 mcg total) by mouth daily. 90 tablet  0 06/05/2023 06/04/2024   senna-docusate (SENOKOT S) 8.6-50 mg  Take 1 tablet by mouth 2 (two) times a day. 180 tablet  3 06/14/2023 06/13/2024   thiamine 100 MG tablet  TAKE ONE TABLET BY MOUTH EVERY DAY 90 tablet  1 01/05/2024     tiotropium (SPIRIVA) 18 mcg inhalation capsule  Inhale 1 capsule (18 mcg total) daily.   0 01/02/2024     WIXELA INHUB 500-50 mcg/dose powder for inhalation  Inhale 1 puff 2 (two) times a day.           Allergies:   Allergies   Allergen Reactions    Amoxicillin Hives    Augmentin " "[Amoxicillin-Pot Clavulanate] Hives       Social History:     Marital Status: Single   Occupation: retired  Patient Pre-hospital Living Situation: home  Patient Pre-hospital Level of Mobility: amb  Patient Pre-hospital Diet Restrictions: denied  Substance Use History:   Social History     Substance and Sexual Activity   Alcohol Use Not Currently    Alcohol/week: 8.0 - 12.0 standard drinks of alcohol    Types: 8 - 12 Cans of beer per week    Comment: every day drinker     Social History     Tobacco Use   Smoking Status Former    Types: Cigarettes   Smokeless Tobacco Never   Tobacco Comments    quit 5 months ago      Social History     Substance and Sexual Activity   Drug Use Never       Family History:    Family History   Problem Relation Age of Onset    Heart attack Mother        Physical Exam:     Vitals:   Blood Pressure: 139/76 (04/06/24 1649)  Pulse: 76 (04/06/24 1649)  Temperature: 97.9 °F (36.6 °C) (04/06/24 1649)  Temp Source: Oral (04/06/24 1649)  Respirations: 20 (04/06/24 1649)  Height: 6' 1\" (185.4 cm) (04/06/24 1649)  Weight - Scale: 99.8 kg (220 lb) (04/06/24 1649)  SpO2: 96 % (04/06/24 1649)    Physical Exam  HENT:      Head: Normocephalic and atraumatic.   Cardiovascular:      Rate and Rhythm: Normal rate and regular rhythm.   Pulmonary:      Effort: Pulmonary effort is normal.      Breath sounds: Normal breath sounds.   Musculoskeletal:         General: No swelling.   Neurological:      General: No focal deficit present.             Additional Data:     Lab Results: I have personally reviewed pertinent reports.      Results from last 7 days   Lab Units 04/06/24  1735 04/06/24  1347   WBC Thousand/uL 12.94* 10.66*   HEMOGLOBIN g/dL 12.3 12.8   HEMATOCRIT % 40.5 41.9   PLATELETS Thousands/uL 393* 454*   NEUTROS PCT %  --  72   LYMPHS PCT %  --  19   MONOS PCT %  --  8   EOS PCT %  --  0     Results from last 7 days   Lab Units 04/06/24  1347   SODIUM mmol/L 135   POTASSIUM mmol/L 4.4   CHLORIDE mmol/L " 98   CO2 mmol/L 27   BUN mg/dL 16   CREATININE mg/dL 0.69   ANION GAP mmol/L 10   CALCIUM mg/dL 9.2   ALBUMIN g/dL 4.2   TOTAL BILIRUBIN mg/dL 0.45   ALK PHOS U/L 60   ALT U/L 17   AST U/L 20   GLUCOSE RANDOM mg/dL 194*     Results from last 7 days   Lab Units 04/06/24  1708   INR  0.91                   Imaging: I have personally reviewed pertinent reports.      XR chest 2 views   ED Interpretation by Tila Moreira MD (04/06 1328)   Atelectasis, otherwise no acute cardiopulmonary disease              ** Please Note: This note has been constructed using a voice recognition system. **

## 2024-04-06 NOTE — ASSESSMENT & PLAN NOTE
Patient presented initially with atrial flutter with rapid vent response.  Case was discussed with Dr. Rendon from EP with the ED.  Consult EP  Start anticoagulation  Patient appears rate controlled currently. Currently in sinus on monitor   Patient reports tachycardia was exacerbated after albuterol treatment this morning.

## 2024-04-06 NOTE — ED ATTENDING ATTESTATION
4/6/2024  I, Randal Avila MD, saw and evaluated the patient. I have discussed the patient with the resident/non-physician practitioner and agree with the resident's/non-physician practitioner's findings, Plan of Care, and MDM as documented in the resident's/non-physician practitioner's note, except where noted. All available labs and Radiology studies were reviewed.  I was present for key portions of any procedure(s) performed by the resident/non-physician practitioner and I was immediately available to provide assistance.       At this point I agree with the current assessment done in the Emergency Department.  I have conducted an independent evaluation of this patient a history and physical is as follows:    ED Course     69-year-old male, history of COPD, multiple hospital visits for COPD exacerbations, was last seen at [facility] on 4/3/2024 where he had an evaluation including lab work which was reviewed by me and was unremarkable and a chest x-ray that demonstrated atelectasis versus early infiltrate of the right middle lobe for which she was treated with azithromycin and steroids.  Patient states that 30 minutes prior to arrival today, he had the onset of shortness of breath.  No fever.  No chest pain.  No abdominal pain, nausea, vomiting, diarrhea.    The patient is resting comfortably on a stretcher in no acute respiratory distress. The patient appears nontoxic. HEENT reveals moist mucous membranes. Head is normocephalic and atraumatic. Conjunctiva and sclera are normal. Neck is nontender and supple with full range of motion to flexion, extension, lateral rotation. No meningismus appreciated. No masses are appreciated.  Lungs with some mild expiratory wheezing bilateral upper lung fields.  Diminished bilateral lower lung fields.  Heart is regular rate and rhythm without any murmurs, rubs or gallops. Abdomen is soft and nontender without any rebound or guarding. Extremities appear  grossly normal without any significant arthropathy. Patient is awake, alert, and oriented x3. The patient has normal interaction.  Cranial nerves grossly intact.  Motor is 5 out of 5 bilateral upper and lower extremities.    MEDICAL DECISION MAKING    Number and Complexity of Problems  Differential diagnosis: COPD exacerbation, worsening pneumonia.    Medical Decision Making Data  External documents reviewed: Reviewed ED visit and lab work and chest x-ray from [facility] on 4/3/2024.  My EKG interpretation: New onset a flutter.  Rate controlled.  My X-ray interpretation: Atelectasis.    XR chest 2 views   ED Interpretation   Atelectasis, otherwise no acute cardiopulmonary disease        Labs Reviewed   CBC AND DIFFERENTIAL - Abnormal       Result Value Ref Range Status    WBC 10.66 (*) 4.31 - 10.16 Thousand/uL Final    RBC 5.35  3.88 - 5.62 Million/uL Final    Hemoglobin 12.8  12.0 - 17.0 g/dL Final    Hematocrit 41.9  36.5 - 49.3 % Final    MCV 78 (*) 82 - 98 fL Final    MCH 23.9 (*) 26.8 - 34.3 pg Final    MCHC 30.5 (*) 31.4 - 37.4 g/dL Final    RDW 19.9 (*) 11.6 - 15.1 % Final    MPV 10.8  8.9 - 12.7 fL Final    Platelets 454 (*) 149 - 390 Thousands/uL Final    nRBC 0  /100 WBCs Final    Neutrophils Relative 72  43 - 75 % Final    Immature Grans % 1  0 - 2 % Final    Lymphocytes Relative 19  14 - 44 % Final    Monocytes Relative 8  4 - 12 % Final    Eosinophils Relative 0  0 - 6 % Final    Basophils Relative 0  0 - 1 % Final    Neutrophils Absolute 7.73 (*) 1.85 - 7.62 Thousands/µL Final    Absolute Immature Grans 0.06  0.00 - 0.20 Thousand/uL Final    Absolute Lymphocytes 1.98  0.60 - 4.47 Thousands/µL Final    Absolute Monocytes 0.84  0.17 - 1.22 Thousand/µL Final    Eosinophils Absolute 0.01  0.00 - 0.61 Thousand/µL Final    Basophils Absolute 0.04  0.00 - 0.10 Thousands/µL Final   COMPREHENSIVE METABOLIC PANEL - Abnormal    Sodium 135  135 - 147 mmol/L Final    Potassium 4.4  3.5 - 5.3 mmol/L  Final    Chloride 98  96 - 108 mmol/L Final    CO2 27  21 - 32 mmol/L Final    ANION GAP 10  4 - 13 mmol/L Final    BUN 16  5 - 25 mg/dL Final    Creatinine 0.69  0.60 - 1.30 mg/dL Final    Comment: Standardized to IDMS reference method    Glucose 194 (*) 65 - 140 mg/dL Final    Comment: If the patient is fasting, the ADA then defines impaired fasting glucose as > 100 mg/dL and diabetes as > or equal to 123 mg/dL.    Calcium 9.2  8.4 - 10.2 mg/dL Final    AST 20  13 - 39 U/L Final    ALT 17  7 - 52 U/L Final    Comment: Specimen collection should occur prior to Sulfasalazine administration due to the potential for falsely depressed results.     Alkaline Phosphatase 60  34 - 104 U/L Final    Total Protein 7.3  6.4 - 8.4 g/dL Final    Albumin 4.2  3.5 - 5.0 g/dL Final    Total Bilirubin 0.45  0.20 - 1.00 mg/dL Final    Comment: Use of this assay is not recommended for patients undergoing treatment with eltrombopag due to the potential for falsely elevated results.  N-acetyl-p-benzoquinone imine (metabolite of Acetaminophen) will generate erroneously low results in samples for patients that have taken an overdose of Acetaminophen.    eGFR 96  ml/min/1.73sq m Final    Narrative:     National Kidney Disease Foundation guidelines for Chronic Kidney Disease (CKD):     Stage 1 with normal or high GFR (GFR > 90 mL/min/1.73 square meters)    Stage 2 Mild CKD (GFR = 60-89 mL/min/1.73 square meters)    Stage 3A Moderate CKD (GFR = 45-59 mL/min/1.73 square meters)    Stage 3B Moderate CKD (GFR = 30-44 mL/min/1.73 square meters)    Stage 4 Severe CKD (GFR = 15-29 mL/min/1.73 square meters)    Stage 5 End Stage CKD (GFR <15 mL/min/1.73 square meters)  Note: GFR calculation is accurate only with a steady state creatinine   TSH, 3RD GENERATION WITH FREE T4 REFLEX - Normal    TSH 3RD GENERATON 0.743  0.450 - 4.500 uIU/mL Final    Comment: Adult TSH (3rd generation) reference range follows the recommended guidelines of the American  Thyroid Association, January, 2020.       Labs reviewed: No significant lab abnormalities.  Mild hyperglycemia.    Case discussed with hospitalist.  Considered admission for: COPD exacerbation.  New onset a flutter.    Treatment and Disposition  ED course: Patient underwent evaluation in the emergency department.  Chest x-ray demonstrates atelectasis right middle lobe.  Patient treated with heart neb.  Reassessment the patient was feeling improved.  While the patient was being monitored, EKG noted atrial flutter  Shared decision making: Patient agrees to follow-up with primary care.  Code status: Full code.              Critical Care Time  Procedures

## 2024-04-06 NOTE — ASSESSMENT & PLAN NOTE
Bronchodilator therapy  Patient was recently started on Zithromax and prednisone 2 days ago at [facility] ED.  He presented there with suspected COPD exacerbation with possible infection..He was sent home on Zithromax and prednisone

## 2024-04-07 PROBLEM — I48.92 ATRIAL FLUTTER WITH RAPID VENTRICULAR RESPONSE (HCC): Status: ACTIVE | Noted: 2024-04-06

## 2024-04-07 LAB
ALBUMIN SERPL BCP-MCNC: 3.6 G/DL (ref 3.5–5)
ALP SERPL-CCNC: 49 U/L (ref 34–104)
ALT SERPL W P-5'-P-CCNC: 14 U/L (ref 7–52)
ANION GAP SERPL CALCULATED.3IONS-SCNC: 4 MMOL/L (ref 4–13)
APTT PPP: 118 SECONDS (ref 23–37)
APTT PPP: 38 SECONDS (ref 23–37)
APTT PPP: 49 SECONDS (ref 23–37)
APTT PPP: 84 SECONDS (ref 23–37)
AST SERPL W P-5'-P-CCNC: 15 U/L (ref 13–39)
ATRIAL RATE: 277 BPM
BASOPHILS # BLD AUTO: 0.07 THOUSANDS/ÂΜL (ref 0–0.1)
BASOPHILS NFR BLD AUTO: 1 % (ref 0–1)
BILIRUB SERPL-MCNC: 0.55 MG/DL (ref 0.2–1)
BUN SERPL-MCNC: 13 MG/DL (ref 5–25)
CALCIUM SERPL-MCNC: 8.5 MG/DL (ref 8.4–10.2)
CHLORIDE SERPL-SCNC: 101 MMOL/L (ref 96–108)
CO2 SERPL-SCNC: 30 MMOL/L (ref 21–32)
CREAT SERPL-MCNC: 0.57 MG/DL (ref 0.6–1.3)
EOSINOPHIL # BLD AUTO: 0.33 THOUSAND/ÂΜL (ref 0–0.61)
EOSINOPHIL NFR BLD AUTO: 4 % (ref 0–6)
ERYTHROCYTE [DISTWIDTH] IN BLOOD BY AUTOMATED COUNT: 19.2 % (ref 11.6–15.1)
GFR SERPL CREATININE-BSD FRML MDRD: 104 ML/MIN/1.73SQ M
GLUCOSE SERPL-MCNC: 136 MG/DL (ref 65–140)
GLUCOSE SERPL-MCNC: 313 MG/DL (ref 65–140)
GLUCOSE SERPL-MCNC: 327 MG/DL (ref 65–140)
GLUCOSE SERPL-MCNC: 85 MG/DL (ref 65–140)
GLUCOSE SERPL-MCNC: 91 MG/DL (ref 65–140)
HCT VFR BLD AUTO: 37.8 % (ref 36.5–49.3)
HGB BLD-MCNC: 11.2 G/DL (ref 12–17)
IMM GRANULOCYTES # BLD AUTO: 0.04 THOUSAND/UL (ref 0–0.2)
IMM GRANULOCYTES NFR BLD AUTO: 0 % (ref 0–2)
LYMPHOCYTES # BLD AUTO: 2.9 THOUSANDS/ÂΜL (ref 0.6–4.47)
LYMPHOCYTES NFR BLD AUTO: 32 % (ref 14–44)
MCH RBC QN AUTO: 23.9 PG (ref 26.8–34.3)
MCHC RBC AUTO-ENTMCNC: 29.6 G/DL (ref 31.4–37.4)
MCV RBC AUTO: 81 FL (ref 82–98)
MONOCYTES # BLD AUTO: 1.02 THOUSAND/ÂΜL (ref 0.17–1.22)
MONOCYTES NFR BLD AUTO: 11 % (ref 4–12)
NEUTROPHILS # BLD AUTO: 4.62 THOUSANDS/ÂΜL (ref 1.85–7.62)
NEUTS SEG NFR BLD AUTO: 52 % (ref 43–75)
NRBC BLD AUTO-RTO: 0 /100 WBCS
PLATELET # BLD AUTO: 348 THOUSANDS/UL (ref 149–390)
PMV BLD AUTO: 10.5 FL (ref 8.9–12.7)
POTASSIUM SERPL-SCNC: 3.8 MMOL/L (ref 3.5–5.3)
PROT SERPL-MCNC: 6 G/DL (ref 6.4–8.4)
QRS AXIS: 127 DEGREES
QRSD INTERVAL: 104 MS
QT INTERVAL: 338 MS
QTC INTERVAL: 429 MS
RBC # BLD AUTO: 4.68 MILLION/UL (ref 3.88–5.62)
SODIUM SERPL-SCNC: 135 MMOL/L (ref 135–147)
T WAVE AXIS: -3 DEGREES
VENTRICULAR RATE: 97 BPM
WBC # BLD AUTO: 8.98 THOUSAND/UL (ref 4.31–10.16)

## 2024-04-07 PROCEDURE — 94640 AIRWAY INHALATION TREATMENT: CPT

## 2024-04-07 PROCEDURE — 82948 REAGENT STRIP/BLOOD GLUCOSE: CPT

## 2024-04-07 PROCEDURE — 85025 COMPLETE CBC W/AUTO DIFF WBC: CPT | Performed by: INTERNAL MEDICINE

## 2024-04-07 PROCEDURE — 93010 ELECTROCARDIOGRAM REPORT: CPT | Performed by: INTERNAL MEDICINE

## 2024-04-07 PROCEDURE — 97163 PT EVAL HIGH COMPLEX 45 MIN: CPT

## 2024-04-07 PROCEDURE — 80053 COMPREHEN METABOLIC PANEL: CPT | Performed by: INTERNAL MEDICINE

## 2024-04-07 PROCEDURE — 97166 OT EVAL MOD COMPLEX 45 MIN: CPT

## 2024-04-07 PROCEDURE — 99232 SBSQ HOSP IP/OBS MODERATE 35: CPT | Performed by: PHYSICIAN ASSISTANT

## 2024-04-07 PROCEDURE — 99223 1ST HOSP IP/OBS HIGH 75: CPT | Performed by: INTERNAL MEDICINE

## 2024-04-07 PROCEDURE — 94664 DEMO&/EVAL PT USE INHALER: CPT

## 2024-04-07 PROCEDURE — 94760 N-INVAS EAR/PLS OXIMETRY 1: CPT

## 2024-04-07 PROCEDURE — 85730 THROMBOPLASTIN TIME PARTIAL: CPT | Performed by: INTERNAL MEDICINE

## 2024-04-07 RX ADMIN — ALBUTEROL SULFATE 2 PUFF: 90 AEROSOL, METERED RESPIRATORY (INHALATION) at 01:29

## 2024-04-07 RX ADMIN — FOLIC ACID 1 MG: 1 TABLET ORAL at 08:47

## 2024-04-07 RX ADMIN — FERROUS SULFATE TAB 325 MG (65 MG ELEMENTAL FE) 325 MG: 325 (65 FE) TAB at 08:47

## 2024-04-07 RX ADMIN — BUDESONIDE 0.5 MG: 0.5 INHALANT RESPIRATORY (INHALATION) at 07:10

## 2024-04-07 RX ADMIN — PANTOPRAZOLE SODIUM 40 MG: 40 TABLET, DELAYED RELEASE ORAL at 08:47

## 2024-04-07 RX ADMIN — BUSPIRONE HYDROCHLORIDE 10 MG: 10 TABLET ORAL at 08:47

## 2024-04-07 RX ADMIN — UMECLIDINIUM 1 PUFF: 62.5 AEROSOL, POWDER ORAL at 08:48

## 2024-04-07 RX ADMIN — FLUTICASONE PROPIONATE 1 SPRAY: 50 SPRAY, METERED NASAL at 08:48

## 2024-04-07 RX ADMIN — THIAMINE HCL TAB 100 MG 100 MG: 100 TAB at 08:47

## 2024-04-07 RX ADMIN — HEPARIN SODIUM 12.1 UNITS/KG/HR: 10000 INJECTION, SOLUTION INTRAVENOUS at 12:42

## 2024-04-07 RX ADMIN — ASPIRIN 81 MG CHEWABLE TABLET 81 MG: 81 TABLET CHEWABLE at 08:47

## 2024-04-07 RX ADMIN — LEVALBUTEROL HYDROCHLORIDE 1.25 MG: 1.25 SOLUTION RESPIRATORY (INHALATION) at 18:33

## 2024-04-07 RX ADMIN — AZITHROMYCIN DIHYDRATE 250 MG: 250 TABLET ORAL at 16:46

## 2024-04-07 RX ADMIN — PREDNISONE 40 MG: 20 TABLET ORAL at 08:47

## 2024-04-07 RX ADMIN — ALBUTEROL SULFATE 2 PUFF: 90 AEROSOL, METERED RESPIRATORY (INHALATION) at 14:00

## 2024-04-07 RX ADMIN — LEVALBUTEROL HYDROCHLORIDE 1.25 MG: 1.25 SOLUTION RESPIRATORY (INHALATION) at 07:10

## 2024-04-07 RX ADMIN — HEPARIN SODIUM 2000 UNITS: 1000 INJECTION INTRAVENOUS; SUBCUTANEOUS at 14:55

## 2024-04-07 RX ADMIN — ATORVASTATIN CALCIUM 80 MG: 80 TABLET, FILM COATED ORAL at 16:45

## 2024-04-07 RX ADMIN — LISINOPRIL 40 MG: 20 TABLET ORAL at 08:47

## 2024-04-07 RX ADMIN — HEPARIN SODIUM 4000 UNITS: 1000 INJECTION INTRAVENOUS; SUBCUTANEOUS at 00:18

## 2024-04-07 RX ADMIN — BUSPIRONE HYDROCHLORIDE 10 MG: 10 TABLET ORAL at 21:20

## 2024-04-07 RX ADMIN — INSULIN LISPRO 3 UNITS: 100 INJECTION, SOLUTION INTRAVENOUS; SUBCUTANEOUS at 16:45

## 2024-04-07 RX ADMIN — BUDESONIDE 0.5 MG: 0.5 INHALANT RESPIRATORY (INHALATION) at 18:33

## 2024-04-07 RX ADMIN — PANTOPRAZOLE SODIUM 40 MG: 40 TABLET, DELAYED RELEASE ORAL at 16:45

## 2024-04-07 RX ADMIN — BUSPIRONE HYDROCHLORIDE 10 MG: 10 TABLET ORAL at 16:45

## 2024-04-07 RX ADMIN — INSULIN GLARGINE 30 UNITS: 100 INJECTION, SOLUTION SUBCUTANEOUS at 21:20

## 2024-04-07 RX ADMIN — FLUTICASONE PROPIONATE 1 SPRAY: 50 SPRAY, METERED NASAL at 16:48

## 2024-04-07 NOTE — OCCUPATIONAL THERAPY NOTE
Occupational Therapy Evaluation     Patient Name: Lucho Talavera  Today's Date: 4/7/2024  Problem List  Principal Problem:    Tachycardia  Active Problems:    Type 2 diabetes mellitus with hyperglycemia, without long-term current use of insulin (HCC)    ETOH abuse    Hypertension    COPD (chronic obstructive pulmonary disease) (HCC)    Leukocytosis    Past Medical History  Past Medical History:   Diagnosis Date    Cardiac arrest (HCC)     COPD (chronic obstructive pulmonary disease) (HCC)     CVA (cerebral vascular accident) (HCC)     Diabetes mellitus (HCC)     Heart attack (HCC)     Hypertension     Stroke (HCC)      Past Surgical History  Past Surgical History:   Procedure Laterality Date    CERVICAL FUSION N/A 9/10/2018    Procedure: Posterior cervical decompressive laminectomy C3-6; Posterior cervical lateral mass and pedicle fixation fusion C1-T1;  Surgeon: Papa Quiros MD;  Location: [facility];  Service: Neurosurgery         04/07/24 1005   OT Last Visit   OT Visit Date 04/07/24   Note Type   Note type Evaluation   Pain Assessment   Pain Assessment Tool 0-10   Pain Score No Pain   Patient's Stated Pain Goal No pain   Hospital Pain Intervention(s) Repositioned;Ambulation/increased activity;Emotional support   Restrictions/Precautions   Weight Bearing Precautions Per Order No   Other Precautions Multiple lines;Fall Risk;Pain;Telemetry   Home Living   Type of Home House   Home Layout Two level;Able to live on main level with bedroom/bathroom;Stairs to enter with rails  (2 HETAL)   Bathroom Shower/Tub Walk-in shower   Bathroom Toilet Standard   Bathroom Equipment Grab bars in shower;Shower chair;Grab bars around toilet   Bathroom Accessibility Accessible   Home Equipment Cane   Additional Comments + USE OF SPC IF WALKING GREATER THAN 1 BLOCK PTA. + USE OF SC. PT REPORTS USE OF SUPPLEMENTAL O2 PRN   Prior Function   Level of [place] Independent with ADLs;Independent with functional  mobility;Independent with IADLS   Lives With Family;Daughter   Receives Help From Family   IADLs Independent with driving;Independent with meal prep;Independent with medication management   Falls in the last 6 months 0   Vocational Retired   Lifestyle   [employer] PT REPORTS BEING I WITH ADLS/IADLS/DRIVING PTA.   Reciprocal Relationships LIVES WITH SUPPORTIVE DAUGHTER AND ADDITIONAL FAMILY MEMEBERS.   Service to Others RETIRED; MAILMAN   Intrinsic Gratification ENJOYS SPENDING TIME WITH FAMILY.   ADL   Eating Assistance 7  Independent   Grooming Assistance 5  Supervision/Setup   UB Bathing Assistance 5  Supervision/Setup   LB Bathing Assistance 4  Minimal Assistance   UB Dressing Assistance 5  Supervision/Setup   LB Dressing Assistance 4  Minimal Assistance   Toileting Assistance  5  Supervision/Setup   Functional Assistance 5  Supervision/Setup   Bed Mobility   Supine to Sit 5  Supervision   Additional items Increased time required;Verbal cues   Sit to Supine Unable to assess   Additional Comments PT LEFT OOB WITH ALL NEEDS IN REACH   Transfers   Sit to Stand 5  Supervision   Additional items Increased time required   Stand to Sit 5  Supervision   Additional items Increased time required   Functional Mobility   Functional Mobility 5  Supervision   Additional Comments W/ USE OF IV POLE. MILD SOB NOTED; O2 SATS RANGED FROM 89-96% ON RA WITH ACTIVITY   Balance   Static Sitting Good   Static Standing Fair   Ambulatory Fair -   Activity Tolerance   Activity Tolerance Patient tolerated treatment well;Patient limited by fatigue   Medical Staff Made Aware PT SEEN FOR CO-EVAL WITH SKILLED PHYSICAL THERAPIST 2' POLY-TRAUAMTIC INJURIES, NEW PRECAUTIONS/LIMITATIONS, AND LIMITED ACTIVITY TOLERANCE WHICH IMPACT PERFORMANCE AND ARE A REGRESSION FROM PT'S BASELINE.   Nurse Made Aware APPROPRIATE TO SEE PER RN.   RUE Assessment   RUE Assessment WFL   LUE Assessment   LUE Assessment WFL   Hand Function   Gross Motor Coordination  Functional   Fine Motor Coordination Functional   Psychosocial   Psychosocial (WDL) WDL   Cognition   Overall Cognitive Status WFL   Arousal/Participation Alert;Cooperative   Attention Within functional limits   Orientation Level Oriented X4   Memory Within functional limits   Following Commands Follows one step commands without difficulty   Comments PT IS PLEASANT AND COOPERATIVE.   Assessment   Assessment 70 YO Male SEEN FOR INITIAL OCCUPATIONAL THERAPY EVALUATION FOLLOWING ADMISSION TO [facility] WITH SOB. DX INCLUDES AFLUTTER WITH RAPID VENTRICULAR RESPONSE. PROBLEMS LIST/PMH INCLUDES Cardiac arrest (Formerly [facility]), COPD (chronic obstructive pulmonary disease) (Formerly [facility]), CVA (cerebral vascular accident) (Formerly [facility]), Diabetes mellitus (Formerly [facility]), Heart attack ([facility]), Hypertension, and Stroke (Formerly [facility]). PT IS FROM HOME WITH FAMILY WHERE HE REPORTS BEING INDEPENDENT WITH ADLS/IADLS/DRIVING PTA. PT CURRENTLY REQUIRES OVERALL S-MIN A WITH ADLS AND S WITH TRANSFERS / FUNCTIONAL MOBILITY. PT IS EXPERIENCING EXPECTED LIMITATIONS 2' FATIGUE, IMPAIRED BALANCE, FALL RISK , OVERALL WEAKNESS/DECONDITIONING , MILD SOB, and OVERALL LIMITED ACTIVITY TOLERANCE. PT EDUCATED ON DEEP BREATHING TECHNIQUES T/O ACTIVITY, SLOWING OF PACE, ENERGY CONSERVATION TECHNIQUES FOR CARRY OVER UPON D/C, INCREASED FAMILY SUPPORT, and CONTINUE PARTICIPATION IN SELF-CARE/MOBILITY WITH STAFF WHILE IN THE HOSPITAL . The patient's raw score on the -PAC Daily Activity Inpatient Short Form is 19. A raw score of greater than or equal to 19 suggests the patient may benefit from discharge to home. Please refer to the recommendation of the Occupational Therapist for safe discharge planning. FROM AN OCCUPATIONAL THERAPY PERSPECTIVE, PT CAN RETURN HOME WITH INCREASED FAMILY SUPPORT WHEN MEDICALLY CLEARED. ALL QUESTIONS/CONCERNS ADDRESSED. NO ADDITIONAL ACUTE CARE OT NEEDS. D/C OT.   Goals   Patient Goals TO RETURN HOME   Discharge Recommendation   Rehab Resource Intensity  Level, OT No post-acute rehabilitation needs   Equipment Recommended   (USE OF SC AT ALL TIMES, PT AGREEABLE.)   AM-PAC Daily Activity Inpatient   Lower Body Dressing 3   Bathing 3   Toileting 3   Upper Body Dressing 3   Grooming 3   Eating 4   Daily Activity Raw Score 19   Daily Activity Standardized Score (Calc for Raw Score >=11) 40.22   AM-PAC Applied Cognition Inpatient   Following a Speech/Presentation 4   Understanding Ordinary Conversation 4   Taking Medications 4   Remembering Where Things Are Placed or Put Away 4   Remembering List of 4-5 Errands 3   Taking Care of Complicated Tasks 3   Applied Cognition Raw Score 22   Applied Cognition Standardized Score 47.83     Documentation completed by DELILAH Alaniz, OTR/L  MOCA Certified ID# [id number]

## 2024-04-07 NOTE — ASSESSMENT & PLAN NOTE
Bronchodilator therapy  Patient was recently started on Zithromax and prednisone 2 days ago at [facility] ED.  He presented there with suspected COPD exacerbation with possible infection..He was sent home on Zithromax and prednisone -- continue to complete course

## 2024-04-07 NOTE — CONSULTS
"Electrophysiology-Cardiology (EP)   Lucho Talavera 69 y.o. male MRN: [id number]  Unit/Bed#: [facility] 504-01 Encounter: [id number]        IMPRESSION:  New onset Atrial flutter, difficult to tell if Typical but seems neg V1, +Inf leads which likely clockwise CTI flutter. Has both bradycardic response in 40s and intermittently RVR so not suitable for rate control.  [id number] was  not on AC. Last echo 2021 relatively normal. NO palpitations or syncope. Has SOB+ but also COPD>  COPD- not 02 dependent, acute exacerbation, frequent exacertbations. Quit smoking  No longer drinks ETOH often.   4. DMT2  5. RBBB, RAD    Unclear why PMH says \"Cardiac arrest\" and \"heart attack\" will need to get more records. He is not sure why either. DOes not know he has any prior heart issues and doesn't see a cardiologist.       PLAN:  Recommend BRITTNEY atrial flutter ablation (if typical flutter). If not typical options \would include complex ablation or pacemaker and antiarrhythmics.  NPO MN will see if we can add on tomorrow.   Long term cardiac monitoring, at risk of Afib. would consider Loop recorder. He is agreeable .  Check TTE  Agree with Heparin gtt for now, transition to Eliquis or other DOAC after                    Referring Physian: Juan Cordova MD    Chief Complain/Reason for Referal: Atrial flutter  HPI:Lucho Talavera is a 69 y.o.   Patient Active Problem List    Diagnosis Date Noted    Tachycardia 04/06/2024    Leukocytosis 04/06/2024    COVID-19 10/25/2023    History of alcohol abuse 10/17/2023    Anemia 10/17/2023    SIRS (systemic inflammatory response syndrome) (HCC) 08/07/2023    Hypertension 08/06/2023    COPD (chronic obstructive pulmonary disease) ([facility]) 08/06/2023    Anxiety 07/28/2023    Chronic respiratory failure (HCC) 07/24/2023    Stage 3 severe COPD by GOLD classification ([facility]) 07/23/2023    Oral abscess 06/08/2022    Tooth fracture 06/08/2022    H/O ETOH abuse 06/08/2022    Closed nondisplaced fracture of " posterior arch of first cervical vertebra (HCC) 06/08/2022    Fall 06/08/2022    Diabetic polyneuropathy associated with type 2 diabetes mellitus ([facility]) 07/12/2021    Hammertoe, bilateral 07/12/2021    Post-traumatic arthritis of left foot 07/12/2021    Pain due to onychomycosis of toenail 07/12/2021    Bronchitis 04/18/2020    KLARISSA (obstructive sleep apnea) 02/19/2020    Dirty living conditions 02/19/2020    Closed fracture of first cervical vertebra (HCC) 02/28/2019    ETOH abuse 02/28/2019    Chronic obstructive pulmonary disease with acute exacerbation ([facility]) 09/19/2018    At moderate risk for venous thromboembolism (VTE) 09/14/2018    S/P C1-T1 Posterior Cervical Discectomy and Fusion on 9/10/18 09/12/2018    Acute blood loss anemia 09/11/2018    Type 2 diabetes mellitus with hyperglycemia, without long-term current use of insulin ([facility]) 09/11/2018    Closed odontoid fracture with routine healing 09/09/2018    Closed displaced fracture of third cervical vertebra (HCC) 09/09/2018    Closed displaced fracture of fourth cervical vertebra (HCC) 09/09/2018    Alcohol abuse 09/09/2018    CAD (coronary artery disease) 09/09/2018    Dens fracture ([facility]) 09/09/2018     70 yo male  H/o COPD and frequent exacerbations, in ER yesterday about 4 days after ER visit to [facility] ER, was not in aflutter per report then, but was in RVR in ER yesterday then rate controlled without intervention followed by bradycardia overnight.  HE was admitted and put on heparin. HE has prior CVA but doesn't recall details of it. He used drink ETOH but seldom does. Quit tobacco 2 years ago. No edema. He is SOB at baseline and attributes curretn SOB to COPD. NO palpitations. NO CP, no syncope.       Past Medical History:   Diagnosis Date    Cardiac arrest (HCC)     COPD (chronic obstructive pulmonary disease) (HCC)     CVA (cerebral vascular accident) (HCC)     Diabetes mellitus (HCC)     Heart attack (HCC)     Hypertension     Stroke ([facility])         Medications Prior to Admission   Medication    albuterol (Proventil HFA) 90 mcg/act inhaler    aspirin 81 mg chewable tablet    atorvastatin (LIPITOR) 80 mg tablet    B Complex Vitamins (VITAMIN B COMPLEX 100 IJ)    budesonide (PULMICORT) 0.5 mg/2 mL nebulizer solution    busPIRone (BUSPAR) 10 mg tablet    ferrous sulfate 324 (65 Fe) mg    fluticasone (FLONASE) 50 mcg/act nasal spray    Fluticasone-Salmeterol (Advair) 500-50 mcg/dose inhaler    folic acid (FOLVITE) 1 mg tablet    Insulin Glargine (BASAGLAR KWIKPEN SC)    lisinopril (ZESTRIL) 40 mg tablet    LORazepam (Ativan) 0.5 mg tablet    melatonin 3 mg    metFORMIN (GLUCOPHAGE) 1000 MG tablet    pantoprazole (Protonix) 40 mg tablet    thiamine 100 MG tablet    tiotropium (SPIRIVA) 18 mcg inhalation capsule    betamethasone valerate (VALISONE) 0.1 % cream       Scheduled Meds:  Current Facility-Administered Medications   Medication Dose Route Frequency Provider Last Rate    acetaminophen  650 mg Oral Q6H PRN Hetul Garrison, DO      albuterol  2 puff Inhalation Q6H PRN Hetul Garrison, DO      aspirin  81 mg Oral Daily Hetul Garrison, DO      atorvastatin  80 mg Oral Daily With Dinner Hetul Garrison, DO      azithromycin  250 mg Oral Q24H Hetul Garrison, DO      budesonide  0.5 mg Nebulization Q12H Hetul Garrison, DO      busPIRone  10 mg Oral TID Hetul Garrison, DO      ferrous sulfate  325 mg Oral Daily With Breakfast Hetul Garrison, DO      fluticasone  1 spray Nasal BID Hetul Garrison, DO      folic acid  1 mg Oral Daily Hetul Garrison, DO      heparin (porcine)  3-20 Units/kg/hr (Order-Specific) Intravenous Titrated Hetul Garrison, DO 12.1 Units/kg/hr (04/07/24 0826)    heparin (porcine)  2,000 Units Intravenous Q6H PRN Hetul Garrison, DO      heparin (porcine)  4,000 Units Intravenous Q6H PRN Hetul Garrison, DO      insulin glargine  30 Units Subcutaneous HS Hetul Garrison, DO      insulin lispro  1-5 Units Subcutaneous TID AC Hetul Garrison, DO      levalbuterol  1.25 mg Nebulization BID Hetul  Garrison, DO      lisinopril  40 mg Oral Daily Hetul Garrison, DO      LORazepam  0.5 mg Oral Q6H PRN Hetul Garrison, DO      melatonin  3 mg Oral HS PRN Hetul Garrison, DO      ondansetron  4 mg Intravenous Q6H PRN Hetul Garrison, DO      pantoprazole  40 mg Oral BID Hetul Garrison, DO      predniSONE  40 mg Oral Daily Hetul Garrison, DO      thiamine  100 mg Oral Daily Hetul Garrison, DO      umeclidinium  1 puff Inhalation Daily Hetul Garrison, DO       Continuous Infusions:heparin (porcine), 3-20 Units/kg/hr (Order-Specific), Last Rate: 12.1 Units/kg/hr (04/07/24 0826)      PRN Meds:.  acetaminophen    albuterol    heparin (porcine)    heparin (porcine)    LORazepam    melatonin    ondansetron  Allergies   Allergen Reactions    Amoxicillin Hives    Augmentin [Amoxicillin-Pot Clavulanate] Hives     I reviewed the Home Medication list in the chart.     Family History   Problem Relation Age of Onset    Heart attack Mother        Social History     Socioeconomic History    Marital status: Single     Spouse name: Not on file    Number of children: Not on file    Years of education: Not on file    Highest education level: Not on file   Occupational History    Not on file   Tobacco Use    Smoking status: Former     Types: Cigarettes    Smokeless tobacco: Never    Tobacco comments:     quit 5 months ago    Vaping Use    Vaping status: Never Used   Substance and Sexual Activity    Alcohol use: Not Currently     Alcohol/week: 8.0 - 12.0 standard drinks of alcohol     Types: 8 - 12 Cans of beer per week     Comment: every day drinker    Drug use: Never    Sexual activity: Not Currently   Other Topics Concern    Not on file   Social History Narrative    ** Merged History Encounter **         ** Merged History Encounter **         ** Merged History Encounter **          Social Determinants of Health     Financial Resource Strain: Low Risk  (2/1/2024)    Received from [facility]    Overall Financial Resource Strain (CARDIA)      Difficulty of Paying Living Expenses: Not very hard   Food Insecurity: No Food Insecurity (2/1/2024)    Received from [facility]    Hunger Vital Sign     Worried About Running Out of Food in the Last Year: Never true     Ran Out of Food in the Last Year: Never true   Transportation Needs: No Transportation Needs (2/1/2024)    Received from [facility]    PRAPARE - Transportation     Lack of Transportation (Medical): No     Lack of Transportation (Non-Medical): No   Physical Activity: Inactive (4/7/2023)    Received from [facility]    Exercise Vital Sign     Days of Exercise per Week: 0 days     Minutes of Exercise per Session: 0 min   Stress: No Stress Concern Present (4/7/2023)    Received from [facility]    [place] [employer] - Occupational Stress Questionnaire     Feeling of Stress : Only a little   Social Connections: Socially Isolated (4/7/2023)    Received from [facility]    Social Connection and Isolation Panel [NHANES]     Frequency of Communication with Friends and Family: Never     Frequency of Social Gatherings with Friends and Family: Never     Attends Amish Services: Never     Active Member of Clubs or Organizations: No     Attends Club or Organization Meetings: Never     Marital Status:    Intimate Partner Violence: Not At Risk (2/1/2024)    Received from [facility]    Humiliation, Afraid, Rape, and Kick questionnaire     Fear of Current or Ex-Partner: No     Emotionally Abused: No     Physically Abused: No     Sexually Abused: No   Housing Stability: Low Risk  (2/1/2024)    Received from [facility]    Housing Stability Vital Sign     Unable to Pay for Housing in the Last Year: No     Number of Places Lived in the Last Year: 2     Unstable Housing in the Last Year: No       Review of Systems -12 Point ROS reviewed and are negative or noted in  chart except for Pertinent Positives Pertaining to Cardiovascular and Respiratory in HPI above.     Vitals:    04/07/24 0754   BP: 124/79   Pulse: 71   Resp: 20   Temp: (!) 97.3 °F (36.3 °C)   SpO2: 94%     Vitals:    04/06/24 1125 04/06/24 1649   Weight: 99.8 kg (220 lb) 99.8 kg (220 lb)     Intake/Output Summary (Last 24 hours) at 4/7/2024 0938  Last data filed at 4/7/2024 0826  Gross per 24 hour   Intake 1729.5 ml   Output 2150 ml   Net -420.5 ml         GEN: No acute distress, Alert and oriented, well appearing  HEENT:Head, neck, ears, oral pharynx: Mucus membranes moist, oral pharynx clear, nares clear. External ears normal  EYES: Pupils equal, sclera anicteric, midline, normal conjuctiva  NECK: No JVD, supple, no obvious masses or thryomegaly or goiter  CARDIOVASCULAR:irreg irreg No murmur, rub, gallops S1,S2  LUNGS: Distant Lung sounds. No wheezing  ABDOMEN: Soft, nondistended, nontender, without obvious organomegaly or ascites  EXTREMITIES/VASCULAR: No edema. Radial pulses intact, pedal pulses difficult to palpate, warm an well perfused.  PSYCH: Normal Affect, no overt suicidal ideation, linear speech pattern without evidence of psychosis.   NEURO: Grossly intact, moving all extremiteis equal, face symmetric, alert and responsive, no obvious focal defecits  HEME: No bleeding, bruising, petechia, purpura  SKIN: No significant rashes, warm, no diaphoresis or pallor.     Lab Results:     CBC with diff:   Results from last 7 days   Lab Units 04/07/24  0610 04/06/24  1735 04/06/24  1347   WBC Thousand/uL 8.98 12.94* 10.66*   HEMOGLOBIN g/dL 11.2* 12.3 12.8   HEMATOCRIT % 37.8 40.5 41.9   MCV fL 81* 79* 78*   PLATELETS Thousands/uL 348 393* 454*   RBC Million/uL 4.68 5.10 5.35   MCH pg 23.9* 24.1* 23.9*   MCHC g/dL 29.6* 30.4* 30.5*   RDW % 19.2* 19.5* 19.9*   MPV fL 10.5 9.8 10.8   NRBC AUTO /100 WBCs 0  --  0         CMP:  Results from last 7 days   Lab Units 04/07/24  0610 04/06/24  1347 04/03/24  1885    POTASSIUM mmol/L 3.8 4.4 4.7   CHLORIDE mmol/L 101 98 101   CO2 mmol/L 30 27 28   BUN mg/dL 13 16 16   CREATININE mg/dL 0.57* 0.69 0.57   CALCIUM mg/dL 8.5 9.2 9.2   AST U/L 15 20  --    ALT U/L 14 17  --    ALK PHOS U/L 49 60  --    EGFR ml/min/1.73sq m 104 96 105         BMP:  Results from last 7 days   Lab Units 04/07/24  0610 04/06/24  1347 04/03/24  1859   POTASSIUM mmol/L 3.8 4.4 4.7   CHLORIDE mmol/L 101 98 101   CO2 mmol/L 30 27 28   BUN mg/dL 13 16 16   CREATININE mg/dL 0.57* 0.69 0.57   CALCIUM mg/dL 8.5 9.2 9.2       BNP:   Results Reviewed       Procedure Component Value Units Date/Time    Protime-INR [[id number]]  (Normal) Collected: 04/06/24 1708    Lab Status: Final result Specimen: Blood from Arm, Left Updated: 04/06/24 1804     Protime 12.2 seconds      INR 0.91    CBC [[id number]]  (Abnormal) Collected: 04/06/24 1735    Lab Status: Final result Specimen: Blood from Arm, Right Updated: 04/06/24 1747     WBC 12.94 Thousand/uL      RBC 5.10 Million/uL      Hemoglobin 12.3 g/dL      Hematocrit 40.5 %      MCV 79 fL      MCH 24.1 pg      MCHC 30.4 g/dL      RDW 19.5 %      Platelets 393 Thousands/uL      MPV 9.8 fL     APTT [[id number]]  (Normal) Collected: 04/06/24 1708    Lab Status: Final result Specimen: Blood from Arm, Left Updated: 04/06/24 1732     PTT 28 seconds     TSH, 3rd generation with Free T4 reflex [[id number]]  (Normal) Collected: 04/06/24 1347    Lab Status: Final result Specimen: Blood from Arm, Left Updated: 04/06/24 1443     TSH 3RD GENERATON 0.743 uIU/mL     Comprehensive metabolic panel [[id number]]  (Abnormal) Collected: 04/06/24 1347    Lab Status: Final result Specimen: Blood from Arm, Left Updated: 04/06/24 1423     Sodium 135 mmol/L      Potassium 4.4 mmol/L      Chloride 98 mmol/L      CO2 27 mmol/L      ANION GAP 10 mmol/L      BUN 16 mg/dL      Creatinine 0.69 mg/dL      Glucose 194 mg/dL      Calcium 9.2 mg/dL      AST 20 U/L      ALT 17 U/L      Alkaline Phosphatase 60 U/L   "    Total Protein 7.3 g/dL      Albumin 4.2 g/dL      Total Bilirubin 0.45 mg/dL      eGFR 96 ml/min/1.73sq m     Narrative:      National Kidney Disease Foundation guidelines for Chronic Kidney Disease (CKD):     Stage 1 with normal or high GFR (GFR > 90 mL/min/1.73 square meters)    Stage 2 Mild CKD (GFR = 60-89 mL/min/1.73 square meters)    Stage 3A Moderate CKD (GFR = 45-59 mL/min/1.73 square meters)    Stage 3B Moderate CKD (GFR = 30-44 mL/min/1.73 square meters)    Stage 4 Severe CKD (GFR = 15-29 mL/min/1.73 square meters)    Stage 5 End Stage CKD (GFR <15 mL/min/1.73 square meters)  Note: GFR calculation is accurate only with a steady state creatinine    CBC and differential [[id number]]  (Abnormal) Collected: 04/06/24 1347    Lab Status: Final result Specimen: Blood from Arm, Left Updated: 04/06/24 1401     WBC 10.66 Thousand/uL      RBC 5.35 Million/uL      Hemoglobin 12.8 g/dL      Hematocrit 41.9 %      MCV 78 fL      MCH 23.9 pg      MCHC 30.5 g/dL      RDW 19.9 %      MPV 10.8 fL      Platelets 454 Thousands/uL      nRBC 0 /100 WBCs      Neutrophils Relative 72 %      Immature Grans % 1 %      Lymphocytes Relative 19 %      Monocytes Relative 8 %      Eosinophils Relative 0 %      Basophils Relative 0 %      Neutrophils Absolute 7.73 Thousands/µL      Absolute Immature Grans 0.06 Thousand/uL      Absolute Lymphocytes 1.98 Thousands/µL      Absolute Monocytes 0.84 Thousand/µL      Eosinophils Absolute 0.01 Thousand/µL      Basophils Absolute 0.04 Thousands/µL           No results for input(s): \"BNP\" in the last 72 hours.             Magnesium:       Coags:   Results from last 7 days   Lab Units 04/07/24  0610 04/06/24  2314 04/06/24  1708   PTT seconds 118* 38* 28   INR   --   --  0.91       TSH:   wnl    Lipid Profile:         Cardiac testing:   No results found for this or any previous visit.    No results found for this or any previous visit.    No results found for this or any previous visit.    No " results found for this or any previous visit.          CXR: no pulm edema, COPD . No effusions.     EKG/TELE Atrial flutter variable block with slow ventricular response 40s.  RBBB on tele  Yesterday EKG in ER Atrial flutter , variable block , flutter waves possibly typical see disucssion.      I have personally reviewed the EKG,, CXR and Telemetry images directly and the above is my interpretation.

## 2024-04-07 NOTE — CASE MANAGEMENT
Case Management Assessment & Discharge Planning Note    Patient name Lucho Talavera  Location [facility] 504/[facility] 504-01 MRN [id number]  : 1955 Date 2024       Current Admission Date: 2024  Current Admission Diagnosis:Tachycardia   Patient Active Problem List    Diagnosis Date Noted    Tachycardia 2024    Leukocytosis 2024    COVID-19 10/25/2023    History of alcohol abuse 10/17/2023    Anemia 10/17/2023    SIRS (systemic inflammatory response syndrome) ([facility]) 2023    Hypertension 2023    COPD (chronic obstructive pulmonary disease) ([facility]) 2023    Anxiety 2023    Chronic respiratory failure ([facility]) 2023    Stage 3 severe COPD by GOLD classification ([facility]) 2023    Oral abscess 2022    Tooth fracture 2022    H/O ETOH abuse 2022    Closed nondisplaced fracture of posterior arch of first cervical vertebra ([facility]) 2022    Fall 2022    Diabetic polyneuropathy associated with type 2 diabetes mellitus ([facility]) 2021    Hammertoe, bilateral 2021    Post-traumatic arthritis of left foot 2021    Pain due to onychomycosis of toenail 2021    Bronchitis 2020    KLARISSA (obstructive sleep apnea) 2020    Dirty living conditions 2020    Closed fracture of first cervical vertebra ([facility]) 2019    ETOH abuse 2019    Chronic obstructive pulmonary disease with acute exacerbation ([facility]) 2018    At moderate risk for venous thromboembolism (VTE) 2018    S/P C1-T1 Posterior Cervical Discectomy and Fusion on 9/10/18 2018    Acute blood loss anemia 2018    Type 2 diabetes mellitus with hyperglycemia, without long-term current use of insulin ([facility]) 2018    Closed odontoid fracture with routine healing 2018    Closed displaced fracture of third cervical vertebra ([facility]) 2018    Closed displaced fracture of fourth cervical vertebra ([facility]) 2018    Alcohol abuse 2018     CAD (coronary artery disease) 09/09/2018    Dens fracture (HCC) 09/09/2018      LOS (days): 1  Geometric Mean LOS (GMLOS) (days):   Days to GMLOS:     OBJECTIVE:    Risk of Unplanned Readmission Score: 40.67         Current admission status: Inpatient       Preferred Pharmacy:   [id number] - [place], [place] - [address]  [address]  [place] [address]  Phone: [phone] Fax: [phone]    [facility] [place] - [place] - [address]  [address]  [place] [address]  Phone: [phone] Fax: [phone]    Primary Care Provider: TATIANA Cotto    Primary Insurance: MEDICARE  Secondary Insurance: BLUE CROSS    ASSESSMENT:  Active Health Care Proxies       Nimco Talavera Health Care Representative - Daughter   Primary Phone: [phone] (Mobile)                 Advance Directives  Does patient have a Health Care POA?: Yes  Does patient have Advance Directives?: Yes  Advance Directives: Living will  Primary Contact: dghter-copy not on file              Patient Information  Admitted from:: Home  Mental Status: Alert  During Assessment patient was accompanied by: Not accompanied during assessment  Assessment information provided by:: Patient  Primary Caregiver: Self  Support Systems: Family members, Daughter  What city do you live in?: [facility]  Home entry access options. Select all that apply.: Stairs  Number of steps to enter home.: 2  Do the steps have railings?: Yes  Type of Current Residence: 3 story home, Other (Comment) (codno)  Upon entering residence, is there a bedroom on the main floor (no further steps)?: Yes  Upon entering residence, is there a bathroom on the main floor (no further steps)?: Yes  Living Arrangements: Lives w/ Daughter, Lives w/ Extended Family    Activities of Daily Living Prior to Admission  Functional Status: Independent  Completes ADLs independently?: Yes  Ambulates independently?: Yes  Does patient use  assisted devices?: Yes  Assisted Devices (DME) used: Straight Cane  Does patient currently own DME?: Yes  What DME does the patient currently own?: Straight Cane, CPAP  Does patient have a history of Outpatient Therapy (PT/OT)?: No  Does the patient have a history of Short-Term Rehab?: Yes ([facility])  Does patient have a history of HHC?: Yes (unknown name)  Does patient currently have HHC?: No         Patient Information Continued  Income Source: Pension/senior living  Does patient have prescription coverage?: Yes  Does patient receive dialysis treatments?: No  Does patient have a history of substance abuse?: Yes  Historical substance use preference: Alcohol/ETOH  History of Withdrawal Symptoms: Denies past symptoms  Is patient currently in treatment for substance abuse?: N/A - sober  Does patient have a history of Mental Health Diagnosis?: No         Means of Transportation  Means of Transport to Appts:: Family transport      Social Determinants of Health (SDOH)      Flowsheet Row Most Recent Value   Housing Stability    In the last 12 months, was there a time when you were not able to pay the mortgage or rent on time? N   In the last 12 months, how many places have you lived? 1   In the last 12 months, was there a time when you did not have a steady place to sleep or slept in a shelter (including now)? N   Transportation Needs    In the past 12 months, has lack of transportation kept you from medical appointments or from getting medications? no   In the past 12 months, has lack of transportation kept you from meetings, work, or from getting things needed for daily living? No   Food Insecurity    Within the past 12 months, you worried that your food would run out before you got the money to buy more. Never true   Within the past 12 months, the food you bought just didn't last and you didn't have money to get more. Never true   Utilities    In the past 12 months has the electric, gas, oil, or water company threatened to  shut off services in your home? No            DISCHARGE DETAILS:    Discharge planning discussed with:: pt  Freedom of Choice: Yes                   Contacts  Patient Contacts: Nimco Hanson  Relationship to Patient:: Family  Contact Method: Phone  Phone Number: [phone]  Reason/Outcome: Continuity of Care, Emergency Contact, Discharge Planning                                                                     Additional Comments: resides with fred, I PTA, high utilizer, email referral sent

## 2024-04-07 NOTE — PHYSICAL THERAPY NOTE
Physical Therapy Evaluation     Patient's Name: Lucho Talavera    Admitting Diagnosis  Atrial flutter ([facility]) [I48.92]  SOB (shortness of breath) [R06.02]  Tachycardia [R00.0]  COPD exacerbation ([facility]) [J44.1]    Problem List  Patient Active Problem List   Diagnosis    Closed odontoid fracture with routine healing    Closed displaced fracture of third cervical vertebra (HCC)    Closed displaced fracture of fourth cervical vertebra (HCC)    Alcohol abuse    CAD (coronary artery disease)    Dens fracture ([facility])    Acute blood loss anemia    Type 2 diabetes mellitus with hyperglycemia, without long-term current use of insulin (HCC)    S/P C1-T1 Posterior Cervical Discectomy and Fusion on 9/10/18    At moderate risk for venous thromboembolism (VTE)    Chronic obstructive pulmonary disease with acute exacerbation (HCC)    Closed fracture of first cervical vertebra (HCC)    ETOH abuse    KLARISSA (obstructive sleep apnea)    Dirty living conditions    Bronchitis    Diabetic polyneuropathy associated with type 2 diabetes mellitus ([facility])    Hammertoe, bilateral    Post-traumatic arthritis of left foot    Pain due to onychomycosis of toenail    Oral abscess    Tooth fracture    H/O ETOH abuse    Closed nondisplaced fracture of posterior arch of first cervical vertebra (HCC)    Fall    Stage 3 severe COPD by GOLD classification ([facility])    Chronic respiratory failure (HCC)    Anxiety    Hypertension    COPD (chronic obstructive pulmonary disease) (HCC)    SIRS (systemic inflammatory response syndrome) ([facility])    History of alcohol abuse    Anemia    COVID-19    Tachycardia    Leukocytosis       Past Medical History  Past Medical History:   Diagnosis Date    Cardiac arrest (HCC)     COPD (chronic obstructive pulmonary disease) (HCC)     CVA (cerebral vascular accident) (HCC)     Diabetes mellitus (HCC)     Heart attack (HCC)     Hypertension     Stroke (HCC)        Past Surgical History  Past Surgical History:   Procedure Laterality  Date    CERVICAL FUSION N/A 9/10/2018    Procedure: Posterior cervical decompressive laminectomy C3-6; Posterior cervical lateral mass and pedicle fixation fusion C1-T1;  Surgeon: Papa Quiros MD;  [facility]: [facility];  Service: Neurosurgery        04/07/24 1006   PT Last Visit   PT Visit Date 04/07/24   Note Type   Note type Evaluation   Pain Assessment   Pain Assessment Tool 0-10   Pain Score No Pain   Restrictions/Precautions   Weight Bearing Precautions Per Order No   Braces or Orthoses   (none)   Other Precautions Fall Risk;Multiple lines;Telemetry   Home Living   Type of Home House   Home Layout Two level;Stairs to enter with rails;Able to live on main level with bedroom/bathroom;Performs ADLs on one level  (2 hetal)   Bathroom Shower/Tub Walk-in shower   Bathroom Toilet Standard   Bathroom Equipment Grab bars in shower;Grab bars around toilet;Shower chair   Bathroom Accessibility Accessible   Home Equipment Cane   Additional Comments Prior to this admission patient resided with his dght and grandchildren in a 2 level home (2 HETAL; 1st floor set up). Walk in shower, standard toilet, grab bars in shower and around toilet, shower chair, SPC, O2 at night and PRN during day. At his baseline he is I with mobility (SPC PRN) and ADLs.   Prior Function   Level of [place] Independent with ADLs;Independent with functional mobility;Independent with IADLS   Lives With Daughter;Family   Receives Help From Family   IADLs Independent with medication management;Family/Friend/Other provides meals   Falls in the last 6 months 0   Vocational Retired   General   Additional Pertinent History 69 y.o. male admitted to [facility] on 4/6/2024 with symptoms of: SOB (recently started albuterol). Diagnosis includes afluter with rapid ventriclar response. Patient is pending EP consult.   Family/Caregiver Present No   Cognition   Overall Cognitive Status WFL   Arousal/Participation Alert   Orientation Level Oriented X4  "  Memory Within functional limits   Following Commands Follows one step commands without difficulty   Subjective   Subjective \"I feel a little short of breath\"   RUE Assessment   RUE Assessment WFL   LUE Assessment   LUE Assessment WFL   RLE Assessment   RLE Assessment WFL   LLE Assessment   LLE Assessment WFL   Bed Mobility   Supine to Sit 5  Supervision   Additional items HOB elevated;Increased time required   Sit to Supine Unable to assess   Additional Comments post evaluatio patient OOB in chair with lines intact, call bell in reach and call don't fall  policy reviewed with patient   Transfers   Sit to Stand 5  Supervision   Stand to Sit 5  Supervision   Ambulation/Elevation   Gait pattern Excessively slow;Short stride;Decreased foot clearance   Gait Assistance 5  Supervision   Additional items Verbal cues   Assistive Device Other (Comment)  (IV pole; railing)   Distance 30 feet x 2   Ambulation/Elevation Additional Comments Patient walked 30 feet x 2, initially utilizing IV pole however encouraged to use HR for improved balance (in absence of availability of SPC today). Patient required standing rest break and distance was limited by GARCIA (desat to 89% on RA).   Balance   Static Sitting Good   Static Standing Fair   Ambulatory Fair -   Endurance Deficit   Endurance Deficit Yes   Endurance Deficit Description desat to 89% ambulating; + GARCIA; rest breaks while standing   Activity Tolerance   Activity Tolerance Patient limited by fatigue  (GARCIA)   Medical Staff Made Aware This high complexity evaluation was performed with an occupational therapist due to the patient's co-morbidities, clinically unstable presentation, and present impairments which are a regression from the patient's baseline.   Nurse Made Aware barry to see per RN Pro   Assessment   Prognosis Good   Problem List Decreased endurance;Impaired balance;Decreased mobility   Assessment PT completed evaluation of 69 y.o. male admitted to [facility]" on 4/6/2024 with symptoms of: SOB (recently started albuterol). Diagnosis includes afluter with rapid ventriclar response. Patient is pending EP consult.     Patient's current status instabilities include use of IV fluids, continuous O2/HR monitoring, ongoing GARCIA, and a regression in function from baseline.  PMH is significant for ETOH disorder, CVA, COPD, cardiac arrest. Prior to this admission patient resided with his dght and grandchildren in a 2 level home (2 HETAL; 1st floor set up). Walk in shower, standard toilet, grab bars in shower and around toilet, shower chair, SPC, O2 at night and PRN during day. At his baseline he is I with mobility (SPC PRN) and ADLs.    Patient presents at time of PT evaluation functioning below baseline and currently w/ overall mobility deficits 2* to: impaired balance, decreased endurance, gait deviations, decreased activity tolerance and fall risk. During PT evaluation, patient currently is requiring supervision with increased time for bed mobility, transfers, and ambulation. Patient walked 30 feet x 2, initially utilizing IV pole however encouraged to use HR for improved balance (in absence of availability of SPC today). Patient required standing rest break and distance was limited by GARCIA (desat to 89% on RA). Post evaluation patient seated OOB in chair.     Plan to provide skilled PT services this admission to assess gait with SPC verus RW, improve activity tolerance and balance and trial stairs (2 HETAL). Do not anticipate need for f/u PT at time of d/c, would recommend home with family support at this time. Patient will continue to benefit from continued skilled PT this admission to achieve maximal function and safety.   Goals   Patient Goals to go home   LTG Expiration Date 04/21/24   Long Term Goal #1 1) Perform bed mobility mod-I to participate in frequent repositioning and improve skin integrity; 2) Perform functional transfers mod-I to promote I with toileting and OOB  mobility; 3) Ambulate 150 feet mod-I with least restrictive device to participate in household and community level mobility; 4) Improve balance by 1 grade to reduce risk for falls; 2) Navigate 2 steps S level in order to safely navigate in/out of home   PT Treatment Day 0   Plan   Treatment/Interventions Functional transfer training;Elevations;Endurance training;Patient/family training;Equipment eval/education;Gait training;Bed mobility;Spoke to nursing   PT Frequency 2-3x/wk   Discharge Recommendation   Rehab Resource Intensity Level, PT No post-acute rehabilitation needs  (home w/ family)   Equipment Recommended Cane  (has SPC)   AM-PAC Basic Mobility Inpatient   Turning in Flat Bed Without Bedrails 4   Lying on Back to Sitting on Edge of Flat Bed Without Bedrails 4   Moving Bed to Chair 4   Standing Up From Chair Using Arms 4   Walk in Room 3   Climb 3-5 Stairs With Railing 3   Basic Mobility Inpatient Raw Score 22   Basic Mobility Standardized Score 47.4   [facility] Highest Level Of Mobility   -HLM Goal 7: Walk 25 feet or more   -HLM Achieved 7: Walk 25 feet or more     The patient's AM-PAC Basic Mobility Inpatient Standardized Score is greater than 42.9, suggesting this patient may benefit from discharge to home. Please also refer to the recommendation of the Physical Therapist for safe discharge planning.    Mariangel Boudreaux, PT, DPT

## 2024-04-07 NOTE — PLAN OF CARE
Problem: PHYSICAL THERAPY ADULT  Goal: Performs mobility at highest level of function for planned discharge setting.  See evaluation for individualized goals.  Description: Treatment/Interventions: Functional transfer training, Elevations, Endurance training, Patient/family training, Equipment eval/education, Gait training, Bed mobility, Spoke to nursing  Equipment Recommended: [place] (has SPC)       See flowsheet documentation for full assessment, interventions and recommendations.  Note: Prognosis: Good  Problem List: Decreased endurance, Impaired balance, Decreased mobility  Assessment: PT completed evaluation of 69 y.o. male admitted to [facility] on 4/6/2024 with symptoms of: SOB (recently started albuterol). Diagnosis includes afluter with rapid ventriclar response. Patient is pending EP consult. Patient's current status instabilities include use of IV fluids, continuous O2/HR monitoring, ongoing GARCIA, and a regression in function from baseline.  PMH is significant for ETOH disorder, CVA, COPD, cardiac arrest. Prior to this admission patient resided with his dght and grandchildren in a 2 level home (2 HETAL; 1st floor set up). Walk in shower, standard toilet, grab bars in shower and around toilet, shower chair, SPC, O2 at night and PRN during day. At his baseline he is I with mobility (SPC PRN) and ADLs. Patient presents at time of PT evaluation functioning below baseline and currently w/ overall mobility deficits 2* to: impaired balance, decreased endurance, gait deviations, decreased activity tolerance and fall risk. During PT evaluation, patient currently is requiring supervision with increased time for bed mobility, transfers, and ambulation. Patient walked 30 feet x 2, initially utilizing IV pole however encouraged to use HR for improved balance (in absence of availability of SPC today). Patient required standing rest break and distance was limited by GARCIA (desat to 89% on RA). Post evaluation  patient seated OOB in chair. Plan to provide skilled PT services this admission to assess gait with SPC verus RW, improve activity tolerance and balance and trial stairs (2 HETAL). Do not anticipate need for f/u PT at time of d/c, would recommend home with family support at this time. Patient will continue to benefit from continued skilled PT this admission to achieve maximal function and safety.        Rehab Resource Intensity Level, PT: No post-acute rehabilitation needs (home w/ family)    See flowsheet documentation for full assessment.

## 2024-04-07 NOTE — PLAN OF CARE
Problem: PAIN - ADULT  Goal: Verbalizes/displays adequate comfort level or baseline comfort level  Description: Interventions:  - Encourage patient to monitor pain and request assistance  - Assess pain using appropriate pain scale  - Administer analgesics based on type and severity of pain and evaluate response  - Implement non-pharmacological measures as appropriate and evaluate response  - Consider cultural and social influences on pain and pain management  - Notify physician/advanced practitioner if interventions unsuccessful or patient reports new pain  Outcome: Progressing     Problem: INFECTION - ADULT  Goal: Absence or prevention of progression during hospitalization  Description: INTERVENTIONS:  - Assess and monitor for signs and symptoms of infection  - Monitor lab/diagnostic results  - Monitor all insertion sites, i.e. indwelling lines, tubes, and drains  - Monitor endotracheal if appropriate and nasal secretions for changes in amount and color  - [place] appropriate cooling/warming therapies per order  - Administer medications as ordered  - Instruct and encourage patient and family to use good hand hygiene technique  - Identify and instruct in appropriate isolation precautions for identified infection/condition  Outcome: Progressing  Goal: Absence of fever/infection during neutropenic period  Description: INTERVENTIONS:  - Monitor WBC    Outcome: Progressing

## 2024-04-07 NOTE — ASSESSMENT & PLAN NOTE
Lab Results   Component Value Date    HGBA1C 8.9 (H) 04/06/2024       Recent Labs     04/06/24 2033 04/07/24  0551 04/07/24  1045   POCGLU 213* 91 136       Blood Sugar Average: Last 72 hrs:  (P) [id number]  Sliding scale insulin

## 2024-04-07 NOTE — ASSESSMENT & PLAN NOTE
Patient presented with SOB, initially with atrial flutter with rapid vent response.  Case was discussed with Dr. Rendon from EP with the ED.  Start anticoagulation with heparin gtt   Patient appears rate controlled currently, with occasional bradycardia not able to rate control with meds if tachy  Patient reports tachycardia was exacerbated after albuterol treatment  EP consulted.  Plan for BRTITNEY ablation, either Monday 4/8 or Tuesday 4/9 depending availability    -3

## 2024-04-08 ENCOUNTER — APPOINTMENT (OUTPATIENT)
Dept: NON INVASIVE DIAGNOSTICS | Facility: HOSPITAL | Age: 69
DRG: 274 | End: 2024-04-08
Attending: INTERNAL MEDICINE
Payer: MEDICARE

## 2024-04-08 ENCOUNTER — ANESTHESIA EVENT (INPATIENT)
Dept: NON INVASIVE DIAGNOSTICS | Facility: HOSPITAL | Age: 69
DRG: 274 | End: 2024-04-08
Payer: MEDICARE

## 2024-04-08 ENCOUNTER — PATIENT OUTREACH (OUTPATIENT)
Dept: CASE MANAGEMENT | Facility: OTHER | Age: 69
End: 2024-04-08

## 2024-04-08 LAB
ANION GAP SERPL CALCULATED.3IONS-SCNC: 8 MMOL/L (ref 4–13)
APTT PPP: 112 SECONDS (ref 23–37)
APTT PPP: 74 SECONDS (ref 23–37)
ATRIAL RATE: 105 BPM
ATRIAL RATE: 108 BPM
BUN SERPL-MCNC: 12 MG/DL (ref 5–25)
CALCIUM SERPL-MCNC: 8.8 MG/DL (ref 8.4–10.2)
CHLORIDE SERPL-SCNC: 99 MMOL/L (ref 96–108)
CO2 SERPL-SCNC: 30 MMOL/L (ref 21–32)
CREAT SERPL-MCNC: 0.69 MG/DL (ref 0.6–1.3)
GFR SERPL CREATININE-BSD FRML MDRD: 96 ML/MIN/1.73SQ M
GLUCOSE SERPL-MCNC: 126 MG/DL (ref 65–140)
GLUCOSE SERPL-MCNC: 130 MG/DL (ref 65–140)
GLUCOSE SERPL-MCNC: 144 MG/DL (ref 65–140)
GLUCOSE SERPL-MCNC: 218 MG/DL (ref 65–140)
GLUCOSE SERPL-MCNC: 351 MG/DL (ref 65–140)
GLUCOSE SERPL-MCNC: 368 MG/DL (ref 65–140)
MAGNESIUM SERPL-MCNC: 2.1 MG/DL (ref 1.9–2.7)
P AXIS: -22 DEGREES
P AXIS: -50 DEGREES
POTASSIUM SERPL-SCNC: 4.3 MMOL/L (ref 3.5–5.3)
PR INTERVAL: 183 MS
PR INTERVAL: 192 MS
QRS AXIS: 102 DEGREES
QRS AXIS: 70 DEGREES
QRSD INTERVAL: 108 MS
QRSD INTERVAL: 113 MS
QT INTERVAL: 346 MS
QT INTERVAL: 358 MS
QTC INTERVAL: 464 MS
QTC INTERVAL: 474 MS
SODIUM SERPL-SCNC: 137 MMOL/L (ref 135–147)
T WAVE AXIS: -25 DEGREES
T WAVE AXIS: -5 DEGREES
VENTRICULAR RATE: 105 BPM
VENTRICULAR RATE: 108 BPM

## 2024-04-08 PROCEDURE — 94760 N-INVAS EAR/PLS OXIMETRY 1: CPT

## 2024-04-08 PROCEDURE — 33285 INSJ SUBQ CAR RHYTHM MNTR: CPT | Performed by: INTERNAL MEDICINE

## 2024-04-08 PROCEDURE — 93653 COMPRE EP EVAL TX SVT: CPT | Performed by: INTERNAL MEDICINE

## 2024-04-08 PROCEDURE — C1730 CATH, EP, 19 OR FEW ELECT: HCPCS | Performed by: INTERNAL MEDICINE

## 2024-04-08 PROCEDURE — 93312 ECHO TRANSESOPHAGEAL: CPT

## 2024-04-08 PROCEDURE — 85730 THROMBOPLASTIN TIME PARTIAL: CPT | Performed by: INTERNAL MEDICINE

## 2024-04-08 PROCEDURE — 02583ZZ DESTRUCTION OF CONDUCTION MECHANISM, PERCUTANEOUS APPROACH: ICD-10-PCS | Performed by: INTERNAL MEDICINE

## 2024-04-08 PROCEDURE — 93005 ELECTROCARDIOGRAM TRACING: CPT

## 2024-04-08 PROCEDURE — C1894 INTRO/SHEATH, NON-LASER: HCPCS | Performed by: INTERNAL MEDICINE

## 2024-04-08 PROCEDURE — 76937 US GUIDE VASCULAR ACCESS: CPT | Performed by: INTERNAL MEDICINE

## 2024-04-08 PROCEDURE — 80048 BASIC METABOLIC PNL TOTAL CA: CPT | Performed by: INTERNAL MEDICINE

## 2024-04-08 PROCEDURE — 82948 REAGENT STRIP/BLOOD GLUCOSE: CPT

## 2024-04-08 PROCEDURE — C1764 EVENT RECORDER, CARDIAC: HCPCS | Performed by: INTERNAL MEDICINE

## 2024-04-08 PROCEDURE — 94640 AIRWAY INHALATION TREATMENT: CPT

## 2024-04-08 PROCEDURE — 93010 ELECTROCARDIOGRAM REPORT: CPT | Performed by: INTERNAL MEDICINE

## 2024-04-08 PROCEDURE — C1893 INTRO/SHEATH, FIXED,NON-PEEL: HCPCS | Performed by: INTERNAL MEDICINE

## 2024-04-08 PROCEDURE — 02K83ZZ MAP CONDUCTION MECHANISM, PERCUTANEOUS APPROACH: ICD-10-PCS | Performed by: INTERNAL MEDICINE

## 2024-04-08 PROCEDURE — 83735 ASSAY OF MAGNESIUM: CPT | Performed by: INTERNAL MEDICINE

## 2024-04-08 PROCEDURE — 94002 VENT MGMT INPAT INIT DAY: CPT

## 2024-04-08 PROCEDURE — 0JH632Z INSERTION OF MONITORING DEVICE INTO CHEST SUBCUTANEOUS TISSUE AND FASCIA, PERCUTANEOUS APPROACH: ICD-10-PCS | Performed by: INTERNAL MEDICINE

## 2024-04-08 DEVICE — ICM LNQ22 LINQ II USA
Type: IMPLANTABLE DEVICE | Site: CHEST  WALL | Status: FUNCTIONAL
Brand: LINQ II™

## 2024-04-08 RX ORDER — FENTANYL CITRATE 50 UG/ML
INJECTION, SOLUTION INTRAMUSCULAR; INTRAVENOUS AS NEEDED
Status: DISCONTINUED | OUTPATIENT
Start: 2024-04-08 | End: 2024-04-08

## 2024-04-08 RX ORDER — DEXAMETHASONE SODIUM PHOSPHATE 10 MG/ML
INJECTION, SOLUTION INTRAMUSCULAR; INTRAVENOUS AS NEEDED
Status: DISCONTINUED | OUTPATIENT
Start: 2024-04-08 | End: 2024-04-08

## 2024-04-08 RX ORDER — EPHEDRINE SULFATE 50 MG/ML
INJECTION INTRAVENOUS AS NEEDED
Status: DISCONTINUED | OUTPATIENT
Start: 2024-04-08 | End: 2024-04-08

## 2024-04-08 RX ORDER — SODIUM CHLORIDE 9 MG/ML
20 INJECTION, SOLUTION INTRAVENOUS ONCE
Status: DISCONTINUED | OUTPATIENT
Start: 2024-04-08 | End: 2024-04-10 | Stop reason: HOSPADM

## 2024-04-08 RX ORDER — LIDOCAINE HYDROCHLORIDE 10 MG/ML
INJECTION, SOLUTION EPIDURAL; INFILTRATION; INTRACAUDAL; PERINEURAL CODE/TRAUMA/SEDATION MEDICATION
Status: DISCONTINUED | OUTPATIENT
Start: 2024-04-08 | End: 2024-04-08 | Stop reason: HOSPADM

## 2024-04-08 RX ORDER — CEFAZOLIN SODIUM 1 G/3ML
INJECTION, POWDER, FOR SOLUTION INTRAMUSCULAR; INTRAVENOUS AS NEEDED
Status: DISCONTINUED | OUTPATIENT
Start: 2024-04-08 | End: 2024-04-08

## 2024-04-08 RX ORDER — INSULIN LISPRO 100 [IU]/ML
1-6 INJECTION, SOLUTION INTRAVENOUS; SUBCUTANEOUS
Status: DISCONTINUED | OUTPATIENT
Start: 2024-04-08 | End: 2024-04-10 | Stop reason: HOSPADM

## 2024-04-08 RX ORDER — LIDOCAINE HYDROCHLORIDE 10 MG/ML
INJECTION, SOLUTION EPIDURAL; INFILTRATION; INTRACAUDAL; PERINEURAL AS NEEDED
Status: DISCONTINUED | OUTPATIENT
Start: 2024-04-08 | End: 2024-04-08

## 2024-04-08 RX ORDER — ONDANSETRON 2 MG/ML
INJECTION INTRAMUSCULAR; INTRAVENOUS AS NEEDED
Status: DISCONTINUED | OUTPATIENT
Start: 2024-04-08 | End: 2024-04-08

## 2024-04-08 RX ORDER — PROPOFOL 10 MG/ML
INJECTION, EMULSION INTRAVENOUS AS NEEDED
Status: DISCONTINUED | OUTPATIENT
Start: 2024-04-08 | End: 2024-04-08

## 2024-04-08 RX ORDER — SUCCINYLCHOLINE/SOD CL,ISO/PF 100 MG/5ML
SYRINGE (ML) INTRAVENOUS AS NEEDED
Status: DISCONTINUED | OUTPATIENT
Start: 2024-04-08 | End: 2024-04-08

## 2024-04-08 RX ORDER — ONDANSETRON 2 MG/ML
4 INJECTION INTRAMUSCULAR; INTRAVENOUS ONCE AS NEEDED
Status: DISCONTINUED | OUTPATIENT
Start: 2024-04-08 | End: 2024-04-08 | Stop reason: HOSPADM

## 2024-04-08 RX ORDER — SODIUM CHLORIDE 9 MG/ML
INJECTION, SOLUTION INTRAVENOUS CONTINUOUS PRN
Status: DISCONTINUED | OUTPATIENT
Start: 2024-04-08 | End: 2024-04-08

## 2024-04-08 RX ADMIN — SODIUM CHLORIDE: 0.9 INJECTION, SOLUTION INTRAVENOUS at 12:52

## 2024-04-08 RX ADMIN — LISINOPRIL 40 MG: 20 TABLET ORAL at 08:23

## 2024-04-08 RX ADMIN — BUSPIRONE HYDROCHLORIDE 10 MG: 10 TABLET ORAL at 21:03

## 2024-04-08 RX ADMIN — EPHEDRINE SULFATE 10 MG: 50 INJECTION, SOLUTION INTRAVENOUS at 13:21

## 2024-04-08 RX ADMIN — PROPOFOL 150 MG: 10 INJECTION, EMULSION INTRAVENOUS at 13:04

## 2024-04-08 RX ADMIN — INSULIN LISPRO 6 UNITS: 100 INJECTION, SOLUTION INTRAVENOUS; SUBCUTANEOUS at 21:34

## 2024-04-08 RX ADMIN — FLUTICASONE PROPIONATE 1 SPRAY: 50 SPRAY, METERED NASAL at 08:24

## 2024-04-08 RX ADMIN — APIXABAN 5 MG: 5 TABLET, FILM COATED ORAL at 17:00

## 2024-04-08 RX ADMIN — FENTANYL CITRATE 50 MCG: 50 INJECTION INTRAMUSCULAR; INTRAVENOUS at 14:01

## 2024-04-08 RX ADMIN — FOLIC ACID 1 MG: 1 TABLET ORAL at 08:23

## 2024-04-08 RX ADMIN — THIAMINE HCL TAB 100 MG 100 MG: 100 TAB at 08:23

## 2024-04-08 RX ADMIN — PREDNISONE 40 MG: 20 TABLET ORAL at 08:23

## 2024-04-08 RX ADMIN — ALBUTEROL SULFATE 2 PUFF: 90 AEROSOL, METERED RESPIRATORY (INHALATION) at 00:03

## 2024-04-08 RX ADMIN — PANTOPRAZOLE SODIUM 40 MG: 40 TABLET, DELAYED RELEASE ORAL at 08:23

## 2024-04-08 RX ADMIN — BUSPIRONE HYDROCHLORIDE 10 MG: 10 TABLET ORAL at 08:23

## 2024-04-08 RX ADMIN — ONDANSETRON 4 MG: 2 INJECTION INTRAMUSCULAR; INTRAVENOUS at 13:26

## 2024-04-08 RX ADMIN — DEXAMETHASONE SODIUM PHOSPHATE 10 MG: 10 INJECTION, SOLUTION INTRAMUSCULAR; INTRAVENOUS at 13:04

## 2024-04-08 RX ADMIN — LORAZEPAM 0.5 MG: 0.5 TABLET ORAL at 00:04

## 2024-04-08 RX ADMIN — CEFAZOLIN 2000 MG: 1 INJECTION, POWDER, FOR SOLUTION INTRAMUSCULAR; INTRAVENOUS at 13:15

## 2024-04-08 RX ADMIN — BUDESONIDE 0.5 MG: 0.5 INHALANT RESPIRATORY (INHALATION) at 19:28

## 2024-04-08 RX ADMIN — HEPARIN SODIUM 11.1 UNITS/KG/HR: 10000 INJECTION, SOLUTION INTRAVENOUS at 09:59

## 2024-04-08 RX ADMIN — INSULIN GLARGINE 30 UNITS: 100 INJECTION, SOLUTION SUBCUTANEOUS at 21:03

## 2024-04-08 RX ADMIN — Medication 100 MG: at 13:04

## 2024-04-08 RX ADMIN — PHENYLEPHRINE HYDROCHLORIDE 50 MCG/MIN: 10 INJECTION INTRAVENOUS at 13:21

## 2024-04-08 RX ADMIN — LIDOCAINE HYDROCHLORIDE 50 MG: 10 INJECTION, SOLUTION EPIDURAL; INFILTRATION; INTRACAUDAL; PERINEURAL at 13:04

## 2024-04-08 RX ADMIN — FENTANYL CITRATE 50 MCG: 50 INJECTION INTRAMUSCULAR; INTRAVENOUS at 13:04

## 2024-04-08 RX ADMIN — BUDESONIDE 0.5 MG: 0.5 INHALANT RESPIRATORY (INHALATION) at 06:45

## 2024-04-08 RX ADMIN — UMECLIDINIUM 1 PUFF: 62.5 AEROSOL, POWDER ORAL at 08:24

## 2024-04-08 RX ADMIN — LEVALBUTEROL HYDROCHLORIDE 1.25 MG: 1.25 SOLUTION RESPIRATORY (INHALATION) at 06:45

## 2024-04-08 RX ADMIN — FLUTICASONE PROPIONATE 1 SPRAY: 50 SPRAY, METERED NASAL at 17:01

## 2024-04-08 RX ADMIN — ASPIRIN 81 MG CHEWABLE TABLET 81 MG: 81 TABLET CHEWABLE at 08:23

## 2024-04-08 RX ADMIN — BUSPIRONE HYDROCHLORIDE 10 MG: 10 TABLET ORAL at 17:15

## 2024-04-08 RX ADMIN — PANTOPRAZOLE SODIUM 40 MG: 40 TABLET, DELAYED RELEASE ORAL at 17:00

## 2024-04-08 RX ADMIN — EPHEDRINE SULFATE 10 MG: 50 INJECTION, SOLUTION INTRAVENOUS at 13:08

## 2024-04-08 RX ADMIN — Medication 3 MG: at 21:04

## 2024-04-08 RX ADMIN — INSULIN LISPRO 1 UNITS: 100 INJECTION, SOLUTION INTRAVENOUS; SUBCUTANEOUS at 17:14

## 2024-04-08 RX ADMIN — LEVALBUTEROL HYDROCHLORIDE 1.25 MG: 1.25 SOLUTION RESPIRATORY (INHALATION) at 19:28

## 2024-04-08 RX ADMIN — FERROUS SULFATE TAB 325 MG (65 MG ELEMENTAL FE) 325 MG: 325 (65 FE) TAB at 08:23

## 2024-04-08 RX ADMIN — ATORVASTATIN CALCIUM 80 MG: 80 TABLET, FILM COATED ORAL at 17:15

## 2024-04-08 NOTE — PROGRESS NOTES
Referral received via email. Referred to High Utilizer Care Plan Committee on 4/7/24 to be reviewed for careplan.  Will await determination of case review.   Chart review completed.  Per chart review patient has an active episode open with [facility] and family has been engaging with [facility] CM RN..

## 2024-04-08 NOTE — PHYSICAL THERAPY NOTE
Physical Therapy Cancellation Note         04/08/24 8925   PT Last Visit   PT Visit Date 04/08/24   Note Type   Note Type Cancelled Session   Cancel Reasons Patient off floor/test  (Patient off floor/in PACU at this time. PT will continue to follow.)       AMRIK JEAN PT, DPT

## 2024-04-08 NOTE — ANESTHESIA POSTPROCEDURE EVALUATION
Post-Op Assessment Note    CV Status:  Stable  Pain Score: 0    Pain management: adequate       Mental Status:  Alert and awake   Hydration Status:  Euvolemic   PONV Controlled:  Controlled   Airway Patency:  Patent     Post Op Vitals Reviewed: Yes      Staff: CRNA               /64 (04/08/24 1530)    Temp      Pulse 104 (04/08/24 1530)   Resp 18 (04/08/24 1530)    SpO2 92 % (04/08/24 1530)

## 2024-04-08 NOTE — PLAN OF CARE
Problem: PAIN - ADULT  Goal: Verbalizes/displays adequate comfort level or baseline comfort level  Description: Interventions:  - Encourage patient to monitor pain and request assistance  - Assess pain using appropriate pain scale  - Administer analgesics based on type and severity of pain and evaluate response  - Implement non-pharmacological measures as appropriate and evaluate response  - Consider cultural and social influences on pain and pain management  - Notify physician/advanced practitioner if interventions unsuccessful or patient reports new pain  Outcome: Progressing     Problem: INFECTION - ADULT  Goal: Absence or prevention of progression during hospitalization  Description: INTERVENTIONS:  - Assess and monitor for signs and symptoms of infection  - Monitor lab/diagnostic results  - Monitor all insertion sites, i.e. indwelling lines, tubes, and drains  - Monitor endotracheal if appropriate and nasal secretions for changes in amount and color  - [place] appropriate cooling/warming therapies per order  - Administer medications as ordered  - Instruct and encourage patient and family to use good hand hygiene technique  - Identify and instruct in appropriate isolation precautions for identified infection/condition  Outcome: Progressing  Goal: Absence of fever/infection during neutropenic period  Description: INTERVENTIONS:  - Monitor WBC    Outcome: Progressing

## 2024-04-08 NOTE — QUICK NOTE
Patient attempted to be seen, however has been off the floor for the duration of the day in cardiac cath lab.

## 2024-04-08 NOTE — DISCHARGE INSTR - AVS FIRST PAGE
PLEASE NOTE THE FOLLOWING MEDICATION RECOMMENDATIONS:  - take Eliquis 5 mg twice daily for 30 days, at which time it can be discontinued (PLEASE  ONE MONTH FREE SUPPLY AT [facility] UPON DISCHARGE)  - discontinue metoprolol          ABLATION RESTRICTIONS:  No heavy lifting or strenuous activity for one week.    No soaking in a bath tub/hot tub/swimming pool for one week or until groin heals. You may shower - please let soap and water run over the groins, no scrubbing, and pat the area dry. Please place band-aid on groins daily for up to five days, but you may remove sooner if no issues are noted.     If you notice ongoing bleeding, swelling, or firm lumps in groin near ablation incision, please contact Dr. Blum's office - [phone].      LOOP RECORDER RESTRICTIONS:  Keep loop recorder incision dry for one week. If site is glued, then you may shower normally (as the glue is waterproof). If not, please keep the area covered with a waterproof bandage and dry while bathing for the first week, after which you may get the site wet in the shower and pat dry afterwards.     Do not use lotions/powders/creams on incision. Remove outer bandage 48 hours after procedure. Any sutures present will be removed at your 2 week follow up appointment. Please call the office if you notice redness, swelling, bleeding, or drainage from incision or if you develop fevers - [phone].    Cardiac Loop Recorder Insertion      WHAT YOU SHOULD KNOW:    A cardiac loop recorder is a device used to diagnose heart rhythm problems, such as a fast or irregular heartbeat. It is implanted in your left chest, just under the skin. The device records a pattern of your heart's rhythm, called an EKG. Your device records automatic EKGs, depending on how your caregiver programs it. You may also receive a handheld controller. You press a button on the controller when you have symptoms, such as dizziness, lightheadedness, or palpitations.  The device will record an EKG at that moment. The recording can help your caregiver see if your symptoms may be caused by heart rhythm problems. Your caregiver will remove the device after it has collected enough data. You may need the device for up to 3 years. The procedure to remove the device is similar to the procedure used to implant it.      AFTER YOU LEAVE:    Follow up with your cardiologist as directed: You will need to return in 1 to 2 weeks. Your cardiologist will check your incision. He may also program your device settings again. He will retrieve data from the device every 1 to 3 months with a monitor held over your skin. You may be able to transmit data from your device from home as well. You will do this by calling a number provided by your cardiologist, or as they have instructed you. Ask for information about this process. Write down your questions so you remember to ask them during your visits.      Wound care: Keep loop recorder incision dry for one week. Do not use lotions/powders/creams on incision. Remove outer bandage 48 hours after procedure - leave underlying steri-strips in place, they will either fall off on their own or will be removed at 2 week follow up appointment. Please call the office if you notice redness, swelling, bleeding, or drainage from incision or if you develop fevers. After that first week, carefully wash your incision with soap and water. Keep the area clean and dry until it heals.      Return to activity: If you received anesthesia, you will not be able to drive for 24 hours. Otherwise, most people can return to normal activities soon after the procedure. Your cardiologist may want to know if your work involves electrical current or high-voltage equipment. Ask about other electrical items that could interfere with your cardiac loop recorder.      Contact your cardiologist if:   You have a fever or chills.    Your wound is red, swollen, or draining pus.    You have  questions or concerns about your condition or care.    Seek care immediately or call 911 if:   You feel weak, dizzy, or faint.    You lose consciousness.      © 2014 [employer] Information is for End User's use only and may not be sold, redistributed or otherwise used for commercial purposes. All illustrations and images included in CareNotes® are the copyrighted property of [employer]. or [employer].    The above information is an  only. It is not intended as medical advice for individual conditions or treatments. Talk to your doctor, nurse or pharmacist before following any medical regimen to see if it is safe and effective for you.

## 2024-04-09 ENCOUNTER — APPOINTMENT (INPATIENT)
Dept: NON INVASIVE DIAGNOSTICS | Facility: HOSPITAL | Age: 69
DRG: 274 | End: 2024-04-09
Attending: INTERNAL MEDICINE
Payer: MEDICARE

## 2024-04-09 LAB
ANION GAP SERPL CALCULATED.3IONS-SCNC: 9 MMOL/L (ref 4–13)
AORTIC ROOT: 3.9 CM
AORTIC VALVE ANNULUS: 2.8 CM
AORTIC VALVE MEAN VELOCITY: 7 M/S
APICAL FOUR CHAMBER EJECTION FRACTION: 68 %
ASCENDING AORTA: 4.1 CM
ATRIAL RATE: 90 BPM
AV AREA BY CONTINUOUS VTI: 5.1 CM2
AV AREA PEAK VELOCITY: 5.2 CM2
AV LVOT MEAN GRADIENT: 2 MMHG
AV LVOT PEAK GRADIENT: 3 MMHG
AV MEAN GRADIENT: 2 MMHG
AV PEAK GRADIENT: 4 MMHG
AV VALVE AREA: 5.09 CM2
AV VELOCITY RATIO: 0.91
BSA FOR ECHO PROCEDURE: 2.24 M2
BUN SERPL-MCNC: 14 MG/DL (ref 5–25)
CALCIUM SERPL-MCNC: 8.4 MG/DL (ref 8.4–10.2)
CHLORIDE SERPL-SCNC: 101 MMOL/L (ref 96–108)
CO2 SERPL-SCNC: 27 MMOL/L (ref 21–32)
CREAT SERPL-MCNC: 0.58 MG/DL (ref 0.6–1.3)
DOP CALC AO PEAK VEL: 1 M/S
DOP CALC AO VTI: 23.93 CM
DOP CALC LVOT AREA: 5.72 CM2
DOP CALC LVOT CARDIAC INDEX: 3.57 L/MIN/M2
DOP CALC LVOT CARDIAC OUTPUT: 8 L/MIN
DOP CALC LVOT DIAMETER: 2.7 CM
DOP CALC LVOT PEAK VEL VTI: 21.29 CM
DOP CALC LVOT PEAK VEL: 0.91 M/S
DOP CALC LVOT STROKE INDEX: 53.1 ML/M2
DOP CALC LVOT STROKE VOLUME: 121.84
E WAVE DECELERATION TIME: 252 MS
E/A RATIO: 1.16
ERYTHROCYTE [DISTWIDTH] IN BLOOD BY AUTOMATED COUNT: 19 % (ref 11.6–15.1)
FRACTIONAL SHORTENING: 32 (ref 28–44)
GFR SERPL CREATININE-BSD FRML MDRD: 103 ML/MIN/1.73SQ M
GLUCOSE SERPL-MCNC: 130 MG/DL (ref 65–140)
GLUCOSE SERPL-MCNC: 136 MG/DL (ref 65–140)
GLUCOSE SERPL-MCNC: 182 MG/DL (ref 65–140)
GLUCOSE SERPL-MCNC: 263 MG/DL (ref 65–140)
GLUCOSE SERPL-MCNC: 308 MG/DL (ref 65–140)
HCT VFR BLD AUTO: 35.3 % (ref 36.5–49.3)
HGB BLD-MCNC: 10.5 G/DL (ref 12–17)
INR PPP: 0.98 (ref 0.84–1.19)
INTERVENTRICULAR SEPTUM IN DIASTOLE (PARASTERNAL SHORT AXIS VIEW): 1.6 CM
INTERVENTRICULAR SEPTUM: 1.6 CM (ref 0.6–1.1)
IVC: 2.1 MM
LAAS-AP2: 22.1 CM2
LAAS-AP4: 32.9 CM2
LEFT ATRIUM SIZE: 4.5 CM
LEFT ATRIUM VOLUME (MOD BIPLANE): 104 ML
LEFT ATRIUM VOLUME INDEX (MOD BIPLANE): 46.4 ML/M2
LEFT INTERNAL DIMENSION IN SYSTOLE: 3.2 CM (ref 2.1–4)
LEFT VENTRICULAR INTERNAL DIMENSION IN DIASTOLE: 4.7 CM (ref 3.5–6)
LEFT VENTRICULAR POSTERIOR WALL IN END DIASTOLE: 1.2 CM
LEFT VENTRICULAR STROKE VOLUME: 61 ML
LVSV (TEICH): 61 ML
MCH RBC QN AUTO: 23.9 PG (ref 26.8–34.3)
MCHC RBC AUTO-ENTMCNC: 29.7 G/DL (ref 31.4–37.4)
MCV RBC AUTO: 80 FL (ref 82–98)
MV E'TISSUE VEL-LAT: 9 CM/S
MV E'TISSUE VEL-SEP: 9 CM/S
MV PEAK A VEL: 0.89 M/S
MV PEAK E VEL: 103 CM/S
MV STENOSIS PRESSURE HALF TIME: 73 MS
MV VALVE AREA P 1/2 METHOD: 3.01
PLATELET # BLD AUTO: 328 THOUSANDS/UL (ref 149–390)
PMV BLD AUTO: 10.3 FL (ref 8.9–12.7)
POTASSIUM SERPL-SCNC: 4.3 MMOL/L (ref 3.5–5.3)
PR INTERVAL: 200 MS
PROTHROMBIN TIME: 12.9 SECONDS (ref 11.6–14.5)
QRS AXIS: 24 DEGREES
QRSD INTERVAL: 110 MS
QT INTERVAL: 376 MS
QTC INTERVAL: 459 MS
RA PRESSURE ESTIMATED: 3 MMHG
RBC # BLD AUTO: 4.4 MILLION/UL (ref 3.88–5.62)
RIGHT ATRIUM AREA SYSTOLE A4C: 18.8 CM2
RIGHT VENTRICLE ID DIMENSION: 4 CM
RV PSP: 10 MMHG
SINOTUBULAR JUNCTION: 3.5 CM
SL CV LEFT ATRIUM LENGTH A2C: 5.3 CM
SL CV LV EF: 60
SL CV PED ECHO LEFT VENTRICLE DIASTOLIC VOLUME (MOD BIPLANE) 2D: 102 ML
SL CV PED ECHO LEFT VENTRICLE SYSTOLIC VOLUME (MOD BIPLANE) 2D: 41 ML
SL CV SINUS OF VALSALVA 2D: 3.9 CM
SODIUM SERPL-SCNC: 137 MMOL/L (ref 135–147)
STJ: 3.5 CM
T WAVE AXIS: 2 DEGREES
TR MAX PG: 7 MMHG
TR PEAK VELOCITY: 1.3 M/S
TRICUSPID ANNULAR PLANE SYSTOLIC EXCURSION: 1.9 CM
TRICUSPID VALVE PEAK REGURGITATION VELOCITY: 1.29 M/S
VENTRICULAR RATE: 90 BPM
WBC # BLD AUTO: 8 THOUSAND/UL (ref 4.31–10.16)

## 2024-04-09 PROCEDURE — 99232 SBSQ HOSP IP/OBS MODERATE 35: CPT | Performed by: INTERNAL MEDICINE

## 2024-04-09 PROCEDURE — 99232 SBSQ HOSP IP/OBS MODERATE 35: CPT | Performed by: PHYSICIAN ASSISTANT

## 2024-04-09 PROCEDURE — 82948 REAGENT STRIP/BLOOD GLUCOSE: CPT

## 2024-04-09 PROCEDURE — 94760 N-INVAS EAR/PLS OXIMETRY 1: CPT

## 2024-04-09 PROCEDURE — 85027 COMPLETE CBC AUTOMATED: CPT | Performed by: INTERNAL MEDICINE

## 2024-04-09 PROCEDURE — 93010 ELECTROCARDIOGRAM REPORT: CPT | Performed by: INTERNAL MEDICINE

## 2024-04-09 PROCEDURE — 93306 TTE W/DOPPLER COMPLETE: CPT

## 2024-04-09 PROCEDURE — 93306 TTE W/DOPPLER COMPLETE: CPT | Performed by: INTERNAL MEDICINE

## 2024-04-09 PROCEDURE — 97110 THERAPEUTIC EXERCISES: CPT

## 2024-04-09 PROCEDURE — 85610 PROTHROMBIN TIME: CPT | Performed by: INTERNAL MEDICINE

## 2024-04-09 PROCEDURE — 94660 CPAP INITIATION&MGMT: CPT

## 2024-04-09 PROCEDURE — 97116 GAIT TRAINING THERAPY: CPT

## 2024-04-09 PROCEDURE — 80048 BASIC METABOLIC PNL TOTAL CA: CPT | Performed by: INTERNAL MEDICINE

## 2024-04-09 PROCEDURE — 94640 AIRWAY INHALATION TREATMENT: CPT

## 2024-04-09 RX ADMIN — LISINOPRIL 40 MG: 20 TABLET ORAL at 11:38

## 2024-04-09 RX ADMIN — BUSPIRONE HYDROCHLORIDE 10 MG: 10 TABLET ORAL at 08:13

## 2024-04-09 RX ADMIN — INSULIN LISPRO 2 UNITS: 100 INJECTION, SOLUTION INTRAVENOUS; SUBCUTANEOUS at 16:39

## 2024-04-09 RX ADMIN — THIAMINE HCL TAB 100 MG 100 MG: 100 TAB at 08:14

## 2024-04-09 RX ADMIN — FOLIC ACID 1 MG: 1 TABLET ORAL at 08:14

## 2024-04-09 RX ADMIN — FERROUS SULFATE TAB 325 MG (65 MG ELEMENTAL FE) 325 MG: 325 (65 FE) TAB at 08:13

## 2024-04-09 RX ADMIN — FLUTICASONE PROPIONATE 1 SPRAY: 50 SPRAY, METERED NASAL at 17:03

## 2024-04-09 RX ADMIN — PANTOPRAZOLE SODIUM 40 MG: 40 TABLET, DELAYED RELEASE ORAL at 08:13

## 2024-04-09 RX ADMIN — INSULIN GLARGINE 30 UNITS: 100 INJECTION, SOLUTION SUBCUTANEOUS at 20:15

## 2024-04-09 RX ADMIN — BUSPIRONE HYDROCHLORIDE 10 MG: 10 TABLET ORAL at 16:38

## 2024-04-09 RX ADMIN — LEVALBUTEROL HYDROCHLORIDE 1.25 MG: 1.25 SOLUTION RESPIRATORY (INHALATION) at 07:59

## 2024-04-09 RX ADMIN — BUDESONIDE 0.5 MG: 0.5 INHALANT RESPIRATORY (INHALATION) at 20:12

## 2024-04-09 RX ADMIN — APIXABAN 5 MG: 5 TABLET, FILM COATED ORAL at 17:03

## 2024-04-09 RX ADMIN — BUSPIRONE HYDROCHLORIDE 10 MG: 10 TABLET ORAL at 20:16

## 2024-04-09 RX ADMIN — ASPIRIN 81 MG CHEWABLE TABLET 81 MG: 81 TABLET CHEWABLE at 08:13

## 2024-04-09 RX ADMIN — BUDESONIDE 0.5 MG: 0.5 INHALANT RESPIRATORY (INHALATION) at 07:59

## 2024-04-09 RX ADMIN — APIXABAN 5 MG: 5 TABLET, FILM COATED ORAL at 08:13

## 2024-04-09 RX ADMIN — ATORVASTATIN CALCIUM 80 MG: 80 TABLET, FILM COATED ORAL at 16:38

## 2024-04-09 RX ADMIN — UMECLIDINIUM 1 PUFF: 62.5 AEROSOL, POWDER ORAL at 08:21

## 2024-04-09 RX ADMIN — INSULIN LISPRO 1 UNITS: 100 INJECTION, SOLUTION INTRAVENOUS; SUBCUTANEOUS at 11:37

## 2024-04-09 RX ADMIN — FLUTICASONE PROPIONATE 1 SPRAY: 50 SPRAY, METERED NASAL at 08:22

## 2024-04-09 RX ADMIN — LEVALBUTEROL HYDROCHLORIDE 1.25 MG: 1.25 SOLUTION RESPIRATORY (INHALATION) at 20:12

## 2024-04-09 RX ADMIN — PREDNISONE 40 MG: 20 TABLET ORAL at 08:13

## 2024-04-09 RX ADMIN — PANTOPRAZOLE SODIUM 40 MG: 40 TABLET, DELAYED RELEASE ORAL at 17:03

## 2024-04-09 RX ADMIN — INSULIN LISPRO 4 UNITS: 100 INJECTION, SOLUTION INTRAVENOUS; SUBCUTANEOUS at 20:16

## 2024-04-09 NOTE — PLAN OF CARE
Problem: PHYSICAL THERAPY ADULT  Goal: Performs mobility at highest level of function for planned discharge setting.  See evaluation for individualized goals.  Description: Treatment/Interventions: Functional transfer training, Elevations, Endurance training, Patient/family training, Equipment eval/education, Gait training, Bed mobility, Spoke to nursing  Equipment Recommended: [place] (has SPC)       See flowsheet documentation for full assessment, interventions and recommendations.  Outcome: Progressing  Note: Prognosis: Good  Problem List: Decreased endurance, Impaired balance, Decreased mobility  Assessment: Patient was received supine  in bed. Patient was agreeable to therapy services today. PT session focused on gait and exercises today in order to improve functional mobility and independence. Functional mobility was performed as described above.  Pt was eager to participate in therapy services this date. Pt did not require any hands on assist throughout therapy session. Pt displayed shortness of breath during ambulation and SpO2 dropped to approximately 80%. Pt deferred seated rest and was able to continue ambulation. Upon return to room, pt was seated in bedside recliner and led through several seated exercises for BLE strengthening and to reduce risk of muscle atrophy. Pt denies pain throughout therapy session. Patient will benefit from continued PT services while in hospital in order to address remaining limitations. The patients AM-PAC Basic Mobility Inpatient Short From Raw Score is 23 .  Based on AM-PAC scoring and patient presentation, PT currently recommending No Post Acute Rehab Needs. Please also refer to the recommendation of the Physical Therapist for safe discharge planning.        Rehab Resource Intensity Level, PT: No post-acute rehabilitation needs    See flowsheet documentation for full assessment.

## 2024-04-09 NOTE — ASSESSMENT & PLAN NOTE
Lab Results   Component Value Date    HGBA1C 8.9 (H) 04/06/2024       Recent Labs     04/08/24  2102 04/08/24  2117 04/09/24  0635 04/09/24  1026   POCGLU 368* 351* 130 182*         Blood Sugar Average: Last 72 hrs:  (P) 215.4087502451575920  Sliding scale insulin

## 2024-04-09 NOTE — PROGRESS NOTES
[facility]  Progress Note  Name: Lucho Talavera I  MRN: [id number]  [facility] Date of Admission: 4/6/2024   Date of Service: 4/9/2024 I Hospital Day: 3    Assessment/Plan   Leukocytosis  Assessment & Plan  Mild in the setting of recent steroid regimen started on the fourth  Now resolved     COPD (chronic obstructive pulmonary disease) (HCC)  Assessment & Plan  Bronchodilator therapy  Patient was recently started on Zithromax and prednisone 2 days ago at [facility] ED.  He presented there with suspected COPD exacerbation with possible infection..He was sent home on Zithromax and prednisone -- continue to complete course     Hypertension  Assessment & Plan  Continue ACE-I  BP is controlled over the past 24 hours    ETOH abuse  Assessment & Plan  Will check echo to rule out cardiomyopathy in the setting of tachyarrhythmia-currently pending read  CIWA protocol    Type 2 diabetes mellitus with hyperglycemia, without long-term current use of insulin (HCC)  Assessment & Plan  Lab Results   Component Value Date    HGBA1C 8.9 (H) 04/06/2024       Recent Labs     04/08/24  2102 04/08/24  2117 04/09/24  0635 04/09/24  1026   POCGLU 368* 351* 130 182*         Blood Sugar Average: Last 72 hrs:  (P) 215.9751438386795337  Sliding scale insulin    * Atrial flutter with rapid ventricular response (HCC)  Assessment & Plan  Patient presented with SOB, initially with atrial flutter with rapid vent response.  Case was discussed with Dr. Rendon from EP with the ED.  Start anticoagulation with heparin gtt   Patient appears rate controlled currently, with occasional bradycardia not able to rate control with meds if tachy  Patient reports tachycardia was exacerbated after albuterol treatment  EP consulted.  Underwent BRITTNEY ablation, now cleared for discharge by electrophysiology, maintaining NSR               VTE Pharmacologic Prophylaxis:   High Risk (Score >/= 5) - Pharmacological DVT  Prophylaxis Ordered: apixaban (Eliquis). Sequential Compression Devices Ordered.    Mobility:   Basic Mobility Inpatient Raw Score: 23  JH-HLM Goal: 7: Walk 25 feet or more  JH-HLM Achieved: 7: Walk 25 feet or more  JH-HLM Goal achieved. Continue to encourage appropriate mobility.    Patient Centered Rounds: I performed bedside rounds with nursing staff today.   Discussions with Specialists or Other Care Team Provider:     Education and Discussions with Family / Patient:  unable to reach daughter.     Total Time Spent on Date of Encounter in care of patient:  mins. This time was spent on one or more of the following: performing physical exam; counseling and coordination of care; obtaining or reviewing history; documenting in the medical record; reviewing/ordering tests, medications or procedures; communicating with other healthcare professionals and discussing with patient's family/caregivers.    Current Length of Stay: 3 day(s)  Current Patient Status: Inpatient   Certification Statement: The patient will continue to require additional inpatient hospital stay due to dc tmorrow  Discharge Plan: Anticipate discharge tomorrow to home.    Code Status: Level 1 - Full Code    Subjective:   Patient denies any acute complaints today    Objective:     Vitals:   Temp (24hrs), Av.6 °F (36.4 °C), Min:97 °F (36.1 °C), Max:98.1 °F (36.7 °C)    Temp:  [97 °F (36.1 °C)-98.1 °F (36.7 °C)] 98.1 °F (36.7 °C)  HR:  [] 66  Resp:  [12-18] 18  BP: ()/(53-87) 113/53  SpO2:  [92 %-98 %] 95 %  Body mass index is 29.03 kg/m².     Input and Output Summary (last 24 hours):     Intake/Output Summary (Last 24 hours) at 2024 1435  Last data filed at 2024 1431  Gross per 24 hour   Intake 3282 ml   Output 2250 ml   Net 1032 ml       Physical Exam:   Physical Exam  Vitals and nursing note reviewed.   Constitutional:       General: He is not in acute distress.     Appearance: He is well-developed. He is not toxic-appearing or  diaphoretic.   HENT:      Head: Normocephalic and atraumatic.   Eyes:      General: No scleral icterus.     Conjunctiva/sclera: Conjunctivae normal.   Cardiovascular:      Rate and Rhythm: Normal rate and regular rhythm.      Heart sounds: No murmur heard.     No friction rub. No gallop.   Pulmonary:      Effort: Pulmonary effort is normal. No respiratory distress.      Breath sounds: Normal breath sounds. No stridor. No wheezing, rhonchi or rales.   Chest:      Chest wall: No tenderness.   Abdominal:      General: There is no distension.      Palpations: Abdomen is soft. There is no mass.      Tenderness: There is no abdominal tenderness. There is no guarding or rebound.      Hernia: No hernia is present.   Musculoskeletal:         General: No swelling or tenderness.      Cervical back: Neck supple.   Skin:     General: Skin is warm and dry.      Capillary Refill: Capillary refill takes less than 2 seconds.   Neurological:      Mental Status: He is alert and oriented to person, place, and time.   Psychiatric:         Mood and Affect: Mood normal.          Additional Data:     Labs:  Results from last 7 days   Lab Units 04/09/24  0519 04/07/24  0610   WBC Thousand/uL 8.00 8.98   HEMOGLOBIN g/dL 10.5* 11.2*   HEMATOCRIT % 35.3* 37.8   PLATELETS Thousands/uL 328 348   SEGS PCT %  --  52   LYMPHO PCT %  --  32   MONO PCT %  --  11   EOS PCT %  --  4     Results from last 7 days   Lab Units 04/09/24  0519 04/08/24  0403 04/07/24  0610   SODIUM mmol/L 137   < > 135   POTASSIUM mmol/L 4.3   < > 3.8   CHLORIDE mmol/L 101   < > 101   CO2 mmol/L 27   < > 30   BUN mg/dL 14   < > 13   CREATININE mg/dL 0.58*   < > 0.57*   ANION GAP mmol/L 9   < > 4   CALCIUM mg/dL 8.4   < > 8.5   ALBUMIN g/dL  --   --  3.6   TOTAL BILIRUBIN mg/dL  --   --  0.55   ALK PHOS U/L  --   --  49   ALT U/L  --   --  14   AST U/L  --   --  15   GLUCOSE RANDOM mg/dL 136   < > 85    < > = values in this interval not displayed.     Results from last 7 days    Lab Units 04/09/24  0519   INR  0.98     Results from last 7 days   Lab Units 04/09/24  1026 04/09/24  0635 04/08/24  2117 04/08/24  2102 04/08/24  1548 04/08/24  1041 04/08/24  0602 04/07/24  2108 04/07/24  1528 04/07/24  1045 04/07/24  0551 04/06/24  2033   POC GLUCOSE mg/dl 182* 130 351* 368* 218* 130 126 313* 327* 136 91 213*     Results from last 7 days   Lab Units 04/06/24  2314   HEMOGLOBIN A1C % 8.9*           Lines/Drains:  Invasive Devices       Peripheral Intravenous Line  Duration             Peripheral IV 04/06/24 Distal;Left;Ventral (anterior) Forearm 3 days    Peripheral IV 04/08/24 Dorsal (posterior);Right Forearm 1 day                      Telemetry:  Telemetry Orders (From admission, onward)               24 Hour Telemetry Monitoring  Continuous x 24 Hours (Telem)        Question:  Reason for 24 Hour Telemetry  Answer:  Arrhythmias requiring acute medical intervention / PPM or ICD malfunction                                Imaging: No pertinent imaging reviewed.    Recent Cultures (last 7 days):         Last 24 Hours Medication List:   Current Facility-Administered Medications   Medication Dose Route Frequency Provider Last Rate    acetaminophen  650 mg Oral Q6H PRN Juan Cordova MD      albuterol  2 puff Inhalation Q6H PRN Juan Cordova MD      apixaban  5 mg Oral BID Juan Cordova MD      aspirin  81 mg Oral Daily Juan Cordova MD      atorvastatin  80 mg Oral Daily With Dinner Juan Cordova MD      budesonide  0.5 mg Nebulization Q12H Juan Cordova MD      busPIRone  10 mg Oral TID Juan Cordova MD      ferrous sulfate  325 mg Oral Daily With Breakfast Juan Cordova MD      fluticasone  1 spray Nasal BID Juan Cordova MD      folic acid  1 mg Oral Daily Juan Cordova MD      insulin glargine  30 Units Subcutaneous HS Juan Cordova MD      insulin lispro  1-5 Units Subcutaneous TID AC Juan Cordova MD      insulin lispro  1-6 Units Subcutaneous HS Yue Oakley PA-C      levalbuterol   1.25 mg Nebulization BID Juan Cordova MD      lisinopril  40 mg Oral Daily Juan Cordova MD      LORazepam  0.5 mg Oral Q6H PRN Juan Cordova MD      melatonin  3 mg Oral HS PRN Juan Cordova MD      ondansetron  4 mg Intravenous Q6H PRN Juan Cordova MD      pantoprazole  40 mg Oral BID Juan Cordova MD      predniSONE  40 mg Oral Daily Juan Cordova MD      sodium chloride  20 mL/hr Intravenous Once Juan Cordova MD      thiamine  100 mg Oral Daily Juan Cordova MD      umeclidinium  1 puff Inhalation Daily Juan Cordova MD          Today, Patient Was Seen By: Saqib Valle DO    **Please Note: This note may have been constructed using a voice recognition system.**

## 2024-04-09 NOTE — PHYSICAL THERAPY NOTE
PHYSICAL THERAPY NOTE          Patient Name: Lucho Talavera  Today's Date: 4/9/2024 04/09/24 0903   PT Last Visit   PT Visit Date 04/09/24   Note Type   Note Type Treatment   Pain Assessment   Pain Assessment Tool 0-10   Pain Score No Pain   Restrictions/Precautions   Weight Bearing Precautions Per Order No   Other Precautions Fall Risk   General   Chart Reviewed Yes   Response to Previous Treatment Patient with no complaints from previous session.   Family/Caregiver Present No   Cognition   Overall Cognitive Status WFL   Arousal/Participation Alert;Cooperative   Attention Within functional limits   Orientation Level Oriented X4   Memory Within functional limits   Following Commands Follows one step commands without difficulty   Subjective   Subjective pt pleasant and cooperative throughout therapy session. pt received supine in bed   Bed Mobility   Supine to Sit 5  Supervision   Additional items Increased time required;Verbal cues   Sit to Supine Unable to assess   Transfers   Sit to Stand 5  Supervision   Additional items Increased time required;Verbal cues   Stand to Sit 5  Supervision   Additional items Increased time required;Verbal cues   Additional Comments transfers w RW   Ambulation/Elevation   Gait pattern Improper Weight shift;Narrow TEO;Forward Flexion;Decreased foot clearance;Shuffling;Short stride;Excessively slow   Gait Assistance 5  Supervision   Additional items Verbal cues   Assistive Device Rolling walker   Distance 180'   Stair Management Assistance Not tested   Balance   Static Sitting Good   Dynamic Sitting Fair +   Static Standing Fair +   Dynamic Standing Fair   Ambulatory Fair   Endurance Deficit   Endurance Deficit Yes   Endurance Deficit Description decreased exercise tolerance   Activity Tolerance   Activity Tolerance Patient limited by fatigue   Nurse Made Aware RN cleared and updated   Exercises    Glute Sets Sitting;10 reps;AROM;Bilateral   Hip Flexion Sitting;10 reps;AROM;Bilateral   Knee AROM Long Arc Quad Sitting;10 reps;AROM;Bilateral   Ankle Pumps Sitting;10 reps;AROM;Bilateral   Assessment   Prognosis Good   Problem List Decreased endurance;Impaired balance;Decreased mobility   Assessment Patient was received supine  in bed. Patient was agreeable to therapy services today. PT session focused on gait and exercises today in order to improve functional mobility and independence. Functional mobility was performed as described above.  Pt was eager to participate in therapy services this date. Pt did not require any hands on assist throughout therapy session. Pt displayed shortness of breath during ambulation and SpO2 dropped to approximately 80%. Pt deferred seated rest and was able to continue ambulation. Upon return to room, pt was seated in bedside recliner and led through several seated exercises for BLE strengthening and to reduce risk of muscle atrophy. Pt denies pain throughout therapy session. Patient will benefit from continued PT services while in hospital in order to address remaining limitations. The patients AM-PAC Basic Mobility Inpatient Short From Raw Score is 23 .  Based on AM-PAC scoring and patient presentation, PT currently recommending No Post Acute Rehab Needs. Please also refer to the recommendation of the Physical Therapist for safe discharge planning.   Goals   Patient Goals to go home   LTG Expiration Date 04/21/24   PT Treatment Day 1   Plan   Treatment/Interventions ADL retraining;Functional transfer training;LE strengthening/ROM;Elevations;Therapeutic exercise;Endurance training;Bed mobility;Gait training;Spoke to nursing;OT   Progress Slow progress, decreased activity tolerance   PT Frequency 2-3x/wk   Discharge Recommendation   Rehab Resource Intensity Level, PT No post-acute rehabilitation needs   AM-PAC Basic Mobility Inpatient   Turning in Flat Bed Without Bedrails 4    Lying on Back to Sitting on Edge of Flat Bed Without Bedrails 4   Moving Bed to Chair 4   Standing Up From Chair Using Arms 4   Walk in Room 4   Climb 3-5 Stairs With Railing 3   Basic Mobility Inpatient Raw Score 23   Basic Mobility Standardized Score 50.88   [facility] Highest Level Of Mobility   -[facility] Goal 7: Walk 25 feet or more   -HLM Achieved 7: Walk 25 feet or more   Education   Education Provided Mobility training   Patient Demonstrates acceptance/verbal understanding   End of Consult   Patient Position at End of Consult Bedside chair;All needs within reach       Gianni Gann PT, DPT

## 2024-04-09 NOTE — PLAN OF CARE
Problem: PAIN - ADULT  Goal: Verbalizes/displays adequate comfort level or baseline comfort level  Description: Interventions:  - Encourage patient to monitor pain and request assistance  - Assess pain using appropriate pain scale  - Administer analgesics based on type and severity of pain and evaluate response  - Implement non-pharmacological measures as appropriate and evaluate response  - Consider cultural and social influences on pain and pain management  - Notify physician/advanced practitioner if interventions unsuccessful or patient reports new pain  Outcome: Progressing     Problem: INFECTION - ADULT  Goal: Absence or prevention of progression during hospitalization  Description: INTERVENTIONS:  - Assess and monitor for signs and symptoms of infection  - Monitor lab/diagnostic results  - Monitor all insertion sites, i.e. indwelling lines, tubes, and drains  - Monitor endotracheal if appropriate and nasal secretions for changes in amount and color  - [place] appropriate cooling/warming therapies per order  - Administer medications as ordered  - Instruct and encourage patient and family to use good hand hygiene technique  - Identify and instruct in appropriate isolation precautions for identified infection/condition  Outcome: Progressing  Goal: Absence of fever/infection during neutropenic period  Description: INTERVENTIONS:  - Monitor WBC    Outcome: Progressing

## 2024-04-09 NOTE — ASSESSMENT & PLAN NOTE
Will check echo to rule out cardiomyopathy in the setting of tachyarrhythmia-currently pending read  CIWA protocol

## 2024-04-09 NOTE — ASSESSMENT & PLAN NOTE
Bronchodilator therapy  Patient was recently started on Zithromax and prednisone 2 days ago at [facility] ED.  He presented there with suspected COPD exacerbation with possible infection..He was sent home on Zithromax and prednisone -- continue to complete course

## 2024-04-09 NOTE — PROGRESS NOTES
Progress Note - Electrophysiology-Cardiology (EP)   Lucho Talavera 69 y.o. male MRN: [id number]  Unit/Bed#: [facility] 504-01 Encounter: [id number]      Assessment:  Atrial flutter, status post typical CTI flutter line + [employer] loop recorder implantation 4/8/2024  New onset this admission  Patient reports history of tachycardia and has been on metoprolol in the past, unclear dosing   CHADS2 Vasc = 6, will be discharged on Eliquis 5 mg BID x 30 days  Historically preserved LV systolic function with LVEF 60-65% per echo 12/2021, repeat echo this admission pending  COPD with frequent exacerbations, not on chronic O2  Diabetes mellitus type 2  Baseline right bundle branch block  Mild tachy-baljeet syndrome      Plan:  Review of telemetry shows normal sinus rhythm with occasional bradycardia, no significant pauses or evidence of high grade AV block.    He reports historically being on metoprolol, however given his tendency towards bradycardia would recommend that he not be discharged on metoprolol or other AV baljinder agents/antiarrhythmics.    He was instructed to take Eliquis 5 mg twice daily for 30 days, at which time it can be discontinued. We will monitor for recurrent arrhythmias via his loop recorder, and restart AC if needed moving forward (per reports, Eliquis would cost around $70/month).  I explained that there is about a 40% chance of developing atrial fibrillation given that he has had atrial flutter.    His groin access site was stable, there is no suture present and there is no evidence of ongoing bleeding or hematoma.    He was given postablation and loop recorder discharge instructions and restrictions, all questions were answered.  All follow-up appointments were arranged.    He is stable for discharge from an EP standpoint, please contact us with questions or concerns.      Subjective/Objective   Chief Complaint: No acute complaints    Subjective: He is feeling well today, he denies chest pain or pressure,  "shortness of breath, palpitations, dizziness, lightheadedness, presyncope, or syncope.  He has been ambulating without significant symptoms or limitations.      Objective:     Vitals: /64   Pulse 80   Temp 98 °F (36.7 °C)   Resp 18   Ht 6' 1\" (1.854 m)   Wt 99.8 kg (220 lb)   SpO2 96%   BMI 29.03 kg/m²   Vitals:    04/06/24 1649 04/08/24 1300   Weight: 99.8 kg (220 lb) 99.8 kg (220 lb)     Orthostatic Blood Pressures      Flowsheet Row Most Recent Value   Blood Pressure 123/64 filed at 04/09/2024 1028   Patient Position - Orthostatic VS Lying filed at 04/08/2024 2308              Intake/Output Summary (Last 24 hours) at 4/9/2024 1038  Last data filed at 4/9/2024 0804  Gross per 24 hour   Intake 2382 ml   Output 1600 ml   Net 782 ml       Invasive Devices       Peripheral Intravenous Line  Duration             Peripheral IV 04/06/24 Distal;Left;Ventral (anterior) Forearm 2 days    Peripheral IV 04/08/24 Dorsal (posterior);Right Forearm <1 day                                Scheduled Meds:  Current Facility-Administered Medications   Medication Dose Route Frequency Provider Last Rate    acetaminophen  650 mg Oral Q6H PRN Juan Cordova MD      albuterol  2 puff Inhalation Q6H PRN Juan Cordova MD      apixaban  5 mg Oral BID Juan Cordova MD      aspirin  81 mg Oral Daily Juan Cordova MD      atorvastatin  80 mg Oral Daily With Dinner Juan Cordova MD      budesonide  0.5 mg Nebulization Q12H Juan Cordova MD      busPIRone  10 mg Oral TID Juan Cordova MD      ferrous sulfate  325 mg Oral Daily With Breakfast Juan Cordova MD      fluticasone  1 spray Nasal BID Juan Cordova MD      folic acid  1 mg Oral Daily Juan Cordova MD      insulin glargine  30 Units Subcutaneous HS Juan Cordova MD      insulin lispro  1-5 Units Subcutaneous TID AC Juan Cordova MD      insulin lispro  1-6 Units Subcutaneous HS Yue Oakley PA-C      levalbuterol  1.25 mg Nebulization BID Juan Cordova MD      lisinopril "  40 mg Oral Daily Juan Cordova MD      LORazepam  0.5 mg Oral Q6H PRN Juan Cordova MD      melatonin  3 mg Oral HS PRN Juan Cordova MD      ondansetron  4 mg Intravenous Q6H PRN Juan Cordova MD      pantoprazole  40 mg Oral BID Juan Cordova MD      predniSONE  40 mg Oral Daily Juan Cordova MD      sodium chloride  20 mL/hr Intravenous Once Juan Cordova MD      thiamine  100 mg Oral Daily Juan Cordova MD      umeclidinium  1 puff Inhalation Daily Juan Cordova MD       Continuous Infusions:   PRN Meds:.  acetaminophen    albuterol    LORazepam    melatonin    ondansetron    Review of Systems   Constitutional: Negative for fever and malaise/fatigue.   Cardiovascular:  Negative for chest pain, dyspnea on exertion, irregular heartbeat, leg swelling, near-syncope, orthopnea, palpitations, paroxysmal nocturnal dyspnea and syncope.   All other systems reviewed and are negative.        Physical Exam:   GEN: NAD, alert and oriented x 3, well appearing  SKIN: dry without significant lesions or rashes  HEENT: NCAT, PERRL, EOMs intact  NECK: No JVD appreciated  CARDIOVASCULAR: RRR, normal S1, S2 without murmurs, rubs, or gallops appreciated  LUNGS: Clear to auscultation bilaterally anteriorly without wheezes, rhonchi, or rales  ABDOMEN: Soft, nontender, nondistended  EXTREMITIES/VASCULAR: perfused without clubbing, cyanosis, or LE edema b/l  PSYCH: Normal mood and affect  NEURO: CN ll-Xll grossly intact                Lab Results: I have personally reviewed pertinent lab results.    Results from last 7 days   Lab Units 04/09/24  0519 04/07/24  0610 04/06/24  1735   WBC Thousand/uL 8.00 8.98 12.94*   HEMOGLOBIN g/dL 10.5* 11.2* 12.3   HEMATOCRIT % 35.3* 37.8 40.5   PLATELETS Thousands/uL 328 348 393*     Results from last 7 days   Lab Units 04/09/24  0519 04/08/24  0403 04/07/24  0610   POTASSIUM mmol/L 4.3 4.3 3.8   CHLORIDE mmol/L 101 99 101   CO2 mmol/L 27 30 30   BUN mg/dL 14 12 13   CREATININE mg/dL 0.58* 0.69 0.57*    CALCIUM mg/dL 8.4 8.8 8.5     Results from last 7 days   Lab Units 04/09/24  0519 04/08/24  1125 04/08/24  0330 04/07/24  2118 04/06/24  2314 04/06/24  1708   INR  0.98  --   --   --   --  0.91   PTT seconds  --  74* 112* 84*   < > 28    < > = values in this interval not displayed.     Results from last 7 days   Lab Units 04/08/24  0403   MAGNESIUM mg/dL 2.1         Imaging: I have personally reviewed pertinent reports.      ECHO: No results found for this or any previous visit.      No results found for this or any previous visit.        EKG/TELEMETRY:             VTE Pharmacologic Prophylaxis: Eliquis

## 2024-04-09 NOTE — ASSESSMENT & PLAN NOTE
Patient presented with SOB, initially with atrial flutter with rapid vent response.  Case was discussed with Dr. Rendon from EP with the ED.  Start anticoagulation with heparin gtt   Patient appears rate controlled currently, with occasional bradycardia not able to rate control with meds if tachy  Patient reports tachycardia was exacerbated after albuterol treatment  EP consulted.  Underwent BRITTNEY ablation, now cleared for discharge by electrophysiology, maintaining NSR

## 2024-04-10 ENCOUNTER — PATIENT OUTREACH (OUTPATIENT)
Dept: CASE MANAGEMENT | Facility: OTHER | Age: 69
End: 2024-04-10

## 2024-04-10 VITALS
DIASTOLIC BLOOD PRESSURE: 78 MMHG | OXYGEN SATURATION: 98 % | HEIGHT: 73 IN | RESPIRATION RATE: 18 BRPM | SYSTOLIC BLOOD PRESSURE: 155 MMHG | BODY MASS INDEX: 29.16 KG/M2 | WEIGHT: 220 LBS | HEART RATE: 86 BPM | TEMPERATURE: 98.1 F

## 2024-04-10 LAB
ANION GAP SERPL CALCULATED.3IONS-SCNC: 6 MMOL/L (ref 4–13)
BASOPHILS # BLD AUTO: 0.02 THOUSANDS/ÂΜL (ref 0–0.1)
BASOPHILS NFR BLD AUTO: 0 % (ref 0–1)
BUN SERPL-MCNC: 13 MG/DL (ref 5–25)
CALCIUM SERPL-MCNC: 8.3 MG/DL (ref 8.4–10.2)
CHLORIDE SERPL-SCNC: 102 MMOL/L (ref 96–108)
CO2 SERPL-SCNC: 30 MMOL/L (ref 21–32)
CREAT SERPL-MCNC: 0.56 MG/DL (ref 0.6–1.3)
EOSINOPHIL # BLD AUTO: 0.06 THOUSAND/ÂΜL (ref 0–0.61)
EOSINOPHIL NFR BLD AUTO: 1 % (ref 0–6)
ERYTHROCYTE [DISTWIDTH] IN BLOOD BY AUTOMATED COUNT: 19 % (ref 11.6–15.1)
GFR SERPL CREATININE-BSD FRML MDRD: 104 ML/MIN/1.73SQ M
GLUCOSE SERPL-MCNC: 108 MG/DL (ref 65–140)
GLUCOSE SERPL-MCNC: 117 MG/DL (ref 65–140)
GLUCOSE SERPL-MCNC: 193 MG/DL (ref 65–140)
HCT VFR BLD AUTO: 31.4 % (ref 36.5–49.3)
HGB BLD-MCNC: 9.7 G/DL (ref 12–17)
IMM GRANULOCYTES # BLD AUTO: 0.05 THOUSAND/UL (ref 0–0.2)
IMM GRANULOCYTES NFR BLD AUTO: 1 % (ref 0–2)
LYMPHOCYTES # BLD AUTO: 1.94 THOUSANDS/ÂΜL (ref 0.6–4.47)
LYMPHOCYTES NFR BLD AUTO: 23 % (ref 14–44)
MCH RBC QN AUTO: 24.6 PG (ref 26.8–34.3)
MCHC RBC AUTO-ENTMCNC: 30.9 G/DL (ref 31.4–37.4)
MCV RBC AUTO: 80 FL (ref 82–98)
MONOCYTES # BLD AUTO: 0.95 THOUSAND/ÂΜL (ref 0.17–1.22)
MONOCYTES NFR BLD AUTO: 11 % (ref 4–12)
NEUTROPHILS # BLD AUTO: 5.58 THOUSANDS/ÂΜL (ref 1.85–7.62)
NEUTS SEG NFR BLD AUTO: 64 % (ref 43–75)
NRBC BLD AUTO-RTO: 0 /100 WBCS
PLATELET # BLD AUTO: 263 THOUSANDS/UL (ref 149–390)
PMV BLD AUTO: 10.2 FL (ref 8.9–12.7)
POTASSIUM SERPL-SCNC: 3.7 MMOL/L (ref 3.5–5.3)
RBC # BLD AUTO: 3.94 MILLION/UL (ref 3.88–5.62)
SODIUM SERPL-SCNC: 138 MMOL/L (ref 135–147)
WBC # BLD AUTO: 8.6 THOUSAND/UL (ref 4.31–10.16)

## 2024-04-10 PROCEDURE — 94640 AIRWAY INHALATION TREATMENT: CPT

## 2024-04-10 PROCEDURE — 82948 REAGENT STRIP/BLOOD GLUCOSE: CPT

## 2024-04-10 PROCEDURE — 97530 THERAPEUTIC ACTIVITIES: CPT

## 2024-04-10 PROCEDURE — 94660 CPAP INITIATION&MGMT: CPT

## 2024-04-10 PROCEDURE — 94760 N-INVAS EAR/PLS OXIMETRY 1: CPT

## 2024-04-10 PROCEDURE — 94664 DEMO&/EVAL PT USE INHALER: CPT

## 2024-04-10 PROCEDURE — 97116 GAIT TRAINING THERAPY: CPT

## 2024-04-10 PROCEDURE — 85025 COMPLETE CBC W/AUTO DIFF WBC: CPT | Performed by: INTERNAL MEDICINE

## 2024-04-10 PROCEDURE — 80048 BASIC METABOLIC PNL TOTAL CA: CPT | Performed by: INTERNAL MEDICINE

## 2024-04-10 RX ORDER — PREDNISONE 20 MG/1
40 TABLET ORAL DAILY
Qty: 4 TABLET | Refills: 0 | Status: SHIPPED | OUTPATIENT
Start: 2024-04-11 | End: 2024-04-13

## 2024-04-10 RX ORDER — ALBUTEROL SULFATE 2.5 MG/3ML
2.5 SOLUTION RESPIRATORY (INHALATION) EVERY 4 HOURS PRN
Status: DISCONTINUED | OUTPATIENT
Start: 2024-04-10 | End: 2024-04-10 | Stop reason: HOSPADM

## 2024-04-10 RX ORDER — IPRATROPIUM BROMIDE AND ALBUTEROL SULFATE 2.5; .5 MG/3ML; MG/3ML
3 SOLUTION RESPIRATORY (INHALATION) EVERY 6 HOURS PRN
Status: DISCONTINUED | OUTPATIENT
Start: 2024-04-10 | End: 2024-04-10

## 2024-04-10 RX ADMIN — APIXABAN 5 MG: 5 TABLET, FILM COATED ORAL at 08:45

## 2024-04-10 RX ADMIN — LEVALBUTEROL HYDROCHLORIDE 1.25 MG: 1.25 SOLUTION RESPIRATORY (INHALATION) at 07:20

## 2024-04-10 RX ADMIN — PANTOPRAZOLE SODIUM 40 MG: 40 TABLET, DELAYED RELEASE ORAL at 08:45

## 2024-04-10 RX ADMIN — FERROUS SULFATE TAB 325 MG (65 MG ELEMENTAL FE) 325 MG: 325 (65 FE) TAB at 08:47

## 2024-04-10 RX ADMIN — LISINOPRIL 40 MG: 20 TABLET ORAL at 08:45

## 2024-04-10 RX ADMIN — ALBUTEROL SULFATE 2 PUFF: 90 AEROSOL, METERED RESPIRATORY (INHALATION) at 06:01

## 2024-04-10 RX ADMIN — BUDESONIDE 0.5 MG: 0.5 INHALANT RESPIRATORY (INHALATION) at 07:20

## 2024-04-10 RX ADMIN — INSULIN LISPRO 1 UNITS: 100 INJECTION, SOLUTION INTRAVENOUS; SUBCUTANEOUS at 11:35

## 2024-04-10 RX ADMIN — FLUTICASONE PROPIONATE 1 SPRAY: 50 SPRAY, METERED NASAL at 08:46

## 2024-04-10 RX ADMIN — BUSPIRONE HYDROCHLORIDE 10 MG: 10 TABLET ORAL at 08:45

## 2024-04-10 RX ADMIN — ASPIRIN 81 MG CHEWABLE TABLET 81 MG: 81 TABLET CHEWABLE at 08:45

## 2024-04-10 RX ADMIN — UMECLIDINIUM 1 PUFF: 62.5 AEROSOL, POWDER ORAL at 08:46

## 2024-04-10 RX ADMIN — PREDNISONE 40 MG: 20 TABLET ORAL at 08:45

## 2024-04-10 RX ADMIN — IPRATROPIUM BROMIDE AND ALBUTEROL SULFATE 3 ML: 2.5; .5 SOLUTION RESPIRATORY (INHALATION) at 00:48

## 2024-04-10 RX ADMIN — FOLIC ACID 1 MG: 1 TABLET ORAL at 08:45

## 2024-04-10 RX ADMIN — THIAMINE HCL TAB 100 MG 100 MG: 100 TAB at 08:45

## 2024-04-10 NOTE — PHYSICAL THERAPY NOTE
Physical Therapy Treatment Note       04/10/24 1000   PT Last Visit   PT Visit Date 04/10/24   Note Type   Note Type Treatment   Pain Assessment   Pain Assessment Tool 0-10   Pain Score No Pain   Restrictions/Precautions   Weight Bearing Precautions Per Order No   Other Precautions Fall Risk;O2;Impulsive  (declined to wear O2, states he normally takes it off when he walks.)   General   Chart Reviewed Yes   Family/Caregiver Present No   Cognition   Overall Cognitive Status WFL   Arousal/Participation Responsive   Attention Within functional limits   Orientation Level Oriented X4   Memory Unable to assess   Following Commands Follows one step commands without difficulty   Subjective   Subjective cooperative w/ session, willing to mobilize.  some weakness, fatigue   Bed Mobility   Supine to Sit 5  Supervision   Additional items Increased time required   Sit to Supine 5  Supervision   Additional items Assist x 1;Increased time required   Additional Comments sat EOB x few minutes prior to and post session   Transfers   Sit to Stand 5  Supervision   Additional items Assist x 1;Increased time required;Verbal cues   Stand to Sit 5  Supervision   Additional items Assist x 1;Increased time required   Ambulation/Elevation   Gait pattern   (slow, short step length)   Gait Assistance   (CGA/S)   Additional items Assist x 1;Verbal cues   Assistive Device Rolling walker   Distance 120'x2 w/ standing rest.  time spent for setup, repositioning   Balance   Static Sitting Good   Dynamic Sitting Fair +   Static Standing Fair +   Dynamic Standing Fair   Ambulatory Fair -   Endurance Deficit   Endurance Deficit Yes   Endurance Deficit Description fatigue, weakness   Activity Tolerance   Activity Tolerance Patient tolerated treatment well;Patient limited by fatigue   Nurse Made Aware yes   Assessment   Prognosis Good   Problem List Decreased strength;Decreased endurance;Impaired balance;Decreased mobility   Assessment Pt seen for  session for setup, bed mob, time spent EOB, transfers, gait w/ rest time, repositioning.  Pt cooperative w/ session, willing to participate.  Needed a bit less assist for mobility tasks, continues to note fatigue, mild unsteadiness w/ gait.  do note improving tolerance, and continue to anticipate d/c home when stable w/ no post acute care therapy needs at d/c   Goals   Patient Goals to go home   PT Treatment Day 2   Plan   Treatment/Interventions Functional transfer training;LE strengthening/ROM;Therapeutic exercise;Endurance training;Patient/family training;Equipment eval/education;Gait training;Bed mobility;Elevations   Progress Progressing toward goals   PT Frequency 2-3x/wk   Discharge Recommendation   Rehab Resource Intensity Level, PT No post-acute rehabilitation needs   AM-PAC Basic Mobility Inpatient   Turning in Flat Bed Without Bedrails 4   Lying on Back to Sitting on Edge of Flat Bed Without Bedrails 4   Moving Bed to Chair 3   Standing Up From Chair Using Arms 3   Walk in Room 3   Climb 3-5 Stairs With Railing 3   Basic Mobility Inpatient Raw Score 20   Basic Mobility Standardized Score 43.99   [facility] Highest Level Of Mobility   JH-HLM Goal 6: Walk 10 steps or more   JH-HLM Achieved 8: Walk 250 feet ot more     Jameel Noriega PT, DPT CSRS

## 2024-04-10 NOTE — PLAN OF CARE
Problem: PHYSICAL THERAPY ADULT  Goal: Performs mobility at highest level of function for planned discharge setting.  See evaluation for individualized goals.  Description: Treatment/Interventions: Functional transfer training, Elevations, Endurance training, Patient/family training, Equipment eval/education, Gait training, Bed mobility, Spoke to nursing  Equipment Recommended: [place] (has SPC)       See flowsheet documentation for full assessment, interventions and recommendations.  Outcome: Progressing  Note: Prognosis: Good  Problem List: Decreased strength, Decreased endurance, Impaired balance, Decreased mobility  Assessment: Pt seen for session for setup, bed mob, time spent EOB, transfers, gait w/ rest time, repositioning.  Pt cooperative w/ session, willing to participate.  Needed a bit less assist for mobility tasks, continues to note fatigue, mild unsteadiness w/ gait.  do note improving tolerance, and continue to anticipate d/c home when stable w/ no post acute care therapy needs at d/c        Rehab Resource Intensity Level, PT: No post-acute rehabilitation needs    See flowsheet documentation for full assessment.

## 2024-04-10 NOTE — ANESTHESIA PREPROCEDURE EVALUATION
Procedure:  Cardiac eps/aflutter ablation (Chest)    Relevant Problems   CARDIO   (+) Atrial flutter with rapid ventricular response ([facility])   (+) CAD (coronary artery disease)   (+) Hypertension      ENDO   (+) Type 2 diabetes mellitus with hyperglycemia, without long-term current use of insulin ([facility])      HEMATOLOGY   (+) Acute blood loss anemia   (+) Anemia      MUSCULOSKELETAL   (+) Post-traumatic arthritis of left foot      NEURO/PSYCH   (+) Anxiety   (+) Diabetic polyneuropathy associated with type 2 diabetes mellitus ([facility])      PULMONARY   (+) COPD (chronic obstructive pulmonary disease) ([facility])   (+) Chronic obstructive pulmonary disease with acute exacerbation ([facility])   (+) Chronic respiratory failure ([facility])   (+) KLARISSA (obstructive sleep apnea)   (+) Stage 3 severe COPD by GOLD classification ([facility])        Physical Exam    Airway    Mallampati score: II  TM Distance: >3 FB  Neck ROM: full     Dental       Cardiovascular      Pulmonary      Other Findings        Anesthesia Plan  ASA Score- 3     Anesthesia Type- general with ASA Monitors.         Additional Monitors:     Airway Plan: ETT.           Plan Factors-Exercise tolerance (METS): >4 METS.    Chart reviewed.        Patient is not a current smoker.  Patient did not smoke on day of surgery.    Obstructive sleep apnea risk education given perioperatively.        Induction- intravenous.    Postoperative Plan- Plan for postoperative opioid use. Planned trial extubation    Informed Consent- Anesthetic plan and risks discussed with patient.  I personally reviewed this patient with the CRNA. Discussed and agreed on the Anesthesia Plan with the CRNA..

## 2024-04-10 NOTE — CASE MANAGEMENT
Case Management Discharge Planning Note    Patient name Lucho Talavera  Location [facility] 504/[facility] 504-01 MRN [id number]  : 1955 Date 4/10/2024       Current Admission Date: 2024  Current Admission Diagnosis:Atrial flutter with rapid ventricular response ([facility])   Patient Active Problem List    Diagnosis Date Noted    Atrial flutter with rapid ventricular response ([facility]) 2024    Leukocytosis 2024    COVID-19 10/25/2023    History of alcohol abuse 10/17/2023    Anemia 10/17/2023    SIRS (systemic inflammatory response syndrome) ([facility]) 2023    Hypertension 2023    COPD (chronic obstructive pulmonary disease) ([facility]) 2023    Anxiety 2023    Chronic respiratory failure ([facility]) 2023    Stage 3 severe COPD by GOLD classification ([facility]) 2023    Oral abscess 2022    Tooth fracture 2022    H/O ETOH abuse 2022    Closed nondisplaced fracture of posterior arch of first cervical vertebra ([facility]) 2022    Fall 2022    Diabetic polyneuropathy associated with type 2 diabetes mellitus ([facility]) 2021    Hammertoe, bilateral 2021    Post-traumatic arthritis of left foot 2021    Pain due to onychomycosis of toenail 2021    Bronchitis 2020    KLARISSA (obstructive sleep apnea) 2020    Dirty living conditions 2020    Closed fracture of first cervical vertebra ([facility]) 2019    ETOH abuse 2019    Chronic obstructive pulmonary disease with acute exacerbation ([facility]) 2018    At moderate risk for venous thromboembolism (VTE) 2018    S/P C1-T1 Posterior Cervical Discectomy and Fusion on 9/10/18 2018    Acute blood loss anemia 2018    Type 2 diabetes mellitus with hyperglycemia, without long-term current use of insulin ([facility]) 2018    Closed odontoid fracture with routine healing 2018    Closed displaced fracture of third cervical vertebra ([facility]) 2018    Closed displaced fracture of fourth  cervical vertebra ([facility]) 09/09/2018    Alcohol abuse 09/09/2018    CAD (coronary artery disease) 09/09/2018    Dens fracture ([facility]) 09/09/2018      LOS (days): 4  Geometric Mean LOS (GMLOS) (days): 1.2  Days to GMLOS:-2.6     OBJECTIVE:  Risk of Unplanned Readmission Score: 45.1         Current admission status: Inpatient   Preferred Pharmacy:   [facility] [place], [place] - [address]  [address]  [place] [address]  Phone: [phone] Fax: [phone]    [facility] [place]  [place], [place] - [address]  [address]  [place] [address]  Phone: [phone] Fax: [phone]    Primary Care Provider: TATIANA Cotto    Primary Insurance: MEDICARE  Secondary Insurance: BLUE CROSS    DISCHARGE DETAILS:        Contacts  Reason/Outcome: Discharge Planning    Treatment Team Recommendation: Home  Discharge Destination Plan:: Home  Transport at Discharge : [facility]     Number/Name of Dispatcher: [facility] transport requested in Roundtrip     ETA of Transport (Date): 04/10/24       Per MATY, Pt is medically ready for discharge. Pt is aware and in agreement with his discharge to home.  CM met w/ Pt who states that he needs a ride home. Pt is on 2L oxygen and confirms that he has an oxygen concentrator and tanks at home.     Pt is leaving via [facility] w/ [place] [facility] at 3:00pm.     RN and SLIM provider aware.

## 2024-04-10 NOTE — PROGRESS NOTES
Email received from [employer] Util[employer] Committee on 4/9/24.  Committee reviewed and determined a care plan is not appropriate at this time.  Recommendation from committee is that patient might benefit from a GOC conversation with his pulmonologist.    
none

## 2024-04-12 DIAGNOSIS — J44.1 CHRONIC OBSTRUCTIVE PULMONARY DISEASE WITH ACUTE EXACERBATION (HCC): ICD-10-CM

## 2024-04-12 DIAGNOSIS — Z71.89 ENCOUNTER FOR COORDINATION OF COMPLEX CARE: Primary | ICD-10-CM

## 2024-04-14 ENCOUNTER — APPOINTMENT (EMERGENCY)
Dept: RADIOLOGY | Facility: HOSPITAL | Age: 69
End: 2024-04-14
Payer: MEDICARE

## 2024-04-14 ENCOUNTER — HOSPITAL ENCOUNTER (OUTPATIENT)
Facility: HOSPITAL | Age: 69
Setting detail: OBSERVATION
Discharge: HOME | End: 2024-04-15
Attending: EMERGENCY MEDICINE | Admitting: INTERNAL MEDICINE
Payer: MEDICARE

## 2024-04-14 DIAGNOSIS — J44.1 COPD EXACERBATION (HCC): Primary | ICD-10-CM

## 2024-04-14 LAB
2HR DELTA HS TROPONIN: 0 NG/L
ALBUMIN SERPL BCP-MCNC: 3.5 G/DL (ref 3.5–5)
ALP SERPL-CCNC: 52 U/L (ref 34–104)
ALT SERPL W P-5'-P-CCNC: 14 U/L (ref 7–52)
ANION GAP SERPL CALCULATED.3IONS-SCNC: 8 MMOL/L (ref 4–13)
AST SERPL W P-5'-P-CCNC: 15 U/L (ref 13–39)
ATRIAL RATE: 88 BPM
BASE EX.OXY STD BLDV CALC-SCNC: 67.8 % (ref 60–80)
BASE EXCESS BLDV CALC-SCNC: 0.8 MMOL/L
BASOPHILS # BLD AUTO: 0.05 THOUSANDS/ÂΜL (ref 0–0.1)
BASOPHILS NFR BLD AUTO: 1 % (ref 0–1)
BILIRUB SERPL-MCNC: 0.32 MG/DL (ref 0.2–1)
BUN SERPL-MCNC: 11 MG/DL (ref 5–25)
CALCIUM SERPL-MCNC: 8.3 MG/DL (ref 8.4–10.2)
CARDIAC TROPONIN I PNL SERPL HS: 29 NG/L
CARDIAC TROPONIN I PNL SERPL HS: 29 NG/L
CHLORIDE SERPL-SCNC: 105 MMOL/L (ref 96–108)
CO2 SERPL-SCNC: 26 MMOL/L (ref 21–32)
CREAT SERPL-MCNC: 0.83 MG/DL (ref 0.6–1.3)
EOSINOPHIL # BLD AUTO: 0.31 THOUSAND/ÂΜL (ref 0–0.61)
EOSINOPHIL NFR BLD AUTO: 4 % (ref 0–6)
ERYTHROCYTE [DISTWIDTH] IN BLOOD BY AUTOMATED COUNT: 18.6 % (ref 11.6–15.1)
GFR SERPL CREATININE-BSD FRML MDRD: 89 ML/MIN/1.73SQ M
GLUCOSE SERPL-MCNC: 203 MG/DL (ref 65–140)
HCO3 BLDV-SCNC: 25.9 MMOL/L (ref 24–30)
HCT VFR BLD AUTO: 34.2 % (ref 36.5–49.3)
HGB BLD-MCNC: 10.4 G/DL (ref 12–17)
IMM GRANULOCYTES # BLD AUTO: 0.06 THOUSAND/UL (ref 0–0.2)
IMM GRANULOCYTES NFR BLD AUTO: 1 % (ref 0–2)
LYMPHOCYTES # BLD AUTO: 2.55 THOUSANDS/ÂΜL (ref 0.6–4.47)
LYMPHOCYTES NFR BLD AUTO: 34 % (ref 14–44)
MCH RBC QN AUTO: 24.5 PG (ref 26.8–34.3)
MCHC RBC AUTO-ENTMCNC: 30.4 G/DL (ref 31.4–37.4)
MCV RBC AUTO: 81 FL (ref 82–98)
MONOCYTES # BLD AUTO: 0.66 THOUSAND/ÂΜL (ref 0.17–1.22)
MONOCYTES NFR BLD AUTO: 9 % (ref 4–12)
NEUTROPHILS # BLD AUTO: 3.86 THOUSANDS/ÂΜL (ref 1.85–7.62)
NEUTS SEG NFR BLD AUTO: 51 % (ref 43–75)
NRBC BLD AUTO-RTO: 0 /100 WBCS
O2 CT BLDV-SCNC: 11.3 ML/DL
PCO2 BLDV: 43.1 MM HG (ref 42–50)
PH BLDV: 7.4 [PH] (ref 7.3–7.4)
PLATELET # BLD AUTO: 305 THOUSANDS/UL (ref 149–390)
PMV BLD AUTO: 10.4 FL (ref 8.9–12.7)
PO2 BLDV: 40.7 MM HG (ref 35–45)
POTASSIUM SERPL-SCNC: 4 MMOL/L (ref 3.5–5.3)
PR INTERVAL: 184 MS
PROT SERPL-MCNC: 5.9 G/DL (ref 6.4–8.4)
QRS AXIS: -15 DEGREES
QRSD INTERVAL: 112 MS
QT INTERVAL: 366 MS
QTC INTERVAL: 462 MS
RBC # BLD AUTO: 4.24 MILLION/UL (ref 3.88–5.62)
SODIUM SERPL-SCNC: 139 MMOL/L (ref 135–147)
T WAVE AXIS: 10 DEGREES
VENTRICULAR RATE: 96 BPM
WBC # BLD AUTO: 7.49 THOUSAND/UL (ref 4.31–10.16)

## 2024-04-14 PROCEDURE — 84484 ASSAY OF TROPONIN QUANT: CPT

## 2024-04-14 PROCEDURE — 82805 BLOOD GASES W/O2 SATURATION: CPT

## 2024-04-14 PROCEDURE — 93010 ELECTROCARDIOGRAM REPORT: CPT | Performed by: INTERNAL MEDICINE

## 2024-04-14 PROCEDURE — 99285 EMERGENCY DEPT VISIT HI MDM: CPT

## 2024-04-14 PROCEDURE — 99285 EMERGENCY DEPT VISIT HI MDM: CPT | Performed by: EMERGENCY MEDICINE

## 2024-04-14 PROCEDURE — 80053 COMPREHEN METABOLIC PANEL: CPT

## 2024-04-14 PROCEDURE — 93005 ELECTROCARDIOGRAM TRACING: CPT

## 2024-04-14 PROCEDURE — 94644 CONT INHLJ TX 1ST HOUR: CPT

## 2024-04-14 PROCEDURE — 85025 COMPLETE CBC W/AUTO DIFF WBC: CPT

## 2024-04-14 PROCEDURE — 71045 X-RAY EXAM CHEST 1 VIEW: CPT

## 2024-04-14 PROCEDURE — 94640 AIRWAY INHALATION TREATMENT: CPT

## 2024-04-14 PROCEDURE — 36415 COLL VENOUS BLD VENIPUNCTURE: CPT

## 2024-04-14 RX ORDER — SODIUM CHLORIDE FOR INHALATION 0.9 %
12 VIAL, NEBULIZER (ML) INHALATION ONCE
Status: COMPLETED | OUTPATIENT
Start: 2024-04-14 | End: 2024-04-14

## 2024-04-14 RX ORDER — METHYLPREDNISOLONE SOD SUCC 125 MG
1 VIAL (EA) INJECTION ONCE
Status: COMPLETED | OUTPATIENT
Start: 2024-04-14 | End: 2024-04-14

## 2024-04-14 RX ORDER — ALBUTEROL SULFATE 2.5 MG/3ML
1 SOLUTION RESPIRATORY (INHALATION) ONCE
Status: COMPLETED | OUTPATIENT
Start: 2024-04-14 | End: 2024-04-14

## 2024-04-14 RX ORDER — IPRATROPIUM BROMIDE AND ALBUTEROL SULFATE .5; 3 MG/3ML; MG/3ML
2 SOLUTION RESPIRATORY (INHALATION) ONCE
Status: COMPLETED | OUTPATIENT
Start: 2024-04-14 | End: 2024-04-14

## 2024-04-14 RX ADMIN — ISODIUM CHLORIDE 12 ML: 0.03 SOLUTION RESPIRATORY (INHALATION) at 20:35

## 2024-04-14 RX ADMIN — ALBUTEROL SULFATE 10 MG: 2.5 SOLUTION RESPIRATORY (INHALATION) at 20:35

## 2024-04-14 RX ADMIN — IPRATROPIUM BROMIDE 1 MG: 0.5 SOLUTION RESPIRATORY (INHALATION) at 20:35

## 2024-04-14 RX ADMIN — ALBUTEROL SULFATE 5 MG: 2.5 SOLUTION RESPIRATORY (INHALATION) at 22:46

## 2024-04-15 ENCOUNTER — PATIENT OUTREACH (OUTPATIENT)
Dept: CASE MANAGEMENT | Facility: OTHER | Age: 69
End: 2024-04-15

## 2024-04-15 VITALS
TEMPERATURE: 98.7 F | RESPIRATION RATE: 17 BRPM | SYSTOLIC BLOOD PRESSURE: 142 MMHG | HEART RATE: 72 BPM | DIASTOLIC BLOOD PRESSURE: 77 MMHG | OXYGEN SATURATION: 98 %

## 2024-04-15 PROBLEM — I48.91 ATRIAL FIBRILLATION (HCC): Status: ACTIVE | Noted: 2024-04-15

## 2024-04-15 LAB
4HR DELTA HS TROPONIN: 0 NG/L
ANION GAP SERPL CALCULATED.3IONS-SCNC: 13 MMOL/L (ref 4–13)
BUN SERPL-MCNC: 15 MG/DL (ref 5–25)
CALCIUM SERPL-MCNC: 8.4 MG/DL (ref 8.4–10.2)
CARDIAC TROPONIN I PNL SERPL HS: 29 NG/L
CHLORIDE SERPL-SCNC: 97 MMOL/L (ref 96–108)
CO2 SERPL-SCNC: 24 MMOL/L (ref 21–32)
CREAT SERPL-MCNC: 0.64 MG/DL (ref 0.6–1.3)
ERYTHROCYTE [DISTWIDTH] IN BLOOD BY AUTOMATED COUNT: 18.9 % (ref 11.6–15.1)
GFR SERPL CREATININE-BSD FRML MDRD: 99 ML/MIN/1.73SQ M
GLUCOSE SERPL-MCNC: 321 MG/DL (ref 65–140)
GLUCOSE SERPL-MCNC: 355 MG/DL (ref 65–140)
GLUCOSE SERPL-MCNC: 373 MG/DL (ref 65–140)
GLUCOSE SERPL-MCNC: 387 MG/DL (ref 65–140)
HCT VFR BLD AUTO: 33.9 % (ref 36.5–49.3)
HGB BLD-MCNC: 10.2 G/DL (ref 12–17)
MCH RBC QN AUTO: 24.1 PG (ref 26.8–34.3)
MCHC RBC AUTO-ENTMCNC: 30.1 G/DL (ref 31.4–37.4)
MCV RBC AUTO: 80 FL (ref 82–98)
PLATELET # BLD AUTO: 289 THOUSANDS/UL (ref 149–390)
PMV BLD AUTO: 10.8 FL (ref 8.9–12.7)
POTASSIUM SERPL-SCNC: 4.1 MMOL/L (ref 3.5–5.3)
RBC # BLD AUTO: 4.24 MILLION/UL (ref 3.88–5.62)
SODIUM SERPL-SCNC: 134 MMOL/L (ref 135–147)
WBC # BLD AUTO: 10.9 THOUSAND/UL (ref 4.31–10.16)

## 2024-04-15 PROCEDURE — 94640 AIRWAY INHALATION TREATMENT: CPT

## 2024-04-15 PROCEDURE — 82948 REAGENT STRIP/BLOOD GLUCOSE: CPT

## 2024-04-15 PROCEDURE — 85027 COMPLETE CBC AUTOMATED: CPT | Performed by: INTERNAL MEDICINE

## 2024-04-15 PROCEDURE — 84484 ASSAY OF TROPONIN QUANT: CPT

## 2024-04-15 PROCEDURE — 36415 COLL VENOUS BLD VENIPUNCTURE: CPT

## 2024-04-15 PROCEDURE — 94760 N-INVAS EAR/PLS OXIMETRY 1: CPT

## 2024-04-15 PROCEDURE — 94660 CPAP INITIATION&MGMT: CPT | Performed by: SOCIAL WORKER

## 2024-04-15 PROCEDURE — NC001 PR NO CHARGE: Performed by: PHYSICIAN ASSISTANT

## 2024-04-15 PROCEDURE — 94664 DEMO&/EVAL PT USE INHALER: CPT

## 2024-04-15 PROCEDURE — 99236 HOSP IP/OBS SAME DATE HI 85: CPT | Performed by: INTERNAL MEDICINE

## 2024-04-15 PROCEDURE — 80048 BASIC METABOLIC PNL TOTAL CA: CPT | Performed by: INTERNAL MEDICINE

## 2024-04-15 RX ORDER — FERROUS SULFATE 325(65) MG
325 TABLET ORAL
Status: DISCONTINUED | OUTPATIENT
Start: 2024-04-15 | End: 2024-04-15 | Stop reason: HOSPADM

## 2024-04-15 RX ORDER — METHYLPREDNISOLONE SODIUM SUCCINATE 40 MG/ML
40 INJECTION, POWDER, LYOPHILIZED, FOR SOLUTION INTRAMUSCULAR; INTRAVENOUS EVERY 12 HOURS SCHEDULED
Status: DISCONTINUED | OUTPATIENT
Start: 2024-04-15 | End: 2024-04-15 | Stop reason: HOSPADM

## 2024-04-15 RX ORDER — BUDESONIDE 0.5 MG/2ML
0.5 INHALANT ORAL
Status: DISCONTINUED | OUTPATIENT
Start: 2024-04-15 | End: 2024-04-15 | Stop reason: HOSPADM

## 2024-04-15 RX ORDER — LEVALBUTEROL INHALATION SOLUTION 1.25 MG/3ML
1.25 SOLUTION RESPIRATORY (INHALATION)
Status: DISCONTINUED | OUTPATIENT
Start: 2024-04-15 | End: 2024-04-15 | Stop reason: HOSPADM

## 2024-04-15 RX ORDER — ALBUTEROL SULFATE 2.5 MG/3ML
2.5 SOLUTION RESPIRATORY (INHALATION) ONCE
Status: COMPLETED | OUTPATIENT
Start: 2024-04-15 | End: 2024-04-15

## 2024-04-15 RX ORDER — FOLIC ACID 1 MG/1
1 TABLET ORAL DAILY
Status: DISCONTINUED | OUTPATIENT
Start: 2024-04-15 | End: 2024-04-15 | Stop reason: HOSPADM

## 2024-04-15 RX ORDER — PREDNISONE 10 MG/1
40 TABLET ORAL DAILY
Qty: 16 TABLET | Refills: 0 | Status: SHIPPED | OUTPATIENT
Start: 2024-04-16 | End: 2024-04-20

## 2024-04-15 RX ORDER — LORAZEPAM 0.5 MG/1
0.5 TABLET ORAL EVERY 6 HOURS PRN
Status: DISCONTINUED | OUTPATIENT
Start: 2024-04-15 | End: 2024-04-15 | Stop reason: HOSPADM

## 2024-04-15 RX ORDER — FLUTICASONE PROPIONATE 50 MCG
1 SPRAY, SUSPENSION (ML) NASAL 2 TIMES DAILY
Status: DISCONTINUED | OUTPATIENT
Start: 2024-04-15 | End: 2024-04-15 | Stop reason: HOSPADM

## 2024-04-15 RX ORDER — LISINOPRIL 20 MG/1
40 TABLET ORAL DAILY
Status: DISCONTINUED | OUTPATIENT
Start: 2024-04-15 | End: 2024-04-15 | Stop reason: HOSPADM

## 2024-04-15 RX ORDER — ALBUTEROL SULFATE 2.5 MG/3ML
2.5 SOLUTION RESPIRATORY (INHALATION) EVERY 6 HOURS PRN
Status: DISCONTINUED | OUTPATIENT
Start: 2024-04-15 | End: 2024-04-15 | Stop reason: HOSPADM

## 2024-04-15 RX ORDER — INSULIN GLARGINE 100 [IU]/ML
30 INJECTION, SOLUTION SUBCUTANEOUS
Status: DISCONTINUED | OUTPATIENT
Start: 2024-04-15 | End: 2024-04-15 | Stop reason: HOSPADM

## 2024-04-15 RX ORDER — PANTOPRAZOLE SODIUM 40 MG/1
40 TABLET, DELAYED RELEASE ORAL 2 TIMES DAILY
Status: DISCONTINUED | OUTPATIENT
Start: 2024-04-15 | End: 2024-04-15 | Stop reason: HOSPADM

## 2024-04-15 RX ORDER — INSULIN LISPRO 100 [IU]/ML
2-12 INJECTION, SOLUTION INTRAVENOUS; SUBCUTANEOUS
Status: DISCONTINUED | OUTPATIENT
Start: 2024-04-15 | End: 2024-04-15 | Stop reason: HOSPADM

## 2024-04-15 RX ORDER — ACETAMINOPHEN 325 MG/1
650 TABLET ORAL EVERY 6 HOURS PRN
Status: DISCONTINUED | OUTPATIENT
Start: 2024-04-15 | End: 2024-04-15 | Stop reason: HOSPADM

## 2024-04-15 RX ORDER — LANOLIN ALCOHOL/MO/W.PET/CERES
100 CREAM (GRAM) TOPICAL DAILY
Status: DISCONTINUED | OUTPATIENT
Start: 2024-04-15 | End: 2024-04-15 | Stop reason: HOSPADM

## 2024-04-15 RX ORDER — INSULIN LISPRO 100 [IU]/ML
1-6 INJECTION, SOLUTION INTRAVENOUS; SUBCUTANEOUS
Status: DISCONTINUED | OUTPATIENT
Start: 2024-04-15 | End: 2024-04-15 | Stop reason: HOSPADM

## 2024-04-15 RX ORDER — ASPIRIN 81 MG/1
81 TABLET, CHEWABLE ORAL DAILY
Status: DISCONTINUED | OUTPATIENT
Start: 2024-04-15 | End: 2024-04-15 | Stop reason: HOSPADM

## 2024-04-15 RX ORDER — ATORVASTATIN CALCIUM 80 MG/1
80 TABLET, FILM COATED ORAL
Status: DISCONTINUED | OUTPATIENT
Start: 2024-04-15 | End: 2024-04-15 | Stop reason: HOSPADM

## 2024-04-15 RX ORDER — BUSPIRONE HYDROCHLORIDE 10 MG/1
10 TABLET ORAL 3 TIMES DAILY
Status: DISCONTINUED | OUTPATIENT
Start: 2024-04-15 | End: 2024-04-15 | Stop reason: HOSPADM

## 2024-04-15 RX ORDER — LANOLIN ALCOHOL/MO/W.PET/CERES
3 CREAM (GRAM) TOPICAL
Status: DISCONTINUED | OUTPATIENT
Start: 2024-04-15 | End: 2024-04-15 | Stop reason: HOSPADM

## 2024-04-15 RX ADMIN — ALBUTEROL SULFATE 2.5 MG: 2.5 SOLUTION RESPIRATORY (INHALATION) at 10:37

## 2024-04-15 RX ADMIN — ALBUTEROL SULFATE 2.5 MG: 2.5 SOLUTION RESPIRATORY (INHALATION) at 04:33

## 2024-04-15 RX ADMIN — FLUTICASONE PROPIONATE 1 SPRAY: 50 SPRAY, METERED NASAL at 08:40

## 2024-04-15 RX ADMIN — FERROUS SULFATE TAB 325 MG (65 MG ELEMENTAL FE) 325 MG: 325 (65 FE) TAB at 08:39

## 2024-04-15 RX ADMIN — INSULIN LISPRO 10 UNITS: 100 INJECTION, SOLUTION INTRAVENOUS; SUBCUTANEOUS at 06:14

## 2024-04-15 RX ADMIN — ASPIRIN 81 MG CHEWABLE TABLET 81 MG: 81 TABLET CHEWABLE at 08:39

## 2024-04-15 RX ADMIN — LEVALBUTEROL HYDROCHLORIDE 1.25 MG: 1.25 SOLUTION RESPIRATORY (INHALATION) at 14:11

## 2024-04-15 RX ADMIN — LISINOPRIL 40 MG: 20 TABLET ORAL at 08:39

## 2024-04-15 RX ADMIN — METHYLPREDNISOLONE SODIUM SUCCINATE 40 MG: 40 INJECTION, POWDER, FOR SOLUTION INTRAMUSCULAR; INTRAVENOUS at 08:39

## 2024-04-15 RX ADMIN — ALBUTEROL SULFATE 2.5 MG: 2.5 SOLUTION RESPIRATORY (INHALATION) at 02:15

## 2024-04-15 RX ADMIN — BUDESONIDE 0.5 MG: 0.5 INHALANT RESPIRATORY (INHALATION) at 09:00

## 2024-04-15 RX ADMIN — THIAMINE HCL TAB 100 MG 100 MG: 100 TAB at 08:39

## 2024-04-15 RX ADMIN — FOLIC ACID 1 MG: 1 TABLET ORAL at 08:39

## 2024-04-15 RX ADMIN — IPRATROPIUM BROMIDE 0.5 MG: 0.5 SOLUTION RESPIRATORY (INHALATION) at 09:00

## 2024-04-15 RX ADMIN — LEVALBUTEROL HYDROCHLORIDE 1.25 MG: 1.25 SOLUTION RESPIRATORY (INHALATION) at 09:00

## 2024-04-15 RX ADMIN — ALBUTEROL SULFATE 2.5 MG: 2.5 SOLUTION RESPIRATORY (INHALATION) at 00:51

## 2024-04-15 RX ADMIN — APIXABAN 5 MG: 5 TABLET, FILM COATED ORAL at 08:39

## 2024-04-15 RX ADMIN — INSULIN LISPRO 6 UNITS: 100 INJECTION, SOLUTION INTRAVENOUS; SUBCUTANEOUS at 01:42

## 2024-04-15 RX ADMIN — INSULIN LISPRO 8 UNITS: 100 INJECTION, SOLUTION INTRAVENOUS; SUBCUTANEOUS at 11:09

## 2024-04-15 RX ADMIN — INSULIN GLARGINE 30 UNITS: 100 INJECTION, SOLUTION SUBCUTANEOUS at 01:52

## 2024-04-15 RX ADMIN — BUSPIRONE HYDROCHLORIDE 10 MG: 10 TABLET ORAL at 08:39

## 2024-04-15 RX ADMIN — PANTOPRAZOLE SODIUM 40 MG: 40 TABLET, DELAYED RELEASE ORAL at 08:39

## 2024-04-15 RX ADMIN — IPRATROPIUM BROMIDE 0.5 MG: 0.5 SOLUTION RESPIRATORY (INHALATION) at 14:11

## 2024-04-15 NOTE — ASSESSMENT & PLAN NOTE
Lab Results   Component Value Date    HGBA1C 8.9 (H) 04/06/2024       Recent Labs     04/15/24  0141 04/15/24  0542   POCGLU 387* 373*       Blood Sugar Average: Last 72 hrs:  (P) 380Cont insulin regimen. Tapering steroids

## 2024-04-15 NOTE — ED PROVIDER NOTES
History  Chief Complaint   Patient presents with    Respiratory Distress     RA SPO2 87%. Hx of COPD. Complains of sob starting 30 mins ago.  2 duo/1 albuterol/125 solu/IV. SPO2 97% on NRB. No increased WOB     Patient is a 69-year-old male with past medical history of COPD, CAD, type 2 diabetes, and KLARISSA who presents via EMS for evaluation of respiratory distress.  Patient states that he was sitting down approximately 20 minutes prior to coming to the emergency room when he began to feel short of breath despite use of home 2 L nasal cannula oxygen.  Patient called 911. Upon EMS arrival to the scene, patient was satting 87%. Apical wheezing and decreased air movement in bilateral lower lobes. On route to hospital, patient received 2x DuoNebs and Solu-Medrol as well as 1x albuterol neb and placed on non-rebreather to 8 L with improvement in SpO2. Vitals otherwise stable per EMS. In the department, patient able to provide history. Reports improvement of symptoms with treatment. States he had a similar episode 2 days ago and sought care at [facility] ED.          Prior to Admission Medications   Prescriptions Last Dose Informant Patient Reported? Taking?   B Complex Vitamins (VITAMIN B COMPLEX 100 IJ)   Yes No   Sig: Take 1 tablet by mouth daily   Fluticasone-Salmeterol (Advair) 500-50 mcg/dose inhaler   No No   Sig: Inhale 1 puff 2 (two) times a day Rinse mouth after use.   Insulin Glargine (BASAGLAR KWIKPEN SC)   Yes No   Sig: Inject 30 Units under the skin daily at bedtime   LORazepam (Ativan) 0.5 mg tablet   Yes No   Sig: Take by mouth every 6 (six) hours as needed for anxiety    albuterol (Proventil HFA) 90 mcg/act inhaler   No No   Sig: Inhale 2 puffs every 6 (six) hours as needed for wheezing   apixaban (Eliquis) 5 mg   No No   Sig: Take 1 tablet (5 mg total) by mouth 2 (two) times a day Do not fill, please check copay   aspirin 81 mg chewable tablet   Yes No   Sig: Chew 81 mg daily   atorvastatin (LIPITOR) 80 mg  tablet   No No   Sig: Take 1 tablet (80 mg total) by mouth daily with dinner   betamethasone valerate (VALISONE) 0.1 % cream   Yes No   Sig: Apply topically 2 (two) times a day   Patient not taking: Reported on 2024   budesonide (PULMICORT) 0.5 mg/2 mL nebulizer solution   No No   Sig: Take 2 mL (0.5 mg total) by nebulization every 12 (twelve) hours Rinse mouth after use.   busPIRone (BUSPAR) 10 mg tablet   No No   Sig: Take 1 tablet (10 mg total) by mouth 3 (three) times a day   ferrous sulfate 324 (65 Fe) mg   No No   Sig: Take 1 tablet (324 mg total) by mouth 2 (two) times a day before meals   fluticasone (FLONASE) 50 mcg/act nasal spray   Yes No   Si spray into each nostril 2 (two) times a day   folic acid (FOLVITE) 1 mg tablet   No No   Sig: Take 1 tablet (1 mg total) by mouth daily   lisinopril (ZESTRIL) 40 mg tablet   Yes No   Sig: Take 40 mg by mouth daily    melatonin 3 mg   No No   Sig: Take 1 tablet (3 mg total) by mouth daily at bedtime as needed (30)   metFORMIN (GLUCOPHAGE) 1000 MG tablet   Yes No   Sig: Take 1 tablet by mouth every 12 (twelve) hours   pantoprazole (Protonix) 40 mg tablet   No No   Sig: Take 1 tablet (40 mg total) by mouth 2 (two) times a day   predniSONE 20 mg tablet   No No   Sig: Take 2 tablets (40 mg total) by mouth daily for 2 days   thiamine 100 MG tablet   No No   Sig: Take 1 tablet (100 mg total) by mouth daily   tiotropium (SPIRIVA) 18 mcg inhalation capsule   No No   Sig: Place 1 capsule (18 mcg total) into inhaler and inhale daily      Facility-Administered Medications: None       Past Medical History:   Diagnosis Date    Cardiac arrest (HCC)     COPD (chronic obstructive pulmonary disease) (HCC)     CVA (cerebral vascular accident) (HCC)     Diabetes mellitus (HCC)     Heart attack (HCC)     Hypertension     Stroke (HCC)        Past Surgical History:   Procedure Laterality Date    CARDIAC ELECTROPHYSIOLOGY PROCEDURE N/A 2024    Procedure: Cardiac eps/aflutter  ablation;  Surgeon: Ihsan Blum DO;  Location: [facility];  Service: Cardiology    CERVICAL FUSION N/A 9/10/2018    Procedure: Posterior cervical decompressive laminectomy C3-6; Posterior cervical lateral mass and pedicle fixation fusion C1-T1;  Surgeon: Papa Quiros MD;  Location: [facility] MAIN OR;  Service: Neurosurgery       Family History   Problem Relation Age of Onset    Heart attack Mother      I have reviewed and agree with the history as documented.    E-Cigarette/Vaping    E-Cigarette Use Never User      E-Cigarette/Vaping Substances    Nicotine No     THC No     CBD No     Flavoring No     Other No     Unknown No      Social History     Tobacco Use    Smoking status: Former     Types: Cigarettes    Smokeless tobacco: Never    Tobacco comments:     quit 5 months ago    Vaping Use    Vaping status: Never Used   Substance Use Topics    Alcohol use: Not Currently     Alcohol/week: 8.0 - 12.0 standard drinks of alcohol     Types: 8 - 12 Cans of beer per week     Comment: every day drinker    Drug use: Never        Review of Systems   Respiratory:  Positive for cough, shortness of breath and wheezing.    All other systems reviewed and are negative.      Physical Exam  ED Triage Vitals   Temperature Pulse Respirations Blood Pressure SpO2   04/14/24 2013 04/14/24 2013 04/14/24 2013 04/14/24 2022 04/14/24 2013   99 °F (37.2 °C) 95 (!) 38 156/76 94 %      Temp Source Heart Rate Source Patient Position - Orthostatic VS BP Location FiO2 (%)   04/14/24 2013 04/14/24 2013 04/14/24 2013 04/14/24 2013 --   Oral Monitor Lying Left arm       Pain Score       04/14/24 2013       No Pain             Orthostatic Vital Signs  Vitals:    04/14/24 2022 04/14/24 2200 04/14/24 2330 04/15/24 0000   BP: 156/76 138/66 149/70 156/77   Pulse:  (!) 118 (!) 124 (!) 121   Patient Position - Orthostatic VS:  Sitting Lying Lying       Physical Exam  Constitutional:       General: He is not in acute distress.     Appearance: He is  not toxic-appearing or diaphoretic.   HENT:      Head: Normocephalic and atraumatic.      Nose: Nose normal.      Mouth/Throat:      Mouth: Mucous membranes are moist.      Pharynx: Oropharynx is clear.   Eyes:      Extraocular Movements: Extraocular movements intact.      Conjunctiva/sclera: Conjunctivae normal.   Pulmonary:      Comments: Increased work of breathing on arrival with subsequent improvement     Diffusely decreased air movement. Do not appreciate wheezing or rhonchi, though difficult to auscultate      Abdominal:      General: Abdomen is flat.      Palpations: Abdomen is soft.      Tenderness: There is no abdominal tenderness.   Musculoskeletal:         General: No swelling. Normal range of motion.      Cervical back: Normal range of motion and neck supple.   Skin:     General: Skin is warm and dry.      Capillary Refill: Capillary refill takes less than 2 seconds.   Neurological:      Mental Status: He is alert and oriented to person, place, and time.   Psychiatric:         Mood and Affect: Mood normal.         Behavior: Behavior normal.         ED Medications  Medications   ipratropium-albuterol (FOR EMS ONLY) (DUO-NEB) 0.5-2.5 mg/3 mL inhalation solution 6 mL (0 mL Does not apply Given to EMS 4/14/24 2024)   methylPREDNISolone sodium succinate (FOR EMS ONLY) (Solu-MEDROL) 125 MG injection 125 mg (0 mg Does not apply Given to EMS 4/14/24 2024)   albuterol (FOR EMS ONLY) (2.5 mg/3 mL) 0.083 % inhalation solution 2.5 mg (0 mg Does not apply Given to EMS 4/14/24 2024)   albuterol inhalation solution 10 mg (10 mg Nebulization Given 4/14/24 2035)   ipratropium (ATROVENT) 0.02 % inhalation solution 1 mg (1 mg Nebulization Given 4/14/24 2035)   sodium chloride 0.9 % inhalation solution 12 mL (12 mL Nebulization Given 4/14/24 2035)   albuterol inhalation solution 5 mg (5 mg Nebulization Given 4/14/24 2246)       Diagnostic Studies  Results Reviewed       Procedure Component Value Units Date/Time    HS  Troponin I 2hr [[id number]]  (Normal) Collected: 04/14/24 2258    Lab Status: Final result Specimen: Blood from Arm, Right Updated: 04/14/24 2330     hs TnI 2hr 29 ng/L      Delta 2hr hsTnI 0 ng/L     HS Troponin I 4hr [[id number]]     Lab Status: No result Specimen: Blood     Comprehensive metabolic panel [[id number]]  (Abnormal) Collected: 04/14/24 2033    Lab Status: Final result Specimen: Blood from Arm, Left Updated: 04/14/24 2105     Sodium 139 mmol/L      Potassium 4.0 mmol/L      Chloride 105 mmol/L      CO2 26 mmol/L      ANION GAP 8 mmol/L      BUN 11 mg/dL      Creatinine 0.83 mg/dL      Glucose 203 mg/dL      Calcium 8.3 mg/dL      AST 15 U/L      ALT 14 U/L      Alkaline Phosphatase 52 U/L      Total Protein 5.9 g/dL      Albumin 3.5 g/dL      Total Bilirubin 0.32 mg/dL      eGFR 89 ml/min/1.73sq m     Narrative:      National Kidney Disease Foundation guidelines for Chronic Kidney Disease (CKD):     Stage 1 with normal or high GFR (GFR > 90 mL/min/1.73 square meters)    Stage 2 Mild CKD (GFR = 60-89 mL/min/1.73 square meters)    Stage 3A Moderate CKD (GFR = 45-59 mL/min/1.73 square meters)    Stage 3B Moderate CKD (GFR = 30-44 mL/min/1.73 square meters)    Stage 4 Severe CKD (GFR = 15-29 mL/min/1.73 square meters)    Stage 5 End Stage CKD (GFR <15 mL/min/1.73 square meters)  Note: GFR calculation is accurate only with a steady state creatinine    HS Troponin 0hr (reflex protocol) [[id number]]  (Normal) Collected: 04/14/24 2033    Lab Status: Final result Specimen: Blood from Arm, Left Updated: 04/14/24 2105     hs TnI 0hr 29 ng/L     Blood gas, venous [[id number]] Collected: 04/14/24 2033    Lab Status: Final result Specimen: Blood from Arm, Left Updated: 04/14/24 2045     pH, Rocky 7.396     pCO2, Rocky 43.1 mm Hg      pO2, Rocky 40.7 mm Hg      HCO3, Rocky 25.9 mmol/L      Base Excess, Rocky 0.8 mmol/L      O2 Content, Rocky 11.3 ml/dL      O2 HGB, VENOUS 67.8 %     CBC and differential [[id number]]  (Abnormal)  Collected: 04/14/24 2033    Lab Status: Final result Specimen: Blood from Arm, Left Updated: 04/14/24 2044     WBC 7.49 Thousand/uL      RBC 4.24 Million/uL      Hemoglobin 10.4 g/dL      Hematocrit 34.2 %      MCV 81 fL      MCH 24.5 pg      MCHC 30.4 g/dL      RDW 18.6 %      MPV 10.4 fL      Platelets 305 Thousands/uL      nRBC 0 /100 WBCs      Segmented % 51 %      Immature Grans % 1 %      Lymphocytes % 34 %      Monocytes % 9 %      Eosinophils Relative 4 %      Basophils Relative 1 %      Absolute Neutrophils 3.86 Thousands/µL      Absolute Immature Grans 0.06 Thousand/uL      Absolute Lymphocytes 2.55 Thousands/µL      Absolute Monocytes 0.66 Thousand/µL      Eosinophils Absolute 0.31 Thousand/µL      Basophils Absolute 0.05 Thousands/µL                    XR chest 1 view portable   Final Result by Antonieta Rodriguez MD (04/14 2259)      No acute cardiopulmonary disease.            Workstation performed: [id number]               Procedures  ECG 12 Lead Documentation Only    Date/Time: 4/15/2024 12:21 AM    Performed by: Shirin Schoifeld MD  Authorized by: Shirin Schofield MD    Indications / Diagnosis:  SOB  ECG reviewed by me, the ED Provider: yes    Patient location:  ED  Previous ECG:     Previous ECG:  Compared to current    Similarity:  Changes noted  Interpretation:     Interpretation: abnormal    Rate:     ECG rate:  96    ECG rate assessment: normal    Rhythm:     Rhythm: other rhythm      Rhythm comment:  Undetermined  Ectopy:     Ectopy: none    Conduction:     Conduction: abnormal      Abnormal conduction: incomplete RBBB    Comments:      Possible Lateral infarct , age undetermined              ED Course                             SBIRT 20yo+      Flowsheet Row Most Recent Value   Initial Alcohol Screen: US AUDIT-C     1. How often do you have a drink containing alcohol? 2 Filed at: 04/14/2024 2019   2. How many drinks containing alcohol do you have on a typical day you are drinking?  1  Filed at: 04/14/2024 2019   3a. Male UNDER 65: How often do you have five or more drinks on one occasion? 2 Filed at: 04/14/2024 2019   Audit-C Score 5 Filed at: 04/14/2024 2019   MICHAEL: How many times in the past year have you...    Used an illegal drug or used a prescription medication for non-medical reasons? Never Filed at: 04/14/2024 2019                  Medical Decision Making  Lucho Talavera is a 69 y.o. who presents with complaints of difficulty breathing    Vital signs are 95% on 8L via non-rebreather on arrival   Hypertensive, otherwise stable, afebrile      Ddx: COPD exacerbation most likely   Will evaluate for ACS    Plan: CXR similar to prior   Delta trop 0  CMP showing elevated glucose, otherwise at patient's baseline  CBC similar to prior   Patient able to be transitioned to 2L NC with SpO2 92-93%  Poor movement of air despite [place] and albuterol neb in ED, though increases work of breathing has resolved    Disposition: Discussed with [facility]. Admit obs for likely COPD exacerbation              Amount and/or Complexity of Data Reviewed  Labs: ordered.  Radiology: ordered.    Risk  Prescription drug management.  Decision regarding hospitalization.          Disposition  Final diagnoses:   COPD exacerbation (HCC)     Time reflects when diagnosis was documented in both MDM as applicable and the Disposition within this note       Time User Action Codes Description Comment    4/14/2024 11:19 PM Shirin Schofield Add [J44.1] COPD exacerbation (HCC)           ED Disposition       ED Disposition   Admit    Condition   Stable    Date/Time   Sun Apr 14, 2024 7661    Comment   Case was discussed with Dr. Elam and the patient's admission status was agreed to be Admission Status: observation status to [facility] .               Follow-up Information    None         Patient's Medications   Discharge Prescriptions    No medications on file     No discharge procedures on file.    PDMP Review       None             ED  Provider  Attending physically available and evaluated Lucho ALAN Laraan. I managed the patient along with the ED Attending.    Electronically Signed by           Shirin Schofield MD  04/15/24 0025

## 2024-04-15 NOTE — RESPIRATORY THERAPY NOTE
RT Protocol Note  Lucho Talavera 69 y.o. male MRN: [id number]  Unit/Bed#: CW2 215-01 Encounter: [id number]    Assessment    Principal Problem:    Chronic obstructive pulmonary disease with acute exacerbation (HCC)  Active Problems:    CAD (coronary artery disease)    Type 2 diabetes mellitus with hyperglycemia, without long-term current use of insulin (HCC)    KLARISSA (obstructive sleep apnea)    H/O ETOH abuse    Hypertension    Atrial fibrillation (HCC)      Home Pulmonary Medications:  Albuterol PRN  Spiriva daily  Pulmicort BID  Advair BID  Home Devices/Therapy: BiPAP/CPAP    Past Medical History:   Diagnosis Date    Cardiac arrest (HCC)     COPD (chronic obstructive pulmonary disease) (HCC)     CVA (cerebral vascular accident) (HCC)     Diabetes mellitus (HCC)     Heart attack (HCC)     Hypertension     Stroke (HCC)      Social History     Socioeconomic History    Marital status: Single     Spouse name: Not on file    Number of children: Not on file    Years of education: Not on file    Highest education level: Not on file   Occupational History    Not on file   Tobacco Use    Smoking status: Former     Types: Cigarettes    Smokeless tobacco: Never    Tobacco comments:     quit 5 months ago    Vaping Use    Vaping status: Never Used   Substance and Sexual Activity    Alcohol use: Not Currently     Alcohol/week: 8.0 - 12.0 standard drinks of alcohol     Types: 8 - 12 Cans of beer per week     Comment: every day drinker    Drug use: Never    Sexual activity: Not Currently   Other Topics Concern    Not on file   Social History Narrative    ** Merged History Encounter **         ** Merged History Encounter **         ** Merged History Encounter **          Social Determinants of Health     Financial Resource Strain: Low Risk  (2/1/2024)    Received from [facility]    Overall Financial Resource Strain (CARDIA)     Difficulty of Paying Living Expenses: Not very hard   Food Insecurity: No Food  Insecurity (4/7/2024)    Hunger Vital Sign     Worried About Running Out of Food in the Last Year: Never true     Ran Out of Food in the Last Year: Never true   Transportation Needs: No Transportation Needs (4/7/2024)    PRAPARE - Transportation     Lack of Transportation (Medical): No     Lack of Transportation (Non-Medical): No   Physical Activity: Inactive (4/7/2023)    Received from [facility]    Exercise Vital Sign     Days of Exercise per Week: 0 days     Minutes of Exercise per Session: 0 min   Stress: No Stress Concern Present (4/7/2023)    Received from [facility]    [employer] - Occupational Stress Questionnaire     Feeling of Stress : Only a little   Social Connections: Socially Isolated (4/7/2023)    Received from [facility]    Social Connection and Isolation Panel [NHANES]     Frequency of Communication with Friends and Family: Never     Frequency of Social Gatherings with Friends and Family: Never     Attends Jewish Services: Never     Active Member of Clubs or Organizations: No     Attends Club or Organization Meetings: Never     Marital Status:    Intimate Partner Violence: Not At Risk (2/1/2024)    Received from [facility]    Humiliation, Afraid, Rape, and Kick questionnaire     Fear of Current or Ex-Partner: No     Emotionally Abused: No     Physically Abused: No     Sexually Abused: No   Housing Stability: Low Risk  (4/7/2024)    Housing Stability Vital Sign     Unable to Pay for Housing in the Last Year: No     Number of Places Lived in the Last Year: 1     Unstable Housing in the Last Year: No       Subjective         Objective    Physical Exam:   Assessment Type: Assess only  General Appearance: Sleeping  Respiratory Pattern: Normal  Chest Assessment: Chest expansion symmetrical  Bilateral Breath Sounds: Diminished  Cough: Productive    Vitals:  Blood pressure 146/79, pulse 105,  temperature 99 °F (37.2 °C), temperature source Oral, resp. rate 20, SpO2 93%.          Imaging and other studies:           Plan             Resp Comments: (P) Pt. presents with COPD exacerbation. Pt. states that he has a hx of CPODand KLARISSA. Pt. states that he wears 2LNC at home. Pt. takes Albuterol PRN, pulmicort BID, spiriva once daily and and advair BID for his COPD at home. Pt. wears CPAP at home. Pt. does not know what CPAP settings he wears, but he states that he bleeds 2L O2 in to his CPAP. BBS diminished/expiratory wheezing, , RR 20, SPO2 94% on 2L NC. CXR revealed no active disease. Will order xopenex and atrovent TID, Allbuterol PRN, pulmicort BID. Will start pt. on CPAP of 8 with 2L O2 HS. Will continue to monitor and treat per resp protocol.

## 2024-04-15 NOTE — ED ATTENDING ATTESTATION
4/14/2024  I, Yogi Combs DO, saw and evaluated the patient. I have discussed the patient with the resident/non-physician practitioner and agree with the resident's/non-physician practitioner's findings, Plan of Care, and MDM as documented in the resident's/non-physician practitioner's note, except where noted. All available labs and Radiology studies were reviewed.  I was present for key portions of any procedure(s) performed by the resident/non-physician practitioner and I was immediately available to provide assistance.       At this point I agree with the current assessment done in the Emergency Department.  I have conducted an independent evaluation of this patient a history and physical is as follows: 69 y.o. male patient presents with chief complaint of dyspnea    Differential includes but is not limited to: atypical acs, chf, pneumonia, bronchitis, uri, pneumothorax, pleural effusion, less likely neoplasm, PE    Plan: cbc, metabolic panel, ekg, troponin, +/- ddimer/ct scan, chest xray      ED Course         Critical Care Time  Procedures

## 2024-04-15 NOTE — ASSESSMENT & PLAN NOTE
Mild tachycardia likely due to copd exac, nebs txt. Will use xopenex instead  Cont bb  A/c with eliquis

## 2024-04-15 NOTE — ASSESSMENT & PLAN NOTE
W/ hx of chronic respiratory uses 2L during the day and 1L at night  S/p CXR no consolidation  Place on IV steroid 40mg bid - change to oral Prednisone 40mg daily for 4 more days at discharge.  Oxygen requirements at baseline

## 2024-04-15 NOTE — PROGRESS NOTES
OP RT CM called and spoke with patient daughter, Nimco ( listed as contact)  regarding  father breathing, medications and O2. She just picked him up from hospital and will go to pharmacy for meds. She is father's caregiver and does give him meds. Patient was taken off Albuterol and put on Xopenex for tachycardia and shaking. I did review all respiratory meds with her and discussed rinsing mouth after steroid.  She makes sure he is compliant.   [place] is on home oxygen 2 lpm 24/7 which he wears.   Patient does not have fever or chills or excess SOB  at this time. He is not coughing at present time.   He has follow up with pulmonary [facility] 4/22 and cardiology  4/23 which she will take him to.  I will send out COPD booklet  and zone tool to help manage patient  COPD.   Nimco wanted to get home from picking up child at school and call me back to make sure she had everything correct. I gave her my contact info to call me back. OPRT CM will outreach x1 week to check patient status if I do not hear from patient or his daughter in meantime.

## 2024-04-15 NOTE — DISCHARGE SUMMARY
[facility]  Discharge- Lucho Talavera 1955, 69 y.o. male MRN: [id number]  Unit/Bed#: CW2 215-01 Encounter: [id number]  Primary Care Provider: TATIANA Cotto   Date and time admitted to hospital: 4/14/2024  8:11 PM    * Chronic obstructive pulmonary disease with acute exacerbation (HCC)  Assessment & Plan  W/ hx of chronic respiratory uses 2L during the day and 1L at night  S/p CXR no consolidation  Place on IV steroid 40mg bid - change to oral Prednisone 40mg daily for 4 more days at discharge.  Oxygen requirements at baseline    Atrial fibrillation (HCC)  Assessment & Plan  Mild tachycardia likely due to copd exac, nebs txt. Will use xopenex instead  Cont bb  A/c with eliquis    Type 2 diabetes mellitus with hyperglycemia, without long-term current use of insulin ([facility])  Assessment & Plan  Lab Results   Component Value Date    HGBA1C 8.9 (H) 04/06/2024       Recent Labs     04/15/24  0141 04/15/24  0542   POCGLU 387* 373*       Blood Sugar Average: Last 72 hrs:  (P) 380Cont insulin regimen. Tapering steroids      Hypertension  Assessment & Plan  Stable  Cont meds    KLARISSA (obstructive sleep apnea)  Assessment & Plan  Use of cpap at qhs    H/O ETOH abuse  Assessment & Plan  No sign of w/d  Cont thiamine, folic acid    CAD (coronary artery disease)  Assessment & Plan  Stable  Pt is cp free  Cont cardiac meds      Medical Problems       Resolved Problems  Date Reviewed: 4/15/2024   None       Discharging Physician / Practitioner: Ramonita Jacobo PA-C  PCP: TATIANA Cotto  Admission Date:   Admission Orders (From admission, onward)       Ordered        04/14/24 2320  Place in Observation  Once                          Discharge Date: 04/15/24    Consultations During Hospital Stay:  none    Procedures Performed:     CXR  No acute cardiopulmonary disease.     Significant Findings / Test Results:   See above    Incidental Findings:   none     Test Results Pending at  Discharge (will require follow up):   none     Outpatient Tests Requested:  none    Complications:  none    Reason for Admission: SOB    Hospital Course:   Lucho Talavera is a 69 y.o. male patient who originally presented to the hospital on 4/14/2024 due to shortness of breath.  He was found to have an acute COPD exacerbation.  He improved rapidly on IV steroid.  He will be discharged on oral prednisone 40 mg for 4 more days.    Please see above list of diagnoses and related plan for additional information.     Condition at Discharge: good    Discharge Day Visit / Exam:   Subjective: Feels better.  Shortness of breath improved  Vitals: Blood Pressure: 142/77 (04/15/24 0723)  Pulse: 72 (04/15/24 0723)  Temperature: 98.7 °F (37.1 °C) (04/15/24 0723)  Temp Source: Oral (04/14/24 2013)  Respirations: 17 (04/15/24 0723)  SpO2: 96 % (04/15/24 0859)  Exam:   Physical Exam  Vitals and nursing note reviewed.   Constitutional:       General: He is not in acute distress.     Appearance: He is well-developed.   HENT:      Head: Normocephalic and atraumatic.   Eyes:      Conjunctiva/sclera: Conjunctivae normal.   Cardiovascular:      Rate and Rhythm: Normal rate. Rhythm irregularly irregular.      Heart sounds: No murmur heard.  Pulmonary:      Effort: Pulmonary effort is normal. No respiratory distress.      Breath sounds: Normal breath sounds.   Abdominal:      Palpations: Abdomen is soft.      Tenderness: There is no abdominal tenderness.   Musculoskeletal:         General: No swelling.      Cervical back: Neck supple.   Skin:     General: Skin is warm and dry.      Capillary Refill: Capillary refill takes less than 2 seconds.   Neurological:      Mental Status: He is alert.   Psychiatric:         Mood and Affect: Mood normal.          Discussion with Family: Patient declined call to .     Discharge instructions/Information to patient and family:   See after visit summary for information provided to patient  and family.      Provisions for Follow-Up Care:  See after visit summary for information related to follow-up care and any pertinent home health orders.      Mobility at time of Discharge:   Basic Mobility Inpatient Raw Score: 24  JH-HLM Goal: 8: Walk 250 feet or more  JH-HLM Achieved: 8: Walk 250 feet ot more  HLM Goal achieved. Continue to encourage appropriate mobility.     Disposition:   Home    Planned Readmission: none     Discharge Statement:  I spent 35 minutes discharging the patient. This time was spent on the day of discharge. I had direct contact with the patient on the day of discharge. Greater than 50% of the total time was spent examining patient, answering all patient questions, arranging and discussing plan of care with patient as well as directly providing post-discharge instructions.  Additional time then spent on discharge activities.    Discharge Medications:  See after visit summary for reconciled discharge medications provided to patient and/or family.      **Please Note: This note may have been constructed using a voice recognition system**

## 2024-04-15 NOTE — ASSESSMENT & PLAN NOTE
W/ hx of chronic respiratory uses 2L during the day and 1L at night  S/p CXR no consolidation  Place on IV steroid 40mg bid  Pulm toilet with xopenex/ipratropium  Inhaled steroids with budesonide  Oxygen requirements at baseline

## 2024-04-15 NOTE — ASSESSMENT & PLAN NOTE
"Lab Results   Component Value Date    HGBA1C 8.9 (H) 04/06/2024       No results for input(s): \"POCGLU\" in the last 72 hours.    Blood Sugar Average: Last 72 hrs:  Cont insulin regimen; titrate up for optimal control given on steroids  Place on ISS    "

## 2024-04-15 NOTE — H&P
"[facility]  H&P  Name: Lucho Talavera 69 y.o. male I MRN: [id number]  Unit/Bed#: CW2 215-01 I Date of Admission: 4/14/2024   Date of Service: 4/15/2024 I Hospital Day: 0      Assessment/Plan   * Chronic obstructive pulmonary disease with acute exacerbation (HCC)  Assessment & Plan  W/ hx of chronic respiratory uses 2L during the day and 1L at night  S/p CXR no consolidation  Place on IV steroid 40mg bid  Pulm toilet with xopenex/ipratropium  Inhaled steroids with budesonide  Oxygen requirements at baseline    Atrial fibrillation (HCC)  Assessment & Plan  Mild tachycardia likely due to copd exac, nebs txt. Will use xopenex instead  Cont bb  A/c with eliquis    Hypertension  Assessment & Plan  Stable  Cont meds    H/O ETOH abuse  Assessment & Plan  No sign of w/d  Cont thiamine, folic acid    KLARISSA (obstructive sleep apnea)  Assessment & Plan  Use of cpap at qhs    Type 2 diabetes mellitus with hyperglycemia, without long-term current use of insulin ([facility])  Assessment & Plan  Lab Results   Component Value Date    HGBA1C 8.9 (H) 04/06/2024       No results for input(s): \"POCGLU\" in the last 72 hours.    Blood Sugar Average: Last 72 hrs:  Cont insulin regimen; titrate up for optimal control given on steroids  Place on ISS      CAD (coronary artery disease)  Assessment & Plan  Stable  Pt is cp free  Cont cardiac meds         VTE Prophylaxis: Apixaban (Eliquis)  / sequential compression device   Code Status: Full code  POLST: There is no POLST form on file for this patient (pre-hospital)  Discussion with family: Plan of care d/w patient    Anticipated Length of Stay:  Patient will be admitted on an Observation basis with an anticipated length of stay of  < 2 midnights.   Justification for Hospital Stay: COPD exac    Total Time for Visit, including Counseling / Coordination of Care: 60 minutes.  Greater than 50% of this total time spent on direct patient counseling and coordination of " care.    Chief Complaint:   SOB x 1 day    History of Present Illness:    Lucho Talavera is a 69 y.o. male medical hx significant for chronic respiratory failure secondary to copd, KLARISSA, htn, IDDM, Afib presents to the ER with c./o sob x 1. He denies cough, fever or chills, sick contacts. Upon arrival of EMS, pt saturating at 87% on 2L. He was given 2 rounds of nebs, IV steroids and increased oxygen.   Upon arrival to the ED, pt reports of improved symptoms. His pox is presently stable on 2L which is his baseline requirements.    Review of Systems:    Review of Systems   Respiratory:  Positive for shortness of breath and wheezing.        Past Medical and Surgical History:     Past Medical History:   Diagnosis Date    Cardiac arrest (HCC)     COPD (chronic obstructive pulmonary disease) (HCC)     CVA (cerebral vascular accident) (HCC)     Diabetes mellitus (HCC)     Heart attack (HCC)     Hypertension     Stroke (HCC)        Past Surgical History:   Procedure Laterality Date    CARDIAC ELECTROPHYSIOLOGY PROCEDURE N/A 4/8/2024    Procedure: Cardiac eps/aflutter ablation;  Surgeon: Ihsan Blum DO;  Location: [facility];  Service: Cardiology    CERVICAL FUSION N/A 9/10/2018    Procedure: Posterior cervical decompressive laminectomy C3-6; Posterior cervical lateral mass and pedicle fixation fusion C1-T1;  Surgeon: Papa Quiros MD;  Location: [facility] MAIN OR;  Service: Neurosurgery       Meds/Allergies:    Prior to Admission medications    Medication Sig Start Date End Date Taking? Authorizing Provider   albuterol (Proventil HFA) 90 mcg/act inhaler Inhale 2 puffs every 6 (six) hours as needed for wheezing 1/1/24   Pro Kwan MD   apixaban (Eliquis) 5 mg Take 1 tablet (5 mg total) by mouth 2 (two) times a day Do not fill, please check copay 4/8/24   Brianna Mcneal PA-C   aspirin 81 mg chewable tablet Chew 81 mg daily    Historical Provider, MD   atorvastatin (LIPITOR) 80 mg tablet Take 1 tablet (80 mg  total) by mouth daily with dinner 9/22/18   Bowen aHrtman MD   B Complex Vitamins (VITAMIN B COMPLEX 100 IJ) Take 1 tablet by mouth daily    Historical Provider, MD   budesonide (PULMICORT) 0.5 mg/2 mL nebulizer solution Take 2 mL (0.5 mg total) by nebulization every 12 (twelve) hours Rinse mouth after use. 6/10/22   Jenna Meneses PA-C   busPIRone (BUSPAR) 10 mg tablet Take 1 tablet (10 mg total) by mouth 3 (three) times a day 7/28/23   Thelma Huizar PA-C   ferrous sulfate 324 (65 Fe) mg Take 1 tablet (324 mg total) by mouth 2 (two) times a day before meals 10/18/23   Dawson Whitley   fluticasone (FLONASE) 50 mcg/act nasal spray 1 spray into each nostril 2 (two) times a day 10/4/13   Historical Provider, MD   Fluticasone-Salmeterol (Advair) 500-50 mcg/dose inhaler Inhale 1 puff 2 (two) times a day Rinse mouth after use. 1/1/24   Pro Kwan MD   folic acid (FOLVITE) 1 mg tablet Take 1 tablet (1 mg total) by mouth daily 6/11/22   Jenna Meneses PA-C   Insulin Glargine (BASAGLAR KWIKPEN SC) Inject 30 Units under the skin daily at bedtime    Historical Provider, MD   lisinopril (ZESTRIL) 40 mg tablet Take 40 mg by mouth daily     Historical Provider, MD   LORazepam (Ativan) 0.5 mg tablet Take by mouth every 6 (six) hours as needed for anxiety     Historical Provider, MD   melatonin 3 mg Take 1 tablet (3 mg total) by mouth daily at bedtime as needed (30) 9/21/18   Bowen Hartman MD   metFORMIN (GLUCOPHAGE) 1000 MG tablet Take 1 tablet by mouth every 12 (twelve) hours    Historical Provider, MD   pantoprazole (Protonix) 40 mg tablet Take 1 tablet (40 mg total) by mouth 2 (two) times a day 8/8/23   TATIANA Thorpe   thiamine 100 MG tablet Take 1 tablet (100 mg total) by mouth daily 9/22/18   Bowen Hartman MD   tiotropium (SPIRIVA) 18 mcg inhalation capsule Place 1 capsule (18 mcg total) into inhaler and inhale daily 1/1/24   Pro Kwan MD   betamethasone valerate (VALISONE) 0.1 % cream Apply  topically 2 (two) times a day  Patient not taking: Reported on 4/6/2024 10/4/13 4/15/24  Historical Provider, MD MCCOY have reviewed home medications with patient personally.    Allergies:   Allergies   Allergen Reactions    Amoxicillin Hives    Augmentin [Amoxicillin-Pot Clavulanate] Hives       Social History:     Marital Status: Single   Occupation:   Patient Pre-hospital Living Situation: Resides w. His son  Patient Pre-hospital Level of Mobility: Independent  Patient Pre-hospital Diet Restrictions: None  Substance Use History:   Social History     Substance and Sexual Activity   Alcohol Use Not Currently    Alcohol/week: 8.0 - 12.0 standard drinks of alcohol    Types: 8 - 12 Cans of beer per week    Comment: every day drinker     Social History     Tobacco Use   Smoking Status Former    Types: Cigarettes   Smokeless Tobacco Never   Tobacco Comments    quit 5 months ago      Social History     Substance and Sexual Activity   Drug Use Never       Family History:    Family History   Problem Relation Age of Onset    Heart attack Mother        Physical Exam:     Vitals:   Blood Pressure: 146/79 (04/15/24 0143)  Pulse: 105 (04/15/24 0143)  Temperature: 99 °F (37.2 °C) (04/14/24 2013)  Temp Source: Oral (04/14/24 2013)  Respirations: 20 (04/15/24 0100)  SpO2: 93 % (04/15/24 0143)    Physical Exam  Cardiovascular:      Rate and Rhythm: Normal rate and regular rhythm.      Pulses: Normal pulses.      Heart sounds: Normal heart sounds.   Pulmonary:      Effort: Pulmonary effort is normal.      Breath sounds: Wheezing present.   Abdominal:      General: Abdomen is flat. Bowel sounds are normal. There is no distension.      Palpations: Abdomen is soft.      Tenderness: There is no abdominal tenderness. There is no guarding.   Musculoskeletal:         General: Normal range of motion.      Cervical back: Normal range of motion and neck supple.      Right lower leg: No edema.      Left lower leg: No edema.   Skin:      General: Skin is warm and dry.   Neurological:      General: No focal deficit present.      Mental Status: He is alert and oriented to person, place, and time. Mental status is at baseline.      Cranial Nerves: No cranial nerve deficit.      Motor: No weakness.           Additional Data:     Lab Results: I have personally reviewed pertinent reports.      Results from last 7 days   Lab Units 04/14/24 2033   WBC Thousand/uL 7.49   HEMOGLOBIN g/dL 10.4*   HEMATOCRIT % 34.2*   PLATELETS Thousands/uL 305   SEGS PCT % 51   LYMPHO PCT % 34   MONO PCT % 9   EOS PCT % 4     Results from last 7 days   Lab Units 04/14/24 2033   SODIUM mmol/L 139   POTASSIUM mmol/L 4.0   CHLORIDE mmol/L 105   CO2 mmol/L 26   BUN mg/dL 11   CREATININE mg/dL 0.83   ANION GAP mmol/L 8   CALCIUM mg/dL 8.3*   ALBUMIN g/dL 3.5   TOTAL BILIRUBIN mg/dL 0.32   ALK PHOS U/L 52   ALT U/L 14   AST U/L 15   GLUCOSE RANDOM mg/dL 203*     Results from last 7 days   Lab Units 04/09/24  0519   INR  0.98     Results from last 7 days   Lab Units 04/15/24  0141 04/10/24  1033 04/10/24  0606 04/09/24  2007 04/09/24  1631 04/09/24  1026 04/09/24  0635 04/08/24  2117 04/08/24  2102 04/08/24  1548 04/08/24  1041 04/08/24  0602   POC GLUCOSE mg/dl 387* 193* 108 308* 263* 182* 130 351* 368* 218* 130 126               Imaging: I have personally reviewed pertinent reports.      XR chest 1 view portable   Final Result by Antonieta Rodriguez MD (04/14 2259)      No acute cardiopulmonary disease.            Workstation performed: [id number]             EKG, Pathology, and Other Studies Reviewed on Admission:   EKG: afib @ 96bpm    Allscripts / Epic Records Reviewed: Yes     ** Please Note: This note has been constructed using a voice recognition system. **

## 2024-04-16 ENCOUNTER — TELEPHONE (OUTPATIENT)
Age: 69
End: 2024-04-16

## 2024-04-17 ENCOUNTER — PATIENT OUTREACH (OUTPATIENT)
Dept: CASE MANAGEMENT | Facility: OTHER | Age: 69
End: 2024-04-17

## 2024-05-02 ENCOUNTER — PATIENT OUTREACH (OUTPATIENT)
Dept: CASE MANAGEMENT | Facility: OTHER | Age: 69
End: 2024-05-02

## 2024-05-02 ENCOUNTER — APPOINTMENT (EMERGENCY)
Dept: RADIOLOGY | Facility: HOSPITAL | Age: 69
End: 2024-05-02
Payer: MEDICARE

## 2024-05-02 ENCOUNTER — HOSPITAL ENCOUNTER (EMERGENCY)
Facility: HOSPITAL | Age: 69
Discharge: HOME | End: 2024-05-02
Attending: STUDENT IN AN ORGANIZED HEALTH CARE EDUCATION/TRAINING PROGRAM
Payer: MEDICARE

## 2024-05-02 VITALS
HEART RATE: 106 BPM | SYSTOLIC BLOOD PRESSURE: 128 MMHG | OXYGEN SATURATION: 94 % | TEMPERATURE: 98.2 F | RESPIRATION RATE: 22 BRPM | DIASTOLIC BLOOD PRESSURE: 73 MMHG

## 2024-05-02 DIAGNOSIS — J44.1 COPD EXACERBATION (HCC): Primary | ICD-10-CM

## 2024-05-02 DIAGNOSIS — I45.5 SINUS PAUSE: ICD-10-CM

## 2024-05-02 LAB
ANION GAP SERPL CALCULATED.3IONS-SCNC: 10 MMOL/L (ref 4–13)
BASE EX.OXY STD BLDV CALC-SCNC: 84.6 % (ref 60–80)
BASE EXCESS BLDV CALC-SCNC: 0.2 MMOL/L
BASOPHILS # BLD AUTO: 0.04 THOUSANDS/ÂΜL (ref 0–0.1)
BASOPHILS NFR BLD AUTO: 0 % (ref 0–1)
BUN SERPL-MCNC: 16 MG/DL (ref 5–25)
CALCIUM SERPL-MCNC: 9.2 MG/DL (ref 8.4–10.2)
CHLORIDE SERPL-SCNC: 99 MMOL/L (ref 96–108)
CO2 SERPL-SCNC: 24 MMOL/L (ref 21–32)
CREAT SERPL-MCNC: 0.63 MG/DL (ref 0.6–1.3)
EOSINOPHIL # BLD AUTO: 0.02 THOUSAND/ÂΜL (ref 0–0.61)
EOSINOPHIL NFR BLD AUTO: 0 % (ref 0–6)
ERYTHROCYTE [DISTWIDTH] IN BLOOD BY AUTOMATED COUNT: 18.6 % (ref 11.6–15.1)
GFR SERPL CREATININE-BSD FRML MDRD: 99 ML/MIN/1.73SQ M
GLUCOSE SERPL-MCNC: 341 MG/DL (ref 65–140)
HCO3 BLDV-SCNC: 23.8 MMOL/L (ref 24–30)
HCT VFR BLD AUTO: 38.5 % (ref 36.5–49.3)
HGB BLD-MCNC: 11.9 G/DL (ref 12–17)
IMM GRANULOCYTES # BLD AUTO: 0.14 THOUSAND/UL (ref 0–0.2)
IMM GRANULOCYTES NFR BLD AUTO: 2 % (ref 0–2)
LYMPHOCYTES # BLD AUTO: 1.34 THOUSANDS/ÂΜL (ref 0.6–4.47)
LYMPHOCYTES NFR BLD AUTO: 14 % (ref 14–44)
MCH RBC QN AUTO: 24.6 PG (ref 26.8–34.3)
MCHC RBC AUTO-ENTMCNC: 30.9 G/DL (ref 31.4–37.4)
MCV RBC AUTO: 80 FL (ref 82–98)
MONOCYTES # BLD AUTO: 0.36 THOUSAND/ÂΜL (ref 0.17–1.22)
MONOCYTES NFR BLD AUTO: 4 % (ref 4–12)
NEUTROPHILS # BLD AUTO: 7.67 THOUSANDS/ÂΜL (ref 1.85–7.62)
NEUTS SEG NFR BLD AUTO: 80 % (ref 43–75)
NRBC BLD AUTO-RTO: 0 /100 WBCS
O2 CT BLDV-SCNC: 15.2 ML/DL
PCO2 BLDV: 35.3 MM HG (ref 42–50)
PH BLDV: 7.45 [PH] (ref 7.3–7.4)
PLATELET # BLD AUTO: 433 THOUSANDS/UL (ref 149–390)
PMV BLD AUTO: 10 FL (ref 8.9–12.7)
PO2 BLDV: 49.4 MM HG (ref 35–45)
POTASSIUM SERPL-SCNC: 4.5 MMOL/L (ref 3.5–5.3)
RBC # BLD AUTO: 4.83 MILLION/UL (ref 3.88–5.62)
SODIUM SERPL-SCNC: 133 MMOL/L (ref 135–147)
WBC # BLD AUTO: 9.57 THOUSAND/UL (ref 4.31–10.16)

## 2024-05-02 PROCEDURE — 99285 EMERGENCY DEPT VISIT HI MDM: CPT | Performed by: STUDENT IN AN ORGANIZED HEALTH CARE EDUCATION/TRAINING PROGRAM

## 2024-05-02 PROCEDURE — 80048 BASIC METABOLIC PNL TOTAL CA: CPT | Performed by: STUDENT IN AN ORGANIZED HEALTH CARE EDUCATION/TRAINING PROGRAM

## 2024-05-02 PROCEDURE — 99285 EMERGENCY DEPT VISIT HI MDM: CPT

## 2024-05-02 PROCEDURE — 85025 COMPLETE CBC W/AUTO DIFF WBC: CPT | Performed by: STUDENT IN AN ORGANIZED HEALTH CARE EDUCATION/TRAINING PROGRAM

## 2024-05-02 PROCEDURE — 71045 X-RAY EXAM CHEST 1 VIEW: CPT

## 2024-05-02 PROCEDURE — 93005 ELECTROCARDIOGRAM TRACING: CPT

## 2024-05-02 PROCEDURE — 82805 BLOOD GASES W/O2 SATURATION: CPT | Performed by: STUDENT IN AN ORGANIZED HEALTH CARE EDUCATION/TRAINING PROGRAM

## 2024-05-02 PROCEDURE — 36415 COLL VENOUS BLD VENIPUNCTURE: CPT | Performed by: STUDENT IN AN ORGANIZED HEALTH CARE EDUCATION/TRAINING PROGRAM

## 2024-05-02 RX ORDER — SODIUM CHLORIDE FOR INHALATION 0.9 %
12 VIAL, NEBULIZER (ML) INHALATION ONCE
Status: COMPLETED | OUTPATIENT
Start: 2024-05-02 | End: 2024-05-02

## 2024-05-02 RX ORDER — PREDNISONE 20 MG/1
40 TABLET ORAL ONCE
Status: COMPLETED | OUTPATIENT
Start: 2024-05-02 | End: 2024-05-02

## 2024-05-02 RX ADMIN — IPRATROPIUM BROMIDE 1 MG: 0.5 SOLUTION RESPIRATORY (INHALATION) at 17:32

## 2024-05-02 RX ADMIN — ISODIUM CHLORIDE 12 ML: 0.03 SOLUTION RESPIRATORY (INHALATION) at 17:32

## 2024-05-02 RX ADMIN — ALBUTEROL SULFATE 10 MG: 2.5 SOLUTION RESPIRATORY (INHALATION) at 17:32

## 2024-05-02 RX ADMIN — PREDNISONE 40 MG: 20 TABLET ORAL at 18:06

## 2024-05-02 NOTE — ED ATTENDING ATTESTATION
Final Diagnoses:     1. COPD exacerbation (HCC)    2. Sinus pause           IAguilar DO, saw and evaluated the patient. All available labs and X-rays were ordered by me or the resident / non-physician and have been reviewed by myself. I discussed the patient with the resident / non-physician and agree with the resident's / non-physician practitioner's findings and plan as documented in the resident's / non-physician practicitioner's note, except where noted.   At this point, I agree with the current assessment done in the ED.   I was present during key portions of all procedures performed unless otherwise stated.     Nursing Triage:     Chief Complaint   Patient presents with    Shortness of Breath     Pt with hx of COPD c/o sob since this morning.  Pt states he used his nebulizer at home with no relief.        HPI:   This is a 69 y.o. male presenting for evaluation of shortness of breath.  Patient has a history of COPD and has frequent presentations for COPD exacerbations.  He was most recently seen 2 days ago for this.  He was ultimately discharged.  He states that today symptoms started earlier this morning.  Has tried his home albuterol inhalers without relief.  No other modifying factors.  No associated symptoms.  No other complaints or concerns.    ASSESSMENT + PLAN:   Patient's presentation is most consistent with a mild acute COPD exacerbation. These constellation of symptoms are similar to prior flares without overt deviations from normal exacerbations. Low suspicion for alternate etiologies such as pneumothorax and acute PE. Presentation not consistent with other acute cardiopulmonary causes including ACS, CHF, or cardiac effusion.       Plan to maintain SaO2 ~90-94% with supplemental O2. Based on current presentation, including work of breathing, patient will not need NIPPV at this time. Plan for trial of breathing treatments and steroids. No indication for antibiotic treatment at this time. Will  evaluate for other acute cardiopulmonary processes with a CXR. Do not anticipate hospitalization given mild increase in symptoms, no other significant co-morbidities, and no new arrhythmias.         Physical:   Pertinent: Tachypnea, decreased breath sounds at the L base    General: VS reviewed  Appears in NAD  awake, alert.   Well-nourished, well-developed. Appears stated age.   Speaking normally in full sentences.   Head: Normocephalic, atraumatic  Eyes: EOM-I. No diplopia.   No hyphema.   No subconjunctival hemorrhages.  Symmetrical lids.   ENT: Atraumatic external nose and ears.    MMM  No malocclusion. No stridor. Normal phonation. No drooling. Normal swallowing.   Neck: No JVD.  CV: No pallor noted  Abd: soft nt nd no rebound/guarding  MSK:   FROM spontaneously  Skin: Dry, intact.   Neuro: Awake, alert, GCS15, CN II-XII grossly intact.   Motor grossly intact.  Psychiatric/Behavioral: interacting normally; appropriate mood/affect.    Exam: deferred    Vitals:    05/02/24 1706   BP: 128/73   BP Location: Right arm   Pulse: (!) 106   Resp: 22   Temp: 98.2 °F (36.8 °C)   TempSrc: Oral   SpO2: 94%     - There are no obvious limitations to social determinants of care.   - Nursing note reviewed.   - Vitals reviewed.   - Orders placed by myself and/or advanced practitioner / resident.    - Previous chart was reviewed  - No language barrier.   - History obtained from patient.    - There are no limitations to the history obtained:       Past Medical:    has a past medical history of Cardiac arrest (HCC), COPD (chronic obstructive pulmonary disease) (HCC), CVA (cerebral vascular accident) (HCC), Diabetes mellitus (HCC), Heart attack (HCC), Hypertension, and Stroke (HCC).    Past Surgical:    has a past surgical history that includes Cervical fusion (N/A, 9/10/2018) and Cardiac electrophysiology procedure (N/A, 4/8/2024).    Social:     Social History     Substance and Sexual Activity   Alcohol Use Not Currently     Alcohol/week: 8.0 - 12.0 standard drinks of alcohol    Types: 8 - 12 Cans of beer per week    Comment: every day drinker     Social History     Tobacco Use   Smoking Status Former    Types: Cigarettes   Smokeless Tobacco Never   Tobacco Comments    quit 5 months ago      Social History     Substance and Sexual Activity   Drug Use Never       Echo:   No results found for this or any previous visit.    No results found for this or any previous visit.      Cath:    No results found for this or any previous visit.      Code Status: Prior  Advance Directive and Living Will:      Power of :    POLST:    Medications   albuterol inhalation solution 10 mg (10 mg Nebulization Given 5/2/24 1732)   ipratropium (ATROVENT) 0.02 % inhalation solution 1 mg (1 mg Nebulization Given 5/2/24 1732)   sodium chloride 0.9 % inhalation solution 12 mL (12 mL Nebulization Given 5/2/24 1732)   predniSONE tablet 40 mg (40 mg Oral Given 5/2/24 1806)     XR chest 1 view portable   ED Interpretation   No acute cardiopulmonary disease.        Orders Placed This Encounter   Procedures    XR chest 1 view portable    CBC and differential    Blood gas, venous    Basic metabolic panel    Continuous cardiac monitoring    Holter monitor    ECG 12 lead    ECG 12 lead     Labs Reviewed   CBC AND DIFFERENTIAL - Abnormal       Result Value Ref Range Status    WBC 9.57  4.31 - 10.16 Thousand/uL Final    RBC 4.83  3.88 - 5.62 Million/uL Final    Hemoglobin 11.9 (*) 12.0 - 17.0 g/dL Final    Hematocrit 38.5  36.5 - 49.3 % Final    MCV 80 (*) 82 - 98 fL Final    MCH 24.6 (*) 26.8 - 34.3 pg Final    MCHC 30.9 (*) 31.4 - 37.4 g/dL Final    RDW 18.6 (*) 11.6 - 15.1 % Final    MPV 10.0  8.9 - 12.7 fL Final    Platelets 433 (*) 149 - 390 Thousands/uL Final    nRBC 0  /100 WBCs Final    Segmented % 80 (*) 43 - 75 % Final    Immature Grans % 2  0 - 2 % Final    Lymphocytes % 14  14 - 44 % Final    Monocytes % 4  4 - 12 % Final    Eosinophils Relative 0  0 - 6 %  Final    Basophils Relative 0  0 - 1 % Final    Absolute Neutrophils 7.67 (*) 1.85 - 7.62 Thousands/µL Final    Absolute Immature Grans 0.14  0.00 - 0.20 Thousand/uL Final    Absolute Lymphocytes 1.34  0.60 - 4.47 Thousands/µL Final    Absolute Monocytes 0.36  0.17 - 1.22 Thousand/µL Final    Eosinophils Absolute 0.02  0.00 - 0.61 Thousand/µL Final    Basophils Absolute 0.04  0.00 - 0.10 Thousands/µL Final   BLOOD GAS, VENOUS - Abnormal    pH, Rocky 7.447 (*) 7.300 - 7.400 Final    pCO2, Rocky 35.3 (*) 42.0 - 50.0 mm Hg Final    pO2, Rocky 49.4 (*) 35.0 - 45.0 mm Hg Final    HCO3, Rocky 23.8 (*) 24 - 30 mmol/L Final    Base Excess, Rocky 0.2  mmol/L Final    O2 Content, Rocky 15.2  ml/dL Final    O2 HGB, VENOUS 84.6 (*) 60.0 - 80.0 % Final   BASIC METABOLIC PANEL - Abnormal    Sodium 133 (*) 135 - 147 mmol/L Final    Potassium 4.5  3.5 - 5.3 mmol/L Final    Chloride 99  96 - 108 mmol/L Final    CO2 24  21 - 32 mmol/L Final    ANION GAP 10  4 - 13 mmol/L Final    BUN 16  5 - 25 mg/dL Final    Creatinine 0.63  0.60 - 1.30 mg/dL Final    Comment: Standardized to IDMS reference method    Glucose 341 (*) 65 - 140 mg/dL Final    Comment: If the patient is fasting, the ADA then defines impaired fasting glucose as > 100 mg/dL and diabetes as > or equal to 123 mg/dL.    Calcium 9.2  8.4 - 10.2 mg/dL Final    eGFR 99  ml/min/1.73sq m Final    Narrative:     National Kid[employer] Foundation guidelines for Chronic Kidney Disease (CKD):     Stage 1 with normal or high GFR (GFR > 90 mL/min/1.73 square meters)    Stage 2 Mild CKD (GFR = 60-89 mL/min/1.73 square meters)    Stage 3A Moderate CKD (GFR = 45-59 mL/min/1.73 square meters)    Stage 3B Moderate CKD (GFR = 30-44 mL/min/1.73 square meters)    Stage 4 Severe CKD (GFR = 15-29 mL/min/1.73 square meters)    Stage 5 End Stage CKD (GFR <15 mL/min/1.73 square meters)  Note: GFR calculation is accurate only with a steady state creatinine     Time reflects when diagnosis was documented in  both MDM as applicable and the Disposition within this note       Time User Action Codes Description Comment    5/2/2024  6:46 PM Tila Moreira [J44.1] COPD exacerbation (HCC)     5/2/2024  6:52 PM Tila Moreira [I45.5] Sinus pause           ED Disposition       ED Disposition   Discharge    Condition   Stable    Date/Time   Thu May 2, 2024  6:49 PM    Comment   Lucho PHILLIPS Sadaf discharge to home/self care.                   Follow-up Information       Follow up With Specialties Details Why Contact Info    [facility], Nurse Practitioner   [address] [place] [id number] [phone]            Discharge Medication List as of 5/2/2024  6:53 PM        CONTINUE these medications which have NOT CHANGED    Details   albuterol (Proventil HFA) 90 mcg/act inhaler Inhale 2 puffs every 6 (six) hours as needed for wheezing, Starting Mon 1/1/2024, Normal      apixaban (Eliquis) 5 mg Take 1 tablet (5 mg total) by mouth 2 (two) times a day Do not fill, please check copay, Starting Mon 4/8/2024, Normal      aspirin 81 mg chewable tablet Chew 81 mg daily, Historical Med      atorvastatin (LIPITOR) 80 mg tablet Take 1 tablet (80 mg total) by mouth daily with dinner, Starting Sat 9/22/2018, Normal      B Complex Vitamins (VITAMIN B COMPLEX 100 IJ) Take 1 tablet by mouth daily, Historical Med      budesonide (PULMICORT) 0.5 mg/2 mL nebulizer solution Take 2 mL (0.5 mg total) by nebulization every 12 (twelve) hours Rinse mouth after use., Starting Fri 6/10/2022, No Print      busPIRone (BUSPAR) 10 mg tablet Take 1 tablet (10 mg total) by mouth 3 (three) times a day, Starting Fri 7/28/2023, Normal      ferrous sulfate 324 (65 Fe) mg Take 1 tablet (324 mg total) by mouth 2 (two) times a day before meals, Starting Wed 10/18/2023, No Print      fluticasone (FLONASE) 50 mcg/act nasal spray 1 spray into each nostril 2 (two) times a day, Starting Fri 10/4/2013, Historical Med       Fluticasone-Salmeterol (Advair) 500-50 mcg/dose inhaler Inhale 1 puff 2 (two) times a day Rinse mouth after use., Starting Mon 1/1/2024, Normal      folic acid (FOLVITE) 1 mg tablet Take 1 tablet (1 mg total) by mouth daily, Starting Sat 6/11/2022, No Print      Insulin Glargine (BASAGLAR KWIKPEN SC) Inject 30 Units under the skin daily at bedtime, Historical Med      lisinopril (ZESTRIL) 40 mg tablet Take 40 mg by mouth daily , Historical Med      LORazepam (Ativan) 0.5 mg tablet Take by mouth every 6 (six) hours as needed for anxiety , Historical Med      melatonin 3 mg Take 1 tablet (3 mg total) by mouth daily at bedtime as needed (30), Starting Fri 9/21/2018, Normal      metFORMIN (GLUCOPHAGE) 1000 MG tablet Take 1 tablet by mouth every 12 (twelve) hours, Historical Med      pantoprazole (Protonix) 40 mg tablet Take 1 tablet (40 mg total) by mouth 2 (two) times a day, Starting Tue 8/8/2023, Normal      thiamine 100 MG tablet Take 1 tablet (100 mg total) by mouth daily, Starting Sat 9/22/2018, Normal      tiotropium (SPIRIVA) 18 mcg inhalation capsule Place 1 capsule (18 mcg total) into inhaler and inhale daily, Starting Mon 1/1/2024, Normal           Outpatient Discharge Orders   Holter monitor   Standing Status: Future Standing Exp. Date: 05/02/28     Prior to Admission Medications   Prescriptions Last Dose Informant Patient Reported? Taking?   B Complex Vitamins (VITAMIN B COMPLEX 100 IJ)   Yes No   Sig: Take 1 tablet by mouth daily   Fluticasone-Salmeterol (Advair) 500-50 mcg/dose inhaler   No No   Sig: Inhale 1 puff 2 (two) times a day Rinse mouth after use.   Insulin Glargine (BASAGLAR KWIKPEN SC)   Yes No   Sig: Inject 30 Units under the skin daily at bedtime   LORazepam (Ativan) 0.5 mg tablet   Yes No   Sig: Take by mouth every 6 (six) hours as needed for anxiety    albuterol (Proventil HFA) 90 mcg/act inhaler   No No   Sig: Inhale 2 puffs every 6 (six) hours as needed for wheezing   apixaban (Eliquis)  "5 mg   No No   Sig: Take 1 tablet (5 mg total) by mouth 2 (two) times a day Do not fill, please check copay   aspirin 81 mg chewable tablet   Yes No   Sig: Chew 81 mg daily   atorvastatin (LIPITOR) 80 mg tablet   No No   Sig: Take 1 tablet (80 mg total) by mouth daily with dinner   budesonide (PULMICORT) 0.5 mg/2 mL nebulizer solution   No No   Sig: Take 2 mL (0.5 mg total) by nebulization every 12 (twelve) hours Rinse mouth after use.   busPIRone (BUSPAR) 10 mg tablet   No No   Sig: Take 1 tablet (10 mg total) by mouth 3 (three) times a day   ferrous sulfate 324 (65 Fe) mg   No No   Sig: Take 1 tablet (324 mg total) by mouth 2 (two) times a day before meals   fluticasone (FLONASE) 50 mcg/act nasal spray   Yes No   Si spray into each nostril 2 (two) times a day   folic acid (FOLVITE) 1 mg tablet   No No   Sig: Take 1 tablet (1 mg total) by mouth daily   lisinopril (ZESTRIL) 40 mg tablet   Yes No   Sig: Take 40 mg by mouth daily    melatonin 3 mg   No No   Sig: Take 1 tablet (3 mg total) by mouth daily at bedtime as needed (30)   metFORMIN (GLUCOPHAGE) 1000 MG tablet   Yes No   Sig: Take 1 tablet by mouth every 12 (twelve) hours   pantoprazole (Protonix) 40 mg tablet   No No   Sig: Take 1 tablet (40 mg total) by mouth 2 (two) times a day   thiamine 100 MG tablet   No No   Sig: Take 1 tablet (100 mg total) by mouth daily   tiotropium (SPIRIVA) 18 mcg inhalation capsule   No No   Sig: Place 1 capsule (18 mcg total) into inhaler and inhale daily      Facility-Administered Medications: None                        Portions of the record may have been created with voice recognition software. Occasional wrong word or \"sound a like\" substitutions may have occurred due to the inherent limitations of voice recognition software. Read the chart carefully and recognize, using context, where substitutions have occurred.    Electronically signed by:  Aguilar Caal    "

## 2024-05-02 NOTE — DISCHARGE INSTRUCTIONS
You have been seen for a COPD exacerbation. Please continue with medications at home and follow up regarding the holter monitor.

## 2024-05-02 NOTE — ED PROVIDER NOTES
History  Chief Complaint   Patient presents with    Shortness of Breath     Pt with hx of COPD c/o sob since this morning.  Pt states he used his nebulizer at home with no relief.      Patient is a 69-year-old male, past medical history significant for COPD, diabetes, hypertension, presenting today for evaluation of shortness of breath.  Patient reports over the past hour, he has become increasingly short of breath.  States that he did try to take albuterol at home, with no relief.  Patient here requesting a breathing treatment as has feels like his COPD exacerbations.  Patient denies any chest pain.  Denies recent fevers or worsening coughing.  Patient denies any leg pain, leg swelling, hemoptysis, recent surgeries, recent immobility, or history of blood clots.  Patient last seen 2 days ago at [facility] for the same.          Prior to Admission Medications   Prescriptions Last Dose Informant Patient Reported? Taking?   B Complex Vitamins (VITAMIN B COMPLEX 100 IJ)   Yes No   Sig: Take 1 tablet by mouth daily   Fluticasone-Salmeterol (Advair) 500-50 mcg/dose inhaler   No No   Sig: Inhale 1 puff 2 (two) times a day Rinse mouth after use.   Insulin Glargine (BASAGLAR KWIKPEN SC)   Yes No   Sig: Inject 30 Units under the skin daily at bedtime   LORazepam (Ativan) 0.5 mg tablet   Yes No   Sig: Take by mouth every 6 (six) hours as needed for anxiety    albuterol (Proventil HFA) 90 mcg/act inhaler   No No   Sig: Inhale 2 puffs every 6 (six) hours as needed for wheezing   apixaban (Eliquis) 5 mg   No No   Sig: Take 1 tablet (5 mg total) by mouth 2 (two) times a day Do not fill, please check copay   aspirin 81 mg chewable tablet   Yes No   Sig: Chew 81 mg daily   atorvastatin (LIPITOR) 80 mg tablet   No No   Sig: Take 1 tablet (80 mg total) by mouth daily with dinner   budesonide (PULMICORT) 0.5 mg/2 mL nebulizer solution   No No   Sig: Take 2 mL (0.5 mg total) by nebulization every 12 (twelve) hours Rinse mouth after  use.   busPIRone (BUSPAR) 10 mg tablet   No No   Sig: Take 1 tablet (10 mg total) by mouth 3 (three) times a day   ferrous sulfate 324 (65 Fe) mg   No No   Sig: Take 1 tablet (324 mg total) by mouth 2 (two) times a day before meals   fluticasone (FLONASE) 50 mcg/act nasal spray   Yes No   Si spray into each nostril 2 (two) times a day   folic acid (FOLVITE) 1 mg tablet   No No   Sig: Take 1 tablet (1 mg total) by mouth daily   lisinopril (ZESTRIL) 40 mg tablet   Yes No   Sig: Take 40 mg by mouth daily    melatonin 3 mg   No No   Sig: Take 1 tablet (3 mg total) by mouth daily at bedtime as needed (30)   metFORMIN (GLUCOPHAGE) 1000 MG tablet   Yes No   Sig: Take 1 tablet by mouth every 12 (twelve) hours   pantoprazole (Protonix) 40 mg tablet   No No   Sig: Take 1 tablet (40 mg total) by mouth 2 (two) times a day   thiamine 100 MG tablet   No No   Sig: Take 1 tablet (100 mg total) by mouth daily   tiotropium (SPIRIVA) 18 mcg inhalation capsule   No No   Sig: Place 1 capsule (18 mcg total) into inhaler and inhale daily      Facility-Administered Medications: None       Past Medical History:   Diagnosis Date    Cardiac arrest (HCC)     COPD (chronic obstructive pulmonary disease) (HCC)     CVA (cerebral vascular accident) (HCC)     Diabetes mellitus (HCC)     Heart attack (HCC)     Hypertension     Stroke (HCC)        Past Surgical History:   Procedure Laterality Date    CARDIAC ELECTROPHYSIOLOGY PROCEDURE N/A 2024    Procedure: Cardiac eps/aflutter ablation;  Surgeon: Ihsan Blum DO;  Location: [facility];  Service: Cardiology    CERVICAL FUSION N/A 9/10/2018    Procedure: Posterior cervical decompressive laminectomy C3-6; Posterior cervical lateral mass and pedicle fixation fusion C1-T1;  Surgeon: Papa Quiros MD;  Location: [facility];  Service: Neurosurgery       Family History   Problem Relation Age of Onset    Heart attack Mother      I have reviewed and agree with the history as  documented.    E-Cigarette/Vaping    E-Cigarette Use Never User      E-Cigarette/Vaping Substances    Nicotine No     THC No     CBD No     Flavoring No     Other No     Unknown No      Social History     Tobacco Use    Smoking status: Former     Types: Cigarettes    Smokeless tobacco: Never    Tobacco comments:     quit 5 months ago    Vaping Use    Vaping status: Never Used   Substance Use Topics    Alcohol use: Not Currently     Alcohol/week: 8.0 - 12.0 standard drinks of alcohol     Types: 8 - 12 Cans of beer per week     Comment: every day drinker    Drug use: Never        Review of Systems    Physical Exam  ED Triage Vitals [05/02/24 1706]   Temperature Pulse Respirations Blood Pressure SpO2   98.2 °F (36.8 °C) (!) 106 22 128/73 94 %      Temp Source Heart Rate Source Patient Position - Orthostatic VS BP Location FiO2 (%)   Oral Monitor Sitting Right arm --      Pain Score       --             Orthostatic Vital Signs  Vitals:    05/02/24 1706   BP: 128/73   Pulse: (!) 106   Patient Position - Orthostatic VS: Sitting       Physical Exam  Vitals and nursing note reviewed.   Constitutional:       General: He is not in acute distress.     Appearance: Normal appearance. He is not ill-appearing or toxic-appearing.   HENT:      Head: Normocephalic and atraumatic.   Eyes:      General: No scleral icterus.     Extraocular Movements: Extraocular movements intact.   Cardiovascular:      Rate and Rhythm: Normal rate and regular rhythm.      Pulses: Normal pulses.      Heart sounds: Normal heart sounds. No murmur heard.  Pulmonary:      Effort: Pulmonary effort is normal. No respiratory distress.      Breath sounds: Wheezing (Minimal left-sided wheezing.) present. No decreased breath sounds or rhonchi.   Abdominal:      General: Abdomen is flat. There is no distension.      Palpations: Abdomen is soft.      Tenderness: There is no abdominal tenderness.   Musculoskeletal:         General: Normal range of motion.       Cervical back: Normal range of motion.      Right lower leg: No tenderness. No edema.      Left lower leg: No tenderness. No edema.   Skin:     Capillary Refill: Capillary refill takes less than 2 seconds.   Neurological:      General: No focal deficit present.      Mental Status: He is alert.      Cranial Nerves: No cranial nerve deficit.      Sensory: No sensory deficit.      Motor: No weakness.      Gait: Gait normal.   Psychiatric:         Mood and Affect: Mood normal.         Behavior: Behavior normal.         ED Medications  Medications   albuterol inhalation solution 10 mg (10 mg Nebulization Given 5/2/24 1732)   ipratropium (ATROVENT) 0.02 % inhalation solution 1 mg (1 mg Nebulization Given 5/2/24 1732)   sodium chloride 0.9 % inhalation solution 12 mL (12 mL Nebulization Given 5/2/24 1732)   predniSONE tablet 40 mg (40 mg Oral Given 5/2/24 1806)       Diagnostic Studies  Results Reviewed       Procedure Component Value Units Date/Time    Basic metabolic panel [[id number]]  (Abnormal) Collected: 05/02/24 1736    Lab Status: Final result Specimen: Blood from Arm, Left Updated: 05/02/24 1807     Sodium 133 mmol/L      Potassium 4.5 mmol/L      Chloride 99 mmol/L      CO2 24 mmol/L      ANION GAP 10 mmol/L      BUN 16 mg/dL      Creatinine 0.63 mg/dL      Glucose 341 mg/dL      Calcium 9.2 mg/dL      eGFR 99 ml/min/1.73sq m     Narrative:      National Kidney Disease Foundation guidelines for Chronic Kidney Disease (CKD):     Stage 1 with normal or high GFR (GFR > 90 mL/min/1.73 square meters)    Stage 2 Mild CKD (GFR = 60-89 mL/min/1.73 square meters)    Stage 3A Moderate CKD (GFR = 45-59 mL/min/1.73 square meters)    Stage 3B Moderate CKD (GFR = 30-44 mL/min/1.73 square meters)    Stage 4 Severe CKD (GFR = 15-29 mL/min/1.73 square meters)    Stage 5 End Stage CKD (GFR <15 mL/min/1.73 square meters)  Note: GFR calculation is accurate only with a steady state creatinine    Blood gas, venous [[id number]]   (Abnormal) Collected: 05/02/24 1736    Lab Status: Final result Specimen: Blood from Arm, Left Updated: 05/02/24 1755     pH, Rocky 7.447     pCO2, Rocky 35.3 mm Hg      pO2, Rocky 49.4 mm Hg      HCO3, Rocky 23.8 mmol/L      Base Excess, Rocky 0.2 mmol/L      O2 Content, Rocky 15.2 ml/dL      O2 HGB, VENOUS 84.6 %     CBC and differential [[id number]]  (Abnormal) Collected: 05/02/24 1736    Lab Status: Final result Specimen: Blood from Arm, Left Updated: 05/02/24 1750     WBC 9.57 Thousand/uL      RBC 4.83 Million/uL      Hemoglobin 11.9 g/dL      Hematocrit 38.5 %      MCV 80 fL      MCH 24.6 pg      MCHC 30.9 g/dL      RDW 18.6 %      MPV 10.0 fL      Platelets 433 Thousands/uL      nRBC 0 /100 WBCs      Segmented % 80 %      Immature Grans % 2 %      Lymphocytes % 14 %      Monocytes % 4 %      Eosinophils Relative 0 %      Basophils Relative 0 %      Absolute Neutrophils 7.67 Thousands/µL      Absolute Immature Grans 0.14 Thousand/uL      Absolute Lymphocytes 1.34 Thousands/µL      Absolute Monocytes 0.36 Thousand/µL      Eosinophils Absolute 0.02 Thousand/µL      Basophils Absolute 0.04 Thousands/µL                    XR chest 1 view portable   ED Interpretation by Tila Moreira MD (05/02 1844)   No acute cardiopulmonary disease.            Procedures  Procedures      ED Course  ED Course as of 05/02/24 1932   u May 02, 2024   1715 Patient seen and evaluated by me  Ddx: COPD exacerbation, doubt pneumonia in absence of fever and worsening cough as well as acuity of onset. No risk factors/signs/symptoms of PE-tachycardia likely from albuterol at home.  Workup and plan: CBC, BMP, VBG, EKG   1727 EKG done at 1727 interpreted by me   rate 90  rhythm sinus tachycardia with PACs vs sinus pauses  axis normal  QRS complexes are normal  Intervals are normal  ST segments showing no ischemic changes.      1756 Hemoglobin(!): 11.9  Improved from prior.   1758 Blood gas, venous(!)  Grossly normal.   1841 Basic metabolic  panel(!)  Hyperglycemia, will advise patient.   1844 XR chest 1 view portable  Normal on my interpretation.   1845 Spoke with cardiology regarding EKG, recommending holter monitor and follow up.   1852 Patient discharged this time, given return precautions and follow-up information.  Patient reports understanding, all questions answered.               Identification of Seniors at Risk      Flowsheet Row Most Recent Value   (ISAR) Identification of Seniors at Risk    Before the illness or injury that brought you to the Emergency, did you need someone to help you on a regular basis? 0 Filed at: 05/02/2024 1708   In the last 24 hours, have you needed more help than usual? 0 Filed at: 05/02/2024 1708   Have you been hospitalized for one or more nights during the past 6 months? 0 Filed at: 05/02/2024 1708   In general, do you see well? 0 Filed at: 05/02/2024 1708   In general, do you have serious problems with your memory? 0 Filed at: 05/02/2024 1708   Do you take more than three different medications every day? 1 Filed at: 05/02/2024 1708   ISAR Score 1 Filed at: 05/02/2024 1708                      SBIRT 20yo+      Flowsheet Row Most Recent Value   Initial Alcohol Screen: US AUDIT-C     1. How often do you have a drink containing alcohol? 0 Filed at: 05/02/2024 1708   Audit-C Score 0 Filed at: 05/02/2024 1708   MICHAEL: How many times in the past year have you...    Used an illegal drug or used a prescription medication for non-medical reasons? Never Filed at: 05/02/2024 1708                  Medical Decision Making  Please see ED course above regarding details of the MDM.    Amount and/or Complexity of Data Reviewed  Labs: ordered. Decision-making details documented in ED Course.  Radiology: ordered and independent interpretation performed. Decision-making details documented in ED Course.    Risk  Prescription drug management.          Disposition  Final diagnoses:   COPD exacerbation (HCC)   Sinus pause     Time  reflects when diagnosis was documented in both MDM as applicable and the Disposition within this note       Time User Action Codes Description Comment    5/2/2024  6:46 PM Tila Moreira [J44.1] COPD exacerbation (HCC)     5/2/2024  6:52 PM Tila Moreira [I45.5] Sinus pause           ED Disposition       ED Disposition   Discharge    Condition   Stable    Date/Time   Thu May 2, 2024 1849    Comment   Lucho ALAN Talavera discharge to home/self care.                   Follow-up Information       Follow up With Specialties Details Why Contact Info    [facility], Nurse Practitioner   [address] [place] [id number]  [phone]              Discharge Medication List as of 5/2/2024  6:53 PM        CONTINUE these medications which have NOT CHANGED    Details   albuterol (Proventil HFA) 90 mcg/act inhaler Inhale 2 puffs every 6 (six) hours as needed for wheezing, Starting Mon 1/1/2024, Normal      apixaban (Eliquis) 5 mg Take 1 tablet (5 mg total) by mouth 2 (two) times a day Do not fill, please check copay, Starting Mon 4/8/2024, Normal      aspirin 81 mg chewable tablet Chew 81 mg daily, Historical Med      atorvastatin (LIPITOR) 80 mg tablet Take 1 tablet (80 mg total) by mouth daily with dinner, Starting Sat 9/22/2018, Normal      B Complex Vitamins (VITAMIN B COMPLEX 100 IJ) Take 1 tablet by mouth daily, Historical Med      budesonide (PULMICORT) 0.5 mg/2 mL nebulizer solution Take 2 mL (0.5 mg total) by nebulization every 12 (twelve) hours Rinse mouth after use., Starting Fri 6/10/2022, No Print      busPIRone (BUSPAR) 10 mg tablet Take 1 tablet (10 mg total) by mouth 3 (three) times a day, Starting Fri 7/28/2023, Normal      ferrous sulfate 324 (65 Fe) mg Take 1 tablet (324 mg total) by mouth 2 (two) times a day before meals, Starting Wed 10/18/2023, No Print      fluticasone (FLONASE) 50 mcg/act nasal spray 1 spray into each nostril 2 (two) times a day, Starting Fri  10/4/2013, Historical Med      Fluticasone-Salmeterol (Advair) 500-50 mcg/dose inhaler Inhale 1 puff 2 (two) times a day Rinse mouth after use., Starting Mon 1/1/2024, Normal      folic acid (FOLVITE) 1 mg tablet Take 1 tablet (1 mg total) by mouth daily, Starting Sat 6/11/2022, No Print      Insulin Glargine (BASAGLAR KWIKPEN SC) Inject 30 Units under the skin daily at bedtime, Historical Med      lisinopril (ZESTRIL) 40 mg tablet Take 40 mg by mouth daily , Historical Med      LORazepam (Ativan) 0.5 mg tablet Take by mouth every 6 (six) hours as needed for anxiety , Historical Med      melatonin 3 mg Take 1 tablet (3 mg total) by mouth daily at bedtime as needed (30), Starting Fri 9/21/2018, Normal      metFORMIN (GLUCOPHAGE) 1000 MG tablet Take 1 tablet by mouth every 12 (twelve) hours, Historical Med      pantoprazole (Protonix) 40 mg tablet Take 1 tablet (40 mg total) by mouth 2 (two) times a day, Starting Tue 8/8/2023, Normal      thiamine 100 MG tablet Take 1 tablet (100 mg total) by mouth daily, Starting Sat 9/22/2018, Normal      tiotropium (SPIRIVA) 18 mcg inhalation capsule Place 1 capsule (18 mcg total) into inhaler and inhale daily, Starting Mon 1/1/2024, Normal           Outpatient Discharge Orders   Holter monitor   Standing Status: Future Standing Exp. Date: 05/02/28       PDMP Review       None             ED Provider  Attending physically available and evaluated Lucho ALAN Laraan. I managed the patient along with the ED Attending.    Electronically Signed by           Tila Moreira MD  05/02/24 1932

## 2024-05-02 NOTE — PROGRESS NOTES
".OP RT CM called and spoke with patient' daughter Nimco regarding his breathing, medications and O2.    Nimco is Lucho' caregiver.  She stated he uses Trelegy Qday and rinses out mouth.  She stated he is insistent on requesting nebulizer every 6 hours or sooner and threatens to call 911 if she is reluctant to give him.  Nimco stated he is not wheezing and/or chest tightness she believes he is requesting due to habit.   Nimco stated her da coughs up sputum but she doesn't see the color.  I encouraged her to ask. He is on 2lpm nasal cannula SpO2 95% but she states he doesn't wear all the time. \"He is stubborn\" we reviewed guidelines.    Nimco had recent surgery and she cannot drive however she is interested in getting pulmonary rehab  for her dad.  We discussed COPD and deconditioning and I believe this would be wonderful for Lucho.  Nimco stated he is very sedentary. We discussed the referral process and for now, I will send pulmonary rehab exercises Lucho can do at home, some seated to increase mobility and strength.  Once Nimco can drive, she will reach out for a referral for pulmonary rehab.  I encouraged her to speak to Dr. Villalpando as well since he is her dad' pulmonologist with [facility].      OP RT CM will outreach next week and Nimco has my contact information.  "

## 2024-05-03 LAB
ATRIAL RATE: 102 BPM
P AXIS: 38 DEGREES
PR INTERVAL: 208 MS
QRS AXIS: 60 DEGREES
QRSD INTERVAL: 88 MS
QT INTERVAL: 316 MS
QTC INTERVAL: 386 MS
T WAVE AXIS: -23 DEGREES
VENTRICULAR RATE: 90 BPM

## 2024-05-03 PROCEDURE — 93010 ELECTROCARDIOGRAM REPORT: CPT | Performed by: INTERNAL MEDICINE

## 2024-05-05 ENCOUNTER — HOSPITAL ENCOUNTER (EMERGENCY)
Facility: HOSPITAL | Age: 69
Discharge: HOME | DRG: 190 | End: 2024-05-05
Attending: EMERGENCY MEDICINE
Payer: MEDICARE

## 2024-05-05 ENCOUNTER — APPOINTMENT (EMERGENCY)
Dept: RADIOLOGY | Facility: HOSPITAL | Age: 69
DRG: 190 | End: 2024-05-05
Payer: MEDICARE

## 2024-05-05 VITALS
RESPIRATION RATE: 22 BRPM | DIASTOLIC BLOOD PRESSURE: 118 MMHG | OXYGEN SATURATION: 98 % | SYSTOLIC BLOOD PRESSURE: 132 MMHG | HEART RATE: 87 BPM | TEMPERATURE: 98.6 F

## 2024-05-05 DIAGNOSIS — J44.1 COPD EXACERBATION (HCC): Primary | ICD-10-CM

## 2024-05-05 LAB
ALBUMIN SERPL BCP-MCNC: 4.1 G/DL (ref 3.5–5)
ALP SERPL-CCNC: 63 U/L (ref 34–104)
ALT SERPL W P-5'-P-CCNC: 18 U/L (ref 7–52)
ANION GAP SERPL CALCULATED.3IONS-SCNC: 11 MMOL/L (ref 4–13)
AST SERPL W P-5'-P-CCNC: 16 U/L (ref 13–39)
BASOPHILS # BLD AUTO: 0.07 THOUSANDS/ÂΜL (ref 0–0.1)
BASOPHILS NFR BLD AUTO: 1 % (ref 0–1)
BILIRUB SERPL-MCNC: 0.58 MG/DL (ref 0.2–1)
BUN SERPL-MCNC: 19 MG/DL (ref 5–25)
CALCIUM SERPL-MCNC: 9.7 MG/DL (ref 8.4–10.2)
CHLORIDE SERPL-SCNC: 96 MMOL/L (ref 96–108)
CO2 SERPL-SCNC: 29 MMOL/L (ref 21–32)
CREAT SERPL-MCNC: 0.67 MG/DL (ref 0.6–1.3)
EOSINOPHIL # BLD AUTO: 0.12 THOUSAND/ÂΜL (ref 0–0.61)
EOSINOPHIL NFR BLD AUTO: 1 % (ref 0–6)
ERYTHROCYTE [DISTWIDTH] IN BLOOD BY AUTOMATED COUNT: 19.4 % (ref 11.6–15.1)
GFR SERPL CREATININE-BSD FRML MDRD: 97 ML/MIN/1.73SQ M
GLUCOSE SERPL-MCNC: 209 MG/DL (ref 65–140)
HCT VFR BLD AUTO: 42.5 % (ref 36.5–49.3)
HGB BLD-MCNC: 12.9 G/DL (ref 12–17)
IMM GRANULOCYTES # BLD AUTO: 0.09 THOUSAND/UL (ref 0–0.2)
IMM GRANULOCYTES NFR BLD AUTO: 1 % (ref 0–2)
LYMPHOCYTES # BLD AUTO: 1.58 THOUSANDS/ÂΜL (ref 0.6–4.47)
LYMPHOCYTES NFR BLD AUTO: 15 % (ref 14–44)
MCH RBC QN AUTO: 24.7 PG (ref 26.8–34.3)
MCHC RBC AUTO-ENTMCNC: 30.4 G/DL (ref 31.4–37.4)
MCV RBC AUTO: 81 FL (ref 82–98)
MONOCYTES # BLD AUTO: 0.8 THOUSAND/ÂΜL (ref 0.17–1.22)
MONOCYTES NFR BLD AUTO: 8 % (ref 4–12)
NEUTROPHILS # BLD AUTO: 7.84 THOUSANDS/ÂΜL (ref 1.85–7.62)
NEUTS SEG NFR BLD AUTO: 74 % (ref 43–75)
NRBC BLD AUTO-RTO: 0 /100 WBCS
PLATELET # BLD AUTO: 417 THOUSANDS/UL (ref 149–390)
PMV BLD AUTO: 10.4 FL (ref 8.9–12.7)
POTASSIUM SERPL-SCNC: 4.1 MMOL/L (ref 3.5–5.3)
PROT SERPL-MCNC: 7.4 G/DL (ref 6.4–8.4)
RBC # BLD AUTO: 5.23 MILLION/UL (ref 3.88–5.62)
SODIUM SERPL-SCNC: 136 MMOL/L (ref 135–147)
WBC # BLD AUTO: 10.5 THOUSAND/UL (ref 4.31–10.16)

## 2024-05-05 PROCEDURE — 36415 COLL VENOUS BLD VENIPUNCTURE: CPT | Performed by: STUDENT IN AN ORGANIZED HEALTH CARE EDUCATION/TRAINING PROGRAM

## 2024-05-05 PROCEDURE — 71046 X-RAY EXAM CHEST 2 VIEWS: CPT

## 2024-05-05 PROCEDURE — 99285 EMERGENCY DEPT VISIT HI MDM: CPT

## 2024-05-05 PROCEDURE — 94640 AIRWAY INHALATION TREATMENT: CPT

## 2024-05-05 PROCEDURE — 94644 CONT INHLJ TX 1ST HOUR: CPT

## 2024-05-05 PROCEDURE — 85025 COMPLETE CBC W/AUTO DIFF WBC: CPT | Performed by: STUDENT IN AN ORGANIZED HEALTH CARE EDUCATION/TRAINING PROGRAM

## 2024-05-05 PROCEDURE — 94760 N-INVAS EAR/PLS OXIMETRY 1: CPT

## 2024-05-05 PROCEDURE — 80053 COMPREHEN METABOLIC PANEL: CPT | Performed by: STUDENT IN AN ORGANIZED HEALTH CARE EDUCATION/TRAINING PROGRAM

## 2024-05-05 PROCEDURE — 99285 EMERGENCY DEPT VISIT HI MDM: CPT | Performed by: EMERGENCY MEDICINE

## 2024-05-05 RX ORDER — SODIUM CHLORIDE FOR INHALATION 0.9 %
12 VIAL, NEBULIZER (ML) INHALATION ONCE
Status: COMPLETED | OUTPATIENT
Start: 2024-05-05 | End: 2024-05-05

## 2024-05-05 RX ADMIN — IPRATROPIUM BROMIDE 1 MG: 0.5 SOLUTION RESPIRATORY (INHALATION) at 16:47

## 2024-05-05 RX ADMIN — ALBUTEROL SULFATE 10 MG: 2.5 SOLUTION RESPIRATORY (INHALATION) at 16:47

## 2024-05-05 RX ADMIN — ISODIUM CHLORIDE 12 ML: 0.03 SOLUTION RESPIRATORY (INHALATION) at 16:47

## 2024-05-05 NOTE — ED PROVIDER NOTES
History  Chief Complaint   Patient presents with    Shortness of Breath     Hx COPD. Reports he has been feeling sick for 1 hour PTA     69-year-old male with history of COPD presents emergency department today via EMS for COPD exacerbation.  Patient states that he has been feeling short of breath for the past hour so he called EMS to the hospital for evaluation.  He has been here frequently for repeat COPD exacerbations and he states this feels exact same as last time he was here.  He is 2 L of oxygen via nasal cannula at baseline.  He denies chest pain loss of consciousness blurry vision visual disturbance, nausea vomiting or GI issues.  He has no other complaints at this time and states he would like a nebulizer as he normally gets to help him feel better.  He denies fever or chills.  Nurses tell me that when he arrived he coughed up some thick yellow sputum on 1 occasion.        Prior to Admission Medications   Prescriptions Last Dose Informant Patient Reported? Taking?   B Complex Vitamins (VITAMIN B COMPLEX 100 IJ)   Yes No   Sig: Take 1 tablet by mouth daily   Fluticasone-Salmeterol (Advair) 500-50 mcg/dose inhaler   No No   Sig: Inhale 1 puff 2 (two) times a day Rinse mouth after use.   Insulin Glargine (BASAGLAR KWIKPEN SC)   Yes No   Sig: Inject 30 Units under the skin daily at bedtime   LORazepam (Ativan) 0.5 mg tablet   Yes No   Sig: Take by mouth every 6 (six) hours as needed for anxiety    albuterol (Proventil HFA) 90 mcg/act inhaler   No No   Sig: Inhale 2 puffs every 6 (six) hours as needed for wheezing   apixaban (Eliquis) 5 mg   No No   Sig: Take 1 tablet (5 mg total) by mouth 2 (two) times a day Do not fill, please check copay   aspirin 81 mg chewable tablet   Yes No   Sig: Chew 81 mg daily   atorvastatin (LIPITOR) 80 mg tablet   No No   Sig: Take 1 tablet (80 mg total) by mouth daily with dinner   budesonide (PULMICORT) 0.5 mg/2 mL nebulizer solution   No No   Sig: Take 2 mL (0.5 mg total) by  nebulization every 12 (twelve) hours Rinse mouth after use.   busPIRone (BUSPAR) 10 mg tablet   No No   Sig: Take 1 tablet (10 mg total) by mouth 3 (three) times a day   ferrous sulfate 324 (65 Fe) mg   No No   Sig: Take 1 tablet (324 mg total) by mouth 2 (two) times a day before meals   fluticasone (FLONASE) 50 mcg/act nasal spray   Yes No   Si spray into each nostril 2 (two) times a day   folic acid (FOLVITE) 1 mg tablet   No No   Sig: Take 1 tablet (1 mg total) by mouth daily   lisinopril (ZESTRIL) 40 mg tablet   Yes No   Sig: Take 40 mg by mouth daily    melatonin 3 mg   No No   Sig: Take 1 tablet (3 mg total) by mouth daily at bedtime as needed (30)   metFORMIN (GLUCOPHAGE) 1000 MG tablet   Yes No   Sig: Take 1 tablet by mouth every 12 (twelve) hours   pantoprazole (Protonix) 40 mg tablet   No No   Sig: Take 1 tablet (40 mg total) by mouth 2 (two) times a day   thiamine 100 MG tablet   No No   Sig: Take 1 tablet (100 mg total) by mouth daily   tiotropium (SPIRIVA) 18 mcg inhalation capsule   No No   Sig: Place 1 capsule (18 mcg total) into inhaler and inhale daily      Facility-Administered Medications: None       Past Medical History:   Diagnosis Date    Cardiac arrest (HCC)     COPD (chronic obstructive pulmonary disease) (HCC)     CVA (cerebral vascular accident) (HCC)     Diabetes mellitus (HCC)     Heart attack (HCC)     Hypertension     Stroke (HCC)        Past Surgical History:   Procedure Laterality Date    CARDIAC ELECTROPHYSIOLOGY PROCEDURE N/A 2024    Procedure: Cardiac eps/aflutter ablation;  Surgeon: Ihsan Blum DO;  Location: [facility];  Service: Cardiology    CERVICAL FUSION N/A 9/10/2018    Procedure: Posterior cervical decompressive laminectomy C3-6; Posterior cervical lateral mass and pedicle fixation fusion C1-T1;  Surgeon: Papa Quiros MD;  Location: [facility];  Service: Neurosurgery       Family History   Problem Relation Age of Onset    Heart attack Mother      I  have reviewed and agree with the history as documented.    E-Cigarette/Vaping    E-Cigarette Use Never User      E-Cigarette/Vaping Substances    Nicotine No     THC No     CBD No     Flavoring No     Other No     Unknown No      Social History     Tobacco Use    Smoking status: Former     Types: Cigarettes    Smokeless tobacco: Never    Tobacco comments:     quit 5 months ago    Vaping Use    Vaping status: Never Used   Substance Use Topics    Alcohol use: Not Currently     Alcohol/week: 8.0 - 12.0 standard drinks of alcohol     Types: 8 - 12 Cans of beer per week     Comment: every day drinker    Drug use: Never        Review of Systems   Constitutional:  Negative for activity change, chills, fatigue and fever.   HENT:  Negative for congestion, facial swelling, rhinorrhea, sinus pressure, sinus pain and sore throat.    Eyes:  Negative for visual disturbance.   Respiratory:  Positive for shortness of breath. Negative for cough, wheezing and stridor.    Cardiovascular:  Negative for chest pain, palpitations and leg swelling.   Gastrointestinal:  Negative for abdominal pain, nausea and vomiting.   Musculoskeletal:  Negative for myalgias.   Neurological:  Negative for dizziness, tremors, syncope, weakness, light-headedness and headaches.   Psychiatric/Behavioral:  Negative for agitation and confusion. The patient is not nervous/anxious.        Physical Exam  ED Triage Vitals   Temperature Pulse Respirations Blood Pressure SpO2   05/05/24 1630 05/05/24 1630 05/05/24 1745 05/05/24 1630 05/05/24 1630   98.6 °F (37 °C) 90 (!) 24 135/82 99 %      Temp Source Heart Rate Source Patient Position - Orthostatic VS BP Location FiO2 (%)   05/05/24 1630 05/05/24 1745 -- -- --   Oral Monitor         Pain Score       --                    Orthostatic Vital Signs  Vitals:    05/05/24 1630 05/05/24 1745 05/05/24 1800 05/05/24 1845   BP: 135/82 (!) 199/97 153/81 (!) 132/118   Pulse: 90 96 88 87       Physical Exam  Constitutional:        General: He is not in acute distress.     Appearance: He is well-developed. He is not ill-appearing.   HENT:      Head: Normocephalic and atraumatic.      Mouth/Throat:      Mouth: Mucous membranes are moist.      Pharynx: Oropharynx is clear.   Eyes:      Pupils: Pupils are equal, round, and reactive to light.   Cardiovascular:      Rate and Rhythm: Normal rate and regular rhythm.      Heart sounds: No murmur heard.  Pulmonary:      Effort: Pulmonary effort is normal. No tachypnea.      Breath sounds: Examination of the right-middle field reveals rhonchi. Examination of the right-lower field reveals rhonchi. Examination of the left-lower field reveals decreased breath sounds and rhonchi. Decreased breath sounds and rhonchi present. No wheezing or rales.   Chest:      Chest wall: No mass or tenderness.   Abdominal:      Palpations: Abdomen is soft.      Tenderness: There is no abdominal tenderness. There is no guarding.   Musculoskeletal:      Right lower leg: No tenderness. No edema.      Left lower leg: No tenderness. No edema.   Skin:     General: Skin is warm and dry.   Neurological:      Mental Status: He is alert and oriented to person, place, and time.         ED Medications  Medications   albuterol inhalation solution 10 mg (10 mg Nebulization Given 5/5/24 1647)   ipratropium (ATROVENT) 0.02 % inhalation solution 1 mg (1 mg Nebulization Given 5/5/24 1647)   sodium chloride 0.9 % inhalation solution 12 mL (12 mL Nebulization Given 5/5/24 1647)       Diagnostic Studies  Results Reviewed       Procedure Component Value Units Date/Time    Comprehensive metabolic panel [[id number]]  (Abnormal) Collected: 05/05/24 1645    Lab Status: Final result Specimen: Blood from Arm, Right Updated: 05/05/24 1714     Sodium 136 mmol/L      Potassium 4.1 mmol/L      Chloride 96 mmol/L      CO2 29 mmol/L      ANION GAP 11 mmol/L      BUN 19 mg/dL      Creatinine 0.67 mg/dL      Glucose 209 mg/dL      Calcium 9.7 mg/dL      AST  16 U/L      ALT 18 U/L      Alkaline Phosphatase 63 U/L      Total Protein 7.4 g/dL      Albumin 4.1 g/dL      Total Bilirubin 0.58 mg/dL      eGFR 97 ml/min/1.73sq m     Narrative:      National Kid[employer] Foundation guidelines for Chronic Kidney Disease (CKD):     Stage 1 with normal or high GFR (GFR > 90 mL/min/1.73 square meters)    Stage 2 Mild CKD (GFR = 60-89 mL/min/1.73 square meters)    Stage 3A Moderate CKD (GFR = 45-59 mL/min/1.73 square meters)    Stage 3B Moderate CKD (GFR = 30-44 mL/min/1.73 square meters)    Stage 4 Severe CKD (GFR = 15-29 mL/min/1.73 square meters)    Stage 5 End Stage CKD (GFR <15 mL/min/1.73 square meters)  Note: GFR calculation is accurate only with a steady state creatinine    CBC and differential [[id number]]  (Abnormal) Collected: 05/05/24 1645    Lab Status: Final result Specimen: Blood from Arm, Right Updated: 05/05/24 1654     WBC 10.50 Thousand/uL      RBC 5.23 Million/uL      Hemoglobin 12.9 g/dL      Hematocrit 42.5 %      MCV 81 fL      MCH 24.7 pg      MCHC 30.4 g/dL      RDW 19.4 %      MPV 10.4 fL      Platelets 417 Thousands/uL      nRBC 0 /100 WBCs      Segmented % 74 %      Immature Grans % 1 %      Lymphocytes % 15 %      Monocytes % 8 %      Eosinophils Relative 1 %      Basophils Relative 1 %      Absolute Neutrophils 7.84 Thousands/µL      Absolute Immature Grans 0.09 Thousand/uL      Absolute Lymphocytes 1.58 Thousands/µL      Absolute Monocytes 0.80 Thousand/µL      Eosinophils Absolute 0.12 Thousand/µL      Basophils Absolute 0.07 Thousands/µL                    XR chest 2 views    (Results Pending)         Procedures  Procedures      ED Course                                       Medical Decision Making  69-year-old male with history of COPD presents emergency department today for his likely COPD exacerbation.  Patient placed on 2 L nasal cannula which is his baseline and saturating 98%.  He does have some rhonchi anterior breath sounds on the left.   Plan to obtain chest x-ray to rule out pneumonia as well as basic labs to include CBC and CMP.  We also begin a heart neb here to help treat her shortness of breath.  Chest x-ray was interpreted by myself and the attending physician and does not appear to be changed from most recent exam a few days ago.  Patient is afebrile with improvement of rhonchi on examination during my reassessment.  CBC CMP also largely reassuring.  Discussed diagnosis of COPD exacerbation with the patient all patient questions were answered.  States feeling better like to be discharged home at this time.  Patient made hemodynamically stable during time the emergency department, symptoms improved with nebulizer treatment, no signs of pneumonia or other infection, and he is appropriate for discharge home.    Amount and/or Complexity of Data Reviewed  Labs: ordered.  Radiology: ordered.    Risk  Prescription drug management.          Disposition  Final diagnoses:   COPD exacerbation (HCC)     Time reflects when diagnosis was documented in both MDM as applicable and the Disposition within this note       Time User Action Codes Description Comment    5/5/2024  6:52 PM Abdiel Bellamy Add [J44.1] COPD exacerbation (HCC)           ED Disposition       ED Disposition   Discharge    Condition   Stable    Date/Time   Sun May 5, 2024  6:52 PM    Comment   Lucho Talavera discharge to home/self care.                   Follow-up Information       Follow up With Specialties Details Why Contact Info    [facility], Nurse Practitioner In 2 days  [address] [place] [id number] [phone]              Patient's Medications   Discharge Prescriptions    No medications on file     No discharge procedures on file.    PDMP Review       None             ED Provider  Attending physically available and evaluated Lucho Talavera. I managed the patient along with the ED Attending.    Electronically Signed by           Abdiel Bellamy  MD  05/05/24 4138

## 2024-05-05 NOTE — ED NOTES
Pt repeatedly yelling out and using call bell for food and water.  Pt reminded he needs to wait until his ambrose neb is finished and he is cleared by the physician to eat/drink. Call bell within reach and urinal is at the bedside.     Tiffany Dubon RN  05/05/24 1756       Tiffany Dubon RN  05/05/24 0635

## 2024-05-06 ENCOUNTER — PATIENT OUTREACH (OUTPATIENT)
Dept: CASE MANAGEMENT | Facility: OTHER | Age: 69
End: 2024-05-06

## 2024-05-06 NOTE — PROGRESS NOTES
ADT alert for recent patient ER visit for COPD/ SOB.   ------------------------------------------------------------------------  Spoke with patient daughter Nimco. She reports that her father has been in/ out of ER [facility] and [facility] this weekend. Father has been discharged and is being considered for rehab facility.   She reports his breathing had given him problems over the weekend and that he is weak. She is working with RN at [facility] at present time to help facilitate rehab.  I did provide her with my contact information to outreach should she have questions prior to my next outreach. [facility] CM will outreach patient in 2 weeks to check status on his breathing / check to see if patient has gotten into rehab.

## 2024-05-06 NOTE — ED ATTENDING ATTESTATION
5/5/2024  I, Jameel Aleman MD, saw and evaluated the patient. I have discussed the patient with the resident/non-physician practitioner and agree with the resident's/non-physician practitioner's findings, Plan of Care, and MDM as documented in the resident's/non-physician practitioner's note, except where noted. All available labs and Radiology studies were reviewed.  I was present for key portions of any procedure(s) performed by the resident/non-physician practitioner and I was immediately available to provide assistance.       At this point I agree with the current assessment done in the Emergency Department.  I have conducted an independent evaluation of this patient a history and physical is as follows:    ED Course     Impression: Acute dyspnea history of COPD    Differential diagnosis: Acute COPD exacerbation, at risk for pneumonia, at risk for pneumothorax, bronchitis    Plan to check labs chest x-ray    Will treat with heart neb  reassess    Anticipate discharge    Chest x-ray independently interpreted by me no acute cardiopulmonary disease.    Labs reviewed CBC remarkable for low MCV elevated platelet count elevated white cell count.  CMP remarkable for elevated glucose.    Patient reassessed symptoms have resolved at baseline 2 L of home oxygen requirement.  Stable for discharge to home.    Critical Care Time  Procedures

## 2024-05-07 ENCOUNTER — APPOINTMENT (EMERGENCY)
Dept: RADIOLOGY | Facility: HOSPITAL | Age: 69
DRG: 190 | End: 2024-05-07
Payer: MEDICARE

## 2024-05-07 ENCOUNTER — APPOINTMENT (INPATIENT)
Dept: RADIOLOGY | Facility: HOSPITAL | Age: 69
DRG: 190 | End: 2024-05-07
Payer: MEDICARE

## 2024-05-07 ENCOUNTER — HOSPITAL ENCOUNTER (INPATIENT)
Facility: HOSPITAL | Age: 69
LOS: 4 days | DRG: 190 | End: 2024-05-11
Attending: EMERGENCY MEDICINE | Admitting: INTERNAL MEDICINE
Payer: MEDICARE

## 2024-05-07 DIAGNOSIS — I48.20 CHRONIC ATRIAL FIBRILLATION (HCC): ICD-10-CM

## 2024-05-07 DIAGNOSIS — I63.9 STROKE (CEREBRUM) (HCC): ICD-10-CM

## 2024-05-07 DIAGNOSIS — I63.9 STROKE (HCC): ICD-10-CM

## 2024-05-07 DIAGNOSIS — J44.1 ACUTE EXACERBATION OF CHRONIC OBSTRUCTIVE PULMONARY DISEASE (COPD) (HCC): Primary | ICD-10-CM

## 2024-05-07 LAB
ALBUMIN SERPL BCP-MCNC: 3.7 G/DL (ref 3.5–5)
ALP SERPL-CCNC: 52 U/L (ref 34–104)
ALT SERPL W P-5'-P-CCNC: 11 U/L (ref 7–52)
ANION GAP SERPL CALCULATED.3IONS-SCNC: 10 MMOL/L (ref 4–13)
AST SERPL W P-5'-P-CCNC: 14 U/L (ref 13–39)
BASE EX.OXY STD BLDV CALC-SCNC: 83.5 % (ref 60–80)
BASE EXCESS BLDV CALC-SCNC: -2.9 MMOL/L
BASOPHILS # BLD AUTO: 0.03 THOUSANDS/ÂΜL (ref 0–0.1)
BASOPHILS NFR BLD AUTO: 0 % (ref 0–1)
BILIRUB SERPL-MCNC: 0.6 MG/DL (ref 0.2–1)
BUN SERPL-MCNC: 20 MG/DL (ref 5–25)
CALCIUM SERPL-MCNC: 9 MG/DL (ref 8.4–10.2)
CHLORIDE SERPL-SCNC: 99 MMOL/L (ref 96–108)
CO2 SERPL-SCNC: 24 MMOL/L (ref 21–32)
CREAT SERPL-MCNC: 0.58 MG/DL (ref 0.6–1.3)
EOSINOPHIL # BLD AUTO: 0.04 THOUSAND/ÂΜL (ref 0–0.61)
EOSINOPHIL NFR BLD AUTO: 0 % (ref 0–6)
ERYTHROCYTE [DISTWIDTH] IN BLOOD BY AUTOMATED COUNT: 19 % (ref 11.6–15.1)
FLUAV RNA RESP QL NAA+PROBE: NEGATIVE
FLUBV RNA RESP QL NAA+PROBE: NEGATIVE
GFR SERPL CREATININE-BSD FRML MDRD: 103 ML/MIN/1.73SQ M
GLUCOSE SERPL-MCNC: 163 MG/DL (ref 65–140)
GLUCOSE SERPL-MCNC: 199 MG/DL (ref 65–140)
GLUCOSE SERPL-MCNC: 83 MG/DL (ref 65–140)
HCO3 BLDV-SCNC: 18.3 MMOL/L (ref 24–30)
HCT VFR BLD AUTO: 39.3 % (ref 36.5–49.3)
HGB BLD-MCNC: 12.5 G/DL (ref 12–17)
IMM GRANULOCYTES # BLD AUTO: 0.08 THOUSAND/UL (ref 0–0.2)
IMM GRANULOCYTES NFR BLD AUTO: 1 % (ref 0–2)
LYMPHOCYTES # BLD AUTO: 3.05 THOUSANDS/ÂΜL (ref 0.6–4.47)
LYMPHOCYTES NFR BLD AUTO: 24 % (ref 14–44)
MCH RBC QN AUTO: 25.5 PG (ref 26.8–34.3)
MCHC RBC AUTO-ENTMCNC: 31.8 G/DL (ref 31.4–37.4)
MCV RBC AUTO: 80 FL (ref 82–98)
MONOCYTES # BLD AUTO: 1.18 THOUSAND/ÂΜL (ref 0.17–1.22)
MONOCYTES NFR BLD AUTO: 9 % (ref 4–12)
NEUTROPHILS # BLD AUTO: 8.3 THOUSANDS/ÂΜL (ref 1.85–7.62)
NEUTS SEG NFR BLD AUTO: 66 % (ref 43–75)
NRBC BLD AUTO-RTO: 0 /100 WBCS
O2 CT BLDV-SCNC: 16 ML/DL
PCO2 BLDV: 23.6 MM HG (ref 42–50)
PH BLDV: 7.51 [PH] (ref 7.3–7.4)
PLATELET # BLD AUTO: 446 THOUSANDS/UL (ref 149–390)
PMV BLD AUTO: 10.1 FL (ref 8.9–12.7)
PO2 BLDV: 45.8 MM HG (ref 35–45)
POTASSIUM SERPL-SCNC: 3.5 MMOL/L (ref 3.5–5.3)
PROT SERPL-MCNC: 6.6 G/DL (ref 6.4–8.4)
RBC # BLD AUTO: 4.9 MILLION/UL (ref 3.88–5.62)
RSV RNA RESP QL NAA+PROBE: NEGATIVE
SARS-COV-2 RNA RESP QL NAA+PROBE: NEGATIVE
SODIUM SERPL-SCNC: 133 MMOL/L (ref 135–147)
WBC # BLD AUTO: 12.68 THOUSAND/UL (ref 4.31–10.16)

## 2024-05-07 PROCEDURE — 96374 THER/PROPH/DIAG INJ IV PUSH: CPT

## 2024-05-07 PROCEDURE — 82805 BLOOD GASES W/O2 SATURATION: CPT | Performed by: INTERNAL MEDICINE

## 2024-05-07 PROCEDURE — 70496 CT ANGIOGRAPHY HEAD: CPT

## 2024-05-07 PROCEDURE — 94644 CONT INHLJ TX 1ST HOUR: CPT

## 2024-05-07 PROCEDURE — 70498 CT ANGIOGRAPHY NECK: CPT

## 2024-05-07 PROCEDURE — 0241U HB NFCT DS VIR RESP RNA 4 TRGT: CPT | Performed by: INTERNAL MEDICINE

## 2024-05-07 PROCEDURE — 85025 COMPLETE CBC W/AUTO DIFF WBC: CPT

## 2024-05-07 PROCEDURE — 99285 EMERGENCY DEPT VISIT HI MDM: CPT | Performed by: EMERGENCY MEDICINE

## 2024-05-07 PROCEDURE — 99223 1ST HOSP IP/OBS HIGH 75: CPT | Performed by: INTERNAL MEDICINE

## 2024-05-07 PROCEDURE — 71045 X-RAY EXAM CHEST 1 VIEW: CPT

## 2024-05-07 PROCEDURE — 94664 DEMO&/EVAL PT USE INHALER: CPT

## 2024-05-07 PROCEDURE — 99285 EMERGENCY DEPT VISIT HI MDM: CPT

## 2024-05-07 PROCEDURE — 82948 REAGENT STRIP/BLOOD GLUCOSE: CPT

## 2024-05-07 PROCEDURE — 94760 N-INVAS EAR/PLS OXIMETRY 1: CPT

## 2024-05-07 PROCEDURE — 36415 COLL VENOUS BLD VENIPUNCTURE: CPT

## 2024-05-07 PROCEDURE — 93005 ELECTROCARDIOGRAM TRACING: CPT

## 2024-05-07 PROCEDURE — 80053 COMPREHEN METABOLIC PANEL: CPT

## 2024-05-07 PROCEDURE — 80061 LIPID PANEL: CPT | Performed by: NURSE PRACTITIONER

## 2024-05-07 RX ORDER — ATORVASTATIN CALCIUM 80 MG/1
80 TABLET, FILM COATED ORAL
Status: DISCONTINUED | OUTPATIENT
Start: 2024-05-08 | End: 2024-05-11 | Stop reason: HOSPADM

## 2024-05-07 RX ORDER — GUAIFENESIN 600 MG/1
600 TABLET, EXTENDED RELEASE ORAL 2 TIMES DAILY
Status: DISCONTINUED | OUTPATIENT
Start: 2024-05-07 | End: 2024-05-11 | Stop reason: HOSPADM

## 2024-05-07 RX ORDER — LANOLIN ALCOHOL/MO/W.PET/CERES
3 CREAM (GRAM) TOPICAL
Status: DISCONTINUED | OUTPATIENT
Start: 2024-05-07 | End: 2024-05-11 | Stop reason: HOSPADM

## 2024-05-07 RX ORDER — LORAZEPAM 0.5 MG/1
0.5 TABLET ORAL 2 TIMES DAILY PRN
Status: DISCONTINUED | OUTPATIENT
Start: 2024-05-07 | End: 2024-05-11 | Stop reason: HOSPADM

## 2024-05-07 RX ORDER — LANOLIN ALCOHOL/MO/W.PET/CERES
100 CREAM (GRAM) TOPICAL DAILY
Status: DISCONTINUED | OUTPATIENT
Start: 2024-05-08 | End: 2024-05-07

## 2024-05-07 RX ORDER — METHYLPREDNISOLONE SODIUM SUCCINATE 40 MG/ML
40 INJECTION, POWDER, LYOPHILIZED, FOR SOLUTION INTRAMUSCULAR; INTRAVENOUS EVERY 8 HOURS
Status: DISCONTINUED | OUTPATIENT
Start: 2024-05-07 | End: 2024-05-08

## 2024-05-07 RX ORDER — LEVALBUTEROL INHALATION SOLUTION 1.25 MG/3ML
1.25 SOLUTION RESPIRATORY (INHALATION)
Status: DISCONTINUED | OUTPATIENT
Start: 2024-05-07 | End: 2024-05-08

## 2024-05-07 RX ORDER — AZITHROMYCIN 250 MG/1
500 TABLET, FILM COATED ORAL EVERY 24 HOURS
Status: COMPLETED | OUTPATIENT
Start: 2024-05-07 | End: 2024-05-09

## 2024-05-07 RX ORDER — SODIUM CHLORIDE FOR INHALATION 0.9 %
3 VIAL, NEBULIZER (ML) INHALATION
Status: DISCONTINUED | OUTPATIENT
Start: 2024-05-07 | End: 2024-05-07

## 2024-05-07 RX ORDER — ALBUTEROL SULFATE 2.5 MG/3ML
2.5 SOLUTION RESPIRATORY (INHALATION) EVERY 6 HOURS PRN
Status: DISCONTINUED | OUTPATIENT
Start: 2024-05-07 | End: 2024-05-11 | Stop reason: HOSPADM

## 2024-05-07 RX ORDER — FOLIC ACID 1 MG/1
1 TABLET ORAL DAILY
Status: DISCONTINUED | OUTPATIENT
Start: 2024-05-08 | End: 2024-05-11 | Stop reason: HOSPADM

## 2024-05-07 RX ORDER — METHYLPREDNISOLONE SOD SUCC 125 MG
1 VIAL (EA) INJECTION ONCE
Status: COMPLETED | OUTPATIENT
Start: 2024-05-07 | End: 2024-05-07

## 2024-05-07 RX ORDER — SODIUM CHLORIDE FOR INHALATION 0.9 %
12 VIAL, NEBULIZER (ML) INHALATION ONCE
Status: COMPLETED | OUTPATIENT
Start: 2024-05-07 | End: 2024-05-07

## 2024-05-07 RX ORDER — LORAZEPAM 2 MG/ML
0.5 INJECTION INTRAMUSCULAR ONCE
Status: COMPLETED | OUTPATIENT
Start: 2024-05-07 | End: 2024-05-07

## 2024-05-07 RX ORDER — LORAZEPAM 0.5 MG/1
0.5 TABLET ORAL EVERY 8 HOURS PRN
Status: DISCONTINUED | OUTPATIENT
Start: 2024-05-07 | End: 2024-05-07

## 2024-05-07 RX ORDER — IPRATROPIUM BROMIDE AND ALBUTEROL SULFATE .5; 3 MG/3ML; MG/3ML
2 SOLUTION RESPIRATORY (INHALATION) ONCE
Status: COMPLETED | OUTPATIENT
Start: 2024-05-07 | End: 2024-05-07

## 2024-05-07 RX ORDER — BUSPIRONE HYDROCHLORIDE 10 MG/1
10 TABLET ORAL 3 TIMES DAILY
Status: DISCONTINUED | OUTPATIENT
Start: 2024-05-07 | End: 2024-05-11 | Stop reason: HOSPADM

## 2024-05-07 RX ORDER — LISINOPRIL 20 MG/1
40 TABLET ORAL DAILY
Status: DISCONTINUED | OUTPATIENT
Start: 2024-05-08 | End: 2024-05-08

## 2024-05-07 RX ORDER — IPRATROPIUM BROMIDE AND ALBUTEROL SULFATE 2.5; .5 MG/3ML; MG/3ML
3 SOLUTION RESPIRATORY (INHALATION) 4 TIMES DAILY
Status: ON HOLD | COMMUNITY

## 2024-05-07 RX ORDER — ASPIRIN 81 MG/1
81 TABLET, CHEWABLE ORAL DAILY
Status: DISCONTINUED | OUTPATIENT
Start: 2024-05-08 | End: 2024-05-11 | Stop reason: HOSPADM

## 2024-05-07 RX ORDER — FERROUS SULFATE 325(65) MG
325 TABLET ORAL
Status: DISCONTINUED | OUTPATIENT
Start: 2024-05-08 | End: 2024-05-11 | Stop reason: HOSPADM

## 2024-05-07 RX ORDER — FLUTICASONE PROPIONATE 50 MCG
1 SPRAY, SUSPENSION (ML) NASAL 2 TIMES DAILY
Status: DISCONTINUED | OUTPATIENT
Start: 2024-05-07 | End: 2024-05-11 | Stop reason: HOSPADM

## 2024-05-07 RX ORDER — INSULIN LISPRO 100 [IU]/ML
1-6 INJECTION, SOLUTION INTRAVENOUS; SUBCUTANEOUS
Status: DISCONTINUED | OUTPATIENT
Start: 2024-05-08 | End: 2024-05-11 | Stop reason: HOSPADM

## 2024-05-07 RX ORDER — PANTOPRAZOLE SODIUM 40 MG/1
40 TABLET, DELAYED RELEASE ORAL
Status: DISCONTINUED | OUTPATIENT
Start: 2024-05-08 | End: 2024-05-11 | Stop reason: HOSPADM

## 2024-05-07 RX ORDER — INSULIN GLARGINE 100 [IU]/ML
20 INJECTION, SOLUTION SUBCUTANEOUS
Status: DISCONTINUED | OUTPATIENT
Start: 2024-05-07 | End: 2024-05-11 | Stop reason: HOSPADM

## 2024-05-07 RX ADMIN — IPRATROPIUM BROMIDE 1 MG: 0.5 SOLUTION RESPIRATORY (INHALATION) at 16:26

## 2024-05-07 RX ADMIN — FLUTICASONE PROPIONATE 1 SPRAY: 50 SPRAY, METERED NASAL at 22:18

## 2024-05-07 RX ADMIN — ISODIUM CHLORIDE 12 ML: 0.03 SOLUTION RESPIRATORY (INHALATION) at 16:26

## 2024-05-07 RX ADMIN — METHYLPREDNISOLONE SODIUM SUCCINATE 40 MG: 40 INJECTION, POWDER, FOR SOLUTION INTRAMUSCULAR; INTRAVENOUS at 20:22

## 2024-05-07 RX ADMIN — Medication 3 MG: at 21:17

## 2024-05-07 RX ADMIN — GUAIFENESIN 600 MG: 600 TABLET, EXTENDED RELEASE ORAL at 20:22

## 2024-05-07 RX ADMIN — LORAZEPAM 0.5 MG: 0.5 TABLET ORAL at 21:16

## 2024-05-07 RX ADMIN — AZITHROMYCIN DIHYDRATE 500 MG: 250 TABLET ORAL at 20:22

## 2024-05-07 RX ADMIN — ALBUTEROL SULFATE 10 MG: 2.5 SOLUTION RESPIRATORY (INHALATION) at 16:26

## 2024-05-07 RX ADMIN — IOHEXOL 85 ML: 350 INJECTION, SOLUTION INTRAVENOUS at 22:06

## 2024-05-07 RX ADMIN — APIXABAN 5 MG: 5 TABLET, FILM COATED ORAL at 20:22

## 2024-05-07 RX ADMIN — INSULIN GLARGINE 20 UNITS: 100 INJECTION, SOLUTION SUBCUTANEOUS at 22:19

## 2024-05-07 RX ADMIN — LORAZEPAM 0.5 MG: 2 INJECTION INTRAMUSCULAR; INTRAVENOUS at 17:21

## 2024-05-07 RX ADMIN — LEVALBUTEROL HYDROCHLORIDE 1.25 MG: 1.25 SOLUTION RESPIRATORY (INHALATION) at 20:26

## 2024-05-07 RX ADMIN — IPRATROPIUM BROMIDE 0.5 MG: 0.5 SOLUTION RESPIRATORY (INHALATION) at 20:22

## 2024-05-07 NOTE — ED NOTES
"Pt repeatedly screaming \"help nurse\" RN went into room multiple times to assess pt and asure pt he is okay as well as provider. Pt currently sating at 97% on RA      Yue Guzman RN  05/07/24 6027    "

## 2024-05-07 NOTE — ED PROVIDER NOTES
History  Chief Complaint   Patient presents with    Respiratory Distress     Pt brought in by EMS was in respiratory distress got 2 duo nebs and 125 of solumedrol     69-year-old man with relevant past medical history of COPD presents with difficulty breathing.  Upon review of patient's chart, he has been in and out of multiple emergency departments over the last couple days for COPD exacerbation.  He was at home when he developed acute onset dyspnea.  EMS was called and he received 125 mg Solu-Medrol and 2 DuoNeb's on route.  Patient is denying any chest pain but is still saying he cannot breathe.  His oxygen saturation is normal on room air.  He is not on oxygen chronically.  Patient is calling for someone to help him.        Prior to Admission Medications   Prescriptions Last Dose Informant Patient Reported? Taking?   B Complex Vitamins (VITAMIN B COMPLEX 100 IJ) 5/7/2024  Yes Yes   Sig: Take 1 tablet by mouth daily   Fluticasone-Salmeterol (Advair) 500-50 mcg/dose inhaler   No No   Sig: Inhale 1 puff 2 (two) times a day Rinse mouth after use.   Fluticasone-Umeclidin-Vilant (TRELEGY ELLIPTA IN) 5/7/2024  Yes Yes   Sig: Inhale   Insulin Glargine (BASAGLAR KWIKPEN SC)   Yes No   Sig: Inject 30 Units under the skin daily at bedtime   LORazepam (Ativan) 0.5 mg tablet   Yes No   Sig: Take by mouth every 6 (six) hours as needed for anxiety    albuterol (Proventil HFA) 90 mcg/act inhaler Not Taking  No No   Sig: Inhale 2 puffs every 6 (six) hours as needed for wheezing   Patient not taking: Reported on 5/7/2024   apixaban (Eliquis) 5 mg Not Taking  No No   Sig: Take 1 tablet (5 mg total) by mouth 2 (two) times a day Do not fill, please check copay   Patient not taking: Reported on 5/7/2024   aspirin 81 mg chewable tablet 5/6/2024  Yes Yes   Sig: Chew 81 mg daily   atorvastatin (LIPITOR) 80 mg tablet 5/7/2024  No Yes   Sig: Take 1 tablet (80 mg total) by mouth daily with dinner   budesonide (PULMICORT) 0.5 mg/2 mL  nebulizer solution Not Taking  No No   Sig: Take 2 mL (0.5 mg total) by nebulization every 12 (twelve) hours Rinse mouth after use.   Patient not taking: Reported on 2024   busPIRone (BUSPAR) 10 mg tablet   No No   Sig: Take 1 tablet (10 mg total) by mouth 3 (three) times a day   ferrous sulfate 324 (65 Fe) mg   No No   Sig: Take 1 tablet (324 mg total) by mouth 2 (two) times a day before meals   fluticasone (FLONASE) 50 mcg/act nasal spray   Yes No   Si spray into each nostril 2 (two) times a day   folic acid (FOLVITE) 1 mg tablet   No No   Sig: Take 1 tablet (1 mg total) by mouth daily   ipratropium-albuterol (DUO-NEB) 0.5-2.5 mg/3 mL nebulizer solution 2024  Yes Yes   Sig: Take 3 mL by nebulization 4 (four) times a day   lisinopril (ZESTRIL) 40 mg tablet   Yes No   Sig: Take 40 mg by mouth daily    melatonin 3 mg   No No   Sig: Take 1 tablet (3 mg total) by mouth daily at bedtime as needed (30)   metFORMIN (GLUCOPHAGE) 1000 MG tablet   Yes No   Sig: Take 1 tablet by mouth every 12 (twelve) hours   pantoprazole (Protonix) 40 mg tablet   No No   Sig: Take 1 tablet (40 mg total) by mouth 2 (two) times a day   thiamine 100 MG tablet   No No   Sig: Take 1 tablet (100 mg total) by mouth daily   tiotropium (SPIRIVA) 18 mcg inhalation capsule   No No   Sig: Place 1 capsule (18 mcg total) into inhaler and inhale daily      Facility-Administered Medications: None       Past Medical History:   Diagnosis Date    Cardiac arrest (HCC)     COPD (chronic obstructive pulmonary disease) (HCC)     CVA (cerebral vascular accident) (HCC)     Diabetes mellitus (HCC)     Heart attack (HCC)     Hypertension     Stroke (HCC)        Past Surgical History:   Procedure Laterality Date    CARDIAC ELECTROPHYSIOLOGY PROCEDURE N/A 2024    Procedure: Cardiac eps/aflutter ablation;  Surgeon: Ihsan Blum DO;  Location: [facility];  Service: Cardiology    CERVICAL FUSION N/A 9/10/2018    Procedure: Posterior cervical  decompressive laminectomy C3-6; Posterior cervical lateral mass and pedicle fixation fusion C1-T1;  Surgeon: Papa Quiros MD;  Location: [facility];  Service: Neurosurgery       Family History   Problem Relation Age of Onset    Heart attack Mother      I have reviewed and agree with the history as documented.    E-Cigarette/Vaping    E-Cigarette Use Never User      E-Cigarette/Vaping Substances    Nicotine No     THC No     CBD No     Flavoring No     Other No     Unknown No      Social History     Tobacco Use    Smoking status: Former     Types: Cigarettes    Smokeless tobacco: Never    Tobacco comments:     quit 5 months ago    Vaping Use    Vaping status: Never Used   Substance Use Topics    Alcohol use: Not Currently     Alcohol/week: 8.0 - 12.0 standard drinks of alcohol     Types: 8 - 12 Cans of beer per week     Comment: every day drinker    Drug use: Never        Review of Systems    Physical Exam  ED Triage Vitals [05/07/24 1610]   Temperature Pulse Respirations Blood Pressure SpO2   97.8 °F (36.6 °C) 84 (!) 24 125/59 96 %      Temp Source Heart Rate Source Patient Position - Orthostatic VS BP Location FiO2 (%)   Oral Monitor -- Left arm --      Pain Score       No Pain             Orthostatic Vital Signs  Vitals:    05/07/24 1610 05/07/24 1715   BP: 125/59    Pulse: 84 94       Physical Exam  Vitals and nursing note reviewed.   Constitutional:       General: He is not in acute distress.     Appearance: Normal appearance. He is not ill-appearing.   HENT:      Head: Normocephalic and atraumatic.      Right Ear: External ear normal.      Left Ear: External ear normal.   Cardiovascular:      Rate and Rhythm: Normal rate.   Pulmonary:      Effort: Pulmonary effort is normal. No respiratory distress.      Breath sounds: Examination of the right-upper field reveals wheezing. Examination of the left-upper field reveals wheezing. Examination of the right-middle field reveals wheezing. Examination of the  left-middle field reveals wheezing. Examination of the right-lower field reveals wheezing. Examination of the left-lower field reveals wheezing. Wheezing present.   Abdominal:      Palpations: Abdomen is soft.      Tenderness: There is no abdominal tenderness. There is no guarding or rebound.   Musculoskeletal:         General: Normal range of motion.      Cervical back: Normal range of motion and neck supple.   Skin:     General: Skin is warm and dry.   Neurological:      Mental Status: He is alert and oriented to person, place, and time. Mental status is at baseline.      Comments: Oriented to person place and time and situation.  Patient does periodically say he cannot breathe and yells out for someone to help him.   Psychiatric:         Mood and Affect: Mood normal.         Behavior: Behavior normal.         ED Medications  Medications   albuterol inhalation solution 10 mg (10 mg Nebulization Given 5/7/24 1626)   ipratropium (ATROVENT) 0.02 % inhalation solution 1 mg (1 mg Nebulization Given 5/7/24 1626)   sodium chloride 0.9 % inhalation solution 12 mL (12 mL Nebulization Given 5/7/24 1626)   ipratropium-albuterol (FOR EMS ONLY) (DUO-NEB) 0.5-2.5 mg/3 mL inhalation solution 6 mL (0 mL Does not apply Given to EMS 5/7/24 1612)   methylPREDNISolone sodium succinate (FOR EMS ONLY) (Solu-MEDROL) 125 MG injection 125 mg (0 mg Does not apply Given to EMS 5/7/24 1612)   LORazepam (ATIVAN) injection 0.5 mg (0.5 mg Intravenous Given 5/7/24 1721)       Diagnostic Studies  Results Reviewed       Procedure Component Value Units Date/Time    CBC and differential [[id number]]  (Abnormal) Collected: 05/07/24 1619    Lab Status: Final result Specimen: Blood from Arm, Left Updated: 05/07/24 1655     WBC 12.68 Thousand/uL      RBC 4.90 Million/uL      Hemoglobin 12.5 g/dL      Hematocrit 39.3 %      MCV 80 fL      MCH 25.5 pg      MCHC 31.8 g/dL      RDW 19.0 %      MPV 10.1 fL      Platelets 446 Thousands/uL      nRBC 0 /100 WBCs       Segmented % 66 %      Immature Grans % 1 %      Lymphocytes % 24 %      Monocytes % 9 %      Eosinophils Relative 0 %      Basophils Relative 0 %      Absolute Neutrophils 8.30 Thousands/µL      Absolute Immature Grans 0.08 Thousand/uL      Absolute Lymphocytes 3.05 Thousands/µL      Absolute Monocytes 1.18 Thousand/µL      Eosinophils Absolute 0.04 Thousand/µL      Basophils Absolute 0.03 Thousands/µL     Comprehensive metabolic panel [[id number]]  (Abnormal) Collected: 05/07/24 1619    Lab Status: Final result Specimen: Blood from Arm, Left Updated: 05/07/24 1651     Sodium 133 mmol/L      Potassium 3.5 mmol/L      Chloride 99 mmol/L      CO2 24 mmol/L      ANION GAP 10 mmol/L      BUN 20 mg/dL      Creatinine 0.58 mg/dL      Glucose 83 mg/dL      Calcium 9.0 mg/dL      AST 14 U/L      ALT 11 U/L      Alkaline Phosphatase 52 U/L      Total Protein 6.6 g/dL      Albumin 3.7 g/dL      Total Bilirubin 0.60 mg/dL      eGFR 103 ml/min/1.73sq m     Narrative:      National Kidney Disease Foundation guidelines for Chronic Kidney Disease (CKD):     Stage 1 with normal or high GFR (GFR > 90 mL/min/1.73 square meters)    Stage 2 Mild CKD (GFR = 60-89 mL/min/1.73 square meters)    Stage 3A Moderate CKD (GFR = 45-59 mL/min/1.73 square meters)    Stage 3B Moderate CKD (GFR = 30-44 mL/min/1.73 square meters)    Stage 4 Severe CKD (GFR = 15-29 mL/min/1.73 square meters)    Stage 5 End Stage CKD (GFR <15 mL/min/1.73 square meters)  Note: GFR calculation is accurate only with a steady state creatinine                   XR chest 1 view portable   ED Interpretation by Bowen Tatum MD (05/07 1637)   Chest radiograph personally interpreted by me as no acute cardiopulmonary disease.             Procedures  Procedures      ED Course  ED Course as of 05/07/24 1904   Tue May 07, 2024   1650 This ECG was interpreted by me. The ECG demonstrates sinus rhythm, normal intervals, normal axis, RBBB, non specific acute ST-T changes   "  8626 125 solumedrol and 2 duonebs via EMS                                       Medical Decision Making  Patient presents with difficulty breathing.  Exam seems consistent with COPD exacerbation.  Patient had much improvement of his respiratory mechanics with a ambrose nebulizer here.  Given his recurrent visits to the emergency department of the last couple days, I will admit him to the hospital for COPD exacerbation. Patient in agreement with the medical plan and all questions were answered.     Previous ED, hospitalization, and outpatient documentation was reviewed.  History was obtained from the patient.    Portions or all of this note were generated using voice recognition software.  Occasional wrong word or \"sound a like\" substitutions may have occurred due to the inherent limitations of voice recognition software.  Please interpret any errors within the intended context of the whole sentence or idea.      Amount and/or Complexity of Data Reviewed  Labs: ordered.  Radiology: ordered and independent interpretation performed.    Risk  Prescription drug management.  Decision regarding hospitalization.          Disposition  Final diagnoses:   Acute exacerbation of chronic obstructive pulmonary disease (COPD) ([facility])     Time reflects when diagnosis was documented in both MDM as applicable and the Disposition within this note       Time User Action Codes Description Comment    5/7/2024  5:43 PM Bowen Tatum Add [J44.1] Acute exacerbation of chronic obstructive pulmonary disease (COPD) ([facility])           ED Disposition       ED Disposition   Admit    Condition   Stable    Date/Time   Tue May 7, 2024  5:43 PM    Comment   Case was discussed with MATY and the patient's admission status was agreed to be Admission Status: inpatient status to the service of Dr. Ellis .               Follow-up Information    None         Patient's Medications   Discharge Prescriptions    No medications on file     No discharge " procedures on file.    PDMP Review       None             ED Provider  Attending physically available and evaluated Lucho ALAN Laraan. I managed the patient along with the ED Attending.    Electronically Signed by           Bowen Tatum MD  05/07/24 6377

## 2024-05-07 NOTE — ED ATTENDING ATTESTATION
5/7/2024  I, Babs Avila MD, saw and evaluated the patient. I have discussed the patient with the resident/non-physician practitioner and agree with the resident's/non-physician practitioner's findings, Plan of Care, and MDM as documented in the resident's/non-physician practitioner's note, except where noted. All available labs and Radiology studies were reviewed.  I was present for key portions of any procedure(s) performed by the resident/non-physician practitioner and I was immediately available to provide assistance.       At this point I agree with the current assessment done in the Emergency Department.  I have conducted an independent evaluation of this patient a history and physical is as follows:  Patient here with shortness of breath.  This is a 69-year-old male with history of COPD.  Per EMS, whom I independently interviewed, the patient's family called because the patient was acting confused and altered.  The family stated that this happens when his oxygen is low.  BLS has been dispatched to the patient's home, and he has been hypoxic.  On ALS arrival, the patient was on 2 L of oxygen, and has been so since.  He received nebulized bronchodilators as well as Solu-Medrol and route, and they state that his mental status and breathing has overall improved.  The patient is an unreliable historian.  On review of his record, the patient has been in and out of emergency departments for COPD exacerbations over the last 1 week.  The patient also has a history of a recent ablation.  Patient states he feels short of breath.  He denies chest pain.  He denies abdominal pain, but is overall a poor historian.  On exam the patient is disheveled.  He is wearing hospital scrubs.  On HEENT exam, the patient has somewhat dry mucous membranes.  His conjunctiva are pink and he is anicteric.  His neck is supple.  He does not have subcutaneous air.  His heart is tachycardic.  It is irregular.  I do not appreciate any  murmurs, rubs, or gallops.  His lungs are decreased and wheezing throughout.  He overall has only fair air movement.  His abdomen is soft.  It is not tender.  I do not appreciate any masses.  The patient's extremities are intact.  He does not lateralizing edema.  The patient is a 1 cm sutured wound on his mid chest that the patient cannot provide history for.MEDICAL DECISION MAKING    Number and Complexity of Problems  Differential diagnosis: COPD exacerbation, doubt pulmonary embolus, doubt cardiac event, doubt pneumonia    Medical Decision Making Data  External documents reviewed: Prior ED visits reviewed, patient was seen at [facility] in the last few days for COPD exacerbation  My EKG interpretation: Patient's EKG is narrow complex, irregular, unclear supraventricular focus, no flutter waves, no ischemia or ectopy  My CT interpretation:   My X-ray interpretation: No evidence of infiltrate  My ultrasound interpretation:     XR chest 1 view portable   ED Interpretation   Chest radiograph personally interpreted by me as no acute cardiopulmonary disease.           Labs Reviewed   CBC AND DIFFERENTIAL - Abnormal       Result Value Ref Range Status    WBC 12.68 (*) 4.31 - 10.16 Thousand/uL Final    RBC 4.90  3.88 - 5.62 Million/uL Final    Hemoglobin 12.5  12.0 - 17.0 g/dL Final    Hematocrit 39.3  36.5 - 49.3 % Final    MCV 80 (*) 82 - 98 fL Final    MCH 25.5 (*) 26.8 - 34.3 pg Final    MCHC 31.8  31.4 - 37.4 g/dL Final    RDW 19.0 (*) 11.6 - 15.1 % Final    MPV 10.1  8.9 - 12.7 fL Final    Platelets 446 (*) 149 - 390 Thousands/uL Final    nRBC 0  /100 WBCs Final    Segmented % 66  43 - 75 % Final    Immature Grans % 1  0 - 2 % Final    Lymphocytes % 24  14 - 44 % Final    Monocytes % 9  4 - 12 % Final    Eosinophils Relative 0  0 - 6 % Final    Basophils Relative 0  0 - 1 % Final    Absolute Neutrophils 8.30 (*) 1.85 - 7.62 Thousands/µL Final    Absolute Immature Grans 0.08  0.00 - 0.20 Thousand/uL Final     Absolute Lymphocytes 3.05  0.60 - 4.47 Thousands/µL Final    Absolute Monocytes 1.18  0.17 - 1.22 Thousand/µL Final    Eosinophils Absolute 0.04  0.00 - 0.61 Thousand/µL Final    Basophils Absolute 0.03  0.00 - 0.10 Thousands/µL Final   COMPREHENSIVE METABOLIC PANEL - Abnormal    Sodium 133 (*) 135 - 147 mmol/L Final    Potassium 3.5  3.5 - 5.3 mmol/L Final    Chloride 99  96 - 108 mmol/L Final    CO2 24  21 - 32 mmol/L Final    ANION GAP 10  4 - 13 mmol/L Final    BUN 20  5 - 25 mg/dL Final    Creatinine 0.58 (*) 0.60 - 1.30 mg/dL Final    Comment: Standardized to IDMS reference method    Glucose 83  65 - 140 mg/dL Final    Comment: If the patient is fasting, the ADA then defines impaired fasting glucose as > 100 mg/dL and diabetes as > or equal to 123 mg/dL.    Calcium 9.0  8.4 - 10.2 mg/dL Final    AST 14  13 - 39 U/L Final    ALT 11  7 - 52 U/L Final    Comment: Specimen collection should occur prior to Sulfasalazine administration due to the potential for falsely depressed results.     Alkaline Phosphatase 52  34 - 104 U/L Final    Total Protein 6.6  6.4 - 8.4 g/dL Final    Albumin 3.7  3.5 - 5.0 g/dL Final    Total Bilirubin 0.60  0.20 - 1.00 mg/dL Final    Comment: Use of this assay is not recommended for patients undergoing treatment with eltrombopag due to the potential for falsely elevated results.  N-acetyl-p-benzoquinone imine (metabolite of Acetaminophen) will generate erroneously low results in samples for patients that have taken an overdose of Acetaminophen.    eGFR 103  ml/min/1.73sq m Final    Narrative:     National Kidney Disease Foundation guidelines for Chronic Kidney Disease (CKD):     Stage 1 with normal or high GFR (GFR > 90 mL/min/1.73 square meters)    Stage 2 Mild CKD (GFR = 60-89 mL/min/1.73 square meters)    Stage 3A Moderate CKD (GFR = 45-59 mL/min/1.73 square meters)    Stage 3B Moderate CKD (GFR = 30-44 mL/min/1.73 square meters)    Stage 4 Severe CKD (GFR = 15-29 mL/min/1.73  square meters)    Stage 5 End Stage CKD (GFR <15 mL/min/1.73 square meters)  Note: GFR calculation is accurate only with a steady state creatinine       Labs reviewed by me are significant for: Unremarkable    Clinical decision rules/scores are significant for:     Discussed case with: Medicine  Considered admission for: COPD exacerbation    Treatment and Disposition  ED course:   Shared decision making:   Code status:     ED Course         Critical Care Time  Procedures

## 2024-05-08 ENCOUNTER — APPOINTMENT (OUTPATIENT)
Dept: RADIOLOGY | Facility: HOSPITAL | Age: 69
DRG: 190 | End: 2024-05-08
Payer: MEDICARE

## 2024-05-08 ENCOUNTER — PATIENT OUTREACH (OUTPATIENT)
Dept: CASE MANAGEMENT | Facility: OTHER | Age: 69
End: 2024-05-08

## 2024-05-08 PROBLEM — I63.9 STROKE (CEREBRUM) (HCC): Status: ACTIVE | Noted: 2024-05-08

## 2024-05-08 PROBLEM — R29.90 STROKE-LIKE SYMPTOM: Status: ACTIVE | Noted: 2024-05-08

## 2024-05-08 PROBLEM — D50.8 IRON DEFICIENCY ANEMIA SECONDARY TO INADEQUATE DIETARY IRON INTAKE: Status: ACTIVE | Noted: 2023-10-17

## 2024-05-08 LAB
ANION GAP SERPL CALCULATED.3IONS-SCNC: 14 MMOL/L (ref 4–13)
BUN SERPL-MCNC: 22 MG/DL (ref 5–25)
CALCIUM SERPL-MCNC: 9.2 MG/DL (ref 8.4–10.2)
CHLORIDE SERPL-SCNC: 98 MMOL/L (ref 96–108)
CHOLEST SERPL-MCNC: 211 MG/DL
CO2 SERPL-SCNC: 21 MMOL/L (ref 21–32)
CREAT SERPL-MCNC: 0.74 MG/DL (ref 0.6–1.3)
ERYTHROCYTE [DISTWIDTH] IN BLOOD BY AUTOMATED COUNT: 19.6 % (ref 11.6–15.1)
FERRITIN SERPL-MCNC: 15 NG/ML (ref 24–336)
FOLATE SERPL-MCNC: >22.3 NG/ML
GFR SERPL CREATININE-BSD FRML MDRD: 93 ML/MIN/1.73SQ M
GLUCOSE SERPL-MCNC: 167 MG/DL (ref 65–140)
GLUCOSE SERPL-MCNC: 181 MG/DL (ref 65–140)
GLUCOSE SERPL-MCNC: 196 MG/DL (ref 65–140)
GLUCOSE SERPL-MCNC: 260 MG/DL (ref 65–140)
GLUCOSE SERPL-MCNC: 280 MG/DL (ref 65–140)
HCT VFR BLD AUTO: 38.6 % (ref 36.5–49.3)
HDLC SERPL-MCNC: 65 MG/DL
HGB BLD-MCNC: 12.6 G/DL (ref 12–17)
IRON SATN MFR SERPL: 13 % (ref 15–50)
IRON SERPL-MCNC: 51 UG/DL (ref 50–212)
LDLC SERPL CALC-MCNC: 120 MG/DL (ref 0–100)
MCH RBC QN AUTO: 25.1 PG (ref 26.8–34.3)
MCHC RBC AUTO-ENTMCNC: 32.6 G/DL (ref 31.4–37.4)
MCV RBC AUTO: 77 FL (ref 82–98)
PLATELET # BLD AUTO: 457 THOUSANDS/UL (ref 149–390)
PMV BLD AUTO: 10.2 FL (ref 8.9–12.7)
POTASSIUM SERPL-SCNC: 4 MMOL/L (ref 3.5–5.3)
RBC # BLD AUTO: 5.01 MILLION/UL (ref 3.88–5.62)
SODIUM SERPL-SCNC: 133 MMOL/L (ref 135–147)
TIBC SERPL-MCNC: 391 UG/DL (ref 250–450)
TRIGL SERPL-MCNC: 132 MG/DL
UIBC SERPL-MCNC: 340 UG/DL (ref 155–355)
VIT B12 SERPL-MCNC: 753 PG/ML (ref 180–914)
WBC # BLD AUTO: 7.25 THOUSAND/UL (ref 4.31–10.16)

## 2024-05-08 PROCEDURE — 82948 REAGENT STRIP/BLOOD GLUCOSE: CPT

## 2024-05-08 PROCEDURE — 85027 COMPLETE CBC AUTOMATED: CPT | Performed by: INTERNAL MEDICINE

## 2024-05-08 PROCEDURE — 82746 ASSAY OF FOLIC ACID SERUM: CPT | Performed by: NURSE PRACTITIONER

## 2024-05-08 PROCEDURE — 94640 AIRWAY INHALATION TREATMENT: CPT

## 2024-05-08 PROCEDURE — 94760 N-INVAS EAR/PLS OXIMETRY 1: CPT

## 2024-05-08 PROCEDURE — 83540 ASSAY OF IRON: CPT | Performed by: NURSE PRACTITIONER

## 2024-05-08 PROCEDURE — 99223 1ST HOSP IP/OBS HIGH 75: CPT | Performed by: STUDENT IN AN ORGANIZED HEALTH CARE EDUCATION/TRAINING PROGRAM

## 2024-05-08 PROCEDURE — 82607 VITAMIN B-12: CPT | Performed by: NURSE PRACTITIONER

## 2024-05-08 PROCEDURE — 80048 BASIC METABOLIC PNL TOTAL CA: CPT | Performed by: INTERNAL MEDICINE

## 2024-05-08 PROCEDURE — 82728 ASSAY OF FERRITIN: CPT | Performed by: NURSE PRACTITIONER

## 2024-05-08 PROCEDURE — 83550 IRON BINDING TEST: CPT | Performed by: NURSE PRACTITIONER

## 2024-05-08 PROCEDURE — 70551 MRI BRAIN STEM W/O DYE: CPT

## 2024-05-08 PROCEDURE — 99232 SBSQ HOSP IP/OBS MODERATE 35: CPT | Performed by: NURSE PRACTITIONER

## 2024-05-08 RX ORDER — ACETAMINOPHEN 325 MG/1
650 TABLET ORAL EVERY 6 HOURS PRN
Status: DISCONTINUED | OUTPATIENT
Start: 2024-05-08 | End: 2024-05-11 | Stop reason: HOSPADM

## 2024-05-08 RX ORDER — LANOLIN ALCOHOL/MO/W.PET/CERES
100 CREAM (GRAM) TOPICAL DAILY
Status: DISCONTINUED | OUTPATIENT
Start: 2024-05-08 | End: 2024-05-11 | Stop reason: HOSPADM

## 2024-05-08 RX ORDER — METHYLPREDNISOLONE SODIUM SUCCINATE 40 MG/ML
40 INJECTION, POWDER, LYOPHILIZED, FOR SOLUTION INTRAMUSCULAR; INTRAVENOUS EVERY 12 HOURS SCHEDULED
Status: DISCONTINUED | OUTPATIENT
Start: 2024-05-08 | End: 2024-05-09

## 2024-05-08 RX ADMIN — BUSPIRONE HYDROCHLORIDE 10 MG: 10 TABLET ORAL at 08:36

## 2024-05-08 RX ADMIN — Medication 1 TABLET: at 10:44

## 2024-05-08 RX ADMIN — BUSPIRONE HYDROCHLORIDE 10 MG: 10 TABLET ORAL at 21:07

## 2024-05-08 RX ADMIN — BUSPIRONE HYDROCHLORIDE 10 MG: 10 TABLET ORAL at 17:35

## 2024-05-08 RX ADMIN — ACETAMINOPHEN 650 MG: 325 TABLET, FILM COATED ORAL at 02:42

## 2024-05-08 RX ADMIN — INSULIN LISPRO 3 UNITS: 100 INJECTION, SOLUTION INTRAVENOUS; SUBCUTANEOUS at 11:28

## 2024-05-08 RX ADMIN — FLUTICASONE PROPIONATE 1 SPRAY: 50 SPRAY, METERED NASAL at 10:45

## 2024-05-08 RX ADMIN — INSULIN LISPRO 2 UNITS: 100 INJECTION, SOLUTION INTRAVENOUS; SUBCUTANEOUS at 08:36

## 2024-05-08 RX ADMIN — INSULIN LISPRO 1 UNITS: 100 INJECTION, SOLUTION INTRAVENOUS; SUBCUTANEOUS at 17:35

## 2024-05-08 RX ADMIN — APIXABAN 5 MG: 5 TABLET, FILM COATED ORAL at 08:36

## 2024-05-08 RX ADMIN — GUAIFENESIN 600 MG: 600 TABLET, EXTENDED RELEASE ORAL at 17:35

## 2024-05-08 RX ADMIN — INSULIN GLARGINE 20 UNITS: 100 INJECTION, SOLUTION SUBCUTANEOUS at 21:07

## 2024-05-08 RX ADMIN — AZITHROMYCIN DIHYDRATE 500 MG: 250 TABLET ORAL at 21:07

## 2024-05-08 RX ADMIN — FLUTICASONE PROPIONATE 1 SPRAY: 50 SPRAY, METERED NASAL at 17:35

## 2024-05-08 RX ADMIN — ATORVASTATIN CALCIUM 80 MG: 80 TABLET, FILM COATED ORAL at 17:35

## 2024-05-08 RX ADMIN — APIXABAN 5 MG: 5 TABLET, FILM COATED ORAL at 17:35

## 2024-05-08 RX ADMIN — FERROUS SULFATE TAB 325 MG (65 MG ELEMENTAL FE) 325 MG: 325 (65 FE) TAB at 08:38

## 2024-05-08 RX ADMIN — ALBUTEROL SULFATE 2.5 MG: 2.5 SOLUTION RESPIRATORY (INHALATION) at 06:29

## 2024-05-08 RX ADMIN — THIAMINE HCL TAB 100 MG 100 MG: 100 TAB at 10:45

## 2024-05-08 RX ADMIN — LISINOPRIL 40 MG: 20 TABLET ORAL at 08:36

## 2024-05-08 RX ADMIN — FOLIC ACID 1 MG: 1 TABLET ORAL at 08:36

## 2024-05-08 RX ADMIN — Medication 3 MG: at 21:07

## 2024-05-08 RX ADMIN — GUAIFENESIN 600 MG: 600 TABLET, EXTENDED RELEASE ORAL at 08:36

## 2024-05-08 RX ADMIN — METHYLPREDNISOLONE SODIUM SUCCINATE 40 MG: 40 INJECTION, POWDER, FOR SOLUTION INTRAMUSCULAR; INTRAVENOUS at 21:08

## 2024-05-08 RX ADMIN — ALBUTEROL SULFATE 2.5 MG: 2.5 SOLUTION RESPIRATORY (INHALATION) at 20:04

## 2024-05-08 RX ADMIN — PANTOPRAZOLE SODIUM 40 MG: 40 TABLET, DELAYED RELEASE ORAL at 06:12

## 2024-05-08 RX ADMIN — Medication 2.5 MG: at 03:57

## 2024-05-08 RX ADMIN — METHYLPREDNISOLONE SODIUM SUCCINATE 40 MG: 40 INJECTION, POWDER, FOR SOLUTION INTRAMUSCULAR; INTRAVENOUS at 03:57

## 2024-05-08 RX ADMIN — LORAZEPAM 0.5 MG: 0.5 TABLET ORAL at 08:36

## 2024-05-08 RX ADMIN — ASPIRIN 81 MG CHEWABLE TABLET 81 MG: 81 TABLET CHEWABLE at 08:36

## 2024-05-08 NOTE — ASSESSMENT & PLAN NOTE
He was admitted with atrial flutter with rapid ventricular response in April underwent ablation and loop recorder insertion 4/8/2024  Continue Eliquis 5 mg twice daily

## 2024-05-08 NOTE — ASSESSMENT & PLAN NOTE
He was admitted with atrial flutter with rapid ventricular response in April underwent ablation and loop recorder insertion 4/8/2024  At that time he was started on Eliquis 5 mg twice daily but daughter reports she is in charge of his medications and he is not taking this medication and that it wasn't called into the pharmacy

## 2024-05-08 NOTE — ASSESSMENT & PLAN NOTE
Check updated iron panels  Continue with p.o. iron, folic acid  Transfuse for hemoglobin less than 7  Hemoglobin currently stable at 12.6

## 2024-05-08 NOTE — ASSESSMENT & PLAN NOTE
Chronic, noted on CTA head and neck   Neuro consult appreciated   See above plan for stroke like symptoms above

## 2024-05-08 NOTE — PLAN OF CARE
Problem: PAIN - ADULT  Goal: Verbalizes/displays adequate comfort level or baseline comfort level  Description: Interventions:  - Encourage patient to monitor pain and request assistance  - Assess pain using appropriate pain scale  - Administer analgesics based on type and severity of pain and evaluate response  - Implement non-pharmacological measures as appropriate and evaluate response  - Consider cultural and social influences on pain and pain management  - Notify physician/advanced practitioner if interventions unsuccessful or patient reports new pain  Outcome: Progressing     Problem: INFECTION - ADULT  Goal: Absence or prevention of progression during hospitalization  Description: INTERVENTIONS:  - Assess and monitor for signs and symptoms of infection  - Monitor lab/diagnostic results  - Monitor all insertion sites, i.e. indwelling lines, tubes, and drains  - Monitor endotracheal if appropriate and nasal secretions for changes in amount and color  - [place] appropriate cooling/warming therapies per order  - Administer medications as ordered  - Instruct and encourage patient and family to use good hand hygiene technique  - Identify and instruct in appropriate isolation precautions for identified infection/condition  Outcome: Progressing     Problem: SAFETY ADULT  Goal: Patient will remain free of falls  Description: INTERVENTIONS:  - Educate patient/family on patient safety including physical limitations  - Instruct patient to call for assistance with activity   - Consult OT/PT to assist with strengthening/mobility   - Keep Call bell within reach  - Keep bed low and locked with side rails adjusted as appropriate  - Keep care items and personal belongings within reach  - Initiate and maintain comfort rounds  - Make Fall Risk Sign visible to staff  - Offer Toileting every 2 Hours, in advance of need  - Initiate/Maintain ***alarm  - Obtain necessary fall risk management equipment: ***  - Apply yellow socks  and bracelet for high fall risk patients  - Consider moving patient to room near nurses station  Outcome: Progressing

## 2024-05-08 NOTE — CASE MANAGEMENT
Case Management Assessment & Discharge Planning Note    Patient name Lucho Talavera  Location 8 [id number] MRN [id number]  : 1955 Date 2024       Current Admission Date: 2024  Current Admission Diagnosis:Chronic obstructive pulmonary disease with acute exacerbation (HCC)   Patient Active Problem List    Diagnosis Date Noted    Stroke (cerebrum) ([facility]) 2024    Stroke-like symptom 2024    Atrial fibrillation ([facility]) 04/15/2024    Atrial flutter with rapid ventricular response ([facility]) 2024    Leukocytosis 2024    COVID-19 10/25/2023    History of alcohol abuse 10/17/2023    Iron deficiency anemia secondary to inadequate dietary iron intake 10/17/2023    SIRS (systemic inflammatory response syndrome) ([facility]) 2023    Hypertension 2023    COPD (chronic obstructive pulmonary disease) ([facility]) 2023    Anxiety 2023    Chronic respiratory failure ([facility]) 2023    Stage 3 severe COPD by GOLD classification ([facility]) 2023    Oral abscess 2022    Tooth fracture 2022    H/O ETOH abuse 2022    Closed nondisplaced fracture of posterior arch of first cervical vertebra ([facility]) 2022    Fall 2022    Diabetic polyneuropathy associated with type 2 diabetes mellitus ([facility]) 2021    Hammertoe, bilateral 2021    Post-traumatic arthritis of left foot 2021    Pain due to onychomycosis of toenail 2021    Bronchitis 2020    KLARISSA (obstructive sleep apnea) 2020    Dirty living conditions 2020    Closed fracture of first cervical vertebra ([facility]) 2019    ETOH abuse 2019    Chronic obstructive pulmonary disease with acute exacerbation ([facility]) 2018    At moderate risk for venous thromboembolism (VTE) 2018    S/P C1-T1 Posterior Cervical Discectomy and Fusion on 9/10/18 2018    Acute blood loss anemia 2018    Type 2 diabetes mellitus ([facility]) 2018    Closed odontoid fracture with  routine healing 09/09/2018    Closed displaced fracture of third cervical vertebra (HCC) 09/09/2018    Closed displaced fracture of fourth cervical vertebra (HCC) 09/09/2018    Alcohol abuse 09/09/2018    CAD (coronary artery disease) 09/09/2018    Dens fracture (HCC) 09/09/2018      LOS (days): 1  Geometric Mean LOS (GMLOS) (days): 2.7  Days to GMLOS:1.8     OBJECTIVE:  PATIENT READMITTED TO HOSPITAL  Risk of Unplanned Readmission Score: 44.4         Current admission status: Inpatient   Preferred Pharmacy:   [id number] - [place], [place] - [address]  [address]  [place] [address]  Phone: [phone] Fax: [phone]    [facility] [place] - [address]  [address]  [place] [address]  Phone: [phone] Fax: [phone]    Primary Care Provider: TATIANA Cotto    Primary Insurance: MEDICARE  Secondary Insurance: BLUE CROSS    ASSESSMENT:  Active Health Care Proxies       Nimco Talavera Health Care Representative - Daughter   Primary Phone: [phone] (Mobile)  Home Phone: [phone]                 Advance Directives  Does patient have a Health Care POA?: Yes  Does patient have Advance Directives?: No  Primary Contact: Nimco Talavera-daughter    Readmission Root Cause  30 Day Readmission: Yes  Who directed you to return to the hospital?: Self  Did you understand whom to contact if you had questions or problems?: Yes  Did you get your prescriptions before you left the hospital?: No  Were you able to get your prescriptions filled when you left the hospital?: No (nothing prescribed)  Reason::  (nothing prescribed)  Did you take your medications as prescribed?: Yes  Were you able to get to your follow-up appointments?: No  Reason:: Readmitted prior to appointment  During previous admission, was a post-acute recommendation made?: Yes  What post-acute resources were offered?: STR (patient refused)  Patient was readmitted  due to: Acute Exacerbation COPD    Patient Information  Admitted from:: Home  Mental Status: Alert  During Assessment patient was accompanied by: Not accompanied during assessment  Assessment information provided by:: Patient  Primary Caregiver: Self  Support Systems: Self, Children, Family members  What city do you live in?: [place], [place]  Home entry access options. Select all that apply.: Stairs  Number of steps to enter home.: 2  Do the steps have railings?: Yes  Type of Current Residence: 2 story home  Upon entering residence, is there a bedroom on the main floor (no further steps)?: Yes  Upon entering residence, is there a bathroom on the main floor (no further steps)?: Yes  Living Arrangements: Lives w/ Daughter, Other (Comment) (lives with daughter and 4 grandchildren)  Is patient a ?: No    Activities of Daily Living Prior to Admission  Functional Status: Independent  Completes ADLs independently?: Yes  Ambulates independently?: Yes  Does patient use assisted devices?: Yes  Assisted Devices (DME) used: Walker  Does patient currently own DME?: Yes  What DME does the patient currently own?: Walker  Does patient have a history of Outpatient Therapy (PT/OT)?: Yes  Does the patient have a history of Short-Term Rehab?: No  Does patient have a history of HHC?: Yes  Does patient currently have HHC?: No    Patient Information Continued  Income Source: SSI/SSD  Does patient have prescription coverage?: Yes  Does patient receive dialysis treatments?: No  Does patient have a history of substance abuse?: No  Does patient have a history of Mental Health Diagnosis?: No  Means of Transportation  [facility]      Social Determinants of Health (SDOH)      Flowsheet Row Most Recent Value   Housing Stability    In the last 12 months, was there a time when you were not able to pay the mortgage or rent on time? N   In the last 12 months, how many places have you lived? 1   In the last 12  months, was there a time when you did not have a steady place to sleep or slept in a shelter (including now)? N   Transportation Needs    In the past 12 months, has lack of transportation kept you from medical appointments or from getting medications? no   In the past 12 months, has lack of transportation kept you from meetings, work, or from getting things needed for daily living? No   Food Insecurity    Within the past 12 months, you worried that your food would run out before you got the money to buy more. Never true   Within the past 12 months, the food you bought just didn't last and you didn't have money to get more. Never true   Utilities    In the past 12 months has the electric, gas, oil, or water company threatened to shut off services in your home? No            DISCHARGE DETAILS:    Discharge planning discussed with:: patient  Freedom of Choice: Yes  Comments - Freedom of Choice: pending any recs  CM contacted family/caregiver?: No- see comments  Were Treatment Team discharge recommendations reviewed with patient/caregiver?: Yes  Did patient/caregiver verbalize understanding of patient care needs?: Yes  Were patient/caregiver advised of the risks associated with not following Treatment Team discharge recommendations?: Yes    Contacts  Patient Contacts: Nimco Hanson  Relationship to Patient:: Family  Contact Method: Phone  Phone Number: [phone]  Reason/Outcome: Continuity of Care, Emergency Contact, Discharge Planning     ..CM reviewed d/c planning process including the following: identifying help at home, patient preference for d/c planning needs, Discharge Lounge, [facility] Med[facility] to [facility] program, availability of treatment team to discuss questions or concerns patient and/or family may have regarding understanding medications and recognizing signs and symptoms once discharged.  CM also encouraged patient to follow up with all recommended appointments after discharge. Patient advised of importance  for patient and family to participate in managing patient’s medical well being.

## 2024-05-08 NOTE — ASSESSMENT & PLAN NOTE
Reviewing notes chronically on 2 L  Currently at baseline  Patient is yelling out that he is short of breath, his oxygen saturation was actually acceptable on room air but he was placed back on his 2 L baseline.  He answers questions appropriately but occasionally mumbling unable to understand, questionable confusion  Check VBG

## 2024-05-08 NOTE — H&P
[facility]  H&P  Name: Lucho Talavera 69 y.o. male I MRN: [id number]  Unit/Bed#: ED 28 I Date of Admission: 5/7/2024   Date of Service: 5/7/2024 I Hospital Day: 0    Addendum at 2055: Daughter at bedside, she states for the past 5 days patient has increased weakness, dysarthria, leaning to the left side.  He fell 2 days ago.  He has a history of atrial flutter, he was started on Eliquis in April, patient was not taking it, daughter is unaware he was supposed to take Eliquis.  Per daughter up until a week ago he has been drinking beer daily, 1-2 beers a day, sometimes more.  On physical exam per daughter his speech is not at baseline.  He has no facial asymmetry.  Muscle strength 5/5 in all 4 extremities, able to lift extremities against gravity.   Check CTA head and neck  Neuro checks  If imaging negative, consider high-dose thiamine for possible Wernicke's encephalopathy and MRI        Assessment/Plan   * Chronic obstructive pulmonary disease with acute exacerbation (HCC)  Assessment & Plan  Patient is a history of COPD, had multiple ED visits in the past few days for COPD exacerbation, received nebulizer treatments and steroids with improvement.  Patient presents with wheezing and shortness of breath since yesterday  Received Solu-Medrol and nebulizer with some improvement but still not back at baseline per patient  Chest x-ray showed no acute cardiopulmonary disease  Solu-Medrol 40 mg every 8 hours  Atrovent/Xopenex  Azithromycin 500 mg daily for 3 days.  QTc 439  Respiratory protocol    Chronic respiratory failure (HCC)  Assessment & Plan  Reviewing notes chronically on 2 L  Currently at baseline  Patient is yelling out that he is short of breath, his oxygen saturation was actually acceptable on room air but he was placed back on his 2 L baseline.  He answers questions appropriately but occasionally mumbling unable to understand, questionable confusion  Check VBG    Atrial  "fibrillation (HCC)  Assessment & Plan  He was admitted with atrial flutter with rapid ventricular response in April underwent ablation and loop recorder insertion 4/8/2024  Continue Eliquis 5 mg twice daily    Hypertension  Assessment & Plan  Continue lisinopril with holding parameters    Type 2 diabetes mellitus (HCC)  Assessment & Plan  Lab Results   Component Value Date    HGBA1C 8.9 (H) 04/06/2024       No results for input(s): \"POCGLU\" in the last 72 hours.    Blood Sugar Average: Last 72 hrs:  Reviewing chart patient is on Basaglar 30 units at bedtime and seems like on Humalog sliding scale.  Patient states no changes were made in his insulin regimen, but his daughter knows his meds, she didn't  the phone  Patient states he is not hungry, will decrease his Lantus to 20 units at bedtime now with sliding scale  Expect increase in blood glucose due to steroid      Alcohol abuse  Assessment & Plan  Per chart review patient has a history of alcohol abuse  Patient unable to tell me when was the last time he had alcohol, also unable to reach daughter who he lives with to get more information  Start [facility] protocol  Thiamine and folic acid       VTE Pharmacologic Prophylaxis: VTE Score: 4 Moderate Risk (Score 3-4) - Pharmacological DVT Prophylaxis Ordered: apixaban (Eliquis).  Code Status: Level 1 - Full Code   Discussion with family:  patient.     Anticipated Length of Stay: Patient will be admitted on an inpatient basis with an anticipated length of stay of greater than 2 midnights secondary to COPD exacerbation.    Total Time Spent on Date of Encounter in care of patient: 60 mins. This time was spent on one or more of the following: performing physical exam; counseling and coordination of care; obtaining or reviewing history; documenting in the medical record; reviewing/ordering tests, medications or procedures; communicating with other healthcare professionals and discussing with patient's " family/caregivers.    Chief Complaint: sob    History of Present Illness:  Lucho Talavera is a 69 y.o. male with a PMH of COPD, hypertension, diabetes, atrial flutter, chronic respiratory failure on 2 L of oxygen, alcohol abuse who presents with shortness of breath.  Patient had multiple ED visits for shortness of breath secondary to COPD exacerbation.  Patient presented again with shortness of breath and wheezing, he reports 1 day of productive cough.  He was found to be in COPD exacerbation received Solu-Medrol and nebulizer treatments with some improvement but patient states he is still not back at baseline.    Review of Systems:  Review of Systems   Constitutional:  Positive for activity change. Negative for chills, fatigue and fever.   HENT: Negative.     Respiratory:  Positive for cough, shortness of breath and wheezing.    Cardiovascular:  Negative for chest pain, palpitations and leg swelling.   Gastrointestinal:  Negative for abdominal pain, diarrhea, nausea and vomiting.   Genitourinary:  Negative for dysuria.   Musculoskeletal: Negative.    Skin: Negative.    Neurological: Negative.    Hematological: Negative.    Psychiatric/Behavioral: Negative.         Past Medical and Surgical History:   Past Medical History:   Diagnosis Date    Cardiac arrest (HCC)     COPD (chronic obstructive pulmonary disease) (HCC)     CVA (cerebral vascular accident) (HCC)     Diabetes mellitus (HCC)     Heart attack (HCC)     Hypertension     Stroke (HCC)        Past Surgical History:   Procedure Laterality Date    CARDIAC ELECTROPHYSIOLOGY PROCEDURE N/A 4/8/2024    Procedure: Cardiac eps/aflutter ablation;  Surgeon: Ihsan Blum DO;  Location: [facility];  Service: Cardiology    CERVICAL FUSION N/A 9/10/2018    Procedure: Posterior cervical decompressive laminectomy C3-6; Posterior cervical lateral mass and pedicle fixation fusion C1-T1;  Surgeon: Papa Quiros MD;  Location: [facility]IN [facility];  Service: Neurosurgery        Meds/Allergies:  Prior to Admission medications    Medication Sig Start Date End Date Taking? Authorizing Provider   aspirin 81 mg chewable tablet Chew 81 mg daily   Yes Historical Provider, MD   atorvastatin (LIPITOR) 80 mg tablet Take 1 tablet (80 mg total) by mouth daily with dinner 9/22/18  Yes Bowen Hartman MD   B Complex Vitamins (VITAMIN B COMPLEX 100 IJ) Take 1 tablet by mouth daily   Yes Historical Provider, MD   Fluticasone-Umeclidin-Vilant (TRELEGY ELLIPTA IN) Inhale   Yes Historical Provider, MD   ipratropium-albuterol (DUO-NEB) 0.5-2.5 mg/3 mL nebulizer solution Take 3 mL by nebulization 4 (four) times a day   Yes Historical Provider, MD   albuterol (Proventil HFA) 90 mcg/act inhaler Inhale 2 puffs every 6 (six) hours as needed for wheezing  Patient not taking: Reported on 5/7/2024 1/1/24   Pro Kwan MD   apixaban (Eliquis) 5 mg Take 1 tablet (5 mg total) by mouth 2 (two) times a day Do not fill, please check copay  Patient not taking: Reported on 5/7/2024 4/8/24   Brianna Mcneal PA-C   budesonide (PULMICORT) 0.5 mg/2 mL nebulizer solution Take 2 mL (0.5 mg total) by nebulization every 12 (twelve) hours Rinse mouth after use.  Patient not taking: Reported on 5/7/2024 6/10/22   Jenna Meneses PA-C   busPIRone (BUSPAR) 10 mg tablet Take 1 tablet (10 mg total) by mouth 3 (three) times a day 7/28/23   Thelma Huizar PA-C   ferrous sulfate 324 (65 Fe) mg Take 1 tablet (324 mg total) by mouth 2 (two) times a day before meals 10/18/23   Dawson Whitley   fluticasone (FLONASE) 50 mcg/act nasal spray 1 spray into each nostril 2 (two) times a day 10/4/13   Historical Provider, MD   Fluticasone-Salmeterol (Advair) 500-50 mcg/dose inhaler Inhale 1 puff 2 (two) times a day Rinse mouth after use. 1/1/24   Pro Kwan MD   folic acid (FOLVITE) 1 mg tablet Take 1 tablet (1 mg total) by mouth daily 6/11/22   Jenna Meneses PA-C   Insulin Glargine (BASAGLAR KWIKPEN SC) Inject 30 Units  under the skin daily at bedtime    Historical Provider, MD   lisinopril (ZESTRIL) 40 mg tablet Take 40 mg by mouth daily     Historical Provider, MD   LORazepam (Ativan) 0.5 mg tablet Take by mouth every 6 (six) hours as needed for anxiety     Historical Provider, MD   melatonin 3 mg Take 1 tablet (3 mg total) by mouth daily at bedtime as needed (30) 9/21/18   Bowen Hartman MD   metFORMIN (GLUCOPHAGE) 1000 MG tablet Take 1 tablet by mouth every 12 (twelve) hours    Historical Provider, MD   pantoprazole (Protonix) 40 mg tablet Take 1 tablet (40 mg total) by mouth 2 (two) times a day 8/8/23   TATIANA Thorpe   thiamine 100 MG tablet Take 1 tablet (100 mg total) by mouth daily 9/22/18   Bowen Hartman MD   tiotropium (SPIRIVA) 18 mcg inhalation capsule Place 1 capsule (18 mcg total) into inhaler and inhale daily 1/1/24   Pro Kwan MD     I have reviewed home medications using recent Epic encounter.    Allergies:   Allergies   Allergen Reactions    Amoxicillin Hives    Augmentin [Amoxicillin-Pot Clavulanate] Hives       Social History:  Marital Status: Single     Substance Use History:   Social History     Substance and Sexual Activity   Alcohol Use Not Currently    Alcohol/week: 8.0 - 12.0 standard drinks of alcohol    Types: 8 - 12 Cans of beer per week    Comment: every day drinker     Social History     Tobacco Use   Smoking Status Former    Types: Cigarettes   Smokeless Tobacco Never   Tobacco Comments    quit 5 months ago      Social History     Substance and Sexual Activity   Drug Use Never       Family History:  Family History   Problem Relation Age of Onset    Heart attack Mother        Physical Exam:     Vitals:   Blood Pressure: 127/58 (05/07/24 1900)  Pulse: 100 (05/07/24 1900)  Temperature: 97.8 °F (36.6 °C) (05/07/24 1610)  Temp Source: Oral (05/07/24 1610)  Respirations: 20 (05/07/24 2016)  SpO2: 98 % (05/07/24 1900)    Physical Exam  Constitutional:       General: He is not in acute distress.      Comments: Chronically ill-appearing   HENT:      Head: Atraumatic.   Cardiovascular:      Rate and Rhythm: Normal rate and regular rhythm.      Heart sounds: No murmur heard.  Pulmonary:      Effort: Pulmonary effort is normal.      Breath sounds: Wheezing present.      Comments: On 2 L of oxygen  Abdominal:      General: Bowel sounds are normal. There is no distension.      Palpations: Abdomen is soft.      Tenderness: There is no abdominal tenderness.   Musculoskeletal:         General: No swelling.      Cervical back: Neck supple.   Skin:     General: Skin is warm and dry.   Neurological:      General: No focal deficit present.      Mental Status: He is alert.   Psychiatric:         Mood and Affect: Mood normal.          Additional Data:     Lab Results:  Results from last 7 days   Lab Units 05/07/24  1619   WBC Thousand/uL 12.68*   HEMOGLOBIN g/dL 12.5   HEMATOCRIT % 39.3   PLATELETS Thousands/uL 446*   SEGS PCT % 66   LYMPHO PCT % 24   MONO PCT % 9   EOS PCT % 0     Results from last 7 days   Lab Units 05/07/24  1619   SODIUM mmol/L 133*   POTASSIUM mmol/L 3.5   CHLORIDE mmol/L 99   CO2 mmol/L 24   BUN mg/dL 20   CREATININE mg/dL 0.58*   ANION GAP mmol/L 10   CALCIUM mg/dL 9.0   ALBUMIN g/dL 3.7   TOTAL BILIRUBIN mg/dL 0.60   ALK PHOS U/L 52   ALT U/L 11   AST U/L 14   GLUCOSE RANDOM mg/dL 83         Results from last 7 days   Lab Units 05/07/24  2025   POC GLUCOSE mg/dl 163*               Lines/Drains:  Invasive Devices       Peripheral Intravenous Line  Duration             Peripheral IV 05/07/24 Dorsal (posterior);Left Hand <1 day                        Imaging: Reviewed radiology reports from this admission including: chest xray  XR chest 1 view portable   ED Interpretation by Bowen Tatum MD (05/07 4227)   Chest radiograph personally interpreted by me as no acute cardiopulmonary disease.           EKG and Other Studies Reviewed on Admission:   EKG:  Sinus rhythm with possible PAC, no acute  ischemic changes, RBBB.    ** Please Note: This note has been constructed using a voice recognition system. **

## 2024-05-08 NOTE — ASSESSMENT & PLAN NOTE
Per chart review patient has a history of alcohol abuse and has been in rehab in the past  Currently living with daughter, daughter has quit her job and takes care of him as of recently on a full time basis.   Reports he drinks 1/2 beers a day but no more than that because he is monitored with his ETOH intake and he is unable to leave the house to purchase ETOH on his own   C/w CIWA protocol monitoring at this time- if stable in 24 hrs can d/c    C/w Thiamine, MVI and folic acid

## 2024-05-08 NOTE — PROGRESS NOTES
[facility]  Progress Note  Name: Lucho Talavera I  MRN: [id number]  Unit/Bed#: NW8 875-01 I Date of Admission: 5/7/2024   Date of Service: 5/8/2024 I Hospital Day: 1    Assessment/Plan   * Chronic obstructive pulmonary disease with acute exacerbation (HCC)  Assessment & Plan   history of COPD, had multiple ED visits in the past few days for COPD exacerbation, received nebulizer treatments and steroids with improvement  CXR 5/8- Chronic appearing blunting of the left costophrenic angle is stable.  VBG- ph 7.5; pco2 23.6; hco3 23.6  Sputum culture- not sent, pending   Oxygen via NC, goal 88-92%- currently 98% on room air- pt is on 2LNC at HS chronically at home   C/w nebs: Atrovent/Xopenex  Steroids: solumedrol wean to 40mg IV BID from TID today   Antibiotics: azithromycin 500mg daily x3 days   Respiratory protocol    Stroke-like symptom  Assessment & Plan  Presented with COPD exacerbation and daughter reported the patient has been having increased weakness, dysarthria, and leaning to the left side for the past 5 days. He fell 2 days ago, denied head trauma. He has a history of atrial flutter, he was started on Eliquis in April, patient was not taking it, daughter is unaware he was supposed to take Eliquis.   Stroke pathway  CTA head and neck 5/7- Chronic left PCA territory infarct, new since the prior exam. No evidence of acute vascular territorial infarction, intracranial hemorrhage or mass.   2. No hemodynamically significant stenosis, dissection or occlusion of the carotid or vertebral arteries or major vessels of the [place].   Neuro consult pending   Check MRI given CTA findings   ECHO recently obtained outpatient for 9/24- Left ventricular wall thickness is mildly increased. There is mild concentric hypertrophy, EF 60%.  Left and right atrium dilated.  Aortic valve sclerosis and trace tricuspid valve regurg. The aortic root is mildly dilated. The ascending aorta  is mildly dilated.   Stroke pathway  Permissive hypertension  DVT prophylaxis: Apixaban  PT/OT-pending  ASA 81 mg daily  Statin: Lipitor 80 mg daily  Lipid panel: Cholesterol 211; triglycerides 132; HDL 65;   Recent TSH 0.743  recent Hgb A1C 8.9      CVA (cerebral vascular accident) (HCC)  Assessment & Plan  Chronic, noted on CTA head and neck   Neuro consult appreciated   See above plan for stroke like symptoms above     Atrial fibrillation (HCC)  Assessment & Plan  He was admitted with atrial flutter with rapid ventricular response in April underwent ablation and loop recorder insertion 4/8/2024  At that time he was started on Eliquis 5 mg twice daily but daughter reports she is in charge of his medications and he is not taking this medication and that it wasn't called into the pharmacy     Iron deficiency anemia secondary to inadequate dietary iron intake  Assessment & Plan  Check updated iron panels  Continue with p.o. iron, folic acid  Transfuse for hemoglobin less than 7  Hemoglobin currently stable at 12.6    Hypertension  Assessment & Plan  BP at times low in the 90s to 130s  Hold lisinopril to allow for permissive htn     Chronic respiratory failure (HCC)  Assessment & Plan  Reviewing notes chronically on 2 LNC at HS   Currently at baseline  Check home o2 eval prior to dc     Type 2 diabetes mellitus (HCC)  Assessment & Plan  Lab Results   Component Value Date    HGBA1C 8.9 (H) 04/06/2024       Recent Labs     05/07/24 2025 05/07/24  2218 05/08/24  0714 05/08/24  1049   POCGLU 163* 199* 196* 260*       Blood Sugar Average: Last 72 hrs:  (P) 204.5  Continue with Lantus 20 units daily at bedtime monitor for steroid-induced hyperglycemia  Continue with SSI coverage  Hypoglycemic protocol      Alcohol abuse  Assessment & Plan  Per chart review patient has a history of alcohol abuse and has been in rehab in the past  Currently living with daughter, daughter has quit her job and takes care of him as of  recently on a full time basis.   Reports he drinks 1/2 beers a day but no more than that because he is monitored with his ETOH intake and he is unable to leave the house to purchase ETOH on his own   C/w CIWA protocol monitoring at this time- if stable in 24 hrs can d/c    C/w Thiamine, MVI and folic acid           VTE Pharmacologic Prophylaxis: VTE Score: 4 Moderate Risk (Score 3-4) - Pharmacological DVT Prophylaxis Ordered: apixaban (Eliquis).    Mobility:   Basic Mobility Inpatient Raw Score: 19  JH-HLM Goal: 6: Walk 10 steps or more  JH-HLM Achieved: 6: Walk 10 steps or more  JH-HLM Goal NOT achieved. Continue with multidisciplinary rounding and encourage appropriate mobility to improve upon JH-HLM goals.    Patient Centered Rounds: I performed bedside rounds with nursing staff today.   Discussions with Specialists or Other Care Team Provider: d/w RN, neuro     Education and Discussions with Family / Patient: Updated  (daughter) via phone.    Total Time Spent on Date of Encounter in care of patient: 35 mins. This time was spent on one or more of the following: performing physical exam; counseling and coordination of care; obtaining or reviewing history; documenting in the medical record; reviewing/ordering tests, medications or procedures; communicating with other healthcare professionals and discussing with patient's family/caregivers.    Current Length of Stay: 1 day(s)  Current Patient Status: Inpatient   Certification Statement: The patient will continue to require additional inpatient hospital stay due to iv steroids neuro francois  Discharge Plan: Anticipate discharge in 48-72 hrs to discharge location to be determined pending rehab evaluations.    Code Status: Level 1 - Full Code    Subjective:   Patient lying in bed reports that he is feeling better today.  Reports that his breathing is better.  Reports that his speech is better as well.  Patient notes that he was having difficulty with his  speech and also experiencing weakness for the past several days prior to admission.  He does report that he had a fall but denied any head trauma reports that he fell to his buttocks and that he does not have any pain.  Patient denies all other complaints and is asking when his daughters can come to pick him up so he can go home.  Patient reports that he has been compliant with his medications at home and taking his Eliquis.  When discussing with daughter over the phone she reports that this medication has never been picked up and was not called into the pharmacy and that the patient has not been taking this at home.  She reports that her father has been living with her for the past 20 years and that in 2018 he had an MI while he was at an alcohol rehab facility which he developed a stroke from during his resuscitation because he was considered clinically dead for 4-1/2 minutes before he was resuscitated.  The daughter reports that she recently quit her job so that she could start taking care of her father more on a full-time basis given his increasing decline that has been noted.  Daughter reports that she was unaware that he was started on this anticoagulation and why he would need to take it and that it was not called to the pharmacy.  Daughter reports that he occasionally has 1-2 beers a day but that is monitored and does not exceed this amount.    Objective:     Vitals:   Temp (24hrs), Av.1 °F (36.7 °C), Min:97.4 °F (36.3 °C), Max:99.3 °F (37.4 °C)    Temp:  [97.4 °F (36.3 °C)-99.3 °F (37.4 °C)] 97.9 °F (36.6 °C)  HR:  [] 101  Resp:  [16-24] 16  BP: ()/(57-98) 137/76  SpO2:  [96 %-100 %] 99 %  There is no height or weight on file to calculate BMI.     Input and Output Summary (last 24 hours):     Intake/Output Summary (Last 24 hours) at 2024 1213  Last data filed at 2024 0951  Gross per 24 hour   Intake --   Output 800 ml   Net -800 ml       Physical Exam:   Physical Exam  Vitals and  nursing note reviewed.   Constitutional:       General: He is not in acute distress.     Comments: Chronically ill appearing, thin    HENT:      Head: Normocephalic and atraumatic.      Mouth/Throat:      Mouth: Mucous membranes are moist.   Eyes:      Conjunctiva/sclera: Conjunctivae normal.      Pupils: Pupils are equal, round, and reactive to light.   Cardiovascular:      Rate and Rhythm: Normal rate and regular rhythm.      Heart sounds: No murmur heard.  Pulmonary:      Effort: No respiratory distress.      Breath sounds: No wheezing or rales.      Comments: Poor effort, diminished LL b/l. Room air   Abdominal:      General: Bowel sounds are normal. There is no distension.      Palpations: Abdomen is soft.      Tenderness: There is no abdominal tenderness.   Musculoskeletal:         General: No swelling.      Cervical back: Neck supple.   Skin:     General: Skin is warm and dry.   Neurological:      Mental Status: He is alert and oriented to person, place, and time.      Cranial Nerves: No cranial nerve deficit.      Motor: No weakness or tremor.   Psychiatric:         Mood and Affect: Mood normal.          Additional Data:     Labs:  Results from last 7 days   Lab Units 05/08/24  0513 05/07/24  1619   WBC Thousand/uL 7.25 12.68*   HEMOGLOBIN g/dL 12.6 12.5   HEMATOCRIT % 38.6 39.3   PLATELETS Thousands/uL 457* 446*   SEGS PCT %  --  66   LYMPHO PCT %  --  24   MONO PCT %  --  9   EOS PCT %  --  0     Results from last 7 days   Lab Units 05/08/24  0513 05/07/24  1619   SODIUM mmol/L 133* 133*   POTASSIUM mmol/L 4.0 3.5   CHLORIDE mmol/L 98 99   CO2 mmol/L 21 24   BUN mg/dL 22 20   CREATININE mg/dL 0.74 0.58*   ANION GAP mmol/L 14* 10   CALCIUM mg/dL 9.2 9.0   ALBUMIN g/dL  --  3.7   TOTAL BILIRUBIN mg/dL  --  0.60   ALK PHOS U/L  --  52   ALT U/L  --  11   AST U/L  --  14   GLUCOSE RANDOM mg/dL 280* 83         Results from last 7 days   Lab Units 05/08/24  1049 05/08/24  0714 05/07/24  2218 05/07/24  2025   POC  GLUCOSE mg/dl 260* 196* 199* 163*               Lines/Drains:  Invasive Devices       Peripheral Intravenous Line  Duration             Peripheral IV 05/07/24 Proximal;Right;Ventral (anterior) Forearm <1 day              Drain  Duration             External Urinary Catheter <1 day                          Imaging: Reviewed radiology reports from this admission including: chest xray and CT head    Recent Cultures (last 7 days):         Last 24 Hours Medication List:   Current Facility-Administered Medications   Medication Dose Route Frequency Provider Last Rate    acetaminophen  650 mg Oral Q6H PRN Rosana Herrera PA-C      albuterol  2.5 mg Nebulization Q6H PRN Danielle Ellis MD      apixaban  5 mg Oral BID Danielle Ellis MD      aspirin  81 mg Oral Daily Danielle lElis MD      atorvastatin  80 mg Oral Daily With Dinner Danielle Ellis MD      azithromycin  500 mg Oral Q24H Danielle Ellis MD      busPIRone  10 mg Oral TID Danielle Ellis MD      ferrous sulfate  325 mg Oral Daily With Breakfast Danielle Ellis MD      fluticasone  1 spray Nasal BID Danielle Ellis MD      folic acid  1 mg Oral Daily Danielle Ellis MD      guaiFENesin  600 mg Oral BID Danielle Ellis MD      insulin glargine  20 Units Subcutaneous HS Danielle Ellis MD      insulin lispro  1-6 Units Subcutaneous TID AC Danielle Ellis MD      ipratropium  0.5 mg Nebulization TID Danielle Ellis MD      levalbuterol  1.25 mg Nebulization TID Danielle Ellis MD      lisinopril  40 mg Oral Daily Danielle Ellis MD      LORazepam  0.5 mg Oral BID PRN Danielle Ellis MD      melatonin  3 mg Oral HS PRN Danielle Ellis MD      methylPREDNISolone sodium succinate  40 mg Intravenous Q12H [facility] TATIANA Dangelo      multivitamin-minerals  1 tablet Oral Daily TATIANA Dangelo      oxyCODONE  2.5 mg Oral Q4H PRN Rosana Herrera PA-C      pantoprazole  40 mg Oral Early Morning Danielle Ellis  MD      thiamine  100 mg Oral Daily TATIANA Dangelo          Today, Patient Was Seen By: TATIANA Dangelo    **Please Note: This note may have been constructed using a voice recognition system.**

## 2024-05-08 NOTE — ASSESSMENT & PLAN NOTE
"Lab Results   Component Value Date    HGBA1C 8.9 (H) 04/06/2024       No results for input(s): \"POCGLU\" in the last 72 hours.    Blood Sugar Average: Last 72 hrs:  Reviewing chart patient is on Basaglar 30 units at bedtime and seems like on Humalog sliding scale.  Patient states no changes were made in his insulin regimen, but his daughter knows his meds, she didn't  the phone  Patient states he is not hungry, will decrease his Lantus to 20 units at bedtime now with sliding scale  Expect increase in blood glucose due to steroid    "

## 2024-05-08 NOTE — PLAN OF CARE
Problem: PAIN - ADULT  Goal: Verbalizes/displays adequate comfort level or baseline comfort level  Description: Interventions:  - Encourage patient to monitor pain and request assistance  - Assess pain using appropriate pain scale  - Administer analgesics based on type and severity of pain and evaluate response  - Implement non-pharmacological measures as appropriate and evaluate response  - Consider cultural and social influences on pain and pain management  - Notify physician/advanced practitioner if interventions unsuccessful or patient reports new pain  Outcome: Progressing     Problem: INFECTION - ADULT  Goal: Absence or prevention of progression during hospitalization  Description: INTERVENTIONS:  - Assess and monitor for signs and symptoms of infection  - Monitor lab/diagnostic results  - Monitor all insertion sites, i.e. indwelling lines, tubes, and drains  - Monitor endotracheal if appropriate and nasal secretions for changes in amount and color  - [place] appropriate cooling/warming therapies per order  - Administer medications as ordered  - Instruct and encourage patient and family to use good hand hygiene technique  - Identify and instruct in appropriate isolation precautions for identified infection/condition  Outcome: Progressing  Goal: Absence of fever/infection during neutropenic period  Description: INTERVENTIONS:  - Monitor WBC    Outcome: Progressing     Problem: SAFETY ADULT  Goal: Patient will remain free of falls  Description: INTERVENTIONS:  - Educate patient/family on patient safety including physical limitations  - Instruct patient to call for assistance with activity   - Consult OT/PT to assist with strengthening/mobility   - Keep Call bell within reach  - Keep bed low and locked with side rails adjusted as appropriate  - Keep care items and personal belongings within reach  - Initiate and maintain comfort rounds  - Make Fall Risk Sign visible to staff  - Offer Toileting every 2 Hours,  in advance of need  - Initiate/Maintain bed alarm  - Obtain necessary fall risk management equipment  - Apply yellow socks and bracelet for high fall risk patients  - Consider moving patient to room near nurses station  Outcome: Progressing  Goal: Maintain or return to baseline ADL function  Description: INTERVENTIONS:  -  Assess patient's ability to carry out ADLs; assess patient's baseline for ADL function and identify physical deficits which impact ability to perform ADLs (bathing, care of mouth/teeth, toileting, grooming, dressing, etc.)  - Assess/evaluate cause of self-care deficits   - Assess range of motion  - Assess patient's mobility; develop plan if impaired  - Assess patient's need for assistive devices and provide as appropriate  - Encourage maximum independence but intervene and supervise when necessary  - Involve family in performance of ADLs  - Assess for home care needs following discharge   - Consider OT consult to assist with ADL evaluation and planning for discharge  - Provide patient education as appropriate  Outcome: Progressing  Goal: Maintains/Returns to pre admission functional level  Description: INTERVENTIONS:  - Perform AM-PAC 6 Click Basic Mobility/ Daily Activity assessment daily.  - Set and communicate daily mobility goal to care team and patient/family/caregiver.   - Collaborate with rehabilitation services on mobility goals if consulted  - Perform Range of Motion 4 times a day.  - Reposition patient every 2 hours.  - Dangle patient 4 times a day  - Stand patient 3 times a day  - Ambulate patient 3 times a day  - Out of bed to chair 3 times a day   - Out of bed for meals 3 times a day  - Out of bed for toileting  - Record patient progress and toleration of activity level   Outcome: Progressing     Problem: DISCHARGE PLANNING  Goal: Discharge to home or other facility with appropriate resources  Description: INTERVENTIONS:  - Identify barriers to discharge w/patient and caregiver  -  Arrange for needed discharge resources and transportation as appropriate  - Identify discharge learning needs (meds, wound care, etc.)  - Arrange for interpretive services to assist at discharge as needed  - Refer to Case Management Department for coordinating discharge planning if the patient needs post-hospital services based on physician/advanced practitioner order or complex needs related to functional status, cognitive ability, or social support system  Outcome: Progressing     Problem: Knowledge Deficit  Goal: Patient/family/caregiver demonstrates understanding of disease process, treatment plan, medications, and discharge instructions  Description: Complete learning assessment and assess knowledge base.  Interventions:  - Provide teaching at level of understanding  - Provide teaching via preferred learning methods  Outcome: Progressing     Problem: Prexisting or High Potential for Compromised Skin Integrity  Goal: Skin integrity is maintained or improved  Description: INTERVENTIONS:  - Identify patients at risk for skin breakdown  - Assess and monitor skin integrity  - Assess and monitor nutrition and hydration status  - Monitor labs   - Assess for incontinence   - Turn and reposition patient  - Assist with mobility/ambulation  - Relieve pressure over bony prominences  - Avoid friction and shearing  - Provide appropriate hygiene as needed including keeping skin clean and dry  - Evaluate need for skin moisturizer/barrier cream  - Collaborate with interdisciplinary team   - Patient/family teaching  - Consider wound care consult   Outcome: Progressing

## 2024-05-08 NOTE — ASSESSMENT & PLAN NOTE
history of COPD, had multiple ED visits in the past few days for COPD exacerbation, received nebulizer treatments and steroids with improvement  CXR 5/8- Chronic appearing blunting of the left costophrenic angle is stable.  VBG- ph 7.5; pco2 23.6; hco3 23.6  Sputum culture- not sent, pending   Oxygen via NC, goal 88-92%- currently 98% on room air- pt is on 2LNC at HS chronically at home   C/w nebs: Atrovent/Xopenex  Steroids: solumedrol wean to 40mg IV BID from TID today   Antibiotics: azithromycin 500mg daily x3 days   Respiratory protocol

## 2024-05-08 NOTE — ASSESSMENT & PLAN NOTE
Lab Results   Component Value Date    HGBA1C 8.9 (H) 04/06/2024       Recent Labs     05/07/24 2025 05/07/24 2218 05/08/24 0714 05/08/24  1049   POCGLU 163* 199* 196* 260*       Blood Sugar Average: Last 72 hrs:  (P) 204.5  Continue with Lantus 20 units daily at bedtime monitor for steroid-induced hyperglycemia  Continue with SSI coverage  Hypoglycemic protocol

## 2024-05-08 NOTE — WOUND OSTOMY CARE
Consult Note - Wound   Lucho Talavera 69 y.o. male MRN: [id number]  Unit/Bed#: NW8 875-01 Encounter: [id number]        History and Present Illness:  Patient is a 70 yo male that was admitted to [facility] for treatment of chronic obstructive pulmonary disease. Patient has a PMH of  COPD, hypertension, diabetes, atrial flutter, chronic respiratory failure. Patient is a moderate assist for turning and repositioning. Patient is incontinent of bowel and bladder. On assessment, patient is seen lying on regular mattress.     Wound Care was consulted for multiple wounds     Assessment Findings:   B/L heels are dry intact and amber with no skin loss or wounds present. Recommend preventative Hydraguard Cream and proper offloading/ repositioning.      B/L sacro-buttocks is dry, intact, pink in color and blanches. No skin loss or wounds present. Recommend preventative hydragaurd to area due to incontinence.   B/L Anterior Lower Legs/ Feet and Right Arm with scattered, intact, well adhered scabs. No skin loss or drainage present. Recommend leaving areas HELIO.     Right Lateral Forearm Skin Tear: irregular in shape area of partial thickness skin loss- wound bed is beefy red in color and bleeding. Skin flap covers 60% of wound bed. Pat-wound is fragile and ecchymotic. Scant sanguinous drainage noted. Recommend dermagran and DSD.     No induration, fluctuance, odor, warmth/temperature differences, redness, or purulence noted to the above noted wounds and skin areas assessed. New dressings applied per orders listed below. Patient tolerated well- no s/s of non-verbal pain or discomfort observed during the encounter. Bedside nurse aware of plan of care. See flow sheets for more detailed assessment findings.      Orders listed below and wound care will sign off, call or tiger text with questions.     Skin Care Plan:  1-Left Upper Arm Wound: Cleanse with NSS and pat dry. Apply Dermagran to wound bed only. Cover with an ABD and  secure with danii wrap. Change every other day or PRN soilage/displacement.   2-Turn/reposition q2h or when medically stable for pressure re-distribution on skin .  3-Elevate heels to offload pressure.  4-Moisturize skin daily with skin nourishing cream  5-Ehob cushion in chair when out of bed.  6-Preventative Hydraguard to bilateral sacro-buttocks and heels BID and PRN.       Wounds:  Wound 05/08/24 Skin tear Arm Anterior;Left;Inferior (Active)   Wound Image   05/08/24 0840   Wound Description Beefy red;Bleeding 05/08/24 0840   Pat-wound Assessment Fragile 05/08/24 0840   Wound Length (cm) 7 cm 05/08/24 0840   Wound Width (cm) 1 cm 05/08/24 0840   Wound Depth (cm) 0.1 cm 05/08/24 0840   Wound Surface Area (cm^2) 7 cm^2 05/08/24 0840   Wound Volume (cm^3) 0.7 cm^3 05/08/24 0840   Calculated Wound Volume (cm^3) 0.7 cm^3 05/08/24 0840   Drainage Amount Scant 05/08/24 0840   Drainage Description Sanguineous 05/08/24 0840   Non-staged Wound Description Partial thickness 05/08/24 0840   Treatments Cleansed;Irrigation with NSS;Site care 05/08/24 0840   Dressing Dermagran gauze;ABD;Dry dressing 05/08/24 0840   Dressing Changed New 05/08/24 0840   Patient Tolerance Tolerated well 05/08/24 0840   Dressing Status Clean;Intact;Dry 05/08/24 0840       Wound 05/08/24 Abrasion(s) Arm Anterior;Right (Active)   Wound Image   05/08/24 0840   Wound Description Intact 05/08/24 0840   Pat-wound Assessment Intact 05/08/24 0840   Wound Length (cm) 0 cm 05/08/24 0840   Wound Width (cm) 0 cm 05/08/24 0840   Wound Depth (cm) 0 cm 05/08/24 0840   Wound Surface Area (cm^2) 0 cm^2 05/08/24 0840   Wound Volume (cm^3) 0 cm^3 05/08/24 0840   Calculated Wound Volume (cm^3) 0 cm^3 05/08/24 0840   Drainage Amount None 05/08/24 0840   Treatments Cleansed 05/08/24 0840   Dressing Open to air 05/08/24 0840               Jing Alicea RN, BSN, CWOCN

## 2024-05-08 NOTE — ASSESSMENT & PLAN NOTE
70 y/o male with prior strokes, COPD, DM, HTN, Aflutter s/p ablation in 04/2024, alcohol abuse, who presented on 5/7 with shortness of breath, concerning for COPD exacerbation. Family reported patient having increased weakness, dysarthria, and leaning to the L side with fall 2 days PTA. Patient was supposed to be on Eliquis but was not taking it at home as family was unaware that he was supposed to be on it and prescription was reportedly never called in to pharmacy. BP on presentation 125/59. Exam with some dysarthria but no focal deficits noted. Imaging with multiple small acute-early subacute infarcts. Suspect most likely cardioembolic in the setting of Aflutter/Afib not on AC.    Workup:  - CTA head and neck:  1. Chronic left PCA territory infarct, new since the prior exam. No evidence of acute vascular territorial infarction, intracranial hemorrhage or mass.  2. No hemodynamically significant stenosis, dissection or occlusion of the carotid or vertebral arteries or major vessels of the [place].  - MRI brain: Multiple infarcts of varying ages in the posterior circulation include multiple small acute/early subacute infarcts in the bilateral cerebellum. Additional small foci of recent ischemia in the left PCA distribution superimposed on chronic infarct. No acute hemorrhage or mass effect  - Labs: WBC on presentation 12.68, COVID/flu/RSV negative, 4/6/24 A1C 8.9,   - 4/9/24 Echo: EF 60%. No regional wall motion abnormality. Bilateral atrium dilated.    Plan:  - Stroke pathway  Echo  Aspirin 81 mg   Atorvastatin 40 mg  Goal normotension; avoid hypotension  Continue telemetry  PT/OT/ST  Frequent neuro checks. Continue to monitor and notify neurology with any changes.   - Continue home Eliquis 5 mg BID  - Medical management and supportive care per primary team. Correction of any metabolic or infectious disturbances.

## 2024-05-08 NOTE — ASSESSMENT & PLAN NOTE
Per chart review patient has a history of alcohol abuse  Patient unable to tell me when was the last time he had alcohol, also unable to reach daughter who he lives with to get more information  Start [facility] protocol  Thiamine and folic acid

## 2024-05-08 NOTE — ASSESSMENT & PLAN NOTE
- CIWA protcol  - Recommend thiamine supplementation  - B12 753, folate >22.3  - Medical management per primary team

## 2024-05-08 NOTE — CONSULTS
Consultation - Neurology   Lucho Talavera 69 y.o. male MRN: [id number]  Unit/Bed#: NW8 875-01 Encounter: [id number]      Assessment/Plan     Stroke (cerebrum) (HCC)  Assessment & Plan  68 y/o male with prior strokes, COPD, DM, HTN, Aflutter s/p ablation in 04/2024 on Eliquis, alcohol abuse, who presented on 5/7 with shortness of breath, concerning for COPD exacerbation. Family reported patient having increased weakness, dysarthria, and leaning to the L side with fall 2 days PTA. Patient was reportedly not taking Eliquis at home. BP on presentation 125/59. Exam with some dysarthria but no focal deficits noted. Unclear etiology at this time. Workup as noted below.    Workup:  - CTA head and neck:  1. Chronic left PCA territory infarct, new since the prior exam. No evidence of acute vascular territorial infarction, intracranial hemorrhage or mass.  2. No hemodynamically significant stenosis, dissection or occlusion of the carotid or vertebral arteries or major vessels of the [place].  - Labs: WBC on presentation 12.68, COVID/flu/RSV negative, 4/6/24 A1C 8.9,   - 4/9/24 Echo: EF 60%. No regional wall motion abnormality. Bilateral atrium dilated.    Plan:  - Stroke pathway  MRI brain  Aspirin 81 mg   Atorvastatin 40 mg  Goal normotension; avoid hypotension  Continue telemetry  PT/OT/ST  Frequent neuro checks. Continue to monitor and notify neurology with any changes.   - Continue home Eliquis 5 mg BID  - Medical management and supportive care per primary team. Correction of any metabolic or infectious disturbances.     Atrial fibrillation (HCC)  Assessment & Plan  - S/p ablation in 04/2024  - Was supposed to be on Eliquis but daughter reporting she was unaware that patient was on this and it was not called into pharmacy  - Telemetry  - Medical management per primary team    Alcohol abuse  Assessment & Plan  - CIWA protcol  - Recommend thiamine supplementation  - B12 753, folate >22.3  - Medical  management per primary team      Lucho Talavera will need follow up in in 6 weeks with neurovascular attending or advance practitioner. He will not require outpatient neurological testing.    Case and treatment plan reviewed with attending neurologist, Dr. Patiño. Please see attending attestation for any further recommendations.    History of Present Illness     Reason for Consult / Principal Problem: Stroke  HPI: Lucho Talavera is a 69 y.o.  male with prior strokes, COPD, DM, HTN, Aflutter s/p ablation in 04/2024 on Eliquis, alcohol abuse, who presents with stroke.    Patient initially presented on 5/7 with shortness of breath, concerning for COPD exacerbation. Daughter, who is patient's caretaker, reported to primary team that patient has been having increased weakness, dysarthria, and leaning to the L side along with a fall 2 days PTA. Patient reportedly was not taking Eliquis and daughter was unaware that he was supposed to be on it.     Patient seen and evaluated. He reports that he has been having trouble enunciating his words for the last two days and some RUE weakness. He denies any headache, numbness/tingling, difficulty swallowing, vision changes, or LE weakness. He endorses Eliquis compliance but notes his daughter manages his medications for him at home. He reports a history of stroke 5 years ago but unable to provide further details of what symptoms he experienced at that time.    Per chart review, patient frequently presents to the ED/hospital, mainly for COPD exacerbation. In 04/2024, patient presented with shortness of breath and was found to be in Aflutter with RVR. He was evaluated by EP and underwent BRITTNEY ablation. He was discharged on Eliquis.    Inpatient consult to Neurology  Consult performed by: TATIANA Vegas  Consult ordered by: Rosana Herrera PA-C          Review of Systems  A 12 system ROS was completed. Other than the above mentioned complaints in the HPI and those commented on  below, all remaining systems were negative.    Historical Information   Past Medical History:   Diagnosis Date    Cardiac arrest (HCC)     COPD (chronic obstructive pulmonary disease) (HCC)     CVA (cerebral vascular accident) (HCC)     Diabetes mellitus (HCC)     Heart attack (HCC)     Hypertension     Stroke (HCC)      Past Surgical History:   Procedure Laterality Date    CARDIAC ELECTROPHYSIOLOGY PROCEDURE N/A 4/8/2024    Procedure: Cardiac eps/aflutter ablation;  Surgeon: Ihsan Blum DO;  Location: [facility];  Service: Cardiology    CERVICAL FUSION N/A 9/10/2018    Procedure: Posterior cervical decompressive laminectomy C3-6; Posterior cervical lateral mass and pedicle fixation fusion C1-T1;  Surgeon: Papa Quiros MD;  Location: [facility] MAIN [facility];  Service: Neurosurgery     Social History   Social History     Substance and Sexual Activity   Alcohol Use Not Currently    Alcohol/week: 8.0 - 12.0 standard drinks of alcohol    Types: 8 - 12 Cans of beer per week    Comment: every day drinker     Social History     Substance and Sexual Activity   Drug Use Never     E-Cigarette/Vaping    E-Cigarette Use Never User      E-Cigarette/Vaping Substances    Nicotine No     THC No     CBD No     Flavoring No     Other No     Unknown No      Social History     Tobacco Use   Smoking Status Former    Types: Cigarettes   Smokeless Tobacco Never   Tobacco Comments    quit 5 months ago      Family History:   Family History   Problem Relation Age of Onset    Heart attack Mother        Review of previous medical records was completed.     Meds/Allergies   all current active meds have been reviewed, current meds:   Current Facility-Administered Medications   Medication Dose Route Frequency    acetaminophen (TYLENOL) tablet 650 mg  650 mg Oral Q6H PRN    albuterol inhalation solution 2.5 mg  2.5 mg Nebulization Q6H PRN    apixaban (ELIQUIS) tablet 5 mg  5 mg Oral BID    aspirin chewable tablet 81 mg  81 mg Oral Daily     atorvastatin (LIPITOR) tablet 80 mg  80 mg Oral Daily With Dinner    azithromycin (ZITHROMAX) tablet 500 mg  500 mg Oral Q24H    busPIRone (BUSPAR) tablet 10 mg  10 mg Oral TID    ferrous sulfate tablet 325 mg  325 mg Oral Daily With Breakfast    fluticasone (FLONASE) 50 mcg/act nasal spray 1 spray  1 spray Nasal BID    folic acid (FOLVITE) tablet 1 mg  1 mg Oral Daily    guaiFENesin (MUCINEX) 12 hr tablet 600 mg  600 mg Oral BID    insulin glargine (LANTUS) subcutaneous injection 20 Units 0.2 mL  20 Units Subcutaneous HS    insulin lispro (HumALOG/ADMELOG) 100 units/mL subcutaneous injection 1-6 Units  1-6 Units Subcutaneous TID AC    LORazepam (ATIVAN) tablet 0.5 mg  0.5 mg Oral BID PRN    melatonin tablet 3 mg  3 mg Oral HS PRN    methylPREDNISolone sodium succinate (Solu-MEDROL) injection 40 mg  40 mg Intravenous Q12H LENNY    multivitamin-minerals (CENTRUM) tablet 1 tablet  1 tablet Oral Daily    oxyCODONE (ROXICODONE) split tablet 2.5 mg  2.5 mg Oral Q4H PRN    pantoprazole (PROTONIX) EC tablet 40 mg  40 mg Oral Early Morning    thiamine tablet 100 mg  100 mg Oral Daily   , and PTA meds:   Prior to Admission Medications   Prescriptions Last Dose Informant Patient Reported? Taking?   B Complex Vitamins (VITAMIN B COMPLEX 100 IJ) 5/7/2024  Yes Yes   Sig: Take 1 tablet by mouth daily   Fluticasone-Salmeterol (Advair) 500-50 mcg/dose inhaler   No No   Sig: Inhale 1 puff 2 (two) times a day Rinse mouth after use.   Fluticasone-Umeclidin-Vilant (TRELEGY ELLIPTA IN) 5/7/2024  Yes Yes   Sig: Inhale   Insulin Glargine (BASAGLAR KWIKPEN SC)   Yes No   Sig: Inject 30 Units under the skin daily at bedtime   LORazepam (Ativan) 0.5 mg tablet   Yes No   Sig: Take by mouth every 6 (six) hours as needed for anxiety    albuterol (Proventil HFA) 90 mcg/act inhaler Not Taking  No No   Sig: Inhale 2 puffs every 6 (six) hours as needed for wheezing   Patient not taking: Reported on 5/7/2024   apixaban (Eliquis) 5 mg Not Taking  No No    Sig: Take 1 tablet (5 mg total) by mouth 2 (two) times a day Do not fill, please check copay   Patient not taking: Reported on 2024   aspirin 81 mg chewable tablet 2024  Yes Yes   Sig: Chew 81 mg daily   atorvastatin (LIPITOR) 80 mg tablet 2024  No Yes   Sig: Take 1 tablet (80 mg total) by mouth daily with dinner   budesonide (PULMICORT) 0.5 mg/2 mL nebulizer solution Not Taking  No No   Sig: Take 2 mL (0.5 mg total) by nebulization every 12 (twelve) hours Rinse mouth after use.   Patient not taking: Reported on 2024   busPIRone (BUSPAR) 10 mg tablet   No No   Sig: Take 1 tablet (10 mg total) by mouth 3 (three) times a day   ferrous sulfate 324 (65 Fe) mg   No No   Sig: Take 1 tablet (324 mg total) by mouth 2 (two) times a day before meals   fluticasone (FLONASE) 50 mcg/act nasal spray   Yes No   Si spray into each nostril 2 (two) times a day   folic acid (FOLVITE) 1 mg tablet   No No   Sig: Take 1 tablet (1 mg total) by mouth daily   ipratropium-albuterol (DUO-NEB) 0.5-2.5 mg/3 mL nebulizer solution 2024  Yes Yes   Sig: Take 3 mL by nebulization 4 (four) times a day   lisinopril (ZESTRIL) 40 mg tablet   Yes No   Sig: Take 40 mg by mouth daily    melatonin 3 mg   No No   Sig: Take 1 tablet (3 mg total) by mouth daily at bedtime as needed (30)   metFORMIN (GLUCOPHAGE) 1000 MG tablet   Yes No   Sig: Take 1 tablet by mouth every 12 (twelve) hours   pantoprazole (Protonix) 40 mg tablet   No No   Sig: Take 1 tablet (40 mg total) by mouth 2 (two) times a day   thiamine 100 MG tablet   No No   Sig: Take 1 tablet (100 mg total) by mouth daily   tiotropium (SPIRIVA) 18 mcg inhalation capsule   No No   Sig: Place 1 capsule (18 mcg total) into inhaler and inhale daily      Facility-Administered Medications: None       Allergies   Allergen Reactions    Amoxicillin Hives    Augmentin [Amoxicillin-Pot Clavulanate] Hives       Objective   Vitals:Blood pressure 137/76, pulse 101, temperature 97.9 °F (36.6  °C), temperature source Oral, resp. rate 16, SpO2 99%.,There is no height or weight on file to calculate BMI.    Intake/Output Summary (Last 24 hours) at 5/8/2024 1445  Last data filed at 5/8/2024 1444  Gross per 24 hour   Intake --   Output 1275 ml   Net -1275 ml       Invasive Devices:   Invasive Devices       Peripheral Intravenous Line  Duration             Peripheral IV 05/07/24 Proximal;Right;Ventral (anterior) Forearm <1 day              Drain  Duration             External Urinary Catheter <1 day                    Physical Exam  Vitals and nursing note reviewed.   Constitutional:       General: He is not in acute distress.     Appearance: Normal appearance. He is not ill-appearing.   HENT:      Head: Normocephalic.      Mouth/Throat:      Mouth: Mucous membranes are moist.      Pharynx: Oropharynx is clear.   Eyes:      General: No scleral icterus.        Right eye: No discharge.         Left eye: No discharge.      Extraocular Movements: Extraocular movements intact and EOM normal.      Conjunctiva/sclera: Conjunctivae normal.   Cardiovascular:      Rate and Rhythm: Normal rate.   Pulmonary:      Effort: Pulmonary effort is normal. No respiratory distress.   Musculoskeletal:      Right lower leg: No edema.      Left lower leg: No edema.   Skin:     General: Skin is warm and dry.      Coloration: Skin is not jaundiced or pale.      Comments: Abrasions on LE bilaterally   Neurological:      Mental Status: He is alert.      Coordination: Finger-Nose-Finger Test and Heel to Shin Test normal.   Psychiatric:         Mood and Affect: Mood normal.       Neurologic Exam     Mental Status   Level of consciousness: alert  Able to state he is at [facility]. States the year is 2014 and that the month is April. Able to follow commands appropriately. Speech mildly dysarthric. No aphasia noted.     Cranial Nerves     CN III, IV, VI   Extraocular motions are normal.     CN V   Facial sensation intact.     CN VII  "  Facial expression full, symmetric.     CN VIII   Hearing: intact    CN IX, X   Palate: symmetric    CN XI   Right trapezius strength: normal  Left trapezius strength: normal    CN XII   CN XII normal.   Inconsistent VF testing     Motor Exam   Muscle bulk: normal  Right arm pronator drift: absent  Left arm pronator drift: absent  Bilateral UE strength 5/5 deltoids, biceps, triceps, hand   Bilateral LE strength 5/5 hip flexion, dorsiflexion, plantar flexion     Sensory Exam   Light touch normal.     Gait, Coordination, and Reflexes     Coordination   Finger to nose coordination: normal  Heel to shin coordination: normal    Tremor   Resting tremor: absent  Intention tremor: absent      Lab Results: I have personally reviewed pertinent reports.  , CBC:   Results from last 7 days   Lab Units 05/08/24  0513 05/07/24  1619 05/05/24  1645   WBC Thousand/uL 7.25 12.68* 10.50*   RBC Million/uL 5.01 4.90 5.23   HEMOGLOBIN g/dL 12.6 12.5 12.9   HEMATOCRIT % 38.6 39.3 42.5   MCV fL 77* 80* 81*   PLATELETS Thousands/uL 457* 446* 417*   , BMP/CMP:   Results from last 7 days   Lab Units 05/08/24  0513 05/07/24  1619 05/05/24  1645 05/04/24  1309   SODIUM mmol/L 133* 133* 136 133*   POTASSIUM mmol/L 4.0 3.5 4.1 4.7   CHLORIDE mmol/L 98 99 96 96*   CO2 mmol/L 21 24 29 28   BUN mg/dL 22 20 19 22   CREATININE mg/dL 0.74 0.58* 0.67 0.63   CALCIUM mg/dL 9.2 9.0 9.7 9.9   AST U/L  --  14 16 14   ALT U/L  --  11 18 15   ALK PHOS U/L  --  52 63 67   EGFR ml/min/1.73sq m 93 103 97 102   , Vitamin B12:   Results from last 7 days   Lab Units 05/08/24  0513   VITAMIN B 12 pg/mL 753   , HgBA1C:   , TSH:   , Coagulation:   , Lipid Profile:   Results from last 7 days   Lab Units 05/07/24  1619   HDL mg/dL 65   LDL CALC mg/dL 120*   TRIGLYCERIDES mg/dL 132   , Ammonia:   , Urinalysis:       Invalid input(s): \"URIBILINOGEN\", Drug Screen:   , Medication Drug Levels:       Invalid input(s): \"CARBAMAZEPINE\", \"OXCARBAZEPINE\"    Imaging Studies: I " have personally reviewed pertinent reports.   and I have personally reviewed pertinent films in PACS  EKG, Pathology, and Other Studies: I have personally reviewed pertinent reports.    VTE Prophylaxis: Sequential compression device (Venodyne) and Eliquis    Code Status: Level 1 - Full Code  Advance Directive and Living Will:      Power of :    POLST:

## 2024-05-08 NOTE — ASSESSMENT & PLAN NOTE
- S/p ablation in 04/2024  - Was supposed to be on Eliquis but daughter reporting she was unaware that patient was on this and it was not called into pharmacy  - Telemetry  - Medical management per primary team

## 2024-05-08 NOTE — DISCHARGE INSTR - OTHER ORDERS
Skin Care Plan:  1-Left Upper Arm Wound: Cleanse with NSS and pat dry. Apply Dermagran to wound bed only. Cover with an ABD and secure with danii wrap. Change every other day or PRN soilage/displacement.   2-Turn/reposition q2h or when medically stable for pressure re-distribution on skin .  3-Elevate heels to offload pressure.  4-Moisturize skin daily with skin nourishing cream  5-Ehob cushion in chair when out of bed.  6-Preventative Hydraguard to bilateral sacro-buttocks and heels BID and PRN.

## 2024-05-08 NOTE — ASSESSMENT & PLAN NOTE
Patient is a history of COPD, had multiple ED visits in the past few days for COPD exacerbation, received nebulizer treatments and steroids with improvement.  Patient presents with wheezing and shortness of breath since yesterday  Received Solu-Medrol and nebulizer with some improvement but still not back at baseline per patient  Chest x-ray showed no acute cardiopulmonary disease  Solu-Medrol 40 mg every 8 hours  Atrovent/Xopenex  Azithromycin 500 mg daily for 3 days.  QTc 439  Respiratory protocol

## 2024-05-08 NOTE — ASSESSMENT & PLAN NOTE
Presented with COPD exacerbation and daughter reported the patient has been having increased weakness, dysarthria, and leaning to the left side for the past 5 days. He fell 2 days ago, denied head trauma. He has a history of atrial flutter, he was started on Eliquis in April, patient was not taking it, daughter is unaware he was supposed to take Eliquis.   Stroke pathway  CTA head and neck 5/7- Chronic left PCA territory infarct, new since the prior exam. No evidence of acute vascular territorial infarction, intracranial hemorrhage or mass.   2. No hemodynamically significant stenosis, dissection or occlusion of the carotid or vertebral arteries or major vessels of the [place].   Neuro consult pending   Check MRI given CTA findings   ECHO recently obtained outpatient for 9/24- Left ventricular wall thickness is mildly increased. There is mild concentric hypertrophy, EF 60%.  Left and right atrium dilated.  Aortic valve sclerosis and trace tricuspid valve regurg. The aortic root is mildly dilated. The ascending aorta is mildly dilated.   Stroke pathway  Permissive hypertension  DVT prophylaxis: Apixaban  PT/OT-pending  ASA 81 mg daily  Statin: Lipitor 80 mg daily  Lipid panel: Cholesterol 211; triglycerides 132; HDL 65;   Recent TSH 0.743  recent Hgb A1C 8.9

## 2024-05-09 ENCOUNTER — APPOINTMENT (INPATIENT)
Dept: NON INVASIVE DIAGNOSTICS | Facility: HOSPITAL | Age: 69
DRG: 190 | End: 2024-05-09
Payer: MEDICARE

## 2024-05-09 LAB
ANION GAP SERPL CALCULATED.3IONS-SCNC: 10 MMOL/L (ref 4–13)
AORTIC ROOT: 3.8 CM
APICAL FOUR CHAMBER EJECTION FRACTION: 60 %
BSA FOR ECHO PROCEDURE: 2.1 M2
BUN SERPL-MCNC: 23 MG/DL (ref 5–25)
CALCIUM SERPL-MCNC: 8.5 MG/DL (ref 8.4–10.2)
CHLORIDE SERPL-SCNC: 101 MMOL/L (ref 96–108)
CO2 SERPL-SCNC: 22 MMOL/L (ref 21–32)
CREAT SERPL-MCNC: 0.65 MG/DL (ref 0.6–1.3)
ERYTHROCYTE [DISTWIDTH] IN BLOOD BY AUTOMATED COUNT: 18.9 % (ref 11.6–15.1)
FRACTIONAL SHORTENING: 33 (ref 28–44)
GFR SERPL CREATININE-BSD FRML MDRD: 98 ML/MIN/1.73SQ M
GLUCOSE SERPL-MCNC: 164 MG/DL (ref 65–140)
GLUCOSE SERPL-MCNC: 169 MG/DL (ref 65–140)
GLUCOSE SERPL-MCNC: 171 MG/DL (ref 65–140)
GLUCOSE SERPL-MCNC: 175 MG/DL (ref 65–140)
GLUCOSE SERPL-MCNC: 201 MG/DL (ref 65–140)
HCT VFR BLD AUTO: 37.7 % (ref 36.5–49.3)
HGB BLD-MCNC: 11.9 G/DL (ref 12–17)
INTERVENTRICULAR SEPTUM IN DIASTOLE (PARASTERNAL SHORT AXIS VIEW): 1.4 CM
INTERVENTRICULAR SEPTUM: 1.4 CM (ref 0.6–1.1)
LEFT ATRIUM SIZE: 5 CM
LEFT INTERNAL DIMENSION IN SYSTOLE: 3.2 CM (ref 2.1–4)
LEFT VENTRICULAR INTERNAL DIMENSION IN DIASTOLE: 4.8 CM (ref 3.5–6)
LEFT VENTRICULAR POSTERIOR WALL IN END DIASTOLE: 1.3 CM
LEFT VENTRICULAR STROKE VOLUME: 64 ML
LVSV (TEICH): 64 ML
MCH RBC QN AUTO: 25.2 PG (ref 26.8–34.3)
MCHC RBC AUTO-ENTMCNC: 31.6 G/DL (ref 31.4–37.4)
MCV RBC AUTO: 80 FL (ref 82–98)
PLATELET # BLD AUTO: 406 THOUSANDS/UL (ref 149–390)
PMV BLD AUTO: 10 FL (ref 8.9–12.7)
POTASSIUM SERPL-SCNC: 4.1 MMOL/L (ref 3.5–5.3)
RBC # BLD AUTO: 4.72 MILLION/UL (ref 3.88–5.62)
SL CV LV EF: 55
SL CV PED ECHO LEFT VENTRICLE DIASTOLIC VOLUME (MOD BIPLANE) 2D: 106 ML
SL CV PED ECHO LEFT VENTRICLE SYSTOLIC VOLUME (MOD BIPLANE) 2D: 41 ML
SODIUM SERPL-SCNC: 133 MMOL/L (ref 135–147)
TR MAX PG: 22 MMHG
TR PEAK VELOCITY: 2.3 M/S
TRICUSPID ANNULAR PLANE SYSTOLIC EXCURSION: 1.6 CM
TRICUSPID VALVE PEAK REGURGITATION VELOCITY: 2.33 M/S
WBC # BLD AUTO: 8.98 THOUSAND/UL (ref 4.31–10.16)

## 2024-05-09 PROCEDURE — 85027 COMPLETE CBC AUTOMATED: CPT | Performed by: NURSE PRACTITIONER

## 2024-05-09 PROCEDURE — 94640 AIRWAY INHALATION TREATMENT: CPT

## 2024-05-09 PROCEDURE — C8924 2D TTE W OR W/O FOL W/CON,FU: HCPCS

## 2024-05-09 PROCEDURE — 92522 EVALUATE SPEECH PRODUCTION: CPT

## 2024-05-09 PROCEDURE — 93321 DOPPLER ECHO F-UP/LMTD STD: CPT | Performed by: INTERNAL MEDICINE

## 2024-05-09 PROCEDURE — 93321 DOPPLER ECHO F-UP/LMTD STD: CPT

## 2024-05-09 PROCEDURE — 93325 DOPPLER ECHO COLOR FLOW MAPG: CPT

## 2024-05-09 PROCEDURE — 94760 N-INVAS EAR/PLS OXIMETRY 1: CPT

## 2024-05-09 PROCEDURE — 93308 TTE F-UP OR LMTD: CPT | Performed by: INTERNAL MEDICINE

## 2024-05-09 PROCEDURE — 82948 REAGENT STRIP/BLOOD GLUCOSE: CPT

## 2024-05-09 PROCEDURE — 94664 DEMO&/EVAL PT USE INHALER: CPT

## 2024-05-09 PROCEDURE — 99232 SBSQ HOSP IP/OBS MODERATE 35: CPT | Performed by: FAMILY MEDICINE

## 2024-05-09 PROCEDURE — 99232 SBSQ HOSP IP/OBS MODERATE 35: CPT | Performed by: STUDENT IN AN ORGANIZED HEALTH CARE EDUCATION/TRAINING PROGRAM

## 2024-05-09 PROCEDURE — 93325 DOPPLER ECHO COLOR FLOW MAPG: CPT | Performed by: INTERNAL MEDICINE

## 2024-05-09 PROCEDURE — 80048 BASIC METABOLIC PNL TOTAL CA: CPT | Performed by: NURSE PRACTITIONER

## 2024-05-09 RX ORDER — PREDNISONE 20 MG/1
40 TABLET ORAL DAILY
Status: COMPLETED | OUTPATIENT
Start: 2024-05-10 | End: 2024-05-11

## 2024-05-09 RX ADMIN — PANTOPRAZOLE SODIUM 40 MG: 40 TABLET, DELAYED RELEASE ORAL at 05:16

## 2024-05-09 RX ADMIN — FERROUS SULFATE TAB 325 MG (65 MG ELEMENTAL FE) 325 MG: 325 (65 FE) TAB at 08:28

## 2024-05-09 RX ADMIN — THIAMINE HCL TAB 100 MG 100 MG: 100 TAB at 08:28

## 2024-05-09 RX ADMIN — INSULIN LISPRO 1 UNITS: 100 INJECTION, SOLUTION INTRAVENOUS; SUBCUTANEOUS at 12:10

## 2024-05-09 RX ADMIN — GUAIFENESIN 600 MG: 600 TABLET, EXTENDED RELEASE ORAL at 17:14

## 2024-05-09 RX ADMIN — FLUTICASONE PROPIONATE 1 SPRAY: 50 SPRAY, METERED NASAL at 08:27

## 2024-05-09 RX ADMIN — BUSPIRONE HYDROCHLORIDE 10 MG: 10 TABLET ORAL at 17:14

## 2024-05-09 RX ADMIN — ACETAMINOPHEN 650 MG: 325 TABLET, FILM COATED ORAL at 14:42

## 2024-05-09 RX ADMIN — GUAIFENESIN 600 MG: 600 TABLET, EXTENDED RELEASE ORAL at 08:28

## 2024-05-09 RX ADMIN — INSULIN LISPRO 1 UNITS: 100 INJECTION, SOLUTION INTRAVENOUS; SUBCUTANEOUS at 08:27

## 2024-05-09 RX ADMIN — BUSPIRONE HYDROCHLORIDE 10 MG: 10 TABLET ORAL at 08:28

## 2024-05-09 RX ADMIN — ATORVASTATIN CALCIUM 80 MG: 80 TABLET, FILM COATED ORAL at 17:14

## 2024-05-09 RX ADMIN — ASPIRIN 81 MG CHEWABLE TABLET 81 MG: 81 TABLET CHEWABLE at 08:27

## 2024-05-09 RX ADMIN — ALBUTEROL SULFATE 2.5 MG: 2.5 SOLUTION RESPIRATORY (INHALATION) at 11:56

## 2024-05-09 RX ADMIN — APIXABAN 5 MG: 5 TABLET, FILM COATED ORAL at 17:14

## 2024-05-09 RX ADMIN — Medication 1 TABLET: at 08:28

## 2024-05-09 RX ADMIN — APIXABAN 5 MG: 5 TABLET, FILM COATED ORAL at 08:27

## 2024-05-09 RX ADMIN — FOLIC ACID 1 MG: 1 TABLET ORAL at 08:28

## 2024-05-09 RX ADMIN — PERFLUTREN 0.4 ML/MIN: 6.52 INJECTION, SUSPENSION INTRAVENOUS at 14:00

## 2024-05-09 RX ADMIN — BUSPIRONE HYDROCHLORIDE 10 MG: 10 TABLET ORAL at 21:59

## 2024-05-09 RX ADMIN — INSULIN GLARGINE 20 UNITS: 100 INJECTION, SOLUTION SUBCUTANEOUS at 21:59

## 2024-05-09 RX ADMIN — AZITHROMYCIN DIHYDRATE 500 MG: 250 TABLET ORAL at 19:32

## 2024-05-09 RX ADMIN — FLUTICASONE PROPIONATE 1 SPRAY: 50 SPRAY, METERED NASAL at 17:15

## 2024-05-09 RX ADMIN — INSULIN LISPRO 1 UNITS: 100 INJECTION, SOLUTION INTRAVENOUS; SUBCUTANEOUS at 17:15

## 2024-05-09 NOTE — ASSESSMENT & PLAN NOTE
CXR 5/8- Chronic appearing blunting of the left costophrenic angle is stable.  Sputum culture- not sent, pending   Oxygen via NC, goal 88-92%- currently 98% on room air- pt is on 2LNC at HS chronically at home   C/w nebs: Atrovent/Xopenex  Steroids: solumedrol wean to Prednisone 40 mg PO from Solumedrol 40 IV BID  Antibiotics: azithromycin 500mg daily x3 days   Respiratory protocol

## 2024-05-09 NOTE — ASSESSMENT & PLAN NOTE
5/7: Presented with COPD exacerbation and daughter reported the patient has been having increased weakness, dysarthria, and leaning to the left side for the past 5 days. He fell 2 days ago, denied head trauma. He has a history of atrial flutter, he was started on Eliquis in April, patient was not taking it, daughter is unaware he was supposed to take Eliquis.   Stroke pathway  CTA head and neck 5/7- Chronic left PCA territory infarct, new since the prior exam. No evidence of acute vascular territorial infarction, intracranial hemorrhage or mass.   2. No hemodynamically significant stenosis, dissection or occlusion of the carotid or vertebral arteries or major vessels of the [place].   Neuro consult pending   Check MRI given CTA findings   ECHO recently obtained outpatient for 9/24- Left ventricular wall thickness is mildly increased. There is mild concentric hypertrophy, EF 60%.  Left and right atrium dilated.  Aortic valve sclerosis and trace tricuspid valve regurg. The aortic root is mildly dilated. The ascending aorta is mildly dilated.   Stroke pathway  DVT prophylaxis: Apixaban  PT/OT-pending  ASA 81 mg daily  Statin: Lipitor 80 mg daily

## 2024-05-09 NOTE — PLAN OF CARE
Problem: PAIN - ADULT  Goal: Verbalizes/displays adequate comfort level or baseline comfort level  Description: Interventions:  - Encourage patient to monitor pain and request assistance  - Assess pain using appropriate pain scale  - Administer analgesics based on type and severity of pain and evaluate response  - Implement non-pharmacological measures as appropriate and evaluate response  - Consider cultural and social influences on pain and pain management  - Notify physician/advanced practitioner if interventions unsuccessful or patient reports new pain  Outcome: Progressing     Problem: INFECTION - ADULT  Goal: Absence or prevention of progression during hospitalization  Description: INTERVENTIONS:  - Assess and monitor for signs and symptoms of infection  - Monitor lab/diagnostic results  - Monitor all insertion sites, i.e. indwelling lines, tubes, and drains  - Monitor endotracheal if appropriate and nasal secretions for changes in amount and color  - [place] appropriate cooling/warming therapies per order  - Administer medications as ordered  - Instruct and encourage patient and family to use good hand hygiene technique  - Identify and instruct in appropriate isolation precautions for identified infection/condition  Outcome: Progressing  Goal: Absence of fever/infection during neutropenic period  Description: INTERVENTIONS:  - Monitor WBC    Outcome: Progressing     Problem: SAFETY ADULT  Goal: Patient will remain free of falls  Description: INTERVENTIONS:  - Educate patient/family on patient safety including physical limitations  - Instruct patient to call for assistance with activity   - Consult OT/PT to assist with strengthening/mobility   - Keep Call bell within reach  - Keep bed low and locked with side rails adjusted as appropriate  - Keep care items and personal belongings within reach  - Initiate and maintain comfort rounds  - Make Fall Risk Sign visible to staff  - Offer Toileting every 2 Hours,  in advance of need  - Initiate/Maintain bed alarm  - Obtain necessary fall risk management equipment  - Apply yellow socks and bracelet for high fall risk patients  - Consider moving patient to room near nurses station  Outcome: Progressing  Goal: Maintain or return to baseline ADL function  Description: INTERVENTIONS:  -  Assess patient's ability to carry out ADLs; assess patient's baseline for ADL function and identify physical deficits which impact ability to perform ADLs (bathing, care of mouth/teeth, toileting, grooming, dressing, etc.)  - Assess/evaluate cause of self-care deficits   - Assess range of motion  - Assess patient's mobility; develop plan if impaired  - Assess patient's need for assistive devices and provide as appropriate  - Encourage maximum independence but intervene and supervise when necessary  - Involve family in performance of ADLs  - Assess for home care needs following discharge   - Consider OT consult to assist with ADL evaluation and planning for discharge  - Provide patient education as appropriate  Outcome: Progressing  Goal: Maintains/Returns to pre admission functional level  Description: INTERVENTIONS:  - Perform AM-PAC 6 Click Basic Mobility/ Daily Activity assessment daily.  - Set and communicate daily mobility goal to care team and patient/family/caregiver.   - Collaborate with rehabilitation services on mobility goals if consulted  - Perform Range of Motion 4 times a day.  - Reposition patient every 2 hours.  - Dangle patient 4 times a day  - Stand patient 3 times a day  - Ambulate patient 3 times a day  - Out of bed to chair 3 times a day   - Out of bed for meals 3 times a day  - Out of bed for toileting  - Record patient progress and toleration of activity level   Outcome: Progressing     Problem: DISCHARGE PLANNING  Goal: Discharge to home or other facility with appropriate resources  Description: INTERVENTIONS:  - Identify barriers to discharge w/patient and caregiver  -  Arrange for needed discharge resources and transportation as appropriate  - Identify discharge learning needs (meds, wound care, etc.)  - Arrange for interpretive services to assist at discharge as needed  - Refer to Case Management Department for coordinating discharge planning if the patient needs post-hospital services based on physician/advanced practitioner order or complex needs related to functional status, cognitive ability, or social support system  Outcome: Progressing     Problem: Knowledge Deficit  Goal: Patient/family/caregiver demonstrates understanding of disease process, treatment plan, medications, and discharge instructions  Description: Complete learning assessment and assess knowledge base.  Interventions:  - Provide teaching at level of understanding  - Provide teaching via preferred learning methods  Outcome: Progressing     Problem: Prexisting or High Potential for Compromised Skin Integrity  Goal: Skin integrity is maintained or improved  Description: INTERVENTIONS:  - Identify patients at risk for skin breakdown  - Assess and monitor skin integrity  - Assess and monitor nutrition and hydration status  - Monitor labs   - Assess for incontinence   - Turn and reposition patient  - Assist with mobility/ambulation  - Relieve pressure over bony prominences  - Avoid friction and shearing  - Provide appropriate hygiene as needed including keeping skin clean and dry  - Evaluate need for skin moisturizer/barrier cream  - Collaborate with interdisciplinary team   - Patient/family teaching  - Consider wound care consult   Outcome: Progressing

## 2024-05-09 NOTE — ASSESSMENT & PLAN NOTE
He was admitted with atrial flutter with rapid ventricular response in April underwent ablation and loop recorder insertion 4/8/2024  At that time he was started on Eliquis 5 mg twice daily but daughter reports she is in charge of his medications and he is not taking this medication and that it wasn't called into the pharmacy   Restarted on Eliquis   Subjective:  Tomi is seen today 2 weeks status post right total knee arthroplasty.  The patient is doing well with no concerns or complaints and has been taking Tylenol ES as needed for pain. He has been having trouble sleeping due to pain, so he did try a hydrocodone at bedtime last night, and he states this did help him get more sleep. Tomi has been going to physical therapy which is going fairly well, but he states that extension has been a bit of a struggle.    Objective:  In general, Tomi is alert and oriented X 3, pleasant and in no acute distress.  The patient is ambulating with a walker.  Examination of the right knee reveals moderate swelling. Knee circumference on right is 48 cm compared to 46 cm on the left. He has general lower extremity edema bilaterally, which is chronic for him, and is a bit worse on the right at present. He is wearing compression stockings bilaterally. Incision is intact without erythema, drainage, or dehiscence.  Staples were removed today.  The right lower extremity is neurovascularly intact.  Calf is soft and nontender.  Range of motion of the right knee is 15 to 100 degrees.    Impression:  Two weeks status post right total knee arthroplasty.      Plan:  Staples were removed without difficulty or wound dehiscence.  Incision was redressed with Steri-Strips.  The surgical procedure and operative findings were discussed with Tomi.  The patient was instructed to continue physical therapy with home exercise program per protocol. His flexion is good, but he is still lacking with gaining full extension. We discussed elevating his right leg and pushing extension. I also prescribed a prednisone taper for him.  He is on long-term Eliquis and also uses compression stockings normally.  Tomi was instructed to schedule an appointment to see Dr. Goodman in 4 weeks for further follow-up.  I instructed the patient to contact our office with any questions or concerns prior to the next  appointment.    Poonam Valenzuela PA-C

## 2024-05-09 NOTE — PLAN OF CARE
Patient is alert/oriented with slurred speech, denies pain/SOB on room air, but has GARCIA.  Will continue to assess/monitor.      Problem: PAIN - ADULT  Goal: Verbalizes/displays adequate comfort level or baseline comfort level  Description: Interventions:  - Encourage patient to monitor pain and request assistance  - Assess pain using appropriate pain scale  - Administer analgesics based on type and severity of pain and evaluate response  - Implement non-pharmacological measures as appropriate and evaluate response  - Consider cultural and social influences on pain and pain management  - Notify physician/advanced practitioner if interventions unsuccessful or patient reports new pain  Outcome: Progressing     Problem: Prexisting or High Potential for Compromised Skin Integrity  Goal: Skin integrity is maintained or improved  Description: INTERVENTIONS:  - Identify patients at risk for skin breakdown  - Assess and monitor skin integrity  - Assess and monitor nutrition and hydration status  - Monitor labs   - Assess for incontinence   - Turn and reposition patient  - Assist with mobility/ambulation  - Relieve pressure over bony prominences  - Avoid friction and shearing  - Provide appropriate hygiene as needed including keeping skin clean and dry  - Evaluate need for skin moisturizer/barrier cream  - Collaborate with interdisciplinary team   - Patient/family teaching  - Consider wound care consult   Outcome: Progressing

## 2024-05-09 NOTE — PROGRESS NOTES
[facility]  Progress Note  Name: Lucho Talavera I  MRN: [id number]  Unit/Bed#: NW8 875-01 I Date of Admission: 5/7/2024   Date of Service: 5/9/2024 I Hospital Day: 2    Assessment/Plan   * Chronic obstructive pulmonary disease with acute exacerbation (HCC)  Assessment & Plan  CXR 5/8- Chronic appearing blunting of the left costophrenic angle is stable.  Sputum culture- not sent, pending   Oxygen via NC, goal 88-92%- currently 98% on room air- pt is on 2LNC at HS chronically at home   C/w nebs: Atrovent/Xopenex  Steroids: solumedrol wean to Prednisone 40 mg PO from Solumedrol 40 IV BID  Antibiotics: azithromycin 500mg daily x3 days   Respiratory protocol    Stroke-like symptom  Assessment & Plan  5/7: Presented with COPD exacerbation and daughter reported the patient has been having increased weakness, dysarthria, and leaning to the left side for the past 5 days. He fell 2 days ago, denied head trauma. He has a history of atrial flutter, he was started on Eliquis in April, patient was not taking it, daughter is unaware he was supposed to take Eliquis.   Stroke pathway  CTA head and neck 5/7- Chronic left PCA territory infarct, new since the prior exam. No evidence of acute vascular territorial infarction, intracranial hemorrhage or mass.   2. No hemodynamically significant stenosis, dissection or occlusion of the carotid or vertebral arteries or major vessels of the [place].   Neuro consult pending   Check MRI given CTA findings   ECHO recently obtained outpatient for 9/24- Left ventricular wall thickness is mildly increased. There is mild concentric hypertrophy, EF 60%.  Left and right atrium dilated.  Aortic valve sclerosis and trace tricuspid valve regurg. The aortic root is mildly dilated. The ascending aorta is mildly dilated.   Stroke pathway  DVT prophylaxis: Apixaban  PT/OT-pending  ASA 81 mg daily  Statin: Lipitor 80 mg daily    Stroke (cerebrum)  (HCC)  Assessment & Plan  Chronic, noted on CTA head and neck   Neuro consult appreciated   See above plan for stroke like symptoms above     Atrial fibrillation (HCC)  Assessment & Plan  He was admitted with atrial flutter with rapid ventricular response in April underwent ablation and loop recorder insertion 4/8/2024  At that time he was started on Eliquis 5 mg twice daily but daughter reports she is in charge of his medications and he is not taking this medication and that it wasn't called into the pharmacy   Restarted on Eliquis    Iron deficiency anemia secondary to inadequate dietary iron intake  Assessment & Plan  Check updated iron panels  Continue with p.o. iron, folic acid  Transfuse for hemoglobin less than 7  Hemoglobin currently stable at 12.6    Hypertension  Assessment & Plan  BP at times low / at goal  Hold lisinopril     Chronic respiratory failure (HCC)  Assessment & Plan  Reviewing notes chronically on 2 LNC at HS   Currently at baseline  Check home o2 eval prior to dc     Type 2 diabetes mellitus (HCC)  Assessment & Plan  Lab Results   Component Value Date    HGBA1C 8.9 (H) 04/06/2024       Recent Labs     05/08/24  1049 05/08/24  1733 05/08/24  2037 05/09/24  0808   POCGLU 260* 167* 181* 175*         Blood Sugar Average: Last 72 hrs:  (P) [id number]  Continue with Lantus 20 units daily at bedtime monitor for steroid-induced hyperglycemia  Continue with SSI coverage  Hypoglycemic protocol      Alcohol abuse  Assessment & Plan  C/w CIWA protocol monitoring     C/w Thiamine, MVI and folic acid               VTE Pharmacologic Prophylaxis: VTE Score: 4 Moderate Risk (Score 3-4) - Pharmacological DVT Prophylaxis Ordered: apixaban (Eliquis).    Mobility:   Basic Mobility Inpatient Raw Score: 13  -HLM Goal: 4: Move to chair/commode  JH-HLM Achieved: 3: Sit at edge of bed  JH-HLM Goal NOT achieved. Continue with multidisciplinary rounding and encourage appropriate mobility to improve upon  [facility] goals.    Patient Centered Rounds: I performed bedside rounds with nursing staff today.   Discussions with Specialists or Other Care Team Provider: None    Education and Discussions with Family / Patient:  Attempted to call daughter. .     Total Time Spent on Date of Encounter in care of patient: 50 mins. This time was spent on one or more of the following: performing physical exam; counseling and coordination of care; obtaining or reviewing history; documenting in the medical record; reviewing/ordering tests, medications or procedures; communicating with other healthcare professionals and discussing with patient's family/caregivers.    Current Length of Stay: 2 day(s)  Current Patient Status: Inpatient   Certification Statement: The patient will continue to require additional inpatient hospital stay due to Stroke work up/ COPD exacerbation   Discharge Plan: Anticipate discharge in 24-48 hrs to Pending Pt / OP Evaluation     Code Status: Level 1 - Full Code    Subjective:   This is a very pleasant 69 y.o. male who was seen today at bedside. Patient has no new complaints. Patient is not in any acute distress.       Objective:     Vitals:   Temp (24hrs), Av.2 °F (36.8 °C), Min:97.8 °F (36.6 °C), Max:98.5 °F (36.9 °C)    Temp:  [97.8 °F (36.6 °C)-98.5 °F (36.9 °C)] 98.5 °F (36.9 °C)  HR:  [68-98] 96  Resp:  [14-19] 14  BP: ()/(46-93) 147/93  SpO2:  [95 %-98 %] 97 %  There is no height or weight on file to calculate BMI.     Input and Output Summary (last 24 hours):     Intake/Output Summary (Last 24 hours) at 2024 1328  Last data filed at 2024 1046  Gross per 24 hour   Intake 720 ml   Output 1700 ml   Net -980 ml       Physical Exam:   Physical Exam  Vitals reviewed.   HENT:      Head: Normocephalic.      Nose: No congestion.      Mouth/Throat:      Pharynx: No oropharyngeal exudate or posterior oropharyngeal erythema.   Eyes:      Conjunctiva/sclera: Conjunctivae normal.   Cardiovascular:       Rate and Rhythm: Normal rate and regular rhythm.      Heart sounds: Murmur heard.   Pulmonary:      Effort: Pulmonary effort is normal. No respiratory distress.      Breath sounds: Wheezing present.   Abdominal:      General: Abdomen is flat.      Palpations: Abdomen is soft.   Skin:     General: Skin is warm and dry.   Neurological:      Mental Status: He is alert and oriented to person, place, and time. Mental status is at baseline.          Additional Data:     Labs:  Results from last 7 days   Lab Units 05/09/24  0547 05/08/24  0513 05/07/24  1619   WBC Thousand/uL 8.98   < > 12.68*   HEMOGLOBIN g/dL 11.9*   < > 12.5   HEMATOCRIT % 37.7   < > 39.3   PLATELETS Thousands/uL 406*   < > 446*   SEGS PCT %  --   --  66   LYMPHO PCT %  --   --  24   MONO PCT %  --   --  9   EOS PCT %  --   --  0    < > = values in this interval not displayed.     Results from last 7 days   Lab Units 05/09/24  0547 05/08/24  0513 05/07/24  1619   SODIUM mmol/L 133*   < > 133*   POTASSIUM mmol/L 4.1   < > 3.5   CHLORIDE mmol/L 101   < > 99   CO2 mmol/L 22   < > 24   BUN mg/dL 23   < > 20   CREATININE mg/dL 0.65   < > 0.58*   ANION GAP mmol/L 10   < > 10   CALCIUM mg/dL 8.5   < > 9.0   ALBUMIN g/dL  --   --  3.7   TOTAL BILIRUBIN mg/dL  --   --  0.60   ALK PHOS U/L  --   --  52   ALT U/L  --   --  11   AST U/L  --   --  14   GLUCOSE RANDOM mg/dL 201*   < > 83    < > = values in this interval not displayed.         Results from last 7 days   Lab Units 05/09/24  1204 05/09/24  0808 05/08/24  2037 05/08/24  1733 05/08/24  1049 05/08/24  0714 05/07/24 2218 05/07/24 2025   POC GLUCOSE mg/dl 169* 175* 181* 167* 260* 196* 199* 163*               Lines/Drains:  Invasive Devices       Peripheral Intravenous Line  Duration             Peripheral IV 05/07/24 Proximal;Right;Ventral (anterior) Forearm 1 day                          Imaging: Reviewed radiology reports from this admission including: MRI brain    Recent Cultures (last 7 days):          Last 24 Hours Medication List:   Current Facility-Administered Medications   Medication Dose Route Frequency Provider Last Rate    acetaminophen  650 mg Oral Q6H PRN Rosana Herrera PA-C      albuterol  2.5 mg Nebulization Q6H PRN Danielle Ellis MD      apixaban  5 mg Oral BID Danielle Ellis MD      aspirin  81 mg Oral Daily Danielle Ellis MD      atorvastatin  80 mg Oral Daily With Dinner Danielle Ellis MD      azithromycin  500 mg Oral Q24H Danielle Ellis MD      busPIRone  10 mg Oral TID Danielle Ellis MD      ferrous sulfate  325 mg Oral Daily With Breakfast Danielle Ellis MD      fluticasone  1 spray Nasal BID Danielle Ellis MD      folic acid  1 mg Oral Daily Danielle Ellis MD      guaiFENesin  600 mg Oral BID Danielle Ellis MD      insulin glargine  20 Units Subcutaneous HS Danielle Ellis MD      insulin lispro  1-6 Units Subcutaneous TID AC Danielle Ellis MD      LORazepam  0.5 mg Oral BID PRN Danielle Ellis MD      melatonin  3 mg Oral HS PRN Danielle Ellis MD      multivitamin-minerals  1 tablet Oral Daily TATIANA Dangelo      oxyCODONE  2.5 mg Oral Q4H PRN Rosana Herrera PA-C      pantoprazole  40 mg Oral Early Morning Danielle Ellis MD      [START ON 5/10/2024] predniSONE  40 mg Oral Daily Papa Reed MD      thiamine  100 mg Oral Daily TATIANA Dangelo          Today, Patient Was Seen By: Papa Reed MD    **Please Note: This note may have been constructed using a voice recognition system.**

## 2024-05-09 NOTE — SPEECH THERAPY NOTE
SLP Motor Speech Evaluation      Patient Name: Lucho Talavera    Today's Date: 5/9/2024     Problem List  Principal Problem:    Chronic obstructive pulmonary disease with acute exacerbation (HCC)  Active Problems:    Alcohol abuse    Type 2 diabetes mellitus (HCC)    Chronic respiratory failure (HCC)    Hypertension    Iron deficiency anemia secondary to inadequate dietary iron intake    Atrial fibrillation (HCC)    Stroke (cerebrum) (HCC)    Stroke-like symptom      Past Medical History  Past Medical History:   Diagnosis Date    Cardiac arrest (HCC)     COPD (chronic obstructive pulmonary disease) (HCC)     CVA (cerebral vascular accident) (HCC)     Diabetes mellitus (HCC)     Heart attack (HCC)     Hypertension     Stroke (HCC)        Past Surgical History  Past Surgical History:   Procedure Laterality Date    CARDIAC ELECTROPHYSIOLOGY PROCEDURE N/A 4/8/2024    Procedure: Cardiac eps/aflutter ablation;  Surgeon: Ihsan Blum DO;  Location: [facility];  Service: Cardiology    CERVICAL FUSION N/A 9/10/2018    Procedure: Posterior cervical decompressive laminectomy C3-6; Posterior cervical lateral mass and pedicle fixation fusion C1-T1;  Surgeon: Papa Quiros MD;  Location: [facility]IN [facility];  Service: Neurosurgery         Impressions:  The patient presents with a mild dysarthria characterized by decreased articulatory precision and min decreased intelligibility during conversation. Language skills appear adequate.     Speech Therapy recommended:  yes,  1-2 f/u sessions while hospitalized    Patient's goal:  None stated     LONG TERM GOALS:  -The patient will independently utilize compensatory strategies to maximize communication in all ADLs.    SHORT TERM GOALS:  Articulation  -Patient will demonstrate adequate lingual strength, ROM and control for  fricatives and affricates     HISTORY AND PHYSICAL:     Subjective:   Patient lying in bed reports that he is feeling better today.  Reports that his breathing  is better.  Reports that his speech is better as well.  Patient notes that he was having difficulty with his speech and also experiencing weakness for the past several days prior to admission.  He does report that he had a fall but denied any head trauma reports that he fell to his buttocks and that he does not have any pain.  Patient denies all other complaints and is asking when his daughters can come to pick him up so he can go home.  Patient reports that he has been compliant with his medications at home and taking his Eliquis.  When discussing with daughter over the phone she reports that this medication has never been picked up and was not called into the pharmacy and that the patient has not been taking this at home.  She reports that her father has been living with her for the past 20 years and that in 2018 he had an MI while he was at an alcohol rehab facility which he developed a stroke from during his resuscitation because he was considered clinically dead for 4-1/2 minutes before he was resuscitated.  The daughter reports that she recently quit her job so that she could start taking care of her father more on a full-time basis given his increasing decline that has been noted.  Daughter reports that she was unaware that he was started on this anticoagulation and why he would need to take it and that it was not called to the pharmacy.  Daughter reports that he occasionally has 1-2 beers a day but that is monitored and does not exceed this amount.    Speech and Swallowing Mechanism Exam   Facial: symmetrical  Labial: WFL  Lingual: WFL  Velum: unable to visualize  Mandible: adequate ROM  Dentition: adequate  Respiratory Support: on RA    Respiration and Phonation  Sustains phonation across words, phrases, sentences and in normal conversational speech  Vocal Quality:  clear/adequate     Articulation:  Decreased precision across words, phrases and during conversation. Patient seemed to experience more  difficulty with /s/ and /sh/ in all word positions     Diadochokinesis: n/a    Resonation: Normal nasality    S/S Oral apraxia:  None    S/S Verbal Apraxia:  None      INTELLIGIBILITY at the WORD LEVEL:  min decreased      INTELLIGIBILITY at the PHRASE LEVEL:  min decreased    INTELLIGIBILITY at the SENTENCE LEVEL:  min decreased    INTELLIGIBILITY in CONVERSATIONAL SPEECH:  min decreased      Conversation:  Imprecise articulation

## 2024-05-09 NOTE — ASSESSMENT & PLAN NOTE
Lab Results   Component Value Date    HGBA1C 8.9 (H) 04/06/2024       Recent Labs     05/08/24  1049 05/08/24  1733 05/08/24 2037 05/09/24  0808   POCGLU 260* 167* 181* 175*         Blood Sugar Average: Last 72 hrs:  (P) [id number]  Continue with Lantus 20 units daily at bedtime monitor for steroid-induced hyperglycemia  Continue with SSI coverage  Hypoglycemic protocol

## 2024-05-09 NOTE — PROGRESS NOTES
Progress Note - Neurology   Lucho Talavera 69 y.o. male [id number]  Unit/Bed#: NW8 875/NW8 875-01    Assessment:    Stroke (cerebrum) (HCC)  Assessment & Plan  68 y/o male with prior strokes, COPD, DM, HTN, Aflutter s/p ablation in 04/2024, alcohol abuse, who presented on 5/7 with shortness of breath, concerning for COPD exacerbation. Family reported patient having increased weakness, dysarthria, and leaning to the L side with fall 2 days PTA. Patient was supposed to be on Eliquis but was not taking it at home as family was unaware that he was supposed to be on it and prescription was reportedly never called in to pharmacy. BP on presentation 125/59. Exam with some dysarthria but no focal deficits noted. Imaging with multiple small acute-early subacute infarcts. Suspect most likely cardioembolic in the setting of Aflutter/Afib not on AC.    Workup:  - CTA head and neck:  1. Chronic left PCA territory infarct, new since the prior exam. No evidence of acute vascular territorial infarction, intracranial hemorrhage or mass.  2. No hemodynamically significant stenosis, dissection or occlusion of the carotid or vertebral arteries or major vessels of the [place].  - MRI brain: Multiple infarcts of varying ages in the posterior circulation include multiple small acute/early subacute infarcts in the bilateral cerebellum. Additional small foci of recent ischemia in the left PCA distribution superimposed on chronic infarct. No acute hemorrhage or mass effect  - Labs: WBC on presentation 12.68, COVID/flu/RSV negative, 4/6/24 A1C 8.9,   - 4/9/24 Echo: EF 60%. No regional wall motion abnormality. Bilateral atrium dilated.    Plan:  - Stroke pathway  Echo  Aspirin 81 mg   Atorvastatin 40 mg  Goal normotension; avoid hypotension  Continue telemetry  PT/OT/ST  Frequent neuro checks. Continue to monitor and notify neurology with any changes.   - Continue home Eliquis 5 mg BID  - Medical management and supportive care  per primary team. Correction of any metabolic or infectious disturbances.     Atrial fibrillation (HCC)  Assessment & Plan  - S/p ablation in 04/2024  - Was supposed to be on Eliquis but daughter reporting she was unaware that patient was on this and it was not called into pharmacy  - Telemetry  - Medical management per primary team    Alcohol abuse  Assessment & Plan  - CIWA protcol  - Recommend thiamine supplementation  - B12 753, folate >22.3  - Medical management per primary team       Lucho Talavera will need follow up in in 6 weeks with neurovascular attending/AP .  He will not require outpatient neurological testing.     Will review case and treatment plan with attending neurologist, Dr. Patiño. Please see attending attestation for any further recommendations.    Subjective:   Patient resting in bed. He denies any new complaints at this time. He reports his speech is improved from yesterday but not quite back to baseline. He denies any RUE weakness today.        Past Medical History:   Diagnosis Date    Cardiac arrest (HCC)     COPD (chronic obstructive pulmonary disease) (HCC)     CVA (cerebral vascular accident) (HCC)     Diabetes mellitus (HCC)     Heart attack (HCC)     Hypertension     Stroke (HCC)      Past Surgical History:   Procedure Laterality Date    CARDIAC ELECTROPHYSIOLOGY PROCEDURE N/A 4/8/2024    Procedure: Cardiac eps/aflutter ablation;  Surgeon: Ihsan Blum DO;  Location: [facility];  Service: Cardiology    CERVICAL FUSION N/A 9/10/2018    Procedure: Posterior cervical decompressive laminectomy C3-6; Posterior cervical lateral mass and pedicle fixation fusion C1-T1;  Surgeon: Papa Quiros MD;  Location: [facility];  Service: Neurosurgery     Family History   Problem Relation Age of Onset    Heart attack Mother      Social History     Socioeconomic History    Marital status: Single     Spouse name: None    Number of children: None    Years of education: None    Highest  education level: None   Occupational History    None   Tobacco Use    Smoking status: Former     Types: Cigarettes    Smokeless tobacco: Never    Tobacco comments:     quit 5 months ago    Vaping Use    Vaping status: Never Used   Substance and Sexual Activity    Alcohol use: Not Currently     Alcohol/week: 8.0 - 12.0 standard drinks of alcohol     Types: 8 - 12 Cans of beer per week     Comment: every day drinker    Drug use: Never    Sexual activity: Not Currently   Other Topics Concern    None   Social History Narrative    ** Merged History Encounter **         ** Merged History Encounter **         ** Merged History Encounter **          Social Determinants of Health     Financial Resource Strain: Low Risk  (2/1/2024)    Received from [facility]    Overall Financial Resource Strain (CARDIA)     Difficulty of Paying Living Expenses: Not very hard   Food Insecurity: No Food Insecurity (5/8/2024)    Hunger Vital Sign     Worried About Running Out of Food in the Last Year: Never true     Ran Out of Food in the Last Year: Never true   Transportation Needs: No Transportation Needs (5/8/2024)    PRAPARE - Transportation     Lack of Transportation (Medical): No     Lack of Transportation (Non-Medical): No   Physical Activity: Inactive (4/7/2023)    Received from [facility]    Exercise Vital Sign     Days of Exercise per Week: 0 days     Minutes of Exercise per Session: 0 min   Stress: No Stress Concern Present (4/7/2023)    Received from [facility]    [employer] - Occupational Stress Questionnaire     Feeling of Stress : Only a little   Social Connections: Socially Isolated (4/7/2023)    Received from [facility]    Social Connection and Isolation Panel [NHANES]     Frequency of Communication with Friends and Family: Never     Frequency of Social Gatherings with Friends and Family: Never     Attends Restoration Services:  Never     Active Member of Clubs or Organizations: No     Attends Club or Organization Meetings: Never     Marital Status:    Intimate Partner Violence: Not At Risk (2/1/2024)    Received from [facility]    Humiliation, Afraid, Rape, and Kick questionnaire     Fear of Current or Ex-Partner: No     Emotionally Abused: No     Physically Abused: No     Sexually Abused: No   Housing Stability: Low Risk  (5/8/2024)    Housing Stability Vital Sign     Unable to Pay for Housing in the Last Year: No     Number of Places Lived in the Last Year: 1     Unstable Housing in the Last Year: No         Medications:  All current active meds have been reviewed and current meds:  Scheduled Meds:  Current Facility-Administered Medications   Medication Dose Route Frequency Provider Last Rate    acetaminophen  650 mg Oral Q6H PRN Rosana Herrera PA-C      albuterol  2.5 mg Nebulization Q6H PRN Danielle Ellis MD      apixaban  5 mg Oral BID Danielle Ellis MD      aspirin  81 mg Oral Daily Danielle Ellis MD      atorvastatin  80 mg Oral Daily With Dinner Danielle Ellis MD      azithromycin  500 mg Oral Q24H Danielle Ellis MD      busPIRone  10 mg Oral TID Danielle Ellis MD      ferrous sulfate  325 mg Oral Daily With Breakfast Danielle Ellis MD      fluticasone  1 spray Nasal BID Danielle Ellis MD      folic acid  1 mg Oral Daily Danielle Ellis MD      guaiFENesin  600 mg Oral BID Danielle Ellis MD      insulin glargine  20 Units Subcutaneous HS Danielle Ellis MD      insulin lispro  1-6 Units Subcutaneous TID AC Danielle Ellis MD      LORazepam  0.5 mg Oral BID PRN Danielle Ellis MD      melatonin  3 mg Oral HS PRN Danielle Ellis MD      multivitamin-minerals  1 tablet Oral Daily TATIANA Dangelo      oxyCODONE  2.5 mg Oral Q4H PRN Rosana Herrera PA-C      pantoprazole  40 mg Oral Early Morning Danielle Ellis MD      [START ON 5/10/2024]  predniSONE  40 mg Oral Daily Papa Reed MD      thiamine  100 mg Oral Daily TATIANA Dangelo       Continuous Infusions:   PRN Meds:.  acetaminophen    albuterol    LORazepam    melatonin    oxyCODONE       ROS:   A 12 system ROS was completed. Other than the above mentioned complaints in the HPI and those commented on below, all remaining systems were negative.      Vitals:   /89 (BP Location: Right arm)   Pulse 78   Temp 98.5 °F (36.9 °C) (Oral)   Resp 19   SpO2 97%       Physical Exam:   Physical Exam  Vitals and nursing note reviewed.   Constitutional:       General: He is not in acute distress.     Appearance: Normal appearance. He is not ill-appearing.   HENT:      Head: Normocephalic.      Mouth/Throat:      Mouth: Mucous membranes are moist.      Pharynx: Oropharynx is clear.   Eyes:      General: No scleral icterus.        Right eye: No discharge.         Left eye: No discharge.      Extraocular Movements: Extraocular movements intact and EOM normal.      Conjunctiva/sclera: Conjunctivae normal.   Cardiovascular:      Rate and Rhythm: Normal rate.   Pulmonary:      Effort: Pulmonary effort is normal. No respiratory distress.   Musculoskeletal:         General: Normal range of motion.   Skin:     General: Skin is warm and dry.      Coloration: Skin is not jaundiced or pale.   Neurological:      Mental Status: He is alert.      Coordination: Finger-Nose-Finger Test normal.   Psychiatric:         Mood and Affect: Mood normal.       Neurologic Exam     Mental Status   Level of consciousness: alert  States he is at [facility]. States it is May 2014. Able to follow commands appropriately. Mild dysarthria noted. No aphasia noted.     Cranial Nerves     CN II   Visual fields full to confrontation.     CN III, IV, VI   Extraocular motions are normal.     CN V   Facial sensation intact.     CN VII   Facial expression full, symmetric.     CN VIII   Hearing: intact    CN IX, X   Palate:  symmetric    CN XI   CN XI normal.     CN XII   CN XII normal.     Motor Exam   Muscle bulk: normal  Bilateral UE strength 5/5 deltoids, biceps, triceps, hand   Bilateral LE strength 5/5 hip flexion, dorsiflexion, plantar flexion     Sensory Exam   Light touch normal.     Gait, Coordination, and Reflexes     Coordination   Finger to nose coordination: normal    Tremor   Resting tremor: absent  Intention tremor: absent          Labs: I have personally reviewed pertinent reports.   Recent Results (from the past 24 hour(s))   Fingerstick Glucose (POCT)    Collection Time: 05/08/24 10:49 AM   Result Value Ref Range    POC Glucose 260 (H) 65 - 140 mg/dl   Fingerstick Glucose (POCT)    Collection Time: 05/08/24  5:33 PM   Result Value Ref Range    POC Glucose 167 (H) 65 - 140 mg/dl   Fingerstick Glucose (POCT)    Collection Time: 05/08/24  8:37 PM   Result Value Ref Range    POC Glucose 181 (H) 65 - 140 mg/dl   CBC    Collection Time: 05/09/24  5:47 AM   Result Value Ref Range    WBC 8.98 4.31 - 10.16 Thousand/uL    RBC 4.72 3.88 - 5.62 Million/uL    Hemoglobin 11.9 (L) 12.0 - 17.0 g/dL    Hematocrit 37.7 36.5 - 49.3 %    MCV 80 (L) 82 - 98 fL    MCH 25.2 (L) 26.8 - 34.3 pg    MCHC 31.6 31.4 - 37.4 g/dL    RDW 18.9 (H) 11.6 - 15.1 %    Platelets 406 (H) 149 - 390 Thousands/uL    MPV 10.0 8.9 - 12.7 fL   Basic metabolic panel    Collection Time: 05/09/24  5:47 AM   Result Value Ref Range    Sodium 133 (L) 135 - 147 mmol/L    Potassium 4.1 3.5 - 5.3 mmol/L    Chloride 101 96 - 108 mmol/L    CO2 22 21 - 32 mmol/L    ANION GAP 10 4 - 13 mmol/L    BUN 23 5 - 25 mg/dL    Creatinine 0.65 0.60 - 1.30 mg/dL    Glucose 201 (H) 65 - 140 mg/dL    Calcium 8.5 8.4 - 10.2 mg/dL    eGFR 98 ml/min/1.73sq m   Fingerstick Glucose (POCT)    Collection Time: 05/09/24  8:08 AM   Result Value Ref Range    POC Glucose 175 (H) 65 - 140 mg/dl       Imaging: I have personally reviewed pertinent imaging in PACS, and I have personally reviewed  PACS reports.     EKG, Pathology, and Other Studies: I have personally reviewed pertinent reports.       VTE Prophylaxis: Sequential compression device (Venodyne) and Eliquis

## 2024-05-10 LAB
ALBUMIN SERPL BCP-MCNC: 3.5 G/DL (ref 3.5–5)
ALP SERPL-CCNC: 47 U/L (ref 34–104)
ALT SERPL W P-5'-P-CCNC: 12 U/L (ref 7–52)
ANION GAP SERPL CALCULATED.3IONS-SCNC: 8 MMOL/L (ref 4–13)
AST SERPL W P-5'-P-CCNC: 15 U/L (ref 13–39)
ATRIAL RATE: 96 BPM
BASOPHILS # BLD AUTO: 0.02 THOUSANDS/ÂΜL (ref 0–0.1)
BASOPHILS NFR BLD AUTO: 0 % (ref 0–1)
BILIRUB SERPL-MCNC: 0.66 MG/DL (ref 0.2–1)
BUN SERPL-MCNC: 20 MG/DL (ref 5–25)
CALCIUM SERPL-MCNC: 8.2 MG/DL (ref 8.4–10.2)
CHLORIDE SERPL-SCNC: 102 MMOL/L (ref 96–108)
CO2 SERPL-SCNC: 24 MMOL/L (ref 21–32)
CREAT SERPL-MCNC: 0.56 MG/DL (ref 0.6–1.3)
EOSINOPHIL # BLD AUTO: 0.14 THOUSAND/ÂΜL (ref 0–0.61)
EOSINOPHIL NFR BLD AUTO: 2 % (ref 0–6)
ERYTHROCYTE [DISTWIDTH] IN BLOOD BY AUTOMATED COUNT: 18.4 % (ref 11.6–15.1)
GFR SERPL CREATININE-BSD FRML MDRD: 104 ML/MIN/1.73SQ M
GLUCOSE SERPL-MCNC: 127 MG/DL (ref 65–140)
GLUCOSE SERPL-MCNC: 155 MG/DL (ref 65–140)
GLUCOSE SERPL-MCNC: 202 MG/DL (ref 65–140)
GLUCOSE SERPL-MCNC: 230 MG/DL (ref 65–140)
GLUCOSE SERPL-MCNC: 285 MG/DL (ref 65–140)
HCT VFR BLD AUTO: 37.9 % (ref 36.5–49.3)
HGB BLD-MCNC: 11.9 G/DL (ref 12–17)
IMM GRANULOCYTES # BLD AUTO: 0.05 THOUSAND/UL (ref 0–0.2)
IMM GRANULOCYTES NFR BLD AUTO: 1 % (ref 0–2)
LYMPHOCYTES # BLD AUTO: 2.5 THOUSANDS/ÂΜL (ref 0.6–4.47)
LYMPHOCYTES NFR BLD AUTO: 32 % (ref 14–44)
MCH RBC QN AUTO: 25.3 PG (ref 26.8–34.3)
MCHC RBC AUTO-ENTMCNC: 31.4 G/DL (ref 31.4–37.4)
MCV RBC AUTO: 81 FL (ref 82–98)
MONOCYTES # BLD AUTO: 0.87 THOUSAND/ÂΜL (ref 0.17–1.22)
MONOCYTES NFR BLD AUTO: 11 % (ref 4–12)
NEUTROPHILS # BLD AUTO: 4.23 THOUSANDS/ÂΜL (ref 1.85–7.62)
NEUTS SEG NFR BLD AUTO: 54 % (ref 43–75)
NRBC BLD AUTO-RTO: 0 /100 WBCS
P AXIS: -63 DEGREES
PLATELET # BLD AUTO: 354 THOUSANDS/UL (ref 149–390)
PMV BLD AUTO: 10.1 FL (ref 8.9–12.7)
POTASSIUM SERPL-SCNC: 4 MMOL/L (ref 3.5–5.3)
PROT SERPL-MCNC: 6.2 G/DL (ref 6.4–8.4)
QRS AXIS: 84 DEGREES
QRSD INTERVAL: 108 MS
QT INTERVAL: 374 MS
QTC INTERVAL: 439 MS
RBC # BLD AUTO: 4.7 MILLION/UL (ref 3.88–5.62)
SODIUM SERPL-SCNC: 134 MMOL/L (ref 135–147)
T WAVE AXIS: 69 DEGREES
VENTRICULAR RATE: 83 BPM
WBC # BLD AUTO: 7.81 THOUSAND/UL (ref 4.31–10.16)

## 2024-05-10 PROCEDURE — 80053 COMPREHEN METABOLIC PANEL: CPT | Performed by: FAMILY MEDICINE

## 2024-05-10 PROCEDURE — 97167 OT EVAL HIGH COMPLEX 60 MIN: CPT

## 2024-05-10 PROCEDURE — 94760 N-INVAS EAR/PLS OXIMETRY 1: CPT

## 2024-05-10 PROCEDURE — 85025 COMPLETE CBC W/AUTO DIFF WBC: CPT | Performed by: FAMILY MEDICINE

## 2024-05-10 PROCEDURE — 97163 PT EVAL HIGH COMPLEX 45 MIN: CPT

## 2024-05-10 PROCEDURE — 99232 SBSQ HOSP IP/OBS MODERATE 35: CPT | Performed by: FAMILY MEDICINE

## 2024-05-10 PROCEDURE — 94640 AIRWAY INHALATION TREATMENT: CPT

## 2024-05-10 PROCEDURE — 82948 REAGENT STRIP/BLOOD GLUCOSE: CPT

## 2024-05-10 PROCEDURE — 94664 DEMO&/EVAL PT USE INHALER: CPT

## 2024-05-10 PROCEDURE — 93010 ELECTROCARDIOGRAM REPORT: CPT | Performed by: INTERNAL MEDICINE

## 2024-05-10 RX ADMIN — ALBUTEROL SULFATE 2.5 MG: 2.5 SOLUTION RESPIRATORY (INHALATION) at 08:00

## 2024-05-10 RX ADMIN — ASPIRIN 81 MG CHEWABLE TABLET 81 MG: 81 TABLET CHEWABLE at 08:30

## 2024-05-10 RX ADMIN — GUAIFENESIN 600 MG: 600 TABLET, EXTENDED RELEASE ORAL at 17:27

## 2024-05-10 RX ADMIN — ALBUTEROL SULFATE 2.5 MG: 2.5 SOLUTION RESPIRATORY (INHALATION) at 00:36

## 2024-05-10 RX ADMIN — APIXABAN 5 MG: 5 TABLET, FILM COATED ORAL at 08:30

## 2024-05-10 RX ADMIN — FOLIC ACID 1 MG: 1 TABLET ORAL at 08:30

## 2024-05-10 RX ADMIN — FERROUS SULFATE TAB 325 MG (65 MG ELEMENTAL FE) 325 MG: 325 (65 FE) TAB at 08:30

## 2024-05-10 RX ADMIN — BUSPIRONE HYDROCHLORIDE 10 MG: 10 TABLET ORAL at 08:30

## 2024-05-10 RX ADMIN — INSULIN GLARGINE 20 UNITS: 100 INJECTION, SOLUTION SUBCUTANEOUS at 21:50

## 2024-05-10 RX ADMIN — PANTOPRAZOLE SODIUM 40 MG: 40 TABLET, DELAYED RELEASE ORAL at 05:27

## 2024-05-10 RX ADMIN — THIAMINE HCL TAB 100 MG 100 MG: 100 TAB at 08:30

## 2024-05-10 RX ADMIN — BUSPIRONE HYDROCHLORIDE 10 MG: 10 TABLET ORAL at 17:27

## 2024-05-10 RX ADMIN — INSULIN LISPRO 1 UNITS: 100 INJECTION, SOLUTION INTRAVENOUS; SUBCUTANEOUS at 08:30

## 2024-05-10 RX ADMIN — GUAIFENESIN 600 MG: 600 TABLET, EXTENDED RELEASE ORAL at 08:30

## 2024-05-10 RX ADMIN — FLUTICASONE PROPIONATE 1 SPRAY: 50 SPRAY, METERED NASAL at 11:29

## 2024-05-10 RX ADMIN — PREDNISONE 40 MG: 20 TABLET ORAL at 08:30

## 2024-05-10 RX ADMIN — LORAZEPAM 0.5 MG: 0.5 TABLET ORAL at 17:56

## 2024-05-10 RX ADMIN — APIXABAN 5 MG: 5 TABLET, FILM COATED ORAL at 17:27

## 2024-05-10 RX ADMIN — INSULIN LISPRO 2 UNITS: 100 INJECTION, SOLUTION INTRAVENOUS; SUBCUTANEOUS at 11:29

## 2024-05-10 RX ADMIN — BUSPIRONE HYDROCHLORIDE 10 MG: 10 TABLET ORAL at 21:50

## 2024-05-10 RX ADMIN — ATORVASTATIN CALCIUM 80 MG: 80 TABLET, FILM COATED ORAL at 17:27

## 2024-05-10 RX ADMIN — INSULIN LISPRO 4 UNITS: 100 INJECTION, SOLUTION INTRAVENOUS; SUBCUTANEOUS at 17:27

## 2024-05-10 RX ADMIN — ALBUTEROL SULFATE 2.5 MG: 2.5 SOLUTION RESPIRATORY (INHALATION) at 23:48

## 2024-05-10 RX ADMIN — FLUTICASONE PROPIONATE 1 SPRAY: 50 SPRAY, METERED NASAL at 17:28

## 2024-05-10 RX ADMIN — Medication 1 TABLET: at 08:30

## 2024-05-10 NOTE — PLAN OF CARE
Problem: PAIN - ADULT  Goal: Verbalizes/displays adequate comfort level or baseline comfort level  Description: Interventions:  - Encourage patient to monitor pain and request assistance  - Assess pain using appropriate pain scale  - Administer analgesics based on type and severity of pain and evaluate response  - Implement non-pharmacological measures as appropriate and evaluate response  - Consider cultural and social influences on pain and pain management  - Notify physician/advanced practitioner if interventions unsuccessful or patient reports new pain  Outcome: Progressing     Problem: INFECTION - ADULT  Goal: Absence or prevention of progression during hospitalization  Description: INTERVENTIONS:  - Assess and monitor for signs and symptoms of infection  - Monitor lab/diagnostic results  - Monitor all insertion sites, i.e. indwelling lines, tubes, and drains  - Monitor endotracheal if appropriate and nasal secretions for changes in amount and color  - [place] appropriate cooling/warming therapies per order  - Administer medications as ordered  - Instruct and encourage patient and family to use good hand hygiene technique  - Identify and instruct in appropriate isolation precautions for identified infection/condition  Outcome: Progressing  Goal: Absence of fever/infection during neutropenic period  Description: INTERVENTIONS:  - Monitor WBC    Outcome: Progressing     Problem: SAFETY ADULT  Goal: Patient will remain free of falls  Description: INTERVENTIONS:  - Educate patient/family on patient safety including physical limitations  - Instruct patient to call for assistance with activity   - Consult OT/PT to assist with strengthening/mobility   - Keep Call bell within reach  - Keep bed low and locked with side rails adjusted as appropriate  - Keep care items and personal belongings within reach  - Initiate and maintain comfort rounds  - Make Fall Risk Sign visible to staff  - Offer Toileting every 2 Hours,  in advance of need  - Initiate/Maintain bed alarm  - Apply yellow socks and bracelet for high fall risk patients  - Consider moving patient to room near nurses station  Outcome: Progressing  Goal: Maintain or return to baseline ADL function  Description: INTERVENTIONS:  -  Assess patient's ability to carry out ADLs; assess patient's baseline for ADL function and identify physical deficits which impact ability to perform ADLs (bathing, care of mouth/teeth, toileting, grooming, dressing, etc.)  - Assess/evaluate cause of self-care deficits   - Assess range of motion  - Assess patient's mobility; develop plan if impaired  - Assess patient's need for assistive devices and provide as appropriate  - Encourage maximum independence but intervene and supervise when necessary  - Involve family in performance of ADLs  - Assess for home care needs following discharge   - Consider OT consult to assist with ADL evaluation and planning for discharge  - Provide patient education as appropriate  Outcome: Progressing  Goal: Maintains/Returns to pre admission functional level  Description: INTERVENTIONS:  - Perform AM-PAC 6 Click Basic Mobility/ Daily Activity assessment daily.  - Set and communicate daily mobility goal to care team and patient/family/caregiver.   - Collaborate with rehabilitation services on mobility goals if consulted  - Perform Range of Motion 4 times a day.  - Reposition patient every 2 hours.  - Dangle patient 4 times a day  - Stand patient 4 times a day  - Ambulate patient 4 times a day  - Out of bed to chair 4 times a day   - Out of bed for meals 4 times a day  - Out of bed for toileting  - Record patient progress and toleration of activity level   Outcome: Progressing     Problem: DISCHARGE PLANNING  Goal: Discharge to home or other facility with appropriate resources  Description: INTERVENTIONS:  - Identify barriers to discharge w/patient and caregiver  - Arrange for needed discharge resources and  transportation as appropriate  - Identify discharge learning needs (meds, wound care, etc.)  - Arrange for interpretive services to assist at discharge as needed  - Refer to Case Management Department for coordinating discharge planning if the patient needs post-hospital services based on physician/advanced practitioner order or complex needs related to functional status, cognitive ability, or social support system  Outcome: Progressing     Problem: Knowledge Deficit  Goal: Patient/family/caregiver demonstrates understanding of disease process, treatment plan, medications, and discharge instructions  Description: Complete learning assessment and assess knowledge base.  Interventions:  - Provide teaching at level of understanding  - Provide teaching via preferred learning methods  Outcome: Progressing     Problem: Prexisting or High Potential for Compromised Skin Integrity  Goal: Skin integrity is maintained or improved  Description: INTERVENTIONS:  - Identify patients at risk for skin breakdown  - Assess and monitor skin integrity  - Assess and monitor nutrition and hydration status  - Monitor labs   - Assess for incontinence   - Turn and reposition patient  - Assist with mobility/ambulation  - Relieve pressure over bony prominences  - Avoid friction and shearing  - Provide appropriate hygiene as needed including keeping skin clean and dry  - Evaluate need for skin moisturizer/barrier cream  - Collaborate with interdisciplinary team   - Patient/family teaching  - Consider wound care consult   Outcome: Progressing

## 2024-05-10 NOTE — PLAN OF CARE
"  Problem: PHYSICAL THERAPY ADULT  Goal: Performs mobility at highest level of function for planned discharge setting.  See evaluation for individualized goals.  Description: Treatment/Interventions: LE strengthening/ROM, Functional transfer training, Therapeutic exercise, Endurance training, Cognitive reorientation, Patient/family training, Equipment eval/education, Bed mobility, Gait training, Elevations, Spoke to nursing, Spoke to case management, OT, Family, Continued evaluation, Compensatory technique education          See flowsheet documentation for full assessment, interventions and recommendations.  Note: Prognosis: Good  Problem List: Impaired balance, Decreased endurance, Decreased mobility, Decreased coordination, Decreased cognition, Impaired judgement, Decreased strength, Decreased safety awareness, Impaired hearing, Impaired vision  Assessment: Pt is a 69 y.o. male admitted to [facility] on 5/7/2024 with respiratory distress, increased weakness, dysarthria, and leaning to L 5 days prior with fall 2 days prior. Pt received a primary medical dx of COPD with acute exacerbation. Also found to have \"Multiple infarcts of varying ages in the posterior circulation include multiple small acute/early subacute infarcts in the bilateral cerebellum. Additional small foci of recent ischemia in the left PCA distribution superimposed on chronic infarct\" per MRI brain. Pt has the following comorbidities which affect their treatment: active problems listed above,  has a past medical history of Cardiac arrest (HCC), COPD (chronic obstructive pulmonary disease) (HCC), CVA (cerebral vascular accident) (HCC), Diabetes mellitus (HCC), Heart attack (HCC), Hypertension, and Stroke (HCC).  , as well as personal factors including stairs to access home and living alone while daughter at work. Pt has a high complexity clinical presentation due to Ongoing medical management for primary dx, Increased reliance on more restrictive AD " compared to baseline, Decreased activity tolerance compared to baseline, Fall risk, Increased assistance needed from caregiver at current time, Ongoing telemetry monitoring, Cog status, Increased O2 via NC from pts baseline, Trending lab values, Continuous capnography monitoring, Diagnostic imaging pending , and PMH. PT was consulted to evaluate pt's functional mobility and discharge needs. Upon evaluation, patient required S for bed mobility, minAx2 for STS transfers, modAx2 for ambulation. Body system impairments include impaired functional strength, balance, endurance, cognition, vision, hearing. Pt's functional activity impairments include: activity tolerance and mobility. Participation restrictions include inability to safely return home or community. At conclusion of eval, pt remained seated in chair with chair alarm, phone, call bell, and all other personal needs within reach. Pt would benefit from skilled PT to address their functional mobility limitations. The patient's AM-PAC Basic Mobility Inpatient Short Form Raw Score is 13. A Raw score of less than or equal to 16 suggests the patient may benefit from discharge to post-acute rehabilitation services. Please also refer to the recommendation of the Physical Therapist for safe discharge planning.  Barriers to Discharge: Decreased caregiver support, Inaccessible home environment     Rehab Resource Intensity Level, PT: (S) I (Maximum Resource Intensity)    See flowsheet documentation for full assessment.

## 2024-05-10 NOTE — [facility] ADMISSION
Patients case reviewed, patient is pre-approved for [facility] pending medical stability, LOF at discharge, bed availability.  [facility] admissions will continue to follow patient for progression of care and discharge readiness.

## 2024-05-10 NOTE — ASSESSMENT & PLAN NOTE
CXR 5/8- Chronic appearing blunting of the left costophrenic angle is stable.  Sputum culture- not sent, pending   Oxygen via NC, goal 88-92%- currently 98% on room air- pt is on 2LNC at HS chronically at home   C/w nebs: Atrovent/Xopenex  Steroids: solumedrol wean to Prednisone 40 mg PO from Solumedrol 40 IV BID  Antibiotics: azithromycin 500mg daily x3 days   Respiratory protocol  Significantly improving

## 2024-05-10 NOTE — OCCUPATIONAL THERAPY NOTE
Occupational Therapy Evaluation     Patient Name: Lucho Talavera  Today's Date: 5/10/2024  Problem List  Principal Problem:    Chronic obstructive pulmonary disease with acute exacerbation (HCC)  Active Problems:    Alcohol abuse    Type 2 diabetes mellitus (HCC)    Chronic respiratory failure (HCC)    Hypertension    Iron deficiency anemia secondary to inadequate dietary iron intake    Atrial fibrillation (HCC)    Stroke (cerebrum) (HCC)    Stroke-like symptom    Past Medical History  Past Medical History:   Diagnosis Date    Cardiac arrest (HCC)     COPD (chronic obstructive pulmonary disease) (HCC)     CVA (cerebral vascular accident) (HCC)     Diabetes mellitus (HCC)     Heart attack (HCC)     Hypertension     Stroke (HCC)      Past Surgical History  Past Surgical History:   Procedure Laterality Date    CARDIAC ELECTROPHYSIOLOGY PROCEDURE N/A 4/8/2024    Procedure: Cardiac eps/aflutter ablation;  Surgeon: Ihsan Blum DO;  Location: [facility];  Service: Cardiology    CERVICAL FUSION N/A 9/10/2018    Procedure: Posterior cervical decompressive laminectomy C3-6; Posterior cervical lateral mass and pedicle fixation fusion C1-T1;  Surgeon: Papa Quiros MD;  Location: [facility];  Service: Neurosurgery      05/10/24 0825   OT Last Visit   OT Visit Date 05/10/24   Note Type   Note type Evaluation   Pain Assessment   Pain Assessment Tool 0-10   Pain Score No Pain   Restrictions/Precautions   Weight Bearing Precautions Per Order No   Other Precautions Cognitive;Telemetry;Multiple lines;Fall Risk;Hard of hearing;Visual impairment   Home Living   Type of Home House   Home Layout Two level   Home Equipment Walker   Prior Function   Level of [place] Independent with ADLs   Lives With Daughter;Family   Receives Help From Family   IADLs Family/Friend/Other provides medication management;Family/Friend/Other provides meals;Family/Friend/Other provides transportation   Falls in the last 6 months 1 to 4    Lifestyle   Autonomy I with ADL's/assistance with IADL's, +RW occassionally +02 at night, and 1X per day (pt reports) questionable historian (?)   Reciprocal Relationships daughter, grandchildren   Service to Others retired   Intrinsic Gratification read, pet dogs   General   Family/Caregiver Present No   ADL   Eating Assistance 5  Supervision/Setup   Grooming Assistance 5  Supervision/Setup   UB Bathing Assistance 4  Minimal Assistance   LB Bathing Assistance 3  Moderate Assistance   UB Dressing Assistance 4  Minimal Assistance   LB Dressing Assistance 3  Moderate Assistance   Toileting Assistance  4  Minimal Assistance   Bed Mobility   Supine to Sit 5  Supervision   Transfers   Sit to Stand 4  Minimal assistance   Additional items Assist x 2   Stand to Sit 4  Minimal assistance   Additional items Assist x 2   Additional Comments B/l HHA   Functional Mobility   Functional Mobility 3  Moderate assistance   Additional Comments mod a x 2 with short distance functional mobility to chair   Balance   Static Sitting Fair +   Dynamic Sitting Fair   Static Standing Poor   Dynamic Standing Poor -   Ambulatory Poor -   Activity Tolerance   Activity Tolerance Patient limited by fatigue   Medical Staff Made Aware PT balbir due to the patient's co-morbidities, clinically unstable presentation, and present impairments which are a regression from the patient's baseline.    Nurse Made Aware RN   Hand Function   Gross Motor Coordination Impaired  (left UE overshooting to right)   Fine Motor Coordination Impaired   Sensation   Light Touch No apparent deficits   Vision - Complex Assessment   Ocular Range of Motion Intact   Tracking Intact   Saccades Impaired  (+jerky to right)   Convergence (Slow convergence)   Acuity Able to read normal print without difficulty   Cognition   Overall Cognitive Status Impaired   Arousal/Participation Responsive;Arousable;Cooperative   Attention Attends with cues to redirect   Orientation Level  "Oriented X4  (except day of week stating \"tuesday\")   Memory Decreased recall of precautions;Decreased recall of recent events;Decreased short term memory   Following Commands Follows one step commands with increased time or repetition   Comments pt pleasant and motivated, [place] slow processing, impaired attention, questionable historian. Will continue to assess functional cognition with additional treatment sessions for safe discharge planning.   Assessment   Limitation Decreased ADL status;Decreased Safe judgement during ADL;Decreased cognition;Decreased endurance;Decreased fine motor control;Decreased self-care trans;Decreased high-level ADLs   Assessment Pt is a 69 y.o. male who was admitted to [facility] on 5/7/2024 with increased weakness, dysarthria, leaning to left for 5 days, +recent fall and now here with  Chronic obstructive pulmonary disease with acute exacerbation ([facility]) , stroke-like symptoms a-fib, cerebrum stroke, HTN, diabetes.. Patient  has a past medical history of Cardiac arrest ([facility]), COPD (chronic obstructive pulmonary disease) ([facility]), CVA (cerebral vascular accident) ([facility]), Diabetes mellitus ([facility]), Heart attack ([facility]), Hypertension, and Stroke ([facility]).  At baseline pt was completing I with ADL's/assistance with IaDL's. Pt lives with daughter, granddaughter, grandchildren. Currently pt requires min/mod a for overall ADLS and mod a x 2 with B/l HHA for functional mobility/transfers. Pt currently presents with impairments in the following categories -difficulty performing ADLS and difficulty performing IADLS  activity tolerance, endurance, standing balance/tolerance, and sitting balance/tolerance. These impairments, as well as pt's fatigue, decreased caregiver support, and risk for falls  limit pt's ability to safely engage in all baseline areas of occupation, includinggrooming, bathing, dressing, toileting, functional mobility/transfers, and community mobility From OT standpoint, recommend " max level I upon D/C. The patient's raw score on the AM-PAC Daily Activity Inpatient Short Form is 16. A raw score of less than 19 suggests the patient may benefit from discharge to post-acute rehabilitation services. Please refer to the recommendation of the Occupational Therapist for safe discharge planning. OT will continue to follow to address the below stated goals.   Goals   Patient Goals sit up in chair   LT Time Frame 10-14   Long Term Goal #1 see goals below   AM-PAC Daily Activity Inpatient   Lower Body Dressing 2   Bathing 2   Toileting 3   Upper Body Dressing 3   Grooming 3   Eating 3   Daily Activity Raw Score 16   Daily Activity Standardized Score (Calc for Raw Score >=11) 35.96   AM-PAC Applied Cognition Inpatient   Following a Speech/Presentation 2   Understanding Ordinary Conversation 3   Taking Medications 2   Remembering Where Things Are Placed or Put Away 2   Remembering List of 4-5 Errands 1   Taking Care of Complicated Tasks 1   Applied Cognition Raw Score 11   Applied Cognition Standardized Score 27.03   End of Consult   Patient Position at End of Consult All needs within reach;Bed/Chair alarm activated;Bedside chair   Nurse Communication Nurse aware of consult    Occupational Therapy Goals:    *Mod I with bed mobility to engage in functional tasks.  *Mod I Adl's after setup with use of AE PRN  *Mod I toileting and clothing management   *Mod I functional mobility and transfers to/from all surfaces with Fair + dynamic balance and safety for participation in dynamic adls and iadl tasks   *Demonstrate good carryover with safe use of RW during functional tasks   *Assess DME needs   *Increase activity tolerance to 25-30 minutes for participation in adls and enjoyable activities  *Pt to participate in further cognitive testing with good attention and participation to assist with safe d/c recommendations  *Mod I with Simulated IADL management task.  *Demonstrate good carryover of pt/family education  and training with good tolerance for increased safety and independence with ADL's/ADl's.  *Pt will improve standing balance to 4-5 minutes with functional tasks to increase I with toileting/transfers.  *Patient will demonstrate 100% carryover of energy conservation techniques t/o functional I/ADL/leisure tasks w/o cues s/p skilled education to increase endurance during functional tasks  *Pt will follow 100% simple 2-3 step verbal commands and be A/Ox4 consistently t/o use of external environmental cues w/ mod I  *Pt will participate in fine/gross motor coordination/strenthening/dexterity exercises to Good to left UE in order to increase participation in functional activities.   * Kayla Dale OTR/L

## 2024-05-10 NOTE — ASSESSMENT & PLAN NOTE
5/7: Presented with COPD exacerbation and daughter reported the patient has been having increased weakness, dysarthria, and leaning to the left side for the past 5 days. He fell 2 days ago, denied head trauma. He has a history of atrial flutter, he was started on Eliquis in April, patient was not taking it, daughter is unaware he was supposed to take Eliquis.   Stroke pathway  CTA head and neck 5/7- Chronic left PCA territory infarct, new since the prior exam. No evidence of acute vascular territorial infarction, intracranial hemorrhage or mass.   2. No hemodynamically significant stenosis, dissection or occlusion of the carotid or vertebral arteries or major vessels of the [place].   Neuro consult pending   MRI consistent with Stroke  ECHO recently obtained outpatient for 9/24- Left ventricular wall thickness is mildly increased. There is mild concentric hypertrophy, EF 60%.  Left and right atrium dilated.  Aortic valve sclerosis and trace tricuspid valve regurg. The aortic root is mildly dilated. The ascending aorta is mildly dilated.   Repeat TTE  Stroke pathway  DVT prophylaxis: Apixaban  PT/OT-pending  ASA 81 mg daily  Statin: Lipitor 80 mg daily

## 2024-05-10 NOTE — PROGRESS NOTES
PHYSICAL MEDICINE AND REHABILITATION   PREADMISSION ASSESSMENT     Projected IGC and Rehabilitation Diagnoses:  Impairment of mobility, safety and Activities of Daily Living (ADLs) due to Stroke:  01.1  Left Body Involvement (Right Brain)  Etiologic: Multiple infarcts of varying ages in the posterior circulation include multiple small acute/early subacute infarcts in the bilateral cerebellum.   Date of Onset: 5/7/24   Date of surgery: n/a    PATIENT INFORMATION  Name: Lucho Talavera Phone #: [phone] (home)   Address: [address] [place] [address]  YOB: 1955 Age: 69 y.o. #   Marital Status: Single  Ethnicity: White  Employment Status: retired  Extended Emergency Contact Information  Primary Emergency Contact: Nimco Talavera  Home Phone: [phone]  Mobile Phone: [phone]  Relation: Daughter  Advance Directive: Level 1 Full Code (no ACP docs)    INSURANCE/COVERAGE:     Primary Payor: MEDICARE / Plan: MEDICARE A AND B / Product Type: Medicare A & B Fee for Service /   Secondary Payer:[employer]  ID# [id number]   Payer Contact:  Payer Contact:   Contact Phone:  Contact Phone:     MEDICARE #: [id number]  Medicare Days: 56/30/60  Medical Record #: [id number]    REFERRAL SOURCE:   Referring provider: Papa Reed MD  Referring facility: [facility]  Room[facility]  PCP: TATIANA Cotto PCP phone number: [phone]    MEDICAL INFORMATION  HPI: Pt is a 69 year old male with a PMH of COPD, hypertension, diabetes, atrial flutter, chronic respiratory failure on 2 L of oxygen, alcohol abuse who presented on 5/7 with COPD exacerbation and daughter reported the patient has been having increased weakness, dysarthria, and leaning to the left side for the past 5 days. He fell 2 days ago, denied head trauma. He has a history of atrial flutter, he was started on Eliquis in April, patient was not taking it, daughter is unaware  he was supposed to take Eliquis. Patient had multiple ED visits for shortness of breath secondary to COPD exacerbation.  Pt received Solu-Medrol and nebulizer treatments with some improvement. CXR 5/8- Chronic appearing blunting of the left costophrenic angle is stable.  · Sputum culture- not sent, pending   · Oxygen via NC, goal 88-92%- currently 98% on room air- pt is on 2LNC at HS chronically at home   · C/w nebs: Atrovent/Xopenex  · Steroids: solumedrol wean to Prednisone 40 mg PO from Solumedrol 40 IV BID  · Antibiotics: azithromycin 500mg daily x3 days    Stroke pathway. CTA head and neck 5/7- Chronic left PCA territory infarct, new since the prior exam. No evidence of acute vascular territorial infarction, intracranial hemorrhage or mass.  No hemodynamically significant stenosis, dissection or occlusion of the carotid or vertebral arteries or major vessels of the [place].   · Neuro consult pending   · MRI: Multiple infarcts of varying ages in the posterior circulation include multiple small acute/early subacute infarcts in the bilateral cerebellum. Additional small foci of recent ischemia in the left PCA distribution superimposed on chronic infarct.   · ECHO recently obtained outpatient for 9/24- Left ventricular wall thickness is mildly increased. There is mild concentric hypertrophy, EF 60%.  Left and right atrium dilated.  Aortic valve sclerosis and trace tricuspid valve regurg. The aortic root is mildly dilated. The ascending aorta is mildly dilated.   · Repeat TTE    PT and OT have been consulted and are recommending post-acute rehab services.   Patient's case has been reviewed with [facility] medical director, patient meets medical criteria for acute rehab and has demonstrated the ability to tolerate three or more hours of therapy per day. Patient is medically stable and ready for discharge to [facility].        Past Medical History:   Past Surgical History:   Allergies:     Past Medical History:   Diagnosis  Date    Cardiac arrest (HCC)     COPD (chronic obstructive pulmonary disease) (HCC)     CVA (cerebral vascular accident) (HCC)     Diabetes mellitus (HCC)     Heart attack (HCC)     Hypertension     Stroke (HCC)     Past Surgical History:   Procedure Laterality Date    CARDIAC ELECTROPHYSIOLOGY PROCEDURE N/A 4/8/2024    Procedure: Cardiac eps/aflutter ablation;  Surgeon: Ihsan Blum DO;  [facility];  Service: Cardiology    CERVICAL FUSION N/A 9/10/2018    Procedure: Posterior cervical decompressive laminectomy C3-6; Posterior cervical lateral mass and pedicle fixation fusion C1-T1;  Surgeon: Papa Quiros MD;  Location[facility] MA[facility];  Service: Neurosurgery     Allergies   Allergen Reactions    Amoxicillin Hives    Augmentin [Amoxicillin-Pot Clavulanate] Hives         Medical/functional conditions requiring inpatient rehabilitation: Stroke, COPD exacerbation, impaired self care and mobility, decreased strength/endurance    Risk for medical/clinical complications: Risk for falls, Risk for skin breakdown secondary to decreased mobility, Risk for respiratory failure, Risk for hypertensive episodes, Risk for additional infarcts or extension    Comorbidities:   COPD with acute exacerbation  Atrial fibrillation  HTN  Iron deficiency  DM2  Alcohol abuse    Surgeries in the last 100 days: n/a    CURRENT VITAL SIGNS:   Temp:  [97 °F (36.1 °C)-99.3 °F (37.4 °C)] 99.3 °F (37.4 °C)  HR:  [] 117  Resp:  [18] 18  BP: (121-148)/(72-80) 132/80   Intake/Output Summary (Last 24 hours) at 5/10/2024 1629  Last data filed at 5/9/2024 1721  Gross per 24 hour   Intake --   Output 150 ml   Net -150 ml        LABORATORY RESULTS:      Lab Results   Component Value Date    HGB 11.9 (L) 05/10/2024    HGB 8.8 (L) 12/27/2021    HCT 37.9 05/10/2024    HCT 25.5 (L) 12/27/2021    WBC 7.81 05/10/2024    WBC 12.05 (H) 03/11/2015     Lab Results   Component Value Date    BUN 20 05/10/2024    BUN 22 05/04/2024      03/11/2015    K 4.0 05/10/2024    K 4.7 05/04/2024     05/10/2024    CL 96 (L) 05/04/2024    GLUCOSE 323 (H) 06/08/2022    GLUCOSE 91 03/11/2015    CREATININE 0.56 (L) 05/10/2024    CREATININE 0.63 05/04/2024     Lab Results   Component Value Date    PROTIME 12.9 04/09/2024    PROTIME 13.2 03/11/2015    INR 0.98 04/09/2024    INR 0.9 12/24/2023        DIAGNOSTIC STUDIES:  MRI brain wo contrast    Result Date: 5/8/2024  Impression: Multiple infarcts of varying ages in the posterior circulation include multiple small acute/early subacute infarcts in the bilateral cerebellum. Additional small foci of recent ischemia in the left PCA distribution superimposed on chronic infarct No acute hemorrhage or mass effect Study marked in epic for notification. Workstation performed: [id number]     XR chest 1 view portable    Result Date: 5/8/2024  Impression: Chronic appearing blunting of the left costophrenic angle is stable. Workstation performed: [id number]     CTA head and neck w wo contrast    Result Date: 5/7/2024  Impression: 1. Chronic left PCA territory infarct, new since the prior exam. No evidence of acute vascular territorial infarction, intracranial hemorrhage or mass. 2. No hemodynamically significant stenosis, dissection or occlusion of the carotid or vertebral arteries or major vessels of the [place]. Workstation performed: [id number]       PRECAUTIONS/SPECIAL NEEDS:  {ARC PRE Precautions:29203}    MEDICATIONS:     Current Facility-Administered Medications:     acetaminophen (TYLENOL) tablet 650 mg, 650 mg, Oral, Q6H PRN, Rosana Herrera PA-C, 650 mg at 05/09/24 1442    albuterol inhalation solution 2.5 mg, 2.5 mg, Nebulization, Q6H PRN, Danielle Ellis MD, 2.5 mg at 05/10/24 0800    apixaban (ELIQUIS) tablet 5 mg, 5 mg, Oral, BID, Danielle Ellis MD, 5 mg at 05/10/24 0830    aspirin chewable tablet 81 mg, 81 mg, Oral, Daily, Danielle Ellis MD, 81 mg at 05/10/24 0830    atorvastatin  (LIPITOR) tablet 80 mg, 80 mg, Oral, Daily With Dinner, Danielle Ellis MD, 80 mg at 05/09/24 1714    busPIRone (BUSPAR) tablet 10 mg, 10 mg, Oral, TID, Danielle Ellis MD, 10 mg at 05/10/24 0830    ferrous sulfate tablet 325 mg, 325 mg, Oral, Daily With Breakfast, Danielle Ellis MD, 325 mg at 05/10/24 0830    fluticasone (FLONASE) 50 mcg/act nasal spray 1 spray, 1 spray, Nasal, BID, Danielle Ellis MD, 1 spray at 05/10/24 1129    folic acid (FOLVITE) tablet 1 mg, 1 mg, Oral, Daily, Danielle Ellis MD, 1 mg at 05/10/24 0830    guaiFENesin (MUCINEX) 12 hr tablet 600 mg, 600 mg, Oral, BID, Danielle Ellis MD, 600 mg at 05/10/24 0830    insulin glargine (LANTUS) subcutaneous injection 20 Units 0.2 mL, 20 Units, Subcutaneous, HS, Danielle Ellis MD, 20 Units at 05/09/24 2159    insulin lispro (HumALOG/ADMELOG) 100 units/mL subcutaneous injection 1-6 Units, 1-6 Units, Subcutaneous, TID AC, 2 Units at 05/10/24 1129 **AND** Fingerstick Glucose (POCT), , , 4x Daily AC and at bedtime, Danielle Ellis MD    LORazepam (ATIVAN) tablet 0.5 mg, 0.5 mg, Oral, BID PRN, Danielle Ellis MD, 0.5 mg at 05/08/24 0836    melatonin tablet 3 mg, 3 mg, Oral, HS PRN, Danielle Ellis MD, 3 mg at 05/08/24 2107    multivitamin-minerals (CENTRUM) tablet 1 tablet, 1 tablet, Oral, Daily, TATIANA Dangelo, 1 tablet at 05/10/24 0830    oxyCODONE (ROXICODONE) split tablet 2.5 mg, 2.5 mg, Oral, Q4H PRN, Rosana Herrera PA-C, 2.5 mg at 05/08/24 0357    pantoprazole (PROTONIX) EC tablet 40 mg, 40 mg, Oral, Early Morning, Danielle Ellis MD, 40 mg at 05/10/24 0527    predniSONE tablet 40 mg, 40 mg, Oral, Daily, Papa Reed MD, 40 mg at 05/10/24 0830    thiamine tablet 100 mg, 100 mg, Oral, Daily, TATIANA Dangelo, 100 mg at 05/10/24 0830    SKIN INTEGRITY:   Wound 05/08/24 Skin tear Arm Anterior;Left;Inferior      PRIOR LEVEL OF FUNCTION:  He lives in a(n) single family home  Lucho Talavera is single  and lives with their family.  Self Care: Independent, Indoor Mobility: Independent, Stairs (in/outdoor): Independent, and Cognition: Independent    FALLS IN THE LAST 6 MONTHS: 1-4    HOME ENVIRONMENT:  The living area: can live on one level  There are 2 steps to enter the home.    The patient will have 24 hour supervision/physical assistance available upon discharge.    PREVIOUS DME:  Equipment in home (previous DME): Rolling Walker and Single Point Cane    FUNCTIONAL STATUS:***  Physical Therapy Occupational Therapy Speech Therapy          CARE SCORES:  Self Care:  Eating: {ARC Pre Admission Screenin}  Oral hygiene: {ARC Pre Admission Screenin}  Toilet hygiene: {ARC Pre Admission Screenin}  Shower/bathing self: {ARC Pre Admission Screenin}  Upper body dressing: {ARC Pre Admission Screenin}  Lower body dressing: {ARC Pre Admission Screenin}  Putting on/taking off footwear: {ARC Pre Admission Screenin}  Transfers:  Roll left and right: {ARC Pre Admission Screenin}  Sit to lying: {ARC Pre Admission Screenin}  Lying to sitting on side of bed: {ARC Pre Admission Screenin}  Sit to stand: {ARC Pre Admission Screenin}  Chair/bed to chair transfer: {ARC Pre Admission Screenin}  Toilet transfer: {ARC Pre Admission Screenin}  Mobility:  Walk 10 ft: {ARC Pre Admission Screenin}  Walk 50 ft with two turns: {ARC Pre Admission Screenin}  Walk 150ft: {ARC Pre Admission Screenin}    CURRENT GAP IN FUNCTION  Prior to Admission: Functional Status: Patient was independent with mobility/ambulation, transfers, ADL's, IADL's.    Expected functional outcomes: It is expected that with skilled acute rehabilitation services the patient will progress to Independent for self care and Independent for mobility     Estimated length of stay: 10 to 14 days    Anticipated Post-Discharge Disposition/Treatment  Disposition: Return to  previous home/apartment.  Outpatient Services: Physical Therapy (PT) and Occupational Therapy (OT)    BARRIERS TO DISCHARGE  Weakness, Balance Difficulty, Fatigue, Home Accessibility, Caregiver Accessibility, and Equipment Needs    INTERVENTIONS FOR DISCHARGE  Adaptive equipment, Patient/Family/Caregiver Education, Community Resources, Support Group, Arrange DME needs, Therapy exercises, and Energy conservation education     REQUIRED THERAPY:  Patient will require PT and OT 90 minutes each per day, five days per week to achieve rehab goals.     REQUIRED FUNCTIONAL AND MEDICAL MANAGEMENT FOR INPATIENT REHABILITATION:  Skin:  Monitor skin for breakdown, Pain Management: Overall pain is moderately controlled, Deep Vein Thrombosis (DVT) Prophylaxis:  Per MD orders, Diabetes Management: continue sliding scale insulin, patient to do finger sticks as ordered, SLIM to continue to manage diabetes, further internal medicine management of additional medical conditions while on ARC, PT/OT intervention, patient/family education and training, and any needed consults PRN.    RECOMMENDED LEVEL OF CARE: ***       Pt was independent PTA with transfers, amb, and ADLs. Pt lives with daughter and family in a 2 story home with 2 HETAL. Pt is currently functioning below baseline needing *** A for ADLs and *** A for transfers/amb. Pt would benefit from ARC admission to have close medical management while participating in 3 hours of therapy per day that will include physical and occupational therapy. Physical and occupational therapists will address functional mobility deficits and assist patient in improving their strength, endurance, ROM, and self care. Pt will have 24/7 nursing care to monitor routine vitals, blood sugars, I/Os, skin integrity, and overall condition. The rehab nursing staff will follow therapy recommendations to have 24 hour follow through during non-therapy hours. PM&R to maximize function and provide medical oversight.  The MD will monitor patient co-morbidities while on the unit as well as order any additional tests, labs, and consults needed. The ARC specialized interdisciplinary team will meet weekly to discuss patient overall medical status and rehab goals in preparation for D/C home. Inpatient acute rehab is recommended for patient to maximize overall strength, endurance, self care, and mobility for a safe and timely transition back home.

## 2024-05-10 NOTE — PHYSICAL THERAPY NOTE
Physical Therapy Evaluation    Patient Name: Lucho Talavera    Today's Date: 5/10/2024     Problem List  Principal Problem:    Chronic obstructive pulmonary disease with acute exacerbation (HCC)  Active Problems:    Alcohol abuse    Type 2 diabetes mellitus (HCC)    Chronic respiratory failure (HCC)    Hypertension    Iron deficiency anemia secondary to inadequate dietary iron intake    Atrial fibrillation (HCC)    Stroke (cerebrum) (HCC)    Stroke-like symptom       Past Medical History  Past Medical History:   Diagnosis Date    Cardiac arrest (HCC)     COPD (chronic obstructive pulmonary disease) (HCC)     CVA (cerebral vascular accident) (HCC)     Diabetes mellitus (HCC)     Heart attack (HCC)     Hypertension     Stroke (HCC)         Past Surgical History  Past Surgical History:   Procedure Laterality Date    CARDIAC ELECTROPHYSIOLOGY PROCEDURE N/A 4/8/2024    Procedure: Cardiac eps/aflutter ablation;  Surgeon: Ihsan Blum DO;  [facility];  Service: Cardiology    CERVICAL FUSION N/A 9/10/2018    Procedure: Posterior cervical decompressive laminectomy C3-6; Posterior cervical lateral mass and pedicle fixation fusion C1-T1;  Surgeon: Papa Quiros MD;  [facility];  Service: Neurosurgery         05/10/24 0832   PT Last Visit   PT Visit Date 05/10/24   Note Type   Note type Evaluation   Pain Assessment   Pain Assessment Tool 0-10   Pain Score No Pain   Restrictions/Precautions   Weight Bearing Precautions Per Order No   Other Precautions Cognitive;Chair Alarm;Bed Alarm;Multiple lines;Telemetry;O2;Fall Risk;Hard of hearing;Visual impairment  (L sided UE dysmetria and LE impaired coordination, 2L02 via NC)   Home Living   Type of Home House   Home Layout Two level;Able to live on main level with bedroom/bathroom;Stairs to enter with rails  (2 HETAL)   Home Equipment Walker  (occasional use PTA)   Additional Comments 02 use at night  and at 2PM during day per pt   Prior Function   Level of [place] Independent with functional mobility;Independent with ADLs;Needs assistance with IADLS   Lives With Family  (daughter,  4 grandkids)   Receives Help From Family   IADLs Family/Friend/Other provides transportation;Family/Friend/Other provides meals;Family/Friend/Other provides medication management   Falls in the last 6 months 1 to 4  (at least 1)   Comments daughter works and grandchildren in school; pt +home alone   General   Family/Caregiver Present No   Cognition   Overall Cognitive Status Impaired   Arousal/Participation Cooperative   Orientation Level Oriented X4   Memory Decreased recall of recent events   Following Commands Follows one step commands with increased time or repetition   Comments [facility]   RLE Assessment   RLE Assessment   (4+/5 MMT grossly, weak functionally)   LLE Assessment   LLE Assessment   (4+/5 MMT grossly, weak functionally)   Vestibular   Vestibular Comments impaired smooth pursuits and convergence   Coordination   Finger to Nose & Finger to Finger  Impaired  (L side)   Rapid Alternating Movements Impaired   Light Touch   RLE Light Touch Grossly intact   LLE Light Touch Grossly intact   Bed Mobility   Supine to Sit 5  Supervision   Sit to Supine Unable to assess   Transfers   Sit to Stand 4  Minimal assistance   Additional items Increased time required;Verbal cues;Assist x 2   Stand to Sit 4  Minimal assistance   Additional items Increased time required;Verbal cues;Assist x 2   Additional Comments b/l HHA   Ambulation/Elevation   Gait pattern Improper Weight shift;Decreased foot clearance;Short stride;Excessively slow   Gait Assistance 3  Moderate assist   Additional items Assist x 2;Verbal cues   Assistive Device   (b/l HHA)   Distance 2' to chair  (+unsteady)   Balance   Static Sitting Fair +   Dynamic Sitting Fair   Static Standing Poor   Dynamic Standing Poor -   Ambulatory Poor -   Endurance Deficit   Endurance  "Deficit Yes   Endurance Deficit Description fatigue   Activity Tolerance   Activity Tolerance Patient limited by fatigue   Medical Staff Made Aware OT   Nurse Made Aware yes   Assessment   Prognosis Good   Problem List Impaired balance;Decreased endurance;Decreased mobility;Decreased coordination;Decreased cognition;Impaired judgement;Decreased strength;Decreased safety awareness;Impaired hearing;Impaired vision   Assessment Pt is a 69 y.o. male admitted to hospitals on 5/7/2024 with respiratory distress, increased weakness, dysarthria, and leaning to L 5 days prior with fall 2 days prior. Pt received a primary medical dx of COPD with acute exacerbation. Also found to have \"Multiple infarcts of varying ages in the posterior circulation include multiple small acute/early subacute infarcts in the bilateral cerebellum. Additional small foci of recent ischemia in the left PCA distribution superimposed on chronic infarct\" per MRI brain. Pt has the following comorbidities which affect their treatment: active problems listed above,  has a past medical history of Cardiac arrest (HCC), COPD (chronic obstructive pulmonary disease) (HCC), CVA (cerebral vascular accident) (HCC), Diabetes mellitus (HCC), Heart attack (HCC), Hypertension, and Stroke (HCC).  , as well as personal factors including stairs to access home and living alone while daughter at work. Pt has a high complexity clinical presentation due to Ongoing medical management for primary dx, Increased reliance on more restrictive AD compared to baseline, Decreased activity tolerance compared to baseline, Fall risk, Increased assistance needed from caregiver at current time, Ongoing telemetry monitoring, Cog status, Increased O2 via NC from pts baseline, Trending lab values, Continuous capnography monitoring, Diagnostic imaging pending , and PMH. PT was consulted to evaluate pt's functional mobility and discharge needs. Upon evaluation, patient required S for bed mobility, " minAx2 for STS transfers, modAx2 for ambulation. Body system impairments include impaired functional strength, balance, endurance, cognition, vision, hearing. Pt's functional activity impairments include: activity tolerance and mobility. Participation restrictions include inability to safely return home or community. At conclusion of eval, pt remained seated in chair with chair alarm, phone, call bell, and all other personal needs within reach. Pt would benefit from skilled PT to address their functional mobility limitations. The patient's AM-[facility] Basic Mobility Inpatient Short Form Raw Score is 13. A Raw score of less than or equal to 16 suggests the patient may benefit from discharge to post-acute rehabilitation services. Please also refer to the recommendation of the Physical Therapist for safe discharge planning.   Barriers to Discharge Decreased caregiver support;Inaccessible home environment   Goals   Patient Goals to sit in chair   STG Expiration Date 05/24/24   Short Term Goal #1 In 14 days, pt will: 1) perform bed mobility with mod I to promote functional independence and decrease caregiver burden. 2) perform transfers to<>from all surfaces with mod I to promote functional independence and decrease caregiver burden. 3) ambulate 200' with mod I and least restrictive device to promote safe return to home 4) negotiate 2 stairs with mod I to promote safe return to home. 5) improve balance grades by 1/2 to promote safety with functional mobility.   PT Treatment Day 0   Plan   Treatment/Interventions LE strengthening/ROM;Functional transfer training;Therapeutic exercise;Endurance training;Cognitive reorientation;Patient/family training;Equipment eval/education;Bed mobility;Gait training;Elevations;Spoke to nursing;Spoke to case management;OT;Family;Continued evaluation;Compensatory technique education   PT Frequency 3-5x/wk   Discharge Recommendation   Rehab Resource Intensity Level, PT (S)  I (Maximum Resource  Intensity)   AM-PAC Basic Mobility Inpatient   Turning in Flat Bed Without Bedrails 4   Lying on Back to Sitting on Edge of Flat Bed Without Bedrails 3   Moving Bed to Chair 2   Standing Up From Chair Using Arms 2   Walk in Room 1   Climb 3-5 Stairs With Railing 1   Basic Mobility Inpatient Raw Score 13   Basic Mobility Standardized Score 33.99   [facility] Highest Level Of Mobility   -[facility] Goal 4: Move to chair/commode   -HL Achieved 4: Move to chair/commode   Modified Duval Scale   Modified Duval Scale 4   Barthel Index   Feeding 10   Bathing 0   Grooming Score 0   Dressing Score 5   Bladder Score 5   Bowels Score 5   Toilet Use Score 5   Transfers (Bed/Chair) Score 5   Mobility (Level Surface) Score 0   Stairs Score 0   Barthel Index Score 35   Gustavo Douglas, PT, DPT

## 2024-05-10 NOTE — PLAN OF CARE
Problem: OCCUPATIONAL THERAPY ADULT  Goal: Performs self-care activities at highest level of function for planned discharge setting.  See evaluation for individualized goals.  Description:            See flowsheet documentation for full assessment, interventions and recommendations.   Note: Limitation: Decreased ADL status, Decreased Safe judgement during ADL, Decreased cognition, Decreased endurance, Decreased fine motor control, Decreased self-care trans, Decreased high-level ADLs     Assessment: Pt is a 69 y.o. male who was admitted to [facility] on 5/7/2024 with increased weakness, dysarthria, leaning to left for 5 days, +recent fall and now here with  Chronic obstructive pulmonary disease with acute exacerbation ([facility]) , stroke-like symptoms a-fib, cerebrum stroke, HTN, diabetes.. Patient  has a past medical history of Cardiac arrest ([facility]), COPD (chronic obstructive pulmonary disease) ([facility]), CVA (cerebral vascular accident) ([facility]), Diabetes mellitus ([facility]), Heart attack ([facility]), Hypertension, and Stroke ([facility]).  At baseline pt was completing I with ADL's/assistance with IaDL's. Pt lives with daughter, granddaughter, grandchildren. Currently pt requires min/mod a for overall ADLS and mod a x 2 with B/l HHA for functional mobility/transfers. Pt currently presents with impairments in the following categories -difficulty performing ADLS and difficulty performing IADLS  activity tolerance, endurance, standing balance/tolerance, and sitting balance/tolerance. These impairments, as well as pt's fatigue, decreased caregiver support, and risk for falls  limit pt's ability to safely engage in all baseline areas of occupation, includinggrooming, bathing, dressing, toileting, functional mobility/transfers, and community mobility From OT standpoint, recommend max level I upon D/C. The patient's raw score on the AM-PAC Daily Activity Inpatient Short Form is 16. A raw score of less than 19 suggests the patient may  benefit from discharge to post-acute rehabilitation services. Please refer to the recommendation of the Occupational Therapist for safe discharge planning. OT will continue to follow to address the below stated goals.

## 2024-05-10 NOTE — RESTORATIVE TECHNICIAN NOTE
Restorative Technician Note      Patient Name: Lucho Talavera     Note Type: Mobility (Attempetd to see pt, pt refused ambulation nursing aware.)    Feng FLYNN, Restorative Technician,

## 2024-05-10 NOTE — PROGRESS NOTES
[facility]  Progress Note  Name: Lucho Talavera I  MRN: [id number]  [facility] Date of Admission: 5/7/2024   Date of Service: 5/10/2024 I Hospital Day: 3    Assessment/Plan   * Chronic obstructive pulmonary disease with acute exacerbation (HCC)  Assessment & Plan  CXR 5/8- Chronic appearing blunting of the left costophrenic angle is stable.  Sputum culture- not sent, pending   Oxygen via NC, goal 88-92%- currently 98% on room air- pt is on 2LNC at HS chronically at home   C/w nebs: Atrovent/Xopenex  Steroids: solumedrol wean to Prednisone 40 mg PO from Solumedrol 40 IV BID  Antibiotics: azithromycin 500mg daily x3 days   Respiratory protocol  Significantly improving    Stroke-like symptom  Assessment & Plan  5/7: Presented with COPD exacerbation and daughter reported the patient has been having increased weakness, dysarthria, and leaning to the left side for the past 5 days. He fell 2 days ago, denied head trauma. He has a history of atrial flutter, he was started on Eliquis in April, patient was not taking it, daughter is unaware he was supposed to take Eliquis.   Stroke pathway  CTA head and neck 5/7- Chronic left PCA territory infarct, new since the prior exam. No evidence of acute vascular territorial infarction, intracranial hemorrhage or mass.   2. No hemodynamically significant stenosis, dissection or occlusion of the carotid or vertebral arteries or major vessels of the [place].   Neuro consult pending   MRI consistent with Stroke  ECHO recently obtained outpatient for 9/24- Left ventricular wall thickness is mildly increased. There is mild concentric hypertrophy, EF 60%.  Left and right atrium dilated.  Aortic valve sclerosis and trace tricuspid valve regurg. The aortic root is mildly dilated. The ascending aorta is mildly dilated.   Repeat TTE  Stroke pathway  DVT prophylaxis: Apixaban  PT/OT-pending  ASA 81 mg daily  Statin: Lipitor 80 mg  daily    Stroke (cerebrum) (HCC)  Assessment & Plan  Chronic, noted on CTA head and neck , also seen on MRI  Neuro consult appreciated   See above plan for stroke like symptoms above     Atrial fibrillation (HCC)  Assessment & Plan  He was admitted with atrial flutter with rapid ventricular response in April underwent ablation and loop recorder insertion 4/8/2024  At that time he was started on Eliquis 5 mg twice daily but daughter reports she is in charge of his medications and he is not taking this medication and that it wasn't called into the pharmacy   Restarted on Eliquis    Iron deficiency anemia secondary to inadequate dietary iron intake  Assessment & Plan  Check updated iron panels  Continue with p.o. iron, folic acid  Transfuse for hemoglobin less than 7  Hemoglobin currently stable at 12.6    Hypertension  Assessment & Plan  BP at times low / at goal  Hold lisinopril     Chronic respiratory failure (HCC)  Assessment & Plan  Reviewing notes chronically on 2 LNC at HS   Currently at baseline  Check home o2 eval prior to dc     Type 2 diabetes mellitus (HCC)  Assessment & Plan  Lab Results   Component Value Date    HGBA1C 8.9 (H) 04/06/2024       Recent Labs     05/09/24  1204 05/09/24  1657 05/09/24  2150 05/10/24  0627   POCGLU 169* 164* 171* 155*         Blood Sugar Average: Last 72 hrs:  (P) 181.9588987991434283  Continue with Lantus 20 units daily at bedtime monitor for steroid-induced hyperglycemia  Continue with SSI coverage  Hypoglycemic protocol      Alcohol abuse  Assessment & Plan  C/w CIWA protocol monitoring     C/w Thiamine, MVI and folic acid               VTE Pharmacologic Prophylaxis: VTE Score: 4 Moderate Risk (Score 3-4) - Pharmacological DVT Prophylaxis Ordered: apixaban (Eliquis).    Mobility:   Basic Mobility Inpatient Raw Score: 13  JH-HLM Goal: 4: Move to chair/commode  JH-HLM Achieved: 4: Move to chair/commode  JH-HLM Goal achieved. Continue to encourage appropriate  mobility.    Patient Centered Rounds: I performed bedside rounds with nursing staff today.   Discussions with Specialists or Other Care Team Provider: Neurology     Education and Discussions with Family / Patient: Updated  (daughter) via phone.    Total Time Spent on Date of Encounter in care of patient: 50 mins. This time was spent on one or more of the following: performing physical exam; counseling and coordination of care; obtaining or reviewing history; documenting in the medical record; reviewing/ordering tests, medications or procedures; communicating with other healthcare professionals and discussing with patient's family/caregivers.    Current Length of Stay: 3 day(s)  Current Patient Status: Inpatient   Certification Statement: The patient will continue to require additional inpatient hospital stay due to placement  Discharge Plan: Anticipate discharge in >72 hrs to rehab facility.    Code Status: Level 1 - Full Code    Subjective:   This is a very pleasant 69 y.o. male who was seen today at bedside. Patient has no new complaints. Patient is not in any acute distress.       Objective:     Vitals:   Temp (24hrs), Av.4 °F (36.3 °C), Min:97 °F (36.1 °C), Max:97.6 °F (36.4 °C)    Temp:  [97 °F (36.1 °C)-97.6 °F (36.4 °C)] 97 °F (36.1 °C)  HR:  [] 79  Resp:  [16-18] 18  BP: (121-149)/(72-84) 132/76  SpO2:  [93 %-96 %] 96 %  Body mass index is 24.8 kg/m².     Input and Output Summary (last 24 hours):     Intake/Output Summary (Last 24 hours) at 5/10/2024 1438  Last data filed at 2024 1721  Gross per 24 hour   Intake --   Output 150 ml   Net -150 ml       Physical Exam:   Physical Exam  Vitals reviewed.   HENT:      Head: Normocephalic.      Nose: No congestion.      Mouth/Throat:      Pharynx: No oropharyngeal exudate or posterior oropharyngeal erythema.   Eyes:      Conjunctiva/sclera: Conjunctivae normal.   Cardiovascular:      Rate and Rhythm: Normal rate and regular rhythm.       Heart sounds: Murmur heard.   Pulmonary:      Effort: Pulmonary effort is normal. No respiratory distress.      Breath sounds: No wheezing.   Abdominal:      General: Abdomen is flat.      Palpations: Abdomen is soft.   Skin:     General: Skin is warm and dry.   Neurological:      Mental Status: He is alert and oriented to person, place, and time. Mental status is at baseline.          Additional Data:     Labs:  Results from last 7 days   Lab Units 05/10/24  0527   WBC Thousand/uL 7.81   HEMOGLOBIN g/dL 11.9*   HEMATOCRIT % 37.9   PLATELETS Thousands/uL 354   SEGS PCT % 54   LYMPHO PCT % 32   MONO PCT % 11   EOS PCT % 2     Results from last 7 days   Lab Units 05/10/24  0527   SODIUM mmol/L 134*   POTASSIUM mmol/L 4.0   CHLORIDE mmol/L 102   CO2 mmol/L 24   BUN mg/dL 20   CREATININE mg/dL 0.56*   ANION GAP mmol/L 8   CALCIUM mg/dL 8.2*   ALBUMIN g/dL 3.5   TOTAL BILIRUBIN mg/dL 0.66   ALK PHOS U/L 47   ALT U/L 12   AST U/L 15   GLUCOSE RANDOM mg/dL 127         Results from last 7 days   Lab Units 05/10/24  1106 05/10/24  0627 05/09/24  2150 05/09/24  1657 05/09/24  1204 05/09/24  0808 05/08/24  2037 05/08/24  1733 05/08/24  1049 05/08/24  0714 05/07/24  2218 05/07/24  2025   POC GLUCOSE mg/dl 202* 155* 171* 164* 169* 175* 181* 167* 260* 196* 199* 163*               Lines/Drains:  Invasive Devices       Peripheral Intravenous Line  Duration             Peripheral IV 05/07/24 Proximal;Right;Ventral (anterior) Forearm 2 days                          Imaging: Reviewed radiology reports from this admission including: MRI brain    Recent Cultures (last 7 days):         Last 24 Hours Medication List:   Current Facility-Administered Medications   Medication Dose Route Frequency Provider Last Rate    acetaminophen  650 mg Oral Q6H PRN Rosana Herrera PA-C      albuterol  2.5 mg Nebulization Q6H PRN Danielle Ellis MD      apixaban  5 mg Oral BID Danielle Ellis MD      aspirin  81 mg Oral Daily Danielle Ellis MD       atorvastatin  80 mg Oral Daily With Dinner Danielle Ellis MD      busPIRone  10 mg Oral TID Danielle Ellis MD      ferrous sulfate  325 mg Oral Daily With Breakfast Danielle Ellis MD      fluticasone  1 spray Nasal BID Danielle Ellis MD      folic acid  1 mg Oral Daily Danielle Ellis MD      guaiFENesin  600 mg Oral BID Danielle Ellis MD      insulin glargine  20 Units Subcutaneous HS Danielle Ellis MD      insulin lispro  1-6 Units Subcutaneous TID AC Danielle Ellis MD      LORazepam  0.5 mg Oral BID PRN Danielle Ellis MD      melatonin  3 mg Oral HS PRN Danielle Ellis MD      multivitamin-minerals  1 tablet Oral Daily TATIANA Dangelo      oxyCODONE  2.5 mg Oral Q4H PRN Rosana Herrera PA-C      pantoprazole  40 mg Oral Early Morning Danielle Ellis MD      predniSONE  40 mg Oral Daily Papa Reed MD      thiamine  100 mg Oral Daily TATIANA Dangelo          Today, Patient Was Seen By: Papa Reed MD    **Please Note: This note may have been constructed using a voice recognition system.**

## 2024-05-10 NOTE — ASSESSMENT & PLAN NOTE
He was admitted with atrial flutter with rapid ventricular response in April underwent ablation and loop recorder insertion 4/8/2024  At that time he was started on Eliquis 5 mg twice daily but daughter reports she is in charge of his medications and he is not taking this medication and that it wasn't called into the pharmacy   Restarted on Eliquis

## 2024-05-10 NOTE — ASSESSMENT & PLAN NOTE
Chronic, noted on CTA head and neck , also seen on MRI  Neuro consult appreciated   See above plan for stroke like symptoms above

## 2024-05-10 NOTE — ASSESSMENT & PLAN NOTE
Lab Results   Component Value Date    HGBA1C 8.9 (H) 04/06/2024       Recent Labs     05/09/24  1204 05/09/24  1657 05/09/24  2150 05/10/24  0627   POCGLU 169* 164* 171* 155*         Blood Sugar Average: Last 72 hrs:  (P) 181.8588807381565544  Continue with Lantus 20 units daily at bedtime monitor for steroid-induced hyperglycemia  Continue with SSI coverage  Hypoglycemic protocol

## 2024-05-11 ENCOUNTER — HOSPITAL ENCOUNTER (INPATIENT)
Facility: HOSPITAL | Age: 69
LOS: 18 days | Discharge: HOME HEALTH | DRG: 057 | End: 2024-05-29
Attending: FAMILY MEDICINE | Admitting: STUDENT IN AN ORGANIZED HEALTH CARE EDUCATION/TRAINING PROGRAM
Payer: MEDICARE

## 2024-05-11 VITALS
BODY MASS INDEX: 24.92 KG/M2 | HEART RATE: 93 BPM | WEIGHT: 188 LBS | SYSTOLIC BLOOD PRESSURE: 164 MMHG | TEMPERATURE: 96.9 F | HEIGHT: 73 IN | RESPIRATION RATE: 16 BRPM | DIASTOLIC BLOOD PRESSURE: 84 MMHG | OXYGEN SATURATION: 94 %

## 2024-05-11 DIAGNOSIS — E11.9 TYPE 2 DIABETES MELLITUS WITHOUT COMPLICATION, WITHOUT LONG-TERM CURRENT USE OF INSULIN (HCC): ICD-10-CM

## 2024-05-11 DIAGNOSIS — D62 ACUTE BLOOD LOSS ANEMIA: ICD-10-CM

## 2024-05-11 DIAGNOSIS — I10 PRIMARY HYPERTENSION: ICD-10-CM

## 2024-05-11 DIAGNOSIS — I63.432 CEREBROVASCULAR ACCIDENT (CVA) DUE TO EMBOLISM OF LEFT POSTERIOR CEREBRAL ARTERY (HCC): Primary | ICD-10-CM

## 2024-05-11 DIAGNOSIS — K21.9 GASTROESOPHAGEAL REFLUX DISEASE, UNSPECIFIED WHETHER ESOPHAGITIS PRESENT: ICD-10-CM

## 2024-05-11 DIAGNOSIS — J44.9 CHRONIC OBSTRUCTIVE PULMONARY DISEASE, UNSPECIFIED COPD TYPE (HCC): ICD-10-CM

## 2024-05-11 DIAGNOSIS — M19.172 POST-TRAUMATIC ARTHRITIS OF LEFT FOOT: ICD-10-CM

## 2024-05-11 DIAGNOSIS — S91.001A ANKLE WOUND, RIGHT, INITIAL ENCOUNTER: ICD-10-CM

## 2024-05-11 DIAGNOSIS — M20.41 HAMMERTOE, BILATERAL: ICD-10-CM

## 2024-05-11 DIAGNOSIS — I48.20 CHRONIC ATRIAL FIBRILLATION (HCC): ICD-10-CM

## 2024-05-11 DIAGNOSIS — M20.42 HAMMERTOE, BILATERAL: ICD-10-CM

## 2024-05-11 DIAGNOSIS — F41.9 ANXIETY: ICD-10-CM

## 2024-05-11 LAB
ALBUMIN SERPL BCP-MCNC: 3.7 G/DL (ref 3.5–5)
ALP SERPL-CCNC: 53 U/L (ref 34–104)
ALT SERPL W P-5'-P-CCNC: 18 U/L (ref 7–52)
ANION GAP SERPL CALCULATED.3IONS-SCNC: 9 MMOL/L (ref 4–13)
AST SERPL W P-5'-P-CCNC: 18 U/L (ref 13–39)
BASOPHILS # BLD AUTO: 0.02 THOUSANDS/ÂΜL (ref 0–0.1)
BASOPHILS NFR BLD AUTO: 0 % (ref 0–1)
BILIRUB SERPL-MCNC: 0.51 MG/DL (ref 0.2–1)
BUN SERPL-MCNC: 14 MG/DL (ref 5–25)
CALCIUM SERPL-MCNC: 8.5 MG/DL (ref 8.4–10.2)
CHLORIDE SERPL-SCNC: 100 MMOL/L (ref 96–108)
CO2 SERPL-SCNC: 25 MMOL/L (ref 21–32)
CREAT SERPL-MCNC: 0.5 MG/DL (ref 0.6–1.3)
EOSINOPHIL # BLD AUTO: 0.09 THOUSAND/ÂΜL (ref 0–0.61)
EOSINOPHIL NFR BLD AUTO: 1 % (ref 0–6)
ERYTHROCYTE [DISTWIDTH] IN BLOOD BY AUTOMATED COUNT: 17.9 % (ref 11.6–15.1)
GFR SERPL CREATININE-BSD FRML MDRD: 109 ML/MIN/1.73SQ M
GLUCOSE SERPL-MCNC: 138 MG/DL (ref 65–140)
GLUCOSE SERPL-MCNC: 179 MG/DL (ref 65–140)
GLUCOSE SERPL-MCNC: 189 MG/DL (ref 65–140)
GLUCOSE SERPL-MCNC: 254 MG/DL (ref 65–140)
GLUCOSE SERPL-MCNC: 271 MG/DL (ref 65–140)
HCT VFR BLD AUTO: 39.5 % (ref 36.5–49.3)
HGB BLD-MCNC: 12.4 G/DL (ref 12–17)
IMM GRANULOCYTES # BLD AUTO: 0.08 THOUSAND/UL (ref 0–0.2)
IMM GRANULOCYTES NFR BLD AUTO: 1 % (ref 0–2)
LYMPHOCYTES # BLD AUTO: 2.94 THOUSANDS/ÂΜL (ref 0.6–4.47)
LYMPHOCYTES NFR BLD AUTO: 28 % (ref 14–44)
MCH RBC QN AUTO: 24.8 PG (ref 26.8–34.3)
MCHC RBC AUTO-ENTMCNC: 31.4 G/DL (ref 31.4–37.4)
MCV RBC AUTO: 79 FL (ref 82–98)
MONOCYTES # BLD AUTO: 0.97 THOUSAND/ÂΜL (ref 0.17–1.22)
MONOCYTES NFR BLD AUTO: 9 % (ref 4–12)
NEUTROPHILS # BLD AUTO: 6.4 THOUSANDS/ÂΜL (ref 1.85–7.62)
NEUTS SEG NFR BLD AUTO: 61 % (ref 43–75)
NRBC BLD AUTO-RTO: 0 /100 WBCS
PLATELET # BLD AUTO: 395 THOUSANDS/UL (ref 149–390)
PMV BLD AUTO: 10.2 FL (ref 8.9–12.7)
POTASSIUM SERPL-SCNC: 3.8 MMOL/L (ref 3.5–5.3)
PROT SERPL-MCNC: 6.2 G/DL (ref 6.4–8.4)
RBC # BLD AUTO: 5 MILLION/UL (ref 3.88–5.62)
SODIUM SERPL-SCNC: 134 MMOL/L (ref 135–147)
WBC # BLD AUTO: 10.5 THOUSAND/UL (ref 4.31–10.16)

## 2024-05-11 PROCEDURE — 94640 AIRWAY INHALATION TREATMENT: CPT

## 2024-05-11 PROCEDURE — 99239 HOSP IP/OBS DSCHRG MGMT >30: CPT | Performed by: FAMILY MEDICINE

## 2024-05-11 PROCEDURE — 82948 REAGENT STRIP/BLOOD GLUCOSE: CPT

## 2024-05-11 PROCEDURE — 80053 COMPREHEN METABOLIC PANEL: CPT | Performed by: FAMILY MEDICINE

## 2024-05-11 PROCEDURE — 94664 DEMO&/EVAL PT USE INHALER: CPT

## 2024-05-11 PROCEDURE — 94760 N-INVAS EAR/PLS OXIMETRY 1: CPT

## 2024-05-11 PROCEDURE — 99223 1ST HOSP IP/OBS HIGH 75: CPT

## 2024-05-11 PROCEDURE — 99232 SBSQ HOSP IP/OBS MODERATE 35: CPT | Performed by: FAMILY MEDICINE

## 2024-05-11 PROCEDURE — NC001 PR NO CHARGE

## 2024-05-11 PROCEDURE — 85025 COMPLETE CBC W/AUTO DIFF WBC: CPT | Performed by: FAMILY MEDICINE

## 2024-05-11 PROCEDURE — 99223 1ST HOSP IP/OBS HIGH 75: CPT | Performed by: STUDENT IN AN ORGANIZED HEALTH CARE EDUCATION/TRAINING PROGRAM

## 2024-05-11 RX ORDER — LANOLIN ALCOHOL/MO/W.PET/CERES
100 CREAM (GRAM) TOPICAL DAILY
Status: DISCONTINUED | OUTPATIENT
Start: 2024-05-12 | End: 2024-05-29 | Stop reason: HOSPADM

## 2024-05-11 RX ORDER — BUSPIRONE HYDROCHLORIDE 10 MG/1
10 TABLET ORAL 3 TIMES DAILY
Status: DISCONTINUED | OUTPATIENT
Start: 2024-05-11 | End: 2024-05-29 | Stop reason: HOSPADM

## 2024-05-11 RX ORDER — ATORVASTATIN CALCIUM 80 MG/1
80 TABLET, FILM COATED ORAL
Status: DISCONTINUED | OUTPATIENT
Start: 2024-05-11 | End: 2024-05-29 | Stop reason: HOSPADM

## 2024-05-11 RX ORDER — LANOLIN ALCOHOL/MO/W.PET/CERES
3 CREAM (GRAM) TOPICAL
Status: DISCONTINUED | OUTPATIENT
Start: 2024-05-11 | End: 2024-05-13

## 2024-05-11 RX ORDER — INSULIN GLARGINE 100 [IU]/ML
20 INJECTION, SOLUTION SUBCUTANEOUS
Status: DISCONTINUED | OUTPATIENT
Start: 2024-05-11 | End: 2024-05-12

## 2024-05-11 RX ORDER — GUAIFENESIN 600 MG/1
600 TABLET, EXTENDED RELEASE ORAL 2 TIMES DAILY
Status: DISCONTINUED | OUTPATIENT
Start: 2024-05-11 | End: 2024-05-29 | Stop reason: HOSPADM

## 2024-05-11 RX ORDER — FERROUS SULFATE 325(65) MG
325 TABLET ORAL
Status: DISCONTINUED | OUTPATIENT
Start: 2024-05-12 | End: 2024-05-29 | Stop reason: HOSPADM

## 2024-05-11 RX ORDER — ALBUTEROL SULFATE 90 UG/1
2 AEROSOL, METERED RESPIRATORY (INHALATION) EVERY 6 HOURS PRN
Status: DISCONTINUED | OUTPATIENT
Start: 2024-05-11 | End: 2024-05-14

## 2024-05-11 RX ORDER — ASPIRIN 81 MG/1
81 TABLET, CHEWABLE ORAL DAILY
Status: DISCONTINUED | OUTPATIENT
Start: 2024-05-12 | End: 2024-05-29 | Stop reason: HOSPADM

## 2024-05-11 RX ORDER — INSULIN LISPRO 100 [IU]/ML
1-5 INJECTION, SOLUTION INTRAVENOUS; SUBCUTANEOUS
Status: DISCONTINUED | OUTPATIENT
Start: 2024-05-11 | End: 2024-05-29 | Stop reason: HOSPADM

## 2024-05-11 RX ORDER — LEVALBUTEROL INHALATION SOLUTION 1.25 MG/3ML
1.25 SOLUTION RESPIRATORY (INHALATION)
Status: DISCONTINUED | OUTPATIENT
Start: 2024-05-11 | End: 2024-05-16

## 2024-05-11 RX ORDER — LORAZEPAM 0.5 MG/1
0.5 TABLET ORAL 2 TIMES DAILY PRN
Status: DISCONTINUED | OUTPATIENT
Start: 2024-05-11 | End: 2024-05-20

## 2024-05-11 RX ORDER — ALBUTEROL SULFATE 2.5 MG/3ML
2.5 SOLUTION RESPIRATORY (INHALATION) EVERY 6 HOURS PRN
Status: DISCONTINUED | OUTPATIENT
Start: 2024-05-11 | End: 2024-05-25

## 2024-05-11 RX ORDER — ACETAMINOPHEN 325 MG/1
650 TABLET ORAL EVERY 6 HOURS PRN
Status: DISCONTINUED | OUTPATIENT
Start: 2024-05-11 | End: 2024-05-29 | Stop reason: HOSPADM

## 2024-05-11 RX ORDER — FLUTICASONE FUROATE AND VILANTEROL 200; 25 UG/1; UG/1
1 POWDER RESPIRATORY (INHALATION)
Status: DISCONTINUED | OUTPATIENT
Start: 2024-05-12 | End: 2024-05-29 | Stop reason: HOSPADM

## 2024-05-11 RX ORDER — FOLIC ACID 1 MG/1
1 TABLET ORAL DAILY
Status: DISCONTINUED | OUTPATIENT
Start: 2024-05-12 | End: 2024-05-29 | Stop reason: HOSPADM

## 2024-05-11 RX ORDER — PANTOPRAZOLE SODIUM 40 MG/1
40 TABLET, DELAYED RELEASE ORAL
Status: DISCONTINUED | OUTPATIENT
Start: 2024-05-12 | End: 2024-05-29 | Stop reason: HOSPADM

## 2024-05-11 RX ORDER — FLUTICASONE PROPIONATE 50 MCG
1 SPRAY, SUSPENSION (ML) NASAL 2 TIMES DAILY
Status: DISCONTINUED | OUTPATIENT
Start: 2024-05-11 | End: 2024-05-29 | Stop reason: HOSPADM

## 2024-05-11 RX ADMIN — FLUTICASONE PROPIONATE 1 SPRAY: 50 SPRAY, METERED NASAL at 09:31

## 2024-05-11 RX ADMIN — APIXABAN 5 MG: 5 TABLET, FILM COATED ORAL at 09:31

## 2024-05-11 RX ADMIN — FLUTICASONE PROPIONATE 1 SPRAY: 50 SPRAY, METERED NASAL at 17:39

## 2024-05-11 RX ADMIN — Medication 1 TABLET: at 09:31

## 2024-05-11 RX ADMIN — INSULIN LISPRO 1 UNITS: 100 INJECTION, SOLUTION INTRAVENOUS; SUBCUTANEOUS at 11:49

## 2024-05-11 RX ADMIN — BUSPIRONE HYDROCHLORIDE 10 MG: 10 TABLET ORAL at 16:46

## 2024-05-11 RX ADMIN — THIAMINE HCL TAB 100 MG 100 MG: 100 TAB at 09:31

## 2024-05-11 RX ADMIN — ASPIRIN 81 MG CHEWABLE TABLET 81 MG: 81 TABLET CHEWABLE at 09:31

## 2024-05-11 RX ADMIN — FERROUS SULFATE TAB 325 MG (65 MG ELEMENTAL FE) 325 MG: 325 (65 FE) TAB at 09:31

## 2024-05-11 RX ADMIN — ALBUTEROL SULFATE 2.5 MG: 2.5 SOLUTION RESPIRATORY (INHALATION) at 16:00

## 2024-05-11 RX ADMIN — LEVALBUTEROL HYDROCHLORIDE 1.25 MG: 1.25 SOLUTION RESPIRATORY (INHALATION) at 19:53

## 2024-05-11 RX ADMIN — GUAIFENESIN 600 MG: 600 TABLET, EXTENDED RELEASE ORAL at 17:39

## 2024-05-11 RX ADMIN — INSULIN LISPRO 2 UNITS: 100 INJECTION, SOLUTION INTRAVENOUS; SUBCUTANEOUS at 09:31

## 2024-05-11 RX ADMIN — ALBUTEROL SULFATE 2.5 MG: 2.5 SOLUTION RESPIRATORY (INHALATION) at 13:01

## 2024-05-11 RX ADMIN — BUSPIRONE HYDROCHLORIDE 10 MG: 10 TABLET ORAL at 21:48

## 2024-05-11 RX ADMIN — PANTOPRAZOLE SODIUM 40 MG: 40 TABLET, DELAYED RELEASE ORAL at 05:40

## 2024-05-11 RX ADMIN — FOLIC ACID 1 MG: 1 TABLET ORAL at 09:31

## 2024-05-11 RX ADMIN — APIXABAN 5 MG: 5 TABLET, FILM COATED ORAL at 17:39

## 2024-05-11 RX ADMIN — ATORVASTATIN CALCIUM 80 MG: 80 TABLET, FILM COATED ORAL at 16:46

## 2024-05-11 RX ADMIN — INSULIN GLARGINE 20 UNITS: 100 INJECTION, SOLUTION SUBCUTANEOUS at 21:48

## 2024-05-11 RX ADMIN — BUSPIRONE HYDROCHLORIDE 10 MG: 10 TABLET ORAL at 09:31

## 2024-05-11 RX ADMIN — INSULIN LISPRO 2 UNITS: 100 INJECTION, SOLUTION INTRAVENOUS; SUBCUTANEOUS at 16:46

## 2024-05-11 RX ADMIN — ALBUTEROL SULFATE 2.5 MG: 2.5 SOLUTION RESPIRATORY (INHALATION) at 23:57

## 2024-05-11 RX ADMIN — PREDNISONE 40 MG: 20 TABLET ORAL at 09:31

## 2024-05-11 RX ADMIN — GUAIFENESIN 600 MG: 600 TABLET, EXTENDED RELEASE ORAL at 09:31

## 2024-05-11 NOTE — PROGRESS NOTES
[facility]  Progress Note  Name: Lucho Talavera I  MRN: [id number]  [facility] Date of Admission: 5/7/2024   Date of Service: 5/11/2024 I Hospital Day: 4    Assessment/Plan   * Chronic obstructive pulmonary disease with acute exacerbation (HCC)  Assessment & Plan  CXR 5/8- Chronic appearing blunting of the left costophrenic angle is stable.  Oxygen via NC, goal 88-92%- currently 98% on room air- pt is on 2LNC at HS chronically at home   C/w nebs: Atrovent/Xopenex  Steroids: solumedrol wean to Prednisone 40 mg PO from Solumedrol 40 IV BID  Antibiotics: azithromycin 500mg daily x3 days   Respiratory protocol  Significantly improving    Stroke-like symptom  Assessment & Plan  5/7: Presented with COPD exacerbation and daughter reported the patient has been having increased weakness, dysarthria, and leaning to the left side for the past 5 days. He fell 2 days ago, denied head trauma. He has a history of atrial flutter, he was started on Eliquis in April, patient was not taking it, daughter is unaware he was supposed to take Eliquis.   Stroke pathway  CTA head and neck 5/7- Chronic left PCA territory infarct, new since the prior exam. No evidence of acute vascular territorial infarction, intracranial hemorrhage or mass.   2. No hemodynamically significant stenosis, dissection or occlusion of the carotid or vertebral arteries or major vessels of the [place].   Neuro consult pending   MRI consistent with Stroke  ECHO recently obtained outpatient for 9/24- Left ventricular wall thickness is mildly increased. There is mild concentric hypertrophy, EF 60%.  Left and right atrium dilated.  Aortic valve sclerosis and trace tricuspid valve regurg. The aortic root is mildly dilated. The ascending aorta is mildly dilated.   Repeat TTE  Stroke pathway  DVT prophylaxis: Apixaban  PT/OT-pending  ASA 81 mg daily  Statin: Lipitor 80 mg daily    Stroke (cerebrum)  (HCC)  Assessment & Plan  Chronic, noted on CTA head and neck , also seen on MRI  Neuro consult appreciated   See above plan for stroke like symptoms above     Atrial fibrillation (HCC)  Assessment & Plan  He was admitted with atrial flutter with rapid ventricular response in April underwent ablation and loop recorder insertion 4/8/2024  At that time he was started on Eliquis 5 mg twice daily but daughter reports she is in charge of his medications and he is not taking this medication and that it wasn't called into the pharmacy   Restarted on Eliquis    Iron deficiency anemia secondary to inadequate dietary iron intake  Assessment & Plan  Check updated iron panels  Continue with p.o. iron, folic acid  Transfuse for hemoglobin less than 7  Hemoglobin currently stable at 12.6    Hypertension  Assessment & Plan  BP at times low / at goal  Hold lisinopril     Type 2 diabetes mellitus (HCC)  Assessment & Plan  Lab Results   Component Value Date    HGBA1C 8.9 (H) 04/06/2024       Recent Labs     05/10/24  1106 05/10/24  1558 05/10/24  2130 05/11/24  0635   POCGLU 202* 285* 230* 179*         Blood Sugar Average: Last 72 hrs:  (P) 194.0629476798451281  Continue with Lantus 20 units daily at bedtime monitor for steroid-induced hyperglycemia  Continue with SSI coverage  Hypoglycemic protocol      Alcohol abuse  Assessment & Plan  C/w CIWA protocol monitoring     C/w Thiamine, MVI and folic acid             Medical Problems       Resolved Problems  Date Reviewed: 5/11/2024   None       Discharging Physician / Practitioner: Papa Reed MD  PCP: TATIANA Cotto  Admission Date:   Admission Orders (From admission, onward)       Ordered        05/07/24 1744  INPATIENT ADMISSION  Once                          Discharge Date: 05/11/24    Consultations During Hospital Stay:  Neurology     Procedures Performed:   None    Significant Findings / Test Results:   MRI brain wo contrast: Multiple infarcts of varying ages in the  "posterior circulation include multiple small acute/early subacute infarcts in the bilateral cerebellum. Additional small foci of recent ischemia in the left PCA distribution superimposed on chronic infarct, No acute hemorrhage or mass effect,     Incidental Findings:   See above   I reviewed the above mentioned incidental findings with the patient and/or family and they expressed understanding.    Test Results Pending at Discharge (will require follow up):   None     Outpatient Tests Requested:  None    Complications:  None    Reason for Admission: Strokelike symptoms    Hospital Course:   Lucho Talavera is a 69 y.o. male patient who originally presented to the hospital on 5/7/2024 due to strokelike symptoms.  Patient was found to have multiple infarcts of varying ages in the posterior circulation including multiple small acute/early subacute infarcts in bilateral cerebellum.  Additional small foci of recent ischemia in the left PCA distribution superimposed on chronic infarct.  Patient also presented with a COPD exacerbation.  From a COPD exacerbation point of view patient significantly improved.  Patient was further evaluated by neurology and management was optimized.  Patient was evaluated by physical therapy and Occupational Therapy who recommended him for acute rehab.  Patient will be discharged to [facility] acute rehab.        Please see above list of diagnoses and related plan for additional information.     Condition at Discharge: stable    Discharge Day Visit / Exam:   Subjective: Is a very pleasant 69-year-old gentleman who was seen evaluated today at bedside.  Patient has no acute complaints at this time.  Patient is eager to go to acute rehab.  Vitals: Blood Pressure: 164/84 (05/11/24 0749)  Pulse: 93 (05/11/24 0749)  Temperature: (!) 96.9 °F (36.1 °C) (05/11/24 0749)  Temp Source: Oral (05/10/24 0810)  Respirations: 16 (05/11/24 0749)  Height: 6' 1\" (185.4 cm) (05/09/24 1410)  Weight - Scale: 85.3 kg " (188 lb) (05/09/24 1410)  SpO2: 94 % (05/11/24 1301)  Exam:   Physical Exam  Vitals reviewed.   HENT:      Head: Normocephalic.      Nose: No congestion.      Mouth/Throat:      Pharynx: No oropharyngeal exudate or posterior oropharyngeal erythema.   Eyes:      Conjunctiva/sclera: Conjunctivae normal.   Cardiovascular:      Rate and Rhythm: Normal rate and regular rhythm.   Pulmonary:      Effort: Pulmonary effort is normal.   Abdominal:      General: Abdomen is flat.      Palpations: Abdomen is soft.   Skin:     General: Skin is warm and dry.   Neurological:      Mental Status: He is alert and oriented to person, place, and time. Mental status is at baseline.          Discussion with Family: Updated  (daughter) via phone.    Discharge instructions/Information to patient and family:   See after visit summary for information provided to patient and family.      Provisions for Follow-Up Care:  See after visit summary for information related to follow-up care and any pertinent home health orders.      Mobility at time of Discharge:   Basic Mobility Inpatient Raw Score: 13  JH-HLM Goal: 4: Move to chair/commode  JH-HLM Achieved: 4: Move to chair/commode  HLM Goal achieved. Continue to encourage appropriate mobility.     Disposition:   Acute Rehab at [facility]    Planned Readmission: None     Discharge Statement:  I spent 45 minutes discharging the patient. This time was spent on the day of discharge. I had direct contact with the patient on the day of discharge. Greater than 50% of the total time was spent examining patient, answering all patient questions, arranging and discussing plan of care with patient as well as directly providing post-discharge instructions.  Additional time then spent on discharge activities.    Discharge Medications:  See after visit summary for reconciled discharge medications provided to patient and/or family.      **Please Note: This note may have been constructed using a  voice recognition system**

## 2024-05-11 NOTE — PROGRESS NOTES
Patient:    MRN:  [id number]    Dany Request ID:  [id number]    Level of care reserved:  Inpatient Rehab Facility    Partner Reserved:  [facility] - ([place]), [place] [id number] [phone]    Clinical needs requested:    Geography searched:  [place] miles around [phone]    Start of Service:    Request sent:  3:55pm EDT on 5/10/2024 by Lisa Keyes    Partner reserved:  9:43am EDT on 5/11/2024 by Yue Ocasio    Choice list shared:

## 2024-05-11 NOTE — ARC ADMISSION
Patient is medically cleared for discharge and is approved for admission to [facility] today.  Patient will admit to [facility] room 455 and has a 1400 transport time.  Patient's nurse can tiger text the [facility] charge nurse for report.  CM has been updated with same in [place].

## 2024-05-11 NOTE — ASSESSMENT & PLAN NOTE
CXR 5/8- Chronic appearing blunting of the left costophrenic angle is stable.  Oxygen via NC, goal 88-92%- currently 98% on room air- pt is on 2LNC at HS chronically at home   C/w nebs: Atrovent/Xopenex  Steroids: solumedrol wean to Prednisone 40 mg PO from Solumedrol 40 IV BID  Antibiotics: azithromycin 500mg daily x3 days   Respiratory protocol  Significantly improving

## 2024-05-11 NOTE — ASSESSMENT & PLAN NOTE
5/7: Presented with COPD exacerbation and daughter reported the patient has been having increased weakness, dysarthria, and leaning to the left side for the past 5 days. He fell 2 days ago, denied head trauma. He has a history of atrial flutter, he was started on Eliquis in April, patient was not taking it, daughter is unaware he was supposed to take Eliquis.   Stroke pathway  CTA head and neck 5/7- Chronic left PCA territory infarct, new since the prior exam. No evidence of acute vascular territorial infarction, intracranial hemorrhage or mass.   2. No hemodynamically significant stenosis, dissection or occlusion of the carotid or vertebral arteries or major vessels of the [place].   Neuro consult pending   MRI consistent with Stroke  ECHO recently obtained outpatient for 9/24- Left ventricular wall thickness is mildly increased. There is mild concentric hypertrophy, EF 60%.  Left and right atrium dilated.  Aortic valve sclerosis and trace tricuspid valve regurg. The aortic root is mildly dilated. The ascending aorta is mildly dilated.   Repeat TTE  Stroke pathway  DVT prophylaxis: Apixaban  PT/OT-pending  ASA 81 mg daily  Statin: Lipitor 80 mg daily

## 2024-05-11 NOTE — ASSESSMENT & PLAN NOTE
Lab Results   Component Value Date    HGBA1C 8.9 (H) 04/06/2024       Recent Labs     05/10/24  1558 05/10/24  2130 05/11/24  0635 05/11/24  1102   POCGLU 285* 230* 179* 189*       Blood Sugar Average: Last 72 hrs:    Blood sugars elevated in the setting of recent use of steroids  Continue Lantus 20 units at bedtime  Correctional scale insulin ordered  Hypoglycemia protocol  Diabetic diet

## 2024-05-11 NOTE — ASSESSMENT & PLAN NOTE
Lab Results   Component Value Date    HGBA1C 8.9 (H) 04/06/2024       Recent Labs     05/10/24  1106 05/10/24  1558 05/10/24  2130 05/11/24  0635   POCGLU 202* 285* 230* 179*         Blood Sugar Average: Last 72 hrs:  (P) 194.1795599921429172  Continue with Lantus 20 units daily at bedtime monitor for steroid-induced hyperglycemia  Continue with SSI coverage  Hypoglycemic protocol

## 2024-05-11 NOTE — RESPIRATORY THERAPY NOTE
RT Protocol Note  Lucho Talavera 69 y.o. male MRN: [id number]  Unit/Bed#: -01 Encounter: [id number]    Assessment    Principal Problem:    Stroke (cerebrum) (HCC)  Active Problems:    Type 2 diabetes mellitus (HCC)    Chronic obstructive pulmonary disease with acute exacerbation (HCC)    ETOH abuse    Chronic respiratory failure (HCC)    Hypertension    Atrial fibrillation (HCC)      Home Pulmonary Medications:    Home Devices/Therapy: (P) Home O2    Past Medical History:   Diagnosis Date    Cardiac arrest (HCC)     COPD (chronic obstructive pulmonary disease) (HCC)     CVA (cerebral vascular accident) (HCC)     Diabetes mellitus (HCC)     Heart attack (HCC)     Hypertension     Stroke (HCC)      Social History     Socioeconomic History    Marital status: Single     Spouse name: Not on file    Number of children: Not on file    Years of education: Not on file    Highest education level: Not on file   Occupational History    Not on file   Tobacco Use    Smoking status: Former     Types: Cigarettes    Smokeless tobacco: Never    Tobacco comments:     quit 5 months ago    Vaping Use    Vaping status: Never Used   Substance and Sexual Activity    Alcohol use: Not Currently     Alcohol/week: 8.0 - 12.0 standard drinks of alcohol     Types: 8 - 12 Cans of beer per week     Comment: every day drinker    Drug use: Never    Sexual activity: Not Currently   Other Topics Concern    Not on file   Social History Narrative    ** Merged History Encounter **         ** Merged History Encounter **         ** Merged History Encounter **          Social Determinants of Health     Financial Resource Strain: Low Risk  (2/1/2024)    Received from [facility]    Overall Financial Resource Strain (CARDIA)     Difficulty of Paying Living Expenses: Not very hard   Food Insecurity: No Food Insecurity (5/8/2024)    Hunger Vital Sign     Worried About Running Out of Food in the Last Year: Never true     Ran Out of Food  in the Last Year: Never true   Transportation Needs: No Transportation Needs (5/8/2024)    PRAPARE - Transportation     Lack of Transportation (Medical): No     Lack of Transportation (Non-Medical): No   Physical Activity: Inactive (4/7/2023)    Received from [facility]    Exercise Vital Sign     Days of Exercise per Week: 0 days     Minutes of Exercise per Session: 0 min   Stress: No Stress Concern Present (4/7/2023)    Received from [facility]    [employer] [place] [employer] - Occupational Stress Questionnaire     Feeling of Stress : Only a little   Social Connections: Socially Isolated (4/7/2023)    Received from [facility]    Social Connection and Isolation Panel [NHANES]     Frequency of Communication with Friends and Family: Never     Frequency of Social Gatherings with Friends and Family: Never     Attends [employer] Services: Never     Active Member of Clubs or Organizations: No     Attends Club or Organization Meetings: Never     Marital Status:    Intimate Partner Violence: Not At Risk (2/1/2024)    Received from [facility]    Humiliation, Afraid, Rape, and Kick questionnaire     Fear of Current or Ex-Partner: No     Emotionally Abused: No     Physically Abused: No     Sexually Abused: No   Housing Stability: Low Risk  (5/8/2024)    Housing Stability Vital Sign     Unable to Pay for Housing in the Last Year: No     Number of Places Lived in the Last Year: 1     Unstable Housing in the Last Year: No       Subjective         Objective    Physical Exam:   Assessment Type: Pre-treatment  General Appearance: Alert, Awake  Respiratory Pattern: Normal  Chest Assessment: Chest expansion symmetrical  Bilateral Breath Sounds: Clear, Diminished  Cough: None  O2 Device: nc    Vitals:  Blood pressure 118/80, pulse 95, temperature 98.4 °F (36.9 °C), temperature source Oral, resp. rate 18, weight 83.6 kg (184 lb 4.8 oz), SpO2  "94%.          Imaging and other studies: I have personally reviewed pertinent reports.      O2 Device: nc     Plan    Respiratory Plan: Mild Distress pathway        Resp Comments: (P) Pt on 2L nc, requested prn udn tx. Assessed pt, no visable distress upon entering room, bbs diminished, no wheezing, spo2 95 on 2L. Informed pt that it has only been 3 hours since his last udn tx and it was early for another, \"i cant breathe, i cant breathe.\" Pt given prn udn tx and started on TID xopenex per rcp, reassess need later.Pt has COPD hx, wears 2L o2 at baseline, home meds per chart are advair, pulmicort, albuterol, spiriva.   "

## 2024-05-11 NOTE — ASSESSMENT & PLAN NOTE
Previously admitted for atrial flutter with RVR in 4/2024 s/p ablation and loop recorder insertion.  Previously noncompliant with Eliquis.  Continue Eliquis  Currently rate controlled

## 2024-05-11 NOTE — ASSESSMENT & PLAN NOTE
History of alcohol abuse  Monitor off CIWA protocol  Ativan as needed anxiety  Continue supplements with thiamine, folate, MVT

## 2024-05-11 NOTE — PLAN OF CARE
Problem: PAIN - ADULT  Goal: Verbalizes/displays adequate comfort level or baseline comfort level  Description: Interventions:  - Encourage patient to monitor pain and request assistance  - Assess pain using appropriate pain scale  - Administer analgesics based on type and severity of pain and evaluate response  - Implement non-pharmacological measures as appropriate and evaluate response  - Consider cultural and social influences on pain and pain management  - Notify physician/advanced practitioner if interventions unsuccessful or patient reports new pain  Outcome: Progressing     Problem: INFECTION - ADULT  Goal: Absence or prevention of progression during hospitalization  Description: INTERVENTIONS:  - Assess and monitor for signs and symptoms of infection  - Monitor lab/diagnostic results  - Monitor all insertion sites, i.e. indwelling lines, tubes, and drains  - Monitor endotracheal if appropriate and nasal secretions for changes in amount and color  - [place] appropriate cooling/warming therapies per order  - Administer medications as ordered  - Instruct and encourage patient and family to use good hand hygiene technique  - Identify and instruct in appropriate isolation precautions for identified infection/condition  Outcome: Progressing  Goal: Absence of fever/infection during neutropenic period  Description: INTERVENTIONS:  - Monitor WBC    Outcome: Progressing     Problem: SAFETY ADULT  Goal: Patient will remain free of falls  Description: INTERVENTIONS:  - Educate patient/family on patient safety including physical limitations  - Instruct patient to call for assistance with activity   - Consult OT/PT to assist with strengthening/mobility   - Keep Call bell within reach  - Keep bed low and locked with side rails adjusted as appropriate  - Keep care items and personal belongings within reach  - Initiate and maintain comfort rounds  - Make Fall Risk Sign visible to staff  - Offer Toileting every 2 Hours,  in advance of need  - Initiate/Maintain bed alarm  - Apply yellow socks and bracelet for high fall risk patients  - Consider moving patient to room near nurses station  Outcome: Progressing  Goal: Maintain or return to baseline ADL function  Description: INTERVENTIONS:  -  Assess patient's ability to carry out ADLs; assess patient's baseline for ADL function and identify physical deficits which impact ability to perform ADLs (bathing, care of mouth/teeth, toileting, grooming, dressing, etc.)  - Assess/evaluate cause of self-care deficits   - Assess range of motion  - Assess patient's mobility; develop plan if impaired  - Assess patient's need for assistive devices and provide as appropriate  - Encourage maximum independence but intervene and supervise when necessary  - Involve family in performance of ADLs  - Assess for home care needs following discharge   - Consider OT consult to assist with ADL evaluation and planning for discharge  - Provide patient education as appropriate  Outcome: Progressing  Goal: Maintains/Returns to pre admission functional level  Description: INTERVENTIONS:  - Perform AM-PAC 6 Click Basic Mobility/ Daily Activity assessment daily.  - Set and communicate daily mobility goal to care team and patient/family/caregiver.   - Collaborate with rehabilitation services on mobility goals if consulted  - Perform Range of Motion 4 times a day.  - Reposition patient every 2 hours.  - Dangle patient 4 times a day  - Stand patient 4 times a day  - Ambulate patient 4 times a day  - Out of bed to chair 4 times a day   - Out of bed for meals 4 times a day  - Out of bed for toileting  - Record patient progress and toleration of activity level   Outcome: Progressing     Problem: DISCHARGE PLANNING  Goal: Discharge to home or other facility with appropriate resources  Description: INTERVENTIONS:  - Identify barriers to discharge w/patient and caregiver  - Arrange for needed discharge resources and  transportation as appropriate  - Identify discharge learning needs (meds, wound care, etc.)  - Arrange for interpretive services to assist at discharge as needed  - Refer to Case Management Department for coordinating discharge planning if the patient needs post-hospital services based on physician/advanced practitioner order or complex needs related to functional status, cognitive ability, or social support system  Outcome: Progressing     Problem: Knowledge Deficit  Goal: Patient/family/caregiver demonstrates understanding of disease process, treatment plan, medications, and discharge instructions  Description: Complete learning assessment and assess knowledge base.  Interventions:  - Provide teaching at level of understanding  - Provide teaching via preferred learning methods  Outcome: Progressing     Problem: Prexisting or High Potential for Compromised Skin Integrity  Goal: Skin integrity is maintained or improved  Description: INTERVENTIONS:  - Identify patients at risk for skin breakdown  - Assess and monitor skin integrity  - Assess and monitor nutrition and hydration status  - Monitor labs   - Assess for incontinence   - Turn and reposition patient  - Assist with mobility/ambulation  - Relieve pressure over bony prominences  - Avoid friction and shearing  - Provide appropriate hygiene as needed including keeping skin clean and dry  - Evaluate need for skin moisturizer/barrier cream  - Collaborate with interdisciplinary team   - Patient/family teaching  - Consider wound care consult   Outcome: Progressing

## 2024-05-11 NOTE — PLAN OF CARE
Problem: Prexisting or High Potential for Compromised Skin Integrity  Goal: Skin integrity is maintained or improved  Description: INTERVENTIONS:  - Identify patients at risk for skin breakdown  - Assess and monitor skin integrity  - Assess and monitor nutrition and hydration status  - Monitor labs   - Assess for incontinence   - Turn and reposition patient  - Assist with mobility/ambulation  - Relieve pressure over bony prominences  - Avoid friction and shearing  - Provide appropriate hygiene as needed including keeping skin clean and dry  - Evaluate need for skin moisturizer/barrier cream  - Collaborate with interdisciplinary team   - Patient/family teaching  - Consider wound care consult   Outcome: Progressing     Problem: PAIN - ADULT  Goal: Verbalizes/displays adequate comfort level or baseline comfort level  Description: Interventions:  - Encourage patient to monitor pain and request assistance  - Assess pain using appropriate pain scale  - Administer analgesics based on type and severity of pain and evaluate response  - Implement non-pharmacological measures as appropriate and evaluate response  - Consider cultural and social influences on pain and pain management  - Notify physician/advanced practitioner if interventions unsuccessful or patient reports new pain  Outcome: Progressing     Problem: INFECTION - ADULT  Goal: Absence or prevention of progression during hospitalization  Description: INTERVENTIONS:  - Assess and monitor for signs and symptoms of infection  - Monitor lab/diagnostic results  - Monitor all insertion sites, i.e. indwelling lines, tubes, and drains  - Monitor endotracheal if appropriate and nasal secretions for changes in amount and color  - [place] appropriate cooling/warming therapies per order  - Administer medications as ordered  - Instruct and encourage patient and family to use good hand hygiene technique  - Identify and instruct in appropriate isolation precautions for  identified infection/condition  Outcome: Progressing     Problem: SAFETY ADULT  Goal: Patient will remain free of falls  Description: INTERVENTIONS:  - Educate patient/family on patient safety including physical limitations  - Instruct patient to call for assistance with activity   - Consult OT/PT to assist with strengthening/mobility   - Keep Call bell within reach  - Keep bed low and locked with side rails adjusted as appropriate  - Keep care items and personal belongings within reach  - Initiate and maintain comfort rounds  - Make Fall Risk Sign visible to staff  - Offer Toileting every 2 Hours, in advance of need  - Initiate/Maintain bed alarm  - Obtain necessary fall risk management equipment: alarms  - Apply yellow socks and bracelet for high fall risk patients  - Consider moving patient to room near nurses station  Outcome: Progressing  Goal: Maintain or return to baseline ADL function  Description: INTERVENTIONS:  -  Assess patient's ability to carry out ADLs; assess patient's baseline for ADL function and identify physical deficits which impact ability to perform ADLs (bathing, care of mouth/teeth, toileting, grooming, dressing, etc.)  - Assess/evaluate cause of self-care deficits   - Assess range of motion  - Assess patient's mobility; develop plan if impaired  - Assess patient's need for assistive devices and provide as appropriate  - Encourage maximum independence but intervene and supervise when necessary  - Involve family in performance of ADLs  - Assess for home care needs following discharge   - Consider OT consult to assist with ADL evaluation and planning for discharge  - Provide patient education as appropriate  Outcome: Progressing  Goal: Maintains/Returns to pre admission functional level  Description: INTERVENTIONS:  - Perform AM-PAC 6 Click Basic Mobility/ Daily Activity assessment daily.  - Set and communicate daily mobility goal to care team and patient/family/caregiver.   - Collaborate with  rehabilitation services on mobility goals if consulted  - Perform Range of Motion 3 times a day.  - Reposition patient every 2 hours.  - Dangle patient 3 times a day  - Stand patient 3 times a day  - Ambulate patient 3 times a day  - Out of bed to chair 3 times a day   - Out of bed for meals 3 times a day  - Out of bed for toileting  - Record patient progress and toleration of activity level   Outcome: Progressing     Problem: DISCHARGE PLANNING  Goal: Discharge to home or other facility with appropriate resources  Description: INTERVENTIONS:  - Identify barriers to discharge w/patient and caregiver  - Arrange for needed discharge resources and transportation as appropriate  - Identify discharge learning needs (meds, wound care, etc.)  - Arrange for interpretive services to assist at discharge as needed  - Refer to Case Management Department for coordinating discharge planning if the patient needs post-hospital services based on physician/advanced practitioner order or complex needs related to functional status, cognitive ability, or social support system  Outcome: Progressing

## 2024-05-11 NOTE — PROGRESS NOTES
PHYSICAL MEDICINE AND REHABILITATION   PREADMISSION ASSESSMENT     Projected IGC and Rehabilitation Diagnoses:  Impairment of mobility, safety and Activities of Daily Living (ADLs) due to Stroke:  01.3  Bilateral involvement  Etiologic: Multiple infarcts of varying ages in the posterior circulation include multiple small acute/early subacute infarcts in the bilateral cerebellum.   Date of Onset: 5/7/24   Date of surgery: n/a    PATIENT INFORMATION  Name: Lucho Talavera Phone #: [phone] (home)   Address: [address] [place] [address]  YOB: 1955 Age: 69 y.o. #   Marital Status: Single  Ethnicity: Not  or  or Nigerian  Employment Status: retired  Extended Emergency Contact Information  Primary Emergency Contact: Nimco Talavera  Home Phone: [phone]  Mobile Phone: [phone]  Relation: Daughter  Advance Directive: Level 1 Full Code (no ACP docs)    INSURANCE/COVERAGE:     Primary Payor: MEDICARE / Plan: MEDICARE A AND B / Product Type: Medicare A & B Fee for Service /   Secondary Payer:[employer]  ID# [id number]   Payer Contact:  Payer Contact:   Contact Phone:  Contact Phone:     MEDICARE #: [id number]  Medicare Days: 56/30/60  Medical Record #: [id number]    REFERRAL SOURCE:   Referring provider: Papa Reed MD  Referring facility: [facility]  Room: [place] [address]/[place] [address]  PCP: TATIANA Cotto PCP phone number: [phone]    MEDICAL INFORMATION  HPI: This is a 69 year old male with a PMH of COPD, hypertension, diabetes, atrial flutter, chronic respiratory failure (on 2 L of oxygen) and alcohol abuse who presented to [facility] on 5/7/24 with SOB and wheezing.  Daughter reported that the patient had been having dysarthria, leaning to the left side and had increased weakness for the past 5 days prior to admission.  He fell 2 days ago prior to admission.  He has a history of atrial flutter and  recently underwent an ablation and loop recorder insertion on 4/8/24.  He was started on Eliquis after that, however, he was not taking it.  Daughter manages his medications and was unaware he was supposed to take Eliquis.  Patient found to have a COPD exacerbation during this ER visit.  He received IV Solu-Medrol and nebulizer treatments with some improvement.  He was transitioned to Prednisone.  He was also given azithromycin 500 mg daily x 3 days.  Because of the stroke like symptoms and falls he had prior to admission he was worked up for a CVA.  CT was negative but MRI showed multiple infarcts of varying ages in the posterior circulation include multiple small acute/early subacute infarcts in the bilateral cerebellum.  Additional small foci of recent ischemia was seen in the left PCA distribution superimposed on chronic infarct.  ECHO recently obtained as an outpatient and showed left ventricular wall thickness is mildly increased; mild concentric hypertrophy, EF 60%; left and right atrium dilated; aortic valve sclerosis and trace tricuspid valve regurgitation; he aortic root is mildly dilated; he ascending aorta is mildly dilated.  TTE was repeated and showed no significant changes from prior study.  For the stroke he is taking Eliquis, ASA and Lipitor.  Patient worked with therapy and was recommended for rehab following his hospital stay.  He has demonstrated that he can tolerate three hours of therapy, five days a week and is now medically cleared for discharge to the [facility].        Past Medical History:   Past Surgical History:   Allergies:     Past Medical History:   Diagnosis Date    Cardiac arrest (HCC)     COPD (chronic obstructive pulmonary disease) (HCC)     CVA (cerebral vascular accident) (HCC)     Diabetes mellitus (HCC)     Heart attack (HCC)     Hypertension     Stroke (HCC)     Past Surgical History:   Procedure Laterality Date    CARDIAC ELECTROPHYSIOLOGY PROCEDURE N/A 4/8/2024    Procedure:  Cardiac eps/aflutter ablation;  Surgeon: Ihsan Blum DO;  [facility];  Service: Cardiology    CERVICAL FUSION N/A 9/10/2018    Procedure: Posterior cervical decompressive laminectomy C3-6; Posterior cervical lateral mass and pedicle fixation fusion C1-T1;  Surgeon: Papa Quiros MD;  Location: [facility] MAIN OR;  Service: Neurosurgery     Allergies   Allergen Reactions    Amoxicillin Hives    Augmentin [Amoxicillin-Pot Clavulanate] Hives         Medical/functional conditions requiring inpatient rehabilitation: Stroke, COPD exacerbation (on 2 liters O2 at baseline) impaired self care and mobility, decreased strength/endurance, a-flutter with rapid ventricular response    Risk for medical/clinical complications: Risk for falls, Risk for skin breakdown secondary to decreased mobility, Risk for respiratory failure, Risk for hypertensive episodes, Risk for additional infarcts or extension, Risk for bleeding, Risk for hospital delirium, Risk for uncontrolled blood sugars    Comorbidities:   COPD with acute exacerbation  Atrial flutter with rapid ventricular response  HTN  Iron deficiency  DM2  Alcohol abuse    Surgeries in the last 100 days: n/a    CURRENT VITAL SIGNS:   Temp:  [96.9 °F (36.1 °C)-99.3 °F (37.4 °C)] 96.9 °F (36.1 °C)  HR:  [] 93  Resp:  [16] 16  BP: (132-164)/() 164/84   Intake/Output Summary (Last 24 hours) at 5/11/2024 1229  Last data filed at 5/10/2024 2101  Gross per 24 hour   Intake 1040 ml   Output 1650 ml   Net -610 ml        LABORATORY RESULTS:      Lab Results   Component Value Date    HGB 12.4 05/11/2024    HGB 8.8 (L) 12/27/2021    HCT 39.5 05/11/2024    HCT 25.5 (L) 12/27/2021    WBC 10.50 (H) 05/11/2024    WBC 12.05 (H) 03/11/2015     Lab Results   Component Value Date    BUN 14 05/11/2024    BUN 22 05/04/2024     03/11/2015    K 3.8 05/11/2024    K 4.7 05/04/2024     05/11/2024    CL 96 (L) 05/04/2024    GLUCOSE 323 (H) 06/08/2022    GLUCOSE 91  03/11/2015    CREATININE 0.50 (L) 05/11/2024    CREATININE 0.63 05/04/2024     Lab Results   Component Value Date    PROTIME 12.9 04/09/2024    PROTIME 13.2 03/11/2015    INR 0.98 04/09/2024    INR 0.9 12/24/2023        DIAGNOSTIC STUDIES:  MRI brain wo contrast    Result Date: 5/8/2024  Impression: Multiple infarcts of varying ages in the posterior circulation include multiple small acute/early subacute infarcts in the bilateral cerebellum. Additional small foci of recent ischemia in the left PCA distribution superimposed on chronic infarct No acute hemorrhage or mass effect Study marked in epic for notification. Workstation performed: [id number]     XR chest 1 view portable    Result Date: 5/8/2024  Impression: Chronic appearing blunting of the left costophrenic angle is stable. Workstation performed: [id number]     CTA head and neck w wo contrast    Result Date: 5/7/2024  Impression: 1. Chronic left PCA territory infarct, new since the prior exam. No evidence of acute vascular territorial infarction, intracranial hemorrhage or mass. 2. No hemodynamically significant stenosis, dissection or occlusion of the carotid or vertebral arteries or major vessels of the [place]. Workstation performed: [id number]       PRECAUTIONS/SPECIAL NEEDS:  Alcohol:    Social History     Substance and Sexual Activity   Alcohol Use Not Currently    Alcohol/week: 8.0 - 12.0 standard drinks of alcohol    Types: 8 - 12 Cans of beer per week    Comment: every day drinker   , Anticoagulation:  aspirin 81 mg orally every day and Eliquis , Blood Sugar Management: as per MD orders, Requires O2: 2 L/min, Dietary Restrictions: 2 gm Na cardiovascular consistent CHO diet, Visually Impaired, Language Preference: English, and fall precautions    MEDICATIONS:     Current Facility-Administered Medications:     acetaminophen (TYLENOL) tablet 650 mg, 650 mg, Oral, Q6H PRN, Rosana Herrera PA-C, 650 mg at 05/09/24 1442    albuterol inhalation  solution 2.5 mg, 2.5 mg, Nebulization, Q6H PRN, Danielle Ellis MD, 2.5 mg at 05/10/24 2348    apixaban (ELIQUIS) tablet 5 mg, 5 mg, Oral, BID, Danielle Ellis MD, 5 mg at 05/11/24 0931    aspirin chewable tablet 81 mg, 81 mg, Oral, Daily, Danielle Ellis MD, 81 mg at 05/11/24 0931    atorvastatin (LIPITOR) tablet 80 mg, 80 mg, Oral, Daily With Dinner, Danielle Ellis MD, 80 mg at 05/10/24 1727    busPIRone (BUSPAR) tablet 10 mg, 10 mg, Oral, TID, Danielle Ellis MD, 10 mg at 05/11/24 0931    ferrous sulfate tablet 325 mg, 325 mg, Oral, Daily With Breakfast, Danielle Ellis MD, 325 mg at 05/11/24 0931    fluticasone (FLONASE) 50 mcg/act nasal spray 1 spray, 1 spray, Nasal, BID, Danielle Ellis MD, 1 spray at 05/11/24 0931    folic acid (FOLVITE) tablet 1 mg, 1 mg, Oral, Daily, Danielle Ellis MD, 1 mg at 05/11/24 0931    guaiFENesin (MUCINEX) 12 hr tablet 600 mg, 600 mg, Oral, BID, Danielle Ellis MD, 600 mg at 05/11/24 0931    insulin glargine (LANTUS) subcutaneous injection 20 Units 0.2 mL, 20 Units, Subcutaneous, HS, Danielle Ellis MD, 20 Units at 05/10/24 2150    insulin lispro (HumALOG/ADMELOG) 100 units/mL subcutaneous injection 1-6 Units, 1-6 Units, Subcutaneous, TID AC, 1 Units at 05/11/24 1149 **AND** Fingerstick Glucose (POCT), , , 4x Daily AC and at bedtime, Danielle Ellis MD    LORazepam (ATIVAN) tablet 0.5 mg, 0.5 mg, Oral, BID PRN, Danielle Ellis MD, 0.5 mg at 05/10/24 1756    melatonin tablet 3 mg, 3 mg, Oral, HS PRN, Danielle Ellis MD, 3 mg at 05/08/24 2107    multivitamin-minerals (CENTRUM) tablet 1 tablet, 1 tablet, Oral, Daily, TATIANA Dangelo, 1 tablet at 05/11/24 0931    oxyCODONE (ROXICODONE) split tablet 2.5 mg, 2.5 mg, Oral, Q4H PRN, Rosana Herrera PA-C, 2.5 mg at 05/08/24 0357    pantoprazole (PROTONIX) EC tablet 40 mg, 40 mg, Oral, Early Morning, Danielle Ellis MD, 40 mg at 05/11/24 0540    thiamine tablet 100 mg, 100 mg, Oral, Daily,  Mariangel Hernandez, TATIANA, 100 mg at 05/11/24 0931    SKIN INTEGRITY:   Wound 05/08/24 Skin tear Arm Anterior;Left;Inferior      PRIOR LEVEL OF FUNCTION:  He lives in a(n) single family home  Lucho Talavera is single and lives with their family.  Self Care: Independent, Indoor Mobility: Independent, Stairs (in/outdoor): Independent, and Cognition: Independent    FALLS IN THE LAST 6 MONTHS: 1-4    HOME ENVIRONMENT:  The living area: can live on one level  There are 2 steps to enter the home.    The patient will have 24 hour supervision/physical assistance available upon discharge.    PREVIOUS DME:  Equipment in home (previous DME): Rolling Walker and Single Point Cane    FUNCTIONAL STATUS:  Physical Therapy Occupational Therapy Speech Therapy   05/10/24 0832    PT Last Visit   PT Visit Date 05/10/24   Note Type   Note type Evaluation   Pain Assessment   Pain Assessment Tool 0-10   Pain Score No Pain   Restrictions/Precautions   Weight Bearing Precautions Per Order No   Other Precautions Cognitive;Chair Alarm;Bed Alarm;Multiple lines;Telemetry;O2;Fall Risk;Hard of hearing;Visual impairment  (L sided UE dysmetria and LE impaired coordination, 2L02 via NC)   Home Living   Type of Home House   Home Layout Two level;Able to live on main level with bedroom/bathroom;Stairs to enter with rails  (2 HETAL)   Home Equipment Walker  (occasional use PTA)   Additional Comments 02 use at night and at 2PM during day per pt   Prior Function   Level of [place] Independent with functional mobility;Independent with ADLs;Needs assistance with IADLS   Lives With Family  (daughter,  4 grandkids)   Receives Help From Family   IADLs Family/Friend/Other provides transportation;Family/Friend/Other provides meals;Family/Friend/Other provides medication management   Falls in the last 6 months 1 to 4  (at least 1)   Comments daughter works and grandchildren in school; pt +home alone   General   Family/Caregiver Present No   Cognition   Overall  Cognitive Status Impaired   Arousal/Participation Cooperative   Orientation Level Oriented X4   Memory Decreased recall of recent events   Following Commands Follows one step commands with increased time or repetition   Comments [facility]   RLE Assessment   RLE Assessment    (4+/5 MMT grossly, weak functionally)   LLE Assessment   LLE Assessment    (4+/5 MMT grossly, weak functionally)   Vestibular   Vestibular Comments impaired smooth pursuits and convergence   Coordination   Finger to Nose & Finger to Finger  Impaired  (L side)   Rapid Alternating Movements Impaired   Light Touch   RLE Light Touch Grossly intact   LLE Light Touch Grossly intact   Bed Mobility   Supine to Sit 5  Supervision   Sit to Supine Unable to assess   Transfers   Sit to Stand 4  Minimal assistance   Additional items Increased time required;Verbal cues;Assist x 2   Stand to Sit 4  Minimal assistance   Additional items Increased time required;Verbal cues;Assist x 2   Additional Comments b/l HHA   Ambulation/Elevation   Gait pattern Improper Weight shift;Decreased foot clearance;Short stride;Excessively slow   Gait Assistance 3  Moderate assist   Additional items Assist x 2;Verbal cues   Assistive Device    (b/l HHA)   Distance 2' to chair  (+unsteady)   Balance   Static Sitting Fair +   Dynamic Sitting Fair   Static Standing Poor   Dynamic Standing Poor -   Ambulatory Poor -   Endurance Deficit   Endurance Deficit Yes   Endurance Deficit Description fatigue   Activity Tolerance   Activity Tolerance Patient limited by fatigue   Medical Staff Made Aware OT   Nurse Made Aware yes   Assessment   Prognosis Good   Problem List Impaired balance;Decreased endurance;Decreased mobility;Decreased coordination;Decreased cognition;Impaired judgement;Decreased strength;Decreased safety awareness;Impaired hearing;Impaired vision   Assessment Pt is a 69 y.o. male [facility] with respiratory distress, increased weakness, dysarthria, and leaning to L  "5 days prior with fall 2 days prior. Pt received a primary medical dx of COPD with acute exacerbation. Also found to have \"Multiple infarcts of varying ages in the posterior circulation include multiple small acute/early subacute infarcts in the bilateral cerebellum. Additional small foci of recent ischemia in the left PCA distribution superimposed on chronic infarct\" per MRI brain. Pt has the following comorbidities which affect their treatment: active problems listed above,  has a past medical history of Cardiac arrest (HCC), COPD (chronic obstructive pulmonary disease) (HCC), CVA (cerebral vascular accident) (HCC), Diabetes mellitus (HCC), Heart attack (HCC), Hypertension, and Stroke (HCC).  , as well as personal factors including stairs to access home and living alone while daughter at work. Pt has a high complexity clinical presentation due to Ongoing medical management for primary dx, Increased reliance on more restrictive AD compared to baseline, Decreased activity tolerance compared to baseline, Fall risk, Increased assistance needed from caregiver at current time, Ongoing telemetry monitoring, Cog status, Increased O2 via NC from pts baseline, Trending lab values, Continuous capnography monitoring, Diagnostic imaging pending , and PMH. PT was consulted to evaluate pt's functional mobility and discharge needs. Upon evaluation, patient required S for bed mobility, minAx2 for STS transfers, modAx2 for ambulation. Body system impairments include impaired functional strength, balance, endurance, cognition, vision, hearing. Pt's functional activity impairments include: activity tolerance and mobility. Participation restrictions include inability to safely return home or community. At conclusion of eval, pt remained seated in chair with chair alarm, phone, call bell, and all other personal needs within reach. Pt would benefit from skilled PT to address their functional mobility limitations. The patient's AM-PAC " Basic Mobility Inpatient Short Form Raw Score is 13. A Raw score of less than or equal to 16 suggests the patient may benefit from discharge to post-acute rehabilitation services. Please also refer to the recommendation of the Physical Therapist for safe discharge planning.    05/10/24 0825    OT Last Visit   OT Visit Date 05/10/24   Note Type   Note type Evaluation   Pain Assessment   Pain Assessment Tool 0-10   Pain Score No Pain   Restrictions/Precautions   Weight Bearing Precautions Per Order No   Other Precautions Cognitive;Telemetry;Multiple lines;Fall Risk;Hard of hearing;Visual impairment   Home Living   Type of Home House   Home Layout Two level   Home Equipment Walker   Prior Function   Level of [place] Independent with ADLs   Lives With Daughter;Family   Receives Help From Family   IADLs Family/Friend/Other provides medication management;Family/Friend/Other provides meals;Family/Friend/Other provides transportation   Falls in the last 6 months 1 to 4   Lifestyle   Autonomy I with ADL's/assistance with IADL's, +RW occassionally +02 at night, and 1X per day (pt reports) questionable historian (?)   Reciprocal Relationships daughter, grandchildren   Service to Others retired   Intrinsic Gratification read, pet dogs   General   Family/Caregiver Present No   ADL   Eating Assistance 5  Supervision/Setup   Grooming Assistance 5  Supervision/Setup   UB Bathing Assistance 4  Minimal Assistance   LB Bathing Assistance 3  Moderate Assistance   UB Dressing Assistance 4  Minimal Assistance   LB Dressing Assistance 3  Moderate Assistance   Toileting Assistance  4  Minimal Assistance   Bed Mobility   Supine to Sit 5  Supervision   Transfers   Sit to Stand 4  Minimal assistance   Additional items Assist x 2   Stand to Sit 4  Minimal assistance   Additional items Assist x 2   Additional Comments B/l HHA   Functional Mobility   Functional Mobility 3  Moderate assistance   Additional Comments mod a x 2 with short  "distance functional mobility to chair   Balance   Static Sitting Fair +   Dynamic Sitting Fair   Static Standing Poor   Dynamic Standing Poor -   Ambulatory Poor -   Activity Tolerance   Activity Tolerance Patient limited by fatigue   Medical Staff Made Aware PT balbir due to the patient's co-morbidities, clinically unstable presentation, and present impairments which are a regression from the patient's baseline.    Nurse Made Aware RN   Hand Function   Gross Motor Coordination Impaired  (left UE overshooting to right)   Fine Motor Coordination Impaired   Sensation   Light Touch No apparent deficits   Vision - Complex Assessment   Ocular Range of Motion Intact   Tracking Intact   Saccades Impaired  (+jerky to right)   Convergence (Slow convergence)   Acuity Able to read normal print without difficulty   Cognition   Overall Cognitive Status Impaired   Arousal/Participation Responsive;Arousable;Cooperative   Attention Attends with cues to redirect   Orientation Level Oriented X4  (except day of week stating \"tuesday\")   Memory Decreased recall of precautions;Decreased recall of recent events;Decreased short term memory   Following Commands Follows one step commands with increased time or repetition   Comments pt pleasant and motivated, [place] slow processing, impaired attention, questionable historian. Will continue to assess functional cognition with additional treatment sessions for safe discharge planning.   Assessment   Limitation Decreased ADL status;Decreased Safe judgement during ADL;Decreased cognition;Decreased endurance;Decreased fine motor control;Decreased self-care trans;Decreased high-level ADLs   Assessment Pt is a 69 y.o. male who was admitted to [facility] on 5/7/2024 with increased weakness, dysarthria, leaning to left for 5 days, +recent fall and now here with  Chronic obstructive pulmonary disease with acute exacerbation (HCC) , stroke-like symptoms a-fib, cerebrum stroke, HTN, diabetes.. " Patient  has a past medical history of Cardiac arrest (HCC), COPD (chronic obstructive pulmonary disease) (HCC), CVA (cerebral vascular accident) (HCC), Diabetes mellitus (HCC), Heart attack (HCC), Hypertension, and Stroke (HCC).  At baseline pt was completing I with ADL's/assistance with IaDL's. Pt lives with daughter, granddaughter, grandchildren. Currently pt requires min/mod a for overall ADLS and mod a x 2 with B/l HHA for functional mobility/transfers. Pt currently presents with impairments in the following categories -difficulty performing ADLS and difficulty performing IADLS  activity tolerance, endurance, standing balance/tolerance, and sitting balance/tolerance. These impairments, as well as pt's fatigue, decreased caregiver support, and risk for falls  limit pt's ability to safely engage in all baseline areas of occupation, includinggrooming, bathing, dressing, toileting, functional mobility/transfers, and community mobility From OT standpoint, recommend max level I upon D/C. The patient's raw score on the -PAC Daily Activity Inpatient Short Form is 16. A raw score of less than 19 suggests the patient may benefit from discharge to post-acute rehabilitation services. Please refer to the recommendation of the Occupational Therapist for safe discharge planning. OT will continue to follow to address the below stated goals.    Today's Date: 5/9/2024     Problem List  Principal Problem:    Chronic obstructive pulmonary disease with acute exacerbation (HCC)  Active Problems:    Alcohol abuse    Type 2 diabetes mellitus (HCC)    Chronic respiratory failure (HCC)    Hypertension    Iron deficiency anemia secondary to inadequate dietary iron intake    Atrial fibrillation (HCC)    Stroke (cerebrum) ([facility])    Stroke-like symptom        Past Medical History  Medical History        Past Medical History:   Diagnosis Date    Cardiac arrest (HCC)      COPD (chronic obstructive pulmonary disease) (HCC)      CVA (cerebral  vascular accident) (HCC)      Diabetes mellitus (HCC)      Heart attack (HCC)      Hypertension      Stroke (HCC)              Past Surgical History  Surgical History         Past Surgical History:   Procedure Laterality Date    CARDIAC ELECTROPHYSIOLOGY PROCEDURE N/A 4/8/2024     Procedure: Cardiac eps/aflutter ablation;  Surgeon: Ihsan Blum DO;  [facility];  Service: Cardiology    CERVICAL FUSION N/A 9/10/2018     Procedure: Posterior cervical decompressive laminectomy C3-6; Posterior cervical lateral mass and pedicle fixation fusion C1-T1;  Surgeon: Papa Quiros MD;  Location: [facility];  Service: Neurosurgery              Impressions:  The patient presents with a mild dysarthria characterized by decreased articulatory precision and min decreased intelligibility during conversation. Language skills appear adequate.      Speech Therapy recommended:  yes,  1-2 f/u sessions while hospitalized     Patient's goal:  None stated      LONG TERM GOALS:  -The patient will independently utilize compensatory strategies to maximize communication in all ADLs.     SHORT TERM GOALS:  Articulation  -Patient will demonstrate adequate lingual strength, ROM and control for  fricatives and affricates      HISTORY AND PHYSICAL:     Subjective:   Patient lying in bed reports that he is feeling better today.  Reports that his breathing is better.  Reports that his speech is better as well.  Patient notes that he was having difficulty with his speech and also experiencing weakness for the past several days prior to admission.  He does report that he had a fall but denied any head trauma reports that he fell to his buttocks and that he does not have any pain.  Patient denies all other complaints and is asking when his daughters can come to pick him up so he can go home.  Patient reports that he has been compliant with his medications at home and taking his Eliquis.  When discussing with daughter over the phone  she reports that this medication has never been picked up and was not called into the pharmacy and that the patient has not been taking this at home.  She reports that her father has been living with her for the past 20 years and that in 2018 he had an MI while he was at an alcohol rehab facility which he developed a stroke from during his resuscitation because he was considered clinically dead for 4-1/2 minutes before he was resuscitated.  The daughter reports that she recently quit her job so that she could start taking care of her father more on a full-time basis given his increasing decline that has been noted.  Daughter reports that she was unaware that he was started on this anticoagulation and why he would need to take it and that it was not called to the pharmacy.  Daughter reports that he occasionally has 1-2 beers a day but that is monitored and does not exceed this amount.     Speech and Swallowing Mechanism Exam   Facial: symmetrical  Labial: WFL  Lingual: WFL  Velum: unable to visualize  Mandible: adequate ROM  Dentition: adequate  Respiratory Support: on RA     Respiration and Phonation  Sustains phonation across words, phrases, sentences and in normal conversational speech  Vocal Quality:  clear/adequate      Articulation:  Decreased precision across words, phrases and during conversation. Patient seemed to experience more difficulty with /s/ and /sh/ in all word positions      Diadochokinesis: n/a     Resonation: Normal nasality     S/S Oral apraxia:  None     S/S Verbal Apraxia:  None      INTELLIGIBILITY at the WORD LEVEL:  min decreased      INTELLIGIBILITY at the PHRASE LEVEL:  min decreased     INTELLIGIBILITY at the SENTENCE LEVEL:  min decreased     INTELLIGIBILITY in CONVERSATIONAL SPEECH:  min decreased        Conversation:  Imprecise articulation     CARE SCORES:  Self Care:  Eatin: Supervision or touching  assistance  Oral hygiene: 04: Supervision or touching  assistance  Toilet  hygiene: 03: Partial/moderate assistance  Shower/bathing self: 03: Partial/moderate assistance  Upper body dressin: Partial/moderate assistance  Lower body dressin: Partial/moderate assistance  Putting on/taking off footwear: 09: Not applicable  Transfers:  Roll left and right: 09: Not applicable  Sit to lyin: Not applicable  Lying to sitting on side of bed: 04: Supervision or touching  assistance  Sit to stand: 03: Partial/moderate assistance (min x 2)  Chair/bed to chair transfer: 03: Partial/moderate assistance (min x 2)  Toilet transfer: 09: Not applicable  Mobility:  Walk 10 ft: 09: Not applicable  Walk 50 ft with two turns: 09: Not applicable  Walk 150ft: 09: Not applicable    CURRENT GAP IN FUNCTION  Prior to Admission: Functional Status: Patient was independent with mobility/ambulation, transfers, ADL's, IADL's.    Expected functional outcomes: It is expected that with skilled acute rehabilitation services the patient will progress to Independent for self care and Independent for mobility     Estimated length of stay: 10 to 14 days    Anticipated Post-Discharge Disposition/Treatment  Disposition: Return to previous home/apartment.  Outpatient Services: Physical Therapy (PT) and Occupational Therapy (OT)    BARRIERS TO DISCHARGE  Weakness, Balance Difficulty, Fatigue, Home Accessibility, Caregiver Accessibility, and Equipment Needs    INTERVENTIONS FOR DISCHARGE  Adaptive equipment, Patient/Family/Caregiver Education, Community Resources, Support Group, Arrange DME needs, Therapy exercises, and Energy conservation education     REQUIRED THERAPY:  Patient will require PT and OT 90 minutes each per day, five days per week to achieve rehab goals.     REQUIRED FUNCTIONAL AND MEDICAL MANAGEMENT FOR INPATIENT REHABILITATION:  Skin:  Monitor skin for breakdown, Pain Management: Overall pain is moderately controlled, Deep Vein Thrombosis (DVT) Prophylaxis:  Per MD orders, Diabetes Management: continue  sliding scale insulin, patient to do finger sticks as ordered, SLIM to continue to manage diabetes, further internal medicine management of additional medical conditions while on ARC, PT/OT intervention, patient/family education and training, and any needed consults PRN.    RECOMMENDED LEVEL OF CARE:   This is a 69 year old male with a PMH of COPD, hypertension, diabetes, atrial flutter, chronic respiratory failure (on 2 L of oxygen) and alcohol abuse who presented to [facility] on 5/7/24 with SOB and wheezing.  Daughter reported that the patient had been having dysarthria, leaning to the left side and had increased weakness for the past 5 days prior to admission.  He fell 2 days ago prior to admission.  He has a history of atrial flutter and recently underwent an ablation and loop recorder insertion on 4/8/24.  He was started on Eliquis after that, however, he was not taking it.  Daughter manages his medications and was unaware he was supposed to take Eliquis.  Patient found to have a COPD exacerbation during this ER visit.  He received IV Solu-Medrol and nebulizer treatments with some improvement.  He was transitioned to Prednisone.  He was also given azithromycin 500 mg daily x 3 days.  Because of the stroke like symptoms and falls he had prior to admission he was worked up for a CVA.  CT was negative but MRI showed multiple infarcts of varying ages in the posterior circulation include multiple small acute/early subacute infarcts in the bilateral cerebellum.  Additional small foci of recent ischemia was seen in the left PCA distribution superimposed on chronic infarct.  ECHO recently obtained as an outpatient and showed left ventricular wall thickness is mildly increased; mild concentric hypertrophy, EF 60%; left and right atrium dilated; aortic valve sclerosis and trace tricuspid valve regurgitation; he aortic root is mildly dilated; he ascending aorta is mildly dilated.  TTE was repeated and  showed no significant changes from prior study.  For the stroke he is taking Eliquis, ASA and Lipitor.  Patient worked with therapy and was recommended for rehab following his hospital stay.  He has demonstrated that he can tolerate three hours of therapy, five days a week and is now medically cleared for discharge to the [facility].      PTA, patient was independent with transfers, ambulation, and ADLs.  He lives with daughter and family in a 2 story home with 2 HETAL. Pt is currently functioning below baseline needing supervision to moderate assistance to complete his ADLs.  With PT, he is requiring supervision for bed mobility and min x 2 assistance for transfers.  He ambulated 2 feet with bilateral HHA of mod x 2.  Gait pattern: unsteady, improper weight shift, decreased foot clearance, short stride and excessively slow.    Patient requires close oversight by a physician, PM&R management, 24/7 rehabilitation nursing care and specialized interdisciplinary therapy services in preparation for discharge which can only be provided in the inpatient acute rehabilitation setting.  While on [facility], this patient will need routine neuro checks as well as close monitoring of his VS, I/Os, blood sugars, respiratory status, mentation, skin and his overall condition.  Inpatient acute rehabilitation is recommended for this patient to maximize his overall strength, endurance, self-care, speech and mobility upon discharge back to his home with the support of his family.

## 2024-05-11 NOTE — ASSESSMENT & PLAN NOTE
Presented with increased weakness, dysarthria and leaning on the left side x 5 days with fall 2 days prior to admission in the setting of noncompliance with Eliquis  CTA with chronic left PCA territory infarction  MRI with multiple infarcts of varying ages and posterior circulation including multiple small acute/early subacute infarct in the bilateral cerebellum with additional small foci of recent ischemia in the left PCA distribution superimposed on chronic infarct  Echo with EF 55%, no cardiac thrombus  Maintained on aspirin and Eliquis  Continue Lipitor 80 mg daily  Outpatient follow-up with neurology  Presents for rehab

## 2024-05-11 NOTE — CASE MANAGEMENT
Case Management Discharge Planning Note    Patient name Lucho Talavera  Location [facility]-01 MRN [id number]  : 1955 Date 2024       Current Admission Date: 2024  Current Admission Diagnosis:Chronic obstructive pulmonary disease with acute exacerbation (HCC)   Patient Active Problem List    Diagnosis Date Noted    Stroke (cerebrum) ([facility]) 2024    Stroke-like symptom 2024    Atrial fibrillation ([facility]) 04/15/2024    Atrial flutter with rapid ventricular response ([facility]) 2024    Leukocytosis 2024    COVID-19 10/25/2023    History of alcohol abuse 10/17/2023    Iron deficiency anemia secondary to inadequate dietary iron intake 10/17/2023    SIRS (systemic inflammatory response syndrome) ([facility]) 2023    Hypertension 2023    COPD (chronic obstructive pulmonary disease) ([facility]) 2023    Anxiety 2023    Chronic respiratory failure ([facility]) 2023    Stage 3 severe COPD by GOLD classification ([facility]) 2023    Oral abscess 2022    Tooth fracture 2022    H/O ETOH abuse 2022    Closed nondisplaced fracture of posterior arch of first cervical vertebra ([facility]) 2022    Fall 2022    Diabetic polyneuropathy associated with type 2 diabetes mellitus ([facility]) 2021    Hammertoe, bilateral 2021    Post-traumatic arthritis of left foot 2021    Pain due to onychomycosis of toenail 2021    Bronchitis 2020    KLARISSA (obstructive sleep apnea) 2020    Dirty living conditions 2020    Closed fracture of first cervical vertebra ([facility]) 2019    ETOH abuse 2019    Chronic obstructive pulmonary disease with acute exacerbation ([facility]) 2018    At moderate risk for venous thromboembolism (VTE) 2018    S/P C1-T1 Posterior Cervical Discectomy and Fusion on 9/10/18 2018    Acute blood loss anemia 2018    Type 2 diabetes mellitus ([facility]) 2018    Closed odontoid fracture with routine  healing 09/09/2018    Closed displaced fracture of third cervical vertebra (HCC) 09/09/2018    Closed displaced fracture of fourth cervical vertebra (HCC) 09/09/2018    Alcohol abuse 09/09/2018    CAD (coronary artery disease) 09/09/2018    Dens fracture (HCC) 09/09/2018      LOS (days): 4  Geometric Mean LOS (GMLOS) (days): 2.7  Days to GMLOS:-1     OBJECTIVE:  Risk of Unplanned Readmission Score: 39.8         Current admission status: Inpatient   Preferred Pharmacy:   [facility] [place] - [address] [place]  [address] [place]  [place] [address]  Phone: [phone] Fax: [phone]    [facility] [place] - [address]  [address]  [place] [address]  Phone: [phone] Fax: [phone]    Primary Care Provider: TATIANA Cotto    Primary Insurance: MEDICARE  Secondary Insurance: BLUE CROSS    DISCHARGE DETAILS:    Discharge planning discussed with:: Nimco and patient.  Freedom of Choice: Yes  Comments - Freedom of Choice: FOC discussed.  [facility] is choice. Pending bed.  CM contacted family/caregiver?: Yes  Were Treatment Team discharge recommendations reviewed with patient/caregiver?: Yes  Did patient/caregiver verbalize understanding of patient care needs?: Yes       Contacts  Patient Contacts: Nimco Hanson  Relationship to Patient:: Family  Contact Method: Phone  Phone Number: [phone]  Reason/Outcome: Discharge Planning       Other Referral/Resources/Interventions Provided:  Referral Comments: [facility] is choice.  Reserved in [facility].  Pending bed.

## 2024-05-11 NOTE — H&P
[facility]  H&P  Name: Lucho Talavera 69 y.o. male I MRN: [id number]  Unit/Bed#: -01 I Date of Admission: 5/11/2024   Date of Service: 5/11/2024 I Hospital Day: 0      Assessment/Plan   * Stroke (cerebrum) (HCC)  Assessment & Plan  Presented with increased weakness, dysarthria and leaning on the left side x 5 days with fall 2 days prior to admission in the setting of noncompliance with Eliquis  CTA with chronic left PCA territory infarction  MRI with multiple infarcts of varying ages and posterior circulation including multiple small acute/early subacute infarct in the bilateral cerebellum with additional small foci of recent ischemia in the left PCA distribution superimposed on chronic infarct  Echo with EF 55%, no cardiac thrombus  Maintained on aspirin and Eliquis  Continue Lipitor 80 mg daily  Outpatient follow-up with neurology  Presents for rehab    Atrial fibrillation (HCC)  Assessment & Plan  Previously admitted for atrial flutter with RVR in 4/2024 s/p ablation and loop recorder insertion.  Previously noncompliant with Eliquis.  Continue Eliquis  Currently rate controlled    Hypertension  Assessment & Plan  Blood Pressure: 118/80     Blood pressure stable off lisinopril  Continue holding lisinopril    Chronic respiratory failure (HCC)  Assessment & Plan  Maintained on 2 L nasal cannula baseline  Monitor    ETOH abuse  Assessment & Plan  History of alcohol abuse  Monitor off CIWA protocol  Ativan as needed anxiety  Continue supplements with thiamine, folate, MVT    Chronic obstructive pulmonary disease with acute exacerbation (HCC)  Assessment & Plan  Initially presented with COPD exacerbation which is improving with s/p nebs, steroids (5 days) and azithromycin (3 days)  Resume home inhalers  Continue nebs as needed  Respiratory protocol    Type 2 diabetes mellitus (HCC)  Assessment & Plan  Lab Results   Component Value Date    HGBA1C 8.9 (H) 04/06/2024        Recent Labs     05/10/24  1558 05/10/24  2130 05/11/24  0635 05/11/24  1102   POCGLU 285* 230* 179* 189*       Blood Sugar Average: Last 72 hrs:    Blood sugars elevated in the setting of recent use of steroids  Continue Lantus 20 units at bedtime  Correctional scale insulin ordered  Hypoglycemia protocol  Diabetic diet         VTE Pharmacologic Prophylaxis:   Moderate Risk (Score 3-4) - Pharmacological DVT Prophylaxis Ordered: apixaban (Eliquis).  Code Status: Level 1 - Full Code   Discussion with family: Patient declined call to .     Anticipated Length of Stay: Per rehab physician    Total Time Spent on Date of Encounter in care of patient: 60 mins. This time was spent on one or more of the following: performing physical exam; counseling and coordination of care; obtaining or reviewing history; documenting in the medical record; reviewing/ordering tests, medications or procedures; communicating with other healthcare professionals and discussing with patient's family/caregivers.    Chief Complaint: short of breath    History of Present Illness:  Lucho Talavera is a 69 y.o. male with a PMH of COPD, afib on eliquis, HTN, CVA, T2DM, chronic resp failure on 2L who presents to [facility] for rehab after recent CVA. Patient presented with shortness of breath along with weakness, dysarthria, and leaning to the left. Workup revealed COPD exacerbation and stroke. Patient was treated with steroids, nebs and azithromycin with improvement in COPD exacerbation. Neurology completed stroke evaluation with MRI and TTE. MRI revealed multiple infarcts of varying ages in posterior circulation including multiple small acute/early subacute infarct in the bilateral cerebellum with additional recent ischemia in the left PCA distribution.  Suspect the etiology was likely cardioembolic in the setting of atrial flutter/A-fib with noncompliance with Eliquis.  Patient was stable for discharge to [facility] for further rehabilitation.      Review of Systems:  Review of Systems   Respiratory:  Positive for shortness of breath.    Neurological:  Positive for speech difficulty and weakness.   All other systems reviewed and are negative.      Past Medical and Surgical History:   Past Medical History:   Diagnosis Date    Cardiac arrest (HCC)     COPD (chronic obstructive pulmonary disease) (HCC)     CVA (cerebral vascular accident) (HCC)     Diabetes mellitus (HCC)     Heart attack (HCC)     Hypertension     Stroke (HCC)        Past Surgical History:   Procedure Laterality Date    CARDIAC ELECTROPHYSIOLOGY PROCEDURE N/A 4/8/2024    Procedure: Cardiac eps/aflutter ablation;  Surgeon: Ihsan Blum DO;  Location: [facility];  Service: Cardiology    CERVICAL FUSION N/A 9/10/2018    Procedure: Posterior cervical decompressive laminectomy C3-6; Posterior cervical lateral mass and pedicle fixation fusion C1-T1;  Surgeon: Papa Quiros MD;  Location: [facility] MAIN OR;  Service: Neurosurgery       Meds/Allergies:  Prior to Admission medications    Medication Sig Start Date End Date Taking? Authorizing Provider   albuterol (Proventil HFA) 90 mcg/act inhaler Inhale 2 puffs every 6 (six) hours as needed for wheezing  Patient not taking: Reported on 5/7/2024 1/1/24   Pro Kwan MD   apixaban (Eliquis) 5 mg Take 1 tablet (5 mg total) by mouth 2 (two) times a day 5/11/24   Papa Reed MD   aspirin 81 mg chewable tablet Chew 81 mg daily    Historical Provider, MD   atorvastatin (LIPITOR) 80 mg tablet Take 1 tablet (80 mg total) by mouth daily with dinner 9/22/18   Bowen Hartman MD   B Complex Vitamins (VITAMIN B COMPLEX 100 IJ) Take 1 tablet by mouth daily    Historical Provider, MD   budesonide (PULMICORT) 0.5 mg/2 mL nebulizer solution Take 2 mL (0.5 mg total) by nebulization every 12 (twelve) hours Rinse mouth after use. 6/10/22   Jenna Meneses PA-C   busPIRone (BUSPAR) 10 mg tablet Take 1 tablet (10 mg total) by mouth 3 (three) times a day  7/28/23   Thelma Huizar PA-C   ferrous sulfate 324 (65 Fe) mg Take 1 tablet (324 mg total) by mouth 2 (two) times a day before meals 10/18/23   Dawson Whitley   fluticasone (FLONASE) 50 mcg/act nasal spray 1 spray into each nostril 2 (two) times a day 10/4/13   Historical Provider, MD   Fluticasone-Salmeterol (Advair) 500-50 mcg/dose inhaler Inhale 1 puff 2 (two) times a day Rinse mouth after use. 1/1/24   Pro Kwan MD   folic acid (FOLVITE) 1 mg tablet Take 1 tablet (1 mg total) by mouth daily 6/11/22   Jenna Meneses PA-C   Insulin Glargine (BASAGLAR KWIKPEN SC) Inject 30 Units under the skin daily at bedtime    Historical Provider, MD   ipratropium-albuterol (DUO-NEB) 0.5-2.5 mg/3 mL nebulizer solution Take 3 mL by nebulization 4 (four) times a day    Historical Provider, MD   lisinopril (ZESTRIL) 40 mg tablet Take 40 mg by mouth daily     Historical Provider, MD   LORazepam (Ativan) 0.5 mg tablet Take by mouth every 6 (six) hours as needed for anxiety     Historical Provider, MD   melatonin 3 mg Take 1 tablet (3 mg total) by mouth daily at bedtime as needed (30) 9/21/18   Bowen Hartman MD   metFORMIN (GLUCOPHAGE) 1000 MG tablet Take 1 tablet by mouth every 12 (twelve) hours    Historical Provider, MD   pantoprazole (Protonix) 40 mg tablet Take 1 tablet (40 mg total) by mouth 2 (two) times a day 8/8/23   TATIANA Thorpe   thiamine 100 MG tablet Take 1 tablet (100 mg total) by mouth daily 9/22/18   Bowen Hartman MD   tiotropium (SPIRIVA) 18 mcg inhalation capsule Place 1 capsule (18 mcg total) into inhaler and inhale daily 1/1/24   Pro Kwan MD   apixaban (Eliquis) 5 mg Take 1 tablet (5 mg total) by mouth 2 (two) times a day Do not fill, please check copay  Patient not taking: Reported on 5/7/2024 4/8/24 5/11/24  Brianna Mcneal PA-C   Fluticasone-Umeclidin-Vilant (TRELEGY ELLIPTA IN) Inhale  5/11/24  Historical Provider, MD MCCOY have reviewed home medications using recent Epic  encounter.    Allergies:   Allergies   Allergen Reactions    Amoxicillin Hives    Augmentin [Amoxicillin-Pot Clavulanate] Hives       Social History:  Marital Status: Single     Substance Use History:   Social History     Substance and Sexual Activity   Alcohol Use Not Currently    Alcohol/week: 8.0 - 12.0 standard drinks of alcohol    Types: 8 - 12 Cans of beer per week    Comment: every day drinker     Social History     Tobacco Use   Smoking Status Former    Types: Cigarettes   Smokeless Tobacco Never   Tobacco Comments    quit 5 months ago      Social History     Substance and Sexual Activity   Drug Use Never       Family History:  Family History   Problem Relation Age of Onset    Heart attack Mother        Physical Exam:     Vitals:   Blood Pressure: 118/80 (05/11/24 1535)  Pulse: 91 (05/11/24 1535)  Temperature: 98.4 °F (36.9 °C) (05/11/24 1535)  Temp Source: Oral (05/11/24 1535)  Respirations: 17 (05/11/24 1535)  Weight - Scale: 83.6 kg (184 lb 4.8 oz) (05/11/24 1515)  SpO2: 92 % (05/11/24 1535)    Physical Exam  Vitals reviewed.   Constitutional:       General: He is not in acute distress.     Appearance: Normal appearance. He is not ill-appearing.   HENT:      Head: Normocephalic and atraumatic.   Cardiovascular:      Rate and Rhythm: Normal rate and regular rhythm.      Heart sounds: Normal heart sounds.   Pulmonary:      Effort: Pulmonary effort is normal. No respiratory distress.      Breath sounds: No wheezing or rales.      Comments: 2L  Abdominal:      General: Bowel sounds are normal.      Tenderness: There is no abdominal tenderness.   Musculoskeletal:      Right lower leg: No edema.      Left lower leg: No edema.   Skin:     General: Skin is warm and dry.   Neurological:      Mental Status: He is alert and oriented to person, place, and time. Mental status is at baseline.          Additional Data:     Lab Results:  Results from last 7 days   Lab Units 05/11/24  0524   WBC Thousand/uL 10.50*    HEMOGLOBIN g/dL 12.4   HEMATOCRIT % 39.5   PLATELETS Thousands/uL 395*   SEGS PCT % 61   LYMPHO PCT % 28   MONO PCT % 9   EOS PCT % 1     Results from last 7 days   Lab Units 05/11/24  0524   SODIUM mmol/L 134*   POTASSIUM mmol/L 3.8   CHLORIDE mmol/L 100   CO2 mmol/L 25   BUN mg/dL 14   CREATININE mg/dL 0.50*   ANION GAP mmol/L 9   CALCIUM mg/dL 8.5   ALBUMIN g/dL 3.7   TOTAL BILIRUBIN mg/dL 0.51   ALK PHOS U/L 53   ALT U/L 18   AST U/L 18   GLUCOSE RANDOM mg/dL 138         Results from last 7 days   Lab Units 05/11/24  1102 05/11/24  0635 05/10/24  2130 05/10/24  1558 05/10/24  1106 05/10/24  0627 05/09/24  2150 05/09/24  1657 05/09/24  1204 05/09/24  0808 05/08/24  2037 05/08/24  1733   POC GLUCOSE mg/dl 189* 179* 230* 285* 202* 155* 171* 164* 169* 175* 181* 167*               Lines/Drains:  Invasive Devices       Peripheral Intravenous Line  Duration             Peripheral IV 05/07/24 Proximal;Right;Ventral (anterior) Forearm 3 days                        Imaging: Reviewed radiology reports from this admission including: CT head and MRI brain  No orders to display       EKG and Other Studies Reviewed on Admission:   EKG: No EKG obtained.    ** Please Note: This note has been constructed using a voice recognition system. **

## 2024-05-12 PROBLEM — Z76.89 ENCOUNTER FOR SKIN CARE: Status: ACTIVE | Noted: 2024-05-12

## 2024-05-12 LAB
GLUCOSE SERPL-MCNC: 174 MG/DL (ref 65–140)
GLUCOSE SERPL-MCNC: 191 MG/DL (ref 65–140)
GLUCOSE SERPL-MCNC: 215 MG/DL (ref 65–140)
GLUCOSE SERPL-MCNC: 219 MG/DL (ref 65–140)

## 2024-05-12 PROCEDURE — 97116 GAIT TRAINING THERAPY: CPT

## 2024-05-12 PROCEDURE — 94760 N-INVAS EAR/PLS OXIMETRY 1: CPT

## 2024-05-12 PROCEDURE — 92522 EVALUATE SPEECH PRODUCTION: CPT

## 2024-05-12 PROCEDURE — 82948 REAGENT STRIP/BLOOD GLUCOSE: CPT

## 2024-05-12 PROCEDURE — 97162 PT EVAL MOD COMPLEX 30 MIN: CPT

## 2024-05-12 PROCEDURE — 97535 SELF CARE MNGMENT TRAINING: CPT

## 2024-05-12 PROCEDURE — 94640 AIRWAY INHALATION TREATMENT: CPT

## 2024-05-12 PROCEDURE — 99232 SBSQ HOSP IP/OBS MODERATE 35: CPT | Performed by: NURSE PRACTITIONER

## 2024-05-12 PROCEDURE — 94664 DEMO&/EVAL PT USE INHALER: CPT

## 2024-05-12 PROCEDURE — 97130 THER IVNTJ EA ADDL 15 MIN: CPT

## 2024-05-12 PROCEDURE — 97112 NEUROMUSCULAR REEDUCATION: CPT

## 2024-05-12 PROCEDURE — 97166 OT EVAL MOD COMPLEX 45 MIN: CPT

## 2024-05-12 PROCEDURE — 92610 EVALUATE SWALLOWING FUNCTION: CPT

## 2024-05-12 PROCEDURE — 92523 SPEECH SOUND LANG COMPREHEN: CPT

## 2024-05-12 PROCEDURE — 97129 THER IVNTJ 1ST 15 MIN: CPT

## 2024-05-12 PROCEDURE — 97530 THERAPEUTIC ACTIVITIES: CPT

## 2024-05-12 RX ORDER — INSULIN GLARGINE 100 [IU]/ML
25 INJECTION, SOLUTION SUBCUTANEOUS
Status: DISCONTINUED | OUTPATIENT
Start: 2024-05-12 | End: 2024-05-18

## 2024-05-12 RX ADMIN — BUSPIRONE HYDROCHLORIDE 10 MG: 10 TABLET ORAL at 09:34

## 2024-05-12 RX ADMIN — FLUTICASONE PROPIONATE 1 SPRAY: 50 SPRAY, METERED NASAL at 09:35

## 2024-05-12 RX ADMIN — INSULIN LISPRO 1 UNITS: 100 INJECTION, SOLUTION INTRAVENOUS; SUBCUTANEOUS at 11:27

## 2024-05-12 RX ADMIN — BUSPIRONE HYDROCHLORIDE 10 MG: 10 TABLET ORAL at 21:11

## 2024-05-12 RX ADMIN — ALBUTEROL SULFATE 2 PUFF: 90 AEROSOL, METERED RESPIRATORY (INHALATION) at 03:57

## 2024-05-12 RX ADMIN — FLUTICASONE FUROATE AND VILANTEROL TRIFENATATE 1 PUFF: 200; 25 POWDER RESPIRATORY (INHALATION) at 07:41

## 2024-05-12 RX ADMIN — APIXABAN 5 MG: 5 TABLET, FILM COATED ORAL at 09:34

## 2024-05-12 RX ADMIN — ASPIRIN 81 MG CHEWABLE TABLET 81 MG: 81 TABLET CHEWABLE at 09:34

## 2024-05-12 RX ADMIN — INSULIN GLARGINE 25 UNITS: 100 INJECTION, SOLUTION SUBCUTANEOUS at 21:10

## 2024-05-12 RX ADMIN — LEVALBUTEROL HYDROCHLORIDE 1.25 MG: 1.25 SOLUTION RESPIRATORY (INHALATION) at 13:49

## 2024-05-12 RX ADMIN — BUSPIRONE HYDROCHLORIDE 10 MG: 10 TABLET ORAL at 16:00

## 2024-05-12 RX ADMIN — GUAIFENESIN 600 MG: 600 TABLET, EXTENDED RELEASE ORAL at 17:31

## 2024-05-12 RX ADMIN — FERROUS SULFATE TAB 325 MG (65 MG ELEMENTAL FE) 325 MG: 325 (65 FE) TAB at 07:40

## 2024-05-12 RX ADMIN — THIAMINE HCL TAB 100 MG 100 MG: 100 TAB at 09:34

## 2024-05-12 RX ADMIN — INSULIN LISPRO 1 UNITS: 100 INJECTION, SOLUTION INTRAVENOUS; SUBCUTANEOUS at 07:41

## 2024-05-12 RX ADMIN — FLUTICASONE PROPIONATE 1 SPRAY: 50 SPRAY, METERED NASAL at 17:31

## 2024-05-12 RX ADMIN — INSULIN LISPRO 1 UNITS: 100 INJECTION, SOLUTION INTRAVENOUS; SUBCUTANEOUS at 16:00

## 2024-05-12 RX ADMIN — UMECLIDINIUM 1 PUFF: 62.5 AEROSOL, POWDER ORAL at 09:36

## 2024-05-12 RX ADMIN — LEVALBUTEROL HYDROCHLORIDE 1.25 MG: 1.25 SOLUTION RESPIRATORY (INHALATION) at 20:40

## 2024-05-12 RX ADMIN — APIXABAN 5 MG: 5 TABLET, FILM COATED ORAL at 17:31

## 2024-05-12 RX ADMIN — GUAIFENESIN 600 MG: 600 TABLET, EXTENDED RELEASE ORAL at 09:34

## 2024-05-12 RX ADMIN — FOLIC ACID 1 MG: 1 TABLET ORAL at 09:34

## 2024-05-12 RX ADMIN — PANTOPRAZOLE SODIUM 40 MG: 40 TABLET, DELAYED RELEASE ORAL at 06:19

## 2024-05-12 RX ADMIN — ATORVASTATIN CALCIUM 80 MG: 80 TABLET, FILM COATED ORAL at 16:00

## 2024-05-12 RX ADMIN — Medication 1 TABLET: at 09:34

## 2024-05-12 RX ADMIN — LEVALBUTEROL HYDROCHLORIDE 1.25 MG: 1.25 SOLUTION RESPIRATORY (INHALATION) at 09:34

## 2024-05-12 NOTE — PROGRESS NOTES
[facility]  Progress Note  Name: Lucho Talavera I  MRN: [id number]  Unit/Bed#: -01 I Date of Admission: 5/11/2024   Date of Service: 5/12/2024 I Hospital Day: 1    Assessment/Plan   * Stroke (cerebrum) (HCC)  Assessment & Plan  Presented with increased weakness, dysarthria and leaning on the left side x 5 days with fall 2 days prior to admission in the setting of noncompliance with Eliquis  CTA with chronic left PCA territory infarction  MRI with multiple infarcts of varying ages and posterior circulation including multiple small acute/early subacute infarct in the bilateral cerebellum with additional small foci of recent ischemia in the left PCA distribution superimposed on chronic infarct  Echo with EF 55%, no cardiac thrombus  Maintained on aspirin and Eliquis  Continue Lipitor 80 mg daily  Outpatient follow-up with neurology  Presents for rehab    Atrial fibrillation (HCC)  Assessment & Plan  Previously admitted for atrial flutter with RVR in 4/2024 s/p ablation and loop recorder insertion.  Previously noncompliant with Eliquis.  Continue Eliquis  Currently rate controlled    Type 2 diabetes mellitus (HCC)  Assessment & Plan  Lab Results   Component Value Date    HGBA1C 8.9 (H) 04/06/2024       Recent Labs     05/11/24  1102 05/11/24  1627 05/11/24  2035 05/12/24  0611   POCGLU 189* 254* 271* 174*       Blood Sugar Average: Last 72 hrs:  (P) 233  Blood sugars elevated in the setting of recent use of steroids  Increase Lantus 20 units at bedtime to 25 units tonight for fast of 174 this am   Correctional scale insulin ordered  Hypoglycemia protocol  Diabetic diet    Hypertension  Assessment & Plan  Blood Pressure: 112/59     Blood pressure stable off lisinopril  Continue holding lisinopril    Chronic respiratory failure (HCC)  Assessment & Plan  Maintained on 2 L nasal cannula baseline  Monitor    ETOH abuse  Assessment & Plan  History of alcohol abuse  Monitor off  MEHDI protocol - Pt now post withdrawal window   Ativan as needed anxiety  Continue supplements with thiamine, folate, MVT    Chronic obstructive pulmonary disease with acute exacerbation (HCC)  Assessment & Plan  Initially presented with COPD exacerbation which is improving with s/p nebs, steroids (5 days) and azithromycin (3 days)  Resume home inhalers  Continue nebs as needed  Respiratory protocol               The above assessment and plan was reviewed and updated as determined by my evaluation of the patient on 5/12/2024.    Labs:   Results from last 7 days   Lab Units 05/11/24  0524 05/10/24  0527   WBC Thousand/uL 10.50* 7.81   HEMOGLOBIN g/dL 12.4 11.9*   HEMATOCRIT % 39.5 37.9   PLATELETS Thousands/uL 395* 354     Results from last 7 days   Lab Units 05/11/24  0524 05/10/24  0527   SODIUM mmol/L 134* 134*   POTASSIUM mmol/L 3.8 4.0   CHLORIDE mmol/L 100 102   CO2 mmol/L 25 24   BUN mg/dL 14 20   CREATININE mg/dL 0.50* 0.56*   CALCIUM mg/dL 8.5 8.2*             Results from last 7 days   Lab Units 05/12/24  0611 05/11/24 2035 05/11/24  1627   POC GLUCOSE mg/dl 174* 271* 254*       Imaging  No orders to display       Review of Scheduled Meds:  Current Facility-Administered Medications   Medication Dose Route Frequency Provider Last Rate    acetaminophen  650 mg Oral Q6H PRN Richmond Chew, DO      albuterol  2 puff Inhalation Q6H PRN William Camacho, DO      albuterol  2.5 mg Nebulization Q6H PRN Richmond Chew, DO      apixaban  5 mg Oral BID Richmond Chew, DO      aspirin  81 mg Oral Daily Richmond Chew, DO      atorvastatin  80 mg Oral Daily With Dinner Richmond Chew, DO      busPIRone  10 mg Oral TID Richmond Chew, DO      ferrous sulfate  325 mg Oral Daily With Breakfast Richmond Chew, DO      fluticasone  1 spray Nasal BID Richmond Chew, DO      fluticasone-vilanterol  1 puff Inhalation Daily Richmond Chew, DO      folic acid  1 mg Oral Daily Richmond  Jeevan, DO      guaiFENesin  600 mg Oral BID Richmond Chew, DO      insulin glargine  25 Units Subcutaneous HS Sil TATIANA Ceja      insulin lispro  1-5 Units Subcutaneous TID AC Richmond Chew, DO      levalbuterol  1.25 mg Nebulization TID Richmond Chew, DO      LORazepam  0.5 mg Oral BID PRN Paramjitkaitlynjenelletim Chew, DO      melatonin  3 mg Oral HS PRN Richmond Chew, DO      multivitamin-minerals  1 tablet Oral Daily Paramjitzay Chew, DO      oxyCODONE  2.5 mg Oral Q4H PRN Paramjitkaitlynjenelletim Chew, DO      pantoprazole  40 mg Oral Early Morning Paramjitkaitlynfemi Chew, DO      thiamine  100 mg Oral Daily Paramjitalcidestim Chew, DO      umeclidinium  1 puff Inhalation Daily Richmond Chew, DO         Subjective/ HPI: Patient seen and examined. Patients overnight issues or events were reviewed with nursing staff. New or overnight issues include the following: Patient reports having had a bowel movement yesterday.  Denies any pain.  Slight slur to speech.  Unsteady on feet working with occupational therapy at this time.  Difficulty putting on his underwear.  Reports having positive sensation in lower extremities.  Reports living with his daughter and 4 children.  Also reports being on 2 L at baseline.  And denies acute shortness of breath at this time    Cell number for daughter [phone] not in service .   Call number under home ; left message for krunal need to update Monday .     ROS:   A 10 point ROS was performed; negative except as noted above.        *Labs /Radiology studies Reviewed  *Medications  reviewed and reconciled as needed  *Please refer to order section for additional ordered labs studies      Physical Examination:  Vitals:   Vitals:    05/11/24 1953 05/11/24 2358 05/12/24 0609 05/12/24 0934   BP:   112/59    BP Location:   Left arm    Pulse:       Resp:   18    Temp:   98.7 °F (37.1 °C)    TempSrc:   Oral    SpO2: 95% 96% 100% 95%   Weight:       Height:           General Appearance: NAD;  pleasant  HEENT: PERRLA, conjuctiva normal; mucous membranes moist; face symmetrical  Neck:  Supple  Lungs: On 2 L chronically and currently, rhonchi bilaterally, normal respiratory effort, no retractions,  CV: regular rate and rhythm, no murmurs rubs or gallops noted   ABD: soft non tender, +BS x4  EXT: DP pulses intact, no lymphadenopathy, no edema.  Lower extremities with discoloration when feet dependent.  Warm however to touch and positive sensation per patient  Skin: normal turgor, normal texture, no rash  Psych: affect odd, slight slur to speech  Neuro: AAOx3       The above physical exam was reviewed and updated as determined by my evaluation of the patient on 5/12/2024.    Invasive Devices       None                      VTE Pharmacologic Prophylaxis: Eliquis  Code Status: Level 1 - Full Code  Current Length of Stay: 1 day(s)    Total floor / unit time spent today 20 minutes  Coordination of patient's care was performed in conjunction with primary service. Time invested included review of patient's labs, vitals, and management of their comorbidities with continued monitoring, examination of patient as well as answering patient questions, documenting her findings and creating progress note in electronic medical record,  ordering appropriate diagnostic testing.       ** Please Note:  voice to text software may have been used in the creation of this document. Although proof errors in transcription or interpretation are a potential of such software**

## 2024-05-12 NOTE — ASSESSMENT & PLAN NOTE
Seen by wound care 5/7  - Managed R lateral forearm skin tear   - Noted to have old open area on sacrum on admission.   - Consulted wound care to follow-up. Epithelialized   - Offloading,specialty cushion   - Allevyn   - Monitor closely.

## 2024-05-12 NOTE — ASSESSMENT & PLAN NOTE
With hx of stenting  IM consulted and with overall management at their discretion during ARC course  Antithrombotic: Aspirin and Eliquis   Statin  Optimal blood pressure and blood sugar control

## 2024-05-12 NOTE — PROGRESS NOTES
SLP Cognitive Linguistic Communication Assessment, Motor Speech Assessment and Bedside Swallow Assessment       05/12/24 0800   Pain Assessment   Pain Assessment Tool 0-10   Pain Score No Pain   Restrictions/Precautions   Precautions Aspiration;Bed/chair alarms;Cognitive;Fall Risk;Supervision on toilet/commode;O2  (2L NC)   Weight Bearing Restrictions No   ROM Restrictions No   Cognitive Linguisitic Assessments   Cognitive Linquistic Quick Test (CLQT) Pt completed the CLQT+ on initial evaluation with a Composite Severity Rating score of 2.2 out of 4.0, correlating to overall moderately impaired  cognitive linguistic impairments at time of evaluation and in comparison to age matched peers ranging from 70-88 y/o. Pt scored at or above criterion cut score for 4 out of 10 tasks completed. Pt's Cognitive Domain Scores are as follows:      Cognitive Domain: Score:  Severity Rating:   Attention   42 moderately impaired    Memory 92 moderately impaired        Executive Functions 16 mildly impaired       Language 24 moderately impaired       Visuospatial Skills  33 moderately impaired       Clock Drawing Screen    9 moderately impaired    Overall Composite Severity Rating Score 2.2 out of 4.0 moderately impaired       Pt was educated on results of assessment with review of overall results and performance on each cognitive domain, as well as specific focus on weaker areas, which will primarily be targeted during therapy sessions. Pt was receptive to all information provided, along with recommendations for skilled SLP services during acute rehab stay to maximize overall cognitive linguistic skills.      Comprehension   Auditory Basic   Visual Basic   Findings Pt completed the CLQT and motor speech assessments- see SLP rehab note for details.   QI: Comprehension 3. Usually Understands: Understands most conversations, but misses some part/intent of message. Requires cues at times to understand.   Comprehension (FIM) 4 -  Understands basic info/conversation 75-90% of time   Expression   Verbal Basic   Non-Verbal Basic   Intelligibility Phrase   Findings Pt completed the CLQT and motor speech assessments- see SLP rehab note for details.   QI: Expression 3. Exhibits some difficulty with expressing needs and ideas (e.g., some words or finishing thoughts) or speech is not clear   Expression (FIM) 3 - Expresses basic info/needs 50-74% of time   Social Interaction   Cooperation with staff   Participation Individual   Behaviors observed Appropriate   Findings Pt completed the CLQT and motor speech assessments- see SLP rehab note for details.   Social Interaction (FIM) 5 - Interacts appropriately with others 90% of time   Problem Solving   Routine Manages call bell   Findings Pt completed the CLQT and motor speech assessments- see SLP rehab note for details.   Problem solving (FIM) 3 - Solves basic problmes 50-74% of time   Memory   Recognize People No   Remember Routine No   Initiates Tasks No   Short-Term Impaired   Long Term Intact   Recalls Precaution No   Findings Pt completed the CLQT and motor speech assessments- see SLP rehab note for details.   Memory (FIM) 3 - Recognizes, recalls/performs 50-74%   Motor Speech Evaluation   Motor Speech Evaluation Motor Speech Evaluation    Speech and Swallowing Mechanism Exam   Facial: symmetrical  Labial: WFL  Lingual: WFL  Velum: symmetrical  Mandible: adequate ROM  Dentition: adequate  Respiratory Support: 2L O2 via NC      Respiration and Phonation  Maximum Phonation Time  (Normal 15-20 seconds for healthy older adult with standard deviation of 5.5-6):  average 6.3 seconds    Diadochokinesis:   Puh puh puh (normal should be 3.0 to 5.5 repetitions/second): 1.3  tuh tuh tuh (normal should exceed 25 reps in 10 seconds): 27  kuh kuh kuh (normal should exceed 25 reps in 10 seconds): 25  puh tuh kuh (pattycake or buttercup)- (normal should exceed 8 reps in 10 seconds): 13      Articulation:  Single  "Syllable Words: 16/16  Multisyllabic Words: 4/10  Sentence Level: 2/5    Repetition of multisyllabic words 5 times: /15  Words of progressive length: 14/15    Counting 1-20: noted trying to go quickly and with decreased breath support, talking on end of breath    Counting 20-1: better pacing and overall intelligibility for task      S/S Oral apraxia:  None  S/S Verbal Apraxia:  None           Conversation:  Decreased breath support for speech  Decreased coordination of respiration with speech attempts  Imprecise articulation  Rapid rate      Impressions:  At this time pt is demonstrating speech intelligibility to be mildly impaired at word/phrase level but more moderately impaired at the sentence/conversational level. Speech overall characterized as having decreased breath support for speech, decreased coordination of respiration with speech attempts, imprecise articulation, and rapid rate.  Pt will benefit from SLP services at this time to improve overall speech intelligibility while on the acute rehab center.     Speech/Language/Cognition Assessmetn   Treatment Assessment Pt completed skilled cognitive linguistic assessment using CLQT- see above for score details. Pt overall scoring MODERATE deficits as compared to similar aged peers. Pt demonstrating deficits in all cognitive domains- specifically decreased processing, decreased ST/working memory, decreased thought organization, dysarthria, decreased executive function skills (problem solving, reasoning, organization, judgement) and overall insight to deficits which currently impact safety and functional mobility. Pt engaged in informal interview which pt was oriented to place, month and year. Pt did not recall \"stroke\" as dx but did state COPD which was part of his reason for admission. Pt able to state address,  and age. Reports living with daughter, her  and 4 kids- daughter working 2 days a week \"cleaning house\". 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 Pt was not driving prior but was independent for basic ADLs. Family completes all IADL tasks. Pt reports goal to \"feel better\"- noted deficits in conversation as pt asked SLP 3 times \"you live around here\" but then recognizing when provided response with \"oh that's write\". Pt reports feeling below his baseline in regards to thinking and motor speech skills- see motor speech assessment for details regarding dysarthria. Pt overall will benefit from skilled SLP services targeting cognitive linguistic communication skills for increased independence and decreased burden of care.   Eating   Type of Assistance Needed Set-up / clean-up;Supervision;Verbal cues   Physical Assistance Level No physical assistance   Eating CARE Score 4   Swallow Assessment   Swallow Treatment Assessment     Bedside Swallow Evaluation      Patient Name: Lucho Talavear    Today's Date: 5/12/2024     Problem List  Principal Problem:    Stroke (cerebrum) (HCC)  Active Problems:    CAD (coronary artery disease)    Type 2 diabetes mellitus (HCC)    Chronic obstructive pulmonary disease with acute exacerbation (HCC)    ETOH abuse    Chronic respiratory failure (HCC)    Hypertension    Atrial fibrillation (HCC)    Encounter for skin care      Past Medical History  Past Medical History:   Diagnosis Date    Cardiac arrest (HCC)     COPD (chronic obstructive pulmonary disease) (HCC)     CVA (cerebral vascular accident) (HCC)     Diabetes mellitus (HCC)     Heart attack (HCC)     Hypertension     Stroke (HCC)        Past Surgical History  Past Surgical History:   Procedure Laterality Date    CARDIAC ELECTROPHYSIOLOGY PROCEDURE N/A 4/8/2024    Procedure: Cardiac eps/aflutter ablation;  Surgeon: Ishan Blum DO;  Location: [facility];  Service: Cardiology    CERVICAL FUSION N/A 9/10/2018    Procedure: Posterior cervical decompressive laminectomy C3-6; Posterior cervical lateral mass and pedicle fixation fusion C1-T1;  Surgeon: Papa" "MD Chula;  Location: [facility];  Service: Neurosurgery           Reason for consult: new CVA, upon chart review pt was on ARC in 2018 and followed for dysphagia- was discharged home on Regular diet and thin liquids, no further follow up    Swallow Information   Current Risks for Dysphagia & Aspiration: Respiratory compromise;Dysarthria;General debilitation;New Neuro event;Brain injury;Cognitive deficit;Mental status change;HX neurologic dx;   Current Symptoms/Concerns: Cough;During meals;HX Dysphagia;   Current Diet: regular diet and thin liquids   Baseline Diet: regular diet and thin liquids      Baseline Assessment   Behavior/Cognition: alert  Speech/Language Status: able to participate in conversation and able to follow commands  Patient Positioning: upright in bed  Pain Status/Interventions/Response to Interventions: No report of or nonverbal indications of pain.      Swallow Mechanism Exam  Facial: symmetrical  Labial: WFL  Lingual: WFL  Velum: symmetrical  Mandible: adequate ROM  Dentition: adequate  Vocal quality:clear/adequate   Volitional Cough: strong/productive   Respiratory Status: on 2L O2 NC         Consistencies Assessed:  Consistencies Administered: thin liquids, soft solids, and hard solids  Materials administered included omelet with tomato, mushrooms and swiss, toast, and thin liquid by cup    Total Amount Consumed:   100% meal  240cc thin liquids by cup      Oral stage:WFL and minimal  Lip closure: functional to utensil and cup sip  Anterior spillage: none  Mastication: slow and minimally prolonged but functional  Bolus formation: suspect functional  Bolus control: suspect functional  Transfer: timely  Oral residue: none  Pocketing:none      Pharyngeal stage:mild  Swallow promptness: appears prompt with foods and liquids  Hyolaryngeal elevation: present  Wet voice: none  Throat clear: none  Cough: present during consumption with textures x5- pt reports \"morning phlegm\"   Secondary swallows: " none  Audible swallows: none     Esophageal stage:  Some belching present towards end of meal    Summary:      Pt presenting with minimal to WFL oral and mild pharyngeal dysphagia today. Symptoms or concerns included decreased mastication,  suspected pharyngeal residue versus retention/morning phlegm given hx COPD. Pt with coughing during meal x5.    Pt assessed with breakfast meal- able to set up tray and self feed independently. Retrieval and control with textures and liquids functional- no loss. Oral processing with regular textures minimally prolonged but functional breakdown and bolus formations achieved. Tranfers and swallows also appear timely however pt with coughing with textures x5. Pt reports he coughs in the morning to clear phlegm from the night. With liquids, thin by cup functional, timely transfers and swallows. No s/s with liquids this meal. Pt agreeable to brief follow up to assess at later meal regarding increased coughing.      Recommendations:  Diet: regular diet and thin liquids  Meds: whole with liquid  Strategies: upright posture, only feed when fully alert, slow rate of feeding, small bites/sips, quiet environment (tv off, limit talking, door closed, etc.), and alternating bites and sips    DISTANT supervision w/ meals.     Results reviewed with:  patient, Elmer QUESADA, Ivana OT   Aspiration precautions posted.    F/u ST tx: Pt will continue to benefit from ongoing skilled dysphagia tx sessions to establish safest least restrictive diet w/o increased oropharyngeal or aspiration sxs as well as monitor ability to carryover swallow strategies independently.    Plan: Cont diet as tolerated    Brief follow up x1-2 as needed with different meal to assess tolerance and for increased s/s     Swallow Assessment Prognosis   Prognosis Good   Prognosis Considerations Age;Co-morbidities;Medical diagnosis;Medical prognosis;Previous level of function;New learning ability;Ability to carry over;Cooperation   SLP  Therapy Minutes   SLP Time In 0800   SLP Time Out 0930   SLP Total Time (minutes) 90   SLP Mode of treatment - Individual (minutes) 90   SLP Mode of treatment - Concurrent (minutes) 0   SLP Mode of treatment - Group (minutes) 0   SLP Mode of treatment - Co-treat (minutes) 0   SLP Mode of Treatment - Total time(minutes) 90 minutes   SLP Cumulative Minutes 90

## 2024-05-12 NOTE — ASSESSMENT & PLAN NOTE
Lab Results   Component Value Date    HGBA1C 8.9 (H) 04/06/2024       Recent Labs     05/11/24  1102 05/11/24  1627 05/11/24 2035 05/12/24  0611   POCGLU 189* 254* 271* 174*       Blood Sugar Average: Last 72 hrs:  (P) 233  Blood sugars elevated in the setting of recent use of steroids  Increase Lantus 20 units at bedtime to 25 units tonight for fast of 174 this am   Correctional scale insulin ordered  Hypoglycemia protocol  Diabetic diet

## 2024-05-12 NOTE — PROGRESS NOTES
SLP ARC TAA     05/12/24 0800   Patient Data   Rehab Impairment Stroke: 01.3 Bilateral involvement   Etiologic Diagnosis Multiple infarcts of varying ages in the posterior circulation include multiple small acute/early subacute infarcts in the bilateral cerebellum.   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 Yes   Baseline Information   Vocation Other  (retired mailman)   Transportation Family/friends drive   Prior IADL Participation   Money Management   (family completes)   Meal Preparation   (family completes)   Laundry   (family completes)   Home Cleaning   (family completes)   Patient Preference   Nickname (Patient Preference) [place]   Psychosocial   Psychosocial (WDL) WDL   Restrictions/Precautions   Precautions Aspiration;Bed/chair alarms;Cognitive;Fall Risk;Supervision on toilet/commode;O2  (2L NC)   Weight Bearing Restrictions No   ROM Restrictions No   Pain Assessment   Pain Assessment Tool 0-10   Pain Score No Pain   Eating Assessment   Swallow Precautions Yes   Bedside Swallow Results Yes   VBS Study Results No   Food To Mouth Yes   Able To Cut Yes   Noted Coughing   Positioning Upright   Safety Needs Increase Time   Meal Assessed Breakfast   QI: Swallowing/Nutritional Status None of the above   Current Diet Regular;Thin   Intake Mode PO   Finishes Timely Yes   Opens Packages Yes   Findings See SLP rehab note for details.   Type of Assistance Needed Set-up / clean-up;Supervision;Verbal cues   Physical Assistance Level No physical assistance   Eating CARE Score 4   Comprehension   Auditory Basic   Visual Basic   Findings Pt completed the CLQT and motor speech assessments- see SLP rehab note for details.   QI: Comprehension 3. Usually Understands: Understands most conversations, but misses some part/intent of message. Requires cues at times  "to understand.   Comprehension (FIM) 4 - Understands basic info/conversation 75-90% of time   Expression   Verbal Basic   Non-Verbal Basic   Intelligibility Phrase   Findings Pt completed the CLQT and motor speech assessments- see SLP rehab note for details.   QI: Expression 3. Exhibits some difficulty with expressing needs and ideas (e.g., some words or finishing thoughts) or speech is not clear   Expression (FIM) 3 - Expresses basic info/needs 50-74% of time   Social Interaction   Cooperation with staff   Participation Individual   Behaviors observed Appropriate   Findings Pt completed the CLQT and motor speech assessments- see SLP rehab note for details.   Social Interaction (FIM) 5 - Interacts appropriately with others 90% of time   Problem Solving   Routine Manages call bell   Findings Pt completed the CLQT and motor speech assessments- see SLP rehab note for details.   Problem solving (FIM) 3 - Solves basic problmes 50-74% of time   Memory   Recognize People No   Remember Routine No   Initiates Tasks No   Short-Term Impaired   Long Term Intact   Recalls Precaution No   Findings Pt completed the CLQT and motor speech assessments- see SLP rehab note for details.   Memory (FIM) 3 - Recognizes, recalls/performs 50-74%   Discharge Information   Vocational Plan Retired/not working   Patient's Discharge Plan Home with family support   Patient's Rehab Expectations \"feel better\"   Barriers to Discharge Home Decreased Cognitive Function;Decreased Strength;Decreased Endurance;Safety Considerations   Impressions Pt is a 68 yo male with PMHx COPD, multitude of ED visits/hospitalizations, DM2, CAD, EtOH abuse, HTN, prior CVA, found to have aflutter with RVR on hospitalization 4/2024 s/p BRITTNEY ablation who was to d/c on Eliquis but per report family did not know he needed it and he was not taking it who developed dysarthria, weakness and imbalance with recent fall at home and presented to hospital. CTA H/N showed Chronic left " PCA territory infarct, new since the prior exam. No evidence of acute vascular territorial infarction, intracranial hemorrhage or mass.  No hemodynamically significant stenosis, dissection or occlusion of the carotid or vertebral arteries or major vessels of the [place].  Neuro consulted and recommended MRI brain which showed multiple infarcts of varying ages in the posterior circulation include multiple small acute/early subacute infarcts in the bilateral cerebellum. TTE did not show thrombus.  Neuro recommended Eliquis, aspirin, and statin. Pt was approved for acute rehab and transferred on 5/11/2024. Orders received for skilled bedside swallow, motor speech and cognitive assessment which was initiated using the CLQT assessment tool. Initial interview with review of history, orientation and events completed as well. See SLP rehab note for evaluation details. At this time, pt is a good rehab candidate to achieve min A to supervision for cognitive linguistic skills while in the acute rehab center w/ anticipated discharge home w/ family support/supervision. Current barriers which present at this time include: decreased processing, decreased ST/working memory, decreased thought organization, dysarthria, decreased executive function skills (problem solving, reasoning, organization, judgement) and overall insight to deficits which currently impact safety and functional mobility. ELOS ~ 2-3 weeks. Plan for brief follow up with swallow function at a different meal. Pt will also benefit from skilled SLP services at this time to maximize overall functional motor speech and cognitive linguistic skills to decrease burden of care for family at time of discharge.   SLP Therapy Minutes   SLP Time In 0800   SLP Time Out 0930   SLP Total Time (minutes) 90   SLP Mode of treatment - Individual (minutes) 90   SLP Mode of treatment - Concurrent (minutes) 0   SLP Mode of treatment - Group (minutes) 0   SLP Mode of treatment -  Co-treat (minutes) 0   SLP Mode of Treatment - Total time(minutes) 90 minutes   SLP Cumulative Minutes 90   Cumulative Minutes   Cumulative therapy minutes 90

## 2024-05-12 NOTE — ASSESSMENT & PLAN NOTE
History of alcohol abuse  Monitor off CIWA protocol - Pt now post withdrawal window   Ativan as needed anxiety  Continue supplements with thiamine, folate, MVT

## 2024-05-12 NOTE — PLAN OF CARE
Problem: Prexisting or High Potential for Compromised Skin Integrity  Goal: Skin integrity is maintained or improved  Description: INTERVENTIONS:  - Identify patients at risk for skin breakdown  - Assess and monitor skin integrity  - Assess and monitor nutrition and hydration status  - Monitor labs   - Assess for incontinence   - Turn and reposition patient  - Assist with mobility/ambulation  - Relieve pressure over bony prominences  - Avoid friction and shearing  - Provide appropriate hygiene as needed including keeping skin clean and dry  - Evaluate need for skin moisturizer/barrier cream  - Collaborate with interdisciplinary team   - Patient/family teaching  - Consider wound care consult   Outcome: Progressing     Problem: PAIN - ADULT  Goal: Verbalizes/displays adequate comfort level or baseline comfort level  Description: Interventions:  - Encourage patient to monitor pain and request assistance  - Assess pain using appropriate pain scale  - Administer analgesics based on type and severity of pain and evaluate response  - Implement non-pharmacological measures as appropriate and evaluate response  - Consider cultural and social influences on pain and pain management  - Notify physician/advanced practitioner if interventions unsuccessful or patient reports new pain  Outcome: Progressing     Problem: INFECTION - ADULT  Goal: Absence or prevention of progression during hospitalization  Description: INTERVENTIONS:  - Assess and monitor for signs and symptoms of infection  - Monitor lab/diagnostic results  - Monitor all insertion sites, i.e. indwelling lines, tubes, and drains  - Monitor endotracheal if appropriate and nasal secretions for changes in amount and color  - [place] appropriate cooling/warming therapies per order  - Administer medications as ordered  - Instruct and encourage patient and family to use good hand hygiene technique  - Identify and instruct in appropriate isolation precautions for  identified infection/condition  Outcome: Progressing     Problem: SAFETY ADULT  Goal: Patient will remain free of falls  Description: INTERVENTIONS:  - Educate patient/family on patient safety including physical limitations  - Instruct patient to call for assistance with activity   - Consult OT/PT to assist with strengthening/mobility   - Keep Call bell within reach  - Keep bed low and locked with side rails adjusted as appropriate  - Keep care items and personal belongings within reach  - Initiate and maintain comfort rounds  - Make Fall Risk Sign visible to staff  - Offer Toileting every 2 Hours, in advance of need  - Initiate/Maintain bed/chair alarm  - Obtain necessary fall risk management equipment: alarms  - Apply yellow socks and bracelet for high fall risk patients  - Consider moving patient to room near nurses station  Outcome: Progressing  Goal: Maintain or return to baseline ADL function  Description: INTERVENTIONS:  -  Assess patient's ability to carry out ADLs; assess patient's baseline for ADL function and identify physical deficits which impact ability to perform ADLs (bathing, care of mouth/teeth, toileting, grooming, dressing, etc.)  - Assess/evaluate cause of self-care deficits   - Assess range of motion  - Assess patient's mobility; develop plan if impaired  - Assess patient's need for assistive devices and provide as appropriate  - Encourage maximum independence but intervene and supervise when necessary  - Involve family in performance of ADLs  - Assess for home care needs following discharge   - Consider OT consult to assist with ADL evaluation and planning for discharge  - Provide patient education as appropriate  Outcome: Progressing  Goal: Maintains/Returns to pre admission functional level  Description: INTERVENTIONS:  - Perform AM-PAC 6 Click Basic Mobility/ Daily Activity assessment daily.  - Set and communicate daily mobility goal to care team and patient/family/caregiver.   -  Collaborate with rehabilitation services on mobility goals if consulted  - Perform Range of Motion 3 times a day.  - Reposition patient every 2 hours.  - Dangle patient 3 times a day  - Stand patient 3 times a day  - Ambulate patient 3 times a day  - Out of bed to chair 3 times a day   - Out of bed for meals 3 times a day  - Out of bed for toileting  - Record patient progress and toleration of activity level   Outcome: Progressing     Problem: DISCHARGE PLANNING  Goal: Discharge to home or other facility with appropriate resources  Description: INTERVENTIONS:  - Identify barriers to discharge w/patient and caregiver  - Arrange for needed discharge resources and transportation as appropriate  - Identify discharge learning needs (meds, wound care, etc.)  - Arrange for interpretive services to assist at discharge as needed  - Refer to Case Management Department for coordinating discharge planning if the patient needs post-hospital services based on physician/advanced practitioner order or complex needs related to functional status, cognitive ability, or social support system  Outcome: Progressing

## 2024-05-12 NOTE — PROGRESS NOTES
ARC ICP PT EVAL    05/12/24 1225   Rehab Team Goals   Transfer Team Goal Patient will require supervision with transfers with least restrictive device upon completion of rehab program   Locomotion Team Goal Patient will require supervision with locomotion with least restrictive device upon completion of rehab program   Rehab Team Interventions   PT Interventions Gait Training;Neuromuscualr Reeducation;Therapeutic Exercise;Transfer Training;Community Reintegration;Bed Mobility;Wheelchair Mobility;Patient/Family Education   PT Transfer Goal   Roll left and right Goal 06. Independent - Patient completes the activity by him/herself with no assistance from a helper.   Sit to lying Goal 06. Independent - Patient completes the activity by him/herself with no assistance from a helper.   Lying to sitting on side of bed Goal 06. Independent - Patient completes the activity by him/herself with no assistance from a helper.   Sit to stand Goal 04. Supervision or touching assistance- Rogers provides VERBAL CUES or supervision throughout activity.   Chair/bed-to-chair transfer Goal 04. Supervision or touching assistance- [place] provides VERBAL CUES or supervision throughout activity.   Car Transfer Goal 04. Supervision or touching assistance- [place] provides VERBAL CUES or supervision throughout activity.   Status Ongoing;Target goal - two weeks;Target goal - three weeks   Locomotion Goal   Primary discharge mode of locomotion Walking   Target Walk Distance 150 ft   Assist Device   (LRAD)   Walk 10 feet Goal 04. Supervision or touching assistance- [place] provides VERBAL CUES or supervision throughout activity.   Walk 50 feet with 2 turns Goal 04. Supervision or touching assistance- [place] provides VERBAL CUES or supervision throughout activity.   Walk 150 feet Goal 04. Supervision or touching assistance- [place] provides VERBAL CUES or supervision throughout activity.   Walking 10 feet on uneven surface 04. Supervision or touching  assistance- Jackson provides VERBAL CUES or supervision throughout activity.   Walking Goal Status Ongoing;Target goal - two weeks;Target goal - three weeks   Wheel 50 feet with 2 turns Goal 09. Not applicable   Wheel 150 feet Goal 09. Not applicable   Stairs Goal   1 step or curb goal 04. Supervision or touching assistance- [place] provides VERBAL CUES or supervision throughout activity.   4 steps Goal 09. Not applicable   12 steps Goal 09. Not applicable   Assist Level Supervision   Number of Stairs 2   Hand Rail Right   Status Ongoing;Target goal - two weeks;Target goal - three weeks   Object Retrieval Goal   Picking up object Goal 04. Supervision or touching assistance- [place] provides VERBAL CUES or supervision throughout activity.   Assistive Device  Reacher   Small Object Picked Up marker     ELOS 2-3 WEEKS

## 2024-05-12 NOTE — ASSESSMENT & PLAN NOTE
2/2 COPD  At home on 2L NC  Here has been satting well off 2L NC  Will request when experiencing increased WOB, but generally during these episodes O2 is maintained in the 90s.  Goal > 88%.

## 2024-05-12 NOTE — PROGRESS NOTES
05/12/24 1000   Pain Assessment   Pain Assessment Tool 0-10   Pain Score No Pain   Patient's Stated Pain Goal No pain   Restrictions/Precautions   Precautions Fall Risk;Aspiration;O2;Supervision on toilet/commode;Cognitive   Weight Bearing Restrictions No   ROM Restrictions No   Oral Hygiene   Type of Assistance Needed Physical assistance   Physical Assistance Level 51%-75%   Comment standing at the sink Pt required mod A to maintain balance and support to allow for unilateral/bilateral hand use in setting up toothbrush, and then brushing his teeth. Pt became fatigued and needed to complete the task seated.   Oral Hygiene CARE Score 2   Grooming   Able To Brush/Clean Teeth;Wash/Dry Hands;Wash/Dry Face;Comb/Brush Hair   Limitation Noted In Coordination;Problem Solving;Strength;Timeliness   Shower/Bathe Self   Type of Assistance Needed Physical assistance   Physical Assistance Level Total assistance   Comment Pt reports standing in a shower to bathe at baseline. He was unable to stand self supported at EOB and would need total A to complete shower as he did PTA. Seated at EOB with set-up Pt able to sponge bathe with assist for sequencing, periarea, buttocks, right foot.   Shower/Bathe Self CARE Score 1   Bathing   Assessed Bath Style Sponge Bath   Anticipated D/C Bath Style Shower   Able to Gather/Transport No   Able to Adjust Water Temperature No   Able to Wash/Rinse/Dry (body part) Left Arm;Right Arm;L Upper Leg;R Upper Leg;L Lower Leg/Foot;Chest;Abdomen   Limitations Noted in Balance;Coordination;Problem Solving;Safety;Sequencing;Timeliness   Positioning Seated;Standing   Tub/Shower Transfer   Reason Not Assessed (S)  Medical  (no order for showering)   Upper Body Dressing   Type of Assistance Needed Physical assistance   Physical Assistance Level 26%-50%   Comment difficulty weight shifting to clear gown from under his thigh, needed assist to untie/retie the gown behind his neck.   Upper Body Dressing CARE  Score 3   Lower Body Dressing   Type of Assistance Needed Physical assistance   Physical Assistance Level 51%-75%   Comment seated EOB. Verbal cues for task segmentation, problem solving, and safety. Occasional LOB to the right, unable to bring RLE up into figure 4 or thread over right foot..   Lower Body Dressing CARE Score 2   Putting On/Taking Off Footwear   Type of Assistance Needed Physical assistance   Physical Assistance Level 51%-75%   Comment able to don/doff left slipper sock, but not right   Putting On/Taking Off Footwear CARE Score 2   Dressing/Undressing Clothing   Remove UB Clothes Other  (hospital gown)   Don UB Clothes Other  (hospital gown)   Remove LB Clothes Socks   Don LB Clothes Undergarment;Pants;Socks   Limitations Noted In Balance;Coordination;Problem Solving;Safety;Sequencing;Timeliness   Roll Left and Right   Type of Assistance Needed Physical assistance   Physical Assistance Level 25% or less   Comment min A to roll onto his left side, supervision to roll right   Roll Left and Right CARE Score 3   Lying to Sitting on Side of Bed   Type of Assistance Needed Physical assistance   Physical Assistance Level 25% or less   Comment bed flat, no rails   Lying to Sitting on Side of Bed CARE Score 3   Sit to Stand   Type of Assistance Needed Physical assistance   Physical Assistance Level 51%-75%   Comment mod A and verbal cues for hand placement   Sit to Stand CARE Score 2   Bed-Chair Transfer   Type of Assistance Needed Physical assistance   Physical Assistance Level Total assistance   Comment Pt was not using an AD prior to admission. He was unable to achieve full upright stance without UE support at eval, and too fearful to attempt a step with HHA x2. With RW intervention Pt able to transfer with min A once standing, mod A to stand if surface was lower.   Chair/Bed-to-Chair Transfer CARE Score 1   Transfer Bed/Chair/Wheelchair   Positioning Concerns Cognition   Limitations Noted In  Balance;Coordination;Problem Solving   Adaptive Equipment Roller Walker   Findings Pt did not use a RW PTA, needing cues to manage safely   Toileting Hygiene   Type of Assistance Needed Physical assistance   Physical Assistance Level 76% or more   Comment PTA Pt would stand at the toilet to urinate, unable to recall how he performed posterior hygiene. Mod A to mntn balance and cue through one-handed techniques for clothing mgmt to improve stability, total A for posterior hygiene in stance. Pt unaware of urine incontinence in the bed, min A for thorough anterior hygine.   Toileting Hygiene CARE Score 2   Toileting   Able to Pull Clothing down no, up no.   Able to Manage Clothing Closures No   Manage Hygiene Bladder;Bowel   Limitations Noted In Balance;Problem Solving;Safety  (see above for details)   Toilet Transfer   Type of Assistance Needed Physical assistance   Physical Assistance Level 51%-75%   Comment Max A to stand from the standard height toilet, Mod A to lower slowly onto the standard toilet. Following BSC intervention over the toilet, Min A for transfers   Toilet Transfer CARE Score 2   Toilet Transfer   Surface Assessed Standard Commode   Transfer Technique Standard   Limitations Noted In Balance;Endurance;Safety;Problem Solving   Adaptive Equipment Walker  (BSC placed over the toilet for increased seat height and ability to utilize BUE to stand from the toilet.)   Cognition   Overall Cognitive Status Impaired   Arousal/Participation Responsive;Cooperative   Attention Attends with cues to redirect   Orientation Level Oriented to person;Oriented to place;Oriented to situation  (needed cues of Mother's Day for day of week and month.)   Memory Decreased recall of recent events;Decreased recall of precautions   Following Commands Follows one step commands with increased time or repetition   Comments Modoc   Vision   Occulomotor Scanning;Tracking  ([facility])   Vision Comments Pt able to read his daily schedule approx  "18\" from face, as well as the sign and erase board on the wall from in the bed, without eye glasses. Denied any blurriness or change in vision since his hospitalizaiton.   Activity Tolerance   Activity Tolerance Patient limited by fatigue   Medical Staff Made Aware (S)    (Pt cup lid/straw on bedside table w/ rhianna liquid in it. Pt took a sip, spit it out immediately stating it was urine. Pt w/ no recollection of urinating in the cup or replacing the lid. Urinal was next to him. OT provided mouth wash and removed the cup.)   Other Comments   Assessment Pt is a 68yo male admitted to [facility] 5/7/24 with dysarthria and leaning to the left x 5 days, 2 falls prior to admission, and now Dx with COPD exacerbation. CTH (+)bilateral subacute cerebellum infarcts, chronic (but recent) left PCA infarct. PMH includes: ETOH abuse, COPD, DMII, CAD, HTN, cardiac arrect, aflutter with RVR. Pt lives with dtr, JERE, and four grandchildren at baseline, and will have 24hr supervision upon discharge. He was independent with selfcare and household mobility without AD. He is on 2L O2 continuously at home but had difficulty explaining how he transported the O2, small tank via long tubing. Today Pt unable to complete bathing at PLOF of standing in a shower, and has no personal clothing present to don/doff. With modifications he was able to complete selfcare with Min A UB, Mod A LB, Max A for right foot. He was very fearful in stance without UE support, however appeared to improve confidence by end of session with OT, and then reportedly further improved with PT in following session. Pt would benefit from skilled OT intervention to address deficits in coordination, balance, endurance, problem solving and safety awareness to maximize independence with daily selfcare routines and related mobility. ELOS 2 weeks   Assessment   Prognosis Good   Problem List Decreased endurance;Impaired balance;Decreased cognition;Impaired judgement;Decreased " safety awareness;Impaired hearing;Decreased coordination;Decreased mobility   Barriers to Discharge Inaccessible home environment   Plan   Treatment/Interventions ADL retraining;Functional transfer training;Endurance training;Cognitive reorientation;Patient/family training;Equipment eval/education;Compensatory technique education   OT Therapy Minutes   OT Time In 1000   OT Time Out 1130   OT Total Time (minutes) 90   OT Mode of treatment - Individual (minutes) 90   OT Mode of treatment - Concurrent (minutes) 0   OT Mode of treatment - Group (minutes) 0   OT Mode of treatment - Co-treat (minutes) 0   OT Mode of Treatment - Total time(minutes) 90 minutes   OT Cumulative Minutes 90

## 2024-05-12 NOTE — ASSESSMENT & PLAN NOTE
IM consulted and with overall management at their discretion during ARC course  Rate control: None  Antithrombotic: Eliquis    - Cost is $71.28/month. Daughter confirmed this price is fine.

## 2024-05-12 NOTE — ASSESSMENT & PLAN NOTE
Has had a multitude of ED visits related to COPD/concern for SOB but was frequently d/c'd same day  - Monitor also for anxiety and optimal mgmt of that; education on monitor home O2  IM consulted and with overall management at their discretion during ARC course  Monitor lung exam, vitals with and without activity  Encourage incentive spirometry  Breo/Incruse  Xopenex  PRN Albuterol  PRN supplemental O2 - has needed only sparingly here.  Mucinex

## 2024-05-12 NOTE — ASSESSMENT & PLAN NOTE
- MRI brain 5/8 - Multiple infarcts of varying ages in the posterior circulation include multiple small acute/early subacute infarcts in the bilateral cerebellum. Additional small foci of recent ischemia in the left PCA distribution superimposed on chronic infarct  No acute hemorrhage or mass effect  - CTA H/N -  1. Chronic left PCA territory infarct, new since the prior exam. No evidence of acute vascular territorial infarction,  intracranial hemorrhage or mass. 2. No hemodynamically significant stenosis, dissection or occlusion of the carotid or vertebral arteries or major vessels of the [place].  - Residual impairments on admission to [facility]: Impaired balance, coordination  - Co-morbidities most impacting functional recovery: COPD with chronic hypoxic respiratory failure, anxiety    Secondary stroke prevention  - Antithrombotic: Aspirin and Eliquis  - Statin  - Patient at increased risk for stroke particularly early in post-stroke period - monitor neuro exam closely with low threshold to repeat imaging  -  patient and if applicable caregiver on optimal stroke management  - Follow-up with neurology and PCP after d/c   - Recommend acute comprehensive interdisciplinary inpatient rehabilitation to include intensive skilled therapies (PT, OT, ST) as outlined with oversight and management by rehabilitation physician as well as inpatient rehab level nursing, case management and weekly interdisciplinary team meetings.

## 2024-05-12 NOTE — ASSESSMENT & PLAN NOTE
Lab Results   Component Value Date    HGBA1C 8.9 (H) 04/06/2024       Recent Labs     05/28/24  1519 05/28/24 2037 05/29/24  0603 05/29/24  1122   POCGLU 140 161* 101 136       Blood Sugar Average: Last 72 hrs:  (P) 262.5  Home: Metformin 1000mg Q12hr, Lantus 30 units at bedtime  Current meds: Lantus 18, Metformin 500mg Q12hr, HS, Lispro SS AC/HS, consistent carb diabetic diet  Nutrition consult  Management as per IM  Outpatient f/u with PCP

## 2024-05-12 NOTE — TREATMENT PLAN
Individualized Plan of Care - [facility] [place]  Lucho Talavera 69 y.o. male MRN: [id number]  Unit/Bed#: [facility] 455-01 Encounter: [id number]     PATIENT INFORMATION  ADMISSION DATE: 5/11/2024  3:07 PM BEBE CATEGORY:Stroke:  01.3  Bilateral involvement   ADMISSION DIAGNOSIS: Multiple infarcts of varying ages in the posterior circulation include multiple small acute/early subacute infarcts in the bilateral cerebellum.   EXPECTED LOS: 16 days     MEDICAL/FUNCTIONAL PROGNOSIS  Pre-admit screens and post-admit evaluations reviewed and are consistent.     Based on my assessment of the patient's medical conditions and current functional status, the prognosis for attaining medical and functional goals or the IRF stay is:  Good.    Patient is appropriate for acute rehabilitation.    Medical Goals:   Patient will be medically stable for discharge back to community setting upon completion or acute rehab program and patient/family will be able to manage medical conditions and comorbid conditions with medications and appropriate follow up upon completion of rehab program.    Cardiopulmonary function: Ensure cardiopulmonary stability and optimize cardiopulmonary function not only at rest but with activity as patient's activity level significantly increases in acute rehab compared with prior to transfer in preparation for safe discharge from [facility].  Must closely and frequently monitor blood pressure and HR to ensure adequate cardiac output during ADLs and ambulation as patient is at increased risk for orthostatic hypotension/syncope and potential injury if not monitored for and managed adequately.    Blood pressure management:    Frequent monitoring of blood pressure with appropriate adjustments in blood pressure medication management to optimize blood pressure control and prevent/limit renal complications.   Monitoring impact of blood pressure and side-effects of blood pressure medications at rest and with activity.  Hypoxia  prevention: Ensure appropriate level of oxygenation at rest and with activity to avoid symptomatic hypoxia, maximize functional performance, and decrease risk of atelectasis/pneumonia through close and frequent monitoring, providing appropriate respiratory treatments (such as incentive spirometry), and when necessary provide/adjust respiratory medications.    DM: Patient will improve/maintain blood sugar control to ensure optimal healing and decrease risks of complications associated with DM through medication monitoring, adjustments as indicated, and optimal dietary intake and education.  Neurologic Disorder: CVA causing impaired mobility, ADLs, and gait:  intensive skilled therapies with physical therapy and occupational therapy with close oversight and management by rehab specialized physician in acute rehabilitation setting to most expeditiously and effectively improve functional mobility, transfers, upper and lower body strengthening, conditioning, balance, and gait training with appropriate assistive device.  Patient will have optimal supervision and management of patient's underlying neurologic disorder with specialized rehabilitation physician during this period of recovery to ensure most expeditious and optimal recovery with decreased risks of fall/injury and other complications including acute worsening of neuro disorder, decrease risk of VTE, PNA, and skin ulceration.  Cognitive impairment: intensive skilled therapies including speech language pathology and occupation therapy to evaluate and treat deficits in cognition.  Close oversight and management by rehab specialized physician of cognitive deficits and potential confounding factors (prevention of, monitoring for, and if found treatment of possible complications such as infections, as well as optimally managing sleep, mood, and medications which can negatively impact cognition and functional recovery).  Inpatient rehabilitation education/teaching:  To  be provided to patient and typically family/caregiver (if able to be identified) by all skilled therapists, rehab nursing, case management, and rehab specialized physician to ensure optimal recovery and decrease risks of complications in both acute rehabilitation setting as well as after discharge.   Anxiety: Patient's anxiety and it's impact on therapy participation and functional recovery will improve during course with supportive counseling, relaxation/breathing techniques and if necessary medication management.  Requires frequent re-assessment and close management to ensure anxiety/depression management during acute rehab course with planning for appropriate outpatient management to ensure optimal mental health and functional recovery.    Skin management:  skin wounds/breakdown/rashes due to recent immobility and co-morbidities.   Appropriate skin checks from nursing and as needed physician.  Frequent turning, application of appropriate barrier creams/dressings, cushions.  Patient/caregiver education.  Optimal bowel/bladder hygiene and mobilization.        ANTICIPATED DISCHARGE DISPOSITION AND SERVICES  COMMUNITY SETTING:    Previous community setting.    ANTICIPATED FOLLOW-UP SERVICE:   Home Health Services: PT, OT     DISCIPLINE SPECIFIC PLANS:  Required Disciplines & Services: PT, OT ST, Rehabilitation Physician, Rehabillitation Nursing, Case Management, Dietary    REQUIRED THERAPY (expected):  Therapy Hours per Day Days per Week Tx Days (Days in [facility])   Physical Therapy 1 5 16   Occupational Therapy 1 5 16   Speech/Language Therapy 1 5 12   NOTE: Additional therapy time(s) may be added as appropriate to meet patient needs and to achieve functional goals.      ANTICIPATED FUNCTIONAL OUTCOMES:  ADL: Patient will be largely supervision   Bladder/Bowel: Patient will be modified independent with bladder/bowel management upon completion of rehab program   Transfers: Patient will be supervision-modified independent  with transfers upon completion of rehab program   Locomotion: Patient will be at or near supervision with locomotion (wheelchair or ambulation) upon completion of rehab program   Cognitive: Patient will be near prior level of cognitive function upon completion of rehab program     DISCHARGE PLANNING NEEDS  Equipment needs: To be determined at team conference.      REHAB ANTICIPATED PARTICIPATION RESTRICTIONS:  Uncertain at this time.  To be determined closer to discharge.

## 2024-05-12 NOTE — ASSESSMENT & PLAN NOTE
Home: Lisinopril 40mg daily  Here: Lisinopril 5mg daily  Monitor vitals with and without activity; monitor for orthostasis  Monitor hemoglobin, electrolytes, kidney function, hydration status   Management as per IM.

## 2024-05-12 NOTE — PROGRESS NOTES
[facility] PT IE   05/12/24 1225   Patient Data   Rehab Impairment Stroke: 01.3 Bilateral involvement   Etiologic Diagnosis Multiple infarcts of varying ages in the posterior circulation include multiple small acute/early subacute infarcts in the bilateral cerebellum.   Date of Onset 05/07/24   Support System   Name Brianna Hess SIL and four grandchildren in the home   Managing Financials No   Able to provide 24 hour supervision Yes  (chart indicates yes, daughter works x2/week)   Able to provide physical help? Yes   Home Setup   Type of Home Multi Level  (pt states he stays on first floor)   Method of Entry Stairs;Hand Rail Right   Number of Stairs 2   Number of Stairs in Home 0   First Floor Bathroom Full;Shower   First Floor Setup Available Yes   Available Equipment Wheelchair;Single Point Cane   Prior Level of Function   Self-Care 3. Independent - Patient completed the activities by him/herself, with or without an assistive device, with no assistance from a helper.   Indoor-Mobility (Ambulation) 3. Independent - Patient completed the activities by him/herself, with or without an assistive device, with no assistance from a helper.   Stairs 3. Independent - Patient completed the activities by him/herself, with or without an assistive device, with no assistance from a helper.   Prior Device Used Z. None of the above  (no AD in home, SPC in community)   Falls in the Last Year   Number of falls in the past 12 months 2   Type of Injury Associated with Fall No injury   Patient Preference   Nickname (Patient Preference) Kingston   Psychosocial   Psychosocial (WDL) WDL   Patient Behaviors/Mood [place] affect  (answers in 1-2 words typically)   Restrictions/Precautions   Precautions Bed/chair alarms;Fall Risk;Aspiration;Supervision on toilet/commode;Cognitive;O2   Weight Bearing Restrictions No   ROM Restrictions No   Pain Assessment   Pain Assessment Tool 0-10   Pain Score No Pain   Transfer Bed/Chair/Wheelchair   Positioning  Concerns Cognition   Limitations Noted In Balance;Confidence;Coordination;Endurance;Problem Solving   Adaptive Equipment Roller Walker   Type of Assistance Needed Physical assistance   Physical Assistance Level 26%-50%   Comment modA for sequencing with RW initially progressing to Deanna x1   Chair/Bed-to-Chair Transfer CARE Score 3   Roll Left and Right   Type of Assistance Needed Physical assistance   Physical Assistance Level 25% or less   Comment bed rails   Roll Left and Right CARE Score 3   Sit to Lying   Type of Assistance Needed Physical assistance   Physical Assistance Level 25% or less   Sit to Lying CARE Score 3   Lying to Sitting on Side of Bed   Type of Assistance Needed Physical assistance   Physical Assistance Level 25% or less   Comment bed flat no rails   Lying to Sitting on Side of Bed CARE Score 3   Sit to Stand   Type of Assistance Needed Physical assistance   Physical Assistance Level 25% or less   Comment Deanna with heavy verbal cues for sequencing RW   Sit to Stand CARE Score 3   Picking Up Object   Reason if not Attempted Safety concerns   Picking Up Object CARE Score 88   Car Transfer   Reason if not Attempted Safety concerns   Car Transfer CARE Score 88   Ambulation   Primary Mode of Locomotion Prior to Admission Walk   Distance Walked (feet) 70 ft  (70 ft x3 (no AD), 50 ft (RW), 25 ft (RW))   Assist Device Other  (no AD initially, RW in treatment session)   Gait Pattern Slow Sury;Decreased foot clearance;R foot drag;Improper weight shift   Limitations Noted In Balance;Endurance;Device Management;Coordination;Heel Strike;Sequencing;Strength;Swing   Provided Assistance with: Balance;Direction;Limb Stabilization;Limb Advancement;Trunk Support;Weight Shift   Walk Assist Level Chair Follow   Findings R HHA /modA x1 + chair follow for safety wide base of support with decreased step length noted RLE; limited by GARCIA   Walk 10 Feet   Type of Assistance Needed Physical assistance   Physical  "Assistance Level Total assistance   Comment modA x1 (HHA) + chair follow   Walk 10 Feet CARE Score 1   Walk 50 Feet with Two Turns   Type of Assistance Needed Physical assistance   Physical Assistance Level Total assistance   Comment modA x1 (HHA) + chair follow   Walk 50 Feet with Two Turns CARE Score 1   Walk 150 Feet   Reason if not Attempted Safety concerns   Walk 150 Feet CARE Score 88   Wheel 50 Feet with Two Turns   Reason if not Attempted Activity not applicable   Wheel 50 Feet with Two Turns CARE Score 9   Wheel 150 Feet   Reason if not Attempted Activity not applicable   Wheel 150 Feet CARE Score 9   Curb or Single Stair   Style negotiated Single stair   Type of Assistance Needed Physical assistance   Physical Assistance Level Total assistance   Comment modA x1 + standby of another for line management 2 steps with BHR   1 Step (Curb) CARE Score 1   4 Steps   Reason if not Attempted Safety concerns   4 Steps CARE Score 88   12 Steps   Reason if not Attempted Safety concerns   12 Steps CARE Score 88   Stairs   Type  Steps   # of Steps 2   Weight Bearing Precautions Fall Risk   Assist Devices Bilateral Rail   Findings modA x1 + standby of another for 02 line management limited by patient GARCIA 2 (6\") steps   RLE Assessment   RLE Assessment   (3+5)   LLE Assessment   LLE Assessment   (3+/5)   Coordination   Movements are Fluid and Coordinated 0   Cognition   Overall Cognitive Status Impaired   Arousal/Participation Responsive;Cooperative   Attention Attends with cues to redirect   Orientation Level Oriented to person;Oriented to place;Oriented to situation  (needed cues of Mother's Day for day of week and month.)   Memory Decreased recall of recent events;Decreased recall of precautions   Following Commands Follows one step commands with increased time or repetition   Comments [place]   Vision   Occulomotor Scanning;Tracking  (WFL)   Objective Measure   PT Findings (S)  Sp02 monitored through session on 2L 02 " "NC 97-98% Sp02   Therapeutic Exercise   Therapeutic Exercise/Activity ambulation with modAx1 and RW 50 ft, 25 ft. nu step x5 min BUE/BLE   Discharge Information   Patient's Discharge Plan home with family (outpatient PT?)   Patient's Rehab Expectations \"get better\"   Barriers to Discharge Home Decreased Cognitive Function;Decreased Strength;Decreased Endurance;Safety Considerations   Impressions Kingston is a 69 year old M with PMH of COPD, multitude of ED visits/hospitalizations, DM2, CAD, EtOH abuse, HTN, prior CVA, found to have aflutter with RVR on hospitalization 4/2024 s/p BRITTNEY ablation who was to d/c on Eliquis but per report family did not know he needed it and he was not taking it who developed dysarthria, weakness and imbalance with recent fall at home and presented to hospital. CTA H/N showed Chronic left PCA territory infarct, new since the prior exam. No evidence of acute vascular territorial infarction, intracranial hemorrhage or mass.  No hemodynamically significant stenosis, dissection or occlusion of the carotid or vertebral arteries or major vessels of the [place].  Neuro consulted and recommended MRI brain which showed multiple infarcts of varying ages in the posterior circulation include multiple small acute/early subacute infarcts in the bilateral cerebellum. TTE did not show thrombus. Prior to admission he was independent without AD in home and SPC in community and lives with daughter and grandchildren in multistory home with first floor set up, 2 HETAL + RHR. Owns w/c and SPC. pt reports using 02 at home during day and night 2 L02 NC. Pt this session required min assist for transfers, mod assist of 1 HHA + chair follow for TOTAL ASSISTANCE ambulation 70 ft. TOTAL ASSISTANCE for stair negotiation (modA X1 + standby of another 2 steps with BHR). Progressed to trialing RW at end of session with modA x1 50 ft, 25 ft but significant verbal cues needed for safety/sequencing secondary to " distractibility. Pt is below base line level of function and would benefit from continued skilled PT to maximize independence. Pt demonstrates ataxia, decreased endurance, decreased LE strength and impaired balance. Continue POC to focus on transfer, gait and stair negotiation training. Hopeful for progression to supervision level with LRAD.   PT Therapy Minutes   PT Time In 1215   PT Time Out 1345   PT Total Time (minutes) 90   PT Mode of treatment - Individual (minutes) 90   PT Mode of treatment - Concurrent (minutes) 0   PT Mode of treatment - Group (minutes) 0   PT Mode of treatment - Co-treat (minutes) 0   PT Mode of Treatment - Total time(minutes) 90 minutes   PT Cumulative Minutes 90   Cumulative Minutes   Cumulative therapy minutes 180

## 2024-05-12 NOTE — ASSESSMENT & PLAN NOTE
Patient reports cutting down and only about 2 beers per day but occasionally liquor   Alcohol cessation counseling and supportive counseling  Optimal mood/stress mgmt   Thiamine, folate, MVI   PRN low dose ativan BID   Consider gabapentin as well

## 2024-05-12 NOTE — CONSULTS
PHYSICAL MEDICINE AND REHABILITATION CONSULT NOTE  Lucho Talavera 69 y.o. male MRN: [id number]  Unit/Bed#: [facility] 455-01 Encounter: [id number]     Rehab Diagnosis: Stroke:  01.3  Bilateral involvement    Etiologic Diagnosis:  Multiple infarcts of varying ages in the posterior circulation include multiple small acute/early subacute infarcts in the bilateral cerebellum.     History of Present Illness:   69 year old M with PMH of COPD, multitude of ED visits/hospitalizations, DM2, CAD, EtOH abuse, HTN, prior CVA, found to have aflutter with RVR on hospitalization 4/2024 s/p BRITTNEY ablation who was to d/c on Eliquis but per report family did not know he needed it and he was not taking it who developed dysarthria, weakness and imbalance with recent fall at home and presented to hospital. CTA H/N showed Chronic left PCA territory infarct, new since the prior exam. No evidence of acute vascular territorial infarction, intracranial hemorrhage or mass.  No hemodynamically significant stenosis, dissection or occlusion of the carotid or vertebral arteries or major vessels of the [place].  Neuro consulted and recommended MRI brain which showed multiple infarcts of varying ages in the posterior circulation include multiple small acute/early subacute infarcts in the bilateral cerebellum. TTE did not show thrombus.  Neuro recommended Eliquis, aspirin, and statin. Patient was evaluated by skilled therapies and was found to have significant decline in ADLs and ambulation and appears appropriate for admission to [facility].     Chief complaint:  Recent stroke     Subjective:  On eval, patient denies significant focal weakness but notes feeling somewhat weak all over with perhaps some imbalance.  He denies visual changes, SOB, increased cough, pain, fever, chills, sweats, urinary/bowel issues or other new complaints.      Review of Systems: A 10 point ROS was performed; negative except as noted above.      * Stroke (cerebrum) (HCC)  Assessment & Plan  - MRI brain 5/8 - Multiple infarcts of varying ages in the posterior circulation include multiple small acute/early subacute infarcts in the bilateral cerebellum. Additional small foci of recent ischemia in the left PCA distribution superimposed on chronic infarct  No acute hemorrhage or mass effect  - CTA H/N -  1. Chronic left PCA territory infarct, new since the prior exam. No evidence of acute vascular territorial infarction,  intracranial hemorrhage or mass. 2. No hemodynamically significant stenosis, dissection or occlusion of the carotid or vertebral arteries or major vessels of the [place].  - Residual impairments on admission to [facility]: Impaired balance, coordination, and possibly vision (assess for other impairments during [facility] course)   - Co-morbidities most impacting functional recovery: COPD with chronic hypoxic respiratory failure, anxiety    Secondary stroke prevention  - Antithrombotic: Aspirin and Eliquis  - Statin  - Optimal management of blood pressure and diabetes  -  on importance of abstinence from alcohol   - Patient at increased risk for stroke particularly early in post-stroke period - monitor neuro exam closely with low threshold to repeat imaging  -  patient and if applicable caregiver on optimal stroke management  - Follow-up with neurology and PCP after d/c   - Recommend acute comprehensive interdisciplinary inpatient rehabilitation to include intensive skilled therapies (PT, OT, ST) as outlined with oversight and management by rehabilitation physician as well as inpatient rehab level nursing, case management and weekly interdisciplinary team meetings.       Chronic obstructive pulmonary disease with acute exacerbation (HCC)  Assessment & Plan  Has had a multitude of ED visits related to COPD/concern for SOB but was frequently d/c'd same day  - Monitor also for anxiety and optimal mgmt of that; education on monitor home  "O2  IM consulted and with overall management at their discretion during [facility] course  Monitor lung exam, vitals with and without activity  Encourage incentive spirometry  Breo/Incruse  Xopenex  PRN Albuterol  PRN supplemental O2   Mucinex     Encounter for skin care  Assessment & Plan  Seen by wound care 5/7  \"B/L heels are dry intact and amber with no skin loss or wounds present. Recommend preventative Hydraguard Cream and proper offloading/ repositioning.    B/L sacro-buttocks is dry, intact, pink in color and blanches. No skin loss or wounds present. Recommend preventative hydragaurd to area due to incontinence.   B/L Anterior Lower Legs/ Feet and Right Arm with scattered, intact, well adhered scabs. No skin loss or drainage present. Recommend leaving areas HELIO.  Right Lateral Forearm Skin Tear: irregular in shape area of partial thickness skin loss- wound bed is beefy red in color and bleeding. Skin flap covers 60% of wound bed. Pat-wound is fragile and ecchymotic. Scant sanguinous drainage noted. Recommend dermagran and DSD.     Skin Care Plan:  1-Left Upper Arm Wound: Cleanse with NSS and pat dry. Apply Dermagran to wound bed only. Cover with an ABD and secure with danii wrap. Change every other day or PRN soilage/displacement.   2-Turn/reposition q2h or when medically stable for pressure re-distribution on skin .  3-Elevate heels to offload pressure.  4-Moisturize skin daily with skin nourishing cream  5-Ehob cushion in chair when out of bed.  6-Preventative Hydraguard to bilateral sacro-buttocks and heels BID and PRN. \"      - Wound care nurse c/s and assistance with management during [facility] course  - Increased risk of skin wounds/breakdown/rashes due to recent immobility and co-morbidities   - 2 nurse/staff full skin check for abnormal on admission - obtain photos PRN  - Turn patient Q2H; encourage frequent wt shifts when in chair/WC every 10-15 minutes  - Hydragaurd to buttocks and sacrum BID & PRN  - Alleyvn " foam to bilateral heels; change PRN if soiled or coming off; Change QOD and perform skin check; notify MD if heel develops bogginess, skin breakdown, discoloration.  - Float heels and ankles/bony protuberances with pillow(s)(or wedges) in all positions while in bed or recliner chair.  Be sure feet are not up against footboard (pull patient up/remove footboard/or request longer bed as indicated)  - EHOB waffle cushion to chair/WC when OOB  - Moisturizer daily and PRN to dry skin; do not use moisturizer on areas of redness or skin breakdown unless otherwise documented  - Optimal bowel/bladder hygiene - avoid bedpan unless absolutely necessary and use for least time possible   - Optimal nutrition   - Nursing to document in chart and Notify MD if buttock, sacrum, heel, or other skin site develops erythema or skin breakdown as soon as possible.  If patient is soiling themselves with urine or stool notify MD.  If you are unable to maintain skin integrity and prevent erythema due to frequency of soiling notify MD as soon as possible.  - Notify MD of new or increasing drainage, development of purulent drainage, increased size/depth of wound, lack of healing, inability to maintain wound integrity due to degree of drainage, wound product, dressing, or currently ordered frequency of wound management.  In general dressings should be changed PRN if soiled unless specifically noted otherwise.  Do not allow soiled dressing on patient for extended period of time.      Atrial fibrillation (HCC)  Assessment & Plan  IM consulted and with overall management at their discretion during AR[facility] course  Rate control: None  Antithrombotic: Eliquis       Hypertension  Assessment & Plan  Internal medicine consulted and co-management with their service  Monitor vitals with and without activity; monitor for orthostasis  Monitor hemoglobin, electrolytes, kidney function, hydration status   Current meds: None       Chronic respiratory failure  (HCC)  Assessment & Plan  IM consulted and with overall management at their discretion during ARC course      ETOH abuse  Assessment & Plan  Patient reports cutting down and only about 2 beers per day but occasionally liquor   Alcohol cessation counseling and supportive counseling  Optimal mood/stress mgmt   Thiamine, folate, MVI   PRN low dose ativan BID   Consider gabapentin as well     Type 2 diabetes mellitus (HCC)  Assessment & Plan  Lab Results   Component Value Date    HGBA1C 8.9 (H) 04/06/2024       Recent Labs     05/11/24  0635 05/11/24  1102 05/11/24  1627 05/11/24 2035   POCGLU 179* 189* 254* 271*       Blood Sugar Average: Last 72 hrs:  (P) 262.5  Internal medicine consulted and management at their discretion  Monitor for signs and symptoms of hypoglycemia   Current meds: Lantus 20U HS, Lispro SS AC/HS, consistent carb diabetic diet      CAD (coronary artery disease)  Assessment & Plan  With hx of stenting  IM consulted and with overall management at their discretion during ARC course  Antithrombotic: Aspirin and Eliquis   Statin  Optimal blood pressure and blood sugar control          Disposition:   Home with [facility] 2 weeks from admission     Follow-up:   PCP  Neuro  Pulm  Other chronic providers    Restrictions include:  Fall precautions and see above     ---------------------------------------------------------------------------------------------------------------------      OBJECTIVE:    Physical Exam:  Temp:  [96.9 °F (36.1 °C)-98.4 °F (36.9 °C)] 98.4 °F (36.9 °C)  HR:  [91-95] 95  Resp:  [16-18] 18  BP: (118-164)/(80-84) 118/80  General: Awake, alert in NAD  HENT: NCAT, MMM  Neck: Supple, no lymphadenopathy  Respiratory: Unlabored breathing, breath sounds equal, Lungs CTA, no wheezes, rales, or rhonchi  Cardiovascular: Regular rate and rhythm, no murmurs, rubs, or gallops  Gastrointestinal: Soft, non-tender, non-distended, normoactive bowel sounds  Genitourinary: No antony  SkiN/MSK/Extremities:   "Distal extremities appropriately warm, normal cap refill distally, no cyanosis or calf edema, no calf tenderness to palpation  Neurologic/Psych:   MENTAL STATUS: awake, oriented to person, place, time, and largely to situation   Affect: Somewhat flattened    CN II-XII: some dysarthria   Strength/MMT:  Near full throughout  FTN impaired some L>R     Laboratory:    Results from last 7 days   Lab Units 05/11/24  0524 05/10/24  0527 05/09/24  0547   HEMOGLOBIN g/dL 12.4 11.9* 11.9*   HEMATOCRIT % 39.5 37.9 37.7   WBC Thousand/uL 10.50* 7.81 8.98     Results from last 7 days   Lab Units 05/11/24  0524 05/10/24  0527 05/09/24  0547 05/08/24  0513 05/07/24  1619   BUN mg/dL 14 20 23   < > 20   POTASSIUM mmol/L 3.8 4.0 4.1   < > 3.5   CHLORIDE mmol/L 100 102 101   < > 99   CREATININE mg/dL 0.50* 0.56* 0.65   < > 0.58*   AST U/L 18 15  --   --  14   ALT U/L 18 12  --   --  11    < > = values in this interval not displayed.            Wt Readings from Last 1 Encounters:   05/11/24 83.6 kg (184 lb 4.8 oz)     Estimated body mass index is 24.32 kg/m² as calculated from the following:    Height as of this encounter: 6' 1\" (1.854 m).    Weight as of this encounter: 83.6 kg (184 lb 4.8 oz).    Imaging: reviewed    Per EMR:  Past Medical History:   Past Surgical History:   Family History:   Social history:   Past Medical History:   Diagnosis Date    Cardiac arrest (HCC)     COPD (chronic obstructive pulmonary disease) (HCC)     CVA (cerebral vascular accident) (HCC)     Diabetes mellitus (HCC)     Heart attack (HCC)     Hypertension     Stroke (HCC)     Past Surgical History:   Procedure Laterality Date    CARDIAC ELECTROPHYSIOLOGY PROCEDURE N/A 4/8/2024    Procedure: Cardiac eps/aflutter ablation;  Surgeon: Ihsan Blum DO;  Location: [facility];  Service: Cardiology    CERVICAL FUSION N/A 9/10/2018    Procedure: Posterior cervical decompressive laminectomy C3-6; Posterior cervical lateral mass and pedicle fixation " fusion C1-T1;  Surgeon: Papa Quiros MD;  Location: [facility] MAIN OR;  Service: Neurosurgery     Family History   Problem Relation Age of Onset    Heart attack Mother       Social History     Socioeconomic History    Marital status: Single     Spouse name: None    Number of children: None    Years of education: None    Highest education level: None   Occupational History    None   Tobacco Use    Smoking status: Former     Types: Cigarettes    Smokeless tobacco: Never    Tobacco comments:     quit 5 months ago    Vaping Use    Vaping status: Never Used   Substance and Sexual Activity    Alcohol use: Not Currently     Alcohol/week: 8.0 - 12.0 standard drinks of alcohol     Types: 8 - 12 Cans of beer per week     Comment: every day drinker    Drug use: Never    Sexual activity: Not Currently   Other Topics Concern    None   Social History Narrative    ** Merged History Encounter **         ** Merged History Encounter **         ** Merged History Encounter **          Social Determinants of Health     Financial Resource Strain: Low Risk  (2/1/2024)    Received from [facility]    Overall Financial Resource Strain (CARDIA)     Difficulty of Paying Living Expenses: Not very hard   Food Insecurity: No Food Insecurity (5/8/2024)    Hunger Vital Sign     Worried About Running Out of Food in the Last Year: Never true     Ran Out of Food in the Last Year: Never true   Transportation Needs: No Transportation Needs (5/8/2024)    PRAPARE - Transportation     Lack of Transportation (Medical): No     Lack of Transportation (Non-Medical): No   Physical Activity: Inactive (4/7/2023)    Received from [facility]    Exercise Vital Sign     Days of Exercise per Week: 0 days     Minutes of Exercise per Session: 0 min   Stress: No Stress Concern Present (4/7/2023)    Received from [facility]    [employer] [place] - Occupational Stress Questionnaire     Feeling of  Stress : Only a little   Social Connections: Socially Isolated (4/7/2023)    Received from [facility]    Social Connection and Isolation Panel [NHANES]     Frequency of Communication with Friends and Family: Never     Frequency of Social Gatherings with Friends and Family: Never     Attends Druze Services: Never     Active Member of Clubs or Organizations: No     Attends Club or Organization Meetings: Never     Marital Status:    Intimate Partner Violence: Not At Risk (2/1/2024)    Received from [facility]    Humiliation, Afraid, Rape, and Kick questionnaire     Fear of Current or Ex-Partner: No     Emotionally Abused: No     Physically Abused: No     Sexually Abused: No   Housing Stability: Low Risk  (5/8/2024)    Housing Stability Vital Sign     Unable to Pay for Housing in the Last Year: No     Number of Places Lived in the Last Year: 1     Unstable Housing in the Last Year: No          Current Medical Diagnosis Medications Allergies   Patient Active Problem List   Diagnosis    Closed odontoid fracture with routine healing    Closed displaced fracture of third cervical vertebra (HCC)    Closed displaced fracture of fourth cervical vertebra (HCC)    Alcohol abuse    CAD (coronary artery disease)    Dens fracture (HCC)    Acute blood loss anemia    Type 2 diabetes mellitus (HCC)    S/P C1-T1 Posterior Cervical Discectomy and Fusion on 9/10/18    At moderate risk for venous thromboembolism (VTE)    Chronic obstructive pulmonary disease with acute exacerbation (HCC)    Closed fracture of first cervical vertebra (HCC)    ETOH abuse    KLARISSA (obstructive sleep apnea)    Dirty living conditions    Bronchitis    Diabetic polyneuropathy associated with type 2 diabetes mellitus (HCC)    Hammertoe, bilateral    Post-traumatic arthritis of left foot    Pain due to onychomycosis of toenail    Oral abscess    Tooth fracture    H/O ETOH abuse    Closed nondisplaced fracture of  posterior arch of first cervical vertebra (HCC)    Fall    Stage 3 severe COPD by GOLD classification ([facility])    Chronic respiratory failure (HCC)    Anxiety    Hypertension    COPD (chronic obstructive pulmonary disease) (HCC)    SIRS (systemic inflammatory response syndrome) ([facility])    History of alcohol abuse    Iron deficiency anemia secondary to inadequate dietary iron intake    COVID-19    Atrial flutter with rapid ventricular response ([facility])    Leukocytosis    Atrial fibrillation (HCC)    Stroke (cerebrum) ([facility])    Stroke-like symptom    Encounter for skin care      Current Facility-Administered Medications:     acetaminophen (TYLENOL) tablet 650 mg, 650 mg, Oral, Q6H PRN, Richmond Chew, DO    albuterol (PROVENTIL HFA,VENTOLIN HFA) inhaler 2 puff, 2 puff, Inhalation, Q6H PRN, William Camacho, DO, 2 puff at 05/12/24 0357    albuterol inhalation solution 2.5 mg, 2.5 mg, Nebulization, Q6H PRN, Richmond Chew, , 2.5 mg at 05/11/24 2357    apixaban (ELIQUIS) tablet 5 mg, 5 mg, Oral, BID, Richmond Chew, DO, 5 mg at 05/11/24 1739    aspirin chewable tablet 81 mg, 81 mg, Oral, Daily, Richmond Chew DO    atorvastatin (LIPITOR) tablet 80 mg, 80 mg, Oral, Daily With Dinner, Richmond Chew DO, 80 mg at 05/11/24 1646    busPIRone (BUSPAR) tablet 10 mg, 10 mg, Oral, TID, Richmond Chew DO, 10 mg at 05/11/24 2148    ferrous sulfate tablet 325 mg, 325 mg, Oral, Daily With Breakfast, Richmond Chew DO    fluticasone (FLONASE) 50 mcg/act nasal spray 1 spray, 1 spray, Nasal, BID, Richmond Chew DO, 1 spray at 05/11/24 1739    fluticasone-vilanterol 200-25 mcg/actuation 1 puff, 1 puff, Inhalation, Daily, Richmond Chew DO    folic acid (FOLVITE) tablet 1 mg, 1 mg, Oral, Daily, Richmond Chew DO    guaiFENesin (MUCINEX) 12 hr tablet 600 mg, 600 mg, Oral, BID, Richmond Chew DO, 600 mg at 05/11/24 7229    insulin glargine (LANTUS) subcutaneous injection 20 Units 0.2 mL, 20  Units, Subcutaneous, HS, Richmond Chew, DO, 20 Units at 05/11/24 2148    insulin lispro (HumALOG/ADMELOG) 100 units/mL subcutaneous injection 1-5 Units, 1-5 Units, Subcutaneous, TID AC, 2 Units at 05/11/24 1646 **AND** Fingerstick Glucose (POCT), , , TID AC, Paramjitnishkumar Chew, DO    levalbuterol (XOPENEX) inhalation solution 1.25 mg, 1.25 mg, Nebulization, TID, Richmond Osorioel, DO, 1.25 mg at 05/11/24 1953    LORazepam (ATIVAN) tablet 0.5 mg, 0.5 mg, Oral, BID PRN, Richmond Chew, DO    melatonin tablet 3 mg, 3 mg, Oral, HS PRN, Richmond Osorioel, DO    multivitamin-minerals (CENTRUM) tablet 1 tablet, 1 tablet, Oral, Daily, Richmond Osorioel, DO    oxyCODONE (ROXICODONE) split tablet 2.5 mg, 2.5 mg, Oral, Q4H PRN, Richmond Osorioel, DO    pantoprazole (PROTONIX) EC tablet 40 mg, 40 mg, Oral, Early Morning, Richmond Osorioel, DO    thiamine tablet 100 mg, 100 mg, Oral, Daily, Richmond Chew, DO    umeclidinium 62.5 mcg/actuation inhaler AEPB 1 puff, 1 puff, Inhalation, Daily, Richmond Osorioel, DO Allergies   Allergen Reactions    Amoxicillin Hives    Augmentin [Amoxicillin-Pot Clavulanate] Hives            Prior Level of Function and social history:    He lives in a(n) single family home  Lucho Talavera is single and lives with their family.  Self Care: Independent, Indoor Mobility: Independent, Stairs (in/outdoor): Independent, and Cognition: Independent     FALLS IN THE LAST 6 MONTHS: 1-4     HOME ENVIRONMENT:  The living area: can live on one level  There are 2 steps to enter the home.    The patient will have 24 hour supervision/physical assistance available upon discharge.       Current Level of Function:    Mobility:  Transfers min assist   Ambulation/Mobility 2 ft mod assist     ADLs:  Mod assist LBB, LBD  Min assist UBB, UBD, toileting     Potential Barriers to Discharge:  Co-morbidities (see above), new functional deficits, imbalance, incoordination, cardiopulm limitations      Tolerance for three hours of therapy per therapy day: adequate     Rehabilitation Prognosis: good       Rehabilitation Plan/Therapy Interventions: This patient will have close medical and rehabilitation oversight from a physical medicine and rehabilitation physician and if needed an internal medicine physician to manage the complexity of medical issues to optimize health, ability to participate in therapy, quality of life, and functional outcomes. This patient requires 24 hour rehabilitation nursing to address bowel and bladder management, (pain management if listed below), medication administration, positioning/skin monitoring, fall/injury prevention, and VTE prophylaxis.      Physical, occupational and speech therapy: Patient requires PT and OT to improve functional mobility, transfers, upper and lower body strengthening, conditioning, balance, and gait training with appropriate assistive device. Patient also requires ST to evaluate and if appropriate treat deficits in speech, swallowing, and cognition. PT, OT, ST will be provided approximately 5 times per week for 60 minutes per day. Skilled therapists and nursing will also provide patient/family safety education and training.  / will work to ensure proper communication between patient (+/- family) and staff regarding the overall rehabilitation and medical process while patient is in the acute rehabilitation center.  / will work with patient (and if applicable family and community resources) to optimize safe discharge.    Discharge Planning:    Estimated length of stay:   16 days.    Family/Patient Goals:  Patient/family's goals: Return to previous home/apartment.    Mobility Goals:   Largely supervision    Activities of Daily Living (ADLs) Goals:  Largely supervision    Cognition / Communication:  Closer to baseline     Rehabilitation and discharge goals discussed with the patient and/or family.    Case  "Managment and Social Work to review patient/family resources and to coordinate Discharge Planning.    Patient and Family Education and Training:  Rehabilitation and discharge goals discussed with the patient and/or family.  Patient/family education/training needs to be discussed in weekly team meeting.    Other equipment: To be determined    Medical Necessity Criteria for ARC Admission:  The preadmission screen, post-admission physical evaluation, overall plan of care and admissions order demonstrate a reasonable expectation that the following criteria were met at the time of admission to the [facility].  (See \"Specific areas of management and oversight in ARC setting\" for additional details on medical necessity as outlined below).  The patient requires active and ongoing therapeutic intervention of multiple therapy disciplines (physical therapy, occupational therapy, speech-language pathology, or prosthetics/orthotics), one of which is physical or occupational therapy.    Patient requires an intensive rehabilitation therapy program, as defined in Chapter 1, section 110.2.2 of the CMS Medicare Policy Manual. This intensive rehabilitation therapy program will consist of at least 3 hours of therapy per day at least 5 days per week or at least 15 hours of intensive rehabilitation therapy within a 7 consecutive day period, beginning with the date of admission to the [facility].    The patient is reasonably expected to actively participate in, and benefit significantly from, the intensive rehabilitation therapy program as defined in Chapter 1, section 110.2.2 of the CMS Medicare Policy Manual at this time of admission to the [facility]. He can reasonably be expected to make measurable improvement (that will be of practical value to improve the patient’s functional capacity or adaptation to impairments) as a result of the rehabilitation treatment, as defined in section 110.3, and such improvement can be expected to be made within the " prescribed period of time. As noted in the CMS Medicare Policy Manual, the patient need not be expected to achieve complete independence in the domain of self-care nor be expected to return to his or her prior level of functioning in order to meet this standard.  The patient must require physician supervision by a rehabilitation physician. As such, a rehabilitation physician will conduct face-to-face visits with the patient at least 3 days per week throughout the patient’s stay in the ARC to assess the patient both medically and functionally, as well as to modify the course of treatment as needed to maximize the patient’s capacity to benefit from the rehabilitation process.  The patient requires an intensive and coordinated interdisciplinary approach to providing rehabilitation, as defined in Chapter 1, section 110.2.5 of the CMS Medicare Policy Manual. This will be achieved through periodic team conferences, conducted at least once in a 7-day period, and comprising of an interdisciplinary team of medical professionals consisting of: a rehabilitation physician, registered nurse,  and/or , and a licensed/certified therapist from each therapy discipline involved in treating the patient.     Changes Since Pre-admission Assessment: None -This patient's participation in rehab continues to be reasonable, necessary and appropriate.    CMS Required Post-Admission Physician Evaluation Elements  History and Physical, including medical history, functional history and active comorbidities as in above text.     Post-Admission Physician Evaluation:  The patient has the potential to make improvement and is in need of physical, occupational, and/or therapy services. The patient may also need nutritional services. Given the patient's complex medical condition and risk of further medical complications, rehabilitative services cannot be safely provided at a lower level of care, such as a skilled nursing  facility. I have reviewed the patient's functional and medical status at the time of the preadmission screening and they are the same as on the day of this admission. I acknowledge that I have personally performed a full physical examination on this patient within 24 hours of admission. The patient demonstrated understanding the rehabilitation program and the discharge process after we discussed them.     Agree in entirety: yes  Minor adaptions: none    Major changes: none    Specific areas of management and oversight in [facility] setting:    Cardiopulmonary function: Ensure cardiopulmonary stability and optimize cardiopulmonary function not only at rest but with activity as patient's activity level significantly increases in acute rehab compared with prior to transfer in preparation for safe discharge from [facility].  Must closely and frequently monitor blood pressure and HR to ensure adequate cardiac output during ADLs and ambulation as patient is at increased risk for orthostatic hypotension/syncope and potential injury if not monitored for and managed adequately.    Blood pressure management:    Frequent monitoring of blood pressure with appropriate adjustments in blood pressure medication management to optimize blood pressure control and prevent/limit renal complications.   Monitoring impact of blood pressure and side-effects of blood pressure medications at rest and with activity.  Hypoxia prevention: Ensure appropriate level of oxygenation at rest and with activity to avoid symptomatic hypoxia, maximize functional performance, and decrease risk of atelectasis/pneumonia through close and frequent monitoring, providing appropriate respiratory treatments (such as incentive spirometry), and when necessary provide/adjust respiratory medications.    DM: Patient will improve/maintain blood sugar control to ensure optimal healing and decrease risks of complications associated with DM through medication monitoring, adjustments as  indicated, and optimal dietary intake and education.  Neurologic Disorder: CVA causing impaired mobility, ADLs, and gait:  intensive skilled therapies with physical therapy and occupational therapy with close oversight and management by rehab specialized physician in acute rehabilitation setting to most expeditiously and effectively improve functional mobility, transfers, upper and lower body strengthening, conditioning, balance, and gait training with appropriate assistive device.  Patient will have optimal supervision and management of patient's underlying neurologic disorder with specialized rehabilitation physician during this period of recovery to ensure most expeditious and optimal recovery with decreased risks of fall/injury and other complications including acute worsening of neuro disorder, decrease risk of VTE, PNA, and skin ulceration.  Cognitive impairment: intensive skilled therapies including speech language pathology and occupation therapy to evaluate and treat deficits in cognition.  Close oversight and management by rehab specialized physician of cognitive deficits and potential confounding factors (prevention of, monitoring for, and if found treatment of possible complications such as infections, as well as optimally managing sleep, mood, and medications which can negatively impact cognition and functional recovery).  Inpatient rehabilitation education/teaching:  To be provided to patient and typically family/caregiver (if able to be identified) by all skilled therapists, rehab nursing, case management, and rehab specialized physician to ensure optimal recovery and decrease risks of complications in both acute rehabilitation setting as well as after discharge.   Anxiety: Patient's anxiety and it's impact on therapy participation and functional recovery will improve during course with supportive counseling, relaxation/breathing techniques and if necessary medication management.  Requires frequent  re-assessment and close management to ensure anxiety/depression management during acute rehab course with planning for appropriate outpatient management to ensure optimal mental health and functional recovery.    Skin management:  skin wounds/breakdown/rashes due to recent immobility and co-morbidities.   Appropriate skin checks from nursing and as needed physician.  Frequent turning, application of appropriate barrier creams/dressings, cushions.  Patient/caregiver education.  Optimal bowel/bladder hygiene and mobilization.      ** Please Note: Fluency Direct voice to text software may have been used in the creation of this document. **    75 minutes or greater spent for this encounter which included a combination of face-to-face time with patient and non-face-to-face time which in part specifically includes management of CVA.  Face-to-face time included extended discussion with patient regarding current condition, medical history, mood, medical/rehabilitation management, and disposition.  Non face-to-face time included coordination of care with patient's co-managing AP and/or physician(s) thru communication and review of their recent documentation as well as reviewing vitals, bowel/bladder function, recent labs, diagnostic imaging, and notes from therapy, CM, and nursing.      I personally performed the required components and examined the patient myself in person on 5/11/24.      Jameel Pittman MD, MS  [facility]  Physical Medicine and Rehabilitation  Brain Injury Medicine

## 2024-05-12 NOTE — PROGRESS NOTES
05/12/24 1000   Rehab Team Goals   ADL Team Goal Patient will require supervision with ADLs with least restrictive device upon completion of rehab program   Rehab Team Interventions   OT Interventions Self Care;Cognitive Retraining;Patient/Family Education   Grooming Goal   Oral Hygiene Goal 04. Supervision or touching assistance- [place] provides VERBAL CUES or supervision throughout activity.   Task Brush Teeth;Comb Hair;Shave   Status Ongoing;Target goal - two weeks   Intervention Balance Work;Tolerance Work   Tub/Shower Transfer Goal   Method Shower Stall   Assist Device Seat with Back;Grab Bar;Hand Held Shower   Status Ongoing;Target goal - two weeks   Interventions Assistive Device;ADL Training   Bathing Goal   Shower/bathe self Goal 04. Supervision or touching assistance- [place] provides VERBAL CUES or supervision throughout activity.   Environment Seated;Standing;Shower   Adaptive Equipment Seat with back;Grab Bar   Status Ongoing;Target goal - two weeks   Intervention ADL Training   Upper Body Dressing Goal   Upper body dressing Goal 04. Supervision or touching assistance- [place] provides VERBAL CUES or supervision throughout activity.   Task Upper Body   Environment Seated   Status Ongoing;Target goal - two weeks   Intervention Balance Work;Tolerance Work   Lower Body Dressing Goal   Lower body dressing Goal 04. Supervision or touching assistance- [place] provides VERBAL CUES or supervision throughout activity.   Putting on/taking off footwear Goal 04. Supervision or touching assistance- [place] provides VERBAL CUES or supervision throughout activity.   Task Lower Body   Environment Seated;Standing   Status Ongoing;Target goal - two weeks   Intervention Assistive Device;Balance Work;Tolerance Work   Toileting Transfer Goal   Toilet transfer Goal 04. Supervision or touching assistance- [place] provides VERBAL CUES or supervision throughout activity.   Status Ongoing;Target goal - two weeks   Intervention ADL  Training;Balance Work   Toileting Goal   Toileting hygiene Goal 04. Supervision or touching assistance- [place] provides VERBAL CUES or supervision throughout activity.   Assistive Device Other  (BSC over the toilet)   Status Ongoing;Target goal - two weeks   Intervention ADL Training;Balance Work        05/12/24 1000   Rehab Team Goals   ADL Team Goal Patient will require supervision with ADLs with least restrictive device upon completion of rehab program   Rehab Team Interventions   OT Interventions Self Care;Cognitive Retraining;Patient/Family Education   Grooming Goal   Oral Hygiene Goal 04. Supervision or touching assistance- [place] provides VERBAL CUES or supervision throughout activity.   Task Brush Teeth;Comb Hair;Shave   Status Ongoing;Target goal - two weeks   Intervention Balance Work;Tolerance Work   Tub/Shower Transfer Goal   Method Shower Stall   Assist Device Seat with Back;Grab Bar;Hand Held Shower   Status Ongoing;Target goal - two weeks   Interventions Assistive Device;ADL Training   Bathing Goal   Shower/bathe self Goal 04. Supervision or touching assistance- [place] provides VERBAL CUES or supervision throughout activity.   Environment Seated;Standing;Shower   Adaptive Equipment Seat with back;Grab Bar   Status Ongoing;Target goal - two weeks   Intervention ADL Training   Upper Body Dressing Goal   Upper body dressing Goal 04. Supervision or touching assistance- [place] provides VERBAL CUES or supervision throughout activity.   Task Upper Body   Environment Seated   Status Ongoing;Target goal - two weeks   Intervention Balance Work;Tolerance Work   Lower Body Dressing Goal   Lower body dressing Goal 04. Supervision or touching assistance- [place] provides VERBAL CUES or supervision throughout activity.   Putting on/taking off footwear Goal 04. Supervision or touching assistance- [place] provides VERBAL CUES or supervision throughout activity.   Task Lower Body   Environment Seated;Standing   Status  Ongoing;Target goal - two weeks   Intervention Assistive Device;Balance Work;Tolerance Work   Toileting Transfer Goal   Toilet transfer Goal 04. Supervision or touching assistance- [place] provides VERBAL CUES or supervision throughout activity.   Status Ongoing;Target goal - two weeks   Intervention ADL Training;Balance Work   Toileting Goal   Toileting hygiene Goal 04. Supervision or touching assistance- [place] provides VERBAL CUES or supervision throughout activity.   Assistive Device Other  (BSC over the toilet)   Status Ongoing;Target goal - two weeks   Intervention ADL Training;Balance Work

## 2024-05-12 NOTE — ASSESSMENT & PLAN NOTE
Initially presented with COPD exacerbation which is improving with s/p nebs, steroids (5 days) and azithromycin (3 days)  Resume home inhalers  Continue nebs as needed  Respiratory protocol   Fall Risk

## 2024-05-13 PROBLEM — S31.000A SACRAL WOUND: Status: ACTIVE | Noted: 2024-05-12

## 2024-05-13 PROBLEM — G47.00 INSOMNIA: Status: ACTIVE | Noted: 2024-05-13

## 2024-05-13 LAB
ALBUMIN SERPL BCP-MCNC: 3.6 G/DL (ref 3.5–5)
ALP SERPL-CCNC: 48 U/L (ref 34–104)
ALT SERPL W P-5'-P-CCNC: 18 U/L (ref 7–52)
ANION GAP SERPL CALCULATED.3IONS-SCNC: 5 MMOL/L (ref 4–13)
AST SERPL W P-5'-P-CCNC: 16 U/L (ref 13–39)
BASOPHILS # BLD AUTO: 0.06 THOUSANDS/ÂΜL (ref 0–0.1)
BASOPHILS NFR BLD AUTO: 1 % (ref 0–1)
BILIRUB SERPL-MCNC: 0.49 MG/DL (ref 0.2–1)
BUN SERPL-MCNC: 11 MG/DL (ref 5–25)
CALCIUM SERPL-MCNC: 8.6 MG/DL (ref 8.4–10.2)
CHLORIDE SERPL-SCNC: 99 MMOL/L (ref 96–108)
CO2 SERPL-SCNC: 31 MMOL/L (ref 21–32)
CREAT SERPL-MCNC: 0.57 MG/DL (ref 0.6–1.3)
EOSINOPHIL # BLD AUTO: 0.22 THOUSAND/ÂΜL (ref 0–0.61)
EOSINOPHIL NFR BLD AUTO: 3 % (ref 0–6)
ERYTHROCYTE [DISTWIDTH] IN BLOOD BY AUTOMATED COUNT: 18.1 % (ref 11.6–15.1)
GFR SERPL CREATININE-BSD FRML MDRD: 104 ML/MIN/1.73SQ M
GLUCOSE P FAST SERPL-MCNC: 137 MG/DL (ref 65–99)
GLUCOSE SERPL-MCNC: 137 MG/DL (ref 65–140)
GLUCOSE SERPL-MCNC: 139 MG/DL (ref 65–140)
GLUCOSE SERPL-MCNC: 175 MG/DL (ref 65–140)
GLUCOSE SERPL-MCNC: 213 MG/DL (ref 65–140)
GLUCOSE SERPL-MCNC: 239 MG/DL (ref 65–140)
HCT VFR BLD AUTO: 39.5 % (ref 36.5–49.3)
HGB BLD-MCNC: 12 G/DL (ref 12–17)
IMM GRANULOCYTES # BLD AUTO: 0.07 THOUSAND/UL (ref 0–0.2)
IMM GRANULOCYTES NFR BLD AUTO: 1 % (ref 0–2)
LYMPHOCYTES # BLD AUTO: 1.97 THOUSANDS/ÂΜL (ref 0.6–4.47)
LYMPHOCYTES NFR BLD AUTO: 23 % (ref 14–44)
MCH RBC QN AUTO: 24.9 PG (ref 26.8–34.3)
MCHC RBC AUTO-ENTMCNC: 30.4 G/DL (ref 31.4–37.4)
MCV RBC AUTO: 82 FL (ref 82–98)
MONOCYTES # BLD AUTO: 0.94 THOUSAND/ÂΜL (ref 0.17–1.22)
MONOCYTES NFR BLD AUTO: 11 % (ref 4–12)
NEUTROPHILS # BLD AUTO: 5.47 THOUSANDS/ÂΜL (ref 1.85–7.62)
NEUTS SEG NFR BLD AUTO: 61 % (ref 43–75)
NRBC BLD AUTO-RTO: 0 /100 WBCS
PLATELET # BLD AUTO: 383 THOUSANDS/UL (ref 149–390)
PMV BLD AUTO: 10.3 FL (ref 8.9–12.7)
POTASSIUM SERPL-SCNC: 3.8 MMOL/L (ref 3.5–5.3)
PROT SERPL-MCNC: 6 G/DL (ref 6.4–8.4)
RBC # BLD AUTO: 4.82 MILLION/UL (ref 3.88–5.62)
SODIUM SERPL-SCNC: 135 MMOL/L (ref 135–147)
WBC # BLD AUTO: 8.73 THOUSAND/UL (ref 4.31–10.16)

## 2024-05-13 PROCEDURE — 97130 THER IVNTJ EA ADDL 15 MIN: CPT

## 2024-05-13 PROCEDURE — 97530 THERAPEUTIC ACTIVITIES: CPT

## 2024-05-13 PROCEDURE — 97129 THER IVNTJ 1ST 15 MIN: CPT

## 2024-05-13 PROCEDURE — 82948 REAGENT STRIP/BLOOD GLUCOSE: CPT

## 2024-05-13 PROCEDURE — 99232 SBSQ HOSP IP/OBS MODERATE 35: CPT | Performed by: INTERNAL MEDICINE

## 2024-05-13 PROCEDURE — 97116 GAIT TRAINING THERAPY: CPT

## 2024-05-13 PROCEDURE — 94760 N-INVAS EAR/PLS OXIMETRY 1: CPT

## 2024-05-13 PROCEDURE — 99221 1ST HOSP IP/OBS SF/LOW 40: CPT

## 2024-05-13 PROCEDURE — 80053 COMPREHEN METABOLIC PANEL: CPT | Performed by: NURSE PRACTITIONER

## 2024-05-13 PROCEDURE — 94664 DEMO&/EVAL PT USE INHALER: CPT

## 2024-05-13 PROCEDURE — 94640 AIRWAY INHALATION TREATMENT: CPT

## 2024-05-13 PROCEDURE — 99232 SBSQ HOSP IP/OBS MODERATE 35: CPT | Performed by: PHYSICAL MEDICINE & REHABILITATION

## 2024-05-13 PROCEDURE — 97110 THERAPEUTIC EXERCISES: CPT

## 2024-05-13 PROCEDURE — 85025 COMPLETE CBC W/AUTO DIFF WBC: CPT | Performed by: NURSE PRACTITIONER

## 2024-05-13 PROCEDURE — 92526 ORAL FUNCTION THERAPY: CPT

## 2024-05-13 RX ORDER — LANOLIN ALCOHOL/MO/W.PET/CERES
3 CREAM (GRAM) TOPICAL EVERY EVENING
Status: DISCONTINUED | OUTPATIENT
Start: 2024-05-13 | End: 2024-05-14

## 2024-05-13 RX ADMIN — FLUTICASONE PROPIONATE 1 SPRAY: 50 SPRAY, METERED NASAL at 17:15

## 2024-05-13 RX ADMIN — FLUTICASONE FUROATE AND VILANTEROL TRIFENATATE 1 PUFF: 200; 25 POWDER RESPIRATORY (INHALATION) at 08:05

## 2024-05-13 RX ADMIN — APIXABAN 5 MG: 5 TABLET, FILM COATED ORAL at 08:00

## 2024-05-13 RX ADMIN — Medication 3 MG: at 21:45

## 2024-05-13 RX ADMIN — ALBUTEROL SULFATE 2 PUFF: 90 AEROSOL, METERED RESPIRATORY (INHALATION) at 23:07

## 2024-05-13 RX ADMIN — BUSPIRONE HYDROCHLORIDE 10 MG: 10 TABLET ORAL at 21:45

## 2024-05-13 RX ADMIN — ATORVASTATIN CALCIUM 80 MG: 80 TABLET, FILM COATED ORAL at 17:14

## 2024-05-13 RX ADMIN — FERROUS SULFATE TAB 325 MG (65 MG ELEMENTAL FE) 325 MG: 325 (65 FE) TAB at 07:59

## 2024-05-13 RX ADMIN — BUSPIRONE HYDROCHLORIDE 10 MG: 10 TABLET ORAL at 17:14

## 2024-05-13 RX ADMIN — LORAZEPAM 0.5 MG: 0.5 TABLET ORAL at 13:40

## 2024-05-13 RX ADMIN — BUSPIRONE HYDROCHLORIDE 10 MG: 10 TABLET ORAL at 08:00

## 2024-05-13 RX ADMIN — ALBUTEROL SULFATE 2 PUFF: 90 AEROSOL, METERED RESPIRATORY (INHALATION) at 07:29

## 2024-05-13 RX ADMIN — GUAIFENESIN 600 MG: 600 TABLET, EXTENDED RELEASE ORAL at 17:14

## 2024-05-13 RX ADMIN — LEVALBUTEROL HYDROCHLORIDE 1.25 MG: 1.25 SOLUTION RESPIRATORY (INHALATION) at 13:09

## 2024-05-13 RX ADMIN — Medication 1 TABLET: at 08:01

## 2024-05-13 RX ADMIN — UMECLIDINIUM 1 PUFF: 62.5 AEROSOL, POWDER ORAL at 08:05

## 2024-05-13 RX ADMIN — PANTOPRAZOLE SODIUM 40 MG: 40 TABLET, DELAYED RELEASE ORAL at 06:41

## 2024-05-13 RX ADMIN — ALBUTEROL SULFATE 2 PUFF: 90 AEROSOL, METERED RESPIRATORY (INHALATION) at 13:47

## 2024-05-13 RX ADMIN — THIAMINE HCL TAB 100 MG 100 MG: 100 TAB at 08:00

## 2024-05-13 RX ADMIN — ASPIRIN 81 MG CHEWABLE TABLET 81 MG: 81 TABLET CHEWABLE at 07:59

## 2024-05-13 RX ADMIN — FLUTICASONE PROPIONATE 1 SPRAY: 50 SPRAY, METERED NASAL at 08:05

## 2024-05-13 RX ADMIN — GUAIFENESIN 600 MG: 600 TABLET, EXTENDED RELEASE ORAL at 08:00

## 2024-05-13 RX ADMIN — LEVALBUTEROL HYDROCHLORIDE 1.25 MG: 1.25 SOLUTION RESPIRATORY (INHALATION) at 20:32

## 2024-05-13 RX ADMIN — APIXABAN 5 MG: 5 TABLET, FILM COATED ORAL at 17:15

## 2024-05-13 RX ADMIN — INSULIN LISPRO 2 UNITS: 100 INJECTION, SOLUTION INTRAVENOUS; SUBCUTANEOUS at 17:15

## 2024-05-13 RX ADMIN — INSULIN LISPRO 1 UNITS: 100 INJECTION, SOLUTION INTRAVENOUS; SUBCUTANEOUS at 12:30

## 2024-05-13 RX ADMIN — INSULIN GLARGINE 25 UNITS: 100 INJECTION, SOLUTION SUBCUTANEOUS at 21:45

## 2024-05-13 RX ADMIN — FOLIC ACID 1 MG: 1 TABLET ORAL at 08:00

## 2024-05-13 NOTE — ASSESSMENT & PLAN NOTE
History of alcohol abuse  Monitor off CIWA protocol - Pt now post withdrawal window   Ativan as needed anxiety  Continue supplements with thiamine, folate, MVI

## 2024-05-13 NOTE — PCC SPEECH THERAPY
Pt currently being followed for cognitive, speech and dysphagia tx sessions. Pt completed CLQT+ in which a Composite Severity Rating score of 2.2 out of 4.0, correlating to overall moderately impaired  cognitive linguistic impairments at time of evaluation and in comparison to age matched peers ranging from 70-88 y/o. Cognitive barriers which present include: decreased attention, LT memory recall, ST memory recall , problem solving, reasoning, sequencing, organization of thoughts, judgement, slower processing, and insight, which still impacts pt's overall safety, functional cognitive communication skills as well as functional mobility. The following interventions are used to target these barriers, including verbal problem solving task, visual memory recall tasks, drawing conclusions activities, written sequencing tasks, categorization tasks , picture problem solving activities, verbal reasoning tasks, verbal review of current medications, written review of medications,  written health management tasks, verbal health management tasks, visual attention tasks, functional reading tasks , time management activities, and family education/training. Pt completed Motor Speech Eval in which pt is demonstrating speech intelligibility to be mildly impaired at word/phrase level but more moderately impaired at the sentence/conversational level. Speech overall characterized as having decreased breath support for speech, decreased coordination of respiration with speech attempts, imprecise articulation, and rapid rate. Speech/language barriers which present include: decreased intelligibility decreased at sentence and conversational speech levels, imprecise articulation , faster rate of speech, and decreased breath support to sustain speech which still impacts pt's overall safety, functional cognitive communication skills as well as functional mobility.  The following interventions are used to target these barriers, including  completion of OME's, speech drills. Lastly, pt completing bedside dysphagia assessment in which pt is presenting w/ minimal to mild oral and mild pharyngeal dysphagia. Symptoms or concerns included decreased mastication, decreased bolus formation, delayed oral intiation, oral residue with solids , and residue effectively cleared with increased time, finger sweeps, liquid wash in addition to suspected pharyngeal swallow delay, suspected decreased hyolaryngeal elevation upon palpation, audible swallows, and inconsistent throat clear vs hawking given meals. Dysphagia barriers which present include the following risks for aspiration such as: poor positioning, decreased cognition, ineffective mastication, delay in oral transit, delay in swallow initiation, pharyngeal weakness upon swallowing, and inconsistent cough/throat clear/hawking given meals . In order to address the noted barriers, skilled SLP services will address this by targeting the following interventions: oral motor exercise, proper positioning, diet modification, safe swallow strategies, and family education/training.  Current diet is regular w/ thin liquids.  Family training/education will need to be initiated to review concerns and recommendations as per noted abilities as listed above. At this time, pt will continue to benefit from skilled cognitive linguistic tx, speech/apraxia tx, and dysphagia tx  session to maximize overall functional independence given overall cognitive linguistic skills, speech and intelligibility, and swallow abilities  in attempts to decreased caregiver burden over time.       Update from week: 5/20/2024: Pt continues to be followed for cognitive linguistic tx and speech/apraxia tx sessions to where pt is making  slower and steady gains  at present time. Continued cognitive barriers which present include: decreased attention, LT memory recall, ST memory recall , problem solving, reasoning, sequencing, organization of thoughts,  judgement, slower processing, and insight, which still impacts pt's overall safety, functional cognitive communication skills as well as functional mobility. The following interventions are used to target these barriers, including verbal working memory tasks, visual memory recall tasks, drawing conclusions activities, written sequencing tasks, verbal sequencing tasks, categorization tasks , picture problem solving activities, verbal reasoning tasks,  written health management tasks, verbal health management tasks, and functional reading tasks . Continued speech barriers which present include: decreased intelligibility decreased at phrase and conversational speech level, imprecise articulation , slower rate of speech, and decreased breath support to sustain speech which still impacts pt's overall safety, functional communication intent. The following interventions are used to target these barriers, including review of OME's engagement in open-ended discussions, speech drills. Current speech and cognitive functioning is rated at min A for comprehension, expression, supervision for social interactions, mod A for executive functions and memory. Of note, pt has had brief f/u for dysphagia tx sessions, to where pt is currently demonstrating least restrictive diet w/o increased oropharyngeal sxs. Upon last tx sessions, pt presented w/ minimal to functional oral and functional pharyngeal dysphagia where symptoms or concerns included slower but effective mastication of solids and no overt aspiration sxs observed during meal but still does have productive cough post meals due to pre-existing congestion.  Pt had been demonstrating ability to use swallow strategies throughout meal w/o difficulty or prompting by SLP. Positioning is more paramount to decrease possible risk of aspiration if in bed. Recommendations are to continue regular diet w/ thin liquids at this time, still continuing aspiration precautions. No further  dysphagia tx sessions warranted at this time where focus to be on cognitive and speech skills. Of note, family training was to occur last week, but pt's dtr unable to make planned session. Will attempt to complete this upcoming week w/ dtr as able as appropriate. At this time, pt will continue to benefit from skilled cognitive linguistic tx and speech/apraxia tx session to maximize overall functional independence given overall cognitive linguistic skills and speech and intelligibility in attempts to decreased caregiver burden over time.     Update from week of 5/27/2024: Pt conts to be followed for skilled SLP services targeting cognitive linguistic communication skills. Pt has been making steady progress towards his goals- currently functioning at mod A memory, min A comprehension, and supervision problem solving and expression. Targets this passed week have been on family training which was completed on 5/24 with daughter, [place]. Reviewed overall progress with Dysphagia, Speech and Cognition. Daughter is able to complete all IADL tasks at discharge. Pt has been participating in stroke education as well, however with varied carryover and recall. Pt benefits from extended processing time, repetition and visual aids. Plan is for discharge home with family on 5/29 with further skilled home therapies. However not home ST services warranted- may benefit from outpt if desired.   Barriers > interventions:  Dysphagia > resolved- regular and thin liquids, pt/family education completed  Motor speech > OMEs, breath support exercises, rate and articulation exercises  Cognition > ST/working memory strategies, sequencing tasks, categorization tasks, problem solving activities, IADL tasks, family education/training (completed)

## 2024-05-13 NOTE — PROGRESS NOTES
05/13/24 1401   Pain Assessment   Pain Assessment Tool 0-10   Pain Score No Pain   Restrictions/Precautions   Precautions Bed/chair alarms;Cognitive;Fall Risk;Supervision on toilet/commode   Cognition   Overall Cognitive Status Impaired   Arousal/Participation Alert;Cooperative   Attention Attends with cues to redirect   Memory Decreased recall of recent events;Decreased recall of precautions - per pt he takes ativan at home daily although not a good historian so will need to confirm information with family   Following Commands Follows one step commands with increased time or repetition   Subjective   Subjective pt in bed watching TV and agreeable to PT   Sit to Lying   Type of Assistance Needed Supervision;Verbal cues   Comment no rails, in/out of bed on the left side per home set up   Sit to Lying CARE Score 4   Lying to Sitting on Side of Bed   Type of Assistance Needed Supervision;Verbal cues   Comment without bed rails. unable to tolerate flat bed, used 3 pillows per baseline practice at home   Lying to Sitting on Side of Bed CARE Score 4   Sit to Stand   Type of Assistance Needed Physical assistance;Verbal cues;Adaptive equipment   Physical Assistance Level 26%-50%   Comment mod of 1 without AD, min of 1 with RW but max VC for proper hand placement dionisio prior to sitting down so will benefit from additional training. at this time expressed more comfortable with RW when mobilizing   Sit to Stand CARE Score 3   Bed-Chair Transfer   Type of Assistance Needed Physical assistance;Verbal cues;Adaptive equipment   Physical Assistance Level 26%-50%   Comment mod of 1 without AD but tendency to reach for furnitures. min of 1 with RW   Chair/Bed-to-Chair Transfer CARE Score 3   Car Transfer   Type of Assistance Needed Physical assistance;Verbal cues;Adaptive equipment   Physical Assistance Level 26%-50%   Comment mod of 1 no AD, min of 1 with RW, VC for technique and walker management on mock car with extra cushion to  "raised seat height for pt's dtr car is a van   Car Transfer CARE Score 3   Walk 10 Feet   Type of Assistance Needed Physical assistance;Verbal cues;Adaptive equipment   Physical Assistance Level Total assistance   Walk 10 Feet CARE Score 1   Walk 50 Feet with Two Turns   Type of Assistance Needed Physical assistance;Verbal cues;Adaptive equipment   Physical Assistance Level Total assistance   Walk 50 Feet with Two Turns CARE Score 1   Walk 150 Feet   Type of Assistance Needed Physical assistance;Verbal cues;Adaptive equipment   Physical Assistance Level Total assistance   Comment assist x 2 if no AD. 158' with RW x 1 short standing rest break, initially second person for CFA but afterwards no CFA for distances 10-50'.    Walk 150 Feet CARE Score 1   Walking 10 Feet on Uneven Surfaces   Type of Assistance Needed Physical assistance;Verbal cues   Physical Assistance Level Total assistance   Comment assist x 2 if no AD but with RW over floor mat min of 1 and VC for sequencing and RW management. - demos ataxic gait, fwd flexed posture    Walking 10 Feet on Uneven Surfaces CARE Score 1   Wheel 50 Feet with Two Turns   Comment anticipate will be amb at d/c   Reason if not Attempted Activity not applicable   Wheel 50 Feet with Two Turns CARE Score 9   Wheel 150 Feet   Reason if not Attempted Activity not applicable   Wheel 150 Feet CARE Score 9   Wheelchair mobility   Findings w/c for LE strengthening/coordination training x 100'   Curb or Single Stair   Style negotiated Single stair   Type of Assistance Needed Adaptive equipment;Physical assistance;Verbal cues   Physical Assistance Level Total assistance   1 Step (Curb) CARE Score 1   4 Steps   Type of Assistance Needed Physical assistance;Verbal cues;Adaptive equipment   Physical Assistance Level Total assistance   Comment mod-min of 2 on 6\"  with initial 1 step with bilat HR then remaining 3 steps with both hands on R rail only   4 Steps CARE Score 1   Picking Up " Object   Comment (S)  will assess next tx   Toilet Transfer   Reason if not Attempted Refused to perform   Toilet Transfer CARE Score 7   Equipment Use   NuStep lvl 1 using all ext x 10 mins, SPM 30-55 (SpO2 on RA 94%, -121 )   Assessment   Treatment Assessment Pt seen for skilled PT for 90 mins on 2L supplemental O2 via NC initially with SpO2 >97% with ND 99 bpm at rest while 96%, 97bpm ND on 1L so trialed on RA with SpO2 ranging 94-95%, ND 98-100bpm although still had O2 line on for suspect request for supplemental O2 anxiety related. RN medicated pt with ativan and had breathing tx before PT session this PM. Pt able to amb 158' using a RW with SpO2 94-95% and  bpm post task. although SpO2 momentarily dropped to 88% post 4 steps negotiation but went up to 94% after a few seconds with VC for proper breathing. Afterwards pt participated with additional therapy tasks on RA without O2 line on and he was agreeable which he tolerated till end of tx without any c/o breathing difficulties. Pt will benefit from additional skilled PT interventions with inc focus on NMR/NPP to facilitate recovery post stroke but anticipate pt might be using an AD (SPC vs RW) at d/c to optimize safety.   Family/Caregiver Present no   Barriers to Discharge Inaccessible home environment   PT Barriers   Functional Limitation Car transfers;Ramp negotiation;Stair negotiation;Standing;Transfers;Walking   Plan   Treatment/Interventions Functional transfer training;LE strengthening/ROM;Therapeutic exercise;Endurance training;Bed mobility;Gait training;Spoke to nursing;OT;Spoke to case management;Spoke to advanced practitioner   Progress Progressing toward goals   PT Therapy Minutes   PT Time In 1400   PT Time Out 1530   PT Total Time (minutes) 90   PT Mode of treatment - Individual (minutes) 90   PT Mode of treatment - Concurrent (minutes) 0   PT Mode of treatment - Group (minutes) 0   PT Mode of treatment - Co-treat (minutes) 0   PT Mode  of Treatment - Total time(minutes) 90 minutes   PT Cumulative Minutes 180   Therapy Time missed   Time missed? No

## 2024-05-13 NOTE — PROGRESS NOTES
Physical Medicine and Rehabilitation Progress Note  Lucho Talavera 69 y.o. male MRN: [id number]  Unit/Bed#: -01 Encounter: [id number]    To Review: 69 year old M with PMH of COPD, multitude of ED visits/hospitalizations, DM2, CAD, EtOH abuse, HTN, prior CVA, found to have aflutter with RVR on hospitalization 4/2024 s/p BRITTNEY ablation who was to d/c on Eliquis but per report family did not know he needed it and he was not taking it who developed dysarthria, weakness and imbalance with recent fall at home and presented to hospital. CTA H/N showed Chronic left PCA territory infarct, new since the prior exam. No evidence of acute vascular territorial infarction, intracranial hemorrhage or mass.  No hemodynamically significant stenosis, dissection or occlusion of the carotid or vertebral arteries or major vessels of the [place].  Neuro consulted and recommended MRI brain which showed multiple infarcts of varying ages in the posterior circulation include multiple small acute/early subacute infarcts in the bilateral cerebellum. TTE did not show thrombus.  Neuro recommended Eliquis, aspirin, and statin. Patient was evaluated by skilled therapies and was found to have significant decline in ADLs and ambulation and appears appropriate for admission to [facility] [place].     Chief Complaint: Poor sleep overnight.     Interval History/Subjective:  No acute events overnight. Last BM 5/12 and continent. Continent of bladder. States he has had difficulty with speech since his stroke, and difficultly with ambulation for a longer period of time with falls in the house and multiple healing wounds on his legs. He denies any new CP, SOB, fevers, chills, N/V, abdominal pain.    ROS:  A 10 point review of systems was negative except for what is noted in the HPI.    Today's Changes:  Appreciate internal medicine eliquis price check $71.28. Will need to confirm family can afford this.  On home O2.    Monitor BP  Reviewed labs, largely stable.   Wound care saw patient with comments on LUE. I updated them re: open spot on sacrum POA.     Total visit time: 35 minutes, with more than 50% spent counseling/coordinating care. Counseling includes discussion with patient re: progress in therapies, functional issues observed by therapy staff, and discussion with patient regarding their current medical state and wellbeing. Coordination of patient's care was performed in conjunction with Internal Medicine service to monitor patient's labs, vitals, and management of their comorbidities.    Assessment/Plan:    * Stroke (cerebrum) ([facility])  Assessment & Plan  - MRI brain 5/8 - Multiple infarcts of varying ages in the posterior circulation include multiple small acute/early subacute infarcts in the bilateral cerebellum. Additional small foci of recent ischemia in the left PCA distribution superimposed on chronic infarct  No acute hemorrhage or mass effect  - CTA H/N -  1. Chronic left PCA territory infarct, new since the prior exam. No evidence of acute vascular territorial infarction,  intracranial hemorrhage or mass. 2. No hemodynamically significant stenosis, dissection or occlusion of the carotid or vertebral arteries or major vessels of the [place].  - Residual impairments on admission to [facility]: Impaired balance, coordination, possibly vision  - Co-morbidities most impacting functional recovery: COPD with chronic hypoxic respiratory failure, anxiety    Secondary stroke prevention  - Antithrombotic: Aspirin and Eliquis  - Statin  - Patient at increased risk for stroke particularly early in post-stroke period - monitor neuro exam closely with low threshold to repeat imaging  -  patient and if applicable caregiver on optimal stroke management  - Follow-up with neurology and PCP after d/c   - Recommend acute comprehensive interdisciplinary inpatient rehabilitation to include intensive skilled therapies (PT, OT,  ST) as outlined with oversight and management by rehabilitation physician as well as inpatient rehab level nursing, case management and weekly interdisciplinary team meetings.       Sacral wound  Assessment & Plan  Seen by wound care 5/7  - Managed R lateral forearm skin tear   - Noted to have open area on sacrum on admission.   - Consulted wound care to follow-up .   - Offloading,specialty cushion   - Allevyn   - Monitor closely.       Chronic obstructive pulmonary disease with acute exacerbation (HCC)  Assessment & Plan  Has had a multitude of ED visits related to COPD/concern for SOB but was frequently d/c'd same day  - Monitor also for anxiety and optimal mgmt of that; education on monitor home O2  IM consulted and with overall management at their discretion during ARC course  Monitor lung exam, vitals with and without activity  Encourage incentive spirometry  Breo/Incruse  Xopenex  PRN Albuterol  PRN supplemental O2   Mucinex     Insomnia  Assessment & Plan  Environmental interventions.  Sleep log  Added melatonin scheduled    Atrial fibrillation (HCC)  Assessment & Plan  IM consulted and with overall management at their discretion during ARC course  Rate control: None  Antithrombotic: Eliquis    - Cost is $71.28/month. Need to confirm family can afford this.       Hypertension  Assessment & Plan  Home: Lisinopril 40mg daily  Here: None   Monitor vitals with and without activity; monitor for orthostasis  Monitor hemoglobin, electrolytes, kidney function, hydration status   Management as per IM.       Chronic respiratory failure (HCC)  Assessment & Plan  2/2 COPD  At home on 2L NC  On same at this time.     ETOH abuse  Assessment & Plan  Patient reports cutting down and only about 2 beers per day but occasionally liquor   Alcohol cessation counseling and supportive counseling  Optimal mood/stress mgmt   Thiamine, folate, MVI   PRN low dose ativan BID   Consider gabapentin as well     Type 2 diabetes mellitus  ([facility])  Assessment & Plan  Lab Results   Component Value Date    HGBA1C 8.9 (H) 04/06/2024       Recent Labs     05/12/24  1557 05/12/24  2044 05/13/24  0640 05/13/24  1111   POCGLU 215* 219* 139 175*       Blood Sugar Average: Last 72 hrs:  (P) 262.5  Home: Metformin 1000mg Q12hr, Lantus 30 units at bedtime  Current meds: Lantus 20U HS, Lispro SS AC/HS, consistent carb diabetic diet  Nutrition consult  Management as per IM  Outpatient f/u with PCP      CAD (coronary artery disease)  Assessment & Plan  With hx of stenting  IM consulted and with overall management at their discretion during ARC course  Antithrombotic: Aspirin and Eliquis   Statin  Optimal blood pressure and blood sugar control          Health Maintenance  #Delirium/Sleep: At risk. Optimize sleep/wake, pain, bowel, bladder management. Avoid deliriogenic meds. Of note is on melatonin, ativan, and oxycodone PRN.   #Pain: Tylenol PRN  #Bowel: Last BM 5/12 and continent. Adding PRN miralax  #Bladder: Voiding and continent  #Skin/Pressure Injury Prevention: Turn Q2hr in bed, with weight shifts [id number] in wheelchair.  #DVT Prophylaxis:Fully anticoagulated on eliquis, SCDs  #GI Prophylaxis: PPI on for GERD  #Code Status: Full Code.   #FEN: Diabetic Diet  #Dispo: ELOS 2-3 weeks with goals to discharge to 1 level home with 2 HETAL, with supervision.      Objective:    Functional Update:  PT: min-modA bed mobility ,transfers,  modA with chair follow ambulation 70'  OT: modA oral hygiene, total A shower/bathe, Deanna Ub dressing, modA LB dressing, modA footwear, maxA toileting hygiene  SLP: CLQT with 2.2 out of 4 indicating moderate impairment overall in all fields, save for mild impairment in executive function.    Allergies per EMR    Physical Exam:  Temp:  [97.7 °F (36.5 °C)-98.3 °F (36.8 °C)] 97.7 °F (36.5 °C)  HR:  [64-90] 71  Resp:  [17-20] 20  BP: (107-136)/(72-86) 136/73  Oxygen Therapy  SpO2: 96 %      Gen: No acute distress, a bit disheveled   HEENT:  Moist mucus membranes  Cardiovascular: Regular rate, rhythm, S1/S2. Distal pulses palpable  Heme/Extr: No edema  Pulmonary: Non-labored breathing  : No antony  GI: Soft, non-tender, non-distended.   MSK: ROM is full in all extremities. No effusions or deformities. Bulk is symmetric. See below for MMT scores.   Integumentary: Skin is warm, dry. Multiple healing scabs/wounds on his legs. Sacrum with open area with pink wound bed.   Neuro: AAOx2-3 (self corrected when he initially said [place] for location). States he is here for COPD and had a stroke. Speech at times with some dysarthria, he self corrects. Appropriate to questioning. Cooperative. Some mild ataxia with FTN bilaterally.   MMT:   Strength:   Right  Left  Site  Right  Left  Site    4 5  S Ab: Shoulder Abductors  4 4 HF: Hip Flexors    5 5  EF: Elbow Flexors  NT  NT KF: Knee Flexors    5  5  EE: Elbow Extensors  4+ 4+  KE: Knee Extensors    5  5  WE: Wrist Extensors  5  5  DR: Dorsi Flexors    5  5  FF: Finger Flexors  5  5  PF: Plantar Flexors    5  5  HI: Hand Intrinsics  5  1  EHL: Extensor Hallucis Longus   Psych: Normal mood and affect.     Diagnostic Studies: Reviewed, no new imaging    Laboratory:  Reviewed   Results from last 7 days   Lab Units 05/13/24  0537 05/11/24  0524 05/10/24  0527   HEMOGLOBIN g/dL 12.0 12.4 11.9*   HEMATOCRIT % 39.5 39.5 37.9   WBC Thousand/uL 8.73 10.50* 7.81     Results from last 7 days   Lab Units 05/13/24  0537 05/11/24  0524 05/10/24  0527   BUN mg/dL 11 14 20   POTASSIUM mmol/L 3.8 3.8 4.0   CHLORIDE mmol/L 99 100 102   CREATININE mg/dL 0.57* 0.50* 0.56*   AST U/L 16 18 15   ALT U/L 18 18 12            Patient Active Problem List   Diagnosis    Closed odontoid fracture with routine healing    Closed displaced fracture of third cervical vertebra (HCC)    Closed displaced fracture of fourth cervical vertebra (HCC)    Alcohol abuse    CAD (coronary artery disease)    Dens fracture (HCC)    Acute blood loss anemia     Type 2 diabetes mellitus (HCC)    S/P C1-T1 Posterior Cervical Discectomy and Fusion on 9/10/18    At moderate risk for venous thromboembolism (VTE)    Chronic obstructive pulmonary disease with acute exacerbation ([facility])    Closed fracture of first cervical vertebra ([facility])    ETOH abuse    KLARISSA (obstructive sleep apnea)    Dirty living conditions    Bronchitis    Diabetic polyneuropathy associated with type 2 diabetes mellitus ([facility])    Hammertoe, bilateral    Post-traumatic arthritis of left foot    Pain due to onychomycosis of toenail    Oral abscess    Tooth fracture    H/O ETOH abuse    Closed nondisplaced fracture of posterior arch of first cervical vertebra ([facility])    Fall    Stage 3 severe COPD by GOLD classification ([facility])    Chronic respiratory failure ([facility])    Anxiety    Hypertension    COPD (chronic obstructive pulmonary disease) ([facility])    SIRS (systemic inflammatory response syndrome) ([facility])    History of alcohol abuse    Iron deficiency anemia secondary to inadequate dietary iron intake    COVID-19    Atrial flutter with rapid ventricular response ([facility])    Leukocytosis    Atrial fibrillation ([facility])    Stroke (cerebrum) ([facility])    Stroke-like symptom    Encounter for skin care         Medications  Current Facility-Administered Medications   Medication Dose Route Frequency Provider Last Rate    acetaminophen  650 mg Oral Q6H PRN Richmond Chew, DO      albuterol  2 puff Inhalation Q6H PRN William Camacho, DO      albuterol  2.5 mg Nebulization Q6H PRN Richmond Chew, DO      apixaban  5 mg Oral BID Richmond Chew, DO      aspirin  81 mg Oral Daily Richmond Chew, DO      atorvastatin  80 mg Oral Daily With Dinner Richmond Chew, DO      busPIRone  10 mg Oral TID Richmond Chew, DO      ferrous sulfate  325 mg Oral Daily With Breakfast Richmond Chew, DO      fluticasone  1 spray Nasal BID Richmond Chew, DO      fluticasone-vilanterol  1 puff Inhalation Daily Richmond Chew, DO       folic acid  1 mg Oral Daily Richmond Chew, DO      guaiFENesin  600 mg Oral BID Richmond Chew, DO      insulin glargine  25 Units Subcutaneous HS TATIANA Manriquez      insulin lispro  1-5 Units Subcutaneous TID AC Richmond Chew, DO      levalbuterol  1.25 mg Nebulization TID Richmond Chew, DO      LORazepam  0.5 mg Oral BID PRN Richmond Chew, DO      melatonin  3 mg Oral HS PRN Richmond Chew, DO      multivitamin-minerals  1 tablet Oral Daily Richmond Chew, DO      oxyCODONE  2.5 mg Oral Q4H PRN Richmond Chew, DO      pantoprazole  40 mg Oral Early Morning Richmond Chew, DO      thiamine  100 mg Oral Daily Richmond Chew, DO      umeclidinium  1 puff Inhalation Daily Richmond Chew, DO            ** Please Note: Fluency Direct voice to text software may have been used in the creation of this document. **

## 2024-05-13 NOTE — PLAN OF CARE
Problem: Prexisting or High Potential for Compromised Skin Integrity  Goal: Skin integrity is maintained or improved  Description: INTERVENTIONS:  - Identify patients at risk for skin breakdown  - Assess and monitor skin integrity  - Assess and monitor nutrition and hydration status  - Monitor labs   - Assess for incontinence   - Turn and reposition patient  - Assist with mobility/ambulation  - Relieve pressure over bony prominences  - Avoid friction and shearing  - Provide appropriate hygiene as needed including keeping skin clean and dry  - Evaluate need for skin moisturizer/barrier cream  - Collaborate with interdisciplinary team   - Patient/family teaching  - Consider wound care consult   Outcome: Progressing     Problem: PAIN - ADULT  Goal: Verbalizes/displays adequate comfort level or baseline comfort level  Description: Interventions:  - Encourage patient to monitor pain and request assistance  - Assess pain using appropriate pain scale  - Administer analgesics based on type and severity of pain and evaluate response  - Implement non-pharmacological measures as appropriate and evaluate response  - Consider cultural and social influences on pain and pain management  - Notify physician/advanced practitioner if interventions unsuccessful or patient reports new pain  Outcome: Progressing     Problem: INFECTION - ADULT  Goal: Absence or prevention of progression during hospitalization  Description: INTERVENTIONS:  - Assess and monitor for signs and symptoms of infection  - Monitor lab/diagnostic results  - Monitor all insertion sites, i.e. indwelling lines, tubes, and drains  - Monitor endotracheal if appropriate and nasal secretions for changes in amount and color  - [place] appropriate cooling/warming therapies per order  - Administer medications as ordered  - Instruct and encourage patient and family to use good hand hygiene technique  - Identify and instruct in appropriate isolation precautions for  identified infection/condition  Outcome: Progressing     Problem: SAFETY ADULT  Goal: Patient will remain free of falls  Description: INTERVENTIONS:  - Educate patient/family on patient safety including physical limitations  - Instruct patient to call for assistance with activity   - Consult OT/PT to assist with strengthening/mobility   - Keep Call bell within reach  - Keep bed low and locked with side rails adjusted as appropriate  - Keep care items and personal belongings within reach  - Initiate and maintain comfort rounds  - Make Fall Risk Sign visible to staff  - Offer Toileting every  Hours, in advance of need  - Initiate/Maintain alarm  - Obtain necessary fall risk management equipment:   - Apply yellow socks and bracelet for high fall risk patients  - Consider moving patient to room near nurses station  Outcome: Progressing  Goal: Maintain or return to baseline ADL function  Description: INTERVENTIONS:  -  Assess patient's ability to carry out ADLs; assess patient's baseline for ADL function and identify physical deficits which impact ability to perform ADLs (bathing, care of mouth/teeth, toileting, grooming, dressing, etc.)  - Assess/evaluate cause of self-care deficits   - Assess range of motion  - Assess patient's mobility; develop plan if impaired  - Assess patient's need for assistive devices and provide as appropriate  - Encourage maximum independence but intervene and supervise when necessary  - Involve family in performance of ADLs  - Assess for home care needs following discharge   - Consider OT consult to assist with ADL evaluation and planning for discharge  - Provide patient education as appropriate  Outcome: Progressing  Goal: Maintains/Returns to pre admission functional level  Description: INTERVENTIONS:  - Perform AM-PAC 6 Click Basic Mobility/ Daily Activity assessment daily.  - Set and communicate daily mobility goal to care team and patient/family/caregiver.   - Collaborate with  rehabilitation services on mobility goals if consulted  - Perform Range of Motion  times a day.  - Reposition patient every  hours.  - Dangle patient  times a day  - Stand patient  times a day  - Ambulate patient  times a day  - Out of bed to chair  times a day   - Out of bed for meals  times a day  - Out of bed for toileting  - Record patient progress and toleration of activity level   Outcome: Progressing     Problem: DISCHARGE PLANNING  Goal: Discharge to home or other facility with appropriate resources  Description: INTERVENTIONS:  - Identify barriers to discharge w/patient and caregiver  - Arrange for needed discharge resources and transportation as appropriate  - Identify discharge learning needs (meds, wound care, etc.)  - Arrange for interpretive services to assist at discharge as needed  - Refer to Case Management Department for coordinating discharge planning if the patient needs post-hospital services based on physician/advanced practitioner order or complex needs related to functional status, cognitive ability, or social support system  Outcome: Progressing

## 2024-05-13 NOTE — PROGRESS NOTES
05/13/24 1100   Pain Assessment   Pain Assessment Tool 0-10   Pain Score No Pain   Restrictions/Precautions   Precautions Aspiration;Bed/chair alarms;Cognitive;Fall Risk;O2;Supervision on toilet/commode   Comprehension   Comprehension (FIM) 3 - Understands basic info/conversation 50-74% of time   Expression   Expression (FIM) 4 - Expresses basic info/needs 75-90% of time   Social Interaction   Social Interaction (FIM) 5 - Interacts appropriately with others 90% of time   Problem Solving   Problem solving (FIM) 3 - Solves basic problmes 50-74% of time   Memory   Memory (FIM) 2 - Recognizes, recalls/performs 25-49%   Speech/Language/Cognition Assessmetn   Treatment Assessment Prior to dysphagia tx session, SLP engaged pt in cognitive session, by more or less engaging in rapport building w/ pt as upon entering room, pt was requesting a breathing treatment. Of note, RNLeidy Camacho was present as well and checking pulse ox which he was on 1L O2 via NC and sating at 95%. Of note, SLP did sit pt up slightly more that he was, but still appeared to verbalize wanting a breathing tx. SLP did have to provide review to pt that Respiratory staff is not on unit and will take a short time to get to room. However, pt did not appear visibly SOB or any WOB until respiratory came for tx. RN did increased O2 to 2L until tx.     Otherwise, SLP reviewing orientation in which pt was able to recall current place/city, current month and year only, not being oriented to date/BRANDI. Pt was also not oriented to reasoning for current hospitalization. Moderate cues needed for pt to recall sustaining a stroke vs his thought of being here due to his COPD. Pt was able to recall that he lives w/ his dtr Nimco and her family (son-in-law and grandchildren). However, inconsistent report of tasks which pt did complete at home as well as hobbies which he enjoys, etc. It does appear per chart review, I ADL tasks are all completed by pt's family at this time but  functional mobility and ADL's he completed on own. Additionally, it was noted that pt is suspected to have fluctuating awareness given use for call bell, as pt has been noted to yell out for staff vs using call bell to ring for wants/needs despite re-orienting to call bell at end of session. At this time, pt will continue to benefit from ongoing skilled SLP services to maximize functional cognitive skills in attempts to decrease burden of care over time.   Eating   Type of Assistance Needed Set-up / clean-up;Supervision   Physical Assistance Level No physical assistance   Eating CARE Score 4   Swallow Assessment   Swallow Treatment Assessment Daily Dysphagia Tx Note     Patient Name: Lucho Talavera    Today's Date: 5/13/2024      Current Risks for Dysphagia & Aspiration: Respiratory compromise;Dysarthria;General debilitation;New Neuro event;Brain injury;Cognitive deficit;Mental status change;HX neurologic dx;     Current Symptoms/Concerns: Cough;During meals;HX Dysphagia;     Current diet:regular diet and thin liquids     Premorbid diet::regular diet and thin liquids     Positioning: pt repositioned in bed which was then placed in chair mode    Items administered:Consistencies Administered: thin liquids, soft solids, and hard solids  Materials administered included: chicken w/ gravy, rice, brussel sprouts, banana, ice cream x2 and thin liquids by cup/straw    Total amount of meal consumed:   100% of meal and 240cc of thin liquids by cup/straw      Oral stage:minimal to mild  Lip closure: functional  Anterior spillage: none  Mastication: mildly prolonged given solids  Bolus formation: slightly disorganized w/ solids  Bolus control: suspected to be fairly prompt w/ liquids  Transfer: minimal delay given solids  Oral residue: trace amounts but cleared independently w/ use of strategies  Pocketing: none         Pharyngeal stage:mild  Swallow promptness: mild delay w/ solids > liquids  Hyolaryngeal elevation: present  upon palpation, mildly decreased  Wet voice: none  Throat clear: x3 throughout meal  Cough: strong cough x1 after solids but expelled mucous  Secondary swallows: none  Audible swallows: intermittent during meal       Esophageal stage:minimal- pt w/ hawking x3 during meal        Summary:     Pt presenting with minimal to mild oral and mild pharyngeal dysphagia today. Symptoms or concerns included decreased mastication, decreased bolus formation, delayed oral intiation, oral residue with solids , and residue effectively cleared with increased time, finger sweeps, liquid wash in addition to suspected pharyngeal swallow delay, suspected decreased hyolaryngeal elevation upon palpation, audible swallows, and inconsistent throat clear vs hawking given meal today.      Pt was awake, alert and cooperative for session after breathing tx which pt requested in cognitive portion of session. Pt did have cough PRIOR to meal to where he did have some expulsion of mucous as well. Pt was able to demonstrate appropriate strategies throughout meal today, noting ability to consume meal at a slower rate, alternate solids/liquids, use finger sweeps to clear and residual.     As for swallow function, pt did demonstrate adequate lip seal around tsp, fork, cup and straw in addition to functional bolus retrieval across textures. No over anterior loss was observed throughout meal. Mastication given solids was noted to be slower and mildly prolonged, but overall was fairly efficient in breakdown of solids, but did demonstrate some mildly disorganized bolus formulation leading to trace oral residual. However, pt was able to clear residual w/ mentioned swallow strategies as above. It did appear that bolus control/transit of thins was timely. Pt did have mild delay w solids > liquids, but HLE is present and is slightly decreased upon palpation. Pt did have audible swallows w/ both solids and liquids inconsistently throughout meal today. Pt did  have strong cough x1 near beginning of meal but did have productive cough expelling mucous. As for remainder of meal, pt did have throat clear x3 BUT also hawking x3 during meal w/o expelling secretions vs food.        Recommendations:  Diet: regular diet and thin liquids  Meds: whole with liquid  Strategies: upright posture, only feed when fully alert, slow rate of feeding, small bites/sips, quiet environment (tv off, limit talking, door closed, etc.), and alternating bites and sips     DISTANT supervision w/ meals. Setup tray for pt.   Results reviewed with:  patient, RN- Janice  Aspiration precautions posted.     F/u ST tx: Pt will continue to benefit from ongoing skilled dysphagia tx sessions to establish safest least restrictive diet w/o increased oropharyngeal or aspiration sxs as well as monitor ability to carryover swallow strategies independently.     Plan: Cont diet as tolerated             Follow up x1-2 as needed with different meal to assess tolerance and for increased s/s   Swallow Assessment Prognosis   Prognosis Good   Prognosis Considerations Age;Co-morbidities;Family/community support;Medical diagnosis;Medical prognosis;New learning ability;Ability to carry over   SLP Therapy Minutes   SLP Time In 1100   SLP Time Out 1200   SLP Total Time (minutes) 60   SLP Mode of treatment - Individual (minutes) 60   SLP Mode of treatment - Concurrent (minutes) 0   SLP Mode of treatment - Group (minutes) 0   SLP Mode of treatment - Co-treat (minutes) 0   SLP Mode of Treatment - Total time(minutes) 60 minutes   SLP Cumulative Minutes 150   Therapy Time missed   Time missed? No

## 2024-05-13 NOTE — PCC OCCUPATIONAL THERAPY
Pt continues to present with impairments in activity tolerance, endurance, standing balance/tolerance, memory, insight, safety, judgement, and sequencing. Additional functional barriers include fatigue and risk for falls. Pt is functioning at overall CS for ADLs and short distance fxnl mobility w/ RW. Pt will continue to benefit from skilled OT services to address above mentioned barriers and maximize functional independence in baseline areas of occupation. FT completed w/ pt's daughter, daughter is able to provide 24 hour S. DME ordered: standard [facility]. From OT standpoint, anticipate d/c home w/ family support on Wednesday 5/29 w/ recommendation for HHOT.

## 2024-05-13 NOTE — PROGRESS NOTES
Pt called this am stating he was short of breath  sats were normal vital signs were normal lungs clear diminished in bases I gave proventil rescue inhalor pt had no complaints of pain  pt was on 2l nc. Does have moist non productive cough shortly after I gave him all his other inhalors and nasal spray  pt had no further complaints Before lunch pt asked for a nebulizer Resp tech did come give this  No sob noted sats normal on 2 l nc  Pt called again this after noon stating he could not breath Lungs remain clear  No sob noted sats 96 percent on 2lnc HR 96  on monitor RT  called stated ok to give proventil inhalor Lauren SANCHES notified of pt's complaints Lauren came to see pt  suggested giving proventil inhalor and ativan that was ordered  Both given  Pt is working with PT now and has no further complaints

## 2024-05-13 NOTE — ASSESSMENT & PLAN NOTE
Environmental interventions.  Sleep log  Drinks a lot of caffeinated sodas throughout the night   - Placed no caffeine order and review with nursing.    - Improvement with this  Added melatonin scheduled  5/16 added mirtazapine for both sleep side effect and anxiety.

## 2024-05-13 NOTE — CONSULTS
Consultation - Wound Care   Lucho Talavera 69 y.o. male MRN: [id number]  Unit/Bed#: [facility] 455-01 Encounter: [id number]    Assessment:  Skin tear of the left upper extremity, initial encounter  Type 2 diabetes with long-term use of insulin    Plan:  Left upper arm skin tear with improved measurements on exam.  Will recommend to continue with Dermagran gauze to the wound bed.  May use silicone bordered foam dressing to secure the primary dressing.  Change every other day and as needed.  No clinical s/s of infection present   A1C results reviewed with the patient today.    Result of 8.9 noted from 4/6/2024  Managed by primary team   Recommend to continue with preventative nursing skin care measures in place as per WOCN team/PMR team  Pressure relief- offloading of pressure with turning/repositioning as patient medically tolerates, heel elevation, foam wedges for offloading/repositioning, and waffle cushion to chair.  Nutrition is following  Patient verbalized understanding of plan of care.  [place] text wound care team with questions or concerns.   Routine wound care follow-up while admitted. AVS updated.       History of Present Illness:  Patient is a 69-year-old male who was admitted to the [facility] for physical rehabilitation status post stroke.  Patient has a history of atrial fibrillation, hypertension, alcohol abuse, COPD, and diabetes.  Patient seen today for follow-up visit of left upper extremity skin tear.  Patient has been followed by WOCN team during this hospital stay, with current wound management of Dermagran gauze.  Patient cooperative and agreeable for the assessment.  Wound dressed as per orders at time of the assessment. Patient offers no wound related complaints. Denies fever, chills, or increased pain related to the wound.    Subjective:    Review of Systems   Constitutional:  Positive for fatigue. Negative for chills and fever.   HENT:  Negative for congestion and sneezing.    Respiratory:  Negative for  cough and shortness of breath.    Musculoskeletal:  Positive for gait problem.        Wheelchair   Skin:  Positive for wound.        LUE   Hematological:  Bruises/bleeds easily.   Psychiatric/Behavioral:  Negative for agitation.        Historical Information   Past Medical History:   Diagnosis Date    Cardiac arrest (HCC)     COPD (chronic obstructive pulmonary disease) (HCC)     CVA (cerebral vascular accident) (HCC)     Diabetes mellitus (HCC)     Heart attack (HCC)     Hypertension     Stroke (HCC)      Past Surgical History:   Procedure Laterality Date    CARDIAC ELECTROPHYSIOLOGY PROCEDURE N/A 4/8/2024    Procedure: Cardiac eps/aflutter ablation;  Surgeon: Ihsan Blum DO;  Location: [facility];  Service: Cardiology    CERVICAL FUSION N/A 9/10/2018    Procedure: Posterior cervical decompressive laminectomy C3-6; Posterior cervical lateral mass and pedicle fixation fusion C1-T1;  Surgeon: Papa Quiros MD;  Location: [facility] MAIN OR;  Service: Neurosurgery     Social History   Social History     Substance and Sexual Activity   Alcohol Use Not Currently    Alcohol/week: 8.0 - 12.0 standard drinks of alcohol    Types: 8 - 12 Cans of beer per week    Comment: every day drinker     Social History     Substance and Sexual Activity   Drug Use Never     E-Cigarette/Vaping    E-Cigarette Use Never User      E-Cigarette/Vaping Substances    Nicotine No     THC No     CBD No     Flavoring No     Other No     Unknown No      Social History     Tobacco Use   Smoking Status Former    Types: Cigarettes   Smokeless Tobacco Never   Tobacco Comments    quit 5 months ago      Family History:   Family History   Problem Relation Age of Onset    Heart attack Mother        Meds/Allergies   current meds:   Current Facility-Administered Medications   Medication Dose Route Frequency    acetaminophen (TYLENOL) tablet 650 mg  650 mg Oral Q6H PRN    albuterol (PROVENTIL HFA,VENTOLIN HFA) inhaler 2 puff  2 puff Inhalation Q6H PRN     "albuterol inhalation solution 2.5 mg  2.5 mg Nebulization Q6H PRN    apixaban (ELIQUIS) tablet 5 mg  5 mg Oral BID    aspirin chewable tablet 81 mg  81 mg Oral Daily    atorvastatin (LIPITOR) tablet 80 mg  80 mg Oral Daily With Dinner    busPIRone (BUSPAR) tablet 10 mg  10 mg Oral TID    ferrous sulfate tablet 325 mg  325 mg Oral Daily With Breakfast    fluticasone (FLONASE) 50 mcg/act nasal spray 1 spray  1 spray Nasal BID    fluticasone-vilanterol 200-25 mcg/actuation 1 puff  1 puff Inhalation Daily    folic acid (FOLVITE) tablet 1 mg  1 mg Oral Daily    guaiFENesin (MUCINEX) 12 hr tablet 600 mg  600 mg Oral BID    insulin glargine (LANTUS) subcutaneous injection 25 Units 0.25 mL  25 Units Subcutaneous HS    insulin lispro (HumALOG/ADMELOG) 100 units/mL subcutaneous injection 1-5 Units  1-5 Units Subcutaneous TID AC    levalbuterol (XOPENEX) inhalation solution 1.25 mg  1.25 mg Nebulization TID    LORazepam (ATIVAN) tablet 0.5 mg  0.5 mg Oral BID PRN    melatonin tablet 3 mg  3 mg Oral QPM    multivitamin-minerals (CENTRUM) tablet 1 tablet  1 tablet Oral Daily    oxyCODONE (ROXICODONE) split tablet 2.5 mg  2.5 mg Oral Q4H PRN    pantoprazole (PROTONIX) EC tablet 40 mg  40 mg Oral Early Morning    thiamine tablet 100 mg  100 mg Oral Daily    umeclidinium 62.5 mcg/actuation inhaler AEPB 1 puff  1 puff Inhalation Daily     Allergies   Allergen Reactions    Amoxicillin Hives    Augmentin [Amoxicillin-Pot Clavulanate] Hives       Objective   Vitals: Blood pressure 136/73, pulse 71, temperature 97.7 °F (36.5 °C), temperature source Oral, resp. rate 20, height 6' 1\" (1.854 m), weight 83.6 kg (184 lb 4.8 oz), SpO2 96%.     Wounds:   Left upper extremity/elbow skin tear.  Wound is irregular shaped, partial-thickness, with 10% moist pink tissue and 80% adherent yellow slough/fibrin.  Wound is producing small amount of serosanguineous drainage.  Edges fragile and attached without maceration.  No skin flap to approximate.  " Periwound is intact with noted ecchymosis.  B/l heels are dry and intact without redness or wounds.   Mid sacrum with dry and intact freshly healed epithelialized blanchable pink-colored skin.  No open aspect, redness, or drainage.  Noted dry peeling skin.  Skin is nontender upon palpation.  There are no temperature or texture differences noted to the skin.      No induration, fluctuance, odor, warmth/temperature differences, redness, or purulence noted to the above mentioned wounds and skin areas assessed. New dressings applied as noted above. Patient tolerated assessment well- denies pain and no s/s of non-verbal pain or discomfort observed during the encounter.        Wound 05/08/24 Skin tear Arm Anterior;Left;Inferior (Active)   Wound Image   05/13/24 0944   Wound Length (cm) 5 cm 05/13/24 0944   Wound Width (cm) 0.6 cm 05/13/24 0944   Wound Depth (cm) 0.1 cm 05/13/24 0944           Physical Exam  Constitutional:       General: He is awake. He is not in acute distress.     Appearance: He is not ill-appearing, toxic-appearing or diaphoretic.      Interventions: Nasal cannula in place.   HENT:      Head: Normocephalic and atraumatic.      Right Ear: External ear normal.      Left Ear: External ear normal.   Eyes:      Conjunctiva/sclera: Conjunctivae normal.   Pulmonary:      Effort: Pulmonary effort is normal. No respiratory distress.   Genitourinary:     Comments: Fecal incontinence noted per nursing flowsheets.  Musculoskeletal:      Comments: Patient seen out of bed in wheelchair during therapy session.   Skin:     General: Skin is warm and dry.      Findings: Wound present.      Comments: Refer to wound section for assessment details.    Neurological:      Mental Status: He is alert.      Gait: Gait abnormal.   Psychiatric:         Behavior: Behavior is cooperative.           Lab, Imaging and other studies: I have personally reviewed pertinent reports.      Code Status: Level 1 - Full Code      Counseling /  "Coordination of Care  Total time spent today:    Total time (face-to-face and non-face-to-face) spent on today's visit was 23 minutes. This includes preparation for the visits (H&P on 5/11/24, and wound care note on 5/8/24) performance of a medically appropriate history and examination, and orders for medications/treatments or testing.  Discussed assessment findings, and plan of care/recommendations with patients RN.      TATIANA Molina, FNP-C, CWON      Portions of the record may have been created with voice recognition software.  Occasional wrong word or \"sound a like\" substitutions may have occurred due to the inherent limitations of voice recognition software.  Read the chart carefully and recognize, using context, where substitutions have occurred      "

## 2024-05-13 NOTE — ASSESSMENT & PLAN NOTE
Lab Results   Component Value Date    HGBA1C 8.9 (H) 04/06/2024       Recent Labs     05/12/24  1557 05/12/24  2044 05/13/24  0640 05/13/24  1111   POCGLU 215* 219* 139 175*       Blood Sugar Average: Last 72 hrs:  (P) 204.75  Blood sugars elevated in the setting of recent use of steroids  Currently on Lantus 25U at bedtime.  Review of records reveals that pt is prescribed metformin 1000mg every 12 hours and Lantus 30U at bedtime  No changes today.  Correctional scale insulin ordered  Hypoglycemia protocol  Diabetic diet

## 2024-05-13 NOTE — ASSESSMENT & PLAN NOTE
Lab Results   Component Value Date    HGBA1C 8.9 (H) 04/06/2024     Recent Labs     05/12/24  1557 05/12/24  2044 05/13/24  0640 05/13/24  1111   POCGLU 215* 219* 139 175*       Blood Sugar Average: Last 72 hrs:  (P) 204.75    Blood sugars were elevated in the setting of recent use of steroids but improving  Lantus was increased from 20 units at bedtime to 25 units.  Continue to monitor sugars closely.  If they start dropping too low, may need to decrease Lantus to prior dose now that steroids have been discontinued.   Continue with accuchecks and SSI  Hypoglycemia protocol  Diabetic diet

## 2024-05-13 NOTE — CASE MANAGEMENT
Cm met with pt at bedside to introduce role and complete cm open. Pt lives with daughter Brianna in 3 story home, 2 HETAL. Pt has wc and spc, pt is on chronic oxygen 2 L. Pt has no hx of op therapies, or hhc. Hx of [facility]. PCP is Miguelina Chicas, preferred pharmacy is [facility] [address] [facility] in [place]. The patient was educated on the rehabilitation process including therapy program, the interdisciplinary team, and weekly team meetings. Estimated length of stay was reviewed with the patient as well as expectations of discussions of discharge planning. The role of the  was reviewed including providing care coordination, discharge planning and discharge facilitation. IMM was reviewed with the patient and a copy was provided for their reference. The patient verbalized understanding of the information provided and denied any further questions at this time. CM will continue to follow and assist the patient throughout their rehabilitation stay.

## 2024-05-13 NOTE — DISCHARGE INSTR - OTHER ORDERS
Cleanse right lateral ankle abrasion with NSS, pat dry, and apply silicone bordered foam dressing. Change every other day and as needed for soilage/dislodgement.

## 2024-05-13 NOTE — PROGRESS NOTES
"   05/13/24 0905   Pain Assessment   Pain Assessment Tool 0-10   Pain Score No Pain   Restrictions/Precautions   Precautions Bed/chair alarms;Cognitive;Aspiration;Fall Risk;O2;Supervision on toilet/commode   Weight Bearing Restrictions No   ROM Restrictions No   Lifestyle   Autonomy \"I don't know.\"   Sit to Stand   Type of Assistance Needed Physical assistance;Adaptive equipment   Physical Assistance Level 25% or less   Comment cues for proper hand placement   Sit to Stand CARE Score 3   Bed-Chair Transfer   Type of Assistance Needed Physical assistance;Verbal cues;Adaptive equipment   Physical Assistance Level 25% or less   Comment Min A stand pivot w/ RW, cues for proper RW management.   Chair/Bed-to-Chair Transfer CARE Score 3   Exercise Tools   Other Exercise Tool 1 BUE ther ex to maximize strength and endurance for ADLs and fxnl mobility. 10# flex bar 3x10 in pronation and supination. Completed w/ 2# DB, 3x10 bicep curls, chest press, prograde and retrograde rows. Pt tolerated well w/ Min vc's for technique and rest breaks between sets to manage generalized fatigue.   Cognition   Overall Cognitive Status Impaired   Comments flat affect. Noted w/ impaired fxnl problem solving, dec insight, impaired judgement, poor safety awareness, dec cognitive flexibility, impaired STM.   Additional Activities   Additional Activities Comments Pt reports his daughter works part-time 2 days a week.   Activity Tolerance   Medical Staff Made Aware Spot checked SpO2 at rest on 2LO2 >97%. Pt agreeeable to trial RA, >93%. With light activity on RA >92%. Once seated, pt reported inc dizziness w/ /76 and pt reporting inc feelings of SOB despite saturation >92%. Pt placed on 1LO2 and pt reporting dec feelings of anxiety and without dizziness. Pt requested to keep O2 on for session. Pt reports having O2 tanks at home at using 1-2x/day at baseline, however unable to elaborate on rationale. RN made aware of above.   Assessment "   Treatment Assessment Pt seen for skilled OT session focusing on fxnl stand pivot xfers, BUE strengthening, and stroke education: stroke 101. Time spent rapport building, pt enjoys watching tv and reading. Pt reporting poor night sleep, however RN reporting pt was unagreeable to turn off lights or tv overnight. Pt noted w/ cognitive impairment, req cues for task initiation, sequencing and fxnl problem-solving to improve safety w/ fxnl tasks. Add'lly limited by fatigue, req inc time for all fxnl tasks and frequent rest breaks to manage. Cont OT POC: endurance work, fxnl standing tolerance, BUE strengthening, fxnl cognitive/safety training, ADL retraining, and RW management training. Pt requested to rest in bed, all needs within reach and alarm activated.   Prognosis Good   Problem List Decreased endurance;Impaired balance;Decreased cognition;Impaired judgement;Decreased safety awareness;Impaired hearing;Decreased coordination;Decreased mobility   Plan   Treatment/Interventions ADL retraining;Functional transfer training;Therapeutic exercise;Endurance training;Patient/family training   Progress Progressing toward goals   Discharge Recommendation   Rehab Resource Intensity Level, OT   (pending progress)   OT Therapy Minutes   OT Time In 0905   OT Time Out 1030   OT Total Time (minutes) 85   OT Mode of treatment - Individual (minutes) 85   OT Mode of treatment - Concurrent (minutes) 0   OT Mode of treatment - Group (minutes) 0   OT Mode of treatment - Co-treat (minutes) 0   OT Mode of Treatment - Total time(minutes) 85 minutes   OT Cumulative Minutes 175   Therapy Time missed   Time missed? No     Stroke Education Series    Pt participated in skilled Stroke Education Series in an individual setting to address the topic of Stroke 101: Understanding the Basics of Stroke in both verbal and written formats. Education within this session reviewed the basic structural and functional components of the brain and included  information on the causes of stroke, related signs/symptoms, risk factors, and the process of stroke rehabilitation. The goal of this education was to provide the patient with general understanding of how the brain functions and how a stroke can impact his/her functional mobility and independence. In addition, the intention of this education is to provide the patient with the information to reduce the risk of a second stroke. Following education, pt's response to education is: verbalizes understanding and needs reinforcment .    To individualize the education, the following topics were included based upon the patients' past and current medical history: atrial fibrillation, carotid stenosis, family history, and hypertension.  Additional topics that were covered include weakness, dysarthria, dysphagia, cognitive changes, pain, hydration, nutrition, fall prevention, and prevention of new conditions and/or worsening condition.     Start Time: 1015  End Time: 1030

## 2024-05-13 NOTE — PROGRESS NOTES
[facility]  Progress Note  Name: Lucho Talavera I  MRN: [id number]  Unit/Bed#: -01 I Date of Admission: 5/11/2024   Date of Service: 5/13/2024 I Hospital Day: 2    Assessment/Plan   * Stroke (cerebrum) (HCC)  Assessment & Plan  Presented with increased weakness, dysarthria and leaning on the left side x 5 days with fall 2 days prior to admission in the setting of noncompliance with Eliquis. Spoke with the pharmacist at [facility] and it was prescribed but never picked up.  Cost of Eliquis is $71.28. Called pt's daughter to review price but she did not answer.  CTA with chronic left PCA territory infarction  MRI with multiple infarcts of varying ages and posterior circulation including multiple small acute/early subacute infarct in the bilateral cerebellum with additional small foci of recent ischemia in the left PCA distribution superimposed on chronic infarct  Echo with EF 55%, no cardiac thrombus  Continue aspirin and Eliquis  Continue Lipitor 80 mg daily  Outpatient follow-up with neurology  Therapy per PMR    Hypertension  Assessment & Plan  Home: Lisinopril 40mg daily.  Here: None.  BP currently acceptable off medication.  Will add back Lisinopril as necessary.    Chronic respiratory failure (HCC)  Assessment & Plan  Maintained on 2 L nasal cannula baseline  Has been treated for acute exacerbations recently with steroids.  Continue rescue inhaler.    ETOH abuse  Assessment & Plan  History of alcohol abuse  Monitor off CIWA protocol - Pt now post withdrawal window   Ativan as needed anxiety  Continue supplements with thiamine, folate, MVI    Type 2 diabetes mellitus (HCC)  Assessment & Plan  Lab Results   Component Value Date    HGBA1C 8.9 (H) 04/06/2024       Recent Labs     05/12/24  1557 05/12/24  2044 05/13/24  0640 05/13/24  1111   POCGLU 215* 219* 139 175*       Blood Sugar Average: Last 72 hrs:  (P) 204.75  Blood sugars elevated in the setting of recent use of  steroids  Currently on Lantus 25U at bedtime.  Review of records reveals that pt is prescribed metformin 1000mg every 12 hours and Lantus 30U at bedtime  No changes today.  Correctional scale insulin ordered  Hypoglycemia protocol  Diabetic diet      CAD (coronary artery disease)  Assessment & Plan  Continue ASA and statin.             The above assessment and plan was reviewed and updated as determined by my evaluation of the patient on 5/13/2024.    Labs:   Results from last 7 days   Lab Units 05/13/24  0537 05/11/24  0524   WBC Thousand/uL 8.73 10.50*   HEMOGLOBIN g/dL 12.0 12.4   HEMATOCRIT % 39.5 39.5   PLATELETS Thousands/uL 383 395*     Results from last 7 days   Lab Units 05/13/24  0537 05/11/24  0524   SODIUM mmol/L 135 134*   POTASSIUM mmol/L 3.8 3.8   CHLORIDE mmol/L 99 100   CO2 mmol/L 31 25   BUN mg/dL 11 14   CREATININE mg/dL 0.57* 0.50*   CALCIUM mg/dL 8.6 8.5             Results from last 7 days   Lab Units 05/13/24  1111 05/13/24  0640 05/12/24  2044   POC GLUCOSE mg/dl 175* 139 219*       Imaging  No orders to display       Review of Scheduled Meds:  Current Facility-Administered Medications   Medication Dose Route Frequency Provider Last Rate    acetaminophen  650 mg Oral Q6H PRN Richmond Chew, DO      albuterol  2 puff Inhalation Q6H PRN William Camacho, DO      albuterol  2.5 mg Nebulization Q6H PRN Richmond Chew, DO      apixaban  5 mg Oral BID Richmond Chew, DO      aspirin  81 mg Oral Daily Richmond Chew, DO      atorvastatin  80 mg Oral Daily With Dinner Richmond Chew, DO      busPIRone  10 mg Oral TID Richmond Chew, DO      ferrous sulfate  325 mg Oral Daily With Breakfast Richmond Chew, DO      fluticasone  1 spray Nasal BID Richmond Chew, DO      fluticasone-vilanterol  1 puff Inhalation Daily Richmond Chew, DO      folic acid  1 mg Oral Daily Richmond Chew, DO      guaiFENesin  600 mg Oral BID Richmond Chew, DO      insulin  glargine  25 Units Subcutaneous HS Sil TATIANA Ceja      insulin lispro  1-5 Units Subcutaneous TID AC Richmond Chew, DO      levalbuterol  1.25 mg Nebulization TID Richmond Chew, DO      LORazepam  0.5 mg Oral BID PRN Richmond Chew, DO      melatonin  3 mg Oral QPM Ashley Depadua, MD      multivitamin-minerals  1 tablet Oral Daily Richmond Chew, DO      oxyCODONE  2.5 mg Oral Q4H PRN Richmond Chew, DO      pantoprazole  40 mg Oral Early Morning Harzay Chew, DO      thiamine  100 mg Oral Daily Harzay Chew, DO      umeclidinium  1 puff Inhalation Daily Harzay Chew, DO         Subjective/ HPI: Patient seen and examined. Patients overnight issues or events were reviewed with nursing staff. New or overnight issues include the following:     Pt seen in his room. He complained of feeling like he can't breathe today. He was given a nebulizer treatment. Lungs are clear. He denies any other complaints.    ROS:   A 10 point ROS was performed; negative except as noted above.        *Labs /Radiology studies Reviewed  *Medications  reviewed and reconciled as needed  *Please refer to order section for additional ordered labs studies      Physical Examination:  Vitals:   Vitals:    05/13/24 0741 05/13/24 1115 05/13/24 1309 05/13/24 1317   BP:       BP Location:       Pulse: 71      Resp: 20      Temp:       TempSrc:       SpO2: 96% 94% 96% 96%   Weight:       Height:           General Appearance: NAD; pleasant  HEENT: PERRLA, conjuctiva normal; mucous membranes moist; face symmetrical  Neck:  Supple  Lungs: clear bilaterally, normal respiratory effort, no retractions, expiratory effort normal, on 2L via NC, using IS and at 2500mL  CV: regular rate and rhythm, no murmurs rubs or gallops noted   ABD: soft non tender, +BS x4  EXT: DP pulses intact, no lymphadenopathy, no edema  Skin: normal turgor, normal texture, no rash  Psych: affect normal, mood normal  Neuro: Awake and alert.       The above physical exam was reviewed and updated as determined by my evaluation of the patient on 5/13/2024.    Invasive Devices       None                      VTE Pharmacologic Prophylaxis: Eliquis  Code Status: Level 1 - Full Code  Current Length of Stay: 2 day(s)    Total floor / unit time spent today 45 minutes  Coordination of patient's care was performed in conjunction with primary service. Time invested included review of patient's labs, vitals, and management of their comorbidities with continued monitoring, examination of patient as well as answering patient questions, documenting her findings and creating progress note in electronic medical record,  ordering appropriate diagnostic testing.       ** Please Note:  voice to text software may have been used in the creation of this document. Although proof errors in transcription or interpretation are a potential of such software**

## 2024-05-13 NOTE — ASSESSMENT & PLAN NOTE
Maintained on 2 L nasal cannula baseline  Has been treated for acute exacerbations recently with steroids.  Continue rescue inhaler.

## 2024-05-13 NOTE — ASSESSMENT & PLAN NOTE
Presented with increased weakness, dysarthria and leaning on the left side x 5 days with fall 2 days prior to admission in the setting of noncompliance with Eliquis. Spoke with the pharmacist at [facility] and it was prescribed but never picked up.  Cost of Eliquis is $71.28. Called pt's daughter to review price but she did not answer.  CTA with chronic left PCA territory infarction  MRI with multiple infarcts of varying ages and posterior circulation including multiple small acute/early subacute infarct in the bilateral cerebellum with additional small foci of recent ischemia in the left PCA distribution superimposed on chronic infarct  Echo with EF 55%, no cardiac thrombus  Continue aspirin and Eliquis  Continue Lipitor 80 mg daily  Outpatient follow-up with neurology  Therapy per PMR

## 2024-05-13 NOTE — ASSESSMENT & PLAN NOTE
Presented with increased weakness, dysarthria and leaning on the left side x 5 days with fall 2 days prior to admission in the setting of noncompliance with Eliquis  CTA with chronic left PCA territory infarction  MRI with multiple infarcts of varying ages and posterior circulation including multiple small acute/early subacute infarct in the bilateral cerebellum with additional small foci of recent ischemia in the left PCA distribution superimposed on chronic infarct  Echo with EF 55%, no cardiac thrombus  Maintained on aspirin and Eliquis  Continue Lipitor 80 mg daily  Outpatient follow-up with neurology  Continue acute rehab

## 2024-05-13 NOTE — PCC PHYSICAL THERAPY
5/27  Pt continues to demonstrate good progress from requiring CG-min A last week to consistent CS level this week using a RW on even/uneven surface mobilities, curb step and stairs negotiation with R rail. Overall pt is a lot calmer, using call button more appropriately however still demos low frustration tolerance when having difficulties with certain therapy task requiring emotional support and redirection. Stroke ed completed and HEP already in place with hand out provided. While family training with dtr [employer] completed on 5/24/24. From PT standpoint appropriate for home d/c on 5/29/24 with home PT services initially then transition to OP PT services as discussed with pt/dtr during FT to maximize rehab potential post CVA. Family have confirmed availability of a RW and reacher at home for pt to use.

## 2024-05-13 NOTE — PCC CARE MANAGEMENT
5/13- Cm met with pt at bedside to introduce role and complete cm open. Pt lives with daughter Brianna in 3 story home, 2 HETAL. Pt has wc and spc, pt is on chronic oxygen 2 L. Pt has no hx of op therapies, or hhc. Hx of [facility]. PCP is Miguelina Chicas, preferred pharmacy is [facility] in [place]. The patient was educated on the rehabilitation process including therapy program, the interdisciplinary team, and weekly team meetings. Estimated length of stay was reviewed with the patient as well as expectations of discussions of discharge planning. The role of the  was reviewed including providing care coordination, discharge planning and discharge facilitation. IMM was reviewed with the patient and a copy was provided for their reference. The patient verbalized understanding of the information provided and denied any further questions at this time. CM will continue to follow and assist the patient throughout their rehabilitation stay.     5/20- Following for dc needs    5/28- Pt to dc 5/30 with HHC RN PT OT ST

## 2024-05-13 NOTE — ASSESSMENT & PLAN NOTE
Home: Lisinopril 40mg daily.  Here: None.  BP currently acceptable off medication.  Will add back Lisinopril as necessary.

## 2024-05-13 NOTE — CONSULTS
Consultation - Wound Care   Lucho Talavera 69 y.o. male MRN: [id number]  Unit/Bed#: [facility] 455-01 Encounter: [id number]    Assessment:  Skin tear of the left upper extremity, initial encounter  Type 2 diabetes with long-term use of insulin    Plan:  Left upper arm skin tear with improved measurements on exam.  Will recommend to continue with Dermagran gauze to the wound bed.  May use silicone bordered foam dressing to secure the primary dressing.  Change every other day and as needed.  No clinical s/s of infection present   A1C results reviewed with the patient today.    Result of 8.9 noted from 4/6/2024  Managed by primary team   Recommend to continue with preventative nursing skin care measures in place as per WOCN team/PMR team  Pressure relief- offloading of pressure with turning/repositioning as patient medically tolerates, heel elevation, foam wedges for offloading/repositioning, and waffle cushion to chair.  Nutrition is following  Patient verbalized understanding of plan of care.  Union text wound care team with questions or concerns.   Routine wound care follow-up while admitted. AVS updated.       History of Present Illness:  Patient is a 69-year-old male who was admitted to the [facility] for physical rehabilitation status post stroke.  Patient has a history of atrial fibrillation, hypertension, alcohol abuse, COPD, and diabetes.  Patient seen today for follow-up visit of left upper extremity skin tear.  Patient has been followed by WOCN team during this hospital stay, with current wound management of Dermagran gauze.  Patient cooperative and agreeable for the assessment.  Wound dressed as per orders at time of the assessment. Patient offers no wound related complaints. Denies fever, chills, or increased pain related to the wound.    Subjective:    Review of Systems   Constitutional:  Positive for fatigue. Negative for chills and fever.   HENT:  Negative for congestion and sneezing.    Respiratory:  Negative for  cough and shortness of breath.    Musculoskeletal:  Positive for gait problem.        Wheelchair   Skin:  Positive for wound.        LUE   Hematological:  Bruises/bleeds easily.   Psychiatric/Behavioral:  Negative for agitation.        Historical Information   Past Medical History:   Diagnosis Date    Cardiac arrest (HCC)     COPD (chronic obstructive pulmonary disease) (HCC)     CVA (cerebral vascular accident) (HCC)     Diabetes mellitus (HCC)     Heart attack (HCC)     Hypertension     Stroke (HCC)      Past Surgical History:   Procedure Laterality Date    CARDIAC ELECTROPHYSIOLOGY PROCEDURE N/A 4/8/2024    Procedure: Cardiac eps/aflutter ablation;  Surgeon: Ihsan Blum DO;  Location: [facility] CARD[facility]AC [facility];  Service: Cardiology    CERVICAL FUSION N/A 9/10/2018    Procedure: Posterior cervical decompressive laminectomy C3-6; Posterior cervical lateral mass and pedicle fixation fusion C1-T1;  Surgeon: Papa Quiros MD;  Location: [facility] MAIN OR;  Service: Neurosurgery     Social History   Social History     Substance and Sexual Activity   Alcohol Use Not Currently    Alcohol/week: 8.0 - 12.0 standard drinks of alcohol    Types: 8 - 12 Cans of beer per week    Comment: every day drinker     Social History     Substance and Sexual Activity   Drug Use Never     E-Cigarette/Vaping    E-Cigarette Use Never User      E-Cigarette/Vaping Substances    Nicotine No     THC No     CBD No     Flavoring No     Other No     Unknown No      Social History     Tobacco Use   Smoking Status Former    Types: Cigarettes   Smokeless Tobacco Never   Tobacco Comments    quit 5 months ago      Family History:   Family History   Problem Relation Age of Onset    Heart attack Mother        Meds/Allergies   current meds:   Current Facility-Administered Medications   Medication Dose Route Frequency    acetaminophen (TYLENOL) tablet 650 mg  650 mg Oral Q6H PRN    albuterol (PROVENTIL HFA,VENTOLIN HFA) inhaler 2 puff  2 puff Inhalation Q6H PRN     "albuterol inhalation solution 2.5 mg  2.5 mg Nebulization Q6H PRN    apixaban (ELIQUIS) tablet 5 mg  5 mg Oral BID    aspirin chewable tablet 81 mg  81 mg Oral Daily    atorvastatin (LIPITOR) tablet 80 mg  80 mg Oral Daily With Dinner    busPIRone (BUSPAR) tablet 10 mg  10 mg Oral TID    ferrous sulfate tablet 325 mg  325 mg Oral Daily With Breakfast    fluticasone (FLONASE) 50 mcg/act nasal spray 1 spray  1 spray Nasal BID    fluticasone-vilanterol 200-25 mcg/actuation 1 puff  1 puff Inhalation Daily    folic acid (FOLVITE) tablet 1 mg  1 mg Oral Daily    guaiFENesin (MUCINEX) 12 hr tablet 600 mg  600 mg Oral BID    insulin glargine (LANTUS) subcutaneous injection 25 Units 0.25 mL  25 Units Subcutaneous HS    insulin lispro (HumALOG/ADMELOG) 100 units/mL subcutaneous injection 1-5 Units  1-5 Units Subcutaneous TID AC    levalbuterol (XOPENEX) inhalation solution 1.25 mg  1.25 mg Nebulization TID    LORazepam (ATIVAN) tablet 0.5 mg  0.5 mg Oral BID PRN    melatonin tablet 3 mg  3 mg Oral HS PRN    multivitamin-minerals (CENTRUM) tablet 1 tablet  1 tablet Oral Daily    oxyCODONE (ROXICODONE) split tablet 2.5 mg  2.5 mg Oral Q4H PRN    pantoprazole (PROTONIX) EC tablet 40 mg  40 mg Oral Early Morning    thiamine tablet 100 mg  100 mg Oral Daily    umeclidinium 62.5 mcg/actuation inhaler AEPB 1 puff  1 puff Inhalation Daily     Allergies   Allergen Reactions    Amoxicillin Hives    Augmentin [Amoxicillin-Pot Clavulanate] Hives       Objective   Vitals: Blood pressure 136/73, pulse 71, temperature 97.7 °F (36.5 °C), temperature source Oral, resp. rate 20, height 6' 1\" (1.854 m), weight 83.6 kg (184 lb 4.8 oz), SpO2 94%.     Wounds:   Left upper extremity/elbow skin tear.  Wound is irregular shaped, partial-thickness, with 10% moist pink tissue and 80% adherent yellow slough/fibrin.  Wound is producing small amount of serosanguineous drainage.  Edges fragile and attached without maceration.  No skin flap to approximate.  " Periwound is intact with noted ecchymosis.      No induration, fluctuance, odor, warmth/temperature differences, redness, or purulence noted to the above mentioned wounds and skin areas assessed. New dressings applied as noted above. Patient tolerated assessment well- denies pain and no s/s of non-verbal pain or discomfort observed during the encounter.        Wound 05/08/24 Skin tear Arm Anterior;Left;Inferior (Active)   Wound Image   05/13/24 0944   Wound Length (cm) 5 cm 05/13/24 0944   Wound Width (cm) 0.6 cm 05/13/24 0944   Wound Depth (cm) 0.1 cm 05/13/24 0944       Physical Exam  Constitutional:       General: He is awake. He is not in acute distress.     Appearance: He is not ill-appearing, toxic-appearing or diaphoretic.      Interventions: Nasal cannula in place.   HENT:      Head: Normocephalic and atraumatic.      Right Ear: External ear normal.      Left Ear: External ear normal.   Eyes:      Conjunctiva/sclera: Conjunctivae normal.   Pulmonary:      Effort: Pulmonary effort is normal. No respiratory distress.   Genitourinary:     Comments: Fecal incontinence noted per nursing flowsheets.  Musculoskeletal:      Comments: Patient seen out of bed in wheelchair during therapy session.   Skin:     General: Skin is warm and dry.      Findings: Wound present.      Comments: Refer to wound section for assessment details.    Neurological:      Mental Status: He is alert.      Gait: Gait abnormal.   Psychiatric:         Behavior: Behavior is cooperative.           Lab, Imaging and other studies: I have personally reviewed pertinent reports.      Code Status: Level 1 - Full Code      Counseling / Coordination of Care  Total time spent today:    Total time (face-to-face and non-face-to-face) spent on today's visit was 23 minutes. This includes preparation for the visits (H&P on 5/11/24, and wound care note on 5/8/24) performance of a medically appropriate history and examination, and orders for  "medications/treatments or testing.  Discussed assessment findings, and plan of care/recommendations with patients RN.      TATIANA Molina, FNP-C, CWON      Portions of the record may have been created with voice recognition software.  Occasional wrong word or \"sound a like\" substitutions may have occurred due to the inherent limitations of voice recognition software.  Read the chart carefully and recognize, using context, where substitutions have occurred      "

## 2024-05-14 ENCOUNTER — PATIENT OUTREACH (OUTPATIENT)
Dept: CASE MANAGEMENT | Facility: OTHER | Age: 69
End: 2024-05-14

## 2024-05-14 LAB
GLUCOSE SERPL-MCNC: 162 MG/DL (ref 65–140)
GLUCOSE SERPL-MCNC: 168 MG/DL (ref 65–140)
GLUCOSE SERPL-MCNC: 172 MG/DL (ref 65–140)
GLUCOSE SERPL-MCNC: 212 MG/DL (ref 65–140)

## 2024-05-14 PROCEDURE — 97530 THERAPEUTIC ACTIVITIES: CPT

## 2024-05-14 PROCEDURE — 82948 REAGENT STRIP/BLOOD GLUCOSE: CPT

## 2024-05-14 PROCEDURE — 97116 GAIT TRAINING THERAPY: CPT

## 2024-05-14 PROCEDURE — 94760 N-INVAS EAR/PLS OXIMETRY 1: CPT

## 2024-05-14 PROCEDURE — 97112 NEUROMUSCULAR REEDUCATION: CPT

## 2024-05-14 PROCEDURE — 94640 AIRWAY INHALATION TREATMENT: CPT

## 2024-05-14 PROCEDURE — 97130 THER IVNTJ EA ADDL 15 MIN: CPT

## 2024-05-14 PROCEDURE — 99232 SBSQ HOSP IP/OBS MODERATE 35: CPT | Performed by: INTERNAL MEDICINE

## 2024-05-14 PROCEDURE — 99233 SBSQ HOSP IP/OBS HIGH 50: CPT | Performed by: PHYSICAL MEDICINE & REHABILITATION

## 2024-05-14 PROCEDURE — 94664 DEMO&/EVAL PT USE INHALER: CPT

## 2024-05-14 PROCEDURE — 97129 THER IVNTJ 1ST 15 MIN: CPT

## 2024-05-14 PROCEDURE — 92507 TX SP LANG VOICE COMM INDIV: CPT

## 2024-05-14 RX ORDER — LANOLIN ALCOHOL/MO/W.PET/CERES
6 CREAM (GRAM) TOPICAL EVERY EVENING
Status: DISCONTINUED | OUTPATIENT
Start: 2024-05-14 | End: 2024-05-29 | Stop reason: HOSPADM

## 2024-05-14 RX ORDER — ALBUTEROL SULFATE 90 UG/1
2 AEROSOL, METERED RESPIRATORY (INHALATION) EVERY 4 HOURS PRN
Status: DISCONTINUED | OUTPATIENT
Start: 2024-05-14 | End: 2024-05-29 | Stop reason: HOSPADM

## 2024-05-14 RX ADMIN — LEVALBUTEROL HYDROCHLORIDE 1.25 MG: 1.25 SOLUTION RESPIRATORY (INHALATION) at 13:22

## 2024-05-14 RX ADMIN — FLUTICASONE PROPIONATE 1 SPRAY: 50 SPRAY, METERED NASAL at 19:00

## 2024-05-14 RX ADMIN — THIAMINE HCL TAB 100 MG 100 MG: 100 TAB at 09:10

## 2024-05-14 RX ADMIN — BUSPIRONE HYDROCHLORIDE 10 MG: 10 TABLET ORAL at 16:44

## 2024-05-14 RX ADMIN — LORAZEPAM 0.5 MG: 0.5 TABLET ORAL at 00:31

## 2024-05-14 RX ADMIN — BUSPIRONE HYDROCHLORIDE 10 MG: 10 TABLET ORAL at 20:33

## 2024-05-14 RX ADMIN — APIXABAN 5 MG: 5 TABLET, FILM COATED ORAL at 09:10

## 2024-05-14 RX ADMIN — INSULIN LISPRO 1 UNITS: 100 INJECTION, SOLUTION INTRAVENOUS; SUBCUTANEOUS at 16:44

## 2024-05-14 RX ADMIN — METFORMIN HYDROCHLORIDE 500 MG: 500 TABLET, FILM COATED ORAL at 09:11

## 2024-05-14 RX ADMIN — Medication 6 MG: at 20:33

## 2024-05-14 RX ADMIN — LEVALBUTEROL HYDROCHLORIDE 1.25 MG: 1.25 SOLUTION RESPIRATORY (INHALATION) at 07:38

## 2024-05-14 RX ADMIN — BUSPIRONE HYDROCHLORIDE 10 MG: 10 TABLET ORAL at 09:10

## 2024-05-14 RX ADMIN — INSULIN LISPRO 1 UNITS: 100 INJECTION, SOLUTION INTRAVENOUS; SUBCUTANEOUS at 22:04

## 2024-05-14 RX ADMIN — FERROUS SULFATE TAB 325 MG (65 MG ELEMENTAL FE) 325 MG: 325 (65 FE) TAB at 09:09

## 2024-05-14 RX ADMIN — PANTOPRAZOLE SODIUM 40 MG: 40 TABLET, DELAYED RELEASE ORAL at 05:26

## 2024-05-14 RX ADMIN — INSULIN LISPRO 1 UNITS: 100 INJECTION, SOLUTION INTRAVENOUS; SUBCUTANEOUS at 11:46

## 2024-05-14 RX ADMIN — METFORMIN HYDROCHLORIDE 500 MG: 500 TABLET, FILM COATED ORAL at 16:44

## 2024-05-14 RX ADMIN — APIXABAN 5 MG: 5 TABLET, FILM COATED ORAL at 19:00

## 2024-05-14 RX ADMIN — GUAIFENESIN 600 MG: 600 TABLET, EXTENDED RELEASE ORAL at 18:59

## 2024-05-14 RX ADMIN — ALBUTEROL SULFATE 2.5 MG: 2.5 SOLUTION RESPIRATORY (INHALATION) at 02:36

## 2024-05-14 RX ADMIN — Medication 1 TABLET: at 09:09

## 2024-05-14 RX ADMIN — LORAZEPAM 0.5 MG: 0.5 TABLET ORAL at 20:33

## 2024-05-14 RX ADMIN — LEVALBUTEROL HYDROCHLORIDE 1.25 MG: 1.25 SOLUTION RESPIRATORY (INHALATION) at 20:13

## 2024-05-14 RX ADMIN — GUAIFENESIN 600 MG: 600 TABLET, EXTENDED RELEASE ORAL at 09:09

## 2024-05-14 RX ADMIN — INSULIN LISPRO 1 UNITS: 100 INJECTION, SOLUTION INTRAVENOUS; SUBCUTANEOUS at 08:02

## 2024-05-14 RX ADMIN — FOLIC ACID 1 MG: 1 TABLET ORAL at 09:09

## 2024-05-14 RX ADMIN — ASPIRIN 81 MG CHEWABLE TABLET 81 MG: 81 TABLET CHEWABLE at 09:09

## 2024-05-14 RX ADMIN — ATORVASTATIN CALCIUM 80 MG: 80 TABLET, FILM COATED ORAL at 16:44

## 2024-05-14 RX ADMIN — FLUTICASONE PROPIONATE 1 SPRAY: 50 SPRAY, METERED NASAL at 11:46

## 2024-05-14 RX ADMIN — FLUTICASONE FUROATE AND VILANTEROL TRIFENATATE 1 PUFF: 200; 25 POWDER RESPIRATORY (INHALATION) at 11:44

## 2024-05-14 RX ADMIN — INSULIN GLARGINE 25 UNITS: 100 INJECTION, SOLUTION SUBCUTANEOUS at 22:07

## 2024-05-14 RX ADMIN — UMECLIDINIUM 1 PUFF: 62.5 AEROSOL, POWDER ORAL at 11:46

## 2024-05-14 NOTE — PROGRESS NOTES
OP RT CM received AST alert.  Patient is currently hospitalized. OP RT CM will outreach once discharged to home.

## 2024-05-14 NOTE — PLAN OF CARE
Problem: Prexisting or High Potential for Compromised Skin Integrity  Goal: Skin integrity is maintained or improved  Description: INTERVENTIONS:  - Identify patients at risk for skin breakdown  - Assess and monitor skin integrity  - Assess and monitor nutrition and hydration status  - Monitor labs   - Assess for incontinence   - Turn and reposition patient  - Assist with mobility/ambulation  - Relieve pressure over bony prominences  - Avoid friction and shearing  - Provide appropriate hygiene as needed including keeping skin clean and dry  - Evaluate need for skin moisturizer/barrier cream  - Collaborate with interdisciplinary team   - Patient/family teaching  - Consider wound care consult   Outcome: Progressing     Problem: SAFETY ADULT  Goal: Patient will remain free of falls  Description: INTERVENTIONS:  - Educate patient/family on patient safety including physical limitations  - Instruct patient to call for assistance with activity   - Consult OT/PT to assist with strengthening/mobility   - Keep Call bell within reach  - Keep bed low and locked with side rails adjusted as appropriate  - Keep care items and personal belongings within reach  - Initiate and maintain comfort rounds  - Make Fall Risk Sign visible to staff  - Offer Toileting every 2 Hours, in advance of need  - Initiate/Maintain bed/chair alarm  - Obtain necessary fall risk management equipment: non skid footwear  - Apply yellow socks and bracelet for high fall risk patients  - Consider moving patient to room near nurses station  Outcome: Progressing

## 2024-05-14 NOTE — PLAN OF CARE
Problem: Prexisting or High Potential for Compromised Skin Integrity  Goal: Skin integrity is maintained or improved  Description: INTERVENTIONS:  - Identify patients at risk for skin breakdown  - Assess and monitor skin integrity  - Assess and monitor nutrition and hydration status  - Monitor labs   - Assess for incontinence   - Turn and reposition patient  - Assist with mobility/ambulation  - Relieve pressure over bony prominences  - Avoid friction and shearing  - Provide appropriate hygiene as needed including keeping skin clean and dry  - Evaluate need for skin moisturizer/barrier cream  - Collaborate with interdisciplinary team   - Patient/family teaching  - Consider wound care consult   Outcome: Progressing     Problem: PAIN - ADULT  Goal: Verbalizes/displays adequate comfort level or baseline comfort level  Description: Interventions:  - Encourage patient to monitor pain and request assistance  - Assess pain using appropriate pain scale  - Administer analgesics based on type and severity of pain and evaluate response  - Implement non-pharmacological measures as appropriate and evaluate response  - Consider cultural and social influences on pain and pain management  - Notify physician/advanced practitioner if interventions unsuccessful or patient reports new pain  Outcome: Progressing     Problem: INFECTION - ADULT  Goal: Absence or prevention of progression during hospitalization  Description: INTERVENTIONS:  - Assess and monitor for signs and symptoms of infection  - Monitor lab/diagnostic results  - Monitor all insertion sites, i.e. indwelling lines, tubes, and drains  - Monitor endotracheal if appropriate and nasal secretions for changes in amount and color  - [place] appropriate cooling/warming therapies per order  - Administer medications as ordered  - Instruct and encourage patient and family to use good hand hygiene technique  - Identify and instruct in appropriate isolation precautions for  identified infection/condition  Outcome: Progressing     Problem: SAFETY ADULT  Goal: Patient will remain free of falls  Description: INTERVENTIONS:  - Educate patient/family on patient safety including physical limitations  - Instruct patient to call for assistance with activity   - Consult OT/PT to assist with strengthening/mobility   - Keep Call bell within reach  - Keep bed low and locked with side rails adjusted as appropriate  - Keep care items and personal belongings within reach  - Initiate and maintain comfort rounds  - Make Fall Risk Sign visible to staff  - Offer Toileting every  Hours, in advance of need  - Initiate/Maintain alarm  - Obtain necessary fall risk management equipment:   - Apply yellow socks and bracelet for high fall risk patients  - Consider moving patient to room near nurses station  Outcome: Progressing  Goal: Maintain or return to baseline ADL function  Description: INTERVENTIONS:  -  Assess patient's ability to carry out ADLs; assess patient's baseline for ADL function and identify physical deficits which impact ability to perform ADLs (bathing, care of mouth/teeth, toileting, grooming, dressing, etc.)  - Assess/evaluate cause of self-care deficits   - Assess range of motion  - Assess patient's mobility; develop plan if impaired  - Assess patient's need for assistive devices and provide as appropriate  - Encourage maximum independence but intervene and supervise when necessary  - Involve family in performance of ADLs  - Assess for home care needs following discharge   - Consider OT consult to assist with ADL evaluation and planning for discharge  - Provide patient education as appropriate  Outcome: Progressing  Goal: Maintains/Returns to pre admission functional level  Description: INTERVENTIONS:  - Perform AM-PAC 6 Click Basic Mobility/ Daily Activity assessment daily.  - Set and communicate daily mobility goal to care team and patient/family/caregiver.   - Collaborate with  rehabilitation services on mobility goals if consulted  - Perform Range of Motion  times a day.  - Reposition patient every  hours.  - Dangle patient  times a day  - Stand patient  times a day  - Ambulate patient  times a day  - Out of bed to chair  times a day   - Out of bed for meals  times a day  - Out of bed for toileting  - Record patient progress and toleration of activity level   Outcome: Progressing     Problem: DISCHARGE PLANNING  Goal: Discharge to home or other facility with appropriate resources  Description: INTERVENTIONS:  - Identify barriers to discharge w/patient and caregiver  - Arrange for needed discharge resources and transportation as appropriate  - Identify discharge learning needs (meds, wound care, etc.)  - Arrange for interpretive services to assist at discharge as needed  - Refer to Case Management Department for coordinating discharge planning if the patient needs post-hospital services based on physician/advanced practitioner order or complex needs related to functional status, cognitive ability, or social support system  Outcome: Progressing

## 2024-05-14 NOTE — PROGRESS NOTES
05/14/24 1430   Pain Assessment   Pain Assessment Tool 0-10   Pain Score No Pain   Restrictions/Precautions   Precautions Aspiration;Bed/chair alarms;Cognitive;Fall Risk;Supervision on toilet/commode   Weight Bearing Restrictions No   ROM Restrictions No   Cognition   Overall Cognitive Status Impaired   Arousal/Participation Alert;Responsive;Cooperative   Attention Attends with cues to redirect   Orientation Level Oriented X4   Memory Decreased short term memory;Decreased recall of precautions   Following Commands Follows one step commands with increased time or repetition   Subjective   Subjective Pt reports he has no complaints of pain/discomfort this PM   Roll Left and Right   Comment Pt OOB   Sit to Lying   Comment Pt OOB   Lying to Sitting on Side of Bed   Comment Pt OOB   Sit to Stand   Type of Assistance Needed Physical assistance   Physical Assistance Level 25% or less   Comment Deanna RW; VC for hand placement   Sit to Stand CARE Score 3   Bed-Chair Transfer   Type of Assistance Needed Physical assistance;Verbal cues   Physical Assistance Level 25% or less   Comment Deanna RW; VC for hand placement and safe RW use   Chair/Bed-to-Chair Transfer CARE Score 3   Walk 10 Feet   Type of Assistance Needed Physical assistance;Verbal cues   Physical Assistance Level 25% or less   Comment Deanna RW; VC for PLB   Walk 10 Feet CARE Score 3   Walk 50 Feet with Two Turns   Type of Assistance Needed Physical assistance;Verbal cues   Physical Assistance Level 25% or less   Comment Deanna RW; VC for PLB   Walk 50 Feet with Two Turns CARE Score 3   Walking 10 Feet on Uneven Surfaces   Type of Assistance Needed Physical assistance   Physical Assistance Level 26%-50%   Comment min-modA RW; VC for safety with RW   Walking 10 Feet on Uneven Surfaces CARE Score 3   Ambulation   Primary Mode of Locomotion Prior to Admission Walk   Distance Walked (feet) 81 ft  (81', 68', 60', 27')   Assist Device Roller Walker   Gait Pattern  Inconsistant Sury;Slow Sury;Decreased foot clearance;Ataxic;Forward Flexion;Improper weight shift   Limitations Noted In Balance;Coordination;Endurance;Heel Strike;Posture;Sequencing;Speed;Strength   Provided Assistance with: Balance;Direction   Does the patient walk? 2. Yes   Wheel 50 Feet with Two Turns   Reason if not Attempted Activity not applicable   Wheel 50 Feet with Two Turns CARE Score 9   Wheel 150 Feet   Reason if not Attempted Activity not applicable   Wheel 150 Feet CARE Score 9   Wheelchair mobility   Does the patient use a wheelchair? 0. No   Curb or Single Stair   Style negotiated Single stair   Type of Assistance Needed Physical assistance;Verbal cues   Physical Assistance Level 26%-50%   Comment min-modA up/down 3 steps with B/L HR on  steps; VC for sequencing   1 Step (Curb) CARE Score 3   12 Steps   Reason if not Attempted Safety concerns   12 Steps CARE Score 88   Stairs   Type Stairs   # of Steps 3   Weight Bearing Precautions Fall Risk   Assist Devices Bilateral Rail   Findings min-modA up/down 3 steps with B/L HR on  steps; VC for sequencing   Toilet Transfer   Comment Pt did not require during session   Therapeutic Interventions   Neuromuscular Re-Education Stepping over obstacles with RW   Other Flutter valve prior to ambulation, PLB during and after activity, repeated SPT between chairs and w/c focusing on safe sequence and hand placement, stair training, gait training   Other Comments   Comments SpO2 between 93-97% with all mobility, HR between 108-122bpm with all mobility   Assessment   Treatment Assessment Pt agreeable to PT this PM, received sitting upright in recliner. Continued with education and use of flutter valve t/o day as well as PLB during and between activity, verbalized understanding. Pt is demonstrating less assist with functional mobility, but remains limited by decreased endurance for prolonged activity. SpO2 between 93-97% on RA during session. He  continues to require frequent VC for hand placement and safety with transfers, but improved by end of session following repeated trials. Will continue with current PT POC to improve deficits and promote return to PLOF.   Problem List Decreased strength;Decreased range of motion;Decreased endurance;Impaired balance;Decreased mobility;Decreased coordination;Decreased cognition;Impaired judgement;Decreased safety awareness;Impaired sensation;Decreased skin integrity   PT Barriers   Physical Impairment Decreased strength;Decreased range of motion;Decreased endurance;Impaired balance;Decreased mobility;Decreased coordination;Decreased cognition;Impaired judgement;Decreased safety awareness;Impaired sensation;Decreased skin integrity   Functional Limitation Car transfers;Ramp negotiation;Stair negotiation;Standing;Transfers;Walking   Plan   Treatment/Interventions Functional transfer training;LE strengthening/ROM;Therapeutic exercise;Endurance training;Cognitive reorientation;Patient/family training;Equipment eval/education;Bed mobility;Gait training   Progress Progressing toward goals   PT Therapy Minutes   PT Time In 1430   PT Time Out 1525   PT Total Time (minutes) 55   PT Mode of treatment - Individual (minutes) 55   PT Mode of treatment - Concurrent (minutes) 0   PT Mode of treatment - Group (minutes) 0   PT Mode of treatment - Co-treat (minutes) 0   PT Mode of Treatment - Total time(minutes) 55 minutes   PT Cumulative Minutes 295     Patient remains OOB in chair, all needs in reach.  Alarm in place and activated.  Encouraged use of call bell, patient verbalizes understanding.

## 2024-05-14 NOTE — ASSESSMENT & PLAN NOTE
Lab Results   Component Value Date    HGBA1C 8.9 (H) 04/06/2024       Recent Labs     05/13/24  1111 05/13/24  1542 05/13/24  2107 05/14/24  0613   POCGLU 175* 239* 213* 168*         Blood Sugar Average: Last 72 hrs:  (P) [id number]  Blood sugars elevated in the setting of recent use of steroids  Home: Lantus 30u qhs/metformin 1g bid  Here: Lantus 25U qhs/sliding scale/metformin 500mg bid (added 5/14/2024)  Cont DM diet  BS elevated additions as above

## 2024-05-14 NOTE — TEAM CONFERENCE
Acute RehabilitationTeam Conference Note  Date: 5/14/2024   Time: 9:21 AM       Patient Name:  Lucho Talavera       Medical Record Number: [id number]   YOB: 1955  Sex: Male          Room/Bed:  [place] [address]/[facility] 455-01  Payor Info:  Payor: MEDICARE / Plan: MEDICARE A AND B / Product Type: Medicare A & B Fee for Service /      Admitting Diagnosis: CVA (cerebral vascular accident) ([facility]) [I63.9]   Admit Date/Time:  5/11/2024  3:07 PM  Admission Comments: No comment available     Primary Diagnosis:  Stroke (cerebrum) ([facility])  Principal Problem: Stroke (cerebrum) ([facility])    Patient Active Problem List    Diagnosis Date Noted    Insomnia 05/13/2024    Sacral wound 05/12/2024    Stroke (cerebrum) ([facility]) 05/08/2024    Stroke-like symptom 05/08/2024    Atrial fibrillation ([facility]) 04/15/2024    Atrial flutter with rapid ventricular response ([facility]) 04/06/2024    Leukocytosis 04/06/2024    COVID-19 10/25/2023    History of alcohol abuse 10/17/2023    Iron deficiency anemia secondary to inadequate dietary iron intake 10/17/2023    SIRS (systemic inflammatory response syndrome) ([facility]) 08/07/2023    Hypertension 08/06/2023    COPD (chronic obstructive pulmonary disease) ([facility]) 08/06/2023    Anxiety 07/28/2023    Chronic respiratory failure ([facility]) 07/24/2023    Stage 3 severe COPD by GOLD classification ([facility]) 07/23/2023    Oral abscess 06/08/2022    Tooth fracture 06/08/2022    Closed nondisplaced fracture of posterior arch of first cervical vertebra ([facility]) 06/08/2022    Fall 06/08/2022    Diabetic polyneuropathy associated with type 2 diabetes mellitus ([facility]) 07/12/2021    Hammertoe, bilateral 07/12/2021    Post-traumatic arthritis of left foot 07/12/2021    Pain due to onychomycosis of toenail 07/12/2021    Bronchitis 04/18/2020    KLARISSA (obstructive sleep apnea) 02/19/2020    Dirty living conditions 02/19/2020    Closed fracture of first cervical vertebra ([facility]) 02/28/2019    ETOH abuse 02/28/2019    Chronic obstructive pulmonary  "disease with acute exacerbation (HCC) 09/19/2018    At moderate risk for venous thromboembolism (VTE) 09/14/2018    S/P C1-T1 Posterior Cervical Discectomy and Fusion on 9/10/18 09/12/2018    Acute blood loss anemia 09/11/2018    Type 2 diabetes mellitus ([facility]) 09/11/2018    Closed odontoid fracture with routine healing 09/09/2018    Closed displaced fracture of third cervical vertebra ([facility]) 09/09/2018    Closed displaced fracture of fourth cervical vertebra ([facility]) 09/09/2018    CAD (coronary artery disease) 09/09/2018    Dens fracture ([facility]) 09/09/2018       Physical Therapy:    Weight Bearing Status: Full Weight Bearing  Transfers: Moderate Assistance, Minimal Assistance (mod of 1 without AD, min of 1 with RW)  Bed Mobility: Supervision, Minimal Assistance (min-S depending on fatigue)  Amulation Distance (ft): 10 feet (to 158')  Ambulation: Assist of 2 (mod-min of 1 with CFA of 2nd person)  Assistive Device for Ambulation: Roller Walker  Wheelchair Mobility Distance:  (anticipate will be amb at d/c)  Number of Stairs: 4 (6\" )  Assistive Device for Stairs: Right Hand Rail  Stair Assistance: Assist of 2 (mod-min of 2)  Discharge Recommendations: Home with:  DC Home with:: 24 Hour Supervision, 24 Hour Assisteance, Family Support, First Floor Setup, Outpatient Physical Therapy    Pt is a 69 year old male admitted to [facility] due to significant functional declined and heightened risk for falls s/p CVA. Barriers to overall safety and functional indep include impaired cognition with dec safety awareness dionisio with O2 line management, impaired standing balance/tolerance with impaired righting reactions, strength deficits, gait dysfunctions with ataxic gait pattern and dec endurance. Additional barriers include 2 HETAL and dec caregiver availability to provide S/assist to the pt  since dtr/JERE works. At this time requires min-mod A of 1 person during level surface mobilities and 2 person assist for steps negotiation. Pt will " benefit from additional skilled PT interventions with inc focus on NMR/NPP, repeated functional training to promote carry over of safety practices and proper techniques, wean off supplemental O2 at tolerated to facilitate improvement of overall functions and reduce risk for falls.     Occupational Therapy:  Eating: Independent  Grooming: Minimal Assistance  Bathing: Minimal Assistance  Bathing: Minimal Assistance  Upper Body Dressing: Supervision  Lower Body Dressing: Minimal Assistance  Toileting: Minimal Assistance  Tub/Shower Transfer: Minimal Assistance  Toilet Transfer: Minimal Assistance  Cognition: Exceptions to WNL  Cognition: Decreased Memory, Decreased Safety, Decreased Executive Functions, Decreased Attention  Orientation: Person, Place, Time, Situation  Discharge Recommendations: Home with:  DC Home with:: Family Support, First Floor Setup, 24 Hour Supervision, Home Occupational Therapy       Pt continues to present with impairments in activity tolerance, endurance, standing balance/tolerance, memory, insight, safety , judgement , and sequencing . Additional functional barriers include fatigue, SOB, decreased caregiver support, risk for falls, and home environment. Pt is functioning at overall Min A for ADLs and fxnl stand pivot xfers w/ RW. Pt will continue to benefit from skilled OT services to address above mentioned barriers and maximize functional independence in baseline areas of occupation. From OT standpoint, anticipate d/c home w/ family support middle to end of next week w/ recommendation for HHOT.            Speech Therapy:  Mode of Communication: Verbal  Speech/Language: Dysarthia  Cognition: Exceptions to WNL  Cognition: Decreased Memory, Decreased Executive Functions, Decreased Attention, Decreased Comprehension  Orientation: Person, Place  Swallowing: Exceptions to WNL  Swallowing: Aspiration Risk  Diet Recommendations: Regular Diet, Thin  Discharge Recommendations: Home with:  DC Home  with:: 24 Hour Supervision, Family Support  Pt currently being followed for cognitive, speech and dysphagia tx sessions. Pt completed CLQT+ in which a Composite Severity Rating score of 2.2 out of 4.0, correlating to overall moderately impaired  cognitive linguistic impairments at time of evaluation and in comparison to age matched peers ranging from 70-88 y/o. Cognitive barriers which present include: decreased attention, LT memory recall, ST memory recall , problem solving, reasoning, sequencing, organization of thoughts, judgement, slower processing, and insight, which still impacts pt's overall safety, functional cognitive communication skills as well as functional mobility. The following interventions are used to target these barriers, including verbal problem solving task, visual memory recall tasks, drawing conclusions activities, written sequencing tasks, categorization tasks , picture problem solving activities, verbal reasoning tasks, verbal review of current medications, written review of medications,  written health management tasks, verbal health management tasks, visual attention tasks, functional reading tasks , time management activities, and family education/training. Pt completed Motor Speech Eval in which pt is demonstrating speech intelligibility to be mildly impaired at word/phrase level but more moderately impaired at the sentence/conversational level. Speech overall characterized as having decreased breath support for speech, decreased coordination of respiration with speech attempts, imprecise articulation, and rapid rate. Speech/language barriers which present include: decreased intelligibility decreased at sentence and conversational speech levels, imprecise articulation , faster rate of speech, and decreased breath support to sustain speech which still impacts pt's overall safety, functional cognitive communication skills as well as functional mobility.  The following interventions are  used to target these barriers, including completion of OME's, speech drills. Lastly, pt completing bedside dysphagia assessment in which pt is presenting w/ minimal to mild oral and mild pharyngeal dysphagia. Symptoms or concerns included decreased mastication, decreased bolus formation, delayed oral intiation, oral residue with solids , and residue effectively cleared with increased time, finger sweeps, liquid wash in addition to suspected pharyngeal swallow delay, suspected decreased hyolaryngeal elevation upon palpation, audible swallows, and inconsistent throat clear vs hawking given meals. Dysphagia barriers which present include the following risks for aspiration such as: poor positioning, decreased cognition, ineffective mastication, delay in oral transit, delay in swallow initiation, pharyngeal weakness upon swallowing, and inconsistent cough/throat clear/hawking given meals . In order to address the noted barriers, skilled SLP services will address this by targeting the following interventions: oral motor exercise, proper positioning, diet modification, safe swallow strategies, and family education/training.  Current diet is regular w/ thin liquids.  Family training/education will need to be initiated to review concerns and recommendations as per noted abilities as listed above. At this time, pt will continue to benefit from skilled cognitive linguistic tx, speech/apraxia tx, and dysphagia tx  session to maximize overall functional independence given overall cognitive linguistic skills, speech and intelligibility, and swallow abilities  in attempts to decreased caregiver burden over time.               Nursing Notes:  Appetite: Fair  Diet Type: Cardiac, Diabetic                                                                        Pain Score: 0                                  No notes on file    Case Management:     Discharge Planning  Living Arrangements: Lives w/ Children, Lives w/ Family members (lives  w/ daughter & 4 grandchildren)  Support Systems: Self, Children, Family members  Assistance Needed: tbd  Type of Current Residence: Private residence (2 story home)  Current Home Care Services: No  5/13- Cm met with pt at bedside to introduce role and complete cm open. Pt lives with daughter Brianna in 3 story home, 2 HETAL. Pt has wc and spc, pt is on chronic oxygen 2 L. Pt has no hx of op therapies, or hhc. Hx of [facility]. PCP is Miguelina Chicas, preferred pharmacy is [facility]. The patient was educated on the rehabilitation process including therapy program, the interdisciplinary team, and weekly team meetings. Estimated length of stay was reviewed with the patient as well as expectations of discussions of discharge planning. The role of the  was reviewed including providing care coordination, discharge planning and discharge facilitation. IMM was reviewed with the patient and a copy was provided for their reference. The patient verbalized understanding of the information provided and denied any further questions at this time. CM will continue to follow and assist the patient throughout their rehabilitation stay.       Is the patient actively participating in therapies? yes  List any modifications to the treatment plan: None    Barriers Interventions   Chronic Respiratory Failure/ COPD Chronic 2l   Afib Price check, agreeable to eliquis    Insomnia Sleep med adjustment   Anxiety Supportive counseling, med adjustment   Cog- initiation, memory, attention, processing, swallow SLP Services    Standnig balance, safety awareness Education, possible family training   Right reaction, activity tolerance Endurance training          Is the patient making expected progress toward goals? yes  List any update or changes to goals: None    Medical Goals:     Weekly Team Goals:   Rehab Team Goals  ADL Team Goal: Patient will require supervision with ADLs with least restrictive device upon completion of  rehab program  Bowel/Bladder Team Goal: Patient will require assist with bladder/bowel management with least restrictive device upon completion of rehab program  Transfer Team Goal: Patient will require supervision with transfers with least restrictive device upon completion of rehab program  Locomotion Team Goal: Patient will require supervision with locomotion with least restrictive device upon completion of rehab program  Cognitive Team Goal: Patient will require supervision for basic and complex tasks upon completion of rehab program    Discussion: Pt functioning at mod assist with ambualtion, 2 person assist, min assist with adls. Pending activity, min to max for cog. Team recommending sup goals at dc, family training eventually to be scheduled. DC home with pt ot rn st, 5/30.     Anticipated Discharge Date:  dc Friday 5/30 RN PT OT [facility] Team Members Present:  The following team members are supervising care for this patient and were present during this Weekly Team Conference.    Physician: Dr. DePadua, MD  : Jacki Marmolejo MSW  Registered Nurse: Janice Vargas, SANGITA  Physical Therapist: Yogi Mar DPT  Occupational Therapist: Tiffany Mcclendon MS, OTR/L  Speech Therapist: Yue Cavanaugh MA, CCC-SLP

## 2024-05-14 NOTE — PROGRESS NOTES
"   05/14/24 0930   Pain Assessment   Pain Assessment Tool 0-10   Pain Score No Pain   Restrictions/Precautions   Precautions Bed/chair alarms;Cognitive;Fall Risk;O2;Supervision on toilet/commode   Lifestyle   Autonomy \"I want to play with my grandkids and go home.\"   Lying to Sitting on Side of Bed   Type of Assistance Needed Supervision   Lying to Sitting on Side of Bed CARE Score 4   Sit to Stand   Type of Assistance Needed Supervision   Sit to Stand CARE Score 4   Bed-Chair Transfer   Type of Assistance Needed Physical assistance   Physical Assistance Level 25% or less   Comment RW use. slightly retropulsive in stance. Does not initaite use of O2 line preparation   Chair/Bed-to-Chair Transfer CARE Score 3   Cognition   Overall Cognitive Status Impaired   Comments Pt engaged in Arsh Cognitive Assessment (MoCA) version 8.1 pt obtains 1 point for education level. Pt scored overall 21/30 indicative of Mild cognitive  impairments. Pt scoring 2/5 points visuospatial/executive function. 3/3 for naming.  2/2 scored for digit reading, 1/1 scored for Attention, 2/3 scored for serial 7 subtraction.  2/2 for sentence repetition. 0/1 for word finding/language fluency, Pt identified 6 words. 2/2 for abstract/correlational thinking.  Pt scoring 5/5 points delayed recall. Memory index score 5/15.  Scoring 6/6 for orientation. Pt educated on functional implications of MOCA score and acknowledged implications for rehab and discharge planning.  Administered by certified MOCA clinician Hattie Freye OTR/ALAN [id number]   Assessment   Treatment Assessment Pt participated in skilled OT treatment session with treatment focus on fxnl xfers and formalized cognitive assessment. Pt tolerated session fair, with increased time for arousal and motivation to participate. Pt states that his goals are to play with his grand kids and go home. Offered hygiene and bathing as strong odors were present. Today performing MILD impairment of MOCA " version 8.1. reduced score from completing in 2018. Notable retropulsion in stance upon first OOB transfers today. Monitored vitals today with BP series 116/73 sitting, 119/78 stance, and 126/77 min stance. Cont OT POC: endurance work, fxnl standing tolerance, BUE strengthening, fxnl cognitive/safety training, ADL retraining, and RW management training. Pt requested to rest in bed, all needs within reach and alarm activated.   Prognosis Good   Plan   Progress Slow progress, medical status limitations   OT Therapy Minutes   OT Time In 0930   OT Time Out 1030   OT Total Time (minutes) 60   OT Mode of treatment - Individual (minutes) 60   OT Mode of treatment - Concurrent (minutes) 0   OT Mode of treatment - Group (minutes) 0   OT Mode of treatment - Co-treat (minutes) 0   OT Mode of Treatment - Total time(minutes) 60 minutes   OT Cumulative Minutes 235

## 2024-05-14 NOTE — PROGRESS NOTES
05/14/24 1030   Pain Assessment   Pain Assessment Tool 0-10   Pain Score No Pain   Cognition   Overall Cognitive Status Impaired   Subjective   Subjective Kingston says he is doing well today following his OT session. He is open to trialing transition to room air.   Sit to Stand   Type of Assistance Needed Physical assistance;Adaptive equipment;Verbal cues   Physical Assistance Level 26%-50%   Comment Mod-Deanna VC for UE sequencing to push from support surface, RW   Sit to Stand CARE Score 3   Bed-Chair Transfer   Type of Assistance Needed Physical assistance;Adaptive equipment;Verbal cues   Physical Assistance Level 25% or less   Comment Deanna stand pivot transfer RW, VC's for sequencing   Chair/Bed-to-Chair Transfer CARE Score 3   Walk 10 Feet   Type of Assistance Needed Physical assistance;Adaptive equipment   Physical Assistance Level 26%-50%   Comment ModA RW once off supplemental O2   Walk 10 Feet CARE Score 3   Walk 50 Feet with Two Turns   Type of Assistance Needed Physical assistance;Adaptive equipment   Physical Assistance Level 26%-50%   Comment ModA RW once off supplemental O2   Walk 50 Feet with Two Turns CARE Score 3   Walk 150 Feet   Type of Assistance Needed Physical assistance;Adaptive equipment   Physical Assistance Level Total assistance   Comment Ax2 RW for mgmt of O2 cylinder and WC follow   Walk 150 Feet CARE Score 1   Ambulation   Primary Mode of Locomotion Prior to Admission Walk   Distance Walked (feet) 150 ft   Assist Device Roller Walker   Gait Pattern Inconsistant Sury;Improper weight shift   Does the patient walk? 2. Yes   Picking Up Object   Type of Assistance Needed Physical assistance;Adaptive equipment   Physical Assistance Level 76% or more   Comment MaxA RW grabber pants from floor   Picking Up Object CARE Score 2   Therapeutic Interventions   Neuromuscular Re-Education HIGT: Progressed from 2L to 1L to RA for majority of ambulatory bouts: SUMMER BEST 3x120' fwd, x60' lat ea, trialed  UL SPC x80' fwd for light touch cue and work on coordination (HR responses ranged 120-137 BPM)   Other Pt education regarding use of flutter valve and influence on COPD; 10-20x 3x/day. Standing and participation in pericare after urinary incontinence episode.   Other Comments   Comments (S)  Vitals on RA remained beteen 93-97% SPO2. Patient left on RA at end of session; communicated with nurse Destiny Vargas   Assessment   Treatment Assessment Session focused on progressing and assessing tolerance towards HIGT to maximize neuroplastic recovery in the presence of multiple CVA's and facilitiate improved balance and sequencing with transfers and overall mobility. Was able to progress from 2L NC to RA; communicated with nurse Destiny Vargas. Pt did have episode of urinary incontinence at session start; also communicated with nurse Destiny Vargas. Overall control with RW is improving; pt reported at baseline he used his SPC more than RW - we tried this today and pt had significant difficulties sequencing SPC leading to more instabilities - used this as a teaching moment to enforce RW use for home. Incorporated pursed lip breathing and flutter valve uses today; pt was able to expectorate at end of session after flutter valve use. Recommend at upcoming sessions flutter valve use at session start to give pt the opportunity to expectorate and potentially carryover to improved session participation. Pt was not necessarily fatigued with HHA ambulatory distances today but needed standing rest breaks moreso due to c/o of shortness of breath but was able to regain sinus breathing with standing rest breaks.   Family/Caregiver Present No; FT to be completed on Friday with daughter.   Problem List Decreased strength;Decreased range of motion;Decreased endurance;Impaired balance;Decreased mobility;Decreased coordination;Decreased cognition;Impaired judgement;Decreased safety awareness;Impaired sensation;Decreased skin integrity  (Decreased  respiratory efficiency limiting endurance and activity tolerance.)   Barriers to Discharge Inaccessible home environment   PT Barriers   Physical Impairment Decreased strength;Decreased range of motion;Decreased endurance;Impaired balance;Decreased mobility;Decreased coordination;Decreased cognition;Impaired judgement;Decreased safety awareness;Impaired sensation;Decreased skin integrity  (Decreased respiratory efficiency limiting endurance and activity tolerance.)   Functional Limitation Car transfers;Ramp negotiation;Stair negotiation;Standing;Transfers;Walking   Plan   Treatment/Interventions ADL retraining;Functional transfer training;LE strengthening/ROM;Therapeutic exercise;Endurance training;Cognitive reorientation;Equipment eval/education;Gait training;Compensatory technique education;Spoke to nursing;OT   Progress Progressing toward goals   Discharge Recommendation   Rehab Resource Intensity Level, PT   (HH PT)   PT Therapy Minutes   PT Time In 1030   PT Time Out 1130   PT Total Time (minutes) 60   PT Mode of treatment - Individual (minutes) 60   PT Mode of treatment - Concurrent (minutes) 0   PT Mode of treatment - Group (minutes) 0   PT Mode of treatment - Co-treat (minutes) 0   PT Mode of Treatment - Total time(minutes) 60 minutes   PT Cumulative Minutes 240   Therapy Time missed   Time missed? No

## 2024-05-14 NOTE — ASSESSMENT & PLAN NOTE
Previously non compliant with eliquis. Spoke with the pharmacist at [facility] and it was prescribed but never picked up.  Cost of Eliquis is $71.28. Called pt's daughter and she states that cost is fine  CTA with chronic left PCA territory infarction  MRI with multiple infarcts of varying ages and posterior circulation including multiple small acute/early subacute infarct in the bilateral cerebellum with additional small foci of recent ischemia in the left PCA distribution superimposed on chronic infarct  Echo with EF 55%, no cardiac thrombus  Continue aspirin and Eliquis/statin  Outpatient follow-up with neurology  Therapy per PMR

## 2024-05-14 NOTE — PROGRESS NOTES
05/14/24 1400   Pain Assessment   Pain Assessment Tool 0-10   Pain Score No Pain   Restrictions/Precautions   Precautions Aspiration;Bed/chair alarms;Cognitive;Fall Risk;Supervision on toilet/commode;O2   Comprehension   Comprehension (FIM) 4 - Understands basic info/conversation 75-90% of time   Expression   Expression (FIM) 4 - Expresses basic info/needs 75-90% of time   Social Interaction   Social Interaction (FIM) 5 - Interacts appropriately with others 90% of time   Problem Solving   Problem solving (FIM) 3 - Solves basic problmes 50-74% of time   Memory   Memory (FIM) 3 - Recognizes, recalls/performs 50-74%   Speech/Language/Cognition Assessmetn   Treatment Assessment Pt was in recliner upon arrival to room, awake, alert and appropriately turning off TV to focus on session today. SLP reviewing briefly w/ pt about speaking to his dtr earlier today and that she is planning to come in on Friday for family training/observation. Pt appropriate in his ability to ask for current BRANDI to orient himself. Pt recalling items consumed as last meal (lunch) w/ minimal probing questions.     Otherwise, focus of session today was toward completion of Oral Motor Exercises. Upon d/w pt's dtr earlier today, SLP did educate that pt does exhibit minimal-mild dysarthria currently which she did confirm that she has noticed in conversational speech. Introduced OME's to pt today where SLP reviewed each one w/ pt to ensure accuracy in executing exercises for effectiveness. Exercises targeted lips, face and tongue. Pt was able to complete lip and tongue exercises w/o increased difficulty. It was observed that when completing face exercises, pt was observed to hold his breath a bit too long, which would lead to coughing after completion. SLP providing increased demonstrating for pacing and completing these exercises, but pt still noting difficulty in coordination of breathing and executing exercises. SLP did further state to pt to not  over do these as they could increase SOB. Provided written cue as a reminder when completing the face exercises to take breaks. Lastly, SLP reviewing the frequency of each OME for pt which is 10x each and to complete this on his own 2x per day. Pt w/o further questions at this time and will continue to spot check overall ability to continue to execute these OME's w/o difficulty. At this time, pt will continue to benefit from ongoing skilled SLP services targeting speech, cognitive and dysphagia skills in attempts to decrease caregiver burden over time.   SLP Therapy Minutes   SLP Time In 1400   SLP Time Out 1430   SLP Total Time (minutes) 30   SLP Mode of treatment - Individual (minutes) 30   SLP Mode of treatment - Concurrent (minutes) 0   SLP Mode of treatment - Group (minutes) 0   SLP Mode of treatment - Co-treat (minutes) 0   SLP Mode of Treatment - Total time(minutes) 30 minutes   SLP Cumulative Minutes 180   Therapy Time missed   Time missed? No

## 2024-05-14 NOTE — PROGRESS NOTES
Physical Medicine and Rehabilitation Progress Note  Lucho Talavera 69 y.o. male MRN: [id number]  Unit/Bed#: -01 Encounter: [id number]    To Review: 69 year old M with PMH of COPD, multitude of ED visits/hospitalizations, DM2, CAD, EtOH abuse, HTN, prior CVA, found to have aflutter with RVR on hospitalization 4/2024 s/p BRITTNEY ablation who was to d/c on Eliquis but per report family did not know he needed it and he was not taking it who developed dysarthria, weakness and imbalance with recent fall at home and presented to hospital. CTA H/N showed Chronic left PCA territory infarct, new since the prior exam. No evidence of acute vascular territorial infarction, intracranial hemorrhage or mass.  No hemodynamically significant stenosis, dissection or occlusion of the carotid or vertebral arteries or major vessels of the [place].  Neuro consulted and recommended MRI brain which showed multiple infarcts of varying ages in the posterior circulation include multiple small acute/early subacute infarcts in the bilateral cerebellum. TTE did not show thrombus.  Neuro recommended Eliquis, aspirin, and statin. Patient was evaluated by skilled therapies and was found to have significant decline in ADLs and ambulation and appears appropriate for admission to [facility].     Chief Complaint: No new concerns.     Interval History/Subjective:  No acute events overnight. Yesterday did have a few episodes of SOB, but maintained O2 sats with lungs sounding clear. Received nebs with improvement. Not complaining of SOB this morning. No CP. No N/V, abdominal pain. Continent of bowel/bladder. Last BM 5/13. Improved sleep on melatonin    ROS:  A 10 point review of systems was negative except for what is noted in the HPI.    Today's Changes:  IM to f/u with family re: eliquis pricing  Adjustment of T2DM regimen with addition of metformin today.  I led and participated in Team Conference today. See  dispo below and separate conference note for more details. I concur with the plan of care as determined in Team Conference.  Consideration for increasing melatonin.     Total time spent:  50 minutes, with more than 50% spent counseling/coordinating care. Counseling includes discussion with patient re: progress in therapies, functional issues observed by therapy staff, and discussion with patient his/her current medical state/wellbeing. Coordination of patient's care was performed in conjunction with Internal Medicine service to monitor patient's labs, vitals, and management of their comorbidities. In addition, I lead the interdisciplinary team in weekly case conference today and concur with plan as per team meeting. The care of the patient was extensively discussed with all care providers and an appropriate rehabilitation plan was formulated unique for this patient. Barriers were identified preventing progression of therapy and appropriate interventions were discussed with each discipline. Please see the team note for input from all disciplines regarding barriers, intervention, and discharge planning.    Assessment/Plan:    * Stroke (cerebrum) (HCC)  Assessment & Plan  - MRI brain 5/8 - Multiple infarcts of varying ages in the posterior circulation include multiple small acute/early subacute infarcts in the bilateral cerebellum. Additional small foci of recent ischemia in the left PCA distribution superimposed on chronic infarct  No acute hemorrhage or mass effect  - CTA H/N -  1. Chronic left PCA territory infarct, new since the prior exam. No evidence of acute vascular territorial infarction,  intracranial hemorrhage or mass. 2. No hemodynamically significant stenosis, dissection or occlusion of the carotid or vertebral arteries or major vessels of the [place].  - Residual impairments on admission to [facility]: Impaired balance, coordination, possibly vision  - Co-morbidities most impacting functional recovery:  COPD with chronic hypoxic respiratory failure, anxiety    Secondary stroke prevention  - Antithrombotic: Aspirin and Eliquis  - Statin  - Patient at increased risk for stroke particularly early in post-stroke period - monitor neuro exam closely with low threshold to repeat imaging  -  patient and if applicable caregiver on optimal stroke management  - Follow-up with neurology and PCP after d/c   - Recommend acute comprehensive interdisciplinary inpatient rehabilitation to include intensive skilled therapies (PT, OT, ST) as outlined with oversight and management by rehabilitation physician as well as inpatient rehab level nursing, case management and weekly interdisciplinary team meetings.       Sacral wound  Assessment & Plan  Seen by wound care 5/7  - Managed R lateral forearm skin tear   - Noted to have old open area on sacrum on admission.   - Consulted wound care to follow-up. They feel this is epithelialized.    - Offloading,specialty cushion   - Allevyn   - Monitor closely.       Chronic obstructive pulmonary disease with acute exacerbation (HCC)  Assessment & Plan  Has had a multitude of ED visits related to COPD/concern for SOB but was frequently d/c'd same day  - Monitor also for anxiety and optimal mgmt of that; education on monitor home O2  IM consulted and with overall management at their discretion during ARC course  Monitor lung exam, vitals with and without activity  Encourage incentive spirometry  Breo/Incruse  Xopenex  PRN Albuterol  PRN supplemental O2   Mucinex     Insomnia  Assessment & Plan  Environmental interventions.  Sleep log  Added melatonin scheduled    Atrial fibrillation (HCC)  Assessment & Plan  IM consulted and with overall management at their discretion during ARC course  Rate control: None  Antithrombotic: Eliquis    - Cost is $71.28/month. Need to confirm family can afford this.       Hypertension  Assessment & Plan  Home: Lisinopril 40mg daily  Here: None   Monitor vitals  with and without activity; monitor for orthostasis  Monitor hemoglobin, electrolytes, kidney function, hydration status   Management as per IM.       Chronic respiratory failure (HCC)  Assessment & Plan  2/2 COPD  At home on 2L NC  On same at this time.     ETOH abuse  Assessment & Plan  Patient reports cutting down and only about 2 beers per day but occasionally liquor   Alcohol cessation counseling and supportive counseling  Optimal mood/stress mgmt   Thiamine, folate, MVI   PRN low dose ativan BID   Consider gabapentin as well     Type 2 diabetes mellitus (HCC)  Assessment & Plan  Lab Results   Component Value Date    HGBA1C 8.9 (H) 04/06/2024       Recent Labs     05/13/24  1111 05/13/24  1542 05/13/24  2107 05/14/24  0613   POCGLU 175* 239* 213* 168*       Blood Sugar Average: Last 72 hrs:  (P) 262.5  Home: Metformin 1000mg Q12hr, Lantus 30 units at bedtime  Current meds: Lantus 25, Metformin 500mg Q12hr, HS, Lispro SS AC/HS, consistent carb diabetic diet  Nutrition consult  Management as per IM  Outpatient f/u with PCP      CAD (coronary artery disease)  Assessment & Plan  With hx of stenting  IM consulted and with overall management at their discretion during ARC course  Antithrombotic: Aspirin and Eliquis   Statin  Optimal blood pressure and blood sugar control          Health Maintenance  #Delirium/Sleep: At risk. Optimize sleep/wake, pain, bowel, bladder management. Avoid deliriogenic meds. Of note is on melatonin, ativan, and oxycodone PRN.   #Pain: Tylenol PRN  #Bowel: Last BM 5/13 and continent. Adding PRN miralax  #Bladder: Voiding and continent  #Skin/Pressure Injury Prevention: Turn Q2hr in bed, with weight shifts [id number] in wheelchair.  #DVT Prophylaxis:Fully anticoagulated on eliquis, SCDs  #GI Prophylaxis: PPI on for GERD  #Code Status: Full Code.   #FEN: Diabetic Diet  #Dispo: Team 5/14: ADD 5/30 with home RN/PT/OT/SLP with goals to discharge to 1 level home with 2 HETAL, with supervision.  Will  need f/u with Pulm, PCP, and Neuro    Objective:    Functional Update:  PT: min-modA transfers, Sup-Deanna bed mobility ,min-modA x2 (CF for 2nd person) ambulation with RW, min-mod A x2 for stairs   OT: Ind eating, Deanna grooming, Deanna bathing, Sup UB dressing, Deanna LB dressing, Deanna toileting, Deanna tub/shower transfers, Deanna toilet transfers   SLP: Moderate cognitive linguistic impairments on CLQT, mild oropharyngeal dysphagia    Allergies per EMR    Physical Exam:  Temp:  [97.6 °F (36.4 °C)-98.2 °F (36.8 °C)] 97.6 °F (36.4 °C)  HR:  [71-94] 75  Resp:  [19-20] 20  BP: (107-134)/(66-76) 134/76  Oxygen Therapy  SpO2: 99 %  O2 Flow Rate (L/min): 2.5 L/min      Gen: No acute distress, chronically ill appearing   HEENT: Moist mucus membranes, Normocephalic/Atraumatic  Cardiovascular: Regular rate, rhythm, S1/S2. Distal pulses palpable  Heme/Extr: No edema  Pulmonary: Non-labored breathing. Lungs CTAB. On NC  : No antony  GI: Soft, non-tender, non-distended. BS+  MSK: ROM is WFL in all extremities. No effusions or deformities. Bulk is symmetric. See below for MMT scores.   Integumentary: Skin is warm, dry.   Neuro: AAOx3,  Speech is intelligible. Appropriate to questioning. Tone is normal.   Psych: Normal mood and flat affect.       Diagnostic Studies: Reviewed, no new imaging    Laboratory:  Reviewed   Results from last 7 days   Lab Units 05/13/24  0537 05/11/24  0524 05/10/24  0527   HEMOGLOBIN g/dL 12.0 12.4 11.9*   HEMATOCRIT % 39.5 39.5 37.9   WBC Thousand/uL 8.73 10.50* 7.81     Results from last 7 days   Lab Units 05/13/24  0537 05/11/24  0524 05/10/24  0527   BUN mg/dL 11 14 20   POTASSIUM mmol/L 3.8 3.8 4.0   CHLORIDE mmol/L 99 100 102   CREATININE mg/dL 0.57* 0.50* 0.56*   AST U/L 16 18 15   ALT U/L 18 18 12            Patient Active Problem List   Diagnosis    Closed odontoid fracture with routine healing    Closed displaced fracture of third cervical vertebra (HCC)    Closed displaced fracture of fourth  cervical vertebra (HCC)    Alcohol abuse    CAD (coronary artery disease)    Dens fracture (HCC)    Acute blood loss anemia    Type 2 diabetes mellitus ([facility])    S/P C1-T1 Posterior Cervical Discectomy and Fusion on 9/10/18    At moderate risk for venous thromboembolism (VTE)    Chronic obstructive pulmonary disease with acute exacerbation (HCC)    Closed fracture of first cervical vertebra (HCC)    ETOH abuse    KLARISSA (obstructive sleep apnea)    Dirty living conditions    Bronchitis    Diabetic polyneuropathy associated with type 2 diabetes mellitus ([facility])    Hammertoe, bilateral    Post-traumatic arthritis of left foot    Pain due to onychomycosis of toenail    Oral abscess    Tooth fracture    H/O ETOH abuse    Closed nondisplaced fracture of posterior arch of first cervical vertebra (HCC)    Fall    Stage 3 severe COPD by GOLD classification ([facility])    Chronic respiratory failure ([facility])    Anxiety    Hypertension    COPD (chronic obstructive pulmonary disease) ([facility])    SIRS (systemic inflammatory response syndrome) ([facility])    History of alcohol abuse    Iron deficiency anemia secondary to inadequate dietary iron intake    COVID-19    Atrial flutter with rapid ventricular response ([facility])    Leukocytosis    Atrial fibrillation ([facility])    Stroke (cerebrum) ([facility])    Stroke-like symptom    Sacral wound    Insomnia         Medications  Current Facility-Administered Medications   Medication Dose Route Frequency Provider Last Rate    acetaminophen  650 mg Oral Q6H PRN Richmond Chew, DO      albuterol  2 puff Inhalation Q4H PRN William Camacho, DO      albuterol  2.5 mg Nebulization Q6H PRN Richmond Chew, DO      apixaban  5 mg Oral BID Richmond Chew, DO      aspirin  81 mg Oral Daily Richmond Chew, DO      atorvastatin  80 mg Oral Daily With Dinner Richmond Chew, DO      busPIRone  10 mg Oral TID Richmond Chew, DO      ferrous sulfate  325 mg Oral Daily With Breakfast Richmond Chew, DO       fluticasone  1 spray Nasal BID Richmond Chew, DO      fluticasone-vilanterol  1 puff Inhalation Daily Richmond Chew, DO      folic acid  1 mg Oral Daily Richmond Chew, DO      guaiFENesin  600 mg Oral BID Richmond Chew, DO      insulin glargine  25 Units Subcutaneous HS TATIANA Manriquez      insulin lispro  1-5 Units Subcutaneous TID AC Richmond Chew, DO      levalbuterol  1.25 mg Nebulization TID Richmond Chew, DO      LORazepam  0.5 mg Oral BID PRN Richmond Chew, DO      melatonin  3 mg Oral QPM Ashley Depadua, MD      multivitamin-minerals  1 tablet Oral Daily Richmond Chew, DO      oxyCODONE  2.5 mg Oral Q4H PRN Richmond Chew, DO      pantoprazole  40 mg Oral Early Morning Richmond Chew, DO      thiamine  100 mg Oral Daily Richmond Chew, DO      umeclidinium  1 puff Inhalation Daily Richmond Chew, DO            ** Please Note: Fluency Direct voice to text software may have been used in the creation of this document. **

## 2024-05-14 NOTE — PROGRESS NOTES
[facility]  Progress Note  Name: Lucho Talavera I  MRN: [id number]  Unit/Bed#: -01 I Date of Admission: 5/11/2024   Date of Service: 5/14/2024 I Hospital Day: 3    Assessment/Plan   * Stroke (cerebrum) (HCC)  Assessment & Plan  Previously non compliant with eliquis. Spoke with the pharmacist at [facility] and it was prescribed but never picked up.  Cost of Eliquis is $71.28. Called pt's daughter and she states that cost is fine  CTA with chronic left PCA territory infarction  MRI with multiple infarcts of varying ages and posterior circulation including multiple small acute/early subacute infarct in the bilateral cerebellum with additional small foci of recent ischemia in the left PCA distribution superimposed on chronic infarct  Echo with EF 55%, no cardiac thrombus  Continue aspirin and Eliquis/statin  Outpatient follow-up with neurology  Therapy per PMR    Atrial fibrillation (HCC)  Assessment & Plan  Rates controlled   Cont eliquis  F/u cardiology as outpatient    Hypertension  Assessment & Plan  Home: Lisinopril 40mg daily.  Here: None.  stable  Will add back Lisinopril as necessary.    Type 2 diabetes mellitus (HCC)  Assessment & Plan  Lab Results   Component Value Date    HGBA1C 8.9 (H) 04/06/2024       Recent Labs     05/13/24  1111 05/13/24  1542 05/13/24  2107 05/14/24  0613   POCGLU 175* 239* 213* 168*         Blood Sugar Average: Last 72 hrs:  (P) 205.4458974405906920  Blood sugars elevated in the setting of recent use of steroids  Home: Lantus 30u qhs/metformin 1g bid  Here: Lantus 25U qhs/sliding scale/metformin 500mg bid (added 5/14/2024)  Cont DM diet  BS elevated additions as above      Chronic respiratory failure (HCC)  Assessment & Plan  Maintained on 2 L nasal cannula baseline  Has been treated for acute exacerbations recently with steroids.  Continue rescue inhaler.    ETOH abuse  Assessment & Plan  History of alcohol abuse  Monitor off [facility] protocol -  Pt now post withdrawal window   Ativan as needed anxiety  Continue supplements with thiamine, folate, MVI                 The above assessment and plan was reviewed and updated as determined by my evaluation of the patient on 5/14/2024.    Labs:   Results from last 7 days   Lab Units 05/13/24  0537 05/11/24  0524   WBC Thousand/uL 8.73 10.50*   HEMOGLOBIN g/dL 12.0 12.4   HEMATOCRIT % 39.5 39.5   PLATELETS Thousands/uL 383 395*     Results from last 7 days   Lab Units 05/13/24  0537 05/11/24  0524   SODIUM mmol/L 135 134*   POTASSIUM mmol/L 3.8 3.8   CHLORIDE mmol/L 99 100   CO2 mmol/L 31 25   BUN mg/dL 11 14   CREATININE mg/dL 0.57* 0.50*   CALCIUM mg/dL 8.6 8.5             Results from last 7 days   Lab Units 05/14/24  0613 05/13/24  2107 05/13/24  1542   POC GLUCOSE mg/dl 168* 213* 239*       Imaging  No orders to display       Review of Scheduled Meds:  Current Facility-Administered Medications   Medication Dose Route Frequency Provider Last Rate    acetaminophen  650 mg Oral Q6H PRN Richmond Chew, DO      albuterol  2 puff Inhalation Q4H PRN William Camacho, DO      albuterol  2.5 mg Nebulization Q6H PRN Richmond Chew, DO      apixaban  5 mg Oral BID Richmond Chew, DO      aspirin  81 mg Oral Daily Richmond Chew, DO      atorvastatin  80 mg Oral Daily With Dinner Richmond Chew, DO      busPIRone  10 mg Oral TID Richmond Chew, DO      ferrous sulfate  325 mg Oral Daily With Breakfast Richmond Chew, DO      fluticasone  1 spray Nasal BID Richmond Chew, DO      fluticasone-vilanterol  1 puff Inhalation Daily Richmond Chew, DO      folic acid  1 mg Oral Daily Richmond Chew, DO      guaiFENesin  600 mg Oral BID Richmond Chew, DO      insulin glargine  25 Units Subcutaneous HS TATIANA Manriquez      insulin lispro  1-5 Units Subcutaneous TID AC Richmond Chew, DO      levalbuterol  1.25 mg Nebulization TID Richmond Chew, DO      LORazepam  0.5 mg  Oral BID PRN Harnishkumar Chew, DO      melatonin  3 mg Oral QPM Ashley Depadua, MD      metFORMIN  500 mg Oral BID With Meals TATIANA Paulino      multivitamin-minerals  1 tablet Oral Daily Harnishkumar Chew, DO      oxyCODONE  2.5 mg Oral Q4H PRN Harnishkumar Chew, DO      pantoprazole  40 mg Oral Early Morning Harnishkumar Chew, DO      thiamine  100 mg Oral Daily Harnishkumar Chew, DO      umeclidinium  1 puff Inhalation Daily Harnishkumar Chew, DO         Subjective/ HPI: Patient seen and examined. Patients overnight issues or events were reviewed with nursing staff. New or overnight issues include the following:     Pt seen and examined at bedside. No complaints overnight, states he has no pain. Reviewed cost of eliquis he states it's fine but to check with daughter who also said it was fine.     ROS:   A 10 point ROS was performed; negative except as noted above.        *Labs /Radiology studies Reviewed  *Medications  reviewed and reconciled as needed  *Please refer to order section for additional ordered labs studies      Physical Examination:  Vitals:   Vitals:    05/13/24 2056 05/14/24 0237 05/14/24 0515 05/14/24 0740   BP: 107/66  134/76    BP Location: Left arm  Left arm    Pulse: 91  75    Resp: 20  20    Temp: 98.2 °F (36.8 °C)  97.6 °F (36.4 °C)    TempSrc: Oral  Axillary    SpO2: 97% 97% 99% 98%   Weight:       Height:           General Appearance: NAD; pleasant  HEENT: PERRLA, conjuctiva normal; mucous membranes moist; face symmetrical  Neck:  Supple  Lungs: clear bilaterally, normal respiratory effort, no retractions, expiratory effort normal, on room air  CV: regular rate and rhythm, no murmurs rubs or gallops noted   ABD: soft non tender, +BS x4  EXT: DP pulses intact, no lymphadenopathy, no ayush  Skin: normal turgor, normal texture, no rash, multiple scabs on b/l upper and LE  Psych: affect flat, mood normal  Neuro: AAOx3; dysarthria       The above physical exam was reviewed and  updated as determined by my evaluation of the patient on 5/14/2024.    Invasive Devices       None                      VTE Pharmacologic Prophylaxis: Eliquis  Code Status: Level 1 - Full Code  Current Length of Stay: 3 day(s)    Total floor / unit time spent today 30 minutes  Coordination of patient's care was performed in conjunction with primary service. Time invested included review of patient's labs, vitals, and management of their comorbidities with continued monitoring, examination of patient as well as answering patient questions, documenting her findings and creating progress note in electronic medical record,  ordering appropriate diagnostic testing.       ** Please Note:  voice to text software may have been used in the creation of this document. Although proof errors in transcription or interpretation are a potential of such software**

## 2024-05-14 NOTE — CASE MANAGEMENT
Team update: Cm, ST, OT and PT spoke with daughter Nimco via phone, Nimco aware of dc 5/30 and able to assist pt at home as she has recently quit her job and is looking to be paid caregiver. Therapy scheduled family training as well.

## 2024-05-15 LAB
GLUCOSE SERPL-MCNC: 130 MG/DL (ref 65–140)
GLUCOSE SERPL-MCNC: 137 MG/DL (ref 65–140)
GLUCOSE SERPL-MCNC: 185 MG/DL (ref 65–140)

## 2024-05-15 PROCEDURE — 97116 GAIT TRAINING THERAPY: CPT

## 2024-05-15 PROCEDURE — 97110 THERAPEUTIC EXERCISES: CPT

## 2024-05-15 PROCEDURE — 97112 NEUROMUSCULAR REEDUCATION: CPT

## 2024-05-15 PROCEDURE — 97535 SELF CARE MNGMENT TRAINING: CPT

## 2024-05-15 PROCEDURE — 94760 N-INVAS EAR/PLS OXIMETRY 1: CPT

## 2024-05-15 PROCEDURE — 94640 AIRWAY INHALATION TREATMENT: CPT

## 2024-05-15 PROCEDURE — 97530 THERAPEUTIC ACTIVITIES: CPT

## 2024-05-15 PROCEDURE — 82948 REAGENT STRIP/BLOOD GLUCOSE: CPT

## 2024-05-15 PROCEDURE — 99232 SBSQ HOSP IP/OBS MODERATE 35: CPT | Performed by: INTERNAL MEDICINE

## 2024-05-15 PROCEDURE — 92526 ORAL FUNCTION THERAPY: CPT

## 2024-05-15 PROCEDURE — 99232 SBSQ HOSP IP/OBS MODERATE 35: CPT | Performed by: PHYSICAL MEDICINE & REHABILITATION

## 2024-05-15 RX ADMIN — INSULIN GLARGINE 25 UNITS: 100 INJECTION, SOLUTION SUBCUTANEOUS at 21:12

## 2024-05-15 RX ADMIN — UMECLIDINIUM 1 PUFF: 62.5 AEROSOL, POWDER ORAL at 08:06

## 2024-05-15 RX ADMIN — FERROUS SULFATE TAB 325 MG (65 MG ELEMENTAL FE) 325 MG: 325 (65 FE) TAB at 08:03

## 2024-05-15 RX ADMIN — LEVALBUTEROL HYDROCHLORIDE 1.25 MG: 1.25 SOLUTION RESPIRATORY (INHALATION) at 22:33

## 2024-05-15 RX ADMIN — FLUTICASONE PROPIONATE 1 SPRAY: 50 SPRAY, METERED NASAL at 17:07

## 2024-05-15 RX ADMIN — GUAIFENESIN 600 MG: 600 TABLET, EXTENDED RELEASE ORAL at 08:04

## 2024-05-15 RX ADMIN — FLUTICASONE PROPIONATE 1 SPRAY: 50 SPRAY, METERED NASAL at 08:05

## 2024-05-15 RX ADMIN — Medication 1 TABLET: at 08:03

## 2024-05-15 RX ADMIN — APIXABAN 5 MG: 5 TABLET, FILM COATED ORAL at 08:04

## 2024-05-15 RX ADMIN — PANTOPRAZOLE SODIUM 40 MG: 40 TABLET, DELAYED RELEASE ORAL at 05:29

## 2024-05-15 RX ADMIN — INSULIN LISPRO 1 UNITS: 100 INJECTION, SOLUTION INTRAVENOUS; SUBCUTANEOUS at 17:06

## 2024-05-15 RX ADMIN — FOLIC ACID 1 MG: 1 TABLET ORAL at 08:03

## 2024-05-15 RX ADMIN — Medication 6 MG: at 21:11

## 2024-05-15 RX ADMIN — APIXABAN 5 MG: 5 TABLET, FILM COATED ORAL at 17:05

## 2024-05-15 RX ADMIN — METFORMIN HYDROCHLORIDE 500 MG: 500 TABLET, FILM COATED ORAL at 08:03

## 2024-05-15 RX ADMIN — BUSPIRONE HYDROCHLORIDE 10 MG: 10 TABLET ORAL at 17:08

## 2024-05-15 RX ADMIN — BUSPIRONE HYDROCHLORIDE 10 MG: 10 TABLET ORAL at 21:11

## 2024-05-15 RX ADMIN — GUAIFENESIN 600 MG: 600 TABLET, EXTENDED RELEASE ORAL at 17:04

## 2024-05-15 RX ADMIN — ASPIRIN 81 MG CHEWABLE TABLET 81 MG: 81 TABLET CHEWABLE at 08:03

## 2024-05-15 RX ADMIN — ATORVASTATIN CALCIUM 80 MG: 80 TABLET, FILM COATED ORAL at 17:05

## 2024-05-15 RX ADMIN — LORAZEPAM 0.5 MG: 0.5 TABLET ORAL at 21:14

## 2024-05-15 RX ADMIN — THIAMINE HCL TAB 100 MG 100 MG: 100 TAB at 08:03

## 2024-05-15 RX ADMIN — FLUTICASONE FUROATE AND VILANTEROL TRIFENATATE 1 PUFF: 200; 25 POWDER RESPIRATORY (INHALATION) at 08:05

## 2024-05-15 RX ADMIN — BUSPIRONE HYDROCHLORIDE 10 MG: 10 TABLET ORAL at 08:04

## 2024-05-15 RX ADMIN — METFORMIN HYDROCHLORIDE 500 MG: 500 TABLET, FILM COATED ORAL at 17:05

## 2024-05-15 NOTE — ASSESSMENT & PLAN NOTE
Previously non compliant with eliquis. Spoke with the pharmacist at [facility] and it was prescribed but never picked up.  Cost of Eliquis is $71.28. Called pt's daughter and she states that cost is fine  CTA with chronic left PCA territory infarction  MRI with multiple infarcts of varying ages and posterior circulation including multiple small acute/early subacute infarct in the bilateral cerebellum with additional small foci of recent ischemia in the left PCA distribution superimposed on chronic infarct  Echo with EF 55%, no cardiac thrombus  Continue aspirin and Eliquis/statin  Outpatient follow-up with neurology  Therapy per PMR  DC 5/30/24=family aware

## 2024-05-15 NOTE — PROGRESS NOTES
Physical Medicine and Rehabilitation Progress Note  Lucho Talavera 69 y.o. male MRN: [id number]  Unit/Bed#: [facility] 455-01 Encounter: [id number]    To Review: 69 year old M with PMH of COPD, multitude of ED visits/hospitalizations, DM2, CAD, EtOH abuse, HTN, prior CVA, found to have aflutter with RVR on hospitalization 4/2024 s/p BRITTNEY ablation who was to d/c on Eliquis but per report family did not know he needed it and he was not taking it who developed dysarthria, weakness and imbalance with recent fall at home and presented to hospital. CTA H/N showed Chronic left PCA territory infarct, new since the prior exam. No evidence of acute vascular territorial infarction, intracranial hemorrhage or mass.  No hemodynamically significant stenosis, dissection or occlusion of the carotid or vertebral arteries or major vessels of the [place].  Neuro consulted and recommended MRI brain which showed multiple infarcts of varying ages in the posterior circulation include multiple small acute/early subacute infarcts in the bilateral cerebellum. TTE did not show thrombus.  Neuro recommended Eliquis, aspirin, and statin. Patient was evaluated by skilled therapies and was found to have significant decline in ADLs and ambulation and appears appropriate for admission to [facility].     Chief Complaint: No new concerns today.    Interval History/Subjective:  No acute events overnight. Has been off NC and satting well. He does at times feel better with the oxygen on board. Therapy spoke with daughter who is planning on quitting her job to take care of her father, and who has already contacted [facility] agency on aging. He is currently eating breakfast off oxygen and feels fine. He is continent of bowel/bladder. Last BM 5/13. No new CP, N/V, abdominal pain, fevers, chills. He reports his sleep has been better    ROS:  A 10 point review of systems was negative except for what is noted in the HPI.    Today's  Changes:  Family is fine with cost of eliquis, appreciate IM inquiring.  IM reviewed appropriate use of O2 with patient, and provided reassurance regarding his oxygen levels currently. Will continue PRN O2 to maintain O2 sat >88%.   Will try to focus on non-pharmacologic strategies to manage anxiety. However, some consideration if this is not manageable for initiation of trial of sertraline.    Total visit time: 35 minutes, with more than 50% spent counseling/coordinating care. Counseling includes discussion with patient re: progress in therapies, functional issues observed by therapy staff, and discussion with patient regarding their current medical state and wellbeing. Coordination of patient's care was performed in conjunction with Internal Medicine service to monitor patient's labs, vitals, and management of their comorbidities.    Assessment/Plan:    * Stroke (cerebrum) ([facility])  Assessment & Plan  - MRI brain 5/8 - Multiple infarcts of varying ages in the posterior circulation include multiple small acute/early subacute infarcts in the bilateral cerebellum. Additional small foci of recent ischemia in the left PCA distribution superimposed on chronic infarct  No acute hemorrhage or mass effect  - CTA H/N -  1. Chronic left PCA territory infarct, new since the prior exam. No evidence of acute vascular territorial infarction,  intracranial hemorrhage or mass. 2. No hemodynamically significant stenosis, dissection or occlusion of the carotid or vertebral arteries or major vessels of the [place].  - Residual impairments on admission to [facility]: Impaired balance, coordination, possibly vision  - Co-morbidities most impacting functional recovery: COPD with chronic hypoxic respiratory failure, anxiety    Secondary stroke prevention  - Antithrombotic: Aspirin and Eliquis  - Statin  - Patient at increased risk for stroke particularly early in post-stroke period - monitor neuro exam closely with low threshold to repeat  imaging  -  patient and if applicable caregiver on optimal stroke management  - Follow-up with neurology and PCP after d/c   - Recommend acute comprehensive interdisciplinary inpatient rehabilitation to include intensive skilled therapies (PT, OT, ST) as outlined with oversight and management by rehabilitation physician as well as inpatient rehab level nursing, case management and weekly interdisciplinary team meetings.       Sacral wound  Assessment & Plan  Seen by wound care 5/7  - Managed R lateral forearm skin tear   - Noted to have old open area on sacrum on admission.   - Consulted wound care to follow-up. They feel this is epithelialized.    - Offloading,specialty cushion   - Allevyn   - Monitor closely.       Chronic obstructive pulmonary disease with acute exacerbation (HCC)  Assessment & Plan  Has had a multitude of ED visits related to COPD/concern for SOB but was frequently d/c'd same day  - Monitor also for anxiety and optimal mgmt of that; education on monitor home O2  IM consulted and with overall management at their discretion during ARC course  Monitor lung exam, vitals with and without activity  Encourage incentive spirometry  Breo/Incruse  Xopenex  PRN Albuterol  PRN supplemental O2   Mucinex     Insomnia  Assessment & Plan  Environmental interventions.  Sleep log  Added melatonin scheduled    Atrial fibrillation (HCC)  Assessment & Plan  IM consulted and with overall management at their discretion during ARC course  Rate control: None  Antithrombotic: Eliquis    - Cost is $71.28/month. Daughter confirmed this price is fine.      Hypertension  Assessment & Plan  Home: Lisinopril 40mg daily  Here: None   Monitor vitals with and without activity; monitor for orthostasis  Monitor hemoglobin, electrolytes, kidney function, hydration status   Management as per IM.       Chronic respiratory failure (HCC)  Assessment & Plan  2/2 COPD  At home on 2L NC  Here has been satting well off 2L NC  Will  request when experiencing increased WOB, but generally during these episodes O2 is maintained in the 90s.  Goal > 88%.     ETOH abuse  Assessment & Plan  Patient reports cutting down and only about 2 beers per day but occasionally liquor   Alcohol cessation counseling and supportive counseling  Optimal mood/stress mgmt   Thiamine, folate, MVI   PRN low dose ativan BID   Consider gabapentin as well     Type 2 diabetes mellitus (HCC)  Assessment & Plan  Lab Results   Component Value Date    HGBA1C 8.9 (H) 04/06/2024       Recent Labs     05/14/24  2021 05/15/24  0618 05/15/24  1047 05/15/24  1536   POCGLU 212* 130 137 185*       Blood Sugar Average: Last 72 hrs:  (P) 262.5  Home: Metformin 1000mg Q12hr, Lantus 30 units at bedtime  Current meds: Lantus 25, Metformin 500mg Q12hr, HS, Lispro SS AC/HS, consistent carb diabetic diet  Nutrition consult  Management as per IM  Outpatient f/u with PCP      CAD (coronary artery disease)  Assessment & Plan  With hx of stenting  IM consulted and with overall management at their discretion during ARC course  Antithrombotic: Aspirin and Eliquis   Statin  Optimal blood pressure and blood sugar control          Health Maintenance  #Delirium/Sleep: At risk. Optimize sleep/wake, pain, bowel, bladder management. Avoid deliriogenic meds. Of note is on melatonin, ativan, and oxycodone PRN.   #Pain: Tylenol PRN  #Bowel: Last BM 5/13 and continent. Adding PRN miralax  #Bladder: Voiding and continent  #Skin/Pressure Injury Prevention: Turn Q2hr in bed, with weight shifts [id number] in wheelchair.  #DVT Prophylaxis:Fully anticoagulated on eliquis, SCDs  #GI Prophylaxis: PPI on for GERD  #Code Status: Full Code.   #FEN: Diabetic Diet  #Dispo: Team 5/14: ADD 5/30 with home RN/PT/OT/SLP with goals to discharge to 1 level home with 2 HETAL, with supervision.  Will need f/u with Pulm, PCP, and Neuro    Objective:    Functional Update:  PT: min-modA transfers, Sup-Deanna bed mobility ,min-modA x2 (CF  for 2nd person) ambulation with RW, min-mod A x2 for stairs   OT: Ind eating, Deanna grooming, Deanna bathing, Sup UB dressing, Deanna LB dressing, Deanna toileting, Deanna tub/shower transfers, Deanna toilet transfers   SLP: Moderate cognitive linguistic impairments on CLQT, mild oropharyngeal dysphagia    Allergies per EMR    Physical Exam:  Temp:  [97.8 °F (36.6 °C)-98.6 °F (37 °C)] 97.8 °F (36.6 °C)  HR:  [] 86  Resp:  [17-20] 20  BP: (110-132)/(58-87) 128/87  Oxygen Therapy  SpO2: 94 %  O2 Flow Rate (L/min): 2 L/min    Gen: No acute distress, eating lunch.  HEENT: Moist mucus membranes, Normocephalic/Atraumatic  Cardiovascular: Regular rate, rhythm, S1/S2. Distal pulses palpable  Heme/Extr: No edema  Pulmonary: Non-labored breathing. Lungs CTAB  : No antony  GI: Soft, non-tender, non-distended. BS+  Integumentary: Skin is warm, dry.   Neuro: AAOx3, Speech is dysarthric, but able to answer questions appropriately and make needs known. Cooperative.   Psych: Normal mood and flat affect.    Diagnostic Studies: Reviewed, no new imaging    Laboratory:  Reviewed   Results from last 7 days   Lab Units 05/13/24  0537 05/11/24  0524 05/10/24  0527   HEMOGLOBIN g/dL 12.0 12.4 11.9*   HEMATOCRIT % 39.5 39.5 37.9   WBC Thousand/uL 8.73 10.50* 7.81     Results from last 7 days   Lab Units 05/13/24  0537 05/11/24  0524 05/10/24  0527   BUN mg/dL 11 14 20   POTASSIUM mmol/L 3.8 3.8 4.0   CHLORIDE mmol/L 99 100 102   CREATININE mg/dL 0.57* 0.50* 0.56*   AST U/L 16 18 15   ALT U/L 18 18 12            Patient Active Problem List   Diagnosis    Closed odontoid fracture with routine healing    Closed displaced fracture of third cervical vertebra (HCC)    Closed displaced fracture of fourth cervical vertebra (HCC)    CAD (coronary artery disease)    Dens fracture (HCC)    Acute blood loss anemia    Type 2 diabetes mellitus (HCC)    S/P C1-T1 Posterior Cervical Discectomy and Fusion on 9/10/18    At moderate risk for venous  thromboembolism (VTE)    Chronic obstructive pulmonary disease with acute exacerbation (HCC)    Closed fracture of first cervical vertebra (HCC)    ETOH abuse    KLARISSA (obstructive sleep apnea)    Dirty living conditions    Bronchitis    Diabetic polyneuropathy associated with type 2 diabetes mellitus ([facility])    Hammertoe, bilateral    Post-traumatic arthritis of left foot    Pain due to onychomycosis of toenail    Oral abscess    Tooth fracture    Closed nondisplaced fracture of posterior arch of first cervical vertebra ([facility])    Fall    Stage 3 severe COPD by GOLD classification ([facility])    Chronic respiratory failure ([facility])    Anxiety    Hypertension    COPD (chronic obstructive pulmonary disease) ([facility])    SIRS (systemic inflammatory response syndrome) ([facility])    History of alcohol abuse    Iron deficiency anemia secondary to inadequate dietary iron intake    COVID-19    Atrial flutter with rapid ventricular response ([facility])    Leukocytosis    Atrial fibrillation ([facility])    Stroke (cerebrum) ([facility])    Stroke-like symptom    Sacral wound    Insomnia         Medications  Current Facility-Administered Medications   Medication Dose Route Frequency Provider Last Rate    acetaminophen  650 mg Oral Q6H PRN Richmond Chew, DO      albuterol  2 puff Inhalation Q4H PRN William Camacho, DO      albuterol  2.5 mg Nebulization Q6H PRN Richmond Chew, DO      apixaban  5 mg Oral BID Richmond Chew, DO      aspirin  81 mg Oral Daily [facility]      atorvastatin  80 mg Oral Daily With Dinner Richmond Chew, DO      busPIRone  10 mg Oral TID Richmond Chew, DO      ferrous sulfate  325 mg Oral Daily With Breakfast Richmond Chew, DO      fluticasone  1 spray Nasal BID Richmond Chew, DO      fluticasone-vilanterol  1 puff Inhalation Daily Richmond Chew, DO      folic acid  1 mg Oral Daily Richmond Chew, DO      guaiFENesin  600 mg Oral BID Richmond Chew, DO      insulin glargine  25 Units  Subcutaneous HS TATIANA Manriquez      insulin lispro  1-5 Units Subcutaneous TID AC Richmond Chew, DO      levalbuterol  1.25 mg Nebulization TID Richmond Chew, DO      LORazepam  0.5 mg Oral BID PRN Richmond Chew, DO      melatonin  6 mg Oral QPM Ashley Depadua, MD      metFORMIN  500 mg Oral BID With Meals TATIANA Paulino      multivitamin-minerals  1 tablet Oral Daily Richmond Chew, DO      oxyCODONE  2.5 mg Oral Q4H PRN Richmond Chew, DO      pantoprazole  40 mg Oral Early Morning Richmond Chew, DO      thiamine  100 mg Oral Daily Richmond Chew, DO      umeclidinium  1 puff Inhalation Daily Richmnod Chew, DO            ** Please Note: Fluency Direct voice to text software may have been used in the creation of this document. **

## 2024-05-15 NOTE — PROGRESS NOTES
05/15/24 1335   Pain Assessment   Pain Assessment Tool 0-10   Pain Score No Pain   Restrictions/Precautions   Precautions Aspiration;Bed/chair alarms;Cognitive;Fall Risk;Supervision on toilet/commode   Weight Bearing Restrictions No   ROM Restrictions No   Cognition   Overall Cognitive Status Impaired   Arousal/Participation Alert;Responsive;Cooperative   Attention Attends with cues to redirect   Orientation Level Oriented to person;Oriented to place;Oriented to time   Memory Decreased short term memory;Decreased recall of recent events;Decreased recall of precautions   Following Commands Follows one step commands with increased time or repetition   Subjective   Subjective Pt reports he was happy his family visited him this PM   Roll Left and Right   Comment Pt OOB   Sit to Lying   Comment Pt OOB   Lying to Sitting on Side of Bed   Comment Pt OOB   Sit to Stand   Type of Assistance Needed Physical assistance;Incidental touching;Verbal cues   Physical Assistance Level 25% or less   Comment CGA/Deanna RW; VC for hand placement   Sit to Stand CARE Score 3   Bed-Chair Transfer   Type of Assistance Needed Physical assistance;Incidental touching;Verbal cues   Physical Assistance Level 25% or less   Comment CGA/Deanna RW; VC for hand placement, safety with RW   Chair/Bed-to-Chair Transfer CARE Score 3   Walk 10 Feet   Type of Assistance Needed Physical assistance;Incidental touching   Physical Assistance Level 25% or less   Comment CGA/Deanna RW   Walk 10 Feet CARE Score 3   Walk 50 Feet with Two Turns   Type of Assistance Needed Physical assistance;Incidental touching   Physical Assistance Level 25% or less   Comment CGA/Deanna RW   Walk 50 Feet with Two Turns CARE Score 3   Walk 150 Feet   Comment decreased endurance, HR elevated 130-132 with 110' and 120' respectively   Walking 10 Feet on Uneven Surfaces   Type of Assistance Needed Physical assistance   Physical Assistance Level 25% or less   Comment Deanna RW; VC for safety  "with RW   Walking 10 Feet on Uneven Surfaces CARE Score 3   Ambulation   Primary Mode of Locomotion Prior to Admission Walk   Distance Walked (feet) 122 ft  (122', 110', 52', 51')   Assist Device Roller Walker   Gait Pattern Inconsistant Sury;Slow Sury;Decreased foot clearance;Forward Flexion;Ataxic;Improper weight shift   Limitations Noted In Balance;Coordination;Device Management;Endurance;Heel Strike;Posture;Sequencing;Speed;Strength   Provided Assistance with: Balance;Direction   Walk Assist Level Contact Guard;Minimum Assist   Findings CGA/Deanna RW   Does the patient walk? 2. Yes   Wheel 50 Feet with Two Turns   Reason if not Attempted Activity not applicable   Wheel 50 Feet with Two Turns CARE Score 9   Wheel 150 Feet   Reason if not Attempted Activity not applicable   Wheel 150 Feet CARE Score 9   Wheelchair mobility   Does the patient use a wheelchair? 0. No   Findings N/A   Curb or Single Stair   Style negotiated Single stair   Type of Assistance Needed Physical assistance;Verbal cues   Physical Assistance Level 25% or less   Comment Deanna up/down 4 6\" steps with B/L HR; VC for sequencing   1 Step (Curb) CARE Score 3   4 Steps   Type of Assistance Needed Physical assistance;Verbal cues   Physical Assistance Level 25% or less   Comment Deanna up/down 4 6\" steps with B/L HR; VC for sequencing   4 Steps CARE Score 3   12 Steps   Reason if not Attempted Safety concerns   12 Steps CARE Score 88   Stairs   Type Stairs   # of Steps 4   Weight Bearing Precautions Fall Risk   Assist Devices Bilateral Rail   Findings Deanna up/down 4 6\" steps with B/L HR; VC for sequencing   Picking Up Object   Type of Assistance Needed Physical assistance;Verbal cues   Physical Assistance Level 25% or less   Comment Deanna  bean bags from floor, UE support on RW, VC for sequencing   Picking Up Object CARE Score 3   Toilet Transfer   Comment Pt did not require during session   Therapeutic Interventions   Neuromuscular " Re-Education Bean bag pickup and toss with L UE support on L HR   Other repeated transfer training emphasizing safety and hand placement, gait training, stair training   Equipment Use   NuStep L2, UE/LE, seat 9, arms 9, 10 min   Assessment   Treatment Assessment Pt agreeable to PT this PM, recieved sitting upright in recliner. Pt's spO2 between 92-97% on RA t/o session, which improves with VC for PLB. Pt continues to be limited by decreased endurance, with HR reaching 130-132 with prolonged ambulation >100'. He continues to require VC for hand placement and safety with all functional transfers, but progressing with independence with mobility. Will continue with current PT POC to improve deficits and promote return to PLOF.   Problem List Decreased strength;Decreased range of motion;Decreased endurance;Impaired balance;Decreased mobility;Decreased coordination;Decreased cognition;Decreased safety awareness;Impaired sensation;Decreased skin integrity   PT Barriers   Physical Impairment Decreased strength;Decreased range of motion;Decreased endurance;Impaired balance;Decreased mobility;Decreased coordination;Decreased cognition;Impaired judgement;Decreased safety awareness;Impaired sensation;Decreased skin integrity   Functional Limitation Car transfers;Ramp negotiation;Stair negotiation;Standing;Transfers;Walking   Plan   Treatment/Interventions Functional transfer training;LE strengthening/ROM;Therapeutic exercise;Endurance training;Cognitive reorientation;Patient/family training;Equipment eval/education;Bed mobility;Gait training   Progress Progressing toward goals   PT Therapy Minutes   PT Time In 1335   PT Time Out 1500   PT Total Time (minutes) 85   PT Mode of treatment - Individual (minutes) 85   PT Mode of treatment - Concurrent (minutes) 0   PT Mode of treatment - Group (minutes) 0   PT Mode of treatment - Co-treat (minutes) 0   PT Mode of Treatment - Total time(minutes) 85 minutes   PT Cumulative Minutes 380      Patient remains OOB in chair, all needs in reach.  Alarm in place and activated.  Encouraged use of call bell, patient verbalizes understanding.     [Negative] : Genitourinary

## 2024-05-15 NOTE — PROGRESS NOTES
05/15/24 0800   Pain Assessment   Pain Assessment Tool 0-10   Pain Score No Pain   Restrictions/Precautions   Precautions Aspiration;Bed/chair alarms;Cognitive;Fall Risk;Supervision on toilet/commode   Eating   Type of Assistance Needed Set-up / clean-up;Supervision   Physical Assistance Level No physical assistance   Eating CARE Score 4   Swallow Assessment   Swallow Treatment Assessment   Daily Dysphagia Tx Note     Patient Name: Lucho Talavera    Today's Date: 5/15/2024      Current Risks for Dysphagia & Aspiration: Respiratory compromise;Dysarthria;General debilitation;New Neuro event;Brain injury;Cognitive deficit;Mental status change;HX neurologic dx;      Current Symptoms/Concerns: Cough;During meals;HX Dysphagia;      Current diet:regular diet and thin liquids      Premorbid diet::regular diet and thin liquids      Positioning: pt repositioned in bed which was then placed in chair mode    Items administered:Consistencies Administered: thin liquids and hard solids  Materials administered included: omelet w/ cheese, mushrooms, tomatoes, toast, soda by cup, pills whole w/ water    Total amount of meal consumed:   100% of meal and 240cc of thin liquids by cup/straw      Oral stage:minimal to functional  Lip closure: functional around utensils, cup, straw  Anterior spillage: none observed  Mastication: minimally prolong w/ solids, but overall functional and effective for breakdown  Bolus formation: functional today  Bolus control: prompter  Transfer: minimal delay w/ solids vs prompt w/ liquids  Oral residue: trace but pt independently clearing  Pocketing: none         Pharyngeal stage:functional  Swallow promptness: fairly timely across textures  Hyolaryngeal elevation: present   Wet voice: none  Throat clear: none  Cough: none during meal; x1 after meal-->expelling mucous  Secondary swallows: none  Audible swallows: none       Esophageal stage:No signs/sxs noted w/ meal.        Summary:     Pt presenting  with minimal to functional oral and functional pharyngeal dysphagia today. Symptoms or concerns included slower but effective mastication of solids and no overt aspiration sxs observed during meal but still does have productive cough post meals due to pre-existing congestion.      Upon entering room, pt was asking for breathing tx, but pt was not on O2 this AM and RN was finishing morning med pass to where pt was able to take multiple pills whole w/ water w/o difficulty. Overall and throughout session/meal, pt did no have any further requests for breathing tx in addition to not demonstrating SOB or WOB. However, for swallow function pt is demonstrating ability to use swallow strategies independently including slower rate of intake, smaller bites, alternating solids/liquids, lingual/finger sweeps. Overall, lip seal and bolus retrieval was functional and no anterior loss noted. Mastication was slightly prolonged w/ solids, but again, effective in breakdown and use of swallow strategies  to where bolus formulation and transfers was functional. No oral residual noted. Swallow initiation was noted to be timely across textures and HLE is functional. No overt signs/sxs of aspiration noted during meal but cough at end of meal to where phlegm was expelled only.        Recommendations:  Diet: regular diet and thin liquids  Meds: whole with liquid  Strategies: upright posture- bed in chair mode or OOB, only feed when fully alert, slow rate of feeding, small bites/sips, quiet environment (tv off, limit talking, door closed, etc.), and alternating bites and sips     DISTANT supervision w/ meals. Setup tray for pt.   Results reviewed with:  patient, RN- Pamela  Aspiration precautions posted.     F/u ST tx/Plan: No further dysphagia tx sessions warranted at this time as pt is demonstrating least restrictive diet w/o increased oropharyngeal or aspiration sxs and has demonstrated ability to carryover swallow strategies  independently. Reconsult if any deficits are observed moving forward. Plan for skilled SLP services targeting speech/cognitive skills.    Swallow Assessment Prognosis   Prognosis Good   Prognosis Considerations Age;Co-morbidities;Medical diagnosis;Medical prognosis   SLP Therapy Minutes   SLP Time In 0800   SLP Time Out 0830   SLP Total Time (minutes) 30   SLP Mode of treatment - Individual (minutes) 30   SLP Mode of treatment - Concurrent (minutes) 0   SLP Mode of treatment - Group (minutes) 0   SLP Mode of treatment - Co-treat (minutes) 0   SLP Mode of Treatment - Total time(minutes) 30 minutes   SLP Cumulative Minutes 210   Therapy Time missed   Time missed? No

## 2024-05-15 NOTE — PROGRESS NOTES
[facility]  Progress Note  Name: Lucho Talavera I  MRN: [id number]  Unit/Bed#: -01 I Date of Admission: 5/11/2024   Date of Service: 5/15/2024 I Hospital Day: 4    Assessment & Plan   * Stroke (cerebrum) (HCC)  Assessment & Plan  Previously non compliant with eliquis. Spoke with the pharmacist at [facility] and it was prescribed but never picked up.  Cost of Eliquis is $71.28. Called pt's daughter and she states that cost is fine  CTA with chronic left PCA territory infarction  MRI with multiple infarcts of varying ages and posterior circulation including multiple small acute/early subacute infarct in the bilateral cerebellum with additional small foci of recent ischemia in the left PCA distribution superimposed on chronic infarct  Echo with EF 55%, no cardiac thrombus  Continue aspirin and Eliquis/statin  Outpatient follow-up with neurology  Therapy per PMR  DC 5/30/24=family aware    Atrial fibrillation (HCC)  Assessment & Plan  Rates controlled   Cont eliquis  F/u cardiology as outpatient    Hypertension  Assessment & Plan  Home: Lisinopril 40mg daily.  Here: None.  stable  Will add back Lisinopril as necessary.    Type 2 diabetes mellitus (HCC)  Assessment & Plan  Lab Results   Component Value Date    HGBA1C 8.9 (H) 04/06/2024       Recent Labs     05/14/24  1123 05/14/24  1532 05/14/24  2021 05/15/24  0618   POCGLU 162* 172* 212* 130         Blood Sugar Average: Last 72 hrs:  (P) [id number]  Blood sugars elevated in the setting of recent use of steroids  Home: Lantus 30u qhs/metformin 1g bid  Here: Lantus 25U qhs/sliding scale/metformin 500mg bid (added 5/14/2024)  Cont DM diet  BS improved with addition of metformin      Chronic respiratory failure (HCC)  Assessment & Plan  Maintained on 2 L nasal cannula baseline however a/t nursing has been doing well without it  Has been treated for acute exacerbations recently with steroids.  Continue rescue  inhaler.    ETOH abuse  Assessment & Plan  History of alcohol abuse  Monitor off CIWA protocol - Pt now post withdrawal window   Ativan as needed anxiety  Continue supplements with thiamine, folate, MVI                 The above assessment and plan was reviewed and updated as determined by my evaluation of the patient on 5/15/2024.    Labs:   Results from last 7 days   Lab Units 05/13/24  0537 05/11/24  0524   WBC Thousand/uL 8.73 10.50*   HEMOGLOBIN g/dL 12.0 12.4   HEMATOCRIT % 39.5 39.5   PLATELETS Thousands/uL 383 395*     Results from last 7 days   Lab Units 05/13/24  0537 05/11/24  0524   SODIUM mmol/L 135 134*   POTASSIUM mmol/L 3.8 3.8   CHLORIDE mmol/L 99 100   CO2 mmol/L 31 25   BUN mg/dL 11 14   CREATININE mg/dL 0.57* 0.50*   CALCIUM mg/dL 8.6 8.5             Results from last 7 days   Lab Units 05/15/24  1047 05/15/24  0618 05/14/24 2021   POC GLUCOSE mg/dl 137 130 212*       Imaging  No orders to display       Review of Scheduled Meds:  Current Facility-Administered Medications   Medication Dose Route Frequency Provider Last Rate    acetaminophen  650 mg Oral Q6H PRN Richmond Chew, DO      albuterol  2 puff Inhalation Q4H PRN William Camacho, DO      albuterol  2.5 mg Nebulization Q6H PRN Richmond Chew, DO      apixaban  5 mg Oral BID Richmond Chew, DO      aspirin  81 mg Oral Daily Richmond Chew, DO      atorvastatin  80 mg Oral Daily With Dinner Richmond Chew, DO      busPIRone  10 mg Oral TID Richmond Chew, DO      ferrous sulfate  325 mg Oral Daily With Breakfast Richmond Chew, DO      fluticasone  1 spray Nasal BID Richmond Chew, DO      fluticasone-vilanterol  1 puff Inhalation Daily Richmond Chew, DO      folic acid  1 mg Oral Daily Richmond Chwe, DO      guaiFENesin  600 mg Oral BID Richmond Chew, DO      insulin glargine  25 Units Subcutaneous HS TATIANA Manriquez      insulin lispro  1-5 Units Subcutaneous TID AC Richmond Chew,  DO      levalbuterol  1.25 mg Nebulization TID Harkaitlynktim Chew, DO      LORazepam  0.5 mg Oral BID PRN Haralcidesumar Chew, DO      melatonin  6 mg Oral QPM Ashley Depadua, MD      metFORMIN  500 mg Oral BID With Meals TATIANA Paulino      multivitamin-minerals  1 tablet Oral Daily Harkaitlynkumar Chew, DO      oxyCODONE  2.5 mg Oral Q4H PRN Harkaitlynkumar Chew, DO      pantoprazole  40 mg Oral Early Morning Harnishkumar Cehw, DO      thiamine  100 mg Oral Daily Harnishkumar Chew, DO      umeclidinium  1 puff Inhalation Daily Harnishkumar Chew, DO         Subjective/ HPI: Patient seen and examined. Patients overnight issues or events were reviewed with nursing staff. New or overnight issues include the following:     Pt seen at bedside eating lunch. No issues overnight, slept well    ROS:   A 10 point ROS was performed; negative except as noted above.        *Labs /Radiology studies Reviewed  *Medications  reviewed and reconciled as needed  *Please refer to order section for additional ordered labs studies      Physical Examination:  Vitals:   Vitals:    05/14/24 2032 05/15/24 0509 05/15/24 0554 05/15/24 0748   BP:  128/87     BP Location:  Left arm     Pulse:  83  86   Resp:  20     Temp:  97.8 °F (36.6 °C)     TempSrc:  Oral     SpO2: 95% 95%  94%   Weight:   81.2 kg (179 lb)    Height:           General Appearance: NAD; pleasant  HEENT: PERRLA, conjuctiva normal; mucous membranes moist; face symmetrical  Neck:  Supple  Lungs: clear bilaterally, normal respiratory effort, no retractions, expiratory effort normal, on room air  CV: regular rate and rhythm, no murmurs rubs or gallops noted   ABD: soft non tender, +BS x4  EXT: DP pulses intact, no lymphadenopathy, no edema  Skin: normal turgor, normal texture, no rash  Psych: affect normal, mood normal  Neuro: AAOx3       The above physical exam was reviewed and updated as determined by my evaluation of the patient on 5/15/2024.    Invasive Devices       None                       VTE Pharmacologic Prophylaxis: Eliquis  Code Status: Level 1 - Full Code  Current Length of Stay: 4 day(s)    Total floor / unit time spent today 30 minutes  Coordination of patient's care was performed in conjunction with primary service. Time invested included review of patient's labs, vitals, and management of their comorbidities with continued monitoring, examination of patient as well as answering patient questions, documenting her findings and creating progress note in electronic medical record,  ordering appropriate diagnostic testing.       ** Please Note:  voice to text software may have been used in the creation of this document. Although proof errors in transcription or interpretation are a potential of such software**

## 2024-05-15 NOTE — PROGRESS NOTES
"   05/15/24 0900   Pain Assessment   Pain Assessment Tool 0-10   Pain Score No Pain   Restrictions/Precautions   Precautions Aspiration;Bed/chair alarms;Cognitive;Fall Risk;Supervision on toilet/commode   Weight Bearing Restrictions No   ROM Restrictions No   Lifestyle   Autonomy \"That's fine.\" (to rest in recliner)   Oral Hygiene   Type of Assistance Needed Supervision;Set-up / clean-up   Physical Assistance Level No physical assistance   Comment seated EOB.   Oral Hygiene CARE Score 4   Shower/Bathe Self   Type of Assistance Needed Physical assistance;Verbal cues;Set-up / clean-up;Adaptive equipment   Physical Assistance Level 26%-50%   Comment while seated EOB, pt able to bathe UB and BLE w/ crossed leg technique. Min-Mod A in stance at RW w/ unilateral release to bathe groin and buttocks. req A to thoroughly bathe buttocks.   Shower/Bathe Self CARE Score 3   Tub/Shower Transfer   Reason Not Assessed Sponge Bath;Patient refusal  (Pt unagreeable to shower, requesting to sponge bathe)   Upper Body Dressing   Type of Assistance Needed Set-up / clean-up;Supervision   Physical Assistance Level No physical assistance   Comment seated EOB.   Upper Body Dressing CARE Score 4   Lower Body Dressing   Type of Assistance Needed Physical assistance;Verbal cues;Set-up / clean-up;Adaptive equipment   Physical Assistance Level 26%-50%   Comment while seated EOB, able to thread BLE w/ inc time. Min-Mod A to steady in stance w/ RW w/ cues for alternating unilateral release for clothing management.   Lower Body Dressing CARE Score 3   Putting On/Taking Off Footwear   Type of Assistance Needed Supervision;Set-up / clean-up   Physical Assistance Level No physical assistance   Comment seated in recliner, crossed leg technique to don/doff socks.   Putting On/Taking Off Footwear CARE Score 4   Lying to Sitting on Side of Bed   Type of Assistance Needed Supervision   Physical Assistance Level No physical assistance   Lying to Sitting on " Side of Bed CARE Score 4   Sit to Stand   Type of Assistance Needed Physical assistance;Adaptive equipment;Set-up / clean-up;Verbal cues   Physical Assistance Level 25% or less   Comment cues for proper hand placement.   Sit to Stand CARE Score 3   Bed-Chair Transfer   Type of Assistance Needed Physical assistance;Adaptive equipment;Verbal cues   Physical Assistance Level 25% or less   Comment stand pivot w/ RW.   Chair/Bed-to-Chair Transfer CARE Score 3   Toileting Hygiene   Type of Assistance Needed Physical assistance;Verbal cues;Adaptive equipment   Physical Assistance Level 26%-50%   Comment Min A in stance at RW w/ alternating unilateral release for clothing management.   Toileting Hygiene CARE Score 3   Cognition   Overall Cognitive Status Impaired   Assessment   Treatment Assessment Pt seen for skilled OT session focusing on self-care management. Details on sponge bath ADL noted above, cont to be limited by fatigue req inc time and frequent rest breaks to manage. Pt reported feeling SOB x1, spot checked SpO2 >94% on RA. Pt cont to req Min-Mod A while in fxnl stance at RW w/ alternating unilateral release, noted w/ slight retropulsion at times req cues to correct. Cont OT POC: endurance work, fxnl standing tolerance w/ alternating unilateral release, BUE strengthening, fxnl cognitive/safety training, ADL retraining, and RW management training. Plan for FT w/ pt's daughter Friday afternoon, plan to review OT d/c recommendations. Pt agreeable to rest in recliner, all needs within reach and alarm activated.   Prognosis Good   Problem List Decreased strength;Decreased range of motion;Decreased endurance;Impaired balance;Decreased mobility;Decreased coordination;Decreased cognition;Impaired judgement;Decreased safety awareness;Impaired sensation;Decreased skin integrity   Plan   Treatment/Interventions ADL retraining;Functional transfer training;Endurance training;Patient/family training   Progress Slow progress,  cognitive deficits   Discharge Recommendation   Rehab Resource Intensity Level, OT   (home w/ family to provide 24 hour S, HHOT)   OT Therapy Minutes   OT Time In 0900   OT Time Out 1000   OT Total Time (minutes) 60   OT Mode of treatment - Individual (minutes) 60   OT Mode of treatment - Concurrent (minutes) 0   OT Mode of treatment - Group (minutes) 0   OT Mode of treatment - Co-treat (minutes) 0   OT Mode of Treatment - Total time(minutes) 60 minutes   OT Cumulative Minutes 295   Therapy Time missed   Time missed? No

## 2024-05-15 NOTE — ASSESSMENT & PLAN NOTE
Lab Results   Component Value Date    HGBA1C 8.9 (H) 04/06/2024       Recent Labs     05/14/24  1123 05/14/24  1532 05/14/24  2021 05/15/24  0618   POCGLU 162* 172* 212* 130         Blood Sugar Average: Last 72 hrs:  (P) 185.0081529252837994  Blood sugars elevated in the setting of recent use of steroids  Home: Lantus 30u qhs/metformin 1g bid  Here: Lantus 25U qhs/sliding scale/metformin 500mg bid (added 5/14/2024)  Cont DM diet  BS improved with addition of metformin

## 2024-05-15 NOTE — ASSESSMENT & PLAN NOTE
Maintained on 2 L nasal cannula baseline however a/t nursing has been doing well without it  Has been treated for acute exacerbations recently with steroids.  Continue rescue inhaler.

## 2024-05-15 NOTE — PROGRESS NOTES
05/15/24 1230   Pain Assessment   Pain Assessment Tool 0-10   Pain Score No Pain   Restrictions/Precautions   Precautions Aspiration;Bed/chair alarms;Cognitive;Fall Risk;Supervision on toilet/commode   Weight Bearing Restrictions No   ROM Restrictions No   Sit to Stand   Type of Assistance Needed Physical assistance;Adaptive equipment   Physical Assistance Level 25% or less   Comment Min A with RW with verbal cues for hand placement   Sit to Stand CARE Score 3   Bed-Chair Transfer   Type of Assistance Needed Physical assistance;Adaptive equipment   Physical Assistance Level 25% or less   Comment Min A with RW   Chair/Bed-to-Chair Transfer CARE Score 3   Exercise Tools   Other Exercise Tool 1 Pt participated in BUE therapeutic exercises [id number] with 2# weight in following planes: bicep curl, chest press, and rows. SpO2 monitored on RA with pt at 93-95% throughout exercise routine.   Cognition   Overall Cognitive Status Impaired   Arousal/Participation Alert;Cooperative   Attention Attends with cues to redirect   Orientation Level Oriented to person;Oriented to place;Oriented to time   Memory Decreased short term memory;Decreased recall of precautions   Following Commands Follows one step commands with increased time or repetition   Comments Pt motivated and willing to participate with therapist introduction. Cues needed when family was present to attend to exercise tasks.   Assessment   Treatment Assessment Pt participated in brief 30 min OT session with focus on therapeutic exercise, and therapeutic activities. Pt seated in bedside recliner at start of therapy session. Pt agreeable to OT session. Pt educated on importance of UB strength for participation in daily tasks with pt able to recall back to family later in session why it is important to complete UE exercises. Pt participated in BUE therapeutic exercises [id number] with 2# weight in following planes: bicep curl, chest press, and rows. SpO2 monitored on RA  with pt at 93-95% throughout exercise routine. Pt presenting with mild fatigue after each set however able to complete all sets asked of him. Pt had no c/o SOB throughout exercises. Pt educated on pursed lip breathing and posture with pt able to demonstrate with verbal cues with continued education needed. Pt educated on importance of sacral offloading/weight shifting techniques and importance of incorporating sit to stands into daily activity to avoid prolonged sitting. Pt participated in sit to stand transfers at Min A level with RW with verbal cues needed consistently for hand placement with minimal carryover. Pt educated on weight shifting side to side to allow for pressure reduction with verbal cues needed. Pt's daughter present at end of therapy session during weight shifting activites and education. Pt's daughter to bring in clothing for pt in addition to sneakers for therapy. Pt seated in bedside recliner at end of therapy session with chair alarm on and all needs within reach, and family present. Pt would benefit from continued OT services to progress pt with standing toelrance, endurance, BUE strengthening, cognitive training, safety education, ADL training, and RW management for decreased caregiver burden upon d/c. Recommending 24 hour Sup/Assist at d/c at this time with family to provide per daughter. Continue with established POC at this time.   Prognosis Good   Problem List Decreased strength;Decreased range of motion;Decreased endurance;Impaired balance;Decreased mobility;Decreased coordination;Decreased cognition;Decreased safety awareness;Impaired sensation;Decreased skin integrity   Barriers to Discharge Inaccessible home environment   Plan   Treatment/Interventions ADL retraining;Functional transfer training;Therapeutic exercise;Endurance training;Cognitive reorientation;Patient/family training;Equipment eval/education;Bed mobility;Compensatory technique education   OT Therapy Minutes   OT Time In  1230   OT Time Out 1300   OT Total Time (minutes) 30   OT Mode of treatment - Individual (minutes) 30   OT Mode of treatment - Concurrent (minutes) 0   OT Mode of treatment - Group (minutes) 0   OT Mode of treatment - Co-treat (minutes) 0   OT Mode of Treatment - Total time(minutes) 30 minutes   OT Cumulative Minutes 325   Therapy Time missed   Time missed? No

## 2024-05-16 LAB
GLUCOSE SERPL-MCNC: 177 MG/DL (ref 65–140)
GLUCOSE SERPL-MCNC: 209 MG/DL (ref 65–140)
GLUCOSE SERPL-MCNC: 248 MG/DL (ref 65–140)

## 2024-05-16 PROCEDURE — 97530 THERAPEUTIC ACTIVITIES: CPT

## 2024-05-16 PROCEDURE — 82948 REAGENT STRIP/BLOOD GLUCOSE: CPT

## 2024-05-16 PROCEDURE — 94760 N-INVAS EAR/PLS OXIMETRY 1: CPT

## 2024-05-16 PROCEDURE — 97110 THERAPEUTIC EXERCISES: CPT

## 2024-05-16 PROCEDURE — 99232 SBSQ HOSP IP/OBS MODERATE 35: CPT | Performed by: INTERNAL MEDICINE

## 2024-05-16 PROCEDURE — 97129 THER IVNTJ 1ST 15 MIN: CPT

## 2024-05-16 PROCEDURE — 99232 SBSQ HOSP IP/OBS MODERATE 35: CPT | Performed by: PHYSICAL MEDICINE & REHABILITATION

## 2024-05-16 PROCEDURE — 97116 GAIT TRAINING THERAPY: CPT

## 2024-05-16 PROCEDURE — 97535 SELF CARE MNGMENT TRAINING: CPT

## 2024-05-16 PROCEDURE — 92507 TX SP LANG VOICE COMM INDIV: CPT

## 2024-05-16 PROCEDURE — 94640 AIRWAY INHALATION TREATMENT: CPT

## 2024-05-16 PROCEDURE — 97130 THER IVNTJ EA ADDL 15 MIN: CPT

## 2024-05-16 RX ORDER — MIRTAZAPINE 15 MG/1
7.5 TABLET, FILM COATED ORAL EVERY EVENING
Status: DISCONTINUED | OUTPATIENT
Start: 2024-05-16 | End: 2024-05-29 | Stop reason: HOSPADM

## 2024-05-16 RX ADMIN — PANTOPRAZOLE SODIUM 40 MG: 40 TABLET, DELAYED RELEASE ORAL at 05:54

## 2024-05-16 RX ADMIN — APIXABAN 5 MG: 5 TABLET, FILM COATED ORAL at 08:18

## 2024-05-16 RX ADMIN — FERROUS SULFATE TAB 325 MG (65 MG ELEMENTAL FE) 325 MG: 325 (65 FE) TAB at 08:18

## 2024-05-16 RX ADMIN — LORAZEPAM 0.5 MG: 0.5 TABLET ORAL at 21:25

## 2024-05-16 RX ADMIN — FLUTICASONE PROPIONATE 1 SPRAY: 50 SPRAY, METERED NASAL at 16:31

## 2024-05-16 RX ADMIN — METFORMIN HYDROCHLORIDE 500 MG: 500 TABLET, FILM COATED ORAL at 08:19

## 2024-05-16 RX ADMIN — THIAMINE HCL TAB 100 MG 100 MG: 100 TAB at 08:19

## 2024-05-16 RX ADMIN — INSULIN GLARGINE 25 UNITS: 100 INJECTION, SOLUTION SUBCUTANEOUS at 21:25

## 2024-05-16 RX ADMIN — FLUTICASONE PROPIONATE 1 SPRAY: 50 SPRAY, METERED NASAL at 08:24

## 2024-05-16 RX ADMIN — ALBUTEROL SULFATE 2 PUFF: 90 AEROSOL, METERED RESPIRATORY (INHALATION) at 05:56

## 2024-05-16 RX ADMIN — GUAIFENESIN 600 MG: 600 TABLET, EXTENDED RELEASE ORAL at 08:18

## 2024-05-16 RX ADMIN — INSULIN LISPRO 2 UNITS: 100 INJECTION, SOLUTION INTRAVENOUS; SUBCUTANEOUS at 16:32

## 2024-05-16 RX ADMIN — ATORVASTATIN CALCIUM 80 MG: 80 TABLET, FILM COATED ORAL at 16:30

## 2024-05-16 RX ADMIN — Medication 1 TABLET: at 08:18

## 2024-05-16 RX ADMIN — BUSPIRONE HYDROCHLORIDE 10 MG: 10 TABLET ORAL at 08:19

## 2024-05-16 RX ADMIN — ALBUTEROL SULFATE 2.5 MG: 2.5 SOLUTION RESPIRATORY (INHALATION) at 04:49

## 2024-05-16 RX ADMIN — APIXABAN 5 MG: 5 TABLET, FILM COATED ORAL at 16:30

## 2024-05-16 RX ADMIN — BUSPIRONE HYDROCHLORIDE 10 MG: 10 TABLET ORAL at 16:30

## 2024-05-16 RX ADMIN — INSULIN LISPRO 1 UNITS: 100 INJECTION, SOLUTION INTRAVENOUS; SUBCUTANEOUS at 11:42

## 2024-05-16 RX ADMIN — ASPIRIN 81 MG CHEWABLE TABLET 81 MG: 81 TABLET CHEWABLE at 08:18

## 2024-05-16 RX ADMIN — METFORMIN HYDROCHLORIDE 500 MG: 500 TABLET, FILM COATED ORAL at 16:30

## 2024-05-16 RX ADMIN — Medication 6 MG: at 19:48

## 2024-05-16 RX ADMIN — INSULIN LISPRO 1 UNITS: 100 INJECTION, SOLUTION INTRAVENOUS; SUBCUTANEOUS at 08:19

## 2024-05-16 RX ADMIN — BUSPIRONE HYDROCHLORIDE 10 MG: 10 TABLET ORAL at 19:48

## 2024-05-16 RX ADMIN — GUAIFENESIN 600 MG: 600 TABLET, EXTENDED RELEASE ORAL at 16:30

## 2024-05-16 RX ADMIN — FOLIC ACID 1 MG: 1 TABLET ORAL at 08:19

## 2024-05-16 RX ADMIN — ALBUTEROL SULFATE 2.5 MG: 2.5 SOLUTION RESPIRATORY (INHALATION) at 19:55

## 2024-05-16 RX ADMIN — UMECLIDINIUM 1 PUFF: 62.5 AEROSOL, POWDER ORAL at 08:24

## 2024-05-16 RX ADMIN — FLUTICASONE FUROATE AND VILANTEROL TRIFENATATE 1 PUFF: 200; 25 POWDER RESPIRATORY (INHALATION) at 08:24

## 2024-05-16 RX ADMIN — MIRTAZAPINE 7.5 MG: 15 TABLET, FILM COATED ORAL at 19:47

## 2024-05-16 NOTE — PROGRESS NOTES
05/16/24 1237   Pain Assessment   Pain Assessment Tool 0-10   Pain Score No Pain   Restrictions/Precautions   Precautions Aspiration;Bed/chair alarms;Cognitive;Fall Risk;Supervision on toilet/commode   Weight Bearing Restrictions No   ROM Restrictions No   Cognition   Overall Cognitive Status Impaired   Arousal/Participation Alert;Responsive;Cooperative   Attention Attends with cues to redirect   Orientation Level Oriented X4   Memory Decreased short term memory;Decreased recall of recent events;Decreased recall of precautions   Following Commands Follows one step commands with increased time or repetition   Subjective   Subjective Pt reports he had no visitors since his family came yesterday PM   Roll Left and Right   Comment Pt OOB   Sit to Lying   Comment Pt OOB   Lying to Sitting on Side of Bed   Comment Pt OOB   Sit to Stand   Type of Assistance Needed Incidental touching;Verbal cues   Physical Assistance Level No physical assistance   Comment CGA RW; VC for hand placement   Sit to Stand CARE Score 4   Bed-Chair Transfer   Type of Assistance Needed Incidental touching;Verbal cues   Physical Assistance Level No physical assistance   Comment CGA RW; VC for hand placement, safety with RW   Chair/Bed-to-Chair Transfer CARE Score 4   Car Transfer   Type of Assistance Needed Physical assistance;Verbal cues   Physical Assistance Level 25% or less   Comment Deanna RW; VC for hand placement and safety   Car Transfer CARE Score 3   Walk 10 Feet   Type of Assistance Needed Incidental touching   Physical Assistance Level No physical assistance   Comment RW   Walk 10 Feet CARE Score 4   Walk 50 Feet with Two Turns   Type of Assistance Needed Physical assistance   Physical Assistance Level 25% or less   Comment RW   Walk 50 Feet with Two Turns CARE Score 3   Walk 150 Feet   Type of Assistance Needed Physical assistance   Physical Assistance Level 25% or less   Comment RW   Walk 150 Feet CARE Score 3   Walking 10 Feet on  "Uneven Surfaces   Type of Assistance Needed Physical assistance;Verbal cues   Physical Assistance Level 25% or less   Comment RW on floor mat; VC for RW safety   Walking 10 Feet on Uneven Surfaces CARE Score 3   Ambulation   Primary Mode of Locomotion Prior to Admission Walk   Distance Walked (feet) 152 ft  (152', 132', 120', 51' x 2)   Assist Device Roller Walker   Gait Pattern Inconsistant Sury;Slow Sury;Decreased foot clearance;Forward Flexion;Narrow TEO;Ataxic;Improper weight shift   Limitations Noted In Balance;Coordination;Device Management;Endurance;Heel Strike;Posture;Safety;Speed;Strength   Provided Assistance with: Balance;Direction   Walk Assist Level Contact Guard;Close Supervision   Findings CGA/min RW; increased assist for prolonged distance   Does the patient walk? 2. Yes   Wheel 50 Feet with Two Turns   Reason if not Attempted Activity not applicable   Wheel 50 Feet with Two Turns CARE Score 9   Wheel 150 Feet   Reason if not Attempted Activity not applicable   Wheel 150 Feet CARE Score 9   Wheelchair mobility   Does the patient use a wheelchair? 0. No   Findings N/A   Curb or Single Stair   Style negotiated Single stair   Type of Assistance Needed Physical assistance   Physical Assistance Level 25% or less   Comment Deanna up/down 4 6\"  steps B/L HR   1 Step (Curb) CARE Score 3   4 Steps   Type of Assistance Needed Physical assistance   Physical Assistance Level 25% or less   Comment Deanna up/down 4 6\"  steps B/L HR   4 Steps CARE Score 3   12 Steps   Reason if not Attempted Safety concerns   12 Steps CARE Score 88   Stairs   Type Stairs   # of Steps 4   Weight Bearing Precautions Fall Risk   Assist Devices Bilateral Rail   Findings Deanna up/down 4 6\"  steps B/L HR   Picking Up Object   Type of Assistance Needed Incidental touching   Physical Assistance Level No physical assistance   Comment CGA  marker from floor with reacher, VC for sequencing   Picking Up Object CARE " Score 4   Toilet Transfer   Comment Pt did not require during session   Therapeutic Interventions   Strengthening Seated LE TE   Other repeated transfer training focusing on proper hand placement, proper sequencing, and proper safety with RW, stair training, gait training, car transfer   Equipment Use   NuStep L2, seat/arm 9, 15 min, HR , spO2 94-97%, 0.47 mi   Assessment   Treatment Assessment Pt agreeable to PT this PM, recieved sitting upright in recliner. Pt is demonstrating improved activity tolerance and endurance, with prolonged ambulatory distance, prolonged NuStep session, and decreased HR with activity. SpO2 within 93-97%, HR within  t/o session, despite prolonged ambulation. He continues to require frequent VC for hand placement and safety with transfers. Will continue with current PT POC to improve deficits and promote return to PLOF.   Problem List Decreased strength;Decreased range of motion;Decreased endurance;Impaired balance;Decreased mobility;Decreased coordination;Decreased cognition;Decreased safety awareness;Impaired sensation;Decreased skin integrity   PT Barriers   Physical Impairment Decreased strength;Decreased range of motion;Decreased endurance;Impaired balance;Decreased mobility;Decreased coordination;Decreased cognition;Impaired judgement;Decreased safety awareness;Impaired sensation;Decreased skin integrity   Functional Limitation Car transfers;Ramp negotiation;Stair negotiation;Standing;Transfers;Walking   Plan   Treatment/Interventions Functional transfer training;LE strengthening/ROM;Therapeutic exercise;Endurance training;Cognitive reorientation;Patient/family training;Equipment eval/education;Bed mobility;Gait training   Progress Progressing toward goals   PT Therapy Minutes   PT Time In 1237   PT Time Out 1400   PT Total Time (minutes) 83   PT Mode of treatment - Individual (minutes) 83   PT Mode of treatment - Concurrent (minutes) 0   PT Mode of treatment - Group  (minutes) 0   PT Mode of treatment - Co-treat (minutes) 0   PT Mode of Treatment - Total time(minutes) 83 minutes   PT Cumulative Minutes 463     Seated LE TE:  3x10, B/L LEs, 2#  March  LAQ  Hip ADd - Ball Randi  GS  HR  TR    Patient remains OOB in chair, all needs in reach.  Alarm in place and activated.  Encouraged use of call bell, patient verbalizes understanding.

## 2024-05-16 NOTE — ASSESSMENT & PLAN NOTE
Lab Results   Component Value Date    HGBA1C 8.9 (H) 04/06/2024       Recent Labs     05/15/24  0618 05/15/24  1047 05/15/24  1536 05/16/24  0710   POCGLU 130 137 185* 209*         Blood Sugar Average: Last 72 hrs:  (P) 178.3311174533460565  Blood sugars elevated in the setting of recent use of steroids  Home: Lantus 30u qhs/metformin 1g bid  Here: Lantus 25U qhs/sliding scale/metformin 500mg bid (added 5/14/2024)  Cont DM diet  stable

## 2024-05-16 NOTE — PLAN OF CARE
Problem: Prexisting or High Potential for Compromised Skin Integrity  Goal: Skin integrity is maintained or improved  Description: INTERVENTIONS:  - Identify patients at risk for skin breakdown  - Assess and monitor skin integrity  - Assess and monitor nutrition and hydration status  - Monitor labs   - Assess for incontinence   - Turn and reposition patient  - Assist with mobility/ambulation  - Relieve pressure over bony prominences  - Avoid friction and shearing  - Provide appropriate hygiene as needed including keeping skin clean and dry  - Evaluate need for skin moisturizer/barrier cream  - Collaborate with interdisciplinary team   - Patient/family teaching  - Consider wound care consult   Outcome: Progressing     Problem: PAIN - ADULT  Goal: Verbalizes/displays adequate comfort level or baseline comfort level  Description: Interventions:  - Encourage patient to monitor pain and request assistance  - Assess pain using appropriate pain scale  - Administer analgesics based on type and severity of pain and evaluate response  - Implement non-pharmacological measures as appropriate and evaluate response  - Consider cultural and social influences on pain and pain management  - Notify physician/advanced practitioner if interventions unsuccessful or patient reports new pain  Outcome: Progressing     Problem: INFECTION - ADULT  Goal: Absence or prevention of progression during hospitalization  Description: INTERVENTIONS:  - Assess and monitor for signs and symptoms of infection  - Monitor lab/diagnostic results  - Monitor all insertion sites, i.e. indwelling lines, tubes, and drains  - Monitor endotracheal if appropriate and nasal secretions for changes in amount and color  - [place] appropriate cooling/warming therapies per order  - Administer medications as ordered  - Instruct and encourage patient and family to use good hand hygiene technique  - Identify and instruct in appropriate isolation precautions for  identified infection/condition  Outcome: Progressing     Problem: SAFETY ADULT  Goal: Patient will remain free of falls  Description: INTERVENTIONS:  - Educate patient/family on patient safety including physical limitations  - Instruct patient to call for assistance with activity   - Consult OT/PT to assist with strengthening/mobility   - Keep Call bell within reach  - Keep bed low and locked with side rails adjusted as appropriate  - Keep care items and personal belongings within reach  - Initiate and maintain comfort rounds  - Make Fall Risk Sign visible to staff  - Offer Toileting every  Hours, in advance of need  - Initiate/Maintain alarm  - Obtain necessary fall risk management equipment:   - Apply yellow socks and bracelet for high fall risk patients  - Consider moving patient to room near nurses station  Outcome: Progressing  Goal: Maintain or return to baseline ADL function  Description: INTERVENTIONS:  -  Assess patient's ability to carry out ADLs; assess patient's baseline for ADL function and identify physical deficits which impact ability to perform ADLs (bathing, care of mouth/teeth, toileting, grooming, dressing, etc.)  - Assess/evaluate cause of self-care deficits   - Assess range of motion  - Assess patient's mobility; develop plan if impaired  - Assess patient's need for assistive devices and provide as appropriate  - Encourage maximum independence but intervene and supervise when necessary  - Involve family in performance of ADLs  - Assess for home care needs following discharge   - Consider OT consult to assist with ADL evaluation and planning for discharge  - Provide patient education as appropriate  Outcome: Progressing  Goal: Maintains/Returns to pre admission functional level  Description: INTERVENTIONS:  - Perform AM-PAC 6 Click Basic Mobility/ Daily Activity assessment daily.  - Set and communicate daily mobility goal to care team and patient/family/caregiver.   - Collaborate with  rehabilitation services on mobility goals if consulted  - Perform Range of Motion  times a day.  - Reposition patient every  hours.  - Dangle patient  times a day  - Stand patient  times a day  - Ambulate patient  times a day  - Out of bed to chair  times a day   - Out of bed for meals times a day  - Out of bed for toileting  - Record patient progress and toleration of activity level   Outcome: Progressing     Problem: DISCHARGE PLANNING  Goal: Discharge to home or other facility with appropriate resources  Description: INTERVENTIONS:  - Identify barriers to discharge w/patient and caregiver  - Arrange for needed discharge resources and transportation as appropriate  - Identify discharge learning needs (meds, wound care, etc.)  - Arrange for interpretive services to assist at discharge as needed  - Refer to Case Management Department for coordinating discharge planning if the patient needs post-hospital services based on physician/advanced practitioner order or complex needs related to functional status, cognitive ability, or social support system  Outcome: Progressing

## 2024-05-16 NOTE — PROGRESS NOTES
Physical Medicine and Rehabilitation Progress Note  Lucho Talavera 69 y.o. male MRN: [id number]  Unit/Bed#: -01 Encounter: [id number]    To Review: 69 year old M with PMH of COPD, multitude of ED visits/hospitalizations, DM2, CAD, EtOH abuse, HTN, prior CVA, found to have aflutter with RVR on hospitalization 4/2024 s/p BRITTNEY ablation who was to d/c on Eliquis but per report family did not know he needed it and he was not taking it who developed dysarthria, weakness and imbalance with recent fall at home and presented to hospital. CTA H/N showed Chronic left PCA territory infarct, new since the prior exam. No evidence of acute vascular territorial infarction, intracranial hemorrhage or mass.  No hemodynamically significant stenosis, dissection or occlusion of the carotid or vertebral arteries or major vessels of the [place].  Neuro consulted and recommended MRI brain which showed multiple infarcts of varying ages in the posterior circulation include multiple small acute/early subacute infarcts in the bilateral cerebellum. TTE did not show thrombus.  Neuro recommended Eliquis, aspirin, and statin. Patient was evaluated by skilled therapies and was found to have significant decline in ADLs and ambulation and appears appropriate for admission to [facility] [place].     Chief Complaint: Very poor sleep last night.    Interval History/Subjective:  No acute events overnight. Last BM on 5/15. Remains continent of bowel/bladder. Was sleeping, but per nursing was woken up for ativan and nebulizer treatment and was unable to get back to sleep. He is verily tired right now. No new lightheadedness, dizziness, CP, SOB, fevers, N/V, new numbness/tingling/weakness. Also noted to have sutures in place from previous loop recorder placement.     ROS:  A 10 point review of systems was negative except for what is noted in the HPI.    Today's Changes:  Reached out to EP to see if they are ok with us  removing loop recorder sutures. They are ok with it. Will remove today.   Starting mirtazapine at night for sleep and anxiety  Daughter coming for family training/observation tomorrow.  Lisinopril resumed at 10mg daily for uptrending BP    Total visit time: 35 minutes, with more than 50% spent counseling/coordinating care. Counseling includes discussion with patient re: progress in therapies, functional issues observed by therapy staff, and discussion with patient regarding their current medical state and wellbeing. Coordination of patient's care was performed in conjunction with Internal Medicine service to monitor patient's labs, vitals, and management of their comorbidities.    Assessment/Plan:    * Stroke (cerebrum) ([facility])  Assessment & Plan  - MRI brain 5/8 - Multiple infarcts of varying ages in the posterior circulation include multiple small acute/early subacute infarcts in the bilateral cerebellum. Additional small foci of recent ischemia in the left PCA distribution superimposed on chronic infarct  No acute hemorrhage or mass effect  - CTA H/N -  1. Chronic left PCA territory infarct, new since the prior exam. No evidence of acute vascular territorial infarction,  intracranial hemorrhage or mass. 2. No hemodynamically significant stenosis, dissection or occlusion of the carotid or vertebral arteries or major vessels of the [place] of [place].  - Residual impairments on admission to [facility]: Impaired balance, coordination, possibly vision  - Co-morbidities most impacting functional recovery: COPD with chronic hypoxic respiratory failure, anxiety    Secondary stroke prevention  - Antithrombotic: Aspirin and Eliquis  - Statin  - Patient at increased risk for stroke particularly early in post-stroke period - monitor neuro exam closely with low threshold to repeat imaging  -  patient and if applicable caregiver on optimal stroke management  - Follow-up with neurology and PCP after d/c   - Recommend acute  comprehensive interdisciplinary inpatient rehabilitation to include intensive skilled therapies (PT, OT, ST) as outlined with oversight and management by rehabilitation physician as well as inpatient rehab level nursing, case management and weekly interdisciplinary team meetings.       Sacral wound  Assessment & Plan  Seen by wound care 5/7  - Managed R lateral forearm skin tear   - Noted to have old open area on sacrum on admission.   - Consulted wound care to follow-up. Epithelialized   - Offloading,specialty cushion   - Allevyn   - Monitor closely.       Chronic obstructive pulmonary disease with acute exacerbation (HCC)  Assessment & Plan  Has had a multitude of ED visits related to COPD/concern for SOB but was frequently d/c'd same day  - Monitor also for anxiety and optimal mgmt of that; education on monitor home O2  IM consulted and with overall management at their discretion during ARC course  Monitor lung exam, vitals with and without activity  Encourage incentive spirometry  Breo/Incruse  Xopenex  PRN Albuterol  PRN supplemental O2   Mucinex     Insomnia  Assessment & Plan  Environmental interventions.  Sleep log  Added melatonin scheduled    Atrial fibrillation (HCC)  Assessment & Plan  IM consulted and with overall management at their discretion during ARC course  Rate control: None  Antithrombotic: Eliquis    - Cost is $71.28/month. Daughter confirmed this price is fine.      Hypertension  Assessment & Plan  Home: Lisinopril 40mg daily  Here: Lisinopril 10mg daily  Monitor vitals with and without activity; monitor for orthostasis  Monitor hemoglobin, electrolytes, kidney function, hydration status   Management as per IM.       Anxiety  Assessment & Plan  Cries out for help at times with anxiety  Asks for breathing treatments/O2 when this happens, but usually O2 sats and breath sounds are fine.  Has Ativan ordered PRN currently.  Adding mirtazapine at night to also assist with sleep/insomnia  Monitor  response.     Chronic respiratory failure (HCC)  Assessment & Plan  2/2 COPD  At home on 2L NC  Here has been satting well off 2L NC  Will request when experiencing increased WOB, but generally during these episodes O2 is maintained in the 90s.  Goal > 88%.     ETOH abuse  Assessment & Plan  Patient reports cutting down and only about 2 beers per day but occasionally liquor   Alcohol cessation counseling and supportive counseling  Optimal mood/stress mgmt   Thiamine, folate, MVI   PRN low dose ativan BID   Consider gabapentin as well     Type 2 diabetes mellitus (HCC)  Assessment & Plan  Lab Results   Component Value Date    HGBA1C 8.9 (H) 04/06/2024       Recent Labs     05/15/24  1047 05/15/24  1536 05/16/24  0710 05/16/24  1102   POCGLU 137 185* 209* 177*       Blood Sugar Average: Last 72 hrs:  (P) 262.5  Home: Metformin 1000mg Q12hr, Lantus 30 units at bedtime  Current meds: Lantus 25, Metformin 500mg Q12hr, HS, Lispro SS AC/HS, consistent carb diabetic diet  Nutrition consult  Management as per IM  Outpatient f/u with PCP      CAD (coronary artery disease)  Assessment & Plan  With hx of stenting  IM consulted and with overall management at their discretion during ARC course  Antithrombotic: Aspirin and Eliquis   Statin  Optimal blood pressure and blood sugar control          Health Maintenance  #Delirium/Sleep: At risk. Optimize sleep/wake, pain, bowel, bladder management. Avoid deliriogenic meds. Of note is on melatonin, ativan, and oxycodone PRN.   #Pain: Tylenol PRN  #Bowel: Last BM 5/15 and continent. Adding PRN miralax  #Bladder: Voiding and continent  #Skin/Pressure Injury Prevention: Turn Q2hr in bed, with weight shifts [id number] in wheelchair.  #DVT Prophylaxis:Fully anticoagulated on eliquis, SCDs  #GI Prophylaxis: PPI on for GERD  #Code Status: Full Code.   #FEN: Diabetic Diet  #Dispo: Team 5/14: ADD 5/30 with home RN/PT/OT/SLP with goals to discharge to 1 level home with 2 HETAL, with  supervision.  Will need f/u with Pulm, PCP, and Neuro    Objective:    Functional Update:  PT: min-modA transfers, Sup-Deanna bed mobility ,min-modA x2 (CF for 2nd person) ambulation with RW, min-mod A x2 for stairs   OT: Ind eating, Deanna grooming, Deanna bathing, Sup UB dressing, Deanna LB dressing, Deanna toileting, Deanna tub/shower transfers, Deanna toilet transfers   SLP: Moderate cognitive linguistic impairments on CLQT, mild oropharyngeal dysphagia    Allergies per EMR    Physical Exam:  Temp:  [98.2 °F (36.8 °C)-98.5 °F (36.9 °C)] 98.2 °F (36.8 °C)  HR:  [] 90  Resp:  [20] 20  BP: (126-147)/(76-77) 147/76  Oxygen Therapy  SpO2: 92 %  O2 Flow Rate (L/min): 2 L/min      Gen: No acute distress, appears very tired   HEENT: Moist mucus membranes, Normocephalic/Atraumatic  Cardiovascular: Regular rate, rhythm, S1/S2. Distal pulses palpable  Heme/Extr: No edema  Pulmonary: Non-labored breathing. Lungs CTAB  : No antony  GI: Soft, non-tender, non-distended. BS+  MSK: ROM is WFL in all extremities. No effusions or deformities. Bulk is symmetric. See below for MMT scores.   Integumentary: Skin is warm, dry. Chest wall with sutures, and surrounding erythema. No sign of active infection.   Neuro: Awake, alert. Speech dysarthric. No new focality.   Psych: Normal mood and affect.     Diagnostic Studies: Reviewed, no new imaging    Laboratory:  Reviewed   Results from last 7 days   Lab Units 05/13/24  0537 05/11/24  0524 05/10/24  0527   HEMOGLOBIN g/dL 12.0 12.4 11.9*   HEMATOCRIT % 39.5 39.5 37.9   WBC Thousand/uL 8.73 10.50* 7.81     Results from last 7 days   Lab Units 05/13/24  0537 05/11/24  0524 05/10/24  0527   BUN mg/dL 11 14 20   POTASSIUM mmol/L 3.8 3.8 4.0   CHLORIDE mmol/L 99 100 102   CREATININE mg/dL 0.57* 0.50* 0.56*   AST U/L 16 18 15   ALT U/L 18 18 12            Patient Active Problem List   Diagnosis    Closed odontoid fracture with routine healing    Closed displaced fracture of third cervical vertebra  (HCC)    Closed displaced fracture of fourth cervical vertebra (HCC)    CAD (coronary artery disease)    Dens fracture ([facility])    Acute blood loss anemia    Type 2 diabetes mellitus ([facility])    S/P C1-T1 Posterior Cervical Discectomy and Fusion on 9/10/18    At moderate risk for venous thromboembolism (VTE)    Chronic obstructive pulmonary disease with acute exacerbation ([facility])    Closed fracture of first cervical vertebra ([facility])    ETOH abuse    KLARISSA (obstructive sleep apnea)    Dirty living conditions    Bronchitis    Diabetic polyneuropathy associated with type 2 diabetes mellitus ([facility])    Hammertoe, bilateral    Post-traumatic arthritis of left foot    Pain due to onychomycosis of toenail    Oral abscess    Tooth fracture    Closed nondisplaced fracture of posterior arch of first cervical vertebra ([facility])    Fall    Stage 3 severe COPD by GOLD classification ([facility])    Chronic respiratory failure ([facility])    Anxiety    Hypertension    COPD (chronic obstructive pulmonary disease) ([facility])    SIRS (systemic inflammatory response syndrome) ([facility])    History of alcohol abuse    Iron deficiency anemia secondary to inadequate dietary iron intake    COVID-19    Atrial flutter with rapid ventricular response ([facility])    Leukocytosis    Atrial fibrillation ([facility])    Stroke (cerebrum) ([facility])    Stroke-like symptom    Sacral wound    Insomnia         Medications  Current Facility-Administered Medications   Medication Dose Route Frequency Provider Last Rate    acetaminophen  650 mg Oral Q6H PRN Richmond Chew, DO      albuterol  2 puff Inhalation Q4H PRN William Camacho, DO      albuterol  2.5 mg Nebulization Q6H PRN Richmond Chew, DO      apixaban  5 mg Oral BID Richmond Chew, DO      aspirin  81 mg Oral Daily Richmond Chew, DO      atorvastatin  80 mg Oral Daily With Dinner Richmond Chew, DO      busPIRone  10 mg Oral TID Richmond Chew, DO      ferrous sulfate  325 mg Oral Daily With Breakfast Richmond Chew,  DO      fluticasone  1 spray Nasal BID Richmond Chew, DO      fluticasone-vilanterol  1 puff Inhalation Daily Richmond Chew, DO      folic acid  1 mg Oral Daily Richmond Chew, DO      guaiFENesin  600 mg Oral BID Richmond Chew, DO      insulin glargine  25 Units Subcutaneous HS TATIANA Manriquez      insulin lispro  1-5 Units Subcutaneous TID AC Richmond Chew, DO      levalbuterol  1.25 mg Nebulization TID Richmond Chew, DO      LORazepam  0.5 mg Oral BID PRN Richmond Chew, DO      melatonin  6 mg Oral QPM Ashley Depadua, MD      metFORMIN  500 mg Oral BID With Meals TATIANA Paulino      multivitamin-minerals  1 tablet Oral Daily Richmond Chew, DO      oxyCODONE  2.5 mg Oral Q4H PRN Richmond Chew, DO      pantoprazole  40 mg Oral Early Morning Richmond Chew, DO      thiamine  100 mg Oral Daily Richmond Chew, DO      umeclidinium  1 puff Inhalation Daily Richmond Chew, DO            ** Please Note: Fluency Direct voice to text software may have been used in the creation of this document. **

## 2024-05-16 NOTE — PROGRESS NOTES
05/16/24 0958   Pain Assessment   Pain Assessment Tool 0-10   Pain Score No Pain   Restrictions/Precautions   Precautions Aspiration;Bed/chair alarms;Cognitive;Fall Risk;Supervision on toilet/commode   Comprehension   Comprehension (FIM) 4 - Understands basic info/conversation 75-90% of time   Expression   Expression (FIM) 3 - Expresses basic info/needs 50-74% of time   Social Interaction   Social Interaction (FIM) 5 - Interacts appropriately with others 90% of time   Problem Solving   Problem solving (FIM) 4 - Solves basic problems 75-89% of time   Memory   Memory (FIM) 3 - Recognizes, recalls/performs 50-74%   Speech/Language/Cognition Assessment   Treatment Assessment Pt was seen in room for skilled ST session targeting cognitive linguistic communication skills. SLP and pt participated in brief rapport building as this SLP is new to pt's care team. SLP began first half of session reviewing dysarthria and OME's. Pt voiced feeling 'much better' with expression. SLP educated pt on strategies he is already implementing such as pausing, taking breaths in between words and repeating word that was previously difficult for pt. SLP also educated pt on additional strategies such as enunciating and 'yelling' words out to help w/ expression. SLP asked pt to utilize these during conversation to target functionally. Pt was ~50%-60% intelligible during conversation  w/ verbal cues needed at times to slow ROS. Pt noted to use large words during conversation and did not attempt to change the word to make it easier or omit words, pt used strategies to slow ROS and enunciate words harder. Pt continued to have words blended together when unintelligible but frequently attempted to repeat them slower and enunciate harder.     In regards to cognition, focus on word finding, remote memory, LTM, categorization and problem solving. In first task, pt was given a FO3 verbal words and asked to identify the category each belongs too. Pt  was able to complete task w/ 28/30 accuracy. Pt noted to benefit from increase processing time to complete task. Pt's word finding and LTM was noted to increase during task and would use memory strategies such as association during task. SLP educated pt on additional strategies to use such as visualization. Will introduce WARP memory strategy (writing, association, repeating and picture) in future sessions to offer additional memory resources for pt to practice to increase memory. Last task was a problem solving task in which pt was given verbal problem and verbal FO4 choices and asked to pick the best solution for each. Pt was able to complete task w/ 12/13 accuracy. Pt noted to show increase insight and accuracy with problem solving skills. At times, relied on verbal cues but overall showed improvement in comprehension for functional problem solving increasing safety awareness. During task, pt voiced needing bathroom and SLP alerted PCA to assist w/ setting up BSC and pt was able to follow directions for sit pivot to BSC from bed. Pt easily followed 1-2 step directions verbally presented to change brief and pants as well as increase safety awareness, asking if he can do something prior to completing. Overall, pt is beginning to increase overall safety and precaution awareness and increase overall cognitive skills.  At this time, pt continues to benefit from ongoing skilled SLP services to maximize overall cognitive linguistic skills in attempts to decrease caregiver burden over time.   SLP Therapy Minutes   SLP Time In 0930   SLP Time Out 1030   SLP Total Time (minutes) 60   SLP Mode of treatment - Individual (minutes) 60   SLP Mode of treatment - Concurrent (minutes) 0   SLP Mode of treatment - Group (minutes) 0   SLP Mode of treatment - Co-treat (minutes) 0   SLP Mode of Treatment - Total time(minutes) 60 minutes   SLP Cumulative Minutes 270   Therapy Time missed   Time missed? No

## 2024-05-16 NOTE — ASSESSMENT & PLAN NOTE
Previously non compliant with eliquis. Spoke with the pharmacist at [facility] and it was prescribed but never picked up.  Cost of Eliquis is $71.28. Called pt's daughter and she states that cost is fine  CTA with chronic left PCA territory infarction  MRI with multiple infarcts of varying ages and posterior circulation including multiple small acute/early subacute infarct in the bilateral cerebellum with additional small foci of recent ischemia in the left PCA distribution superimposed on chronic infarct  Echo with EF 55%, no cardiac thrombus  Continue aspirin and Eliquis/statin  Outpatient follow-up with neurology  Therapy per PMR  DC 5/30/24=family aware coming for training 5/17/2024

## 2024-05-16 NOTE — ASSESSMENT & PLAN NOTE
Cries out for help at times with anxiety  This improved here.  Asks for breathing treatments/O2 when this happens, but usually O2 sats and breath sounds are fine.  Reviewed PDMP - last prescribed Lorazepam by Dr. Villalpando in July of last year, most recently by a hospitalist PA at [facility] (for only 10 tablets).  Has not used since 5/11. Will switch to hydroxyzine - has not used while here. Will plan to discontinue at discharge.  Adding mirtazapine at night to also assist with sleep/insomnia  Monitor response.

## 2024-05-16 NOTE — PROGRESS NOTES
[facility]  Progress Note  Name: Lucho Talavera I  MRN: [id number]  Unit/Bed#: -01 I Date of Admission: 5/11/2024   Date of Service: 5/16/2024 I Hospital Day: 5    Assessment & Plan   * Stroke (cerebrum) (HCC)  Assessment & Plan  Previously non compliant with eliquis. Spoke with the pharmacist at [facility] and it was prescribed but never picked up.  Cost of Eliquis is $71.28. Called pt's daughter and she states that cost is fine  CTA with chronic left PCA territory infarction  MRI with multiple infarcts of varying ages and posterior circulation including multiple small acute/early subacute infarct in the bilateral cerebellum with additional small foci of recent ischemia in the left PCA distribution superimposed on chronic infarct  Echo with EF 55%, no cardiac thrombus  Continue aspirin and Eliquis/statin  Outpatient follow-up with neurology  Therapy per PMR  DC 5/30/24=family aware coming for training 5/17/2024    Atrial fibrillation (HCC)  Assessment & Plan  Rates controlled   Cont eliquis  F/u cardiology as outpatient    Hypertension  Assessment & Plan  Home: Lisinopril 40mg daily.  Here: Lisinopril 10mg qd  Trending upward start lisinopril as above    Type 2 diabetes mellitus (HCC)  Assessment & Plan  Lab Results   Component Value Date    HGBA1C 8.9 (H) 04/06/2024       Recent Labs     05/15/24  0618 05/15/24  1047 05/15/24  1536 05/16/24  0710   POCGLU 130 137 185* 209*         Blood Sugar Average: Last 72 hrs:  (P) [id number]  Blood sugars elevated in the setting of recent use of steroids  Home: Lantus 30u qhs/metformin 1g bid  Here: Lantus 25U qhs/sliding scale/metformin 500mg bid (added 5/14/2024)  Cont DM diet  stable      Chronic respiratory failure (HCC)  Assessment & Plan  Maintained on 2 L nasal cannula baseline however a/t nursing has been doing well without it  Has been treated for acute exacerbations recently with steroids.  Seems to use oxygen more  for anxiety educated regarding same  Continue rescue inhaler.    ETOH abuse  Assessment & Plan  History of alcohol abuse  Monitor off CIWA protocol - Pt now post withdrawal window   Ativan as needed anxiety  Continue supplements with thiamine, folate, MVI                 The above assessment and plan was reviewed and updated as determined by my evaluation of the patient on 5/16/2024.    Labs:   Results from last 7 days   Lab Units 05/13/24  0537 05/11/24  0524   WBC Thousand/uL 8.73 10.50*   HEMOGLOBIN g/dL 12.0 12.4   HEMATOCRIT % 39.5 39.5   PLATELETS Thousands/uL 383 395*     Results from last 7 days   Lab Units 05/13/24  0537 05/11/24  0524   SODIUM mmol/L 135 134*   POTASSIUM mmol/L 3.8 3.8   CHLORIDE mmol/L 99 100   CO2 mmol/L 31 25   BUN mg/dL 11 14   CREATININE mg/dL 0.57* 0.50*   CALCIUM mg/dL 8.6 8.5             Results from last 7 days   Lab Units 05/16/24  0710 05/15/24  1536 05/15/24  1047   POC GLUCOSE mg/dl 209* 185* 137       Imaging  No orders to display       Review of Scheduled Meds:  Current Facility-Administered Medications   Medication Dose Route Frequency Provider Last Rate    acetaminophen  650 mg Oral Q6H PRN Richmond Chew, DO      albuterol  2 puff Inhalation Q4H PRN William Camacho, DO      albuterol  2.5 mg Nebulization Q6H PRN Richmond Chew, DO      apixaban  5 mg Oral BID Harkaitlynktim Chew, DO      aspirin  81 mg Oral Daily Harzay Chew, DO      atorvastatin  80 mg Oral Daily With Dinner Richmond Chew, DO      busPIRone  10 mg Oral TID Harkaitlynktim Chew, DO      ferrous sulfate  325 mg Oral Daily With Breakfast Harzay Chew, DO      fluticasone  1 spray Nasal BID Harkaitlynktim Chew, DO      fluticasone-vilanterol  1 puff Inhalation Daily Harzay Chew, DO      folic acid  1 mg Oral Daily Harnishkumar Chew, DO      guaiFENesin  600 mg Oral BID Harnishkumar Chew, DO      insulin glargine  25 Units Subcutaneous HS TATIANA Manriquez      insulin  lispro  1-5 Units Subcutaneous TID AC Harzay Chew, DO      levalbuterol  1.25 mg Nebulization TID Harkaitlynkumar Chew, DO      LORazepam  0.5 mg Oral BID PRN Harkaitlynkumar Chew, DO      melatonin  6 mg Oral QPM Ashley Depadua, MD      metFORMIN  500 mg Oral BID With Meals TATIANA Paulino      multivitamin-minerals  1 tablet Oral Daily Harnishkumar Chew, DO      oxyCODONE  2.5 mg Oral Q4H PRN Harnishkumar Chew, DO      pantoprazole  40 mg Oral Early Morning Harnishkumar Chew, DO      thiamine  100 mg Oral Daily Harnishkumar Chew, DO      umeclidinium  1 puff Inhalation Daily Harnishkumar Chew, DO         Subjective/ HPI: Patient seen and examined. Patients overnight issues or events were reviewed with nursing staff. New or overnight issues include the following:     Pt seen and examined. Didn't sleep well overnight, otherwise no complaints    ROS:   A 10 point ROS was performed; negative except as noted above.        *Labs /Radiology studies Reviewed  *Medications  reviewed and reconciled as needed  *Please refer to order section for additional ordered labs studies      Physical Examination:  Vitals:   Vitals:    05/15/24 2014 05/15/24 2223 05/16/24 0450 05/16/24 0546   BP: 126/77   147/76   BP Location: Left arm   Left arm   Pulse: 76   90   Resp: 20   20   Temp: 98.5 °F (36.9 °C)   98.2 °F (36.8 °C)   TempSrc: Oral   Oral   SpO2: 93% 94% 92% 92%   Weight:       Height:           General Appearance: NAD; pleasant  HEENT: PERRLA, conjuctiva normal; mucous membranes moist; face symmetrical  Neck:  Supple  Lungs: clear bilaterally, normal respiratory effort, no retractions, expiratory effort normal, on room air  CV: regular rate and rhythm, no murmurs rubs or gallops noted   ABD: soft non tender, +BS x4  EXT: DP pulses intact, no lymphadenopathy, no edema, gait dysfunction  Skin: normal turgor, normal texture, no rash  Psych: affect flat, mood normal  Neuro: AAOx3       The above physical exam was  reviewed and updated as determined by my evaluation of the patient on 5/16/2024.    Invasive Devices       None                      VTE Pharmacologic Prophylaxis: Eliquis  Code Status: Level 1 - Full Code  Current Length of Stay: 5 day(s)    Total floor / unit time spent today 30 minutes  Coordination of patient's care was performed in conjunction with primary service. Time invested included review of patient's labs, vitals, and management of their comorbidities with continued monitoring, examination of patient as well as answering patient questions, documenting her findings and creating progress note in electronic medical record,  ordering appropriate diagnostic testing.       ** Please Note:  voice to text software may have been used in the creation of this document. Although proof errors in transcription or interpretation are a potential of such software**

## 2024-05-16 NOTE — PROGRESS NOTES
"   05/16/24 1030   Pain Assessment   Pain Assessment Tool 0-10   Pain Score No Pain   Restrictions/Precautions   Precautions Aspiration;Bed/chair alarms;Cognitive;Fall Risk;Supervision on toilet/commode   Weight Bearing Restrictions No   ROM Restrictions No   Lifestyle   Autonomy \"I don't know.\"   Eating   Type of Assistance Needed Set-up / clean-up   Physical Assistance Level No physical assistance   Eating CARE Score 5   Upper Body Dressing   Type of Assistance Needed Supervision;Set-up / clean-up   Physical Assistance Level No physical assistance   Comment seated EOB   Upper Body Dressing CARE Score 4   Lower Body Dressing   Type of Assistance Needed Physical assistance;Adaptive equipment;Verbal cues   Physical Assistance Level 25% or less   Comment able to thread BLE while seated EOB w/ inc time. Min A to steady in stance at RW for clothing management.   Lower Body Dressing CARE Score 3   Sit to Stand   Type of Assistance Needed Physical assistance;Verbal cues;Adaptive equipment   Physical Assistance Level 25% or less   Comment CGA-Min A w/ RW. cues for proper hand placement, may benefit from trial of memory aide on RW to inc safety.   Sit to Stand CARE Score 3   Bed-Chair Transfer   Type of Assistance Needed Physical assistance;Adaptive equipment;Verbal cues   Physical Assistance Level 25% or less   Comment CGA-Min A w/ RW. cues for proper RW management, specifically squaring off to sitting surface.   Chair/Bed-to-Chair Transfer CARE Score 3   Exercise Tools   Other Exercise Tool 1 BUE ther ex to maximize strength and endurance for ADLs and fxnl mobility. Completed w/ 3# dowel 3x10 bicep curls, chest press, front arm raises, prograde and retrograde rows. Pt tolerated well w/ Min vc's and demo to improve technique. Provided w/ rest breaks between sets to manage generalized fatigue.   Cognition   Overall Cognitive Status Impaired   Comments Pt reporting poor night sleep, noted w/ inc irritability compared to " other sessions. Cont w/ impaired safety awareness, impaired STM, dec frustration tolerance, and dec judgement. + flat affect. Implemented memory aide x2 in room to 'use call bell when you need help'. Reviewed w/ pt.   Assessment   Treatment Assessment Pt seen for skilled OT session focusing on UB/LB dressing, short distance fxnl mobility w/ RW, BUE strengthening, and fxnl cognitive retraining. Pt w/ poor night sleep, noted w/ inc fatigue and req encouragement to maximize overall participation. Cont OT POC: endurance work, fxnl standing tolerance w/ alternating unilateral release, BUE strengthening, fxnl cognitive/safety training, ADL retraining, and RW management training. Plan for FT w/ pt's daughter tomorrow afternoon, plan to review OT d/c recommendations. Pt agreeable to rest in recliner, all needs within reach, meal tray set-up, and alarm activated. From OT standpoint pt is on track to d/c home w/ family to provide 24 hour S on 5/30 with recommendation for HHOT.   Prognosis Good   Problem List Decreased strength;Decreased range of motion;Decreased endurance;Impaired balance;Decreased mobility;Decreased coordination;Decreased cognition;Decreased safety awareness;Impaired sensation;Decreased skin integrity   Plan   Treatment/Interventions ADL retraining;Functional transfer training;Therapeutic exercise;Endurance training;Patient/family training;Cognitive reorientation   Progress Progressing toward goals   Discharge Recommendation   Rehab Resource Intensity Level, OT   (home w/ family support w/ 24 hour S, HHOT)   OT Therapy Minutes   OT Time In 1030   OT Time Out 1130   OT Total Time (minutes) 60   OT Mode of treatment - Individual (minutes) 60   OT Mode of treatment - Concurrent (minutes) 0   OT Mode of treatment - Group (minutes) 0   OT Mode of treatment - Co-treat (minutes) 0   OT Mode of Treatment - Total time(minutes) 60 minutes   OT Cumulative Minutes 385   Therapy Time missed   Time missed? No

## 2024-05-16 NOTE — ASSESSMENT & PLAN NOTE
Maintained on 2 L nasal cannula baseline however a/t nursing has been doing well without it  Has been treated for acute exacerbations recently with steroids.  Seems to use oxygen more for anxiety educated regarding same  Continue rescue inhaler.

## 2024-05-17 PROBLEM — I48.3 TYPICAL ATRIAL FLUTTER (HCC): Status: ACTIVE | Noted: 2024-04-15

## 2024-05-17 PROBLEM — J42 CHRONIC BRONCHITIS (HCC): Status: ACTIVE | Noted: 2018-09-19

## 2024-05-17 PROBLEM — Z86.74 HISTORY OF CARDIAC ARREST: Status: ACTIVE | Noted: 2024-05-17

## 2024-05-17 LAB
GLUCOSE SERPL-MCNC: 139 MG/DL (ref 65–140)
GLUCOSE SERPL-MCNC: 194 MG/DL (ref 65–140)
GLUCOSE SERPL-MCNC: 89 MG/DL (ref 65–140)
GLUCOSE SERPL-MCNC: 97 MG/DL (ref 65–140)

## 2024-05-17 PROCEDURE — 97535 SELF CARE MNGMENT TRAINING: CPT

## 2024-05-17 PROCEDURE — 97110 THERAPEUTIC EXERCISES: CPT

## 2024-05-17 PROCEDURE — 97112 NEUROMUSCULAR REEDUCATION: CPT

## 2024-05-17 PROCEDURE — 92507 TX SP LANG VOICE COMM INDIV: CPT

## 2024-05-17 PROCEDURE — 82948 REAGENT STRIP/BLOOD GLUCOSE: CPT

## 2024-05-17 PROCEDURE — 94640 AIRWAY INHALATION TREATMENT: CPT

## 2024-05-17 PROCEDURE — 99232 SBSQ HOSP IP/OBS MODERATE 35: CPT | Performed by: PHYSICAL MEDICINE & REHABILITATION

## 2024-05-17 PROCEDURE — 94664 DEMO&/EVAL PT USE INHALER: CPT

## 2024-05-17 PROCEDURE — 94760 N-INVAS EAR/PLS OXIMETRY 1: CPT

## 2024-05-17 RX ADMIN — FLUTICASONE FUROATE AND VILANTEROL TRIFENATATE 1 PUFF: 200; 25 POWDER RESPIRATORY (INHALATION) at 10:48

## 2024-05-17 RX ADMIN — ASPIRIN 81 MG CHEWABLE TABLET 81 MG: 81 TABLET CHEWABLE at 08:40

## 2024-05-17 RX ADMIN — PANTOPRAZOLE SODIUM 40 MG: 40 TABLET, DELAYED RELEASE ORAL at 05:17

## 2024-05-17 RX ADMIN — FERROUS SULFATE TAB 325 MG (65 MG ELEMENTAL FE) 325 MG: 325 (65 FE) TAB at 08:41

## 2024-05-17 RX ADMIN — UMECLIDINIUM 1 PUFF: 62.5 AEROSOL, POWDER ORAL at 10:48

## 2024-05-17 RX ADMIN — Medication 1 TABLET: at 08:39

## 2024-05-17 RX ADMIN — GUAIFENESIN 600 MG: 600 TABLET, EXTENDED RELEASE ORAL at 08:39

## 2024-05-17 RX ADMIN — METFORMIN HYDROCHLORIDE 500 MG: 500 TABLET, FILM COATED ORAL at 07:52

## 2024-05-17 RX ADMIN — GUAIFENESIN 600 MG: 600 TABLET, EXTENDED RELEASE ORAL at 17:23

## 2024-05-17 RX ADMIN — FLUTICASONE PROPIONATE 1 SPRAY: 50 SPRAY, METERED NASAL at 10:48

## 2024-05-17 RX ADMIN — Medication 6 MG: at 21:24

## 2024-05-17 RX ADMIN — ATORVASTATIN CALCIUM 80 MG: 80 TABLET, FILM COATED ORAL at 17:23

## 2024-05-17 RX ADMIN — APIXABAN 5 MG: 5 TABLET, FILM COATED ORAL at 08:39

## 2024-05-17 RX ADMIN — FOLIC ACID 1 MG: 1 TABLET ORAL at 08:40

## 2024-05-17 RX ADMIN — APIXABAN 5 MG: 5 TABLET, FILM COATED ORAL at 17:23

## 2024-05-17 RX ADMIN — BUSPIRONE HYDROCHLORIDE 10 MG: 10 TABLET ORAL at 08:40

## 2024-05-17 RX ADMIN — MIRTAZAPINE 7.5 MG: 15 TABLET, FILM COATED ORAL at 21:23

## 2024-05-17 RX ADMIN — FLUTICASONE PROPIONATE 1 SPRAY: 50 SPRAY, METERED NASAL at 17:22

## 2024-05-17 RX ADMIN — BUSPIRONE HYDROCHLORIDE 10 MG: 10 TABLET ORAL at 17:23

## 2024-05-17 RX ADMIN — THIAMINE HCL TAB 100 MG 100 MG: 100 TAB at 08:40

## 2024-05-17 RX ADMIN — ALBUTEROL SULFATE 2.5 MG: 2.5 SOLUTION RESPIRATORY (INHALATION) at 18:31

## 2024-05-17 RX ADMIN — INSULIN GLARGINE 25 UNITS: 100 INJECTION, SOLUTION SUBCUTANEOUS at 21:23

## 2024-05-17 RX ADMIN — BUSPIRONE HYDROCHLORIDE 10 MG: 10 TABLET ORAL at 21:23

## 2024-05-17 RX ADMIN — METFORMIN HYDROCHLORIDE 500 MG: 500 TABLET, FILM COATED ORAL at 17:23

## 2024-05-17 NOTE — ASSESSMENT & PLAN NOTE
Presented with increased weakness, dysarthria and leaning on the left side x 5 days with fall 2 days prior to admission in the setting of noncompliance with Eliquis  CTA with chronic left PCA territory infarction  MRI with multiple infarcts of varying ages and posterior circulation including multiple small acute/early subacute infarct in the bilateral cerebellum with additional small foci of recent ischemia in the left PCA distribution superimposed on chronic infarct  Echo with EF 55%, no cardiac thrombus  Maintained on aspirin and Eliquis  Eliquis is to be $71.28 = daughter aware and OK with the price  Continue Lipitor 80 mg daily  Outpatient follow-up with neurology

## 2024-05-17 NOTE — PROGRESS NOTES
05/17/24 1000   Pain Assessment   Pain Assessment Tool 0-10   Pain Score No Pain   Restrictions/Precautions   Precautions Aspiration;Bed/chair alarms;Cognitive;Fall Risk;Hard of hearing;Supervision on toilet/commode   Comprehension   Comprehension (FIM) 4 - Understands basic info/conversation 75-90% of time   Expression   Expression (FIM) 3 - Needs to repeat parts of sentences   Social Interaction   Social Interaction (FIM) 5 - Interacts appropriately with others 90% of time   Problem Solving   Problem solving (FIM) 3 - Solves basic problmes 50-74% of time   Memory   Memory (FIM) 3 - Recognizes, recalls/performs 50-74%   Speech/Language/Cognition Assessment   Treatment Assessment Pt was seen in room for skilled ST session targeting dysarthria. Upon arrival of SLP, pt was asleep in recliner but easily awoken by SLP voice. Pt noted sleeping well last night. SLP educated pt on target of session using functional reading to practice dysarthria strategies. Began w/ review of strategies for increasing fluent speech such as enunciating and 'talking big', increasing voicing by 'talking big' and increasing pausing to increase breath/speech pattern to 'talk short.' It should be noted, pt was observed to use each strategy in at least 3/7 stories needing mod-max A w/ verbal cues and repetition of strategies frequently to help pt recall strategies. Each story was ~8-10 sentences long describing a nonfiction historical book as this is a genre pt will often read at home w/ familiar vocabulary. During the first story, pt was ~30% intelligible w/ pt presenting w/ hoarse and gurgly voicing but over time in reading, pt slowed ROS and began to enunciate w/ more difficult words. In the 2nd story, pt added more breaks and was ~50% intelligible. However, pt continued to have difficulty with where to place breaks as pt would often lose his breath during production of run on sentences. Pt continued to enunciate words difficult to  pronounce. In next story, pt continued to be ~50% intelligible. Pt continued to be breathy and gurgly and difficulty was observed more with sentences containing fricatives such as /k,s,v,z/. Pt often would repeat words he noted were unclear, indicating improvement in awareness of intelligibility. During 4th story, decreased intelligibility to ~30% as pt was more breathy and needed frequent verbal cues to take breaks. Pt was noted to continue to enunciate and increase voicing and was noted to be agrammatic. In 5th story, pt increased intelligibility slightly to ~40% and as SLP continued to provide verbal education for cues and strategies, pt noted to use them the most during this story. Increased awareness was observed as pt would often repeat and slowed down overall ROS. This continued in next story in which pt self-corrected at least 3x and was ~50% intelligible. Pt's breath/voice pattern improvement. At times, words were not understood and pt attempted to return to retry but was noted to omit again in sentence level due to difficulty. In last story, pt increased to ~60% accuracy w/ improvement in production however over time during reading, fatigue decreased intelligibility. Overall, pt was noted to show fluctuating intelligibility with use of verbal cues and will continue to provide strategies for pt to carryover in conversation.  At this time, pt continues to benefit from ongoing skilled SLP services to maximize overall cognitive linguistic skills in attempts to decrease caregiver burden over time.   SLP Therapy Minutes   SLP Time In 1000   SLP Time Out 1030   SLP Total Time (minutes) 30   SLP Mode of treatment - Individual (minutes) 30   SLP Mode of treatment - Concurrent (minutes) 0   SLP Mode of treatment - Group (minutes) 0   SLP Mode of treatment - Co-treat (minutes) 0   SLP Mode of Treatment - Total time(minutes) 30 minutes   SLP Cumulative Minutes 300   Therapy Time missed   Time missed? No

## 2024-05-17 NOTE — PROGRESS NOTES
Physical Medicine and Rehabilitation Progress Note  Lucho Talavera 69 y.o. male MRN: [id number]  Unit/Bed#: -01 Encounter: [id number]    To Review: 69 year old M with PMH of COPD, multitude of ED visits/hospitalizations, DM2, CAD, EtOH abuse, HTN, prior CVA, found to have aflutter with RVR on hospitalization 4/2024 s/p BRITTNEY ablation who was to d/c on Eliquis but per report family did not know he needed it and he was not taking it who developed dysarthria, weakness and imbalance with recent fall at home and presented to hospital. CTA H/N showed Chronic left PCA territory infarct, new since the prior exam. No evidence of acute vascular territorial infarction, intracranial hemorrhage or mass.  No hemodynamically significant stenosis, dissection or occlusion of the carotid or vertebral arteries or major vessels of the [place].  Neuro consulted and recommended MRI brain which showed multiple infarcts of varying ages in the posterior circulation include multiple small acute/early subacute infarcts in the bilateral cerebellum. TTE did not show thrombus.  Neuro recommended Eliquis, aspirin, and statin. Patient was evaluated by skilled therapies and was found to have significant decline in ADLs and ambulation and appears appropriate for admission to [facility].     Chief Complaint: No new complaints    Interval History/Subjective:  No acute events overnight. Actually fell asleep and stayed asleep from 10-4am. Easily woken up this morning. No new concerns. R chest wall looks better. Denies any new Cp, SOB, fevers, chills, N/V, abdominal pain. Last BM 5/16. Continent of bowel/bladder.     ROS:  A 10 point review of systems was negative except for what is noted in the HPI.    Today's Changes:  R loop recorder site with sutures removed. C/D/I.   Continue current regimen of melatonin and monitor after starting mirtazapine. Continue sleep logs for now. Limit interruptions overnight.      Total visit time: 35 minutes, with more than 50% spent counseling/coordinating care. Counseling includes discussion with patient re: progress in therapies, functional issues observed by therapy staff, and discussion with patient regarding their current medical state and wellbeing. Coordination of patient's care was performed in conjunction with Internal Medicine service to monitor patient's labs, vitals, and management of their comorbidities.    Assessment/Plan:    * Stroke (cerebrum) (Formerly [facility])  Assessment & Plan  - MRI brain 5/8 - Multiple infarcts of varying ages in the posterior circulation include multiple small acute/early subacute infarcts in the bilateral cerebellum. Additional small foci of recent ischemia in the left PCA distribution superimposed on chronic infarct  No acute hemorrhage or mass effect  - CTA H/N -  1. Chronic left PCA territory infarct, new since the prior exam. No evidence of acute vascular territorial infarction,  intracranial hemorrhage or mass. 2. No hemodynamically significant stenosis, dissection or occlusion of the carotid or vertebral arteries or major vessels of the [place].  - Residual impairments on admission to [facility]: Impaired balance, coordination, possibly vision  - Co-morbidities most impacting functional recovery: COPD with chronic hypoxic respiratory failure, anxiety    Secondary stroke prevention  - Antithrombotic: Aspirin and Eliquis  - Statin  - Patient at increased risk for stroke particularly early in post-stroke period - monitor neuro exam closely with low threshold to repeat imaging  -  patient and if applicable caregiver on optimal stroke management  - Follow-up with neurology and PCP after d/c   - Recommend acute comprehensive interdisciplinary inpatient rehabilitation to include intensive skilled therapies (PT, OT, ST) as outlined with oversight and management by rehabilitation physician as well as inpatient rehab level nursing, case management and  weekly interdisciplinary team meetings.       Sacral wound  Assessment & Plan  Seen by wound care 5/7  - Managed R lateral forearm skin tear   - Noted to have old open area on sacrum on admission.   - Consulted wound care to follow-up. Epithelialized   - Offloading,specialty cushion   - Allevyn   - Monitor closely.       Chronic bronchitis (HCC)  Assessment & Plan  Has had a multitude of ED visits related to COPD/concern for SOB but was frequently d/c'd same day  - Monitor also for anxiety and optimal mgmt of that; education on monitor home O2  IM consulted and with overall management at their discretion during ARC course  Monitor lung exam, vitals with and without activity  Encourage incentive spirometry  Breo/Incruse  Xopenex  PRN Albuterol  PRN supplemental O2   Mucinex     Insomnia  Assessment & Plan  Environmental interventions.  Sleep log  Added melatonin scheduled  5/16 added mirtazapine for both sleep side effect and anxiety.     Typical atrial flutter (HCC)  Assessment & Plan  IM consulted and with overall management at their discretion during ARC course  Rate control: None  Antithrombotic: Eliquis    - Cost is $71.28/month. Daughter confirmed this price is fine.      Hypertension  Assessment & Plan  Home: Lisinopril 40mg daily  Here: Lisinopril 10mg daily  Monitor vitals with and without activity; monitor for orthostasis  Monitor hemoglobin, electrolytes, kidney function, hydration status   Management as per IM.       Anxiety  Assessment & Plan  Cries out for help at times with anxiety  Asks for breathing treatments/O2 when this happens, but usually O2 sats and breath sounds are fine.  Has Ativan ordered PRN currently.  Adding mirtazapine at night to also assist with sleep/insomnia  Monitor response.     Chronic respiratory failure (HCC)  Assessment & Plan  2/2 COPD  At home on 2L NC  Here has been satting well off 2L NC  Will request when experiencing increased WOB, but generally during these episodes O2  is maintained in the 90s.  Goal > 88%.     ETOH abuse  Assessment & Plan  Patient reports cutting down and only about 2 beers per day but occasionally liquor   Alcohol cessation counseling and supportive counseling  Optimal mood/stress mgmt   Thiamine, folate, MVI   PRN low dose ativan BID   Consider gabapentin as well     Type 2 diabetes mellitus (HCC)  Assessment & Plan  Lab Results   Component Value Date    HGBA1C 8.9 (H) 04/06/2024       Recent Labs     05/16/24  1102 05/16/24  1549 05/17/24  0612 05/17/24  1059   POCGLU 177* 248* 89 97       Blood Sugar Average: Last 72 hrs:  (P) 262.5  Home: Metformin 1000mg Q12hr, Lantus 30 units at bedtime  Current meds: Lantus 25, Metformin 500mg Q12hr, HS, Lispro SS AC/HS, consistent carb diabetic diet  Nutrition consult  Management as per IM  Outpatient f/u with PCP      CAD (coronary artery disease)  Assessment & Plan  With hx of stenting  IM consulted and with overall management at their discretion during ARC course  Antithrombotic: Aspirin and Eliquis   Statin  Optimal blood pressure and blood sugar control          Health Maintenance  #Delirium/Sleep: At risk. Optimize sleep/wake, pain, bowel, bladder management. Avoid deliriogenic meds. Of note is on melatonin, ativan, and oxycodone PRN.   #Pain: Tylenol PRN  #Bowel: Last BM 5/16 and continent. Adding PRN miralax  #Bladder: Voiding and continent  #Skin/Pressure Injury Prevention: Turn Q2hr in bed, with weight shifts [id number] in wheelchair.  #DVT Prophylaxis:Fully anticoagulated on eliquis, SCDs  #GI Prophylaxis: PPI on for GERD  #Code Status: Full Code.   #FEN: Diabetic Diet  #Dispo: Team 5/14: ADD 5/30 with home RN/PT/OT/SLP with goals to discharge to 1 level home with 2 HETAL, with supervision.  Will need f/u with Pulm, PCP, and Neuro    Objective:    Functional Update:  PT: min-modA transfers, Sup-Deanna bed mobility ,min-modA x2 (CF for 2nd person) ambulation with RW, min-mod A x2 for stairs   OT: Ind eating, Deanna  grooming, Deanna bathing, Sup UB dressing, Deanna LB dressing, Deanna toileting, Deanna tub/shower transfers, Deanna toilet transfers   SLP: Moderate cognitive linguistic impairments on CLQT, mild oropharyngeal dysphagia    Allergies per EMR    Physical Exam:  Temp:  [98 °F (36.7 °C)-98.8 °F (37.1 °C)] 98 °F (36.7 °C)  HR:  [63-84] 63  Resp:  [16-20] 20  BP: (124-137)/(67-79) 137/79  Oxygen Therapy  SpO2: 94 %  O2 Flow Rate (L/min): 2 L/min    Gen: No acute distress, Well-nourished, well-appearing.  HEENT: Moist mucus membranes, Normocephalic/Atraumatic  Cardiovascular: Regular rate, rhythm, S1/S2. Distal pulses palpable. R chest wall healed.   Heme/Extr: No edema  Pulmonary: Non-labored breathing. Lungs CTAB  : No antony  GI: Soft, non-tender, non-distended. BS+  Integumentary: Skin is warm, dry. Did not visualize sacrum today   Neuro: AAOx3, Speech with some dysarthria at times. No new focality.  Psych: Normal mood and blunted affect.       Diagnostic Studies: Reviewed, no new imaging    Laboratory:  Reviewed   Results from last 7 days   Lab Units 05/13/24  0537 05/11/24  0524   HEMOGLOBIN g/dL 12.0 12.4   HEMATOCRIT % 39.5 39.5   WBC Thousand/uL 8.73 10.50*     Results from last 7 days   Lab Units 05/13/24  0537 05/11/24  0524   BUN mg/dL 11 14   POTASSIUM mmol/L 3.8 3.8   CHLORIDE mmol/L 99 100   CREATININE mg/dL 0.57* 0.50*   AST U/L 16 18   ALT U/L 18 18            Patient Active Problem List   Diagnosis    Closed odontoid fracture with routine healing    Closed displaced fracture of third cervical vertebra (HCC)    Closed displaced fracture of fourth cervical vertebra (HCC)    CAD (coronary artery disease)    Dens fracture (HCC)    Acute blood loss anemia    Type 2 diabetes mellitus (HCC)    S/P C1-T1 Posterior Cervical Discectomy and Fusion on 9/10/18    At moderate risk for venous thromboembolism (VTE)    Chronic obstructive pulmonary disease with acute exacerbation (HCC)    Closed fracture of first cervical  vertebra (HCC)    ETOH abuse    KLARISSA (obstructive sleep apnea)    Dirty living conditions    Bronchitis    Diabetic polyneuropathy associated with type 2 diabetes mellitus (Formerly [facility])    Hammertoe, bilateral    Post-traumatic arthritis of left foot    Pain due to onychomycosis of toenail    Oral abscess    Tooth fracture    Closed nondisplaced fracture of posterior arch of first cervical vertebra (Formerly [facility])    Fall    Stage 3 severe COPD by GOLD classification (Formerly [facility])    Chronic respiratory failure (HCC)    Anxiety    Hypertension    COPD (chronic obstructive pulmonary disease) (HCC)    SIRS (systemic inflammatory response syndrome) (Formerly [facility])    History of alcohol abuse    Iron deficiency anemia secondary to inadequate dietary iron intake    COVID-19    Atrial flutter with rapid ventricular response ([facility])    Leukocytosis    Atrial fibrillation ([facility])    Stroke (cerebrum) (Formerly [facility])    Stroke-like symptom    Sacral wound    Insomnia         Medications  Current Facility-Administered Medications   Medication Dose Route Frequency Provider Last Rate    acetaminophen  650 mg Oral Q6H PRN Richmond Chew, DO      albuterol  2 puff Inhalation Q4H PRN William Camacho, DO      albuterol  2.5 mg Nebulization Q6H PRN Richmond Chew, DO      apixaban  5 mg Oral BID Richmond Chew, DO      aspirin  81 mg Oral Daily Richmond Chew, DO      atorvastatin  80 mg Oral Daily With Dinner Richmond Chew, DO      busPIRone  10 mg Oral TID Richmond Chew, DO      ferrous sulfate  325 mg Oral Daily With Breakfast Richmond Chew, DO      fluticasone  1 spray Nasal BID Richmond Chew, DO      fluticasone-vilanterol  1 puff Inhalation Daily Richmond Chew, DO      folic acid  1 mg Oral Daily Richmond Chew, DO      guaiFENesin  600 mg Oral BID Richmond Chew, DO      insulin glargine  25 Units Subcutaneous HS TATIANA Manriquez      insulin lispro  1-5 Units Subcutaneous TID AC Richmond Chew, DO      LORazepam   0.5 mg Oral BID PRN Richmond Chew, DO      melatonin  6 mg Oral QPM Ashley Depadua, MD      metFORMIN  500 mg Oral BID With Meals TATIANA Paulino      mirtazapine  7.5 mg Oral QPM Ashley Depadua, MD      multivitamin-minerals  1 tablet Oral Daily Richmond Chew, DO      oxyCODONE  2.5 mg Oral Q4H PRN Richmond Chew, DO      pantoprazole  40 mg Oral Early Morning Richmond Chew, DO      thiamine  100 mg Oral Daily Richmond Chew, DO      umeclidinium  1 puff Inhalation Daily Richmond Chew, DO            ** Please Note: Fluency Direct voice to text software may have been used in the creation of this document. **

## 2024-05-17 NOTE — PLAN OF CARE
Problem: Prexisting or High Potential for Compromised Skin Integrity  Goal: Skin integrity is maintained or improved  Description: INTERVENTIONS:  - Identify patients at risk for skin breakdown  - Assess and monitor skin integrity  - Assess and monitor nutrition and hydration status  - Monitor labs   - Assess for incontinence   - Turn and reposition patient  - Assist with mobility/ambulation  - Relieve pressure over bony prominences  - Avoid friction and shearing  - Provide appropriate hygiene as needed including keeping skin clean and dry  - Evaluate need for skin moisturizer/barrier cream  - Collaborate with interdisciplinary team   - Patient/family teaching  - Consider wound care consult   Outcome: Progressing     Problem: PAIN - ADULT  Goal: Verbalizes/displays adequate comfort level or baseline comfort level  Description: Interventions:  - Encourage patient to monitor pain and request assistance  - Assess pain using appropriate pain scale  - Administer analgesics based on type and severity of pain and evaluate response  - Implement non-pharmacological measures as appropriate and evaluate response  - Consider cultural and social influences on pain and pain management  - Notify physician/advanced practitioner if interventions unsuccessful or patient reports new pain  Outcome: Progressing     Problem: INFECTION - ADULT  Goal: Absence or prevention of progression during hospitalization  Description: INTERVENTIONS:  - Assess and monitor for signs and symptoms of infection  - Monitor lab/diagnostic results  - Monitor all insertion sites, i.e. indwelling lines, tubes, and drains  - Monitor endotracheal if appropriate and nasal secretions for changes in amount and color  - [place] appropriate cooling/warming therapies per order  - Administer medications as ordered  - Instruct and encourage patient and family to use good hand hygiene technique  - Identify and instruct in appropriate isolation precautions for  identified infection/condition  Outcome: Progressing     Problem: SAFETY ADULT  Goal: Patient will remain free of falls  Description: INTERVENTIONS:  - Educate patient/family on patient safety including physical limitations  - Instruct patient to call for assistance with activity   - Consult OT/PT to assist with strengthening/mobility   - Keep Call bell within reach  - Keep bed low and locked with side rails adjusted as appropriate  - Keep care items and personal belongings within reach  - Initiate and maintain comfort rounds  - Make Fall Risk Sign visible to staff  - Offer Toileting every  Hours, in advance of need  - Initiate/Maintain alarm  - Obtain necessary fall risk management equipment:   - Apply yellow socks and bracelet for high fall risk patients  - Consider moving patient to room near nurses station  Outcome: Progressing  Goal: Maintain or return to baseline ADL function  Description: INTERVENTIONS:  -  Assess patient's ability to carry out ADLs; assess patient's baseline for ADL function and identify physical deficits which impact ability to perform ADLs (bathing, care of mouth/teeth, toileting, grooming, dressing, etc.)  - Assess/evaluate cause of self-care deficits   - Assess range of motion  - Assess patient's mobility; develop plan if impaired  - Assess patient's need for assistive devices and provide as appropriate  - Encourage maximum independence but intervene and supervise when necessary  - Involve family in performance of ADLs  - Assess for home care needs following discharge   - Consider OT consult to assist with ADL evaluation and planning for discharge  - Provide patient education as appropriate  Outcome: Progressing  Goal: Maintains/Returns to pre admission functional level  Description: INTERVENTIONS:  - Perform AM-PAC 6 Click Basic Mobility/ Daily Activity assessment daily.  - Set and communicate daily mobility goal to care team and patient/family/caregiver.   - Collaborate with  rehabilitation services on mobility goals if consulted  - Perform Range of Motion  times a day.  - Reposition patient every  hours.  - Dangle patient  times a day  - Stand patient  times a day  - Ambulate patient  times a day  - Out of bed to chair  times a day   - Out of bed for meals  times a day  - Out of bed for toileting  - Record patient progress and toleration of activity level   Outcome: Progressing     Problem: DISCHARGE PLANNING  Goal: Discharge to home or other facility with appropriate resources  Description: INTERVENTIONS:  - Identify barriers to discharge w/patient and caregiver  - Arrange for needed discharge resources and transportation as appropriate  - Identify discharge learning needs (meds, wound care, etc.)  - Arrange for interpretive services to assist at discharge as needed  - Refer to Case Management Department for coordinating discharge planning if the patient needs post-hospital services based on physician/advanced practitioner order or complex needs related to functional status, cognitive ability, or social support system  Outcome: Progressing

## 2024-05-17 NOTE — ASSESSMENT & PLAN NOTE
Seen and evaluated by wound care with continuation of their recommendations of offloading, specialty cushion and Allevyn.

## 2024-05-17 NOTE — PROGRESS NOTES
"   05/17/24 0900   Pain Assessment   Pain Assessment Tool 0-10   Pain Score No Pain   Restrictions/Precautions   Precautions Aspiration;Bed/chair alarms;Cognitive;Fall Risk;Hard of hearing;Supervision on toilet/commode   Weight Bearing Restrictions No   ROM Restrictions No   Lifestyle   Autonomy \"I guess so.\" (when asked about improved sleep overnight)   Oral Hygiene   Type of Assistance Needed Supervision;Set-up / clean-up;Verbal cues   Physical Assistance Level No physical assistance   Comment seated in w/c at sink. req cues for task initiation and thoroughness.   Oral Hygiene CARE Score 4   Shower/Bathe Self   Type of Assistance Needed Physical assistance;Verbal cues;Set-up / clean-up   Physical Assistance Level 25% or less   Comment seated in w/c at sink, pt bathed UB and BLE w/ crossed leg technique and dynamic reach to feet. Min A to steady in stance w/ alternating unilateral release to bathe groin and buttocks. req cues for task initiation and to improve thoroughness at times.   Shower/Bathe Self CARE Score 3   Tub/Shower Transfer   Reason Not Assessed Sponge Bath;Patient refusal  (Pt w/ active shower orders however declined shower, agreeable to sponge bath.)   Upper Body Dressing   Type of Assistance Needed Supervision;Set-up / clean-up   Physical Assistance Level No physical assistance   Comment seated in w/c.   Upper Body Dressing CARE Score 4   Lower Body Dressing   Type of Assistance Needed Physical assistance;Adaptive equipment   Physical Assistance Level 25% or less   Comment seated in w/c to thread BLE, req inc time to maximize fxnl problem-solving w/ inc difficulty threading L foot. Min A to steady in stance w/ alternating unilateral release for clothing management.   Lower Body Dressing CARE Score 3   Putting On/Taking Off Footwear   Type of Assistance Needed Supervision;Set-up / clean-up   Physical Assistance Level No physical assistance   Comment seated in w/c, crossed leg technique to don/doff " socks. req inc time.   Putting On/Taking Off Footwear CARE Score 4   Sit to Stand   Type of Assistance Needed Incidental touching;Adaptive equipment;Verbal cues   Physical Assistance Level No physical assistance   Comment CGA w/ RW, cues for proper hand placement.   Sit to Stand CARE Score 4   Bed-Chair Transfer   Type of Assistance Needed Incidental touching;Adaptive equipment   Physical Assistance Level No physical assistance   Comment CGA w/ RW. cues for proper RW management, especially w/ squaring off to sitting surfance.   Chair/Bed-to-Chair Transfer CARE Score 4   Cognition   Overall Cognitive Status Impaired   Assessment   Treatment Assessment Pt seen for skilled OT session focusing on self-care management. Details on sponge bath ADL noted above, completed at overall CGA-Min A w/ RW. Slight improvements noted w/ fxnl standing balance, activity tolerance, and timeliness of ADL. Due to cognitive deficits, cont to req cues at times for task initiation and sequencing to inc safety w/ fxnl tasks. Cont OT POC: endurance work, fxnl standing tolerance w/ alternating unilateral release, BUE strengthening, fxnl cognitive/safety training, ADL retraining, and RW management training. Plan for FT w/ pt's daughter this afternoon, plan to review OT d/c recommendations. Pt agreeable to rest in recliner, all needs within reach, meal tray set-up, and alarm activated. From OT standpoint pt is on track to d/c home w/ family to provide 24 hour S on 5/30 with recommendation for HHOT.   Prognosis Good   Problem List Decreased strength;Decreased range of motion;Decreased endurance;Impaired balance;Decreased mobility;Decreased coordination;Decreased cognition;Decreased safety awareness;Impaired sensation;Decreased skin integrity   Plan   Treatment/Interventions ADL retraining;Functional transfer training;Endurance training;Cognitive reorientation;Patient/family training   Progress Progressing toward goals   Discharge Recommendation    Rehab Resource Intensity Level, OT   (home w/ family to provide 24 hour S, HHOT)   OT Therapy Minutes   OT Time In 0900   OT Time Out 1000   OT Total Time (minutes) 60   OT Mode of treatment - Individual (minutes) 60   OT Mode of treatment - Concurrent (minutes) 0   OT Mode of treatment - Group (minutes) 0   OT Mode of treatment - Co-treat (minutes) 0   OT Mode of Treatment - Total time(minutes) 60 minutes   OT Cumulative Minutes 445   Therapy Time missed   Time missed? No

## 2024-05-17 NOTE — ASSESSMENT & PLAN NOTE
Had a VF arrest 2018 with LVEF 20-25% at [facility]  Had card cath at the time with nonobstructive dz  Was tx'd as a NSTEMI with DAPT/statin  Currently on ASA/Lipitor

## 2024-05-17 NOTE — PROGRESS NOTES
"   05/17/24 1400   Pain Assessment   Pain Assessment Tool 0-10   Pain Score No Pain   Restrictions/Precautions   Precautions Aspiration;Bed/chair alarms;Cognitive;Fall Risk;Hard of hearing;Supervision on toilet/commode   Weight Bearing Restrictions No   ROM Restrictions No   Lifestyle   Autonomy \"My daughter couldn't stay.\"   Exercise Tools   Other Exercise Tool 1 BUE ther ex to maximize strength and endurance for ADLs and fxnl mobility. Completed w/ 4# dowel 3x10 bicep curls, 3# dowel for chest press, front arm raises, prograde and retrograde rows. Pt tolerated well w/ Min vc's and demo to improve technique. Provided w/ rest breaks between sets to manage generalized fatigue.   Cognition   Overall Cognitive Status Impaired   Assessment   Treatment Assessment Pt seen for skilled OT session focusing on BUE strengthening. Pt's daughter was visiting, however was unable to stay for scheduled FT. Daughter reports availability next week, provided w/ business card and asked her to call therapy gym to reschedule. Cont OT POC: endurance work, fxnl standing tolerance w/ alternating unilateral release, BUE strengthening, fxnl cognitive/safety training, ADL retraining, and RW management training.  Pt agreeable to rest in recliner, all needs within reach and alarm activated. From OT standpoint pt is on track to d/c home w/ family to provide 24 hour S on 5/30 with recommendation for HHOT.   Prognosis Good   Problem List Decreased strength;Decreased range of motion;Decreased endurance;Impaired balance;Decreased mobility;Decreased coordination;Decreased cognition;Decreased safety awareness;Impaired sensation;Decreased skin integrity   Plan   Treatment/Interventions Therapeutic exercise;Endurance training;Patient/family training   Progress Progressing toward goals   Discharge Recommendation   Rehab Resource Intensity Level, OT   (home w/ family to provide 24 hour S, HHOT)   OT Therapy Minutes   OT Time In 1400   OT Time Out 1430   OT " Total Time (minutes) 30   OT Mode of treatment - Individual (minutes) 30   OT Mode of treatment - Concurrent (minutes) 0   OT Mode of treatment - Group (minutes) 0   OT Mode of treatment - Co-treat (minutes) 0   OT Mode of Treatment - Total time(minutes) 30 minutes   OT Cumulative Minutes 475   Therapy Time missed   Time missed? No

## 2024-05-17 NOTE — PROGRESS NOTES
05/17/24 1430   Pain Assessment   Pain Assessment Tool 0-10   Pain Score No Pain   Restrictions/Precautions   Precautions Aspiration;Bed/chair alarms;Cognitive;Fall Risk;Hard of hearing;Supervision on toilet/commode   Weight Bearing Restrictions No   ROM Restrictions No   Cognition   Overall Cognitive Status Impaired   Arousal/Participation Alert;Responsive;Cooperative   Subjective   Subjective Doing well this afternoon! Daughter Brianna unable to stay for family training - communicated with OT Tiffany - Brianna will look to schedule for next week. Kingston says he has been feeling better using the flutter valve - feels like he is breathing better!   Sit to Stand   Type of Assistance Needed Incidental touching;Verbal cues;Adaptive equipment   Comment CG RW but moderate VC's for UE placement to push from seated surface versus placing BUE on RW to try and stand - demonstrated improved carryover during session with reduced need for cuing.   Sit to Stand CARE Score 4   Bed-Chair Transfer   Type of Assistance Needed Incidental touching;Adaptive equipment   Comment CG RW stand pivot transfer   Chair/Bed-to-Chair Transfer CARE Score 4   Walk 10 Feet   Type of Assistance Needed Incidental touching;Adaptive equipment   Comment CG RW   Walk 10 Feet CARE Score 4   Walk 50 Feet with Two Turns   Type of Assistance Needed Adaptive equipment;Incidental touching   Physical Assistance Level 25% or less   Comment CG RW   Walk 50 Feet with Two Turns CARE Score 3   Walk 150 Feet   Type of Assistance Needed Adaptive equipment;Physical assistance   Physical Assistance Level 25% or less   Comment Sarah RW   Walk 150 Feet CARE Score 3   Ambulation   Primary Mode of Locomotion Prior to Admission Walk   Distance Walked (feet) 150 ft   Assist Device Roller Walker   Gait Pattern Slow Sury;Inconsistant Sury;Improper weight shift   Limitations Noted In Balance;Coordination;Endurance;Speed;Strength;Swing   Does the patient walk? 2. Yes    Therapeutic Interventions   Neuromuscular Re-Education HIGT: Fast ambulatio nw/RW 3x150' fwd. Add: 5# BL AW marches RW 2x60' fwd. R HHA no AW 2x60' fwd, 2x30' fwd   Other Flutter valve max resistance x15, x10 puffs during session   Equipment Use   NuStep L1 60-70 SPM BUE/LE x10 min   Assessment   Treatment Assessment Session focused on progressing overground mobilities, safety with STS transfers, endurance, and balance. Strength, endurance, and balance markedly improved since my last session with this patient on Tuesday. Overall feels he is breathing better with use and practice of flutter valve in the presence of his COPD - these improvements are likely carrying over to improved stamina. Daughter was unavailable for FT today; looking to reschedule to next week - communicated with OT Tiffany Rich. Recommend continued HIGT with minimized UE support to improve stamina, balance reactions, and reduce falls risk while maintaining recommendation for RW use at home.   Family/Caregiver Present No; FT to be RS'd.   Problem List Decreased strength;Decreased range of motion;Decreased endurance;Impaired balance;Decreased mobility;Decreased coordination;Decreased cognition;Decreased safety awareness;Impaired sensation;Decreased skin integrity   PT Barriers   Physical Impairment Decreased strength;Decreased range of motion;Decreased endurance;Impaired balance;Decreased mobility;Decreased coordination;Decreased cognition;Decreased safety awareness;Impaired sensation;Decreased skin integrity   Functional Limitation Car transfers;Ramp negotiation;Stair negotiation;Standing;Transfers;Walking   Plan   Treatment/Interventions ADL retraining;Functional transfer training;LE strengthening/ROM;Therapeutic exercise;Endurance training;Cognitive reorientation;Gait training;Spoke to MD;Spoke to nursing;OT;Family   Progress Progressing toward goals   Discharge Recommendation   Rehab Resource Intensity Level, PT   (HH PT versus OP PT based on  transportation constraints)   PT Therapy Minutes   PT Time In 1430   PT Time Out 1530   PT Total Time (minutes) 60   PT Mode of treatment - Individual (minutes) 60   PT Mode of treatment - Concurrent (minutes) 0   PT Mode of treatment - Group (minutes) 0   PT Mode of treatment - Co-treat (minutes) 0   PT Mode of Treatment - Total time(minutes) 60 minutes   PT Cumulative Minutes 523   Therapy Time missed   Time missed? No

## 2024-05-17 NOTE — ASSESSMENT & PLAN NOTE
Home:  Lisinopril 40mg qd  Here:  no meds  Blood pressure stable off lisinopril  Continue holding lisinopril

## 2024-05-17 NOTE — ASSESSMENT & PLAN NOTE
Previously admitted for atrial flutter with RVR in 4/8/2024 s/p ablation and loop recorder insertion.    Sutures removed 5/17/24  Anticoagulated on Eliquis  On no rate control medications.  Last EKG some NSR present but baseline interference

## 2024-05-17 NOTE — ASSESSMENT & PLAN NOTE
Has had many admissions for COPD exacerbation  Initially presented with COPD exacerbation, now resolved after completion of steroids and azithromycin.  Continue home regimen of inhalers with PRN nebs and respiratory protocol.  He uses O2 at home 2L NC

## 2024-05-17 NOTE — ASSESSMENT & PLAN NOTE
History of alcohol abuse  Monitor off CIWA protocol - Pt now post withdrawal window   Ativan as needed for anxiety  Continue supplements with thiamine, folate, MVI

## 2024-05-17 NOTE — ASSESSMENT & PLAN NOTE
Initially presented with COPD exacerbation, now resolved after completion of steroids and azithromycin.  Continue home regimen of inhalers with PRN nebs and respiratory protocol.

## 2024-05-17 NOTE — PROGRESS NOTES
[facility]  Progress Note  Name: Lucho Talavera I  MRN: [id number]  Unit/Bed#: -01 I Date of Admission: 5/11/2024   Date of Service: 5/17/2024 I Hospital Day: 6    Assessment & Plan   * Stroke (cerebrum) (HCC)  Assessment & Plan  Presented with increased weakness, dysarthria and leaning on the left side x 5 days with fall 2 days prior to admission in the setting of noncompliance with Eliquis  CTA with chronic left PCA territory infarction  MRI with multiple infarcts of varying ages and posterior circulation including multiple small acute/early subacute infarct in the bilateral cerebellum with additional small foci of recent ischemia in the left PCA distribution superimposed on chronic infarct  Echo with EF 55%, no cardiac thrombus  Maintained on aspirin and Eliquis  Continue Lipitor 80 mg daily  Outpatient follow-up with neurology  Continue acute rehab    Sacral wound  Assessment & Plan  Seen and evaluated by wound care with continuation of their recommendations of offloading, specialty cushion and Allevyn.    Typical atrial flutter ([facility])  Assessment & Plan  Previously admitted for atrial flutter with RVR in 4/2024 s/p ablation and loop recorder insertion.    PMR reached out to confirm it's ok to remove sutures.  Anticoagulated on Eliquis and rate controlled off medications.    Hypertension  Assessment & Plan  Blood Pressure: 136/73     Blood pressure stable off lisinopril  Continue holding lisinopril    ETOH abuse  Assessment & Plan  History of alcohol abuse  Monitor off CIWA protocol - Pt now post withdrawal window   Ativan as needed anxiety  Continue supplements with thiamine, folate, MVT    Chronic bronchitis ([facility])  Assessment & Plan  Initially presented with COPD exacerbation, now resolved after completion of steroids and azithromycin.  Continue home regimen of inhalers with PRN nebs and respiratory protocol.    Type 2 diabetes mellitus ([facility])  Assessment &  Plan  Lab Results   Component Value Date    HGBA1C 8.9 (H) 04/06/2024     Recent Labs     05/12/24  1557 05/12/24  2044 05/13/24  0640 05/13/24  1111   POCGLU 215* 219* 139 175*       Blood Sugar Average: Last 72 hrs:  (P) 204.75    Blood sugars were elevated in the setting of recent use of steroids but improving  Lantus was increased from 20 units at bedtime to 25 units.  Continue to monitor sugars closely.  If they start dropping too low, may need to decrease Lantus to prior dose now that steroids have been discontinued.   Continue with accuchecks and SSI  Hypoglycemia protocol  Diabetic diet        The above assessment and plan was reviewed and updated as determined by my evaluation of the patient on 5/17/2024.    Labs:   Results from last 7 days   Lab Units 05/13/24  0537 05/11/24  0524   WBC Thousand/uL 8.73 10.50*   HEMOGLOBIN g/dL 12.0 12.4   HEMATOCRIT % 39.5 39.5   PLATELETS Thousands/uL 383 395*     Results from last 7 days   Lab Units 05/13/24  0537 05/11/24  0524   SODIUM mmol/L 135 134*   POTASSIUM mmol/L 3.8 3.8   CHLORIDE mmol/L 99 100   CO2 mmol/L 31 25   BUN mg/dL 11 14   CREATININE mg/dL 0.57* 0.50*   CALCIUM mg/dL 8.6 8.5             Results from last 7 days   Lab Units 05/17/24  1059 05/17/24  0612 05/16/24  1549   POC GLUCOSE mg/dl 97 89 248*       Imaging  No orders to display       Review of Scheduled Meds:  Current Facility-Administered Medications   Medication Dose Route Frequency Provider Last Rate    acetaminophen  650 mg Oral Q6H PRN Richmond Chew, DO      albuterol  2 puff Inhalation Q4H PRN William Camacho, DO      albuterol  2.5 mg Nebulization Q6H PRN Richmond Chew, DO      apixaban  5 mg Oral BID Richmond Chew, DO      aspirin  81 mg Oral Daily Richmond Chew, DO      atorvastatin  80 mg Oral Daily With Dinner Richmond Chew, DO      busPIRone  10 mg Oral TID Richmond Chew, DO      ferrous sulfate  325 mg Oral Daily With Breakfast Richmond Chew, DO       fluticasone  1 spray Nasal BID Rcihmond Chew, DO      fluticasone-vilanterol  1 puff Inhalation Daily Richmond Chew, DO      folic acid  1 mg Oral Daily Richmond Chew, DO      guaiFENesin  600 mg Oral BID [facility]      insulin glargine  25 Units Subcutaneous HS Sil TATIANA Ceja      insulin lispro  1-5 Units Subcutaneous TID AC Richmond Chew, DO      LORazepam  0.5 mg Oral BID PRN Richmond Chew, DO      melatonin  6 mg Oral QPM Ashley Depadua, MD      metFORMIN  500 mg Oral BID With Meals TATIANA Paulino      mirtazapine  7.5 mg Oral QPM Ashley Depadua, MD      multivitamin-minerals  1 tablet Oral Daily [facility] Che[facility]      oxyCODONE  2.5 mg Oral Q4H PRN [facility], DO      pantoprazole  40 mg Oral Early Morning [facility] Che[facility], DO      thiamine  100 mg Oral Daily [facility], DO      umeclidinium  1 puff Inhalation Daily [facility] Che[facility], DO         Subjective/ HPI: Patient seen and examined.  Patient currently has no complaints.  He is sitting in the bedside chair comfortably.     ROS:   A 10 point ROS was performed; negative except as noted above.        *Labs /Radiology studies Reviewed, no new imaging  *Medications  reviewed and reconciled as needed  *Please refer to order section for additional ordered labs studies      Physical Examination:  Vitals:   Vitals:    05/16/24 1500 05/16/24 1955 05/17/24 0544 05/17/24 1421   BP: 124/67  137/79 138/79   BP Location: Right arm  Left arm Right arm   Pulse: 84 78 63 (!) 113   Resp: 17 16 20 18   Temp: 98.5 °F (36.9 °C) 98.8 °F (37.1 °C) 98 °F (36.7 °C) 98.1 °F (36.7 °C)   TempSrc: Oral Oral Oral Oral   SpO2: 94% 98% 94% 96%   Weight:       Height:           General Appearance: NAD; pleasant  HEENT: PERRLA, conjuctiva normal; mucous membranes moist; face symmetrical  Neck:  Supple  Lungs: clear bilaterally, normal respiratory effort, no retractions, expiratory effort normal, on room air  CV:  regular rate and rhythm, no murmurs rubs or gallops noted   ABD: soft non tender, +BS x4  EXT: DP pulses intact, no lymphadenopathy, no edema  Skin: normal turgor, normal texture, no rash  Psych: affect normal, mood normal  Neuro: AAOx3       The above physical exam was reviewed and updated as determined by my evaluation of the patient on 5/17/2024.    Invasive Devices       None                  VTE Pharmacologic Prophylaxis: Eliquis  Code Status: Level 1 - Full Code  Current Length of Stay: 6 day(s)    Total floor / unit time spent today 30 minutes  Coordination of patient's care was performed in conjunction with primary service. Time invested included review of patient's labs, vitals, and management of their comorbidities with continued monitoring, examination of patient as well as answering patient questions, documenting her findings and creating progress note in electronic medical record,  ordering appropriate diagnostic testing.       ** Please Note:  voice to text software may have been used in the creation of this document. Although proof errors in transcription or interpretation are a potential of such software**

## 2024-05-17 NOTE — PROGRESS NOTES
[facility]  Progress Note  Name: Lucho Talavera I  MRN: [id number]  Unit/Bed#: -01 I Date of Admission: 5/11/2024   Date of Service: 5/18/2024 I Hospital Day: 7    Assessment & Plan   * Stroke (cerebrum) (HCC)  Assessment & Plan  Presented with increased weakness, dysarthria and leaning on the left side x 5 days with fall 2 days prior to admission in the setting of noncompliance with Eliquis  CTA with chronic left PCA territory infarction  MRI with multiple infarcts of varying ages and posterior circulation including multiple small acute/early subacute infarct in the bilateral cerebellum with additional small foci of recent ischemia in the left PCA distribution superimposed on chronic infarct  Echo with EF 55%, no cardiac thrombus  Maintained on aspirin and Eliquis  Eliquis is to be $71.28 = daughter aware and OK with the price  Continue Lipitor 80 mg daily  Outpatient follow-up with neurology    Typical atrial flutter (HCC)  Assessment & Plan  Previously admitted for atrial flutter with RVR in 4/8/2024 s/p ablation and loop recorder insertion.    Sutures removed 5/17/24  Anticoagulated on Eliquis  On no rate control medications.  Last EKG some NSR present but baseline interference    Hypertension  Assessment & Plan  Home:  Lisinopril 40mg qd  Here:  no meds  Blood pressure stable off lisinopril  Continue holding lisinopril    Type 2 diabetes mellitus (HCC)  Assessment & Plan  HbA1C 8.9 on 4/6/24  Home:  Basaglar 30U qhs/Metformin  Here:  Lantus 25U qhs/Metformin 500mg BID  Blood sugars were elevated in the setting of recent use of steroids but improving   Steroids were discontinued as of 5/11/24  Continue with accuchecks/SSI/DM diet  Since last 2 FBS have been in the 80s, will reduce the Lantus to 16U    ETOH abuse  Assessment & Plan  History of alcohol abuse  Monitor off CIWA protocol - Pt now post withdrawal window   Ativan as needed for anxiety  Continue supplements  with thiamine, folate, MVI    Chronic bronchitis (HCC)  Assessment & Plan  Has had many admissions for COPD exacerbation  Initially presented with COPD exacerbation, now resolved after completion of steroids and azithromycin.  Continue home regimen of inhalers with PRN nebs and respiratory protocol.  He uses O2 at home 2L NC    Sacral wound  Assessment & Plan  Seen and evaluated by wound care with continuation of their recommendations of offloading, specialty cushion and Allevyn.    History of cardiac arrest  Assessment & Plan  Had a VF arrest 2018 with LVEF 20-25% at [facility]  Had card cath at the time with nonobstructive dz  Was tx'd as a NSTEMI with DAPT/statin  Currently on ASA/Lipitor           The above assessment and plan was reviewed and updated as determined by my evaluation of the patient on 5/18/2024.    Labs:   Results from last 7 days   Lab Units 05/13/24  0537   WBC Thousand/uL 8.73   HEMOGLOBIN g/dL 12.0   HEMATOCRIT % 39.5   PLATELETS Thousands/uL 383     Results from last 7 days   Lab Units 05/13/24  0537   SODIUM mmol/L 135   POTASSIUM mmol/L 3.8   CHLORIDE mmol/L 99   CO2 mmol/L 31   BUN mg/dL 11   CREATININE mg/dL 0.57*   CALCIUM mg/dL 8.6             Results from last 7 days   Lab Units 05/18/24  0601 05/17/24  2045 05/17/24  1551   POC GLUCOSE mg/dl 86 194* 139       Imaging  No orders to display       Review of Scheduled Meds:  Current Facility-Administered Medications   Medication Dose Route Frequency Provider Last Rate    acetaminophen  650 mg Oral Q6H PRN Richmond Chew, DO      albuterol  2 puff Inhalation Q4H PRN William Camacho, DO      albuterol  2.5 mg Nebulization Q6H PRN Richmond Chew, DO      apixaban  5 mg Oral BID Richmond Chew, DO      aspirin  81 mg Oral Daily Richmond Chew, DO      atorvastatin  80 mg Oral Daily With Dinner Richmond Chew, DO      busPIRone  10 mg Oral TID Richmond Chew, DO      ferrous sulfate  325 mg Oral Daily With Breakfast  Richmond Chew, DO      fluticasone  1 spray Nasal BID Richmond Chew, DO      fluticasone-vilanterol  1 puff Inhalation Daily Richmond Chew, DO      folic acid  1 mg Oral Daily Richmond Osorioel, DO      guaiFENesin  600 mg Oral BID Richmond Osorioel, DO      insulin glargine  16 Units Subcutaneous HS Alis TATIANA Spring      insulin lispro  1-5 Units Subcutaneous TID AC Richmond Chew, DO      LORazepam  0.5 mg Oral BID PRN Richmond Chew, DO      melatonin  6 mg Oral QPM Ashley Depadua, MD      metFORMIN  500 mg Oral BID With Meals TATIANA Paulino      mirtazapine  7.5 mg Oral QPM Ashley Depadua, MD      multivitamin-minerals  1 tablet Oral Daily [facility] Che[facility], DO      oxyCODONE  2.5 mg Oral Q4H PRN Richmond Chew, DO      pantoprazole  40 mg Oral Early Morning [facility] Che[facility], DO      thiamine  100 mg Oral Daily [facility] Che[facility], DO      umeclidinium  1 puff Inhalation Daily Richmond Chew, DO         Subjective/ HPI: Patient seen and examined. Patients overnight issues or events were reviewed with nursing staff. New or overnight issues include the following:     No new or overnight issues.  Offers no complaints    ROS:   A 10 point ROS was performed; negative except as noted above.        *Labs /Radiology studies Reviewed  *Medications  reviewed and reconciled as needed  *Please refer to order section for additional ordered labs studies      Physical Examination:  Vitals:   Vitals:    05/17/24 1900 05/17/24 2028 05/18/24 0236 05/18/24 0538   BP:  139/67  139/63   BP Location:  Left arm  Left arm   Pulse:  83  63   Resp:  20  20   Temp:  98.6 °F (37 °C)  97.9 °F (36.6 °C)   TempSrc:  Oral  Oral   SpO2: 92% 94% 96% 100%   Weight:       Height:           General Appearance: no distress, non toxic appearing  HEENT: PERRLA, conjuctiva normal; oropharynx clear; mucous membranes moist   Neck:  Supple, normal ROM  Lungs: BBS with clear left lung and mild coarse  rhonchi right lung, normal respiratory effort, no retractions, expiratory effort normal.  On RA.  CV: regular rate and rhythm; no rubs/murmurs/gallops, PMI normal   ABD: soft; ND/NT; +BS  EXT: no edema  Skin: normal turgor, normal texture, no rashes  Psych: affect normal, mood normal  Neuro: AAO           The above physical exam was reviewed and updated as determined by my evaluation of the patient on 5/18/2024.    Invasive Devices       None                      VTE Pharmacologic Prophylaxis: Eliquis  Code Status: Level 1 - Full Code  Current Length of Stay: 7 day(s)    Total floor / unit time spent today 30 minutes  Coordination of patient's care was performed in conjunction with primary service. Time invested included review of patient's labs, vitals, and management of their comorbidities with continued monitoring, examination of patient as well as answering patient questions, documenting her findings and creating progress note in electronic medical record,  ordering appropriate diagnostic testing.       ** Please Note:  voice to text software may have been used in the creation of this document. Although proof errors in transcription or interpretation are a potential of such software**

## 2024-05-18 LAB
GLUCOSE SERPL-MCNC: 141 MG/DL (ref 65–140)
GLUCOSE SERPL-MCNC: 154 MG/DL (ref 65–140)
GLUCOSE SERPL-MCNC: 177 MG/DL (ref 65–140)
GLUCOSE SERPL-MCNC: 86 MG/DL (ref 65–140)

## 2024-05-18 PROCEDURE — 94640 AIRWAY INHALATION TREATMENT: CPT

## 2024-05-18 PROCEDURE — 94760 N-INVAS EAR/PLS OXIMETRY 1: CPT

## 2024-05-18 PROCEDURE — 94664 DEMO&/EVAL PT USE INHALER: CPT

## 2024-05-18 PROCEDURE — 82948 REAGENT STRIP/BLOOD GLUCOSE: CPT

## 2024-05-18 PROCEDURE — 99232 SBSQ HOSP IP/OBS MODERATE 35: CPT | Performed by: INTERNAL MEDICINE

## 2024-05-18 RX ORDER — INSULIN GLARGINE 100 [IU]/ML
16 INJECTION, SOLUTION SUBCUTANEOUS
Status: DISCONTINUED | OUTPATIENT
Start: 2024-05-18 | End: 2024-05-21

## 2024-05-18 RX ADMIN — BUSPIRONE HYDROCHLORIDE 10 MG: 10 TABLET ORAL at 16:23

## 2024-05-18 RX ADMIN — FLUTICASONE PROPIONATE 1 SPRAY: 50 SPRAY, METERED NASAL at 17:26

## 2024-05-18 RX ADMIN — APIXABAN 5 MG: 5 TABLET, FILM COATED ORAL at 09:18

## 2024-05-18 RX ADMIN — METFORMIN HYDROCHLORIDE 500 MG: 500 TABLET, FILM COATED ORAL at 17:26

## 2024-05-18 RX ADMIN — PANTOPRAZOLE SODIUM 40 MG: 40 TABLET, DELAYED RELEASE ORAL at 06:17

## 2024-05-18 RX ADMIN — INSULIN LISPRO 1 UNITS: 100 INJECTION, SOLUTION INTRAVENOUS; SUBCUTANEOUS at 16:07

## 2024-05-18 RX ADMIN — FLUTICASONE FUROATE AND VILANTEROL TRIFENATATE 1 PUFF: 200; 25 POWDER RESPIRATORY (INHALATION) at 09:19

## 2024-05-18 RX ADMIN — APIXABAN 5 MG: 5 TABLET, FILM COATED ORAL at 17:26

## 2024-05-18 RX ADMIN — FLUTICASONE PROPIONATE 1 SPRAY: 50 SPRAY, METERED NASAL at 09:22

## 2024-05-18 RX ADMIN — UMECLIDINIUM 1 PUFF: 62.5 AEROSOL, POWDER ORAL at 09:19

## 2024-05-18 RX ADMIN — ALBUTEROL SULFATE 2.5 MG: 2.5 SOLUTION RESPIRATORY (INHALATION) at 02:36

## 2024-05-18 RX ADMIN — GUAIFENESIN 600 MG: 600 TABLET, EXTENDED RELEASE ORAL at 17:26

## 2024-05-18 RX ADMIN — ALBUTEROL SULFATE 2 PUFF: 90 AEROSOL, METERED RESPIRATORY (INHALATION) at 01:11

## 2024-05-18 RX ADMIN — Medication 1 TABLET: at 09:18

## 2024-05-18 RX ADMIN — ATORVASTATIN CALCIUM 80 MG: 80 TABLET, FILM COATED ORAL at 16:23

## 2024-05-18 RX ADMIN — BUSPIRONE HYDROCHLORIDE 10 MG: 10 TABLET ORAL at 09:19

## 2024-05-18 RX ADMIN — BUSPIRONE HYDROCHLORIDE 10 MG: 10 TABLET ORAL at 20:43

## 2024-05-18 RX ADMIN — THIAMINE HCL TAB 100 MG 100 MG: 100 TAB at 09:19

## 2024-05-18 RX ADMIN — FOLIC ACID 1 MG: 1 TABLET ORAL at 09:18

## 2024-05-18 RX ADMIN — METFORMIN HYDROCHLORIDE 500 MG: 500 TABLET, FILM COATED ORAL at 09:18

## 2024-05-18 RX ADMIN — Medication 6 MG: at 20:43

## 2024-05-18 RX ADMIN — FERROUS SULFATE TAB 325 MG (65 MG ELEMENTAL FE) 325 MG: 325 (65 FE) TAB at 09:18

## 2024-05-18 RX ADMIN — GUAIFENESIN 600 MG: 600 TABLET, EXTENDED RELEASE ORAL at 09:18

## 2024-05-18 RX ADMIN — ASPIRIN 81 MG CHEWABLE TABLET 81 MG: 81 TABLET CHEWABLE at 09:18

## 2024-05-18 RX ADMIN — INSULIN GLARGINE 16 UNITS: 100 INJECTION, SOLUTION SUBCUTANEOUS at 20:45

## 2024-05-18 RX ADMIN — MIRTAZAPINE 7.5 MG: 15 TABLET, FILM COATED ORAL at 20:44

## 2024-05-18 RX ADMIN — ALBUTEROL SULFATE 2 PUFF: 90 AEROSOL, METERED RESPIRATORY (INHALATION) at 14:16

## 2024-05-18 NOTE — PROGRESS NOTES
[facility]  Progress Note  Name: Lucho Talavera I  MRN: [id number]  Unit/Bed#: -01 I Date of Admission: 5/11/2024   Date of Service: 5/19/2024 I Hospital Day: 8    Assessment & Plan   * Stroke (cerebrum) (HCC)  Assessment & Plan  Presented with increased weakness, dysarthria and leaning on the left side x 5 days with fall 2 days prior to admission in the setting of noncompliance with Eliquis  CTA with chronic left PCA territory infarction  MRI with multiple infarcts of varying ages and posterior circulation including multiple small acute/early subacute infarct in the bilateral cerebellum with additional small foci of recent ischemia in the left PCA distribution superimposed on chronic infarct  Echo with EF 55%, no cardiac thrombus  Maintained on aspirin and Eliquis  Eliquis is to be $71.28 = daughter aware and OK with the price  Continue Lipitor 80 mg daily  Outpatient follow-up with neurology    Typical atrial flutter (HCC)  Assessment & Plan  Previously admitted for atrial flutter with RVR in 4/8/2024 s/p ablation and loop recorder insertion.    Sutures removed 5/17/24  Anticoagulated on Eliquis  On no rate control medications.  Last EKG some NSR present but baseline interference    Hypertension  Assessment & Plan  Home:  Lisinopril 40mg qd  Here:  no meds  Blood pressure stable off lisinopril  Continue holding lisinopril    Type 2 diabetes mellitus (HCC)  Assessment & Plan  HbA1C 8.9 on 4/6/24  Home:  Basaglar 30U qhs/Metformin  Here:  Lantus 16U qhs/Metformin 500mg BID  Blood sugars were elevated in the setting of recent use of steroids but improving   Steroids were discontinued as of 5/11/24  Continue with accuchecks/SSI/DM diet  Since FBS had been in the 80s, reduced the Lantus to 16U qhs starting 5/18    ETOH abuse  Assessment & Plan  History of alcohol abuse  Monitor off CIWA protocol - Pt now post withdrawal window   Ativan as needed for anxiety  Continue  supplements with thiamine, folate, MVI    Chronic bronchitis (HCC)  Assessment & Plan  Has had many admissions for COPD exacerbation  Initially presented with COPD exacerbation, now resolved after completion of steroids and azithromycin.  Continue home regimen of inhalers with PRN nebs and respiratory protocol.  He uses O2 at home 2L NC    Sacral wound  Assessment & Plan  Seen and evaluated by wound care with continuation of their recommendations of offloading, specialty cushion and Allevyn.    History of cardiac arrest  Assessment & Plan  Had a VF arrest 2018 with LVEF 20-25% at [facility]  Had card cath at the time with nonobstructive dz  Was tx'd as a NSTEMI with DAPT/statin  Currently on ASA/Lipitor           The above assessment and plan was reviewed and updated as determined by my evaluation of the patient on 5/19/2024.    Labs:   Results from last 7 days   Lab Units 05/13/24  0537   WBC Thousand/uL 8.73   HEMOGLOBIN g/dL 12.0   HEMATOCRIT % 39.5   PLATELETS Thousands/uL 383     Results from last 7 days   Lab Units 05/13/24  0537   SODIUM mmol/L 135   POTASSIUM mmol/L 3.8   CHLORIDE mmol/L 99   CO2 mmol/L 31   BUN mg/dL 11   CREATININE mg/dL 0.57*   CALCIUM mg/dL 8.6             Results from last 7 days   Lab Units 05/19/24  0617 05/18/24  2045 05/18/24  1554   POC GLUCOSE mg/dl 118 177* 154*       Imaging  No orders to display       Review of Scheduled Meds:  Current Facility-Administered Medications   Medication Dose Route Frequency Provider Last Rate    acetaminophen  650 mg Oral Q6H PRN Richmond Chew, DO      albuterol  2 puff Inhalation Q4H PRN William Camacho, DO      albuterol  2.5 mg Nebulization Q6H PRN Richmond Chew, DO      apixaban  5 mg Oral BID Richmond Chew, DO      aspirin  81 mg Oral Daily Richmond Chew, DO      atorvastatin  80 mg Oral Daily With Dinner Richmond Chew, DO      busPIRone  10 mg Oral TID Richmond Chew, DO      ferrous sulfate  325 mg Oral Daily With  Breakfast Harkaitlynkumar Chew, DO      fluticasone  1 spray Nasal BID [facility]      fluticasone-vilanterol  1 puff Inhalation Daily [facility], DO      folic acid  1 mg Oral Daily [facility], DO      guaiFENesin  600 mg Oral BID [facility], DO      insulin glargine  16 Units Subcutaneous HS TATIANA Lindsey      insulin lispro  1-5 Units Subcutaneous TID AC Nolafemi Chew, DO      LORazepam  0.5 mg Oral BID PRN [facility], DO      melatonin  6 mg Oral QPM Ashley Depadua, MD      metFORMIN  500 mg Oral BID With Meals TATIANA Paulino      mirtazapine  7.5 mg Oral QPM Ashley Depadua, MD      multivitamin-minerals  1 tablet Oral Daily [facility], DO      oxyCODONE  2.5 mg Oral Q4H PRN [facility], DO      pantoprazole  40 mg Oral Early Morning [facility], DO      thiamine  100 mg Oral Daily [facility], [facility]      umeclidinium  1 puff Inhalation Daily [facility], DO         Subjective/ HPI: Patient seen and examined. Patients overnight issues or events were reviewed with nursing staff. New or overnight issues include the following:     No new or overnight issues.  Offers no complaints    ROS:   A 10 point ROS was performed; negative except as noted above.        *Labs /Radiology studies Reviewed  *Medications  reviewed and reconciled as needed  *Please refer to order section for additional ordered labs studies      Physical Examination:  Vitals:   Vitals:    05/18/24 0538 05/18/24 1359 05/18/24 2117 05/19/24 0500   BP: 139/63 115/72 138/75 127/83   BP Location: Left arm Left arm Left arm Left arm   Pulse: 63 85 79 68   Resp: 20 16 16 16   Temp: 97.9 °F (36.6 °C) 98.4 °F (36.9 °C) 98.3 °F (36.8 °C) 98.1 °F (36.7 °C)   TempSrc: Oral Oral Oral Oral   SpO2: 100% 92% 93% 95%   Weight:       Height:           General Appearance: no distress, nontoxic appearing  HEENT:  External ear normal.  Nose normal w/o drainage. Mucous  membranes are moist. Oropharynx is clear. Conjunctiva clear w/o icterus or redness.  Neck:  Supple, normal ROM  Lungs: BBS without crackles/wheeze/rhonchi; respirations unlabored with normal inspiratory/expiratory effort.  No retractions noted.  On RA  CV: regular rate and rhythm; no rubs/murmurs/gallops, PMI normal   ABD: Abdomen is soft.  Bowel sounds all quadrants.  Nontender with no distention.    EXT: no edema  Skin: normal turgor, normal texture, no rashes  Psych: affect normal, mood normal  Neuro: AAO         The above physical exam was reviewed and updated as determined by my evaluation of the patient on 5/19/2024.    Invasive Devices       None                      VTE Pharmacologic Prophylaxis: Eliquis  Code Status: Level 1 - Full Code  Current Length of Stay: 8 day(s)    Total floor / unit time spent today 30 minutes  Coordination of patient's care was performed in conjunction with primary service. Time invested included review of patient's labs, vitals, and management of their comorbidities with continued monitoring, examination of patient as well as answering patient questions, documenting her findings and creating progress note in electronic medical record,  ordering appropriate diagnostic testing.       ** Please Note:  voice to text software may have been used in the creation of this document. Although proof errors in transcription or interpretation are a potential of such software**

## 2024-05-18 NOTE — ASSESSMENT & PLAN NOTE
Had a VF arrest 2018 with LVEF 20-25% at [facility]  Had card cath at the time with nonobstructive dz  Was tx'd as a NSTEMI with DAPT/statin  Currently on ASA/Lipitor

## 2024-05-18 NOTE — ASSESSMENT & PLAN NOTE
HbA1C 8.9 on 4/6/24  Home:  Basaglar 30U qhs/Metformin  Here:  Lantus 16U qhs/Metformin 500mg BID  Blood sugars were elevated in the setting of recent use of steroids but improving   Steroids were discontinued as of 5/11/24  Continue with accuchecks/SSI/DM diet  Since FBS had been in the 80s, reduced the Lantus to 16U qhs starting 5/18

## 2024-05-19 LAB
GLUCOSE SERPL-MCNC: 114 MG/DL (ref 65–140)
GLUCOSE SERPL-MCNC: 118 MG/DL (ref 65–140)
GLUCOSE SERPL-MCNC: 156 MG/DL (ref 65–140)
GLUCOSE SERPL-MCNC: 196 MG/DL (ref 65–140)

## 2024-05-19 PROCEDURE — 92507 TX SP LANG VOICE COMM INDIV: CPT

## 2024-05-19 PROCEDURE — 97530 THERAPEUTIC ACTIVITIES: CPT | Performed by: PHYSICAL THERAPIST

## 2024-05-19 PROCEDURE — 99232 SBSQ HOSP IP/OBS MODERATE 35: CPT | Performed by: INTERNAL MEDICINE

## 2024-05-19 PROCEDURE — 97110 THERAPEUTIC EXERCISES: CPT | Performed by: PHYSICAL THERAPIST

## 2024-05-19 PROCEDURE — 97116 GAIT TRAINING THERAPY: CPT | Performed by: PHYSICAL THERAPIST

## 2024-05-19 PROCEDURE — 94664 DEMO&/EVAL PT USE INHALER: CPT

## 2024-05-19 PROCEDURE — 94760 N-INVAS EAR/PLS OXIMETRY 1: CPT

## 2024-05-19 PROCEDURE — 94640 AIRWAY INHALATION TREATMENT: CPT

## 2024-05-19 PROCEDURE — 97129 THER IVNTJ 1ST 15 MIN: CPT

## 2024-05-19 PROCEDURE — 82948 REAGENT STRIP/BLOOD GLUCOSE: CPT

## 2024-05-19 RX ADMIN — Medication 6 MG: at 20:48

## 2024-05-19 RX ADMIN — PANTOPRAZOLE SODIUM 40 MG: 40 TABLET, DELAYED RELEASE ORAL at 06:18

## 2024-05-19 RX ADMIN — THIAMINE HCL TAB 100 MG 100 MG: 100 TAB at 08:01

## 2024-05-19 RX ADMIN — ATORVASTATIN CALCIUM 80 MG: 80 TABLET, FILM COATED ORAL at 16:56

## 2024-05-19 RX ADMIN — GUAIFENESIN 600 MG: 600 TABLET, EXTENDED RELEASE ORAL at 16:57

## 2024-05-19 RX ADMIN — METFORMIN HYDROCHLORIDE 500 MG: 500 TABLET, FILM COATED ORAL at 16:56

## 2024-05-19 RX ADMIN — BUSPIRONE HYDROCHLORIDE 10 MG: 10 TABLET ORAL at 08:00

## 2024-05-19 RX ADMIN — BUSPIRONE HYDROCHLORIDE 10 MG: 10 TABLET ORAL at 16:56

## 2024-05-19 RX ADMIN — FLUTICASONE FUROATE AND VILANTEROL TRIFENATATE 1 PUFF: 200; 25 POWDER RESPIRATORY (INHALATION) at 07:59

## 2024-05-19 RX ADMIN — APIXABAN 5 MG: 5 TABLET, FILM COATED ORAL at 07:59

## 2024-05-19 RX ADMIN — INSULIN LISPRO 1 UNITS: 100 INJECTION, SOLUTION INTRAVENOUS; SUBCUTANEOUS at 16:57

## 2024-05-19 RX ADMIN — FLUTICASONE PROPIONATE 1 SPRAY: 50 SPRAY, METERED NASAL at 16:57

## 2024-05-19 RX ADMIN — ALBUTEROL SULFATE 2.5 MG: 2.5 SOLUTION RESPIRATORY (INHALATION) at 22:18

## 2024-05-19 RX ADMIN — BUSPIRONE HYDROCHLORIDE 10 MG: 10 TABLET ORAL at 20:48

## 2024-05-19 RX ADMIN — APIXABAN 5 MG: 5 TABLET, FILM COATED ORAL at 16:57

## 2024-05-19 RX ADMIN — Medication 1 TABLET: at 07:57

## 2024-05-19 RX ADMIN — UMECLIDINIUM 1 PUFF: 62.5 AEROSOL, POWDER ORAL at 08:00

## 2024-05-19 RX ADMIN — GUAIFENESIN 600 MG: 600 TABLET, EXTENDED RELEASE ORAL at 08:01

## 2024-05-19 RX ADMIN — FLUTICASONE PROPIONATE 1 SPRAY: 50 SPRAY, METERED NASAL at 08:00

## 2024-05-19 RX ADMIN — METFORMIN HYDROCHLORIDE 500 MG: 500 TABLET, FILM COATED ORAL at 07:56

## 2024-05-19 RX ADMIN — MIRTAZAPINE 7.5 MG: 15 TABLET, FILM COATED ORAL at 20:48

## 2024-05-19 RX ADMIN — FERROUS SULFATE TAB 325 MG (65 MG ELEMENTAL FE) 325 MG: 325 (65 FE) TAB at 07:56

## 2024-05-19 RX ADMIN — FOLIC ACID 1 MG: 1 TABLET ORAL at 07:58

## 2024-05-19 RX ADMIN — INSULIN GLARGINE 16 UNITS: 100 INJECTION, SOLUTION SUBCUTANEOUS at 20:48

## 2024-05-19 RX ADMIN — ASPIRIN 81 MG CHEWABLE TABLET 81 MG: 81 TABLET CHEWABLE at 07:57

## 2024-05-19 NOTE — PLAN OF CARE
Problem: Prexisting or High Potential for Compromised Skin Integrity  Goal: Skin integrity is maintained or improved  Description: INTERVENTIONS:  - Identify patients at risk for skin breakdown  - Assess and monitor skin integrity  - Assess and monitor nutrition and hydration status  - Monitor labs   - Assess for incontinence   - Turn and reposition patient  - Assist with mobility/ambulation  - Relieve pressure over bony prominences  - Avoid friction and shearing  - Provide appropriate hygiene as needed including keeping skin clean and dry  - Evaluate need for skin moisturizer/barrier cream  - Collaborate with interdisciplinary team   - Patient/family teaching  - Consider wound care consult   Outcome: Progressing     Problem: PAIN - ADULT  Goal: Verbalizes/displays adequate comfort level or baseline comfort level  Description: Interventions:  - Encourage patient to monitor pain and request assistance  - Assess pain using appropriate pain scale  - Administer analgesics based on type and severity of pain and evaluate response  - Implement non-pharmacological measures as appropriate and evaluate response  - Consider cultural and social influences on pain and pain management  - Notify physician/advanced practitioner if interventions unsuccessful or patient reports new pain  Outcome: Progressing     Problem: INFECTION - ADULT  Goal: Absence or prevention of progression during hospitalization  Description: INTERVENTIONS:  - Assess and monitor for signs and symptoms of infection  - Monitor lab/diagnostic results  - Monitor all insertion sites, i.e. indwelling lines, tubes, and drains  - Monitor endotracheal if appropriate and nasal secretions for changes in amount and color  - [place] appropriate cooling/warming therapies per order  - Administer medications as ordered  - Instruct and encourage patient and family to use good hand hygiene technique  - Identify and instruct in appropriate isolation precautions for  identified infection/condition  Outcome: Progressing     Problem: SAFETY ADULT  Goal: Patient will remain free of falls  Description: INTERVENTIONS:  - Educate patient/family on patient safety including physical limitations  - Instruct patient to call for assistance with activity   - Consult OT/PT to assist with strengthening/mobility   - Keep Call bell within reach  - Keep bed low and locked with side rails adjusted as appropriate  - Keep care items and personal belongings within reach  - Initiate and maintain comfort rounds  - Make Fall Risk Sign visible to staff  - Offer Toileting every 2 Hours, in advance of need  - Initiate/Maintain bed/chair alarm  - Obtain necessary fall risk management equipment: alarms   - Apply yellow socks and bracelet for high fall risk patients  - Consider moving patient to room near nurses station  Outcome: Progressing  Goal: Maintain or return to baseline ADL function  Description: INTERVENTIONS:  -  Assess patient's ability to carry out ADLs; assess patient's baseline for ADL function and identify physical deficits which impact ability to perform ADLs (bathing, care of mouth/teeth, toileting, grooming, dressing, etc.)  - Assess/evaluate cause of self-care deficits   - Assess range of motion  - Assess patient's mobility; develop plan if impaired  - Assess patient's need for assistive devices and provide as appropriate  - Encourage maximum independence but intervene and supervise when necessary  - Involve family in performance of ADLs  - Assess for home care needs following discharge   - Consider OT consult to assist with ADL evaluation and planning for discharge  - Provide patient education as appropriate  Outcome: Progressing  Goal: Maintains/Returns to pre admission functional level  Description: INTERVENTIONS:  - Perform AM-PAC 6 Click Basic Mobility/ Daily Activity assessment daily.  - Set and communicate daily mobility goal to care team and patient/family/caregiver.   -  Collaborate with rehabilitation services on mobility goals if consulted  - Perform Range of Motion 3 times a day.  - Reposition patient every 2 hours.  - Dangle patient 3 times a day  - Stand patient 3 times a day  - Ambulate patient 3 times a day  - Out of bed to chair 3 times a day   - Out of bed for meals 3 times a day  - Out of bed for toileting  - Record patient progress and toleration of activity level   Outcome: Progressing     Problem: DISCHARGE PLANNING  Goal: Discharge to home or other facility with appropriate resources  Description: INTERVENTIONS:  - Identify barriers to discharge w/patient and caregiver  - Arrange for needed discharge resources and transportation as appropriate  - Identify discharge learning needs (meds, wound care, etc.)  - Arrange for interpretive services to assist at discharge as needed  - Refer to Case Management Department for coordinating discharge planning if the patient needs post-hospital services based on physician/advanced practitioner order or complex needs related to functional status, cognitive ability, or social support system  Outcome: Progressing

## 2024-05-19 NOTE — PROGRESS NOTES
05/19/24 1230   Pain Assessment   Pain Assessment Tool 0-10   Pain Score No Pain   Restrictions/Precautions   Precautions Aspiration;Bed/chair alarms;Cognitive;Fall Risk;Hard of hearing;Supervision on toilet/commode   Comprehension   Comprehension (FIM) 4 - Understands basic info/conversation 75-90% of time   Expression   Expression (FIM) 4 - Expresses basic info/needs 75-90% of time   Social Interaction   Social Interaction (FIM) 5 - Interacts appropriately with others 90% of time   Problem Solving   Problem solving (FIM) 3 - Solves basic problmes 50-74% of time   Memory   Memory (FIM) 3 - Recognizes, recalls/performs 50-74%   Speech/Language/Cognition Assessment   Treatment Assessment Pt was seen in room for skilled ST session targeting cognitive linguistic communication skills. Upon arrival, pt was seated in bed awake and alert. Pt recalled SLP stating 'Hecelestino Destiny!' Pt also recalled PT from earlier in day, lunch and visitors from yesterday and today. SLP and pt participated in brief rapport building as this SLP has not seen pt in a few days. Began w/ review of orientation in which pt was oriented x4 to month, yea,r current location and reason for hospitalization. Began w/ targeting cognition for first 15 min of session, using categorization task. Pt was given verbal FO3 complex category members and then asked to label each category. Pt was able to complete task w/ 29/42 accuracy which increased to 35/32 once given verbal and phonemic cues. Pt noted to need increase processing time to complete task and was noted to have increase attention. Pt's word finding continues to be fluctuating during task and at times, pt will compensate and provide words that do not fit and state 'I can't think of the word so I'm just going to say ** even though its wrong.' SLP educated pt on word finding strategies and provided verbal examples on how to use in daily life. For  last 15 words, SLP attempted to get pt to use during task but  pt did not follow or recall provided verbal strategies. Will continue to practice and monitor over next few sessions. Last task targeted verbal expression. Pt was given a situation and asked to discuss the solution for each. Pt was able to complete task w/ ~60% intelligible speech. At times, pt would repeat words and 'talked loud' to enunciate and over produce longer, more difficult words. Pt continues to have breathy voicing throughout conversations and needed verbal cues for clearance and to insert more breaks for improvement in breath/speak pattern.  At this time, pt continues to benefit from ongoing skilled SLP services to maximize overall cognitive linguistic skills and intelligibility in attempts to decrease caregiver burden over time.   SLP Therapy Minutes   SLP Time In 1230   SLP Time Out 1300   SLP Total Time (minutes) 30   SLP Mode of treatment - Individual (minutes) 30   SLP Mode of treatment - Concurrent (minutes) 0   SLP Mode of treatment - Group (minutes) 0   SLP Mode of treatment - Co-treat (minutes) 0   SLP Mode of Treatment - Total time(minutes) 30 minutes   SLP Cumulative Minutes 330   Therapy Time missed   Time missed? No

## 2024-05-19 NOTE — PROGRESS NOTES
05/19/24 1000   Pain Assessment   Pain Assessment Tool 0-10   Pain Score No Pain   Restrictions/Precautions   Precautions Aspiration;Bed/chair alarms;Cognitive;Fall Risk;Hard of hearing;Supervision on toilet/commode   Weight Bearing Restrictions No   ROM Restrictions No   Cognition   Overall Cognitive Status Impaired   Arousal/Participation Alert;Responsive;Cooperative   Attention Attends with cues to redirect   Orientation Level Oriented X4   Memory Decreased short term memory;Decreased recall of recent events;Decreased recall of precautions   Following Commands Follows one step commands with increased time or repetition   Subjective   Subjective doing well, no issues with sleeping during the night.   Roll Left and Right   Type of Assistance Needed Adaptive equipment;Physical assistance   Physical Assistance Level 25% or less   Comment \   Roll Left and Right CARE Score 3   Sit to Lying   Type of Assistance Needed Adaptive equipment;Physical assistance   Physical Assistance Level 25% or less   Sit to Lying CARE Score 3   Lying to Sitting on Side of Bed   Type of Assistance Needed Supervision   Physical Assistance Level No physical assistance   Lying to Sitting on Side of Bed CARE Score 4   Sit to Stand   Type of Assistance Needed Incidental touching   Physical Assistance Level No physical assistance   Comment  w RW cuing for hand placement   Sit to Stand CARE Score 4   Bed-Chair Transfer   Type of Assistance Needed Incidental touching   Physical Assistance Level No physical assistance   Comment  w RW cuing for sequence to and from bed side, chair x 2 times   Chair/Bed-to-Chair Transfer CARE Score 4   Walk 10 Feet   Type of Assistance Needed Physical assistance   Physical Assistance Level 51%-75%   Comment mod A for HHA, w RW No physical assistance CG   Walk 10 Feet CARE Score 2   Walk 50 Feet with Two Turns   Type of Assistance Needed Physical assistance   Physical Assistance Level 51%-75%   Comment mod A  "for HHA, w RW No physical assistance CG   Walk 50 Feet with Two Turns CARE Score 2   Walk 150 Feet   Type of Assistance Needed Physical assistance;Adaptive equipment   Physical Assistance Level 25% or less   Comment CGA   Walk 150 Feet CARE Score 3   Ambulation   Primary Mode of Locomotion Prior to Admission Walk   Distance Walked (feet) 150 ft   Assist Device Roller Walker   Gait Pattern Forward Flexion;Slow Sury;Decreased foot clearance;L foot drag;Improper weight shift   Limitations Noted In Balance;Coordination;Endurance;Speed;Strength;Swing   Provided Assistance with: Balance;Direction   Walk Assist Level Contact Guard;Close Supervision   Findings R HHA Mod A x 1 person 50 feet, 150 feet x 3   Does the patient walk? 2. Yes   Wheel 50 Feet with Two Turns   Comment anticipate will be amb at d/c   Wheel 150 Feet   Reason if not Attempted Activity not applicable   Wheel 150 Feet CARE Score 9   Wheelchair mobility   Does the patient use a wheelchair? 0. No   Curb or Single Stair   Style negotiated Single stair   Type of Assistance Needed Physical assistance   Physical Assistance Level 25% or less   Comment min A up and down 4 6\"  step BL HR. attempt 1 HR only with patient able to descent only, ascent unable   1 Step (Curb) CARE Score 3   4 Steps   Type of Assistance Needed Physical assistance   Physical Assistance Level 25% or less   Comment min A up and down 4 6\"  step BL HR. attempt 1 HR only with patient able to descent only, ascent unable   4 Steps CARE Score 3   Stairs   Type Stairs   # of Steps 4   Weight Bearing Precautions Fall Risk   Assist Devices Bilateral Rail   Findings min A up and down 4 6\"  step BL HR. attempt 1 HR only with patient able to descent only, ascent unable   Picking Up Object   Type of Assistance Needed Incidental touching   Physical Assistance Level No physical assistance   Comment CGA picking up marker from floor with reacher 3 times   Picking Up Object CARE " Score 4   Therapeutic Interventions   Strengthening seated LAQ and Marches 2 sets x 10 reps 3 sec hold end range   Other Flutter valve max resistance x15, x10 puffs during session   Equipment Use   NuStep L1 60-70 SPM BUE/LE x10 min   Other Comments   Comments SpO2 between 94-97% with all mobility, HR between 105-123bpm with all mobility. BP at start 145/65 and post amb 138/75   Assessment   Treatment Assessment Session focused on progressing overground mobilities, safety with STS transfers, endurance, and balance. Endurance continues to remain a limiting factor with patient as SOB does hinder continue participation during session this date. Cuing for breathing which improved breathing. attempt to perform stair navigation with 1 UE only with SHR with ability to descend but unable to ascent. continues to require BL. Recommend continued HIGT with minimized UE support to improve stamina, balance reactions, and reduce falls risk while maintaining recommendation for RW use at home.   Family/Caregiver Present No   Problem List Decreased strength;Decreased range of motion;Decreased endurance;Impaired balance;Decreased mobility;Decreased coordination;Decreased cognition;Decreased safety awareness;Impaired sensation;Decreased skin integrity   Barriers to Discharge Decreased caregiver support;Inaccessible home environment   PT Barriers   Physical Impairment Decreased strength;Decreased range of motion;Decreased endurance;Impaired balance;Decreased mobility;Decreased coordination;Decreased cognition;Decreased safety awareness;Impaired sensation;Decreased skin integrity   Functional Limitation Car transfers;Ramp negotiation;Stair negotiation;Standing;Transfers;Walking   Plan   Treatment/Interventions LE strengthening/ROM;Functional transfer training;Therapeutic exercise;Endurance training;Cognitive reorientation;Patient/family training;Equipment eval/education;Bed mobility;Gait training;Elevations;Continued  evaluation;Compensatory technique education   Progress Progressing toward goals   PT Therapy Minutes   PT Time In 1000   PT Time Out 1130   PT Total Time (minutes) 90   PT Mode of treatment - Individual (minutes) 90   PT Mode of treatment - Concurrent (minutes) 0   PT Mode of treatment - Group (minutes) 0   PT Mode of treatment - Co-treat (minutes) 0   PT Mode of Treatment - Total time(minutes) 90 minutes   PT Cumulative Minutes 613   Therapy Time missed   Time missed? No

## 2024-05-19 NOTE — PLAN OF CARE
Problem: Prexisting or High Potential for Compromised Skin Integrity  Goal: Skin integrity is maintained or improved  Description: INTERVENTIONS:  - Identify patients at risk for skin breakdown  - Assess and monitor skin integrity  - Assess and monitor nutrition and hydration status  - Monitor labs   - Assess for incontinence   - Turn and reposition patient  - Assist with mobility/ambulation  - Relieve pressure over bony prominences  - Avoid friction and shearing  - Provide appropriate hygiene as needed including keeping skin clean and dry  - Evaluate need for skin moisturizer/barrier cream  - Collaborate with interdisciplinary team   - Patient/family teaching  - Consider wound care consult   Outcome: Progressing     Problem: PAIN - ADULT  Goal: Verbalizes/displays adequate comfort level or baseline comfort level  Description: Interventions:  - Encourage patient to monitor pain and request assistance  - Assess pain using appropriate pain scale  - Administer analgesics based on type and severity of pain and evaluate response  - Implement non-pharmacological measures as appropriate and evaluate response  - Consider cultural and social influences on pain and pain management  - Notify physician/advanced practitioner if interventions unsuccessful or patient reports new pain  Outcome: Progressing     Problem: INFECTION - ADULT  Goal: Absence or prevention of progression during hospitalization  Description: INTERVENTIONS:  - Assess and monitor for signs and symptoms of infection  - Monitor lab/diagnostic results  - Monitor all insertion sites, i.e. indwelling lines, tubes, and drains  - Monitor endotracheal if appropriate and nasal secretions for changes in amount and color  - [place] appropriate cooling/warming therapies per order  - Administer medications as ordered  - Instruct and encourage patient and family to use good hand hygiene technique  - Identify and instruct in appropriate isolation precautions for  identified infection/condition  Outcome: Progressing     Problem: SAFETY ADULT  Goal: Patient will remain free of falls  Description: INTERVENTIONS:  - Educate patient/family on patient safety including physical limitations  - Instruct patient to call for assistance with activity   - Consult OT/PT to assist with strengthening/mobility   - Keep Call bell within reach  - Keep bed low and locked with side rails adjusted as appropriate  - Keep care items and personal belongings within reach  - Initiate and maintain comfort rounds  - Make Fall Risk Sign visible to staff  - Offer Toileting every  Hours, in advance of need  - Initiate/Maintain alarm  - Obtain necessary fall risk management equipment:   - Apply yellow socks and bracelet for high fall risk patients  - Consider moving patient to room near nurses station  Outcome: Progressing  Goal: Maintain or return to baseline ADL function  Description: INTERVENTIONS:  -  Assess patient's ability to carry out ADLs; assess patient's baseline for ADL function and identify physical deficits which impact ability to perform ADLs (bathing, care of mouth/teeth, toileting, grooming, dressing, etc.)  - Assess/evaluate cause of self-care deficits   - Assess range of motion  - Assess patient's mobility; develop plan if impaired  - Assess patient's need for assistive devices and provide as appropriate  - Encourage maximum independence but intervene and supervise when necessary  - Involve family in performance of ADLs  - Assess for home care needs following discharge   - Consider OT consult to assist with ADL evaluation and planning for discharge  - Provide patient education as appropriate  Outcome: Progressing  Goal: Maintains/Returns to pre admission functional level  Description: INTERVENTIONS:  - Perform AM-PAC 6 Click Basic Mobility/ Daily Activity assessment daily.  - Set and communicate daily mobility goal to care team and patient/family/caregiver.   - Collaborate with  rehabilitation services on mobility goals if consulted  - Perform Range of Motion  times a day.  - Reposition patient every  hours.  - Dangle patient  times a day  - Stand patient  times a day  - Ambulate patient  times a day  - Out of bed to chair  times a day   - Out of bed for meals  times a day  - Out of bed for toileting  - Record patient progress and toleration of activity level   Outcome: Progressing     Problem: DISCHARGE PLANNING  Goal: Discharge to home or other facility with appropriate resources  Description: INTERVENTIONS:  - Identify barriers to discharge w/patient and caregiver  - Arrange for needed discharge resources and transportation as appropriate  - Identify discharge learning needs (meds, wound care, etc.)  - Arrange for interpretive services to assist at discharge as needed  - Refer to Case Management Department for coordinating discharge planning if the patient needs post-hospital services based on physician/advanced practitioner order or complex needs related to functional status, cognitive ability, or social support system  Outcome: Progressing

## 2024-05-20 LAB
GLUCOSE SERPL-MCNC: 109 MG/DL (ref 65–140)
GLUCOSE SERPL-MCNC: 116 MG/DL (ref 65–140)
GLUCOSE SERPL-MCNC: 146 MG/DL (ref 65–140)
GLUCOSE SERPL-MCNC: 211 MG/DL (ref 65–140)

## 2024-05-20 PROCEDURE — 94664 DEMO&/EVAL PT USE INHALER: CPT

## 2024-05-20 PROCEDURE — 97535 SELF CARE MNGMENT TRAINING: CPT

## 2024-05-20 PROCEDURE — 97110 THERAPEUTIC EXERCISES: CPT

## 2024-05-20 PROCEDURE — 97530 THERAPEUTIC ACTIVITIES: CPT

## 2024-05-20 PROCEDURE — 94760 N-INVAS EAR/PLS OXIMETRY 1: CPT

## 2024-05-20 PROCEDURE — 97116 GAIT TRAINING THERAPY: CPT

## 2024-05-20 PROCEDURE — 97112 NEUROMUSCULAR REEDUCATION: CPT

## 2024-05-20 PROCEDURE — 82948 REAGENT STRIP/BLOOD GLUCOSE: CPT

## 2024-05-20 PROCEDURE — 99232 SBSQ HOSP IP/OBS MODERATE 35: CPT | Performed by: PHYSICAL MEDICINE & REHABILITATION

## 2024-05-20 PROCEDURE — 99233 SBSQ HOSP IP/OBS HIGH 50: CPT | Performed by: INTERNAL MEDICINE

## 2024-05-20 PROCEDURE — 94640 AIRWAY INHALATION TREATMENT: CPT

## 2024-05-20 PROCEDURE — 99231 SBSQ HOSP IP/OBS SF/LOW 25: CPT

## 2024-05-20 RX ORDER — HYDROXYZINE HYDROCHLORIDE 25 MG/1
25 TABLET, FILM COATED ORAL 2 TIMES DAILY PRN
Status: DISCONTINUED | OUTPATIENT
Start: 2024-05-20 | End: 2024-05-29

## 2024-05-20 RX ADMIN — APIXABAN 5 MG: 5 TABLET, FILM COATED ORAL at 17:08

## 2024-05-20 RX ADMIN — MIRTAZAPINE 7.5 MG: 15 TABLET, FILM COATED ORAL at 20:52

## 2024-05-20 RX ADMIN — FERROUS SULFATE TAB 325 MG (65 MG ELEMENTAL FE) 325 MG: 325 (65 FE) TAB at 08:11

## 2024-05-20 RX ADMIN — Medication 6 MG: at 20:52

## 2024-05-20 RX ADMIN — BUSPIRONE HYDROCHLORIDE 10 MG: 10 TABLET ORAL at 20:53

## 2024-05-20 RX ADMIN — UMECLIDINIUM 1 PUFF: 62.5 AEROSOL, POWDER ORAL at 08:15

## 2024-05-20 RX ADMIN — ASPIRIN 81 MG CHEWABLE TABLET 81 MG: 81 TABLET CHEWABLE at 08:11

## 2024-05-20 RX ADMIN — ATORVASTATIN CALCIUM 80 MG: 80 TABLET, FILM COATED ORAL at 17:08

## 2024-05-20 RX ADMIN — APIXABAN 5 MG: 5 TABLET, FILM COATED ORAL at 08:12

## 2024-05-20 RX ADMIN — GUAIFENESIN 600 MG: 600 TABLET, EXTENDED RELEASE ORAL at 17:08

## 2024-05-20 RX ADMIN — GUAIFENESIN 600 MG: 600 TABLET, EXTENDED RELEASE ORAL at 08:11

## 2024-05-20 RX ADMIN — ALBUTEROL SULFATE 2.5 MG: 2.5 SOLUTION RESPIRATORY (INHALATION) at 17:14

## 2024-05-20 RX ADMIN — INSULIN GLARGINE 16 UNITS: 100 INJECTION, SOLUTION SUBCUTANEOUS at 20:53

## 2024-05-20 RX ADMIN — THIAMINE HCL TAB 100 MG 100 MG: 100 TAB at 08:12

## 2024-05-20 RX ADMIN — FLUTICASONE FUROATE AND VILANTEROL TRIFENATATE 1 PUFF: 200; 25 POWDER RESPIRATORY (INHALATION) at 08:15

## 2024-05-20 RX ADMIN — BUSPIRONE HYDROCHLORIDE 10 MG: 10 TABLET ORAL at 17:08

## 2024-05-20 RX ADMIN — BUSPIRONE HYDROCHLORIDE 10 MG: 10 TABLET ORAL at 08:12

## 2024-05-20 RX ADMIN — FLUTICASONE PROPIONATE 1 SPRAY: 50 SPRAY, METERED NASAL at 17:05

## 2024-05-20 RX ADMIN — Medication 1 TABLET: at 08:11

## 2024-05-20 RX ADMIN — FOLIC ACID 1 MG: 1 TABLET ORAL at 08:12

## 2024-05-20 RX ADMIN — FLUTICASONE PROPIONATE 1 SPRAY: 50 SPRAY, METERED NASAL at 08:14

## 2024-05-20 RX ADMIN — METFORMIN HYDROCHLORIDE 500 MG: 500 TABLET, FILM COATED ORAL at 17:08

## 2024-05-20 RX ADMIN — METFORMIN HYDROCHLORIDE 500 MG: 500 TABLET, FILM COATED ORAL at 08:11

## 2024-05-20 RX ADMIN — PANTOPRAZOLE SODIUM 40 MG: 40 TABLET, DELAYED RELEASE ORAL at 05:29

## 2024-05-20 NOTE — PROGRESS NOTES
"   05/20/24 1230   Pain Assessment   Pain Assessment Tool 0-10   Pain Score No Pain   Restrictions/Precautions   Precautions Aspiration;Bed/chair alarms;Cognitive;Fall Risk;Hard of hearing;Supervision on toilet/commode   Weight Bearing Restrictions No   ROM Restrictions No   Lifestyle   Autonomy \"I want to spongebathe. I don't feel like getting wet.\"   Oral Hygiene   Type of Assistance Needed Set-up / clean-up   Physical Assistance Level No physical assistance   Comment Patient dons deodorant, persaud hair, and completes oral hygiene tasks I after s/u seated.   Oral Hygiene CARE Score 5   Shower/Bathe Self   Type of Assistance Needed Incidental touching   Physical Assistance Level No physical assistance   Comment Patient requests SB on this date, primarily completed seated in recliner. UB S seated. LB to feet S seated, CGA in stance with RW for support.   Shower/Bathe Self CARE Score 4   Tub/Shower Transfer   Findings Patient reports at home he utilizes tub/shower unit with grab bars both inside/out of tub and shower chair with back. Patient completes dry tub transfer with grab bars and shower chair CGA 2x on this date. Pt with no LOB or concerns.   Upper Body Dressing   Type of Assistance Needed Supervision;Set-up / clean-up   Physical Assistance Level No physical assistance   Comment seated in recliner, hospital gown treated as [place] shirt   Upper Body Dressing CARE Score 4   Lower Body Dressing   Type of Assistance Needed Incidental touching   Physical Assistance Level No physical assistance   Comment S to thread pants over feet seated, CGA for CM over hips in stance with RW for support. Pt reports he utilizes pull ups at home - brief tabs fastened to simulate this on this date. Therapist cued pt to sit down to fasten pants button as pt frustrated trying to complete in stance.   Lower Body Dressing CARE Score 4   Putting On/Taking Off Footwear   Type of Assistance Needed Supervision   Physical Assistance Level No " physical assistance   Comment X technique utilized to doff/don hospital socks   Putting On/Taking Off Footwear CARE Score 4   Sit to Stand   Type of Assistance Needed Incidental touching   Physical Assistance Level No physical assistance   Comment CGA with RW   Sit to Stand CARE Score 4   Bed-Chair Transfer   Type of Assistance Needed Adaptive equipment;Incidental touching   Physical Assistance Level No physical assistance   Comment CGA with RW   Chair/Bed-to-Chair Transfer CARE Score 4   Toilet Transfer   Comment Patient declines toileting/toilet transfer on this date.   Therapeutic Excerise-Strength   UE Strength Yes   Right Upper Extremity- Strength   RUE Strength Comment Seated, BUE strengthening ex completed utilizing 4lb dowel bar 3 sets x10 reps for bicep curls, chest presses, shoulder raises, wrist flex/ext, arm circles forward/backward direction, side to side, and shoulder presses. O2 sat monitored and remains 96% on room air. Ther ex completed to promote strength and activity tolerance for carryover into ADLs/functional transfers.   Left Upper Extremity-Strength   LUE Strength Comment see above   Cognition   Overall Cognitive Status Impaired   Arousal/Participation Alert;Cooperative   Attention Attends with cues to redirect   Memory Decreased recall of precautions   Following Commands Follows one step commands with increased time or repetition   Comments flat affect   Activity Tolerance   Activity Tolerance Patient tolerated treatment well   Assessment   Treatment Assessment Patient engaged in 90 min treatment session with focus on ADL retraining, functional transfers with RW, dry tub transfer with simulated home set-up, and BUE strengthening as tolerated. CLOF pt completes SB ADL primarily seated in recliner overall with CGA. Pt continues to refuse showers with OT - however willing to complete dry tub transfer on this date with simulated home set-up CGA. Transfers with RW CGA. See above for treatment  details. Continue OT plan of care with focus on ADL retraining, functional transfers, endurance, standing balance/tolerance, and functional cognitive/safety training.   Prognosis Good   Problem List Decreased strength;Decreased endurance;Impaired balance;Decreased mobility;Decreased cognition;Decreased safety awareness;Impaired judgement;Decreased skin integrity   Barriers to Discharge Inaccessible home environment;Decreased caregiver support   Plan   Treatment/Interventions ADL retraining;Functional transfer training;Therapeutic exercise;Endurance training;Cognitive reorientation;Patient/family training;Equipment eval/education;Bed mobility;Compensatory technique education   Progress Progressing toward goals   Discharge Recommendation   Rehab Resource Intensity Level, OT   (home w/ family to provide 24 hour S, HHOT)   OT Therapy Minutes   OT Time In 1230   OT Time Out 1400   OT Total Time (minutes) 90   OT Mode of treatment - Individual (minutes) 90   OT Mode of treatment - Concurrent (minutes) 0   OT Mode of treatment - Group (minutes) 0   OT Mode of treatment - Co-treat (minutes) 0   OT Mode of Treatment - Total time(minutes) 90 minutes   OT Cumulative Minutes 565   Therapy Time missed   Time missed? No

## 2024-05-20 NOTE — PROGRESS NOTES
[facility]  Progress Note  Name: Lucho Talavera I  MRN: [id number]  Unit/Bed#: -01 I Date of Admission: 5/11/2024   Date of Service: 5/20/2024 I Hospital Day: 9    Assessment & Plan   * Stroke (cerebrum) (HCC)  Assessment & Plan  Previously non compliant with eliquis. Spoke with the pharmacist at [facility] and it was prescribed but never picked up.  Cost of Eliquis is $71.28. Called pt's daughter and she states that cost is fine  CTA with chronic left PCA territory infarction  MRI with multiple infarcts of varying ages and posterior circulation including multiple small acute/early subacute infarct in the bilateral cerebellum with additional small foci of recent ischemia in the left PCA distribution superimposed on chronic infarct  Echo with EF 55%, no cardiac thrombus  Continue aspirin and Eliquis/statin  Outpatient follow-up with neurology  Therapy per PMR  DC 5/30/24=family aware. Training needs to be rescheduled.    Typical atrial flutter (HCC)  Assessment & Plan  Rates controlled   Cont eliquis  F/u cardiology as outpatient    Hypertension  Assessment & Plan  Home: Lisinopril 40mg daily.  Here: None  BP stable.    ETOH abuse  Assessment & Plan  History of alcohol abuse  Monitor off CIWA protocol - Pt now post withdrawal window   Ativan as needed anxiety  Continue supplements with thiamine, folate, MVI    Type 2 diabetes mellitus (HCC)  Assessment & Plan  Lab Results   Component Value Date    HGBA1C 8.9 (H) 04/06/2024       Recent Labs     05/19/24  1120 05/19/24  1554 05/19/24  2038 05/20/24  0610   POCGLU 114 156* 196* 116         Blood Sugar Average: Last 72 hrs:  (P) [id number]  Blood sugars elevated in the setting of recent use of steroids  Home: Lantus 30u qhs/metformin 1g bid  Here: Lantus 16U qhs/sliding scale/metformin 500mg bid (added 5/14/2024)  Cont DM diet  stable      CAD (coronary artery disease)  Assessment & Plan  Continue ASA and statin.        "      The above assessment and plan was reviewed and updated as determined by my evaluation of the patient on 5/20/2024.    Labs:             Invalid input(s): \"LABGLOM\", \"CMP\"          Results from last 7 days   Lab Units 05/20/24  1121 05/20/24  0610 05/19/24 2038   POC GLUCOSE mg/dl 109 116 196*       Imaging  No orders to display       Review of Scheduled Meds:  Current Facility-Administered Medications   Medication Dose Route Frequency Provider Last Rate    acetaminophen  650 mg Oral Q6H PRN Richmond Chew, DO      albuterol  2 puff Inhalation Q4H PRN William Camacho, DO      albuterol  2.5 mg Nebulization Q6H PRN Richmond Chew, DO      apixaban  5 mg Oral BID Richmond Chew, DO      aspirin  81 mg Oral Daily Richmond Chew, DO      atorvastatin  80 mg Oral Daily With Dinner Richmond Chew, DO      busPIRone  10 mg Oral TID Richmond Chew, DO      ferrous sulfate  325 mg Oral Daily With Breakfast Richmond Chew, DO      fluticasone  1 spray Nasal BID Richmond Chew, DO      fluticasone-vilanterol  1 puff Inhalation Daily Richmond Chew, DO      folic acid  1 mg Oral Daily Richmond Chew, DO      guaiFENesin  600 mg Oral BID Richmond Chew, DO      hydrOXYzine HCL  25 mg Oral BID PRN Ashley Depadua, MD      insulin glargine  16 Units Subcutaneous HS TATIANA Lindsey      insulin lispro  1-5 Units Subcutaneous TID AC Richmond Chew, DO      melatonin  6 mg Oral QPM Ashley Depadua, MD      metFORMIN  500 mg Oral BID With Meals TATIANA Pualino      mirtazapine  7.5 mg Oral QPM Ashley Depadua, MD      multivitamin-minerals  1 tablet Oral Daily Richmond Chew, DO      oxyCODONE  2.5 mg Oral Q4H PRN Richmond Chew, DO      pantoprazole  40 mg Oral Early Morning Richmond Chew, DO      thiamine  100 mg Oral Daily Richmond Chew, DO      umeclidinium  1 puff Inhalation Daily Richmond Chew, DO         Subjective/ HPI: Patient seen and " examined. Patients overnight issues or events were reviewed with nursing staff. New or overnight issues include the following:     Pt seen in his room. He states that he is doing well and denies any current complaints.    ROS:   A 10 point ROS was performed; negative except as noted above.        *Labs /Radiology studies Reviewed  *Medications  reviewed and reconciled as needed  *Please refer to order section for additional ordered labs studies      Physical Examination:  Vitals:   Vitals:    05/19/24 2218 05/20/24 0607 05/20/24 1050 05/20/24 1051   BP:  136/71 129/73 142/79   BP Location:  Left arm Left arm Left arm   Pulse:  77  104   Resp:  18     Temp:  98.1 °F (36.7 °C)     TempSrc:  Oral     SpO2: 96% 98%     Weight:       Height:           General Appearance: NAD; pleasant  HEENT: PERRLA, conjuctiva normal; mucous membranes moist; face symmetrical  Neck:  Supple  Lungs: clear bilaterally, normal respiratory effort, no retractions, expiratory effort normal, on room air  CV: regular rate and rhythm, no murmurs rubs or gallops noted   ABD: soft non tender, +BS x4  EXT: DP pulses intact, no lymphadenopathy, no edema  Skin: normal turgor, normal texture, no rash  Psych: affect normal, mood normal  Neuro: Awake and alert     The above physical exam was reviewed and updated as determined by my evaluation of the patient on 5/20/2024.    Invasive Devices       None                      VTE Pharmacologic Prophylaxis: Eliquis  Code Status: Level 1 - Full Code  Current Length of Stay: 9 day(s)    Total floor / unit time spent today 30 minutes  Coordination of patient's care was performed in conjunction with primary service. Time invested included review of patient's labs, vitals, and management of their comorbidities with continued monitoring, examination of patient as well as answering patient questions, documenting her findings and creating progress note in electronic medical record,  ordering appropriate diagnostic  testing.       ** Please Note:  voice to text software may have been used in the creation of this document. Although proof errors in transcription or interpretation are a potential of such software**

## 2024-05-20 NOTE — DISCHARGE SUMMARY
[facility]  Progress Note  Name: Lucho Talavera I  MRN: [id number]  [facility] Date of Admission: 5/7/2024   Date of Service: 5/11/2024 I Hospital Day: 4        Assessment/Plan  * Chronic obstructive pulmonary disease with acute exacerbation (HCC)  Assessment & Plan  CXR 5/8- Chronic appearing blunting of the left costophrenic angle is stable.  Oxygen via NC, goal 88-92%- currently 98% on room air- pt is on 2LNC at HS chronically at home   C/w nebs: Atrovent/Xopenex  Steroids: solumedrol wean to Prednisone 40 mg PO from Solumedrol 40 IV BID  Antibiotics: azithromycin 500mg daily x3 days   Respiratory protocol  Significantly improving     Stroke-like symptom  Assessment & Plan  5/7: Presented with COPD exacerbation and daughter reported the patient has been having increased weakness, dysarthria, and leaning to the left side for the past 5 days. He fell 2 days ago, denied head trauma. He has a history of atrial flutter, he was started on Eliquis in April, patient was not taking it, daughter is unaware he was supposed to take Eliquis.   Stroke pathway  CTA head and neck 5/7- Chronic left PCA territory infarct, new since the prior exam. No evidence of acute vascular territorial infarction, intracranial hemorrhage or mass.   2. No hemodynamically significant stenosis, dissection or occlusion of the carotid or vertebral arteries or major vessels of the [place].   Neuro consult pending   MRI consistent with Stroke  ECHO recently obtained outpatient for 9/24- Left ventricular wall thickness is mildly increased. There is mild concentric hypertrophy, EF 60%.  Left and right atrium dilated.  Aortic valve sclerosis and trace tricuspid valve regurg. The aortic root is mildly dilated. The ascending aorta is mildly dilated.   Repeat TTE  Stroke pathway  DVT prophylaxis: Apixaban  PT/OT-pending  ASA 81 mg daily  Statin: Lipitor 80 mg daily     Stroke (cerebrum)  (HCC)  Assessment & Plan  Chronic, noted on CTA head and neck , also seen on MRI  Neuro consult appreciated   See above plan for stroke like symptoms above      Atrial fibrillation (HCC)  Assessment & Plan  He was admitted with atrial flutter with rapid ventricular response in April underwent ablation and loop recorder insertion 4/8/2024  At that time he was started on Eliquis 5 mg twice daily but daughter reports she is in charge of his medications and he is not taking this medication and that it wasn't called into the pharmacy   Restarted on Eliquis     Iron deficiency anemia secondary to inadequate dietary iron intake  Assessment & Plan  Check updated iron panels  Continue with p.o. iron, folic acid  Transfuse for hemoglobin less than 7  Hemoglobin currently stable at 12.6     Hypertension  Assessment & Plan  BP at times low / at goal  Hold lisinopril      Type 2 diabetes mellitus (HCC)  Assessment & Plan        Lab Results   Component Value Date     HGBA1C 8.9 (H) 04/06/2024                Recent Labs     05/10/24  1106 05/10/24  1558 05/10/24  2130 05/11/24  0635   POCGLU 202* 285* 230* 179*            Blood Sugar Average: Last 72 hrs:  (P) 194.2729547138550901  Continue with Lantus 20 units daily at bedtime monitor for steroid-induced hyperglycemia  Continue with SSI coverage  Hypoglycemic protocol        Alcohol abuse  Assessment & Plan  C/w CIWA protocol monitoring     C/w Thiamine, MVI and folic acid                    Medical Problems         Resolved Problems  Date Reviewed: 5/11/2024   None         Discharging Physician / Practitioner: Papa Reed MD  PCP: TATIANA Cotto  Admission Date:   Admission Orders (From admission, onward)          Ordered         05/07/24 1744   INPATIENT ADMISSION  Once                               Discharge Date: 05/11/24     Consultations During Hospital Stay:  Neurology      Procedures Performed:   None     Significant Findings / Test Results:   MRI brain wo  contrast: Multiple infarcts of varying ages in the posterior circulation include multiple small acute/early subacute infarcts in the bilateral cerebellum. Additional small foci of recent ischemia in the left PCA distribution superimposed on chronic infarct, No acute hemorrhage or mass effect,      Incidental Findings:   See above   I reviewed the above mentioned incidental findings with the patient and/or family and they expressed understanding.     Test Results Pending at Discharge (will require follow up):   None     Outpatient Tests Requested:  None     Complications:  None     Reason for Admission: Strokelike symptoms     Hospital Course:   Lucho Talavera is a 69 y.o. male patient who originally presented to the hospital on 5/7/2024 due to strokelike symptoms.  Patient was found to have multiple infarcts of varying ages in the posterior circulation including multiple small acute/early subacute infarcts in bilateral cerebellum.  Additional small foci of recent ischemia in the left PCA distribution superimposed on chronic infarct.  Patient also presented with a COPD exacerbation.  From a COPD exacerbation point of view patient significantly improved.  Patient was further evaluated by neurology and management was optimized.  Patient was evaluated by physical therapy and Occupational Therapy who recommended him for acute rehab.  Patient will be discharged to [facility] acute rehab.           Please see above list of diagnoses and related plan for additional information.      Condition at Discharge: stable     Discharge Day Visit / Exam:   Subjective: Is a very pleasant 69-year-old gentleman who was seen evaluated today at bedside.  Patient has no acute complaints at this time.  Patient is eager to go to acute rehab.  Vitals: Blood Pressure: 164/84 (05/11/24 0749)  Pulse: 93 (05/11/24 0749)  Temperature: (!) 96.9 °F (36.1 °C) (05/11/24 0749)  Temp Source: Oral (05/10/24 0810)  Respirations: 16 (05/11/24  "0749)  Height: 6' 1\" (185.4 cm) (05/09/24 1410)  Weight - Scale: 85.3 kg (188 lb) (05/09/24 1410)  SpO2: 94 % (05/11/24 1301)  Exam:   Physical Exam  Vitals reviewed.   HENT:      Head: Normocephalic.      Nose: No congestion.      Mouth/Throat:      Pharynx: No oropharyngeal exudate or posterior oropharyngeal erythema.   Eyes:      Conjunctiva/sclera: Conjunctivae normal.   Cardiovascular:      Rate and Rhythm: Normal rate and regular rhythm.   Pulmonary:      Effort: Pulmonary effort is normal.   Abdominal:      General: Abdomen is flat.      Palpations: Abdomen is soft.   Skin:     General: Skin is warm and dry.   Neurological:      Mental Status: He is alert and oriented to person, place, and time. Mental status is at baseline.            Discussion with Family: Updated  (daughter) via phone.     Discharge instructions/Information to patient and family:   See after visit summary for information provided to patient and family.       Provisions for Follow-Up Care:  See after visit summary for information related to follow-up care and any pertinent home health orders.       Mobility at time of Discharge:   Basic Mobility Inpatient Raw Score: 13  JH-HLM Goal: 4: Move to chair/commode  JH-HLM Achieved: 4: Move to chair/commode  HLM Goal achieved. Continue to encourage appropriate mobility.     Disposition:   Acute Rehab at [facility]     Planned Readmission: None     Discharge Statement:  I spent 45 minutes discharging the patient. This time was spent on the day of discharge. I had direct contact with the patient on the day of discharge. Greater than 50% of the total time was spent examining patient, answering all patient questions, arranging and discussing plan of care with patient as well as directly providing post-discharge instructions.  Additional time then spent on discharge activities.     Discharge Medications:  See after visit summary for reconciled discharge medications provided to patient " and/or family.       **Please Note: This note may have been constructed using a voice recognition system**

## 2024-05-20 NOTE — ASSESSMENT & PLAN NOTE
Lab Results   Component Value Date    HGBA1C 8.9 (H) 04/06/2024       Recent Labs     05/19/24  1120 05/19/24  1554 05/19/24 2038 05/20/24  0610   POCGLU 114 156* 196* 116         Blood Sugar Average: Last 72 hrs:  (P) 136.5558409531583191  Blood sugars elevated in the setting of recent use of steroids  Home: Lantus 30u qhs/metformin 1g bid  Here: Lantus 16U qhs/sliding scale/metformin 500mg bid (added 5/14/2024)  Cont DM diet  stable

## 2024-05-20 NOTE — PROGRESS NOTES
Physical Medicine and Rehabilitation Progress Note  Lucho Talavera 69 y.o. male MRN: [id number]  Unit/Bed#: -01 Encounter: [id number]    To Review: 69 year old M with PMH of COPD, multitude of ED visits/hospitalizations, DM2, CAD, EtOH abuse, HTN, prior CVA, found to have aflutter with RVR on hospitalization 4/2024 s/p BRITTNEY ablation who was to d/c on Eliquis but per report family did not know he needed it and he was not taking it who developed dysarthria, weakness and imbalance with recent fall at home and presented to hospital. CTA H/N showed Chronic left PCA territory infarct, new since the prior exam. No evidence of acute vascular territorial infarction, intracranial hemorrhage or mass.  No hemodynamically significant stenosis, dissection or occlusion of the carotid or vertebral arteries or major vessels of the [place].  Neuro consulted and recommended MRI brain which showed multiple infarcts of varying ages in the posterior circulation include multiple small acute/early subacute infarcts in the bilateral cerebellum. TTE did not show thrombus.  Neuro recommended Eliquis, aspirin, and statin. Patient was evaluated by skilled therapies and was found to have significant decline in ADLs and ambulation and appears appropriate for admission to [facility].     Chief Complaint: Poor sleep    Interval History/Subjective:  No acute events overnight. Behavioral/sleep log not filled out. Didn't sleep, but also drank soda throughout the night. Discussed with patient and he is ok with going caffeine free, and limiting sodas in the afternoon. I discussed with staff and put it on his sign out as well. No new fevers, chills, N/V, abdominal pain. Last BM 5/19. He is wearing his NC today, but reports feeling fine with no new CP/SOB. Has not take ativan since 5/11    ROS:  A 10 point review of systems was negative except for what is noted in the HPI.    Today's Changes:  Reviewed PDMP -  last prescribed Lorazepam by Dr. Villalpando in July of last year, most recently by a hospitalist PA at [facility] (for only 10 tablets).  Has not used since 5/11. Will switch to hydroxyzine  Continue mirtazapine at current dose  Would benefit from Geriatric outpatient f/u.   2.  Caffeine free sodas only. Added caffeine free only marker on diet.   3. Start sleep/behavior logs again.    Total visit time: 35 minutes, with more than 50% spent counseling/coordinating care. Counseling includes discussion with patient re: progress in therapies, functional issues observed by therapy staff, and discussion with patient regarding their current medical state and wellbeing. Coordination of patient's care was performed in conjunction with Internal Medicine service to monitor patient's labs, vitals, and management of their comorbidities.    Assessment/Plan:    * Stroke (cerebrum) ([facility])  Assessment & Plan  - MRI brain 5/8 - Multiple infarcts of varying ages in the posterior circulation include multiple small acute/early subacute infarcts in the bilateral cerebellum. Additional small foci of recent ischemia in the left PCA distribution superimposed on chronic infarct  No acute hemorrhage or mass effect  - CTA H/N -  1. Chronic left PCA territory infarct, new since the prior exam. No evidence of acute vascular territorial infarction,  intracranial hemorrhage or mass. 2. No hemodynamically significant stenosis, dissection or occlusion of the carotid or vertebral arteries or major vessels of the [place] of [place].  - Residual impairments on admission to [facility]: Impaired balance, coordination, possibly vision  - Co-morbidities most impacting functional recovery: COPD with chronic hypoxic respiratory failure, anxiety    Secondary stroke prevention  - Antithrombotic: Aspirin and Eliquis  - Statin  - Patient at increased risk for stroke particularly early in post-stroke period - monitor neuro exam closely with low threshold to repeat imaging  -   patient and if applicable caregiver on optimal stroke management  - Follow-up with neurology and PCP after d/c   - Recommend acute comprehensive interdisciplinary inpatient rehabilitation to include intensive skilled therapies (PT, OT, ST) as outlined with oversight and management by rehabilitation physician as well as inpatient rehab level nursing, case management and weekly interdisciplinary team meetings.       Sacral wound  Assessment & Plan  Seen by wound care 5/7  - Managed R lateral forearm skin tear   - Noted to have old open area on sacrum on admission.   - Consulted wound care to follow-up. Epithelialized   - Offloading,specialty cushion   - Allevyn   - Monitor closely.       Chronic bronchitis (HCC)  Assessment & Plan  Has had a multitude of ED visits related to COPD/concern for SOB but was frequently d/c'd same day  - Monitor also for anxiety and optimal mgmt of that; education on monitor home O2  IM consulted and with overall management at their discretion during ARC course  Monitor lung exam, vitals with and without activity  Encourage incentive spirometry  Breo/Incruse  Xopenex  PRN Albuterol  PRN supplemental O2   Mucinex     Insomnia  Assessment & Plan  Environmental interventions.  Sleep log  Drinks a lot of caffeinated sodas throughout the night   - Placed no caffeine order and review with nursing.   Added melatonin scheduled  5/16 added mirtazapine for both sleep side effect and anxiety.     Typical atrial flutter (HCC)  Assessment & Plan  IM consulted and with overall management at their discretion during ARC course  Rate control: None  Antithrombotic: Eliquis    - Cost is $71.28/month. Daughter confirmed this price is fine.      Hypertension  Assessment & Plan  Home: Lisinopril 40mg daily  Here: Lisinopril 10mg daily  Monitor vitals with and without activity; monitor for orthostasis  Monitor hemoglobin, electrolytes, kidney function, hydration status   Management as per IM.        Anxiety  Assessment & Plan  Cries out for help at times with anxiety  Asks for breathing treatments/O2 when this happens, but usually O2 sats and breath sounds are fine.  Reviewed PDMP - last prescribed Lorazepam by Dr. Villalpando in July of last year, most recently by a hospitalist PA at [facility] (for only 10 tablets).  Has not used since 5/11. Will switch to hydroxyzine  Adding mirtazapine at night to also assist with sleep/insomnia   - Improved.  Monitor response.     ETOH abuse  Assessment & Plan  Patient reports cutting down and only about 2 beers per day but occasionally liquor   Alcohol cessation counseling and supportive counseling  Optimal mood/stress mgmt   Thiamine, folate, MVI   PRN low dose ativan BID   Consider gabapentin as well     Type 2 diabetes mellitus (HCC)  Assessment & Plan  Lab Results   Component Value Date    HGBA1C 8.9 (H) 04/06/2024       Recent Labs     05/19/24  1554 05/19/24  2038 05/20/24  0610 05/20/24  1121   POCGLU 156* 196* 116 109       Blood Sugar Average: Last 72 hrs:  (P) 262.5  Home: Metformin 1000mg Q12hr, Lantus 30 units at bedtime  Current meds: Lantus 16, Metformin 500mg Q12hr, HS, Lispro SS AC/HS, consistent carb diabetic diet  Nutrition consult  Management as per IM  Outpatient f/u with PCP      CAD (coronary artery disease)  Assessment & Plan  With hx of stenting  IM consulted and with overall management at their discretion during ARC course  Antithrombotic: Aspirin and Eliquis   Statin  Optimal blood pressure and blood sugar control          Health Maintenance  #Delirium/Sleep: At risk. Optimize sleep/wake, pain, bowel, bladder management. Avoid deliriogenic meds. Of note is on melatonin, ativan, and oxycodone PRN.   #Pain: Tylenol PRN  #Bowel: Last BM 5/19 and continent. Adding PRN miralax  #Bladder: Voiding and continent  #Skin/Pressure Injury Prevention: Turn Q2hr in bed, with weight shifts [id number] in wheelchair.  #DVT Prophylaxis:Fully anticoagulated on eliquis,  "SCDs  #GI Prophylaxis: PPI on for GERD  #Code Status: Full Code.   #FEN: Diabetic Diet  #Dispo: Team 5/14: ADD 5/30 with home RN/PT/OT/SLP with goals to discharge to 1 level home with 2 HETAL, with supervision. Daughter for family training this week.   Will need f/u with Pulm, PCP, and Neuro    Objective:    Functional Update:  PT: min-modA transfers, Sup-Deanna bed mobility ,min-modA x2 (CF for 2nd person) ambulation with RW, min-mod A x2 for stairs   OT: Ind eating, Deanna grooming, Deanna bathing, Sup UB dressing, Deanna LB dressing, Deanna toileting, Deanna tub/shower transfers, Deanna toilet transfers   SLP: Moderate cognitive linguistic impairments on CLQT, mild oropharyngeal dysphagia    Allergies per EMR    Physical Exam:  Temp:  [97.6 °F (36.4 °C)-98.2 °F (36.8 °C)] 98.1 °F (36.7 °C)  HR:  [75-86] 77  Resp:  [16-18] 18  BP: (116-136)/(71-75) 136/71  Oxygen Therapy  SpO2: 98 %  O2 Flow Rate (L/min): 2 L/min    Gen: No acute distress, Well-nourished, well-appearing.  HEENT: Moist mucus membranes, Normocephalic/Atraumatic  Cardiovascular: Regular rate, rhythm, S1/S2. Distal pulses palpable  Heme/Extr: No edema  Pulmonary: Non-labored breathing. Lungs CTAB. Wearing 2L NC.   : No antony  GI: Soft, non-tender, non-distended. BS+  MSK: ROM is WFL in all extremities. No effusions or deformities. Bulk is symmetric. See below for MMT scores.   Integumentary: Skin is warm, dry.   Neuro: Awake, alert. Impaired insight and recall.   Psych: Normal mood and blunted affect.     Diagnostic Studies: Reviewed, no new imaging    Laboratory:  Reviewed         Invalid input(s): \"PLTCT\"          Invalid input(s): \"CA\"           Patient Active Problem List   Diagnosis    Closed odontoid fracture with routine healing    Closed displaced fracture of third cervical vertebra (HCC)    Closed displaced fracture of fourth cervical vertebra (HCC)    CAD (coronary artery disease)    Dens fracture (HCC)    Acute blood loss anemia    Type 2 diabetes " mellitus (HCC)    S/P C1-T1 Posterior Cervical Discectomy and Fusion on 9/10/18    At moderate risk for venous thromboembolism (VTE)    Chronic bronchitis (HCC)    Closed fracture of first cervical vertebra ([facility])    ETOH abuse    KLARISSA (obstructive sleep apnea)    Dirty living conditions    Bronchitis    Diabetic polyneuropathy associated with type 2 diabetes mellitus ([facility])    Hammertoe, bilateral    Post-traumatic arthritis of left foot    Pain due to onychomycosis of toenail    Oral abscess    Tooth fracture    Closed nondisplaced fracture of posterior arch of first cervical vertebra ([facility])    Fall    Stage 3 severe COPD by GOLD classification ([facility])    Anxiety    Hypertension    COPD (chronic obstructive pulmonary disease) ([facility])    SIRS (systemic inflammatory response syndrome) ([facility])    History of alcohol abuse    Iron deficiency anemia secondary to inadequate dietary iron intake    COVID-19    Atrial flutter with rapid ventricular response ([facility])    Leukocytosis    Typical atrial flutter ([facility])    Stroke (cerebrum) ([facility])    Stroke-like symptom    Sacral wound    Insomnia    History of cardiac arrest         Medications  Current Facility-Administered Medications   Medication Dose Route Frequency Provider Last Rate    acetaminophen  650 mg Oral Q6H PRN Richmond Chew, DO      albuterol  2 puff Inhalation Q4H PRN William Camacho, DO      albuterol  2.5 mg Nebulization Q6H PRN Richmond Chew, DO      apixaban  5 mg Oral BID Richmond Chew, DO      aspirin  81 mg Oral Daily Richmond Chew, DO      atorvastatin  80 mg Oral Daily With Dinner Richmond Chew, DO      busPIRone  10 mg Oral TID Richmond Chew, DO      ferrous sulfate  325 mg Oral Daily With Breakfast Richmond Chew, DO      fluticasone  1 spray Nasal BID Richmond Chew, DO      fluticasone-vilanterol  1 puff Inhalation Daily Richmond Chew, DO      folic acid  1 mg Oral Daily Richmond Chew, DO      guaiFENesin  600 mg  Oral BID Harkaitlynkumar Chew, DO      insulin glargine  16 Units Subcutaneous HS Alis TATIANA Spring      insulin lispro  1-5 Units Subcutaneous TID AC Richmond Chew, DO      LORazepam  0.5 mg Oral BID PRN Richmond Chew, DO      melatonin  6 mg Oral QPM Ashley Depadua, MD      metFORMIN  500 mg Oral BID With Meals TATIANA Paulino      mirtazapine  7.5 mg Oral QPM Ashley Depadua, MD      multivitamin-minerals  1 tablet Oral Daily Richmond Chew, DO      oxyCODONE  2.5 mg Oral Q4H PRN Richmond Chew, DO      pantoprazole  40 mg Oral Early Morning Harzay Chew, DO      thiamine  100 mg Oral Daily Melaniaumar Chew, DO      umeclidinium  1 puff Inhalation Daily Richmond Chew, DO            ** Please Note: Fluency Direct voice to text software may have been used in the creation of this document. **

## 2024-05-20 NOTE — ASSESSMENT & PLAN NOTE
Previously non compliant with eliquis. Spoke with the pharmacist at [facility] and it was prescribed but never picked up.  Cost of Eliquis is $71.28. Called pt's daughter and she states that cost is fine  CTA with chronic left PCA territory infarction  MRI with multiple infarcts of varying ages and posterior circulation including multiple small acute/early subacute infarct in the bilateral cerebellum with additional small foci of recent ischemia in the left PCA distribution superimposed on chronic infarct  Echo with EF 55%, no cardiac thrombus  Continue aspirin and Eliquis/statin  Outpatient follow-up with neurology  Therapy per PMR  DC 5/30/24=family aware. Training needs to be rescheduled.

## 2024-05-20 NOTE — PROGRESS NOTES
"Progress Note - Wound   Lucho Talavera 69 y.o. male MRN: [id number]  Unit/Bed#: -01 Encounter: [id number]      Assessment:  Abrasion  Skin tear of the left upper extremity, initial encounter  Type 2 diabetes with long-term use of insulin      Plan:  Left upper arm skin tear is resolved on exam.  Okay to leave open to air.  Right lateral ankle with abrasion.  Will recommend wound management of silicone bordered foam dressing every other day.  No pressure injury or open wound to the bilateral sacral buttocks.  No clinical s/s of infection present to the above areas  Recommend to continue with preventative nursing skin care measures in place  Pressure relief- offloading of pressure with turning/repositioning as patient medically tolerates, heel elevation, foam wedges for offloading/repositioning, and waffle cushion to chair.  Nutrition is following  Patient verbalized understanding of plan of care.  [facility] wound care team with questions or concerns.   Routine wound care follow-up while admitted. AVS updated.       Subjective:  Wound care to see patient for follow-up visit of left upper extremity skin tear and sacral wound.  Per chart review patient with new abrasion to the right lateral ankle.  Patient alert and awake, cooperative and agreeable for the assessment.  Patient seen out of bed in recliner chair sitting on offloading seating cushion.  Per chart patient is continent of urine and incontinent of bowel.  Patient is able to stand for the assessment using rolling walker and moderate assist of 1. Denies fever, chills, or increased pain related to the wound.     Objective:      Vitals: Blood pressure 139/80, pulse 88, temperature 98.4 °F (36.9 °C), temperature source Oral, resp. rate 18, height 6' 1\" (1.854 m), weight 81.2 kg (179 lb), SpO2 96%.,Body mass index is 23.62 kg/m².    Focused Physical Exam:  Left upper extremity skin tear is resolved on exam.  No open aspect, redness, or drainage.  Noted " intact epithelium with small amount of well adhered dry scabbing.  B/l heels are dry and intact without redness or wounds.   Bilateral sacral buttocks are dry and intact with blanchable pink/hyperpigmented skin and scar tissue. No open aspect, redness, or drainage.  Skin is nontender upon palpation.  There are no temperature or texture differences noted to the skin. No maceration present.   Right lateral ankle with irregular shaped abrasion.  Wound is partial thickness. Wound is 100% moist yellow tissue that is producing scant amount of serosanguineous drainage.  Edges fragile attached without maceration.  Periwound is dry and intact. No edema to RLE.    No induration, fluctuance, odor, warmth/temperature differences, redness, or purulence noted to the above mentioned wounds and skin areas assessed. New dressings applied as noted above. Patient tolerated assessment well- denies pain and no s/s of non-verbal pain or discomfort observed during the encounter.        Lab, Imaging and other studies: I have personally reviewed pertinent reports.        Wound 05/08/24 Skin tear Arm Anterior;Left;Inferior (Active)   Wound Image   05/20/24 1130   Wound Length (cm) 0 cm 05/20/24 1130   Wound Width (cm) 0 cm 05/20/24 1130   Wound Depth (cm) 0 cm 05/20/24 1130   Wound Surface Area (cm^2) 0 cm^2 05/20/24 1130   Wound Volume (cm^3) 0 cm^3 05/20/24 1130   Calculated Wound Volume (cm^3) 0 cm^3 05/20/24 1130   Change in Wound Size % 100 05/20/24 1130   Wound 05/19/24 Abrasion(s) Ankle Right (Active)   Wound Image   05/20/24 1129   Wound Length (cm) 1 cm 05/20/24 1129   Wound Width (cm) 1 cm 05/20/24 1129   Wound Depth (cm) 0.1 cm 05/20/24 1129             Total time spent today:    Total time (face-to-face and non-face-to-face) spent on today's visit was 20 minutes. This includes preparation for the visits (previous wound care notes/images and PMR 5/20/24) performance of a medically appropriate history and examination, and orders  "for medications/treatments or testing.  Discussed assessment findings, and plan of care/recommendations with patients RN.      TATIANA Molina, FNP-C, CWON      Portions of the record may have been created with voice recognition software.  Occasional wrong word or \"sound a like\" substitutions may have occurred due to the inherent limitations of voice recognition software.  Read the chart carefully and recognize, using context, where substitutions have occurred      "

## 2024-05-20 NOTE — PROGRESS NOTES
"   05/20/24 1000   Pain Assessment   Pain Score No Pain   Restrictions/Precautions   Precautions Aspiration;Bed/chair alarms;Cognitive;Fall Risk;Hard of hearing;Supervision on toilet/commode   Cognition   Arousal/Participation Alert;Cooperative   Attention Attends with cues to redirect   Memory Decreased short term memory;Decreased recall of recent events;Decreased recall of precautions   Following Commands Follows one step commands with increased time or repetition   Subjective   Subjective \"Okay, let's go\"   Lying to Sitting on Side of Bed   Type of Assistance Needed Supervision   Lying to Sitting on Side of Bed CARE Score 4   Sit to Stand   Type of Assistance Needed Incidental touching;Adaptive equipment   Comment CGA, CS from higher surface   Sit to Stand CARE Score 4   Bed-Chair Transfer   Type of Assistance Needed Adaptive equipment;Incidental touching   Chair/Bed-to-Chair Transfer CARE Score 4   Transfer Bed/Chair/Wheelchair   Adaptive Equipment Roller Walker   Walk 10 Feet   Type of Assistance Needed Incidental touching;Adaptive equipment   Comment with RW   Walk 10 Feet CARE Score 4   Walk 50 Feet with Two Turns   Type of Assistance Needed Incidental touching;Adaptive equipment   Comment with RW   Walk 50 Feet with Two Turns CARE Score 4   Walk 150 Feet   Type of Assistance Needed Incidental touching;Adaptive equipment;Physical assistance   Physical Assistance Level 25% or less   Comment with RW   Walk 150 Feet CARE Score 3   Walking 10 Feet on Uneven Surfaces   Type of Assistance Needed Physical assistance;Verbal cues;Adaptive equipment   Comment with RW over floor mat   Walking 10 Feet on Uneven Surfaces CARE Score -   Ambulation   Primary Mode of Locomotion Prior to Admission Walk   Distance Walked (feet) 150 ft  (X 2, 100 X 1 , shorter distances for task)   Assist Device Roller Walker   Gait Pattern Decreased foot clearance;Forward Flexion;L foot drag;Improper weight shift   Limitations Noted In " "Balance;Coordination   Walk Assist Level Contact Guard   Does the patient walk? 2. Yes   Wheel 50 Feet with Two Turns   Reason if not Attempted Activity not applicable   Wheel 50 Feet with Two Turns CARE Score 9   Wheel 150 Feet   Reason if not Attempted Activity not applicable   Wheel 150 Feet CARE Score 9   Wheelchair mobility   Does the patient use a wheelchair? 0. No   Curb or Single Stair   Style negotiated Single stair   Type of Assistance Needed Incidental touching;Adaptive equipment   Comment using B HR   1 Step (Curb) CARE Score 4   4 Steps   Type of Assistance Needed Physical assistance   Physical Assistance Level 25% or less   Comment using B HR   4 Steps CARE Score 3   12 Steps   Reason if not Attempted Safety concerns   12 Steps CARE Score 88   Stairs   Type Stairs   # of Steps 4   Weight Bearing Precautions Fall Risk   Assist Devices Bilateral Rail   Therapeutic Interventions   Strengthening LAQ and hip flex with 3# wts, add squeeze with ball, hip abd and hamstring curl with blue TB   Flexibility B hamstring and gastroc stretching   Neuromuscular Re-Education walking for waite and side stepping along railing X 2 rounds each   Equipment Use   NuStep Level 2 B LE and UE X 12 mins, spm bet 50-70   Other Comments   Comments (S)  Spo2 at RA bet 93-95% , Bob exertion scale bet 10-12   Assessment   Treatment Assessment Pt. able to engaged in 90 mins session and was able to tolerate above activities focused on functional mobility using RW, LE strengthening, and improving overall endurance  as well as NMR with only using 1 UE support. Pt. though does not feel comfortable holding on to rail and seeks HHA from this therapist to complete task as he feels \"unsteady \" just holding on with 1 hand. Pt. had no LOB noted but did c/o one instance of lightheadedness durng railing acitivities. orhtistatic BP were assessed and was negative. No other complaints reported by pt. Pt. assisted to recliner at end of session and " lunch was set up after PCA did a BS check. Cont with PT to achieve goals in POC.   Problem List Decreased strength;Decreased endurance;Decreased mobility;Impaired balance;Decreased coordination;Decreased cognition;Impaired judgement;Decreased safety awareness;Decreased skin integrity   Barriers to Discharge Inaccessible home environment;Decreased caregiver support   PT Barriers   Functional Limitation Car transfers;Ramp negotiation;Stair negotiation;Standing;Transfers;Walking   Plan   Treatment/Interventions Functional transfer training;LE strengthening/ROM;Elevations;Therapeutic exercise;Endurance training;Patient/family training;Equipment eval/education;Bed mobility;Gait training   Discharge Recommendation   Rehab Resource Intensity Level, PT   (Out patient vs home PT)   Equipment Recommended Walker  (vs LRAD)   PT Therapy Minutes   PT Time In 1000   PT Time Out 1130   PT Total Time (minutes) 90   PT Mode of treatment - Individual (minutes) 90   PT Mode of treatment - Concurrent (minutes) 0   PT Mode of treatment - Group (minutes) 0   PT Mode of treatment - Co-treat (minutes) 0   PT Mode of Treatment - Total time(minutes) 90 minutes   PT Cumulative Minutes 703   Therapy Time missed   Time missed? No

## 2024-05-20 NOTE — PLAN OF CARE
Problem: Prexisting or High Potential for Compromised Skin Integrity  Goal: Skin integrity is maintained or improved  Description: INTERVENTIONS:  - Identify patients at risk for skin breakdown  - Assess and monitor skin integrity  - Assess and monitor nutrition and hydration status  - Monitor labs   - Assess for incontinence   - Turn and reposition patient  - Assist with mobility/ambulation  - Relieve pressure over bony prominences  - Avoid friction and shearing  - Provide appropriate hygiene as needed including keeping skin clean and dry  - Evaluate need for skin moisturizer/barrier cream  - Collaborate with interdisciplinary team   - Patient/family teaching  - Consider wound care consult   Outcome: Progressing     Problem: PAIN - ADULT  Goal: Verbalizes/displays adequate comfort level or baseline comfort level  Description: Interventions:  - Encourage patient to monitor pain and request assistance  - Assess pain using appropriate pain scale  - Administer analgesics based on type and severity of pain and evaluate response  - Implement non-pharmacological measures as appropriate and evaluate response  - Consider cultural and social influences on pain and pain management  - Notify physician/advanced practitioner if interventions unsuccessful or patient reports new pain  Outcome: Progressing     Problem: INFECTION - ADULT  Goal: Absence or prevention of progression during hospitalization  Description: INTERVENTIONS:  - Assess and monitor for signs and symptoms of infection  - Monitor lab/diagnostic results  - Monitor all insertion sites, i.e. indwelling lines, tubes, and drains  - Monitor endotracheal if appropriate and nasal secretions for changes in amount and color  - [place] appropriate cooling/warming therapies per order  - Administer medications as ordered  - Instruct and encourage patient and family to use good hand hygiene technique  - Identify and instruct in appropriate isolation precautions for  identified infection/condition  Outcome: Progressing     Problem: SAFETY ADULT  Goal: Patient will remain free of falls  Description: INTERVENTIONS:  - Educate patient/family on patient safety including physical limitations  - Instruct patient to call for assistance with activity   - Consult OT/PT to assist with strengthening/mobility   - Keep Call bell within reach  - Keep bed low and locked with side rails adjusted as appropriate  - Keep care items and personal belongings within reach  - Initiate and maintain comfort rounds  - Make Fall Risk Sign visible to staff  - Offer Toileting every  Hours, in advance of need  - Initiate/Maintain alarm  - Obtain necessary fall risk management equipment:   - Apply yellow socks and bracelet for high fall risk patients  - Consider moving patient to room near nurses station  Outcome: Progressing  Goal: Maintain or return to baseline ADL function  Description: INTERVENTIONS:  -  Assess patient's ability to carry out ADLs; assess patient's baseline for ADL function and identify physical deficits which impact ability to perform ADLs (bathing, care of mouth/teeth, toileting, grooming, dressing, etc.)  - Assess/evaluate cause of self-care deficits   - Assess range of motion  - Assess patient's mobility; develop plan if impaired  - Assess patient's need for assistive devices and provide as appropriate  - Encourage maximum independence but intervene and supervise when necessary  - Involve family in performance of ADLs  - Assess for home care needs following discharge   - Consider OT consult to assist with ADL evaluation and planning for discharge  - Provide patient education as appropriate  Outcome: Progressing  Goal: Maintains/Returns to pre admission functional level  Description: INTERVENTIONS:  - Perform AM-PAC 6 Click Basic Mobility/ Daily Activity assessment daily.  - Set and communicate daily mobility goal to care team and patient/family/caregiver.   - Collaborate with  rehabilitation services on mobility goals if consulted  - Perform Range of Motion  times a day.  - Reposition patient every  hours.  - Dangle patient  times a day  - Stand patient  times a day  - Ambulate patient  times a day  - Out of bed to chair  times a day   - Out of bed for meals  times a day  - Out of bed for toileting  - Record patient progress and toleration of activity level   Outcome: Progressing     Problem: DISCHARGE PLANNING  Goal: Discharge to home or other facility with appropriate resources  Description: INTERVENTIONS:  - Identify barriers to discharge w/patient and caregiver  - Arrange for needed discharge resources and transportation as appropriate  - Identify discharge learning needs (meds, wound care, etc.)  - Arrange for interpretive services to assist at discharge as needed  - Refer to Case Management Department for coordinating discharge planning if the patient needs post-hospital services based on physician/advanced practitioner order or complex needs related to functional status, cognitive ability, or social support system  Outcome: Progressing

## 2024-05-21 ENCOUNTER — HOME HEALTH ADMISSION (OUTPATIENT)
Dept: HOME HEALTH SERVICES | Facility: HOME HEALTHCARE | Age: 69
End: 2024-05-21
Payer: MEDICARE

## 2024-05-21 LAB
GLUCOSE SERPL-MCNC: 101 MG/DL (ref 65–140)
GLUCOSE SERPL-MCNC: 112 MG/DL (ref 65–140)
GLUCOSE SERPL-MCNC: 121 MG/DL (ref 65–140)
GLUCOSE SERPL-MCNC: 203 MG/DL (ref 65–140)

## 2024-05-21 PROCEDURE — 97530 THERAPEUTIC ACTIVITIES: CPT

## 2024-05-21 PROCEDURE — 82948 REAGENT STRIP/BLOOD GLUCOSE: CPT

## 2024-05-21 PROCEDURE — 97130 THER IVNTJ EA ADDL 15 MIN: CPT

## 2024-05-21 PROCEDURE — 97129 THER IVNTJ 1ST 15 MIN: CPT

## 2024-05-21 PROCEDURE — 97110 THERAPEUTIC EXERCISES: CPT

## 2024-05-21 PROCEDURE — 97112 NEUROMUSCULAR REEDUCATION: CPT

## 2024-05-21 PROCEDURE — 97116 GAIT TRAINING THERAPY: CPT

## 2024-05-21 PROCEDURE — 99232 SBSQ HOSP IP/OBS MODERATE 35: CPT | Performed by: PHYSICAL MEDICINE & REHABILITATION

## 2024-05-21 PROCEDURE — 94760 N-INVAS EAR/PLS OXIMETRY 1: CPT

## 2024-05-21 PROCEDURE — 94640 AIRWAY INHALATION TREATMENT: CPT

## 2024-05-21 PROCEDURE — 97535 SELF CARE MNGMENT TRAINING: CPT

## 2024-05-21 PROCEDURE — 99232 SBSQ HOSP IP/OBS MODERATE 35: CPT | Performed by: INTERNAL MEDICINE

## 2024-05-21 RX ORDER — INSULIN GLARGINE 100 [IU]/ML
18 INJECTION, SOLUTION SUBCUTANEOUS
Status: DISCONTINUED | OUTPATIENT
Start: 2024-05-21 | End: 2024-05-29 | Stop reason: HOSPADM

## 2024-05-21 RX ORDER — LISINOPRIL 5 MG/1
5 TABLET ORAL DAILY
Status: DISCONTINUED | OUTPATIENT
Start: 2024-05-21 | End: 2024-05-28

## 2024-05-21 RX ADMIN — APIXABAN 5 MG: 5 TABLET, FILM COATED ORAL at 17:22

## 2024-05-21 RX ADMIN — THIAMINE HCL TAB 100 MG 100 MG: 100 TAB at 09:02

## 2024-05-21 RX ADMIN — ATORVASTATIN CALCIUM 80 MG: 80 TABLET, FILM COATED ORAL at 16:24

## 2024-05-21 RX ADMIN — BUSPIRONE HYDROCHLORIDE 10 MG: 10 TABLET ORAL at 20:32

## 2024-05-21 RX ADMIN — UMECLIDINIUM 1 PUFF: 62.5 AEROSOL, POWDER ORAL at 09:06

## 2024-05-21 RX ADMIN — METFORMIN HYDROCHLORIDE 500 MG: 500 TABLET, FILM COATED ORAL at 09:02

## 2024-05-21 RX ADMIN — FERROUS SULFATE TAB 325 MG (65 MG ELEMENTAL FE) 325 MG: 325 (65 FE) TAB at 09:03

## 2024-05-21 RX ADMIN — BUSPIRONE HYDROCHLORIDE 10 MG: 10 TABLET ORAL at 09:02

## 2024-05-21 RX ADMIN — INSULIN GLARGINE 18 UNITS: 100 INJECTION, SOLUTION SUBCUTANEOUS at 20:33

## 2024-05-21 RX ADMIN — FLUTICASONE PROPIONATE 1 SPRAY: 50 SPRAY, METERED NASAL at 17:23

## 2024-05-21 RX ADMIN — BUSPIRONE HYDROCHLORIDE 10 MG: 10 TABLET ORAL at 16:24

## 2024-05-21 RX ADMIN — MIRTAZAPINE 7.5 MG: 15 TABLET, FILM COATED ORAL at 20:32

## 2024-05-21 RX ADMIN — FLUTICASONE PROPIONATE 1 SPRAY: 50 SPRAY, METERED NASAL at 09:06

## 2024-05-21 RX ADMIN — LISINOPRIL 5 MG: 5 TABLET ORAL at 09:02

## 2024-05-21 RX ADMIN — FOLIC ACID 1 MG: 1 TABLET ORAL at 09:01

## 2024-05-21 RX ADMIN — METFORMIN HYDROCHLORIDE 500 MG: 500 TABLET, FILM COATED ORAL at 17:22

## 2024-05-21 RX ADMIN — APIXABAN 5 MG: 5 TABLET, FILM COATED ORAL at 09:02

## 2024-05-21 RX ADMIN — FLUTICASONE FUROATE AND VILANTEROL TRIFENATATE 1 PUFF: 200; 25 POWDER RESPIRATORY (INHALATION) at 09:06

## 2024-05-21 RX ADMIN — GUAIFENESIN 600 MG: 600 TABLET, EXTENDED RELEASE ORAL at 09:02

## 2024-05-21 RX ADMIN — Medication 1 TABLET: at 09:01

## 2024-05-21 RX ADMIN — GUAIFENESIN 600 MG: 600 TABLET, EXTENDED RELEASE ORAL at 17:23

## 2024-05-21 RX ADMIN — Medication 6 MG: at 20:32

## 2024-05-21 RX ADMIN — PANTOPRAZOLE SODIUM 40 MG: 40 TABLET, DELAYED RELEASE ORAL at 06:08

## 2024-05-21 RX ADMIN — ASPIRIN 81 MG CHEWABLE TABLET 81 MG: 81 TABLET CHEWABLE at 09:02

## 2024-05-21 RX ADMIN — ALBUTEROL SULFATE 2.5 MG: 2.5 SOLUTION RESPIRATORY (INHALATION) at 17:54

## 2024-05-21 NOTE — PROGRESS NOTES
"[facility]  Progress Note  Name: Lucho Talavera I  MRN: [id number]  Unit/Bed#: -01 I Date of Admission: 5/11/2024   Date of Service: 5/21/2024 I Hospital Day: 10    Assessment & Plan   * Stroke (cerebrum) (HCC)  Assessment & Plan  Presented with increased weakness, dysarthria and leaning on the left side x 5 days with fall 2 days prior to admission in the setting of noncompliance with Eliquis  CTA with chronic left PCA territory infarction  MRI with multiple infarcts of varying ages and posterior circulation including multiple small acute/early subacute infarct in the bilateral cerebellum with additional small foci of recent ischemia in the left PCA distribution superimposed on chronic infarct  Echo with EF 55%, no cardiac thrombus  Maintained on aspirin and Eliquis  Eliquis is to be $71.28 = daughter aware and OK with the price  Continue Lipitor 80 mg daily  Outpatient follow-up with neurology  DC tentative 5/30/2024    Typical atrial flutter (HCC)  Assessment & Plan  Previously admitted for atrial flutter with RVR in 4/8/2024 s/p ablation and loop recorder insertion.    Sutures removed 5/17/24  Anticoagulated on Eliquis      Hypertension  Assessment & Plan  Home:  Lisinopril 40mg qd  Here:  resume lisinopril 5mg qd (5/21/2024)        Type 2 diabetes mellitus (HCC)  Assessment & Plan  HbA1C 8.9 on 4/6/24  Home:  Basaglar 30U qhs/Metformin  Here:  Lantus 18U qhs/Metformin 500mg BID  Continue with accuchecks/SSI/DM diet      ETOH abuse  Assessment & Plan  History of alcohol abuse  Monitor off CIWA protocol - Pt now post withdrawal window   Ativan as needed for anxiety  Continue supplements with thiamine, folate, MVI                 The above assessment and plan was reviewed and updated as determined by my evaluation of the patient on 5/21/2024.    Labs:             Invalid input(s): \"LABGLOM\", \"CMP\"          Results from last 7 days   Lab Units 05/21/24  0609 " 05/20/24 2052 05/20/24  1549   POC GLUCOSE mg/dl 112 211* 146*       Imaging  No orders to display       Review of Scheduled Meds:  Current Facility-Administered Medications   Medication Dose Route Frequency Provider Last Rate    acetaminophen  650 mg Oral Q6H PRN [facility]      albuterol  2 puff Inhalation Q4H PRN William Camacho, DO      albuterol  2.5 mg Nebulization Q6H PRN [facility]      apixaban  5 mg Oral BID [facility]      aspirin  81 mg Oral Daily [facility] DO      atorvastatin  80 mg Oral Daily With Dinner [facility]      busPIRone  10 mg Oral TID [facility] DO      ferrous sulfate  325 mg Oral Daily With Breakfast [facility] DO      fluticasone  1 spray Nasal BID [facility], DO      fluticasone-vilanterol  1 puff Inhalation Daily [facility] DO      folic acid  1 mg Oral Daily [facility] DO      guaiFENesin  600 mg Oral BID [facility], [facility]      hydrOXYzine HCL  25 mg Oral BID PRN Ashley Depadua, MD      insulin glargine  16 Units Subcutaneous HS TATIANA Lindsey      insulin lispro  1-5 Units Subcutaneous TID AC [facility]      lisinopril  5 mg Oral Daily TATIANA Paulino      melatonin  6 mg Oral QPM Ashley Depadua, MD      metFORMIN  500 mg Oral BID With Meals TATIANA Paulino      mirtazapine  7.5 mg Oral QPM Ashley Depadua, MD      multivitamin-minerals  1 tablet Oral Daily [facility], [facility]      oxyCODONE  2.5 mg Oral Q4H PRN [facility] DO      pantoprazole  40 mg Oral Early Morning [facility] DO      thiamine  100 mg Oral Daily [facility], [facility]      umeclidinium  1 puff Inhalation Daily [facility], [facility]         Subjective/ HPI: Patient seen and examined. Patients overnight issues or events were reviewed with nursing staff. New or overnight issues include the following:     Pt seen in therapy. Counting down his days to  discharge. No complaints    ROS:   A 10 point ROS was performed; negative except as noted above.        *Labs /Radiology studies Reviewed  *Medications  reviewed and reconciled as needed  *Please refer to order section for additional ordered labs studies      Physical Examination:  Vitals:   Vitals:    05/20/24 1715 05/20/24 2011 05/21/24 0503 05/21/24 0902   BP:  123/60 140/64 116/63   BP Location:  Left arm Right arm    Pulse:  95 67    Resp:  20 20    Temp:  98.7 °F (37.1 °C) 98.2 °F (36.8 °C)    TempSrc:  Oral Oral    SpO2: 100% 92% 99%    Weight:       Height:           General Appearance: NAD; pleasant  HEENT: PERRLA, conjuctiva normal; mucous membranes moist; face symmetrical  Neck:  Supple  Lungs: clear bilaterally, normal respiratory effort, no retractions, expiratory effort normal, on room air  CV: regular rate and rhythm, no murmurs rubs or gallops noted   ABD: soft non tender, +BS x4  EXT: DP pulses intact, no lymphadenopathy, no edema, mild right hemiparesis  Skin: normal turgor, normal texture, no rash  Psych: affect normal, mood normal  Neuro: AAOx3       The above physical exam was reviewed and updated as determined by my evaluation of the patient on 5/21/2024.    Invasive Devices       None                      VTE Pharmacologic Prophylaxis: Eliquis  Code Status: Level 1 - Full Code  Current Length of Stay: 10 day(s)    Total floor / unit time spent today 30 minutes  Coordination of patient's care was performed in conjunction with primary service. Time invested included review of patient's labs, vitals, and management of their comorbidities with continued monitoring, examination of patient as well as answering patient questions, documenting her findings and creating progress note in electronic medical record,  ordering appropriate diagnostic testing.       ** Please Note:  voice to text software may have been used in the creation of this document. Although proof errors in transcription or  interpretation are a potential of such software**

## 2024-05-21 NOTE — TEAM CONFERENCE
Acute RehabilitationTeam Conference Note  Date: 5/21/2024   Time: 12:20 PM       Patient Name:  Lucho Talavera       Medical Record Number: [id number]   YOB: 1955  Sex: Male          Room/Bed:  [place] [address]/[facility] 455-01  Payor Info:  Payor: MEDICARE / Plan: MEDICARE A AND B / Product Type: Medicare A & B Fee for Service /      Admitting Diagnosis: CVA (cerebral vascular accident) (Formerly [facility]) [I63.9]   Admit Date/Time:  5/11/2024  3:07 PM  Admission Comments: No comment available     Primary Diagnosis:  Stroke (cerebrum) (Formerly [facility])  Principal Problem: Stroke (cerebrum) (Formerly [facility])    Patient Active Problem List    Diagnosis Date Noted    History of cardiac arrest 05/17/2024    Insomnia 05/13/2024    Sacral wound 05/12/2024    Stroke (cerebrum) (Formerly [facility]) 05/08/2024    Stroke-like symptom 05/08/2024    Typical atrial flutter (Formerly [facility]) 04/15/2024    Atrial flutter with rapid ventricular response (Formerly [facility]) 04/06/2024    Leukocytosis 04/06/2024    COVID-19 10/25/2023    History of alcohol abuse 10/17/2023    Iron deficiency anemia secondary to inadequate dietary iron intake 10/17/2023    SIRS (systemic inflammatory response syndrome) (Formerly [facility]) 08/07/2023    Hypertension 08/06/2023    COPD (chronic obstructive pulmonary disease) (Formerly [facility]) 08/06/2023    Anxiety 07/28/2023    Stage 3 severe COPD by GOLD classification (Formerly [facility]) 07/23/2023    Oral abscess 06/08/2022    Tooth fracture 06/08/2022    Closed nondisplaced fracture of posterior arch of first cervical vertebra (Formerly [facility]) 06/08/2022    Fall 06/08/2022    Diabetic polyneuropathy associated with type 2 diabetes mellitus (Formerly [facility]) 07/12/2021    Hammertoe, bilateral 07/12/2021    Post-traumatic arthritis of left foot 07/12/2021    Pain due to onychomycosis of toenail 07/12/2021    Bronchitis 04/18/2020    KLARISSA (obstructive sleep apnea) 02/19/2020    Dirty living conditions 02/19/2020    Closed fracture of first cervical vertebra (Formerly [facility]) 02/28/2019    ETOH abuse 02/28/2019    Chronic bronchitis (Formerly [facility]) 09/19/2018     At moderate risk for venous thromboembolism (VTE) 09/14/2018    S/P C1-T1 Posterior Cervical Discectomy and Fusion on 9/10/18 09/12/2018    Acute blood loss anemia 09/11/2018    Type 2 diabetes mellitus (HCC) 09/11/2018    Closed odontoid fracture with routine healing 09/09/2018    Closed displaced fracture of third cervical vertebra (HCC) 09/09/2018    Closed displaced fracture of fourth cervical vertebra (HCC) 09/09/2018    CAD (coronary artery disease) 09/09/2018    Dens fracture (HCC) 09/09/2018       Physical Therapy:    Weight Bearing Status: Full Weight Bearing  Transfers: Incidental Touching  Bed Mobility: Supervision  Amulation Distance (ft): 150 feet  Ambulation: Incidental Touching (mod A with HHA)  Assistive Device for Ambulation: Roller Walker  Wheelchair Mobility Distance:  (anticipate will be amb at d/c)  Number of Stairs: 6  Assistive Device for Stairs: Bilateral Hand Rails  Stair Assistance: Minimal Assistance  Discharge Recommendations: Home with:  DC Home with:: 24 Hour Supervision, Family Support, First Floor Setup, Outpatient Physical Therapy    5/20 Pt. Demonstrates good progress since last week and is currently CGA with functional transfers using RW and ambulates up to 150 feet. Pt. Also CGA/ min A with stairs management using B HR. Pt. Still needs mod A with R HHA with deficits that cont to limit him including L LE weakness and  incoordination, dec balance and righting reactions and dec endurance and dec safety awareness  . Pt. Now on RA and had been saturating WNL with activities although still presenting with min SOB. Barriers for d/c aside from deficits include: HETAL and dec caregiver availability to provide 24/7 S. Pt. Anticipited d/c date of 5/30 . Pt. Will cont to benefit from skilled PT to maximize functional mobility independence, motor recovery post stroke , inc safety and initiate FT prior to d/c. Will also need to order DME appropriate for pt. Lissett Mark PT     5/13 Pt is a  69 year old male admitted to [facility] due to significant functional declined and heightened risk for falls s/p CVA. Barriers to overall safety and functional indep include impaired cognition with dec safety awareness dionisio with O2 line management, impaired standing balance/tolerance with impaired righting reactions, strength deficits, gait dysfunctions with ataxic gait pattern and dec endurance. Additional barriers include 2 HETAL and dec caregiver availability to provide S/assist to the pt  since dtr/JERE works. At this time requires min-mod A of 1 person during level surface mobilities and 2 person assist for steps negotiation. Pt will benefit from additional skilled PT interventions with inc focus on NMR/NPP, repeated functional training to promote carry over of safety practices and proper techniques, wean off supplemental O2 at tolerated to facilitate improvement of overall functions and reduce risk for falls.     Occupational Therapy:  Eating: Independent  Grooming: Supervision  Bathing: Incidental Touching  Bathing: Incidental Touching  Upper Body Dressing: Supervision  Lower Body Dressing: Incidental Touching  Toileting: Incidental Touching  Tub/Shower Transfer: Incidental Touching  Toilet Transfer: Incidental Touching  Cognition: Exceptions to WNL  Cognition: Decreased Memory, Decreased Safety, Decreased Executive Functions, Decreased Attention, Decreased Comprehension  Orientation: Person, Place, Situation  Discharge Recommendations: Home with:  DC Home with:: Family Support, First Floor Setup, 24 Hour Supervision, Home Occupational Therapy       Pt continues to present with impairments in activity tolerance, endurance, standing balance/tolerance, memory, insight, safety, judgement, and sequencing. Additional functional barriers include fatigue, SOB, risk for falls, and home environment. Pt is functioning at overall CGA for ADLs and short distance fxnl mobility w/ RW. Pt will continue to benefit from skilled OT  services to address above mentioned barriers and maximize functional independence in baseline areas of occupation. Planning for FT w/ pt's daughter this week, daughter is able to provide 24 hour S if needed at d/c. From OT standpoint, anticipate d/c home w/ family support on Thursday 5/30 w/ recommendation for HHOT.    Speech Therapy:  Mode of Communication: Verbal  Speech/Language: Dysarthia  Cognition: Exceptions to WNL  Cognition: Decreased Memory, Decreased Executive Functions, Decreased Attention, Decreased Comprehension  Orientation: Person, Place  Swallowing: Within Defined Limits  Swallowing: Aspiration Risk  Diet Recommendations: Regular Diet, Thin  Discharge Recommendations: Home with:  DC Home with:: 24 Hour Supervision, Family Support, Home Speech Therapy      Nursing Notes:  Appetite: Good  Diet Type: Cardiac, Diabetic                                                                        Pain Score: 0                                  No notes on file    Case Management:     Discharge Planning  Living Arrangements: Lives w/ Children, Lives w/ Family members (lives w/ daughter & 4 grandchildren)  Support Systems: Self, Children, Family members  Assistance Needed: tbd  Type of Current Residence: Private residence (2 story home)  Current Home Care Services: No  5/13- Cm met with pt at bedside to introduce role and complete cm open. Pt lives with daughter Brianna in 3 story home, [facility]. Pt has wc and spc, pt is on chronic oxygen 2 L. Pt has no hx of op therapies, or hhc. Hx of [facility]. PCP is Miguelina Chicas, preferred pharmacy is [facility]. The patient was educated on the rehabilitation process including therapy program, the interdisciplinary team, and weekly team meetings. Estimated length of stay was reviewed with the patient as well as expectations of discussions of discharge planning. The role of the  was reviewed including providing care coordination, discharge planning  and discharge facilitation. IMM was reviewed with the patient and a copy was provided for their reference. The patient verbalized understanding of the information provided and denied any further questions at this time. CM will continue to follow and assist the patient throughout their rehabilitation stay.     5/20- Following for dc needs    Is the patient actively participating in therapies? yes  List any modifications to the treatment plan: None         Barriers Interventions   Chronic Respiratory Failure/ COPD Chronic 2l   Afib Price check, agreeable to eliquis    Insomnia Sleep med adjustment, sleep/behavioral log   Anxiety Supportive counseling, med adjustment   Cog- initiation, memory, attention, processing, swallow SLP Services    Standnig balance, safety awareness Education, possible family training   Right reaction, activity tolerance Endurance training , improving    Dysarthric  SLP Services   Sacral wound Healing                      Is the patient making expected progress toward goals? yes  List any update or changes to goals: None    Medical Goals: Patient will be medically stable for discharge to [facility] upon completion of rehab program and Patient will be able to manage medical conditions and comorbid conditions with medications and follow up upon completion of rehab program    Weekly Team Goals:   Rehab Team Goals  ADL Team Goal: Patient will require supervision with ADLs with least restrictive device upon completion of rehab program  Bowel/Bladder Team Goal: Patient will require assist with bladder/bowel management with least restrictive device upon completion of rehab program  Transfer Team Goal: Patient will require supervision with transfers with least restrictive device upon completion of rehab program  Locomotion Team Goal: Patient will require supervision with locomotion with least restrictive device upon completion of rehab program  Cognitive Team Goal: Patient will  require supervision for basic and complex tasks upon completion of rehab program    Discussion: Pt functioning at cg to close sup with walker, and adls. Team recommending DC home with pt ot rn st, 5/29    Anticipated Discharge Date:  dc home 5/29   [facility] Team Members Present:  .The following team members are supervising care for this patient and were present during this Weekly Team Conference.    Physician: Dr. DePadua, MD  : Jacki Marmolejo MSW  Registered Nurse: Pamela Mehta RN  Physical Therapist: Yogi Mar DPT  Occupational Therapist: Tiffany Mcclendon MS, OTR/L  Speech Therapist: Yue Cavanaugh MA, CCC-SLP

## 2024-05-21 NOTE — ASSESSMENT & PLAN NOTE
Presented with increased weakness, dysarthria and leaning on the left side x 5 days with fall 2 days prior to admission in the setting of noncompliance with Eliquis  CTA with chronic left PCA territory infarction  MRI with multiple infarcts of varying ages and posterior circulation including multiple small acute/early subacute infarct in the bilateral cerebellum with additional small foci of recent ischemia in the left PCA distribution superimposed on chronic infarct  Echo with EF 55%, no cardiac thrombus  Maintained on aspirin and Eliquis  Eliquis is to be $71.28 = daughter aware and OK with the price  Continue Lipitor 80 mg daily  Outpatient follow-up with neurology  DC tentative 5/30/2024

## 2024-05-21 NOTE — PLAN OF CARE
Problem: PAIN - ADULT  Goal: Verbalizes/displays adequate comfort level or baseline comfort level  Description: Interventions:  - Encourage patient to monitor pain and request assistance  - Assess pain using appropriate pain scale  - Administer analgesics based on type and severity of pain and evaluate response  - Implement non-pharmacological measures as appropriate and evaluate response  - Consider cultural and social influences on pain and pain management  - Notify physician/advanced practitioner if interventions unsuccessful or patient reports new pain  Outcome: Progressing     Problem: INFECTION - ADULT  Goal: Absence or prevention of progression during hospitalization  Description: INTERVENTIONS:  - Assess and monitor for signs and symptoms of infection  - Monitor lab/diagnostic results  - Monitor all insertion sites, i.e. indwelling lines, tubes, and drains  - Monitor endotracheal if appropriate and nasal secretions for changes in amount and color  - [place] appropriate cooling/warming therapies per order  - Administer medications as ordered  - Instruct and encourage patient and family to use good hand hygiene technique  - Identify and instruct in appropriate isolation precautions for identified infection/condition  Outcome: Progressing

## 2024-05-21 NOTE — PROGRESS NOTES
"OT Treatment Note       05/21/24 0700   Pain Assessment   Pain Assessment Tool 0-10   Pain Score No Pain   Restrictions/Precautions   Precautions Aspiration;Bed/chair alarms;Supervision on toilet/commode;Hard of hearing;Cognitive   Lifestyle   Autonomy \"You're stuck with me for 9 more days.\"   Eating   Type of Assistance Needed Set-up / clean-up   Physical Assistance Level No physical assistance   Eating CARE Score 5   Oral Hygiene   Type of Assistance Needed Set-up / clean-up   Physical Assistance Level No physical assistance   Comment seated EOB   Oral Hygiene CARE Score 5   Grooming   Able To Comb/Brush Hair;Wash/Dry Face;Brush/Clean Teeth;Wash/Dry Hands   Findings setup EOB   Shower/Bathe Self   Type of Assistance Needed Incidental touching   Physical Assistance Level No physical assistance   Comment Pt declines shower again today, agreeable to SB seated EOB. Primarily completes seated EOB, CGA/CS in stance   Shower/Bathe Self CARE Score 4   Tub/Shower Transfer   Reason Not Assessed Sponge Bath;Patient refusal   Upper Body Dressing   Type of Assistance Needed Set-up / clean-up   Physical Assistance Level No physical assistance   Comment seated EOB for OH tshirt   Upper Body Dressing CARE Score 5   Lower Body Dressing   Type of Assistance Needed Incidental touching   Physical Assistance Level No physical assistance   Comment threading BLE through briefs and pant legs with SUP, standing to hike pants over hips using RW for support with CGA/CS   Lower Body Dressing CARE Score 4   Putting On/Taking Off Footwear   Type of Assistance Needed Supervision   Physical Assistance Level No physical assistance   Comment using xleg technique for socks   Putting On/Taking Off Footwear CARE Score 4   Lying to Sitting on Side of Bed   Type of Assistance Needed Supervision   Lying to Sitting on Side of Bed CARE Score 4   Sit to Stand   Type of Assistance Needed Supervision;Adaptive equipment;Verbal cues   Physical Assistance Level " No physical assistance   Comment min vc's for hand placement   Sit to Stand CARE Score 4   Bed-Chair Transfer   Type of Assistance Needed Incidental touching   Physical Assistance Level No physical assistance   Comment using RW   Chair/Bed-to-Chair Transfer CARE Score 4   Exercise Tools   Other Exercise Tool 1 BUE therex for increased functional strength and endurance required for ADLs and functional transfers including: standing chest press, seated lat rows, shoulder flexion 0-90*, and bicep curls using 4# db completed unilaterally; seated bicep push press with BUE simulating motion of putting item on OH shelf with 7# db. All exercises completed 10x3 reps with min vc's for technique and rest breaks as needed to manage fatigue, min vc's for proper breathing techniques. O2 spot checked and ranging 93-96% on RA   Cognition   Overall Cognitive Status Impaired   Arousal/Participation Alert;Cooperative   Attention Attends with cues to redirect   Assessment   Treatment Assessment Pt seen for 90 min OT session focusing on ADL routine, functional transfers/mobility, and BUE therex. Pt tolerating session well with rest breaks as needed to manage fatigue. OT to continue POC to continue to progress towards goals prior to d/c home.   Problem List Decreased strength;Decreased endurance;Impaired balance;Decreased mobility;Decreased cognition;Impaired judgement;Decreased safety awareness;Decreased skin integrity   Barriers to Discharge Inaccessible home environment;Decreased caregiver support   Plan   Treatment/Interventions ADL retraining;Functional transfer training;Endurance training;Therapeutic exercise;Patient/family training;Equipment eval/education;Bed mobility;Gait training;Compensatory technique education   Progress Progressing toward goals   Discharge Recommendation   Rehab Resource Intensity Level, OT   ( OT)   OT Therapy Minutes   OT Time In 0700   OT Time Out 0830   OT Total Time (minutes) 90   OT Mode of treatment  - Individual (minutes) 90   OT Mode of treatment - Concurrent (minutes) 0   OT Mode of treatment - Group (minutes) 0   OT Mode of treatment - Co-treat (minutes) 0   OT Mode of Treatment - Total time(minutes) 90 minutes   OT Cumulative Minutes 655   Therapy Time missed   Time missed? No

## 2024-05-21 NOTE — CASE MANAGEMENT
Team update: Cm met with pt at bedside to check in and review dc date. Agreeable to [facility] referral, cm sent via [facility].

## 2024-05-21 NOTE — PROGRESS NOTES
"   05/21/24 1230   Pain Assessment   Pain Assessment Tool 0-10   Pain Score No Pain   Restrictions/Precautions   Precautions Aspiration;Bed/chair alarms;Cognitive;Fall Risk;Supervision on toilet/commode   Comprehension   Comprehension (FIM) 4 - Understands basic info/conversation 75-90% of time   Expression   Expression (FIM) 5 - Needs help/cues only RARELY (< 10% of the time)   Social Interaction   Social Interaction (FIM) 5 - Interacts appropriately with others 90% of time   Problem Solving   Problem solving (FIM) 3 - Solves basic problmes 50-74% of time   Memory   Memory (FIM) 3 - Recognizes, recalls/performs 50-74%   Speech/Language/Cognition Assessmetn   Treatment Assessment Pt was awake, alert and cooperative for session, to where SLP engaged in brief rapport building since current SLP has not seen pt in some sessions. Pt does report feeling stronger overall. SLP able to provide supportive feedback from observations in therapy gym as well as what his primary therapists have reported as well about his functioning. SLP also providing pt an update from team meeting as his date was moved to now 5/29/24. Pt was happy about this and stated that \"I'm ready to go home.\"     SLP engaging in Stroke Education, targeting overall purpose of rehab, but also focusing on warning signs/sxs of stroke given the acronym BE FAST. This was an open-ended conversation in regard to pt recalling his sxs when stroke occurred at home, stating slurred speech and L sided weakness. This was use to provide increased education about the BE FAST acronym. When reviewing pt's overall risk factors, pt was able to identify that he has HBP, h/o smoking and alcohol use, but when asked probing questions about high cholesterol levels and diabetes, he was aware that he has these in his PMH and SLP providing increased education about continuing to be compliant given medication management  (dtr completes medication management), diet, etc to continue to " decrease overall risk of further strokes. SLP reviewing w/ pt about overall reason for being on the rehab which pt was able to recount generalized purpose but SLP providing increased education in regard to other benefits of being on the rehab post stroke recovery.    Lastly, SLP engaging pt in more abstract category naming task by providing pt w/ 3 words and pt to determine the category which the items belonged. Pt was able to determine 15/21 items, increasing to 19/21 w/ minimal semantic cues or additional words which fit the category. Overall, pt is demonstrating slower and steady progress towards cognitive linguistic skills. Currently pt to continue to benefit from ongoing skilled SLP service targeting functional cognitive linguistic skills in attempts to decrease caregiver burden over time.    SLP Therapy Minutes   SLP Time In 1230   SLP Time Out 1300   SLP Total Time (minutes) 30   SLP Mode of treatment - Individual (minutes) 30   SLP Mode of treatment - Concurrent (minutes) 0   SLP Mode of treatment - Group (minutes) 0   SLP Mode of treatment - Co-treat (minutes) 0   SLP Mode of Treatment - Total time(minutes) 30 minutes   SLP Cumulative Minutes 360   Therapy Time missed   Time missed? No     Stroke Education Series    Pt participated in skilled Stroke Education Series in an individual setting to address the topic of Purpose of Rehab post stroke in both verbal and written formats. Education within this session included a review of the individual roles of the rehab team, functions of the acute rehab center, and neuro rehabilitation treatment strategies. The goal of this education session was to provide the patient with an understanding of the overall importance of the therapy process. This section reviews neuroplasticity principles as well that includes how patiens can incorporate specificity, intensity, repetition and salience into their home exercise program. This allows the patient to connect neuro  rehabilitation treatment strategies to his or her individual therapy process. The patients are engaged in conversation to incorporate activities they like to do into therapy sessions. Following education, the patient's response to education is: verbalizes understanding and demonstrates understanding.      Additional topics to individualize this section of stroke education include: adapting to stroke , cardiovascular components of rehab, involvement of therapy reducing risk of another stroke, and how family can help in the rehab process .    Start Time: 1230    End Time: 1245

## 2024-05-21 NOTE — PROGRESS NOTES
05/21/24 0930   Pain Assessment   Pain Assessment Tool 0-10   Pain Score No Pain   Restrictions/Precautions   Precautions Aspiration;Bed/chair alarms   Cognition   Overall Cognitive Status Impaired   Arousal/Participation Alert;Cooperative   Attention Attends with cues to redirect   Orientation Level Oriented X4   Subjective   Subjective pt had no c/o and ready for PT   Roll Left and Right   Type of Assistance Needed Set-up / clean-up   Roll Left and Right CARE Score 5   Sit to Lying   Type of Assistance Needed Supervision;Verbal cues   Comment HOB flat no rail   Sit to Lying CARE Score 4   Lying to Sitting on Side of Bed   Type of Assistance Needed Supervision;Verbal cues   Comment HOB flat no rail   Lying to Sitting on Side of Bed CARE Score 4   Sit to Stand   Type of Assistance Needed Supervision;Verbal cues;Adaptive equipment   Comment VC for hand placement 100% of the time so will benefit from repeated training to improve carry over   Sit to Stand CARE Score 4   Bed-Chair Transfer   Type of Assistance Needed Incidental touching;Verbal cues;Adaptive equipment   Comment CG-CS with RW, VC to square walker in front prior to sitting down   Chair/Bed-to-Chair Transfer CARE Score 4   Car Transfer   Type of Assistance Needed Incidental touching;Verbal cues;Adaptive equipment   Comment mock car with extra cushion + RW and VC for technique   Car Transfer CARE Score 4   Walk 10 Feet   Type of Assistance Needed Incidental touching;Verbal cues;Adaptive equipment   Comment CG with RW   Walk 10 Feet CARE Score 4   Walk 50 Feet with Two Turns   Type of Assistance Needed Incidental touching;Verbal cues;Adaptive equipment   Walk 50 Feet with Two Turns CARE Score 4   Walk 150 Feet   Type of Assistance Needed Incidental touching;Verbal cues;Adaptive equipment   Comment CG with RW x 200', VC to improve upright posture and to look straight ahead as well as promote consistent step through, foot clearance during task   Walk 150  "Feet CARE Score 4   Walking 10 Feet on Uneven Surfaces   Type of Assistance Needed Incidental touching;Verbal cues;Adaptive equipment   Comment floor mat with RW, VC for sequencing   Walking 10 Feet on Uneven Surfaces CARE Score 4   Curb or Single Stair   Style negotiated Curb   Type of Assistance Needed Verbal cues;Adaptive equipment;Physical assistance   Physical Assistance Level 25% or less   Comment 8\" curb with RW, step by step VC for sequencing and walker management   1 Step (Curb) CARE Score 3   4 Steps   Type of Assistance Needed Physical assistance;Verbal cues;Adaptive equipment   Physical Assistance Level 25% or less   Comment 6\"  x 8 reps R rail only, descending bwd non recip pattern while reciprocal pattern ascending   4 Steps CARE Score 3   12 Steps   Comment will benefit from training for strengthening purpose. ICP goals updated   Picking Up Object   Type of Assistance Needed Verbal cues;Adaptive equipment;Incidental touching   Comment CG with RW + reacher, VC on how to use reacher to retrieve pen from the floor   Picking Up Object CARE Score 4   Other Comments   Comments flutter valve x 3 reps pre mobility training   Assessment   Treatment Assessment Pt is demonstrating good progress although overall safety and indep continues to be impacted by ongoing cognitive deficits so will cont to strongly recommend pt to have S/assist at home using a RW to reduce overall risk for falls. Pt tolerating tx on RA with slight SOB noted post functional performances even after negotiating 8 steps sequentially using R rail only on the 6\" . ICP stair goal updated. PT to work on strengthening and NMR/NPP for balance/gait training.   Family/Caregiver Present no   Barriers to Discharge Inaccessible home environment;Decreased caregiver support   PT Barriers   Functional Limitation Car transfers;Ramp negotiation;Stair negotiation;Standing;Transfers;Walking   Plan   Treatment/Interventions Functional transfer " training;LE strengthening/ROM;Therapeutic exercise;Endurance training;Bed mobility;Gait training;Spoke to nursing;OT   Progress Progressing toward goals   Discharge Recommendation   Equipment Recommended Walker  (will need to submit DME order for RW this week)   PT Therapy Minutes   PT Time In 0930   PT Time Out 1030   PT Total Time (minutes) 60   PT Mode of treatment - Individual (minutes) 60   PT Mode of treatment - Concurrent (minutes) 0   PT Mode of treatment - Group (minutes) 0   PT Mode of treatment - Co-treat (minutes) 0   PT Mode of Treatment - Total time(minutes) 60 minutes   PT Cumulative Minutes 763   Therapy Time missed   Time missed? No

## 2024-05-21 NOTE — PROGRESS NOTES
Physical Medicine and Rehabilitation Progress Note  Lucho Talavera 69 y.o. male MRN: [id number]  Unit/Bed#: -01 Encounter: [id number]    To Review: 69 year old M with PMH of COPD, multitude of ED visits/hospitalizations, DM2, CAD, EtOH abuse, HTN, prior CVA, found to have aflutter with RVR on hospitalization 4/2024 s/p BRITTNEY ablation who was to d/c on Eliquis but per report family did not know he needed it and he was not taking it who developed dysarthria, weakness and imbalance with recent fall at home and presented to hospital. CTA H/N showed Chronic left PCA territory infarct, new since the prior exam. No evidence of acute vascular territorial infarction, intracranial hemorrhage or mass.  No hemodynamically significant stenosis, dissection or occlusion of the carotid or vertebral arteries or major vessels of the [place].  Neuro consulted and recommended MRI brain which showed multiple infarcts of varying ages in the posterior circulation include multiple small acute/early subacute infarcts in the bilateral cerebellum. TTE did not show thrombus.  Neuro recommended Eliquis, aspirin, and statin. Patient was evaluated by skilled therapies and was found to have significant decline in ADLs and ambulation and appears appropriate for admission to [facility] [place].     Chief Complaint: No new issues.     Interval History/Subjective:  No acute events overnight, but behavioral log not filled out. He states he slept better last night. He has been switched to a no caffeine diet. No new CP, SOB, fevers, chills, N/V, abdominal pain. Last BM 5/19, continent of bowel/bladder.     ROS:  A 10 point review of systems was negative except for what is noted in the HPI.    Today's Changes:  Reinforced with nursing sleep/behavior logs to continue.  I led and participated in Team Conference today. See dispo below and separate conference note for more details. I concur with the plan of care as  determined in Team Conference.    Total visit time: 35 minutes, with more than 50% spent counseling/coordinating care. Counseling includes discussion with patient re: progress in therapies, functional issues observed by therapy staff, and discussion with patient regarding their current medical state and wellbeing. Coordination of patient's care was performed in conjunction with Internal Medicine service to monitor patient's labs, vitals, and management of their comorbidities.    Assessment/Plan:    * Stroke (cerebrum) (HCC)  Assessment & Plan  - MRI brain 5/8 - Multiple infarcts of varying ages in the posterior circulation include multiple small acute/early subacute infarcts in the bilateral cerebellum. Additional small foci of recent ischemia in the left PCA distribution superimposed on chronic infarct  No acute hemorrhage or mass effect  - CTA H/N -  1. Chronic left PCA territory infarct, new since the prior exam. No evidence of acute vascular territorial infarction,  intracranial hemorrhage or mass. 2. No hemodynamically significant stenosis, dissection or occlusion of the carotid or vertebral arteries or major vessels of the [place].  - Residual impairments on admission to [facility]: Impaired balance, coordination, possibly vision  - Co-morbidities most impacting functional recovery: COPD with chronic hypoxic respiratory failure, anxiety    Secondary stroke prevention  - Antithrombotic: Aspirin and Eliquis  - Statin  - Patient at increased risk for stroke particularly early in post-stroke period - monitor neuro exam closely with low threshold to repeat imaging  -  patient and if applicable caregiver on optimal stroke management  - Follow-up with neurology and PCP after d/c   - Recommend acute comprehensive interdisciplinary inpatient rehabilitation to include intensive skilled therapies (PT, OT, ST) as outlined with oversight and management by rehabilitation physician as well as inpatient rehab level  nursing, case management and weekly interdisciplinary team meetings.       Sacral wound  Assessment & Plan  Seen by wound care 5/7  - Managed R lateral forearm skin tear   - Noted to have old open area on sacrum on admission.   - Consulted wound care to follow-up. Epithelialized   - Offloading,specialty cushion   - Allevyn   - Monitor closely.       Chronic bronchitis (HCC)  Assessment & Plan  Has had a multitude of ED visits related to COPD/concern for SOB but was frequently d/c'd same day  - Monitor also for anxiety and optimal mgmt of that; education on monitor home O2  IM consulted and with overall management at their discretion during ARC course  Monitor lung exam, vitals with and without activity  Encourage incentive spirometry  Breo/Incruse  Xopenex  PRN Albuterol  PRN supplemental O2   Mucinex     Insomnia  Assessment & Plan  Environmental interventions.  Sleep log  Drinks a lot of caffeinated sodas throughout the night   - Placed no caffeine order and review with nursing.   Added melatonin scheduled  5/16 added mirtazapine for both sleep side effect and anxiety.     Typical atrial flutter (HCC)  Assessment & Plan  IM consulted and with overall management at their discretion during ARC course  Rate control: None  Antithrombotic: Eliquis    - Cost is $71.28/month. Daughter confirmed this price is fine.      Hypertension  Assessment & Plan  Home: Lisinopril 40mg daily  Here: Lisinopril 10mg daily  Monitor vitals with and without activity; monitor for orthostasis  Monitor hemoglobin, electrolytes, kidney function, hydration status   Management as per IM.       Anxiety  Assessment & Plan  Cries out for help at times with anxiety  This improved here.  Asks for breathing treatments/O2 when this happens, but usually O2 sats and breath sounds are fine.  Reviewed PDMP - last prescribed Lorazepam by Dr. Villalpando in July of last year, most recently by a hospitalist PA at [facility] (for only 10 tablets).  Has not used since  5/11. Will switch to hydroxyzine  Adding mirtazapine at night to also assist with sleep/insomnia  Monitor response.     ETOH abuse  Assessment & Plan  Patient reports cutting down and only about 2 beers per day but occasionally liquor   Alcohol cessation counseling and supportive counseling  Optimal mood/stress mgmt   Thiamine, folate, MVI   PRN low dose ativan BID   Consider gabapentin as well     Type 2 diabetes mellitus (HCC)  Assessment & Plan  Lab Results   Component Value Date    HGBA1C 8.9 (H) 04/06/2024       Recent Labs     05/20/24  1549 05/20/24  2052 05/21/24  0609 05/21/24  1043   POCGLU 146* 211* 112 101       Blood Sugar Average: Last 72 hrs:  (P) 262.5  Home: Metformin 1000mg Q12hr, Lantus 30 units at bedtime  Current meds: Lantus 18, Metformin 500mg Q12hr, HS, Lispro SS AC/HS, consistent carb diabetic diet  Nutrition consult  Management as per IM  Outpatient f/u with PCP      CAD (coronary artery disease)  Assessment & Plan  With hx of stenting  IM consulted and with overall management at their discretion during ARC course  Antithrombotic: Aspirin and Eliquis   Statin  Optimal blood pressure and blood sugar control        Health Maintenance  #Delirium/Sleep: At risk. Optimize sleep/wake, pain, bowel, bladder management. Avoid deliriogenic meds. Of note is on melatonin, ativan, and oxycodone PRN.   #Pain: Tylenol PRN  #Bowel: Last BM 5/19 and continent. Adding PRN miralax  #Bladder: Voiding and continent  #Skin/Pressure Injury Prevention: Turn Q2hr in bed, with weight shifts [id number] in wheelchair.  #DVT Prophylaxis:Fully anticoagulated on eliquis, SCDs  #GI Prophylaxis: PPI on for GERD  #Code Status: Full Code.   #FEN: Diabetic Diet  #Dispo: Team 5/21: ADD 5/30 with home PT/OT with goals to discharge to 1 level home with 2 HETAL, with supervision. Daughter for family training this week.   Will need f/u with Pulm, PCP, and Neuro    Objective:    Functional Update:  PT:  CGA transfers, Sup bed  "mobility, CGA ambulation with RW, modA with HHA, Deanna stairs   OT: Ind eating, Sup grooming, CGA bathing, Sup UB dressing, CGA LB dressing, CGA toileting, CGA tub/shower transfer, CGA toilet transfers  SLP: Moderate cognitive linguistic impairments on CLQT, mild oropharyngeal dysphagia On reg/thin diet     Allergies per EMR    Physical Exam:  Temp:  [98.2 °F (36.8 °C)-98.7 °F (37.1 °C)] 98.2 °F (36.8 °C)  HR:  [] 67  Resp:  [18-20] 20  BP: (123-142)/(60-80) 140/64  Oxygen Therapy  SpO2: 99 %  O2 Flow Rate (L/min): 2 L/min      Gen: No acute distress  HEENT: Moist mucus membranes, Normocephalic/Atraumatic  Cardiovascular: Regular rate, rhythm, S1/S2. Distal pulses palpable  Heme/Extr: No edema  Pulmonary: Non-labored breathing. Lungs CTAB  : No antony  GI: Soft, non-tender, non-distended. BS+  Integumentary: Skin is warm, dry.   Neuro: AAOx3, Speech dysarthric but intelligible. Appropriate to questioning.   Psych: Normal mood and affect.     Diagnostic Studies: Reviewed, no new imaging    Laboratory:  Reviewed         Invalid input(s): \"PLTCT\"          Invalid input(s): \"CA\"           Patient Active Problem List   Diagnosis    Closed odontoid fracture with routine healing    Closed displaced fracture of third cervical vertebra (HCC)    Closed displaced fracture of fourth cervical vertebra (HCC)    CAD (coronary artery disease)    Dens fracture (HCC)    Acute blood loss anemia    Type 2 diabetes mellitus (HCC)    S/P C1-T1 Posterior Cervical Discectomy and Fusion on 9/10/18    At moderate risk for venous thromboembolism (VTE)    Chronic bronchitis (HCC)    Closed fracture of first cervical vertebra (HCC)    ETOH abuse    KLARISSA (obstructive sleep apnea)    Dirty living conditions    Bronchitis    Diabetic polyneuropathy associated with type 2 diabetes mellitus (HCC)    Hammertoe, bilateral    Post-traumatic arthritis of left foot    Pain due to onychomycosis of toenail    Oral abscess    Tooth fracture    Closed " nondisplaced fracture of posterior arch of first cervical vertebra (HCC)    Fall    Stage 3 severe COPD by GOLD classification ([facility])    Anxiety    Hypertension    COPD (chronic obstructive pulmonary disease) (HCC)    SIRS (systemic inflammatory response syndrome) (HCC)    History of alcohol abuse    Iron deficiency anemia secondary to inadequate dietary iron intake    COVID-19    Atrial flutter with rapid ventricular response (HCC)    Leukocytosis    Typical atrial flutter (HCC)    Stroke (cerebrum) ([facility])    Stroke-like symptom    Sacral wound    Insomnia    History of cardiac arrest         Medications  Current Facility-Administered Medications   Medication Dose Route Frequency Provider Last Rate    acetaminophen  650 mg Oral Q6H PRN Richmond Chew, DO      albuterol  2 puff Inhalation Q4H PRN William Camacho, DO      albuterol  2.5 mg Nebulization Q6H PRN Richmond Chew, DO      apixaban  5 mg Oral BID Richmond Chew, DO      aspirin  81 mg Oral Daily Richmond Chew, DO      atorvastatin  80 mg Oral Daily With Dinner Richmond Chew, DO      busPIRone  10 mg Oral TID Richmond Chew, DO      ferrous sulfate  325 mg Oral Daily With Breakfast Richmond Chew, DO      fluticasone  1 spray Nasal BID [facility]      fluticasone-vilanterol  1 puff Inhalation Daily Richmond Chew, DO      folic acid  1 mg Oral Daily [facility], [facility]      guaiFENesin  600 mg Oral BID [facility], DO      hydrOXYzine HCL  25 mg Oral BID PRN Ashley Depadua, MD      insulin glargine  16 Units Subcutaneous HS TATIANA Lindsey      insulin lispro  1-5 Units Subcutaneous TID AC Richmond Chew, DO      melatonin  6 mg Oral QPM Ashley Depadua, MD      metFORMIN  500 mg Oral BID With Meals TATIANA Paulino      mirtazapine  7.5 mg Oral QPM Ashley Depadua, MD      multivitamin-minerals  1 tablet Oral Daily Richmond Chew, DO      oxyCODONE  2.5 mg Oral Q4H PRN  Richmond Chew,       pantoprazole  40 mg Oral Early Morning Richmond Chew DO      thiamine  100 mg Oral Daily Richmond Chew,       umeclidinium  1 puff Inhalation Daily Richmond Chew,             ** Please Note: Fluency Direct voice to text software may have been used in the creation of this document. **

## 2024-05-21 NOTE — ASSESSMENT & PLAN NOTE
Previously admitted for atrial flutter with RVR in 4/8/2024 s/p ablation and loop recorder insertion.    Sutures removed 5/17/24  Anticoagulated on Eliquis

## 2024-05-21 NOTE — PROGRESS NOTES
05/21/24 1300   Pain Assessment   Pain Assessment Tool 0-10   Pain Score No Pain   Restrictions/Precautions   Precautions Fall Risk;Aspiration;Cognitive;Supervision on toilet/commode   Cognition   Overall Cognitive Status Impaired   Arousal/Participation Alert;Cooperative   Attention Attends with cues to redirect   Subjective   Subjective pt in bed and agreeable to PT session post ST tx.   Sit to Lying   Type of Assistance Needed Supervision   Comment no rail   Sit to Lying CARE Score 4   Lying to Sitting on Side of Bed   Type of Assistance Needed Supervision   Comment no rail   Lying to Sitting on Side of Bed CARE Score 4   Sit to Stand   Type of Assistance Needed Supervision;Verbal cues;Adaptive equipment   Comment RW with hand placement, at times pt gets slightly agitated with cued for proper hand placement by raising voice but able to be redirected   Sit to Stand CARE Score 4   Bed-Chair Transfer   Type of Assistance Needed Incidental touching;Verbal cues;Adaptive equipment   Comment RW   Chair/Bed-to-Chair Transfer CARE Score 4   Walk 10 Feet   Type of Assistance Needed Incidental touching;Verbal cues;Adaptive equipment   Comment CG with RW   Walk 10 Feet CARE Score 4   Walk 50 Feet with Two Turns   Type of Assistance Needed Incidental touching;Verbal cues;Adaptive equipment   Comment RW   Walk 50 Feet with Two Turns CARE Score 4   Walk 150 Feet   Type of Assistance Needed Incidental touching;Verbal cues;Adaptive equipment   Walk 150 Feet CARE Score 4   Toilet Transfer   Findings declined to use toilet but again when PT came back after a few minutes pt was using the urinal but did not spilled any urine   Therapeutic Interventions   Neuromuscular Re-Education gait training without AD x 10-20' then progressed task to repeated 50' walking x 4 reps using SPC on R hand per baseline practice- no overt LOB but at times would put/lean L hand over the R hand on the cane so provided cues to just hold cane on R  requiring min A. Neely bag tossing initially with L hand + 1 hand on walker x 3 mins, 50 seconds then with R hand 3 mins 30 seconds - no LOB or knee buckling but demo'd dec target precision which at times gets pt slightly frustrated   Equipment Use   NuStep lvl 2 all ext x 10 mins SPM> 70   Other Comments   Comments stroke ed: preparing to go home including setting up family training schedule with dtr over the phone for 5/24/24 FT at 12:30-15:30   Assessment   Treatment Assessment Skilled PT initially focused on stroke ed including setting up times for FT with dtr. Then pt participated with strengthening/neuropriming using the nu step before engaging in NMR for gait/balance/coordination training. no overt LOB but pt demos more pronounced ataxic gait pattern during amb using a SPC with mid guarding posture. also noted more noticeable SOB post 50' walk but tolerated without any complaints. cont PT POC with inc focus on NMR/NPP training and improving safety & indep with functional mobilities using a RW.   Family/Caregiver Present spoke to dtr over the phone and schedule FT   Barriers to Discharge Inaccessible home environment;Decreased caregiver support   PT Barriers   Functional Limitation Car transfers;Ramp negotiation;Stair negotiation;Standing;Transfers;Walking   Plan   Treatment/Interventions Functional transfer training;LE strengthening/ROM;Therapeutic exercise;Endurance training;Gait training;Bed mobility;Spoke to nursing;OT   Progress Progressing toward goals   Discharge Recommendation   Equipment Recommended Walker  (confirmed by dtr already has a RW at home)   PT Therapy Minutes   PT Time In 1300   PT Time Out 1400   PT Total Time (minutes) 60   PT Mode of treatment - Individual (minutes) 60   PT Mode of treatment - Concurrent (minutes) 0   PT Mode of treatment - Group (minutes) 0   PT Mode of treatment - Co-treat (minutes) 0   PT Mode of Treatment - Total time(minutes) 60 minutes   PT Cumulative Minutes 823      Stroke Education Series    Pt participated in skilled Stroke Education Series in an individualized setting to address the topic of Preparing To Go Home. This education was provided in both verbal and written formats.Education within this session focused on providing safety strategies including environmental and lifestyle changes that if made will support a safe discharge to his/her home setting. One goal of this session is to focus on ways to improve the patient's independence while maintaining safety and decreasing fall risk. Following education, pt's response to education is: verbalizes understanding and demonstrates understanding.    To individualize this session, the following topics were reviewed: caregiver considerations, assistive devices, ADL recommendations, IADL recommendations , emergency preparedness, family training, and healthcare literacy resources.      Start Time: 13:00     End Time: 13:20

## 2024-05-22 LAB
GLUCOSE SERPL-MCNC: 100 MG/DL (ref 65–140)
GLUCOSE SERPL-MCNC: 125 MG/DL (ref 65–140)
GLUCOSE SERPL-MCNC: 137 MG/DL (ref 65–140)
GLUCOSE SERPL-MCNC: 169 MG/DL (ref 65–140)

## 2024-05-22 PROCEDURE — 97110 THERAPEUTIC EXERCISES: CPT

## 2024-05-22 PROCEDURE — 97530 THERAPEUTIC ACTIVITIES: CPT

## 2024-05-22 PROCEDURE — 97116 GAIT TRAINING THERAPY: CPT

## 2024-05-22 PROCEDURE — 97130 THER IVNTJ EA ADDL 15 MIN: CPT

## 2024-05-22 PROCEDURE — 94664 DEMO&/EVAL PT USE INHALER: CPT

## 2024-05-22 PROCEDURE — 94760 N-INVAS EAR/PLS OXIMETRY 1: CPT

## 2024-05-22 PROCEDURE — 97129 THER IVNTJ 1ST 15 MIN: CPT

## 2024-05-22 PROCEDURE — 99232 SBSQ HOSP IP/OBS MODERATE 35: CPT | Performed by: INTERNAL MEDICINE

## 2024-05-22 PROCEDURE — 97535 SELF CARE MNGMENT TRAINING: CPT

## 2024-05-22 PROCEDURE — 82948 REAGENT STRIP/BLOOD GLUCOSE: CPT

## 2024-05-22 PROCEDURE — 99232 SBSQ HOSP IP/OBS MODERATE 35: CPT | Performed by: PHYSICAL MEDICINE & REHABILITATION

## 2024-05-22 RX ADMIN — FOLIC ACID 1 MG: 1 TABLET ORAL at 08:35

## 2024-05-22 RX ADMIN — FLUTICASONE FUROATE AND VILANTEROL TRIFENATATE 1 PUFF: 200; 25 POWDER RESPIRATORY (INHALATION) at 08:36

## 2024-05-22 RX ADMIN — INSULIN GLARGINE 18 UNITS: 100 INJECTION, SOLUTION SUBCUTANEOUS at 20:41

## 2024-05-22 RX ADMIN — BUSPIRONE HYDROCHLORIDE 10 MG: 10 TABLET ORAL at 16:45

## 2024-05-22 RX ADMIN — BUSPIRONE HYDROCHLORIDE 10 MG: 10 TABLET ORAL at 08:36

## 2024-05-22 RX ADMIN — ATORVASTATIN CALCIUM 80 MG: 80 TABLET, FILM COATED ORAL at 16:45

## 2024-05-22 RX ADMIN — GUAIFENESIN 600 MG: 600 TABLET, EXTENDED RELEASE ORAL at 17:57

## 2024-05-22 RX ADMIN — FLUTICASONE PROPIONATE 1 SPRAY: 50 SPRAY, METERED NASAL at 17:57

## 2024-05-22 RX ADMIN — Medication 6 MG: at 20:41

## 2024-05-22 RX ADMIN — GUAIFENESIN 600 MG: 600 TABLET, EXTENDED RELEASE ORAL at 08:35

## 2024-05-22 RX ADMIN — Medication 1 TABLET: at 08:35

## 2024-05-22 RX ADMIN — ASPIRIN 81 MG CHEWABLE TABLET 81 MG: 81 TABLET CHEWABLE at 08:35

## 2024-05-22 RX ADMIN — PANTOPRAZOLE SODIUM 40 MG: 40 TABLET, DELAYED RELEASE ORAL at 06:30

## 2024-05-22 RX ADMIN — FERROUS SULFATE TAB 325 MG (65 MG ELEMENTAL FE) 325 MG: 325 (65 FE) TAB at 06:30

## 2024-05-22 RX ADMIN — BUSPIRONE HYDROCHLORIDE 10 MG: 10 TABLET ORAL at 20:41

## 2024-05-22 RX ADMIN — APIXABAN 5 MG: 5 TABLET, FILM COATED ORAL at 08:35

## 2024-05-22 RX ADMIN — UMECLIDINIUM 1 PUFF: 62.5 AEROSOL, POWDER ORAL at 08:36

## 2024-05-22 RX ADMIN — INSULIN LISPRO 1 UNITS: 100 INJECTION, SOLUTION INTRAVENOUS; SUBCUTANEOUS at 16:45

## 2024-05-22 RX ADMIN — FLUTICASONE PROPIONATE 1 SPRAY: 50 SPRAY, METERED NASAL at 08:36

## 2024-05-22 RX ADMIN — METFORMIN HYDROCHLORIDE 500 MG: 500 TABLET, FILM COATED ORAL at 16:45

## 2024-05-22 RX ADMIN — LISINOPRIL 5 MG: 5 TABLET ORAL at 08:35

## 2024-05-22 RX ADMIN — MIRTAZAPINE 7.5 MG: 15 TABLET, FILM COATED ORAL at 20:41

## 2024-05-22 RX ADMIN — METFORMIN HYDROCHLORIDE 500 MG: 500 TABLET, FILM COATED ORAL at 08:35

## 2024-05-22 RX ADMIN — THIAMINE HCL TAB 100 MG 100 MG: 100 TAB at 08:36

## 2024-05-22 RX ADMIN — APIXABAN 5 MG: 5 TABLET, FILM COATED ORAL at 17:57

## 2024-05-22 RX ADMIN — ALBUTEROL SULFATE 2.5 MG: 2.5 SOLUTION RESPIRATORY (INHALATION) at 01:09

## 2024-05-22 NOTE — PROGRESS NOTES
Physical Medicine and Rehabilitation Progress Note  Lucho Talavera 69 y.o. male MRN: [id number]  Unit/Bed#: -01 Encounter: [id number]    To Review: 69 year old M with PMH of COPD, multitude of ED visits/hospitalizations, DM2, CAD, EtOH abuse, HTN, prior CVA, found to have aflutter with RVR on hospitalization 4/2024 s/p BRITTNEY ablation who was to d/c on Eliquis but per report family did not know he needed it and he was not taking it who developed dysarthria, weakness and imbalance with recent fall at home and presented to hospital. CTA H/N showed Chronic left PCA territory infarct, new since the prior exam. No evidence of acute vascular territorial infarction, intracranial hemorrhage or mass.  No hemodynamically significant stenosis, dissection or occlusion of the carotid or vertebral arteries or major vessels of the [place].  Neuro consulted and recommended MRI brain which showed multiple infarcts of varying ages in the posterior circulation include multiple small acute/early subacute infarcts in the bilateral cerebellum. TTE did not show thrombus.  Neuro recommended Eliquis, aspirin, and statin. Patient was evaluated by skilled therapies and was found to have significant decline in ADLs and ambulation and appears appropriate for admission to [facility].     Chief Complaint: Smiling, in good spirits    Interval History/Subjective:  No acute events overnight. Daughter and one of his grandkids came by. He is in a good mood and smiling. He denies any new CP, SOB, fevers, chills, N/V, abdominal pain. Last BM 5/21. Sleep was fine.     ROS:  A 10 point review of systems was negative except for what is noted in the HPI.    Today's Changes:  Sleep/behavior logs to continue   [facility] accepted patient  Doing well off oxygen. Hydroxyzine has not yet been used.     Assessment/Plan:    * Stroke (cerebrum) ([facility])  Assessment & Plan  - MRI brain 5/8 - Multiple infarcts of varying ages in the  posterior circulation include multiple small acute/early subacute infarcts in the bilateral cerebellum. Additional small foci of recent ischemia in the left PCA distribution superimposed on chronic infarct  No acute hemorrhage or mass effect  - CTA H/N -  1. Chronic left PCA territory infarct, new since the prior exam. No evidence of acute vascular territorial infarction,  intracranial hemorrhage or mass. 2. No hemodynamically significant stenosis, dissection or occlusion of the carotid or vertebral arteries or major vessels of the [place].  - Residual impairments on admission to [facility]: Impaired balance, coordination, possibly vision  - Co-morbidities most impacting functional recovery: COPD with chronic hypoxic respiratory failure, anxiety    Secondary stroke prevention  - Antithrombotic: Aspirin and Eliquis  - Statin  - Patient at increased risk for stroke particularly early in post-stroke period - monitor neuro exam closely with low threshold to repeat imaging  -  patient and if applicable caregiver on optimal stroke management  - Follow-up with neurology and PCP after d/c   - Recommend acute comprehensive interdisciplinary inpatient rehabilitation to include intensive skilled therapies (PT, OT, ST) as outlined with oversight and management by rehabilitation physician as well as inpatient rehab level nursing, case management and weekly interdisciplinary team meetings.       Sacral wound  Assessment & Plan  Seen by wound care 5/7  - Managed R lateral forearm skin tear   - Noted to have old open area on sacrum on admission.   - Consulted wound care to follow-up. Epithelialized   - Offloading,specialty cushion   - Allevyn   - Monitor closely.       Chronic bronchitis (HCC)  Assessment & Plan  Has had a multitude of ED visits related to COPD/concern for SOB but was frequently d/c'd same day  - Monitor also for anxiety and optimal mgmt of that; education on monitor home O2  IM consulted and with overall  management at their discretion during ARC course  Monitor lung exam, vitals with and without activity  Encourage incentive spirometry  Breo/Incruse  Xopenex  PRN Albuterol  PRN supplemental O2   Mucinex     Insomnia  Assessment & Plan  Environmental interventions.  Sleep log  Drinks a lot of caffeinated sodas throughout the night   - Placed no caffeine order and review with nursing.   Added melatonin scheduled  5/16 added mirtazapine for both sleep side effect and anxiety.     Typical atrial flutter (HCC)  Assessment & Plan  IM consulted and with overall management at their discretion during ARC course  Rate control: None  Antithrombotic: Eliquis    - Cost is $71.28/month. Daughter confirmed this price is fine.      Hypertension  Assessment & Plan  Home: Lisinopril 40mg daily  Here: Lisinopril 5mg daily  Monitor vitals with and without activity; monitor for orthostasis  Monitor hemoglobin, electrolytes, kidney function, hydration status   Management as per IM.       Anxiety  Assessment & Plan  Cries out for help at times with anxiety  This improved here.  Asks for breathing treatments/O2 when this happens, but usually O2 sats and breath sounds are fine.  Reviewed PDMP - last prescribed Lorazepam by Dr. Villalpando in July of last year, most recently by a hospitalist PA at [facility] (for only 10 tablets).  Has not used since 5/11. Will switch to hydroxyzine  Adding mirtazapine at night to also assist with sleep/insomnia  Monitor response.     ETOH abuse  Assessment & Plan  Patient reports cutting down and only about 2 beers per day but occasionally liquor   Alcohol cessation counseling and supportive counseling  Optimal mood/stress mgmt   Thiamine, folate, MVI   PRN low dose ativan BID   Consider gabapentin as well     Type 2 diabetes mellitus (HCC)  Assessment & Plan  Lab Results   Component Value Date    HGBA1C 8.9 (H) 04/06/2024       Recent Labs     05/21/24  2044 05/22/24  0631 05/22/24  1125 05/22/24  1604   POCGLU 203*  125 100 169*       Blood Sugar Average: Last 72 hrs:  (P) 262.5  Home: Metformin 1000mg Q12hr, Lantus 30 units at bedtime  Current meds: Lantus 18, Metformin 500mg Q12hr, HS, Lispro SS AC/HS, consistent carb diabetic diet  Nutrition consult  Management as per IM  Outpatient f/u with PCP      CAD (coronary artery disease)  Assessment & Plan  With hx of stenting  IM consulted and with overall management at their discretion during ARC course  Antithrombotic: Aspirin and Eliquis   Statin  Optimal blood pressure and blood sugar control        Health Maintenance  #Delirium/Sleep: At risk. Optimize sleep/wake, pain, bowel, bladder management. Avoid deliriogenic meds. Of note is on melatonin, ativan, and oxycodone PRN.   #Pain: Tylenol PRN  #Bowel: Last BM 5/21and continent. Adding PRN miralax  #Bladder: Voiding and continent  #Skin/Pressure Injury Prevention: Turn Q2hr in bed, with weight shifts [id number] in wheelchair.  #DVT Prophylaxis:Fully anticoagulated on eliquis, SCDs  #GI Prophylaxis: PPI on for GERD  #Code Status: Full Code.   #FEN: Diabetic Diet  #Dispo: Team 5/21: ADD 5/29 with home PT/OT with [facility] with goals to discharge to 1 level home with 2 HETAL, with supervision. Daughter for family training this week.   Will need f/u with Pulm, PCP, and Neuro    Objective:    Functional Update:  PT:  CGA transfers, Sup bed mobility, CGA ambulation with RW, modA with HHA, Deanna stairs   OT: Ind eating, Sup grooming, CGA bathing, Sup UB dressing, CGA LB dressing, CGA toileting, CGA tub/shower transfer, CGA toilet transfers  SLP: Moderate cognitive linguistic impairments on CLQT, mild oropharyngeal dysphagia On reg/thin diet     Allergies per EMR    Physical Exam:  Temp:  [98 °F (36.7 °C)-98.9 °F (37.2 °C)] 98 °F (36.7 °C)  HR:  [] 62  Resp:  [17-20] 20  BP: ()/(61-63) 122/62  Oxygen Therapy  SpO2: 94 %  O2 Flow Rate (L/min): 2 L/min    Gen: No acute distress, Well-nourished, well-appearing.  HEENT: Moist mucus  "membranes, Normocephalic/Atraumatic  Cardiovascular: Regular rate, rhythm, S1/S2. Distal pulses palpable  Heme/Extr: No edema  Pulmonary: Non-labored breathing. Decreased breath sounds, but no adventitious.   : No antony  GI: Soft, non-tender, non-distended. BS+  Integumentary: Skin is warm, dry.   Neuro: AAOx3, Speech is dysarthric but intelligible, and answering questions appropriate  Psych: Normal mood and improved/more expressive affect today      Diagnostic Studies: Reviewed, no new imaging    Laboratory:  Reviewed         Invalid input(s): \"PLTCT\"          Invalid input(s): \"CA\"           Patient Active Problem List   Diagnosis    Closed odontoid fracture with routine healing    Closed displaced fracture of third cervical vertebra (HCC)    Closed displaced fracture of fourth cervical vertebra (HCC)    CAD (coronary artery disease)    Dens fracture ([facility])    Acute blood loss anemia    Type 2 diabetes mellitus ([facility])    S/P C1-T1 Posterior Cervical Discectomy and Fusion on 9/10/18    At moderate risk for venous thromboembolism (VTE)    Chronic bronchitis ([facility])    Closed fracture of first cervical vertebra ([facility])    ETOH abuse    KLARISSA (obstructive sleep apnea)    Dirty living conditions    Bronchitis    Diabetic polyneuropathy associated with type 2 diabetes mellitus ([facility])    Hammertoe, bilateral    Post-traumatic arthritis of left foot    Pain due to onychomycosis of toenail    Oral abscess    Tooth fracture    Closed nondisplaced fracture of posterior arch of first cervical vertebra ([facility])    Fall    Stage 3 severe COPD by GOLD classification ([facility])    Anxiety    Hypertension    COPD (chronic obstructive pulmonary disease) ([facility])    SIRS (systemic inflammatory response syndrome) ([facility])    History of alcohol abuse    Iron deficiency anemia secondary to inadequate dietary iron intake    COVID-19    Atrial flutter with rapid ventricular response ([facility])    Leukocytosis    Typical atrial flutter ([facility])    Stroke (cerebrum) " (HCC)    Stroke-like symptom    Sacral wound    Insomnia    History of cardiac arrest         Medications  Current Facility-Administered Medications   Medication Dose Route Frequency Provider Last Rate    acetaminophen  650 mg Oral Q6H PRN [facility], DO      albuterol  2 puff Inhalation Q4H PRN William Camacho, DO      albuterol  2.5 mg Nebulization Q6H PRN [facility]      apixaban  5 mg Oral BID [facility], DO      aspirin  81 mg Oral Daily [facility], DO      atorvastatin  80 mg Oral Daily With Dinner [facility], DO      busPIRone  10 mg Oral TID [facility], DO      ferrous sulfate  325 mg Oral Daily With Breakfast [facility], DO      fluticasone  1 spray Nasal BID [facility], DO      fluticasone-vilanterol  1 puff Inhalation Daily [facility], DO      folic acid  1 mg Oral Daily [facility], DO      guaiFENesin  600 mg Oral BID [facility], DO      hydrOXYzine HCL  25 mg Oral BID PRN Ashley Depadua, MD      insulin glargine  18 Units Subcutaneous HS TATIANA Paulino      insulin lispro  1-5 Units Subcutaneous TID AC [facility], DO      lisinopril  5 mg Oral Daily TATIANA Paulino      melatonin  6 mg Oral QPM Ashley Depadua, MD      metFORMIN  500 mg Oral BID With Meals TATIANA Paulino      mirtazapine  7.5 mg Oral QPM Ashley Depadua, MD      multivitamin-minerals  1 tablet Oral Daily [facility], DO      oxyCODONE  2.5 mg Oral Q4H PRN [facility], DO      pantoprazole  40 mg Oral Early Morning [facility], DO      thiamine  100 mg Oral Daily [facility], DO      umeclidinium  1 puff Inhalation Daily [facility], DO            ** Please Note: Fluency Direct voice to text software may have been used in the creation of this document. **

## 2024-05-22 NOTE — PROGRESS NOTES
"   05/22/24 1300   Pain Assessment   Pain Assessment Tool 0-10   Restrictions/Precautions   Precautions Aspiration;Bed/chair alarms;Cognitive;Fall Risk;Supervision on toilet/commode   Comprehension   Comprehension (FIM) 5 - Understands basic directions and conversation   Expression   Expression (FIM) 5 - Needs help/cues only RARELY (< 10% of the time)   Social Interaction   Social Interaction (FIM) 5 - Interacts appropriately with others 90% of time   Problem Solving   Problem solving (FIM) 4 - Solves basic problems 75-89% of time   Memory   Memory (FIM) 3 - Recognizes, recalls/performs 50-74%   Speech/Language/Cognition Assessmetn   Treatment Assessment Pt was awake, alert and cooperative for session, in which began targeting cognitive linguistic skills. Initiated session given a concrete category exclusion task where pt was provided 5 words and pt was to determine the word which does not belong. Pt was able to be 23/25 accurate in ability to ID the words which did not belong. Increasing this task to now abstract category exclusion still providing pt w/ 5 words and still needing to ID the word which does not belong. For this task, pt did exhibit more concrete thinking given this benefiting from increased probing questions from SLP to determine words which did not belong. Pt's ability to independently ID words was 18/25 accurate, but increased to 23/25 w/ review and clues to determine more appropriate words which did not belong.     Other task targeted in session today was visual ST memory activity where SLP provided pt w/ a visual picture scene and pt was to \"study\" the picture and recall particular items located within  the scene. For the first picture which was more simple in nature, pt's ability to recall items in the scene, pt was 10/12 accurate, but even w/ cues unable to recall the remaining 2 items. For the second picture scene, pt was quick to review information (less than 30 seconds), to where " encouragement was needed by SLP to review longer. Pt's ability to recall items in this picture was 7/10 accurate, increasing to 8/10 when provided initial letter cue for one item but still when provided increased cues unable to recall remaining 2 items in picture. Last picture scene, pt did demonstrate improvement in time allowing to study picture, to where his recall given items in the picture was 7/12 accurate. Pt did benefit from initial letter cue again for 1 item but other cueing methods not successful for attempts to recall other items in picture. At this time, pt will continue to benefit from ongoing skilled SLP services at this time to continue to maximize overall functional cognitive skills in attempts to decrease caregiver burden over time.   SLP Therapy Minutes   SLP Time In 1300   SLP Time Out 1330   SLP Total Time (minutes) 30   SLP Mode of treatment - Individual (minutes) 30   SLP Mode of treatment - Concurrent (minutes) 0   SLP Mode of treatment - Group (minutes) 0   SLP Mode of treatment - Co-treat (minutes) 0   SLP Mode of Treatment - Total time(minutes) 30 minutes   SLP Cumulative Minutes 390   Therapy Time missed   Time missed? No

## 2024-05-22 NOTE — PROGRESS NOTES
"[facility]  Progress Note  Name: Lucho Talavera I  MRN: [id number]  Unit/Bed#: -01 I Date of Admission: 5/11/2024   Date of Service: 5/22/2024 I Hospital Day: 11    Assessment & Plan   * Stroke (cerebrum) (HCC)  Assessment & Plan  Presented with increased weakness, dysarthria and leaning on the left side x 5 days with fall 2 days prior to admission in the setting of noncompliance with Eliquis  CTA with chronic left PCA territory infarction  MRI with multiple infarcts of varying ages and posterior circulation including multiple small acute/early subacute infarct in the bilateral cerebellum with additional small foci of recent ischemia in the left PCA distribution superimposed on chronic infarct  Echo with EF 55%, no cardiac thrombus  Maintained on aspirin and Eliquis  Eliquis is to be $71.28 = daughter aware and OK with the price  Continue Lipitor 80 mg daily  Outpatient follow-up with neurology  DC tentative 5/29/2024    Typical atrial flutter (HCC)  Assessment & Plan  Previously admitted for atrial flutter with RVR in 4/8/2024 s/p ablation and loop recorder insertion.    Sutures removed 5/17/24  Anticoagulated on Eliquis      Hypertension  Assessment & Plan  Home:  Lisinopril 40mg qd  Here:  resume lisinopril 5mg qd   stable        Type 2 diabetes mellitus (HCC)  Assessment & Plan  HbA1C 8.9 on 4/6/24  Home:  Basaglar 30U qhs/Metformin  Here:  Lantus 18U qhs/Metformin 500mg BID  Continue with accuchecks/SSI/DM diet  stable      ETOH abuse  Assessment & Plan  History of alcohol abuse  Monitor off CIWA protocol - Pt now post withdrawal window   Ativan as needed for anxiety  Continue supplements with thiamine, folate, MVI                 The above assessment and plan was reviewed and updated as determined by my evaluation of the patient on 5/22/2024.    Labs:             Invalid input(s): \"LABGLOM\", \"CMP\"          Results from last 7 days   Lab Units 05/22/24  0631 " 05/21/24 2044 05/21/24  1546   POC GLUCOSE mg/dl 125 203* 121       Imaging  No orders to display       Review of Scheduled Meds:  Current Facility-Administered Medications   Medication Dose Route Frequency Provider Last Rate    acetaminophen  650 mg Oral Q6H PRN [place] Osoiroel, DO      albuterol  2 puff Inhalation Q4H PRN William Camacho, DO      albuterol  2.5 mg Nebulization Q6H PRN Richmond Chew, DO      apixaban  5 mg Oral BID [facility]      aspirin  81 mg Oral Daily [facility], DO      atorvastatin  80 mg Oral Daily With Dinner [facility], DO      busPIRone  10 mg Oral TID [facility], DO      ferrous sulfate  325 mg Oral Daily With Breakfast [facility], DO      fluticasone  1 spray Nasal BID [facility], DO      fluticasone-vilanterol  1 puff Inhalation Daily Richmond Chew, DO      folic acid  1 mg Oral Daily [facility], DO      guaiFENesin  600 mg Oral BID [facility], DO      hydrOXYzine HCL  25 mg Oral BID PRN Ashley Depadua, MD      insulin glargine  18 Units Subcutaneous HS TATIANA Paulino      insulin lispro  1-5 Units Subcutaneous TID AC Richmond Chew, DO      lisinopril  5 mg Oral Daily TATIANA Paulino      melatonin  6 mg Oral QPM Ashley Depadua, MD      metFORMIN  500 mg Oral BID With Meals TATIANA Paulino      mirtazapine  7.5 mg Oral QPM Ashley Depadua, MD      multivitamin-minerals  1 tablet Oral Daily Richmond Chew, DO      oxyCODONE  2.5 mg Oral Q4H PRN Richmond Chew, DO      pantoprazole  40 mg Oral Early Morning [facility], [facility]      thiamine  100 mg Oral Daily [facility], DO      umeclidinium  1 puff Inhalation Daily Richmond Chew, DO         Subjective/ HPI: Patient seen and examined. Patients overnight issues or events were reviewed with nursing staff. New or overnight issues include the following:     Pt seen and examined at bedside. No overnight issues. Looking  forward to DC    ROS:   A 10 point ROS was performed; negative except as noted above.        *Labs /Radiology studies Reviewed  *Medications  reviewed and reconciled as needed  *Please refer to order section for additional ordered labs studies      Physical Examination:  Vitals:   Vitals:    05/21/24 1754 05/21/24 2005 05/22/24 0500 05/22/24 0538   BP:  111/63 122/62    BP Location:  Right arm Left arm    Pulse:  95 62    Resp:  18 20    Temp:  98.8 °F (37.1 °C) 98 °F (36.7 °C)    TempSrc:  Oral Oral    SpO2: 99% 92% 94%    Weight:    82.8 kg (182 lb 8 oz)   Height:           General Appearance: NAD; pleasant  HEENT: PERRLA, conjuctiva normal; mucous membranes moist; face symmetrical  Neck:  Supple  Lungs: clear bilaterally, normal respiratory effort, no retractions, expiratory effort normal, on room air  CV: regular rate and rhythm, no murmurs rubs or gallops noted   ABD: soft non tender, +BS x4  EXT: DP pulses intact, no lymphadenopathy, no edema; generalized deconditioning  Skin: normal turgor, normal texture, no rash  Psych: affect normal, mood normal  Neuro: AAOx3       The above physical exam was reviewed and updated as determined by my evaluation of the patient on 5/22/2024.    Invasive Devices       None                      VTE Pharmacologic Prophylaxis: Eliquis  Code Status: Level 1 - Full Code  Current Length of Stay: 11 day(s)    Total floor / unit time spent today 30 minutes  Coordination of patient's care was performed in conjunction with primary service. Time invested included review of patient's labs, vitals, and management of their comorbidities with continued monitoring, examination of patient as well as answering patient questions, documenting her findings and creating progress note in electronic medical record,  ordering appropriate diagnostic testing.       ** Please Note:  voice to text software may have been used in the creation of this document. Although proof errors in transcription or  interpretation are a potential of such software**

## 2024-05-22 NOTE — PLAN OF CARE
Problem: Prexisting or High Potential for Compromised Skin Integrity  Goal: Skin integrity is maintained or improved  Description: INTERVENTIONS:  - Identify patients at risk for skin breakdown  - Assess and monitor skin integrity  - Assess and monitor nutrition and hydration status  - Monitor labs   - Assess for incontinence   - Turn and reposition patient  - Assist with mobility/ambulation  - Relieve pressure over bony prominences  - Avoid friction and shearing  - Provide appropriate hygiene as needed including keeping skin clean and dry  - Evaluate need for skin moisturizer/barrier cream  - Collaborate with interdisciplinary team   - Patient/family teaching  - Consider wound care consult   Outcome: Progressing     Problem: PAIN - ADULT  Goal: Verbalizes/displays adequate comfort level or baseline comfort level  Description: Interventions:  - Encourage patient to monitor pain and request assistance  - Assess pain using appropriate pain scale  - Administer analgesics based on type and severity of pain and evaluate response  - Implement non-pharmacological measures as appropriate and evaluate response  - Consider cultural and social influences on pain and pain management  - Notify physician/advanced practitioner if interventions unsuccessful or patient reports new pain  Outcome: Progressing     Problem: INFECTION - ADULT  Goal: Absence or prevention of progression during hospitalization  Description: INTERVENTIONS:  - Assess and monitor for signs and symptoms of infection  - Monitor lab/diagnostic results  - Monitor all insertion sites, i.e. indwelling lines, tubes, and drains  - Monitor endotracheal if appropriate and nasal secretions for changes in amount and color  - [place] appropriate cooling/warming therapies per order  - Administer medications as ordered  - Instruct and encourage patient and family to use good hand hygiene technique  - Identify and instruct in appropriate isolation precautions for  identified infection/condition  Outcome: Progressing     Problem: SAFETY ADULT  Goal: Patient will remain free of falls  Description: INTERVENTIONS:  - Educate patient/family on patient safety including physical limitations  - Instruct patient to call for assistance with activity   - Consult OT/PT to assist with strengthening/mobility   - Keep Call bell within reach  - Keep bed low and locked with side rails adjusted as appropriate  - Keep care items and personal belongings within reach  - Initiate and maintain comfort rounds  - Make Fall Risk Sign visible to staff  - Offer Toileting every 2-4 Hours, in advance of need  - Initiate/Maintain chair and bed alarm  - Obtain necessary fall risk management equipment: nonskid socks   - Apply yellow socks and bracelet for high fall risk patients  - Consider moving patient to room near nurses station  Outcome: Progressing  Goal: Maintain or return to baseline ADL function  Description: INTERVENTIONS:  -  Assess patient's ability to carry out ADLs; assess patient's baseline for ADL function and identify physical deficits which impact ability to perform ADLs (bathing, care of mouth/teeth, toileting, grooming, dressing, etc.)  - Assess/evaluate cause of self-care deficits   - Assess range of motion  - Assess patient's mobility; develop plan if impaired  - Assess patient's need for assistive devices and provide as appropriate  - Encourage maximum independence but intervene and supervise when necessary  - Involve family in performance of ADLs  - Assess for home care needs following discharge   - Consider OT consult to assist with ADL evaluation and planning for discharge  - Provide patient education as appropriate  Outcome: Progressing  Goal: Maintains/Returns to pre admission functional level  Description: INTERVENTIONS:  - Perform AM-PAC 6 Click Basic Mobility/ Daily Activity assessment daily.  - Set and communicate daily mobility goal to care team and patient/family/caregiver.    - Collaborate with rehabilitation services on mobility goals if consulted  - Perform Range of Motion 3 times a day.  - Reposition patient every 3 hours.  - Dangle patient 3 times a day  - Stand patient 3 times a day  - Ambulate patient 3 times a day  - Out of bed to chair 3 times a day   - Out of bed for meals 3 times a day  - Out of bed for toileting  - Record patient progress and toleration of activity level   Outcome: Progressing     Problem: DISCHARGE PLANNING  Goal: Discharge to home or other facility with appropriate resources  Description: INTERVENTIONS:  - Identify barriers to discharge w/patient and caregiver  - Arrange for needed discharge resources and transportation as appropriate  - Identify discharge learning needs (meds, wound care, etc.)  - Arrange for interpretive services to assist at discharge as needed  - Refer to Case Management Department for coordinating discharge planning if the patient needs post-hospital services based on physician/advanced practitioner order or complex needs related to functional status, cognitive ability, or social support system  Outcome: Progressing

## 2024-05-22 NOTE — ASSESSMENT & PLAN NOTE
Presented with increased weakness, dysarthria and leaning on the left side x 5 days with fall 2 days prior to admission in the setting of noncompliance with Eliquis  CTA with chronic left PCA territory infarction  MRI with multiple infarcts of varying ages and posterior circulation including multiple small acute/early subacute infarct in the bilateral cerebellum with additional small foci of recent ischemia in the left PCA distribution superimposed on chronic infarct  Echo with EF 55%, no cardiac thrombus  Maintained on aspirin and Eliquis  Eliquis is to be $71.28 = daughter aware and OK with the price  Continue Lipitor 80 mg daily  Outpatient follow-up with neurology  DC tentative 5/29/2024

## 2024-05-22 NOTE — PROGRESS NOTES
"   05/22/24 0705   Pain Assessment   Pain Assessment Tool 0-10   Pain Score No Pain   Restrictions/Precautions   Precautions Aspiration;Bed/chair alarms;Cognitive;Fall Risk;Supervision on toilet/commode   Weight Bearing Restrictions No   ROM Restrictions No   Lifestyle   Autonomy \"I am excited to see the kids and dogs when I go home.\"   Eating   Type of Assistance Needed Independent   Physical Assistance Level No physical assistance   Eating CARE Score 6   Oral Hygiene   Type of Assistance Needed Set-up / clean-up   Physical Assistance Level No physical assistance   Comment seated at sink.   Oral Hygiene CARE Score 5   Shower/Bathe Self   Type of Assistance Needed Supervision;Set-up / clean-up   Physical Assistance Level No physical assistance   Comment seated to bathe BUE and BLE w/ crossed leg technique. CS in stance to bathe groin and buttocks, req cues to improve thoroughness at times.   Shower/Bathe Self CARE Score 4   Tub/Shower Transfer   Reason Not Assessed Sponge Bath;Patient refusal  (Pt unagreeable to shower, requesting sponge bath)   Upper Body Dressing   Type of Assistance Needed Set-up / clean-up   Physical Assistance Level No physical assistance   Comment seated   Upper Body Dressing CARE Score 5   Lower Body Dressing   Type of Assistance Needed Supervision;Set-up / clean-up   Physical Assistance Level No physical assistance   Comment seated to thread BLE, CS in stance for clothing management.   Lower Body Dressing CARE Score 4   Putting On/Taking Off Footwear   Type of Assistance Needed Supervision;Set-up / clean-up   Physical Assistance Level No physical assistance   Comment seated, crossed leg technique. req encouragement at times 2* poor frustration tolerance w/ inc task demand.   Putting On/Taking Off Footwear CARE Score 4   Lying to Sitting on Side of Bed   Type of Assistance Needed Set-up / clean-up   Physical Assistance Level No physical assistance   Lying to Sitting on Side of Bed CARE Score " 5   Sit to Stand   Type of Assistance Needed Supervision;Set-up / clean-up   Physical Assistance Level No physical assistance   Comment CS w/ RW, cues for proper hand placement   Sit to Stand CARE Score 4   Bed-Chair Transfer   Type of Assistance Needed Incidental touching;Adaptive equipment   Physical Assistance Level No physical assistance   Comment CS-CGA w/ RW, ambulated<>bathroom.   Chair/Bed-to-Chair Transfer CARE Score 4   Toileting Hygiene   Type of Assistance Needed Incidental touching   Physical Assistance Level No physical assistance   Comment seated for hygiene management. CS-CGA in stance for clothing management.   Toileting Hygiene CARE Score 4   Toilet Transfer   Type of Assistance Needed Verbal cues;Incidental touching;Adaptive equipment   Physical Assistance Level No physical assistance   Comment CGA w/ RW to [facility] over toilet   Toilet Transfer CARE Score 4   Exercise Tools   Other Exercise Tool 1 BUE ther ex to maximize strength and endurance for ADLs and fxnl mobility. Completed w/ 4# DB, 3x10 bicep curls, chest press, and OH shoulder press. Pt tolerated well w/ Min vc's to improve technique. Provided w/ rest breaks between sets to manage fatigue.   Cognition   Overall Cognitive Status Impaired   Comments impaired safety awareness, poor insight, dec STM, and poor frustration tolerance. Improvements noted w/ cognitive endurance. Cont to recommend A for all IADLs to include med management.   Assessment   Treatment Assessment Pt seen for skilled OT session focusing on self-care management and BUE strengthening. Details on sponge bath ADL noted above, completed at overall -CGA w/ Min cues for thoroughness. Pt cont to be limited by cognitive deficits, req Min vc's for sequencing to inc safety awareness w/ fxnl tasks. Overall improvements noted w/ fxnl standing tolerance/balance, act tolerance, and timeliness of ADL. Cont OT POC: endurance work, fxnl standing tolerance w/ alternating unilateral release,  BUE strengthening, fxnl cognitive/safety training, and RW management training. Plan for FT w/ pt's daughter on Friday afternoon, plan to review OT d/c recommendations. Pt agreeable to rest in recliner, all needs within reach, meal tray set-up, and alarm activated. From OT standpoint pt is on track to d/c home w/ family to provide 24 hour S on 5/29 with recommendation for HHOT.   Prognosis Good   Problem List Decreased strength;Decreased endurance;Impaired balance;Decreased mobility;Decreased cognition;Impaired judgement;Decreased safety awareness;Decreased skin integrity   Plan   Treatment/Interventions ADL retraining;Functional transfer training;Therapeutic exercise;Endurance training;Patient/family training   Progress Progressing toward goals   Discharge Recommendation   Rehab Resource Intensity Level, OT   (home w/ family support, HHOT)   OT Therapy Minutes   OT Time In 0705   OT Time Out 0830   OT Total Time (minutes) 85   OT Mode of treatment - Individual (minutes) 85   OT Mode of treatment - Concurrent (minutes) 0   OT Mode of treatment - Group (minutes) 0   OT Mode of treatment - Co-treat (minutes) 0   OT Mode of Treatment - Total time(minutes) 85 minutes   OT Cumulative Minutes 740   Therapy Time missed   Time missed? No

## 2024-05-22 NOTE — ASSESSMENT & PLAN NOTE
HbA1C 8.9 on 4/6/24  Home:  Basaglar 30U qhs/Metformin  Here:  Lantus 18U qhs/Metformin 500mg BID  Continue with accuchecks/SSI/DM diet  stable

## 2024-05-23 LAB
GLUCOSE SERPL-MCNC: 118 MG/DL (ref 65–140)
GLUCOSE SERPL-MCNC: 123 MG/DL (ref 65–140)
GLUCOSE SERPL-MCNC: 237 MG/DL (ref 65–140)
GLUCOSE SERPL-MCNC: 99 MG/DL (ref 65–140)

## 2024-05-23 PROCEDURE — 97116 GAIT TRAINING THERAPY: CPT

## 2024-05-23 PROCEDURE — 99232 SBSQ HOSP IP/OBS MODERATE 35: CPT | Performed by: PHYSICAL MEDICINE & REHABILITATION

## 2024-05-23 PROCEDURE — 97110 THERAPEUTIC EXERCISES: CPT

## 2024-05-23 PROCEDURE — 99232 SBSQ HOSP IP/OBS MODERATE 35: CPT | Performed by: INTERNAL MEDICINE

## 2024-05-23 PROCEDURE — 97112 NEUROMUSCULAR REEDUCATION: CPT

## 2024-05-23 PROCEDURE — 97535 SELF CARE MNGMENT TRAINING: CPT

## 2024-05-23 PROCEDURE — 94664 DEMO&/EVAL PT USE INHALER: CPT

## 2024-05-23 PROCEDURE — 97129 THER IVNTJ 1ST 15 MIN: CPT

## 2024-05-23 PROCEDURE — 82948 REAGENT STRIP/BLOOD GLUCOSE: CPT

## 2024-05-23 PROCEDURE — 97130 THER IVNTJ EA ADDL 15 MIN: CPT

## 2024-05-23 RX ADMIN — FOLIC ACID 1 MG: 1 TABLET ORAL at 08:12

## 2024-05-23 RX ADMIN — PANTOPRAZOLE SODIUM 40 MG: 40 TABLET, DELAYED RELEASE ORAL at 06:19

## 2024-05-23 RX ADMIN — FLUTICASONE FUROATE AND VILANTEROL TRIFENATATE 1 PUFF: 200; 25 POWDER RESPIRATORY (INHALATION) at 08:13

## 2024-05-23 RX ADMIN — METFORMIN HYDROCHLORIDE 500 MG: 500 TABLET, FILM COATED ORAL at 08:11

## 2024-05-23 RX ADMIN — MIRTAZAPINE 7.5 MG: 15 TABLET, FILM COATED ORAL at 21:01

## 2024-05-23 RX ADMIN — FLUTICASONE PROPIONATE 1 SPRAY: 50 SPRAY, METERED NASAL at 08:12

## 2024-05-23 RX ADMIN — APIXABAN 5 MG: 5 TABLET, FILM COATED ORAL at 08:12

## 2024-05-23 RX ADMIN — BUSPIRONE HYDROCHLORIDE 10 MG: 10 TABLET ORAL at 08:12

## 2024-05-23 RX ADMIN — LISINOPRIL 5 MG: 5 TABLET ORAL at 08:14

## 2024-05-23 RX ADMIN — BUSPIRONE HYDROCHLORIDE 10 MG: 10 TABLET ORAL at 17:04

## 2024-05-23 RX ADMIN — INSULIN GLARGINE 18 UNITS: 100 INJECTION, SOLUTION SUBCUTANEOUS at 21:01

## 2024-05-23 RX ADMIN — METFORMIN HYDROCHLORIDE 500 MG: 500 TABLET, FILM COATED ORAL at 17:02

## 2024-05-23 RX ADMIN — THIAMINE HCL TAB 100 MG 100 MG: 100 TAB at 08:12

## 2024-05-23 RX ADMIN — UMECLIDINIUM 1 PUFF: 62.5 AEROSOL, POWDER ORAL at 08:12

## 2024-05-23 RX ADMIN — ASPIRIN 81 MG CHEWABLE TABLET 81 MG: 81 TABLET CHEWABLE at 08:11

## 2024-05-23 RX ADMIN — GUAIFENESIN 600 MG: 600 TABLET, EXTENDED RELEASE ORAL at 17:04

## 2024-05-23 RX ADMIN — Medication 6 MG: at 21:01

## 2024-05-23 RX ADMIN — BUSPIRONE HYDROCHLORIDE 10 MG: 10 TABLET ORAL at 21:01

## 2024-05-23 RX ADMIN — APIXABAN 5 MG: 5 TABLET, FILM COATED ORAL at 17:04

## 2024-05-23 RX ADMIN — GUAIFENESIN 600 MG: 600 TABLET, EXTENDED RELEASE ORAL at 08:11

## 2024-05-23 RX ADMIN — ATORVASTATIN CALCIUM 80 MG: 80 TABLET, FILM COATED ORAL at 17:04

## 2024-05-23 RX ADMIN — Medication 1 TABLET: at 08:11

## 2024-05-23 RX ADMIN — FERROUS SULFATE TAB 325 MG (65 MG ELEMENTAL FE) 325 MG: 325 (65 FE) TAB at 08:12

## 2024-05-23 NOTE — PROGRESS NOTES
"[facility]  Progress Note  Name: Lucho Talavera I  MRN: [id number]  Unit/Bed#: -01 I Date of Admission: 5/11/2024   Date of Service: 5/23/2024 I Hospital Day: 12    Assessment & Plan   * Stroke (cerebrum) (HCC)  Assessment & Plan  Presented with increased weakness, dysarthria and leaning on the left side x 5 days with fall 2 days prior to admission in the setting of noncompliance with Eliquis  CTA with chronic left PCA territory infarction  MRI with multiple infarcts of varying ages and posterior circulation including multiple small acute/early subacute infarct in the bilateral cerebellum with additional small foci of recent ischemia in the left PCA distribution superimposed on chronic infarct  Echo with EF 55%, no cardiac thrombus  Maintained on aspirin and Eliquis  Eliquis is to be $71.28 = daughter aware and OK with the price  Continue Lipitor 80 mg daily  Outpatient follow-up with neurology  DC tentative 5/29/2024    Typical atrial flutter (HCC)  Assessment & Plan  Previously admitted for atrial flutter with RVR in 4/8/2024 s/p ablation and loop recorder insertion.    Sutures removed 5/17/24  Anticoagulated on Eliquis      Hypertension  Assessment & Plan  Home:  Lisinopril 40mg qd  Here:  resumed lisinopril 5mg qd   stable        ETOH abuse  Assessment & Plan  History of alcohol abuse  Monitor off CIWA protocol - Pt now post withdrawal window   Ativan as needed for anxiety  Continue supplements with thiamine, folate, MVI    Type 2 diabetes mellitus (HCC)  Assessment & Plan  HbA1C 8.9 on 4/6/24  Home:  Basaglar 30U qhs/Metformin  Here:  Lantus 18U qhs/Metformin 500mg BID  Continue with accuchecks/SSI/DM diet  stable               The above assessment and plan was reviewed and updated as determined by my evaluation of the patient on 5/23/2024.    Labs:             Invalid input(s): \"LABGLOM\", \"CMP\"          Results from last 7 days   Lab Units 05/23/24  0613 " 05/22/24 2050 05/22/24  1604   POC GLUCOSE mg/dl 123 137 169*       Imaging  No orders to display       Review of Scheduled Meds:  Current Facility-Administered Medications   Medication Dose Route Frequency Provider Last Rate    acetaminophen  650 mg Oral Q6H PRN [facility]      albuterol  2 puff Inhalation Q4H PRN William Camacho, DO      albuterol  2.5 mg Nebulization Q6H PRN [facility], DO      apixaban  5 mg Oral BID [facility], [facility]      aspirin  81 mg Oral Daily [facility], DO      atorvastatin  80 mg Oral Daily With Dinner [facility], DO      busPIRone  10 mg Oral TID [facility], DO      ferrous sulfate  325 mg Oral Daily With Breakfast [facility], [facility]      fluticasone  1 spray Nasal BID [facility], DO      fluticasone-vilanterol  1 puff Inhalation Daily [facility], DO      folic acid  1 mg Oral Daily [facility], DO      guaiFENesin  600 mg Oral BID [facility], DO      hydrOXYzine HCL  25 mg Oral BID PRN Ashley Depadua, MD      insulin glargine  18 Units Subcutaneous HS TATIANA Paulino      insulin lispro  1-5 Units Subcutaneous TID AC [facility], DO      lisinopril  5 mg Oral Daily TATIANA Paulino      melatonin  6 mg Oral QPM Ashley Depadua, MD      metFORMIN  500 mg Oral BID With Meals TATIANA Paulino      mirtazapine  7.5 mg Oral QPM Ashley Depadua, MD      multivitamin-minerals  1 tablet Oral Daily [facility], DO      oxyCODONE  2.5 mg Oral Q4H PRN [facility], DO      pantoprazole  40 mg Oral Early Morning [facility] DO      thiamine  100 mg Oral Daily [facility], DO      umeclidinium  1 puff Inhalation Daily [facility], DO         Subjective/ HPI: Patient seen and examined. Patients overnight issues or events were reviewed with nursing staff. New or overnight issues include the following:     Pt seen in his room. He denies any current complaints.    ROS:    A 10 point ROS was performed; negative except as noted above.        *Labs /Radiology studies Reviewed  *Medications  reviewed and reconciled as needed  *Please refer to order section for additional ordered labs studies      Physical Examination:  Vitals:   Vitals:    05/22/24 2042 05/22/24 2208 05/23/24 0426 05/23/24 0814   BP: 146/80  139/64 143/72   BP Location: Left arm  Right arm    Pulse: 75  64    Resp: 20  20    Temp: 98.1 °F (36.7 °C)  97.5 °F (36.4 °C)    TempSrc: Oral  Oral    SpO2: 93% 94% 93%    Weight:       Height:           General Appearance: NAD; pleasant  HEENT: PERRLA, conjuctiva normal; mucous membranes moist; face symmetrical  Neck:  Supple  Lungs: clear bilaterally, normal respiratory effort, no retractions, expiratory effort normal, on room air  CV: regular rate and rhythm, no murmurs rubs or gallops noted   ABD: soft non tender, +BS x4  EXT: DP pulses intact, no lymphadenopathy, no edema  Skin: normal turgor, normal texture, no rash  Psych: affect normal, mood normal  Neuro: AAOx3       The above physical exam was reviewed and updated as determined by my evaluation of the patient on 5/23/2024.    Invasive Devices       None                      VTE Pharmacologic Prophylaxis: Eliquis  Code Status: Level 1 - Full Code  Current Length of Stay: 12 day(s)    Total floor / unit time spent today 45 minutes  Coordination of patient's care was performed in conjunction with primary service. Time invested included review of patient's labs, vitals, and management of their comorbidities with continued monitoring, examination of patient as well as answering patient questions, documenting her findings and creating progress note in electronic medical record,  ordering appropriate diagnostic testing.       ** Please Note:  voice to text software may have been used in the creation of this document. Although proof errors in transcription or interpretation are a potential of such software**

## 2024-05-23 NOTE — PROGRESS NOTES
Physical Medicine and Rehabilitation Progress Note  Lucho Talavera 69 y.o. male MRN: [id number]  Unit/Bed#: [facility] 455-01 Encounter: [id number]    To Review: 69 year old M with PMH of COPD, multitude of ED visits/hospitalizations, DM2, CAD, EtOH abuse, HTN, prior CVA, found to have aflutter with RVR on hospitalization 4/2024 s/p BRITTNEY ablation who was to d/c on Eliquis but per report family did not know he needed it and he was not taking it who developed dysarthria, weakness and imbalance with recent fall at home and presented to hospital. CTA H/N showed Chronic left PCA territory infarct, new since the prior exam. No evidence of acute vascular territorial infarction, intracranial hemorrhage or mass.  No hemodynamically significant stenosis, dissection or occlusion of the carotid or vertebral arteries or major vessels of the [place].  Neuro consulted and recommended MRI brain which showed multiple infarcts of varying ages in the posterior circulation include multiple small acute/early subacute infarcts in the bilateral cerebellum. TTE did not show thrombus.  Neuro recommended Eliquis, aspirin, and statin. Patient was evaluated by skilled therapies and was found to have significant decline in ADLs and ambulation and appears appropriate for admission to [facility].     Chief Complaint: No new concerns.    Interval History/Subjective:  No acute events overnight. Had one isolated episode of bowel incontinence yesterday. He slept well and is feeling well this morning. No new CP, SOB, fevers, chills, N/V, abdominal pain. No new numbness/tingling/weakness. Speech still with some difficulties tripping over his words. Last BM 5/22.    ROS:  A 10 point review of systems was negative except for what is noted in the HPI.    Today's Changes:  Family training with daughter tomorrow, discussed with team  Continue sleep logs    Assessment/Plan:    * Stroke (cerebrum) (HCC)  Assessment & Plan  - MRI  brain 5/8 - Multiple infarcts of varying ages in the posterior circulation include multiple small acute/early subacute infarcts in the bilateral cerebellum. Additional small foci of recent ischemia in the left PCA distribution superimposed on chronic infarct  No acute hemorrhage or mass effect  - CTA H/N -  1. Chronic left PCA territory infarct, new since the prior exam. No evidence of acute vascular territorial infarction,  intracranial hemorrhage or mass. 2. No hemodynamically significant stenosis, dissection or occlusion of the carotid or vertebral arteries or major vessels of the [place].  - Residual impairments on admission to [facility]: Impaired balance, coordination, possibly vision  - Co-morbidities most impacting functional recovery: COPD with chronic hypoxic respiratory failure, anxiety    Secondary stroke prevention  - Antithrombotic: Aspirin and Eliquis  - Statin  - Patient at increased risk for stroke particularly early in post-stroke period - monitor neuro exam closely with low threshold to repeat imaging  -  patient and if applicable caregiver on optimal stroke management  - Follow-up with neurology and PCP after d/c   - Recommend acute comprehensive interdisciplinary inpatient rehabilitation to include intensive skilled therapies (PT, OT, ST) as outlined with oversight and management by rehabilitation physician as well as inpatient rehab level nursing, case management and weekly interdisciplinary team meetings.       Sacral wound  Assessment & Plan  Seen by wound care 5/7  - Managed R lateral forearm skin tear   - Noted to have old open area on sacrum on admission.   - Consulted wound care to follow-up. Epithelialized   - Offloading,specialty cushion   - Allevyn   - Monitor closely.       Chronic bronchitis (HCC)  Assessment & Plan  Has had a multitude of ED visits related to COPD/concern for SOB but was frequently d/c'd same day  - Monitor also for anxiety and optimal mgmt of that;  education on monitor home O2  IM consulted and with overall management at their discretion during ARC course  Monitor lung exam, vitals with and without activity  Encourage incentive spirometry  Breo/Incruse  Xopenex  PRN Albuterol  PRN supplemental O2   Mucinex     Insomnia  Assessment & Plan  Environmental interventions.  Sleep log  Drinks a lot of caffeinated sodas throughout the night   - Placed no caffeine order and review with nursing.   Added melatonin scheduled  5/16 added mirtazapine for both sleep side effect and anxiety.     Typical atrial flutter (HCC)  Assessment & Plan  IM consulted and with overall management at their discretion during ARC course  Rate control: None  Antithrombotic: Eliquis    - Cost is $71.28/month. Daughter confirmed this price is fine.      Hypertension  Assessment & Plan  Home: Lisinopril 40mg daily  Here: Lisinopril 5mg daily  Monitor vitals with and without activity; monitor for orthostasis  Monitor hemoglobin, electrolytes, kidney function, hydration status   Management as per IM.       Anxiety  Assessment & Plan  Cries out for help at times with anxiety  This improved here.  Asks for breathing treatments/O2 when this happens, but usually O2 sats and breath sounds are fine.  Reviewed PDMP - last prescribed Lorazepam by Dr. Villalpando in July of last year, most recently by a hospitalist PA at [facility] (for only 10 tablets).  Has not used since 5/11. Will switch to hydroxyzine  Adding mirtazapine at night to also assist with sleep/insomnia  Monitor response.     ETOH abuse  Assessment & Plan  Patient reports cutting down and only about 2 beers per day but occasionally liquor   Alcohol cessation counseling and supportive counseling  Optimal mood/stress mgmt   Thiamine, folate, MVI   PRN low dose ativan BID   Consider gabapentin as well     Type 2 diabetes mellitus (HCC)  Assessment & Plan  Lab Results   Component Value Date    HGBA1C 8.9 (H) 04/06/2024       Recent Labs     05/22/24  1125  05/22/24  1604 05/22/24  2050 05/23/24  0613   POCGLU 100 169* 137 123       Blood Sugar Average: Last 72 hrs:  (P) 262.5  Home: Metformin 1000mg Q12hr, Lantus 30 units at bedtime  Current meds: Lantus 18, Metformin 500mg Q12hr, HS, Lispro SS AC/HS, consistent carb diabetic diet  Nutrition consult  Management as per IM  Outpatient f/u with PCP      CAD (coronary artery disease)  Assessment & Plan  With hx of stenting  IM consulted and with overall management at their discretion during ARC course  Antithrombotic: Aspirin and Eliquis   Statin  Optimal blood pressure and blood sugar control        Health Maintenance  #Delirium/Sleep: At risk. Optimize sleep/wake, pain, bowel, bladder management. Avoid deliriogenic meds. Of note is on melatonin, ativan, and oxycodone PRN.   #Pain: Tylenol PRN  #Bowel: Last BM 5/22 and continent. Adding PRN miralax  #Bladder: Voiding and continent  #Skin/Pressure Injury Prevention: Turn Q2hr in bed, with weight shifts [id number] in wheelchair.  #DVT Prophylaxis:Fully anticoagulated on eliquis, SCDs  #GI Prophylaxis: PPI on for GERD  #Code Status: Full Code.   #FEN: Diabetic Diet  #Dispo: Team 5/21: ADD 5/29 with home PT/OT with [facility] with goals to discharge to 1 level home with 2 HETAL, with supervision. Daughter for family training this week.   Will need f/u with Pulm, PCP, and Neuro    Objective:    Functional Update:  PT:  CGA transfers, Sup bed mobility, CGA ambulation with RW, modA with HHA, Deanna stairs   OT: Ind eating, Sup grooming, CGA bathing, Sup UB dressing, CGA LB dressing, CGA toileting, CGA tub/shower transfer, CGA toilet transfers  SLP: Moderate cognitive linguistic impairments on CLQT, mild oropharyngeal dysphagia On reg/thin diet     Allergies per EMR    Physical Exam:  Temp:  [97.5 °F (36.4 °C)-98.4 °F (36.9 °C)] 97.5 °F (36.4 °C)  HR:  [64-95] 64  Resp:  [16-20] 20  BP: (111-146)/(64-80) 139/64  Oxygen Therapy  SpO2: 93 %  O2 Flow Rate (L/min): 2 L/min    Gen: No acute  "distress, Well-nourished, well-appearing.  HEENT: Moist mucus membranes, Normocephalic/Atraumatic  Cardiovascular: Regular rate, rhythm, S1/S2. Distal pulses palpable  Heme/Extr: No edema  Pulmonary: Non-labored breathing. Lungs CTAB  : No antony  GI: Soft, non-tender, non-distended. BS+  Integumentary: Skin is warm, dry  Neuro: AAOx3, Speech is dysarthric but intelligible. Appropriate to questioning.   Psych: Normal mood and improved affect.     Diagnostic Studies: Reviewed, no new imaging    Laboratory:  Reviewed         Invalid input(s): \"PLTCT\"          Invalid input(s): \"CA\"           Patient Active Problem List   Diagnosis    Closed odontoid fracture with routine healing    Closed displaced fracture of third cervical vertebra (HCC)    Closed displaced fracture of fourth cervical vertebra ([facility])    CAD (coronary artery disease)    Dens fracture ([facility])    Acute blood loss anemia    Type 2 diabetes mellitus ([facility])    S/P C1-T1 Posterior Cervical Discectomy and Fusion on 9/10/18    At moderate risk for venous thromboembolism (VTE)    Chronic bronchitis ([facility])    Closed fracture of first cervical vertebra ([facility])    ETOH abuse    KLARISSA (obstructive sleep apnea)    Dirty living conditions    Bronchitis    Diabetic polyneuropathy associated with type 2 diabetes mellitus ([facility])    Hammertoe, bilateral    Post-traumatic arthritis of left foot    Pain due to onychomycosis of toenail    Oral abscess    Tooth fracture    Closed nondisplaced fracture of posterior arch of first cervical vertebra ([facility])    Fall    Stage 3 severe COPD by GOLD classification ([facility])    Anxiety    Hypertension    COPD (chronic obstructive pulmonary disease) ([facility])    SIRS (systemic inflammatory response syndrome) ([facility])    History of alcohol abuse    Iron deficiency anemia secondary to inadequate dietary iron intake    COVID-19    Atrial flutter with rapid ventricular response ([facility])    Leukocytosis    Typical atrial flutter ([facility])    Stroke (cerebrum) ([facility]) "    Stroke-like symptom    Sacral wound    Insomnia    History of cardiac arrest         Medications  Current Facility-Administered Medications   Medication Dose Route Frequency Provider Last Rate    acetaminophen  650 mg Oral Q6H PRN [facility]      albuterol  2 puff Inhalation Q4H PRN William Camacho, DO      albuterol  2.5 mg Nebulization Q6H PRN Richmond Chew, DO      apixaban  5 mg Oral BID [facility], DO      aspirin  81 mg Oral Daily [facility], DO      atorvastatin  80 mg Oral Daily With Dinner [facility], DO      busPIRone  10 mg Oral TID [facility], DO      ferrous sulfate  325 mg Oral Daily With Breakfast [facility], DO      fluticasone  1 spray Nasal BID [facility], DO      fluticasone-vilanterol  1 puff Inhalation Daily [facility], DO      folic acid  1 mg Oral Daily [facility], DO      guaiFENesin  600 mg Oral BID [facility], [facility]      hydrOXYzine HCL  25 mg Oral BID PRN Ashley Depadua, MD      insulin glargine  18 Units Subcutaneous HS TATIANA Paulino      insulin lispro  1-5 Units Subcutaneous TID AC Richmond Osorioel, DO      lisinopril  5 mg Oral Daily TATIANA Paulino      melatonin  6 mg Oral QPM Ashley Depadua, MD      metFORMIN  500 mg Oral BID With Meals TATIANA Paulino      mirtazapine  7.5 mg Oral QPM Ashley Depadua, MD      multivitamin-minerals  1 tablet Oral Daily [facility], DO      oxyCODONE  2.5 mg Oral Q4H PRN [facility], DO      pantoprazole  40 mg Oral Early Morning [facility], DO      thiamine  100 mg Oral Daily [facility], DO      umeclidinium  1 puff Inhalation Daily Richmond Chew, DO            ** Please Note: Fluency Direct voice to text software may have been used in the creation of this document. **

## 2024-05-23 NOTE — PROGRESS NOTES
05/23/24 0830   Pain Assessment   Pain Assessment Tool 0-10   Pain Score No Pain   Restrictions/Precautions   Precautions Aspiration;Bed/chair alarms;Cognitive;Fall Risk;Supervision on toilet/commode   Comprehension   Comprehension (FIM) 5 - Understands basic directions and conversation   Expression   Expression (FIM) 5 - Needs help/cues only RARELY (< 10% of the time)   Social Interaction   Social Interaction (FIM) 5 - Interacts appropriately with others 90% of time   Problem Solving   Problem solving (FIM) 4 - Solves basic problems 75-89% of time   Memory   Memory (FIM) 3 - Recognizes, recalls/performs 50-74%   Speech/Language/Cognition Assessmetn   Treatment Assessment Pt awake, alert and cooperative for session, in which SLP reviewing overnight events w/ pt to where pt was able to recall visitors, dtr and grandchildren as well as recalling items consumed on breakfast tray (which had lid over top). Otherwise, session focusing on functional cognitive tasks which began with a categorization task by presenting pt w/ a stated category and then pt to determine an item that belong to that category when provided the initial letter. It was noted that pt was able to demonstrate 24/24 accuracy in this task w/o needing any cues. Briefly reviewed orientation information, to where pt using whiteboard to recall current date, but was accurate for current place/city. Still needed probing cues for reasoning why in the hospital.    The other task which SLP engaged pt in was a remote memory recall task. SLP providing pt w/ a question and then a list of 5 possible answers to the questions. Pt was able to ID the target word on the initial presentation of word lists w/ 8/16 items. Pt did ask for repetition for lists and then chose target word which belonged increasing to 11/16 accuracy. Remaining items did require minimal probing clues to determine closer approximations to answers for remaining 5 times. At this time, pt will  continue to benefit from ongoing skilled SLP services targeting functional cognitive skills in attempts to decrease caregiver burden over time.   SLP Therapy Minutes   SLP Time In 0830   SLP Time Out 0900   SLP Total Time (minutes) 30   SLP Mode of treatment - Individual (minutes) 30   SLP Mode of treatment - Concurrent (minutes) 0   SLP Mode of treatment - Group (minutes) 0   SLP Mode of treatment - Co-treat (minutes) 0   SLP Mode of Treatment - Total time(minutes) 30 minutes   SLP Cumulative Minutes 420   Therapy Time missed   Time missed? No

## 2024-05-23 NOTE — PLAN OF CARE
Problem: Prexisting or High Potential for Compromised Skin Integrity  Goal: Skin integrity is maintained or improved  Description: INTERVENTIONS:  - Identify patients at risk for skin breakdown  - Assess and monitor skin integrity  - Assess and monitor nutrition and hydration status  - Monitor labs   - Assess for incontinence   - Turn and reposition patient  - Assist with mobility/ambulation  - Relieve pressure over bony prominences  - Avoid friction and shearing  - Provide appropriate hygiene as needed including keeping skin clean and dry  - Evaluate need for skin moisturizer/barrier cream  - Collaborate with interdisciplinary team   - Patient/family teaching  - Consider wound care consult   Outcome: Progressing     Problem: PAIN - ADULT  Goal: Verbalizes/displays adequate comfort level or baseline comfort level  Description: Interventions:  - Encourage patient to monitor pain and request assistance  - Assess pain using appropriate pain scale  - Administer analgesics based on type and severity of pain and evaluate response  - Implement non-pharmacological measures as appropriate and evaluate response  - Consider cultural and social influences on pain and pain management  - Notify physician/advanced practitioner if interventions unsuccessful or patient reports new pain  Outcome: Progressing     Problem: INFECTION - ADULT  Goal: Absence or prevention of progression during hospitalization  Description: INTERVENTIONS:  - Assess and monitor for signs and symptoms of infection  - Monitor lab/diagnostic results  - Monitor all insertion sites, i.e. indwelling lines, tubes, and drains  - Monitor endotracheal if appropriate and nasal secretions for changes in amount and color  - [place] appropriate cooling/warming therapies per order  - Administer medications as ordered  - Instruct and encourage patient and family to use good hand hygiene technique  - Identify and instruct in appropriate isolation precautions for  identified infection/condition  Outcome: Progressing     Problem: SAFETY ADULT  Goal: Patient will remain free of falls  Description: INTERVENTIONS:  - Educate patient/family on patient safety including physical limitations  - Instruct patient to call for assistance with activity   - Consult OT/PT to assist with strengthening/mobility   - Keep Call bell within reach  - Keep bed low and locked with side rails adjusted as appropriate  - Keep care items and personal belongings within reach  - Initiate and maintain comfort rounds  - Make Fall Risk Sign visible to staff  - Offer Toileting every 2 Hours, in advance of need  - Initiate/Maintain bed/chair alarm  - Obtain necessary fall risk management equipment: alarms  - Apply yellow socks and bracelet for high fall risk patients  - Consider moving patient to room near nurses station  Outcome: Progressing  Goal: Maintain or return to baseline ADL function  Description: INTERVENTIONS:  -  Assess patient's ability to carry out ADLs; assess patient's baseline for ADL function and identify physical deficits which impact ability to perform ADLs (bathing, care of mouth/teeth, toileting, grooming, dressing, etc.)  - Assess/evaluate cause of self-care deficits   - Assess range of motion  - Assess patient's mobility; develop plan if impaired  - Assess patient's need for assistive devices and provide as appropriate  - Encourage maximum independence but intervene and supervise when necessary  - Involve family in performance of ADLs  - Assess for home care needs following discharge   - Consider OT consult to assist with ADL evaluation and planning for discharge  - Provide patient education as appropriate  Outcome: Progressing  Goal: Maintains/Returns to pre admission functional level  Description: INTERVENTIONS:  - Perform AM-PAC 6 Click Basic Mobility/ Daily Activity assessment daily.  - Set and communicate daily mobility goal to care team and patient/family/caregiver.   -  Collaborate with rehabilitation services on mobility goals if consulted  - Perform Range of Motion 3 times a day.  - Reposition patient every 2 hours.  - Dangle patient 3 times a day  - Stand patient 3 times a day  - Ambulate patient 3 times a day  - Out of bed to chair 3 times a day   - Out of bed for meals 3 times a day  - Out of bed for toileting  - Record patient progress and toleration of activity level   Outcome: Progressing     Problem: DISCHARGE PLANNING  Goal: Discharge to home or other facility with appropriate resources  Description: INTERVENTIONS:  - Identify barriers to discharge w/patient and caregiver  - Arrange for needed discharge resources and transportation as appropriate  - Identify discharge learning needs (meds, wound care, etc.)  - Arrange for interpretive services to assist at discharge as needed  - Refer to Case Management Department for coordinating discharge planning if the patient needs post-hospital services based on physician/advanced practitioner order or complex needs related to functional status, cognitive ability, or social support system  Outcome: Progressing

## 2024-05-23 NOTE — PROGRESS NOTES
"   05/23/24 1000   Pain Assessment   Pain Assessment Tool 0-10   Pain Score No Pain   Restrictions/Precautions   Precautions Aspiration;Bed/chair alarms;Cognitive;Fall Risk;Supervision on toilet/commode  (dec skin integ, R elbow reopened skin tear SANGITA Payne provided care)   Lifestyle   Autonomy \"I'll get a shower\"   Eating   Type of Assistance Needed Independent   Comment Pt smiled when he saw crab cakes on his tray   Eating CARE Score 6   Oral Hygiene   Type of Assistance Needed Set-up / clean-up   Comment seated at sink   Oral Hygiene CARE Score 5   Shower/Bathe Self   Type of Assistance Needed Physical assistance   Physical Assistance Level 25% or less   Comment Shower completed this date as pt was agreeable. Sarah overall as partial assist for thosough scrubbing at buttocks while in stance w/ GB   Shower/Bathe Self CARE Score 3   Bathing   Assessed Bath Style Shower   Tub/Shower Transfer   Findings CGA side step in/out of wet shower environment w/ GB to shower bench   Upper Body Dressing   Type of Assistance Needed Set-up / clean-up   Comment seated w/ extra time   Upper Body Dressing CARE Score 5   Lower Body Dressing   Type of Assistance Needed Physical assistance   Physical Assistance Level 25% or less   Comment Assist for placement of tab brief and for CM in stance. Pt able to don pants seated and complet CM in stance w/ extra time.   Lower Body Dressing CARE Score 3   Putting On/Taking Off Footwear   Type of Assistance Needed Physical assistance   Physical Assistance Level 25% or less   Comment Sarah for socks d/t getting caught on foot following shower, w/ extra time pt dons slip on slippers   Putting On/Taking Off Footwear CARE Score 3   Lying to Sitting on Side of Bed   Type of Assistance Needed Set-up / clean-up   Lying to Sitting on Side of Bed CARE Score 5   Sit to Stand   Type of Assistance Needed Supervision   Comment RW   Sit to Stand CARE Score 4   Bed-Chair Transfer   Type of Assistance Needed " Supervision   Comment RW   Chair/Bed-to-Chair Transfer CARE Score 4   Transfers   Additional Comments fxnl mobility w/ RW from OT gym to recliner in pts room w/ CGA-CS   Exercise Tools   Other Exercise Tool 1 BUE TE using 3# dowel for 2x15 in all available planes. Tolerated well w/ rest breaks and intermittent cues to remind him what set he was on. Complted to inc BUE strength for inc IND w/ aDLs/IADLs.   Cognition   Overall Cognitive Status Impaired   Arousal/Participation Alert;Cooperative   Attention Attends with cues to redirect   Orientation Level Oriented X4   Memory Decreased recall of precautions   Following Commands Follows one step commands with increased time or repetition   Comments seeks assistance from therapist   Assessment   Treatment Assessment OT session focusing on ADL retraining including shower routine and UE TE. pt tolerated well, agreeable to all proposed tasks. He continues to seek assistance for ADLs but when prompted to try on his own he surpasses what he states his abilities are. Pt progressing towards goals but continues to require skilled hands on assist in order to maintain his safety.   Prognosis Good   Problem List Decreased strength;Decreased range of motion;Decreased endurance;Impaired balance;Decreased mobility;Decreased coordination;Decreased cognition;Impaired judgement;Decreased safety awareness;Decreased skin integrity   Plan   Treatment/Interventions ADL retraining;Functional transfer training;Therapeutic exercise;Endurance training;Cognitive reorientation;Patient/family training;Equipment eval/education;Compensatory technique education;Continued evaluation;Spoke to nursing   Progress Progressing toward goals   OT Therapy Minutes   OT Time In 1000   OT Time Out 1130   OT Total Time (minutes) 90   OT Mode of treatment - Individual (minutes) 90   OT Mode of treatment - Concurrent (minutes) 0   OT Mode of treatment - Group (minutes) 0   OT Mode of treatment - Co-treat (minutes) 0    OT Mode of Treatment - Total time(minutes) 90 minutes   OT Cumulative Minutes 830   Therapy Time missed   Time missed? No

## 2024-05-23 NOTE — PROGRESS NOTES
05/23/24 1230   Pain Assessment   Pain Score No Pain   Restrictions/Precautions   Precautions Bed/chair alarms;Cognitive;Fall Risk;Supervision on toilet/commode  (dec skin integrity)   Cognition   Arousal/Participation Alert;Cooperative   Attention Attends with cues to redirect   Memory Decreased recall of precautions   Following Commands Follows one step commands with increased time or repetition   Subjective   Subjective Pt. in recliner and is ready to participate in therapy. No new complaints reported   Sit to Lying   Type of Assistance Needed Supervision   Comment level bed no rail   Sit to Lying CARE Score 4   Sit to Stand   Type of Assistance Needed Supervision;Adaptive equipment   Comment with RW, occasional verbal cues for hand placement   Sit to Stand CARE Score 4   Bed-Chair Transfer   Type of Assistance Needed Supervision;Adaptive equipment   Comment CS with RW   Chair/Bed-to-Chair Transfer CARE Score 4   Transfer Bed/Chair/Wheelchair   Adaptive Equipment Roller Walker   Walk 10 Feet   Type of Assistance Needed Supervision;Adaptive equipment   Comment CS with RW, CGA/min A with SPC   Walk 10 Feet CARE Score 4   Walk 50 Feet with Two Turns   Type of Assistance Needed Supervision;Incidental touching;Adaptive equipment   Comment CS with RW, CGA/min A with SPC   Walk 50 Feet with Two Turns CARE Score 4   Walk 150 Feet   Type of Assistance Needed Incidental touching;Adaptive equipment   Comment CS/CGA with RW   Walk 150 Feet CARE Score 4   Walking 10 Feet on Uneven Surfaces   Type of Assistance Needed Incidental touching;Adaptive equipment   Comment over floor mat CS/CGA, up and down short outside ramp with RW CGA/min A   Walking 10 Feet on Uneven Surfaces CARE Score 4   Ambulation   Primary Mode of Locomotion Prior to Admission Walk   Distance Walked (feet) 150 ft  (X 2, SPC 50 feet x 2)   Assist Device Roller Walker   Gait Pattern Inconsistant Sury;Forward Flexion;Improper weight shift;Decreased foot  clearance  (with SPC WBOS and L LE noted to be more ataxic/sloppy)   Limitations Noted In Balance;Coordination   Walk Assist Level Close Supervision   Findings Ambulated short distance outside using RW including up and down short ramp and side walk as well as up and down curb step   Does the patient walk? 2. Yes   Wheel 50 Feet with Two Turns   Reason if not Attempted Activity not applicable   Wheel 50 Feet with Two Turns CARE Score 9   Wheel 150 Feet   Reason if not Attempted Activity not applicable   Wheel 150 Feet CARE Score 9   Wheelchair mobility   Does the patient use a wheelchair? 0. No   Curb or Single Stair   Style negotiated Curb   Type of Assistance Needed Incidental touching;Adaptive equipment   Comment outside curb   1 Step (Curb) CARE Score 4   4 Steps   Type of Assistance Needed Physical assistance   Physical Assistance Level 25% or less   Comment B hands on R HR , FF on Cw4   4 Steps CARE Score 3   12 Steps   Type of Assistance Needed Physical assistance   Physical Assistance Level 25% or less   Comment B hands on R HR , FF on Cw4   12 Steps CARE Score 3   Stairs   Type Stairs   # of Steps   (FF)   Weight Bearing Precautions Fall Risk   Assist Devices Single Rail   Findings B hands on R HR , FF on Cw4   Therapeutic Interventions   Strengthening seated LAQ , hip flex , add squeeze with ball, ankle PF and DF   Flexibility B hamstring and gastroc stretching   Balance standing with L UE support. throwing bean bags (corn hole throwing)   Equipment Use   datatracker L2 X 10 mins   Assessment   Treatment Assessment Pt. engaged in 90 mins session and was able to tolerate above activities. Tx focused on safe functional mobility using RW as well as challenging balance and NMR through short distance walking using SPC. Also worked on steps' FF management and LE strengthening and endurance training. Pt. presented with obvious ataxic gait pattern when using SPC vs RW needing CGA/ min A . Pt. still recommended to use RW  at d/c. No major LOB noted during session. Cont with PT to maximize balance, coordination, strength and overall safety in functional mobility prior to d/c home.   Problem List Decreased strength;Decreased endurance;Impaired balance;Decreased coordination;Decreased mobility;Decreased cognition;Impaired judgement;Decreased safety awareness;Decreased skin integrity   Barriers to Discharge Inaccessible home environment;Decreased caregiver support   PT Barriers   Functional Limitation Car transfers;Stair negotiation;Standing;Transfers;Walking;Ramp negotiation   Plan   Treatment/Interventions Functional transfer training;LE strengthening/ROM;Elevations;Therapeutic exercise;Endurance training;Patient/family training;Equipment eval/education;Bed mobility;Gait training   Discharge Recommendation   Rehab Resource Intensity Level, PT   (Home PT)   Equipment Recommended Walker   PT Therapy Minutes   PT Time In 1230   PT Time Out 1400   PT Total Time (minutes) 90   PT Mode of treatment - Individual (minutes) 90   PT Mode of treatment - Concurrent (minutes) 0   PT Mode of treatment - Group (minutes) 0   PT Mode of treatment - Co-treat (minutes) 0   PT Mode of Treatment - Total time(minutes) 90 minutes   PT Cumulative Minutes 1003   Therapy Time missed   Time missed? No

## 2024-05-24 LAB
GLUCOSE SERPL-MCNC: 104 MG/DL (ref 65–140)
GLUCOSE SERPL-MCNC: 115 MG/DL (ref 65–140)
GLUCOSE SERPL-MCNC: 115 MG/DL (ref 65–140)
GLUCOSE SERPL-MCNC: 127 MG/DL (ref 65–140)

## 2024-05-24 PROCEDURE — 99232 SBSQ HOSP IP/OBS MODERATE 35: CPT | Performed by: INTERNAL MEDICINE

## 2024-05-24 PROCEDURE — 97129 THER IVNTJ 1ST 15 MIN: CPT

## 2024-05-24 PROCEDURE — 97535 SELF CARE MNGMENT TRAINING: CPT

## 2024-05-24 PROCEDURE — 97130 THER IVNTJ EA ADDL 15 MIN: CPT

## 2024-05-24 PROCEDURE — 97530 THERAPEUTIC ACTIVITIES: CPT

## 2024-05-24 PROCEDURE — 99232 SBSQ HOSP IP/OBS MODERATE 35: CPT | Performed by: PHYSICAL MEDICINE & REHABILITATION

## 2024-05-24 PROCEDURE — 82948 REAGENT STRIP/BLOOD GLUCOSE: CPT

## 2024-05-24 PROCEDURE — 94760 N-INVAS EAR/PLS OXIMETRY 1: CPT

## 2024-05-24 PROCEDURE — 94664 DEMO&/EVAL PT USE INHALER: CPT

## 2024-05-24 PROCEDURE — 97110 THERAPEUTIC EXERCISES: CPT

## 2024-05-24 PROCEDURE — 94640 AIRWAY INHALATION TREATMENT: CPT

## 2024-05-24 RX ADMIN — Medication 1 TABLET: at 09:00

## 2024-05-24 RX ADMIN — METFORMIN HYDROCHLORIDE 500 MG: 500 TABLET, FILM COATED ORAL at 17:35

## 2024-05-24 RX ADMIN — METFORMIN HYDROCHLORIDE 500 MG: 500 TABLET, FILM COATED ORAL at 09:00

## 2024-05-24 RX ADMIN — GUAIFENESIN 600 MG: 600 TABLET, EXTENDED RELEASE ORAL at 17:35

## 2024-05-24 RX ADMIN — THIAMINE HCL TAB 100 MG 100 MG: 100 TAB at 09:01

## 2024-05-24 RX ADMIN — FOLIC ACID 1 MG: 1 TABLET ORAL at 09:01

## 2024-05-24 RX ADMIN — BUSPIRONE HYDROCHLORIDE 10 MG: 10 TABLET ORAL at 09:00

## 2024-05-24 RX ADMIN — PANTOPRAZOLE SODIUM 40 MG: 40 TABLET, DELAYED RELEASE ORAL at 06:15

## 2024-05-24 RX ADMIN — BUSPIRONE HYDROCHLORIDE 10 MG: 10 TABLET ORAL at 21:48

## 2024-05-24 RX ADMIN — ALBUTEROL SULFATE 2.5 MG: 2.5 SOLUTION RESPIRATORY (INHALATION) at 00:58

## 2024-05-24 RX ADMIN — APIXABAN 5 MG: 5 TABLET, FILM COATED ORAL at 17:35

## 2024-05-24 RX ADMIN — BUSPIRONE HYDROCHLORIDE 10 MG: 10 TABLET ORAL at 17:35

## 2024-05-24 RX ADMIN — ASPIRIN 81 MG CHEWABLE TABLET 81 MG: 81 TABLET CHEWABLE at 09:00

## 2024-05-24 RX ADMIN — LISINOPRIL 5 MG: 5 TABLET ORAL at 09:00

## 2024-05-24 RX ADMIN — FLUTICASONE PROPIONATE 1 SPRAY: 50 SPRAY, METERED NASAL at 09:04

## 2024-05-24 RX ADMIN — ATORVASTATIN CALCIUM 80 MG: 80 TABLET, FILM COATED ORAL at 17:35

## 2024-05-24 RX ADMIN — FERROUS SULFATE TAB 325 MG (65 MG ELEMENTAL FE) 325 MG: 325 (65 FE) TAB at 09:00

## 2024-05-24 RX ADMIN — FLUTICASONE PROPIONATE 1 SPRAY: 50 SPRAY, METERED NASAL at 17:39

## 2024-05-24 RX ADMIN — Medication 6 MG: at 21:48

## 2024-05-24 RX ADMIN — MIRTAZAPINE 7.5 MG: 15 TABLET, FILM COATED ORAL at 21:48

## 2024-05-24 RX ADMIN — GUAIFENESIN 600 MG: 600 TABLET, EXTENDED RELEASE ORAL at 09:00

## 2024-05-24 RX ADMIN — UMECLIDINIUM 1 PUFF: 62.5 AEROSOL, POWDER ORAL at 09:04

## 2024-05-24 RX ADMIN — FLUTICASONE FUROATE AND VILANTEROL TRIFENATATE 1 PUFF: 200; 25 POWDER RESPIRATORY (INHALATION) at 09:04

## 2024-05-24 RX ADMIN — APIXABAN 5 MG: 5 TABLET, FILM COATED ORAL at 08:59

## 2024-05-24 RX ADMIN — INSULIN GLARGINE 18 UNITS: 100 INJECTION, SOLUTION SUBCUTANEOUS at 21:48

## 2024-05-24 NOTE — ASSESSMENT & PLAN NOTE
Lab Results   Component Value Date    HGBA1C 8.9 (H) 04/06/2024       Recent Labs     05/23/24  1605 05/23/24 2054 05/24/24  0614 05/24/24  1026   POCGLU 118 237* 104 127         Blood Sugar Average: Last 72 hrs:  (P) 134  Blood sugars elevated in the setting of recent use of steroids  Home: Lantus 30u qhs/metformin 1g bid  Here: Lantus 16U qhs/sliding scale/metformin 500mg bid (added 5/14/2024)  Cont DM diet  stable

## 2024-05-24 NOTE — PROGRESS NOTES
05/24/24 1300   Pain Assessment   Pain Assessment Tool 0-10   Restrictions/Precautions   Precautions Bed/chair alarms;Cognitive;Fall Risk;Supervision on toilet/commode   Comprehension   Comprehension (FIM) 5 - Understands basic directions and conversation   Expression   Expression (FIM) 5 - Needs help/cues only RARELY (< 10% of the time)   Social Interaction   Social Interaction (FIM) 5 - Interacts appropriately with others 90% of time   Problem Solving   Problem solving (FIM) 5 - Solves basic problems 90% of time   Memory   Memory (FIM) 4 - Recognizes/recalls/performs 75-89%   Speech/Language/Cognition Assessmetn   Treatment Assessment Family Training Session    Focus of session today was to target family training/education w/ pt's dtr, Nimco. Pt was awake, alert and cooperative for session where he was in the recliner. SLP re-introducing self to pt's dtr as current SLP had a phone conversation w/ dtr a week or so prior. SLP reviewing the following information:   1- Dysphagia: SLP educating that pt was being followed early during stay on the acute rehab targeting his swallow function and ensuring that pt is not going to be at risk for aspiration given meals. SLP stated that given pt's baseline cough  (which pt was exhibiting throughout session), this is not a concern for pt aspirating, but more related to pre-existing congestion. SLP reviewing swallow strategies w/ dtr, more so about positioning and how being full upright is the best overall. Reviewed that pt was good at demonstrating slow intake rate, alternating solids/liquids, allowing full mastication of foods. No further concerns for dysphagia is warranted at this time.   2-Speech: SLP reviewing w/ pt and pt's dtr about how overall speech clarity is improving, to where dtr also noticing improvement in speech, to where SLP did educate that pt does still demonstrate decreased breath support at times w/ lengthier speech output. Dtr did report this occurred at  home due to COPD. SLP stating that OME's have been completed by pt and reviewed to where he can complete them independently at this time.   3-Cognition: Last topic discussed was pt's overall cognitive linguistic skills, in which SLP had educated pt and pt's dtr that he has improved over time throughout his stay to where his basic cognitive skills are functional. Higher level tasks still requires some assistance for ability to complete task, but SLP did confirm w/ dtr that she does complete ALL I ADL tasks. SLP further educating dtr that overall basics have been targeted to where SLP engaged pt in completing a functional reading comprehension task given basic problem solving skills. Pt was presented w/ a situation and then 4 possible solutions. Pt was to choose the FIRST step towards a solution. Pt was 19/20 accurate, increasing to 20/20 given minimal probing cues. While pt was completing this task SLP engaged in generalized review of tasks and activities which have been targeted in sessions, even discussion Stroke Education. SLP providing the patient booklet: What You Need to Know About Stroke: A Patient Resource, including the BE FAST acronym on the back of booklet, providing dtr some basic stroke 101 information. SLP did state to dtr that pt was able to recognize his risk factors for stroke, which she was surprised, but thankful he does know them. Current recommendations at time of discharge are for no continued skilled SLP services as pt is at baseline functioning which dtr was in agreement and able to confirm this as well. However, while on the acute rehab center, pt to continue to benefit from ongoing skilled SLP services targeting functional cognitive linguistic skills in attempts to decrease caregiver burden.     SLP Therapy Minutes   SLP Time In 1300   SLP Time Out 1330   SLP Total Time (minutes) 30   SLP Mode of treatment - Individual (minutes) 30   SLP Mode of treatment - Concurrent (minutes) 0   SLP Mode  of treatment - Group (minutes) 0   SLP Mode of treatment - Co-treat (minutes) 0   SLP Mode of Treatment - Total time(minutes) 30 minutes   SLP Cumulative Minutes 450   Therapy Time missed   Time missed? No

## 2024-05-24 NOTE — PROGRESS NOTES
05/24/24 1400   Pain Assessment   Pain Assessment Tool 0-10   Pain Score No Pain   Restrictions/Precautions   Precautions Fall Risk;Bed/chair alarms;Cognitive;Supervision on toilet/commode   Weight Bearing Restrictions No   ROM Restrictions No   Cognition   Overall Cognitive Status Impaired   Arousal/Participation Alert;Cooperative   Subjective   Subjective pt agreeable to PT session for FT with dtr Nimco in attendance   Roll Left and Right   Type of Assistance Needed Independent   Roll Left and Right CARE Score 6   Sit to Lying   Type of Assistance Needed Set-up / clean-up   Sit to Lying CARE Score 5   Lying to Sitting on Side of Bed   Type of Assistance Needed Set-up / clean-up   Lying to Sitting on Side of Bed CARE Score 5   Sit to Stand   Type of Assistance Needed Supervision;Verbal cues;Adaptive equipment   Comment still requires VC for hand placement dionisio if getting up from surfaces without arm rests   Sit to Stand CARE Score 4   Bed-Chair Transfer   Type of Assistance Needed Supervision;Adaptive equipment;Verbal cues   Comment still requires VC to square walker in front about 50% of the time to optimize safety with task   Chair/Bed-to-Chair Transfer CARE Score 4   Car Transfer   Type of Assistance Needed Supervision;Verbal cues;Adaptive equipment   Comment mock car with RW- unable to perform on dtr's car for has a lot of things inside for storage   Car Transfer CARE Score 4   Walk 10 Feet   Type of Assistance Needed Supervision;Verbal cues;Adaptive equipment   Walk 10 Feet CARE Score 4   Walk 50 Feet with Two Turns   Type of Assistance Needed Verbal cues;Supervision;Adaptive equipment   Walk 50 Feet with Two Turns CARE Score 4   Walk 150 Feet   Type of Assistance Needed Verbal cues;Adaptive equipment;Incidental touching   Comment CS/CG with RW and VC to keep feet inside the walker to avoid hitting walker post due to WBOS, CGA with SPC with ataxic gait pattern but noted feet not as wide apart compared to  "previous sessions   Walk 150 Feet CARE Score 4   Walking 10 Feet on Uneven Surfaces   Type of Assistance Needed Incidental touching;Verbal cues;Adaptive equipment   Comment over floor mat with RW   Walking 10 Feet on Uneven Surfaces CARE Score 4   Wheel 50 Feet with Two Turns   Reason if not Attempted Activity not applicable   Wheel 50 Feet with Two Turns CARE Score 9   Wheel 150 Feet   Reason if not Attempted Activity not applicable   Wheel 150 Feet CARE Score 9   Curb or Single Stair   Style negotiated Curb   Type of Assistance Needed Incidental touching;Verbal cues;Adaptive equipment   Comment CG with RW on 8\" curb with VC to step closer to front of the walker before lifting it up/down the step   1 Step (Curb) CARE Score 4   4 Steps   Type of Assistance Needed Incidental touching;Verbal cues;Adaptive equipment   4 Steps CARE Score 4   12 Steps   Type of Assistance Needed Incidental touching;Verbal cues;Adaptive equipment   Comment FF with both hands on R rail, reciprocal pattern ascending, non recip descending fwd facing   12 Steps CARE Score 4   Picking Up Object   Comment (S)  will assess next tx   Toilet Transfer   Comment again declined but once in bed noted started to use urinal so PT stayed to supervised and empty urinal x 150 ml output   Equipment Use   NuStep lvl 3 x 15 mins all ext, SPM> 70   Assessment   Treatment Assessment Skilled PT initially focused on FT with dtr which covered bed/chair/car transfers, amb task with RW then SPC, FF and curb step negotiation training with pt requiring CS-CGA while utilizing AD. Dtr is please with pt's progress, denied additional need for FT and expressed her or family's ability to provide S/assist to the pt at home as recommended. Pt then participated with LE strengthening via nu step and tolerated level 3 for 15 mins. no LOB demonstrated but at times shows frustration when cued for proper hand placement during STS transition which dtr was able to observed as well. " Pt assisted back to bed at end of tx with alarm on and all needs within reach.   Family/Caregiver Present dtr Nimco attended FT with good understanding verbalized on current mobility recommendations for pt to have S or assist as needed with all mobilities while using a RW at d/c. also Demo'd mobility with SPC but re-iterated its something for home PT/OT to work on upon d/c.   Problem List Decreased strength;Decreased endurance;Impaired balance;Decreased coordination;Decreased mobility;Decreased cognition;Impaired judgement;Decreased safety awareness;Decreased skin integrity   PT Barriers   Functional Limitation Car transfers;Stair negotiation;Standing;Transfers;Walking;Ramp negotiation   Discharge Recommendation   Equipment Recommended Walker  (already has a walker at home)   PT Therapy Minutes   PT Time In 1400   PT Time Out 1500   PT Total Time (minutes) 60   PT Mode of treatment - Individual (minutes) 60   PT Mode of treatment - Concurrent (minutes) 0   PT Mode of treatment - Group (minutes) 0   PT Mode of treatment - Co-treat (minutes) 0   PT Mode of Treatment - Total time(minutes) 60 minutes   PT Cumulative Minutes 1063   Therapy Time missed   Time missed? No

## 2024-05-24 NOTE — PROGRESS NOTES
05/24/24 1330   Pain Assessment   Pain Assessment Tool 0-10   Pain Score No Pain   Restrictions/Precautions   Precautions Bed/chair alarms;Cognitive;Fall Risk;Supervision on toilet/commode   Weight Bearing Restrictions No   ROM Restrictions No   Cognition   Overall Cognitive Status Impaired   Assessment   Treatment Assessment From OT standpoint pt is on track to d/c home w/ family to provide 24 hour S on 5/29 with recommendation for HHOT.   OT Family training done with: pt's daughterNimco   Assessment of family training Pt's daughter participated in FT to review following OT d/c recommendations: set-up and S for ADLs, ambulating w/ RW, and A for all IADLs to include med management. Provided fxnl examples of need for 24 hour S 2* cognitive deficits. DME recommended: shower chair w/ back and standard BSC. Edu on process of HHC and DOD. Pt  S daughter will be picking pt up around 1400, team made aware of same. From an OT standpoint pt/daughter without further questions/concerns, no further FT warranted.   Prognosis Good   Problem List Decreased strength;Decreased endurance;Impaired balance;Decreased coordination;Decreased mobility;Decreased cognition;Impaired judgement;Decreased safety awareness;Decreased skin integrity   Discharge Recommendation   Rehab Resource Intensity Level, OT   (home w/ family suppoort, HHOT)   Equipment Recommended Bedside commode;Shower/Tub chair with back ($)   Commode Type Standard   OT Equipment ordered standard BSC   Date ordered 05/24/24   OT Therapy Minutes   OT Time In 1330   OT Time Out 1400   OT Total Time (minutes) 30   OT Mode of treatment - Individual (minutes) 30   OT Mode of treatment - Concurrent (minutes) 0   OT Mode of treatment - Group (minutes) 0   OT Mode of treatment - Co-treat (minutes) 0   OT Mode of Treatment - Total time(minutes) 30 minutes   OT Cumulative Minutes 920   Therapy Time missed   Time missed? No

## 2024-05-24 NOTE — PROGRESS NOTES
[facility]  Progress Note  Name: Lucho Talavera I  MRN: [id number]  Unit/Bed#: -01 I Date of Admission: 5/11/2024   Date of Service: 5/24/2024 I Hospital Day: 13    Assessment & Plan   Typical atrial flutter (HCC)  Assessment & Plan  Rates controlled   Cont eliquis  F/u cardiology as outpatient    Hypertension  Assessment & Plan  Home: Lisinopril 40mg daily.  Here: Reinitiated on his lisinopril 5 mg daily since 5/24  BP stable.    ETOH abuse  Assessment & Plan  History of alcohol abuse  Monitor off CIWA protocol - Pt now post withdrawal window   Ativan as needed anxiety  Continue supplements with thiamine, folate, MVI    Type 2 diabetes mellitus (HCC)  Assessment & Plan  Lab Results   Component Value Date    HGBA1C 8.9 (H) 04/06/2024       Recent Labs     05/23/24  1605 05/23/24  2054 05/24/24  0614 05/24/24  1026   POCGLU 118 237* 104 127         Blood Sugar Average: Last 72 hrs:  (P) 134  Blood sugars elevated in the setting of recent use of steroids  Home: Lantus 30u qhs/metformin 1g bid  Here: Lantus 16U qhs/sliding scale/metformin 500mg bid (added 5/14/2024)  Cont DM diet  stable      CAD (coronary artery disease)  Assessment & Plan  Continue ASA and statin.    * Stroke (cerebrum) (HCC)  Assessment & Plan  Previously non compliant with eliquis. Spoke with the pharmacist at [facility] and it was prescribed but never picked up.  Cost of Eliquis is $71.28. Called pt's daughter and she states that cost is fine  CTA with chronic left PCA territory infarction  MRI with multiple infarcts of varying ages and posterior circulation including multiple small acute/early subacute infarct in the bilateral cerebellum with additional small foci of recent ischemia in the left PCA distribution superimposed on chronic infarct  Echo with EF 55%, no cardiac thrombus  Continue aspirin and Eliquis/statin  Outpatient follow-up with neurology  Therapy per PMR  DC 5/30/24=family aware. Training  "needs to be rescheduled.                 The above assessment and plan was reviewed and updated as determined by my evaluation of the patient on 5/24/2024.    Labs:             Invalid input(s): \"LABGLOM\", \"CMP\"          Results from last 7 days   Lab Units 05/24/24  1026 05/24/24  0614 05/23/24 2054   POC GLUCOSE mg/dl 127 104 237*       Imaging  No orders to display       Review of Scheduled Meds:  Current Facility-Administered Medications   Medication Dose Route Frequency Provider Last Rate    acetaminophen  650 mg Oral Q6H PRN Richmond Chew, DO      albuterol  2 puff Inhalation Q4H PRN William Camacho, DO      albuterol  2.5 mg Nebulization Q6H PRN Richmond Chew, DO      apixaban  5 mg Oral BID [facility]      aspirin  81 mg Oral Daily [facility]      atorvastatin  80 mg Oral Daily With Dinner [facility]      busPIRone  10 mg Oral TID [facility], DO      ferrous sulfate  325 mg Oral Daily With Breakfast [facility], [facility]      fluticasone  1 spray Nasal BID [facility], [facility]      fluticasone-vilanterol  1 puff Inhalation Daily Richmond Chew, DO      folic acid  1 mg Oral Daily Richmond Chew, DO      guaiFENesin  600 mg Oral BID [facility]      hydrOXYzine HCL  25 mg Oral BID PRN Ashley Depadua, MD      insulin glargine  18 Units Subcutaneous HS TATIANA Paulino      insulin lispro  1-5 Units Subcutaneous TID AC Richmond Chew, DO      lisinopril  5 mg Oral Daily TATIANA Paulino      melatonin  6 mg Oral QPM Ashley Depadua, MD      metFORMIN  500 mg Oral BID With Meals TATIANA Paulino      mirtazapine  7.5 mg Oral QPM Ashley Depadua, MD      multivitamin-minerals  1 tablet Oral Daily Richmond Chew, DO      oxyCODONE  2.5 mg Oral Q4H PRN Richmond Chew, DO      pantoprazole  40 mg Oral Early Morning [facility]      thiamine  100 mg Oral Daily [facility]      umeclidinium  1 puff " Inhalation Daily Richmond Chew DO         Subjective/ HPI: Patient seen and examined. Patients overnight issues or events were reviewed with nursing staff. New or overnight issues include the following:     Patient napping comfortably in bed, easily arousable.  Denies any subjective complaints    ROS:   A 10 point ROS was performed; negative except as noted above.        *Labs /Radiology studies Reviewed, no new imaging  *Medications  reviewed and reconciled as needed  *Please refer to order section for additional ordered labs studies      Physical Examination:  Vitals:   Vitals:    05/23/24 2051 05/24/24 0059 05/24/24 0537 05/24/24 0900   BP: 119/71  109/55 115/67   BP Location: Left arm  Right arm    Pulse: 76  71    Resp: 16  20    Temp: 98 °F (36.7 °C)  98.1 °F (36.7 °C)    TempSrc: Oral  Oral    SpO2: 96% 95% 96%    Weight:       Height:           General Appearance: NAD; pleasant  HEENT: PERRLA, conjuctiva normal; mucous membranes moist; face symmetrical  Neck:  Supple  Lungs: clear bilaterally, normal respiratory effort, no retractions, expiratory effort normal, on room air  CV: regular rate and rhythm, no murmurs rubs or gallops noted   ABD: soft non tender, +BS x4  EXT: DP pulses intact, no lymphadenopathy, no edema  Skin: normal turgor, normal texture, no rash  Psych: affect normal, mood normal  Neuro: AAOx3       The above physical exam was reviewed and updated as determined by my evaluation of the patient on 5/24/2024.    Invasive Devices       None                      VTE Pharmacologic Prophylaxis: Eliquis  Code Status: Level 1 - Full Code  Current Length of Stay: 13 day(s)    Total floor / unit time spent today  35 minutes   Coordination of patient's care was performed in conjunction with primary service. Time invested included review of patient's labs, vitals, and management of their comorbidities with continued monitoring, examination of patient as well as answering patient questions,  documenting her findings and creating progress note in electronic medical record,  ordering appropriate diagnostic testing.       ** Please Note:  voice to text software may have been used in the creation of this document. Although proof errors in transcription or interpretation are a potential of such software**

## 2024-05-24 NOTE — PROGRESS NOTES
Physical Medicine and Rehabilitation Progress Note  Lucho Talavera 69 y.o. male MRN: [id number]  Unit/Bed#: [facility] 455-01 Encounter: [id number]    To Review: 69 year old M with PMH of COPD, multitude of ED visits/hospitalizations, DM2, CAD, EtOH abuse, HTN, prior CVA, found to have aflutter with RVR on hospitalization 4/2024 s/p BRITTNEY ablation who was to d/c on Eliquis but per report family did not know he needed it and he was not taking it who developed dysarthria, weakness and imbalance with recent fall at home and presented to hospital. CTA H/N showed Chronic left PCA territory infarct, new since the prior exam. No evidence of acute vascular territorial infarction, intracranial hemorrhage or mass.  No hemodynamically significant stenosis, dissection or occlusion of the carotid or vertebral arteries or major vessels of the [place].  Neuro consulted and recommended MRI brain which showed multiple infarcts of varying ages in the posterior circulation include multiple small acute/early subacute infarcts in the bilateral cerebellum. TTE did not show thrombus.  Neuro recommended Eliquis, aspirin, and statin. Patient was evaluated by skilled therapies and was found to have significant decline in ADLs and ambulation and appears appropriate for admission to [facility].     Chief Complaint: No new concerns.    Interval History/Subjective:  No acute events overnight. Slept well. No new CP, SOB, fevers, chills, N/V, abdominal pain. Last BM was 5/22. Still dysarthric    ROS:  A 10 point review of systems was negative except for what is noted in the HPI.      Today's Changes:  Family training with daughter today.   Skin checks with nursing today. R lateral malleolus with area of blanching erythema and allevyns now in place. Similar with sacrum, area of blanching erythema, allevyn in place. Frequent offloading.    Total visit time: 35 minutes, with more than 50% spent counseling/coordinating  care. Counseling includes discussion with patient re: progress in therapies, functional issues observed by therapy staff, and discussion with patient regarding their current medical state and wellbeing. Coordination of patient's care was performed in conjunction with Internal Medicine service to monitor patient's labs, vitals, and management of their comorbidities.    Assessment/Plan:    * Stroke (cerebrum) ([facility])  Assessment & Plan  - MRI brain 5/8 - Multiple infarcts of varying ages in the posterior circulation include multiple small acute/early subacute infarcts in the bilateral cerebellum. Additional small foci of recent ischemia in the left PCA distribution superimposed on chronic infarct  No acute hemorrhage or mass effect  - CTA H/N -  1. Chronic left PCA territory infarct, new since the prior exam. No evidence of acute vascular territorial infarction,  intracranial hemorrhage or mass. 2. No hemodynamically significant stenosis, dissection or occlusion of the carotid or vertebral arteries or major vessels of the [place].  - Residual impairments on admission to [facility]: Impaired balance, coordination, possibly vision  - Co-morbidities most impacting functional recovery: COPD with chronic hypoxic respiratory failure, anxiety    Secondary stroke prevention  - Antithrombotic: Aspirin and Eliquis  - Statin  - Patient at increased risk for stroke particularly early in post-stroke period - monitor neuro exam closely with low threshold to repeat imaging  -  patient and if applicable caregiver on optimal stroke management  - Follow-up with neurology and PCP after d/c   - Recommend acute comprehensive interdisciplinary inpatient rehabilitation to include intensive skilled therapies (PT, OT, ST) as outlined with oversight and management by rehabilitation physician as well as inpatient rehab level nursing, case management and weekly interdisciplinary team meetings.       Sacral wound  Assessment & Plan  Seen  by wound care 5/7  - Managed R lateral forearm skin tear   - Noted to have old open area on sacrum on admission.   - Consulted wound care to follow-up. Epithelialized   - Offloading,specialty cushion   - Allevyn   - Monitor closely.       Chronic bronchitis (HCC)  Assessment & Plan  Has had a multitude of ED visits related to COPD/concern for SOB but was frequently d/c'd same day  - Monitor also for anxiety and optimal mgmt of that; education on monitor home O2  IM consulted and with overall management at their discretion during ARC course  Monitor lung exam, vitals with and without activity  Encourage incentive spirometry  Breo/Incruse  Xopenex  PRN Albuterol  PRN supplemental O2   Mucinex     Insomnia  Assessment & Plan  Environmental interventions.  Sleep log  Drinks a lot of caffeinated sodas throughout the night   - Placed no caffeine order and review with nursing.   Added melatonin scheduled  5/16 added mirtazapine for both sleep side effect and anxiety.     Typical atrial flutter (HCC)  Assessment & Plan  IM consulted and with overall management at their discretion during ARC course  Rate control: None  Antithrombotic: Eliquis    - Cost is $71.28/month. Daughter confirmed this price is fine.      Hypertension  Assessment & Plan  Home: Lisinopril 40mg daily  Here: Lisinopril 5mg daily  Monitor vitals with and without activity; monitor for orthostasis  Monitor hemoglobin, electrolytes, kidney function, hydration status   Management as per IM.       Anxiety  Assessment & Plan  Cries out for help at times with anxiety  This improved here.  Asks for breathing treatments/O2 when this happens, but usually O2 sats and breath sounds are fine.  Reviewed PDMP - last prescribed Lorazepam by Dr. Villalpando in July of last year, most recently by a hospitalist PA at [facility] (for only 10 tablets).  Has not used since 5/11. Will switch to hydroxyzine  Adding mirtazapine at night to also assist with sleep/insomnia  Monitor response.      ETOH abuse  Assessment & Plan  Patient reports cutting down and only about 2 beers per day but occasionally liquor   Alcohol cessation counseling and supportive counseling  Optimal mood/stress mgmt   Thiamine, folate, MVI   PRN low dose ativan BID   Consider gabapentin as well     Type 2 diabetes mellitus (HCC)  Assessment & Plan  Lab Results   Component Value Date    HGBA1C 8.9 (H) 04/06/2024       Recent Labs     05/23/24  1605 05/23/24  2054 05/24/24  0614 05/24/24  1026   POCGLU 118 237* 104 127       Blood Sugar Average: Last 72 hrs:  (P) 262.5  Home: Metformin 1000mg Q12hr, Lantus 30 units at bedtime  Current meds: Lantus 18, Metformin 500mg Q12hr, HS, Lispro SS AC/HS, consistent carb diabetic diet  Nutrition consult  Management as per IM  Outpatient f/u with PCP      CAD (coronary artery disease)  Assessment & Plan  With hx of stenting  IM consulted and with overall management at their discretion during ARC course  Antithrombotic: Aspirin and Eliquis   Statin  Optimal blood pressure and blood sugar control        Health Maintenance  #Delirium/Sleep: At risk. Optimize sleep/wake, pain, bowel, bladder management. Avoid deliriogenic meds. Of note is on melatonin, ativan, and oxycodone PRN.   #Pain: Tylenol PRN  #Bowel: Last BM 5/22 and continent. Adding PRN miralax  #Bladder: Voiding and continent  #Skin/Pressure Injury Prevention: Turn Q2hr in bed, with weight shifts [id number] in wheelchair.  #DVT Prophylaxis:Fully anticoagulated on eliquis, SCDs  #GI Prophylaxis: PPI on for GERD  #Code Status: Full Code.   #FEN: Diabetic Diet  #Dispo: Team 5/21: ADD 5/29 with home PT/OT with [facility] with goals to discharge to 1 level home with 2 HETAL, with supervision. Daughter for family training this week.   Will need f/u with Pulm, PCP, and Neuro    Objective:    Functional Update:  PT:  CGA transfers, Sup bed mobility, CGA ambulation with RW, modA with HHA, Deanna stairs   OT: Ind eating, Sup grooming, CGA bathing, Sup UB  "dressing, CGA LB dressing, CGA toileting, CGA tub/shower transfer, CGA toilet transfers  SLP: Moderate cognitive linguistic impairments on CLQT, mild oropharyngeal dysphagia On reg/thin diet     Allergies per EMR    Physical Exam:  Temp:  [98 °F (36.7 °C)-98.6 °F (37 °C)] 98.1 °F (36.7 °C)  HR:  [71-76] 71  Resp:  [16-20] 20  BP: ()/(55-72) 109/55  Oxygen Therapy  SpO2: 96 %  O2 Flow Rate (L/min): 2 L/min    Gen: No acute distress, Well-nourished, well-appearing.  HEENT: Moist mucus membranes, Normocephalic/Atraumatic  Cardiovascular: Regular rate, rhythm, S1/S2. Distal pulses palpable  Heme/Extr: No edema  Pulmonary: Non-labored breathing. Lungs CTAB  : No antony  GI: Soft, non-tender, non-distended. BS+  Integumentary: Skin is warm, dry. Scattered healin wounds on extremities. Sacrum with blanchable erythema, and R lateral malleolus with area of healed wound with erythema which is blanching.   Neuro: AAOx3, Speech is dysarthric but intelligible. Appropriate to questioning.  Psych: Normal mood and affect.     Diagnostic Studies: Reviewed, no new imaging    Laboratory:  Reviewed         Invalid input(s): \"PLTCT\"          Invalid input(s): \"CA\"           Patient Active Problem List   Diagnosis    Closed odontoid fracture with routine healing    Closed displaced fracture of third cervical vertebra (HCC)    Closed displaced fracture of fourth cervical vertebra (HCC)    CAD (coronary artery disease)    Dens fracture (HCC)    Acute blood loss anemia    Type 2 diabetes mellitus (HCC)    S/P C1-T1 Posterior Cervical Discectomy and Fusion on 9/10/18    At moderate risk for venous thromboembolism (VTE)    Chronic bronchitis (HCC)    Closed fracture of first cervical vertebra (HCC)    ETOH abuse    KLARISSA (obstructive sleep apnea)    Dirty living conditions    Bronchitis    Diabetic polyneuropathy associated with type 2 diabetes mellitus (HCC)    Hammertoe, bilateral    Post-traumatic arthritis of left foot    Pain due to " onychomycosis of toenail    Oral abscess    Tooth fracture    Closed nondisplaced fracture of posterior arch of first cervical vertebra ([facility])    Fall    Stage 3 severe COPD by GOLD classification ([facility])    Anxiety    Hypertension    COPD (chronic obstructive pulmonary disease) ([facility])    SIRS (systemic inflammatory response syndrome) ([facility])    History of alcohol abuse    Iron deficiency anemia secondary to inadequate dietary iron intake    COVID-19    Atrial flutter with rapid ventricular response ([facility])    Leukocytosis    Typical atrial flutter ([facility])    Stroke (cerebrum) ([facility])    Stroke-like symptom    Sacral wound    Insomnia    History of cardiac arrest         Medications  Current Facility-Administered Medications   Medication Dose Route Frequency Provider Last Rate    acetaminophen  650 mg Oral Q6H PRN [facility]      albuterol  2 puff Inhalation Q4H PRN William Camacho, DO      albuterol  2.5 mg Nebulization Q6H PRN [facility], DO      apixaban  5 mg Oral BID [facility], DO      aspirin  81 mg Oral Daily [facility], DO      atorvastatin  80 mg Oral Daily With Dinner [facility], DO      busPIRone  10 mg Oral TID [facility], DO      ferrous sulfate  325 mg Oral Daily With Breakfast [facility], DO      fluticasone  1 spray Nasal BID [facility], DO      fluticasone-vilanterol  1 puff Inhalation Daily [facility], DO      folic acid  1 mg Oral Daily [facility], DO      guaiFENesin  600 mg Oral BID [facility], DO      hydrOXYzine HCL  25 mg Oral BID PRN Ashley Depadua, MD      insulin glargine  18 Units Subcutaneous HS TATIANA Paulino      insulin lispro  1-5 Units Subcutaneous TID AC [facility], DO      lisinopril  5 mg Oral Daily TATIANA Paulino      melatonin  6 mg Oral QPM Ashley Depadua, MD      metFORMIN  500 mg Oral BID With Meals TATIANA Paulino      mirtazapine  7.5 mg Oral QPM Ashley Depadua, MD       multivitamin-minerals  1 tablet Oral Daily Harkaitlynkumar Chew, DO      oxyCODONE  2.5 mg Oral Q4H PRN Harkaitlynkumar Chew, DO      pantoprazole  40 mg Oral Early Morning Haralcidesumar Chew, DO      thiamine  100 mg Oral Daily Harnishkumar Chew, DO      umeclidinium  1 puff Inhalation Daily Richmond Chew, DO            ** Please Note: Fluency Direct voice to text software may have been used in the creation of this document. **

## 2024-05-24 NOTE — ASSESSMENT & PLAN NOTE
Home: Lisinopril 40mg daily.  Here: Reinitiated on his lisinopril 5 mg daily since 5/24  BP stable.

## 2024-05-24 NOTE — PROGRESS NOTES
"   05/24/24 1030   Pain Assessment   Pain Assessment Tool 0-10   Pain Score No Pain   Restrictions/Precautions   Precautions Bed/chair alarms;Cognitive;Fall Risk;Supervision on toilet/commode   Weight Bearing Restrictions No   ROM Restrictions No   Lifestyle   Autonomy \"Ughh, I can't do it.\"   Eating   Type of Assistance Needed Independent   Physical Assistance Level No physical assistance   Eating CARE Score 6   Oral Hygiene   Type of Assistance Needed Set-up / clean-up   Physical Assistance Level No physical assistance   Comment seated   Oral Hygiene CARE Score 5   Shower/Bathe Self   Type of Assistance Needed Supervision   Physical Assistance Level No physical assistance   Comment cues for task progression. bathed BUE and BLE while seated. CS in stance to bathe groin and buttocks, req cues to maximize thoroughness.   Shower/Bathe Self CARE Score 4   Tub/Shower Transfer   Reason Not Assessed Sponge Bath;Patient refusal  (Pt unagreeable to shower, okay w/ sponge bath)   Upper Body Dressing   Type of Assistance Needed Set-up / clean-up   Physical Assistance Level No physical assistance   Comment seated w/ inc time, limited by poor frustration tolerance.   Upper Body Dressing CARE Score 5   Lower Body Dressing   Type of Assistance Needed Supervision;Adaptive equipment   Physical Assistance Level No physical assistance   Comment seated to thread BLE, CS in stance for clothing management.   Lower Body Dressing CARE Score 4   Putting On/Taking Off Footwear   Type of Assistance Needed Physical assistance   Physical Assistance Level 25% or less   Comment seated, crossed leg technique to don/doff socks. Pt limited by poor frustration tolerance, req partial A to fully thread R sock over heel.   Putting On/Taking Off Footwear CARE Score 3   Lying to Sitting on Side of Bed   Type of Assistance Needed Set-up / clean-up   Physical Assistance Level No physical assistance   Lying to Sitting on Side of Bed CARE Score 5   Sit to " Stand   Type of Assistance Needed Supervision;Adaptive equipment   Physical Assistance Level No physical assistance   Comment CS w/ RW, occ vc's for proper hand placement.   Sit to Stand CARE Score 4   Bed-Chair Transfer   Type of Assistance Needed Supervision;Adaptive equipment   Physical Assistance Level No physical assistance   Comment CS w/ RW, ambulated<>bathroom.   Chair/Bed-to-Chair Transfer CARE Score 4   Toileting Hygiene   Type of Assistance Needed Supervision;Adaptive equipment   Physical Assistance Level No physical assistance   Comment seated for hygiene management, CS in stance at RW for clothing management.   Toileting Hygiene CARE Score 4   Toilet Transfer   Type of Assistance Needed Supervision;Adaptive equipment   Physical Assistance Level No physical assistance   Comment CS w/ RW to BSC over toilet.   Toilet Transfer CARE Score 4   Cognition   Overall Cognitive Status Impaired   Comments seeks guidance from others, cues for task initiation/progress, impaired STM, impaired safety awareness, and poor frustration tolerance. + flat affect.   Additional Activities   Additional Activities Comments Flutter valve x30, IS x10.   Assessment   Treatment Assessment Pt seen for skilled OT session focusing on self-care management and breathing exercises. Details on sponge bath ADL noted above, completed at overall CS w/ cues for task initiation/progression and encouragement at times w/ inc frustration. Pt limited by fatigue at times, req inc time for fxnl tasks and frequent rest breaks to manage. Cont OT POC: fxnl standing tolerance, endurance work, fxnl mobility w/ RW, ongoing fxnl cognitive/safety training. Plan for FT w/ pt's daughter this afternoon to review OT d/c recommendations. Pt agreeable to rest in recliner, all needs within reach, meal tray set-up, and alarm activated. From OT standpoint pt is on track to d/c home w/ family to provide 24 hour S on 5/29 with recommendation for HHOT.   Prognosis Good    Problem List Decreased strength;Decreased endurance;Impaired balance;Decreased coordination;Decreased mobility;Decreased cognition;Impaired judgement;Decreased safety awareness;Decreased skin integrity   Plan   Treatment/Interventions ADL retraining;Functional transfer training;Therapeutic exercise;Endurance training;Patient/family training   Progress Progressing toward goals   Discharge Recommendation   Rehab Resource Intensity Level, OT   (home w/ family support, HHOT)   OT Therapy Minutes   OT Time In 1030   OT Time Out 1130   OT Total Time (minutes) 60   OT Mode of treatment - Individual (minutes) 60   OT Mode of treatment - Concurrent (minutes) 0   OT Mode of treatment - Group (minutes) 0   OT Mode of treatment - Co-treat (minutes) 0   OT Mode of Treatment - Total time(minutes) 60 minutes   OT Cumulative Minutes 890   Therapy Time missed   Time missed? No

## 2024-05-24 NOTE — ASSESSMENT & PLAN NOTE
Previously non compliant with eliquis. Spoke with the pharmacist at [facility] and it was prescribed but never picked up.  Cost of Eliquis is $71.28. Called pt's daughter and she states that cost is fine  CTA with chronic left PCA territory infarction  MRI with multiple infarcts of varying ages and posterior circulation including multiple small acute/early subacute infarct in the bilateral cerebellum with additional small foci of recent ischemia in the left PCA distribution superimposed on chronic infarct  Echo with EF 55%, no cardiac thrombus  Continue aspirin and Eliquis/statin  Outpatient follow-up with neurology  Therapy per PMR  DC 5/30/24=family aware. Training needs to be rescheduled.

## 2024-05-25 LAB
ANION GAP SERPL CALCULATED.3IONS-SCNC: 7 MMOL/L (ref 4–13)
BASOPHILS # BLD AUTO: 0.06 THOUSANDS/ÂΜL (ref 0–0.1)
BASOPHILS NFR BLD AUTO: 1 % (ref 0–1)
BUN SERPL-MCNC: 9 MG/DL (ref 5–25)
CALCIUM SERPL-MCNC: 8.9 MG/DL (ref 8.4–10.2)
CHLORIDE SERPL-SCNC: 106 MMOL/L (ref 96–108)
CO2 SERPL-SCNC: 29 MMOL/L (ref 21–32)
CREAT SERPL-MCNC: 0.42 MG/DL (ref 0.6–1.3)
EOSINOPHIL # BLD AUTO: 0.14 THOUSAND/ÂΜL (ref 0–0.61)
EOSINOPHIL NFR BLD AUTO: 2 % (ref 0–6)
ERYTHROCYTE [DISTWIDTH] IN BLOOD BY AUTOMATED COUNT: 17.8 % (ref 11.6–15.1)
GFR SERPL CREATININE-BSD FRML MDRD: 117 ML/MIN/1.73SQ M
GLUCOSE P FAST SERPL-MCNC: 111 MG/DL (ref 65–99)
GLUCOSE SERPL-MCNC: 111 MG/DL (ref 65–140)
GLUCOSE SERPL-MCNC: 118 MG/DL (ref 65–140)
GLUCOSE SERPL-MCNC: 149 MG/DL (ref 65–140)
GLUCOSE SERPL-MCNC: 161 MG/DL (ref 65–140)
GLUCOSE SERPL-MCNC: 170 MG/DL (ref 65–140)
HCT VFR BLD AUTO: 35.8 % (ref 36.5–49.3)
HGB BLD-MCNC: 11 G/DL (ref 12–17)
IMM GRANULOCYTES # BLD AUTO: 0.01 THOUSAND/UL (ref 0–0.2)
IMM GRANULOCYTES NFR BLD AUTO: 0 % (ref 0–2)
LYMPHOCYTES # BLD AUTO: 1.38 THOUSANDS/ÂΜL (ref 0.6–4.47)
LYMPHOCYTES NFR BLD AUTO: 20 % (ref 14–44)
MCH RBC QN AUTO: 25.1 PG (ref 26.8–34.3)
MCHC RBC AUTO-ENTMCNC: 30.7 G/DL (ref 31.4–37.4)
MCV RBC AUTO: 82 FL (ref 82–98)
MONOCYTES # BLD AUTO: 0.8 THOUSAND/ÂΜL (ref 0.17–1.22)
MONOCYTES NFR BLD AUTO: 12 % (ref 4–12)
NEUTROPHILS # BLD AUTO: 4.54 THOUSANDS/ÂΜL (ref 1.85–7.62)
NEUTS SEG NFR BLD AUTO: 65 % (ref 43–75)
NRBC BLD AUTO-RTO: 0 /100 WBCS
PLATELET # BLD AUTO: 268 THOUSANDS/UL (ref 149–390)
PMV BLD AUTO: 10.2 FL (ref 8.9–12.7)
POTASSIUM SERPL-SCNC: 3.5 MMOL/L (ref 3.5–5.3)
RBC # BLD AUTO: 4.38 MILLION/UL (ref 3.88–5.62)
SODIUM SERPL-SCNC: 142 MMOL/L (ref 135–147)
WBC # BLD AUTO: 6.93 THOUSAND/UL (ref 4.31–10.16)

## 2024-05-25 PROCEDURE — 85025 COMPLETE CBC W/AUTO DIFF WBC: CPT | Performed by: INTERNAL MEDICINE

## 2024-05-25 PROCEDURE — 97130 THER IVNTJ EA ADDL 15 MIN: CPT

## 2024-05-25 PROCEDURE — 97530 THERAPEUTIC ACTIVITIES: CPT

## 2024-05-25 PROCEDURE — 97110 THERAPEUTIC EXERCISES: CPT

## 2024-05-25 PROCEDURE — 97112 NEUROMUSCULAR REEDUCATION: CPT

## 2024-05-25 PROCEDURE — 97129 THER IVNTJ 1ST 15 MIN: CPT

## 2024-05-25 PROCEDURE — 94640 AIRWAY INHALATION TREATMENT: CPT

## 2024-05-25 PROCEDURE — 80048 BASIC METABOLIC PNL TOTAL CA: CPT | Performed by: INTERNAL MEDICINE

## 2024-05-25 PROCEDURE — 82948 REAGENT STRIP/BLOOD GLUCOSE: CPT

## 2024-05-25 PROCEDURE — 99232 SBSQ HOSP IP/OBS MODERATE 35: CPT | Performed by: INTERNAL MEDICINE

## 2024-05-25 RX ORDER — LEVALBUTEROL INHALATION SOLUTION 1.25 MG/3ML
SOLUTION RESPIRATORY (INHALATION)
Status: COMPLETED
Start: 2024-05-25 | End: 2024-05-25

## 2024-05-25 RX ORDER — LEVALBUTEROL INHALATION SOLUTION 1.25 MG/3ML
1.25 SOLUTION RESPIRATORY (INHALATION)
Status: DISCONTINUED | OUTPATIENT
Start: 2024-05-26 | End: 2024-05-29

## 2024-05-25 RX ADMIN — FLUTICASONE PROPIONATE 1 SPRAY: 50 SPRAY, METERED NASAL at 17:31

## 2024-05-25 RX ADMIN — METFORMIN HYDROCHLORIDE 500 MG: 500 TABLET, FILM COATED ORAL at 17:28

## 2024-05-25 RX ADMIN — LISINOPRIL 5 MG: 5 TABLET ORAL at 08:39

## 2024-05-25 RX ADMIN — LEVALBUTEROL HYDROCHLORIDE 1.25 MG: 1.25 SOLUTION RESPIRATORY (INHALATION) at 23:05

## 2024-05-25 RX ADMIN — FLUTICASONE FUROATE AND VILANTEROL TRIFENATATE 1 PUFF: 200; 25 POWDER RESPIRATORY (INHALATION) at 08:37

## 2024-05-25 RX ADMIN — IPRATROPIUM BROMIDE 0.5 MG: 0.5 SOLUTION RESPIRATORY (INHALATION) at 23:05

## 2024-05-25 RX ADMIN — INSULIN LISPRO 1 UNITS: 100 INJECTION, SOLUTION INTRAVENOUS; SUBCUTANEOUS at 11:26

## 2024-05-25 RX ADMIN — GUAIFENESIN 600 MG: 600 TABLET, EXTENDED RELEASE ORAL at 17:28

## 2024-05-25 RX ADMIN — FOLIC ACID 1 MG: 1 TABLET ORAL at 08:38

## 2024-05-25 RX ADMIN — METFORMIN HYDROCHLORIDE 500 MG: 500 TABLET, FILM COATED ORAL at 08:38

## 2024-05-25 RX ADMIN — ATORVASTATIN CALCIUM 80 MG: 80 TABLET, FILM COATED ORAL at 17:28

## 2024-05-25 RX ADMIN — Medication 1 TABLET: at 08:38

## 2024-05-25 RX ADMIN — UMECLIDINIUM 1 PUFF: 62.5 AEROSOL, POWDER ORAL at 08:37

## 2024-05-25 RX ADMIN — INSULIN GLARGINE 18 UNITS: 100 INJECTION, SOLUTION SUBCUTANEOUS at 21:42

## 2024-05-25 RX ADMIN — GUAIFENESIN 600 MG: 600 TABLET, EXTENDED RELEASE ORAL at 08:38

## 2024-05-25 RX ADMIN — MIRTAZAPINE 7.5 MG: 15 TABLET, FILM COATED ORAL at 21:41

## 2024-05-25 RX ADMIN — ASPIRIN 81 MG CHEWABLE TABLET 81 MG: 81 TABLET CHEWABLE at 08:39

## 2024-05-25 RX ADMIN — PANTOPRAZOLE SODIUM 40 MG: 40 TABLET, DELAYED RELEASE ORAL at 05:09

## 2024-05-25 RX ADMIN — Medication 6 MG: at 21:41

## 2024-05-25 RX ADMIN — FLUTICASONE PROPIONATE 1 SPRAY: 50 SPRAY, METERED NASAL at 08:37

## 2024-05-25 RX ADMIN — THIAMINE HCL TAB 100 MG 100 MG: 100 TAB at 08:39

## 2024-05-25 RX ADMIN — FERROUS SULFATE TAB 325 MG (65 MG ELEMENTAL FE) 325 MG: 325 (65 FE) TAB at 08:38

## 2024-05-25 RX ADMIN — ALBUTEROL SULFATE 2.5 MG: 2.5 SOLUTION RESPIRATORY (INHALATION) at 01:25

## 2024-05-25 RX ADMIN — APIXABAN 5 MG: 5 TABLET, FILM COATED ORAL at 08:39

## 2024-05-25 RX ADMIN — APIXABAN 5 MG: 5 TABLET, FILM COATED ORAL at 17:28

## 2024-05-25 RX ADMIN — BUSPIRONE HYDROCHLORIDE 10 MG: 10 TABLET ORAL at 21:41

## 2024-05-25 RX ADMIN — BUSPIRONE HYDROCHLORIDE 10 MG: 10 TABLET ORAL at 17:30

## 2024-05-25 RX ADMIN — BUSPIRONE HYDROCHLORIDE 10 MG: 10 TABLET ORAL at 08:38

## 2024-05-25 NOTE — PROGRESS NOTES
05/25/24 1230   Pain Assessment   Pain Assessment Tool 0-10   Pain Score No Pain   Restrictions/Precautions   Precautions Fall Risk;Bed/chair alarms;Cognitive;Supervision on toilet/commode   Sit to Stand   Type of Assistance Needed Supervision;Verbal cues   Physical Assistance Level No physical assistance   Comment RW   Sit to Stand CARE Score 4   Bed-Chair Transfer   Type of Assistance Needed Supervision;Verbal cues   Physical Assistance Level No physical assistance   Comment RW   Chair/Bed-to-Chair Transfer CARE Score 4   Transfer Bed/Chair/Wheelchair   Positioning Concerns Cognition   Limitations Noted In Balance;Confidence;Coordination;Endurance;Problem Solving   Adaptive Equipment Roller Walker   Functional Standing Tolerance   Time 10 minutes 2x   Activity CS in stance w/ RW L hand to place resistance close pins overhead onto tied TB around mirror, difficulty with 3 pt pinch/dexterity in L hand and unusual grasp patterns noted, unable to grasp black heavy resistance close pins, pt LUE crossing midline to retrieve close pins and to place back into bin, G challenge, pt reports a 9 on Bob Scale for difficulty and notes this is a lighter activity, seated rest break between placing close pins and taking close pins back off   Transfers   Additional Comments Pt engaged in fxnl mobility and activity to increase overall endurance and safe navigation with RW. Pt S w/ RW ambulating around OT gym to retrieve various clothespins from various surfaces/handles of cabinets/drawers/clipped to baskets and chairs in room, pt retrieving 1 at a time and returning to bucket sitting on table in gym with frequent turns and short distance ambulation, edu for safe turns and to stay close to RW. Pt fatigues at 4 mins with dyspnea noted 2* COPD and increased frustration with pt dropping 2 close pins and yelling. OT had asked pt if he needed a break shortly prior to inc frustration with pt denying need to rest and demo dec insight to  endurance deficit and when to take a rest break/difficulty with self pacing. Pt engaged in continuous dynamic activity 4 mins before needing to sit and rest. Pt then finished activity with another 4 mins 12 secs continuous activity and cues for self pacing/to slow down to inc pt success. Pt rating task as 15 (hard) on Bob scale 2* deconditioning/low activity tolerance.   Therapeutic Excerise-Strength   UE Strength Yes   Right Upper Extremity- Strength   R Shoulder Flexion;ABduction;Extension;Horizontal ABduction;External rotation;Internal rotation   R Elbow Elbow flexion;Elbow extension   R Position Seated   Equipment Theraband  (blue heavy TB)   R Weight/Reps/Sets 2x15   RUE Strength Comment seated to complete BL UE strengthening for increased endurance/conditioning and inc fxnl strength for ADL/IADL and transfers, pt provided with handout via [employer].   Left Upper Extremity-Strength   LUE Strength Comment see RUE for detail   Cognition   Overall Cognitive Status Impaired   Arousal/Participation Cooperative   Attention Attends with cues to redirect   Orientation Level Oriented X4   Memory Decreased recall of precautions   Following Commands Follows one step commands with increased time or repetition   Comments poor frustration tolerance, yelling out twice during activity when frustrated and requires emotional support/redirection   Additional Activities   Additional Activities Comments Flutter valve x20 at start of session   Activity Tolerance   Activity Tolerance Patient tolerated treatment well   Assessment   Treatment Assessment Pt tolerated 90 min skilled OT Tx session with focus on establishing HEP/handout provided, activity tolerance/endurance, and standing tolerance. See above note for activity detail. Continue OT POC to maximize pt rehab potential for upcoming DC home.   Prognosis Good   Problem List Decreased strength;Decreased endurance;Impaired balance;Decreased coordination;Decreased  mobility;Decreased cognition;Impaired judgement;Decreased safety awareness;Decreased skin integrity   Barriers to Discharge None   Plan   Treatment/Interventions ADL retraining;Functional transfer training;Therapeutic exercise;Endurance training;Patient/family training   Progress Progressing toward goals   Discharge Recommendation   Rehab Resource Intensity Level, OT   (home with family support, HHOT)   OT Therapy Minutes   OT Time In 1230   OT Time Out 1400   OT Total Time (minutes) 90   OT Mode of treatment - Individual (minutes) 90   OT Mode of treatment - Concurrent (minutes) 0   OT Mode of treatment - Group (minutes) 0   OT Mode of treatment - Co-treat (minutes) 0   OT Mode of Treatment - Total time(minutes) 90 minutes   OT Cumulative Minutes 1010   Therapy Time missed   Time missed? No

## 2024-05-25 NOTE — PROGRESS NOTES
05/25/24 0830   Pain Assessment   Pain Assessment Tool 0-10   Restrictions/Precautions   Precautions Fall Risk;Bed/chair alarms;Cognitive;Supervision on toilet/commode   Cognition   Overall Cognitive Status Impaired   Subjective   Subjective pt awake but in bed but not yet dress and ready for the day. However he was agreeable to PT.   Roll Left and Right   Type of Assistance Needed Independent   Roll Left and Right CARE Score 6   Sit to Lying   Type of Assistance Needed Set-up / clean-up   Sit to Lying CARE Score 5   Lying to Sitting on Side of Bed   Type of Assistance Needed Set-up / clean-up   Lying to Sitting on Side of Bed CARE Score 5   Sit to Stand   Type of Assistance Needed Supervision;Verbal cues   Sit to Stand CARE Score 4   Bed-Chair Transfer   Type of Assistance Needed Supervision;Verbal cues;Adaptive equipment   Comment RW   Chair/Bed-to-Chair Transfer CARE Score 4   Walk 10 Feet   Type of Assistance Needed Supervision;Verbal cues;Adaptive equipment   Walk 10 Feet CARE Score 4   Walk 50 Feet with Two Turns   Type of Assistance Needed Supervision;Verbal cues;Adaptive equipment   Walk 50 Feet with Two Turns CARE Score 4   Walk 150 Feet   Type of Assistance Needed Supervision;Verbal cues;Adaptive equipment   Comment RW x 200' at a time. able to keep LE inside the walker more consistently as he walks but still provides VC to stand upright and looks straight ahead   Walk 150 Feet CARE Score 4   Walking 10 Feet on Uneven Surfaces   Type of Assistance Needed Incidental touching;Verbal cues;Adaptive equipment   Comment CG-CS with RW over floor mat   Walking 10 Feet on Uneven Surfaces CARE Score 4   Picking Up Object   Type of Assistance Needed Supervision;Adaptive equipment;Verbal cues   Comment RW support+ reacher - marker   Picking Up Object CARE Score 4   Therapeutic Interventions   Strengthening HEP prescription via Access Code: [email or web address]  URL: [email or web address]  Date: 05/25/2024   Prepared by: Yogi Mar    Exercises  - Seated March  - 1 x daily - 7 x weekly - 2 sets - 10 reps - 5 hold  - Seated Hip Abduction with Resistance  - 1 x daily - 7 x weekly - 2 sets - 10 reps  - Seated Isometric Hip Adduction with Ball  - 1 x daily - 7 x weekly - 3 sets - 10 reps  - Seated Long Arc Quad  - 1 x daily - 7 x weekly - 2 sets - 10 reps - 5 hold  - Seated Heel Toe Raises  - 1 x daily - 7 x weekly - 3 sets - 10 reps  - Gluteal Sets  - 1 x daily - 7 x weekly - 3 sets - 10 reps   Neuromuscular Re-Education repeated stepping over low ht objects such as dowels and 2 inch bolster with SPC and L HHA due to noted dec confidence and unsteadiness with noted pt placing L hand over R hand holding the SPC. Repeated gait /posture training with SPC on R hand on level surface x 50' x 5 reps   Equipment Use   NuStep lvl 4 x 10 mins, SPM>70 - SpO2 on RA 93-94%, NV 70-72 bpm   Other Comments   Comments ADLs performed include sponge bathing with blue wipes, UB/LB dressing, donning socks/shoes which pt completed at S level with 1 step command provided at a time. While eating breakfast pt also participated with stroke ed on what comes next topic.   Assessment   Treatment Assessment Pt tolerated skilled PT with focus on ADLs and functional mobility training with RW, stroke ed including HEP prescription and balance/gait training. Pt demo'd inc unsteadiness during NMR training while using a SPC only leading to pt's dec confidence adapting inc WBOS so at times provided light HHA but no overt LOB. Activity tolerance is so much better when utilizing a RW vs SPC. vitals were stable and pt follows through well with VC on PLB. Pt consistently follow through with 1 step command but does not show task initiation. Pt  unable to tell PT why he did not eat his breakfast when tray was on the table but still covered though. PT did f/u with nursing staff that pt requires set up during meal times. Cont PT POC to improve overall functions  while using a RW thereby decreasing caregiver burden at d/c as well as reduce fall risk.   Family/Caregiver Present no   Barriers to Discharge None   PT Barriers   Functional Limitation Stair negotiation;Walking;Ramp negotiation   Plan   Treatment/Interventions ADL retraining;Functional transfer training;LE strengthening/ROM;Therapeutic exercise;Endurance training;Bed mobility;Gait training;Spoke to nursing;OT   Progress Progressing toward goals   Discharge Recommendation   Equipment Recommended Walker  (has a RW and a reacher at home)   PT Therapy Minutes   PT Time In 0830   PT Time Out 1000   PT Total Time (minutes) 90   PT Mode of treatment - Individual (minutes) 90   PT Mode of treatment - Concurrent (minutes) 0   PT Mode of treatment - Group (minutes) 0   PT Mode of treatment - Co-treat (minutes) 0   PT Mode of Treatment - Total time(minutes) 90 minutes   PT Cumulative Minutes 1153   Therapy Time missed   Time missed? No

## 2024-05-25 NOTE — PROGRESS NOTES
"   05/25/24 1500   Pain Assessment   Pain Assessment Tool 0-10   Pain Score No Pain   Restrictions/Precautions   Precautions Fall Risk;Bed/chair alarms;Cognitive;Supervision on toilet/commode   Weight Bearing Restrictions No   ROM Restrictions No   Comprehension   Comprehension (FIM) 4 - Understands basic info/conversation 75-90% of time   Expression   Expression (FIM) 5 - Needs help/cues only RARELY (< 10% of the time)   Social Interaction   Social Interaction (FIM) 5 - Interacts appropriately with others 90% of time   Problem Solving   Problem solving (FIM) 5 - Solves basic problems 90% of time   Memory   Memory (FIM) 3 - Recognizes, recalls/performs 50-74%   Speech/Language/Cognition Assessmetn   Treatment Assessment Pt seen for skilled speech therapy session targeting cognitive linguistic communication skills. Pt alert and interactive- watching golf on TV but agreeable to turn it down to engaged in SLP session. This SLP has not worked with pt since eval therefore engaged in general review of overall targeted tasks and pt's perceptive of progress and plan. Pt with overall limited input during session, defaults to \"I forget\" when asked to recall ANY tasks completed today or over the last few weeks on rehab. In order to not cause frustration, SLP provided gently cuing and examples of tasks to aid in recall to which pt only shakes head in agreement, does not elaborate. In regards to recall for discharge, FT was completed this past week, pt does recall daughter present for this. Pt is aware of discharge set for next week as well. Per chart review, stroke education was completed therefore addressed recall of his \"main takeaways\" from what he learned- again unable to provide response other than \"I forget\". Reviewed the BE FAST acronym- provided visual aid and direct examples as to each letter and meaning. Also reviewed the risk factors for stroke and things pt has control over to improve healthier lifestyle and reduce " risk of additional stroke. Pt listening and maintaining good eye contact during conversation, however still with limited input or conversation with task. Per chart, pt is planned for discharge on 5/29 with further skilled home therapies. Pt overall is improving with skilled SLP services and will cont to benefit to maximize overall cognitive linguistic communication abilities at this time.   SLP Therapy Minutes   SLP Time In 1500   SLP Time Out 1530   SLP Total Time (minutes) 30   SLP Mode of treatment - Individual (minutes) 30   SLP Mode of treatment - Concurrent (minutes) 0   SLP Mode of treatment - Group (minutes) 0   SLP Mode of treatment - Co-treat (minutes) 0   SLP Mode of Treatment - Total time(minutes) 30 minutes   SLP Cumulative Minutes 480   Therapy Time missed   Time missed? No

## 2024-05-25 NOTE — PLAN OF CARE
Problem: INFECTION - ADULT  Goal: Absence or prevention of progression during hospitalization  Description: INTERVENTIONS:  - Assess and monitor for signs and symptoms of infection  - Monitor lab/diagnostic results  - Monitor all insertion sites, i.e. indwelling lines, tubes, and drains  - Monitor endotracheal if appropriate and nasal secretions for changes in amount and color  - [place] appropriate cooling/warming therapies per order  - Administer medications as ordered  - Instruct and encourage patient and family to use good hand hygiene technique  - Identify and instruct in appropriate isolation precautions for identified infection/condition  Outcome: Progressing

## 2024-05-25 NOTE — PHYSICAL THERAPY NOTE
Stroke Education Series    Pt participated in skilled Stroke Education Series in an individualized setting to address the topic of What Comes Next post stroke in both verbal and written formats. Education within this session included information on recommendations and resources after leaving the [facility]. This education session links together what has been covered in previous stroke education classes to improve the patient's understanding of how managing risk factors for stroke, participating in therapy, working with family and friends in the rehab process, and reviewing their role in the rehab process will maximize outcomes and their functional gains. This education also incorporates what the patient wants to achieve and helps them set realistic goals. To individualize this education the following topics were covered: continued therapy (home versus outpatient), support groups, including [facility], caregiver support, aging with a disability, participation in research , and information about participating in available clinical trials. Following education, pt's response to education is: verbalizes understanding and demonstrates understanding.      Start Time: 0850    End Time:0905

## 2024-05-25 NOTE — PROGRESS NOTES
[facility]  Progress Note  Name: Lucho Talavera I  MRN: [id number]  Unit/Bed#: -01 I Date of Admission: 5/11/2024   Date of Service: 5/25/2024 I Hospital Day: 14    Assessment & Plan   * Stroke (cerebrum) (HCC)  Assessment & Plan  Presented with increased weakness, dysarthria and leaning on the left side x 5 days with fall 2 days prior to admission in the setting of noncompliance with Eliquis  CTA with chronic left PCA territory infarction  MRI with multiple infarcts of varying ages and posterior circulation including multiple small acute/early subacute infarct in the bilateral cerebellum with additional small foci of recent ischemia in the left PCA distribution superimposed on chronic infarct  Echo with EF 55%, no cardiac thrombus  Maintained on aspirin and Eliquis  Eliquis is to be $71.28 = daughter aware and OK with the price  Continue Lipitor 80 mg daily  Outpatient follow-up with neurology  DC tentative 5/29/2024    Typical atrial flutter (HCC)  Assessment & Plan  Previously admitted for atrial flutter with RVR in 4/8/2024 s/p ablation and loop recorder insertion.    Sutures removed 5/17/24  Anticoagulated on Eliquis      Hypertension  Assessment & Plan  Home:  Lisinopril 40mg qd  Here:  resumed lisinopril 5mg qd   stable        ETOH abuse  Assessment & Plan  History of alcohol abuse  Monitor off CIWA protocol - Pt now post withdrawal window   Ativan as needed for anxiety  Continue supplements with thiamine, folate, MVI    Type 2 diabetes mellitus (HCC)  Assessment & Plan  HbA1C 8.9 on 4/6/24  Home:  Basaglar 30U qhs/Metformin  Here:  Lantus 18U qhs/Metformin 500mg BID  Continue with accuchecks/SSI/DM diet  stable               The above assessment and plan was reviewed and updated as determined by my evaluation of the patient on 5/25/2024.    Labs:   Results from last 7 days   Lab Units 05/25/24  0506   WBC Thousand/uL 6.93   HEMOGLOBIN g/dL 11.0*   HEMATOCRIT %  35.8*   PLATELETS Thousands/uL 268     Results from last 7 days   Lab Units 05/25/24  0506   SODIUM mmol/L 142   POTASSIUM mmol/L 3.5   CHLORIDE mmol/L 106   CO2 mmol/L 29   BUN mg/dL 9   CREATININE mg/dL 0.42*   CALCIUM mg/dL 8.9             Results from last 7 days   Lab Units 05/25/24  0617 05/24/24  2116 05/24/24  1634   POC GLUCOSE mg/dl 118 115 115       Imaging  No orders to display       Review of Scheduled Meds:  Current Facility-Administered Medications   Medication Dose Route Frequency Provider Last Rate    acetaminophen  650 mg Oral Q6H PRN Richmond Chew, DO      albuterol  2 puff Inhalation Q4H PRN William Camacho, DO      albuterol  2.5 mg Nebulization Q6H PRN Richmond Chew, DO      apixaban  5 mg Oral BID [facility]      aspirin  81 mg Oral Daily [facility], DO      atorvastatin  80 mg Oral Daily With Dinner [facility] Che[facility], DO      busPIRone  10 mg Oral TID [facility], DO      ferrous sulfate  325 mg Oral Daily With Breakfast Richmond Osorioel, DO      fluticasone  1 spray Nasal BID [facility], DO      fluticasone-vilanterol  1 puff Inhalation Daily Richmond Chew, DO      folic acid  1 mg Oral Daily Richmond Chew, DO      guaiFENesin  600 mg Oral BID Richmond Chew, DO      hydrOXYzine HCL  25 mg Oral BID PRN Ashley Depadua, MD      insulin glargine  18 Units Subcutaneous HS TATIANA Paulino      insulin lispro  1-5 Units Subcutaneous TID AC Richmond Chew, DO      lisinopril  5 mg Oral Daily TATIANA Paulino      melatonin  6 mg Oral QPM Ashley Depadua, MD      metFORMIN  500 mg Oral BID With Meals TATIANA Paulino      mirtazapine  7.5 mg Oral QPM Ashley Depadua, MD      multivitamin-minerals  1 tablet Oral Daily Richmond Osorioel, DO      oxyCODONE  2.5 mg Oral Q4H PRN Richmond Chew, DO      pantoprazole  40 mg Oral Early Morning [facility], DO      thiamine  100 mg Oral Daily Richmond Chew, DO       umeclidinium  1 puff Inhalation Daily Richmond Chew DO         Subjective/ HPI: Patient seen and examined. Patients overnight issues or events were reviewed with nursing staff. New or overnight issues include the following:     Pt seen in his room. He denies any current complaints.    ROS:   A 10 point ROS was performed; negative except as noted above.        *Labs /Radiology studies Reviewed  *Medications  reviewed and reconciled as needed  *Please refer to order section for additional ordered labs studies      Physical Examination:  Vitals:   Vitals:    05/24/24 1454 05/24/24 2103 05/25/24 0126 05/25/24 0458   BP: 129/85 147/73  130/66   BP Location: Left arm Right arm  Left arm   Pulse: 81 80  81   Resp: 17 20  20   Temp: 98.1 °F (36.7 °C) 98.3 °F (36.8 °C)  98.2 °F (36.8 °C)   TempSrc: Oral Oral  Oral   SpO2: 97% 94% 95% 95%   Weight:       Height:           General Appearance: NAD; pleasant  HEENT: PERRLA, conjuctiva normal; mucous membranes moist; face symmetrical  Neck:  Supple  Lungs: clear bilaterally, normal respiratory effort, no retractions, expiratory effort normal, on room air  CV: regular rate and rhythm, no murmurs rubs or gallops noted   ABD: soft non tender, +BS x4  EXT: DP pulses intact, no lymphadenopathy, no edema  Skin: normal turgor, normal texture, no rash  Psych: affect normal, mood normal  Neuro: AAOx3       The above physical exam was reviewed and updated as determined by my evaluation of the patient on 5/25/2024.    Invasive Devices       None                      VTE Pharmacologic Prophylaxis: Eliquis  Code Status: Level 1 - Full Code  Current Length of Stay: 14 day(s)    Total floor / unit time spent today  35 minutes   Coordination of patient's care was performed in conjunction with primary service. Time invested included review of patient's labs, vitals, and management of their comorbidities with continued monitoring, examination of patient as well as answering patient  questions, documenting her findings and creating progress note in electronic medical record,  ordering appropriate diagnostic testing.       ** Please Note:  voice to text software may have been used in the creation of this document. Although proof errors in transcription or interpretation are a potential of such software**

## 2024-05-26 LAB
GLUCOSE SERPL-MCNC: 112 MG/DL (ref 65–140)
GLUCOSE SERPL-MCNC: 113 MG/DL (ref 65–140)
GLUCOSE SERPL-MCNC: 117 MG/DL (ref 65–140)
GLUCOSE SERPL-MCNC: 136 MG/DL (ref 65–140)
GLUCOSE SERPL-MCNC: 173 MG/DL (ref 65–140)

## 2024-05-26 PROCEDURE — 94664 DEMO&/EVAL PT USE INHALER: CPT

## 2024-05-26 PROCEDURE — 94760 N-INVAS EAR/PLS OXIMETRY 1: CPT

## 2024-05-26 PROCEDURE — 82948 REAGENT STRIP/BLOOD GLUCOSE: CPT

## 2024-05-26 PROCEDURE — 94640 AIRWAY INHALATION TREATMENT: CPT

## 2024-05-26 PROCEDURE — 97530 THERAPEUTIC ACTIVITIES: CPT

## 2024-05-26 PROCEDURE — 97110 THERAPEUTIC EXERCISES: CPT

## 2024-05-26 PROCEDURE — 99232 SBSQ HOSP IP/OBS MODERATE 35: CPT | Performed by: INTERNAL MEDICINE

## 2024-05-26 PROCEDURE — 97112 NEUROMUSCULAR REEDUCATION: CPT

## 2024-05-26 RX ADMIN — LISINOPRIL 5 MG: 5 TABLET ORAL at 08:44

## 2024-05-26 RX ADMIN — METFORMIN HYDROCHLORIDE 500 MG: 500 TABLET, FILM COATED ORAL at 08:44

## 2024-05-26 RX ADMIN — ASPIRIN 81 MG CHEWABLE TABLET 81 MG: 81 TABLET CHEWABLE at 08:44

## 2024-05-26 RX ADMIN — FERROUS SULFATE TAB 325 MG (65 MG ELEMENTAL FE) 325 MG: 325 (65 FE) TAB at 08:44

## 2024-05-26 RX ADMIN — BUSPIRONE HYDROCHLORIDE 10 MG: 10 TABLET ORAL at 17:06

## 2024-05-26 RX ADMIN — METFORMIN HYDROCHLORIDE 500 MG: 500 TABLET, FILM COATED ORAL at 17:05

## 2024-05-26 RX ADMIN — UMECLIDINIUM 1 PUFF: 62.5 AEROSOL, POWDER ORAL at 08:43

## 2024-05-26 RX ADMIN — Medication 1 TABLET: at 08:44

## 2024-05-26 RX ADMIN — IPRATROPIUM BROMIDE 0.5 MG: 0.5 SOLUTION RESPIRATORY (INHALATION) at 09:10

## 2024-05-26 RX ADMIN — PANTOPRAZOLE SODIUM 40 MG: 40 TABLET, DELAYED RELEASE ORAL at 05:37

## 2024-05-26 RX ADMIN — GUAIFENESIN 600 MG: 600 TABLET, EXTENDED RELEASE ORAL at 08:44

## 2024-05-26 RX ADMIN — APIXABAN 5 MG: 5 TABLET, FILM COATED ORAL at 08:44

## 2024-05-26 RX ADMIN — BUSPIRONE HYDROCHLORIDE 10 MG: 10 TABLET ORAL at 08:44

## 2024-05-26 RX ADMIN — LEVALBUTEROL HYDROCHLORIDE 1.25 MG: 1.25 SOLUTION RESPIRATORY (INHALATION) at 13:56

## 2024-05-26 RX ADMIN — MIRTAZAPINE 7.5 MG: 15 TABLET, FILM COATED ORAL at 21:38

## 2024-05-26 RX ADMIN — FLUTICASONE FUROATE AND VILANTEROL TRIFENATATE 1 PUFF: 200; 25 POWDER RESPIRATORY (INHALATION) at 08:43

## 2024-05-26 RX ADMIN — INSULIN GLARGINE 18 UNITS: 100 INJECTION, SOLUTION SUBCUTANEOUS at 21:38

## 2024-05-26 RX ADMIN — FLUTICASONE PROPIONATE 1 SPRAY: 50 SPRAY, METERED NASAL at 08:43

## 2024-05-26 RX ADMIN — THIAMINE HCL TAB 100 MG 100 MG: 100 TAB at 08:44

## 2024-05-26 RX ADMIN — ALBUTEROL SULFATE 2 PUFF: 90 AEROSOL, METERED RESPIRATORY (INHALATION) at 03:28

## 2024-05-26 RX ADMIN — BUSPIRONE HYDROCHLORIDE 10 MG: 10 TABLET ORAL at 21:38

## 2024-05-26 RX ADMIN — FLUTICASONE PROPIONATE 1 SPRAY: 50 SPRAY, METERED NASAL at 17:08

## 2024-05-26 RX ADMIN — LEVALBUTEROL HYDROCHLORIDE 1.25 MG: 1.25 SOLUTION RESPIRATORY (INHALATION) at 19:44

## 2024-05-26 RX ADMIN — FOLIC ACID 1 MG: 1 TABLET ORAL at 08:44

## 2024-05-26 RX ADMIN — GUAIFENESIN 600 MG: 600 TABLET, EXTENDED RELEASE ORAL at 17:05

## 2024-05-26 RX ADMIN — LEVALBUTEROL HYDROCHLORIDE 1.25 MG: 1.25 SOLUTION RESPIRATORY (INHALATION) at 09:11

## 2024-05-26 RX ADMIN — IPRATROPIUM BROMIDE 0.5 MG: 0.5 SOLUTION RESPIRATORY (INHALATION) at 04:17

## 2024-05-26 RX ADMIN — IPRATROPIUM BROMIDE 0.5 MG: 0.5 SOLUTION RESPIRATORY (INHALATION) at 19:43

## 2024-05-26 RX ADMIN — ATORVASTATIN CALCIUM 80 MG: 80 TABLET, FILM COATED ORAL at 17:05

## 2024-05-26 RX ADMIN — APIXABAN 5 MG: 5 TABLET, FILM COATED ORAL at 17:05

## 2024-05-26 RX ADMIN — IPRATROPIUM BROMIDE 0.5 MG: 0.5 SOLUTION RESPIRATORY (INHALATION) at 13:57

## 2024-05-26 RX ADMIN — Medication 6 MG: at 21:38

## 2024-05-26 RX ADMIN — LEVALBUTEROL HYDROCHLORIDE 1.25 MG: 1.25 SOLUTION RESPIRATORY (INHALATION) at 04:17

## 2024-05-26 NOTE — PROGRESS NOTES
05/26/24 1230   Pain Assessment   Pain Assessment Tool 0-10   Restrictions/Precautions   Precautions Bed/chair alarms;Cognitive;Fall Risk;Supervision on toilet/commode   Cognition   Overall Cognitive Status Impaired   Arousal/Participation Alert;Cooperative   Subjective   Subjective pt in recliner and ready for PT   Roll Left and Right   Type of Assistance Needed Independent   Roll Left and Right CARE Score 6   Sit to Lying   Type of Assistance Needed Set-up / clean-up   Sit to Lying CARE Score 5   Lying to Sitting on Side of Bed   Type of Assistance Needed Set-up / clean-up   Lying to Sitting on Side of Bed CARE Score 5   Sit to Stand   Type of Assistance Needed Supervision;Verbal cues;Adaptive equipment   Sit to Stand CARE Score 4   Bed-Chair Transfer   Type of Assistance Needed Supervision;Verbal cues;Adaptive equipment   Comment RW   Chair/Bed-to-Chair Transfer CARE Score 4   Car Transfer   Type of Assistance Needed Supervision;Verbal cues;Adaptive equipment   Comment mock car with extra cushion + RW, VC for hand placement   Car Transfer CARE Score 4   Walk 10 Feet   Type of Assistance Needed Supervision;Adaptive equipment   Comment RW   Walk 10 Feet CARE Score 4   Walk 50 Feet with Two Turns   Type of Assistance Needed Supervision;Adaptive equipment   Comment RW   Walk 50 Feet with Two Turns CARE Score 4   Walk 150 Feet   Type of Assistance Needed Supervision;Adaptive equipment   Comment RW   Walk 150 Feet CARE Score 4   Walking 10 Feet on Uneven Surfaces   Type of Assistance Needed Supervision;Adaptive equipment;Verbal cues   Comment CS with RW and VC for safe walker placement pre/post stepping on/off mat   Walking 10 Feet on Uneven Surfaces CARE Score 4   Wheel 50 Feet with Two Turns   Reason if not Attempted Activity not applicable   Wheel 50 Feet with Two Turns CARE Score 9   Wheel 150 Feet   Reason if not Attempted Activity not applicable   Wheel 150 Feet CARE Score 9   Curb or Single Stair   Style  "negotiated Curb   Type of Assistance Needed Supervision;Verbal cues;Adaptive equipment   Comment 8\" curb with RW, VC for safer walker management on/off curb   1 Step (Curb) CARE Score 4   4 Steps   Type of Assistance Needed Supervision;Verbal cues;Adaptive equipment   4 Steps CARE Score 4   12 Steps   Type of Assistance Needed Supervision;Verbal cues;Adaptive equipment   Comment CS on FF with both hands on R rail. short standing rest break to catch breath on the landing post ascending  step through pattern, step to pattern descending   12 Steps CARE Score 4   Picking Up Object   Type of Assistance Needed Supervision;Verbal cues;Adaptive equipment   Comment reacher - pen   Picking Up Object CARE Score 4   Therapeutic Interventions   Neuromuscular Re-Education repeated walking over floor mat with AW underneath to challenge terrain using a SPC, 50' walking at a time with SPC while holding spray bottle on L hand then closed lid water tumbler   Equipment Use   NuStep lvl 4 x 10 mins, SPM> 75   Assessment   Treatment Assessment Pt actively engaged in 60 mins skilled PT with focus on functional mobility training with RW, strengthening and NMR with SPC. pt demo's difficulty with STS transition when holding an object on each. But able to keep L hand  on object while walking with the SPC without any LOB with noted more stability with mobilities this session even when using SPC compared to yesterday's session. cont PT POC and pt on track for home d/c on 5/29/24 with home PT services.   Barriers to Discharge None   PT Barriers   Physical Impairment Decreased strength;Decreased range of motion;Decreased endurance;Impaired balance;Decreased mobility;Decreased coordination;Decreased cognition;Decreased safety awareness;Impaired sensation;Decreased skin integrity   Plan   Progress Progressing toward goals   PT Therapy Minutes   PT Time In 1230   PT Time Out 1330   PT Total Time (minutes) 60   PT Mode of treatment - Individual " (minutes) 60   PT Mode of treatment - Concurrent (minutes) 0   PT Mode of treatment - Group (minutes) 0   PT Mode of treatment - Co-treat (minutes) 0   PT Mode of Treatment - Total time(minutes) 60 minutes   PT Cumulative Minutes 1213   Therapy Time missed   Time missed? No

## 2024-05-26 NOTE — RESPIRATORY THERAPY NOTE
Resp care   05/26/24 0911   Inhalation Therapy Tx   $ Inhalation Therapy Performed Yes   $ Pulse Oximetry Spot Check Charge Completed   SpO2 98 %   Pre-Treatment Pulse 72   Pre-Treatment Respirations 20   Duration 15   Breath Sounds Pre-Treatment Bilateral Diminished   Breath Sounds Post-Treatment Bilateral Diminished   Post-Treatment Pulse 98   Post-Treatment Respirations 20   Delivery Source UDN;Oxygen   Position Semi Simms's   Treatment Tolerance Tolerated well   Resp Comments pt found on ra, spo2 is 98%, bs are diminished, udn tx given, will cont to monitor per resp protocol.

## 2024-05-26 NOTE — PROGRESS NOTES
05/26/24 1230   Pain Assessment   Pain Assessment Tool 0-10   Restrictions/Precautions   Precautions Bed/chair alarms;Cognitive;Fall Risk;Supervision on toilet/commode   Cognition   Overall Cognitive Status Impaired   Arousal/Participation Alert;Cooperative   Subjective   Subjective pt in recliner and ready for PT   Roll Left and Right   Type of Assistance Needed Independent   Roll Left and Right CARE Score 6   Sit to Lying   Type of Assistance Needed Set-up / clean-up   Sit to Lying CARE Score 5   Lying to Sitting on Side of Bed   Type of Assistance Needed Set-up / clean-up   Lying to Sitting on Side of Bed CARE Score 5   Sit to Stand   Type of Assistance Needed Supervision;Verbal cues;Adaptive equipment   Comment RW   Sit to Stand CARE Score 4   Bed-Chair Transfer   Type of Assistance Needed Supervision;Verbal cues;Adaptive equipment   Comment RW   Chair/Bed-to-Chair Transfer CARE Score 4   Car Transfer   Type of Assistance Needed Supervision;Verbal cues;Adaptive equipment   Comment mock car with extra cushion + RW, VC for hand placement   Car Transfer CARE Score 4   Walk 10 Feet   Type of Assistance Needed Supervision;Adaptive equipment   Comment RW   Walk 10 Feet CARE Score 4   Walk 50 Feet with Two Turns   Type of Assistance Needed Supervision;Adaptive equipment   Comment RW   Walk 50 Feet with Two Turns CARE Score 4   Walk 150 Feet   Type of Assistance Needed Supervision;Adaptive equipment   Comment RW   Walk 150 Feet CARE Score 4   Walking 10 Feet on Uneven Surfaces   Type of Assistance Needed Supervision;Adaptive equipment;Verbal cues   Comment CS with RW and VC for safe walker placement pre/post stepping on/off mat   Walking 10 Feet on Uneven Surfaces CARE Score 4   Wheel 50 Feet with Two Turns   Reason if not Attempted Activity not applicable   Wheel 50 Feet with Two Turns CARE Score 9   Wheel 150 Feet   Reason if not Attempted Activity not applicable   Wheel 150 Feet CARE Score 9   Curb or Single Stair  "  Style negotiated Curb   Type of Assistance Needed Supervision;Verbal cues;Adaptive equipment   Comment 8\" curb with RW, VC for safer walker management on/off curb   1 Step (Curb) CARE Score 4   4 Steps   Type of Assistance Needed Supervision;Verbal cues;Adaptive equipment   4 Steps CARE Score 4   12 Steps   Type of Assistance Needed Supervision;Verbal cues;Adaptive equipment   Comment CS on FF with both hands on R rail. short standing rest break to catch breath on the landing post ascending  step through pattern, step to pattern descending   12 Steps CARE Score 4   Picking Up Object   Type of Assistance Needed Supervision;Verbal cues;Adaptive equipment   Comment reacher - pen   Picking Up Object CARE Score 4   Therapeutic Interventions   Neuromuscular Re-Education repeated walking over floor mat with AW underneath to challenge terrain using a SPC, 50' walking at a time with SPC while holding spray bottle on L hand then closed lid water tumbler   Equipment Use   NuStep lvl 4 x 10 mins, SPM> 75   Assessment   Treatment Assessment Pt actively engaged in 60 mins skilled PT with focus on functional mobility training with RW, strengthening and NMR with SPC. pt demo's difficulty with STS transition when holding an object on each. But able to keep L hand  on object while walking with the SPC without any LOB with noted more stability with mobilities this session even when using SPC compared to yesterday's session. cont PT POC and pt on track for home d/c on 5/29/24 with home PT services.   Barriers to Discharge None   PT Barriers   Physical Impairment Decreased strength;Decreased range of motion;Decreased endurance;Impaired balance;Decreased mobility;Decreased coordination;Decreased cognition;Decreased safety awareness;Impaired sensation;Decreased skin integrity   Plan   Treatment/Interventions Functional transfer training;LE strengthening/ROM;Therapeutic exercise;Endurance training;Gait training;Spoke to nursing;Bed " mobility   Progress Progressing toward goals   PT Therapy Minutes   PT Time In 1230   PT Time Out 1330   PT Total Time (minutes) 60   PT Mode of treatment - Individual (minutes) 60   PT Mode of treatment - Concurrent (minutes) 0   PT Mode of treatment - Group (minutes) 0   PT Mode of treatment - Co-treat (minutes) 0   PT Mode of Treatment - Total time(minutes) 60 minutes   PT Cumulative Minutes 1213   Therapy Time missed   Time missed? No

## 2024-05-26 NOTE — PROGRESS NOTES
[facility]  Progress Note  Name: Lucho Talavera I  MRN: [id number]  Unit/Bed#: -01 I Date of Admission: 5/11/2024   Date of Service: 5/26/2024 I Hospital Day: 15    Assessment & Plan   * Stroke (cerebrum) (HCC)  Assessment & Plan  Presented with increased weakness, dysarthria and leaning on the left side x 5 days with fall 2 days prior to admission in the setting of noncompliance with Eliquis  CTA with chronic left PCA territory infarction  MRI with multiple infarcts of varying ages and posterior circulation including multiple small acute/early subacute infarct in the bilateral cerebellum with additional small foci of recent ischemia in the left PCA distribution superimposed on chronic infarct  Echo with EF 55%, no cardiac thrombus  Maintained on aspirin and Eliquis  Eliquis is to be $71.28 = daughter aware and OK with the price  Continue Lipitor 80 mg daily  Outpatient follow-up with neurology  DC tentative 5/29/2024    Typical atrial flutter (HCC)  Assessment & Plan  Previously admitted for atrial flutter with RVR in 4/8/2024 s/p ablation and loop recorder insertion.    Sutures removed 5/17/24  Anticoagulated on Eliquis      Hypertension  Assessment & Plan  Home:  Lisinopril 40mg qd  Here:  resumed lisinopril 5mg qd   stable        ETOH abuse  Assessment & Plan  History of alcohol abuse  Monitor off CIWA protocol - Pt now post withdrawal window   Ativan as needed for anxiety  Continue supplements with thiamine, folate, MVI    Type 2 diabetes mellitus (HCC)  Assessment & Plan  HbA1C 8.9 on 4/6/24  Home:  Basaglar 30U qhs/Metformin  Here:  Lantus 18U qhs/Metformin 500mg BID  Continue with accuchecks/SSI/DM diet  stable               The above assessment and plan was reviewed and updated as determined by my evaluation of the patient on 5/26/2024.    Labs:   Results from last 7 days   Lab Units 05/25/24  0506   WBC Thousand/uL 6.93   HEMOGLOBIN g/dL 11.0*   HEMATOCRIT %  35.8*   PLATELETS Thousands/uL 268     Results from last 7 days   Lab Units 05/25/24  0506   SODIUM mmol/L 142   POTASSIUM mmol/L 3.5   CHLORIDE mmol/L 106   CO2 mmol/L 29   BUN mg/dL 9   CREATININE mg/dL 0.42*   CALCIUM mg/dL 8.9             Results from last 7 days   Lab Units 05/26/24  0615 05/25/24 2032 05/25/24  1550   POC GLUCOSE mg/dl 117 161* 149*       Imaging  No orders to display       Review of Scheduled Meds:  Current Facility-Administered Medications   Medication Dose Route Frequency Provider Last Rate    acetaminophen  650 mg Oral Q6H PRN [facility]      albuterol  2 puff Inhalation Q4H PRN William Camacho, DO      apixaban  5 mg Oral BID [facility], DO      aspirin  81 mg Oral Daily [facility], DO      atorvastatin  80 mg Oral Daily With Dinner [facility], [facility]      busPIRone  10 mg Oral TID [facility], DO      ferrous sulfate  325 mg Oral Daily With Breakfast [facility], DO      fluticasone  1 spray Nasal BID [facility], DO      fluticasone-vilanterol  1 puff Inhalation Daily [facility], DO      folic acid  1 mg Oral Daily [facility], DO      guaiFENesin  600 mg Oral BID [facility], DO      hydrOXYzine HCL  25 mg Oral BID PRN Ashley Depadua, MD      insulin glargine  18 Units Subcutaneous HS TATIANA Paulino      insulin lispro  1-5 Units Subcutaneous TID AC [facility], DO      ipratropium  0.5 mg Nebulization Q6H William Camacho, DO      levalbuterol  1.25 mg Nebulization Q6H William Camacho, DO      lisinopril  5 mg Oral Daily TATIANA Paulino      melatonin  6 mg Oral QPM Ashley Depadua, MD      metFORMIN  500 mg Oral BID With Meals TATIANA Paulino      mirtazapine  7.5 mg Oral QPM Ashley Depadua, MD      multivitamin-minerals  1 tablet Oral Daily [facility], [facility]      oxyCODONE  2.5 mg Oral Q4H PRN [facility], DO      pantoprazole  40 mg Oral Early Morning [place] Chew  DO      thiamine  100 mg Oral Daily [place] Osorioel, DO      umeclidinium  1 puff Inhalation Daily [facility] Osorioel, DO         Subjective/ HPI: Patient seen and examined. Patients overnight issues or events were reviewed with nursing staff. New or overnight issues include the following:     Pt seen in his room. He denies any current complaints.    ROS:   A 10 point ROS was performed; negative except as noted above.        *Labs /Radiology studies Reviewed  *Medications  reviewed and reconciled as needed  *Please refer to order section for additional ordered labs studies      Physical Examination:  Vitals:   Vitals:    05/25/24 1434 05/25/24 2001 05/26/24 0558 05/26/24 0911   BP: 122/70 130/67 139/81    BP Location: Left arm Left arm Left arm    Pulse: 91 78 70    Resp: 20 18 18    Temp: 98.1 °F (36.7 °C) 98.1 °F (36.7 °C) 97.6 °F (36.4 °C)    TempSrc: Oral Oral Oral    SpO2: 95% 94% 97% 98%   Weight:       Height:           General Appearance: NAD; pleasant  HEENT: PERRLA, conjuctiva normal; mucous membranes moist; face symmetrical  Neck:  Supple  Lungs: clear bilaterally, normal respiratory effort, no retractions, expiratory effort normal, on room air  CV: regular rate and rhythm, no murmurs rubs or gallops noted   ABD: soft non tender, +BS x4  EXT: DP pulses intact, no lymphadenopathy, no edema  Skin: normal turgor, normal texture, no rash  Psych: affect normal, mood normal  Neuro: AAOx3       The above physical exam was reviewed and updated as determined by my evaluation of the patient on 5/26/2024.    Invasive Devices       None                      VTE Pharmacologic Prophylaxis: Eliquis  Code Status: Level 1 - Full Code  Current Length of Stay: 15 day(s)    Total floor / unit time spent today  35 minutes   Coordination of patient's care was performed in conjunction with primary service. Time invested included review of patient's labs, vitals, and management of their comorbidities with continued  monitoring, examination of patient as well as answering patient questions, documenting her findings and creating progress note in electronic medical record,  ordering appropriate diagnostic testing.       ** Please Note:  voice to text software may have been used in the creation of this document. Although proof errors in transcription or interpretation are a potential of such software**

## 2024-05-27 LAB
GLUCOSE SERPL-MCNC: 109 MG/DL (ref 65–140)
GLUCOSE SERPL-MCNC: 117 MG/DL (ref 65–140)
GLUCOSE SERPL-MCNC: 136 MG/DL (ref 65–140)
GLUCOSE SERPL-MCNC: 192 MG/DL (ref 65–140)

## 2024-05-27 PROCEDURE — 99232 SBSQ HOSP IP/OBS MODERATE 35: CPT | Performed by: INTERNAL MEDICINE

## 2024-05-27 PROCEDURE — 94640 AIRWAY INHALATION TREATMENT: CPT

## 2024-05-27 PROCEDURE — 94664 DEMO&/EVAL PT USE INHALER: CPT

## 2024-05-27 PROCEDURE — 94760 N-INVAS EAR/PLS OXIMETRY 1: CPT

## 2024-05-27 PROCEDURE — 82948 REAGENT STRIP/BLOOD GLUCOSE: CPT

## 2024-05-27 PROCEDURE — 97530 THERAPEUTIC ACTIVITIES: CPT

## 2024-05-27 PROCEDURE — 97110 THERAPEUTIC EXERCISES: CPT

## 2024-05-27 PROCEDURE — 99233 SBSQ HOSP IP/OBS HIGH 50: CPT | Performed by: PHYSICAL MEDICINE & REHABILITATION

## 2024-05-27 RX ADMIN — FLUTICASONE PROPIONATE 1 SPRAY: 50 SPRAY, METERED NASAL at 17:00

## 2024-05-27 RX ADMIN — THIAMINE HCL TAB 100 MG 100 MG: 100 TAB at 08:48

## 2024-05-27 RX ADMIN — FLUTICASONE PROPIONATE 1 SPRAY: 50 SPRAY, METERED NASAL at 08:49

## 2024-05-27 RX ADMIN — LEVALBUTEROL HYDROCHLORIDE 1.25 MG: 1.25 SOLUTION RESPIRATORY (INHALATION) at 08:29

## 2024-05-27 RX ADMIN — APIXABAN 5 MG: 5 TABLET, FILM COATED ORAL at 08:48

## 2024-05-27 RX ADMIN — GUAIFENESIN 600 MG: 600 TABLET, EXTENDED RELEASE ORAL at 17:00

## 2024-05-27 RX ADMIN — UMECLIDINIUM 1 PUFF: 62.5 AEROSOL, POWDER ORAL at 08:49

## 2024-05-27 RX ADMIN — IPRATROPIUM BROMIDE 0.5 MG: 0.5 SOLUTION RESPIRATORY (INHALATION) at 08:29

## 2024-05-27 RX ADMIN — FOLIC ACID 1 MG: 1 TABLET ORAL at 08:48

## 2024-05-27 RX ADMIN — LEVALBUTEROL HYDROCHLORIDE 1.25 MG: 1.25 SOLUTION RESPIRATORY (INHALATION) at 01:53

## 2024-05-27 RX ADMIN — METFORMIN HYDROCHLORIDE 500 MG: 500 TABLET, FILM COATED ORAL at 16:55

## 2024-05-27 RX ADMIN — IPRATROPIUM BROMIDE 0.5 MG: 0.5 SOLUTION RESPIRATORY (INHALATION) at 19:04

## 2024-05-27 RX ADMIN — INSULIN GLARGINE 18 UNITS: 100 INJECTION, SOLUTION SUBCUTANEOUS at 20:53

## 2024-05-27 RX ADMIN — LISINOPRIL 5 MG: 5 TABLET ORAL at 08:48

## 2024-05-27 RX ADMIN — Medication 1 TABLET: at 08:48

## 2024-05-27 RX ADMIN — BUSPIRONE HYDROCHLORIDE 10 MG: 10 TABLET ORAL at 20:53

## 2024-05-27 RX ADMIN — BUSPIRONE HYDROCHLORIDE 10 MG: 10 TABLET ORAL at 16:55

## 2024-05-27 RX ADMIN — IPRATROPIUM BROMIDE 0.5 MG: 0.5 SOLUTION RESPIRATORY (INHALATION) at 01:51

## 2024-05-27 RX ADMIN — LEVALBUTEROL HYDROCHLORIDE 1.25 MG: 1.25 SOLUTION RESPIRATORY (INHALATION) at 19:04

## 2024-05-27 RX ADMIN — MIRTAZAPINE 7.5 MG: 15 TABLET, FILM COATED ORAL at 20:52

## 2024-05-27 RX ADMIN — ASPIRIN 81 MG CHEWABLE TABLET 81 MG: 81 TABLET CHEWABLE at 08:48

## 2024-05-27 RX ADMIN — Medication 6 MG: at 20:52

## 2024-05-27 RX ADMIN — PANTOPRAZOLE SODIUM 40 MG: 40 TABLET, DELAYED RELEASE ORAL at 05:37

## 2024-05-27 RX ADMIN — METFORMIN HYDROCHLORIDE 500 MG: 500 TABLET, FILM COATED ORAL at 08:48

## 2024-05-27 RX ADMIN — BUSPIRONE HYDROCHLORIDE 10 MG: 10 TABLET ORAL at 08:48

## 2024-05-27 RX ADMIN — GUAIFENESIN 600 MG: 600 TABLET, EXTENDED RELEASE ORAL at 08:48

## 2024-05-27 RX ADMIN — LEVALBUTEROL HYDROCHLORIDE 1.25 MG: 1.25 SOLUTION RESPIRATORY (INHALATION) at 13:41

## 2024-05-27 RX ADMIN — FERROUS SULFATE TAB 325 MG (65 MG ELEMENTAL FE) 325 MG: 325 (65 FE) TAB at 08:48

## 2024-05-27 RX ADMIN — IPRATROPIUM BROMIDE 0.5 MG: 0.5 SOLUTION RESPIRATORY (INHALATION) at 13:41

## 2024-05-27 RX ADMIN — APIXABAN 5 MG: 5 TABLET, FILM COATED ORAL at 17:00

## 2024-05-27 RX ADMIN — FLUTICASONE FUROATE AND VILANTEROL TRIFENATATE 1 PUFF: 200; 25 POWDER RESPIRATORY (INHALATION) at 08:49

## 2024-05-27 RX ADMIN — ATORVASTATIN CALCIUM 80 MG: 80 TABLET, FILM COATED ORAL at 16:55

## 2024-05-27 NOTE — PROGRESS NOTES
Physical Medicine and Rehabilitation Progress Note  Lucho Talavera 69 y.o. male MRN: [id number]  Unit/Bed#: -01 Encounter: [id number]    To Review: 69 year old M with PMH of COPD, multitude of ED visits/hospitalizations, DM2, CAD, EtOH abuse, HTN, prior CVA, found to have aflutter with RVR on hospitalization 4/2024 s/p BRITTNEY ablation who was to d/c on Eliquis but per report family did not know he needed it and he was not taking it who developed dysarthria, weakness and imbalance with recent fall at home and presented to hospital. CTA H/N showed Chronic left PCA territory infarct, new since the prior exam. No evidence of acute vascular territorial infarction, intracranial hemorrhage or mass.  No hemodynamically significant stenosis, dissection or occlusion of the carotid or vertebral arteries or major vessels of the [place].  Neuro consulted and recommended MRI brain which showed multiple infarcts of varying ages in the posterior circulation include multiple small acute/early subacute infarcts in the bilateral cerebellum. TTE did not show thrombus.  Neuro recommended Eliquis, aspirin, and statin. Patient was evaluated by skilled therapies and was found to have significant decline in ADLs and ambulation and appears appropriate for admission to [facility] [place].     Chief Complaint: No new issues.     Interval History/Subjective:  No acute events overnight. Last BM 5/26. Sleep is adequate, he is feeling well and eating lunch. No new headache, lightheadednes, dizziness, CP, SOB, fevers, chills, N/V, abdominal pain.    ROS:  A 10 point review of systems was negative except for what is noted in the HPI.      Today's Changes:  Continue current plan of care. Preparing for discharge on 5/29.       Assessment/Plan:    * Stroke (cerebrum) (HCC)  Assessment & Plan  - MRI brain 5/8 - Multiple infarcts of varying ages in the posterior circulation include multiple small acute/early subacute  infarcts in the bilateral cerebellum. Additional small foci of recent ischemia in the left PCA distribution superimposed on chronic infarct  No acute hemorrhage or mass effect  - CTA H/N -  1. Chronic left PCA territory infarct, new since the prior exam. No evidence of acute vascular territorial infarction,  intracranial hemorrhage or mass. 2. No hemodynamically significant stenosis, dissection or occlusion of the carotid or vertebral arteries or major vessels of the [place].  - Residual impairments on admission to [facility]: Impaired balance, coordination, possibly vision  - Co-morbidities most impacting functional recovery: COPD with chronic hypoxic respiratory failure, anxiety    Secondary stroke prevention  - Antithrombotic: Aspirin and Eliquis  - Statin  - Patient at increased risk for stroke particularly early in post-stroke period - monitor neuro exam closely with low threshold to repeat imaging  -  patient and if applicable caregiver on optimal stroke management  - Follow-up with neurology and PCP after d/c   - Recommend acute comprehensive interdisciplinary inpatient rehabilitation to include intensive skilled therapies (PT, OT, ST) as outlined with oversight and management by rehabilitation physician as well as inpatient rehab level nursing, case management and weekly interdisciplinary team meetings.       Sacral wound  Assessment & Plan  Seen by wound care 5/7  - Managed R lateral forearm skin tear   - Noted to have old open area on sacrum on admission.   - Consulted wound care to follow-up. Epithelialized   - Offloading,specialty cushion   - Allevyn   - Monitor closely.       Chronic bronchitis (HCC)  Assessment & Plan  Has had a multitude of ED visits related to COPD/concern for SOB but was frequently d/c'd same day  - Monitor also for anxiety and optimal mgmt of that; education on monitor home O2  IM consulted and with overall management at their discretion during [facility] course  Monitor lung  exam, vitals with and without activity  Encourage incentive spirometry  Breo/Incruse  Xopenex  PRN Albuterol  PRN supplemental O2   Mucinex     Insomnia  Assessment & Plan  Environmental interventions.  Sleep log  Drinks a lot of caffeinated sodas throughout the night   - Placed no caffeine order and review with nursing.   Added melatonin scheduled  5/16 added mirtazapine for both sleep side effect and anxiety.     Typical atrial flutter (HCC)  Assessment & Plan  IM consulted and with overall management at their discretion during [facility] course  Rate control: None  Antithrombotic: Eliquis    - Cost is $71.28/month. Daughter confirmed this price is fine.      Hypertension  Assessment & Plan  Home: Lisinopril 40mg daily  Here: Lisinopril 5mg daily  Monitor vitals with and without activity; monitor for orthostasis  Monitor hemoglobin, electrolytes, kidney function, hydration status   Management as per IM.       Anxiety  Assessment & Plan  Cries out for help at times with anxiety  This improved here.  Asks for breathing treatments/O2 when this happens, but usually O2 sats and breath sounds are fine.  Reviewed PDMP - last prescribed Lorazepam by Dr. Villalpando in July of last year, most recently by a hospitalist PA at [facility] (for only 10 tablets).  Has not used since 5/11. Will switch to hydroxyzine  Adding mirtazapine at night to also assist with sleep/insomnia  Monitor response.     ETOH abuse  Assessment & Plan  Patient reports cutting down and only about 2 beers per day but occasionally liquor   Alcohol cessation counseling and supportive counseling  Optimal mood/stress mgmt   Thiamine, folate, MVI   PRN low dose ativan BID   Consider gabapentin as well     Type 2 diabetes mellitus (HCC)  Assessment & Plan  Lab Results   Component Value Date    HGBA1C 8.9 (H) 04/06/2024       Recent Labs     05/26/24  1557 05/26/24  2040 05/27/24  0615 05/27/24  1102   POCGLU 112  113 173* 109 136       Blood Sugar Average: Last 72 hrs:  (P)  262.5  Home: Metformin 1000mg Q12hr, Lantus 30 units at bedtime  Current meds: Lantus 18, Metformin 500mg Q12hr, HS, Lispro SS AC/HS, consistent carb diabetic diet  Nutrition consult  Management as per IM  Outpatient f/u with PCP      CAD (coronary artery disease)  Assessment & Plan  With hx of stenting  IM consulted and with overall management at their discretion during [facility] course  Antithrombotic: Aspirin and Eliquis   Statin  Optimal blood pressure and blood sugar control        Health Maintenance  #Delirium/Sleep: At risk. Optimize sleep/wake, pain, bowel, bladder management. Avoid deliriogenic meds. Of note is on melatonin, ativan, and oxycodone PRN.   #Pain: Tylenol PRN  #Bowel: Last BM 5/26 and continent. Adding PRN miralax  #Bladder: Voiding and continent  #Skin/Pressure Injury Prevention: Turn Q2hr in bed, with weight shifts [id number] in wheelchair.  #DVT Prophylaxis:Fully anticoagulated on eliquis, SCDs  #GI Prophylaxis: PPI on for GERD  #Code Status: Full Code.   #FEN: Diabetic Diet  #Dispo: Team 5/21: ADD 5/29 with home PT/OT with [facility] with goals to discharge to 1 level home with 2 HETAL, with supervision. Daughter for family training this week.   Will need f/u with Pulm, PCP, and Neuro    Objective:    Functional Update:  PT:  Set-up to Ind with bed mobility, Sup transfers, Sup ambulation short distances with RW, Sup stairs   OT: Ind eating, Sup grooming, CGA bathing, Sup UB dressing, CGA LB dressing, CGA toileting, CGA tub/shower transfer, CGA toilet transfers  SLP: Moderate cognitive linguistic impairments on CLQT, mild oropharyngeal dysphagia On reg/thin diet     Allergies per EMR    Physical Exam:  Temp:  [97.9 °F (36.6 °C)-98.8 °F (37.1 °C)] 97.9 °F (36.6 °C)  HR:  [67-82] 79  Resp:  [20] 20  BP: (111-130)/(59-77) 117/64  Oxygen Therapy  SpO2: 95 %  O2 Flow Rate (L/min): 2 L/min      Gen: No acute distress  HEENT: Moist mucus membranes, Normocephalic/Atraumatic  Cardiovascular: Regular rate, rhythm,  S1/S2. Distal pulses palpable  Heme/Extr: No edema/  Pulmonary: Non-labored breathing. Lungs CTAB  : No antony  GI: Soft, non-tender, non-distended. BS+  Integumentary: Scattered healin wounds on extremities   Neuro: AAOx3, Speech is dyasrthric, but he is Appropriate to questioning.  Psych: Normal mood and affect.     Diagnostic Studies: Reviewed, no new imaging    Laboratory:  Reviewed   Results from last 7 days   Lab Units 05/25/24  0506   HEMOGLOBIN g/dL 11.0*   HEMATOCRIT % 35.8*   WBC Thousand/uL 6.93       Results from last 7 days   Lab Units 05/25/24  0506   BUN mg/dL 9   POTASSIUM mmol/L 3.5   CHLORIDE mmol/L 106   CREATININE mg/dL 0.42*              Patient Active Problem List   Diagnosis    Closed odontoid fracture with routine healing    Closed displaced fracture of third cervical vertebra (HCC)    Closed displaced fracture of fourth cervical vertebra ([facility])    CAD (coronary artery disease)    Dens fracture ([facility])    Acute blood loss anemia    Type 2 diabetes mellitus ([facility])    S/P C1-T1 Posterior Cervical Discectomy and Fusion on 9/10/18    At moderate risk for venous thromboembolism (VTE)    Chronic bronchitis ([facility])    Closed fracture of first cervical vertebra ([facility])    ETOH abuse    KLARISSA (obstructive sleep apnea)    Dirty living conditions    Bronchitis    Diabetic polyneuropathy associated with type 2 diabetes mellitus ([facility])    Hammertoe, bilateral    Post-traumatic arthritis of left foot    Pain due to onychomycosis of toenail    Oral abscess    Tooth fracture    Closed nondisplaced fracture of posterior arch of first cervical vertebra ([facility])    Fall    Stage 3 severe COPD by GOLD classification ([facility])    Anxiety    Hypertension    COPD (chronic obstructive pulmonary disease) ([facility])    SIRS (systemic inflammatory response syndrome) ([facility])    History of alcohol abuse    Iron deficiency anemia secondary to inadequate dietary iron intake    COVID-19    Atrial flutter with rapid ventricular response ([facility])     Leukocytosis    Typical atrial flutter (HCC)    Stroke (cerebrum) (HCC)    Stroke-like symptom    Sacral wound    Insomnia    History of cardiac arrest         Medications  Current Facility-Administered Medications   Medication Dose Route Frequency Provider Last Rate    acetaminophen  650 mg Oral Q6H PRN [facility], DO      albuterol  2 puff Inhalation Q4H PRN William Camacho, DO      apixaban  5 mg Oral BID [facility], DO      aspirin  81 mg Oral Daily [facility], DO      atorvastatin  80 mg Oral Daily With Dinner [facility], DO      busPIRone  10 mg Oral TID [facility], DO      ferrous sulfate  325 mg Oral Daily With Breakfast [facility], DO      fluticasone  1 spray Nasal BID [facility], DO      fluticasone-vilanterol  1 puff Inhalation Daily [facility], DO      folic acid  1 mg Oral Daily [facility], DO      guaiFENesin  600 mg Oral BID [facility], DO      hydrOXYzine HCL  25 mg Oral BID PRN Ashley Depadua, MD      insulin glargine  18 Units Subcutaneous HS TATIANA Paulino      insulin lispro  1-5 Units Subcutaneous TID AC [facility], DO      ipratropium  0.5 mg Nebulization Q6H William Camacho, DO      levalbuterol  1.25 mg Nebulization Q6H William Camacho, DO      lisinopril  5 mg Oral Daily TATIANA Paulino      melatonin  6 mg Oral QPM Ashley Depadua, MD      metFORMIN  500 mg Oral BID With Meals TATIANA Paulino      mirtazapine  7.5 mg Oral QPM Ashley Depadua, MD      multivitamin-minerals  1 tablet Oral Daily [facility], DO      oxyCODONE  2.5 mg Oral Q4H PRN [facility], DO      pantoprazole  40 mg Oral Early Morning [facility], DO      thiamine  100 mg Oral Daily [facility], DO      umeclidinium  1 puff Inhalation Daily [facility], DO            ** Please Note: Fluency Direct voice to text software may have been used in the creation of this document. **

## 2024-05-27 NOTE — PROGRESS NOTES
[facility]  Progress Note  Name: Lucho Talavera I  MRN: [id number]  Unit/Bed#: -01 I Date of Admission: 5/11/2024   Date of Service: 5/27/2024 I Hospital Day: 16    Assessment & Plan   * Stroke (cerebrum) (HCC)  Assessment & Plan  Presented with increased weakness, dysarthria and leaning on the left side x 5 days with fall 2 days prior to admission in the setting of noncompliance with Eliquis  CTA with chronic left PCA territory infarction  MRI with multiple infarcts of varying ages and posterior circulation including multiple small acute/early subacute infarct in the bilateral cerebellum with additional small foci of recent ischemia in the left PCA distribution superimposed on chronic infarct  Echo with EF 55%, no cardiac thrombus  Maintained on aspirin and Eliquis  Eliquis is to be $71.28 = daughter aware and OK with the price  Continue Lipitor 80 mg daily  Outpatient follow-up with neurology  DC tentative 5/29/2024    Typical atrial flutter (HCC)  Assessment & Plan  Previously admitted for atrial flutter with RVR in 4/8/2024 s/p ablation and loop recorder insertion.    Sutures removed 5/17/24  Anticoagulated on Eliquis      Hypertension  Assessment & Plan  Home:  Lisinopril 40mg qd  Here:  resumed lisinopril 5mg qd   stable        ETOH abuse  Assessment & Plan  History of alcohol abuse  Monitor off CIWA protocol - Pt now post withdrawal window   Ativan as needed for anxiety  Continue supplements with thiamine, folate, MVI    Type 2 diabetes mellitus (HCC)  Assessment & Plan  HbA1C 8.9 on 4/6/24  Home:  Basaglar 30U qhs/Metformin  Here:  Lantus 18U qhs/Metformin 500mg BID  Continue with accuchecks/SSI/DM diet  stable               The above assessment and plan was reviewed and updated as determined by my evaluation of the patient on 5/27/2024.    Labs:   Results from last 7 days   Lab Units 05/25/24  0506   WBC Thousand/uL 6.93   HEMOGLOBIN g/dL 11.0*   HEMATOCRIT %  35.8*   PLATELETS Thousands/uL 268     Results from last 7 days   Lab Units 05/25/24  0506   SODIUM mmol/L 142   POTASSIUM mmol/L 3.5   CHLORIDE mmol/L 106   CO2 mmol/L 29   BUN mg/dL 9   CREATININE mg/dL 0.42*   CALCIUM mg/dL 8.9             Results from last 7 days   Lab Units 05/27/24  0615 05/26/24  2040 05/26/24  1557   POC GLUCOSE mg/dl 109 173* 112  113       Imaging  No orders to display       Review of Scheduled Meds:  Current Facility-Administered Medications   Medication Dose Route Frequency Provider Last Rate    acetaminophen  650 mg Oral Q6H PRN [facility], [facility]      albuterol  2 puff Inhalation Q4H PRN William Camacho, DO      apixaban  5 mg Oral BID [facility], DO      aspirin  81 mg Oral Daily [facility], DO      atorvastatin  80 mg Oral Daily With Dinner [facility], DO      busPIRone  10 mg Oral TID [facility], [facility]      ferrous sulfate  325 mg Oral Daily With Breakfast [facility], DO      fluticasone  1 spray Nasal BID [facility], DO      fluticasone-vilanterol  1 puff Inhalation Daily [facility], DO      folic acid  1 mg Oral Daily [facility], DO      guaiFENesin  600 mg Oral BID [facility], DO      hydrOXYzine HCL  25 mg Oral BID PRN Ashley Depadua, MD      insulin glargine  18 Units Subcutaneous HS TATIANA Paulino      insulin lispro  1-5 Units Subcutaneous TID AC [place] Chew, DO      ipratropium  0.5 mg Nebulization Q6H William Camacho, DO      levalbuterol  1.25 mg Nebulization Q6H William Camacho, DO      lisinopril  5 mg Oral Daily TATIANA Paulino      melatonin  6 mg Oral QPM Ashley Depadua, MD      metFORMIN  500 mg Oral BID With Meals TATIANA Paulino      mirtazapine  7.5 mg Oral QPM Ashley Depadua, MD      multivitamin-minerals  1 tablet Oral Daily [facility]sorio[facility], DO      oxyCODONE  2.5 mg Oral Q4H PRN [place] Chew, DO      pantoprazole  40 mg Oral [facility]ev[facility],       thiamine  100 mg Oral Daily Richmond Chew,       umeclidinium  1 puff Inhalation Daily Richmond Chew,          Subjective/ HPI: Patient seen and examined. Patients overnight issues or events were reviewed with nursing staff. New or overnight issues include the following:     Pt seen in his room. He denies any current complaints.    ROS:   A 10 point ROS was performed; negative except as noted above.        *Labs /Radiology studies Reviewed  *Medications  reviewed and reconciled as needed  *Please refer to order section for additional ordered labs studies      Physical Examination:  Vitals:   Vitals:    05/26/24 2030 05/27/24 0153 05/27/24 0438 05/27/24 0846   BP: 130/60  122/77 117/64   BP Location: Left arm  Left arm Left arm   Pulse: 80  67 79   Resp: 20  20    Temp: 98.3 °F (36.8 °C)  97.9 °F (36.6 °C)    TempSrc: Oral  Oral    SpO2: 94% 95% 98%    Weight:       Height:           General Appearance: NAD; pleasant  HEENT: PERRLA, conjuctiva normal; mucous membranes moist; face symmetrical  Neck:  Supple  Lungs: clear bilaterally, normal respiratory effort, no retractions, expiratory effort normal, on room air  CV: regular rate and rhythm, no murmurs rubs or gallops noted   ABD: soft non tender, +BS x4  EXT: DP pulses intact, no lymphadenopathy, no edema  Skin: normal turgor, normal texture, no rash  Psych: affect normal, mood normal  Neuro: Awake and alert     The above physical exam was reviewed and updated as determined by my evaluation of the patient on 5/27/2024.    Invasive Devices       None                      VTE Pharmacologic Prophylaxis: Eliquis  Code Status: Level 1 - Full Code  Current Length of Stay: 16 day(s)    Total floor / unit time spent today  35 minutes   Coordination of patient's care was performed in conjunction with primary service. Time invested included review of patient's labs, vitals, and management of their comorbidities with continued monitoring,  examination of patient as well as answering patient questions, documenting her findings and creating progress note in electronic medical record,  ordering appropriate diagnostic testing.       ** Please Note:  voice to text software may have been used in the creation of this document. Although proof errors in transcription or interpretation are a potential of such software**

## 2024-05-27 NOTE — PROGRESS NOTES
05/27/24 1300   Pain Assessment   Pain Assessment Tool 0-10   Pain Score No Pain   Restrictions/Precautions   Precautions Fall Risk;Cognitive;Bed/chair alarms;Supervision on toilet/commode   Cognition   Overall Cognitive Status Impaired   Arousal/Participation Alert;Cooperative   Subjective   Subjective pt in bed but agreeable to have short PT session this PM.   Roll Left and Right   Type of Assistance Needed Independent   Roll Left and Right CARE Score 6   Sit to Lying   Type of Assistance Needed Set-up / clean-up   Comment HOB, no rail   Sit to Lying CARE Score 5   Lying to Sitting on Side of Bed   Type of Assistance Needed Set-up / clean-up   Comment HOB, no rail   Lying to Sitting on Side of Bed CARE Score 5   Sit to Stand   Type of Assistance Needed Supervision;Verbal cues;Adaptive equipment   Comment RW   Sit to Stand CARE Score 4   Bed-Chair Transfer   Type of Assistance Needed Verbal cues;Supervision;Adaptive equipment   Comment RW   Chair/Bed-to-Chair Transfer CARE Score 4   Walk 10 Feet   Type of Assistance Needed Supervision;Verbal cues;Adaptive equipment   Comment RW   Walk 10 Feet CARE Score 4   Walk 50 Feet with Two Turns   Type of Assistance Needed Supervision;Verbal cues;Adaptive equipment   Comment RW   Walk 50 Feet with Two Turns CARE Score 4   Walk 150 Feet   Type of Assistance Needed Supervision;Verbal cues;Adaptive equipment   Comment 200' with RW   Walk 150 Feet CARE Score 4   Walking 10 Feet on Uneven Surfaces   Type of Assistance Needed Supervision;Verbal cues;Adaptive equipment   Comment RW - floor mat   Walking 10 Feet on Uneven Surfaces CARE Score 4   Picking Up Object   Type of Assistance Needed Supervision;Verbal cues;Adaptive equipment   Comment reacher -pen   Picking Up Object CARE Score 4   Toilet Transfer   Comment pt agreed to use the bathroom before going back to bed. He stood up in front of the toilet to urinate.   Equipment Use   NuStep lvl 3 x 10 mins, SPM> 70   Assessment    Treatment Assessment Pt tolerated 30 mins skilled PT session focusing on strengthening and functional mobilities with RW. no LOB and better at keeping feet inside the walker but still requires VC to observe upright posture. PT to reassess all care score mobility sections tomorrow in preparation for home d/c on 5/29. May also cont to work on NMR/NPP for balance/gait training with SPC.   Family/Caregiver Present no   Barriers to Discharge None   PT Barriers   Functional Limitation Stair negotiation   Plan   Treatment/Interventions Functional transfer training;LE strengthening/ROM;Therapeutic exercise;Endurance training;Bed mobility;Gait training;Spoke to nursing   Progress Progressing toward goals   PT Therapy Minutes   PT Time In 1300   PT Time Out 1330   PT Total Time (minutes) 30   PT Mode of treatment - Individual (minutes) 30   PT Mode of treatment - Concurrent (minutes) 0   PT Mode of treatment - Group (minutes) 0   PT Mode of treatment - Co-treat (minutes) 0   PT Mode of Treatment - Total time(minutes) 30 minutes   PT Cumulative Minutes 1243   Therapy Time missed   Time missed? No

## 2024-05-28 PROBLEM — S31.000A SACRAL WOUND: Status: RESOLVED | Noted: 2024-05-12 | Resolved: 2024-05-28

## 2024-05-28 LAB
GLUCOSE SERPL-MCNC: 111 MG/DL (ref 65–140)
GLUCOSE SERPL-MCNC: 140 MG/DL (ref 65–140)
GLUCOSE SERPL-MCNC: 146 MG/DL (ref 65–140)
GLUCOSE SERPL-MCNC: 161 MG/DL (ref 65–140)

## 2024-05-28 PROCEDURE — 97130 THER IVNTJ EA ADDL 15 MIN: CPT

## 2024-05-28 PROCEDURE — 94640 AIRWAY INHALATION TREATMENT: CPT

## 2024-05-28 PROCEDURE — 94760 N-INVAS EAR/PLS OXIMETRY 1: CPT

## 2024-05-28 PROCEDURE — 94664 DEMO&/EVAL PT USE INHALER: CPT

## 2024-05-28 PROCEDURE — 97535 SELF CARE MNGMENT TRAINING: CPT

## 2024-05-28 PROCEDURE — 97116 GAIT TRAINING THERAPY: CPT

## 2024-05-28 PROCEDURE — 97112 NEUROMUSCULAR REEDUCATION: CPT

## 2024-05-28 PROCEDURE — 97110 THERAPEUTIC EXERCISES: CPT

## 2024-05-28 PROCEDURE — 97530 THERAPEUTIC ACTIVITIES: CPT

## 2024-05-28 PROCEDURE — 99232 SBSQ HOSP IP/OBS MODERATE 35: CPT | Performed by: INTERNAL MEDICINE

## 2024-05-28 PROCEDURE — 99233 SBSQ HOSP IP/OBS HIGH 50: CPT | Performed by: PHYSICAL MEDICINE & REHABILITATION

## 2024-05-28 PROCEDURE — 97129 THER IVNTJ 1ST 15 MIN: CPT

## 2024-05-28 PROCEDURE — 82948 REAGENT STRIP/BLOOD GLUCOSE: CPT

## 2024-05-28 RX ORDER — MIRTAZAPINE 7.5 MG/1
7.5 TABLET, FILM COATED ORAL EVERY EVENING
Qty: 30 TABLET | Refills: 0 | Status: SHIPPED | OUTPATIENT
Start: 2024-05-28

## 2024-05-28 RX ORDER — GUAIFENESIN 600 MG/1
600 TABLET, EXTENDED RELEASE ORAL 2 TIMES DAILY
Qty: 60 TABLET | Refills: 0 | Status: SHIPPED | OUTPATIENT
Start: 2024-05-28

## 2024-05-28 RX ORDER — LISINOPRIL 10 MG/1
10 TABLET ORAL DAILY
Qty: 30 TABLET | Refills: 0 | Status: SHIPPED | OUTPATIENT
Start: 2024-05-29

## 2024-05-28 RX ORDER — LISINOPRIL 10 MG/1
10 TABLET ORAL DAILY
Status: DISCONTINUED | OUTPATIENT
Start: 2024-05-29 | End: 2024-05-29 | Stop reason: HOSPADM

## 2024-05-28 RX ORDER — INSULIN GLARGINE 100 [IU]/ML
18 INJECTION, SOLUTION SUBCUTANEOUS
Qty: 5 ML | Refills: 0 | Status: SHIPPED | OUTPATIENT
Start: 2024-05-28

## 2024-05-28 RX ORDER — PANTOPRAZOLE SODIUM 40 MG/1
40 TABLET, DELAYED RELEASE ORAL
Qty: 30 TABLET | Refills: 0 | Status: SHIPPED | OUTPATIENT
Start: 2024-05-29

## 2024-05-28 RX ORDER — FERROUS SULFATE 325(65) MG
325 TABLET ORAL
Qty: 30 TABLET
Start: 2024-05-29

## 2024-05-28 RX ORDER — ACETAMINOPHEN 325 MG/1
650 TABLET ORAL EVERY 6 HOURS PRN
Qty: 50 TABLET | Refills: 0 | Status: SHIPPED | OUTPATIENT
Start: 2024-05-28

## 2024-05-28 RX ADMIN — LEVALBUTEROL HYDROCHLORIDE 1.25 MG: 1.25 SOLUTION RESPIRATORY (INHALATION) at 13:34

## 2024-05-28 RX ADMIN — BUSPIRONE HYDROCHLORIDE 10 MG: 10 TABLET ORAL at 17:49

## 2024-05-28 RX ADMIN — PANTOPRAZOLE SODIUM 40 MG: 40 TABLET, DELAYED RELEASE ORAL at 06:16

## 2024-05-28 RX ADMIN — IPRATROPIUM BROMIDE 0.5 MG: 0.5 SOLUTION RESPIRATORY (INHALATION) at 19:20

## 2024-05-28 RX ADMIN — INSULIN GLARGINE 18 UNITS: 100 INJECTION, SOLUTION SUBCUTANEOUS at 20:58

## 2024-05-28 RX ADMIN — HYDROXYZINE HYDROCHLORIDE 25 MG: 25 TABLET, FILM COATED ORAL at 21:24

## 2024-05-28 RX ADMIN — ATORVASTATIN CALCIUM 80 MG: 80 TABLET, FILM COATED ORAL at 17:49

## 2024-05-28 RX ADMIN — LISINOPRIL 5 MG: 5 TABLET ORAL at 08:10

## 2024-05-28 RX ADMIN — LEVALBUTEROL HYDROCHLORIDE 1.25 MG: 1.25 SOLUTION RESPIRATORY (INHALATION) at 19:20

## 2024-05-28 RX ADMIN — Medication 1 TABLET: at 08:10

## 2024-05-28 RX ADMIN — GUAIFENESIN 600 MG: 600 TABLET, EXTENDED RELEASE ORAL at 17:49

## 2024-05-28 RX ADMIN — FERROUS SULFATE TAB 325 MG (65 MG ELEMENTAL FE) 325 MG: 325 (65 FE) TAB at 08:10

## 2024-05-28 RX ADMIN — FLUTICASONE PROPIONATE 1 SPRAY: 50 SPRAY, METERED NASAL at 08:07

## 2024-05-28 RX ADMIN — GUAIFENESIN 600 MG: 600 TABLET, EXTENDED RELEASE ORAL at 08:10

## 2024-05-28 RX ADMIN — METFORMIN HYDROCHLORIDE 500 MG: 500 TABLET, FILM COATED ORAL at 08:10

## 2024-05-28 RX ADMIN — ALBUTEROL SULFATE 2 PUFF: 90 AEROSOL, METERED RESPIRATORY (INHALATION) at 04:36

## 2024-05-28 RX ADMIN — IPRATROPIUM BROMIDE 0.5 MG: 0.5 SOLUTION RESPIRATORY (INHALATION) at 01:10

## 2024-05-28 RX ADMIN — FLUTICASONE PROPIONATE 1 SPRAY: 50 SPRAY, METERED NASAL at 17:50

## 2024-05-28 RX ADMIN — ASPIRIN 81 MG CHEWABLE TABLET 81 MG: 81 TABLET CHEWABLE at 08:10

## 2024-05-28 RX ADMIN — APIXABAN 5 MG: 5 TABLET, FILM COATED ORAL at 08:10

## 2024-05-28 RX ADMIN — BUSPIRONE HYDROCHLORIDE 10 MG: 10 TABLET ORAL at 20:58

## 2024-05-28 RX ADMIN — IPRATROPIUM BROMIDE 0.5 MG: 0.5 SOLUTION RESPIRATORY (INHALATION) at 07:04

## 2024-05-28 RX ADMIN — THIAMINE HCL TAB 100 MG 100 MG: 100 TAB at 08:10

## 2024-05-28 RX ADMIN — FOLIC ACID 1 MG: 1 TABLET ORAL at 08:10

## 2024-05-28 RX ADMIN — BUSPIRONE HYDROCHLORIDE 10 MG: 10 TABLET ORAL at 08:10

## 2024-05-28 RX ADMIN — Medication 6 MG: at 20:58

## 2024-05-28 RX ADMIN — APIXABAN 5 MG: 5 TABLET, FILM COATED ORAL at 17:49

## 2024-05-28 RX ADMIN — IPRATROPIUM BROMIDE 0.5 MG: 0.5 SOLUTION RESPIRATORY (INHALATION) at 13:34

## 2024-05-28 RX ADMIN — LEVALBUTEROL HYDROCHLORIDE 1.25 MG: 1.25 SOLUTION RESPIRATORY (INHALATION) at 01:10

## 2024-05-28 RX ADMIN — METFORMIN HYDROCHLORIDE 500 MG: 500 TABLET, FILM COATED ORAL at 17:49

## 2024-05-28 RX ADMIN — LEVALBUTEROL HYDROCHLORIDE 1.25 MG: 1.25 SOLUTION RESPIRATORY (INHALATION) at 07:04

## 2024-05-28 RX ADMIN — MIRTAZAPINE 7.5 MG: 15 TABLET, FILM COATED ORAL at 20:58

## 2024-05-28 NOTE — DISCHARGE SUMMARY
Discharge Summary   Lucho Talavera 69 y.o. male MRN: [id number]  Unit/Bed#: [facility] 455-01 Encounter: [id number]  Admission Date: 5/11/2024     Discharge Date: 5/29/2024    Discharging Provider: Veronica Chao    PCP:  [phone]      HPI: Refer to previous hospitalization for complete record    Summary of [facility] Course: Pt was admitted to [facility] after acute CVA and participated in a multidisciplinary rehabilitation program and progressed well. From a medicine standpoint he was started on ASA and eliquis for his CVA and the price check was completed and agreed upon by both his daughter and the patient. His BP remained on the lower side while in here and he has only required 10mg of lisinopril instead of his home dose of 40mg. His insulin requirements have also  been decreased to 18u from 30u and he remained on his metformin 500mg bid. He will need close follow up with his PCP for management of both and possible titration back to his home doses if needed. He was also counseled to abstain from etoh d/t being on ASA/eliquis. He is cleared for dc from both medicine and PMR standpoint with home therapy and 24/7 supervision.     Problem List/Comorbidities:    * Stroke (cerebrum) (HCC)  Assessment & Plan  Presented with increased weakness, dysarthria and leaning on the left side x 5 days with fall 2 days prior to admission in the setting of noncompliance with Eliquis  CTA with chronic left PCA territory infarction  MRI with multiple infarcts of varying ages and posterior circulation including multiple small acute/early subacute infarct in the bilateral cerebellum with additional small foci of recent ischemia in the left PCA distribution superimposed on chronic infarct  Echo with EF 55%, no cardiac thrombus  Maintained on aspirin and Eliquis  Eliquis is to be $71.28 = daughter aware and OK with the price  Continue Lipitor 80 mg daily  Outpatient follow-up with neurology  DC 5/29/2024=stable from medicine    Typical atrial flutter  "(HCC)  Assessment & Plan  Previously admitted for atrial flutter with RVR in 4/8/2024 s/p ablation and loop recorder insertion.    Sutures removed 5/17/24  Anticoagulated on Eliquis  F/u OP cardiology      Hypertension  Assessment & Plan  Home:  Lisinopril 40mg qd  Here:  continue lisinopril 10mg qd 5/28/2024   Continue this dose on DC  Monitor BP and follow up with PCP for possible titration if needed          Type 2 diabetes mellitus (HCC)  Assessment & Plan  HbA1C 8.9 on 4/6/24  Home:  Basaglar 30U qhs/Metformin  Here:  Lantus 18U qhs/Metformin 500mg BID  Continue with accuchecks/SSI/DM diet  DC home on above dosing and monitor BS closely  F/u PCP for possible need for titration back to home dosing once back at home and eating his normal diet      ETOH abuse  Assessment & Plan  History of alcohol abuse  Monitor off CIWA protocol - Pt now post withdrawal window   Ativan as needed for anxiety  Continue supplements with thiamine, folate, MVI  Recommend cessation especially in the setting of antiplatelet therapy        Discharge Physical Examination:  /71 (BP Location: Left arm)   Pulse 87   Temp 98.5 °F (36.9 °C) (Oral)   Resp 20   Ht 6' 1\" (1.854 m)   Wt 82.8 kg (182 lb 8 oz)   SpO2 93%   BMI 24.08 kg/m²     GEN: NAD; pleasant  NEURO: Alert and oriented x3, cognitive deficits at baseline  HEENT: Pupils are equal/reactive; normocephalic, face is symmetrical, hearing is mildly impaired  CV: S1 S2 regular, no murmur/rub/gallops, 1/4 pedal pulses, no LE edema present  RESP: Lungs are clear bilaterally, no wheezes rales or rhonchi, on room air, no distress, respirations are easy and non labored  GI: Abdomen is flat, soft, non tender, +BS x4; non distended  : Voiding without issues  MUSC: Moves all extremities except ongoing gait dysfunction   SKIN: pink, warm, normal turgor, no rashes or lesions noted      Significant Findings, Care, Treatment and Services Provided: Acute comprehensive interdisciplinary " inpatient rehabilitation including PT, OT, SLP, RN, CM, SW, dietary, psychology, etc.      Physiatry Additions/Comorbidities:    * Stroke (cerebrum) ([facility])  Assessment & Plan  - MRI brain 5/8 - Multiple infarcts of varying ages in the posterior circulation include multiple small acute/early subacute infarcts in the bilateral cerebellum. Additional small foci of recent ischemia in the left PCA distribution superimposed on chronic infarct  No acute hemorrhage or mass effect  - CTA H/N -  1. Chronic left PCA territory infarct, new since the prior exam. No evidence of acute vascular territorial infarction,  intracranial hemorrhage or mass. 2. No hemodynamically significant stenosis, dissection or occlusion of the carotid or vertebral arteries or major vessels of the [place].  - Residual impairments on admission to [facility]: Impaired balance, coordination, speech/dysarthria  - Co-morbidities most impacting functional recovery: COPD with chronic hypoxic respiratory failure, anxiety     Secondary stroke prevention  - Antithrombotic: Aspirin and Eliquis  - Statin  - Patient at increased risk for stroke particularly early in post-stroke period - monitor neuro exam closely with low threshold to repeat imaging  -  patient and if applicable caregiver on optimal stroke management  - Follow-up with neurology and PCP after d/c   - Recommend acute comprehensive interdisciplinary inpatient rehabilitation to include intensive skilled therapies (PT, OT, ST) as outlined with oversight and management by rehabilitation physician as well as inpatient rehab level nursing, case management and weekly interdisciplinary team meetings.         Sacral wound  Assessment & Plan  Seen by wound care 5/7  - Managed R lateral forearm skin tear   - Noted to have old open area on sacrum on admission.         - Consulted wound care to follow-up. Epithelialized         - Offloading,specialty cushion         - Allevyn         - Monitor closely.       Insomnia  Assessment & Plan  Environmental interventions.  Sleep log  Drinks a lot of caffeinated sodas throughout the night         - Placed no caffeine order and review with nursing. Significant improvement in sleep following this.   Added melatonin scheduled  5/16 added mirtazapine for both sleep side effect and anxiety.     Anxiety  Assessment & Plan  Cries out for help at times with anxiety  This improved here.  Asks for breathing treatments/O2 when this happens, but usually O2 sats and breath sounds are fine.  Reviewed PDMP - last prescribed Lorazepam by Dr. Villalpando in July of last year, most recently by a hospitalist PA at [facility] (for only 10 tablets).  Has not used since 5/11. Will switch to hydroxyzine  Adding mirtazapine at night to also assist with sleep/insomnia   - Significant improvement    Acute Rehabilitation Center Course: Patient participated in a comprehensive interdisciplinary inpatient rehabilitation program which included involvment of Rehab MD, therapies (PT, OT, and/or SLP), RN, CM, SW, dietary, and psychology services.     Etiologic/Rehabilitation Diagnosis: Impairment of mobility, safety and Activities of Daily Living (ADLs) due to Stroke:  01.3  Bilateral involvement    Functional Status Upon Admission to [facility]:  Mobility:  Transfers min assist   Ambulation/Mobility 2 ft mod assist      ADLs:  Mod assist LBB, LBD  Min assist UBB, UBD, toileting     Functional Status Upon Discharge from [facility]:   PT: Sup transfers, Ind bed mobility, Sup ambulation 200' with RW, Sup stairs and ramp  OT: Ind with eating, Sup grooming, Sup bathing, Sup UB dressing, Sup LB dressing, Sup toileting, Sup tub/shower transfer, Sup toilet transfers   SLP:  Dysphagia resolved and on reg/thins with patient/family education completed, motor speech with HEP, mod A memory, Deanna comprehension, Sup problem solving and expression and family has received family training. No SLP at discharge.     Condition at Discharge: good      Rehab Physician Signature:   Ashley de Padua, MD  Physical Medicine and Rehabilitation      Discharge instructions/Information to patient and family:   See after visit summary for information provided to patient and family.      Provisions for Follow-Up Care:  See after visit summary for information related to follow-up care and any pertinent home health orders.      Future Appointments   Date Time Provider Department Center   7/25/2024  6:45 AM DEVICE REMOTE LOOP [place] [facility]ARD [facility]   10/24/2024  6:45 AM DEVICE REMOTE LOOP [place] CARD [facility]   1/23/2025  6:45 AM DEVICE REMOTE LOOP [place] C[facility]           Disposition: Home with Home PT/OT/SLP via  [facility]    Planned Readmission: No    Discharge Statement   I spent 30 minutes or more discharging the patient.  I had direct contact with the patient on the day of discharge. Greater than 50% of the total time was spent examining patient, answering all patient questions, arranging and discussing plan of care with patient and care team as well as directly providing post-discharge instructions. Additional time then spent on discharge activities.    Discharge Medications:  See after visit summary for reconciled discharge medications provided to patient and family.

## 2024-05-28 NOTE — ASSESSMENT & PLAN NOTE
History of alcohol abuse  Monitor off CIWA protocol - Pt now post withdrawal window   Ativan as needed for anxiety  Continue supplements with thiamine, folate, MVI  Recommend cessation especially in the setting of antiplatelet therapy

## 2024-05-28 NOTE — DISCHARGE INSTR - AVS FIRST PAGE
MEDICINE INSTRUCTION  Take insulin as instructed on discharge instructions  You have not required as much insulin in the hospital  Keep track of BS 4x daily and if BS trend upward then reach out to PCP regarding titration of insulin  Your metformin was decreased to 500mg bid as well in the hospital d/w PCP regarding increasing back to home dose if BS start to trend  upward  You have not needed your lisinopril 40mg, you only required 10mg in the hospital  Take BP daily and call PCP if BP starts to trend  upward  Take all other medications as prescribed  ABSTAIN FROM ALL ETOH USE D/T TAKING ELIQUIS AND ASPIRIN DUE TO INCREASED RISK OF BLEEDING  If you suffer a fall you must proceed to the ER immediately d/t being on multiple blood thinners.   Follow up with providers as listed below            DISCHARGE INSTRUCTIONS: [facility] [place]    Bring these instructions with you to your Outpatient Physician appointments so they can order and follow-up any additional lab work or imaging recommended at time of discharge.    It  is you or your caregivers responsibility to obtain follow-up MEDICATION REFILLS  As indicated through your Primary Care Physician (PCP) and other outpatient specialty provider(s) after discharge.  Please follow-up with your PCP as soon as possible after discharge to set-up follow-up management and when appropriate refills.      You have been determined to have some cognitive impairments. - It is YOUR CAREGIVER'S RESPONSIBILITY to ensure appropriate follow-up which includes:  - APPOINTMENTS are scheduled and safe transportation is arranged  - LABS and IMAGING are completed  - MEDICATION MANAGEMENT at home is carried out appropriately     You remain a fall and injury risk which could be severe.  - Your risk of fall has decreased however since admission to acute rehab.  Caregiver training has been completed with our staff.  - Appropriate supervision +/- assistance  as instructed during your rehab course is recommended to decrease risk of fall and injury.    - Continue skilled therapy as discussed after discharge to further decrease this risk    If you (or your health care proxy) have any questions or concerns regarding your acute rehabilitation stay including issues with medications, rehabilitation, and follow-up plan, please call:          [facility] in [place] at [phone] or [phone].    Should you develop fevers, chills, new weakness, changes in sensation, difficulty speaking, facial weakness, confusion, shortness of breath, chest pain, or other concerning symptoms please call 911 and/or obtain transportation to nearest ER immediately.    Home health has been ordered for you:  Your home health agency should reach out to you or your family soon to arrange follow-up.    (If [facility] home health is your provider their phone number is [phone])    If you are unable to get in touch with home health you may contact your  at [phone].     SKIN CARE INSTRUCTIONS to follow:  Monitor skin for increased redness or breadown and promptly notify your physician should these develop    If instructed while in [facility] - be sure you stand +/- walk every 1-2 hours and if advised use appropriate supervision/assistance to optimally offload your buttock/sacral region.  While seated or lying in bed shift positions and from side to side often.  Can use barrier type cream such as Hydragaurd 2 times per day and as needed.    Turn patient (yourself) fully every 2 hours while in bed.      Driving restrictions:  You are recommended against driving until cleared by an outpatient physician and cleared through a formal driving evaluation.  Driving at current time can increase your risk of injury and can increase risk of injury of others.      Work restrictions:  You should NOT return to work (if working) until cleared by an outpatient physician.    You  should not operate heavy machinery (if applicable) until cleared by an outpatient physician.      Alcohol restrictions:  You are recommended to not drink alcohol at this time unless cleared by an outpatient physician.  Drinking alcohol in your current functional condition can increase your risk of injury which could be severe.  Drinking alcohol given your current health problems can lead to increased medical complications which could be severe.    Combining alcohol with your current medications can increase your risk of injury which could be severe.      Smoking restrictions:  You are recommended to not smoke nicotine.  Smoking increases your risk of heart attack, stroke, emphysema/COPD, and lung cancer.      MEDICATIONS:  Please see a full list of your medications outlined in the After Visit Summary that is attached to these Discharge Instructions.  Please note changes may have been made to your medications please refer to your discharge paperwork for your current medications and take this list with you to all your doctors appointments for your doctors to review.  Please do not resume a home medication unless the medication reconciliation sheet indicates to do so, please do not assume that a medication that you were given a prescription for is the same as a medication you have at home based on both medications having the same name as dosages and frequency may have changed.      Unless specifically noted in your medication list provided to you in your discharge paper work do not resume prior vitamins, minerals, or supplements you may have been taking prior to your hospitalization unless instructed by an outpatient physician in the future.  Discuss with your primary care at next visit if applicable.      Blood thinners prescribed to optimize long and short term health and decrease risk of complications but with risks related to bleeding and other complications.    Eliquis (Apixiban) instructions:  You have been  prescribed apixiban(Eliquis).  This medication is used to prevent clots which can cause severe disability and even death.      This medication will need to be managed by your outpatient physician(s) after discharge.  Follow-up with the appropriate provider as soon as possible to ensure appropriate use.    This is a strong anticoagulant (blood thinner).  It is being recommended for use by you (or your family) to decrease the risk of clotting which can be severely disabling and even life-threatening.  Even when provided as recommended it can cause severe disabling and even life-threatening bleeding.  Too much or too little of this blood thinner further increases your risk of this medication causing serious and even life-threatening complications such as severe bleeding, clots, strokes, and death.  With that said, at this time based on available evidence and consensus agreement, your physicians recommend you take Eliquis (Apixiban) medication based on your overall risks and benefits in your specific medical situation.      If you (or your family/caregiver) notice black stools, bloody stools, vomit blood, develop new weakness, slurred speech, confusion or have any other concerning symptoms call 911 or obtain transportation to nearest emergency room immediately.        Aspirin instructions  TAKE aspirin daily unless instructed otherwise by a doctor.    This medication will need to be managed by your outpatient physician(s) after discharge.  Follow-up with the appropriate provider as soon as possible to ensure appropriate use.    This is a blood thinner.  It is being recommended for use by you (or your family) to decrease the risk of clotting which can be severely disabling and even life-threatening.  Even when provided as recommended it can cause severe disabling and even life-threatening bleeding.  Too much or too little of this blood thinner further increases your risk of this medication causing serious and even  life-threatening complications such as severe bleeding, clots, strokes, and death.  With that said, at this time based on best available evidence and consensus agreement, your physicians recommend you take aspirin based on your overall risks and benefits in your specific medical situation.      If you (or your family/caregiver) notice black stools, bloody stools, vomit blood, develop new weakness, slurred speech, confusion or have any other concerning symptoms call 911 or obtain transportation to nearest emergency room immediately.       Psychotropic Medications (Medications intended to improve mental, cognitive, and functional health)  You were evaluated recently and found to have signs and symptoms of anxiety and insomnia (impaired sleep) that can negatively impact your function and quality of life.      You were managed by both medications and non-pharmacologic (non-medication) methods to try to improve mood and sleep.    Non-pharmacologic methods included supportive counseling, neuropsychology consultation and management during course, discussion of deep breathing/relaxation/behavioral management techniques, and skilled functional therapies.       Psychiatric(psychotropic) medications were adjusted during acute rehabilitation course.  To attempt to improve anxiety and insomnia you were started  Mirtazapine/Remeron.     Medications were adjusted during your course based on their effectiveness, tolerability, and safety.  You tolerated the medication(s) adequately during your course without significant side-effects noted by rehab staff or by yourself (family member).       You have functionally improved significantly and appear to be doing better.  Treatment team (rehab physician, internal medicine team, skilled therapists, nursing, and social work) feel you are adequately appropriate for discharge.  You nevertheless remain at risk for fall, injury, and additional medical and physical complications in the future.   Obtain transportation to nearest hospital from family/friends or call 911 for any concerning new symptoms.      Please follow-up with recommended discharge plan to optimize your mental and physical health and decrease risk of mental and physical health complications and hospital re-admission.    MEDICAL MANAGEMENT AT HOME specific to you:    Diabetes Management:  Please check your blood sugars 3-4 times daily before meals and at bedtime and record them to provide to your doctors, contact your family doctor as soon as possible for blood sugars higher than 220 or lower than 100.     Follow-up with PCP/family doctor regularly to ensure blood sugars remain adequately controlled.      Monitor for signs or symptoms of low blood sugar (hypoglycemia)- such as sweating, trembling, feeling hungry, worried, more tired (More severe signs of hypoglycemia could be trouble walking, confusion, passing out)  - If present obtain blood sugar    If blood sugar less than 70 take quick source of sugar such as:  - at least half cup of juice   - 4-5 saltine crackers  - 1 tablespoon of sugar or honey  - 3-4 glucose tablets  (Avoid foods that you have history of allergy)    Recheck blood sugar within 30 minutes.  If still low repeat providing additional quick sources of sugar.  If still not improving or symptoms worsening/not improving seek medical attention right away or obtain medical transport/call 911.    Hypertension Management:  Only take the medications prescribed for you at time of discharge - overly high or low blood pressure increases your risk for health complications    Follow-up with PCP/family doctor regularly to ensure blood pressure remains adequately controlled.      Please check your blood pressure prior to taking your blood pressure medications and keep a log that you will bring with you to your follow-up doctors' appointments.    >Please contact your family doctor or cardiologist immediately for a blood pressure below  100/50 and do not take your blood pressure medications until speaking with them.  >Please contact your family doctor or cardiologist as soon as possible for blood pressure greater than 160/100.    Cardiac Loop recorder (Implanted heart rhythm monitor) IN PLACE: device to monitor for atrial fibrillation (abnormal heart rhythm that increases risk of stroke) -  follow-up with cardiology/device clinic to monitor and for follow-up management; call and schedule follow-up appointment after discharge - see your appopriate office listed in the follow up providers section of your discharge paperwork    Acetaminophen (Tylenol) Dosing Warning:    You may have up to 3000 mg of acetaminophen (Tylenol) from all sources spread out over a 24 hours period.  Do not have more than that, as this can increase your risk of liver injury which can be serious.     Please note a summary of your hospital stay with relevant information for your doctors will try to be sent to them.  Please confirm with your doctors at your follow up visits that they have received this summary and have them contact [facility] Medical Records if they have not received them along with any other medical records they may require.     [facility] Phone Number:  [phone]

## 2024-05-28 NOTE — TEAM CONFERENCE
Acute RehabilitationTeam Conference Note  Date: 5/28/2024   Time: 10:32 AM       Patient Name:  Lucho Talavera       Medical Record Number: [id number]   YOB: 1955  Sex: Male          Room/Bed:  [place] [address]/[facility] 455-01  Payor Info:  Payor: MEDICARE / Plan: MEDICARE A AND B / Product Type: Medicare A & B Fee for Service /      Admitting Diagnosis: CVA (cerebral vascular accident) ([facility]) [I63.9]   Admit Date/Time:  5/11/2024  3:07 PM  Admission Comments: No comment available     Primary Diagnosis:  Stroke (cerebrum) ([facility])  Principal Problem: Stroke (cerebrum) ([facility])    Patient Active Problem List    Diagnosis Date Noted    History of cardiac arrest 05/17/2024    Insomnia 05/13/2024    Stroke (cerebrum) ([facility]) 05/08/2024    Stroke-like symptom 05/08/2024    Typical atrial flutter ([facility]) 04/15/2024    Atrial flutter with rapid ventricular response ([facility]) 04/06/2024    Leukocytosis 04/06/2024    COVID-19 10/25/2023    History of alcohol abuse 10/17/2023    Iron deficiency anemia secondary to inadequate dietary iron intake 10/17/2023    SIRS (systemic inflammatory response syndrome) ([facility]) 08/07/2023    Hypertension 08/06/2023    COPD (chronic obstructive pulmonary disease) ([facility]) 08/06/2023    Anxiety 07/28/2023    Stage 3 severe COPD by GOLD classification ([facility]) 07/23/2023    Oral abscess 06/08/2022    Tooth fracture 06/08/2022    Closed nondisplaced fracture of posterior arch of first cervical vertebra ([facility]) 06/08/2022    Fall 06/08/2022    Diabetic polyneuropathy associated with type 2 diabetes mellitus ([facility]) 07/12/2021    Hammertoe, bilateral 07/12/2021    Post-traumatic arthritis of left foot 07/12/2021    Pain due to onychomycosis of toenail 07/12/2021    Bronchitis 04/18/2020    KLARISSA (obstructive sleep apnea) 02/19/2020    Dirty living conditions 02/19/2020    Closed fracture of first cervical vertebra ([facility]) 02/28/2019    ETOH abuse 02/28/2019    Chronic bronchitis ([facility]) 09/19/2018    At moderate risk for  venous thromboembolism (VTE) 09/14/2018    S/P C1-T1 Posterior Cervical Discectomy and Fusion on 9/10/18 09/12/2018    Acute blood loss anemia 09/11/2018    Type 2 diabetes mellitus (HCC) 09/11/2018    Closed odontoid fracture with routine healing 09/09/2018    Closed displaced fracture of third cervical vertebra ([facility]) 09/09/2018    Closed displaced fracture of fourth cervical vertebra ([facility]) 09/09/2018    CAD (coronary artery disease) 09/09/2018    Dens fracture ([facility]) 09/09/2018       Physical Therapy:    Weight Bearing Status: Full Weight Bearing  Transfers: Supervision  Bed Mobility: Supervision, Independent  Amulation Distance (ft): 200 feet  Ambulation: Supervision  Assistive Device for Ambulation: Roller Walker  Wheelchair Mobility Distance:  (n/a)  Number of Stairs: 12 (FF)  Assistive Device for Stairs: Right Hand Rail  Stair Assistance: Supervision  Ramp: Supervision  Assistive Device for Ramp: Roller Walker  Discharge Recommendations: Home with:  DC Home with:: 24 Hour Supervision, 24 Hour Assisteance, Home Physical Therapy, First Floor Setup, Family Support    5/27  Pt continues to demonstrate good progress from requiring CG-min A last week to consistent CS level this week using a RW on even/uneven surface mobilities, curb step and stairs negotiation with R rail. Overall pt is a lot calmer, using call button more appropriately however still demos low frustration tolerance when having difficulties with certain therapy task requiring emotional support and redirection. Stroke ed completed and HEP already in place with hand out provided. While family training with dtr Nimco completed on 5/24/24. From PT standpoint appropriate for home d/c on 5/29/24 with home PT services initially then transition to OP PT services as discussed with pt/dtr during FT to maximize rehab potential post CVA. Family have confirmed availability of a RW and reacher at home for pt to use.       Occupational Therapy:  Eating:  Independent  Grooming: Supervision  Bathing: Supervision  Bathing: Supervision  Upper Body Dressing: Supervision  Lower Body Dressing: Supervision  Toileting: Supervision  Tub/Shower Transfer: Supervision  Toilet Transfer: Supervision  Cognition: Exceptions to WNL  Cognition: Decreased Memory, Decreased Safety, Decreased Executive Functions  Orientation: Person, Place, Time, Situation  Discharge Recommendations: Home with:  DC Home with:: 24 Hour Supervision, Home Occupational Therapy, Family Support, First Floor Setup       Pt continues to present with impairments in activity tolerance, endurance, standing balance/tolerance, memory, insight, safety, judgement, and sequencing. Additional functional barriers include fatigue and risk for falls. Pt is functioning at overall CS for ADLs and short distance fxnl mobility w/ RW. Pt will continue to benefit from skilled OT services to address above mentioned barriers and maximize functional independence in baseline areas of occupation. FT completed w/ pt's daughter, daughter is able to provide 24 hour S. DME ordered: standard BSC. From OT standpoint, anticipate d/c home w/ family support on Wednesday 5/29 w/ recommendation for HHOT.    Speech Therapy:  Mode of Communication: Verbal  Speech/Language: Dysarthia  Cognition: Exceptions to WNL  Cognition: Decreased Memory, Decreased Executive Functions, Decreased Attention, Decreased Comprehension, Decreased Safety  Orientation: Person, Place  Swallowing: Within Defined Limits  Swallowing: Aspiration Risk  Diet Recommendations: Regular Diet, Thin  Discharge Recommendations: Home with:  DC Home with:: Family Support, 24 Hour Supervision  Pt currently being followed for cognitive, speech and dysphagia tx sessions. Pt completed CLQT+ in which a Composite Severity Rating score of 2.2 out of 4.0, correlating to overall moderately impaired  cognitive linguistic impairments at time of evaluation and in comparison to age matched peers  ranging from 70-90 y/o. Cognitive barriers which present include: decreased attention, LT memory recall, ST memory recall , problem solving, reasoning, sequencing, organization of thoughts, judgement, slower processing, and insight, which still impacts pt's overall safety, functional cognitive communication skills as well as functional mobility. The following interventions are used to target these barriers, including verbal problem solving task, visual memory recall tasks, drawing conclusions activities, written sequencing tasks, categorization tasks , picture problem solving activities, verbal reasoning tasks, verbal review of current medications, written review of medications,  written health management tasks, verbal health management tasks, visual attention tasks, functional reading tasks , time management activities, and family education/training. Pt completed Motor Speech Eval in which pt is demonstrating speech intelligibility to be mildly impaired at word/phrase level but more moderately impaired at the sentence/conversational level. Speech overall characterized as having decreased breath support for speech, decreased coordination of respiration with speech attempts, imprecise articulation, and rapid rate. Speech/language barriers which present include: decreased intelligibility decreased at sentence and conversational speech levels, imprecise articulation , faster rate of speech, and decreased breath support to sustain speech which still impacts pt's overall safety, functional cognitive communication skills as well as functional mobility.  The following interventions are used to target these barriers, including completion of OME's, speech drills. Lastly, pt completing bedside dysphagia assessment in which pt is presenting w/ minimal to mild oral and mild pharyngeal dysphagia. Symptoms or concerns included decreased mastication, decreased bolus formation, delayed oral intiation, oral residue with solids , and  residue effectively cleared with increased time, finger sweeps, liquid wash in addition to suspected pharyngeal swallow delay, suspected decreased hyolaryngeal elevation upon palpation, audible swallows, and inconsistent throat clear vs hawking given meals. Dysphagia barriers which present include the following risks for aspiration such as: poor positioning, decreased cognition, ineffective mastication, delay in oral transit, delay in swallow initiation, pharyngeal weakness upon swallowing, and inconsistent cough/throat clear/hawking given meals . In order to address the noted barriers, skilled SLP services will address this by targeting the following interventions: oral motor exercise, proper positioning, diet modification, safe swallow strategies, and family education/training.  Current diet is regular w/ thin liquids.  Family training/education will need to be initiated to review concerns and recommendations as per noted abilities as listed above. At this time, pt will continue to benefit from skilled cognitive linguistic tx, speech/apraxia tx, and dysphagia tx  session to maximize overall functional independence given overall cognitive linguistic skills, speech and intelligibility, and swallow abilities  in attempts to decreased caregiver burden over time.       Update from week: 5/20/2024: Pt continues to be followed for cognitive linguistic tx and speech/apraxia tx sessions to where pt is making  slower and steady gains  at present time. Continued cognitive barriers which present include: decreased attention, LT memory recall, ST memory recall , problem solving, reasoning, sequencing, organization of thoughts, judgement, slower processing, and insight, which still impacts pt's overall safety, functional cognitive communication skills as well as functional mobility. The following interventions are used to target these barriers, including verbal working memory tasks, visual memory recall tasks, drawing  conclusions activities, written sequencing tasks, verbal sequencing tasks, categorization tasks , picture problem solving activities, verbal reasoning tasks,  written health management tasks, verbal health management tasks, and functional reading tasks . Continued speech barriers which present include: decreased intelligibility decreased at phrase and conversational speech level, imprecise articulation , slower rate of speech, and decreased breath support to sustain speech which still impacts pt's overall safety, functional communication intent. The following interventions are used to target these barriers, including review of OME's engagement in open-ended discussions, speech drills. Current speech and cognitive functioning is rated at min A for comprehension, expression, supervision for social interactions, mod A for executive functions and memory. Of note, pt has had brief f/u for dysphagia tx sessions, to where pt is currently demonstrating least restrictive diet w/o increased oropharyngeal sxs. Upon last tx sessions, pt presented w/ minimal to functional oral and functional pharyngeal dysphagia where symptoms or concerns included slower but effective mastication of solids and no overt aspiration sxs observed during meal but still does have productive cough post meals due to pre-existing congestion.  Pt had been demonstrating ability to use swallow strategies throughout meal w/o difficulty or prompting by SLP. Positioning is more paramount to decrease possible risk of aspiration if in bed. Recommendations are to continue regular diet w/ thin liquids at this time, still continuing aspiration precautions. No further dysphagia tx sessions warranted at this time where focus to be on cognitive and speech skills. Of note, family training was to occur last week, but pt's dtr unable to make planned session. Will attempt to complete this upcoming week w/ dtr as able as appropriate. At this time, pt will continue to  benefit from skilled cognitive linguistic tx and speech/apraxia tx session to maximize overall functional independence given overall cognitive linguistic skills and speech and intelligibility in attempts to decreased caregiver burden over time.     Update from week of 5/27/2024: Pt conts to be followed for skilled SLP services targeting cognitive linguistic communication skills. Pt has been making steady progress towards his goals- currently functioning at mod A memory, min A comprehension, and supervision problem solving and expression. Targets this passed week have been on family training which was completed on 5/24 with daughter, Nimco. Reviewed overall progress with Dysphagia, Speech and Cognition. Daughter is able to complete all IADL tasks at discharge. Pt has been participating in stroke education as well, however with varied carryover and recall. Pt benefits from extended processing time, repetition and visual aids. Plan is for discharge home with family on 5/29 with further skilled home therapies. However not home ST services warranted- may benefit from outpt if desired.   Barriers > interventions:  Dysphagia > resolved- regular and thin liquids, pt/family education completed  Motor speech > OMEs, breath support exercises, rate and articulation exercises  Cognition > ST/working memory strategies, sequencing tasks, categorization tasks, problem solving activities, IADL tasks, family education/training (completed)    Nursing Notes:  Appetite: Good  Diet Type: Diabetic, Cardiac                      Diet Patient/Family Education Complete: Yes                            Bladder: Continent     Bladder Patient/Family Education: Yes  Bowel: Continent     Bowel Patient/Family Education: Yes     Pain Score: 0                             Medication Management/Safety  Safe Administration: No  Reason for non-safe administration: may need monitoring  Medication Patient/Family Education Complete: Yes (ongoing)    Pt  admitted w CVA.   Presented with increased weakness, dysarthria and leaning on the left side x 5 days with fall 2 days prior to admission in the setting of noncompliance with Eliquis CTA with chronic left PCA territory infarction MRI with multiple infarcts of varying ages and posterior circulation including multiple small acute/early subacute infarct in the bilateral cerebellum with additional small foci of recent ischemia in the left PCA distribution superimposed on chronic infarct Echo with EF 55%, no cardiac thrombus Maintained on aspirin and Eliquis Eliquis is to be $71.28 = daughter aware and OK with the price Continue Lipitor 80 mg daily Outpatient follow-up with neurology  H/O aflutter- Previously admitted for atrial flutter with RVR in 4/8/2024 s/p ablation and loop recorder insertion.  Sutures removed 5/17/24  Anticoagulated on Eliquis Hypertension-Home:  Lisinopril 40mg qd Here:  resumed lisinopril 5mg qd  ETOH abuse- Monitor off CIWA protocol - Pt now post withdrawal window  Ativan as needed for anxiety Continue supplements with thiamine, folate, MVI. H/O DM-  HbA1C 8.9 on 4/6/24 Home:  Basaglar 30U qhs/Metformin Here:  Lantus 18U qhs/Metformin 500mg BID Continue with accuchecks/SSI/DM diet. Nutrition consult.  CAD- With hx of stenting Antithrombotic: Aspirin and Eliquis and Statin. Pt is continent of bowel and bladder.  Sl forgetful- needs alarms for safety    This week we will encourage independence with ADLs.  We will monitor labs and vital signs.  We will educate pt/family about repositioning to prevent skin breakdown.  We will assist w repositioning and perform routine skin checks.  We will monitor for adequate pain control.  We will monitor for constipation and medicate pt as ordered. We will provide pt/family DM education as needed.  We will increase safety awareness and keep pt free from falls.                     Case Management:     Discharge Planning  Living Arrangements: Lives w/ Children,  Lives w/ Family members (lives w/ daughter & 4 grandchildren)  Support Systems: Self, Children, Family members  Assistance Needed: tbd  Type of Current Residence: Private residence (2 story home)  Current Home Care Services: No  5/13- Cm met with pt at bedside to introduce role and complete cm open. Pt lives with daughter Brianna in 3 story home, 2 HETAL. Pt has wc and spc, pt is on chronic oxygen 2 L. Pt has no hx of op therapies, or hhc. Hx of [facility]. PCP is Miguelina Chicas, preferred pharmacy is [facility] in [place]. The patient was educated on the rehabilitation process including therapy program, the interdisciplinary team, and weekly team meetings. Estimated length of stay was reviewed with the patient as well as expectations of discussions of discharge planning. The role of the  was reviewed including providing care coordination, discharge planning and discharge facilitation. IMM was reviewed with the patient and a copy was provided for their reference. The patient verbalized understanding of the information provided and denied any further questions at this time. CM will continue to follow and assist the patient throughout their rehabilitation stay.     5/20- Following for dc needs    5/28- Pt to dc 5/30 with [facility] RN PT OT ST    Is the patient actively participating in therapies? yes  List any modifications to the treatment plan: None     Barriers Interventions   Chronic Respiratory Failure/ COPD Chronic 2l   Afib Price check, agreeable to eliquis    Insomnia Sleep med adjustment, sleep/behavioral log   Anxiety Supportive counseling, med adjustment   Cog- initiation, memory, attention, processing, swallow SLP Services    Standnig balance, safety awareness Education, possible family training   Right reaction, activity tolerance Endurance training , improving    Dysarthric  SLP Services   Sacral wound Healing                        Is the patient making expected progress toward goals? yes  List  any update or changes to goals: None    Medical Goals: Patient will be medically stable for discharge to [facility] upon completion of rehab program and Patient will be able to manage medical conditions and comorbid conditions with medications and follow up upon completion of rehab program    Weekly Team Goals:   Rehab Team Goals  ADL Team Goal: Patient will require supervision with ADLs with least restrictive device upon completion of rehab program  Bowel/Bladder Team Goal: Patient will require assist with bladder/bowel management with least restrictive device upon completion of rehab program  Transfer Team Goal: Patient will require supervision with transfers with least restrictive device upon completion of rehab program  Locomotion Team Goal: Patient will require supervision with locomotion with least restrictive device upon completion of rehab program  Cognitive Team Goal: Patient will require supervision for basic and complex tasks upon completion of rehab program    Discussion: Pt functionig sup for adls and mobility with walker, family training occurred Friday 5/24. Min a for memory, sup for comprehension. Home therapies [facility] PT OT RN     Anticipated Discharge Date:  [facility] Friday 5/24  [facility] Team Members Present:  The following team members are supervising care for this patient and were present during this Weekly Team Conference.    Physician: Dr. DePadua, MD  : Jacki Marmolejo MSW  Registered Nurse: Christine Burgos, SANGITA  Physical Therapist: Lorene Warner DPT  Occupational Therapist: Tiffany Mcclendon MS, OTR/L  Speech Therapist: Yue Cavanaugh MA, CCC-SLP

## 2024-05-28 NOTE — PROGRESS NOTES
"   05/28/24 1200   Pain Assessment   Pain Assessment Tool 0-10   Pain Score No Pain   Restrictions/Precautions   Precautions Bed/chair alarms;Cognitive;Fall Risk;Supervision on toilet/commode   Weight Bearing Restrictions No   ROM Restrictions No   Lifestyle   Autonomy \"That's fine.\"   Sit to Stand   Type of Assistance Needed Supervision;Adaptive equipment   Physical Assistance Level No physical assistance   Comment S w/ RW   Sit to Stand CARE Score 4   Bed-Chair Transfer   Type of Assistance Needed Supervision;Adaptive equipment   Physical Assistance Level No physical assistance   Comment S w/ RW, ambulated<>bathroom.   Chair/Bed-to-Chair Transfer CARE Score 4   Toilet Transfer   Type of Assistance Needed Supervision;Adaptive equipment   Physical Assistance Level No physical assistance   Comment S w/ RW to BSC over toilet. Pt will have same set-up at home.   Toilet Transfer CARE Score 4   Cognition   Overall Cognitive Status Impaired   Additional Activities   Additional Activities Comments Incentive spirometer x10, flutter valve x30. Occ cues to improve technique. Tolerated well w/ inc time provided.   Assessment   Treatment Assessment Pt seen for skilled OT session focusing on short distance fxnl mobility w/ RW, toilet xfer, and breathing exercises. Plan for ADL tomorrow. Pt agreeable to rest in recliner, all needs within reach and alarm activated. From OT standpoint pt is on track to d/c home tomorrow w/ family support/24 hour S with recommendation for HHOT.   Prognosis Good   Problem List Decreased strength;Decreased endurance;Impaired balance;Decreased coordination;Decreased mobility;Decreased cognition;Impaired judgement;Decreased safety awareness;Decreased skin integrity   Plan   Treatment/Interventions ADL retraining;Functional transfer training;LE strengthening/ROM;Elevations;Therapeutic exercise;Endurance training;Cognitive reorientation   Progress Progressing toward goals   Discharge Recommendation "   Rehab Resource Intensity Level, OT   (home w/ family support/24 hour S, HHOT)   OT Therapy Minutes   OT Time In 1200   OT Time Out 1230   OT Total Time (minutes) 30   OT Mode of treatment - Individual (minutes) 30   OT Mode of treatment - Concurrent (minutes) 0   OT Mode of treatment - Group (minutes) 0   OT Mode of treatment - Co-treat (minutes) 0   OT Mode of Treatment - Total time(minutes) 30 minutes   OT Cumulative Minutes 1070   Therapy Time missed   Time missed? No

## 2024-05-28 NOTE — PROGRESS NOTES
Physical Medicine and Rehabilitation Progress Note  Lucho Talavera 69 y.o. male MRN: [id number]  Unit/Bed#: -01 Encounter: [id number]    To Review: 69 year old M with PMH of COPD, multitude of ED visits/hospitalizations, DM2, CAD, EtOH abuse, HTN, prior CVA, found to have aflutter with RVR on hospitalization 4/2024 s/p BRITTNEY ablation who was to d/c on Eliquis but per report family did not know he needed it and he was not taking it who developed dysarthria, weakness and imbalance with recent fall at home and presented to hospital. CTA H/N showed Chronic left PCA territory infarct, new since the prior exam. No evidence of acute vascular territorial infarction, intracranial hemorrhage or mass.  No hemodynamically significant stenosis, dissection or occlusion of the carotid or vertebral arteries or major vessels of the [place].  Neuro consulted and recommended MRI brain which showed multiple infarcts of varying ages in the posterior circulation include multiple small acute/early subacute infarcts in the bilateral cerebellum. TTE did not show thrombus.  Neuro recommended Eliquis, aspirin, and statin. Patient was evaluated by skilled therapies and was found to have significant decline in ADLs and ambulation and appears appropriate for admission to [facility] [place].     Chief Complaint: No new concerns.     Interval History/Subjective:  No acute events overnight. Some interrupted sleep last night but feeling fine this AM. Was a bit wheezy this AM. Denies SOB, but currently on 2L NC. No new Cp, SOB, fevers, chills, N/V, abdominal pain. Last BM 5/26. Continent of bowel/bladder.    ROS:  A 10 point review of systems was negative except for what is noted in the HPI.      Today's Changes:  A bit wheezy this AM. Received albuterol PRN.Monitor  Will prepare discharge for tomorrow. He would like scripts sent to [facility] (he reports it is on file).  3. I led and participated in Team Conference  today. See dispo below and separate conference note for more details. I concur with the plan of care as determined in Team Conference.    Total time spent:  50 minutes, with more than 50% spent counseling/coordinating care. Counseling includes discussion with patient re: progress in therapies, functional issues observed by therapy staff, and discussion with patient his/her current medical state/wellbeing. Coordination of patient's care was performed in conjunction with Internal Medicine service to monitor patient's labs, vitals, and management of their comorbidities. In addition, I lead the interdisciplinary team in weekly case conference today and concur with plan as per team meeting. The care of the patient was extensively discussed with all care providers and an appropriate rehabilitation plan was formulated unique for this patient. Barriers were identified preventing progression of therapy and appropriate interventions were discussed with each discipline. Please see the team note for input from all disciplines regarding barriers, intervention, and discharge planning.        Assessment/Plan:    * Stroke (cerebrum) (Formerly [facility])  Assessment & Plan  - MRI brain 5/8 - Multiple infarcts of varying ages in the posterior circulation include multiple small acute/early subacute infarcts in the bilateral cerebellum. Additional small foci of recent ischemia in the left PCA distribution superimposed on chronic infarct  No acute hemorrhage or mass effect  - CTA H/N -  1. Chronic left PCA territory infarct, new since the prior exam. No evidence of acute vascular territorial infarction,  intracranial hemorrhage or mass. 2. No hemodynamically significant stenosis, dissection or occlusion of the carotid or vertebral arteries or major vessels of the [place].  - Residual impairments on admission to [facility]: Impaired balance, coordination  - Co-morbidities most impacting functional recovery: COPD with chronic hypoxic respiratory  failure, anxiety    Secondary stroke prevention  - Antithrombotic: Aspirin and Eliquis  - Statin  - Patient at increased risk for stroke particularly early in post-stroke period - monitor neuro exam closely with low threshold to repeat imaging  -  patient and if applicable caregiver on optimal stroke management  - Follow-up with neurology and PCP after d/c   - Recommend acute comprehensive interdisciplinary inpatient rehabilitation to include intensive skilled therapies (PT, OT, ST) as outlined with oversight and management by rehabilitation physician as well as inpatient rehab level nursing, case management and weekly interdisciplinary team meetings.       Chronic bronchitis (HCC)  Assessment & Plan  Has had a multitude of ED visits related to COPD/concern for SOB but was frequently d/c'd same day  - Monitor also for anxiety and optimal mgmt of that; education on monitor home O2  IM consulted and with overall management at their discretion during ARC course  Monitor lung exam, vitals with and without activity  Encourage incentive spirometry  Breo/Incruse  Xopenex  PRN Albuterol  PRN supplemental O2 - has needed only sparingly here.  Mucinex     Sacral wound-resolved as of 5/28/2024  Assessment & Plan  Seen by wound care 5/7  - Managed R lateral forearm skin tear   - Noted to have old open area on sacrum on admission.   - Consulted wound care to follow-up. Epithelialized   - Offloading,specialty cushion   - Allevyn   - Monitor closely.       Insomnia  Assessment & Plan  Environmental interventions.  Sleep log  Drinks a lot of caffeinated sodas throughout the night   - Placed no caffeine order and review with nursing.    - Improvement with this  Added melatonin scheduled  5/16 added mirtazapine for both sleep side effect and anxiety.     Typical atrial flutter (HCC)  Assessment & Plan  IM consulted and with overall management at their discretion during ARC course  Rate control: None  Antithrombotic: Eliquis     - Cost is $71.28/month. Daughter confirmed this price is fine.      Hypertension  Assessment & Plan  Home: Lisinopril 40mg daily  Here: Lisinopril 5mg daily  Monitor vitals with and without activity; monitor for orthostasis  Monitor hemoglobin, electrolytes, kidney function, hydration status   Management as per IM.       Anxiety  Assessment & Plan  Cries out for help at times with anxiety  This improved here.  Asks for breathing treatments/O2 when this happens, but usually O2 sats and breath sounds are fine.  Reviewed PDMP - last prescribed Lorazepam by Dr. Villalpando in July of last year, most recently by a hospitalist PA at [facility] (for only 10 tablets).  Has not used since 5/11. Will switch to hydroxyzine  Adding mirtazapine at night to also assist with sleep/insomnia  Monitor response.     ETOH abuse  Assessment & Plan  Patient reports cutting down and only about 2 beers per day but occasionally liquor   Alcohol cessation counseling and supportive counseling  Optimal mood/stress mgmt   Thiamine, folate, MVI   PRN low dose ativan BID   Consider gabapentin as well     Type 2 diabetes mellitus (HCC)  Assessment & Plan  Lab Results   Component Value Date    HGBA1C 8.9 (H) 04/06/2024       Recent Labs     05/27/24  1102 05/27/24  1549 05/27/24  2045 05/28/24  0607   POCGLU 136 117 192* 146*       Blood Sugar Average: Last 72 hrs:  (P) 262.5  Home: Metformin 1000mg Q12hr, Lantus 30 units at bedtime  Current meds: Lantus 18, Metformin 500mg Q12hr, HS, Lispro SS AC/HS, consistent carb diabetic diet  Nutrition consult  Management as per IM  Outpatient f/u with PCP      CAD (coronary artery disease)  Assessment & Plan  With hx of stenting  IM consulted and with overall management at their discretion during ARC course  Antithrombotic: Aspirin and Eliquis   Statin  Optimal blood pressure and blood sugar control        Health Maintenance  #Delirium/Sleep: At risk. Optimize sleep/wake, pain, bowel, bladder management. Avoid  deliriogenic meds. Of note is on melatonin, ativan, and oxycodone PRN.   #Pain: Tylenol PRN  #Bowel: Last BM 5/26 and continent. Adding PRN miralax  #Bladder: Voiding and continent  #Skin/Pressure Injury Prevention: Turn Q2hr in bed, with weight shifts [id number] in wheelchair.  #DVT Prophylaxis:Fully anticoagulated on eliquis, SCDs  #GI Prophylaxis: PPI on for GERD  #Code Status: Full Code.   #FEN: Diabetic Diet  #Dispo: Team 5/28: ADD 5/29 with home PT/OT/RN with [facility] with goals to discharge to 1 level home with 2 HETAL, with supervision. Daughter for family training this week.   Will need f/u with Pulm, PCP, and Neuro    Objective:    Functional Update:  PT: Sup transfers, Ind bed mobility, Sup ambulation 200' with RW, Sup stairs and ramp  OT: Ind with eating, Sup grooming, Sup bathing, Sup UB dressing, Sup LB dressing, Sup toileting, Sup tub/shower transfer, Sup toilet transfers   SLP:  Dysphagia resolved and on reg/thins with patient/family education completed, motor speech with HEP, mod A memory, Deanna comprehension, Sup problem solving and expression and family has received family training. No SLP at discharge.     Allergies per EMR    Physical Exam:  Temp:  [97.8 °F (36.6 °C)-98.6 °F (37 °C)] 97.8 °F (36.6 °C)  HR:  [79-96] 84  Resp:  [16-18] 18  BP: (113-147)/(60-67) 147/67  Oxygen Therapy  SpO2: 96 %  O2 Flow Rate (L/min): 2 L/min    Gen: No acute distress, Well-nourished, well-appearing.  HEENT: Moist mucus membranes, Normocephalic/Atraumatic  Cardiovascular: Regular rate, rhythm, S1/S2. Distal pulses palpable  Heme/Extr: No edema  Pulmonary: Non-labored breathing. Lungs with occasional end expiratory wheeze. Wearing NC.   : No anotny  GI: Soft, non-tender, non-distended. BS+  MSK: Generalized sarcopenia  Integumentary: Skin is warm, dry. No rashes or ulcers.  Neuro: AAOx3, Speech is dysarthric. Appropriate to questioning. Tone is normal.   Psych: Normal mood and affect.       Diagnostic Studies: Reviewed,  no new imaging    Laboratory:  Reviewed   Results from last 7 days   Lab Units 05/25/24  0506   HEMOGLOBIN g/dL 11.0*   HEMATOCRIT % 35.8*   WBC Thousand/uL 6.93       Results from last 7 days   Lab Units 05/25/24  0506   BUN mg/dL 9   POTASSIUM mmol/L 3.5   CHLORIDE mmol/L 106   CREATININE mg/dL 0.42*              Patient Active Problem List   Diagnosis    Closed odontoid fracture with routine healing    Closed displaced fracture of third cervical vertebra ([facility])    Closed displaced fracture of fourth cervical vertebra ([facility])    CAD (coronary artery disease)    Dens fracture (Formerly [facility])    Acute blood loss anemia    Type 2 diabetes mellitus (Former[facility])    S/P C1-T1 Posterior Cervical Discectomy and Fusion on 9/10/18    At moderate risk for venous thromboembolism (VTE)    Chronic bronchitis ([facility])    Closed fracture of first cervical vertebra ([facility])    ETOH abuse    KLARISSA (obstructive sleep apnea)    Dirty living conditions    Bronchitis    Diabetic polyneuropathy associated with type 2 diabetes mellitus ([facility])    Hammertoe, bilateral    Post-traumatic arthritis of left foot    Pain due to onychomycosis of toenail    Oral abscess    Tooth fracture    Closed nondisplaced fracture of posterior arch of first cervical vertebra ([facility])    Fall    Stage 3 severe COPD by GOLD classification ([facility])    Anxiety    Hypertension    COPD (chronic obstructive pulmonary disease) ([facility])    SIRS (systemic inflammatory response syndrome) ([facility])    History of alcohol abuse    Iron deficiency anemia secondary to inadequate dietary iron intake    COVID-19    Atrial flutter with rapid ventricular response ([facility])    Leukocytosis    Typical atrial flutter ([facility])    Stroke (cerebrum) ([facility])    Stroke-like symptom    Sacral wound    Insomnia    History of cardiac arrest         Medications  Current Facility-Administered Medications   Medication Dose Route Frequency Provider Last Rate    acetaminophen  650 mg Oral Q6H PRN Richmond Chew DO      albuterol  2 puff  Inhalation Q4H PRN William Camacho, DO      apixaban  5 mg Oral BID [facility]      aspirin  81 mg Oral Daily [facility], DO      atorvastatin  80 mg Oral Daily With Dinner [facility], DO      busPIRone  10 mg Oral TID [facility], DO      ferrous sulfate  325 mg Oral Daily With Breakfast [facility], DO      fluticasone  1 spray Nasal BID [facility], DO      fluticasone-vilanterol  1 puff Inhalation Daily [facility] Che[facility], DO      folic acid  1 mg Oral Daily [facility], DO      guaiFENesin  600 mg Oral BID [facility], DO      hydrOXYzine HCL  25 mg Oral BID PRN Ashley Depadua, MD      insulin glargine  18 Units Subcutaneous HS TATIANA Paulino      insulin lispro  1-5 Units Subcutaneous TID AC Richmond Chew, DO      ipratropium  0.5 mg Nebulization Q6H William Camacho, DO      levalbuterol  1.25 mg Nebulization Q6H William Camacho, DO      lisinopril  5 mg Oral Daily TATIANA Paulino      melatonin  6 mg Oral QPM Ashley Depadua, MD      metFORMIN  500 mg Oral BID With Meals TATIANA Paulino      mirtazapine  7.5 mg Oral QPM Ashley Depadua, MD      multivitamin-minerals  1 tablet Oral Daily [facility], DO      oxyCODONE  2.5 mg Oral Q4H PRN [facility], DO      pantoprazole  40 mg Oral Early Morning [facility], DO      thiamine  100 mg Oral Daily [facility] Che[facility], DO      umeclidinium  1 puff Inhalation Daily [facility], DO            ** Please Note: Fluency Direct voice to text software may have been used in the creation of this document. **

## 2024-05-28 NOTE — PROGRESS NOTES
05/28/24 0830   Pain Assessment   Pain Assessment Tool 0-10   Pain Score No Pain   Restrictions/Precautions   Precautions Fall Risk;Cognitive;Bed/chair alarms;Supervision on toilet/commode;Aphasia;O2   Weight Bearing Restrictions No   ROM Restrictions No   Cognition   Overall Cognitive Status Impaired   Arousal/Participation Alert;Cooperative   Attention Attends with cues to redirect   Subjective   Subjective Doing well this morning! Agreeable to being off supplemental O2 for session. I encouraged flutter valve use at session start which resulted in expectoration and Kingston feeling better.   Roll Left and Right   Type of Assistance Needed Independent   Roll Left and Right CARE Score 6   Sit to Lying   Type of Assistance Needed Independent   Sit to Lying CARE Score 6   Lying to Sitting on Side of Bed   Type of Assistance Needed Independent   Lying to Sitting on Side of Bed CARE Score 6   Sit to Stand   Type of Assistance Needed Supervision;Adaptive equipment   Comment RW distance supervision   Sit to Stand CARE Score 4   Bed-Chair Transfer   Type of Assistance Needed Supervision;Adaptive equipment   Comment RW stand pivot close supervision   Chair/Bed-to-Chair Transfer CARE Score 4   Car Transfer   Type of Assistance Needed Supervision;Adaptive equipment   Comment Close supervision RW   Car Transfer CARE Score 4   Walk 10 Feet   Type of Assistance Needed Supervision;Adaptive equipment   Comment RW distance supervision   Walk 10 Feet CARE Score 4   Walk 50 Feet with Two Turns   Type of Assistance Needed Supervision;Adaptive equipment   Comment RW CS   Walk 50 Feet with Two Turns CARE Score 4   Walk 150 Feet   Type of Assistance Needed Supervision;Adaptive equipment   Comment RW CS   Walk 150 Feet CARE Score 4   Walking 10 Feet on Uneven Surfaces   Type of Assistance Needed Supervision;Adaptive equipment   Comment RW CS foam mat   Walking 10 Feet on Uneven Surfaces CARE Score 4   Ambulation   Primary Mode of  "Locomotion Prior to Admission Walk   Distance Walked (feet) 150 ft  (x3)   Assist Device Roller Walker   Gait Pattern Improper weight shift;Slow Sury;Inconsistant Sury;Decreased foot clearance   Limitations Noted In Balance   Does the patient walk? 2. Yes   Wheel 50 Feet with Two Turns   Reason if not Attempted Activity not applicable   Wheel 50 Feet with Two Turns CARE Score 9   Wheel 150 Feet   Reason if not Attempted Activity not applicable   Wheel 150 Feet CARE Score 9   Wheelchair mobility   Does the patient use a wheelchair? 0. No   Curb or Single Stair   Style negotiated Curb   Type of Assistance Needed Incidental touching;Adaptive equipment   Comment CG RW 4\" curb step   1 Step (Curb) CARE Score 4   4 Steps   Type of Assistance Needed Incidental touching;Adaptive equipment   Comment CG R HR BUE ascending L HR BUE descending reciprocal both ways   4 Steps CARE Score 4   12 Steps   Type of Assistance Needed Incidental touching;Adaptive equipment   Comment CG R HR BUE ascending L HR BUE descending reciprocal both ways   12 Steps CARE Score 4   Picking Up Object   Type of Assistance Needed Supervision;Adaptive equipment   Comment RW CS reacher marker from floor   Picking Up Object CARE Score 4   Toilet Transfer   Type of Assistance Needed Supervision;Adaptive equipment   Comment RW standard height toilet grab bars PRN CS   Toilet Transfer CARE Score 4   Therapeutic Interventions   Other Use of flutter valve max resistance at session start x20 reps, encouragement of expectoration throughout session through huffing and coughing   Equipment Use   NuStep L3 BUE's/LE's 75 SPM avg goal x10 min   Assessment   Treatment Assessment Session focused on reassessment of CARE items and overall mobilities with impending DC to home with daughter tomorrow. Currently Kingston has met and exceeding his PT goals and is ready to be DC to home with his daughter's supervision/CG for respective mobilties. Mobility and control with RW " continues to improve. SPO2 monitored throughout averageding 94-98%. Frequent expectoration with encouraged coughing at session start; encouraged ongoing flutter valve and coughing for expectoration at home, as well as self-pacing and pursed-lip breathing with subjective sensations of shortness of breath. Late morning session to focus on HIGT with use of R SPC for tactile cue.   Family/Caregiver Present No   Problem List Decreased strength;Decreased endurance;Impaired balance;Decreased coordination;Decreased mobility;Decreased cognition;Impaired judgement;Decreased safety awareness;Decreased skin integrity   PT Barriers   Physical Impairment Decreased strength;Decreased endurance;Impaired balance;Decreased coordination;Decreased mobility;Decreased cognition;Impaired judgement;Decreased safety awareness;Decreased skin integrity   Functional Limitation Stair negotiation;Walking  (Being addressed with RW use and CG on stairs)   Plan   Treatment/Interventions ADL retraining;Functional transfer training;LE strengthening/ROM;Elevations;Therapeutic exercise;Endurance training;Cognitive reorientation;Gait training;Spoke to MD;Spoke to nursing;Spoke to case management;OT   Progress Improving as expected   Discharge Recommendation   Rehab Resource Intensity Level, PT   (HH versus OP PT)   PT Therapy Minutes   PT Time In 0830   PT Time Out 0930   PT Total Time (minutes) 60   PT Mode of treatment - Individual (minutes) 60   PT Mode of treatment - Concurrent (minutes) 0   PT Mode of treatment - Group (minutes) 0   PT Mode of treatment - Co-treat (minutes) 0   PT Mode of Treatment - Total time(minutes) 60 minutes   PT Cumulative Minutes 1303   Therapy Time missed   Time missed? No

## 2024-05-28 NOTE — PROGRESS NOTES
05/28/24 1100   Pain Assessment   Pain Assessment Tool 0-10   Pain Score No Pain   Restrictions/Precautions   Precautions Fall Risk;Cognitive;Bed/chair alarms;Supervision on toilet/commode;Aphasia;O2   Weight Bearing Restrictions No   ROM Restrictions No   Cognition   Overall Cognitive Status Impaired   Arousal/Participation Alert;Cooperative   Attention Attends with cues to redirect   Subjective   Subjective Doing well late this morning! Remaining off supplemental O2. A little tired from therapies this AM.   Sit to Stand   Type of Assistance Needed Supervision;Adaptive equipment   Comment RW distance supervision   Sit to Stand CARE Score 4   Bed-Chair Transfer   Type of Assistance Needed Supervision;Adaptive equipment   Comment RW stand pivot close supervision   Chair/Bed-to-Chair Transfer CARE Score 4   Car Transfer   Type of Assistance Needed Supervision;Adaptive equipment   Comment Close supervision RW   Car Transfer CARE Score 4   Walk 10 Feet   Type of Assistance Needed Supervision;Adaptive equipment   Comment RW distance supervision   Walk 10 Feet CARE Score 4   Walk 50 Feet with Two Turns   Type of Assistance Needed Supervision;Adaptive equipment   Comment RW CS   Walk 50 Feet with Two Turns CARE Score 4   Walk 150 Feet   Type of Assistance Needed Supervision;Adaptive equipment   Comment RW CS   Walk 150 Feet CARE Score 4   Ambulation   Primary Mode of Locomotion Prior to Admission Walk   Distance Walked (feet) 150 ft  (x3)   Assist Device Cane   Gait Pattern Inconsistant Sury;Improper weight shift   Limitations Noted In Balance   Provided Assistance with: Direction   Walk Assist Level Minimum Assist;Contact Guard   Does the patient walk? 2. Yes   Wheel 50 Feet with Two Turns   Reason if not Attempted Activity not applicable   Wheel 50 Feet with Two Turns CARE Score 9   Wheel 150 Feet   Reason if not Attempted Activity not applicable   Wheel 150 Feet CARE Score 9   Wheelchair mobility   Does the  patient use a wheelchair? 0. No   Therapeutic Interventions   Neuromuscular Re-Education HIGT as above: R SPC, CG-Deanna focused on fast-paced ambulation with correct sequencing 3x150' fwd   Equipment Use   NuStep L3 BUE's/LE's 50 SPM avg x10 min   Assessment   Treatment Assessment Session focused on HIGT with SPC to challenge and further improve balance reactions in the presence of CVA's. Overall coordination and mobility with SPC seems to have significantly improved since I have last worked with this patient using the SPC. Patient has no questions or concerns regarding his discharge tomorrow.   Family/Caregiver Present No   Problem List Decreased strength;Decreased endurance;Impaired balance;Decreased coordination;Decreased mobility;Decreased cognition;Impaired judgement;Decreased safety awareness;Decreased skin integrity   PT Barriers   Physical Impairment Decreased strength;Decreased endurance;Impaired balance;Decreased coordination;Decreased mobility;Decreased cognition;Impaired judgement;Decreased safety awareness;Decreased skin integrity   Functional Limitation Stair negotiation;Walking  (Being addressed with RW use and CG on stairs)   Plan   Treatment/Interventions ADL retraining;Functional transfer training;LE strengthening/ROM;Elevations;Therapeutic exercise;Endurance training;Cognitive reorientation;Gait training;Spoke to MD;Spoke to nursing;Spoke to case management;OT   Progress Improving as expected   Discharge Recommendation   Rehab Resource Intensity Level, PT   (HH versus OP PT)   PT Therapy Minutes   PT Time In 1100   PT Time Out 1130   PT Total Time (minutes) 30   PT Mode of treatment - Individual (minutes) 30   PT Mode of treatment - Concurrent (minutes) 0   PT Mode of treatment - Group (minutes) 0   PT Mode of treatment - Co-treat (minutes) 0   PT Mode of Treatment - Total time(minutes) 30 minutes   PT Cumulative Minutes 1333   Therapy Time missed   Time missed? No

## 2024-05-28 NOTE — NURSING NOTE
Pt awoke saying he needs a breathing treatment.  O2 sat 90%.  Moist cough, but not able to expectorate.  O 2 2l nc applied for his comfort.  Prn mdi given

## 2024-05-28 NOTE — NURSING NOTE
Pt awoke saying that he needs a treatment.  Po 91% on ra. Resp notified.  When resp arrived pt had fallen back to sleep and forgot that he called for his treatment. Treatment given, as it was scheduled time.

## 2024-05-28 NOTE — PROGRESS NOTES
05/28/24 3935   Pain Assessment   Pain Assessment Tool 0-10   Pain Score No Pain   Restrictions/Precautions   Precautions Bed/chair alarms;Cognitive;Fall Risk;Supervision on toilet/commode   Comprehension   Comprehension (FIM) 5 - Understands basic directions and conversation   Expression   Expression (FIM) 5 - Needs help/cues only RARELY (< 10% of the time)   Social Interaction   Social Interaction (FIM) 5 - Interacts appropriately with others 90% of time   Problem Solving   Problem solving (FIM) 5 - Solves basic problems 90% of time   Memory   Memory (FIM) 4 - Recognizes/recalls/performs 75-89%   Speech/Language/Cognition Assessment   Treatment Assessment Pt participated in skilled SLP session focusing on cognitive linguistic skills. As this SLP is new to pt's care team, engaged in brief rapport building which targeting basic level expression, comprehension, and recall. Pt with functional understanding and clear expression during patient interview. Pt also with accurate recall of dc plan for home with daughter tomorrow, along with his PLOF (as per chart review).    Targeting sequencing, pt was presented with 4-step written sequencing task of common daily activities. Pt correctly sequenced all steps in 4/4 trials. Given Fo5 steps, pt correctly sequenced all steps in 3/4 trials, and was able to correct errored trial when provided with min verbal cuing.     Lastly, pt engaged in a working memory task when auditorily presented with Fo4 words. Prior to task, SLP educated pt on memory strategies to include verbal rehearsal, making associations, repetition, and visualization. Pt then recalled target words based on words placement/order with 9/13 accuracy. Of note, pt initially with decreased working memory/recall of instructions but then able to carryover task objective as activity continued. Pt benefited most notably from repetition of stimuli and question for placement/order, but noted to require repetition of  stimuli x3 in one instance.     Overall, pt continued to make progress towards goals throughout rehab stay. Family training was completed 5/24/24 with pt's daughter, Nimco and discharge is planned for tomorrow, 5/29/24. As per primarily SLP, further skilled SLP services are not recommended on discharge as pt is at baseline functioning as pt's daughter has confirmed.    SLP Therapy Minutes   SLP Time In 0930   SLP Time Out 1000   SLP Total Time (minutes) 30   SLP Mode of treatment - Individual (minutes) 30   SLP Mode of treatment - Concurrent (minutes) 0   SLP Mode of treatment - Group (minutes) 0   SLP Mode of treatment - Co-treat (minutes) 0   SLP Mode of Treatment - Total time(minutes) 30 minutes   SLP Cumulative Minutes 510   Therapy Time missed   Time missed? No

## 2024-05-28 NOTE — PCC NURSING
Pt admitted w CVA.   Presented with increased weakness, dysarthria and leaning on the left side x 5 days with fall 2 days prior to admission in the setting of noncompliance with Eliquis CTA with chronic left PCA territory infarction MRI with multiple infarcts of varying ages and posterior circulation including multiple small acute/early subacute infarct in the bilateral cerebellum with additional small foci of recent ischemia in the left PCA distribution superimposed on chronic infarct Echo with EF 55%, no cardiac thrombus Maintained on aspirin and Eliquis Eliquis is to be $71.28 = daughter aware and OK with the price Continue Lipitor 80 mg daily Outpatient follow-up with neurology  H/O aflutter- Previously admitted for atrial flutter with RVR in 4/8/2024 s/p ablation and loop recorder insertion.  Sutures removed 5/17/24  Anticoagulated on Eliquis Hypertension-Home:  Lisinopril 40mg qd Here:  resumed lisinopril 5mg qd  ETOH abuse- Monitor off CIWA protocol - Pt now post withdrawal window  Ativan as needed for anxiety Continue supplements with thiamine, folate, MVI. H/O DM-  HbA1C 8.9 on 4/6/24 Home:  Basaglar 30U qhs/Metformin Here:  Lantus 18U qhs/Metformin 500mg BID Continue with accuchecks/SSI/DM diet. Nutrition consult.  CAD- With hx of stenting Antithrombotic: Aspirin and Eliquis and Statin. Pt is continent of bowel and bladder.  Sl forgetful- needs alarms for safety    This week we will encourage independence with ADLs.  We will monitor labs and vital signs.  We will educate pt/family about repositioning to prevent skin breakdown.  We will assist w repositioning and perform routine skin checks.  We will monitor for adequate pain control.  We will monitor for constipation and medicate pt as ordered. We will provide pt/family DM education as needed.  We will increase safety awareness and keep pt free from falls.

## 2024-05-28 NOTE — PROGRESS NOTES
[facility]  Progress Note  Name: Lucho Talavera I  MRN: [id number]  Unit/Bed#: -01 I Date of Admission: 5/11/2024   Date of Service: 5/28/2024 I Hospital Day: 17    Assessment & Plan   * Stroke (cerebrum) (HCC)  Assessment & Plan  Presented with increased weakness, dysarthria and leaning on the left side x 5 days with fall 2 days prior to admission in the setting of noncompliance with Eliquis  CTA with chronic left PCA territory infarction  MRI with multiple infarcts of varying ages and posterior circulation including multiple small acute/early subacute infarct in the bilateral cerebellum with additional small foci of recent ischemia in the left PCA distribution superimposed on chronic infarct  Echo with EF 55%, no cardiac thrombus  Maintained on aspirin and Eliquis  Eliquis is to be $71.28 = daughter aware and OK with the price  Continue Lipitor 80 mg daily  Outpatient follow-up with neurology  DC tentative 5/29/2024    Typical atrial flutter (HCC)  Assessment & Plan  Previously admitted for atrial flutter with RVR in 4/8/2024 s/p ablation and loop recorder insertion.    Sutures removed 5/17/24  Anticoagulated on Eliquis      Hypertension  Assessment & Plan  Home:  Lisinopril 40mg qd  Here:  increase lisinopril to 10mg qd 5/28/2024           Type 2 diabetes mellitus (HCC)  Assessment & Plan  HbA1C 8.9 on 4/6/24  Home:  Basaglar 30U qhs/Metformin  Here:  Lantus 18U qhs/Metformin 500mg BID  Continue with accuchecks/SSI/DM diet  stable      ETOH abuse  Assessment & Plan  History of alcohol abuse  Monitor off CIWA protocol - Pt now post withdrawal window   Ativan as needed for anxiety  Continue supplements with thiamine, folate, MVI  Recommend cessation especially in the setting of antiplatelet therapy                 The above assessment and plan was reviewed and updated as determined by my evaluation of the patient on 5/28/2024.    Labs:   Results from last 7 days   Lab  Units 05/25/24  0506   WBC Thousand/uL 6.93   HEMOGLOBIN g/dL 11.0*   HEMATOCRIT % 35.8*   PLATELETS Thousands/uL 268     Results from last 7 days   Lab Units 05/25/24  0506   SODIUM mmol/L 142   POTASSIUM mmol/L 3.5   CHLORIDE mmol/L 106   CO2 mmol/L 29   BUN mg/dL 9   CREATININE mg/dL 0.42*   CALCIUM mg/dL 8.9             Results from last 7 days   Lab Units 05/28/24  0607 05/27/24  2045 05/27/24  1549   POC GLUCOSE mg/dl 146* 192* 117       Imaging  No orders to display       Review of Scheduled Meds:  Current Facility-Administered Medications   Medication Dose Route Frequency Provider Last Rate    acetaminophen  650 mg Oral Q6H PRN [facility]      albuterol  2 puff Inhalation Q4H PRN William Camacho, DO      apixaban  5 mg Oral BID [facility], DO      aspirin  81 mg Oral Daily [facility], DO      atorvastatin  80 mg Oral Daily With Dinner [facility], DO      busPIRone  10 mg Oral TID [facility], DO      ferrous sulfate  325 mg Oral Daily With Breakfast [facility], DO      fluticasone  1 spray Nasal BID [facility], DO      fluticasone-vilanterol  1 puff Inhalation Daily [facility], DO      folic acid  1 mg Oral Daily [facility], DO      guaiFENesin  600 mg Oral BID [facility], DO      hydrOXYzine HCL  25 mg Oral BID PRN Ashley Depadua, MD      insulin glargine  18 Units Subcutaneous HS TATIANA Paulino      insulin lispro  1-5 Units Subcutaneous TID AC [facility]      ipratropium  0.5 mg Nebulization Q6H William Camacho, DO      levalbuterol  1.25 mg Nebulization Q6H William Camacho, DO      lisinopril  5 mg Oral Daily TATIANA Paulino      melatonin  6 mg Oral QPM Ashley Depadua, MD      metFORMIN  500 mg Oral BID With Meals TATIANA Paulino      mirtazapine  7.5 mg Oral QPM Ashley Depadua, MD      multivitamin-minerals  1 tablet Oral Daily [facility], [facility]      oxyCODONE  2.5 mg Oral Q4H PRN  Richmond Chew, DO      pantoprazole  40 mg Oral Early Morning [place] Chew, DO      thiamine  100 mg Oral Daily Harzay Chew, DO      umeclidinium  1 puff Inhalation Daily Richmond Chew, DO         Subjective/ HPI: Patient seen and examined. Patients overnight issues or events were reviewed with nursing staff. New or overnight issues include the following:     Pt seen and examined at bedside. He is anxious for dc tomorrow. We discussed the importance of etoh cessation as well as what to do in the setting of a fall, etc. He is excited for DC in the am    ROS:   A 10 point ROS was performed; negative except as noted above.        *Labs /Radiology studies Reviewed  *Medications  reviewed and reconciled as needed  *Please refer to order section for additional ordered labs studies      Physical Examination:  Vitals:   Vitals:    05/28/24 0100 05/28/24 0110 05/28/24 0605 05/28/24 0705   BP:   147/67    BP Location:   Left arm    Pulse:   84    Resp:   18    Temp:   97.8 °F (36.6 °C)    TempSrc:   Oral    SpO2: 91% 94% 98% 96%   Weight:       Height:           General Appearance: NAD; pleasant  HEENT: PERRLA, conjuctiva normal; mucous membranes moist; face symmetrical  Neck:  Supple  Lungs: clear bilaterally, normal respiratory effort, no retractions, expiratory effort normal, on room air  CV: regular rate and rhythm, no murmurs rubs or gallops noted   ABD: soft non tender, +BS x4  EXT: DP pulses intact, no lymphadenopathy, no edema; mild left sided weakness  Skin: normal turgor, normal texture, no rash  Psych: affect normal, mood normal  Neuro: AAOx3       The above physical exam was reviewed and updated as determined by my evaluation of the patient on 5/28/2024.    Invasive Devices       None                      VTE Pharmacologic Prophylaxis: Eliquis  Code Status: Level 1 - Full Code  Current Length of Stay: 17 day(s)    Total floor / unit time spent today 30 minutes  Coordination of patient's care  was performed in conjunction with primary service. Time invested included review of patient's labs, vitals, and management of their comorbidities with continued monitoring, examination of patient as well as answering patient questions, documenting her findings and creating progress note in electronic medical record,  ordering appropriate diagnostic testing.       ** Please Note:  voice to text software may have been used in the creation of this document. Although proof errors in transcription or interpretation are a potential of such software**

## 2024-05-28 NOTE — PROGRESS NOTES
05/28/24 1000   Pain Assessment   Pain Assessment Tool 0-10   Pain Score No Pain   Restrictions/Precautions   Precautions Bed/chair alarms;Cognitive;Fall Risk;Supervision on toilet/commode   Weight Bearing Restrictions No   ROM Restrictions No   Exercise Tools   Other Exercise Tool 1 BUE ther ex to maximize strength and endurance for ADLs and fxnl mobility. Completed w/ 4# dowel, 3x10 bicep curls, chest press, front arm raises, prograde and retrograde rows. Pt tolerated well w/ rest breaks between sets to manage fatigue.   Cognition   Overall Cognitive Status Impaired   Assessment   Treatment Assessment Pt seen for skilled OT session focusing on BUE strengthening. Cont OT POC: fxnl standing tolerance, short distance fxnl mobility w/ RW, and repetitive safety training. Plan for ADL tomorrow morning. Pt agreeable to rest in recliner, all needs within reach and alarm activated. From OT standpoint pt is on track to d/c home tomorrow w/ family support/24 hour supervision with recommendation for HHOT.   Prognosis Good   Problem List Decreased strength;Decreased endurance;Impaired balance;Decreased coordination;Decreased mobility;Decreased cognition;Impaired judgement;Decreased safety awareness;Decreased skin integrity   Plan   Treatment/Interventions Functional transfer training;Therapeutic exercise;Endurance training;Patient/family training   Progress Progressing toward goals   Discharge Recommendation   Rehab Resource Intensity Level, OT   (home w/ family to provide 24 hour S, HHOT)   OT Therapy Minutes   OT Time In 1000   OT Time Out 1030   OT Total Time (minutes) 30   OT Mode of treatment - Individual (minutes) 30   OT Mode of treatment - Concurrent (minutes) 0   OT Mode of treatment - Group (minutes) 0   OT Mode of treatment - Co-treat (minutes) 0   OT Mode of Treatment - Total time(minutes) 30 minutes   OT Cumulative Minutes 1040   Therapy Time missed   Time missed? No

## 2024-05-29 VITALS
HEIGHT: 73 IN | RESPIRATION RATE: 20 BRPM | WEIGHT: 182.5 LBS | HEART RATE: 87 BPM | BODY MASS INDEX: 24.19 KG/M2 | DIASTOLIC BLOOD PRESSURE: 71 MMHG | OXYGEN SATURATION: 93 % | TEMPERATURE: 98.5 F | SYSTOLIC BLOOD PRESSURE: 120 MMHG

## 2024-05-29 LAB
DME PARACHUTE DELIVERY DATE ACTUAL: NORMAL
DME PARACHUTE DELIVERY DATE REQUESTED: NORMAL
DME PARACHUTE ITEM DESCRIPTION: NORMAL
DME PARACHUTE ORDER STATUS: NORMAL
DME PARACHUTE SUPPLIER NAME: NORMAL
DME PARACHUTE SUPPLIER PHONE: NORMAL
GLUCOSE SERPL-MCNC: 101 MG/DL (ref 65–140)
GLUCOSE SERPL-MCNC: 136 MG/DL (ref 65–140)

## 2024-05-29 PROCEDURE — 94640 AIRWAY INHALATION TREATMENT: CPT

## 2024-05-29 PROCEDURE — 97535 SELF CARE MNGMENT TRAINING: CPT

## 2024-05-29 PROCEDURE — 99233 SBSQ HOSP IP/OBS HIGH 50: CPT | Performed by: PHYSICAL MEDICINE & REHABILITATION

## 2024-05-29 PROCEDURE — 94664 DEMO&/EVAL PT USE INHALER: CPT

## 2024-05-29 PROCEDURE — 94760 N-INVAS EAR/PLS OXIMETRY 1: CPT

## 2024-05-29 PROCEDURE — 99238 HOSP IP/OBS DSCHRG MGMT 30/<: CPT | Performed by: INTERNAL MEDICINE

## 2024-05-29 PROCEDURE — 97530 THERAPEUTIC ACTIVITIES: CPT

## 2024-05-29 PROCEDURE — 82948 REAGENT STRIP/BLOOD GLUCOSE: CPT

## 2024-05-29 RX ORDER — LEVALBUTEROL INHALATION SOLUTION 1.25 MG/3ML
1.25 SOLUTION RESPIRATORY (INHALATION)
Status: DISCONTINUED | OUTPATIENT
Start: 2024-05-29 | End: 2024-05-29 | Stop reason: HOSPADM

## 2024-05-29 RX ORDER — ALBUTEROL SULFATE 2.5 MG/3ML
2.5 SOLUTION RESPIRATORY (INHALATION) EVERY 6 HOURS PRN
Status: DISCONTINUED | OUTPATIENT
Start: 2024-05-29 | End: 2024-05-29 | Stop reason: HOSPADM

## 2024-05-29 RX ADMIN — ASPIRIN 81 MG CHEWABLE TABLET 81 MG: 81 TABLET CHEWABLE at 08:24

## 2024-05-29 RX ADMIN — LEVALBUTEROL HYDROCHLORIDE 1.25 MG: 1.25 SOLUTION RESPIRATORY (INHALATION) at 02:42

## 2024-05-29 RX ADMIN — IPRATROPIUM BROMIDE 0.5 MG: 0.5 SOLUTION RESPIRATORY (INHALATION) at 08:00

## 2024-05-29 RX ADMIN — FLUTICASONE FUROATE AND VILANTEROL TRIFENATATE 1 PUFF: 200; 25 POWDER RESPIRATORY (INHALATION) at 08:22

## 2024-05-29 RX ADMIN — METFORMIN HYDROCHLORIDE 500 MG: 500 TABLET, FILM COATED ORAL at 08:24

## 2024-05-29 RX ADMIN — PANTOPRAZOLE SODIUM 40 MG: 40 TABLET, DELAYED RELEASE ORAL at 05:37

## 2024-05-29 RX ADMIN — LEVALBUTEROL HYDROCHLORIDE 1.25 MG: 1.25 SOLUTION RESPIRATORY (INHALATION) at 08:00

## 2024-05-29 RX ADMIN — ALBUTEROL SULFATE 2 PUFF: 90 AEROSOL, METERED RESPIRATORY (INHALATION) at 08:28

## 2024-05-29 RX ADMIN — LISINOPRIL 10 MG: 10 TABLET ORAL at 08:24

## 2024-05-29 RX ADMIN — GUAIFENESIN 600 MG: 600 TABLET, EXTENDED RELEASE ORAL at 08:24

## 2024-05-29 RX ADMIN — FERROUS SULFATE TAB 325 MG (65 MG ELEMENTAL FE) 325 MG: 325 (65 FE) TAB at 08:24

## 2024-05-29 RX ADMIN — IPRATROPIUM BROMIDE 0.5 MG: 0.5 SOLUTION RESPIRATORY (INHALATION) at 02:42

## 2024-05-29 RX ADMIN — Medication 1 TABLET: at 08:24

## 2024-05-29 RX ADMIN — BUSPIRONE HYDROCHLORIDE 10 MG: 10 TABLET ORAL at 08:24

## 2024-05-29 RX ADMIN — UMECLIDINIUM 1 PUFF: 62.5 AEROSOL, POWDER ORAL at 08:23

## 2024-05-29 RX ADMIN — FOLIC ACID 1 MG: 1 TABLET ORAL at 08:24

## 2024-05-29 RX ADMIN — APIXABAN 5 MG: 5 TABLET, FILM COATED ORAL at 08:24

## 2024-05-29 RX ADMIN — FLUTICASONE PROPIONATE 1 SPRAY: 50 SPRAY, METERED NASAL at 08:23

## 2024-05-29 RX ADMIN — THIAMINE HCL TAB 100 MG 100 MG: 100 TAB at 08:24

## 2024-05-29 NOTE — PROGRESS NOTES
Physical Medicine and Rehabilitation Progress Note  Lucho Talavera 69 y.o. male MRN: [id number]  Unit/Bed#: [facility] 455-01 Encounter: [id number]    To Review: 69 year old M with PMH of COPD, multitude of ED visits/hospitalizations, DM2, CAD, EtOH abuse, HTN, prior CVA, found to have aflutter with RVR on hospitalization 4/2024 s/p BRITTNEY ablation who was to d/c on Eliquis but per report family did not know he needed it and he was not taking it who developed dysarthria, weakness and imbalance with recent fall at home and presented to hospital. CTA H/N showed Chronic left PCA territory infarct, new since the prior exam. No evidence of acute vascular territorial infarction, intracranial hemorrhage or mass.  No hemodynamically significant stenosis, dissection or occlusion of the carotid or vertebral arteries or major vessels of the [place].  Neuro consulted and recommended MRI brain which showed multiple infarcts of varying ages in the posterior circulation include multiple small acute/early subacute infarcts in the bilateral cerebellum. TTE did not show thrombus.  Neuro recommended Eliquis, aspirin, and statin. Patient was evaluated by skilled therapies and was found to have significant decline in ADLs and ambulation and appears appropriate for admission to [facility] [place].     Chief Complaint: Feeling well.     Interval History/Subjective:  No acute events overnight. Last BM 5/26. Continent of bladder. No new lightheadedness, dizziness, CP, SOB, fevers, chills, N/V, abdominal pain. Feels ready for discharge, and denies any new concerns at this point.     ROS:  A 10 point review of systems was negative except for what is noted in the HPI.      Today's Changes:  Very mild, occasional end expiratory wheeze on the R. Asked nursing to give albuterol prior to discharge.  Placed referral to Podiatry for outpatient follow-up of his R lateral malleolus wound. Home nursing to monitor site.  Sacrum is  red but blanching at discharge. Home nursing to follow.     Total visit time: 35 minutes, with more than 50% spent counseling/coordinating care. Counseling includes discussion with patient re: progress in therapies, functional issues observed by therapy staff, and discussion with patient regarding their current medical state and wellbeing. Coordination of patient's care was performed in conjunction with Internal Medicine service to monitor patient's labs, vitals, and management of their comorbidities.        Assessment/Plan:    * Stroke (cerebrum) ([facility])  Assessment & Plan  - MRI brain 5/8 - Multiple infarcts of varying ages in the posterior circulation include multiple small acute/early subacute infarcts in the bilateral cerebellum. Additional small foci of recent ischemia in the left PCA distribution superimposed on chronic infarct  No acute hemorrhage or mass effect  - CTA H/N -  1. Chronic left PCA territory infarct, new since the prior exam. No evidence of acute vascular territorial infarction,  intracranial hemorrhage or mass. 2. No hemodynamically significant stenosis, dissection or occlusion of the carotid or vertebral arteries or major vessels of the [place].  - Residual impairments on admission to [facility]: Impaired balance, coordination  - Co-morbidities most impacting functional recovery: COPD with chronic hypoxic respiratory failure, anxiety    Secondary stroke prevention  - Antithrombotic: Aspirin and Eliquis  - Statin  - Patient at increased risk for stroke particularly early in post-stroke period - monitor neuro exam closely with low threshold to repeat imaging  -  patient and if applicable caregiver on optimal stroke management  - Follow-up with neurology and PCP after d/c   - Recommend acute comprehensive interdisciplinary inpatient rehabilitation to include intensive skilled therapies (PT, OT, ST) as outlined with oversight and management by rehabilitation physician as well as inpatient  rehab level nursing, case management and weekly interdisciplinary team meetings.       Chronic bronchitis (HCC)  Assessment & Plan  Has had a multitude of ED visits related to COPD/concern for SOB but was frequently d/c'd same day  - Monitor also for anxiety and optimal mgmt of that; education on monitor home O2  IM consulted and with overall management at their discretion during ARC course  Monitor lung exam, vitals with and without activity  Encourage incentive spirometry  Breo/Incruse  Xopenex  PRN Albuterol  PRN supplemental O2 - has needed only sparingly here.  Mucinex     Sacral wound-resolved as of 5/28/2024  Assessment & Plan  Seen by wound care 5/7  - Managed R lateral forearm skin tear   - Noted to have old open area on sacrum on admission.   - Consulted wound care to follow-up. Epithelialized   - Offloading,specialty cushion   - Allevyn   - Monitor closely.       Insomnia  Assessment & Plan  Environmental interventions.  Sleep log  Drinks a lot of caffeinated sodas throughout the night   - Placed no caffeine order and review with nursing.    - Improvement with this  Added melatonin scheduled  5/16 added mirtazapine for both sleep side effect and anxiety.     Typical atrial flutter (HCC)  Assessment & Plan  IM consulted and with overall management at their discretion during ARC course  Rate control: None  Antithrombotic: Eliquis    - Cost is $71.28/month. Daughter confirmed this price is fine.      Hypertension  Assessment & Plan  Home: Lisinopril 40mg daily  Here: Lisinopril 5mg daily  Monitor vitals with and without activity; monitor for orthostasis  Monitor hemoglobin, electrolytes, kidney function, hydration status   Management as per IM.       Anxiety  Assessment & Plan  Cries out for help at times with anxiety  This improved here.  Asks for breathing treatments/O2 when this happens, but usually O2 sats and breath sounds are fine.  Reviewed PDMP - last prescribed Lorazepam by Dr. Villalpando in July of  last year, most recently by a hospitalist PA at [facility] (for only 10 tablets).  Has not used since 5/11. Will switch to hydroxyzine - has not used while here. Will plan to discontinue at discharge.  Adding mirtazapine at night to also assist with sleep/insomnia  Monitor response.     ETOH abuse  Assessment & Plan  Patient reports cutting down and only about 2 beers per day but occasionally liquor   Alcohol cessation counseling and supportive counseling  Optimal mood/stress mgmt   Thiamine, folate, MVI   PRN low dose ativan BID   Consider gabapentin as well     Type 2 diabetes mellitus (HCC)  Assessment & Plan  Lab Results   Component Value Date    HGBA1C 8.9 (H) 04/06/2024       Recent Labs     05/28/24  1519 05/28/24  2037 05/29/24  0603 05/29/24  1122   POCGLU 140 161* 101 136       Blood Sugar Average: Last 72 hrs:  (P) 262.5  Home: Metformin 1000mg Q12hr, Lantus 30 units at bedtime  Current meds: Lantus 18, Metformin 500mg Q12hr, HS, Lispro SS AC/HS, consistent carb diabetic diet  Nutrition consult  Management as per IM  Outpatient f/u with PCP      CAD (coronary artery disease)  Assessment & Plan  With hx of stenting  IM consulted and with overall management at their discretion during ARC course  Antithrombotic: Aspirin and Eliquis   Statin  Optimal blood pressure and blood sugar control        Health Maintenance  #Delirium/Sleep: At risk. Optimize sleep/wake, pain, bowel, bladder management. Avoid deliriogenic meds. Of note is on melatonin, ativan, and oxycodone PRN.   #Pain: Tylenol PRN  #Bowel: Last BM 5/26 and continent. Adding PRN miralax  #Bladder: Voiding and continent  #Skin/Pressure Injury Prevention: Turn Q2hr in bed, with weight shifts [id number] in wheelchair.  #DVT Prophylaxis:Fully anticoagulated on eliquis, SCDs  #GI Prophylaxis: PPI on for GERD  #Code Status: Full Code.   #FEN: Diabetic Diet  #Dispo: Team 5/28: ADD 5/29 with home PT/OT/RN with [facility] with goals to discharge to 1 level home with 2  HETAL, with supervision. Daughter for family training this week.   Will need f/u with Pulm, PCP, and Neuro    Objective:    Functional Update:  PT: Sup transfers, Ind bed mobility, Sup ambulation 200' with RW, Sup stairs and ramp  OT: Ind with eating, Sup grooming, Sup bathing, Sup UB dressing, Sup LB dressing, Sup toileting, Sup tub/shower transfer, Sup toilet transfers   SLP:  Dysphagia resolved and on reg/thins with patient/family education completed, motor speech with HEP, mod A memory, Deanna comprehension, Sup problem solving and expression and family has received family training. No SLP at discharge.     Allergies per EMR    Physical Exam:  Temp:  [97.6 °F (36.4 °C)-98.5 °F (36.9 °C)] 98.5 °F (36.9 °C)  HR:  [59-84] 59  Resp:  [16-20] 20  BP: ()/(59-73) 130/73  Oxygen Therapy  SpO2: 92 %  O2 Flow Rate (L/min): 2 L/min    Gen: No acute distress, Well-nourished, well-appearing.  HEENT: Moist mucus membranes, Normocephalic/Atraumatic  Cardiovascular: Regular rate, rhythm, S1/S2. Distal pulses palpable  Heme/Extr: No edema  Pulmonary: Non-labored breathing. Lungs CTAB  : No antony  GI: Soft, non-tender, non-distended. BS+  MSK: ROM is WFL in all extremities. No effusions or deformities. Bulk is symmetric. See below for MMT scores.   Integumentary: Skin is warm, dry. R ankle with blanchable erythema and wound that is stable with minimal drainage. Sacrum erythematous but blanchable without any open wounds.   Neuro: AAOx3, CN 2-12 intact. Speech is dysarthric, but improved Appropriate to questioning. Tone is normal. Still issues with some motor planning/coordination worse on the L. Strength overall is quite full though.   MMT:   Strength:   Right  Left  Site  Right  Left  Site    5 5  S Ab: Shoulder Abductors  5  5  HF: Hip Flexors    5 5  EF: Elbow Flexors  5  5 KF: Knee Flexors    5  5  EE: Elbow Extensors  5  5  KE: Knee Extensors    5  5  WE: Wrist Extensors  5  5  DR: Dorsi Flexors    5  5  FF: Finger  Flexors  5  5  PF: Plantar Flexors    5  5  HI: Hand Intrinsics  5  5  EHL: Extensor Hallucis Longus   Psych: Normal mood and affect.     Diagnostic Studies: Reviewed, no new imaging    Laboratory:  Reviewed   Results from last 7 days   Lab Units 05/25/24  0506   HEMOGLOBIN g/dL 11.0*   HEMATOCRIT % 35.8*   WBC Thousand/uL 6.93       Results from last 7 days   Lab Units 05/25/24  0506   BUN mg/dL 9   POTASSIUM mmol/L 3.5   CHLORIDE mmol/L 106   CREATININE mg/dL 0.42*              Patient Active Problem List   Diagnosis    Closed odontoid fracture with routine healing    Closed displaced fracture of third cervical vertebra ([facility])    Closed displaced fracture of fourth cervical vertebra ([facility])    CAD (coronary artery disease)    Dens fracture ([facility])    Acute blood loss anemia    Type 2 diabetes mellitus ([facility])    S/P C1-T1 Posterior Cervical Discectomy and Fusion on 9/10/18    At moderate risk for venous thromboembolism (VTE)    Chronic bronchitis ([facility])    Closed fracture of first cervical vertebra ([facility])    ETOH abuse    KLARISSA (obstructive sleep apnea)    Dirty living conditions    Bronchitis    Diabetic polyneuropathy associated with type 2 diabetes mellitus ([facility])    Hammertoe, bilateral    Post-traumatic arthritis of left foot    Pain due to onychomycosis of toenail    Oral abscess    Tooth fracture    Closed nondisplaced fracture of posterior arch of first cervical vertebra ([facility])    Fall    Stage 3 severe COPD by GOLD classification ([facility])    Anxiety    Hypertension    COPD (chronic obstructive pulmonary disease) ([facility])    SIRS (systemic inflammatory response syndrome) ([facility])    History of alcohol abuse    Iron deficiency anemia secondary to inadequate dietary iron intake    COVID-19    Atrial flutter with rapid ventricular response ([facility])    Leukocytosis    Typical atrial flutter ([facility])    Stroke (cerebrum) ([facility])    Stroke-like symptom    Insomnia    History of cardiac arrest         Medications  Current  Facility-Administered Medications   Medication Dose Route Frequency Provider Last Rate    acetaminophen  650 mg Oral Q6H PRN Richmond Chew, DO      albuterol  2 puff Inhalation Q4H PRN William Camacho, DO      albuterol  2.5 mg Nebulization Q6H PRN William Camacho, DO      apixaban  5 mg Oral BID Richmond Chew, DO      aspirin  81 mg Oral Daily [facility], DO      atorvastatin  80 mg Oral Daily With Dinner [facility], DO      busPIRone  10 mg Oral TID [facility], DO      ferrous sulfate  325 mg Oral Daily With Breakfast [facility], DO      fluticasone  1 spray Nasal BID [facility], DO      fluticasone-vilanterol  1 puff Inhalation Daily [facility], DO      folic acid  1 mg Oral Daily [facility], DO      guaiFENesin  600 mg Oral BID [facility], DO      hydrOXYzine HCL  25 mg Oral BID PRN Ashley Depadua, MD      insulin glargine  18 Units Subcutaneous HS TATIANA Paulino      insulin lispro  1-5 Units Subcutaneous TID AC Paramjitzay Osorioel, DO      ipratropium  0.5 mg Nebulization TID William Camacho, DO      levalbuterol  1.25 mg Nebulization TID William Camacho, DO      lisinopril  10 mg Oral Daily TATIANA Paulino      melatonin  6 mg Oral QPM Ashley Depadua, MD      metFORMIN  500 mg Oral BID With Meals TATIANA Paulino      mirtazapine  7.5 mg Oral QPM Ashley Depadua, MD      multivitamin-minerals  1 tablet Oral Daily [facility], DO      oxyCODONE  2.5 mg Oral Q4H PRN [facility], DO      pantoprazole  40 mg Oral Early Morning [facility], DO      thiamine  100 mg Oral Daily [facility], DO      umeclidinium  1 puff Inhalation Daily [facility], DO            ** Please Note: Fluency Direct voice to text software may have been used in the creation of this document. **

## 2024-05-29 NOTE — PROGRESS NOTES
05/29/24 1230   Pain Assessment   Pain Assessment Tool 0-10   Pain Score No Pain   Restrictions/Precautions   Precautions Cognitive;Bed/chair alarms;Fall Risk;Supervision on toilet/commode   Cognition   Overall Cognitive Status Impaired   Arousal/Participation Alert;Cooperative   Subjective   Subjective pt smiled and confirmed he is going home today. He was agreeable to have PT.   Roll Left and Right   Type of Assistance Needed Independent   Roll Left and Right CARE Score 6   Sit to Lying   Type of Assistance Needed Independent   Sit to Lying CARE Score 6   Lying to Sitting on Side of Bed   Type of Assistance Needed Independent   Lying to Sitting on Side of Bed CARE Score 6   Sit to Stand   Type of Assistance Needed Supervision;Adaptive equipment;Verbal cues   Comment RW - self corrects hand placement with extra time   Sit to Stand CARE Score 4   Bed-Chair Transfer   Type of Assistance Needed Supervision;Verbal cues;Adaptive equipment   Comment RW   Chair/Bed-to-Chair Transfer CARE Score 4   Car Transfer   Type of Assistance Needed Supervision;Verbal cues;Adaptive equipment   Comment RW -simulated with nu step seat   Car Transfer CARE Score 4   Walk 10 Feet   Type of Assistance Needed Supervision;Adaptive equipment   Walk 10 Feet CARE Score 4   Walk 50 Feet with Two Turns   Type of Assistance Needed Supervision;Adaptive equipment   Walk 50 Feet with Two Turns CARE Score 4   Walk 150 Feet   Type of Assistance Needed Supervision;Adaptive equipment   Comment 200' x 3 with RW   Walk 150 Feet CARE Score 4   Walking 10 Feet on Uneven Surfaces   Type of Assistance Needed Supervision;Adaptive equipment   Comment floor mat with RW x 2 trials   Walking 10 Feet on Uneven Surfaces CARE Score 4   Wheel 50 Feet with Two Turns   Reason if not Attempted Activity not applicable   Wheel 50 Feet with Two Turns CARE Score 9   Wheel 150 Feet   Reason if not Attempted Activity not applicable   Wheel 150 Feet CARE Score 9   Curb or  Single Stair   Style negotiated Single stair   Type of Assistance Needed Adaptive equipment;Verbal cues;Supervision   1 Step (Curb) CARE Score 4   4 Steps   Type of Assistance Needed Incidental touching;Verbal cues;Adaptive equipment   4 Steps CARE Score 4   12 Steps   Type of Assistance Needed Supervision;Verbal cues;Adaptive equipment   Comment CS on FF with both hands on R rail reciprocal pattern, both hands on L rail NR pattern descending fwd facing   12 Steps CARE Score 4   Picking Up Object   Type of Assistance Needed Supervision;Adaptive equipment   Comment RW + reacher - small bottle of lotion   Picking Up Object CARE Score 4   Therapeutic Interventions   Balance standing balance/tolerance training with 1 hand support on walker while tossing bean bags initially with R hand then L hand after a short seated rest break - no unsteadiness even while reaching diagonally to get bean bags however expresses frustration if unable to land bean bags in the hole requiring reassurance/redirection   Equipment Use   NuStep Lvl 4 x 10 mins, SPM> 65   Assessment   Treatment Assessment pt tolerated tx well completing task consistently at S level including FF negotiation without any unsteadiness or LOB. Although still requires VC to observe upright posture by encouraging to look straight ahead while ambulating with RW. no reported concerns expressed regarding home d/c later and pt assisted back to recliner post tx with all needs within reach and chair alarm on.   Family/Caregiver Present no   Barriers to Discharge None   Plan   Treatment/Interventions Functional transfer training;LE strengthening/ROM;Therapeutic exercise;Endurance training;Gait training;Bed mobility;Spoke to nursing;OT   Progress Progressing toward goals   PT Therapy Minutes   PT Time In 1230   PT Time Out 1323   PT Total Time (minutes) 53   PT Mode of treatment - Individual (minutes) 53   PT Mode of treatment - Concurrent (minutes) 0   PT Mode of treatment -  Group (minutes) 0   PT Mode of treatment - Co-treat (minutes) 0   PT Mode of Treatment - Total time(minutes) 53 minutes   PT Cumulative Minutes 1386   Therapy Time missed   Time missed? No

## 2024-05-29 NOTE — PHYSICAL THERAPY NOTE
ARC PT Discharge Summary  Pt demonstrated good progress in PT presented to [facility] s/p CVA with noted impairments on eval include dec cognition with anxiety, core and LE strength deficits, standing static & dynamic balance impairment, gait dysfunction, dec endurance with pt on 2L supplemental O2 resulting to significant functional declined. Pt improved from requiring 1-2 person on eval to S level using a RW including CS FF negotiation with R rail or curb with RW at d/c. No DME needs identified at this admission since pt already has a RW at home. Pt and family education and family training completed with arden Rinaldi with good carry over of safety practices and proper techniques noted. Pt also  completed stroke ed series and was instructed with verbal training and modelling for HEP with handout provided to promote maintenance of increased muscle strength and endurance upon return to home. Pt was d/c on 5/29/24 to home with family support and home PT services to address ongoing rehab needs such as improving standing dynamic balance, increase strength, improve activity tolerance to improve overall functions with reduce risk for falls.

## 2024-05-29 NOTE — ASSESSMENT & PLAN NOTE
Home:  Lisinopril 40mg qd  Here:  continue lisinopril 10mg qd 5/28/2024   Continue this dose on DC  Monitor BP and follow up with PCP for possible titration if needed

## 2024-05-29 NOTE — PROGRESS NOTES
05/29/24 0907   Pain Assessment   Pain Assessment Tool 0-10   Pain Score No Pain   Restrictions/Precautions   Precautions Bed/chair alarms;Cognitive;Fall Risk;Supervision on toilet/commode   Weight Bearing Restrictions No   ROM Restrictions No   Eating   Type of Assistance Needed Independent   Physical Assistance Level No physical assistance   Eating CARE Score 6   Oral Hygiene   Type of Assistance Needed Set-up / clean-up   Physical Assistance Level No physical assistance   Comment seated, CS in stance at sink.   Oral Hygiene CARE Score 5   Shower/Bathe Self   Type of Assistance Needed Supervision;Verbal cues;Set-up / clean-up   Physical Assistance Level No physical assistance   Comment seated to bathe BUE and BLE. S in stance to bathe groin and buttocks, req cues to improve thoroughness at times.   Shower/Bathe Self CARE Score 4   Tub/Shower Transfer   Reason Not Assessed Sponge Bath;Patient refusal  (Pt requesting sponge bath, unagreeable to shower.)   Upper Body Dressing   Type of Assistance Needed Set-up / clean-up   Physical Assistance Level No physical assistance   Comment seated   Upper Body Dressing CARE Score 5   Lower Body Dressing   Type of Assistance Needed Supervision   Physical Assistance Level No physical assistance   Comment seated to thread BLE, S in stance for clothing management.   Lower Body Dressing CARE Score 4   Putting On/Taking Off Footwear   Type of Assistance Needed Supervision;Set-up / clean-up   Physical Assistance Level No physical assistance   Comment seated, crossed leg technique.   Putting On/Taking Off Footwear CARE Score 4   Lying to Sitting on Side of Bed   Type of Assistance Needed Independent   Physical Assistance Level No physical assistance   Lying to Sitting on Side of Bed CARE Score 6   Sit to Stand   Type of Assistance Needed Supervision;Adaptive equipment   Physical Assistance Level No physical assistance   Comment S w/ RW   Sit to Stand CARE Score 4   Bed-Chair  Transfer   Type of Assistance Needed Supervision;Adaptive equipment   Physical Assistance Level No physical assistance   Comment S w/ RW, short distance fxnl mobility   Chair/Bed-to-Chair Transfer CARE Score 4   Toileting Hygiene   Type of Assistance Needed Supervision;Adaptive equipment   Physical Assistance Level No physical assistance   Comment seated for hygiene, S in stance at RW for clothing management.   Toileting Hygiene CARE Score 4   Toilet Transfer   Type of Assistance Needed Supervision;Adaptive equipment   Physical Assistance Level No physical assistance   Comment S w/ RW to BSC over toilet. Pt will have same set-up at d/c.   Toilet Transfer CARE Score 4   Cognition   Overall Cognitive Status Impaired   Additional Activities   Additional Activities Comments Incentive spirometer x10, flutter valve x30. Occ cues to improve technique. Tolerated well w/ inc time provided.   Assessment   Treatment Assessment Pt seen for skilled OT session focusing on self-care management and breathing exercises. Details on sponge bath ADL noted above, completed at set-up and supervision req occ cues for task initiation and thoroughness. DME ordered: standard BSC. FT was completed w/ pt's daughter and receptive to all recommendations and able to provide 24 hour S 2* cognitive deficits. Pt agreeable to rest in recliner, all needs within reach and alarm activated. From OT standpoint pt is on track to d/c home today w/ family support/24 hour S w/ recommendation for HHOT.   Plan   Treatment/Interventions ADL retraining;Functional transfer training;Endurance training;Patient/family training   Progress Progressing toward goals   Discharge Recommendation   Rehab Resource Intensity Level, OT   (home w/ family to provide 24 hour S, HHOT)   OT Therapy Minutes   OT Time In 0907   OT Time Out 1000   OT Total Time (minutes) 53   OT Mode of treatment - Individual (minutes) 53   OT Mode of treatment - Concurrent (minutes) 0   OT Mode of  treatment - Group (minutes) 0   OT Mode of treatment - Co-treat (minutes) 0   OT Mode of Treatment - Total time(minutes) 53 minutes   OT Cumulative Minutes 1123   Therapy Time missed   Time missed? No

## 2024-05-29 NOTE — ASSESSMENT & PLAN NOTE
HbA1C 8.9 on 4/6/24  Home:  Basaglar 30U qhs/Metformin  Here:  Lantus 18U qhs/Metformin 500mg BID  Continue with accuchecks/SSI/DM diet  DC home on above dosing and monitor BS closely  F/u PCP for possible need for titration back to home dosing once back at home and eating his normal diet

## 2024-05-29 NOTE — NURSING NOTE
Patient stable for discharge home with daughter. Instructions reviewed and verbalized understanding

## 2024-05-29 NOTE — RESPIRATORY THERAPY NOTE
RT Protocol Note  Lucho Talavera 69 y.o. male MRN: [id number]  Unit/Bed#: -01 Encounter: [id number]    Assessment    Principal Problem:    Stroke (cerebrum) (HCC)  Active Problems:    CAD (coronary artery disease)    Type 2 diabetes mellitus (HCC)    Chronic bronchitis (HCC)    ETOH abuse    Anxiety    Hypertension    Typical atrial flutter (HCC)    Insomnia    History of cardiac arrest      Home Pulmonary Medications:    Home Devices/Therapy: Home O2    Past Medical History:   Diagnosis Date    Cardiac arrest (HCC)     COPD (chronic obstructive pulmonary disease) (HCC)     CVA (cerebral vascular accident) (HCC)     Diabetes mellitus (HCC)     Heart attack (HCC)     Hypertension     Stroke (HCC)      Social History     Socioeconomic History    Marital status: Single     Spouse name: None    Number of children: None    Years of education: None    Highest education level: None   Occupational History    None   Tobacco Use    Smoking status: Former     Types: Cigarettes    Smokeless tobacco: Never    Tobacco comments:     quit 5 months ago    Vaping Use    Vaping status: Never Used   Substance and Sexual Activity    Alcohol use: Not Currently     Alcohol/week: 8.0 - 12.0 standard drinks of alcohol     Types: 8 - 12 Cans of beer per week     Comment: every day drinker    Drug use: Never    Sexual activity: Not Currently   Other Topics Concern    None   Social History Narrative    ** Merged History Encounter **         ** Merged History Encounter **         ** Merged History Encounter **          Social Determinants of Health     Financial Resource Strain: Low Risk  (2/1/2024)    Received from [facility]    Overall Financial Resource Strain (CARDIA)     Difficulty of Paying Living Expenses: Not very hard   Food Insecurity: No Food Insecurity (5/12/2024)    Hunger Vital Sign     Worried About Running Out of Food in the Last Year: Never true     Ran Out of Food in the Last Year: Never true  "  Transportation Needs: No Transportation Needs (5/12/2024)    PRAPARE - Transportation     Lack of Transportation (Medical): No     Lack of Transportation (Non-Medical): No   Physical Activity: Inactive (4/7/2023)    Received from [facility]    Exercise Vital Sign     Days of Exercise per Week: 0 days     Minutes of Exercise per Session: 0 min   Stress: No Stress Concern Present (4/7/2023)    Received from [facility]    [employer] - Occupational Stress Questionnaire     Feeling of Stress : Only a little   Social Connections: Socially Isolated (4/7/2023)    Received from [facility]    Social Connection and Isolation Panel [NHANES]     Frequency of Communication with Friends and Family: Never     Frequency of Social Gatherings with Friends and Family: Never     Attends [employer] Services: Never     Active Member of Clubs or Organizations: No     Attends Club or Organization Meetings: Never     Marital Status:    Intimate Partner Violence: Not At Risk (2/1/2024)    Received from [facility]    Humiliation, Afraid, Rape, and Kick questionnaire     Fear of Current or Ex-Partner: No     Emotionally Abused: No     Physically Abused: No     Sexually Abused: No   Housing Stability: Low Risk  (5/12/2024)    Housing Stability Vital Sign     Unable to Pay for Housing in the Last Year: No     Number of Places Lived in the Last Year: 1     Unstable Housing in the Last Year: No       Subjective         Objective    Physical Exam:        Vitals:  Blood pressure 132/68, pulse 81, temperature 97.6 °F (36.4 °C), temperature source Axillary, resp. rate 17, height 6' 1\" (1.854 m), weight 82.8 kg (182 lb 8 oz), SpO2 98%.          Imaging and other studies:     O2 Device: ra     Plan    Respiratory Plan: Home Bronchodilator Patient pathway        Resp Comments: pt given treatment. Pt changed to tid xopenex, atrovent. pt with clear " diminished breath sounds. Will order albuterol neb q6 prn.

## 2024-05-29 NOTE — ASSESSMENT & PLAN NOTE
Presented with increased weakness, dysarthria and leaning on the left side x 5 days with fall 2 days prior to admission in the setting of noncompliance with Eliquis  CTA with chronic left PCA territory infarction  MRI with multiple infarcts of varying ages and posterior circulation including multiple small acute/early subacute infarct in the bilateral cerebellum with additional small foci of recent ischemia in the left PCA distribution superimposed on chronic infarct  Echo with EF 55%, no cardiac thrombus  Maintained on aspirin and Eliquis  Eliquis is to be $71.28 = daughter aware and OK with the price  Continue Lipitor 80 mg daily  Outpatient follow-up with neurology  DC 5/29/2024=stable from medicine

## 2024-05-29 NOTE — ASSESSMENT & PLAN NOTE
Previously admitted for atrial flutter with RVR in 4/8/2024 s/p ablation and loop recorder insertion.    Sutures removed 5/17/24  Anticoagulated on Eliquis  F/u OP cardiology

## 2024-05-30 ENCOUNTER — PATIENT OUTREACH (OUTPATIENT)
Dept: CASE MANAGEMENT | Facility: OTHER | Age: 69
End: 2024-05-30

## 2024-05-30 ENCOUNTER — HOME CARE VISIT (OUTPATIENT)
Dept: HOME HEALTH SERVICES | Facility: HOME HEALTHCARE | Age: 69
End: 2024-05-30
Payer: MEDICARE

## 2024-05-30 PROCEDURE — 400013 VN SOC

## 2024-05-30 PROCEDURE — G0151 HHCP-SERV OF PT,EA 15 MIN: HCPCS

## 2024-05-30 PROCEDURE — 10330081 VN NO-PAY CLAIM PROCEDURE

## 2024-05-30 NOTE — PROGRESS NOTES
OP RT CM received incoming ADT alert. Patient was discharged to home. OP RT CM will outreach later today.   __________________________________   .OP RT CM called and spoke with patients' daughter Nimco regarding his breathing, medications and O2.    Nimco stated he is using Trelegy inhaler Qday and rinsing out mouth. She stated in the hospital since this was not on their formulary he was receiving Advair and Spiriva. Nimco stated her dad did not qualify for [employer] financial assistance. We discussed [employer] and I gave her the information regarding. Otherwise her dad has Medicare and can get nebulized solutions in leiu f the Trelegy inhaler billed under Medicare Part B and be little or not cost thru the DME. Nimco will inquire with [employer] first and reach out to Dr. Villalpando with [facility] regarding nebulized therapy. I encouraged an appt. As well for her dad since he hasn't seen pulmonary in a while.   Nimco stated her dad not needed Duoneb as much as in the past.  He is getting treatments accordingly but not asking excessively as in the past.   We discussed breathing techniques and Nimco has videos she has her dad watch.  Nimco stated he is not raising any sputum at this time and wearing O2 at 2lpm. Nimco stated they have a pulse ox but hasn't checked today.   It appears that Lucho is at his baseline with his breathing and per Nimco anxiety plays a role as well.  Home health PT is arriving this afternoon.    OP RT CM will outreach in two weeks and Nimco has my contact information.

## 2024-05-30 NOTE — SPEECH THERAPY NOTE
SLP Discharge Summary    Pt was discharged home w/ family support/supervision on 5/29/2024. Throughout pt's stay on the acute rehab center, pt was able to make slower and steady progress towards overall goals given dysphagia, speech and cognitive linguistic skills. As for swallow function, pt was able to remain on regular diet w/ thin liquids w/o increasing overt aspiration sxs throughout meal. As for pt's speech/cognitive skills at time of discharge, pt was able to demonstrate more consistently supervision-min A for comprehension, supervision level for expression, social interactions and basic executive functions and min-mod A for memory. At time of discharge, no further ST services recommended as pt is demonstrating baseline functioning given cognitive linguistic skills which dtr had confirmed in family training sessions.      In regards to cognition: Pt completed CLQT+ in which a Composite Severity Rating score of 2.2 out of 4.0, correlating to overall moderately impaired cognitive linguistic impairments at time of evaluation and in comparison to age matched peers ranging from 70-88 y/o. Cognitive barriers which present include: decreased attention, LT memory recall, ST memory recall , problem solving, reasoning, sequencing, organization of thoughts, judgement, slower processing, and insight, which still impacts pt's overall safety, functional cognitive communication skills as well as functional mobility. The following interventions are used to target these barriers, including verbal problem solving task, visual memory recall tasks, drawing conclusions activities, written sequencing tasks, categorization tasks , picture problem solving activities, verbal reasoning tasks, verbal review of current medications, written review of medications,  written health management tasks, verbal health management tasks, visual attention tasks, functional reading tasks , time management activities, and family education/training.  Also family training/education w/ pt's dtr, Nimco on 5/24/2024 and reviewed the following: Cognition: Last topic discussed was pt's overall cognitive linguistic skills, in which SLP had educated pt and pt's dtr that he has improved over time throughout his stay to where his basic cognitive skills are functional. Higher level tasks still requires some assistance for ability to complete task, but SLP did confirm w/ dtr that she does complete ALL I ADL tasks.  SLP engaged in generalized review of tasks and activities which have been targeted in sessions, even discussion Stroke Education. SLP providing the patient booklet: What You Need to Know About Stroke: A Patient Resource, including the BE FAST acronym on the back of booklet, providing dtr some basic stroke 101 information. SLP did state to dtr that pt was able to recognize his risk factors for stroke, which she was surprised, but thankful he does know them. Current recommendations at time of discharge are for no continued skilled SLP services as pt is at baseline functioning which dtr was in agreement and able to confirm this as well.      Pt completed Motor Speech Eval in which pt is demonstrating speech intelligibility to be mildly impaired at word/phrase level but more moderately impaired at the sentence/conversational level. Speech overall characterized as having decreased breath support for speech, decreased coordination of respiration with speech attempts, imprecise articulation, and rapid rate. Speech/language barriers which present include: decreased intelligibility decreased at sentence and conversational speech levels, imprecise articulation , faster rate of speech, and decreased breath support to sustain speech which still impacts pt's overall safety, functional cognitive communication skills as well as functional mobility.  The following interventions are used to target these barriers, including completion of OME's, speech drills. Also family  training/education w/ pt's dtr, Nimco on 5/24/2024 and reviewed the following: Speech: SLP reviewing w/ pt and pt's dtr about how overall speech clarity is improving, to where dtr also noticing improvement in speech, to where SLP did educate that pt does still demonstrate decreased breath support at times w/ lengthier speech output. Dtr did report this occurred at home due to COPD. SLP stating that OME's have been completed by pt and reviewed to where he can complete them independently at this time.       Pt completed bedside dysphagia assessment in which pt was presenting w/ minimal to mild oral and mild pharyngeal dysphagia. Symptoms or concerns included decreased mastication, decreased bolus formation, delayed oral intiation, oral residue with solids , and residue effectively cleared with increased time, finger sweeps, liquid wash in addition to suspected pharyngeal swallow delay, suspected decreased hyolaryngeal elevation upon palpation, audible swallows, and inconsistent throat clear vs hawking given meals. Dysphagia barriers which present include the following risks for aspiration such as: poor positioning, decreased cognition, ineffective mastication, delay in oral transit, delay in swallow initiation, pharyngeal weakness upon swallowing, and inconsistent cough/throat clear/hawking given meals. In order to address the noted barriers, skilled SLP services will address this by targeting the following interventions: oral motor exercise, proper positioning, diet modification, safe swallow strategies, and family education/training. Current diet is regular w/ thin liquids. Of note, pt had brief f/u for dysphagia tx sessions, to where pt demonstrated least restrictive diet w/o increased oropharyngeal sxs. Upon last tx sessions, pt presented w/ minimal to functional oral and functional pharyngeal dysphagia where symptoms or concerns included slower but effective mastication of solids and no overt aspiration sxs  observed during meal but still does have productive cough post meals due to pre-existing congestion. Pt had been demonstrating ability to use swallow strategies throughout meal w/o difficulty or prompting by SLP. Positioning was more paramount to decrease possible risk of aspiration if in bed. Recommendations are to continue regular diet w/ thin liquids at this time, still continuing aspiration precautions. No further dysphagia tx sessions warranted at this time where focus to be on cognitive and speech skills. Also family training/education w/ pt's dtr, Nimco on 5/24/2024 and reviewed the following: Dysphagia: SLP educating that pt was being followed early during stay on the acute rehab targeting his swallow function and ensuring that pt is not going to be at risk for aspiration given meals. SLP stated that given pt's baseline cough  (which pt was exhibiting throughout session), this is not a concern for pt aspirating, but more related to pre-existing congestion. SLP reviewing swallow strategies w/ dtr, more so about positioning and how being full upright is the best overall. Reviewed that pt was good at demonstrating slow intake rate, alternating solids/liquids, allowing full mastication of foods. No further concerns for dysphagia is warranted at this time.

## 2024-05-31 ENCOUNTER — HOME CARE VISIT (OUTPATIENT)
Dept: HOME HEALTH SERVICES | Facility: HOME HEALTHCARE | Age: 69
End: 2024-05-31
Payer: MEDICARE

## 2024-05-31 VITALS
TEMPERATURE: 97.7 F | DIASTOLIC BLOOD PRESSURE: 64 MMHG | OXYGEN SATURATION: 95 % | HEART RATE: 84 BPM | SYSTOLIC BLOOD PRESSURE: 124 MMHG

## 2024-05-31 PROCEDURE — G0321 AUDIO-ONLY HHS: HCPCS

## 2024-05-31 NOTE — CASE COMMUNICATION
"[place] Talavera  [facility]'s [facility] has admitted your patient to Home Health service with the following disciplines:      PT, OT, SLP  This report is informational only, no response is needed  Primary focus of home health care: cardiovascular  Patient stated goals of care: \"I want to be able to walk wherever I want without getting too tired.\"  Anticipated visit pattern and next visit date 2w4, next visit TBD  See medication list - meds in home  differ from AVS: Patient is not taking Spiriva, Advair, and Proventil but is instead taking Trelegy 1 puff 1x/day  Patient's daughter reports she was also giving his his Lantus insulin pen 10units 3x/daily with meals in addition to his Basaglar  KwikpPen 18 units at bed time  PT reviewed the AVS with the patient and his daughter with updated instructions pertaining to the changes in medications  Needs refills for Lipitor, Pulmicort nebu lizer solution  Major interactions between aspirin and apixaban as well as busPIRone and mirtazapine  Significant clinical findings: poor endurance, impaired short term memory  Potential barriers to goal achievement: cognitive impairment  Other pertinent information: n/a    Thank you for allowing us to participate in the care of your patient.    "

## 2024-05-31 NOTE — PROGRESS NOTES
05/31/24 1523   Hello, [Guardian’s Name / Patient’s Name], this is [Caller Name] from [facility], and our clinical care team wanted to check on you / your child after your recent visit to the hospital. It will only take 3-5 minutes. Is this a good time?   Discharge Call Type/ Specific Diagnosis: ARC Stroke   ARC Stroke Follow-up   ARC Stroke Follow-Up Time Frame 5 Day follow up   Call Complete   Attempted Number of Calls 2   Discharge phone call complete? Left Message

## 2024-05-31 NOTE — CASE MANAGEMENT
Team dc summary: Pt made good progress in rehab program, pt dc'd with [facility] RN PT OT and no dme. Pt's daughter in frequent for family training and family discussions. Daughter present for dci and transport home.

## 2024-06-03 ENCOUNTER — HOME CARE VISIT (OUTPATIENT)
Dept: HOME HEALTH SERVICES | Facility: HOME HEALTHCARE | Age: 69
End: 2024-06-03
Payer: MEDICARE

## 2024-06-03 PROCEDURE — G0152 HHCP-SERV OF OT,EA 15 MIN: HCPCS

## 2024-06-04 ENCOUNTER — HOME CARE VISIT (OUTPATIENT)
Dept: HOME HEALTH SERVICES | Facility: HOME HEALTHCARE | Age: 69
End: 2024-06-04
Payer: MEDICARE

## 2024-06-04 VITALS — DIASTOLIC BLOOD PRESSURE: 81 MMHG | SYSTOLIC BLOOD PRESSURE: 121 MMHG | OXYGEN SATURATION: 98 % | HEART RATE: 56 BPM

## 2024-06-04 PROCEDURE — G0153 HHCP-SVS OF S/L PATH,EA 15MN: HCPCS

## 2024-06-05 ENCOUNTER — HOME CARE VISIT (OUTPATIENT)
Dept: HOME HEALTH SERVICES | Facility: HOME HEALTHCARE | Age: 69
End: 2024-06-05
Payer: MEDICARE

## 2024-06-05 PROCEDURE — G0180 MD CERTIFICATION HHA PATIENT: HCPCS | Performed by: PHYSICAL MEDICINE & REHABILITATION

## 2024-06-06 ENCOUNTER — HOME CARE VISIT (OUTPATIENT)
Dept: HOME HEALTH SERVICES | Facility: HOME HEALTHCARE | Age: 69
End: 2024-06-06
Payer: MEDICARE

## 2024-06-06 PROCEDURE — G0155 HHCP-SVS OF CSW,EA 15 MIN: HCPCS

## 2024-06-06 PROCEDURE — G0152 HHCP-SERV OF OT,EA 15 MIN: HCPCS

## 2024-06-06 NOTE — Clinical Note
Pt noted with new 3.5 x 2 cm skin tear on R lateral elbow. Pt unsure how it happened. Left voicemail for PCP as no answer to the nurse line at 1:55 PM regarding wound care instructions.     Pt's daughter reporting pt will be out of town 6/12/24 and 6/13/24. Please schedule around those dates.

## 2024-06-06 NOTE — CASE COMMUNICATION
Patient's daughter texts PT to cancel today's visit. Next visit will be Friday. Patient will be out of compliance with visit set this week as a result.

## 2024-06-07 ENCOUNTER — HOME CARE VISIT (OUTPATIENT)
Dept: HOME HEALTH SERVICES | Facility: HOME HEALTHCARE | Age: 69
End: 2024-06-07
Payer: MEDICARE

## 2024-06-07 VITALS
HEART RATE: 95 BPM | TEMPERATURE: 98.2 F | DIASTOLIC BLOOD PRESSURE: 80 MMHG | OXYGEN SATURATION: 96 % | SYSTOLIC BLOOD PRESSURE: 112 MMHG

## 2024-06-07 VITALS — OXYGEN SATURATION: 98 % | HEART RATE: 77 BPM | DIASTOLIC BLOOD PRESSURE: 70 MMHG | SYSTOLIC BLOOD PRESSURE: 117 MMHG

## 2024-06-07 PROCEDURE — G0151 HHCP-SERV OF PT,EA 15 MIN: HCPCS

## 2024-06-07 NOTE — CASE COMMUNICATION
"This message is informative only.  No action required.    [facility] made 6.4.24    Impression.  Observed possible mild pharyngeal stage dysphagia characterized by  mild. moderate congested throat clearing and congested cough on thin liquids. Mild oral stage dysphagia characterized by mildly labored mastication.  Observed moderate dysarthria characterized by limited articulatory postures particularly on phonemes t.d.n and l phonemes.    Mild.Moderate expressive aphasia characterized by anomia and circumlocutions. ex.  said \"bracelet\"  for    \"necklace\"    80 percent oriented.    Hearing and vision reportedly WFL.     Rec.  Sp tx. 1x1, 2 x 3 wks.   Cont informal eval  Practice assignments given  Cont on soft foods--Dysphagia level 3 with compensatory techniques  Trial naturally NT liquids-- ex. add applesauce to apple juice, cranberry juice    Aspiration Precautions--mo niter temp and lung conditon.  Contact DrTobias if change in either one.  Moniter weight--Pt reportedly weights 189 lbs. today 6.4.24                                goal weight 200 lbs.   Mouth wash at least 2x per day   VBS w speech if swallowing difficulty continues       "

## 2024-06-10 ENCOUNTER — HOME CARE VISIT (OUTPATIENT)
Dept: HOME HEALTH SERVICES | Facility: HOME HEALTHCARE | Age: 69
End: 2024-06-10
Payer: MEDICARE

## 2024-06-10 VITALS — HEART RATE: 88 BPM | SYSTOLIC BLOOD PRESSURE: 110 MMHG | OXYGEN SATURATION: 93 % | DIASTOLIC BLOOD PRESSURE: 74 MMHG

## 2024-06-10 VITALS — HEART RATE: 84 BPM | OXYGEN SATURATION: 95 % | SYSTOLIC BLOOD PRESSURE: 136 MMHG | DIASTOLIC BLOOD PRESSURE: 69 MMHG

## 2024-06-10 PROCEDURE — G0152 HHCP-SERV OF OT,EA 15 MIN: HCPCS

## 2024-06-10 PROCEDURE — G0151 HHCP-SERV OF PT,EA 15 MIN: HCPCS

## 2024-06-11 ENCOUNTER — HOME CARE VISIT (OUTPATIENT)
Dept: HOME HEALTH SERVICES | Facility: HOME HEALTHCARE | Age: 69
End: 2024-06-11
Payer: MEDICARE

## 2024-06-11 VITALS — DIASTOLIC BLOOD PRESSURE: 71 MMHG | OXYGEN SATURATION: 98 % | HEART RATE: 84 BPM | SYSTOLIC BLOOD PRESSURE: 128 MMHG

## 2024-06-11 PROCEDURE — G0152 HHCP-SERV OF OT,EA 15 MIN: HCPCS

## 2024-06-11 PROCEDURE — G0153 HHCP-SVS OF S/L PATH,EA 15MN: HCPCS

## 2024-06-12 VITALS — WEIGHT: 188.5 LBS | BODY MASS INDEX: 24.87 KG/M2

## 2024-06-13 ENCOUNTER — PATIENT OUTREACH (OUTPATIENT)
Dept: CASE MANAGEMENT | Facility: OTHER | Age: 69
End: 2024-06-13

## 2024-06-13 ENCOUNTER — HOME CARE VISIT (OUTPATIENT)
Dept: HOME HEALTH SERVICES | Facility: HOME HEALTHCARE | Age: 69
End: 2024-06-13
Payer: MEDICARE

## 2024-06-14 ENCOUNTER — HOME CARE VISIT (OUTPATIENT)
Dept: HOME HEALTH SERVICES | Facility: HOME HEALTHCARE | Age: 69
End: 2024-06-14
Payer: MEDICARE

## 2024-06-14 VITALS — OXYGEN SATURATION: 92 % | HEART RATE: 98 BPM | SYSTOLIC BLOOD PRESSURE: 118 MMHG | DIASTOLIC BLOOD PRESSURE: 62 MMHG

## 2024-06-14 PROCEDURE — G0153 HHCP-SVS OF S/L PATH,EA 15MN: HCPCS

## 2024-06-14 PROCEDURE — G0151 HHCP-SERV OF PT,EA 15 MIN: HCPCS

## 2024-06-16 ENCOUNTER — HOSPITAL ENCOUNTER (EMERGENCY)
Facility: HOSPITAL | Age: 69
Discharge: HOME | End: 2024-06-16
Attending: EMERGENCY MEDICINE
Payer: MEDICARE

## 2024-06-16 ENCOUNTER — APPOINTMENT (EMERGENCY)
Dept: RADIOLOGY | Facility: HOSPITAL | Age: 69
End: 2024-06-16
Payer: MEDICARE

## 2024-06-16 VITALS
HEART RATE: 70 BPM | DIASTOLIC BLOOD PRESSURE: 58 MMHG | SYSTOLIC BLOOD PRESSURE: 129 MMHG | TEMPERATURE: 98.1 F | RESPIRATION RATE: 17 BRPM | OXYGEN SATURATION: 98 %

## 2024-06-16 DIAGNOSIS — J44.1 COPD EXACERBATION (HCC): Primary | ICD-10-CM

## 2024-06-16 LAB
2HR DELTA HS TROPONIN: 0 NG/L
ALBUMIN SERPL BCP-MCNC: 3.8 G/DL (ref 3.5–5)
ALP SERPL-CCNC: 84 U/L (ref 34–104)
ALT SERPL W P-5'-P-CCNC: 9 U/L (ref 7–52)
ANION GAP SERPL CALCULATED.3IONS-SCNC: 9 MMOL/L (ref 4–13)
AST SERPL W P-5'-P-CCNC: 16 U/L (ref 13–39)
ATRIAL RATE: 75 BPM
ATRIAL RATE: 79 BPM
BASOPHILS # BLD AUTO: 0.04 THOUSANDS/ÂΜL (ref 0–0.1)
BASOPHILS NFR BLD AUTO: 1 % (ref 0–1)
BILIRUB SERPL-MCNC: 0.38 MG/DL (ref 0.2–1)
BUN SERPL-MCNC: 14 MG/DL (ref 5–25)
CALCIUM SERPL-MCNC: 9.1 MG/DL (ref 8.4–10.2)
CARDIAC TROPONIN I PNL SERPL HS: 13 NG/L
CARDIAC TROPONIN I PNL SERPL HS: 13 NG/L
CHLORIDE SERPL-SCNC: 105 MMOL/L (ref 96–108)
CO2 SERPL-SCNC: 25 MMOL/L (ref 21–32)
CREAT SERPL-MCNC: 0.58 MG/DL (ref 0.6–1.3)
EOSINOPHIL # BLD AUTO: 0.04 THOUSAND/ÂΜL (ref 0–0.61)
EOSINOPHIL NFR BLD AUTO: 1 % (ref 0–6)
ERYTHROCYTE [DISTWIDTH] IN BLOOD BY AUTOMATED COUNT: 16.4 % (ref 11.6–15.1)
GFR SERPL CREATININE-BSD FRML MDRD: 103 ML/MIN/1.73SQ M
GLUCOSE SERPL-MCNC: 159 MG/DL (ref 65–140)
HCT VFR BLD AUTO: 34.2 % (ref 36.5–49.3)
HGB BLD-MCNC: 10.7 G/DL (ref 12–17)
IMM GRANULOCYTES # BLD AUTO: 0.02 THOUSAND/UL (ref 0–0.2)
IMM GRANULOCYTES NFR BLD AUTO: 0 % (ref 0–2)
LYMPHOCYTES # BLD AUTO: 1.55 THOUSANDS/ÂΜL (ref 0.6–4.47)
LYMPHOCYTES NFR BLD AUTO: 20 % (ref 14–44)
MCH RBC QN AUTO: 25.4 PG (ref 26.8–34.3)
MCHC RBC AUTO-ENTMCNC: 31.3 G/DL (ref 31.4–37.4)
MCV RBC AUTO: 81 FL (ref 82–98)
MONOCYTES # BLD AUTO: 1.02 THOUSAND/ÂΜL (ref 0.17–1.22)
MONOCYTES NFR BLD AUTO: 13 % (ref 4–12)
NEUTROPHILS # BLD AUTO: 5 THOUSANDS/ÂΜL (ref 1.85–7.62)
NEUTS SEG NFR BLD AUTO: 65 % (ref 43–75)
NRBC BLD AUTO-RTO: 0 /100 WBCS
P AXIS: 65 DEGREES
P AXIS: 95 DEGREES
PLATELET # BLD AUTO: 447 THOUSANDS/UL (ref 149–390)
PMV BLD AUTO: 10.2 FL (ref 8.9–12.7)
POTASSIUM SERPL-SCNC: 4.1 MMOL/L (ref 3.5–5.3)
PR INTERVAL: 216 MS
PR INTERVAL: 226 MS
PROT SERPL-MCNC: 6.6 G/DL (ref 6.4–8.4)
QRS AXIS: 84 DEGREES
QRS AXIS: 88 DEGREES
QRSD INTERVAL: 130 MS
QRSD INTERVAL: 132 MS
QT INTERVAL: 410 MS
QT INTERVAL: 430 MS
QTC INTERVAL: 470 MS
QTC INTERVAL: 480 MS
RBC # BLD AUTO: 4.21 MILLION/UL (ref 3.88–5.62)
SODIUM SERPL-SCNC: 139 MMOL/L (ref 135–147)
T WAVE AXIS: 50 DEGREES
T WAVE AXIS: 57 DEGREES
VENTRICULAR RATE: 75 BPM
VENTRICULAR RATE: 79 BPM
WBC # BLD AUTO: 7.67 THOUSAND/UL (ref 4.31–10.16)

## 2024-06-16 PROCEDURE — 99285 EMERGENCY DEPT VISIT HI MDM: CPT

## 2024-06-16 PROCEDURE — 36415 COLL VENOUS BLD VENIPUNCTURE: CPT

## 2024-06-16 PROCEDURE — 93005 ELECTROCARDIOGRAM TRACING: CPT

## 2024-06-16 PROCEDURE — 96374 THER/PROPH/DIAG INJ IV PUSH: CPT

## 2024-06-16 PROCEDURE — 71045 X-RAY EXAM CHEST 1 VIEW: CPT

## 2024-06-16 PROCEDURE — 80053 COMPREHEN METABOLIC PANEL: CPT

## 2024-06-16 PROCEDURE — 85025 COMPLETE CBC W/AUTO DIFF WBC: CPT

## 2024-06-16 PROCEDURE — 84484 ASSAY OF TROPONIN QUANT: CPT

## 2024-06-16 PROCEDURE — 99285 EMERGENCY DEPT VISIT HI MDM: CPT | Performed by: EMERGENCY MEDICINE

## 2024-06-16 PROCEDURE — 93010 ELECTROCARDIOGRAM REPORT: CPT | Performed by: INTERNAL MEDICINE

## 2024-06-16 PROCEDURE — 94640 AIRWAY INHALATION TREATMENT: CPT

## 2024-06-16 RX ORDER — IPRATROPIUM BROMIDE AND ALBUTEROL SULFATE 2.5; .5 MG/3ML; MG/3ML
3 SOLUTION RESPIRATORY (INHALATION) ONCE
Status: COMPLETED | OUTPATIENT
Start: 2024-06-16 | End: 2024-06-16

## 2024-06-16 RX ORDER — SODIUM CHLORIDE 9 MG/ML
3 INJECTION INTRAVENOUS
Status: DISCONTINUED | OUTPATIENT
Start: 2024-06-16 | End: 2024-06-16 | Stop reason: HOSPADM

## 2024-06-16 RX ORDER — METHYLPREDNISOLONE SODIUM SUCCINATE 125 MG/2ML
125 INJECTION, POWDER, LYOPHILIZED, FOR SOLUTION INTRAMUSCULAR; INTRAVENOUS ONCE
Status: COMPLETED | OUTPATIENT
Start: 2024-06-16 | End: 2024-06-16

## 2024-06-16 RX ADMIN — IPRATROPIUM BROMIDE AND ALBUTEROL SULFATE 3 ML: 2.5; .5 SOLUTION RESPIRATORY (INHALATION) at 08:39

## 2024-06-16 RX ADMIN — METHYLPREDNISOLONE SODIUM SUCCINATE 125 MG: 125 INJECTION, POWDER, FOR SOLUTION INTRAMUSCULAR; INTRAVENOUS at 11:53

## 2024-06-16 NOTE — ED PROVIDER NOTES
Final Diagnoses:     1. COPD exacerbation (HCC)        ED Course as of 06/16/24 1546   Sun Jun 16, 2024   1156 hs TnI 2hr: 13     Nursing Triage:     Chief Complaint   Patient presents with    Shortness of Breath     SOB that started this morning and got increasingly worse. Did a treatment this morning that didn't seem to help.Hx of COPD. Was at [facility] yesterday and received a duo neb that helped.     HPI:   This is a 69 y.o. male presenting for evaluation of shortness of breath.   Relevant past medical history COPD, CVA, hypertension, stroke, diabetes, CAD.  Patient coming in for multiple days of shortness of breath.  Was at [facility] yesterday got a DuoNeb he said he felt better however this morning is worsened.  He states that he has been seen multiple times for COPD exacerbations.  He states he feels like that now.  He states that he is on oxygen at night at home.  He states that he feels like he needs oxygen right now he states that his breathing is worse than it normally is.  He also states that he has a cough.  He has a 40-pack-year history.  He states that he has no fever, chills, headache, dizziness, chest pain, N/V/D, abdominal pain, dysuria.  ASSESSMENT + PLAN:   Concern for COPD exacerbation versus ACS versus pneumonia versus viral illness.  Her chest x-ray in addition to basic labs.  Will give DuoNeb.  Will reevaluate.  If patient continues to require oxygen while awake will consider admission for new oxygen requirement.    Patient states he is feeling better after his treatment.  He is satting in the 90s on room air.  He states that he feels comfortable going home.  His troponins x 2 states stable at 13.  Giving him return precautions as well as advised to stop smoking.  Patient will follow-up with his PCP given his recurrent visits for COPD exacerbations.  Physical:   Physical Exam  Vitals and nursing note reviewed.   Constitutional:       Appearance: Normal appearance.   HENT:      Head:  Normocephalic and atraumatic.      Nose: Nose normal.      Mouth/Throat:      Mouth: Mucous membranes are moist.      Pharynx: No oropharyngeal exudate or posterior oropharyngeal erythema.   Eyes:      Extraocular Movements: Extraocular movements intact.      Conjunctiva/sclera: Conjunctivae normal.      Pupils: Pupils are equal, round, and reactive to light.   Cardiovascular:      Rate and Rhythm: Normal rate and regular rhythm.      Pulses: Normal pulses.      Heart sounds: Normal heart sounds.   Pulmonary:      Effort: Pulmonary effort is normal.      Breath sounds: Decreased breath sounds present. No wheezing, rhonchi or rales.   Chest:      Chest wall: No mass or deformity.   Abdominal:      General: Abdomen is flat. There is no distension.      Palpations: Abdomen is soft.      Tenderness: There is no abdominal tenderness.   Musculoskeletal:         General: Normal range of motion.      Cervical back: Normal range of motion.   Lymphadenopathy:      Cervical: No cervical adenopathy.   Skin:     General: Skin is warm and dry.      Capillary Refill: Capillary refill takes less than 2 seconds.   Neurological:      General: No focal deficit present.      Mental Status: He is alert and oriented to person, place, and time.      Cranial Nerves: No cranial nerve deficit.      Sensory: No sensory deficit.   Psychiatric:         Mood and Affect: Mood normal.         Behavior: Behavior normal.       ED Triage Vitals [06/16/24 0825]   Temperature Pulse Respirations Blood Pressure SpO2   98.1 °F (36.7 °C) 82 20 133/75 98 %      Temp Source Heart Rate Source Patient Position - Orthostatic VS BP Location FiO2 (%)   Oral Monitor Sitting Right arm --      Pain Score       No Pain         Vitals:    06/16/24 0825 06/16/24 0930 06/16/24 1100   BP: 133/75 127/67 129/58   TempSrc: Oral     Pulse: 82 83 70   Resp: 20 19 17   Patient Position - Orthostatic VS: Sitting Sitting Sitting   Temp: 98.1 °F (36.7 °C)       Lab Results    Component Value Date    POCGLU 136 05/29/2024    POCGLU 101 05/29/2024    POCGLU 161 (H) 05/28/2024    POCGLU 153 (H) 01/04/2015       - There are no obvious limitations to social determinants of care.   - Nursing note reviewed.   - Vitals reviewed.   - Orders placed by myself.    - Previous chart was reviewed  - No language barrier.   - History obtained from patient.    - There are no limitations to the history obtained:     Past Medical:    has a past medical history of Cardiac arrest (HCC), COPD (chronic obstructive pulmonary disease) (HCC), CVA (cerebral vascular accident) (HCC), Diabetes mellitus (HCC), Heart attack (HCC), Hypertension, and Stroke (HCC).    Past Surgical:    has a past surgical history that includes Cervical fusion (N/A, 9/10/2018) and Cardiac electrophysiology procedure (N/A, 4/8/2024).    Social:     Social History     Substance and Sexual Activity   Alcohol Use Not Currently    Alcohol/week: 8.0 - 12.0 standard drinks of alcohol    Types: 8 - 12 Cans of beer per week    Comment: every day drinker     Social History     Tobacco Use   Smoking Status Former    Types: Cigarettes   Smokeless Tobacco Never   Tobacco Comments    quit 5 months ago      Social History     Substance and Sexual Activity   Drug Use Never       Code Status: Prior  Advance Directive and Living Will:      Power of :    POLST:    Medications   ipratropium-albuterol (DUO-NEB) 0.5-2.5 mg/3 mL inhalation solution 3 mL (3 mL Nebulization Given 6/16/24 0839)   methylPREDNISolone sodium succinate (Solu-MEDROL) injection 125 mg (125 mg Intravenous Given 6/16/24 1153)     X-ray chest 1 view portable   Final Result      No acute cardiopulmonary disease.            Workstation performed: [id number]           Orders Placed This Encounter   Procedures    X-ray chest 1 view portable    CBC and differential    Comprehensive metabolic panel    HS Troponin 0hr (reflex protocol)    HS Troponin I 2hr    ECG 12 lead    ECG 12 lead     ECG 12 lead repeat in 2hrs    ECG 12 lead repeat in 4hrs    ECG 12 lead     Labs Reviewed   CBC AND DIFFERENTIAL - Abnormal       Result Value Ref Range Status    WBC 7.67  4.31 - 10.16 Thousand/uL Final    RBC 4.21  3.88 - 5.62 Million/uL Final    Hemoglobin 10.7 (*) 12.0 - 17.0 g/dL Final    Hematocrit 34.2 (*) 36.5 - 49.3 % Final    MCV 81 (*) 82 - 98 fL Final    MCH 25.4 (*) 26.8 - 34.3 pg Final    MCHC 31.3 (*) 31.4 - 37.4 g/dL Final    RDW 16.4 (*) 11.6 - 15.1 % Final    MPV 10.2  8.9 - 12.7 fL Final    Platelets 447 (*) 149 - 390 Thousands/uL Final    nRBC 0  /100 WBCs Final    Segmented % 65  43 - 75 % Final    Immature Grans % 0  0 - 2 % Final    Lymphocytes % 20  14 - 44 % Final    Monocytes % 13 (*) 4 - 12 % Final    Eosinophils Relative 1  0 - 6 % Final    Basophils Relative 1  0 - 1 % Final    Absolute Neutrophils 5.00  1.85 - 7.62 Thousands/µL Final    Absolute Immature Grans 0.02  0.00 - 0.20 Thousand/uL Final    Absolute Lymphocytes 1.55  0.60 - 4.47 Thousands/µL Final    Absolute Monocytes 1.02  0.17 - 1.22 Thousand/µL Final    Eosinophils Absolute 0.04  0.00 - 0.61 Thousand/µL Final    Basophils Absolute 0.04  0.00 - 0.10 Thousands/µL Final   COMPREHENSIVE METABOLIC PANEL - Abnormal    Sodium 139  135 - 147 mmol/L Final    Potassium 4.1  3.5 - 5.3 mmol/L Final    Chloride 105  96 - 108 mmol/L Final    CO2 25  21 - 32 mmol/L Final    ANION GAP 9  4 - 13 mmol/L Final    BUN 14  5 - 25 mg/dL Final    Creatinine 0.58 (*) 0.60 - 1.30 mg/dL Final    Comment: Standardized to IDMS reference method    Glucose 159 (*) 65 - 140 mg/dL Final    Comment: If the patient is fasting, the ADA then defines impaired fasting glucose as > 100 mg/dL and diabetes as > or equal to 123 mg/dL.    Calcium 9.1  8.4 - 10.2 mg/dL Final    AST 16  13 - 39 U/L Final    ALT 9  7 - 52 U/L Final    Comment: Specimen collection should occur prior to Sulfasalazine administration due to the potential for falsely depressed results.   "   Alkaline Phosphatase 84  34 - 104 U/L Final    Total Protein 6.6  6.4 - 8.4 g/dL Final    Albumin 3.8  3.5 - 5.0 g/dL Final    Total Bilirubin 0.38  0.20 - 1.00 mg/dL Final    Comment: Use of this assay is not recommended for patients undergoing treatment with eltrombopag due to the potential for falsely elevated results.  N-acetyl-p-benzoquinone imine (metabolite of Acetaminophen) will generate erroneously low results in samples for patients that have taken an overdose of Acetaminophen.    eGFR 103  ml/min/1.73sq m Final    Narrative:     National Kidney Disease Foundation guidelines for Chronic Kidney Disease (CKD):     Stage 1 with normal or high GFR (GFR > 90 mL/min/1.73 square meters)    Stage 2 Mild CKD (GFR = 60-89 mL/min/1.73 square meters)    Stage 3A Moderate CKD (GFR = 45-59 mL/min/1.73 square meters)    Stage 3B Moderate CKD (GFR = 30-44 mL/min/1.73 square meters)    Stage 4 Severe CKD (GFR = 15-29 mL/min/1.73 square meters)    Stage 5 End Stage CKD (GFR <15 mL/min/1.73 square meters)  Note: GFR calculation is accurate only with a steady state creatinine   HS TROPONIN I 0HR - Normal    hs TnI 0hr 13  \"Refer to ACS Flowchart\"- see link ng/L Final    Comment:                                              Initial (time 0) result  If >=50 ng/L, Myocardial injury suggested ;  Type of myocardial injury and treatment strategy  to be determined.  If 5-49 ng/L, a delta result at 2 hours and or 4 hours will be needed to further evaluate.  If <4 ng/L, and chest pain has been >3 hours since onset, patient may qualify for discharge based on the HEART score in the ED.  If <5 ng/L and <3hours since onset of chest pain, a delta result at 2 hours will be needed to further evaluate.    HS Troponin 99th Percentile URL of a Health Population=12 ng/L with a 95% Confidence Interval of 8-18 ng/L.    Second Troponin (time 2 hours)  If calculated delta >= 20 ng/L,  Myocardial injury suggested ; Type of myocardial injury and " "treatment strategy to be determined.  If 5-49 ng/L and the calculated delta is 5-19 ng/L, consult medical service for evaluation.  Continue evaluation for ischemia on ecg and other possible etiology and repeat hs troponin at 4 hours.  If delta is <5 ng/L at 2 hours, consider discharge based on risk stratification via the HEART score (if in ED), or KAIDEN risk score in IP/Observation.    HS Troponin 99th Percentile URL of a Health Population=12 ng/L with a 95% Confidence Interval of 8-18 ng/L.   HS TROPONIN I 2HR - Normal    hs TnI 2hr 13  \"Refer to ACS Flowchart\"- see link ng/L Final    Comment:                                              Initial (time 0) result  If >=50 ng/L, Myocardial injury suggested ;  Type of myocardial injury and treatment strategy  to be determined.  If 5-49 ng/L, a delta result at 2 hours and or 4 hours will be needed to further evaluate.  If <4 ng/L, and chest pain has been >3 hours since onset, patient may qualify for discharge based on the HEART score in the ED.  If <5 ng/L and <3hours since onset of chest pain, a delta result at 2 hours will be needed to further evaluate.    HS Troponin 99th Percentile URL of a Health Population=12 ng/L with a 95% Confidence Interval of 8-18 ng/L.    Second Troponin (time 2 hours)  If calculated delta >= 20 ng/L,  Myocardial injury suggested ; Type of myocardial injury and treatment strategy to be determined.  If 5-49 ng/L and the calculated delta is 5-19 ng/L, consult medical service for evaluation.  Continue evaluation for ischemia on ecg and other possible etiology and repeat hs troponin at 4 hours.  If delta is <5 ng/L at 2 hours, consider discharge based on risk stratification via the HEART score (if in ED), or KAIDEN risk score in IP/Observation.    HS Troponin 99th Percentile URL of a Health Population=12 ng/L with a 95% Confidence Interval of 8-18 ng/L.    Delta 2hr hsTnI 0  <20 ng/L Final       Time reflects when diagnosis was documented in both " MDM as applicable and the Disposition within this note       Time User Action Codes Description Comment    6/16/2024 11:48 AM Osmani Adame [J44.1] COPD exacerbation (HCC)           ED Disposition       ED Disposition   Discharge    Condition   Stable    Date/Time   Sun Jun 16, 2024 11:48 AM    Comment   Lucho Talavera discharge to home/self care.                   Follow-up Information    None       Discharge Medication List as of 6/16/2024 11:54 AM        CONTINUE these medications which have NOT CHANGED    Details   acetaminophen (TYLENOL) 325 mg tablet Take 2 tablets (650 mg total) by mouth every 6 (six) hours as needed (mild pain,headaches,fever), Starting Tue 5/28/2024, Normal      albuterol (Proventil HFA) 90 mcg/act inhaler Inhale 2 puffs every 6 (six) hours as needed for wheezing, Starting Mon 1/1/2024, Normal      apixaban (Eliquis) 5 mg Take 1 tablet (5 mg total) by mouth 2 (two) times a day, Starting Tue 5/28/2024, Normal      aspirin 81 mg chewable tablet Chew 81 mg daily, Historical Med      atorvastatin (LIPITOR) 80 mg tablet Take 1 tablet (80 mg total) by mouth daily with dinner, Starting Sat 9/22/2018, Normal      B Complex Vitamins (VITAMIN B COMPLEX 100 IJ) Take 1 tablet by mouth daily, Historical Med      budesonide (PULMICORT) 0.5 mg/2 mL nebulizer solution Take 2 mL (0.5 mg total) by nebulization every 12 (twelve) hours Rinse mouth after use., Starting Fri 6/10/2022, No Print      busPIRone (BUSPAR) 10 mg tablet Take 1 tablet (10 mg total) by mouth 3 (three) times a day, Starting Fri 7/28/2023, Normal      Empagliflozin 25 MG TABS Take 25 mg by mouth in the morning. Indications: Type 2 Diabetes, Historical Med      ferrous sulfate 325 (65 Fe) mg tablet Take 1 tablet (325 mg total) by mouth daily with breakfast, Starting Wed 5/29/2024, No Print      fluticasone (FLONASE) 50 mcg/act nasal spray 1 spray into each nostril 2 (two) times a day, Starting Fri 10/4/2013, Historical Med       Fluticasone-Salmeterol (Advair) 500-50 mcg/dose inhaler Inhale 1 puff 2 (two) times a day Rinse mouth after use., Starting Mon 1/1/2024, Normal      fluticasone-umeclidinium-vilanterol (Trelegy Ellipta) 200-62.5-25 mcg/actuation AEPB inhaler Inhale 1 puff daily. Indications: Asthma, Historical Med      folic acid (FOLVITE) 1 mg tablet Take 1 tablet (1 mg total) by mouth daily, Starting Sat 6/11/2022, No Print      guaiFENesin (MUCINEX) 600 mg 12 hr tablet Take 1 tablet (600 mg total) by mouth 2 (two) times a day, Starting Tue 5/28/2024, Normal      insulin glargine (LANTUS) 100 units/mL subcutaneous injection Inject 10 Units under the skin 3 (three) times a day with meals. Indications: Type 2 Diabetes, Historical Med      Insulin Glargine Solostar (Basaglar KwikPen) 100 UNIT/ML SOPN Inject 0.18 mL (18 Units total) under the skin daily at bedtime, Starting Tue 5/28/2024, Normal      ipratropium-albuterol (DUO-NEB) 0.5-2.5 mg/3 mL nebulizer solution Take 3 mL by nebulization 4 (four) times a day, Historical Med      lisinopril (ZESTRIL) 10 mg tablet Take 1 tablet (10 mg total) by mouth daily Do not start before May 29, 2024., Starting Wed 5/29/2024, Normal      LORazepam (ATIVAN) 1 mg tablet Take 1 mg by mouth every 6 (six) hours as needed for anxiety. this was to be stopped per latest dci   Indications: Feeling Anxious, Historical Med      melatonin 3 mg Take 1 tablet (3 mg total) by mouth daily at bedtime as needed (30), Starting Fri 9/21/2018, Normal      metFORMIN (GLUCOPHAGE) 500 mg tablet Take 1 tablet (500 mg total) by mouth 2 (two) times a day with meals, Starting Tue 5/28/2024, Normal      mirtazapine (REMERON) 7.5 MG tablet Take 1 tablet (7.5 mg total) by mouth every evening, Starting Tue 5/28/2024, Normal      pantoprazole (Protonix) 40 mg tablet Take 1 tablet (40 mg total) by mouth daily in the early morning, Starting Wed 5/29/2024, Normal      predniSONE 20 mg tablet Take 20 mg by mouth 3 (three) times  a day. Indications: COPD, Historical Med      thiamine 100 MG tablet Take 1 tablet (100 mg total) by mouth daily, Starting Sat 9/22/2018, Normal      tiotropium (SPIRIVA) 18 mcg inhalation capsule Place 1 capsule (18 mcg total) into inhaler and inhale daily, Starting Mon 1/1/2024, Normal           No discharge procedures on file.  Prior to Admission Medications   Prescriptions Last Dose Informant Patient Reported? Taking?   B Complex Vitamins (VITAMIN B COMPLEX 100 IJ)   Yes No   Sig: Take 1 tablet by mouth daily   Empagliflozin 25 MG TABS   Yes No   Sig: Take 25 mg by mouth in the morning. Indications: Type 2 Diabetes   Fluticasone-Salmeterol (Advair) 500-50 mcg/dose inhaler   No No   Sig: Inhale 1 puff 2 (two) times a day Rinse mouth after use.   Patient not taking: Reported on 5/30/2024   Insulin Glargine Solostar (Basaglar KwikPen) 100 UNIT/ML SOPN   No No   Sig: Inject 0.18 mL (18 Units total) under the skin daily at bedtime   LORazepam (ATIVAN) 1 mg tablet   Yes No   Sig: Take 1 mg by mouth every 6 (six) hours as needed for anxiety. this was to be stopped per latest dci   Indications: Feeling Anxious   acetaminophen (TYLENOL) 325 mg tablet   No No   Sig: Take 2 tablets (650 mg total) by mouth every 6 (six) hours as needed (mild pain,headaches,fever)   albuterol (Proventil HFA) 90 mcg/act inhaler   No No   Sig: Inhale 2 puffs every 6 (six) hours as needed for wheezing   Patient not taking: Reported on 5/30/2024   apixaban (Eliquis) 5 mg   No No   Sig: Take 1 tablet (5 mg total) by mouth 2 (two) times a day   aspirin 81 mg chewable tablet   Yes No   Sig: Chew 81 mg daily   atorvastatin (LIPITOR) 80 mg tablet   No No   Sig: Take 1 tablet (80 mg total) by mouth daily with dinner   budesonide (PULMICORT) 0.5 mg/2 mL nebulizer solution   No No   Sig: Take 2 mL (0.5 mg total) by nebulization every 12 (twelve) hours Rinse mouth after use.   busPIRone (BUSPAR) 10 mg tablet   No No   Sig: Take 1 tablet (10 mg total) by  mouth 3 (three) times a day   ferrous sulfate 325 (65 Fe) mg tablet   No No   Sig: Take 1 tablet (325 mg total) by mouth daily with breakfast   fluticasone (FLONASE) 50 mcg/act nasal spray   Yes No   Si spray into each nostril 2 (two) times a day   fluticasone-umeclidinium-vilanterol (Trelegy Ellipta) 200-62.5-25 mcg/actuation AEPB inhaler   Yes No   Sig: Inhale 1 puff daily. Indications: Asthma   folic acid (FOLVITE) 1 mg tablet   No No   Sig: Take 1 tablet (1 mg total) by mouth daily   guaiFENesin (MUCINEX) 600 mg 12 hr tablet   No No   Sig: Take 1 tablet (600 mg total) by mouth 2 (two) times a day   insulin glargine (LANTUS) 100 units/mL subcutaneous injection   Yes No   Sig: Inject 10 Units under the skin 3 (three) times a day with meals. Indications: Type 2 Diabetes   ipratropium-albuterol (DUO-NEB) 0.5-2.5 mg/3 mL nebulizer solution   Yes No   Sig: Take 3 mL by nebulization 4 (four) times a day   lisinopril (ZESTRIL) 10 mg tablet   No No   Sig: Take 1 tablet (10 mg total) by mouth daily Do not start before May 29, 2024.   melatonin 3 mg   No No   Sig: Take 1 tablet (3 mg total) by mouth daily at bedtime as needed (30)   metFORMIN (GLUCOPHAGE) 500 mg tablet   No No   Sig: Take 1 tablet (500 mg total) by mouth 2 (two) times a day with meals   mirtazapine (REMERON) 7.5 MG tablet   No No   Sig: Take 1 tablet (7.5 mg total) by mouth every evening   pantoprazole (Protonix) 40 mg tablet   No No   Sig: Take 1 tablet (40 mg total) by mouth daily in the early morning   predniSONE 20 mg tablet   Yes No   Sig: Take 20 mg by mouth 3 (three) times a day. Indications: COPD   thiamine 100 MG tablet   No No   Sig: Take 1 tablet (100 mg total) by mouth daily   tiotropium (SPIRIVA) 18 mcg inhalation capsule   No No   Sig: Place 1 capsule (18 mcg total) into inhaler and inhale daily   Patient not taking: Reported on 2024      Facility-Administered Medications: None                        Portions of the record may have  "been created with voice recognition software. Occasional wrong word or \"sound a like\" substitutions may have occurred due to the inherent limitations of voice recognition software. Read the chart carefully and recognize, using context, where substitutions have occurred.     Osmani Adame MD  06/16/24 1547    "

## 2024-06-17 ENCOUNTER — PATIENT OUTREACH (OUTPATIENT)
Dept: CASE MANAGEMENT | Facility: OTHER | Age: 69
End: 2024-06-17

## 2024-06-17 NOTE — PROGRESS NOTES
OP RT CM received message to notify of recent ER visit. OP RT CM to outreach patient  --------------------------------------------------------------------  OP RT CM left message for patient to check on his breathing , oxygen and recent hospital visit. I left my contact information asking for a return call. OP RT CM will outreach patient in 2 weeks if no he does not return call prior.

## 2024-06-18 NOTE — ED ATTENDING ATTESTATION
6/16/2024  I, Jameel Aleman MD, saw and evaluated the patient. I have discussed the patient with the resident/non-physician practitioner and agree with the resident's/non-physician practitioner's findings, Plan of Care, and MDM as documented in the resident's/non-physician practitioner's note, except where noted. All available labs and Radiology studies were reviewed.  I was present for key portions of any procedure(s) performed by the resident/non-physician practitioner and I was immediately available to provide assistance.       At this point I agree with the current assessment done in the Emergency Department.  I have conducted an independent evaluation of this patient a history and physical is as follows:    ED Course     69-year-old male with history of COPD presents with acute shortness of breath began on this morning.  Became increasingly worse.  He did take a breathing treatment today that did not help.  Patient previously seen at outside hospital was given DuoNebs in ED which improved the symptoms.    Vitals reviewed.  Heart regular rate and rhythm without murmurs.  Lungs decreased breath sounds bilaterally mild tachypnea noted.  Abdomen soft nontender nondistended normal bowel sounds.  Extremities no edema.    Impression: Shortness of breath/dyspnea  Differential diagnosis: COPD exacerbation, ACS MI arrhythmia, doubt CHF doubt pneumonia    Plan to check ECG serial troponin CBC CMP trial bronchodilators reassess    Patient reports improvement with bronchodilators steroids.    ECG independently interpreted by me normal sinus rhythm with first-degree AV block right bundle branch block noted PVCs present.  Impression abnormal ECG no acute ischemic changes.      Chest x-ray independently interpreted by me: No acute cardiopulmonary disease.    Labs reviewed unremarkable.    Patient reassessed symptoms improved plan for discharge return precautions given    Critical Care Time  Procedures

## 2024-06-19 ENCOUNTER — HOME CARE VISIT (OUTPATIENT)
Dept: HOME HEALTH SERVICES | Facility: HOME HEALTHCARE | Age: 69
End: 2024-06-19
Payer: MEDICARE

## 2024-06-19 VITALS — DIASTOLIC BLOOD PRESSURE: 80 MMHG | SYSTOLIC BLOOD PRESSURE: 128 MMHG | OXYGEN SATURATION: 91 % | HEART RATE: 77 BPM

## 2024-06-19 VITALS — SYSTOLIC BLOOD PRESSURE: 143 MMHG | DIASTOLIC BLOOD PRESSURE: 78 MMHG | HEART RATE: 69 BPM | OXYGEN SATURATION: 92 %

## 2024-06-19 PROCEDURE — G0152 HHCP-SERV OF OT,EA 15 MIN: HCPCS

## 2024-06-19 PROCEDURE — G0151 HHCP-SERV OF PT,EA 15 MIN: HCPCS

## 2024-06-19 PROCEDURE — G0153 HHCP-SVS OF S/L PATH,EA 15MN: HCPCS

## 2024-06-19 NOTE — CASE COMMUNICATION
Patient went to ER over the weekend during their family trip for SOB. PT recommended to his daughter, [place]co, to get [place] a follow up with the PCP.

## 2024-06-20 ENCOUNTER — HOME CARE VISIT (OUTPATIENT)
Dept: HOME HEALTH SERVICES | Facility: HOME HEALTHCARE | Age: 69
End: 2024-06-20
Payer: MEDICARE

## 2024-06-20 PROCEDURE — G0153 HHCP-SVS OF S/L PATH,EA 15MN: HCPCS

## 2024-06-21 ENCOUNTER — HOME CARE VISIT (OUTPATIENT)
Dept: HOME HEALTH SERVICES | Facility: HOME HEALTHCARE | Age: 69
End: 2024-06-21
Payer: MEDICARE

## 2024-06-21 NOTE — CASE COMMUNICATION
Patient's daugther called to cancel appointment today. She is taking patient to ER. Will be out of compliance with visit set this week.

## 2024-06-23 ENCOUNTER — HOSPITAL ENCOUNTER (EMERGENCY)
Facility: HOSPITAL | Age: 69
Discharge: HOME | End: 2024-06-23
Attending: EMERGENCY MEDICINE
Payer: MEDICARE

## 2024-06-23 ENCOUNTER — APPOINTMENT (EMERGENCY)
Dept: RADIOLOGY | Facility: HOSPITAL | Age: 69
End: 2024-06-23
Payer: MEDICARE

## 2024-06-23 VITALS
SYSTOLIC BLOOD PRESSURE: 125 MMHG | HEART RATE: 82 BPM | RESPIRATION RATE: 17 BRPM | OXYGEN SATURATION: 100 % | TEMPERATURE: 97.7 F | DIASTOLIC BLOOD PRESSURE: 71 MMHG

## 2024-06-23 DIAGNOSIS — R06.00 DYSPNEA: Primary | ICD-10-CM

## 2024-06-23 LAB
2HR DELTA HS TROPONIN: -2 NG/L
ANION GAP SERPL CALCULATED.3IONS-SCNC: 6 MMOL/L (ref 4–13)
BASOPHILS # BLD AUTO: 0.04 THOUSANDS/ÂΜL (ref 0–0.1)
BASOPHILS NFR BLD AUTO: 0 % (ref 0–1)
BUN SERPL-MCNC: 18 MG/DL (ref 5–25)
CALCIUM SERPL-MCNC: 8.9 MG/DL (ref 8.4–10.2)
CARDIAC TROPONIN I PNL SERPL HS: 12 NG/L
CARDIAC TROPONIN I PNL SERPL HS: 14 NG/L
CHLORIDE SERPL-SCNC: 106 MMOL/L (ref 96–108)
CO2 SERPL-SCNC: 30 MMOL/L (ref 21–32)
CREAT SERPL-MCNC: 0.55 MG/DL (ref 0.6–1.3)
EOSINOPHIL # BLD AUTO: 0.67 THOUSAND/ÂΜL (ref 0–0.61)
EOSINOPHIL NFR BLD AUTO: 7 % (ref 0–6)
ERYTHROCYTE [DISTWIDTH] IN BLOOD BY AUTOMATED COUNT: 15.9 % (ref 11.6–15.1)
GFR SERPL CREATININE-BSD FRML MDRD: 105 ML/MIN/1.73SQ M
GLUCOSE SERPL-MCNC: 157 MG/DL (ref 65–140)
HCT VFR BLD AUTO: 34.7 % (ref 36.5–49.3)
HGB BLD-MCNC: 10.5 G/DL (ref 12–17)
IMM GRANULOCYTES # BLD AUTO: 0.03 THOUSAND/UL (ref 0–0.2)
IMM GRANULOCYTES NFR BLD AUTO: 0 % (ref 0–2)
LYMPHOCYTES # BLD AUTO: 1.26 THOUSANDS/ÂΜL (ref 0.6–4.47)
LYMPHOCYTES NFR BLD AUTO: 13 % (ref 14–44)
MCH RBC QN AUTO: 25.2 PG (ref 26.8–34.3)
MCHC RBC AUTO-ENTMCNC: 30.3 G/DL (ref 31.4–37.4)
MCV RBC AUTO: 83 FL (ref 82–98)
MONOCYTES # BLD AUTO: 0.62 THOUSAND/ÂΜL (ref 0.17–1.22)
MONOCYTES NFR BLD AUTO: 6 % (ref 4–12)
NEUTROPHILS # BLD AUTO: 7.28 THOUSANDS/ÂΜL (ref 1.85–7.62)
NEUTS SEG NFR BLD AUTO: 74 % (ref 43–75)
NRBC BLD AUTO-RTO: 0 /100 WBCS
PLATELET # BLD AUTO: 357 THOUSANDS/UL (ref 149–390)
PMV BLD AUTO: 10.1 FL (ref 8.9–12.7)
POTASSIUM SERPL-SCNC: 3.7 MMOL/L (ref 3.5–5.3)
RBC # BLD AUTO: 4.17 MILLION/UL (ref 3.88–5.62)
SODIUM SERPL-SCNC: 142 MMOL/L (ref 135–147)
WBC # BLD AUTO: 9.9 THOUSAND/UL (ref 4.31–10.16)

## 2024-06-23 PROCEDURE — 94640 AIRWAY INHALATION TREATMENT: CPT

## 2024-06-23 PROCEDURE — 93005 ELECTROCARDIOGRAM TRACING: CPT

## 2024-06-23 PROCEDURE — 99291 CRITICAL CARE FIRST HOUR: CPT | Performed by: EMERGENCY MEDICINE

## 2024-06-23 PROCEDURE — 85025 COMPLETE CBC W/AUTO DIFF WBC: CPT

## 2024-06-23 PROCEDURE — 94644 CONT INHLJ TX 1ST HOUR: CPT

## 2024-06-23 PROCEDURE — 84484 ASSAY OF TROPONIN QUANT: CPT

## 2024-06-23 PROCEDURE — 80048 BASIC METABOLIC PNL TOTAL CA: CPT

## 2024-06-23 PROCEDURE — 99285 EMERGENCY DEPT VISIT HI MDM: CPT

## 2024-06-23 PROCEDURE — 71045 X-RAY EXAM CHEST 1 VIEW: CPT

## 2024-06-23 PROCEDURE — 36415 COLL VENOUS BLD VENIPUNCTURE: CPT

## 2024-06-23 RX ORDER — IPRATROPIUM BROMIDE AND ALBUTEROL SULFATE .5; 3 MG/3ML; MG/3ML
2 SOLUTION RESPIRATORY (INHALATION) ONCE
Status: COMPLETED | OUTPATIENT
Start: 2024-06-23 | End: 2024-06-23

## 2024-06-23 RX ORDER — METHYLPREDNISOLONE SOD SUCC 125 MG
1 VIAL (EA) INJECTION ONCE
Status: COMPLETED | OUTPATIENT
Start: 2024-06-23 | End: 2024-06-23

## 2024-06-23 RX ORDER — PREDNISONE 20 MG/1
50 TABLET ORAL DAILY
Qty: 13 TABLET | Refills: 0 | Status: SHIPPED | OUTPATIENT
Start: 2024-06-23 | End: 2024-06-28

## 2024-06-23 RX ORDER — SODIUM CHLORIDE FOR INHALATION 0.9 %
12 VIAL, NEBULIZER (ML) INHALATION ONCE
Status: COMPLETED | OUTPATIENT
Start: 2024-06-23 | End: 2024-06-23

## 2024-06-23 RX ORDER — ALBUTEROL SULFATE 2.5 MG/3ML
1 SOLUTION RESPIRATORY (INHALATION) ONCE
Status: COMPLETED | OUTPATIENT
Start: 2024-06-23 | End: 2024-06-23

## 2024-06-23 RX ADMIN — IPRATROPIUM BROMIDE 1 MG: 0.5 SOLUTION RESPIRATORY (INHALATION) at 10:22

## 2024-06-23 RX ADMIN — ISODIUM CHLORIDE 12 ML: 0.03 SOLUTION RESPIRATORY (INHALATION) at 10:16

## 2024-06-23 RX ADMIN — ALBUTEROL SULFATE 10 MG: 2.5 SOLUTION RESPIRATORY (INHALATION) at 10:17

## 2024-06-23 NOTE — DISCHARGE INSTRUCTIONS
You were seen in the emergency department today for shortness of breath.    Follow up with your primary care provider.     Take Prednisone as prescribed - start taking this tomorrow.     Return to the emergency department for any new or concerning symptoms including difficulty breathing.     Thank you for choosing [facility] for your care today.

## 2024-06-23 NOTE — ED PROVIDER NOTES
History  Chief Complaint   Patient presents with    Shortness of Breath     Patient reports cough that is unproductive. Patient also increased SOB and work of breathing. COPD hx.      HPI  Patient is a 69 y.o. male with history of COPD presenting to the emergency department for cough and shortness of breath. Patient states that he has been having a nonproductive cough and shortness of breath for the past few hours.  Patient having increased work of breathing as well.  He states that he did not take anything for his symptoms, but was given a nebulizer treatment and steroids by EMS.  He denies having any chest pain, leg swelling, fevers or chills.  Patient states that he is typically on 2 L nasal cannula oxygen only while he is sleeping.    Prior to Admission Medications   Prescriptions Last Dose Informant Patient Reported? Taking?   B Complex Vitamins (VITAMIN B COMPLEX 100 IJ)   Yes No   Sig: Take 1 tablet by mouth daily   Empagliflozin 25 MG TABS   Yes No   Sig: Take 25 mg by mouth in the morning. Indications: Type 2 Diabetes   Fluticasone-Salmeterol (Advair) 500-50 mcg/dose inhaler   No No   Sig: Inhale 1 puff 2 (two) times a day Rinse mouth after use.   Patient not taking: Reported on 5/30/2024   Insulin Glargine Solostar (Basaglar KwikPen) 100 UNIT/ML SOPN   No No   Sig: Inject 0.18 mL (18 Units total) under the skin daily at bedtime   LORazepam (ATIVAN) 1 mg tablet   Yes No   Sig: Take 1 mg by mouth every 6 (six) hours as needed for anxiety. this was to be stopped per latest dci   Indications: Feeling Anxious   acetaminophen (TYLENOL) 325 mg tablet   No No   Sig: Take 2 tablets (650 mg total) by mouth every 6 (six) hours as needed (mild pain,headaches,fever)   albuterol (Proventil HFA) 90 mcg/act inhaler   No No   Sig: Inhale 2 puffs every 6 (six) hours as needed for wheezing   Patient not taking: Reported on 5/30/2024   apixaban (Eliquis) 5 mg   No No   Sig: Take 1 tablet (5 mg total) by mouth 2 (two) times a  I notified the provider of the patients blood pressure, a bolus was ordered and started prior to transfer. Patient is alert and oriented and has no c/o pain. His respirations are even and unlabored and he appears to be in no acute distress. day   aspirin 81 mg chewable tablet   Yes No   Sig: Chew 81 mg daily   atorvastatin (LIPITOR) 80 mg tablet   No No   Sig: Take 1 tablet (80 mg total) by mouth daily with dinner   budesonide (PULMICORT) 0.5 mg/2 mL nebulizer solution   No No   Sig: Take 2 mL (0.5 mg total) by nebulization every 12 (twelve) hours Rinse mouth after use.   busPIRone (BUSPAR) 10 mg tablet   No No   Sig: Take 1 tablet (10 mg total) by mouth 3 (three) times a day   ferrous sulfate 325 (65 Fe) mg tablet   No No   Sig: Take 1 tablet (325 mg total) by mouth daily with breakfast   fluticasone (FLONASE) 50 mcg/act nasal spray   Yes No   Si spray into each nostril 2 (two) times a day   fluticasone-umeclidinium-vilanterol (Trelegy Ellipta) 200-62.5-25 mcg/actuation AEPB inhaler   Yes No   Sig: Inhale 1 puff daily. Indications: Asthma   folic acid (FOLVITE) 1 mg tablet   No No   Sig: Take 1 tablet (1 mg total) by mouth daily   guaiFENesin (MUCINEX) 600 mg 12 hr tablet   No No   Sig: Take 1 tablet (600 mg total) by mouth 2 (two) times a day   insulin glargine (LANTUS) 100 units/mL subcutaneous injection   Yes No   Sig: Inject 10 Units under the skin 3 (three) times a day with meals. Indications: Type 2 Diabetes   ipratropium-albuterol (DUO-NEB) 0.5-2.5 mg/3 mL nebulizer solution   Yes No   Sig: Take 3 mL by nebulization 4 (four) times a day   lisinopril (ZESTRIL) 10 mg tablet   No No   Sig: Take 1 tablet (10 mg total) by mouth daily Do not start before May 29, 2024.   melatonin 3 mg   No No   Sig: Take 1 tablet (3 mg total) by mouth daily at bedtime as needed (30)   metFORMIN (GLUCOPHAGE) 500 mg tablet   No No   Sig: Take 1 tablet (500 mg total) by mouth 2 (two) times a day with meals   mirtazapine (REMERON) 7.5 MG tablet   No No   Sig: Take 1 tablet (7.5 mg total) by mouth every evening   pantoprazole (Protonix) 40 mg tablet   No No   Sig: Take 1 tablet (40 mg total) by mouth daily in the early morning   predniSONE 20 mg tablet   Yes No   Sig:  Take 20 mg by mouth 3 (three) times a day. Indications: COPD   thiamine 100 MG tablet   No No   Sig: Take 1 tablet (100 mg total) by mouth daily   tiotropium (SPIRIVA) 18 mcg inhalation capsule   No No   Sig: Place 1 capsule (18 mcg total) into inhaler and inhale daily   Patient not taking: Reported on 5/30/2024      Facility-Administered Medications: None       Past Medical History:   Diagnosis Date    Cardiac arrest (HCC)     COPD (chronic obstructive pulmonary disease) (HCC)     CVA (cerebral vascular accident) (HCC)     Diabetes mellitus (HCC)     Heart attack (HCC)     Hypertension     Stroke (HCC)        Past Surgical History:   Procedure Laterality Date    CARDIAC ELECTROPHYSIOLOGY PROCEDURE N/A 4/8/2024    Procedure: Cardiac eps/aflutter ablation;  Surgeon: Ihsan Blum DO;  Location: [facility];  Service: Cardiology    CERVICAL FUSION N/A 9/10/2018    Procedure: Posterior cervical decompressive laminectomy C3-6; Posterior cervical lateral mass and pedicle fixation fusion C1-T1;  Surgeon: Papa Quiros MD;  Location: [facility];  Service: Neurosurgery       Family History   Problem Relation Age of Onset    Heart attack Mother      I have reviewed and agree with the history as documented.    E-Cigarette/Vaping    E-Cigarette Use Never User      E-Cigarette/Vaping Substances    Nicotine No     THC No     CBD No     Flavoring No     Other No     Unknown No      Social History     Tobacco Use    Smoking status: Former     Types: Cigarettes    Smokeless tobacco: Never    Tobacco comments:     quit 5 months ago    Vaping Use    Vaping status: Never Used   Substance Use Topics    Alcohol use: Not Currently     Alcohol/week: 8.0 - 12.0 standard drinks of alcohol     Types: 8 - 12 Cans of beer per week     Comment: every day drinker    Drug use: Never        Review of Systems   Constitutional:  Negative for fever.   HENT:  Negative for congestion.    Eyes:  Negative for pain.   Respiratory:  Positive for  cough and shortness of breath.    Cardiovascular:  Negative for chest pain.   Gastrointestinal:  Negative for diarrhea and vomiting.   Genitourinary:  Negative for dysuria.   Musculoskeletal:  Negative for back pain.   Skin:  Negative for rash.   Neurological:  Negative for dizziness.   All other systems reviewed and are negative.      Physical Exam  ED Triage Vitals   Temperature Pulse Respirations Blood Pressure SpO2   06/23/24 0940 06/23/24 0938 06/23/24 0938 06/23/24 0938 06/23/24 0938   97.7 °F (36.5 °C) 95 20 129/69 95 %      Temp Source Heart Rate Source Patient Position - Orthostatic VS BP Location FiO2 (%)   06/23/24 0940 06/23/24 0938 06/23/24 0938 06/23/24 0938 --   Oral Monitor Sitting Right arm       Pain Score       --                    Orthostatic Vital Signs  Vitals:    06/23/24 0938 06/23/24 1003 06/23/24 1200 06/23/24 1230   BP: 129/69  142/63 125/71   Pulse: 95 88 88 82   Patient Position - Orthostatic VS: Sitting  Lying Lying       Physical Exam  Vitals and nursing note reviewed.   Constitutional:       Appearance: Normal appearance.      Comments: Disheveled appearing   HENT:      Head: Normocephalic and atraumatic.      Mouth/Throat:      Mouth: Mucous membranes are moist.   Eyes:      Conjunctiva/sclera: Conjunctivae normal.   Cardiovascular:      Rate and Rhythm: Normal rate and regular rhythm.      Pulses: Normal pulses.      Heart sounds: Normal heart sounds.   Pulmonary:      Effort: Pulmonary effort is normal.      Breath sounds: Wheezing present.   Abdominal:      Palpations: Abdomen is soft.      Tenderness: There is no abdominal tenderness.   Musculoskeletal:         General: No tenderness.      Cervical back: Neck supple.      Right lower leg: No tenderness. No edema.      Left lower leg: No tenderness. No edema.   Skin:     General: Skin is warm and dry.      Capillary Refill: Capillary refill takes less than 2 seconds.   Neurological:      General: No focal deficit present.       Mental Status: He is alert. Mental status is at baseline.   Psychiatric:         Mood and Affect: Mood normal.         ED Medications  Medications   methylPREDNISolone sodium succinate (FOR EMS ONLY) (Solu-MEDROL) 125 MG injection 125 mg (0 mg Does not apply Given to EMS 6/23/24 1022)   ipratropium-albuterol (FOR EMS ONLY) (DUO-NEB) 0.5-2.5 mg/3 mL inhalation solution 6 mL (0 mL Does not apply Given to EMS 6/23/24 1022)   albuterol (FOR EMS ONLY) (2.5 mg/3 mL) 0.083 % inhalation solution 2.5 mg (0 mg Does not apply Given to EMS 6/23/24 1022)   albuterol inhalation solution 10 mg (10 mg Nebulization Given 6/23/24 1017)   ipratropium (ATROVENT) 0.02 % inhalation solution 1 mg (1 mg Nebulization Given 6/23/24 1022)   sodium chloride 0.9 % inhalation solution 12 mL (12 mL Nebulization Given 6/23/24 1016)       Diagnostic Studies  Results Reviewed       Procedure Component Value Units Date/Time    HS Troponin I 2hr [[id number]]  (Normal) Collected: 06/23/24 1258    Lab Status: Final result Specimen: Blood from Arm, Left Updated: 06/23/24 1352     hs TnI 2hr 12 ng/L      Delta 2hr hsTnI -2 ng/L     HS Troponin 0hr (reflex protocol) [[id number]]  (Normal) Collected: 06/23/24 1039    Lab Status: Final result Specimen: Blood from Arm, Left Updated: 06/23/24 1122     hs TnI 0hr 14 ng/L     Basic metabolic panel [[id number]]  (Abnormal) Collected: 06/23/24 1039    Lab Status: Final result Specimen: Blood from Arm, Left Updated: 06/23/24 1108     Sodium 142 mmol/L      Potassium 3.7 mmol/L      Chloride 106 mmol/L      CO2 30 mmol/L      ANION GAP 6 mmol/L      BUN 18 mg/dL      Creatinine 0.55 mg/dL      Glucose 157 mg/dL      Calcium 8.9 mg/dL      eGFR 105 ml/min/1.73sq m     Narrative:      [employer] guidelines for Chronic Kidney Disease (CKD):     Stage 1 with normal or high GFR (GFR > 90 mL/min/1.73 square meters)    Stage 2 Mild CKD (GFR = 60-89 mL/min/1.73 square meters)    Stage 3A Moderate CKD  (GFR = 45-59 mL/min/1.73 square meters)    Stage 3B Moderate CKD (GFR = 30-44 mL/min/1.73 square meters)    Stage 4 Severe CKD (GFR = 15-29 mL/min/1.73 square meters)    Stage 5 End Stage CKD (GFR <15 mL/min/1.73 square meters)  Note: GFR calculation is accurate only with a steady state creatinine    CBC and differential [[id number]]  (Abnormal) Collected: 06/23/24 1039    Lab Status: Final result Specimen: Blood from Arm, Left Updated: 06/23/24 1049     WBC 9.90 Thousand/uL      RBC 4.17 Million/uL      Hemoglobin 10.5 g/dL      Hematocrit 34.7 %      MCV 83 fL      MCH 25.2 pg      MCHC 30.3 g/dL      RDW 15.9 %      MPV 10.1 fL      Platelets 357 Thousands/uL      nRBC 0 /100 WBCs      Segmented % 74 %      Immature Grans % 0 %      Lymphocytes % 13 %      Monocytes % 6 %      Eosinophils Relative 7 %      Basophils Relative 0 %      Absolute Neutrophils 7.28 Thousands/µL      Absolute Immature Grans 0.03 Thousand/uL      Absolute Lymphocytes 1.26 Thousands/µL      Absolute Monocytes 0.62 Thousand/µL      Eosinophils Absolute 0.67 Thousand/µL      Basophils Absolute 0.04 Thousands/µL                    XR chest 1 view portable   Final Result by Antonieta Rodriguez MD (06/23 1132)      No acute cardiopulmonary disease.            Workstation performed: [id number]               Procedures  Procedures      ED Course  ED Course as of 06/25/24 0655   Sun Jun 23, 2024   1130 hs TnI 0hr: 14  Will check 2-hour delta troponin   1130 CBC and differential(!)  No acute abnormalities   1130 Basic metabolic panel(!)  No acute abnormalities   1305 Patient feeling better, disposition pending 2-hour delta troponin:   1332 Procedure Note: EKG  Date/Time: 06/23/24 1:32 PM   Interpreted by: Lisa Schmidt  Indications / Diagnosis: SOB  ECG reviewed by me, the ED Provider: yes   The EKG demonstrates:  Rate: 89  Rhythm: NSR, frequent PVCs  Intervals: regular  Axis: regular  QRS/Blocks: incomplete RBBB  ST Changes: poor baseline, no  acute ST changes.  Compared to prior EKG on 6/16/24, no acute change.      1353 Delta 2hr hsTnI: -2                                       Medical Decision Making  Amount and/or Complexity of Data Reviewed  Labs: ordered. Decision-making details documented in ED Course.  Radiology: ordered.    Risk  Prescription drug management.    Patient is a 69 y.o. male with PMH of COPD who presents to the ED with cough and shortness of breath.    Vital signs patient hypoxic to 88% on room air while sleeping however increased oxygen saturation to mid 90s when he woke up. On exam wheezing.    History and physical exam most consistent with COPD exacerbation. However, differential diagnosis included but not limited to pneumonia, ACS.     Plan: CBC, CMP, troponin, chest x-ray, EKG. Will treat patient with nebulizer    View ED course above for further discussion on patient workup.     On review of previous records reviewed echocardiogram from 5/9/2024 EF 55%.    All labs reviewed and utilized in the medical decision making process  All radiology studies independently viewed by me and interpreted by the radiologist.  I reviewed all testing with the patient.     Upon re-evaluation patient resting comfortably, no shortness of breath at this time.  Oxygen saturation improved on room air.    Disposition: I have reviewed the patient's vital signs, nursing notes, and other relevant tests/information. I had a detailed discussion with the patient regarding the history, exam findings, and any diagnostic results.   Plan to discharge home in stable condition with steroids, follow up with primary care provider.  Discussed with patient who is agreeable to plan.  I discussed discharge instructions, need for follow-up, and oral return precautions for what to return for in addition to the written return precautions and discharge instructions, specifically highlighting areas of special concern.  The patient verbalized understanding of the discharge  "instructions and warnings that would necessitate return to the Emergency Department including difficulty breathing, chest pain.  All questions the patient had were answered prior to discharge to the best of my ability.       Portions of the record may have been created with voice recognition software. Occasional wrong word or \"sound a like\" substitutions may have occurred due to the inherent limitations of voice recognition software. Read the chart carefully and recognize, using context, where substitutions have occurred.        Disposition  Final diagnoses:   Dyspnea     Time reflects when diagnosis was documented in both MDM as applicable and the Disposition within this note       Time User Action Codes Description Comment    6/23/2024  1:53 PM Lisa Schmidt Add [R06.00] Dyspnea           ED Disposition       ED Disposition   Discharge    Condition   Stable    Date/Time   Sun Jun 23, 2024  1:53 PM    Comment   Lucho Talavera discharge to home/self care.                   Follow-up Information       Follow up With Specialties Details Why Contact Info    [facility], Nurse Practitioner Schedule an appointment as soon as possible for a visit in 1 week  [address] [place] [address]  [phone]              Discharge Medication List as of 6/23/2024  1:55 PM        START taking these medications    Details   !! predniSONE 20 mg tablet Take 2.5 tablets (50 mg total) by mouth daily for 5 days, Starting Sun 6/23/2024, Until Fri 6/28/2024, Normal       !! - Potential duplicate medications found. Please discuss with provider.        CONTINUE these medications which have NOT CHANGED    Details   acetaminophen (TYLENOL) 325 mg tablet Take 2 tablets (650 mg total) by mouth every 6 (six) hours as needed (mild pain,headaches,fever), Starting Tue 5/28/2024, Normal      albuterol (Proventil HFA) 90 mcg/act inhaler Inhale 2 puffs every 6 (six) hours as needed for wheezing, Starting Mon " 1/1/2024, Normal      apixaban (Eliquis) 5 mg Take 1 tablet (5 mg total) by mouth 2 (two) times a day, Starting Tue 5/28/2024, Normal      aspirin 81 mg chewable tablet Chew 81 mg daily, Historical Med      atorvastatin (LIPITOR) 80 mg tablet Take 1 tablet (80 mg total) by mouth daily with dinner, Starting Sat 9/22/2018, Normal      B Complex Vitamins (VITAMIN B COMPLEX 100 IJ) Take 1 tablet by mouth daily, Historical Med      budesonide (PULMICORT) 0.5 mg/2 mL nebulizer solution Take 2 mL (0.5 mg total) by nebulization every 12 (twelve) hours Rinse mouth after use., Starting Fri 6/10/2022, No Print      busPIRone (BUSPAR) 10 mg tablet Take 1 tablet (10 mg total) by mouth 3 (three) times a day, Starting Fri 7/28/2023, Normal      Empagliflozin 25 MG TABS Take 25 mg by mouth in the morning. Indications: Type 2 Diabetes, Historical Med      ferrous sulfate 325 (65 Fe) mg tablet Take 1 tablet (325 mg total) by mouth daily with breakfast, Starting Wed 5/29/2024, No Print      fluticasone (FLONASE) 50 mcg/act nasal spray 1 spray into each nostril 2 (two) times a day, Starting Fri 10/4/2013, Historical Med      Fluticasone-Salmeterol (Advair) 500-50 mcg/dose inhaler Inhale 1 puff 2 (two) times a day Rinse mouth after use., Starting Mon 1/1/2024, Normal      fluticasone-umeclidinium-vilanterol (Trelegy Ellipta) 200-62.5-25 mcg/actuation AEPB inhaler Inhale 1 puff daily. Indications: Asthma, Historical Med      folic acid (FOLVITE) 1 mg tablet Take 1 tablet (1 mg total) by mouth daily, Starting Sat 6/11/2022, No Print      guaiFENesin (MUCINEX) 600 mg 12 hr tablet Take 1 tablet (600 mg total) by mouth 2 (two) times a day, Starting Tue 5/28/2024, Normal      insulin glargine (LANTUS) 100 units/mL subcutaneous injection Inject 10 Units under the skin 3 (three) times a day with meals. Indications: Type 2 Diabetes, Historical Med      Insulin Glargine Solostar (Basaglar Sri) 100 UNIT/ML SOPN Inject 0.18 mL (18 Units total)  under the skin daily at bedtime, Starting Tue 5/28/2024, Normal      ipratropium-albuterol (DUO-NEB) 0.5-2.5 mg/3 mL nebulizer solution Take 3 mL by nebulization 4 (four) times a day, Historical Med      lisinopril (ZESTRIL) 10 mg tablet Take 1 tablet (10 mg total) by mouth daily Do not start before May 29, 2024., Starting Wed 5/29/2024, Normal      LORazepam (ATIVAN) 1 mg tablet Take 1 mg by mouth every 6 (six) hours as needed for anxiety. this was to be stopped per latest dci   Indications: Feeling Anxious, Historical Med      melatonin 3 mg Take 1 tablet (3 mg total) by mouth daily at bedtime as needed (30), Starting Fri 9/21/2018, Normal      metFORMIN (GLUCOPHAGE) 500 mg tablet Take 1 tablet (500 mg total) by mouth 2 (two) times a day with meals, Starting Tue 5/28/2024, Normal      mirtazapine (REMERON) 7.5 MG tablet Take 1 tablet (7.5 mg total) by mouth every evening, Starting Tue 5/28/2024, Normal      pantoprazole (Protonix) 40 mg tablet Take 1 tablet (40 mg total) by mouth daily in the early morning, Starting Wed 5/29/2024, Normal      !! predniSONE 20 mg tablet Take 20 mg by mouth 3 (three) times a day. Indications: COPD, Historical Med      thiamine 100 MG tablet Take 1 tablet (100 mg total) by mouth daily, Starting Sat 9/22/2018, Normal      tiotropium (SPIRIVA) 18 mcg inhalation capsule Place 1 capsule (18 mcg total) into inhaler and inhale daily, Starting Mon 1/1/2024, Normal       !! - Potential duplicate medications found. Please discuss with provider.        No discharge procedures on file.    PDMP Review         Value Time User    PDMP Reviewed  Yes 5/28/2024  7:29 AM Ashley Depadua, MD             ED Provider  Attending physically available and evaluated Lucho ALAN Talavera. I managed the patient along with the ED Attending.    Electronically Signed by           Lisa Schmidt DO  06/25/24 0655

## 2024-06-24 ENCOUNTER — PATIENT OUTREACH (OUTPATIENT)
Dept: CASE MANAGEMENT | Facility: OTHER | Age: 69
End: 2024-06-24

## 2024-06-24 ENCOUNTER — HOME CARE VISIT (OUTPATIENT)
Dept: HOME HEALTH SERVICES | Facility: HOME HEALTHCARE | Age: 69
End: 2024-06-24
Payer: MEDICARE

## 2024-06-24 LAB
ATRIAL RATE: 92 BPM
QRS AXIS: 55 DEGREES
QRSD INTERVAL: 92 MS
QT INTERVAL: 354 MS
QTC INTERVAL: 430 MS
T WAVE AXIS: -17 DEGREES
VENTRICULAR RATE: 89 BPM

## 2024-06-24 PROCEDURE — 93010 ELECTROCARDIOGRAM REPORT: CPT | Performed by: INTERNAL MEDICINE

## 2024-06-24 NOTE — PROGRESS NOTES
OP RT CM received incoming ADT alert.  Patient was recently in ED and recently discharged from [facility]. OP RT CM will outreach patient.   __________________________________  OP RT CM called and left a VM requesting a return phone call. I will outreach in two weeks unless I hear back from patient prior.

## 2024-06-24 NOTE — CASE COMMUNICATION
Patient's daughter refused visit today. Rescheduled to tomorrow at 4pm. - c/w atorva 80mg qHS while inpatient

## 2024-06-25 ENCOUNTER — HOME CARE VISIT (OUTPATIENT)
Dept: HOME HEALTH SERVICES | Facility: HOME HEALTHCARE | Age: 69
End: 2024-06-25
Payer: MEDICARE

## 2024-06-25 VITALS — SYSTOLIC BLOOD PRESSURE: 136 MMHG | DIASTOLIC BLOOD PRESSURE: 78 MMHG | OXYGEN SATURATION: 99 % | HEART RATE: 84 BPM

## 2024-06-25 PROCEDURE — G0153 HHCP-SVS OF S/L PATH,EA 15MN: HCPCS

## 2024-06-25 PROCEDURE — G0151 HHCP-SERV OF PT,EA 15 MIN: HCPCS

## 2024-06-25 NOTE — ED ATTENDING ATTESTATION
6/23/2024  I, Gianni Reardon MD, saw and evaluated the patient. I have discussed the patient with the resident and agree with the resident's findings, Plan of Care, and MDM as documented in the resident's note, except where noted. All available labs and Radiology studies were reviewed.  I was present for key portions of any procedure(s) performed by the resident and I was immediately available to provide assistance.    At this point I agree with the current assessment done in the Emergency Department.  I have conducted an independent evaluation of this patient a history and physical is as follows:    70 yo male with a complicated past medical history including COPD, DM, prior CVA, HTN, CAD, KLARISSA, anxiety, and atrial flutter on Eliquis presents to the ED complaining of a cough and shortness of breath. The patient reports a nonproductive cough, audible wheezes, and shortness of breath x several hours. He did not take anything for his symptoms and called 9-1-1. No fevers or chills. He denies chest pain, nausea, vomiting, and diaphoresis. Medics administered albuterol neb and IV steroids en route. He says he is now feeling better. No LE swelling or pain. (+) Baseline 2 liter nasal cannula oxygen requirement while sleeping/resting. No other specific complaints.    ROS: per resident physician note    Gen: (+) mild respiratory distress, AA&Ox3  HEENT: PERRL, EOMI  Neck: supple  CV: RRR  Lungs: (+) diffuse wheezes, (+) poor air movement, (+) increased WOB  Abdomen: soft, NT/ND  Ext: no swelling or deformity  Neuro: 5/5 strength all extremities, sensation grossly intact  Skin: no rash    ED Course  The patient arrives to the ED in mild respiratory distress. (+) Diffuse wheezes and poor air movement on auscultation. Vital signs are stable. COPD exacerbation vs pneumonia? Lower clinical suspicion for ACS, PE, and PTX. Will check EKG, CXR, basic labs, and troponin. Hour-long Duo-neb ordered, will continue to monitor closely  in the ED. Disposition per workup and reassessment.      Critical Care Time  CriticalCare Time    Date/Time: 6/23/2024 11:08 AM    Performed by: Gianni Reardon MD  Authorized by: Gianni Reardon MD    Critical care provider statement:     Critical care time (minutes):  60    Critical care start time:  6/23/2024 10:00 AM    Critical care end time:  6/23/2024 11:00 AM    Critical care time was exclusive of:  Separately billable procedures and treating other patients and teaching time    Critical care was necessary to treat or prevent imminent or life-threatening deterioration of the following conditions:  Respiratory failure    Critical care was time spent personally by me on the following activities:  Obtaining history from patient or surrogate, development of treatment plan with patient or surrogate, discussions with primary provider, evaluation of patient's response to treatment, examination of patient, interpretation of cardiac output measurements, ordering and performing treatments and interventions, ordering and review of laboratory studies, ordering and review of radiographic studies, re-evaluation of patient's condition and review of old charts    I assumed direction of critical care for this patient from another provider in my specialty: no    Comments:      Patient in respiratory distress requiring hour-long breathing treatment, IV steroids, and supplemental oxygen.

## 2024-06-27 ENCOUNTER — HOME CARE VISIT (OUTPATIENT)
Dept: HOME HEALTH SERVICES | Facility: HOME HEALTHCARE | Age: 69
End: 2024-06-27
Payer: MEDICARE

## 2024-06-28 ENCOUNTER — HOME CARE VISIT (OUTPATIENT)
Dept: HOME HEALTH SERVICES | Facility: HOME HEALTHCARE | Age: 69
End: 2024-06-28
Payer: MEDICARE

## 2024-06-29 ENCOUNTER — APPOINTMENT (EMERGENCY)
Dept: RADIOLOGY | Facility: HOSPITAL | Age: 69
End: 2024-06-29
Payer: MEDICARE

## 2024-06-29 ENCOUNTER — HOSPITAL ENCOUNTER (EMERGENCY)
Facility: HOSPITAL | Age: 69
Discharge: HOME | End: 2024-06-29
Attending: EMERGENCY MEDICINE
Payer: MEDICARE

## 2024-06-29 VITALS
DIASTOLIC BLOOD PRESSURE: 65 MMHG | HEART RATE: 91 BPM | RESPIRATION RATE: 20 BRPM | OXYGEN SATURATION: 98 % | SYSTOLIC BLOOD PRESSURE: 131 MMHG | TEMPERATURE: 98.1 F

## 2024-06-29 DIAGNOSIS — J44.1 COPD EXACERBATION (HCC): Primary | ICD-10-CM

## 2024-06-29 LAB
ALBUMIN SERPL BCG-MCNC: 4 G/DL (ref 3.5–5)
ALP SERPL-CCNC: 69 U/L (ref 34–104)
ALT SERPL W P-5'-P-CCNC: 12 U/L (ref 7–52)
ANION GAP SERPL CALCULATED.3IONS-SCNC: 10 MMOL/L (ref 4–13)
AST SERPL W P-5'-P-CCNC: 18 U/L (ref 13–39)
BASOPHILS # BLD AUTO: 0.1 THOUSANDS/ÂΜL (ref 0–0.1)
BASOPHILS NFR BLD AUTO: 1 % (ref 0–1)
BILIRUB SERPL-MCNC: 0.47 MG/DL (ref 0.2–1)
BUN SERPL-MCNC: 12 MG/DL (ref 5–25)
CALCIUM SERPL-MCNC: 8.9 MG/DL (ref 8.4–10.2)
CHLORIDE SERPL-SCNC: 103 MMOL/L (ref 96–108)
CO2 SERPL-SCNC: 25 MMOL/L (ref 21–32)
CREAT SERPL-MCNC: 0.55 MG/DL (ref 0.6–1.3)
EOSINOPHIL # BLD AUTO: 0.76 THOUSAND/ÂΜL (ref 0–0.61)
EOSINOPHIL NFR BLD AUTO: 6 % (ref 0–6)
ERYTHROCYTE [DISTWIDTH] IN BLOOD BY AUTOMATED COUNT: 15.9 % (ref 11.6–15.1)
GFR SERPL CREATININE-BSD FRML MDRD: 105 ML/MIN/1.73SQ M
GLUCOSE SERPL-MCNC: 79 MG/DL (ref 65–140)
HCT VFR BLD AUTO: 34.9 % (ref 36.5–49.3)
HGB BLD-MCNC: 10.8 G/DL (ref 12–17)
IMM GRANULOCYTES # BLD AUTO: 0.04 THOUSAND/UL (ref 0–0.2)
IMM GRANULOCYTES NFR BLD AUTO: 0 % (ref 0–2)
LYMPHOCYTES # BLD AUTO: 2.49 THOUSANDS/ÂΜL (ref 0.6–4.47)
LYMPHOCYTES NFR BLD AUTO: 21 % (ref 14–44)
MCH RBC QN AUTO: 24.8 PG (ref 26.8–34.3)
MCHC RBC AUTO-ENTMCNC: 30.9 G/DL (ref 31.4–37.4)
MCV RBC AUTO: 80 FL (ref 82–98)
MONOCYTES # BLD AUTO: 1.34 THOUSAND/ÂΜL (ref 0.17–1.22)
MONOCYTES NFR BLD AUTO: 11 % (ref 4–12)
NEUTROPHILS # BLD AUTO: 7.06 THOUSANDS/ÂΜL (ref 1.85–7.62)
NEUTS SEG NFR BLD AUTO: 61 % (ref 43–75)
NRBC BLD AUTO-RTO: 0 /100 WBCS
PLATELET # BLD AUTO: 371 THOUSANDS/UL (ref 149–390)
PMV BLD AUTO: 10.4 FL (ref 8.9–12.7)
POTASSIUM SERPL-SCNC: 3.6 MMOL/L (ref 3.5–5.3)
PROT SERPL-MCNC: 6.7 G/DL (ref 6.4–8.4)
RBC # BLD AUTO: 4.36 MILLION/UL (ref 3.88–5.62)
SODIUM SERPL-SCNC: 138 MMOL/L (ref 135–147)
WBC # BLD AUTO: 11.79 THOUSAND/UL (ref 4.31–10.16)

## 2024-06-29 PROCEDURE — 80053 COMPREHEN METABOLIC PANEL: CPT

## 2024-06-29 PROCEDURE — 71045 X-RAY EXAM CHEST 1 VIEW: CPT

## 2024-06-29 PROCEDURE — 36415 COLL VENOUS BLD VENIPUNCTURE: CPT

## 2024-06-29 PROCEDURE — 99284 EMERGENCY DEPT VISIT MOD MDM: CPT | Performed by: EMERGENCY MEDICINE

## 2024-06-29 PROCEDURE — 99285 EMERGENCY DEPT VISIT HI MDM: CPT

## 2024-06-29 PROCEDURE — 85025 COMPLETE CBC W/AUTO DIFF WBC: CPT

## 2024-06-29 PROCEDURE — 93005 ELECTROCARDIOGRAM TRACING: CPT

## 2024-06-29 RX ORDER — SODIUM CHLORIDE FOR INHALATION 0.9 %
12 VIAL, NEBULIZER (ML) INHALATION ONCE
Status: COMPLETED | OUTPATIENT
Start: 2024-06-29 | End: 2024-06-29

## 2024-06-29 RX ORDER — METHYLPREDNISOLONE SOD SUCC 125 MG
1 VIAL (EA) INJECTION ONCE
Status: COMPLETED | OUTPATIENT
Start: 2024-06-29 | End: 2024-06-29

## 2024-06-29 RX ORDER — IPRATROPIUM BROMIDE AND ALBUTEROL SULFATE 2.5; .5 MG/3ML; MG/3ML
3 SOLUTION RESPIRATORY (INHALATION) ONCE
Status: COMPLETED | OUTPATIENT
Start: 2024-06-29 | End: 2024-06-29

## 2024-06-29 RX ADMIN — IPRATROPIUM BROMIDE 1 MG: 0.5 SOLUTION RESPIRATORY (INHALATION) at 01:06

## 2024-06-29 RX ADMIN — ALBUTEROL SULFATE 10 MG: 2.5 SOLUTION RESPIRATORY (INHALATION) at 01:06

## 2024-06-29 RX ADMIN — ISODIUM CHLORIDE 12 ML: 0.03 SOLUTION RESPIRATORY (INHALATION) at 01:06

## 2024-06-29 NOTE — ED ATTENDING ATTESTATION
6/29/2024  I, Jameel Aleman MD, saw and evaluated the patient. I have discussed the patient with the resident/non-physician practitioner and agree with the resident's/non-physician practitioner's findings, Plan of Care, and MDM as documented in the resident's/non-physician practitioner's note, except where noted. All available labs and Radiology studies were reviewed.  I was present for key portions of any procedure(s) performed by the resident/non-physician practitioner and I was immediately available to provide assistance.       At this point I agree with the current assessment done in the Emergency Department.  I have conducted an independent evaluation of this patient a history and physical is as follows:    ED Course     Impression: Shortness of breath history of COPD  Differential diagnosis COPD exacerbation possible pneumonia, ACS, MI, CHF    Patient's presentation symptoms appear consistent with prior COPD exacerbation check ECG and chest x-ray.  Patient reports improvement of symptoms after EMS gave steroids and bronchodilators will give additional bronchodilators emergency department.  Reassessed      Critical Care Time  Procedures

## 2024-06-29 NOTE — ED PROVIDER NOTES
History  Chief Complaint   Patient presents with    Shortness of Breath     Pt arrived from home via EMS. Pt reports becoming sob roughly 45 min prior to arrival. Hx copd and afib. Denies cp     69-year-old male with past medical history of COPD and A-fib presents to the ED via EMS from home for evaluation of shortness of breath that started 45 minutes prior to arrival.  Patient notes he was watching TV when the symptoms started patient tried his nebulizer at home without improvement.  Patient otherwise denies fever, chills, nausea, vomiting, chest pain, abdominal pain, dysuria, diarrhea           Prior to Admission Medications   Prescriptions Last Dose Informant Patient Reported? Taking?   B Complex Vitamins (VITAMIN B COMPLEX 100 IJ)   Yes No   Sig: Take 1 tablet by mouth daily   Empagliflozin 25 MG TABS   Yes No   Sig: Take 25 mg by mouth in the morning. Indications: Type 2 Diabetes   Fluticasone-Salmeterol (Advair) 500-50 mcg/dose inhaler   No No   Sig: Inhale 1 puff 2 (two) times a day Rinse mouth after use.   Patient not taking: Reported on 5/30/2024   Insulin Glargine Solostar (Basaglar KwikPen) 100 UNIT/ML SOPN   No No   Sig: Inject 0.18 mL (18 Units total) under the skin daily at bedtime   LORazepam (ATIVAN) 1 mg tablet   Yes No   Sig: Take 1 mg by mouth every 6 (six) hours as needed for anxiety. this was to be stopped per latest dci   Indications: Feeling Anxious   acetaminophen (TYLENOL) 325 mg tablet   No No   Sig: Take 2 tablets (650 mg total) by mouth every 6 (six) hours as needed (mild pain,headaches,fever)   albuterol (Proventil HFA) 90 mcg/act inhaler   No No   Sig: Inhale 2 puffs every 6 (six) hours as needed for wheezing   Patient not taking: Reported on 5/30/2024   apixaban (Eliquis) 5 mg   No No   Sig: Take 1 tablet (5 mg total) by mouth 2 (two) times a day   aspirin 81 mg chewable tablet   Yes No   Sig: Chew 81 mg daily   atorvastatin (LIPITOR) 80 mg tablet   No No   Sig: Take 1 tablet (80 mg  total) by mouth daily with dinner   budesonide (PULMICORT) 0.5 mg/2 mL nebulizer solution   No No   Sig: Take 2 mL (0.5 mg total) by nebulization every 12 (twelve) hours Rinse mouth after use.   busPIRone (BUSPAR) 10 mg tablet   No No   Sig: Take 1 tablet (10 mg total) by mouth 3 (three) times a day   ferrous sulfate 325 (65 Fe) mg tablet   No No   Sig: Take 1 tablet (325 mg total) by mouth daily with breakfast   fluticasone (FLONASE) 50 mcg/act nasal spray   Yes No   Si spray into each nostril 2 (two) times a day   fluticasone-umeclidinium-vilanterol (Trelegy Ellipta) 200-62.5-25 mcg/actuation AEPB inhaler   Yes No   Sig: Inhale 1 puff daily. Indications: Asthma   folic acid (FOLVITE) 1 mg tablet   No No   Sig: Take 1 tablet (1 mg total) by mouth daily   guaiFENesin (MUCINEX) 600 mg 12 hr tablet   No No   Sig: Take 1 tablet (600 mg total) by mouth 2 (two) times a day   insulin glargine (LANTUS) 100 units/mL subcutaneous injection   Yes No   Sig: Inject 10 Units under the skin 3 (three) times a day with meals. Indications: Type 2 Diabetes   ipratropium-albuterol (DUO-NEB) 0.5-2.5 mg/3 mL nebulizer solution   Yes No   Sig: Take 3 mL by nebulization 4 (four) times a day   lisinopril (ZESTRIL) 10 mg tablet   No No   Sig: Take 1 tablet (10 mg total) by mouth daily Do not start before May 29, 2024.   melatonin 3 mg   No No   Sig: Take 1 tablet (3 mg total) by mouth daily at bedtime as needed (30)   metFORMIN (GLUCOPHAGE) 500 mg tablet   No No   Sig: Take 1 tablet (500 mg total) by mouth 2 (two) times a day with meals   mirtazapine (REMERON) 7.5 MG tablet   No No   Sig: Take 1 tablet (7.5 mg total) by mouth every evening   pantoprazole (Protonix) 40 mg tablet   No No   Sig: Take 1 tablet (40 mg total) by mouth daily in the early morning   predniSONE 20 mg tablet   Yes No   Sig: Take 20 mg by mouth 3 (three) times a day. Indications: COPD   predniSONE 20 mg tablet   No No   Sig: Take 2.5 tablets (50 mg total) by mouth  daily for 5 days   thiamine 100 MG tablet   No No   Sig: Take 1 tablet (100 mg total) by mouth daily   tiotropium (SPIRIVA) 18 mcg inhalation capsule   No No   Sig: Place 1 capsule (18 mcg total) into inhaler and inhale daily   Patient not taking: Reported on 5/30/2024      Facility-Administered Medications: None       Past Medical History:   Diagnosis Date    Cardiac arrest (HCC)     COPD (chronic obstructive pulmonary disease) (HCC)     CVA (cerebral vascular accident) (HCC)     Diabetes mellitus (HCC)     Heart attack (HCC)     Hypertension     Stroke (HCC)        Past Surgical History:   Procedure Laterality Date    CARDIAC ELECTROPHYSIOLOGY PROCEDURE N/A 4/8/2024    Procedure: Cardiac eps/aflutter ablation;  Surgeon: Ihsan Blum DO;  Location: [facility];  Service: Cardiology    CERVICAL FUSION N/A 9/10/2018    Procedure: Posterior cervical decompressive laminectomy C3-6; Posterior cervical lateral mass and pedicle fixation fusion C1-T1;  Surgeon: Papa Quiros MD;  Location: [facility];  Service: Neurosurgery       Family History   Problem Relation Age of Onset    Heart attack Mother      I have reviewed and agree with the history as documented.    E-Cigarette/Vaping    E-Cigarette Use Never User      E-Cigarette/Vaping Substances    Nicotine No     THC No     CBD No     Flavoring No     Other No     Unknown No      Social History     Tobacco Use    Smoking status: Former     Types: Cigarettes    Smokeless tobacco: Never    Tobacco comments:     quit 5 months ago    Vaping Use    Vaping status: Never Used   Substance Use Topics    Alcohol use: Not Currently     Alcohol/week: 8.0 - 12.0 standard drinks of alcohol     Types: 8 - 12 Cans of beer per week     Comment: every day drinker    Drug use: Never        Review of Systems   Constitutional:  Negative for appetite change, chills, diaphoresis, fever and unexpected weight change.   HENT:  Negative for dental problem, ear pain, facial swelling,  sore throat and trouble swallowing.    Eyes:  Negative for pain and visual disturbance.   Respiratory:  Positive for shortness of breath. Negative for cough and chest tightness.    Cardiovascular:  Negative for chest pain, palpitations and leg swelling.   Gastrointestinal:  Negative for abdominal distention, abdominal pain, constipation, diarrhea, nausea and vomiting.   Endocrine: Negative for polyuria.   Genitourinary:  Negative for difficulty urinating, dysuria and hematuria.   Musculoskeletal:  Negative for arthralgias and back pain.   Skin:  Negative for color change and rash.   Neurological:  Negative for dizziness, seizures, syncope, light-headedness and headaches.   Psychiatric/Behavioral:  Negative for confusion.    All other systems reviewed and are negative.      Physical Exam  ED Triage Vitals [06/29/24 0031]   Temperature Pulse Respirations Blood Pressure SpO2   98.1 °F (36.7 °C) 90 20 127/68 97 %      Temp Source Heart Rate Source Patient Position - Orthostatic VS BP Location FiO2 (%)   Oral Monitor Sitting Right arm --      Pain Score       --             Orthostatic Vital Signs  Vitals:    06/29/24 0031 06/29/24 0130 06/29/24 0200   BP: 127/68 159/71 131/65   Pulse: 90 88 91   Patient Position - Orthostatic VS: Sitting Sitting Sitting       Physical Exam  Vitals and nursing note reviewed.   Constitutional:       General: He is not in acute distress.     Appearance: Normal appearance. He is ill-appearing. He is not toxic-appearing or diaphoretic.      Interventions: He is not intubated.  HENT:      Head: Normocephalic and atraumatic.      Right Ear: External ear normal.      Left Ear: External ear normal.      Nose: Nose normal.      Mouth/Throat:      Mouth: Mucous membranes are moist.   Eyes:      General: No scleral icterus.        Right eye: No discharge.      Extraocular Movements: Extraocular movements intact.      Conjunctiva/sclera: Conjunctivae normal.   Cardiovascular:      Rate and Rhythm:  Normal rate and regular rhythm.      Pulses: Normal pulses.      Heart sounds: No murmur heard.  Pulmonary:      Effort: Respiratory distress present. He is not intubated.      Breath sounds: Examination of the right-upper field reveals decreased breath sounds. Examination of the left-upper field reveals decreased breath sounds. Examination of the right-middle field reveals decreased breath sounds and wheezing. Examination of the left-middle field reveals decreased breath sounds. Examination of the right-lower field reveals decreased breath sounds and wheezing. Examination of the left-lower field reveals decreased breath sounds and wheezing. Decreased breath sounds and wheezing present.   Chest:      Chest wall: No tenderness.   Abdominal:      General: Abdomen is flat. There is no distension.      Palpations: Abdomen is soft.      Tenderness: There is no abdominal tenderness.   Musculoskeletal:         General: No deformity or signs of injury. Normal range of motion.      Cervical back: Normal range of motion and neck supple. No rigidity or tenderness.      Right lower leg: No edema.      Left lower leg: No edema.   Skin:     General: Skin is warm and dry.   Neurological:      General: No focal deficit present.      Mental Status: He is alert and oriented to person, place, and time.         ED Medications  Medications   methylPREDNISolone sodium succinate (FOR EMS ONLY) (Solu-MEDROL) 125 MG injection 125 mg (0 mg Does not apply Given to EMS 6/29/24 0032)   ipratropium-albuterol (DUO-NEB) 0.5-2.5 mg/3 mL inhalation solution 3 mL (0 mL Nebulization Given to EMS 6/29/24 0032)   albuterol inhalation solution 10 mg (10 mg Nebulization Given 6/29/24 0106)   ipratropium (ATROVENT) 0.02 % inhalation solution 1 mg (1 mg Nebulization Given 6/29/24 0106)   sodium chloride 0.9 % inhalation solution 12 mL (12 mL Nebulization Given 6/29/24 0106)       Diagnostic Studies  Results Reviewed       Procedure Component Value Units  Date/Time    Comprehensive metabolic panel [[id number]]  (Abnormal) Collected: 06/29/24 0057    Lab Status: Final result Specimen: Blood from Arm, Right Updated: 06/29/24 0124     Sodium 138 mmol/L      Potassium 3.6 mmol/L      Chloride 103 mmol/L      CO2 25 mmol/L      ANION GAP 10 mmol/L      BUN 12 mg/dL      Creatinine 0.55 mg/dL      Glucose 79 mg/dL      Calcium 8.9 mg/dL      AST 18 U/L      ALT 12 U/L      Alkaline Phosphatase 69 U/L      Total Protein 6.7 g/dL      Albumin 4.0 g/dL      Total Bilirubin 0.47 mg/dL      eGFR 105 ml/min/1.73sq m     Narrative:      [employer] guidelines for Chronic Kidney Disease (CKD):     Stage 1 with normal or high GFR (GFR > 90 mL/min/1.73 square meters)    Stage 2 Mild CKD (GFR = 60-89 mL/min/1.73 square meters)    Stage 3A Moderate CKD (GFR = 45-59 mL/min/1.73 square meters)    Stage 3B Moderate CKD (GFR = 30-44 mL/min/1.73 square meters)    Stage 4 Severe CKD (GFR = 15-29 mL/min/1.73 square meters)    Stage 5 End Stage CKD (GFR <15 mL/min/1.73 square meters)  Note: GFR calculation is accurate only with a steady state creatinine    CBC and differential [[id number]]  (Abnormal) Collected: 06/29/24 0057    Lab Status: Final result Specimen: Blood from Arm, Right Updated: 06/29/24 0105     WBC 11.79 Thousand/uL      RBC 4.36 Million/uL      Hemoglobin 10.8 g/dL      Hematocrit 34.9 %      MCV 80 fL      MCH 24.8 pg      MCHC 30.9 g/dL      RDW 15.9 %      MPV 10.4 fL      Platelets 371 Thousands/uL      nRBC 0 /100 WBCs      Segmented % 61 %      Immature Grans % 0 %      Lymphocytes % 21 %      Monocytes % 11 %      Eosinophils Relative 6 %      Basophils Relative 1 %      Absolute Neutrophils 7.06 Thousands/µL      Absolute Immature Grans 0.04 Thousand/uL      Absolute Lymphocytes 2.49 Thousands/µL      Absolute Monocytes 1.34 Thousand/µL      Eosinophils Absolute 0.76 Thousand/µL      Basophils Absolute 0.10 Thousands/µL                    XR  chest 1 view portable   Final Result by Tyrese Gomez MD (06/30 1100)      Possible mild congestion            Workstation performed: [id number]               Procedures  Procedures      ED Course                                       Medical Decision Making  69-year-old male with past medical history of COPD and A-fib presents to the ED via EMS from home for evaluation of shortness of breath that started 45 minutes prior to arrival.    DDX: Viral illness, COPD exacerbation, pneumonia  Will evaluate with EKG and chest x-ray   Patient received steroids and DuoNeb via EMS with mild improvement  Will evaluate with CBC, CMP and will start DuoNeb due to persistent wheezing and decreased breath sounds  Signed out to morning team    Amount and/or Complexity of Data Reviewed  Labs: ordered.  Radiology: ordered.    Risk  Prescription drug management.          Disposition  Final diagnoses:   COPD exacerbation (HCC)     Time reflects when diagnosis was documented in both MDM as applicable and the Disposition within this note       Time User Action Codes Description Comment    6/29/2024  2:31 AM Osmani Adame [J44.1] COPD exacerbation (HCC)           ED Disposition       ED Disposition   Discharge    Condition   Stable    Date/Time   Sat Jun 29, 2024  2:31 AM    Comment   Lucho Talavera discharge to home/self care.                   Follow-up Information    None         Discharge Medication List as of 6/29/2024  2:32 AM        CONTINUE these medications which have NOT CHANGED    Details   acetaminophen (TYLENOL) 325 mg tablet Take 2 tablets (650 mg total) by mouth every 6 (six) hours as needed (mild pain,headaches,fever), Starting Tue 5/28/2024, Normal      albuterol (Proventil HFA) 90 mcg/act inhaler Inhale 2 puffs every 6 (six) hours as needed for wheezing, Starting Mon 1/1/2024, Normal      apixaban (Eliquis) 5 mg Take 1 tablet (5 mg total) by mouth 2 (two) times a day, Starting Tue 5/28/2024, Normal       aspirin 81 mg chewable tablet Chew 81 mg daily, Historical Med      atorvastatin (LIPITOR) 80 mg tablet Take 1 tablet (80 mg total) by mouth daily with dinner, Starting Sat 9/22/2018, Normal      B Complex Vitamins (VITAMIN B COMPLEX 100 IJ) Take 1 tablet by mouth daily, Historical Med      budesonide (PULMICORT) 0.5 mg/2 mL nebulizer solution Take 2 mL (0.5 mg total) by nebulization every 12 (twelve) hours Rinse mouth after use., Starting Fri 6/10/2022, No Print      busPIRone (BUSPAR) 10 mg tablet Take 1 tablet (10 mg total) by mouth 3 (three) times a day, Starting Fri 7/28/2023, Normal      Empagliflozin 25 MG TABS Take 25 mg by mouth in the morning. Indications: Type 2 Diabetes, Historical Med      ferrous sulfate 325 (65 Fe) mg tablet Take 1 tablet (325 mg total) by mouth daily with breakfast, Starting Wed 5/29/2024, No Print      fluticasone (FLONASE) 50 mcg/act nasal spray 1 spray into each nostril 2 (two) times a day, Starting Fri 10/4/2013, Historical Med      Fluticasone-Salmeterol (Advair) 500-50 mcg/dose inhaler Inhale 1 puff 2 (two) times a day Rinse mouth after use., Starting Mon 1/1/2024, Normal      fluticasone-umeclidinium-vilanterol (Trelegy Ellipta) 200-62.5-25 mcg/actuation AEPB inhaler Inhale 1 puff daily. Indications: Asthma, Historical Med      folic acid (FOLVITE) 1 mg tablet Take 1 tablet (1 mg total) by mouth daily, Starting Sat 6/11/2022, No Print      guaiFENesin (MUCINEX) 600 mg 12 hr tablet Take 1 tablet (600 mg total) by mouth 2 (two) times a day, Starting Tue 5/28/2024, Normal      insulin glargine (LANTUS) 100 units/mL subcutaneous injection Inject 10 Units under the skin 3 (three) times a day with meals. Indications: Type 2 Diabetes, Historical Med      Insulin Glargine Solostar (Basaglar KwikPen) 100 UNIT/ML SOPN Inject 0.18 mL (18 Units total) under the skin daily at bedtime, Starting Tue 5/28/2024, Normal      ipratropium-albuterol (DUO-NEB) 0.5-2.5 mg/3 mL nebulizer solution  Take 3 mL by nebulization 4 (four) times a day, Historical Med      lisinopril (ZESTRIL) 10 mg tablet Take 1 tablet (10 mg total) by mouth daily Do not start before May 29, 2024., Starting Wed 5/29/2024, Normal      LORazepam (ATIVAN) 1 mg tablet Take 1 mg by mouth every 6 (six) hours as needed for anxiety. this was to be stopped per latest dci   Indications: Feeling Anxious, Historical Med      melatonin 3 mg Take 1 tablet (3 mg total) by mouth daily at bedtime as needed (30), Starting Fri 9/21/2018, Normal      metFORMIN (GLUCOPHAGE) 500 mg tablet Take 1 tablet (500 mg total) by mouth 2 (two) times a day with meals, Starting Tue 5/28/2024, Normal      mirtazapine (REMERON) 7.5 MG tablet Take 1 tablet (7.5 mg total) by mouth every evening, Starting Tue 5/28/2024, Normal      pantoprazole (Protonix) 40 mg tablet Take 1 tablet (40 mg total) by mouth daily in the early morning, Starting Wed 5/29/2024, Normal      predniSONE 20 mg tablet Take 20 mg by mouth 3 (three) times a day. Indications: COPD, Historical Med      thiamine 100 MG tablet Take 1 tablet (100 mg total) by mouth daily, Starting Sat 9/22/2018, Normal      tiotropium (SPIRIVA) 18 mcg inhalation capsule Place 1 capsule (18 mcg total) into inhaler and inhale daily, Starting Mon 1/1/2024, Normal           No discharge procedures on file.    PDMP Review         Value Time User    PDMP Reviewed  Yes 5/28/2024  7:29 AM Ashley Depadua, MD             ED Provider  Attending physically available and evaluated Lucho Talavera. I managed the patient along with the ED Attending.    Electronically Signed by           Eriberto Bower DO  07/06/24 0218

## 2024-06-29 NOTE — ED CARE HANDOFF
Emergency Department Sign Out Note        Sign out and transfer of care from day resident. See Separate Emergency Department note.     The patient, Lucho Talavera, was evaluated by the previous provider for COPD exacerbation.    Workup Completed:  Chest x-ray and starting DuoNeb    ED Course / Workup Pending (followup):                ED Course as of 06/29/24 0232   Sat Jun 29, 2024   0126 S/o: copd exacerbation - duoneb -> home   0231 On reassessment patient has subtle wheezing on the left no wheezing on the right.  Patient states he feels significantly better than when he came in.  Will discharge patient at this time to home.     Procedures  Medical Decision Making  Amount and/or Complexity of Data Reviewed  Labs: ordered.  Radiology: ordered.    Risk  Prescription drug management.            Disposition  Final diagnoses:   COPD exacerbation (HCC)     Time reflects when diagnosis was documented in both MDM as applicable and the Disposition within this note       Time User Action Codes Description Comment    6/29/2024  2:31 AM Osmani Adame [J44.1] COPD exacerbation (HCC)           ED Disposition       ED Disposition   Discharge    Condition   Stable    Date/Time   Sat Jun 29, 2024  2:31 AM    Comment   Lucho Talavera discharge to home/self care.                   Follow-up Information    None       Patient's Medications   Discharge Prescriptions    No medications on file     No discharge procedures on file.       ED Provider  Electronically Signed by     Osmani Adame MD  06/29/24 0232

## 2024-06-30 LAB
ATRIAL RATE: 90 BPM
P AXIS: 39 DEGREES
PR INTERVAL: 194 MS
QRS AXIS: 97 DEGREES
QRSD INTERVAL: 88 MS
QT INTERVAL: 362 MS
QTC INTERVAL: 442 MS
T WAVE AXIS: -19 DEGREES
VENTRICULAR RATE: 90 BPM

## 2024-06-30 PROCEDURE — 93010 ELECTROCARDIOGRAM REPORT: CPT | Performed by: INTERNAL MEDICINE

## 2024-07-01 ENCOUNTER — PATIENT OUTREACH (OUTPATIENT)
Dept: CASE MANAGEMENT | Facility: OTHER | Age: 69
End: 2024-07-01

## 2024-07-01 NOTE — PROGRESS NOTES
OP RT CM received incoming ADT alert. Patient recently discharged. OP RT CM will outreach later today.   __________________________________  .OP RT CM called and received a loud alarming sound on phone. Unable to leave VM.  I  will outreach tomorrow unless  I hear back from patient prior.

## 2024-07-02 ENCOUNTER — APPOINTMENT (EMERGENCY)
Dept: RADIOLOGY | Facility: HOSPITAL | Age: 69
End: 2024-07-02
Payer: MEDICARE

## 2024-07-02 ENCOUNTER — PATIENT OUTREACH (OUTPATIENT)
Dept: CASE MANAGEMENT | Facility: OTHER | Age: 69
End: 2024-07-02

## 2024-07-02 ENCOUNTER — HOSPITAL ENCOUNTER (EMERGENCY)
Facility: HOSPITAL | Age: 69
Discharge: HOME | End: 2024-07-02
Attending: EMERGENCY MEDICINE
Payer: MEDICARE

## 2024-07-02 VITALS
OXYGEN SATURATION: 90 % | TEMPERATURE: 98.2 F | DIASTOLIC BLOOD PRESSURE: 54 MMHG | RESPIRATION RATE: 22 BRPM | SYSTOLIC BLOOD PRESSURE: 122 MMHG | HEART RATE: 80 BPM

## 2024-07-02 DIAGNOSIS — J44.1 COPD WITH ACUTE EXACERBATION (HCC): Primary | ICD-10-CM

## 2024-07-02 LAB
2HR DELTA HS TROPONIN: 0 NG/L
ALBUMIN SERPL BCG-MCNC: 3.8 G/DL (ref 3.5–5)
ALP SERPL-CCNC: 72 U/L (ref 34–104)
ALT SERPL W P-5'-P-CCNC: 12 U/L (ref 7–52)
ANION GAP SERPL CALCULATED.3IONS-SCNC: 7 MMOL/L (ref 4–13)
AST SERPL W P-5'-P-CCNC: 15 U/L (ref 13–39)
BASOPHILS # BLD AUTO: 0.05 THOUSANDS/ÂΜL (ref 0–0.1)
BASOPHILS NFR BLD AUTO: 0 % (ref 0–1)
BILIRUB SERPL-MCNC: 0.28 MG/DL (ref 0.2–1)
BUN SERPL-MCNC: 17 MG/DL (ref 5–25)
CALCIUM SERPL-MCNC: 8.6 MG/DL (ref 8.4–10.2)
CARDIAC TROPONIN I PNL SERPL HS: 14 NG/L
CARDIAC TROPONIN I PNL SERPL HS: 14 NG/L
CHLORIDE SERPL-SCNC: 103 MMOL/L (ref 96–108)
CO2 SERPL-SCNC: 27 MMOL/L (ref 21–32)
CREAT SERPL-MCNC: 0.72 MG/DL (ref 0.6–1.3)
EOSINOPHIL # BLD AUTO: 0.37 THOUSAND/ÂΜL (ref 0–0.61)
EOSINOPHIL NFR BLD AUTO: 3 % (ref 0–6)
ERYTHROCYTE [DISTWIDTH] IN BLOOD BY AUTOMATED COUNT: 16.2 % (ref 11.6–15.1)
GFR SERPL CREATININE-BSD FRML MDRD: 94 ML/MIN/1.73SQ M
GLUCOSE SERPL-MCNC: 158 MG/DL (ref 65–140)
HCT VFR BLD AUTO: 34.7 % (ref 36.5–49.3)
HGB BLD-MCNC: 10.7 G/DL (ref 12–17)
IMM GRANULOCYTES # BLD AUTO: 0.04 THOUSAND/UL (ref 0–0.2)
IMM GRANULOCYTES NFR BLD AUTO: 0 % (ref 0–2)
LYMPHOCYTES # BLD AUTO: 2.75 THOUSANDS/ÂΜL (ref 0.6–4.47)
LYMPHOCYTES NFR BLD AUTO: 24 % (ref 14–44)
MCH RBC QN AUTO: 24.9 PG (ref 26.8–34.3)
MCHC RBC AUTO-ENTMCNC: 30.8 G/DL (ref 31.4–37.4)
MCV RBC AUTO: 81 FL (ref 82–98)
MONOCYTES # BLD AUTO: 1.55 THOUSAND/ÂΜL (ref 0.17–1.22)
MONOCYTES NFR BLD AUTO: 14 % (ref 4–12)
NEUTROPHILS # BLD AUTO: 6.76 THOUSANDS/ÂΜL (ref 1.85–7.62)
NEUTS SEG NFR BLD AUTO: 59 % (ref 43–75)
NRBC BLD AUTO-RTO: 0 /100 WBCS
PLATELET # BLD AUTO: 392 THOUSANDS/UL (ref 149–390)
PMV BLD AUTO: 10.4 FL (ref 8.9–12.7)
POTASSIUM SERPL-SCNC: 4 MMOL/L (ref 3.5–5.3)
PROT SERPL-MCNC: 6.7 G/DL (ref 6.4–8.4)
RBC # BLD AUTO: 4.29 MILLION/UL (ref 3.88–5.62)
SODIUM SERPL-SCNC: 137 MMOL/L (ref 135–147)
WBC # BLD AUTO: 11.52 THOUSAND/UL (ref 4.31–10.16)

## 2024-07-02 PROCEDURE — 99291 CRITICAL CARE FIRST HOUR: CPT | Performed by: EMERGENCY MEDICINE

## 2024-07-02 PROCEDURE — 71045 X-RAY EXAM CHEST 1 VIEW: CPT

## 2024-07-02 PROCEDURE — 84484 ASSAY OF TROPONIN QUANT: CPT | Performed by: EMERGENCY MEDICINE

## 2024-07-02 PROCEDURE — 93005 ELECTROCARDIOGRAM TRACING: CPT

## 2024-07-02 PROCEDURE — 85025 COMPLETE CBC W/AUTO DIFF WBC: CPT | Performed by: EMERGENCY MEDICINE

## 2024-07-02 PROCEDURE — 36415 COLL VENOUS BLD VENIPUNCTURE: CPT

## 2024-07-02 PROCEDURE — 94760 N-INVAS EAR/PLS OXIMETRY 1: CPT

## 2024-07-02 PROCEDURE — 80053 COMPREHEN METABOLIC PANEL: CPT | Performed by: EMERGENCY MEDICINE

## 2024-07-02 RX ORDER — METHYLPREDNISOLONE SODIUM SUCCINATE 125 MG/2ML
125 INJECTION, POWDER, LYOPHILIZED, FOR SOLUTION INTRAMUSCULAR; INTRAVENOUS ONCE
Status: COMPLETED | OUTPATIENT
Start: 2024-07-02 | End: 2024-07-02

## 2024-07-02 RX ORDER — SODIUM CHLORIDE FOR INHALATION 0.9 %
12 VIAL, NEBULIZER (ML) INHALATION ONCE
Status: COMPLETED | OUTPATIENT
Start: 2024-07-02 | End: 2024-07-02

## 2024-07-02 RX ORDER — PREDNISONE 10 MG/1
40 TABLET ORAL DAILY
Qty: 16 TABLET | Refills: 0 | Status: SHIPPED | OUTPATIENT
Start: 2024-07-02 | End: 2024-07-06

## 2024-07-02 RX ADMIN — METHYLPREDNISOLONE SODIUM SUCCINATE 125 MG: 125 INJECTION, POWDER, FOR SOLUTION INTRAMUSCULAR; INTRAVENOUS at 20:59

## 2024-07-02 RX ADMIN — ALBUTEROL SULFATE 10 MG: 2.5 SOLUTION RESPIRATORY (INHALATION) at 20:59

## 2024-07-02 RX ADMIN — IPRATROPIUM BROMIDE 1 MG: 0.5 SOLUTION RESPIRATORY (INHALATION) at 20:58

## 2024-07-02 RX ADMIN — ISODIUM CHLORIDE 12 ML: 0.03 SOLUTION RESPIRATORY (INHALATION) at 20:59

## 2024-07-02 NOTE — PROGRESS NOTES
OP RT CM attempted to call patient as other RT could not get through. There was no VM just a loud screeching sound. OP RT CM was unable to LM.

## 2024-07-02 NOTE — PROGRESS NOTES
OP RT CM attempted outreach again today and an alarming sound was heard on the phone. Unable to leave VM. OP RT CM will outreach in two weeks unless I hear back from family prior.

## 2024-07-02 NOTE — Clinical Note
Lucho Talavera was seen and treated in our emergency department on 7/2/2024.                Diagnosis: COPD exacerbation    Lucho  .    He may return on this date: 07/04/2024         If you have any questions or concerns, please don't hesitate to call.      Santo Jimenez MD    ______________________________           _______________          _______________  Hospital Representative                              Date                                Time

## 2024-07-03 ENCOUNTER — PATIENT OUTREACH (OUTPATIENT)
Dept: CASE MANAGEMENT | Facility: OTHER | Age: 69
End: 2024-07-03

## 2024-07-03 ENCOUNTER — HOSPITAL ENCOUNTER (EMERGENCY)
Facility: HOSPITAL | Age: 69
Discharge: HOME | End: 2024-07-03
Attending: EMERGENCY MEDICINE
Payer: MEDICARE

## 2024-07-03 VITALS
RESPIRATION RATE: 24 BRPM | OXYGEN SATURATION: 93 % | DIASTOLIC BLOOD PRESSURE: 77 MMHG | TEMPERATURE: 98.3 F | SYSTOLIC BLOOD PRESSURE: 166 MMHG | HEART RATE: 99 BPM

## 2024-07-03 DIAGNOSIS — J44.1 COPD EXACERBATION (HCC): Primary | ICD-10-CM

## 2024-07-03 LAB
ATRIAL RATE: 86 BPM
ATRIAL RATE: 97 BPM
P AXIS: 63 DEGREES
PR INTERVAL: 176 MS
QRS AXIS: 100 DEGREES
QRS AXIS: 79 DEGREES
QRSD INTERVAL: 88 MS
QRSD INTERVAL: 92 MS
QT INTERVAL: 328 MS
QT INTERVAL: 354 MS
QTC INTERVAL: 449 MS
QTC INTERVAL: 452 MS
T WAVE AXIS: -18 DEGREES
T WAVE AXIS: 49 DEGREES
VENTRICULAR RATE: 114 BPM
VENTRICULAR RATE: 97 BPM

## 2024-07-03 PROCEDURE — 93010 ELECTROCARDIOGRAM REPORT: CPT | Performed by: INTERNAL MEDICINE

## 2024-07-03 PROCEDURE — 93005 ELECTROCARDIOGRAM TRACING: CPT

## 2024-07-03 PROCEDURE — 99291 CRITICAL CARE FIRST HOUR: CPT | Performed by: EMERGENCY MEDICINE

## 2024-07-03 RX ORDER — SODIUM CHLORIDE FOR INHALATION 0.9 %
12 VIAL, NEBULIZER (ML) INHALATION ONCE
Status: COMPLETED | OUTPATIENT
Start: 2024-07-03 | End: 2024-07-03

## 2024-07-03 RX ORDER — MAGNESIUM SULFATE HEPTAHYDRATE 40 MG/ML
2 INJECTION, SOLUTION INTRAVENOUS ONCE
Status: COMPLETED | OUTPATIENT
Start: 2024-07-03 | End: 2024-07-03

## 2024-07-03 RX ADMIN — ALBUTEROL SULFATE 10 MG: 2.5 SOLUTION RESPIRATORY (INHALATION) at 16:37

## 2024-07-03 RX ADMIN — MAGNESIUM SULFATE HEPTAHYDRATE 2 G: 40 INJECTION, SOLUTION INTRAVENOUS at 16:48

## 2024-07-03 RX ADMIN — IPRATROPIUM BROMIDE 1 MG: 0.5 SOLUTION RESPIRATORY (INHALATION) at 16:37

## 2024-07-03 RX ADMIN — ISODIUM CHLORIDE 12 ML: 0.03 SOLUTION RESPIRATORY (INHALATION) at 16:37

## 2024-07-03 NOTE — ED PROVIDER NOTES
Final Diagnoses:     1. COPD with acute exacerbation (HCC)      ED Course as of 07/02/24 2338   [place] Jul 02, 2024 2114 WBC(!): 11.52   2114 Hemoglobin(!): 10.7   2114 Platelet Count(!): 392   2140 hs TnI 0hr: 14   2149 hs TnI 0hr: 14  Not here for a story that sounds like ACS.  I think a single one for symptoms is fine.    2154 Feels somewhat improved.  HEART neb still going on.    2318 I reviewed all testing with the patient  I went to reevaluate the patient.  He is symptomatically significantly improved.  Reduced wheezing, actually no wheezing.  He is saturating 91% while on 1 L.  He states he is normally on 2 L nasal cannula.  I offered admission for observation versus discharge.  Patient is okay going home and RTER for worsening.     I gave oral return precautions for what to return for in addition to the written return precautions.   The patient  verbalized understanding of the discharge instructions and warnings that would necessitate return to the Emergency Department.  I specifically highlighted areas of special concern regarding the written and verbal discharge instructions and return precautions.    All questions were answered prior to discharge.       Nursing Triage:     Chief Complaint   Patient presents with    Shortness of Breath     Pt c/o SOB and cough for the last half hour. Denies chest pain. Hx COPD.      HPI:   This is a 69 y.o. male presenting for evaluation of dyspnea, severe COPD exacerbation.  The patient has a very long history of COPD, multiple evaluations for similar.  In last couple days been having increasing dyspnea especially last for half an hour.  Feels exact like previous COPD exacerbation.  No fevers chills nausea vomiting chest pain.  Has chest tightness and shortness of breath similar to previous COPD exacerbation.  He can remember the last exacerbation he has had.  He is not currently on steroids.  He does not need refills of his medications.  Feels exact like previous  episodes.    ASSESSMENT + PLAN:   - We will do IVF for insensible losses  - Will do IV steroids if EMS has not provided it; solumedrol 125mg  - We will do aggressive beta-agonist therapy, specifically 1 hour [place] neb   - CXR for PNA  - Will continue to monitor patient.   - Re-assess --> disposition based on improvement. If still wheezing, tachypneic, or tachycardic, or appears ill and unable to do baseline activities, will admit.   - 13 point ROS was performed and all are normal unless stated in the history above.     First nurse cardiac was done prior to my arrival.   - Will do an EKG for arrythmia, strain; troponin for same as per protocol for evaluation of ACS though my suspicion is low.   - CBC for anemia; CMP for kidney function and electrolytes.   - Will check CXR for pneumonia, PTX, fluid overload  HEART score:  History 0=Slightly or non-suspicious   ECG 0=Normal   Age 2= > 65 years   Risk Factors 2= > 3 risk factors, or history of atherosclerotic disease   Troponin 1= Between 13-35 ng/L   Total 5   - Disposition per workup.     Past Medical History:   Diagnosis Date    Cardiac arrest (HCC)     COPD (chronic obstructive pulmonary disease) (HCC)     CVA (cerebral vascular accident) (HCC)     Diabetes mellitus (HCC)     Heart attack (HCC)     Hypertension     Stroke (HCC)      - Nursing note reviewed. Vitals reviewed.   - Orders placed by myself and/or advanced practitioner / resident.    - Previous chart was reviewed  - No language barrier.   - History obtained from patient.   - There are no limitations to the history obtained.   - Critical care time: 33 minutes  - Critical care time was exclusive of seperately bilable procedures and treating other patients as well as teaching time.   - Critical care was necessary to treat or prevent imminent or life-threatening deterioration of the following condition: Dyspnea, COPD exacerbation necessitating 1 hour neb treatment  - Critical care time was spent personally by  me on the following activities as well as the above as per the ED course and rest of chart: blood draw for specimens, obtaining history from patient / surrogate, developement of a treatment plan, discussions with consultants, evaluation of patient's response to the treatment, examination of the patient, ordering/performing treatements and interventions, re-evaluation of the patient's condition, review of old charts, ordering/reviewing laboratory studies, ordering/reviewing of radiographic studies    Physical:   General: VSS, NAD, awake, alert. Well-nourished, well-developed. Appears stated age.   Head: Normocephalic, atraumatic, nontender.  Eyes: PERRL, EOM-I. No diplopia.   No hyphema.   No subconjunctival hemorrhages.  Symmetrical lids.   ENT: Atraumatic external nose and ears.    Pharynx normal.   MMM  No malocclusion. No stridor. Normal phonation. No drooling. Normal swallowing.   Neck: Symmetric, trachea midline. No JVD.  CV: RRR. +S1/S2  No murmurs or gallops  Peripheral pulses +2 throughout. No chest wall tenderness.   Lungs:   Mildly labored   No retractions  Wheezing (poor air movement, and bilaterally equal wheezing, expiratory only)  lungs sounds equal bilateral.   No crepitus.   +tachypnea.   No paradoxical motion.  Abd: +BS, soft, NT/ND.   MSK:   FROM   No lower extremity edema.   Back:   No CVAT  Skin: Dry, intact.   Neuro: AAOx3, GCS 15, CN II-XII grossly intact.   Motor grossly intact.  Psychiatric/Behavioral: Appropriate mood and affect   Exam: deferred    Vitals:    07/02/24 2040 07/02/24 2145 07/02/24 2300   BP: (!) 173/81 122/54    BP Location: Left arm     Pulse: 97 76 80   Resp: 20 20 22   Temp: 98.2 °F (36.8 °C)     TempSrc: Oral     SpO2: 96% 97% 90%     - There are no obvious limitations to social determinants of care.   - Nursing note reviewed.   - Vitals reviewed.   - Orders placed by myself and/or advanced practitioner / resident.    - Previous chart was reviewed  - No language barrier.    - History obtained from patient.    - There are no limitations to the history obtained:     Past Medical:    has a past medical history of Cardiac arrest (HCC), COPD (chronic obstructive pulmonary disease) (HCC), CVA (cerebral vascular accident) (HCC), Diabetes mellitus (HCC), Heart attack (HCC), Hypertension, and Stroke (HCC).    Past Surgical:    has a past surgical history that includes Cervical fusion (N/A, 9/10/2018) and Cardiac electrophysiology procedure (N/A, 4/8/2024).    Social:     Social History     Substance and Sexual Activity   Alcohol Use Not Currently    Alcohol/week: 8.0 - 12.0 standard drinks of alcohol    Types: 8 - 12 Cans of beer per week    Comment: every day drinker     Social History     Tobacco Use   Smoking Status Former    Types: Cigarettes   Smokeless Tobacco Never   Tobacco Comments    quit 5 months ago      Social History     Substance and Sexual Activity   Drug Use Never       Echo:   No results found for this or any previous visit.    No results found for this or any previous visit.      Cath:    No results found for this or any previous visit.      Code Status: Prior  Advance Directive and Living Will:      Power of :    POLST:    Medications   albuterol inhalation solution 10 mg (10 mg Nebulization Given 7/2/24 2059)   ipratropium (ATROVENT) 0.02 % inhalation solution 1 mg (1 mg Nebulization Given 7/2/24 2058)   sodium chloride 0.9 % inhalation solution 12 mL (12 mL Nebulization Given 7/2/24 2059)   methylPREDNISolone sodium succinate (Solu-MEDROL) injection 125 mg (125 mg Intravenous Given 7/2/24 2059)     XR chest 1 view portable   ED Interpretation   Loop recorder, no pneumohnia.        Orders Placed This Encounter   Procedures    XR chest 1 view portable    CBC and differential    Comprehensive metabolic panel    HS Troponin 0hr (reflex protocol)    HS Troponin I 2hr    HS Troponin I 4hr    Notify physician of an increase in chest pain, symptomatic hypotension, a  change in cardiac rhythm, or an O2 saturation of less than 90%.    Notify physician immediately if patient has persistent Chest Pain.    Insert peripheral IV    Continuous cardiac monitoring    Continuous pulse oximetry    ECG 12 lead    ECG 12 lead     Labs Reviewed   CBC AND DIFFERENTIAL - Abnormal       Result Value Ref Range Status    WBC 11.52 (*) 4.31 - 10.16 Thousand/uL Final    RBC 4.29  3.88 - 5.62 Million/uL Final    Hemoglobin 10.7 (*) 12.0 - 17.0 g/dL Final    Hematocrit 34.7 (*) 36.5 - 49.3 % Final    MCV 81 (*) 82 - 98 fL Final    MCH 24.9 (*) 26.8 - 34.3 pg Final    MCHC 30.8 (*) 31.4 - 37.4 g/dL Final    RDW 16.2 (*) 11.6 - 15.1 % Final    MPV 10.4  8.9 - 12.7 fL Final    Platelets 392 (*) 149 - 390 Thousands/uL Final    nRBC 0  /100 WBCs Final    Segmented % 59  43 - 75 % Final    Immature Grans % 0  0 - 2 % Final    Lymphocytes % 24  14 - 44 % Final    Monocytes % 14 (*) 4 - 12 % Final    Eosinophils Relative 3  0 - 6 % Final    Basophils Relative 0  0 - 1 % Final    Absolute Neutrophils 6.76  1.85 - 7.62 Thousands/µL Final    Absolute Immature Grans 0.04  0.00 - 0.20 Thousand/uL Final    Absolute Lymphocytes 2.75  0.60 - 4.47 Thousands/µL Final    Absolute Monocytes 1.55 (*) 0.17 - 1.22 Thousand/µL Final    Eosinophils Absolute 0.37  0.00 - 0.61 Thousand/µL Final    Basophils Absolute 0.05  0.00 - 0.10 Thousands/µL Final   COMPREHENSIVE METABOLIC PANEL - Abnormal    Sodium 137  135 - 147 mmol/L Final    Potassium 4.0  3.5 - 5.3 mmol/L Final    Chloride 103  96 - 108 mmol/L Final    CO2 27  21 - 32 mmol/L Final    ANION GAP 7  4 - 13 mmol/L Final    BUN 17  5 - 25 mg/dL Final    Creatinine 0.72  0.60 - 1.30 mg/dL Final    Comment: Standardized to IDMS reference method    Glucose 158 (*) 65 - 140 mg/dL Final    Comment: If the patient is fasting, the ADA then defines impaired fasting glucose as > 100 mg/dL and diabetes as > or equal to 123 mg/dL.    Calcium 8.6  8.4 - 10.2 mg/dL Final    AST 15  13  "- 39 U/L Final    ALT 12  7 - 52 U/L Final    Comment: Specimen collection should occur prior to Sulfasalazine administration due to the potential for falsely depressed results.     Alkaline Phosphatase 72  34 - 104 U/L Final    Total Protein 6.7  6.4 - 8.4 g/dL Final    Albumin 3.8  3.5 - 5.0 g/dL Final    Total Bilirubin 0.28  0.20 - 1.00 mg/dL Final    Comment: Use of this assay is not recommended for patients undergoing treatment with eltrombopag due to the potential for falsely elevated results.  N-acetyl-p-benzoquinone imine (metabolite of Acetaminophen) will generate erroneously low results in samples for patients that have taken an overdose of Acetaminophen.    eGFR 94  ml/min/1.73sq m Final    Narrative:     National Kidney Disease Foundation guidelines for Chronic Kidney Disease (CKD):     Stage 1 with normal or high GFR (GFR > 90 mL/min/1.73 square meters)    Stage 2 Mild CKD (GFR = 60-89 mL/min/1.73 square meters)    Stage 3A Moderate CKD (GFR = 45-59 mL/min/1.73 square meters)    Stage 3B Moderate CKD (GFR = 30-44 mL/min/1.73 square meters)    Stage 4 Severe CKD (GFR = 15-29 mL/min/1.73 square meters)    Stage 5 End Stage CKD (GFR <15 mL/min/1.73 square meters)  Note: GFR calculation is accurate only with a steady state creatinine   HS TROPONIN I 0HR - Normal    hs TnI 0hr 14  \"Refer to ACS Flowchart\"- see link ng/L Final    Comment:                                              Initial (time 0) result  If >=50 ng/L, Myocardial injury suggested ;  Type of myocardial injury and treatment strategy  to be determined.  If 5-49 ng/L, a delta result at 2 hours and or 4 hours will be needed to further evaluate.  If <4 ng/L, and chest pain has been >3 hours since onset, patient may qualify for discharge based on the HEART score in the ED.  If <5 ng/L and <3hours since onset of chest pain, a delta result at 2 hours will be needed to further evaluate.    HS Troponin 99th Percentile URL of a Health Population=12 " ng/L with a 95% Confidence Interval of 8-18 ng/L.    Second Troponin (time 2 hours)  If calculated delta >= 20 ng/L,  Myocardial injury suggested ; Type of myocardial injury and treatment strategy to be determined.  If 5-49 ng/L and the calculated delta is 5-19 ng/L, consult medical service for evaluation.  Continue evaluation for ischemia on ecg and other possible etiology and repeat hs troponin at 4 hours.  If delta is <5 ng/L at 2 hours, consider discharge based on risk stratification via the HEART score (if in ED), or KAIDEN risk score in IP/Observation.    HS Troponin 99th Percentile URL of a Health Population=12 ng/L with a 95% Confidence Interval of 8-18 ng/L.   HS TROPONIN I 2HR   HS TROPONIN I 4HR   LIGHT BLUE TOP     Time reflects when diagnosis was documented in both MDM as applicable and the Disposition within this note       Time User Action Codes Description Comment    7/2/2024  8:55 PM Santo Jimenez Add [J44.1] COPD with acute exacerbation (HCC)           ED Disposition       ED Disposition   Discharge    Condition   Stable    Date/Time   Tue Jul 2, 2024  8:55 PM    Comment   Lucho Talavera discharge to home/self care.                   Follow-up Information       Follow up With Specialties Details Why Contact Info Additional Information    [facility] Emergency Department Emergency Medicine Go to  If symptoms worsen [address] [place] [id number] [phone] Formerly [facility] Emergency Department, [address] [place], [place], [id number] [phone]    [facility], Nurse Practitioner Call today To make appointment for re-eval in 3-5 days [address] [place] [id number] [phone]             Patient's Medications   Discharge Prescriptions    PREDNISONE 10 MG TABLET    Take 4 tablets (40 mg total) by mouth daily for 4 days       Start Date: 7/2/2024  End Date: 7/6/2024       Order Dose: 40 mg        Quantity: 16 tablet    Refills: 0     No discharge procedures on file.  Prior to Admission Medications   Prescriptions Last Dose Informant Patient Reported? Taking?   B Complex Vitamins (VITAMIN B COMPLEX 100 IJ)   Yes No   Sig: Take 1 tablet by mouth daily   Empagliflozin 25 MG TABS   Yes No   Sig: Take 25 mg by mouth in the morning. Indications: Type 2 Diabetes   Fluticasone-Salmeterol (Advair) 500-50 mcg/dose inhaler   No No   Sig: Inhale 1 puff 2 (two) times a day Rinse mouth after use.   Patient not taking: Reported on 2024   Insulin Glargine Solostar (Basaglar KwikPen) 100 UNIT/ML SOPN   No No   Sig: Inject 0.18 mL (18 Units total) under the skin daily at bedtime   LORazepam (ATIVAN) 1 mg tablet   Yes No   Sig: Take 1 mg by mouth every 6 (six) hours as needed for anxiety. this was to be stopped per latest dci   Indications: Feeling Anxious   acetaminophen (TYLENOL) 325 mg tablet   No No   Sig: Take 2 tablets (650 mg total) by mouth every 6 (six) hours as needed (mild pain,headaches,fever)   albuterol (Proventil HFA) 90 mcg/act inhaler   No No   Sig: Inhale 2 puffs every 6 (six) hours as needed for wheezing   Patient not taking: Reported on 2024   apixaban (Eliquis) 5 mg   No No   Sig: Take 1 tablet (5 mg total) by mouth 2 (two) times a day   aspirin 81 mg chewable tablet   Yes No   Sig: Chew 81 mg daily   atorvastatin (LIPITOR) 80 mg tablet   No No   Sig: Take 1 tablet (80 mg total) by mouth daily with dinner   budesonide (PULMICORT) 0.5 mg/2 mL nebulizer solution   No No   Sig: Take 2 mL (0.5 mg total) by nebulization every 12 (twelve) hours Rinse mouth after use.   busPIRone (BUSPAR) 10 mg tablet   No No   Sig: Take 1 tablet (10 mg total) by mouth 3 (three) times a day   ferrous sulfate 325 (65 Fe) mg tablet   No No   Sig: Take 1 tablet (325 mg total) by mouth daily with breakfast   fluticasone (FLONASE) 50 mcg/act nasal spray   Yes No   Si spray into each nostril 2 (two) times a day  "  fluticasone-umeclidinium-vilanterol (Trelegy Ellipta) 200-62.5-25 mcg/actuation AEPB inhaler   Yes No   Sig: Inhale 1 puff daily. Indications: Asthma   folic acid (FOLVITE) 1 mg tablet   No No   Sig: Take 1 tablet (1 mg total) by mouth daily   guaiFENesin (MUCINEX) 600 mg 12 hr tablet   No No   Sig: Take 1 tablet (600 mg total) by mouth 2 (two) times a day   insulin glargine (LANTUS) 100 units/mL subcutaneous injection   Yes No   Sig: Inject 10 Units under the skin 3 (three) times a day with meals. Indications: Type 2 Diabetes   ipratropium-albuterol (DUO-NEB) 0.5-2.5 mg/3 mL nebulizer solution   Yes No   Sig: Take 3 mL by nebulization 4 (four) times a day   lisinopril (ZESTRIL) 10 mg tablet   No No   Sig: Take 1 tablet (10 mg total) by mouth daily Do not start before May 29, 2024.   melatonin 3 mg   No No   Sig: Take 1 tablet (3 mg total) by mouth daily at bedtime as needed (30)   metFORMIN (GLUCOPHAGE) 500 mg tablet   No No   Sig: Take 1 tablet (500 mg total) by mouth 2 (two) times a day with meals   mirtazapine (REMERON) 7.5 MG tablet   No No   Sig: Take 1 tablet (7.5 mg total) by mouth every evening   pantoprazole (Protonix) 40 mg tablet   No No   Sig: Take 1 tablet (40 mg total) by mouth daily in the early morning   predniSONE 20 mg tablet   Yes No   Sig: Take 20 mg by mouth 3 (three) times a day. Indications: COPD   thiamine 100 MG tablet   No No   Sig: Take 1 tablet (100 mg total) by mouth daily   tiotropium (SPIRIVA) 18 mcg inhalation capsule   No No   Sig: Place 1 capsule (18 mcg total) into inhaler and inhale daily   Patient not taking: Reported on 5/30/2024      Facility-Administered Medications: None                        Portions of the record may have been created with voice recognition software. Occasional wrong word or \"sound a like\" substitutions may have occurred due to the inherent limitations of voice recognition software. Read the chart carefully and recognize, using context, where " substitutions have occurred.    Electronically signed by:  Santo Jimenez MD  07/02/24 [id number]

## 2024-07-03 NOTE — DISCHARGE INSTRUCTIONS
Continue to use home inhalers as needed.  Follow-up with PCP.  Please return if worsening shortness of breath, chest pain.

## 2024-07-03 NOTE — PROGRESS NOTES
OP RT CM received incoming ADT alert. Patient recently discharged from ED. OP RT CM will outreach later today.   _________________________________  .OP RT CM called and left a VM requesting a return phone call. I will outreach next week unless I hear back from patient prior.

## 2024-07-03 NOTE — ED PROVIDER NOTES
History  Chief Complaint   Patient presents with    Shortness of Breath     Pt was seen yesterday for a COPD exacerbation, Pt still is having worsening SOB, CP.      Patient is a 69-year-old male with past medical history of cardiac arrest, COPD, CVA, diabetes, a flutter, alcohol abuse, chronic bronchitis, coronary artery disease who presents today with increased shortness of breath.  He was seen in the emergency department yesterday for COPD exacerbation and was given breathing treatment, steroids and he felt fine until this morning when he had increased shortness of breath.  He states that it feels similar to his prior COPD exacerbations.  He denies any chest pain, leg swelling, abdominal pain, nausea or vomiting, vision changes, cough, fever.      Shortness of Breath  Associated symptoms: no abdominal pain, no chest pain, no cough, no fever, no rash and no vomiting        Prior to Admission Medications   Prescriptions Last Dose Informant Patient Reported? Taking?   B Complex Vitamins (VITAMIN B COMPLEX 100 IJ)   Yes No   Sig: Take 1 tablet by mouth daily   Empagliflozin 25 MG TABS   Yes No   Sig: Take 25 mg by mouth in the morning. Indications: Type 2 Diabetes   Fluticasone-Salmeterol (Advair) 500-50 mcg/dose inhaler   No No   Sig: Inhale 1 puff 2 (two) times a day Rinse mouth after use.   Patient not taking: Reported on 5/30/2024   Insulin Glargine Solostar (Basaglar KwikPen) 100 UNIT/ML SOPN   No No   Sig: Inject 0.18 mL (18 Units total) under the skin daily at bedtime   LORazepam (ATIVAN) 1 mg tablet   Yes No   Sig: Take 1 mg by mouth every 6 (six) hours as needed for anxiety. this was to be stopped per latest dci   Indications: Feeling Anxious   acetaminophen (TYLENOL) 325 mg tablet   No No   Sig: Take 2 tablets (650 mg total) by mouth every 6 (six) hours as needed (mild pain,headaches,fever)   albuterol (Proventil HFA) 90 mcg/act inhaler   No No   Sig: Inhale 2 puffs every 6 (six) hours as needed for  wheezing   Patient not taking: Reported on 2024   apixaban (Eliquis) 5 mg   No No   Sig: Take 1 tablet (5 mg total) by mouth 2 (two) times a day   aspirin 81 mg chewable tablet   Yes No   Sig: Chew 81 mg daily   atorvastatin (LIPITOR) 80 mg tablet   No No   Sig: Take 1 tablet (80 mg total) by mouth daily with dinner   budesonide (PULMICORT) 0.5 mg/2 mL nebulizer solution   No No   Sig: Take 2 mL (0.5 mg total) by nebulization every 12 (twelve) hours Rinse mouth after use.   busPIRone (BUSPAR) 10 mg tablet   No No   Sig: Take 1 tablet (10 mg total) by mouth 3 (three) times a day   ferrous sulfate 325 (65 Fe) mg tablet   No No   Sig: Take 1 tablet (325 mg total) by mouth daily with breakfast   fluticasone (FLONASE) 50 mcg/act nasal spray   Yes No   Si spray into each nostril 2 (two) times a day   fluticasone-umeclidinium-vilanterol (Trelegy Ellipta) 200-62.5-25 mcg/actuation AEPB inhaler   Yes No   Sig: Inhale 1 puff daily. Indications: Asthma   folic acid (FOLVITE) 1 mg tablet   No No   Sig: Take 1 tablet (1 mg total) by mouth daily   guaiFENesin (MUCINEX) 600 mg 12 hr tablet   No No   Sig: Take 1 tablet (600 mg total) by mouth 2 (two) times a day   insulin glargine (LANTUS) 100 units/mL subcutaneous injection   Yes No   Sig: Inject 10 Units under the skin 3 (three) times a day with meals. Indications: Type 2 Diabetes   ipratropium-albuterol (DUO-NEB) 0.5-2.5 mg/3 mL nebulizer solution   Yes No   Sig: Take 3 mL by nebulization 4 (four) times a day   lisinopril (ZESTRIL) 10 mg tablet   No No   Sig: Take 1 tablet (10 mg total) by mouth daily Do not start before May 29, 2024.   melatonin 3 mg   No No   Sig: Take 1 tablet (3 mg total) by mouth daily at bedtime as needed (30)   metFORMIN (GLUCOPHAGE) 500 mg tablet   No No   Sig: Take 1 tablet (500 mg total) by mouth 2 (two) times a day with meals   mirtazapine (REMERON) 7.5 MG tablet   No No   Sig: Take 1 tablet (7.5 mg total) by mouth every evening    pantoprazole (Protonix) 40 mg tablet   No No   Sig: Take 1 tablet (40 mg total) by mouth daily in the early morning   predniSONE 10 mg tablet   No No   Sig: Take 4 tablets (40 mg total) by mouth daily for 4 days   predniSONE 20 mg tablet   Yes No   Sig: Take 20 mg by mouth 3 (three) times a day. Indications: COPD   thiamine 100 MG tablet   No No   Sig: Take 1 tablet (100 mg total) by mouth daily   tiotropium (SPIRIVA) 18 mcg inhalation capsule   No No   Sig: Place 1 capsule (18 mcg total) into inhaler and inhale daily   Patient not taking: Reported on 5/30/2024      Facility-Administered Medications: None       Past Medical History:   Diagnosis Date    Cardiac arrest (HCC)     COPD (chronic obstructive pulmonary disease) (HCC)     CVA (cerebral vascular accident) (HCC)     Diabetes mellitus (HCC)     Heart attack (HCC)     Hypertension     Stroke (HCC)        Past Surgical History:   Procedure Laterality Date    CARDIAC ELECTROPHYSIOLOGY PROCEDURE N/A 4/8/2024    Procedure: Cardiac eps/aflutter ablation;  Surgeon: Ihsan Blum DO;  Location: [facility];  Service: Cardiology    CERVICAL FUSION N/A 9/10/2018    Procedure: Posterior cervical decompressive laminectomy C3-6; Posterior cervical lateral mass and pedicle fixation fusion C1-T1;  Surgeon: Papa Quiros MD;  Location: [facility];  Service: Neurosurgery       Family History   Problem Relation Age of Onset    Heart attack Mother      I have reviewed and agree with the history as documented.    E-Cigarette/Vaping    E-Cigarette Use Never User      E-Cigarette/Vaping Substances    Nicotine No     THC No     CBD No     Flavoring No     Other No     Unknown No      Social History     Tobacco Use    Smoking status: Former     Types: Cigarettes    Smokeless tobacco: Never    Tobacco comments:     quit 5 months ago    Vaping Use    Vaping status: Never Used   Substance Use Topics    Alcohol use: Not Currently     Alcohol/week: 8.0 - 12.0 standard drinks  of alcohol     Types: 8 - 12 Cans of beer per week     Comment: every day drinker    Drug use: Never        Review of Systems   Constitutional:  Negative for fever.   Respiratory:  Positive for shortness of breath. Negative for cough.    Cardiovascular:  Negative for chest pain and palpitations.   Gastrointestinal:  Negative for abdominal pain and vomiting.   Genitourinary:  Negative for dysuria and hematuria.   Musculoskeletal:  Negative for arthralgias.   Skin:  Negative for color change and rash.   All other systems reviewed and are negative.      Physical Exam  ED Triage Vitals [07/03/24 1533]   Temperature Pulse Respirations Blood Pressure SpO2   98.3 °F (36.8 °C) 99 (!) 24 166/77 93 %      Temp Source Heart Rate Source Patient Position - Orthostatic VS BP Location FiO2 (%)   Temporal Monitor -- Left arm --      Pain Score       --             Orthostatic Vital Signs  Vitals:    07/03/24 1533   BP: 166/77   Pulse: 99       Physical Exam  Vitals and nursing note reviewed.   Constitutional:       General: He is not in acute distress.     Appearance: He is well-developed.   HENT:      Head: Normocephalic and atraumatic.   Eyes:      Conjunctiva/sclera: Conjunctivae normal.   Cardiovascular:      Rate and Rhythm: Normal rate and regular rhythm.      Heart sounds: No murmur heard.  Pulmonary:      Effort: Pulmonary effort is normal. No respiratory distress.      Breath sounds: Normal breath sounds.   Abdominal:      Palpations: Abdomen is soft.      Tenderness: There is no abdominal tenderness.   Musculoskeletal:         General: No swelling.      Cervical back: Neck supple.   Skin:     General: Skin is warm and dry.      Capillary Refill: Capillary refill takes less than 2 seconds.   Neurological:      Mental Status: He is alert.   Psychiatric:         Mood and Affect: Mood normal.         ED Medications  Medications   magnesium sulfate 2 g/50 mL IVPB (premix) 2 g (2 g Intravenous New Bag 7/3/24 7685)   albuterol  inhalation solution 10 mg (10 mg Nebulization Given 7/3/24 1637)   ipratropium (ATROVENT) 0.02 % inhalation solution 1 mg (1 mg Nebulization Given 7/3/24 1637)   sodium chloride 0.9 % inhalation solution 12 mL (12 mL Nebulization Given 7/3/24 1637)       Diagnostic Studies  Results Reviewed       None                   No orders to display         Procedures  Procedures      ED Course  ED Course as of 07/03/24 1827   Wed Jul 03, 2024   1730 Patient states he is feeling better after breathing treatment.                                       Medical Decision Making  Patient's history physical consistent with COPD exacerbation.  Labs from yesterday were not repeated due to similar presentation and symptoms.  Patient feels much better after heart neb treatment and would like to go home.  Patient no longer feels short of breath/chest tightness.  Other diagnosis is considered however unlikely due to patient presentation and history.    Risk  Prescription drug management.          Disposition  Final diagnoses:   COPD exacerbation (HCC)     Time reflects when diagnosis was documented in both MDM as applicable and the Disposition within this note       Time User Action Codes Description Comment    7/3/2024  6:17 PM Ramos Maher Add [J44.1] COPD exacerbation (HCC)           ED Disposition       ED Disposition   Discharge    Condition   Stable    Date/Time   Wed Jul 3, 2024  6:17 PM    Comment   Lucho Talavera discharge to home/self care.                   Follow-up Information       Follow up With Specialties Details Why Contact Info    [facility], Nurse Practitioner In 1 day Follow-up to adjust home medications and symptom check. [address] [place] [address] [id number] [phone]              Current Discharge Medication List        CONTINUE these medications which have NOT CHANGED    Details   acetaminophen (TYLENOL) 325 mg tablet Take 2 tablets (650 mg total) by mouth every 6 (six) hours  as needed (mild pain,headaches,fever)  Qty: 50 tablet, Refills: 0    Associated Diagnoses: Post-traumatic arthritis of left foot      albuterol (Proventil HFA) 90 mcg/act inhaler Inhale 2 puffs every 6 (six) hours as needed for wheezing  Qty: 6.7 g, Refills: 0    Comments: Substitution to a formulary equivalent within the same pharmaceutical class is authorized.  Associated Diagnoses: Mild intermittent asthma, unspecified whether complicated      apixaban (Eliquis) 5 mg Take 1 tablet (5 mg total) by mouth 2 (two) times a day  Qty: 60 tablet, Refills: 0    Associated Diagnoses: Chronic atrial fibrillation (HCC)      aspirin 81 mg chewable tablet Chew 81 mg daily      atorvastatin (LIPITOR) 80 mg tablet Take 1 tablet (80 mg total) by mouth daily with dinner  Qty: 30 tablet, Refills: 0    Associated Diagnoses: Type 2 diabetes mellitus without complication, without long-term current use of insulin (HCC)      B Complex Vitamins (VITAMIN B COMPLEX 100 IJ) Take 1 tablet by mouth daily      budesonide (PULMICORT) 0.5 mg/2 mL nebulizer solution Take 2 mL (0.5 mg total) by nebulization every 12 (twelve) hours Rinse mouth after use.  Refills: 0    Associated Diagnoses: Fall, initial encounter      busPIRone (BUSPAR) 10 mg tablet Take 1 tablet (10 mg total) by mouth 3 (three) times a day  Qty: 90 tablet, Refills: 0    Associated Diagnoses: Anxiety      Empagliflozin 25 MG TABS Take 25 mg by mouth in the morning. Indications: Type 2 Diabetes      ferrous sulfate 325 (65 Fe) mg tablet Take 1 tablet (325 mg total) by mouth daily with breakfast  Qty: 30 tablet    Associated Diagnoses: Acute blood loss anemia      fluticasone (FLONASE) 50 mcg/act nasal spray 1 spray into each nostril 2 (two) times a day      Fluticasone-Salmeterol (Advair) 500-50 mcg/dose inhaler Inhale 1 puff 2 (two) times a day Rinse mouth after use.  Qty: 60 blister, Refills: 0    Comments: Substitution to a formulary equivalent within the same pharmaceutical  class is authorized.  Associated Diagnoses: COPD with acute exacerbation (HCC)      fluticasone-umeclidinium-vilanterol (Trelegy Ellipta) 200-62.5-25 mcg/actuation AEPB inhaler Inhale 1 puff daily. Indications: Asthma      folic acid (FOLVITE) 1 mg tablet Take 1 tablet (1 mg total) by mouth daily  Refills: 0    Associated Diagnoses: Fall, initial encounter      guaiFENesin (MUCINEX) 600 mg 12 hr tablet Take 1 tablet (600 mg total) by mouth 2 (two) times a day  Qty: 60 tablet, Refills: 0    Associated Diagnoses: Chronic obstructive pulmonary disease, unspecified COPD type ([facility])      insulin glargine (LANTUS) 100 units/mL subcutaneous injection Inject 10 Units under the skin 3 (three) times a day with meals. Indications: Type 2 Diabetes      Insulin Glargine Solostar (Basaglar KwikPen) 100 UNIT/ML SOPN Inject 0.18 mL (18 Units total) under the skin daily at bedtime  Qty: 5 mL, Refills: 0    Associated Diagnoses: Type 2 diabetes mellitus without complication, without long-term current use of insulin ([facility])      ipratropium-albuterol (DUO-NEB) 0.5-2.5 mg/3 mL nebulizer solution Take 3 mL by nebulization 4 (four) times a day      lisinopril (ZESTRIL) 10 mg tablet Take 1 tablet (10 mg total) by mouth daily Do not start before May 29, 2024.  Qty: 30 tablet, Refills: 0    Associated Diagnoses: Primary hypertension      LORazepam (ATIVAN) 1 mg tablet Take 1 mg by mouth every 6 (six) hours as needed for anxiety. this was to be stopped per latest dci   Indications: Feeling Anxious      melatonin 3 mg Take 1 tablet (3 mg total) by mouth daily at bedtime as needed (30)  Qty: 30 tablet, Refills: 0    Associated Diagnoses: Closed odontoid fracture with routine healing      metFORMIN (GLUCOPHAGE) 500 mg tablet Take 1 tablet (500 mg total) by mouth 2 (two) times a day with meals  Qty: 60 tablet, Refills: 0    Associated Diagnoses: Type 2 diabetes mellitus without complication, without long-term current use of insulin ([facility])       mirtazapine (REMERON) 7.5 MG tablet Take 1 tablet (7.5 mg total) by mouth every evening  Qty: 30 tablet, Refills: 0    Associated Diagnoses: Anxiety      pantoprazole (Protonix) 40 mg tablet Take 1 tablet (40 mg total) by mouth daily in the early morning  Qty: 30 tablet, Refills: 0    Associated Diagnoses: Gastroesophageal reflux disease, unspecified whether esophagitis present      !! predniSONE 10 mg tablet Take 4 tablets (40 mg total) by mouth daily for 4 days  Qty: 16 tablet, Refills: 0    Associated Diagnoses: COPD with acute exacerbation (HCC)      !! predniSONE 20 mg tablet Take 20 mg by mouth 3 (three) times a day. Indications: COPD      thiamine 100 MG tablet Take 1 tablet (100 mg total) by mouth daily  Qty: 30 tablet, Refills: 0    Associated Diagnoses: Alcohol abuse      tiotropium (SPIRIVA) 18 mcg inhalation capsule Place 1 capsule (18 mcg total) into inhaler and inhale daily  Qty: 30 capsule, Refills: 0    Associated Diagnoses: COPD with acute exacerbation (HCC)       !! - Potential duplicate medications found. Please discuss with provider.        No discharge procedures on file.    PDMP Review         Value Time User    PDMP Reviewed  Yes 5/28/2024  7:29 AM Ashley Depadua, MD             ED Provider  Attending physically available and evaluated Lucho ALAN Talavera. I managed the patient along with the ED Attending.    Electronically Signed by           Ramos Maher DO  07/03/24 3848

## 2024-07-03 NOTE — ED ATTENDING ATTESTATION
7/3/2024  I, Gene Zavala DO, saw and evaluated the patient. I have discussed the patient with the resident/non-physician practitioner and agree with the resident's/non-physician practitioner's findings, Plan of Care, and MDM as documented in the resident's/non-physician practitioner's note, except where noted. All available labs and Radiology studies were reviewed.  I was present for key portions of any procedure(s) performed by the resident/non-physician practitioner and I was immediately available to provide assistance.       At this point I agree with the current assessment done in the Emergency Department.  I have conducted an independent evaluation of this patient a history and physical is as follows:    68 yo male w/COPD presents for evaluation of continued dyspnea. Was here yesterday for same, had labs, CXR, nebs with improvement. No concerning findings regarding workup. No new symptoms today.     Denies fever, chest pain, leg pain or swelling.    Imp: acute COPD exacerbation plan: [place] neb, IV mag, reassess.          ED Course         Critical Care Time  CriticalCare Time    Date/Time: 7/3/2024 4:27 PM    Performed by: Gene Zavala DO  Authorized by: Gene Zavala DO    Critical care provider statement:     Critical care time (minutes):  35    Critical care time was exclusive of:  Separately billable procedures and treating other patients and teaching time    Critical care was necessary to treat or prevent imminent or life-threatening deterioration of the following conditions:  Respiratory failure    Critical care was time spent personally by me on the following activities:  Obtaining history from patient or surrogate, development of treatment plan with patient or surrogate, examination of patient, evaluation of patient's response to treatment, review of old charts, re-evaluation of patient's condition and ordering and performing treatments and interventions

## 2024-07-03 NOTE — DISCHARGE INSTRUCTIONS
If you have worsening symptoms please return for reevaluation as we discussed.  Please take the steroids for the full course.  Please use your inhalers every 6 hours to help her symptoms, if you need to use it more often that please return to the emergency room for reevaluation.  If you have any other concerns, you know the drill

## 2024-07-05 ENCOUNTER — PATIENT OUTREACH (OUTPATIENT)
Dept: CASE MANAGEMENT | Facility: OTHER | Age: 69
End: 2024-07-05

## 2024-07-05 NOTE — PROGRESS NOTES
OP RT CM received incoming ADT alert. Patient was in ED for COPD exacerbation.  OP RT CM will outreach later today.   _________________________________  .OP RT CM called and left a VM requesting a return phone call. I will outreach next week unless I hear back from patient prior.

## 2024-07-11 ENCOUNTER — PATIENT OUTREACH (OUTPATIENT)
Dept: CASE MANAGEMENT | Facility: OTHER | Age: 69
End: 2024-07-11

## 2024-07-11 NOTE — PROGRESS NOTES
.OP RT CM called and unable to leave VM due to mailbox being full. SMS message left.   __________________________________.OP RT CM called and left a VM on daughter Nim[employer] # requesting a return phone call. I will outreach in two weeks unless I hear back from patient prior.    Attending Attestation (For Attendings USE Only)...

## 2024-07-17 ENCOUNTER — TELEPHONE (OUTPATIENT)
Age: 69
End: 2024-07-17

## 2024-07-17 NOTE — TELEPHONE ENCOUNTER
Hello,     Can you please advise which Speciality/Team and if patient can be seen by an Resident, AP and or Attending  only?    Thank you for your time,     Tonia POOLE/ LOBITO RASMUSSEN / STROKE     [place]- [place]- 5/29/2024    Outpatient follow-up with neurology

## 2024-07-24 ENCOUNTER — PATIENT OUTREACH (OUTPATIENT)
Dept: CASE MANAGEMENT | Facility: OTHER | Age: 69
End: 2024-07-24

## 2024-08-06 ENCOUNTER — PATIENT OUTREACH (OUTPATIENT)
Dept: CASE MANAGEMENT | Facility: OTHER | Age: 69
End: 2024-08-06

## 2024-08-06 NOTE — LETTER
08/06/24    Dear Lucho Talavera,    .  This is your [facility] Care Management Respiratory Therapist Team reaching out to check on your breathing status     We have not been able to reach you and would like to set a time to talk with you over the phone.  Our work is to help patients that have complex respiratory conditions get the care they need. This includes patients who may have been in the hospital or in the emergency room.    Please reach out to Lauren at [phone], Tali or Verena at the phone numbers below. We look forward to speaking with you.      Sincerely,    Lauren Golden RCP, CRT  Outpatient Respiratory Care Manager  [phone]    ROBERTA Maldonado RRT  Outpatient Rspiratory Care Manager  [phone]    Verena Medrano RCP, CRT  Outpatient Respiratory Care Manager  [phone]    Care Management Team  [facility], [address] [place] [address]

## 2024-08-06 NOTE — PROGRESS NOTES
.OP RT CM called patient and requested a return phone call.     Unable to reach letter sent via standard mail and [facility].      OP RT CM will discontinue outreach if there is no response from patient within two weeks.  In basket message received.

## 2025-02-11 NOTE — PROGRESS NOTES
Progress Note - Pulmonary   Woody English 76 y o  male MRN: [id number]  [facility]: [id number]      Assessment/plan:  1  COPD with acute exacerbation  -likely asthma/COPD overlap syndrome  History of severe eosinophilia with 720 cells 2018   -outpatient IgG and [place] allergy panel to evaluate potential benefit from anti IL 5/Xolair if qualifies  - continue nebulization with Xopenex/Atrovent Q 6   -Pulmicort nebulization b i d   -discontinued Solu-Medrol  Prednisone 40 mg daily starting tomorrow with taper of 10 mg every 3 days  -out of bed increase activity as tolerated  Pulmonary toilet  Incentive spirometry   -outpatient pulmonary function testing  -discussed importance of compliance with pulmonary medications and CPAP  -patient should have yearly CT lung scan screening  I reviewed [facility] CT of chest report from February 2020 showing few tiny pulmonary nodules up to 3 mm left upper lobe  - patient reports has samples of Trelegy at home which were given to him by his pulmonologist   Will continue 1 inhalation daily on discharge  Should also continue his albuterol HFA/nebulization Q 4 p r n  Melodie Hawkins --patient will follow with Dr Dusty Jolly pulmonologist at [address] [place] [address] 321  Pulmonary signing off  Please call if needed  Okay for DC from pulmonary standpoint  2  KLARISSA  -per [facility] pulmonary note patient was supposed to be on CPAP 14 cm H2O q h s     Patient reports noncompliance  -continue as outpatient  Subjective:   Patient seen examined  Sitting up in bed no apparent distressed on room air with adequate SpO2 at rest   Denies any significant cough  States breathing much easier compared with yesterday  Denies any pain  Patient now admits that he does have Trelegy at home which she does use once daily  Said received samples from his pulmonologist at [address] [place] [address] 321  Objective:     Vitals: Blood pressure 107/60, pulse 75, temperature 97 7 °F (36 5 °C), resp   rate 18, height 6' 1" (1 854 m), weight 75 7 kg (166 lb 14 2 oz), SpO2 93 % , room air, Body mass index is 22 02 kg/m²  Intake/Output Summary (Last 24 hours) at 2/20/2020 0946  Last data filed at 2/20/2020 0935  Gross per 24 hour   Intake 1920 ml   Output 4825 ml   Net -2905 ml         Physical Exam  Gen: Awake, alert,no acute distress  HEENT: Mucous membranes moist  NECK: No accessory muscle use  Cardiac: Regular, single S1, single S2  Lungs:  Diminished breath sounds throughout  Currently no no wheezing noted  Abdomen: normoactive bowel sounds, soft nontender  Extremities: no cyanosis, no clubbing, no edema    Labs: I have personally reviewed pertinent lab results  , ABG: No results found for: PHART, UDO9OPM, PO2ART, LAX0AWP, I1LMJTLM, BEART, SOURCE, BNP: No results found for: BNP, CBC: No results found for: WBC, HGB, HCT, MCV, PLT, ADJUSTEDWBC, MCH, MCHC, RDW, MPV, NRBC, CMP: No results found for: SODIUM, K, CL, CO2, ANIONGAP, BUN, CREATININE, GLUCOSE, CALCIUM, AST, ALT, ALKPHOS, PROT, BILITOT, EGFR, PT/INR: No results found for: PT, INR, Troponin: No results found for: TROPONINI  Imaging and other studies: I have personally reviewed pertinent reports     and I have personally reviewed pertinent films in PACS      Arden Tamayo PA-C 75

## (undated) DEVICE — NEURO PATTIES 1/2 X 3

## (undated) DEVICE — NEEDLE 25G X 1 1/2

## (undated) DEVICE — TOOL 9MH30 LEGEND 9CM 3MM MH: Brand: MIDAS REX

## (undated) DEVICE — GLOVE INDICATOR PI UNDERGLOVE SZ 8.5 BLUE

## (undated) DEVICE — BOWL ASSY BM210 DUAL BLADE DISPOSABLE: Brand: MIDAS REX™

## (undated) DEVICE — SET SCREW 3600315 STANDARD
Type: IMPLANTABLE DEVICE | Site: POSTERIOR CERVICAL | Status: NON-FUNCTIONAL
Brand: INFINITY™ OCCIPITOCERVICAL UPPER THORACIC SYSTEM

## (undated) DEVICE — BLADE ELECTRODE: Brand: EDGE

## (undated) DEVICE — PINNACLE INTRODUCER SHEATH: Brand: PINNACLE

## (undated) DEVICE — TRAY FOLEY 16FR URIMETER SURESTEP

## (undated) DEVICE — DRAPE SURGIKIT SADDLE BAG

## (undated) DEVICE — SURGICEL 2 X 3

## (undated) DEVICE — PREP SURGICAL PURPREP 26ML

## (undated) DEVICE — SPECIMEN CONTAINER STERILE PEEL PACK

## (undated) DEVICE — SPONGE PVP SCRUB WING STERILE

## (undated) DEVICE — INTENDED FOR TISSUE SEPARATION, AND OTHER PROCEDURES THAT REQUIRE A SHARP SURGICAL BLADE TO PUNCTURE OR CUT.: Brand: BARD-PARKER ® CARBON RIB-BACK BLADES

## (undated) DEVICE — INTENDED FOR TISSUE SEPARATION, AND OTHER PROCEDURES THAT REQUIRE A SHARP SURGICAL BLADE TO PUNCTURE OR CUT.: Brand: BARD-PARKER SAFETY BLADES SIZE 15, STERILE

## (undated) DEVICE — NEURO SURGICAL CLIPPER BLADE

## (undated) DEVICE — DRILL BIT G3606010 2.4MM

## (undated) DEVICE — SNAP KOVER: Brand: UNBRANDED

## (undated) DEVICE — CATH EP  INQUIRY 6F 2-5-2MM  XLRG CRV STERABLE  DECAPOLAR 110CM

## (undated) DEVICE — BETADINE OINTMENT FOIL PACK

## (undated) DEVICE — MAYFIELD® DISPOSABLE ADULT SKULL PIN (PLASTIC BASE): Brand: MAYFIELD®

## (undated) DEVICE — PLUMEPEN PRO 10FT

## (undated) DEVICE — TACTIFLEX SE BI D CURVE F-J

## (undated) DEVICE — CATH EP  INQUIRY 6F 2-5-2MM  MED CRV STERABLE  DECAPOLAR 110CM

## (undated) DEVICE — DRAPE EQUIPMENT RF WAND

## (undated) DEVICE — DRAPE SHEET X-LG

## (undated) DEVICE — GUIDE SHEATH SRO 8.5 FR

## (undated) DEVICE — FLOSEAL MATRIX IS INDICATED IN SURGICAL PROCEDURES (OTHER THAN IN OPHTHALMIC) AS AN ADJUNCT TO HEMOSTASIS WHEN CONTROL OF BLEEDING BY LIGATURE OR CONVENTIONAL PROCEDURES IS INEFFECTIVE OR IMPRACTICAL.: Brand: FLOSEAL HEMOSTATIC MATRIX

## (undated) DEVICE — TIBURON SPLIT SHEET: Brand: CONVERTORS

## (undated) DEVICE — PROXIMATE PLUS MD MULTI-DIRECTIONAL RELEASE SKIN STAPLERS CONTAINS 35 STAINLESS STEEL STAPLES APPROXIMATE CLOSED DIMENSIONS: 6.9MM X 3.9MM WIDE: Brand: PROXIMATE

## (undated) DEVICE — BIPOLAR SEALER 23-113-1 AQM 2.3: Brand: AQUAMANTYS™

## (undated) DEVICE — SUT PROLENE 3-0 V-7 36 IN 8976H

## (undated) DEVICE — DRESSING MEPILEX AG BORDER 4 X 8 IN

## (undated) DEVICE — BETHLEHEM UNIVERSAL SPINE, KIT: Brand: CARDINAL HEALTH

## (undated) DEVICE — ANTIBACTERIAL VIOLET BRAIDED (POLYGLACTIN 910), SYNTHETIC ABSORBABLE SUTURE: Brand: COATED VICRYL

## (undated) DEVICE — GLOVE SRG BIOGEL 8

## (undated) DEVICE — LIGHT HANDLE COVER SLEEVE DISP BLUE STELLAR